# Patient Record
Sex: FEMALE | Race: WHITE | NOT HISPANIC OR LATINO | Employment: OTHER | URBAN - METROPOLITAN AREA
[De-identification: names, ages, dates, MRNs, and addresses within clinical notes are randomized per-mention and may not be internally consistent; named-entity substitution may affect disease eponyms.]

---

## 2017-01-10 ENCOUNTER — APPOINTMENT (OUTPATIENT)
Dept: LAB | Facility: HOSPITAL | Age: 57
End: 2017-01-10

## 2017-01-10 ENCOUNTER — OFFICE VISIT (OUTPATIENT)
Dept: INFECTIOUS DISEASES | Facility: CLINIC | Age: 57
End: 2017-01-10

## 2017-01-10 VITALS
HEART RATE: 66 BPM | HEIGHT: 67 IN | DIASTOLIC BLOOD PRESSURE: 83 MMHG | WEIGHT: 138.8 LBS | BODY MASS INDEX: 21.79 KG/M2 | TEMPERATURE: 98 F | SYSTOLIC BLOOD PRESSURE: 131 MMHG

## 2017-01-10 DIAGNOSIS — M81.0 OSTEOPOROSIS: ICD-10-CM

## 2017-01-10 DIAGNOSIS — G89.29 CHRONIC PAIN OF RIGHT KNEE: ICD-10-CM

## 2017-01-10 DIAGNOSIS — B20 HIV (HUMAN IMMUNODEFICIENCY VIRUS INFECTION) (HCC): Primary | ICD-10-CM

## 2017-01-10 DIAGNOSIS — M25.561 CHRONIC PAIN OF RIGHT KNEE: ICD-10-CM

## 2017-01-10 LAB
ALBUMIN SERPL-MCNC: 4.9 G/DL (ref 3.5–5.2)
ALBUMIN/GLOB SERPL: 2 G/DL
ALP SERPL-CCNC: 74 U/L (ref 39–117)
ALT SERPL W P-5'-P-CCNC: 15 U/L (ref 1–33)
ANION GAP SERPL CALCULATED.3IONS-SCNC: 13.9 MMOL/L
AST SERPL-CCNC: 20 U/L (ref 1–32)
BASOPHILS # BLD AUTO: 0.03 10*3/MM3 (ref 0–0.2)
BASOPHILS NFR BLD AUTO: 0.6 % (ref 0–1.5)
BILIRUB SERPL-MCNC: 0.3 MG/DL (ref 0.1–1.2)
BUN BLD-MCNC: 9 MG/DL (ref 6–20)
BUN/CREAT SERPL: 13.4 (ref 7–25)
CALCIUM SPEC-SCNC: 9.6 MG/DL (ref 8.6–10.5)
CHLORIDE SERPL-SCNC: 102 MMOL/L (ref 98–107)
CO2 SERPL-SCNC: 27.1 MMOL/L (ref 22–29)
CREAT BLD-MCNC: 0.67 MG/DL (ref 0.57–1)
DEPRECATED RDW RBC AUTO: 47.5 FL (ref 37–54)
EOSINOPHIL # BLD AUTO: 0.05 10*3/MM3 (ref 0–0.7)
EOSINOPHIL NFR BLD AUTO: 1 % (ref 0.3–6.2)
ERYTHROCYTE [DISTWIDTH] IN BLOOD BY AUTOMATED COUNT: 12.6 % (ref 11.7–13)
GFR SERPL CREATININE-BSD FRML MDRD: 91 ML/MIN/1.73
GLOBULIN UR ELPH-MCNC: 2.5 GM/DL
GLUCOSE BLD-MCNC: 90 MG/DL (ref 65–99)
HCT VFR BLD AUTO: 42.5 % (ref 35.6–45.5)
HGB BLD-MCNC: 13.7 G/DL (ref 11.9–15.5)
IMM GRANULOCYTES # BLD: 0 10*3/MM3 (ref 0–0.03)
IMM GRANULOCYTES NFR BLD: 0 % (ref 0–0.5)
LYMPHOCYTES # BLD AUTO: 2.33 10*3/MM3 (ref 0.9–4.8)
LYMPHOCYTES NFR BLD AUTO: 48.6 % (ref 19.6–45.3)
MCH RBC QN AUTO: 33.1 PG (ref 26.9–32)
MCHC RBC AUTO-ENTMCNC: 32.2 G/DL (ref 32.4–36.3)
MCV RBC AUTO: 102.7 FL (ref 80.5–98.2)
MONOCYTES # BLD AUTO: 0.41 10*3/MM3 (ref 0.2–1.2)
MONOCYTES NFR BLD AUTO: 8.6 % (ref 5–12)
NEUTROPHILS # BLD AUTO: 1.97 10*3/MM3 (ref 1.9–8.1)
NEUTROPHILS NFR BLD AUTO: 41.2 % (ref 42.7–76)
PLATELET # BLD AUTO: 258 10*3/MM3 (ref 140–500)
PMV BLD AUTO: 10.2 FL (ref 6–12)
POTASSIUM BLD-SCNC: 4.1 MMOL/L (ref 3.5–5.2)
PROT SERPL-MCNC: 7.4 G/DL (ref 6–8.5)
RBC # BLD AUTO: 4.14 10*6/MM3 (ref 3.9–5.2)
SODIUM BLD-SCNC: 143 MMOL/L (ref 136–145)
WBC NRBC COR # BLD: 4.79 10*3/MM3 (ref 4.5–10.7)

## 2017-01-10 PROCEDURE — 87536 HIV-1 QUANT&REVRSE TRNSCRPJ: CPT | Performed by: INTERNAL MEDICINE

## 2017-01-10 PROCEDURE — 36415 COLL VENOUS BLD VENIPUNCTURE: CPT | Performed by: INTERNAL MEDICINE

## 2017-01-10 PROCEDURE — 99214 OFFICE O/P EST MOD 30 MIN: CPT | Performed by: INTERNAL MEDICINE

## 2017-01-10 PROCEDURE — 85025 COMPLETE CBC W/AUTO DIFF WBC: CPT | Performed by: INTERNAL MEDICINE

## 2017-01-10 PROCEDURE — 86361 T CELL ABSOLUTE COUNT: CPT | Performed by: INTERNAL MEDICINE

## 2017-01-10 PROCEDURE — 80053 COMPREHEN METABOLIC PANEL: CPT | Performed by: INTERNAL MEDICINE

## 2017-01-10 NOTE — PROGRESS NOTES
CC: f/u HIV    HPI: Richa Dolan is a 56 y.o. female here for f/u of her HIV care. She continues to take Triumeq without any adverse effects. She has not missed any doses. She receives refills in a timely manner through [facility]. She has not had any obvious manifestations of her HIV disease. She has been dealing with some R knee pain worse w/ walking. No associated fevers or swelling or erythema. She is seeing ortho for this and contemplating TKA.     She has had some life stressors recently with father having dementia and her mother unable to care for him. Her son is applying for internships and requiring some heavy travel.    Review of Systems: no n/v/d; no CP or SOA    PMH: HIV, headaches, GERD, HLD    PSH: tonsillectomy, hysterectomy, cholecystectomy    FH: father had dementia    SH: , 1 son, no tobacco use, occasional EtOH, no illicits, works at [employer]    Allergies: Augmentin (hives)    Medications:   Current Outpatient Prescriptions:   •  baclofen (LIORESAL) 10 MG tablet, TAKE 1 TABLET BY MOUTH EVERY NIGHT AT BEDTIME, Disp: , Rfl: 3  •  calcium carbonate (TUMS) 500 MG chewable tablet, Chew 1,000 mg., Disp: , Rfl:   •  colesevelam (WELCHOL) 625 MG tablet, Take 2 tablets by mouth Daily., Disp: 180 tablet, Rfl: 3  •  diclofenac (VOLTAREN) 1 % gel gel, Apply 4 g topically 2 (two) times a day. Use per pkg insert, Disp: 100 g, Rfl: 2  •  escitalopram (LEXAPRO) 20 MG tablet, Take by mouth., Disp: , Rfl:   •  fluticasone (FLONASE) 50 MCG/ACT nasal spray, , Disp: , Rfl:   •  gabapentin (NEURONTIN) 300 MG capsule, , Disp: , Rfl:   •  HYDROcodone-acetaminophen (NORCO)  MG per tablet, Take by mouth., Disp: , Rfl:   •  ibandronate (BONIVA) 150 MG tablet, Take 1 tablet by mouth every 30 (thirty) days., Disp: 3 tablet, Rfl: 3  •  rizatriptan (MAXALT) 10 MG tablet, Take one tablet at onset of headache; may repeat one time in 2 hours if needed., Disp: 12 tablet, Rfl: 6  •  TRIUMEQ 600- MG tablet, TAKE 1  "TABLET BY MOUTH EVERY DAY, Disp: 90 tablet, Rfl: 3  •  zolpidem (AMBIEN) 10 MG tablet, Take 1 tablet by mouth At Night As Needed for sleep., Disp: 90 tablet, Rfl: 0        Blood pressure 131/83, pulse 66, temperature 98 °F (36.7 °C), height 67\" (170.2 cm), weight 138 lb 12.8 oz (63 kg).  GENERAL: Awake and alert, in no acute distress, very nice  HEENT: Oropharynx is clear. Good dentition. No thrush.  EYES: PERRL. EOMI. No scleral icterus.    LYMPHATICS: No lymphadenopathy of the neck region  HEART: Normal rate, regular rhythm.  No peripheral edema.   LUNGS: . Normal work of breathing on ambient air.    ABDOMEN: Soft, nontender, nondistended  SKIN: Warm and dry without cutaneous eruptions   PSYCHIATRIC: Appropriate mood, affect, insight, and judgment.   MSK: R knee crepitus      DIAGNOSTICS:  CD4 817/40% (7/2016)  VL < 20 (7/2016)  HIV Genotype: unknown bc undetectable  [id number] - negative  QF Gold - negative (7/2016)  RPR - nonreactive (10/2016)  Hep A - immune (7/2016)  Hep B - immune (9/2015)  Hep C - negative (9/2015)  Urine GCCT - negative (9/2015)    Lab Results   Component Value Date    WBC 4.1 07/06/2016    HGB 13.7 07/06/2016    HCT 39.8 07/06/2016     07/06/2016     Lab Results   Component Value Date    GLUCOSE 87 10/13/2016    BUN 13 10/13/2016    CREATININE 0.65 10/13/2016    EGFRIFNONA 94 10/13/2016    EGFRIFAFRI >60 05/28/2015    BCR 20.0 10/13/2016    CO2 27.1 10/13/2016    CALCIUM 9.3 10/13/2016    PROTENTOTREF 7.2 10/13/2016    ALBUMIN 4.80 10/13/2016    ALBUMIN 4.2 10/13/2016    LABIL2 2.1 10/13/2016    LABIL2 1.4 10/13/2016    AST 18 10/13/2016    ALT 16 10/13/2016     Radiology: no new imaging    ASSESSMENT/PLAN:    1. HIV (human immunodeficiency virus infection) - well controlled on ART  -continue Triumeq; refills addressed  -check CD4, VL, CBC, CMP today  -encouraged 100% compliance with therapy which she has already achieved for years now    2. Osteoporosis    3. HLD - resolved with diet " modification as of labs 1/2016    4. Chronic knee pain - receiving injections with orthopedics; contemplating replacement    5. Health Maintenance: mammogram and colonoscopoy per PCP. If she has never had a Prevnar-13 then she needs that too.    RTC 6 months

## 2017-01-10 NOTE — MR AVS SNAPSHOT
Richa Dolan   1/10/2017 1:00 PM   Office Visit    Dept Phone:  [phone]   Encounter #:  [id number]    Provider:  Denilson Dawn MD   Department:  [facility]                Your Full Care Plan              Your Updated Medication List          This list is accurate as of: 1/10/17  1:19 PM.  Always use your most recent med list.                baclofen 10 MG tablet   Commonly known as:  LIORESAL       calcium carbonate 500 MG chewable tablet   Commonly known as:  TUMS       colesevelam 625 MG tablet   Commonly known as:  WELCHOL   Take 2 tablets by mouth Daily.       diclofenac 1 % gel gel   Commonly known as:  VOLTAREN   Apply 4 g topically 2 (two) times a day. Use per pkg insert       escitalopram 20 MG tablet   Commonly known as:  LEXAPRO       fluticasone 50 MCG/ACT nasal spray   Commonly known as:  FLONASE       gabapentin 300 MG capsule   Commonly known as:  NEURONTIN       HYDROcodone-acetaminophen  MG per tablet   Commonly known as:  NORCO       ibandronate 150 MG tablet   Commonly known as:  BONIVA   Take 1 tablet by mouth every 30 (thirty) days.       rizatriptan 10 MG tablet   Commonly known as:  MAXALT   Take one tablet at onset of headache; may repeat one time in 2 hours if needed.       TRIUMEQ 600- MG tablet   Generic drug:  Abacavir-Dolutegravir-Lamivud   TAKE 1 TABLET BY MOUTH EVERY DAY       zolpidem 10 MG tablet   Commonly known as:  AMBIEN   Take 1 tablet by mouth At Night As Needed for sleep.               We Performed the Following     CBC & Differential     Comprehensive Metabolic Panel     HIV RNA Real Time PCR (Non-Graph)     T-helper Cells (CD4) Count       You Were Diagnosed With        Codes Comments    HIV (human immunodeficiency virus infection)    -  Primary ICD-10-CM: Z21  ICD-9-CM: V08     Chronic pain of right knee     ICD-10-CM: M25.561, G89.29  ICD-9-CM: 719.46, 338.29     Osteoporosis     ICD-10-CM:  "M81.0  ICD-9-CM: 733.00       Instructions     None    Patient Instructions History      Upcoming Appointments     Visit Type Date Time Department    FOLLOW UP 1/10/2017  1:00 PM [facility]    FOLLOW UP 7/10/2017 10:40 AM [facility]      MyChart Signup     [facility] [employer] allows you to send messages to your doctor, view your test results, renew your prescriptions, schedule appointments, and more. To sign up, go to [employer] and click on the Sign Up Now link in the New User? box. Enter your [employer] Activation Code exactly as it appears below along with the last four digits of your Social Security Number and your Date of Birth () to complete the sign-up process. If you do not sign up before the expiration date, you must request a new code.    [employer] Activation Code: [email or web address]  Expires: 2017  1:19 PM    If you have questions, you can email [email or web address] or call [phone] to talk to our [employer] staff. Remember, [employer] is NOT to be used for urgent needs. For medical emergencies, dial 911.               Other Info from Your Visit           Your Appointments     Jul 10, 2017 10:40 AM EDT   Follow Up with Denilson Dawn MD   [facility] (--)    [address] [place] [id number] [phone]           Arrive 15 minutes prior to appointment.              Allergies     Augmentin [Amoxicillin-pot Clavulanate]  Hives      Reason for Visit     Follow-up           Vital Signs     Blood Pressure Pulse Temperature Height Weight Body Mass Index    131/83 66 98 °F (36.7 °C) 67\" (170.2 cm) 138 lb 12.8 oz (63 kg) 21.74 kg/m2    Smoking Status                   Never Smoker           Problems and Diagnoses Noted     Osteoporosis    Chronic pain of right knee            "

## 2017-01-11 ENCOUNTER — TELEPHONE (OUTPATIENT)
Dept: INFECTIOUS DISEASES | Facility: CLINIC | Age: 57
End: 2017-01-11

## 2017-01-11 DIAGNOSIS — B20 HIV (HUMAN IMMUNODEFICIENCY VIRUS INFECTION) (HCC): Primary | ICD-10-CM

## 2017-01-11 LAB
BASOPHILS # BLD AUTO: 0 X10E3/UL (ref 0–0.2)
BASOPHILS NFR BLD AUTO: 0 %
CD3+CD4+ CELLS # BLD: 1028 /UL (ref 359–1519)
CD3+CD4+ CELLS NFR BLD: 44.7 % (ref 30.8–58.5)
EOSINOPHIL # BLD AUTO: 0 X10E3/UL (ref 0–0.4)
EOSINOPHIL # BLD AUTO: 1 %
ERYTHROCYTE [DISTWIDTH] IN BLOOD BY AUTOMATED COUNT: 13 % (ref 12.3–15.4)
HCT VFR BLD AUTO: 39.9 % (ref 34–46.6)
HGB BLD-MCNC: 13.6 G/DL (ref 11.1–15.9)
HIV1 RNA # SERPL NAA+PROBE: 140 COPIES/ML
IMM GRANULOCYTES # BLD: 0 X10E3/UL (ref 0–0.1)
IMM GRANULOCYTES NFR BLD: 0 %
LOG10 HIV-1 RNA: 2.15 LOG10COPY/ML
LYMPHOCYTES # BLD AUTO: 2.3 X10E3/UL (ref 0.7–3.1)
LYMPHOCYTES NFR BLD AUTO: 49 %
MCH RBC QN AUTO: 32.8 PG (ref 26.6–33)
MCHC RBC AUTO-ENTMCNC: 34.1 G/DL (ref 31.5–35.7)
MCV RBC AUTO: 96 FL (ref 79–97)
MONOCYTES # BLD AUTO: 0.4 X10E3/UL (ref 0.1–0.9)
MONOCYTES NFR BLD AUTO: 8 %
NEUTROPHILS # BLD AUTO: 2 X10E3/UL (ref 1.4–7)
NEUTROPHILS NFR BLD AUTO: 42 %
PLATELET # BLD AUTO: 269 X10E3/UL (ref 150–379)
RBC # BLD AUTO: 4.15 X10E6/UL (ref 3.77–5.28)
WBC # BLD AUTO: 4.7 X10E3/UL (ref 3.4–10.8)

## 2017-02-06 RX ORDER — ZOLPIDEM TARTRATE 10 MG/1
TABLET ORAL
Qty: 90 TABLET | Refills: 0 | OUTPATIENT
Start: 2017-02-06

## 2017-02-10 ENCOUNTER — OFFICE VISIT (OUTPATIENT)
Dept: FAMILY MEDICINE CLINIC | Facility: CLINIC | Age: 57
End: 2017-02-10

## 2017-02-10 VITALS
SYSTOLIC BLOOD PRESSURE: 118 MMHG | WEIGHT: 139.4 LBS | OXYGEN SATURATION: 99 % | HEIGHT: 67 IN | TEMPERATURE: 98.5 F | DIASTOLIC BLOOD PRESSURE: 72 MMHG | HEART RATE: 72 BPM | BODY MASS INDEX: 21.88 KG/M2

## 2017-02-10 DIAGNOSIS — M81.0 OSTEOPOROSIS: ICD-10-CM

## 2017-02-10 DIAGNOSIS — J30.2 SEASONAL ALLERGIC RHINITIS, UNSPECIFIED ALLERGIC RHINITIS TRIGGER: Primary | ICD-10-CM

## 2017-02-10 DIAGNOSIS — G47.00 INSOMNIA, UNSPECIFIED TYPE: ICD-10-CM

## 2017-02-10 PROCEDURE — 99214 OFFICE O/P EST MOD 30 MIN: CPT | Performed by: NURSE PRACTITIONER

## 2017-02-10 RX ORDER — ZOLPIDEM TARTRATE 10 MG/1
10 TABLET ORAL NIGHTLY PRN
Qty: 90 TABLET | Refills: 0 | Status: SHIPPED | OUTPATIENT
Start: 2017-02-10 | End: 2017-05-09 | Stop reason: SDUPTHER

## 2017-02-10 RX ORDER — IBANDRONATE SODIUM 150 MG/1
150 TABLET, FILM COATED ORAL
Qty: 3 TABLET | Refills: 3 | Status: SHIPPED | OUTPATIENT
Start: 2017-02-10 | End: 2018-05-09 | Stop reason: SDUPTHER

## 2017-02-10 RX ORDER — FLUTICASONE PROPIONATE 50 MCG
2 SPRAY, SUSPENSION (ML) NASAL DAILY
Qty: 3 EACH | Refills: 3 | Status: SHIPPED | OUTPATIENT
Start: 2017-02-10 | End: 2018-10-09 | Stop reason: SDUPTHER

## 2017-02-13 ENCOUNTER — RESULTS ENCOUNTER (OUTPATIENT)
Dept: INFECTIOUS DISEASES | Facility: CLINIC | Age: 57
End: 2017-02-13

## 2017-02-13 ENCOUNTER — APPOINTMENT (OUTPATIENT)
Dept: LAB | Facility: HOSPITAL | Age: 57
End: 2017-02-13

## 2017-02-13 DIAGNOSIS — B20 HIV (HUMAN IMMUNODEFICIENCY VIRUS INFECTION) (HCC): ICD-10-CM

## 2017-02-13 PROCEDURE — 36415 COLL VENOUS BLD VENIPUNCTURE: CPT | Performed by: INTERNAL MEDICINE

## 2017-02-13 PROCEDURE — 87536 HIV-1 QUANT&REVRSE TRNSCRPJ: CPT | Performed by: INTERNAL MEDICINE

## 2017-02-15 LAB
HIV1 RNA # SERPL NAA+PROBE: <20 COPIES/ML
LOG10 HIV-1 RNA: NORMAL LOG10COPY/ML

## 2017-03-01 ENCOUNTER — OFFICE VISIT (OUTPATIENT)
Dept: FAMILY MEDICINE CLINIC | Facility: CLINIC | Age: 57
End: 2017-03-01

## 2017-03-01 VITALS
HEIGHT: 67 IN | SYSTOLIC BLOOD PRESSURE: 120 MMHG | BODY MASS INDEX: 21.44 KG/M2 | DIASTOLIC BLOOD PRESSURE: 74 MMHG | OXYGEN SATURATION: 98 % | TEMPERATURE: 98.4 F | WEIGHT: 136.6 LBS | HEART RATE: 71 BPM

## 2017-03-01 DIAGNOSIS — G89.29 CHRONIC PAIN OF RIGHT KNEE: Primary | ICD-10-CM

## 2017-03-01 DIAGNOSIS — M25.561 CHRONIC PAIN OF RIGHT KNEE: Primary | ICD-10-CM

## 2017-03-01 PROCEDURE — 73560 X-RAY EXAM OF KNEE 1 OR 2: CPT | Performed by: NURSE PRACTITIONER

## 2017-03-01 PROCEDURE — 99214 OFFICE O/P EST MOD 30 MIN: CPT | Performed by: NURSE PRACTITIONER

## 2017-03-01 PROCEDURE — 20610 DRAIN/INJ JOINT/BURSA W/O US: CPT | Performed by: NURSE PRACTITIONER

## 2017-03-01 RX ORDER — TRIAMCINOLONE ACETONIDE 40 MG/ML
40 INJECTION, SUSPENSION INTRA-ARTICULAR; INTRAMUSCULAR ONCE
Status: COMPLETED | OUTPATIENT
Start: 2017-03-01 | End: 2017-03-01

## 2017-03-01 RX ADMIN — TRIAMCINOLONE ACETONIDE 40 MG: 40 INJECTION, SUSPENSION INTRA-ARTICULAR; INTRAMUSCULAR at 13:31

## 2017-03-01 NOTE — PROGRESS NOTES
"Subjective   Richa Dolan is a 56 y.o. female.     History of Present Illness   Knee Pain: Patient presents with a knee injury involving the  right knee. Onset of the symptoms was 2 weeks ago. Inciting event: injured while walking dog fell. Current symptoms include pain located frontal. Pain is aggravated by any weight bearing, lateral movements and walking.  Patient has had prior knee problems. Evaluation to date: none. Treatment to date: none.  Sees ortho for arthritis and has had steroid shots and Synvisc injections in that knee the last shot was last year.    The following portions of the patient's history were reviewed and updated as appropriate: allergies, current medications, past social history and problem list.    Review of Systems   All other systems reviewed and are negative.      Objective   Visit Vitals   • /74 (BP Location: Right arm, Patient Position: Sitting)   • Pulse 71   • Temp 98.4 °F (36.9 °C)   • Ht 67\" (170.2 cm)   • Wt 136 lb 9.6 oz (62 kg)   • SpO2 98%   • BMI 21.39 kg/m2     Physical Exam   Constitutional: She is oriented to person, place, and time. Vital signs are normal. She appears well-developed and well-nourished. No distress.   HENT:   Head: Normocephalic.   Cardiovascular: Normal rate, regular rhythm and normal heart sounds.    Pulmonary/Chest: Effort normal and breath sounds normal.   Musculoskeletal:   Full ROM exam within normal limits.  Pain with palpation to frontal knee just below knee cap   Neurological: She is alert and oriented to person, place, and time. Gait normal.   Psychiatric: She has a normal mood and affect. Her behavior is normal. Judgment and thought content normal.   Vitals reviewed.    Xray- knee for injury     Findings- arthritis  No comparsion    Arthrocentesis  Date/Time: 3/1/2017 1:21 PM  Performed by: REJI MOFFETT  Authorized by: REJI MOFFETT   Consent: Verbal consent obtained.  Consent given by: patient  Patient understanding: patient states " understanding of the procedure being performed  Patient consent: the patient's understanding of the procedure matches consent given  Patient identity confirmed: verbally with patient  Indications: pain   Body area: knee  Joint: right knee  Local anesthesia used: yes    Anesthesia:  Local anesthesia used: yes  Local Anesthetic: topical anesthetic   Sedation:  Patient sedated: no    Needle size: 20 G  Ultrasound guidance: no  Approach: lateral  Triamcinolone amount: 40 mg  Patient tolerance: Patient tolerated the procedure well with no immediate complications          Assessment/Plan   Problem List Items Addressed This Visit        Musculoskeletal and Integument    Chronic pain of right knee - Primary    Relevant Medications    triamcinolone acetonide (KENALOG-40) injection 40 mg (Start on 3/1/2017  2:00 PM)    Other Relevant Orders    XR Knee 1 or 2 View Right (Completed)    MRI Knee Right Without Contrast           ice to knee

## 2017-03-07 ENCOUNTER — HOSPITAL ENCOUNTER (OUTPATIENT)
Dept: MRI IMAGING | Facility: HOSPITAL | Age: 57
Discharge: HOME | End: 2017-03-07
Admitting: NURSE PRACTITIONER

## 2017-03-07 DIAGNOSIS — M25.561 CHRONIC PAIN OF RIGHT KNEE: ICD-10-CM

## 2017-03-07 DIAGNOSIS — G89.29 CHRONIC PAIN OF RIGHT KNEE: ICD-10-CM

## 2017-03-07 PROCEDURE — 73721 MRI JNT OF LWR EXTRE W/O DYE: CPT

## 2017-03-13 DIAGNOSIS — R93.6 ABNORMAL MRI, KNEE: Primary | ICD-10-CM

## 2017-03-14 PROBLEM — K21.9 GASTROESOPHAGEAL REFLUX DISEASE: Status: ACTIVE | Noted: 2017-03-14

## 2017-03-14 PROBLEM — G89.29 CHRONIC NECK PAIN: Status: ACTIVE | Noted: 2017-03-14

## 2017-03-14 PROBLEM — F41.9 ANXIETY: Status: ACTIVE | Noted: 2017-03-14

## 2017-03-14 PROBLEM — E78.00 HYPERCHOLESTEROLEMIA: Status: ACTIVE | Noted: 2017-03-14

## 2017-03-14 PROBLEM — M54.2 CHRONIC NECK PAIN: Status: ACTIVE | Noted: 2017-03-14

## 2017-03-17 ENCOUNTER — OFFICE VISIT (OUTPATIENT)
Dept: ORTHOPEDIC SURGERY | Facility: CLINIC | Age: 57
End: 2017-03-17

## 2017-03-17 ENCOUNTER — TELEPHONE (OUTPATIENT)
Dept: ORTHOPEDIC SURGERY | Facility: CLINIC | Age: 57
End: 2017-03-17

## 2017-03-17 VITALS — HEIGHT: 67 IN | BODY MASS INDEX: 21.35 KG/M2 | TEMPERATURE: 98.4 F | WEIGHT: 136 LBS

## 2017-03-17 DIAGNOSIS — M17.11 PRIMARY OSTEOARTHRITIS OF RIGHT KNEE: ICD-10-CM

## 2017-03-17 DIAGNOSIS — S83.241A TEAR OF MEDIAL MENISCUS OF RIGHT KNEE, CURRENT, UNSPECIFIED TEAR TYPE, INITIAL ENCOUNTER: ICD-10-CM

## 2017-03-17 DIAGNOSIS — M25.561 RIGHT KNEE PAIN, UNSPECIFIED CHRONICITY: Primary | ICD-10-CM

## 2017-03-17 PROCEDURE — 73562 X-RAY EXAM OF KNEE 3: CPT | Performed by: ORTHOPAEDIC SURGERY

## 2017-03-17 PROCEDURE — 99214 OFFICE O/P EST MOD 30 MIN: CPT | Performed by: ORTHOPAEDIC SURGERY

## 2017-03-17 RX ORDER — SODIUM CHLORIDE 0.9 % (FLUSH) 0.9 %
1-10 SYRINGE (ML) INJECTION AS NEEDED
Status: CANCELLED | OUTPATIENT
Start: 2017-03-17

## 2017-03-17 RX ORDER — VANCOMYCIN HYDROCHLORIDE 1 G/200ML
15 INJECTION, SOLUTION INTRAVENOUS ONCE
Status: CANCELLED | OUTPATIENT
Start: 2017-03-17 | End: 2017-03-17

## 2017-03-20 NOTE — PROGRESS NOTES
New Patient Complaint      Patient: Richa Dolan  YOB: 1960 56 y.o. female  Medical Record Number: [id number]    Chief Complaints: My knee hurts    History of Present Illness:  Three-week history of moderate intermittent worsening aching pain with grinding and painful popping and catching to the right knee that began after an injury twisting while walking her dog.  Been improved only minimally with ice.  She saw her primary care physician and was sent for MRI and is seen here today for further evaluation.  I last saw her in August of last year with some anterior ankle pain with impingement that has responded very well to injection she is no longer having any pain or burning to the left ankle.    HPI    Allergies:   Allergies   Allergen Reactions   • Augmentin [Amoxicillin-Pot Clavulanate] Hives       Medications:   Current Outpatient Prescriptions on File Prior to Visit   Medication Sig   • colesevelam (WELCHOL) 625 MG tablet Take 2 tablets by mouth Daily.   • diclofenac (VOLTAREN) 1 % gel gel Apply 4 g topically 2 (two) times a day. Use per pkg insert   • escitalopram (LEXAPRO) 20 MG tablet Take by mouth.   • fluticasone (FLONASE) 50 MCG/ACT nasal spray 2 sprays into each nostril Daily.   • gabapentin (NEURONTIN) 300 MG capsule Take 300 mg by mouth 3 (Three) Times a Day.   • HYDROcodone-acetaminophen (NORCO)  MG per tablet Take by mouth.   • ibandronate (BONIVA) 150 MG tablet Take 1 tablet by mouth Every 30 (Thirty) Days.   • rizatriptan (MAXALT) 10 MG tablet Take one tablet at onset of headache; may repeat one time in 2 hours if needed.   • TRIUMEQ 600- MG tablet TAKE 1 TABLET BY MOUTH EVERY DAY   • zolpidem (AMBIEN) 10 MG tablet Take 1 tablet by mouth At Night As Needed for sleep.     No current facility-administered medications on file prior to visit.        Past Medical History   Diagnosis Date   • Depression    • HIV (human immunodeficiency virus infection)    • Hyperlipidemia   "  • Insomnia    • Insomnia    • Neck pain    • Osteoarthritis    • Osteoporosis      Past Surgical History   Procedure Laterality Date   • Adenoidectomy     • Hysterectomy     • Tonsillectomy       Social History     Occupational History   • Not on file.     Social History Main Topics   • Smoking status: Never Smoker   • Smokeless tobacco: Never Used   • Alcohol use No   • Drug use: No   • Sexual activity: Not on file      Social History     Social History Narrative     Family History   Problem Relation Age of Onset   • Cancer Mother    • Dementia Father    • Heart disease Maternal Grandmother    • Diabetes Maternal Grandfather    • Heart disease Maternal Grandfather    • Stroke Maternal Grandfather    • Osteoporosis Paternal Grandmother    • Heart disease Paternal Grandfather        Review of Systems: 14 point review of systems performed, positive pertinent findings identified in HPI. All remaining systems negative for difficulty sleeping    Review of Systems      Physical Exam:   Vitals:    03/17/17 1250   Temp: 98.4 °F (36.9 °C)   TempSrc: Temporal Artery    Weight: 136 lb (61.7 kg)   Height: 67\" (170.2 cm)     Physical Exam   Constitutional: pleasant, well developed   Eyes: sclera non icteric  Hearing : adequate for exam  Cardiovascular: palpable pulses in foot, calf/ thigh NT without sign of DVT  Respiratoy: breathing unlabored   Neurological: grossly sensate to LT throughout LE  Psychiatric: oriented with normal mood and affect.   Lymphatic: No palpable popliteal lymphadenopathy  Skin: intact throughout leg/foot  Musculoskeletal: Nonantalgic gait.  Mild effusion right knee.  0-115° range of motion.  Grossly stable ligamentous exam with discomfort palpation over the medial more so than lateral joint line exacerbated by Deysi's medially..  Physical Exam  Ortho Exam    Radiology: 3 views of the right knee were ordered by weight knee pain reviewed and there are no prior x-rays available for comparison but there " was a report from 3/1/17 of the right knee.  I did not see any obvious fracture or abnormality overall joint space appeared to be a little bit thinned medially but overall preserved.  This is consistent with the previous report    MRI films and report of the right knee do show an oblique tear of the medial meniscal body some mild medial and patellofemoral arthritic change and a 1.5 cm bone contusion over the anterior aspect of the lateral tibial plateau.  As well as a Baker cyst.    Assessment/Plan: Right knee medial meniscal tear with mild exacerbation of chondromalacia.  We discussed operative and nonoperative treatment options however given her onset of pain with an associated event and persistent mechanical symptoms I would recommend arthroscopic debridement.I have discussed operative and non-operative treatment options and although no guarantees could be given,  pt would like to proceed with operative treatment. Plan is for knee scope with debridement as needed. We discussed the  procedure in detail including use of a model as well as  post op protocol. Risks, benefits, potential outcomes, and complications were reviewed and can include but are not limited to heart attack, stroke, death, pneumonia, infection, bleeding, damage to blood vessels, nerves, or tendons, blood clot, pulmonary embolus,persistent or worsening synptoms, stiffness, need for subsequent surgery, and failure to return to presurgery and precondition levels of activity.Pt voiced a clear understanding of these. This will be scheduled on an outpatient basis at a mutually convenient time. Pt was encouraged to call with any questions prior to surgery.

## 2017-03-22 ENCOUNTER — APPOINTMENT (OUTPATIENT)
Dept: PREADMISSION TESTING | Facility: HOSPITAL | Age: 57
End: 2017-03-22

## 2017-03-22 VITALS
DIASTOLIC BLOOD PRESSURE: 77 MMHG | RESPIRATION RATE: 18 BRPM | HEIGHT: 68 IN | HEART RATE: 77 BPM | BODY MASS INDEX: 20.82 KG/M2 | WEIGHT: 137.4 LBS | TEMPERATURE: 98 F | SYSTOLIC BLOOD PRESSURE: 117 MMHG | OXYGEN SATURATION: 100 %

## 2017-03-22 LAB
ANION GAP SERPL CALCULATED.3IONS-SCNC: 13.7 MMOL/L
BASOPHILS # BLD AUTO: 0.03 10*3/MM3 (ref 0–0.2)
BASOPHILS NFR BLD AUTO: 0.4 % (ref 0–1.5)
BUN BLD-MCNC: 12 MG/DL (ref 6–20)
BUN/CREAT SERPL: 19.4 (ref 7–25)
CALCIUM SPEC-SCNC: 9.3 MG/DL (ref 8.6–10.5)
CHLORIDE SERPL-SCNC: 101 MMOL/L (ref 98–107)
CO2 SERPL-SCNC: 26.3 MMOL/L (ref 22–29)
CREAT BLD-MCNC: 0.62 MG/DL (ref 0.57–1)
DEPRECATED RDW RBC AUTO: 45.6 FL (ref 37–54)
EOSINOPHIL # BLD AUTO: 0.08 10*3/MM3 (ref 0–0.7)
EOSINOPHIL NFR BLD AUTO: 1.1 % (ref 0.3–6.2)
ERYTHROCYTE [DISTWIDTH] IN BLOOD BY AUTOMATED COUNT: 12.6 % (ref 11.7–13)
GFR SERPL CREATININE-BSD FRML MDRD: 100 ML/MIN/1.73
GLUCOSE BLD-MCNC: 97 MG/DL (ref 65–99)
HCT VFR BLD AUTO: 40.1 % (ref 35.6–45.5)
HGB BLD-MCNC: 13.2 G/DL (ref 11.9–15.5)
IMM GRANULOCYTES # BLD: 0 10*3/MM3 (ref 0–0.03)
IMM GRANULOCYTES NFR BLD: 0 % (ref 0–0.5)
LYMPHOCYTES # BLD AUTO: 2.64 10*3/MM3 (ref 0.9–4.8)
LYMPHOCYTES NFR BLD AUTO: 36.5 % (ref 19.6–45.3)
MCH RBC QN AUTO: 32.7 PG (ref 26.9–32)
MCHC RBC AUTO-ENTMCNC: 32.9 G/DL (ref 32.4–36.3)
MCV RBC AUTO: 99.3 FL (ref 80.5–98.2)
MONOCYTES # BLD AUTO: 0.67 10*3/MM3 (ref 0.2–1.2)
MONOCYTES NFR BLD AUTO: 9.3 % (ref 5–12)
NEUTROPHILS # BLD AUTO: 3.81 10*3/MM3 (ref 1.9–8.1)
NEUTROPHILS NFR BLD AUTO: 52.7 % (ref 42.7–76)
PLATELET # BLD AUTO: 297 10*3/MM3 (ref 140–500)
PMV BLD AUTO: 9.8 FL (ref 6–12)
POTASSIUM BLD-SCNC: 4.2 MMOL/L (ref 3.5–5.2)
RBC # BLD AUTO: 4.04 10*6/MM3 (ref 3.9–5.2)
SODIUM BLD-SCNC: 141 MMOL/L (ref 136–145)
WBC NRBC COR # BLD: 7.23 10*3/MM3 (ref 4.5–10.7)

## 2017-03-22 NOTE — DISCHARGE INSTRUCTIONS
Take the following medications the morning of surgery with a small sip of water:  NONE  STOP PREOPERATIVELY ANY ANTIINFLAMMATORY, VOLTAREN GEL, MULTIVITAMIN, AND PROBIOTIC.  ARRIVE TO THE OUTPATIENT SURGERY DESK THE DAY OF YOUR SURGERY BY 7AM.        General Instructions:  • Do not eat or drink after midnight: includes water, mints, or gum. You may brush your teeth.  • Do not smoke, chew tobacco, or drink alcohol.  • The Pre-Admission Testing nurse will instruct you to bring medications if unable to obtain an accurate list in Pre-Admission Testing.    • If applicable bring your C-PAP/ BI-PAP machine.  • Bring any papers given to you in the doctor’s office.  • Wear clean comfortable clothes and socks.  • Do not wear contact lenses or make-up.  Bring a case for your glasses if applicable.   • Bring crutches or walker if applicable.  • Leave all other valuables and jewelry at home.    If you were given a blood bank ID arm band remember to bring it with you the day of surgery.    Preventing a Surgical Site Infection:  Shower on the morning of surgery using a fresh bar of anti-bacterial soap (such as Dial) and clean washcloth.  Dry with a clean towel and dress in clean clothing.  For 2 to 3 days before surgery, avoid shaving with a razor near where you will have surgery because the razor can irritate skin and make it easier to develop an infection  Ask your surgeon if you will be receiving antibiotics prior to surgery  Make sure you, your family, and all healthcare providers clean their hands with soap and water or an alcohol based hand  before caring for you or your wound  If at all possible, quit smoking as many days before surgery as you can.    Day of surgery:  Upon arrival, a Pre-op nurse and Anesthesiologist will review your health history, obtain vital signs, and answer questions you may have.  The only belongings needed at this time will be your home medications and if applicable your C-PAP/BI-PAP  machine.  If you are staying overnight your family can leave the rest of your belongings in the car and bring them to your room later.  A Pre-op nurse will start an IV and you may receive medication in preparation for surgery, including something to help you relax.  Your family will be able to see you in the Pre-op area.  While you are in surgery your family should notify the waiting room  if they leave the waiting room area and provide a contact phone number.    Please be aware that surgery does come with discomfort.  We want to make every effort to control your discomfort so please discuss any uncontrolled symptoms with your nurse.   Your doctor will most likely have prescribed pain medications.      If you are going home after surgery you will receive individualized written care instructions before being discharged.  A responsible adult must drive you to and from the hospital on the day of your surgery and stay with you for 24 hours.    If you are staying overnight following surgery, you will be transported to your hospital room following the recovery period.  [facility] has all private rooms.    If you have any questions please call Pre-Admission Testing at [phone].  Deductibles and co-payments are collected on the day of service. Please be prepared to pay the required co-pay, deductible or deposit on the day of service as defined by your plan.

## 2017-03-23 NOTE — H&P
Patient: Richa Dolan  YOB: 1960 56 y.o. female  Medical Record Number: [id number]     Chief Complaints: My knee hurts     History of Present Illness: Three-week history of moderate intermittent worsening aching pain with grinding and painful popping and catching to the right knee that began after an injury twisting while walking her dog. Been improved only minimally with ice. She saw her primary care physician and was sent for MRI and is seen here today for further evaluation. I last saw her in August of last year with some anterior ankle pain with impingement that has responded very well to injection she is no longer having any pain or burning to the left ankle.     HPI     Allergies:        Allergies   Allergen Reactions   • Augmentin [Amoxicillin-Pot Clavulanate] Hives         Medications:   Current Outpatient Prescriptions on File Prior to Visit   Medication Sig   • colesevelam (WELCHOL) 625 MG tablet Take 2 tablets by mouth Daily.   • diclofenac (VOLTAREN) 1 % gel gel Apply 4 g topically 2 (two) times a day. Use per pkg insert   • escitalopram (LEXAPRO) 20 MG tablet Take by mouth.   • fluticasone (FLONASE) 50 MCG/ACT nasal spray 2 sprays into each nostril Daily.   • gabapentin (NEURONTIN) 300 MG capsule Take 300 mg by mouth 3 (Three) Times a Day.   • HYDROcodone-acetaminophen (NORCO)  MG per tablet Take by mouth.   • ibandronate (BONIVA) 150 MG tablet Take 1 tablet by mouth Every 30 (Thirty) Days.   • rizatriptan (MAXALT) 10 MG tablet Take one tablet at onset of headache; may repeat one time in 2 hours if needed.   • TRIUMEQ 600- MG tablet TAKE 1 TABLET BY MOUTH EVERY DAY   • zolpidem (AMBIEN) 10 MG tablet Take 1 tablet by mouth At Night As Needed for sleep.      No current facility-administered medications on file prior to visit.           Medical History         Past Medical History   Diagnosis Date   • Depression     • HIV (human immunodeficiency virus infection)     •  "Hyperlipidemia     • Insomnia     • Insomnia     • Neck pain     • Osteoarthritis     • Osteoporosis            Surgical History          Past Surgical History   Procedure Laterality Date   • Adenoidectomy       • Hysterectomy       • Tonsillectomy             Social History          Occupational History   • Not on file.           Social History Main Topics   • Smoking status: Never Smoker   • Smokeless tobacco: Never Used   • Alcohol use No   • Drug use: No   • Sexual activity: Not on file      Social History      Social History Narrative            Family History   Problem Relation Age of Onset   • Cancer Mother     • Dementia Father     • Heart disease Maternal Grandmother     • Diabetes Maternal Grandfather     • Heart disease Maternal Grandfather     • Stroke Maternal Grandfather     • Osteoporosis Paternal Grandmother     • Heart disease Paternal Grandfather           Review of Systems: 14 point review of systems performed, positive pertinent findings identified in HPI. All remaining systems negative for difficulty sleeping     Review of Systems        Physical Exam:    Vitals        Vitals:     03/17/17 1250   Temp: 98.4 °F (36.9 °C)   TempSrc: Temporal Artery    Weight: 136 lb (61.7 kg)   Height: 67\" (170.2 cm)         Physical Exam   Constitutional: pleasant, well developed   Eyes: sclera non icteric  Hearing : adequate for exam  Cardiovascular: palpable pulses in foot, calf/ thigh NT without sign of DVT  Respiratoy: breathing unlabored   Neurological: grossly sensate to LT throughout LE  Psychiatric: oriented with normal mood and affect.   Lymphatic: No palpable popliteal lymphadenopathy  Skin: intact throughout leg/foot  Musculoskeletal: Nonantalgic gait. Mild effusion right knee. 0-115° range of motion. Grossly stable ligamentous exam with discomfort palpation over the medial more so than lateral joint line exacerbated by Deysi's medially..  Physical Exam  Ortho Exam     Radiology: 3 views of the " right knee were ordered by weight knee pain reviewed and there are no prior x-rays available for comparison but there was a report from 3/1/17 of the right knee. I did not see any obvious fracture or abnormality overall joint space appeared to be a little bit thinned medially but overall preserved. This is consistent with the previous report     MRI films and report of the right knee do show an oblique tear of the medial meniscal body some mild medial and patellofemoral arthritic change and a 1.5 cm bone contusion over the anterior aspect of the lateral tibial plateau. As well as a Baker cyst.     Assessment/Plan: Right knee medial meniscal tear with mild exacerbation of chondromalacia. We discussed operative and nonoperative treatment options however given her onset of pain with an associated event and persistent mechanical symptoms I would recommend arthroscopic debridement.I have discussed operative and non-operative treatment options and although no guarantees could be given, pt would like to proceed with operative treatment. Plan is for knee scope with debridement as needed. We discussed the procedure in detail including use of a model as well as post op protocol. Risks, benefits, potential outcomes, and complications were reviewed and can include but are not limited to heart attack, stroke, death, pneumonia, infection, bleeding, damage to blood vessels, nerves, or tendons, blood clot, pulmonary embolus,persistent or worsening synptoms, stiffness, need for subsequent surgery, and failure to return to presurgery and precondition levels of activity.Pt voiced a clear understanding of these. This will be scheduled on an outpatient basis at a mutually convenient time. Pt was encouraged to call with any questions prior to surgery.

## 2017-03-24 ENCOUNTER — HOSPITAL ENCOUNTER (OUTPATIENT)
Facility: HOSPITAL | Age: 57
Setting detail: HOSPITAL OUTPATIENT SURGERY
Discharge: HOME | End: 2017-03-24
Attending: ORTHOPAEDIC SURGERY | Admitting: ORTHOPAEDIC SURGERY

## 2017-03-24 ENCOUNTER — ANESTHESIA EVENT (OUTPATIENT)
Dept: PERIOP | Facility: HOSPITAL | Age: 57
End: 2017-03-24

## 2017-03-24 ENCOUNTER — ANESTHESIA (OUTPATIENT)
Dept: PERIOP | Facility: HOSPITAL | Age: 57
End: 2017-03-24

## 2017-03-24 VITALS
TEMPERATURE: 98.1 F | OXYGEN SATURATION: 97 % | SYSTOLIC BLOOD PRESSURE: 117 MMHG | DIASTOLIC BLOOD PRESSURE: 74 MMHG | RESPIRATION RATE: 20 BRPM | HEART RATE: 71 BPM

## 2017-03-24 DIAGNOSIS — S83.241A TEAR OF MEDIAL MENISCUS OF RIGHT KNEE, CURRENT, UNSPECIFIED TEAR TYPE, INITIAL ENCOUNTER: ICD-10-CM

## 2017-03-24 PROCEDURE — 25010000002 VANCOMYCIN PER 500 MG: Performed by: ORTHOPAEDIC SURGERY

## 2017-03-24 PROCEDURE — 25010000002 DEXAMETHASONE PER 1 MG: Performed by: NURSE ANESTHETIST, CERTIFIED REGISTERED

## 2017-03-24 PROCEDURE — 25010000002 KETOROLAC TROMETHAMINE PER 15 MG: Performed by: NURSE ANESTHETIST, CERTIFIED REGISTERED

## 2017-03-24 PROCEDURE — 25010000002 MIDAZOLAM PER 1 MG: Performed by: ANESTHESIOLOGY

## 2017-03-24 PROCEDURE — 25010000002 PROPOFOL 10 MG/ML EMULSION: Performed by: NURSE ANESTHETIST, CERTIFIED REGISTERED

## 2017-03-24 PROCEDURE — 25010000002 EPINEPHRINE PER 0.1 MG: Performed by: ORTHOPAEDIC SURGERY

## 2017-03-24 PROCEDURE — 25010000002 FENTANYL CITRATE (PF) 100 MCG/2ML SOLUTION: Performed by: NURSE ANESTHETIST, CERTIFIED REGISTERED

## 2017-03-24 PROCEDURE — 29881 ARTHRS KNE SRG MNISECTMY M/L: CPT | Performed by: ORTHOPAEDIC SURGERY

## 2017-03-24 PROCEDURE — 25010000002 ONDANSETRON PER 1 MG: Performed by: NURSE ANESTHETIST, CERTIFIED REGISTERED

## 2017-03-24 RX ORDER — DEXAMETHASONE SODIUM PHOSPHATE 10 MG/ML
INJECTION INTRAMUSCULAR; INTRAVENOUS AS NEEDED
Status: DISCONTINUED | OUTPATIENT
Start: 2017-03-24 | End: 2017-03-24 | Stop reason: SURG

## 2017-03-24 RX ORDER — OXYCODONE HYDROCHLORIDE AND ACETAMINOPHEN 5; 325 MG/1; MG/1
1-2 TABLET ORAL EVERY 4 HOURS PRN
Qty: 80 TABLET | Refills: 0 | Status: SHIPPED | OUTPATIENT
Start: 2017-03-24 | End: 2017-04-27

## 2017-03-24 RX ORDER — MIDAZOLAM HYDROCHLORIDE 1 MG/ML
1 INJECTION INTRAMUSCULAR; INTRAVENOUS
Status: DISCONTINUED | OUTPATIENT
Start: 2017-03-24 | End: 2017-03-24 | Stop reason: HOSPADM

## 2017-03-24 RX ORDER — NALBUPHINE HCL 10 MG/ML
10 AMPUL (ML) INJECTION EVERY 4 HOURS PRN
Status: DISCONTINUED | OUTPATIENT
Start: 2017-03-24 | End: 2017-03-24 | Stop reason: HOSPADM

## 2017-03-24 RX ORDER — PROMETHAZINE HYDROCHLORIDE 25 MG/ML
6.25 INJECTION, SOLUTION INTRAMUSCULAR; INTRAVENOUS ONCE AS NEEDED
Status: DISCONTINUED | OUTPATIENT
Start: 2017-03-24 | End: 2017-03-24 | Stop reason: HOSPADM

## 2017-03-24 RX ORDER — ERYTHROMYCIN 500 MG/1
500 TABLET, COATED ORAL EVERY 6 HOURS
Qty: 8 TABLET | Refills: 0 | Status: SHIPPED | OUTPATIENT
Start: 2017-03-24 | End: 2017-04-27

## 2017-03-24 RX ORDER — HYDROMORPHONE HYDROCHLORIDE 1 MG/ML
0.5 INJECTION, SOLUTION INTRAMUSCULAR; INTRAVENOUS; SUBCUTANEOUS
Status: DISCONTINUED | OUTPATIENT
Start: 2017-03-24 | End: 2017-03-24 | Stop reason: HOSPADM

## 2017-03-24 RX ORDER — SODIUM CHLORIDE 0.9 % (FLUSH) 0.9 %
1-10 SYRINGE (ML) INJECTION AS NEEDED
Status: DISCONTINUED | OUTPATIENT
Start: 2017-03-24 | End: 2017-03-24 | Stop reason: HOSPADM

## 2017-03-24 RX ORDER — NALBUPHINE HCL 10 MG/ML
2 AMPUL (ML) INJECTION EVERY 4 HOURS PRN
Status: DISCONTINUED | OUTPATIENT
Start: 2017-03-24 | End: 2017-03-24 | Stop reason: HOSPADM

## 2017-03-24 RX ORDER — PROMETHAZINE HYDROCHLORIDE 25 MG/1
25 TABLET ORAL ONCE AS NEEDED
Status: DISCONTINUED | OUTPATIENT
Start: 2017-03-24 | End: 2017-03-24 | Stop reason: HOSPADM

## 2017-03-24 RX ORDER — ACETAMINOPHEN 325 MG/1
650 TABLET ORAL ONCE AS NEEDED
Status: DISCONTINUED | OUTPATIENT
Start: 2017-03-24 | End: 2017-03-24 | Stop reason: HOSPADM

## 2017-03-24 RX ORDER — KETOROLAC TROMETHAMINE 30 MG/ML
INJECTION, SOLUTION INTRAMUSCULAR; INTRAVENOUS AS NEEDED
Status: DISCONTINUED | OUTPATIENT
Start: 2017-03-24 | End: 2017-03-24 | Stop reason: SURG

## 2017-03-24 RX ORDER — DIPHENHYDRAMINE HYDROCHLORIDE 50 MG/ML
12.5 INJECTION INTRAMUSCULAR; INTRAVENOUS
Status: DISCONTINUED | OUTPATIENT
Start: 2017-03-24 | End: 2017-03-24 | Stop reason: HOSPADM

## 2017-03-24 RX ORDER — LIDOCAINE HYDROCHLORIDE 10 MG/ML
5 INJECTION, SOLUTION EPIDURAL; INFILTRATION; INTRACAUDAL; PERINEURAL ONCE
Status: DISCONTINUED | OUTPATIENT
Start: 2017-03-24 | End: 2017-03-24 | Stop reason: HOSPADM

## 2017-03-24 RX ORDER — MIDAZOLAM HYDROCHLORIDE 1 MG/ML
2 INJECTION INTRAMUSCULAR; INTRAVENOUS
Status: DISCONTINUED | OUTPATIENT
Start: 2017-03-24 | End: 2017-03-24 | Stop reason: HOSPADM

## 2017-03-24 RX ORDER — ACETAMINOPHEN 325 MG/1
650 TABLET ORAL ONCE
Status: COMPLETED | OUTPATIENT
Start: 2017-03-24 | End: 2017-03-24

## 2017-03-24 RX ORDER — PROMETHAZINE HYDROCHLORIDE 25 MG/1
25 SUPPOSITORY RECTAL ONCE AS NEEDED
Status: DISCONTINUED | OUTPATIENT
Start: 2017-03-24 | End: 2017-03-24 | Stop reason: HOSPADM

## 2017-03-24 RX ORDER — PROPOFOL 10 MG/ML
VIAL (ML) INTRAVENOUS AS NEEDED
Status: DISCONTINUED | OUTPATIENT
Start: 2017-03-24 | End: 2017-03-24 | Stop reason: SURG

## 2017-03-24 RX ORDER — SODIUM CHLORIDE, SODIUM LACTATE, POTASSIUM CHLORIDE, CALCIUM CHLORIDE 600; 310; 30; 20 MG/100ML; MG/100ML; MG/100ML; MG/100ML
9 INJECTION, SOLUTION INTRAVENOUS CONTINUOUS
Status: DISCONTINUED | OUTPATIENT
Start: 2017-03-24 | End: 2017-03-24 | Stop reason: HOSPADM

## 2017-03-24 RX ORDER — NALOXONE HCL 0.4 MG/ML
0.4 VIAL (ML) INJECTION AS NEEDED
Status: DISCONTINUED | OUTPATIENT
Start: 2017-03-24 | End: 2017-03-24 | Stop reason: HOSPADM

## 2017-03-24 RX ORDER — LIDOCAINE HYDROCHLORIDE 20 MG/ML
INJECTION, SOLUTION INFILTRATION; PERINEURAL AS NEEDED
Status: DISCONTINUED | OUTPATIENT
Start: 2017-03-24 | End: 2017-03-24 | Stop reason: SURG

## 2017-03-24 RX ORDER — ONDANSETRON 2 MG/ML
INJECTION INTRAMUSCULAR; INTRAVENOUS AS NEEDED
Status: DISCONTINUED | OUTPATIENT
Start: 2017-03-24 | End: 2017-03-24 | Stop reason: SURG

## 2017-03-24 RX ORDER — FENTANYL CITRATE 50 UG/ML
INJECTION, SOLUTION INTRAMUSCULAR; INTRAVENOUS AS NEEDED
Status: DISCONTINUED | OUTPATIENT
Start: 2017-03-24 | End: 2017-03-24 | Stop reason: SURG

## 2017-03-24 RX ORDER — FAMOTIDINE 10 MG/ML
20 INJECTION, SOLUTION INTRAVENOUS ONCE
Status: COMPLETED | OUTPATIENT
Start: 2017-03-24 | End: 2017-03-24

## 2017-03-24 RX ORDER — ACETAMINOPHEN 650 MG/1
650 SUPPOSITORY RECTAL ONCE AS NEEDED
Status: DISCONTINUED | OUTPATIENT
Start: 2017-03-24 | End: 2017-03-24 | Stop reason: HOSPADM

## 2017-03-24 RX ORDER — HYDRALAZINE HYDROCHLORIDE 20 MG/ML
5 INJECTION INTRAMUSCULAR; INTRAVENOUS
Status: DISCONTINUED | OUTPATIENT
Start: 2017-03-24 | End: 2017-03-24 | Stop reason: HOSPADM

## 2017-03-24 RX ORDER — FENTANYL CITRATE 50 UG/ML
50 INJECTION, SOLUTION INTRAMUSCULAR; INTRAVENOUS
Status: DISCONTINUED | OUTPATIENT
Start: 2017-03-24 | End: 2017-03-24 | Stop reason: HOSPADM

## 2017-03-24 RX ORDER — OXYCODONE HYDROCHLORIDE AND ACETAMINOPHEN 5; 325 MG/1; MG/1
1 TABLET ORAL ONCE AS NEEDED
Status: DISCONTINUED | OUTPATIENT
Start: 2017-03-24 | End: 2017-03-24 | Stop reason: HOSPADM

## 2017-03-24 RX ORDER — VANCOMYCIN HYDROCHLORIDE 1 G/200ML
15 INJECTION, SOLUTION INTRAVENOUS ONCE
Status: COMPLETED | OUTPATIENT
Start: 2017-03-24 | End: 2017-03-24

## 2017-03-24 RX ORDER — SODIUM CHLORIDE, SODIUM LACTATE, POTASSIUM CHLORIDE, AND CALCIUM CHLORIDE .6; .31; .03; .02 G/100ML; G/100ML; G/100ML; G/100ML
IRRIGANT IRRIGATION AS NEEDED
Status: DISCONTINUED | OUTPATIENT
Start: 2017-03-24 | End: 2017-03-24 | Stop reason: HOSPADM

## 2017-03-24 RX ADMIN — LIDOCAINE HYDROCHLORIDE 60 MG: 20 INJECTION, SOLUTION INFILTRATION; PERINEURAL at 08:27

## 2017-03-24 RX ADMIN — FENTANYL CITRATE 50 MCG: 50 INJECTION INTRAMUSCULAR; INTRAVENOUS at 08:27

## 2017-03-24 RX ADMIN — VANCOMYCIN HYDROCHLORIDE 1000 MG: 1 INJECTION, SOLUTION INTRAVENOUS at 08:19

## 2017-03-24 RX ADMIN — MIDAZOLAM 2 MG: 1 INJECTION INTRAMUSCULAR; INTRAVENOUS at 08:15

## 2017-03-24 RX ADMIN — OXYCODONE HYDROCHLORIDE AND ACETAMINOPHEN 1 TABLET: 5; 325 TABLET ORAL at 10:54

## 2017-03-24 RX ADMIN — PROPOFOL 150 MG: 10 INJECTION, EMULSION INTRAVENOUS at 08:27

## 2017-03-24 RX ADMIN — DEXAMETHASONE SODIUM PHOSPHATE 8 MG: 10 INJECTION INTRAMUSCULAR; INTRAVENOUS at 08:55

## 2017-03-24 RX ADMIN — ONDANSETRON 4 MG: 2 INJECTION INTRAMUSCULAR; INTRAVENOUS at 09:05

## 2017-03-24 RX ADMIN — SODIUM CHLORIDE, POTASSIUM CHLORIDE, SODIUM LACTATE AND CALCIUM CHLORIDE: 600; 310; 30; 20 INJECTION, SOLUTION INTRAVENOUS at 09:14

## 2017-03-24 RX ADMIN — KETOROLAC TROMETHAMINE 30 MG: 30 INJECTION, SOLUTION INTRAMUSCULAR; INTRAVENOUS at 09:14

## 2017-03-24 RX ADMIN — ACETAMINOPHEN 650 MG: 325 TABLET ORAL at 08:14

## 2017-03-24 RX ADMIN — SODIUM CHLORIDE, POTASSIUM CHLORIDE, SODIUM LACTATE AND CALCIUM CHLORIDE 9 ML/HR: 600; 310; 30; 20 INJECTION, SOLUTION INTRAVENOUS at 08:15

## 2017-03-24 RX ADMIN — FAMOTIDINE 20 MG: 10 INJECTION, SOLUTION INTRAVENOUS at 08:15

## 2017-03-24 NOTE — ANESTHESIA POSTPROCEDURE EVALUATION
Patient: Richa Dolan    Procedure Summary     Date Anesthesia Start Anesthesia Stop Room / Location    03/24/17 0824 0929  [facility]  /  [facility]       Procedure Diagnosis Surgeon Provider     RIGHT  KNEE ARTHROSCOPY WITH DEBRIDEMENT CHONDROPLASTY PARTIAL MENISCECTOMY (Right Knee) Tear of medial meniscus of right knee, current, unspecified tear type, initial encounter  (Tear of medial meniscus of right knee, current, unspecified tear type, initial encounter [S83.960A]) MD Adán Bowles MD          Anesthesia Type: general  Last vitals  /74 (03/24/17 1053)    Temp 36.7 °C (98.1 °F) (03/24/17 1053)    Pulse 71 (03/24/17 1053)   Resp 20 (03/24/17 1053)    SpO2 97 % (03/24/17 1053)      Post Anesthesia Care and Evaluation    Patient location during evaluation: bedside  Patient participation: complete - patient participated  Level of consciousness: awake  Pain management: adequate  Airway patency: patent  Anesthetic complications: No anesthetic complications    Cardiovascular status: acceptable  Respiratory status: acceptable  Hydration status: acceptable

## 2017-03-24 NOTE — ANESTHESIA PROCEDURE NOTES
Airway  Urgency: elective    Date/Time: 3/24/2017 8:28 AM  Airway not difficult    General Information and Staff    Patient location during procedure: OR  Anesthesiologist: JULIETTE HEWITT  CRNA: POLI RADER    Indications and Patient Condition  Indications for airway management: airway protection    Preoxygenated: yes  MILS not maintained throughout  Mask difficulty assessment: 0 - not attempted    Final Airway Details  Final airway type: supraglottic airway      Successful airway: classic  Size 4    Number of attempts at approach: 1    Additional Comments  Atraumatic. Dentition intact and unchanged

## 2017-03-24 NOTE — ANESTHESIA PREPROCEDURE EVALUATION
Anesthesia Evaluation        Airway   Mallampati: II  TM distance: >3 FB  Neck ROM: full  no difficulty expected  Dental - normal exam     Pulmonary - negative pulmonary ROS and normal exam   (-) wheezes  Cardiovascular     Rhythm: regular    (+) hypertension,   (-) murmur      Neuro/Psych  (+) headaches, psychiatric history Anxiety and Depression,    GI/Hepatic/Renal/Endo    (+)  GERD, chronic renal disease,     Musculoskeletal     Abdominal  - normal exam   Substance History      OB/GYN          Other                                    Anesthesia Plan    ASA 2     general     intravenous induction   Anesthetic plan and risks discussed with patient.

## 2017-03-24 NOTE — OP NOTE
DATE OF PROCEDURE:  03/24/2017    PREOPERATIVE DIAGNOSES:   1.  Right knee medial meniscal tear.   2.  Right knee medial compartment chondromalacia.   3.  Right knee patellar chondromalacia.     POSTOPERATIVE DIAGNOSES:  1.  Right knee oblique medial meniscal body and posterior horn tear with horizontal component.   2.  Right knee medial femoral condyle grade 3 chondromalacia.   3.  Right medial tibial plateau grade 2 chondromalacia.   4.  Right knee impinging plica.   5.  Right knee medial patellar chondromalacia.     PROCEDURES PERFORMED:  1.  Partial medial meniscectomy.   2.  Right knee abrasion chondroplasty medial femoral condyle.    3.  Right knee plica excision.   4.  Right knee abrasion chondroplasty, patella.     SURGEON: Karl Galeas MD     ASSISTANT: None.     ANESTHESIA: General LMA.     TOURNIQUET TIME: 23 minutes.       ESTIMATED BLOOD LOSS:  Minimal.     DISPOSITION: Taken to recovery room in stable condition.     INDICATIONS FOR PROCEDURE: Several week history of persistent worsening right knee pain with mechanical symptoms subsequent to injury while walking her dog. She has had persistent pain with mechanical symptoms and presents now for operative treatment.     DESCRIPTION OF PROCEDURE: Preoperative informed consent and anesthesia evaluation were obtained. IV antibiotics were administered. Surgical site was marked. Surgical site was marked. The patient was brought to the operating room and placed in a supine position. Anesthesia was induced. LMA was positioned. Well-padded tourniquet was placed on right proximal thigh after exam under anesthesia showed full range of motion and stable ligamentous exam. The right leg was carefully positioned in an arthroscopic leg flores. The right leg was prepped and draped in a sterile fashion. A surgical timeout was performed. The right leg was exsanguinated. Pneumatic tourniquet inflated to 250 mmHg.  Inferolateral portal was established and  arthroscope was introduced. Inspection of the medial compartment showed chondromalacial changes with some loose cartilaginous flaps along the medial femoral condyle as well as some chondromalacial changes along the medial tibial plateau but no loose cartilaginous flaps. Inferomedial portal was created after spinal needle localization. Probe was introduced in the posterior horn extending to the body of the medial meniscus was probed and found to have an oblique tear with a horizontal component to this. This was debrided back to stable margins along the posterior horn and posterior one-half of the body using biter, shaver, and judicious use of electrocautery with exchanging portals where necessary. Abrasion chondroplasty was then performed over the medial femoral condyle back to stable margins. The ACL was inspected and found to be intact. The lateral compartment was inspected and found to be in good condition. There were some minor inner rim fraying of the meniscus, but no clear tear.  Some mild softening of the medial tibial plateau, but no loose cartilaginous fragments. The lateral gutter was inspected and found to be free of any loose body or impinging plica. Medial gutter was then inspected and found to have an impinging plica over the medial aspect of the patella and femoral trochlea with a loose cartilaginous edge along the most medial portion of the patella. The plica was then debrided such that it was no further impingement and removed with loose flap of cartilage from the undersurface of the medial aspect of the patella. Arthroscopic instruments were then removed. Portals were closed with 4-0 nylon suture. The tourniquet was released. Wounds were hemostatic. The knee joint and portals were injected with a total of 30 mL of 0.5% Marcaine with epinephrine. Thigh and calf compartments were soft. Sterile dressings were applied with gently applied Ace bandages from the toes to the upper thigh. She was awakened,  transferred to stretcher, and taken to recovery room in stable condition.     FLOR Sánchez:dulce  D:   03/24/2017 10:07:23  T:   03/24/2017 14:24:33  Job ID:   [id number]  Document ID:   [id number]  cc:

## 2017-03-24 NOTE — PLAN OF CARE
Problem: Perioperative Period (Adult)  Goal: Signs and Symptoms of Listed Potential Problems Will be Absent or Manageable (Perioperative Period)  Outcome: Ongoing (interventions implemented as appropriate)    03/24/17 1006   Perioperative Period   Problems Assessed (Perioperative Period) all   Problems Present (Perioperative Period) none

## 2017-03-24 NOTE — BRIEF OP NOTE
KNEE ARTHROSCOPY  Procedure Note    Richa Dolan  3/24/2017    Pre-op Diagnosis:   Tear of medial meniscus of right knee, current, unspecified tear type, initial encounter [S83.241A]    Post-op Diagnosis:     Post-Op Diagnosis Codes:     * Tear of medial meniscus of right knee, current, unspecified tear type, initial encounter [S83.241A]    Procedure/CPT® Codes:      Procedure(s):   RIGHT  KNEE ARTHROSCOPY WITH DEBRIDEMENT CHONDROPLASTY PARTIAL MENISCECTOMY,plica excision    Surgeon(s):  Karl Galeas MD    Anesthesia: General    Staff:   Circulator: Yanet Rosado RN; Orly Morelos RN  Scrub Person: Lindsay Boyce    Estimated Blood Loss: minimal  Urine Voided: * No values recorded between 3/24/2017  8:22 AM and 3/24/2017  9:23 AM *    Specimens:                none       TT 23min   Drains: none            Complications: none      Karl Galeas MD     Date: 3/24/2017  Time: 9:26 AM

## 2017-03-24 NOTE — PLAN OF CARE
Problem: Patient Care Overview (Adult)  Goal: Plan of Care Review  Outcome: Ongoing (interventions implemented as appropriate)    03/24/17 1006   Coping/Psychosocial Response Interventions   Plan Of Care Reviewed With patient   Patient Care Overview   Progress improving   Outcome Evaluation   Outcome Summary/Follow up Plan Vital signs stable,pain free, taking po fluids       Goal: Adult Individualization and Mutuality  Outcome: Ongoing (interventions implemented as appropriate)  Goal: Discharge Needs Assessment  Outcome: Ongoing (interventions implemented as appropriate)    03/24/17 1006   Discharge Needs Assessment   Concerns To Be Addressed no discharge needs identified

## 2017-03-28 ENCOUNTER — TELEPHONE (OUTPATIENT)
Dept: ORTHOPEDIC SURGERY | Facility: CLINIC | Age: 57
End: 2017-03-28

## 2017-03-29 NOTE — TELEPHONE ENCOUNTER
I spoke with patient  she states she does have a little bit of swelling and wanted to know if she should still be elevating and ice which of course shoulder she should.  She is otherwise doing fine

## 2017-04-03 ENCOUNTER — OFFICE VISIT (OUTPATIENT)
Dept: ORTHOPEDIC SURGERY | Facility: CLINIC | Age: 57
End: 2017-04-03

## 2017-04-03 VITALS — TEMPERATURE: 98.2 F | BODY MASS INDEX: 20.92 KG/M2 | HEIGHT: 68 IN | WEIGHT: 138 LBS

## 2017-04-03 DIAGNOSIS — M17.11 PRIMARY OSTEOARTHRITIS OF RIGHT KNEE: ICD-10-CM

## 2017-04-03 DIAGNOSIS — S83.241D TEAR OF MEDIAL MENISCUS OF RIGHT KNEE, CURRENT, UNSPECIFIED TEAR TYPE, SUBSEQUENT ENCOUNTER: Primary | ICD-10-CM

## 2017-04-03 PROCEDURE — 99024 POSTOP FOLLOW-UP VISIT: CPT | Performed by: ORTHOPAEDIC SURGERY

## 2017-04-03 NOTE — PROGRESS NOTES
"Knee Exam      Patient: Richa Dolan    YOB: 1960 56 y.o. female    Chief Complaints: knee doing okay    History of Present Illness: Follows up right knee scope with partial medial meniscectomy, plica excision and abrasion chondroplasty on 3/24/17.  Still complains of some mild intermittent aching throbbing pain over the medial more so than lateral aspect of the knee but no longer having catching and locking symptoms that she was having before surgery.  HPI    ROS: knee pain no chest pain or shortness of breath  Past Medical History:   Diagnosis Date   • DDD (degenerative disc disease), cervical    • Depression    • GERD (gastroesophageal reflux disease)    • HIV (human immunodeficiency virus infection) 1996   • Hyperlipidemia    • Insomnia    • Meniscus tear 2017    RIGHT   • Migraine    • Neck pain     WITH SHOOTING PAIN LEFT RIGHT ARM   • Osteoarthritis    • Osteoporosis    • Spinal headache    • Tick bite 06/2014       Physical Exam:   Vitals:    04/03/17 0945   Temp: 98.2 °F (36.8 °C)   TempSrc: Temporal Artery    Weight: 138 lb (62.6 kg)   Height: 68\" (172.7 cm)     Well developed with normal mood.  Minimal effusion right knee no warmth or erythema.  Portals are healing without sign of infection.  0-110° range of motion right knee.  Right calf and thigh nontender without sign of DVT      Radiology: None performed      Assessment/Plan:  Status post right knee scope.  Overall she seems be doing quite well.  She's scheduled to go back working as a  standing on 4/21/17 which is fine with me.  If she does not feel she is ready that point she'll let us know.  We'll begin physical therapy at [facility] on [address] at her request.  Sutures removed Steri-Strips are applied.  I'll see her back in 4 weeks.  "

## 2017-04-18 ENCOUNTER — TELEPHONE (OUTPATIENT)
Dept: ORTHOPEDIC SURGERY | Facility: CLINIC | Age: 57
End: 2017-04-18

## 2017-04-18 ENCOUNTER — TELEPHONE (OUTPATIENT)
Dept: FAMILY MEDICINE CLINIC | Facility: CLINIC | Age: 57
End: 2017-04-18

## 2017-04-18 NOTE — TELEPHONE ENCOUNTER
I'm not aware of any adverse drug interaction between WelCholand anti-inflammatory medication. Patient can take over-the-counter ibuprofen or Aleve.  Patient should follow the directions for dosage on the bottle.

## 2017-04-18 NOTE — TELEPHONE ENCOUNTER
I spoke with patient she was doing some problems with feelings of stiffness and soreness to the knee.  She's doing physical therapy at [facility] and they had recommended that she check about anti-inflammatories.  She said that she hasn't taken anti-inflammatories and many many years that she has some digestive issues.  I therefore recommended that she talk with her primary care physician regarding any anti-inflammatories.  If they're unable to provide any due to digestive problems she will let me know and may consider getting her back in for repeat evaluation and possible injection.  She appreciated the call.  I also spoke with her that is not unusual being only 3 weeks out from surgery does still have some pain swelling and inflammation.

## 2017-04-18 NOTE — TELEPHONE ENCOUNTER
Patient left voice message today at 1:12pm    Physical therapist told patient to request an anti-inflammatory from her surgeon (Adal - meniscal tear). He said no because patient is taking Welchol and the combination could cause GI problems. Told patient to follow up with her primary on this. So, patient is requesting advice on this. Please advise.

## 2017-04-26 ENCOUNTER — OFFICE VISIT (OUTPATIENT)
Dept: ORTHOPEDIC SURGERY | Facility: CLINIC | Age: 57
End: 2017-04-26

## 2017-04-26 VITALS — BODY MASS INDEX: 20.92 KG/M2 | HEIGHT: 68 IN | WEIGHT: 138 LBS

## 2017-04-26 DIAGNOSIS — S83.241D TEAR OF MEDIAL MENISCUS OF RIGHT KNEE, UNSPECIFIED TEAR TYPE, UNSPECIFIED WHETHER OLD OR CURRENT TEAR, SUBSEQUENT ENCOUNTER: ICD-10-CM

## 2017-04-26 DIAGNOSIS — M94.261 CHONDROMALACIA, KNEE, RIGHT: Primary | ICD-10-CM

## 2017-04-26 PROBLEM — M94.269 CHONDROMALACIA, KNEE: Status: ACTIVE | Noted: 2017-04-26

## 2017-04-26 PROBLEM — S83.241A TEAR OF MEDIAL MENISCUS OF RIGHT KNEE: Status: ACTIVE | Noted: 2017-04-26

## 2017-04-26 PROCEDURE — 99024 POSTOP FOLLOW-UP VISIT: CPT | Performed by: ORTHOPAEDIC SURGERY

## 2017-04-26 NOTE — PROGRESS NOTES
"Knee Exam      Patient: Richa Dolan    YOB: 1960 56 y.o. female    Chief Complaints: knee hurts    History of Present Illness: Follows up right knee scope with partial medial meniscectomy, plica excision and abrasion chondroplasty from 3/24/17 she was doing some physical therapy but had some complaints of uncomfortable popping and stiffness in the right knee.  I spoke with her on the phone and she's going to speak with her primary care physician about some anti-inflammatory she tried her 's Motrin and it upset her stomach.  She also had a right knee injection before her surgery sometime in March.  That had any efrain locking or giving way to the knee.  HPI    ROS: knee pain reverse chills chest pain or shortness of breath  Past Medical History:   Diagnosis Date   • DDD (degenerative disc disease), cervical    • Depression    • GERD (gastroesophageal reflux disease)    • HIV (human immunodeficiency virus infection) 1996   • Hyperlipidemia    • Insomnia    • Meniscus tear 2017    RIGHT   • Migraine    • Neck pain     WITH SHOOTING PAIN LEFT RIGHT ARM   • Osteoarthritis    • Osteoporosis    • Spinal headache    • Tick bite 06/2014       Physical Exam:   Vitals:    04/26/17 0903   Weight: 138 lb (62.6 kg)   Height: 68\" (172.7 cm)     Well developed with normal mood.Nonantalgic gait minimal if any effusion to the right knee no warmth erythema.  Full extension about 120° of flexion she did have some discomfort over the medial more so than lateral joint line had slowed bit of feelings of popping with flexion and extension about the knee.  Calf and thigh are nontender without sign of DVT.      Radiology: None performed      Assessment/Plan:  About 4-1/2 weeks out now from right knee scope.  Counseled her she may be having some scar tissue may be having some symptoms from the arthritic change to the knee but don't see any need for repeat arthroscopy or further diagnostic imaging at this time.  2 " early for another cortisone injection.  She'll speak with her primary care physician about any anti-inflammatories given her stomach problems.  We'll have her continue physical therapy at [facility] and I'll see her back in 4 weeks if she is unimproved may consider injection.

## 2017-04-27 ENCOUNTER — OFFICE VISIT (OUTPATIENT)
Dept: FAMILY MEDICINE CLINIC | Facility: CLINIC | Age: 57
End: 2017-04-27

## 2017-04-27 VITALS
BODY MASS INDEX: 20.55 KG/M2 | SYSTOLIC BLOOD PRESSURE: 118 MMHG | WEIGHT: 135.6 LBS | TEMPERATURE: 98.3 F | DIASTOLIC BLOOD PRESSURE: 82 MMHG | HEART RATE: 67 BPM | OXYGEN SATURATION: 99 % | HEIGHT: 68 IN

## 2017-04-27 DIAGNOSIS — G47.00 INSOMNIA, UNSPECIFIED TYPE: ICD-10-CM

## 2017-04-27 DIAGNOSIS — S83.241D TEAR OF MEDIAL MENISCUS OF RIGHT KNEE, CURRENT, UNSPECIFIED TEAR TYPE, SUBSEQUENT ENCOUNTER: Primary | ICD-10-CM

## 2017-04-27 PROBLEM — S83.241A TEAR OF MEDIAL MENISCUS OF RIGHT KNEE: Status: RESOLVED | Noted: 2017-04-26 | Resolved: 2017-04-27

## 2017-04-27 PROCEDURE — 99214 OFFICE O/P EST MOD 30 MIN: CPT | Performed by: NURSE PRACTITIONER

## 2017-04-27 NOTE — PROGRESS NOTES
"Subjective   Richa Dolan is a 56 y.o. female.     History of Present Illness   Patient had meniscal repair surgery 1 month ago on right knee patient states the knee is not any better.  She saw her orthopedic yesterday and he suggested to get an anti-inflammatory from his primary care provider.  She goes back to see her Ortho Evra in 3 weeks for follow-up and she is currently is doing physical therapy    Richa Dolan 56 y.o. female presents for follow up of insomnia with onset of symptoms years ago. Patient describes symptoms as difficulty falling asleep. Patient has found complete relief with prescription sleep aid, Ambien. Associated symptoms include: fatigue if unable to take Rx . Patient denies daytime somnolence Symptoms have stabilized.  The patient has failed multiple OTC medications for insomnia.  They are well controlled on current Rx and will continue to try to take Rx PRN.  She will use the lowest effective dose.  The patient has read and signed the [facility] Controlled Substance Contract.  I will continue to see patient for regular follow up appointments and be prescribed the lowest effective dose.  MAGY has been reviewed by me and is updated every 3 months. The patient is aware of the potential for addiction and dependence.  She denies that Ambien cause excessive daytime drowsiness and sleep walking.      The following portions of the patient's history were reviewed and updated as appropriate: allergies, current medications, past social history and problem list.    Review of Systems   Musculoskeletal: Positive for joint swelling.   All other systems reviewed and are negative.      Objective   /82 (BP Location: Right arm, Patient Position: Sitting)  Pulse 67  Temp 98.3 °F (36.8 °C)  Ht 68\" (172.7 cm)  Wt 135 lb 9.6 oz (61.5 kg)  SpO2 99%  BMI 20.62 kg/m2  Physical Exam   Constitutional: She is oriented to person, place, and time. Vital signs are normal. She appears " well-developed and well-nourished. No distress.   HENT:   Head: Normocephalic.   Cardiovascular: Normal rate, regular rhythm and normal heart sounds.    Pulmonary/Chest: Effort normal and breath sounds normal.   Neurological: She is alert and oriented to person, place, and time. Gait normal.   Psychiatric: She has a normal mood and affect. Her behavior is normal. Judgment and thought content normal.   Vitals reviewed.    The patient has read and signed the [facility] Controlled Substance Contract.  I will continue to see patient for regular follow up appointments.  They are well controlled on their medication.  MAGY has been reviewed by me and is updated every 3 months. The patient is aware of the potential for addiction and dependence.    Assessment/Plan   Problem List Items Addressed This Visit        Musculoskeletal and Integument    Tear of medial meniscus of right knee, current - Primary    Relevant Medications    Naproxen-Esomeprazole (VIMOVO) 500-20 MG tablet delayed-release       Other    Insomnia      refill ambien when needed   rto in 3 mons

## 2017-05-09 DIAGNOSIS — G47.00 INSOMNIA, UNSPECIFIED TYPE: ICD-10-CM

## 2017-05-10 RX ORDER — ZOLPIDEM TARTRATE 10 MG/1
TABLET ORAL
Qty: 30 TABLET | Refills: 0 | OUTPATIENT
Start: 2017-05-10 | End: 2017-06-10 | Stop reason: SDUPTHER

## 2017-05-24 ENCOUNTER — OFFICE VISIT (OUTPATIENT)
Dept: ORTHOPEDIC SURGERY | Facility: CLINIC | Age: 57
End: 2017-05-24

## 2017-05-24 VITALS — WEIGHT: 138 LBS | HEIGHT: 67 IN | TEMPERATURE: 98.2 F | BODY MASS INDEX: 21.66 KG/M2

## 2017-05-24 DIAGNOSIS — M17.11 PRIMARY OSTEOARTHRITIS OF RIGHT KNEE: Primary | ICD-10-CM

## 2017-05-24 DIAGNOSIS — S83.241S TEAR OF MEDIAL MENISCUS OF RIGHT KNEE, CURRENT, UNSPECIFIED TEAR TYPE, SEQUELA: ICD-10-CM

## 2017-05-24 PROCEDURE — 99024 POSTOP FOLLOW-UP VISIT: CPT | Performed by: ORTHOPAEDIC SURGERY

## 2017-05-24 RX ORDER — HYDROCODONE BITARTRATE AND ACETAMINOPHEN 10; 325 MG/1; MG/1
TABLET ORAL
Refills: 0 | COMMUNITY
Start: 2017-05-10 | End: 2020-01-17

## 2017-06-10 DIAGNOSIS — G47.00 INSOMNIA, UNSPECIFIED TYPE: ICD-10-CM

## 2017-06-12 RX ORDER — ZOLPIDEM TARTRATE 10 MG/1
TABLET ORAL
Qty: 30 TABLET | Refills: 0 | OUTPATIENT
Start: 2017-06-12 | End: 2017-07-09 | Stop reason: SDUPTHER

## 2017-06-13 DIAGNOSIS — G47.00 INSOMNIA, UNSPECIFIED TYPE: ICD-10-CM

## 2017-06-13 RX ORDER — ZOLPIDEM TARTRATE 10 MG/1
TABLET ORAL
Qty: 30 TABLET | Refills: 0 | OUTPATIENT
Start: 2017-06-13

## 2017-07-09 DIAGNOSIS — G47.00 INSOMNIA, UNSPECIFIED TYPE: ICD-10-CM

## 2017-07-10 RX ORDER — ZOLPIDEM TARTRATE 10 MG/1
TABLET ORAL
Qty: 30 TABLET | Refills: 0 | OUTPATIENT
Start: 2017-07-10 | End: 2017-08-09 | Stop reason: SDUPTHER

## 2017-07-13 ENCOUNTER — OFFICE VISIT (OUTPATIENT)
Dept: FAMILY MEDICINE CLINIC | Facility: CLINIC | Age: 57
End: 2017-07-13

## 2017-07-13 VITALS
SYSTOLIC BLOOD PRESSURE: 110 MMHG | OXYGEN SATURATION: 98 % | HEART RATE: 78 BPM | WEIGHT: 131.6 LBS | TEMPERATURE: 98.5 F | DIASTOLIC BLOOD PRESSURE: 80 MMHG | BODY MASS INDEX: 20.65 KG/M2 | HEIGHT: 67 IN

## 2017-07-13 DIAGNOSIS — I61.9 BRAIN BLEED (HCC): ICD-10-CM

## 2017-07-13 DIAGNOSIS — Z09 HOSPITAL DISCHARGE FOLLOW-UP: ICD-10-CM

## 2017-07-13 DIAGNOSIS — G47.00 INSOMNIA, UNSPECIFIED TYPE: Primary | ICD-10-CM

## 2017-07-13 PROCEDURE — 99214 OFFICE O/P EST MOD 30 MIN: CPT | Performed by: NURSE PRACTITIONER

## 2017-07-13 RX ORDER — TOPIRAMATE 25 MG/1
TABLET ORAL
COMMUNITY
Start: 2017-07-11 | End: 2017-12-11

## 2017-07-13 RX ORDER — ATORVASTATIN CALCIUM 20 MG/1
TABLET, FILM COATED ORAL
COMMUNITY
Start: 2017-07-11 | End: 2017-08-11 | Stop reason: SDUPTHER

## 2017-07-13 NOTE — PROGRESS NOTES
"Subjective   Richa Dolan is a 56 y.o. female.     History of Present Illness   Patient presenting to the office today for hospital follow-up.  On 710 she went to the emergency room at [facility] due to having a headache and dizziness she believes she was having vertigo because she's had the past CT scans MRI/MRA show that she had a very small bleed.  She not had any injury she's not following should not.  She has absolutely no risk factors for a bleed.  She states she's doing well today she will occasionally have dizziness if she lays down flat.  Otherwise she is doing well with no problems of vision changes or speech problems.  She follows up with the neurologist in 5 weeks she's off work for the next week and she needs [place] paperwork [place] she is unable to drive for the next week per neurology    She's also here for a follow-up on her insomnia and she's taking Ambien nightly without any problems or complaints as directed.    The following portions of the patient's history were reviewed and updated as appropriate: allergies, current medications, past social history and problem list.    Review of Systems   Neurological: Positive for headaches.   All other systems reviewed and are negative.      Objective   /80 (BP Location: Left arm, Patient Position: Sitting)  Pulse 78  Temp 98.5 °F (36.9 °C)  Ht 67\" (170.2 cm)  Wt 131 lb 9.6 oz (59.7 kg)  SpO2 98%  BMI 20.61 kg/m2  Physical Exam   Constitutional: She is oriented to person, place, and time. Vital signs are normal. She appears well-developed and well-nourished. No distress.   HENT:   Head: Normocephalic.   Cardiovascular: Normal rate, regular rhythm and normal heart sounds.    Pulmonary/Chest: Effort normal and breath sounds normal.   Neurological: She is alert and oriented to person, place, and time. Gait normal.   Psychiatric: She has a normal mood and affect. Her behavior is normal. Judgment and thought content normal.   Vitals " reviewed.      Assessment/Plan   Problem List Items Addressed This Visit        Nervous and Auditory    Brain bleed       Other    Insomnia - Primary    Hospital discharge follow-up           continue same with Ambien.  She certainly breasts not do any strenuous exercise take it easy no work no driving for at least a week follow-up with neurology also follow-up as directed per hospital with the headache clinic. Return to ER if needed

## 2017-08-08 ENCOUNTER — TELEPHONE (OUTPATIENT)
Dept: FAMILY MEDICINE CLINIC | Facility: CLINIC | Age: 57
End: 2017-08-08

## 2017-08-08 NOTE — TELEPHONE ENCOUNTER
Patient left message stating stephanie had filled out FMLA paperwork for her to be off form 7/10/17 to 7/21/17 due to mini stroke. She said she can not go back to work yet she has not been released from neuro. She wanted to know if stephanie would continue to fill out her FMLA or does neuro need to?

## 2017-08-09 DIAGNOSIS — G47.00 INSOMNIA, UNSPECIFIED TYPE: ICD-10-CM

## 2017-08-10 ENCOUNTER — TELEPHONE (OUTPATIENT)
Dept: FAMILY MEDICINE CLINIC | Facility: CLINIC | Age: 57
End: 2017-08-10

## 2017-08-10 RX ORDER — ZOLPIDEM TARTRATE 10 MG/1
TABLET ORAL
Qty: 30 TABLET | Refills: 0 | OUTPATIENT
Start: 2017-08-10 | End: 2017-09-09 | Stop reason: SDUPTHER

## 2017-08-11 RX ORDER — ATORVASTATIN CALCIUM 20 MG/1
TABLET, FILM COATED ORAL
Qty: 30 TABLET | Refills: 0 | Status: SHIPPED | OUTPATIENT
Start: 2017-08-11 | End: 2017-09-10 | Stop reason: SDUPTHER

## 2017-08-14 RX ORDER — ABACAVIR SULFATE, DOLUTEGRAVIR SODIUM, LAMIVUDINE 600; 50; 300 MG/1; MG/1; MG/1
TABLET, FILM COATED ORAL
Qty: 90 TABLET | Refills: 3 | Status: SHIPPED | OUTPATIENT
Start: 2017-08-14 | End: 2018-08-31 | Stop reason: SDUPTHER

## 2017-08-22 ENCOUNTER — OFFICE VISIT (OUTPATIENT)
Dept: FAMILY MEDICINE CLINIC | Facility: CLINIC | Age: 57
End: 2017-08-22

## 2017-08-22 VITALS
HEART RATE: 78 BPM | DIASTOLIC BLOOD PRESSURE: 72 MMHG | BODY MASS INDEX: 21.28 KG/M2 | OXYGEN SATURATION: 98 % | HEIGHT: 67 IN | WEIGHT: 135.6 LBS | TEMPERATURE: 98.3 F | SYSTOLIC BLOOD PRESSURE: 116 MMHG

## 2017-08-22 DIAGNOSIS — I63.9 CEREBROVASCULAR ACCIDENT (CVA), UNSPECIFIED MECHANISM (HCC): Primary | ICD-10-CM

## 2017-08-22 DIAGNOSIS — E53.8 VITAMIN B12 DEFICIENCY: ICD-10-CM

## 2017-08-22 DIAGNOSIS — H61.22 IMPACTED CERUMEN OF LEFT EAR: ICD-10-CM

## 2017-08-22 PROCEDURE — 99213 OFFICE O/P EST LOW 20 MIN: CPT | Performed by: NURSE PRACTITIONER

## 2017-08-22 PROCEDURE — 69209 REMOVE IMPACTED EAR WAX UNI: CPT | Performed by: NURSE PRACTITIONER

## 2017-08-22 PROCEDURE — 96372 THER/PROPH/DIAG INJ SC/IM: CPT | Performed by: NURSE PRACTITIONER

## 2017-08-22 RX ORDER — TOPIRAMATE 25 MG/1
CAPSULE, EXTENDED RELEASE ORAL
Refills: 0 | COMMUNITY
Start: 2017-08-01 | End: 2017-12-11

## 2017-08-22 RX ORDER — CYANOCOBALAMIN 1000 UG/ML
1000 INJECTION, SOLUTION INTRAMUSCULAR; SUBCUTANEOUS
Status: DISCONTINUED | OUTPATIENT
Start: 2017-08-22 | End: 2019-07-03

## 2017-08-22 RX ADMIN — CYANOCOBALAMIN 1000 MCG: 1000 INJECTION, SOLUTION INTRAMUSCULAR; SUBCUTANEOUS at 15:57

## 2017-08-22 NOTE — PROGRESS NOTES
"Subjective   Richa Dolan is a 56 y.o. female.     History of Present Illness   Having left ear pain for several weeks.  Decreased hearing a little bit.     Also still hasn't followed up with neurology after having stroke but did have a follow up MRi showing she was stable.  Having trouble with [place] office that was suppose to refer her to neuro. Can't get them to answer the phone.     The following portions of the patient's history were reviewed and updated as appropriate: allergies, current medications, past social history and problem list.    Review of Systems   HENT: Positive for ear pain.    All other systems reviewed and are negative.      Objective   /72 (BP Location: Left arm, Patient Position: Sitting)  Pulse 78  Temp 98.3 °F (36.8 °C)  Ht 67\" (170.2 cm)  Wt 135 lb 9.6 oz (61.5 kg)  SpO2 98%  BMI 21.24 kg/m2    Physical Exam   Constitutional: She is oriented to person, place, and time. Vital signs are normal. She appears well-developed and well-nourished. No distress.   HENT:   Head: Normocephalic.   Right Ear: Tympanic membrane and ear canal normal.   Left Ear: Tympanic membrane normal.   Was removed from left ear and WNL after removed    Cardiovascular: Normal rate, regular rhythm and normal heart sounds.    Pulmonary/Chest: Effort normal and breath sounds normal.   Neurological: She is alert and oriented to person, place, and time. Gait normal.   Psychiatric: She has a normal mood and affect. Her behavior is normal. Judgment and thought content normal.   Vitals reviewed.    Ear Cerumen Removal Instrumentation  Date/Time: 8/22/2017 1:54 PM  Performed by: REJI MOFFETT  Authorized by: REJI MOFFETT   Consent: Verbal consent obtained.  Consent given by: patient  Patient understanding: patient states understanding of the procedure being performed  Patient consent: the patient's understanding of the procedure matches consent given  Procedure consent: procedure consent matches procedure " scheduled  Relevant documents: relevant documents present and verified  Patient identity confirmed: verbally with patient    Anesthesia:  Local Anesthetic: none  Location details: left ear  Procedure type: irrigation    Sedation:  Patient sedated: no  Patient tolerance: Patient tolerated the procedure well with no immediate complications        Assessment/Plan   Problem List Items Addressed This Visit        Nervous and Auditory    Impacted cerumen of left ear    Relevant Orders    Ear Cerumen Removal       Other    Cerebrovascular accident (CVA) - Primary    Relevant Orders    Ambulatory Referral to Neurology        rto prn

## 2017-09-08 ENCOUNTER — OFFICE VISIT (OUTPATIENT)
Dept: INFECTIOUS DISEASES | Facility: CLINIC | Age: 57
End: 2017-09-08

## 2017-09-08 ENCOUNTER — LAB (OUTPATIENT)
Dept: LAB | Facility: HOSPITAL | Age: 57
End: 2017-09-08

## 2017-09-08 VITALS
DIASTOLIC BLOOD PRESSURE: 82 MMHG | TEMPERATURE: 98.1 F | HEART RATE: 65 BPM | BODY MASS INDEX: 21.37 KG/M2 | WEIGHT: 141 LBS | HEIGHT: 68 IN | SYSTOLIC BLOOD PRESSURE: 133 MMHG | RESPIRATION RATE: 12 BRPM

## 2017-09-08 DIAGNOSIS — G89.29 CHRONIC PAIN OF BOTH KNEES: ICD-10-CM

## 2017-09-08 DIAGNOSIS — E78.00 PURE HYPERCHOLESTEROLEMIA: ICD-10-CM

## 2017-09-08 DIAGNOSIS — I63.10 CEREBROVASCULAR ACCIDENT (CVA) DUE TO EMBOLISM OF PRECEREBRAL ARTERY (HCC): ICD-10-CM

## 2017-09-08 DIAGNOSIS — M25.562 CHRONIC PAIN OF BOTH KNEES: ICD-10-CM

## 2017-09-08 DIAGNOSIS — B20 HIV (HUMAN IMMUNODEFICIENCY VIRUS INFECTION) (HCC): Primary | ICD-10-CM

## 2017-09-08 DIAGNOSIS — M25.561 CHRONIC PAIN OF BOTH KNEES: ICD-10-CM

## 2017-09-08 DIAGNOSIS — M81.0 OSTEOPOROSIS: ICD-10-CM

## 2017-09-08 LAB
ALBUMIN SERPL-MCNC: 4.5 G/DL (ref 3.5–5.2)
ALBUMIN/GLOB SERPL: 1.6 G/DL
ALP SERPL-CCNC: 82 U/L (ref 39–117)
ALT SERPL W P-5'-P-CCNC: 21 U/L (ref 1–33)
ANION GAP SERPL CALCULATED.3IONS-SCNC: 12.6 MMOL/L
AST SERPL-CCNC: 22 U/L (ref 1–32)
BASOPHILS # BLD AUTO: 0.03 10*3/MM3 (ref 0–0.2)
BASOPHILS NFR BLD AUTO: 0.6 % (ref 0–1.5)
BILIRUB SERPL-MCNC: 0.4 MG/DL (ref 0.1–1.2)
BUN BLD-MCNC: 10 MG/DL (ref 6–20)
BUN/CREAT SERPL: 15.4 (ref 7–25)
CALCIUM SPEC-SCNC: 9.2 MG/DL (ref 8.6–10.5)
CHLORIDE SERPL-SCNC: 104 MMOL/L (ref 98–107)
CO2 SERPL-SCNC: 27.4 MMOL/L (ref 22–29)
CREAT BLD-MCNC: 0.65 MG/DL (ref 0.57–1)
DEPRECATED RDW RBC AUTO: 46.4 FL (ref 37–54)
EOSINOPHIL # BLD AUTO: 0.03 10*3/MM3 (ref 0–0.7)
EOSINOPHIL NFR BLD AUTO: 0.6 % (ref 0.3–6.2)
ERYTHROCYTE [DISTWIDTH] IN BLOOD BY AUTOMATED COUNT: 12.6 % (ref 11.7–13)
GFR SERPL CREATININE-BSD FRML MDRD: 94 ML/MIN/1.73
GLOBULIN UR ELPH-MCNC: 2.9 GM/DL
GLUCOSE BLD-MCNC: 89 MG/DL (ref 65–99)
HCT VFR BLD AUTO: 39.5 % (ref 35.6–45.5)
HCV AB SER DONR QL: NORMAL
HGB BLD-MCNC: 12.8 G/DL (ref 11.9–15.5)
IMM GRANULOCYTES # BLD: 0 10*3/MM3 (ref 0–0.03)
IMM GRANULOCYTES NFR BLD: 0 % (ref 0–0.5)
LYMPHOCYTES # BLD AUTO: 2.23 10*3/MM3 (ref 0.9–4.8)
LYMPHOCYTES NFR BLD AUTO: 47.5 % (ref 19.6–45.3)
MCH RBC QN AUTO: 33 PG (ref 26.9–32)
MCHC RBC AUTO-ENTMCNC: 32.4 G/DL (ref 32.4–36.3)
MCV RBC AUTO: 101.8 FL (ref 80.5–98.2)
MONOCYTES # BLD AUTO: 0.5 10*3/MM3 (ref 0.2–1.2)
MONOCYTES NFR BLD AUTO: 10.7 % (ref 5–12)
NEUTROPHILS # BLD AUTO: 1.9 10*3/MM3 (ref 1.9–8.1)
NEUTROPHILS NFR BLD AUTO: 40.6 % (ref 42.7–76)
PLATELET # BLD AUTO: 216 10*3/MM3 (ref 140–500)
PMV BLD AUTO: 10.3 FL (ref 6–12)
POTASSIUM BLD-SCNC: 4.2 MMOL/L (ref 3.5–5.2)
PROT SERPL-MCNC: 7.4 G/DL (ref 6–8.5)
RBC # BLD AUTO: 3.88 10*6/MM3 (ref 3.9–5.2)
SODIUM BLD-SCNC: 144 MMOL/L (ref 136–145)
WBC NRBC COR # BLD: 4.69 10*3/MM3 (ref 4.5–10.7)

## 2017-09-08 PROCEDURE — 87536 HIV-1 QUANT&REVRSE TRNSCRPJ: CPT | Performed by: INTERNAL MEDICINE

## 2017-09-08 PROCEDURE — 99214 OFFICE O/P EST MOD 30 MIN: CPT | Performed by: INTERNAL MEDICINE

## 2017-09-08 PROCEDURE — 85025 COMPLETE CBC W/AUTO DIFF WBC: CPT | Performed by: INTERNAL MEDICINE

## 2017-09-08 PROCEDURE — 36415 COLL VENOUS BLD VENIPUNCTURE: CPT | Performed by: INTERNAL MEDICINE

## 2017-09-08 PROCEDURE — 86592 SYPHILIS TEST NON-TREP QUAL: CPT | Performed by: INTERNAL MEDICINE

## 2017-09-08 PROCEDURE — 86803 HEPATITIS C AB TEST: CPT | Performed by: INTERNAL MEDICINE

## 2017-09-08 PROCEDURE — 80053 COMPREHEN METABOLIC PANEL: CPT | Performed by: INTERNAL MEDICINE

## 2017-09-08 PROCEDURE — 86361 T CELL ABSOLUTE COUNT: CPT | Performed by: INTERNAL MEDICINE

## 2017-09-08 PROCEDURE — 86480 TB TEST CELL IMMUN MEASURE: CPT

## 2017-09-08 RX ORDER — GABAPENTIN 300 MG/1
300 CAPSULE ORAL 3 TIMES DAILY
COMMUNITY
End: 2018-10-09

## 2017-09-08 NOTE — PROGRESS NOTES
CC: f/u HIV    HPI: Richa Dolan is a 57 y.o. female here for f/u of her HIV care. She continues to take Triumeq without any adverse effects. She has not missed any doses. She receives refills in a timely manner through [facility]. She has not had any obvious manifestations of her HIV disease.     She had a CVA recently and was admitted at [facility]. She denies deficits at this time. She has some headaches but unsure if this is related. She is trying to arrange f/u with neurology.     Her stress level is improved since last visit.     Knee pain is about the same.     Review of Systems: no n/v/d; no CP or SOA    PMH: HIV, headaches, GERD, HLD, CVA    PSH: tonsillectomy, hysterectomy, cholecystectomy    FH: father had dementia    SH: , 1 son, no tobacco use, occasional EtOH, no illicits, works at [employer]    Allergies: Augmentin (hives)    Medications:   Current Outpatient Prescriptions:   •  Abacavir-Dolutegravir-Lamivud (TRIUMEQ) 600- MG tablet, Take 1 tablet by mouth Daily., Disp: , Rfl:   •  atorvastatin (LIPITOR) 20 MG tablet, TAKE 1 TABLET BY MOUTH NIGHTLY, Disp: 30 tablet, Rfl: 0  •  colesevelam (WELCHOL) 625 MG tablet, Take 2 tablets by mouth Daily., Disp: 180 tablet, Rfl: 3  •  diclofenac (VOLTAREN) 1 % gel gel, Apply 4 g topically 2 (two) times a day. Use per pkg insert (Patient taking differently: Apply 4 g topically 2 (Two) Times a Day. Use per pkg insert;  STOPPED PREOP, LAST DOSE 3/21/17), Disp: 100 g, Rfl: 2  •  escitalopram (LEXAPRO) 20 MG tablet, Take 20 mg by mouth Daily., Disp: , Rfl:   •  fluticasone (FLONASE) 50 MCG/ACT nasal spray, 2 sprays into each nostril Daily., Disp: 3 each, Rfl: 3  •  HYDROcodone-acetaminophen (NORCO)  MG per tablet, take 1 tablet by mouth three times a day, Disp: , Rfl: 0  •  ibandronate (BONIVA) 150 MG tablet, Take 1 tablet by mouth Every 30 (Thirty) Days. (Patient taking differently: Take 150 mg by mouth Every 30 (Thirty) Days. APPROX FIRST OF MONTH),  "Disp: 3 tablet, Rfl: 3  •  Multiple Vitamins-Minerals (MULTIVITAMIN ADULT PO), Take 1 tablet by mouth Daily. STOPPED PREOP, LAST DOSE 3/15/17, Disp: , Rfl:   •  Probiotic Product (PROBIOTIC DAILY PO), Take 1 tablet by mouth Daily. STOPPED PREOP, LAST DOSE 3/15/17, Disp: , Rfl:   •  rizatriptan (MAXALT) 10 MG tablet, Take one tablet at onset of headache; may repeat one time in 2 hours if needed. (Patient taking differently: 10 mg 1 (One) Time As Needed. Take one tablet at onset of headache; may repeat one time in 2 hours if needed.), Disp: 12 tablet, Rfl: 6  •  topiramate (TOPAMAX) 25 MG tablet, , Disp: , Rfl:   •  TRIUMEQ 600- MG tablet, TAKE 1 TABLET BY MOUTH EVERY DAY, Disp: 90 tablet, Rfl: 3  •  TROKENDI XR 25 MG capsule sustained-release 24 hr, TAKE 1 CAPSULE BY MOUTH DAILY, Disp: , Rfl: 0  •  zolpidem (AMBIEN) 10 MG tablet, take 1 tablet by mouth at bedtime, Disp: 30 tablet, Rfl: 0    Current Facility-Administered Medications:   •  cyanocobalamin injection 1,000 mcg, 1,000 mcg, Intramuscular, Q28 Days, ОЛЬГА Mccoy, 1,000 mcg at 08/22/17 1557      OBJECTIVE:  /82  Pulse 65  Temp 98.1 °F (36.7 °C)  Resp 12  Ht 68\" (172.7 cm)  Wt 141 lb (64 kg)  BMI 21.44 kg/m2  GENERAL: Awake and alert, in no acute distress, very nice  HEENT: Oropharynx is clear. Good dentition. No thrush.  EYES: PERRL. EOMI. No scleral icterus.    LYMPHATICS: No lymphadenopathy of the neck region  HEART: Normal rate, regular rhythm.  No peripheral edema.   LUNGS: . Normal work of breathing on ambient air.    ABDOMEN: Soft, nontender, nondistended  SKIN: Warm and dry without cutaneous eruptions   PSYCHIATRIC: Appropriate mood, affect, insight, and judgment.   MSK: R knee crepitus      DIAGNOSTICS:  CBC, BMP, HIV labs reviewed today  Lab Results   Component Value Date    WBC 7.23 03/22/2017    HGB 13.2 03/22/2017    HCT 40.1 03/22/2017     03/22/2017     Lab Results   Component Value Date    GLUCOSE 97 03/22/2017    " BUN 12 03/22/2017    CREATININE 0.62 03/22/2017    EGFRIFNONA 100 03/22/2017    EGFRIFAFRI >60 05/28/2015    BCR 19.4 03/22/2017    CO2 26.3 03/22/2017    CALCIUM 9.3 03/22/2017    PROTENTOTREF 7.2 10/13/2016    ALBUMIN 4.90 01/10/2017    LABIL2 2.0 01/10/2017    AST 20 01/10/2017    ALT 15 01/10/2017     HIV Related Labs:  CD4 1028 (1/2017)  VL < 20 (2/2017)  HIV Genotype: unknown bc undetectable  [id number] - negative  QF Gold - negative (7/2016)  RPR - nonreactive (10/2016)  Hep A - immune (7/2016)  Hep B - immune (9/2015)  Hep C - negative (9/2015)  Urine GCCT - negative (9/2015)    Radiology (reviewed report):  Stable old hemorrhagic insult per radiologist    ASSESSMENT/PLAN:    1. HIV (human immunodeficiency virus infection)   -well controlled on ART  -continue Triumeq; refills addressed  -check CD4, VL, CBC, CMP, Hep C, RPR today  -encouraged 100% compliance with therapy which she has already achieved for years now    2. Osteoporosis    3. HLD - resolved with diet modification as of labs 1/2016    4. Chronic knee pain - receiving injections with orthopedics; contemplating replacement    5. Health Maintenance: mammogram and colonoscopoy per PCP. She needs a Prevnar-13 vaccine. I sent a message to her PCP to please give that as we do not carry it in our office.     6. CVA - follows with Jonah rivera.    RTC 6 months

## 2017-09-09 DIAGNOSIS — G47.00 INSOMNIA, UNSPECIFIED TYPE: ICD-10-CM

## 2017-09-09 LAB
BASOPHILS # BLD AUTO: 0 X10E3/UL (ref 0–0.2)
BASOPHILS NFR BLD AUTO: 0 %
CD3+CD4+ CELLS # BLD: 994 /UL (ref 359–1519)
CD3+CD4+ CELLS NFR BLD: 45.2 % (ref 30.8–58.5)
EOSINOPHIL # BLD AUTO: 0.1 X10E3/UL (ref 0–0.4)
EOSINOPHIL # BLD AUTO: 1 %
ERYTHROCYTE [DISTWIDTH] IN BLOOD BY AUTOMATED COUNT: 12.9 % (ref 12.3–15.4)
HCT VFR BLD AUTO: 38.1 % (ref 34–46.6)
HGB BLD-MCNC: 12.5 G/DL (ref 11.1–15.9)
IMM GRANULOCYTES # BLD: 0 X10E3/UL (ref 0–0.1)
IMM GRANULOCYTES NFR BLD: 0 %
LYMPHOCYTES # BLD AUTO: 2.2 X10E3/UL (ref 0.7–3.1)
LYMPHOCYTES NFR BLD AUTO: 47 %
MCH RBC QN AUTO: 32 PG (ref 26.6–33)
MCHC RBC AUTO-ENTMCNC: 32.8 G/DL (ref 31.5–35.7)
MCV RBC AUTO: 97 FL (ref 79–97)
MONOCYTES # BLD AUTO: 0.5 X10E3/UL (ref 0.1–0.9)
MONOCYTES NFR BLD AUTO: 10 %
NEUTROPHILS # BLD AUTO: 2 X10E3/UL (ref 1.4–7)
NEUTROPHILS NFR BLD AUTO: 42 %
PLATELET # BLD AUTO: 227 X10E3/UL (ref 150–379)
RBC # BLD AUTO: 3.91 X10E6/UL (ref 3.77–5.28)
WBC # BLD AUTO: 4.8 X10E3/UL (ref 3.4–10.8)

## 2017-09-11 LAB — RPR SER QL: NORMAL

## 2017-09-11 RX ORDER — ZOLPIDEM TARTRATE 10 MG/1
TABLET ORAL
Qty: 30 TABLET | Refills: 0 | OUTPATIENT
Start: 2017-09-11 | End: 2017-10-10 | Stop reason: SDUPTHER

## 2017-09-11 RX ORDER — ATORVASTATIN CALCIUM 20 MG/1
TABLET, FILM COATED ORAL
Qty: 30 TABLET | Refills: 3 | Status: SHIPPED | OUTPATIENT
Start: 2017-09-11 | End: 2018-01-25 | Stop reason: SDUPTHER

## 2017-09-14 LAB
INTERPRETATION: NORMAL
M TB TUBERC IFN-G BLD QL: NEGATIVE
QFT TB AG MINUS NIL VALUE: 0.07 IU/ML
QUANTIFERON CRITERIA: NORMAL
QUANTIFERON MITOGEN VALUE: >10 IU/ML
QUANTIFERON NIL VALUE: 0.06 IU/ML
QUANTIFERON TB AG VALUE: 0.13 IU/ML

## 2017-09-15 LAB
HIV1 RNA # SERPL NAA+PROBE: <20 COPIES/ML
LOG10 HIV-1 RNA: NORMAL LOG10COPY/ML

## 2017-10-10 DIAGNOSIS — G47.00 INSOMNIA, UNSPECIFIED TYPE: ICD-10-CM

## 2017-10-11 RX ORDER — ZOLPIDEM TARTRATE 10 MG/1
TABLET ORAL
Qty: 30 TABLET | Refills: 0 | OUTPATIENT
Start: 2017-10-11 | End: 2017-11-09 | Stop reason: SDUPTHER

## 2017-11-09 ENCOUNTER — FLU SHOT (OUTPATIENT)
Dept: FAMILY MEDICINE CLINIC | Facility: CLINIC | Age: 57
End: 2017-11-09

## 2017-11-09 ENCOUNTER — TELEPHONE (OUTPATIENT)
Dept: FAMILY MEDICINE CLINIC | Facility: CLINIC | Age: 57
End: 2017-11-09

## 2017-11-09 DIAGNOSIS — G47.00 INSOMNIA, UNSPECIFIED TYPE: ICD-10-CM

## 2017-11-09 PROCEDURE — 90471 IMMUNIZATION ADMIN: CPT | Performed by: NURSE PRACTITIONER

## 2017-11-09 PROCEDURE — 90686 IIV4 VACC NO PRSV 0.5 ML IM: CPT | Performed by: NURSE PRACTITIONER

## 2017-11-09 RX ORDER — ZOLPIDEM TARTRATE 10 MG/1
10 TABLET ORAL NIGHTLY PRN
Qty: 30 TABLET | Refills: 0 | OUTPATIENT
Start: 2017-11-09 | End: 2017-12-11 | Stop reason: SDUPTHER

## 2017-11-10 RX ORDER — ZOLPIDEM TARTRATE 10 MG/1
TABLET ORAL
Qty: 30 TABLET | Refills: 0 | OUTPATIENT
Start: 2017-11-10

## 2017-11-23 DIAGNOSIS — R10.9 STOMACH DISCOMFORT: ICD-10-CM

## 2017-11-27 RX ORDER — COLESEVELAM HYDROCHLORIDE 625 MG/1
TABLET, FILM COATED ORAL
Qty: 180 TABLET | Refills: 3 | Status: SHIPPED | OUTPATIENT
Start: 2017-11-27 | End: 2017-12-11 | Stop reason: SDUPTHER

## 2017-12-11 ENCOUNTER — OFFICE VISIT (OUTPATIENT)
Dept: FAMILY MEDICINE CLINIC | Facility: CLINIC | Age: 57
End: 2017-12-11

## 2017-12-11 VITALS
OXYGEN SATURATION: 98 % | HEART RATE: 72 BPM | BODY MASS INDEX: 21.9 KG/M2 | WEIGHT: 144.5 LBS | SYSTOLIC BLOOD PRESSURE: 130 MMHG | DIASTOLIC BLOOD PRESSURE: 80 MMHG | HEIGHT: 68 IN | TEMPERATURE: 98.5 F

## 2017-12-11 DIAGNOSIS — R10.9 STOMACH DISCOMFORT: ICD-10-CM

## 2017-12-11 DIAGNOSIS — G47.00 INSOMNIA, UNSPECIFIED TYPE: ICD-10-CM

## 2017-12-11 PROCEDURE — 99213 OFFICE O/P EST LOW 20 MIN: CPT | Performed by: NURSE PRACTITIONER

## 2017-12-11 RX ORDER — COLESEVELAM 180 1/1
TABLET ORAL
Qty: 180 TABLET | Refills: 3 | Status: SHIPPED | OUTPATIENT
Start: 2017-12-11 | End: 2018-12-13 | Stop reason: SDUPTHER

## 2017-12-11 RX ORDER — ZOLPIDEM TARTRATE 10 MG/1
10 TABLET ORAL NIGHTLY PRN
Qty: 90 TABLET | Refills: 0 | Status: SHIPPED | OUTPATIENT
Start: 2017-12-11 | End: 2018-03-08 | Stop reason: SDUPTHER

## 2017-12-11 NOTE — PROGRESS NOTES
"Subjective   Richa Dolan is a 57 y.o. female.     History of Present Illness   Richa Dolan 57 y.o. female presents for follow up of insomnia with onset of symptoms years ago. Patient describes symptoms as difficulty falling asleep. Patient has found complete relief with prescription sleep aid, Ambien. Associated symptoms include: fatigue if unable to take Rx . Patient denies daytime somnolence Symptoms have stabilized.  The patient has failed multiple OTC medications for insomnia.  They are well controlled on current Rx and will continue to try to take Rx PRN.  She will use the lowest effective dose.  The patient has read and signed the [facility] Controlled Substance Contract.  I will continue to see patient for regular follow up appointments and be prescribed the lowest effective dose.  MAGY has been reviewed by me and is updated every 3 months. The patient is aware of the potential for addiction and dependence.  She denies that Ambien cause excessive daytime drowsiness and sleep walking.    Also needing Welchol refilled,  USing as directed without side effects     The following portions of the patient's history were reviewed and updated as appropriate: allergies, current medications, past social history and problem list.    Review of Systems   Constitutional: Negative for fever.   All other systems reviewed and are negative.      Objective   /80 (BP Location: Right arm, Patient Position: Sitting)  Pulse 72  Temp 98.5 °F (36.9 °C)  Ht 172.7 cm (67.99\")  Wt 65.5 kg (144 lb 8 oz)  SpO2 98%  BMI 21.98 kg/m2  Physical Exam   Constitutional: She is oriented to person, place, and time. Vital signs are normal. She appears well-developed and well-nourished. No distress.   HENT:   Head: Normocephalic.   Cardiovascular: Normal rate, regular rhythm and normal heart sounds.    Pulmonary/Chest: Effort normal and breath sounds normal.   Neurological: She is alert and oriented to person, place, and " time. Gait normal.   Psychiatric: She has a normal mood and affect. Her behavior is normal. Judgment and thought content normal.   Vitals reviewed.    The patient has read and signed the [facility] Controlled Substance Contract.  I will continue to see patient for regular follow up appointments.  They are well controlled on their medication.  MAGY has been reviewed by me and is updated every 3 months. The patient is aware of the potential for addiction and dependence.    Assessment/Plan   Problem List Items Addressed This Visit        Nervous and Auditory    Stomach discomfort    Relevant Medications    colesevelam (WELCHOL) 625 MG tablet       Other    Insomnia    Relevant Medications    zolpidem (AMBIEN) 10 MG tablet        rto in 3 mons

## 2018-01-18 ENCOUNTER — OFFICE VISIT (OUTPATIENT)
Dept: ORTHOPEDIC SURGERY | Facility: CLINIC | Age: 58
End: 2018-01-18

## 2018-01-18 VITALS — BODY MASS INDEX: 21.67 KG/M2 | WEIGHT: 143 LBS | TEMPERATURE: 98.4 F | HEIGHT: 68 IN

## 2018-01-18 DIAGNOSIS — S83.241S TEAR OF MEDIAL MENISCUS OF RIGHT KNEE, CURRENT, UNSPECIFIED TEAR TYPE, SEQUELA: Primary | ICD-10-CM

## 2018-01-18 DIAGNOSIS — M94.261 CHONDROMALACIA OF RIGHT KNEE: ICD-10-CM

## 2018-01-18 DIAGNOSIS — M25.561 RIGHT KNEE PAIN, UNSPECIFIED CHRONICITY: ICD-10-CM

## 2018-01-18 PROCEDURE — 99213 OFFICE O/P EST LOW 20 MIN: CPT | Performed by: ORTHOPAEDIC SURGERY

## 2018-01-18 PROCEDURE — 73562 X-RAY EXAM OF KNEE 3: CPT | Performed by: ORTHOPAEDIC SURGERY

## 2018-01-18 NOTE — PROGRESS NOTES
"Knee Exam      Patient: Richa Dolan    YOB: 1960 57 y.o. female    Chief Complaints: knee hurts    History of Present Illness: Patient had right knee scope with medial meniscal debridement and abrasion chondroplasty of the medial femoral condyle and patella with plica excision on 3/24/17.  Last seen 5/24/17 and she said her knee did really really well up until around Thanksgiving.  She was doing a lot of standing squatting and twisting.  Since then she's had progressively worsening moderate constant aching pain over the medial and anterior aspects of the right knee is worse with standing and twisting which causes her knee to feel like it wants to give out and locked up.  Also gets discomfort with stairs.    She has started using a neoprene brace with medial and lateral supports since then with out improvement in symptoms  HPI    ROS: knee pain  Past Medical History:   Diagnosis Date   • DDD (degenerative disc disease), cervical    • Depression    • GERD (gastroesophageal reflux disease)    • HIV (human immunodeficiency virus infection) 1996   • Hyperlipidemia    • Insomnia    • Meniscus tear 2017    RIGHT   • Migraine    • Neck pain     WITH SHOOTING PAIN LEFT RIGHT ARM   • Osteoarthritis    • Osteoporosis    • Spinal headache    • Tick bite 06/2014       Physical Exam:   Vitals:    01/18/18 1320   Temp: 98.4 °F (36.9 °C)   TempSrc: Temporal Artery    Weight: 64.9 kg (143 lb)   Height: 172.7 cm (68\")     Well developed with normal mood.Nonantalgic gait.  Right knee shows mild effusion no warmth erythema portals are well-healed.  0-115° range of motion.  Mild discomfort with patellofemoral compression but no patellar instability.  Moderate discomfort over the medial joint line and over the medial tibial plateau symptoms are worse with Deysi's.  No focal tenderness over the medial or lateral femoral condyle or lateral tibial plateau.      Radiology: Views of the right knee ordered to evaluate " pain reviewed and compared with x-rays from March of this year.  There appears to be joint space narrowing tricompartmentally but I do not appreciate significant interval change.      Assessment/Plan:  Recurrent right knee pain with concern for recurrent medial meniscal tear or medial tibial plateau stress fracture.    She'll continue with her knee brace.  She will avoid prolonged standing walking squatting kneeling or twisting.    We need to get an MRI of her right knee for evaluation for recurrent meniscal tear or medial tibial plateau stress fracture.    She understands to call to schedule follow-up appointment once MRI has been scheduled

## 2018-01-25 ENCOUNTER — HOSPITAL ENCOUNTER (OUTPATIENT)
Dept: MRI IMAGING | Facility: HOSPITAL | Age: 58
Discharge: HOME | End: 2018-01-25
Attending: ORTHOPAEDIC SURGERY | Admitting: ORTHOPAEDIC SURGERY

## 2018-01-25 DIAGNOSIS — S83.241S TEAR OF MEDIAL MENISCUS OF RIGHT KNEE, CURRENT, UNSPECIFIED TEAR TYPE, SEQUELA: ICD-10-CM

## 2018-01-25 DIAGNOSIS — M94.261 CHONDROMALACIA OF RIGHT KNEE: ICD-10-CM

## 2018-01-25 PROCEDURE — 73721 MRI JNT OF LWR EXTRE W/O DYE: CPT

## 2018-01-25 RX ORDER — ATORVASTATIN CALCIUM 20 MG/1
TABLET, FILM COATED ORAL
Qty: 30 TABLET | Refills: 3 | Status: SHIPPED | OUTPATIENT
Start: 2018-01-25 | End: 2018-06-05 | Stop reason: SDUPTHER

## 2018-01-29 ENCOUNTER — OFFICE VISIT (OUTPATIENT)
Dept: ORTHOPEDIC SURGERY | Facility: CLINIC | Age: 58
End: 2018-01-29

## 2018-01-29 VITALS — WEIGHT: 143 LBS | BODY MASS INDEX: 21.67 KG/M2 | HEIGHT: 68 IN

## 2018-01-29 DIAGNOSIS — M17.11 PRIMARY LOCALIZED OSTEOARTHROSIS OF RIGHT LOWER LEG: Primary | ICD-10-CM

## 2018-01-29 PROCEDURE — 20610 DRAIN/INJ JOINT/BURSA W/O US: CPT | Performed by: ORTHOPAEDIC SURGERY

## 2018-01-29 PROCEDURE — 99213 OFFICE O/P EST LOW 20 MIN: CPT | Performed by: ORTHOPAEDIC SURGERY

## 2018-01-29 RX ADMIN — BUPIVACAINE HYDROCHLORIDE 4 ML: 5 INJECTION, SOLUTION EPIDURAL; INTRACAUDAL at 12:18

## 2018-01-29 RX ADMIN — METHYLPREDNISOLONE ACETATE 80 MG: 80 INJECTION, SUSPENSION INTRA-ARTICULAR; INTRALESIONAL; INTRAMUSCULAR; SOFT TISSUE at 12:18

## 2018-01-30 RX ORDER — BUPIVACAINE HYDROCHLORIDE 5 MG/ML
4 INJECTION, SOLUTION EPIDURAL; INTRACAUDAL
Status: COMPLETED | OUTPATIENT
Start: 2018-01-29 | End: 2018-01-29

## 2018-01-30 RX ORDER — METHYLPREDNISOLONE ACETATE 80 MG/ML
80 INJECTION, SUSPENSION INTRA-ARTICULAR; INTRALESIONAL; INTRAMUSCULAR; SOFT TISSUE
Status: COMPLETED | OUTPATIENT
Start: 2018-01-29 | End: 2018-01-29

## 2018-01-30 NOTE — PROGRESS NOTES
"Knee Exam      Patient: Richa Dolan    YOB: 1960 57 y.o. female    Chief Complaints: knee feels some better    History of Present Illness: Patient had right knee scope was doing very well up until around Thanksgiving when she was doing a lot of standing squatting and twisting with progressive worsening over the anterior medial aspects of the knee with mechanical symptoms.    She has avoided these activities since I last saw her and has had some improvement.  She states that the knee sleeve does help.  HPI    ROS: knee pain  Past Medical History:   Diagnosis Date   • DDD (degenerative disc disease), cervical    • Depression    • GERD (gastroesophageal reflux disease)    • HIV (human immunodeficiency virus infection) 1996   • Hyperlipidemia    • Insomnia    • Meniscus tear 2017    RIGHT   • Migraine    • Neck pain     WITH SHOOTING PAIN LEFT RIGHT ARM   • Osteoarthritis    • Osteoporosis    • Spinal headache    • Tick bite 06/2014       Physical Exam:   Vitals:    01/29/18 1359   Weight: 64.9 kg (143 lb)   Height: 172.7 cm (68\")     Well developed with normal mood.Right knee shows trace effusion no warmth or erythema there is discomfort with patellofemoral compression and some discomfort over the medial joint line but no exacerbation with Deysi's.  Stable ligamentous exam.  Right calf is nontender without sign of DVT      Radiology: MRI films and report of the right knee dated 20/5/18 were reviewed which showed partial medial meniscectomy but no recurrent meniscal tear was identified.  Chondral malacia was noted along the weightbearing surface of the medial femoral condyle there was no evidence of stress fracture.  Also some chondromalacia the medial patellar facet.  Lateral cartilage appears preserved.      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    I reviewed with her that I don't see anything to do from an operative " standpoint at this time.    She cannot take anti-inflammatories because of significant GI problems when she takes them.    She will continue with her knee sleeve and will begin physical therapy at [facility] in [place] her request.    We discussed other treatment options and after verbal consent and sterile preparation with discussion of risks which can include infection right knee was injected with steroid.    I'll see her back in 6 weeks with x-rays of her right knee if she still having pain.    Large Joint Arthrocentesis  Date/Time: 1/29/2018 12:18 PM  Consent given by: patient  Site marked: site marked  Timeout: Immediately prior to procedure a time out was called to verify the correct patient, procedure, equipment, support staff and site/side marked as required   Supporting Documentation  Indications: pain   Procedure Details  Location: knee - R knee  Preparation: Patient was prepped and draped in the usual sterile fashion  Needle size: 22 G  Approach: anteromedial  Medications administered: 80 mg methylPREDNISolone acetate 80 MG/ML; 4 mL bupivacaine (PF) 0.5 %  Patient tolerance: patient tolerated the procedure well with no immediate complications

## 2018-03-08 DIAGNOSIS — G47.00 INSOMNIA, UNSPECIFIED TYPE: ICD-10-CM

## 2018-03-09 ENCOUNTER — OFFICE VISIT (OUTPATIENT)
Dept: INFECTIOUS DISEASES | Facility: CLINIC | Age: 58
End: 2018-03-09

## 2018-03-09 VITALS
SYSTOLIC BLOOD PRESSURE: 133 MMHG | RESPIRATION RATE: 12 BRPM | BODY MASS INDEX: 22.16 KG/M2 | HEART RATE: 73 BPM | DIASTOLIC BLOOD PRESSURE: 85 MMHG | WEIGHT: 146.2 LBS | HEIGHT: 68 IN

## 2018-03-09 DIAGNOSIS — B20 HIV (HUMAN IMMUNODEFICIENCY VIRUS INFECTION) (HCC): Primary | ICD-10-CM

## 2018-03-09 PROCEDURE — 99213 OFFICE O/P EST LOW 20 MIN: CPT | Performed by: INTERNAL MEDICINE

## 2018-03-09 RX ORDER — ZOLPIDEM TARTRATE 10 MG/1
TABLET ORAL
Qty: 90 TABLET | Refills: 0 | OUTPATIENT
Start: 2018-03-09 | End: 2018-04-05 | Stop reason: SDUPTHER

## 2018-03-09 NOTE — PROGRESS NOTES
CC: f/u HIV    HPI: Richa Dolan is a 57 y.o. female here for f/u of her HIV care. She continues to take Triumeq without any adverse effects. She has not missed any doses. She receives refills in a timely manner through [facility]. She received a coupon this last visit so now her medication cost is $0 about which she is ecstatic.     She has continued to follow with Dr Galeas for her R meniscus injury. She recently received a cortisone shot with good relief.    She has received 30 Botox shots for her migraines with marginal benefit. She says the oral medications were no helping    Review of Systems: no n/v/d; no CP or SOA    PMH: HIV, headaches, GERD, HLD, CVA    PSH: tonsillectomy, hysterectomy, cholecystectomy, Partial medial meniscectomy (right)    FH: father had dementia    SH: , 1 son, no tobacco use, occasional EtOH, no illicits, works at [employer]    Allergies: Augmentin (hives)    Medications:   Current Outpatient Prescriptions:   •  atorvastatin (LIPITOR) 20 MG tablet, TAKE 1 TABLET BY MOUTH NIGHTLY, Disp: 30 tablet, Rfl: 3  •  colesevelam (WELCHOL) 625 MG tablet, 2 tablets Once a day, Disp: 180 tablet, Rfl: 3  •  escitalopram (LEXAPRO) 20 MG tablet, Take 20 mg by mouth Daily., Disp: , Rfl:   •  fluticasone (FLONASE) 50 MCG/ACT nasal spray, 2 sprays into each nostril Daily., Disp: 3 each, Rfl: 3  •  gabapentin (NEURONTIN) 300 MG capsule, Take 300 mg by mouth 3 (Three) Times a Day., Disp: , Rfl:   •  HYDROcodone-acetaminophen (NORCO)  MG per tablet, take 1 tablet by mouth three times a day, Disp: , Rfl: 0  •  ibandronate (BONIVA) 150 MG tablet, Take 1 tablet by mouth Every 30 (Thirty) Days. (Patient taking differently: Take 150 mg by mouth Every 30 (Thirty) Days. APPROX FIRST OF MONTH), Disp: 3 tablet, Rfl: 3  •  Multiple Vitamins-Minerals (MULTIVITAMIN ADULT PO), Take 1 tablet by mouth Daily. STOPPED PREOP, LAST DOSE 3/15/17, Disp: , Rfl:   •  TRIUMEQ 600- MG tablet, TAKE 1 TABLET BY  "MOUTH EVERY DAY, Disp: 90 tablet, Rfl: 3  •  zolpidem (AMBIEN) 10 MG tablet, take 1 tablet by mouth at bedtime if needed for sleep, Disp: 90 tablet, Rfl: 0    Current Facility-Administered Medications:   •  cyanocobalamin injection 1,000 mcg, 1,000 mcg, Intramuscular, Q28 Days, ОЛЬГА Mccoy, 1,000 mcg at 08/22/17 1557      OBJECTIVE:  /85  Pulse 73  Resp 12  Ht 172.7 cm (68\")  Wt 66.3 kg (146 lb 3.2 oz)  BMI 22.23 kg/m2    GENERAL: Awake and alert, in no acute distress, very nice  HEENT: Oropharynx is clear. Good dentition. No thrush.  EYES: PERRL. EOMI. No scleral icterus.    LYMPHATICS: No lymphadenopathy of the neck region  HEART: Normal rate, regular rhythm.  No peripheral edema.   LUNGS: . Normal work of breathing on ambient air.    ABDOMEN: Soft, nontender, nondistended  SKIN: Warm and dry without cutaneous eruptions   PSYCHIATRIC: Appropriate mood, affect, insight, and judgment.         DIAGNOSTICS:  CBC, CMP, HIV labs reviewed today  Lab Results   Component Value Date    WBC 4.8 09/08/2017    WBC 4.69 09/08/2017    HGB 12.5 09/08/2017    HGB 12.8 09/08/2017    HCT 38.1 09/08/2017    HCT 39.5 09/08/2017     09/08/2017     09/08/2017     Lab Results   Component Value Date    GLUCOSE 89 09/08/2017    BUN 10 09/08/2017    CREATININE 0.65 09/08/2017    EGFRIFNONA 94 09/08/2017    EGFRIFAFRI >60 05/28/2015    BCR 15.4 09/08/2017    CO2 27.4 09/08/2017    CALCIUM 9.2 09/08/2017    PROTENTOTREF 7.2 10/13/2016    ALBUMIN 4.50 09/08/2017    LABIL2 1.6 09/08/2017    AST 22 09/08/2017    ALT 21 09/08/2017     HIV Related Labs:  CD4: 994/45% (7/2017)  VL < 20 (7/2017)  HIV Genotype: unknown bc undetectable  [id number] - negative  Tspot - negative (7/2017)  RPR - nonreactive (7/2017)  Hep A - immune (7/2016)  Hep B - immune (9/2015)  Hep C - negative (7/2017)  Urine GCCT - negative (9/2015)    Radiology (reviewed report):  No new imaging today    ASSESSMENT/PLAN:  1. HIV (human " immunodeficiency virus infection)   -well controlled on ART  -continue Triumeq; refills addressed  -check CD4, HIV RNA quantification, CBC w/ diff, CMP today  -encouraged 100% compliance with therapy which she has already achieved for years now    2. Health Maintenance: mammogram and colonoscopoy per PCP. She needs a Prevnar-13 vaccine. I previously sent a message to her PCP to please give that as we do not carry it in our office. Today I handwrote an RX for her to either take to PCP or to local pharmacy such as [facility] or [facility]. Prevnar-13 indicated in all patients with HIV.     I spent greater than 15 minutes of face-to-face time with patient and >50% counseling re: HIV treatments, monitoring, goals of care, risks of transmission.      RTC 6 months

## 2018-03-12 ENCOUNTER — APPOINTMENT (OUTPATIENT)
Dept: LAB | Facility: HOSPITAL | Age: 58
End: 2018-03-12

## 2018-03-12 LAB
ALBUMIN SERPL-MCNC: 4.8 G/DL (ref 3.5–5.2)
ALBUMIN/GLOB SERPL: 1.7 G/DL
ALP SERPL-CCNC: 83 U/L (ref 39–117)
ALT SERPL W P-5'-P-CCNC: 13 U/L (ref 1–33)
ANION GAP SERPL CALCULATED.3IONS-SCNC: 12.9 MMOL/L
AST SERPL-CCNC: 19 U/L (ref 1–32)
BASOPHILS # BLD AUTO: 0.02 10*3/MM3 (ref 0–0.2)
BASOPHILS NFR BLD AUTO: 0.3 % (ref 0–1.5)
BILIRUB SERPL-MCNC: 0.3 MG/DL (ref 0.1–1.2)
BUN BLD-MCNC: 11 MG/DL (ref 6–20)
BUN/CREAT SERPL: 15.5 (ref 7–25)
CALCIUM SPEC-SCNC: 9.6 MG/DL (ref 8.6–10.5)
CHLORIDE SERPL-SCNC: 102 MMOL/L (ref 98–107)
CO2 SERPL-SCNC: 28.1 MMOL/L (ref 22–29)
CREAT BLD-MCNC: 0.71 MG/DL (ref 0.57–1)
DEPRECATED RDW RBC AUTO: 46.2 FL (ref 37–54)
EOSINOPHIL # BLD AUTO: 0.08 10*3/MM3 (ref 0–0.7)
EOSINOPHIL NFR BLD AUTO: 1.3 % (ref 0.3–6.2)
ERYTHROCYTE [DISTWIDTH] IN BLOOD BY AUTOMATED COUNT: 12.7 % (ref 11.7–13)
GFR SERPL CREATININE-BSD FRML MDRD: 85 ML/MIN/1.73
GLOBULIN UR ELPH-MCNC: 2.8 GM/DL
GLUCOSE BLD-MCNC: 103 MG/DL (ref 65–99)
HCT VFR BLD AUTO: 41.8 % (ref 35.6–45.5)
HGB BLD-MCNC: 13.6 G/DL (ref 11.9–15.5)
IMM GRANULOCYTES # BLD: 0 10*3/MM3 (ref 0–0.03)
IMM GRANULOCYTES NFR BLD: 0 % (ref 0–0.5)
LYMPHOCYTES # BLD AUTO: 2.56 10*3/MM3 (ref 0.9–4.8)
LYMPHOCYTES NFR BLD AUTO: 40.4 % (ref 19.6–45.3)
MCH RBC QN AUTO: 32.3 PG (ref 26.9–32)
MCHC RBC AUTO-ENTMCNC: 32.5 G/DL (ref 32.4–36.3)
MCV RBC AUTO: 99.3 FL (ref 80.5–98.2)
MONOCYTES # BLD AUTO: 0.76 10*3/MM3 (ref 0.2–1.2)
MONOCYTES NFR BLD AUTO: 12 % (ref 5–12)
NEUTROPHILS # BLD AUTO: 2.92 10*3/MM3 (ref 1.9–8.1)
NEUTROPHILS NFR BLD AUTO: 46 % (ref 42.7–76)
PLATELET # BLD AUTO: 303 10*3/MM3 (ref 140–500)
PMV BLD AUTO: 10.3 FL (ref 6–12)
POTASSIUM BLD-SCNC: 3.9 MMOL/L (ref 3.5–5.2)
PROT SERPL-MCNC: 7.6 G/DL (ref 6–8.5)
RBC # BLD AUTO: 4.21 10*6/MM3 (ref 3.9–5.2)
SODIUM BLD-SCNC: 143 MMOL/L (ref 136–145)
WBC NRBC COR # BLD: 6.34 10*3/MM3 (ref 4.5–10.7)

## 2018-03-12 PROCEDURE — 36415 COLL VENOUS BLD VENIPUNCTURE: CPT | Performed by: INTERNAL MEDICINE

## 2018-03-12 PROCEDURE — 85025 COMPLETE CBC W/AUTO DIFF WBC: CPT | Performed by: INTERNAL MEDICINE

## 2018-03-12 PROCEDURE — 86361 T CELL ABSOLUTE COUNT: CPT | Performed by: INTERNAL MEDICINE

## 2018-03-12 PROCEDURE — 87536 HIV-1 QUANT&REVRSE TRNSCRPJ: CPT | Performed by: INTERNAL MEDICINE

## 2018-03-12 PROCEDURE — 80053 COMPREHEN METABOLIC PANEL: CPT | Performed by: INTERNAL MEDICINE

## 2018-03-14 LAB
BASOPHILS # BLD AUTO: 0 X10E3/UL (ref 0–0.2)
BASOPHILS NFR BLD AUTO: 0 %
CD3+CD4+ CELLS # BLD: 1180 /UL (ref 359–1519)
CD3+CD4+ CELLS NFR BLD: 45.4 % (ref 30.8–58.5)
EOSINOPHIL # BLD AUTO: 0 X10E3/UL (ref 0–0.4)
EOSINOPHIL # BLD AUTO: 1 %
ERYTHROCYTE [DISTWIDTH] IN BLOOD BY AUTOMATED COUNT: 14 % (ref 12.3–15.4)
HCT VFR BLD AUTO: 41.1 % (ref 34–46.6)
HGB BLD-MCNC: 13.9 G/DL (ref 11.1–15.9)
IMM GRANULOCYTES # BLD: 0 X10E3/UL (ref 0–0.1)
IMM GRANULOCYTES NFR BLD: 0 %
LYMPHOCYTES # BLD AUTO: 2.6 X10E3/UL (ref 0.7–3.1)
LYMPHOCYTES NFR BLD AUTO: 41 %
MCH RBC QN AUTO: 33.5 PG (ref 26.6–33)
MCHC RBC AUTO-ENTMCNC: 33.8 G/DL (ref 31.5–35.7)
MCV RBC AUTO: 99 FL (ref 79–97)
MONOCYTES # BLD AUTO: 0.5 X10E3/UL (ref 0.1–0.9)
MONOCYTES NFR BLD AUTO: 8 %
NEUTROPHILS # BLD AUTO: 3.1 X10E3/UL (ref 1.4–7)
NEUTROPHILS NFR BLD AUTO: 50 %
PLATELET # BLD AUTO: 335 X10E3/UL (ref 150–379)
RBC # BLD AUTO: 4.15 X10E6/UL (ref 3.77–5.28)
WBC # BLD AUTO: 6.3 X10E3/UL (ref 3.4–10.8)

## 2018-03-15 LAB
HIV1 RNA # SERPL NAA+PROBE: <20 COPIES/ML
LOG10 HIV-1 RNA: NORMAL LOG10COPY/ML

## 2018-04-05 ENCOUNTER — OFFICE VISIT (OUTPATIENT)
Dept: FAMILY MEDICINE CLINIC | Facility: CLINIC | Age: 58
End: 2018-04-05

## 2018-04-05 VITALS
OXYGEN SATURATION: 98 % | BODY MASS INDEX: 22.43 KG/M2 | HEIGHT: 68 IN | TEMPERATURE: 98.2 F | WEIGHT: 148 LBS | SYSTOLIC BLOOD PRESSURE: 122 MMHG | HEART RATE: 64 BPM | DIASTOLIC BLOOD PRESSURE: 64 MMHG | RESPIRATION RATE: 14 BRPM

## 2018-04-05 DIAGNOSIS — G47.00 INSOMNIA, UNSPECIFIED TYPE: ICD-10-CM

## 2018-04-05 DIAGNOSIS — Z23 NEED FOR VACCINATION: Primary | ICD-10-CM

## 2018-04-05 DIAGNOSIS — F33.0 MILD EPISODE OF RECURRENT MAJOR DEPRESSIVE DISORDER (HCC): ICD-10-CM

## 2018-04-05 PROBLEM — I63.9 CEREBROVASCULAR ACCIDENT (CVA) (HCC): Status: RESOLVED | Noted: 2017-08-22 | Resolved: 2018-04-05

## 2018-04-05 PROBLEM — G43.719 INTRACTABLE CHRONIC MIGRAINE WITHOUT AURA AND WITHOUT STATUS MIGRAINOSUS: Status: ACTIVE | Noted: 2018-04-05

## 2018-04-05 PROBLEM — R51.9 HEADACHE: Status: ACTIVE | Noted: 2017-08-18

## 2018-04-05 PROBLEM — Z09 HOSPITAL DISCHARGE FOLLOW-UP: Status: RESOLVED | Noted: 2017-07-13 | Resolved: 2018-04-05

## 2018-04-05 PROBLEM — H61.22 IMPACTED CERUMEN OF LEFT EAR: Status: RESOLVED | Noted: 2017-08-22 | Resolved: 2018-04-05

## 2018-04-05 PROBLEM — M17.11 OSTEOARTHRITIS OF RIGHT KNEE: Status: ACTIVE | Noted: 2017-02-02

## 2018-04-05 PROCEDURE — 99214 OFFICE O/P EST MOD 30 MIN: CPT | Performed by: NURSE PRACTITIONER

## 2018-04-05 PROCEDURE — 90670 PCV13 VACCINE IM: CPT | Performed by: NURSE PRACTITIONER

## 2018-04-05 PROCEDURE — 90471 IMMUNIZATION ADMIN: CPT | Performed by: NURSE PRACTITIONER

## 2018-04-05 RX ORDER — BUPROPION HYDROCHLORIDE 150 MG/1
150 TABLET ORAL DAILY
Qty: 90 TABLET | Refills: 3 | Status: SHIPPED | OUTPATIENT
Start: 2018-04-05 | End: 2018-10-09

## 2018-04-05 RX ORDER — ZOLPIDEM TARTRATE 10 MG/1
10 TABLET ORAL NIGHTLY
Qty: 90 TABLET | Refills: 0 | Status: SHIPPED | OUTPATIENT
Start: 2018-04-05 | End: 2018-07-02 | Stop reason: SDUPTHER

## 2018-04-05 NOTE — PROGRESS NOTES
"Subjective   Richa Dolan is a 57 y.o. female.     History of Present Illness   Richa Dolan 57 y.o. female presents for follow up of insomnia with onset of symptoms years ago. Patient describes symptoms as frequent night time awakening and difficulty falling asleep. Patient has found complete relief with Ambien (Zolpidem). Associated symptoms include: fatigue if unable to take Rx . Patient denies daytime somnolence Symptoms have stabilized.  The patient has failed multiple OTC medications for insomnia.  They are well controlled on current Rx and will continue to try to take Rx PRN.  She will use the lowest effective dose.  The patient has read and signed the [facility] Controlled Substance Contract.  I will continue to see patient for regular follow up appointments and be prescribed the lowest effective dose.  MAGY has been reviewed by me and is updated every 3 months. The patient is aware of the potential for addiction and dependence.  She denies that Ambien (Zolpidem) causes excessive daytime drowsiness and sleep walking.  Patient voices understanding to take Ambien (Zolpidem) and go straight to bed. Patient must be able to sleep 7 hours or more when taking this.  Patient will hold Rx and contact me if they experience any impaired mental alertness the next day.      Patient states that she feels the Lexapro is not working as well as it used to.  She is taking it daily as directed without any side effects she's noticed that her depression seems to be getting worse.    The following portions of the patient's history were reviewed and updated as appropriate: allergies, current medications, past social history and problem list.    Review of Systems   Constitutional: Negative for activity change.   All other systems reviewed and are negative.      Objective   /64 (BP Location: Right arm)   Pulse 64   Temp 98.2 °F (36.8 °C) (Oral)   Resp 14   Ht 172.7 cm (68\")   Wt 67.1 kg (148 lb)   SpO2 98%  "  BMI 22.50 kg/m²   Physical Exam   Constitutional: She is oriented to person, place, and time. Vital signs are normal. She appears well-developed and well-nourished. No distress.   HENT:   Head: Normocephalic.   Cardiovascular: Normal rate, regular rhythm and normal heart sounds.    Pulmonary/Chest: Effort normal and breath sounds normal.   Neurological: She is alert and oriented to person, place, and time. Gait normal.   Psychiatric: She has a normal mood and affect. Her behavior is normal. Judgment and thought content normal.   Vitals reviewed.      Assessment/Plan   Problem List Items Addressed This Visit        Other    Insomnia    Relevant Medications    zolpidem (AMBIEN) 10 MG tablet    Need for vaccination - Primary    Relevant Orders    Pneumococcal Conjugate Vaccine 13-[facility] All    Mild episode of recurrent major depressive disorder    Relevant Medications    zolpidem (AMBIEN) 10 MG tablet    buPROPion XL (WELLBUTRIN XL) 150 MG 24 hr tablet      Other Visit Diagnoses    None.       rto in 3 mons   Give wellbutrin 4 weeks to start working

## 2018-05-09 DIAGNOSIS — M81.0 OSTEOPOROSIS: ICD-10-CM

## 2018-05-09 RX ORDER — IBANDRONATE SODIUM 150 MG/1
150 TABLET, FILM COATED ORAL
Qty: 3 TABLET | Refills: 3 | Status: SHIPPED | OUTPATIENT
Start: 2018-05-09 | End: 2019-05-05 | Stop reason: SDUPTHER

## 2018-06-05 RX ORDER — ATORVASTATIN CALCIUM 20 MG/1
TABLET, FILM COATED ORAL
Qty: 30 TABLET | Refills: 3 | Status: SHIPPED | OUTPATIENT
Start: 2018-06-05 | End: 2018-10-01 | Stop reason: SDUPTHER

## 2018-07-02 DIAGNOSIS — G47.00 INSOMNIA, UNSPECIFIED TYPE: ICD-10-CM

## 2018-07-05 RX ORDER — ZOLPIDEM TARTRATE 10 MG/1
TABLET ORAL
Qty: 90 TABLET | Refills: 0 | Status: SHIPPED | OUTPATIENT
Start: 2018-07-05 | End: 2018-10-09 | Stop reason: SDUPTHER

## 2018-07-06 ENCOUNTER — TELEPHONE (OUTPATIENT)
Dept: FAMILY MEDICINE CLINIC | Facility: CLINIC | Age: 58
End: 2018-07-06

## 2018-07-06 NOTE — TELEPHONE ENCOUNTER
PA approved for zolpidem until 7/6/19. Rep says they do not give confirmation number but will fax over approval.    Pharmacy can also run rx again & they will have it as well.    Form placed in MA's PA folder.

## 2018-08-31 RX ORDER — ABACAVIR SULFATE, DOLUTEGRAVIR SODIUM, LAMIVUDINE 600; 50; 300 MG/1; MG/1; MG/1
TABLET, FILM COATED ORAL
Qty: 90 TABLET | Refills: 3 | Status: SHIPPED | OUTPATIENT
Start: 2018-08-31 | End: 2019-08-22 | Stop reason: SDUPTHER

## 2018-09-10 ENCOUNTER — OFFICE VISIT (OUTPATIENT)
Dept: INFECTIOUS DISEASES | Facility: CLINIC | Age: 58
End: 2018-09-10

## 2018-09-10 VITALS
BODY MASS INDEX: 22.49 KG/M2 | TEMPERATURE: 98.3 F | DIASTOLIC BLOOD PRESSURE: 75 MMHG | HEIGHT: 68 IN | HEART RATE: 73 BPM | WEIGHT: 148.4 LBS | SYSTOLIC BLOOD PRESSURE: 114 MMHG

## 2018-09-10 DIAGNOSIS — B20 HIV (HUMAN IMMUNODEFICIENCY VIRUS INFECTION) (HCC): Primary | ICD-10-CM

## 2018-09-10 PROCEDURE — 99213 OFFICE O/P EST LOW 20 MIN: CPT | Performed by: INTERNAL MEDICINE

## 2018-09-10 RX ORDER — BACLOFEN 10 MG/1
10 TABLET ORAL 2 TIMES DAILY
Refills: 2 | COMMUNITY
Start: 2018-08-14

## 2018-09-10 NOTE — PROGRESS NOTES
CC: f/u HIV    HPI: Richa Dolan is a 58 y.o. female here for f/u of her HIV care. She continues to take Triumeq without any adverse effects. She has not missed any doses. She receives refills in a timely manner through [facility]. Her copay is $0.    Her only symptom to report is depression related to a change in her Neurontin dose. She wants to stop it and will d/w her neurologist.     Her migraines are controlled with a new injectable drug.    Review of Systems: no n/v/d; no CP or SOA    PMH: HIV, migraines, GERD, HLD, CVA    PSH: tonsillectomy, hysterectomy, cholecystectomy, Partial medial meniscectomy (right)    FH: father had dementia    SH: , 1 son, no tobacco use, occasional EtOH, no illicits, works at [employer]    Allergies: Augmentin (hives)    Medications:   Current Outpatient Prescriptions:   •  atorvastatin (LIPITOR) 20 MG tablet, TAKE 1 TABLET BY MOUTH NIGHTLY, Disp: 30 tablet, Rfl: 3  •  buPROPion XL (WELLBUTRIN XL) 150 MG 24 hr tablet, Take 1 tablet by mouth Daily., Disp: 90 tablet, Rfl: 3  •  colesevelam (WELCHOL) 625 MG tablet, 2 tablets Once a day, Disp: 180 tablet, Rfl: 3  •  escitalopram (LEXAPRO) 20 MG tablet, Take 20 mg by mouth Daily., Disp: , Rfl:   •  fluticasone (FLONASE) 50 MCG/ACT nasal spray, 2 sprays into each nostril Daily., Disp: 3 each, Rfl: 3  •  gabapentin (NEURONTIN) 300 MG capsule, Take 300 mg by mouth 3 (Three) Times a Day., Disp: , Rfl:   •  HYDROcodone-acetaminophen (NORCO)  MG per tablet, take 1 tablet by mouth three times a day, Disp: , Rfl: 0  •  ibandronate (BONIVA) 150 MG tablet, TAKE 1 TABLET BY MOUTH EVERY 30 (THIRTY) DAYS., Disp: 3 tablet, Rfl: 3  •  TRIUMEQ 600- MG tablet, TAKE 1 TABLET BY MOUTH EVERY DAY, Disp: 90 tablet, Rfl: 3  •  zolpidem (AMBIEN) 10 MG tablet, take 1 tablet by mouth every evening for sleep, Disp: 90 tablet, Rfl: 0  •  Multiple Vitamins-Minerals (MULTIVITAMIN ADULT PO), Take 1 tablet by mouth Daily. STOPPED PREOP, LAST DOSE  "3/15/17, Disp: , Rfl:     Current Facility-Administered Medications:   •  cyanocobalamin injection 1,000 mcg, 1,000 mcg, Intramuscular, Q28 Days, Zach Lora APRN, 1,000 mcg at 08/22/17 1557      OBJECTIVE:  /75   Pulse 73   Temp 98.3 °F (36.8 °C)   Ht 172.7 cm (67.99\")   Wt 67.3 kg (148 lb 6.4 oz)   BMI 22.57 kg/m²     GENERAL: Awake and alert, in no acute distress, very nice  HEENT: Oropharynx is clear. Good dentition. No thrush.  EYES: No scleral icterus.    LYMPHATICS: No lymphadenopathy of the neck region  HEART: Normal rate, regular rhythm.  No peripheral edema.   LUNGS: . Normal work of breathing on ambient air.    ABDOMEN: Soft, nontender, nondistended  SKIN: Warm and dry without cutaneous eruptions       DIAGNOSTICS:  CBC, CMP, HIV labs reviewed today  Lab Results   Component Value Date    WBC 6.3 03/12/2018    WBC 6.34 03/12/2018    HGB 13.9 03/12/2018    HGB 13.6 03/12/2018    HCT 41.1 03/12/2018    HCT 41.8 03/12/2018     03/12/2018     03/12/2018     Lab Results   Component Value Date    GLUCOSE 103 (H) 03/12/2018    BUN 11 03/12/2018    CREATININE 0.71 03/12/2018    EGFRIFNONA 85 03/12/2018    EGFRIFAFRI >60 05/28/2015    BCR 15.5 03/12/2018    CO2 28.1 03/12/2018    CALCIUM 9.6 03/12/2018    PROTENTOTREF 7.2 10/13/2016    ALBUMIN 4.80 03/12/2018    LABIL2 1.4 10/13/2016    AST 19 03/12/2018    ALT 13 03/12/2018     HIV Related Labs:  CD4: 1,180/45% (3/2018)  VL < 20 (3/2018)  HIV Genotype: unknown bc undetectable  [id number] - negative  QF Gold - negative (9/2017)  RPR - nonreactive (9/2017)  Hep A - immune (7/2016)  Hep B - immune (9/2015)  Hep C - negative (9/2017)  Urine GCCT - negative (9/2015)    Radiology (reviewed report):  No new imaging today    ASSESSMENT/PLAN:  1. HIV (human immunodeficiency virus infection)   -well controlled on ART  -continue Triumeq; refills addressed  -check CD4, HIV RNA quantification, CBC w/ diff, CMP, Hep C Ab, RPR, and Tspot " today  -congratulated her on excellent compliance and viral suppression; encouraged her to continue the same  -received Prevnar-13 vaccine as advised; encouraged her to get yearly flu shot once it becomes available        I spent greater than 15 minutes of face-to-face time with patient and >50% counseling re: HIV treatment, lab monitoring, medication adherence, goals of therapy, and her low risk of transmission.      RTC 6 months

## 2018-09-13 ENCOUNTER — APPOINTMENT (OUTPATIENT)
Dept: LAB | Facility: HOSPITAL | Age: 58
End: 2018-09-13

## 2018-09-13 LAB
ALBUMIN SERPL-MCNC: 4.8 G/DL (ref 3.5–5.2)
ALBUMIN/GLOB SERPL: 1.6 G/DL
ALP SERPL-CCNC: 84 U/L (ref 39–117)
ALT SERPL W P-5'-P-CCNC: 20 U/L (ref 1–33)
ANION GAP SERPL CALCULATED.3IONS-SCNC: 11.3 MMOL/L
AST SERPL-CCNC: 23 U/L (ref 1–32)
BASOPHILS # BLD AUTO: 0.02 10*3/MM3 (ref 0–0.2)
BASOPHILS NFR BLD AUTO: 0.3 % (ref 0–1.5)
BILIRUB SERPL-MCNC: 0.2 MG/DL (ref 0.1–1.2)
BUN BLD-MCNC: 10 MG/DL (ref 6–20)
BUN/CREAT SERPL: 15.6 (ref 7–25)
CALCIUM SPEC-SCNC: 9.7 MG/DL (ref 8.6–10.5)
CHLORIDE SERPL-SCNC: 102 MMOL/L (ref 98–107)
CO2 SERPL-SCNC: 28.7 MMOL/L (ref 22–29)
CREAT BLD-MCNC: 0.64 MG/DL (ref 0.57–1)
DEPRECATED RDW RBC AUTO: 46.9 FL (ref 37–54)
EOSINOPHIL # BLD AUTO: 0.12 10*3/MM3 (ref 0–0.7)
EOSINOPHIL NFR BLD AUTO: 1.9 % (ref 0.3–6.2)
ERYTHROCYTE [DISTWIDTH] IN BLOOD BY AUTOMATED COUNT: 12.7 % (ref 11.7–13)
GFR SERPL CREATININE-BSD FRML MDRD: 95 ML/MIN/1.73
GLOBULIN UR ELPH-MCNC: 3 GM/DL
GLUCOSE BLD-MCNC: 82 MG/DL (ref 65–99)
HCT VFR BLD AUTO: 43.3 % (ref 35.6–45.5)
HCV AB SER DONR QL: NORMAL
HGB BLD-MCNC: 13.9 G/DL (ref 11.9–15.5)
IMM GRANULOCYTES # BLD: 0 10*3/MM3 (ref 0–0.03)
IMM GRANULOCYTES NFR BLD: 0 % (ref 0–0.5)
LYMPHOCYTES # BLD AUTO: 2.66 10*3/MM3 (ref 0.9–4.8)
LYMPHOCYTES NFR BLD AUTO: 42.8 % (ref 19.6–45.3)
MCH RBC QN AUTO: 32.6 PG (ref 26.9–32)
MCHC RBC AUTO-ENTMCNC: 32.1 G/DL (ref 32.4–36.3)
MCV RBC AUTO: 101.4 FL (ref 80.5–98.2)
MONOCYTES # BLD AUTO: 0.63 10*3/MM3 (ref 0.2–1.2)
MONOCYTES NFR BLD AUTO: 10.1 % (ref 5–12)
NEUTROPHILS # BLD AUTO: 2.78 10*3/MM3 (ref 1.9–8.1)
NEUTROPHILS NFR BLD AUTO: 44.9 % (ref 42.7–76)
PLATELET # BLD AUTO: 264 10*3/MM3 (ref 140–500)
PMV BLD AUTO: 10.5 FL (ref 6–12)
POTASSIUM BLD-SCNC: 4.1 MMOL/L (ref 3.5–5.2)
PROT SERPL-MCNC: 7.8 G/DL (ref 6–8.5)
RBC # BLD AUTO: 4.27 10*6/MM3 (ref 3.9–5.2)
SODIUM BLD-SCNC: 142 MMOL/L (ref 136–145)
WBC NRBC COR # BLD: 6.21 10*3/MM3 (ref 4.5–10.7)

## 2018-09-13 PROCEDURE — 86361 T CELL ABSOLUTE COUNT: CPT | Performed by: INTERNAL MEDICINE

## 2018-09-13 PROCEDURE — 85025 COMPLETE CBC W/AUTO DIFF WBC: CPT | Performed by: INTERNAL MEDICINE

## 2018-09-13 PROCEDURE — 86803 HEPATITIS C AB TEST: CPT | Performed by: INTERNAL MEDICINE

## 2018-09-13 PROCEDURE — 86481 TB AG RESPONSE T-CELL SUSP: CPT | Performed by: INTERNAL MEDICINE

## 2018-09-13 PROCEDURE — 87536 HIV-1 QUANT&REVRSE TRNSCRPJ: CPT | Performed by: INTERNAL MEDICINE

## 2018-09-13 PROCEDURE — 80053 COMPREHEN METABOLIC PANEL: CPT | Performed by: INTERNAL MEDICINE

## 2018-09-13 PROCEDURE — 86592 SYPHILIS TEST NON-TREP QUAL: CPT | Performed by: INTERNAL MEDICINE

## 2018-09-13 PROCEDURE — 36415 COLL VENOUS BLD VENIPUNCTURE: CPT | Performed by: INTERNAL MEDICINE

## 2018-09-14 LAB — RPR SER QL: NORMAL

## 2018-09-15 LAB
BASOPHILS # BLD AUTO: 0 X10E3/UL (ref 0–0.2)
BASOPHILS NFR BLD AUTO: 0 %
CD3+CD4+ CELLS # BLD: 1261 /UL (ref 359–1519)
CD3+CD4+ CELLS NFR BLD: 46.7 % (ref 30.8–58.5)
EOSINOPHIL # BLD AUTO: 0.1 X10E3/UL (ref 0–0.4)
EOSINOPHIL # BLD AUTO: 2 %
ERYTHROCYTE [DISTWIDTH] IN BLOOD BY AUTOMATED COUNT: 13.4 % (ref 12.3–15.4)
HCT VFR BLD AUTO: 41.6 % (ref 34–46.6)
HGB BLD-MCNC: 13.8 G/DL (ref 11.1–15.9)
IMM GRANULOCYTES # BLD: 0 X10E3/UL (ref 0–0.1)
IMM GRANULOCYTES NFR BLD: 0 %
LYMPHOCYTES # BLD AUTO: 2.7 X10E3/UL (ref 0.7–3.1)
LYMPHOCYTES NFR BLD AUTO: 43 %
MCH RBC QN AUTO: 33.4 PG (ref 26.6–33)
MCHC RBC AUTO-ENTMCNC: 33.2 G/DL (ref 31.5–35.7)
MCV RBC AUTO: 101 FL (ref 79–97)
MONOCYTES # BLD AUTO: 0.5 X10E3/UL (ref 0.1–0.9)
MONOCYTES NFR BLD AUTO: 8 %
NEUTROPHILS # BLD AUTO: 2.9 X10E3/UL (ref 1.4–7)
NEUTROPHILS NFR BLD AUTO: 47 %
PLATELET # BLD AUTO: 274 X10E3/UL (ref 150–379)
RBC # BLD AUTO: 4.13 X10E6/UL (ref 3.77–5.28)
TSPOT INTERPRETATION: NEGATIVE
TSPOT NIL CONTROL INTERPRETATION: NORMAL
TSPOT PANEL A: 0
TSPOT PANEL B: 0
TSPOT POS CONTROL INTERPRETATION: NORMAL
WBC # BLD AUTO: 6.3 X10E3/UL (ref 3.4–10.8)

## 2018-09-16 LAB
HIV1 RNA # SERPL NAA+PROBE: 50 COPIES/ML
LOG10 HIV-1 RNA: 1.7 LOG10COPY/ML

## 2018-09-17 DIAGNOSIS — B20 HIV (HUMAN IMMUNODEFICIENCY VIRUS INFECTION) (HCC): Primary | ICD-10-CM

## 2018-09-18 ENCOUNTER — TELEPHONE (OUTPATIENT)
Dept: INFECTIOUS DISEASES | Facility: CLINIC | Age: 58
End: 2018-09-18

## 2018-09-18 NOTE — TELEPHONE ENCOUNTER
----- Message from Denilson Dawn MD sent at 9/17/2018  8:44 AM EDT -----  Please let patient know her CD4 count remains very high at 1261. The viral load resulted as 50 and it is usually less than 20. It's probably nothing to worry about but I do want her to come back any time in October for a repeat lab to check the viral load again. I will place a standing order.

## 2018-10-01 ENCOUNTER — RESULTS ENCOUNTER (OUTPATIENT)
Dept: INFECTIOUS DISEASES | Facility: CLINIC | Age: 58
End: 2018-10-01

## 2018-10-01 DIAGNOSIS — B20 HIV (HUMAN IMMUNODEFICIENCY VIRUS INFECTION) (HCC): ICD-10-CM

## 2018-10-01 RX ORDER — ATORVASTATIN CALCIUM 20 MG/1
TABLET, FILM COATED ORAL
Qty: 30 TABLET | Refills: 3 | Status: SHIPPED | OUTPATIENT
Start: 2018-10-01 | End: 2018-11-26 | Stop reason: SDUPTHER

## 2018-10-02 DIAGNOSIS — G47.00 INSOMNIA, UNSPECIFIED TYPE: ICD-10-CM

## 2018-10-03 RX ORDER — ZOLPIDEM TARTRATE 10 MG/1
TABLET ORAL
Qty: 30 TABLET | OUTPATIENT
Start: 2018-10-03

## 2018-10-05 DIAGNOSIS — G47.00 INSOMNIA, UNSPECIFIED TYPE: ICD-10-CM

## 2018-10-05 RX ORDER — ZOLPIDEM TARTRATE 10 MG/1
TABLET ORAL
Qty: 30 TABLET | OUTPATIENT
Start: 2018-10-05

## 2018-10-09 ENCOUNTER — OFFICE VISIT (OUTPATIENT)
Dept: FAMILY MEDICINE CLINIC | Facility: CLINIC | Age: 58
End: 2018-10-09

## 2018-10-09 VITALS
BODY MASS INDEX: 22.34 KG/M2 | DIASTOLIC BLOOD PRESSURE: 80 MMHG | TEMPERATURE: 98.3 F | WEIGHT: 147.4 LBS | SYSTOLIC BLOOD PRESSURE: 120 MMHG | HEART RATE: 68 BPM | OXYGEN SATURATION: 98 % | HEIGHT: 68 IN

## 2018-10-09 DIAGNOSIS — G47.00 INSOMNIA, UNSPECIFIED TYPE: ICD-10-CM

## 2018-10-09 DIAGNOSIS — G89.29 CHRONIC MIDLINE LOW BACK PAIN WITHOUT SCIATICA: Primary | ICD-10-CM

## 2018-10-09 DIAGNOSIS — F33.0 MILD EPISODE OF RECURRENT MAJOR DEPRESSIVE DISORDER (HCC): ICD-10-CM

## 2018-10-09 DIAGNOSIS — M54.50 CHRONIC MIDLINE LOW BACK PAIN WITHOUT SCIATICA: Primary | ICD-10-CM

## 2018-10-09 DIAGNOSIS — J30.2 SEASONAL ALLERGIC RHINITIS, UNSPECIFIED TRIGGER: ICD-10-CM

## 2018-10-09 PROCEDURE — 99214 OFFICE O/P EST MOD 30 MIN: CPT | Performed by: NURSE PRACTITIONER

## 2018-10-09 RX ORDER — ZOLPIDEM TARTRATE 10 MG/1
10 TABLET ORAL NIGHTLY PRN
Qty: 90 TABLET | Refills: 0 | Status: SHIPPED | OUTPATIENT
Start: 2018-10-09 | End: 2019-01-03 | Stop reason: SDUPTHER

## 2018-10-09 RX ORDER — DULOXETIN HYDROCHLORIDE 60 MG/1
60 CAPSULE, DELAYED RELEASE ORAL DAILY
Qty: 90 CAPSULE | Refills: 3 | Status: SHIPPED | OUTPATIENT
Start: 2018-10-09 | End: 2019-01-02 | Stop reason: SDUPTHER

## 2018-10-09 RX ORDER — ERENUMAB-AOOE 70 MG/ML
INJECTION SUBCUTANEOUS
Refills: 5 | COMMUNITY
Start: 2018-09-21 | End: 2019-11-04

## 2018-10-09 RX ORDER — GABAPENTIN 400 MG/1
400 CAPSULE ORAL DAILY
Refills: 3 | COMMUNITY
Start: 2018-10-03 | End: 2019-01-14

## 2018-10-09 RX ORDER — DULOXETIN HYDROCHLORIDE 30 MG/1
30 CAPSULE, DELAYED RELEASE ORAL DAILY
Qty: 14 CAPSULE | Refills: 0 | Status: SHIPPED | OUTPATIENT
Start: 2018-10-09 | End: 2019-01-14

## 2018-10-09 RX ORDER — FLUTICASONE PROPIONATE 50 MCG
2 SPRAY, SUSPENSION (ML) NASAL DAILY
Qty: 3 BOTTLE | Refills: 3 | Status: SHIPPED | OUTPATIENT
Start: 2018-10-09 | End: 2019-10-14 | Stop reason: SDUPTHER

## 2018-10-09 NOTE — PROGRESS NOTES
"Subjective   Richa Dolan is a 58 y.o. female.     History of Present Illness   Pt presenting to the office today for ambien refill.  She is using as directed without side effects and working well to control insomnia.    Also needing refill for flonase that she is using for allergies and working well to control.    Would like to switch meds to Cymbalta to see if she can get pain relieve from it.       The following portions of the patient's history were reviewed and updated as appropriate: allergies, current medications, past social history and problem list.    Review of Systems   All other systems reviewed and are negative.      Objective   /80 (BP Location: Left arm, Patient Position: Sitting)   Pulse 68   Temp 98.3 °F (36.8 °C)   Ht 172.7 cm (67.99\")   Wt 66.9 kg (147 lb 6.4 oz)   SpO2 98%   BMI 22.42 kg/m²   Physical Exam   Constitutional: She is oriented to person, place, and time. Vital signs are normal. She appears well-developed and well-nourished. No distress.   HENT:   Head: Normocephalic.   Cardiovascular: Normal rate, regular rhythm and normal heart sounds.    Pulmonary/Chest: Effort normal and breath sounds normal.   Neurological: She is alert and oriented to person, place, and time. Gait normal.   Psychiatric: She has a normal mood and affect. Her behavior is normal. Judgment and thought content normal.   Vitals reviewed.    The patient has read and signed the [facility] Controlled Substance Contract.  I will continue to see patient for regular follow up appointments.  They are well controlled on their medication.  MAGY has been reviewed by me and is updated every 3 months. The patient is aware of the potential for addiction and dependence.    Assessment/Plan      Diagnosis Plan   1. Chronic midline low back pain without sciatica  DULoxetine (CYMBALTA) 30 MG capsule    DULoxetine (CYMBALTA) 60 MG capsule   2. Insomnia, unspecified type  zolpidem (AMBIEN) 10 MG tablet   3. Mild " episode of recurrent major depressive disorder (CMS/HCC)  DULoxetine (CYMBALTA) 30 MG capsule    DULoxetine (CYMBALTA) 60 MG capsule   4. Seasonal allergic rhinitis, unspecified trigger  fluticasone (FLONASE) 50 MCG/ACT nasal spray     rto in 3 mobs   Zach Lora, APRN  10/9/2018

## 2018-10-22 ENCOUNTER — APPOINTMENT (OUTPATIENT)
Dept: LAB | Facility: HOSPITAL | Age: 58
End: 2018-10-22

## 2018-10-23 PROCEDURE — 36415 COLL VENOUS BLD VENIPUNCTURE: CPT | Performed by: NURSE PRACTITIONER

## 2018-10-25 LAB
HIV1 RNA # SERPL NAA+PROBE: <20 COPIES/ML
LOG10 HIV-1 RNA: NORMAL LOG10COPY/ML

## 2018-10-30 ENCOUNTER — TELEPHONE (OUTPATIENT)
Dept: INFECTIOUS DISEASES | Facility: CLINIC | Age: 58
End: 2018-10-30

## 2018-10-30 NOTE — TELEPHONE ENCOUNTER
Please let patient know her HIV viral load was < 20 (which is undetectable) when we repeated it last week which is great news. She should continue the Triumeq.

## 2018-11-26 RX ORDER — ATORVASTATIN CALCIUM 20 MG/1
20 TABLET, FILM COATED ORAL NIGHTLY
Qty: 90 TABLET | Refills: 3 | Status: SHIPPED | OUTPATIENT
Start: 2018-11-26 | End: 2019-11-21 | Stop reason: SDUPTHER

## 2018-12-05 ENCOUNTER — FLU SHOT (OUTPATIENT)
Dept: FAMILY MEDICINE CLINIC | Facility: CLINIC | Age: 58
End: 2018-12-05

## 2018-12-05 DIAGNOSIS — Z23 NEED FOR INFLUENZA VACCINATION: Primary | ICD-10-CM

## 2018-12-05 PROCEDURE — 90471 IMMUNIZATION ADMIN: CPT | Performed by: NURSE PRACTITIONER

## 2018-12-05 PROCEDURE — 90674 CCIIV4 VAC NO PRSV 0.5 ML IM: CPT | Performed by: NURSE PRACTITIONER

## 2018-12-13 DIAGNOSIS — R10.9 STOMACH DISCOMFORT: ICD-10-CM

## 2018-12-13 RX ORDER — COLESEVELAM 180 1/1
TABLET ORAL
Qty: 180 TABLET | Refills: 3 | Status: SHIPPED | OUTPATIENT
Start: 2018-12-13 | End: 2019-11-30 | Stop reason: SDUPTHER

## 2019-01-02 DIAGNOSIS — G89.29 CHRONIC MIDLINE LOW BACK PAIN WITHOUT SCIATICA: ICD-10-CM

## 2019-01-02 DIAGNOSIS — F33.0 MILD EPISODE OF RECURRENT MAJOR DEPRESSIVE DISORDER (HCC): ICD-10-CM

## 2019-01-02 DIAGNOSIS — M54.50 CHRONIC MIDLINE LOW BACK PAIN WITHOUT SCIATICA: ICD-10-CM

## 2019-01-02 RX ORDER — DULOXETIN HYDROCHLORIDE 60 MG/1
60 CAPSULE, DELAYED RELEASE ORAL DAILY
Qty: 90 CAPSULE | Refills: 3 | Status: SHIPPED | OUTPATIENT
Start: 2019-01-02 | End: 2019-03-21

## 2019-01-03 DIAGNOSIS — G47.00 INSOMNIA, UNSPECIFIED TYPE: ICD-10-CM

## 2019-01-03 RX ORDER — ZOLPIDEM TARTRATE 10 MG/1
10 TABLET ORAL NIGHTLY PRN
Qty: 90 TABLET | Refills: 0 | OUTPATIENT
Start: 2019-01-03

## 2019-01-03 NOTE — TELEPHONE ENCOUNTER
Pt made a Zolpidem refill appt for 1/14. It was the first available. Pt is curious if a short supply of Zolpidem can be prescribed until upcoming appt?

## 2019-01-04 RX ORDER — ZOLPIDEM TARTRATE 10 MG/1
10 TABLET ORAL NIGHTLY PRN
Qty: 90 TABLET | Refills: 0 | OUTPATIENT
Start: 2019-01-04 | End: 2019-01-09 | Stop reason: SDUPTHER

## 2019-01-09 DIAGNOSIS — G47.00 INSOMNIA, UNSPECIFIED TYPE: ICD-10-CM

## 2019-01-09 RX ORDER — ZOLPIDEM TARTRATE 10 MG/1
10 TABLET ORAL NIGHTLY PRN
Qty: 90 TABLET | Refills: 0 | Status: SHIPPED | OUTPATIENT
Start: 2019-01-09 | End: 2019-03-21 | Stop reason: SDUPTHER

## 2019-01-14 ENCOUNTER — OFFICE VISIT (OUTPATIENT)
Dept: FAMILY MEDICINE CLINIC | Facility: CLINIC | Age: 59
End: 2019-01-14

## 2019-01-14 VITALS
BODY MASS INDEX: 21.98 KG/M2 | SYSTOLIC BLOOD PRESSURE: 130 MMHG | TEMPERATURE: 98.1 F | HEART RATE: 76 BPM | DIASTOLIC BLOOD PRESSURE: 80 MMHG | OXYGEN SATURATION: 99 % | WEIGHT: 145 LBS | HEIGHT: 68 IN

## 2019-01-14 DIAGNOSIS — G47.00 INSOMNIA, UNSPECIFIED TYPE: Primary | ICD-10-CM

## 2019-01-14 DIAGNOSIS — L60.0 INGROWN NAIL OF GREAT TOE OF RIGHT FOOT: ICD-10-CM

## 2019-01-14 PROCEDURE — 99213 OFFICE O/P EST LOW 20 MIN: CPT | Performed by: NURSE PRACTITIONER

## 2019-01-14 RX ORDER — BUPROPION HYDROCHLORIDE 150 MG/1
150 TABLET ORAL DAILY
Refills: 3 | COMMUNITY
Start: 2018-12-27 | End: 2019-11-04

## 2019-01-14 RX ORDER — DICLOFENAC POTASSIUM 50 MG/1
POWDER, FOR SOLUTION ORAL
Refills: 1 | COMMUNITY
Start: 2018-12-05 | End: 2020-09-29

## 2019-01-14 NOTE — PROGRESS NOTES
"Subjective   Richa Dolan is a 58 y.o. female.     History of Present Illness   Richa Dolan 58 y.o. female presents for follow up of insomnia with onset of symptoms years ago. Patient describes symptoms as frequent night time awakening and difficulty falling asleep. Patient has found complete relief with Ambien (Zolpidem). Associated symptoms include: fatigue if unable to take Rx . Patient denies daytime somnolence Symptoms have stabilized.  The patient has failed multiple OTC medications for insomnia.  They are well controlled on current Rx and will continue to try to take Rx PRN.  She will use the lowest effective dose.  The patient has read and signed the [facility] Controlled Substance Contract.  I will continue to see patient for regular follow up appointments and be prescribed the lowest effective dose.  MAGY has been reviewed by me and is updated every 3 months. The patient is aware of the potential for addiction and dependence.  She denies that Ambien (Zolpidem) causes excessive daytime drowsiness and sleep walking.  Patient voices understanding to take Ambien (Zolpidem) and go straight to bed. Patient must be able to sleep 7 hours or more when taking this.  Patient will hold Rx and contact me if they experience any impaired mental alertness the next day.      As painful right great toe nail on the medial side of toe.  It is also swollen and hurts around 2:00 every day where she has elevated her foot.    The following portions of the patient's history were reviewed and updated as appropriate: allergies, current medications, past social history and problem list.    Review of Systems   Skin:        Nail problem   All other systems reviewed and are negative.      Objective   /80 (BP Location: Right arm, Patient Position: Sitting)   Pulse 76   Temp 98.1 °F (36.7 °C)   Ht 172.7 cm (67.99\")   Wt 65.8 kg (145 lb)   SpO2 99%   BMI 22.05 kg/m²   Physical Exam   Constitutional: She is " oriented to person, place, and time. Vital signs are normal. She appears well-developed and well-nourished. No distress.   HENT:   Head: Normocephalic.   Cardiovascular: Normal rate, regular rhythm and normal heart sounds.   Pulmonary/Chest: Effort normal and breath sounds normal.   Neurological: She is alert and oriented to person, place, and time. Gait normal.   Psychiatric: She has a normal mood and affect. Her behavior is normal. Judgment and thought content normal.   Vitals reviewed.      Assessment/Plan      Diagnosis Plan   1. Insomnia, unspecified type     2. Ingrown nail of great toe of right foot  Ambulatory Referral to Podiatry     Return to office in 3 months    Zach Lora, APRN  1/14/2019

## 2019-03-21 ENCOUNTER — OFFICE VISIT (OUTPATIENT)
Dept: FAMILY MEDICINE CLINIC | Facility: CLINIC | Age: 59
End: 2019-03-21

## 2019-03-21 VITALS
OXYGEN SATURATION: 99 % | TEMPERATURE: 98.3 F | DIASTOLIC BLOOD PRESSURE: 70 MMHG | WEIGHT: 147.8 LBS | HEIGHT: 68 IN | SYSTOLIC BLOOD PRESSURE: 134 MMHG | HEART RATE: 77 BPM | BODY MASS INDEX: 22.4 KG/M2

## 2019-03-21 DIAGNOSIS — G47.00 INSOMNIA, UNSPECIFIED TYPE: ICD-10-CM

## 2019-03-21 DIAGNOSIS — F41.9 ANXIETY: Primary | ICD-10-CM

## 2019-03-21 DIAGNOSIS — Z79.899 HIGH RISK MEDICATION USE: ICD-10-CM

## 2019-03-21 LAB
POC AMPHETAMINES: NEGATIVE
POC BARBITURATES: NEGATIVE
POC BENZODIAZEPHINES: NEGATIVE
POC COCAINE: NEGATIVE
POC METHADONE: NEGATIVE
POC METHAMPHETAMINE SCREEN URINE: NEGATIVE
POC OPIATES: POSITIVE
POC OXYCODONE: NEGATIVE
POC PHENCYCLIDINE: NEGATIVE
POC PROPOXYPHENE: NEGATIVE
POC THC: NEGATIVE
POC TRICYCLIC ANTIDEPRESSANTS: NEGATIVE

## 2019-03-21 PROCEDURE — 99213 OFFICE O/P EST LOW 20 MIN: CPT | Performed by: NURSE PRACTITIONER

## 2019-03-21 RX ORDER — DULOXETIN HYDROCHLORIDE 30 MG/1
30 CAPSULE, DELAYED RELEASE ORAL DAILY
Qty: 30 CAPSULE | Refills: 0 | Status: SHIPPED | OUTPATIENT
Start: 2019-03-21 | End: 2019-04-16 | Stop reason: SDUPTHER

## 2019-03-21 RX ORDER — ZOLPIDEM TARTRATE 10 MG/1
10 TABLET ORAL NIGHTLY PRN
Qty: 90 TABLET | Refills: 0 | Status: SHIPPED | OUTPATIENT
Start: 2019-03-21 | End: 2019-05-01

## 2019-03-21 RX ORDER — ESCITALOPRAM OXALATE 10 MG/1
10 TABLET ORAL DAILY
Qty: 90 TABLET | Refills: 3 | Status: SHIPPED | OUTPATIENT
Start: 2019-03-21 | End: 2020-04-01

## 2019-03-21 NOTE — PROGRESS NOTES
Subjective   Richa Dolan is a 58 y.o. female.     History of Present Illness   Richa Dolan 58 y.o. female presents for follow up of insomnia with onset of symptoms years ago. Patient describes symptoms as frequent night time awakening and difficulty falling asleep. Patient has found complete relief with Ambien (Zolpidem). Associated symptoms include: fatigue if unable to take Rx . Patient denies daytime somnolence Symptoms have stabilized.  The patient has failed multiple OTC medications for insomnia.  They are well controlled on current Rx and will continue to try to take Rx PRN.  She will use the lowest effective dose.  The patient has read and signed the [facility] Controlled Substance Contract.  I will continue to see patient for regular follow up appointments and be prescribed the lowest effective dose.  MAGY has been reviewed by me and is updated every 3 months. The patient is aware of the potential for addiction and dependence.  She denies that Ambien (Zolpidem) causes excessive daytime drowsiness and sleep walking.  Patient voices understanding to take Ambien (Zolpidem) and go straight to bed. Patient must be able to sleep 7 hours or more when taking this.  Patient will hold Rx and contact me if they experience any impaired mental alertness the next day.      Her anxiety is increased lately due to her mother's health and she does not believe the Cymbalta is controlling her anxiety as well as the Lexapro used to.  Another provider that she went to switch her from Lexapro to Cymbalta due to her joint pain but she does not believe that the Cymbalta is helping with her joint pain anyway.  She is requesting to go back on the Lexapro to help her with her anxiety.  The following portions of the patient's history were reviewed and updated as appropriate: allergies, current medications, past social history and problem list.    Review of Systems   All other systems reviewed and are  "negative.      Objective   /70 (BP Location: Left arm, Patient Position: Sitting)   Pulse 77   Temp 98.3 °F (36.8 °C)   Ht 172.7 cm (67.99\")   Wt 67 kg (147 lb 12.8 oz)   SpO2 99%   BMI 22.48 kg/m²   Physical Exam   Constitutional: She is oriented to person, place, and time. Vital signs are normal. She appears well-developed and well-nourished. No distress.   HENT:   Head: Normocephalic.   Cardiovascular: Normal rate, regular rhythm and normal heart sounds.   Pulmonary/Chest: Effort normal and breath sounds normal.   Neurological: She is alert and oriented to person, place, and time. Gait normal.   Psychiatric: She has a normal mood and affect. Her behavior is normal. Judgment and thought content normal.   Vitals reviewed.    The patient has read and signed the [facility] Controlled Substance Contract.  I will continue to see patient for regular follow up appointments.  They are well controlled on their medication.  MAGY has been reviewed by me and is updated every 3 months. The patient is aware of the potential for addiction and dependence.    Assessment/Plan      Diagnosis Plan   1. Anxiety  escitalopram (LEXAPRO) 10 MG tablet    DULoxetine (CYMBALTA) 30 MG capsule   2. Insomnia, unspecified type  zolpidem (AMBIEN) 10 MG tablet     rto in 3 mons   Every other day go ahead and start Lexapro today call with any concerns or questions regarding coming off Cymbalta  Zach Lora, ОЛЬГА  3/21/2019    "

## 2019-03-26 DIAGNOSIS — F33.0 MILD EPISODE OF RECURRENT MAJOR DEPRESSIVE DISORDER (HCC): ICD-10-CM

## 2019-03-26 RX ORDER — BUPROPION HYDROCHLORIDE 150 MG/1
150 TABLET ORAL DAILY
Qty: 90 TABLET | Refills: 3 | OUTPATIENT
Start: 2019-03-26

## 2019-04-08 ENCOUNTER — TELEPHONE (OUTPATIENT)
Dept: FAMILY MEDICINE CLINIC | Facility: CLINIC | Age: 59
End: 2019-04-08

## 2019-04-08 NOTE — TELEPHONE ENCOUNTER
Pt asking for advice from stephanie:    Pt woke up this morning, as soon as she did pt became very dizzy and nauseous. No vomiting. The dizziness and nausea has not gone away. Suggestions for pt?

## 2019-04-08 NOTE — TELEPHONE ENCOUNTER
Possible vertigo.  Move slower with position changes especially when looking over shoulder or turning in bed

## 2019-04-16 DIAGNOSIS — F41.9 ANXIETY: ICD-10-CM

## 2019-04-16 RX ORDER — DULOXETIN HYDROCHLORIDE 30 MG/1
30 CAPSULE, DELAYED RELEASE ORAL DAILY
Qty: 90 CAPSULE | Refills: 0 | Status: SHIPPED | OUTPATIENT
Start: 2019-04-16 | End: 2019-05-01

## 2019-05-01 ENCOUNTER — OFFICE VISIT (OUTPATIENT)
Dept: FAMILY MEDICINE CLINIC | Facility: CLINIC | Age: 59
End: 2019-05-01

## 2019-05-01 VITALS
DIASTOLIC BLOOD PRESSURE: 72 MMHG | BODY MASS INDEX: 22.79 KG/M2 | HEIGHT: 68 IN | WEIGHT: 150.4 LBS | SYSTOLIC BLOOD PRESSURE: 130 MMHG | OXYGEN SATURATION: 100 % | TEMPERATURE: 98.6 F | HEART RATE: 70 BPM

## 2019-05-01 DIAGNOSIS — M25.532 LEFT WRIST PAIN: Primary | ICD-10-CM

## 2019-05-01 PROCEDURE — 73100 X-RAY EXAM OF WRIST: CPT | Performed by: NURSE PRACTITIONER

## 2019-05-01 PROCEDURE — 73130 X-RAY EXAM OF HAND: CPT | Performed by: NURSE PRACTITIONER

## 2019-05-01 PROCEDURE — 99214 OFFICE O/P EST MOD 30 MIN: CPT | Performed by: NURSE PRACTITIONER

## 2019-05-01 NOTE — PROGRESS NOTES
"Subjective   Richa Dolan is a 58 y.o. female.     History of Present Illness   Patient presented to the office today with left wrist pain after falling about 2 weeks ago.  She did fall on her left wrist when she went to catch herself after she just thought she bruised it but is continued to hurt her it hurts worse when she is working it is located along the lateral part of her thumb.  No swelling or bruising.  The following portions of the patient's history were reviewed and updated as appropriate: allergies, current medications, past social history and problem list.    Review of Systems   Musculoskeletal:        Wrist pain   All other systems reviewed and are negative.      Objective   /72 (BP Location: Left arm, Patient Position: Sitting)   Pulse 70   Temp 98.6 °F (37 °C)   Ht 172.7 cm (67.99\")   Wt 68.2 kg (150 lb 6.4 oz)   SpO2 100%   BMI 22.87 kg/m²   Physical Exam   Constitutional: She is oriented to person, place, and time. Vital signs are normal. She appears well-developed and well-nourished. No distress.   HENT:   Head: Normocephalic.   Cardiovascular: Normal rate, regular rhythm and normal heart sounds.   Pulmonary/Chest: Effort normal and breath sounds normal.   Neurological: She is alert and oriented to person, place, and time. Gait normal.   Psychiatric: She has a normal mood and affect. Her behavior is normal. Judgment and thought content normal.   Vitals reviewed.    Xray- left wrist     Findings-normal  No comparison    Assessment/Plan      Diagnosis Plan   1. Left wrist pain  XR Wrist 2 View Left     Brace for 2 weeks ice x 2 daily  rto if no improvement   Zach Lora, APRN  5/1/2019  "

## 2019-05-05 DIAGNOSIS — M81.0 OSTEOPOROSIS: ICD-10-CM

## 2019-05-06 RX ORDER — IBANDRONATE SODIUM 150 MG/1
150 TABLET, FILM COATED ORAL
Qty: 3 TABLET | Refills: 3 | Status: SHIPPED | OUTPATIENT
Start: 2019-05-06 | End: 2020-04-27

## 2019-06-04 ENCOUNTER — TELEPHONE (OUTPATIENT)
Dept: FAMILY MEDICINE CLINIC | Facility: CLINIC | Age: 59
End: 2019-06-04

## 2019-06-06 DIAGNOSIS — G47.00 INSOMNIA, UNSPECIFIED TYPE: Primary | ICD-10-CM

## 2019-06-06 RX ORDER — ZOLPIDEM TARTRATE 10 MG/1
10 TABLET ORAL NIGHTLY PRN
Qty: 90 TABLET | Refills: 3 | Status: SHIPPED | OUTPATIENT
Start: 2019-06-06 | End: 2019-07-03 | Stop reason: SDUPTHER

## 2019-07-03 ENCOUNTER — OFFICE VISIT (OUTPATIENT)
Dept: FAMILY MEDICINE CLINIC | Facility: CLINIC | Age: 59
End: 2019-07-03

## 2019-07-03 VITALS
OXYGEN SATURATION: 98 % | DIASTOLIC BLOOD PRESSURE: 78 MMHG | TEMPERATURE: 98.1 F | BODY MASS INDEX: 22.22 KG/M2 | WEIGHT: 146.6 LBS | SYSTOLIC BLOOD PRESSURE: 134 MMHG | HEIGHT: 68 IN | HEART RATE: 70 BPM

## 2019-07-03 DIAGNOSIS — M54.50 ACUTE RIGHT-SIDED LOW BACK PAIN WITHOUT SCIATICA: Primary | ICD-10-CM

## 2019-07-03 DIAGNOSIS — G47.00 INSOMNIA, UNSPECIFIED TYPE: ICD-10-CM

## 2019-07-03 DIAGNOSIS — Z12.39 BREAST CANCER SCREENING: ICD-10-CM

## 2019-07-03 PROCEDURE — 99213 OFFICE O/P EST LOW 20 MIN: CPT | Performed by: NURSE PRACTITIONER

## 2019-07-03 RX ORDER — ZOLPIDEM TARTRATE 10 MG/1
10 TABLET ORAL NIGHTLY PRN
Qty: 90 TABLET | Refills: 0 | Status: SHIPPED | OUTPATIENT
Start: 2019-07-03 | End: 2019-10-31 | Stop reason: SDUPTHER

## 2019-07-03 NOTE — PROGRESS NOTES
Subjective   Richa Dolan is a 58 y.o. female.     History of Present Illness   Richa Dolan 58 y.o. female presents for follow up of insomnia with onset of symptoms years ago. Patient describes symptoms as frequent night time awakening and difficulty falling asleep. Patient has found complete relief with Ambien (Zolpidem). Associated symptoms include: fatigue if unable to take Rx . Patient denies daytime somnolence Symptoms have stabilized.  The patient has failed multiple OTC medications for insomnia.  They are well controlled on current Rx and will continue to try to take Rx PRN.  She will use the lowest effective dose.  The patient has read and signed the [facility] Controlled Substance Contract.  I will continue to see patient for regular follow up appointments and be prescribed the lowest effective dose.  MAGY has been reviewed by me and is updated every 3 months. The patient is aware of the potential for addiction and dependence.  She denies that Ambien (Zolpidem) causes excessive daytime drowsiness and sleep walking.  Patient voices understanding to take Ambien (Zolpidem) and go straight to bed. Patient must be able to sleep 7 hours or more when taking this.  Patient will hold Rx and contact me if they experience any impaired mental alertness the next day.      Patient also presenting to the office today with low back hip pain on the right side there is no radiation down her leg no numbness or tingling she did change jobs at work and that is when it started.  She uses heat at night which does help while it is applied.  She also is taking her anti-inflammatory as directed.  Pain scale of 5.  Is not causing a limp she does have full range of motion.    The following portions of the patient's history were reviewed and updated as appropriate: allergies, current medications, past social history and problem list.    Review of Systems   Musculoskeletal:        Hip pain   All other systems reviewed and  "are negative.      Objective   /78 (BP Location: Right arm, Patient Position: Sitting)   Pulse 70   Temp 98.1 °F (36.7 °C)   Ht 172.7 cm (67.99\")   Wt 66.5 kg (146 lb 9.6 oz)   SpO2 98%   BMI 22.30 kg/m²   Physical Exam   Constitutional: She is oriented to person, place, and time. Vital signs are normal. She appears well-developed and well-nourished. No distress.   HENT:   Head: Normocephalic.   Cardiovascular: Normal rate, regular rhythm and normal heart sounds.   Pulmonary/Chest: Effort normal and breath sounds normal.   Neurological: She is alert and oriented to person, place, and time. Gait normal.   Psychiatric: She has a normal mood and affect. Her behavior is normal. Judgment and thought content normal.   Vitals reviewed.      Assessment/Plan      Diagnosis Plan   1. Acute right-sided low back pain without sciatica     2. Insomnia, unspecified type  zolpidem (AMBIEN) 10 MG tablet   3. Breast cancer screening  Mammo Screening Bilateral With CAD     rto in 3 mons  I think she is experiencing pain in her SI joint.  She states that she got injections into it when she went to get her back injections and that did help and she would like to continue with that.  I told her if she needs an order for me then I can do that she will let me know.    Zach Lora, APRN  7/3/2019  "

## 2019-07-25 ENCOUNTER — APPOINTMENT (OUTPATIENT)
Dept: WOMENS IMAGING | Facility: HOSPITAL | Age: 59
End: 2019-07-25

## 2019-08-13 ENCOUNTER — TELEPHONE (OUTPATIENT)
Dept: FAMILY MEDICINE CLINIC | Facility: CLINIC | Age: 59
End: 2019-08-13

## 2019-08-13 NOTE — TELEPHONE ENCOUNTER
It's a screening mammogram only, there is no diagnostic mammo or ultrasound ordered. But I have sent to order to [facility]. They will be contacting pt to set up or she can call them to set up [phone]

## 2019-08-13 NOTE — TELEPHONE ENCOUNTER
Pt was scheduled to have an ultrasound and mammo performed at [facility]. Pt states that the appointment was cancelled and she prefers not to return to that facility and would rather have the ultrasound and mammo done through [facility]. Are we able to change this order in the system?

## 2019-08-22 RX ORDER — ABACAVIR SULFATE, DOLUTEGRAVIR SODIUM, LAMIVUDINE 600; 50; 300 MG/1; MG/1; MG/1
TABLET, FILM COATED ORAL
Qty: 90 TABLET | Refills: 3 | Status: SHIPPED | OUTPATIENT
Start: 2019-08-22 | End: 2020-02-07

## 2019-09-23 ENCOUNTER — APPOINTMENT (OUTPATIENT)
Dept: LAB | Facility: HOSPITAL | Age: 59
End: 2019-09-23

## 2019-09-23 ENCOUNTER — OFFICE VISIT (OUTPATIENT)
Dept: INFECTIOUS DISEASES | Facility: CLINIC | Age: 59
End: 2019-09-23

## 2019-09-23 VITALS
WEIGHT: 145.8 LBS | SYSTOLIC BLOOD PRESSURE: 122 MMHG | HEIGHT: 68 IN | DIASTOLIC BLOOD PRESSURE: 78 MMHG | HEART RATE: 69 BPM | BODY MASS INDEX: 22.1 KG/M2

## 2019-09-23 DIAGNOSIS — B20 HIV (HUMAN IMMUNODEFICIENCY VIRUS INFECTION) (HCC): Primary | ICD-10-CM

## 2019-09-23 LAB
ALBUMIN SERPL-MCNC: 4.7 G/DL (ref 3.5–5.2)
ALBUMIN/GLOB SERPL: 1.7 G/DL
ALP SERPL-CCNC: 79 U/L (ref 39–117)
ALT SERPL W P-5'-P-CCNC: 13 U/L (ref 1–33)
ANION GAP SERPL CALCULATED.3IONS-SCNC: 14.5 MMOL/L (ref 5–15)
AST SERPL-CCNC: 20 U/L (ref 1–32)
BASOPHILS # BLD AUTO: 0.04 10*3/MM3 (ref 0–0.2)
BASOPHILS NFR BLD AUTO: 0.7 % (ref 0–1.5)
BILIRUB SERPL-MCNC: 0.2 MG/DL (ref 0.2–1.2)
BUN BLD-MCNC: 14 MG/DL (ref 6–20)
BUN/CREAT SERPL: 23 (ref 7–25)
CALCIUM SPEC-SCNC: 9 MG/DL (ref 8.6–10.5)
CHLORIDE SERPL-SCNC: 102 MMOL/L (ref 98–107)
CO2 SERPL-SCNC: 24.5 MMOL/L (ref 22–29)
CREAT BLD-MCNC: 0.61 MG/DL (ref 0.57–1)
DEPRECATED RDW RBC AUTO: 45.1 FL (ref 37–54)
EOSINOPHIL # BLD AUTO: 0.04 10*3/MM3 (ref 0–0.4)
EOSINOPHIL NFR BLD AUTO: 0.7 % (ref 0.3–6.2)
ERYTHROCYTE [DISTWIDTH] IN BLOOD BY AUTOMATED COUNT: 12.4 % (ref 12.3–15.4)
GFR SERPL CREATININE-BSD FRML MDRD: 100 ML/MIN/1.73
GLOBULIN UR ELPH-MCNC: 2.7 GM/DL
GLUCOSE BLD-MCNC: 87 MG/DL (ref 65–99)
HCT VFR BLD AUTO: 39.5 % (ref 34–46.6)
HCV AB SER DONR QL: NORMAL
HGB BLD-MCNC: 13.2 G/DL (ref 12–15.9)
IMM GRANULOCYTES # BLD AUTO: 0.01 10*3/MM3 (ref 0–0.05)
IMM GRANULOCYTES NFR BLD AUTO: 0.2 % (ref 0–0.5)
LYMPHOCYTES # BLD AUTO: 2.62 10*3/MM3 (ref 0.7–3.1)
LYMPHOCYTES NFR BLD AUTO: 45 % (ref 19.6–45.3)
MCH RBC QN AUTO: 33 PG (ref 26.6–33)
MCHC RBC AUTO-ENTMCNC: 33.4 G/DL (ref 31.5–35.7)
MCV RBC AUTO: 98.8 FL (ref 79–97)
MONOCYTES # BLD AUTO: 0.57 10*3/MM3 (ref 0.1–0.9)
MONOCYTES NFR BLD AUTO: 9.8 % (ref 5–12)
NEUTROPHILS # BLD AUTO: 2.54 10*3/MM3 (ref 1.7–7)
NEUTROPHILS NFR BLD AUTO: 43.6 % (ref 42.7–76)
NRBC BLD AUTO-RTO: 0 /100 WBC (ref 0–0.2)
PLATELET # BLD AUTO: 239 10*3/MM3 (ref 140–450)
PMV BLD AUTO: 10 FL (ref 6–12)
POTASSIUM BLD-SCNC: 3.7 MMOL/L (ref 3.5–5.2)
PROT SERPL-MCNC: 7.4 G/DL (ref 6–8.5)
RBC # BLD AUTO: 4 10*6/MM3 (ref 3.77–5.28)
RPR SER QL: NORMAL
SODIUM BLD-SCNC: 141 MMOL/L (ref 136–145)
WBC NRBC COR # BLD: 5.82 10*3/MM3 (ref 3.4–10.8)

## 2019-09-23 PROCEDURE — 85025 COMPLETE CBC W/AUTO DIFF WBC: CPT | Performed by: INTERNAL MEDICINE

## 2019-09-23 PROCEDURE — 86803 HEPATITIS C AB TEST: CPT | Performed by: INTERNAL MEDICINE

## 2019-09-23 PROCEDURE — 99213 OFFICE O/P EST LOW 20 MIN: CPT | Performed by: INTERNAL MEDICINE

## 2019-09-23 PROCEDURE — 87536 HIV-1 QUANT&REVRSE TRNSCRPJ: CPT | Performed by: INTERNAL MEDICINE

## 2019-09-23 PROCEDURE — 80053 COMPREHEN METABOLIC PANEL: CPT | Performed by: INTERNAL MEDICINE

## 2019-09-23 PROCEDURE — 36415 COLL VENOUS BLD VENIPUNCTURE: CPT | Performed by: INTERNAL MEDICINE

## 2019-09-23 PROCEDURE — 86361 T CELL ABSOLUTE COUNT: CPT | Performed by: INTERNAL MEDICINE

## 2019-09-23 PROCEDURE — 86481 TB AG RESPONSE T-CELL SUSP: CPT | Performed by: INTERNAL MEDICINE

## 2019-09-23 PROCEDURE — 86592 SYPHILIS TEST NON-TREP QUAL: CPT | Performed by: INTERNAL MEDICINE

## 2019-09-23 NOTE — PROGRESS NOTES
CC: f/u HIV    HPI: Richa Dolan is a 59 y.o. female here for f/u of her HIV care. She continues to take Triumeq without any adverse effects. She has not missed any doses. She receives refills in a timely manner through [facility]. Her copay is $0.    She says that vertigo still plagues her from time to time. She is now following with ENT. Her symptoms are improved w/ positioning and recommended exercises.    Review of Systems: no n/v/d; no CP or SOA    PMH: HIV, migraines, GERD, HLD, CVA    PSH: tonsillectomy, hysterectomy, cholecystectomy, Partial medial meniscectomy (right)    FH: father had dementia    SH: , 1 son, no tobacco use, occasional EtOH, no illicits, works at [employer]    Allergies: Augmentin (hives)    Medications:   Current Outpatient Medications:   •  AIMOVIG 70 MG/ML solution auto-injector, INJECT 2 MLS INTO THE SKIN EVERY 30 (THIRTY) DAYS AS INSTRUCTED/EDUCATED, Disp: , Rfl: 5  •  aspirin 81 MG tablet, Take 81 mg by mouth Daily., Disp: , Rfl:   •  atorvastatin (LIPITOR) 20 MG tablet, Take 1 tablet by mouth Every Night., Disp: 90 tablet, Rfl: 3  •  baclofen (LIORESAL) 10 MG tablet, Take 10 mg by mouth 2 (Two) Times a Day., Disp: , Rfl: 2  •  buPROPion XL (WELLBUTRIN XL) 150 MG 24 hr tablet, Take 150 mg by mouth Daily., Disp: , Rfl: 3  •  CAMB[place] 50 MG pack, TAKE 1 PACKET AS NEEDED FOR HEADACHES, Disp: , Rfl: 1  •  colesevelam (WELCHOL) 625 MG tablet, TAKE 2 TABLETS BY MOUTH EVERY DAY, Disp: 180 tablet, Rfl: 3  •  escitalopram (LEXAPRO) 10 MG tablet, Take 1 tablet by mouth Daily., Disp: 90 tablet, Rfl: 3  •  fluticasone (FLONASE) 50 MCG/ACT nasal spray, 2 sprays into the nostril(s) as directed by provider Daily., Disp: 3 bottle, Rfl: 3  •  HYDROcodone-acetaminophen (NORCO)  MG per tablet, take 1 tablet by mouth three times a day, Disp: , Rfl: 0  •  ibandronate (BONIVA) 150 MG tablet, TAKE 1 TABLET BY MOUTH EVERY 30 (THIRTY) DAYS., Disp: 3 tablet, Rfl: 3  •  Multiple Vitamins-Minerals  "(MULTIVITAMIN ADULT PO), Take 1 tablet by mouth Daily. STOPPED PREOP, LAST DOSE 3/15/17, Disp: , Rfl:   •  TRIUMEQ 600- MG per tablet, TAKE 1 TABLET BY MOUTH EVERY DAY, Disp: 90 tablet, Rfl: 3  •  zolpidem (AMBIEN) 10 MG tablet, Take 1 tablet by mouth At Night As Needed for Sleep., Disp: 90 tablet, Rfl: 0    OBJECTIVE:  /78   Pulse 69   Ht 172.7 cm (67.99\")   Wt 66.1 kg (145 lb 12.8 oz)   BMI 22.18 kg/m²     GENERAL: Awake and alert, NAD  HEENT: Oropharynx is clear. Good dentition. No thrush.  EYES: No scleral icterus.    HEART: NR, RR, no peripheral edema.   LUNGS: . Normal work of breathing on ambient air.    ABDOMEN: Soft, nondistended  SKIN: Warm and dry without cutaneous eruptions     DIAGNOSTICS:  CBC, CMP, HIV labs reviewed today  Lab Results   Component Value Date    WBC 6.06 04/08/2019    HGB 15.0 04/08/2019    HCT 43.8 04/08/2019     04/08/2019     Lab Results   Component Value Date    GLUCOSE 82 09/13/2018    BUN 9 04/08/2019    CREATININE 0.6 (L) 04/08/2019    EGFRIFNONA 95 09/13/2018    EGFRIFAFRI >60 05/28/2015    BCR 15.0 04/08/2019    CO2 30 04/08/2019    CALCIUM 9.5 04/08/2019    PROTENTOTREF 7.2 10/13/2016    ALBUMIN 4.7 04/08/2019    LABIL2 1.5 04/08/2019    AST 31 04/08/2019    ALT 26 04/08/2019     HIV Related Labs:  CD4: 1261/46% (9/2018)  VL < 20 (10/2018)  HIV Genotype: unknown bc undetectable  [id number] - negative  Tspot - negative (9/2018)  RPR - non-reactive (9/2018)  Hep A - immune (7/2016)  Hep B - immune (9/2015)  Hep C - negative (9/2018)  Urine GCCT - negative (9/2015)    Radiology (reviewed report):  No new imaging today    ASSESSMENT/PLAN:  1. HIV (human immunodeficiency virus infection)   -well controlled on ART  -continue Triumeq daily; refills addressed  -check CD4, HIV RNA quantification, CBC w/ diff, CMP, Hep C Ab, RPR, and Tspot today  -congratulated her on excellent compliance and viral suppression; encouraged her to continue the same  -recommended " annual flu shot which she will get at local pharmacy      RTC 6 months

## 2019-09-24 LAB
BASOPHILS # BLD AUTO: 0 X10E3/UL (ref 0–0.2)
BASOPHILS NFR BLD AUTO: 1 %
CD3+CD4+ CELLS # BLD: 1097 /UL (ref 359–1519)
CD3+CD4+ CELLS NFR BLD: 42.2 % (ref 30.8–58.5)
EOSINOPHIL # BLD AUTO: 0.1 X10E3/UL (ref 0–0.4)
EOSINOPHIL # BLD AUTO: 1 %
ERYTHROCYTE [DISTWIDTH] IN BLOOD BY AUTOMATED COUNT: 12.4 % (ref 12.3–15.4)
HCT VFR BLD AUTO: 39.7 % (ref 34–46.6)
HGB BLD-MCNC: 13.9 G/DL (ref 11.1–15.9)
IMM GRANULOCYTES # BLD: 0 X10E3/UL (ref 0–0.1)
IMM GRANULOCYTES NFR BLD: 0 %
LYMPHOCYTES # BLD AUTO: 2.6 X10E3/UL (ref 0.7–3.1)
LYMPHOCYTES NFR BLD AUTO: 44 %
MCH RBC QN AUTO: 33.7 PG (ref 26.6–33)
MCHC RBC AUTO-ENTMCNC: 35 G/DL (ref 31.5–35.7)
MCV RBC AUTO: 96 FL (ref 79–97)
MONOCYTES # BLD AUTO: 0.7 X10E3/UL (ref 0.1–0.9)
MONOCYTES NFR BLD AUTO: 11 %
NEUTROPHILS # BLD AUTO: 2.5 X10E3/UL (ref 1.4–7)
NEUTROPHILS NFR BLD AUTO: 43 %
PLATELET # BLD AUTO: 261 X10E3/UL (ref 150–450)
RBC # BLD AUTO: 4.13 X10E6/UL (ref 3.77–5.28)
TSPOT INTERPRETATION: NEGATIVE
TSPOT NIL CONTROL INTERPRETATION: NORMAL
TSPOT PANEL A: 0
TSPOT PANEL B: 0
TSPOT POS CONTROL INTERPRETATION: NORMAL
WBC # BLD AUTO: 5.9 X10E3/UL (ref 3.4–10.8)

## 2019-09-25 DIAGNOSIS — B20 HIV (HUMAN IMMUNODEFICIENCY VIRUS INFECTION) (HCC): Primary | ICD-10-CM

## 2019-09-25 LAB
HIV1 RNA # SERPL NAA+PROBE: <20 COPIES/ML
LOG10 HIV-1 RNA: NORMAL

## 2019-10-10 ENCOUNTER — TRANSCRIBE ORDERS (OUTPATIENT)
Dept: ADMINISTRATIVE | Facility: HOSPITAL | Age: 59
End: 2019-10-10

## 2019-10-10 DIAGNOSIS — N64.4 BREAST TENDERNESS: Primary | ICD-10-CM

## 2019-10-14 DIAGNOSIS — J30.2 SEASONAL ALLERGIC RHINITIS, UNSPECIFIED TRIGGER: ICD-10-CM

## 2019-10-14 RX ORDER — FLUTICASONE PROPIONATE 50 MCG
2 SPRAY, SUSPENSION (ML) NASAL DAILY
Qty: 3 BOTTLE | Refills: 3 | Status: SHIPPED | OUTPATIENT
Start: 2019-10-14 | End: 2020-09-28 | Stop reason: SDUPTHER

## 2019-10-15 ENCOUNTER — TELEPHONE (OUTPATIENT)
Dept: FAMILY MEDICINE CLINIC | Facility: CLINIC | Age: 59
End: 2019-10-15

## 2019-10-15 DIAGNOSIS — N64.4 BREAST TENDERNESS: Primary | ICD-10-CM

## 2019-10-15 NOTE — TELEPHONE ENCOUNTER
Pt has not had a mammogram performed since 2015. So radiology is requesting pt's current mammogram order to be change to Bilateral Diagnostic + US PRN.

## 2019-10-21 ENCOUNTER — HOSPITAL ENCOUNTER (OUTPATIENT)
Dept: ULTRASOUND IMAGING | Facility: HOSPITAL | Age: 59
Discharge: HOME | End: 2019-10-21

## 2019-10-21 ENCOUNTER — HOSPITAL ENCOUNTER (OUTPATIENT)
Dept: MAMMOGRAPHY | Facility: HOSPITAL | Age: 59
Discharge: HOME | End: 2019-10-21
Admitting: NURSE PRACTITIONER

## 2019-10-21 DIAGNOSIS — N64.4 BREAST TENDERNESS: ICD-10-CM

## 2019-10-21 PROCEDURE — 77066 DX MAMMO INCL CAD BI: CPT

## 2019-10-21 PROCEDURE — 76642 ULTRASOUND BREAST LIMITED: CPT

## 2019-10-22 DIAGNOSIS — N63.0 BREAST MASS: Primary | ICD-10-CM

## 2019-10-24 ENCOUNTER — HOSPITAL ENCOUNTER (OUTPATIENT)
Dept: ULTRASOUND IMAGING | Facility: HOSPITAL | Age: 59
Discharge: HOME | End: 2019-10-24

## 2019-10-28 ENCOUNTER — TELEPHONE (OUTPATIENT)
Dept: FAMILY MEDICINE CLINIC | Facility: CLINIC | Age: 59
End: 2019-10-28

## 2019-10-28 NOTE — TELEPHONE ENCOUNTER
Owen needs fmla forms filled out to assist Richa to future doctor visits. Richa was last evaluated on 7/3 for insomnia and a breast cancer screening was ordered. Seen then several ultrasounds haven been ordered. Will stephanie authorize fmla for Owen with Richa being evaluated? Please advise.

## 2019-10-29 ENCOUNTER — HOSPITAL ENCOUNTER (OUTPATIENT)
Dept: ULTRASOUND IMAGING | Facility: HOSPITAL | Age: 59
Discharge: HOME | End: 2019-10-29

## 2019-10-29 ENCOUNTER — HOSPITAL ENCOUNTER (OUTPATIENT)
Dept: ULTRASOUND IMAGING | Facility: HOSPITAL | Age: 59
Discharge: HOME | End: 2019-10-29
Admitting: NURSE PRACTITIONER

## 2019-10-29 VITALS
WEIGHT: 150 LBS | TEMPERATURE: 97.9 F | SYSTOLIC BLOOD PRESSURE: 167 MMHG | HEIGHT: 67 IN | DIASTOLIC BLOOD PRESSURE: 88 MMHG | RESPIRATION RATE: 16 BRPM | BODY MASS INDEX: 23.54 KG/M2 | OXYGEN SATURATION: 99 % | HEART RATE: 72 BPM

## 2019-10-29 DIAGNOSIS — N63.0 BREAST MASS: ICD-10-CM

## 2019-10-29 DIAGNOSIS — IMO0002 MASS: ICD-10-CM

## 2019-10-29 PROCEDURE — 76942 ECHO GUIDE FOR BIOPSY: CPT

## 2019-10-29 PROCEDURE — 88305 TISSUE EXAM BY PATHOLOGIST: CPT | Performed by: NURSE PRACTITIONER

## 2019-10-29 PROCEDURE — 88377 M/PHMTRC ALYS ISHQUANT/SEMIQ: CPT

## 2019-10-29 PROCEDURE — 25010000003 LIDOCAINE 1 % SOLUTION: Performed by: RADIOLOGY

## 2019-10-29 PROCEDURE — A4648 IMPLANTABLE TISSUE MARKER: HCPCS

## 2019-10-29 PROCEDURE — 88360 TUMOR IMMUNOHISTOCHEM/MANUAL: CPT | Performed by: NURSE PRACTITIONER

## 2019-10-29 RX ORDER — LIDOCAINE HYDROCHLORIDE 10 MG/ML
20 INJECTION, SOLUTION INFILTRATION; PERINEURAL ONCE
Status: COMPLETED | OUTPATIENT
Start: 2019-10-29 | End: 2019-10-29

## 2019-10-29 RX ORDER — LIDOCAINE HYDROCHLORIDE AND EPINEPHRINE 10; 10 MG/ML; UG/ML
10 INJECTION, SOLUTION INFILTRATION; PERINEURAL ONCE
Status: COMPLETED | OUTPATIENT
Start: 2019-10-29 | End: 2019-10-29

## 2019-10-29 RX ORDER — DIAZEPAM 5 MG/1
5 TABLET ORAL ONCE AS NEEDED
Status: DISCONTINUED | OUTPATIENT
Start: 2019-10-29 | End: 2019-10-30 | Stop reason: HOSPADM

## 2019-10-29 RX ADMIN — LIDOCAINE HYDROCHLORIDE AND EPINEPHRINE 10 ML: 10; 10 INJECTION, SOLUTION INFILTRATION; PERINEURAL at 14:40

## 2019-10-29 RX ADMIN — LIDOCAINE HYDROCHLORIDE 12 ML: 10 INJECTION, SOLUTION INFILTRATION; PERINEURAL at 14:40

## 2019-10-29 NOTE — TELEPHONE ENCOUNTER
Zach,    No mention of FMLA on last office visit in July. Do you want to add an addendum to the note or have patient return for office visit?  needs FMLA filled out and currently last note is missing this documentation. Please advise.

## 2019-10-29 NOTE — TELEPHONE ENCOUNTER
We can fill it out without a office visit she has been dx with breat cancer and he brenden need off to assist her with appointments

## 2019-10-31 DIAGNOSIS — C50.919 MALIGNANT NEOPLASM OF FEMALE BREAST, UNSPECIFIED ESTROGEN RECEPTOR STATUS, UNSPECIFIED LATERALITY, UNSPECIFIED SITE OF BREAST (HCC): Primary | ICD-10-CM

## 2019-10-31 DIAGNOSIS — G47.00 INSOMNIA, UNSPECIFIED TYPE: ICD-10-CM

## 2019-10-31 RX ORDER — ZOLPIDEM TARTRATE 10 MG/1
10 TABLET ORAL NIGHTLY PRN
Qty: 90 TABLET | Refills: 0 | Status: SHIPPED | OUTPATIENT
Start: 2019-10-31 | End: 2020-03-04 | Stop reason: SDUPTHER

## 2019-11-04 ENCOUNTER — OFFICE VISIT (OUTPATIENT)
Dept: FAMILY MEDICINE CLINIC | Facility: CLINIC | Age: 59
End: 2019-11-04

## 2019-11-04 VITALS
OXYGEN SATURATION: 99 % | DIASTOLIC BLOOD PRESSURE: 80 MMHG | WEIGHT: 141.2 LBS | HEART RATE: 71 BPM | TEMPERATURE: 98.1 F | BODY MASS INDEX: 22.16 KG/M2 | SYSTOLIC BLOOD PRESSURE: 122 MMHG | HEIGHT: 67 IN

## 2019-11-04 DIAGNOSIS — F41.9 ANXIETY: Primary | ICD-10-CM

## 2019-11-04 LAB
CYTO UR: NORMAL
LAB AP CASE REPORT: NORMAL
LAB AP CLINICAL INFORMATION: NORMAL
LAB AP DIAGNOSIS COMMENT: NORMAL
LAB AP SPECIAL STAINS: NORMAL
LAB AP SYNOPTIC CHECKLIST: NORMAL
PATH REPORT.ADDENDUM SPEC: NORMAL
PATH REPORT.FINAL DX SPEC: NORMAL
PATH REPORT.GROSS SPEC: NORMAL

## 2019-11-04 PROCEDURE — 99213 OFFICE O/P EST LOW 20 MIN: CPT | Performed by: NURSE PRACTITIONER

## 2019-11-04 RX ORDER — ONDANSETRON 4 MG/1
4 TABLET, ORALLY DISINTEGRATING ORAL EVERY 8 HOURS PRN
Qty: 30 TABLET | Refills: 0 | Status: SHIPPED | OUTPATIENT
Start: 2019-11-04 | End: 2020-04-03 | Stop reason: SDUPTHER

## 2019-11-04 RX ORDER — ERENUMAB-AOOE 140 MG/ML
140 INJECTION, SOLUTION SUBCUTANEOUS
Refills: 11 | COMMUNITY
Start: 2019-10-21

## 2019-11-04 RX ORDER — CLONAZEPAM 0.5 MG/1
0.5 TABLET ORAL 2 TIMES DAILY PRN
Qty: 60 TABLET | Refills: 0 | Status: SHIPPED | OUTPATIENT
Start: 2019-11-04 | End: 2019-12-07 | Stop reason: SDUPTHER

## 2019-11-04 NOTE — PROGRESS NOTES
"Subjective   Richa Dolan is a 59 y.o. female.     History of Present Illness   Patient presented to the office today to discuss her anxiety ever since getting the news that she has breast cancer.  She is lost 10 pounds she is upset stomach all the time where she feels like she is going to vomit but she does not.  She is on edge all the time she is unable to sleep ever since getting the diagnosis.  She has not her first appointment with an oncologist on 12 November.  She thinks that she is handling it okay but her anxiety is out of control and requesting something to help her with it.    The following portions of the patient's history were reviewed and updated as appropriate: allergies, current medications, past social history and problem list.    Review of Systems   Psychiatric/Behavioral: The patient is nervous/anxious.    All other systems reviewed and are negative.      Objective   /80 (BP Location: Right arm, Patient Position: Sitting)   Pulse 71   Temp 98.1 °F (36.7 °C)   Ht 170.2 cm (67.01\")   Wt 64 kg (141 lb 3.2 oz)   SpO2 99%   BMI 22.11 kg/m²   Physical Exam   Constitutional: She is oriented to person, place, and time. Vital signs are normal. She appears well-developed and well-nourished. No distress.   HENT:   Head: Normocephalic.   Cardiovascular: Normal rate, regular rhythm and normal heart sounds.   Pulmonary/Chest: Effort normal and breath sounds normal.   Neurological: She is alert and oriented to person, place, and time. Gait normal.   Psychiatric: She has a normal mood and affect. Her behavior is normal. Judgment and thought content normal.   Vitals reviewed.      Assessment/Plan      Diagnosis Plan   1. Anxiety  clonazePAM (KlonoPIN) 0.5 MG tablet    ondansetron ODT (ZOFRAN ODT) 4 MG disintegrating tablet     Start Klonopin 0.5 mg twice a day on an as-needed basis return to the office if medication does not work  Zach Lora, ОЛЬГА  11/4/2019  "

## 2019-11-12 ENCOUNTER — CONSULT (OUTPATIENT)
Dept: ONCOLOGY | Facility: CLINIC | Age: 59
End: 2019-11-12

## 2019-11-12 ENCOUNTER — LAB (OUTPATIENT)
Dept: LAB | Facility: HOSPITAL | Age: 59
End: 2019-11-12

## 2019-11-12 VITALS
BODY MASS INDEX: 23.37 KG/M2 | OXYGEN SATURATION: 95 % | HEART RATE: 73 BPM | DIASTOLIC BLOOD PRESSURE: 82 MMHG | WEIGHT: 145.4 LBS | SYSTOLIC BLOOD PRESSURE: 136 MMHG | RESPIRATION RATE: 16 BRPM | HEIGHT: 66 IN | TEMPERATURE: 98.2 F

## 2019-11-12 DIAGNOSIS — M81.0 OSTEOPOROSIS, UNSPECIFIED OSTEOPOROSIS TYPE, UNSPECIFIED PATHOLOGICAL FRACTURE PRESENCE: ICD-10-CM

## 2019-11-12 DIAGNOSIS — Z21 HIV POSITIVE LONG-TERM NON-PROGRESSOR (HCC): Primary | ICD-10-CM

## 2019-11-12 DIAGNOSIS — Z17.0 MALIGNANT NEOPLASM OF OVERLAPPING SITES OF LEFT BREAST IN FEMALE, ESTROGEN RECEPTOR POSITIVE (HCC): ICD-10-CM

## 2019-11-12 DIAGNOSIS — Z51.11 ENCOUNTER FOR ANTINEOPLASTIC CHEMOTHERAPY AND IMMUNOTHERAPY: ICD-10-CM

## 2019-11-12 DIAGNOSIS — Z86.73 HISTORY OF STROKE: ICD-10-CM

## 2019-11-12 DIAGNOSIS — M19.90 ARTHRITIS: ICD-10-CM

## 2019-11-12 DIAGNOSIS — C50.919 MALIGNANT NEOPLASM OF FEMALE BREAST, UNSPECIFIED ESTROGEN RECEPTOR STATUS, UNSPECIFIED LATERALITY, UNSPECIFIED SITE OF BREAST (HCC): Primary | ICD-10-CM

## 2019-11-12 DIAGNOSIS — C50.912 INVASIVE DUCTAL CARCINOMA OF BREAST, FEMALE, LEFT (HCC): ICD-10-CM

## 2019-11-12 DIAGNOSIS — Z51.12 ENCOUNTER FOR ANTINEOPLASTIC CHEMOTHERAPY AND IMMUNOTHERAPY: ICD-10-CM

## 2019-11-12 DIAGNOSIS — C50.812 MALIGNANT NEOPLASM OF OVERLAPPING SITES OF LEFT BREAST IN FEMALE, ESTROGEN RECEPTOR POSITIVE (HCC): ICD-10-CM

## 2019-11-12 DIAGNOSIS — C50.912 INVASIVE DUCTAL CARCINOMA OF BREAST, FEMALE, LEFT (HCC): Primary | ICD-10-CM

## 2019-11-12 LAB
BASOPHILS # BLD AUTO: 0.03 10*3/MM3 (ref 0–0.2)
BASOPHILS NFR BLD AUTO: 0.5 % (ref 0–1.5)
DEPRECATED RDW RBC AUTO: 44.6 FL (ref 37–54)
EOSINOPHIL # BLD AUTO: 0.06 10*3/MM3 (ref 0–0.4)
EOSINOPHIL NFR BLD AUTO: 1 % (ref 0.3–6.2)
ERYTHROCYTE [DISTWIDTH] IN BLOOD BY AUTOMATED COUNT: 12.2 % (ref 12.3–15.4)
HCT VFR BLD AUTO: 41.4 % (ref 34–46.6)
HGB BLD-MCNC: 14.2 G/DL (ref 12–15.9)
IMM GRANULOCYTES # BLD AUTO: 0.01 10*3/MM3 (ref 0–0.05)
IMM GRANULOCYTES NFR BLD AUTO: 0.2 % (ref 0–0.5)
LYMPHOCYTES # BLD AUTO: 2.59 10*3/MM3 (ref 0.7–3.1)
LYMPHOCYTES NFR BLD AUTO: 45 % (ref 19.6–45.3)
MCH RBC QN AUTO: 33.9 PG (ref 26.6–33)
MCHC RBC AUTO-ENTMCNC: 34.3 G/DL (ref 31.5–35.7)
MCV RBC AUTO: 98.8 FL (ref 79–97)
MONOCYTES # BLD AUTO: 0.56 10*3/MM3 (ref 0.1–0.9)
MONOCYTES NFR BLD AUTO: 9.7 % (ref 5–12)
NEUTROPHILS # BLD AUTO: 2.51 10*3/MM3 (ref 1.7–7)
NEUTROPHILS NFR BLD AUTO: 43.6 % (ref 42.7–76)
NRBC BLD AUTO-RTO: 0 /100 WBC (ref 0–0.2)
PLATELET # BLD AUTO: 220 10*3/MM3 (ref 140–450)
PMV BLD AUTO: 10.2 FL (ref 6–12)
RBC # BLD AUTO: 4.19 10*6/MM3 (ref 3.77–5.28)
WBC NRBC COR # BLD: 5.76 10*3/MM3 (ref 3.4–10.8)

## 2019-11-12 PROCEDURE — 36415 COLL VENOUS BLD VENIPUNCTURE: CPT

## 2019-11-12 PROCEDURE — 99245 OFF/OP CONSLTJ NEW/EST HI 55: CPT | Performed by: INTERNAL MEDICINE

## 2019-11-12 PROCEDURE — G0463 HOSPITAL OUTPT CLINIC VISIT: HCPCS | Performed by: INTERNAL MEDICINE

## 2019-11-12 PROCEDURE — 85025 COMPLETE CBC W/AUTO DIFF WBC: CPT

## 2019-11-12 NOTE — PROGRESS NOTES
Subjective     REASON FOR CONSULTATION:    1.  recent diagnosis of invasive ductal carcinoma of the left breast with lymph node involvement.   2.  HIV followed by infectious disease  3.  Vertigo  4.  History of stroke                            REQUESTING PHYSICIAN:  Zach Lora    RECORDS OBTAINED:  Records of the patients history including those obtained from the referring provider were reviewed and summarized in detail.    HISTORY OF PRESENT ILLNESS:  The patient is a 59 y.o. year old female who is here for an opinion about the above issue.    History of Present Illness .  Patient is a 59-year-old female who has had a history of HIV since age 34 followed by Dr. Natali Ng at [facility] and was on multiple HIV drugs initially but more recently has been on a single medication TRIUMEQ, with well-controlled HIV.  She follows up with Dr. Dawn , infectious disease who saw her recently and he saw her on 4/8/2019 and has excellent control and suppression of her viral load.  She is very compliant with her medications.    She also has had history of stroke in 2017 for which she was placed on aspirin.  She presented with a headache.  She is very active and works at [employer] full-time.  She also has had history of vertigo in the past  Patient's PCP is Zach Lora.    Patient first noticed the mass in her left breast 5 months ago.  Her last mammogram was 5 years ago.  She had soreness in the left breast and she went to her primary care physician who then ordered a mammogram diagnostic and an ultrasound.  The diagnostic mammogram showed a 3.7 cm mass at 4 o'clock position in the lower outer quadrant of the left breast.  The ultrasound showed 3.8 cm x 3.1 x 2.5 cm mass at 4 o'clock position in the left breast with 2 abnormal appearing left axillary lymph nodes the largest being 1.4 cm.    She then underwent biopsy of both the breast mass as well as the left axillary lymph node and both of  them are positive for invasive mammary carcinoma it is invasive ductal carcinoma with apocrine features, moderately differentiated, grade 2 of 3 with a [place] score of 5.  The left axillary lymph node is also consistent with metastatic mammary carcinoma.  It is ER positive HI positive HER-2/merna negative.  Details as follows        10/21/19 - Bilateral Diagnostic Mammogram and US Breast Left  FINDINGS: Bilateral digital CC and MLO mammographic images were  obtained. No prior examination is available for comparison. Scattered  fibroglandular densities are seen throughout both breasts. A triangular  skin marker represents the area of palpable concern in the lower outer  quadrant of the left breast in the posterior 1/3. At this location there  is an irregular mass that measures on the order of 3.7 cm in greatest  dimension. Internal calcifications are noted. No suspicious findings in  the right breast are appreciated.     ULTRASOUND: Targeted sonographic evaluation of the left breast was  performed through the area of concern in the left axilla. At the 4  o'clock position on the order of 6 cm from the nipple there is an  irregular hypoechoic mass that measures 3.8 x 3.1 x 2.5 cm. Internal  vascularity is noted.     There are 2 abnormal-appearing left axillary lymph nodes, the largest  which measures on the order of 1.4 cm in greatest dimension.     IMPRESSION:  1. There is a 3.8 cm irregular mass in the left breast at the 4 o'clock  position. This is seen in conjunction with an irregular 1.4 cm lymph  node in the left axilla. This is suspicious for malignancy with left  axillary involvement. Correlation with ultrasound-guided biopsy of both  the left breast mass and the lymph node is recommended.  2. There are no findings suspicious for malignancy in the right breast.     BI-RADS CATEGORY 5:     Patient's labs from 11/12/19 are normal.    10/29/19 - Tissue Pathology  1. Left Breast, 4:00, 6 cm FN, U/S-Guided Core  Needle Biopsy for a Mass:  A. INVASIVE DUCTAL CARCINOMA WITH APOCRINE FEATURES, Moderately differentiated;  Jen Histologic Grade II/III (tubule score = 3, nuclear score = 2, mitoses score = 1), measuring at least  9 mm.  B. No ductal or lobular carcinoma in situ is identified in this sample.  C. Focus suspicious for lymphovascular space invasion.  D. See Biomarker Template for hormone receptor studies.  2. Lymph Node, Left Axilla, U/S-Guided Core Needle Biopsy:  A. METASTATIC MAMMARY CARCINOMA (see Comment).  B. Metastatic focus measures 5 mm in greatest extent.  ER+, 85%  MO+, 10%  HER2- Score 2+    PAST MEDICAL HISTORY:  She had a stroke in 2017 with headache and dizziness.  She went to the ER and was placed on aspirin.  However, she stopped taking it two weeks ago.  She has been taking Aimovig (injection) monthly with Fariba López at [facility].    In , patient was diagnosed with HIV with an unknown cause which is managed by Dr. Natali Subramanian.  As of now, her viral load is good.    Patient has arthritis.  She gets trigger-point injections in her back with her last one being 2 weeks ago.  She has had multiple ablations.  She also has pain in her SI joint and Dr. Bermudez performed a surgery on this.  She takes Narco for the pain.      She is osteoporotic.  She has had multiple hairline fractures in both her legs.  She had her knees cleaned out by Dr. Sinha.     Patient has had a hysterectomy and still has both of her ovaries.    Patient has vertigo and the muscles in her left ear are weak.  She just has an imbalance.    FAMILY HISTORY:  Her mother had breast cancer at age 60, still alive at 85 and in good health..  Her father had dementia.  Her paternal uncle had prostate cancer.  Her brother had esophageal cancer.    OB-GYN:  Menarche 15 years  Menopause-46  Pregnancies- 1 para 1 no miscarriages her first pregnancy was in  at 29 years of age.  She did not breastfeed.  Post menopausal HRT-  None.  Hx of birth control pills- Yes.    SOCIAL HISTORY:  Patient does not smoke or do drugs.  She does drink occasionally.    Past Medical History:   Diagnosis Date   • Anxiety    • Cerebrovascular accident (CVA) (CMS/[facility]) 8/22/2017   • DDD (degenerative disc disease), cervical    • Depression    • GERD (gastroesophageal reflux disease)    • H/O ICH (intracerebral hemorrhage) (CMS/[facility])    • History of stroke    • History of thrombocytopenia    • HIV (human immunodeficiency virus infection) (CMS/[facility]) 1996   • Hyperlipidemia    • Insomnia    • Lupus anticoagulant positive    • Meniscus tear 2017    RIGHT   • Migraine    • Neck pain     WITH SHOOTING PAIN LEFT RIGHT ARM   • Osteoarthritis    • Osteoporosis    • Seasonal allergies    • Spinal headache    • Tick bite 06/2014        Past Surgical History:   Procedure Laterality Date   • ADENOIDECTOMY     • CHOLECYSTECTOMY     • HYSTERECTOMY     • KNEE ARTHROSCOPY Right 3/24/2017    Procedure:  RIGHT  KNEE ARTHROSCOPY WITH DEBRIDEMENT CHONDROPLASTY PARTIAL MENISCECTOMY;  Surgeon: Karl Galeas MD;  Location: [facility] OR [facility];  Service:    • KNEE CARTILAGE SURGERY Right 2007   • TONSILLECTOMY     • US GUIDED LYMPH NODE BIOPSY  10/29/2019        Current Outpatient Medications on File Prior to Visit   Medication Sig Dispense Refill   • AIMOVIG 140 MG/ML solution auto-injector INJECT 140 MG INTO THE SKIN EVERY 30 (THIRTY) DAYS.  11   • atorvastatin (LIPITOR) 20 MG tablet Take 1 tablet by mouth Every Night. 90 tablet 3   • baclofen (LIORESAL) 10 MG tablet Take 10 mg by mouth 2 (Two) Times a Day.  2   • CAMBIA 50 MG pack TAKE 1 PACKET AS NEEDED FOR HEADACHES  1   • clonazePAM (KlonoPIN) 0.5 MG tablet Take 1 tablet by mouth 2 (Two) Times a Day As Needed for Anxiety. 60 tablet 0   • COCONUT OIL PO Take  by mouth.     • colesevelam (WELCHOL) 625 MG tablet TAKE 2 TABLETS BY MOUTH EVERY  tablet 3   • escitalopram (LEXAPRO) 10 MG tablet Take 1 tablet by mouth Daily.  90 tablet 3   • fluticasone (FLONASE) 50 MCG/ACT nasal spray 2 sprays into the nostril(s) as directed by provider Daily. 3 bottle 3   • GLUCOSAMINE CHONDROITIN COMPLX PO Take  by mouth.     • HYDROcodone-acetaminophen (NORCO)  MG per tablet take 1 tablet by mouth three times a day  0   • ibandronate (BONIVA) 150 MG tablet TAKE 1 TABLET BY MOUTH EVERY 30 (THIRTY) DAYS. 3 tablet 3   • NON FORMULARY Collagen peptides powder     • ondansetron ODT (ZOFRAN ODT) 4 MG disintegrating tablet Take 1 tablet by mouth Every 8 (Eight) Hours As Needed for Nausea or Vomiting. 30 tablet 0   • TRIUMEQ 600- MG per tablet TAKE 1 TABLET BY MOUTH EVERY DAY 90 tablet 3   • TURMERIC PO Take  by mouth.     • zolpidem (AMBIEN) 10 MG tablet Take 1 tablet by mouth At Night As Needed for Sleep. 90 tablet 0   • aspirin 81 MG tablet Take 81 mg by mouth Daily.       No current facility-administered medications on file prior to visit.         ALLERGIES:    Allergies   Allergen Reactions   • Augmentin [Amoxicillin-Pot Clavulanate] Hives        Social History     Socioeconomic History   • Marital status:      Spouse name: Owen   • Number of children: Not on file   • Years of education: Not on file   • Highest education level: Not on file   Occupational History     Employer:  [employer]   Tobacco Use   • Smoking status: Never Smoker   • Smokeless tobacco: Never Used   Substance and Sexual Activity   • Alcohol use: No     Comment: occasioonal   • Drug use: No   • Sexual activity: Defer     Birth control/protection: None        Family History   Problem Relation Age of Onset   • Breast cancer Mother    • Leukemia Mother         CLL?   • Dementia Father    • Heart disease Maternal Grandmother    • Diabetes Maternal Grandfather    • Heart disease Maternal Grandfather    • Stroke Maternal Grandfather    • Osteoporosis Paternal Grandmother    • Heart disease Paternal Grandfather    • Leukemia Maternal Aunt         CLL?   •  "Throat cancer Paternal Uncle         Review of Systems   Constitutional: Negative for appetite change, chills, diaphoresis, fatigue, fever and unexpected weight change.   HENT: Negative for hearing loss, sore throat and trouble swallowing.    Respiratory: Negative for cough, chest tightness, shortness of breath and wheezing.    Cardiovascular: Negative for chest pain, palpitations and leg swelling.   Gastrointestinal: Negative for abdominal distention, abdominal pain, constipation, diarrhea, nausea and vomiting.   Genitourinary: Negative for dysuria, frequency, hematuria and urgency.   Musculoskeletal: Negative for joint swelling.        No muscle weakness.   Skin: Negative for rash and wound.   Neurological: Negative for seizures, syncope, speech difficulty, weakness, numbness and headaches.   Hematological: Negative for adenopathy. Does not bruise/bleed easily.   Psychiatric/Behavioral: Negative for behavioral problems, confusion and suicidal ideas.       Objective     Vitals:    11/12/19 1345   BP: 136/82   Pulse: 73   Resp: 16   Temp: 98.2 °F (36.8 °C)   TempSrc: Oral   SpO2: 95%   Weight: 66 kg (145 lb 6.4 oz)   Height: 168 cm (66.14\")  Comment: New Ht No Shoes   PainSc: 0-No pain     Current Status 11/12/2019   ECOG score 0       Physical Exam    GENERAL:  Well-developed, well-nourished in no acute distress.   SKIN:  Warm, dry without rashes, purpura or petechiae.  NECK:  Supple with good range of motion; no thyromegaly or masses, no JVD.  LYMPHATICS:  No cervical, supraclavicular, or inguinal adenopathy.   CHEST:  Lungs clear to auscultation. Good airflow.  BREAST:   Right breast: No skin changes, no evidence of breast mass, no nipple discharge, no evidence of any right axillary adenopathy or right supraclavicular adenopathy  Left breast: No evidence of any skin changes and no evidence of left nipple discharge as well as no left supraclavicular adenopathy.  There is a 4.5 cm by 3.5 cm mass in the lower outer " quadrant of the left breast.   She has left axillary lymphadenopathy.  CARDIAC:  Regular rate and rhythm without murmurs, rubs or gallops. Normal S1,S2.  ABDOMEN:  Soft, nontender with no hepatosplenomegaly or masses.  EXTREMITIES:  No clubbing, cyanosis or edema.  NEUROLOGICAL:  Cranial Nerves II-XII grossly intact.  No focal neurological deficits.  PSYCHIATRIC:  Normal affect and mood.        RECENT LABS:  Hematology WBC   Date Value Ref Range Status   11/12/2019 5.76 3.40 - 10.80 10*3/mm3 Final   09/23/2019 5.9 3.4 - 10.8 x10E3/uL Final   04/08/2019 6.06 4.5 - 11.0 10*3/uL Final     RBC   Date Value Ref Range Status   11/12/2019 4.19 3.77 - 5.28 10*6/mm3 Final   09/23/2019 4.13 3.77 - 5.28 x10E6/uL Final   04/08/2019 4.47 4.0 - 5.2 10*6/uL Final     Hemoglobin   Date Value Ref Range Status   11/12/2019 14.2 12.0 - 15.9 g/dL Final   04/08/2019 15.0 12.0 - 16.0 g/dL Final     Hematocrit   Date Value Ref Range Status   11/12/2019 41.4 34.0 - 46.6 % Final   04/08/2019 43.8 36.0 - 46.0 % Final     Platelets   Date Value Ref Range Status   11/12/2019 220 140 - 450 10*3/mm3 Final   04/08/2019 272 140 - 440 10*3/uL Final          Assessment/Plan     1. T2N1 invasive ductal carcinoma of the left breast, recently diagnosed.    -Noticed mass in the left breast x5 months  -Diagnostic mammogram showed 3.7 mm mass in the left breast at 4 o'clock position  -Ultrasound-showed 3.8 x 3.1 x 2.5 cm mass at 4 o'clock position with 2 abnormal axillary lymph nodes, larger lymph node being 1.4 cm  -Left breast biopsy and axillary lymph node biopsy October 29, 2019, invasive ductal carcinoma, moderately differentiated, grade 2, ER 85%, MS 10%, HER-2/merna 2+, HER-2 FISH negative  -Patient now here to be evaluated for further options of treatment    · Discussed in length with patient that the best option is likely surgery upfront and will need referral to surgeon.  · Patient is interested in lumpectomy but given the large size of the tumor  with axillary lymph node involvement and small sized breast I am unsure if she will be a candidate for lumpectomy.  · Given lymph node involvement I will go ahead and obtain MRI of bilateral breast, CT scan of the chest abdomen pelvis and a bone scan to evaluate for any distant disease.    2. HIV, followed by Dr. Dawn in infectious disease.  Well-controlled and is on a single medication with very low viral load. Has HIV since age 34.     3.  Family history of breast cancer with mother having had breast cancer at age 60.  There is family history of uncle with prostate cancer.  Patient will require genetic referral    4.  History of stroke 2017    5.  Arthritis with severe back pain followed by Dr. Bam Crandall        PLAN:  1. Order CT and bone scans, MRI breast, and echocardiogram  2. Patient scheduled with Dr. Forman  3. Return in 2 weeks with Tiffanie with labs  4. Obtain 2D echocardiogram  5. We will discuss her in the breast cancer conference , discussed with her that if she is a candidate for lumpectomy we could consider chemotherapy adjuvantly but if she is not a candidate for, lumpectomy after reviewing the MRI then we may need to consider neoadjuvant chemotherapy.  6. However best is to obtain genetics testing, will obtain [facility] genetics test  7. We will discuss with infectious disease about her underlying HIV status    Thank you for allowing me to participate in the care of this patient     Ruby Mroeno MD       CC:  ОЛЬГА Mccoy MD Nathan Bullington, MD

## 2019-11-13 ENCOUNTER — TELEPHONE (OUTPATIENT)
Dept: ONCOLOGY | Facility: CLINIC | Age: 59
End: 2019-11-13

## 2019-11-13 ENCOUNTER — HOSPITAL ENCOUNTER (OUTPATIENT)
Dept: PET IMAGING | Facility: HOSPITAL | Age: 59
Discharge: HOME | End: 2019-11-13
Admitting: INTERNAL MEDICINE

## 2019-11-13 ENCOUNTER — LAB (OUTPATIENT)
Dept: LAB | Facility: HOSPITAL | Age: 59
End: 2019-11-13

## 2019-11-13 DIAGNOSIS — Z21 HIV POSITIVE LONG-TERM NON-PROGRESSOR (HCC): ICD-10-CM

## 2019-11-13 DIAGNOSIS — M81.0 OSTEOPOROSIS, UNSPECIFIED OSTEOPOROSIS TYPE, UNSPECIFIED PATHOLOGICAL FRACTURE PRESENCE: ICD-10-CM

## 2019-11-13 DIAGNOSIS — Z17.0 MALIGNANT NEOPLASM OF OVERLAPPING SITES OF LEFT BREAST IN FEMALE, ESTROGEN RECEPTOR POSITIVE (HCC): ICD-10-CM

## 2019-11-13 DIAGNOSIS — C50.912 INVASIVE DUCTAL CARCINOMA OF BREAST, FEMALE, LEFT (HCC): ICD-10-CM

## 2019-11-13 DIAGNOSIS — C50.812 MALIGNANT NEOPLASM OF OVERLAPPING SITES OF LEFT BREAST IN FEMALE, ESTROGEN RECEPTOR POSITIVE (HCC): ICD-10-CM

## 2019-11-13 LAB — CREAT BLDA-MCNC: 0.7 MG/DL (ref 0.6–1.3)

## 2019-11-13 PROCEDURE — 70491 CT SOFT TISSUE NECK W/DYE: CPT

## 2019-11-13 PROCEDURE — 71260 CT THORAX DX C+: CPT

## 2019-11-13 PROCEDURE — 25010000002 IOPAMIDOL 61 % SOLUTION: Performed by: INTERNAL MEDICINE

## 2019-11-13 PROCEDURE — 0 DIATRIZOATE MEGLUMINE & SODIUM PER 1 ML: Performed by: INTERNAL MEDICINE

## 2019-11-13 PROCEDURE — 82565 ASSAY OF CREATININE: CPT

## 2019-11-13 PROCEDURE — 74177 CT ABD & PELVIS W/CONTRAST: CPT

## 2019-11-13 RX ADMIN — DIATRIZOATE MEGLUMINE AND DIATRIZOATE SODIUM 30 ML: 660; 100 LIQUID ORAL; RECTAL at 08:00

## 2019-11-13 RX ADMIN — IOPAMIDOL 85 ML: 612 INJECTION, SOLUTION INTRAVENOUS at 08:43

## 2019-11-13 NOTE — TELEPHONE ENCOUNTER
----- Message from Ruby Moreno MD sent at 11/12/2019  6:02 PM EST -----  Please call the patient as she needs an appointment to get the genetic INVITAE testing done tomorrow.  She is coming for CT scan, can you please do it at the same time and call the patient with appointment.  Ruby Moreno MD

## 2019-11-14 ENCOUNTER — HOSPITAL ENCOUNTER (OUTPATIENT)
Dept: CARDIOLOGY | Facility: HOSPITAL | Age: 59
Discharge: HOME | End: 2019-11-14
Admitting: INTERNAL MEDICINE

## 2019-11-14 VITALS
DIASTOLIC BLOOD PRESSURE: 62 MMHG | WEIGHT: 144 LBS | HEIGHT: 66 IN | HEART RATE: 67 BPM | SYSTOLIC BLOOD PRESSURE: 122 MMHG | BODY MASS INDEX: 23.14 KG/M2

## 2019-11-14 DIAGNOSIS — C50.912 INVASIVE DUCTAL CARCINOMA OF BREAST, FEMALE, LEFT (HCC): ICD-10-CM

## 2019-11-14 DIAGNOSIS — Z51.12 ENCOUNTER FOR ANTINEOPLASTIC CHEMOTHERAPY AND IMMUNOTHERAPY: ICD-10-CM

## 2019-11-14 DIAGNOSIS — Z51.11 ENCOUNTER FOR ANTINEOPLASTIC CHEMOTHERAPY AND IMMUNOTHERAPY: ICD-10-CM

## 2019-11-14 PROCEDURE — 93306 TTE W/DOPPLER COMPLETE: CPT

## 2019-11-14 PROCEDURE — 0399T HC MYOCARDL STRAIN IMAG QUAN ASSMT PER SESS: CPT

## 2019-11-14 PROCEDURE — 93306 TTE W/DOPPLER COMPLETE: CPT | Performed by: INTERNAL MEDICINE

## 2019-11-14 PROCEDURE — 25010000002 PERFLUTREN (DEFINITY) 8.476 MG IN SODIUM CHLORIDE 0.9 % 10 ML INJECTION: Performed by: INTERNAL MEDICINE

## 2019-11-14 PROCEDURE — 0399T ADULT TRANSTHORACIC ECHO COMPLETE W/ CONT IF NECESSARY PER PROTOCOL: CPT | Performed by: INTERNAL MEDICINE

## 2019-11-14 RX ADMIN — PERFLUTREN 2 ML: 6.52 INJECTION, SUSPENSION INTRAVENOUS at 12:17

## 2019-11-15 ENCOUNTER — HOSPITAL ENCOUNTER (OUTPATIENT)
Dept: NUCLEAR MEDICINE | Facility: HOSPITAL | Age: 59
Discharge: HOME | End: 2019-11-15

## 2019-11-15 ENCOUNTER — TELEPHONE (OUTPATIENT)
Dept: OTHER | Facility: HOSPITAL | Age: 59
End: 2019-11-15

## 2019-11-15 DIAGNOSIS — C50.912 INVASIVE DUCTAL CARCINOMA OF BREAST, FEMALE, LEFT (HCC): ICD-10-CM

## 2019-11-15 LAB
AORTIC ARCH: 2.3 CM
AORTIC ROOT ANNULUS: 1.9 CM
ASCENDING AORTA: 2.8 CM
BH CV ECHO MEAS - ACS: 1.9 CM
BH CV ECHO MEAS - AO MAX PG (FULL): 3.3 MMHG
BH CV ECHO MEAS - AO MAX PG: 7.1 MMHG
BH CV ECHO MEAS - AO MEAN PG (FULL): 2 MMHG
BH CV ECHO MEAS - AO MEAN PG: 4.1 MMHG
BH CV ECHO MEAS - AO ROOT AREA (BSA CORRECTED): 1.8
BH CV ECHO MEAS - AO ROOT AREA: 7.3 CM^2
BH CV ECHO MEAS - AO ROOT DIAM: 3.1 CM
BH CV ECHO MEAS - AO V2 MAX: 132.8 CM/SEC
BH CV ECHO MEAS - AO V2 MEAN: 96.8 CM/SEC
BH CV ECHO MEAS - AO V2 VTI: 32.5 CM
BH CV ECHO MEAS - ASC AORTA: 2.8 CM
BH CV ECHO MEAS - AVA(I,A): 2.1 CM^2
BH CV ECHO MEAS - AVA(I,D): 2.1 CM^2
BH CV ECHO MEAS - AVA(V,A): 2.2 CM^2
BH CV ECHO MEAS - AVA(V,D): 2.2 CM^2
BH CV ECHO MEAS - BSA(HAYCOCK): 1.7 M^2
BH CV ECHO MEAS - BSA: 1.7 M^2
BH CV ECHO MEAS - BZI_BMI: 23.2 KILOGRAMS/M^2
BH CV ECHO MEAS - BZI_METRIC_HEIGHT: 167.6 CM
BH CV ECHO MEAS - BZI_METRIC_WEIGHT: 65.3 KG
BH CV ECHO MEAS - EDV(MOD-SP2): 80 ML
BH CV ECHO MEAS - EDV(MOD-SP4): 92 ML
BH CV ECHO MEAS - EDV(TEICH): 109.7 ML
BH CV ECHO MEAS - EF(CUBED): 75 %
BH CV ECHO MEAS - EF(MOD-BP): 67 %
BH CV ECHO MEAS - EF(MOD-SP2): 68.8 %
BH CV ECHO MEAS - EF(MOD-SP4): 65.2 %
BH CV ECHO MEAS - EF(TEICH): 66.7 %
BH CV ECHO MEAS - ESV(MOD-SP2): 25 ML
BH CV ECHO MEAS - ESV(MOD-SP4): 32 ML
BH CV ECHO MEAS - ESV(TEICH): 36.5 ML
BH CV ECHO MEAS - FS: 37 %
BH CV ECHO MEAS - IVS/LVPW: 1
BH CV ECHO MEAS - IVSD: 0.94 CM
BH CV ECHO MEAS - LAT PEAK E' VEL: 15 CM/SEC
BH CV ECHO MEAS - LV DIASTOLIC VOL/BSA (35-75): 52.9 ML/M^2
BH CV ECHO MEAS - LV MASS(C)D: 153.3 GRAMS
BH CV ECHO MEAS - LV MASS(C)DI: 88.1 GRAMS/M^2
BH CV ECHO MEAS - LV MAX PG: 3.8 MMHG
BH CV ECHO MEAS - LV MEAN PG: 2.1 MMHG
BH CV ECHO MEAS - LV SYSTOLIC VOL/BSA (12-30): 18.4 ML/M^2
BH CV ECHO MEAS - LV V1 MAX: 96.8 CM/SEC
BH CV ECHO MEAS - LV V1 MEAN: 69.3 CM/SEC
BH CV ECHO MEAS - LV V1 VTI: 22.7 CM
BH CV ECHO MEAS - LVIDD: 4.8 CM
BH CV ECHO MEAS - LVIDS: 3.1 CM
BH CV ECHO MEAS - LVLD AP2: 6.9 CM
BH CV ECHO MEAS - LVLD AP4: 7.5 CM
BH CV ECHO MEAS - LVLS AP2: 5.8 CM
BH CV ECHO MEAS - LVLS AP4: 6.4 CM
BH CV ECHO MEAS - LVOT AREA (M): 2.8 CM^2
BH CV ECHO MEAS - LVOT AREA: 3 CM^2
BH CV ECHO MEAS - LVOT DIAM: 1.9 CM
BH CV ECHO MEAS - LVPWD: 0.9 CM
BH CV ECHO MEAS - MED PEAK E' VEL: 10 CM/SEC
BH CV ECHO MEAS - MR MAX PG: 91.3 MMHG
BH CV ECHO MEAS - MR MAX VEL: 477.8 CM/SEC
BH CV ECHO MEAS - MV A DUR: 0.11 SEC
BH CV ECHO MEAS - MV A MAX VEL: 69.8 CM/SEC
BH CV ECHO MEAS - MV DEC SLOPE: 423.2 CM/SEC^2
BH CV ECHO MEAS - MV DEC TIME: 0.19 SEC
BH CV ECHO MEAS - MV E MAX VEL: 77.1 CM/SEC
BH CV ECHO MEAS - MV E/A: 1.1
BH CV ECHO MEAS - MV MAX PG: 2.9 MMHG
BH CV ECHO MEAS - MV MEAN PG: 1.5 MMHG
BH CV ECHO MEAS - MV P1/2T MAX VEL: 81.4 CM/SEC
BH CV ECHO MEAS - MV P1/2T: 56.3 MSEC
BH CV ECHO MEAS - MV V2 MAX: 85.3 CM/SEC
BH CV ECHO MEAS - MV V2 MEAN: 58.2 CM/SEC
BH CV ECHO MEAS - MV V2 VTI: 31.2 CM
BH CV ECHO MEAS - MVA P1/2T LCG: 2.7 CM^2
BH CV ECHO MEAS - MVA(P1/2T): 3.9 CM^2
BH CV ECHO MEAS - MVA(VTI): 2.2 CM^2
BH CV ECHO MEAS - PA MAX PG (FULL): 0.98 MMHG
BH CV ECHO MEAS - PA MAX PG: 3.1 MMHG
BH CV ECHO MEAS - PA V2 MAX: 88.4 CM/SEC
BH CV ECHO MEAS - PULM A REVS DUR: 0.11 SEC
BH CV ECHO MEAS - PULM A REVS VEL: 25.7 CM/SEC
BH CV ECHO MEAS - PULM DIAS VEL: 47.6 CM/SEC
BH CV ECHO MEAS - PULM S/D: 1.1
BH CV ECHO MEAS - PULM SYS VEL: 54 CM/SEC
BH CV ECHO MEAS - PVA(V,A): 1.7 CM^2
BH CV ECHO MEAS - PVA(V,D): 1.7 CM^2
BH CV ECHO MEAS - QP/QS: 0.47
BH CV ECHO MEAS - RAP SYSTOLE: 3 MMHG
BH CV ECHO MEAS - RV MAX PG: 2.1 MMHG
BH CV ECHO MEAS - RV MEAN PG: 1.3 MMHG
BH CV ECHO MEAS - RV V1 MAX: 73.3 CM/SEC
BH CV ECHO MEAS - RV V1 MEAN: 53.4 CM/SEC
BH CV ECHO MEAS - RV V1 VTI: 15.7 CM
BH CV ECHO MEAS - RVOT AREA: 2 CM^2
BH CV ECHO MEAS - RVOT DIAM: 1.6 CM
BH CV ECHO MEAS - RVSP: 28 MMHG
BH CV ECHO MEAS - SI(AO): 136.4 ML/M^2
BH CV ECHO MEAS - SI(CUBED): 48.9 ML/M^2
BH CV ECHO MEAS - SI(LVOT): 38.6 ML/M^2
BH CV ECHO MEAS - SI(MOD-SP2): 31.6 ML/M^2
BH CV ECHO MEAS - SI(MOD-SP4): 34.5 ML/M^2
BH CV ECHO MEAS - SI(TEICH): 42.1 ML/M^2
BH CV ECHO MEAS - SUP REN AO DIAM: 1.6 CM
BH CV ECHO MEAS - SV(AO): 237.3 ML
BH CV ECHO MEAS - SV(CUBED): 85.1 ML
BH CV ECHO MEAS - SV(LVOT): 67.2 ML
BH CV ECHO MEAS - SV(MOD-SP2): 55 ML
BH CV ECHO MEAS - SV(MOD-SP4): 60 ML
BH CV ECHO MEAS - SV(RVOT): 31.7 ML
BH CV ECHO MEAS - SV(TEICH): 73.2 ML
BH CV ECHO MEAS - TAPSE (>1.6): 2.2 CM2
BH CV ECHO MEAS - TR MAX VEL: 248.9 CM/SEC
BH CV ECHO MEASUREMENTS AVERAGE E/E' RATIO: 6.17
BH CV XLRA - RV BASE: 2.7 CM
BH CV XLRA - RV LENGTH: 6.5 CM
BH CV XLRA - RV MID: 1.6 CM
BH CV XLRA - TDI S': 13 CM/SEC
LEFT ATRIUM VOLUME INDEX: 20 ML/M2
LV EF 2D ECHO EST: 67 %
MAXIMAL PREDICTED HEART RATE: 161 BPM
SINUS: 2.7 CM
STJ: 2.7 CM
STRESS TARGET HR: 137 BPM

## 2019-11-15 PROCEDURE — 0 TECHNETIUM MEDRONATE KIT: Performed by: INTERNAL MEDICINE

## 2019-11-15 PROCEDURE — 78306 BONE IMAGING WHOLE BODY: CPT

## 2019-11-15 PROCEDURE — A9503 TC99M MEDRONATE: HCPCS | Performed by: INTERNAL MEDICINE

## 2019-11-15 RX ORDER — TC 99M MEDRONATE 20 MG/10ML
24 INJECTION, POWDER, LYOPHILIZED, FOR SOLUTION INTRAVENOUS
Status: COMPLETED | OUTPATIENT
Start: 2019-11-15 | End: 2019-11-15

## 2019-11-15 RX ADMIN — Medication 24 MILLICURIE: at 08:06

## 2019-11-15 NOTE — TELEPHONE ENCOUNTER
Richa returned my call. I introduced myself and navigational services. She had a consultation with Dr. Moreno on Nov 12th and is scheduled to see Dr. Forman on the 19th. She was agreeable to meet after her appointment with Dr. Forman in my office. She has my contact information if the need arises.

## 2019-11-15 NOTE — TELEPHONE ENCOUNTER
Referral received from Eve Cruz RN. Called Ms. Dolan and left a message introducing myself and navigational services. Asked her to call me back at her convenience and left my contact information.

## 2019-11-18 ENCOUNTER — HOSPITAL ENCOUNTER (OUTPATIENT)
Dept: MRI IMAGING | Facility: HOSPITAL | Age: 59
Discharge: HOME | End: 2019-11-18
Admitting: INTERNAL MEDICINE

## 2019-11-18 DIAGNOSIS — Z51.11 ENCOUNTER FOR ANTINEOPLASTIC CHEMOTHERAPY AND IMMUNOTHERAPY: ICD-10-CM

## 2019-11-18 DIAGNOSIS — C50.912 INVASIVE DUCTAL CARCINOMA OF BREAST, FEMALE, LEFT (HCC): ICD-10-CM

## 2019-11-18 DIAGNOSIS — Z51.12 ENCOUNTER FOR ANTINEOPLASTIC CHEMOTHERAPY AND IMMUNOTHERAPY: ICD-10-CM

## 2019-11-18 LAB — CREAT BLDA-MCNC: 0.7 MG/DL (ref 0.6–1.3)

## 2019-11-18 PROCEDURE — 77049 MRI BREAST C-+ W/CAD BI: CPT

## 2019-11-18 PROCEDURE — A9577 INJ MULTIHANCE: HCPCS | Performed by: INTERNAL MEDICINE

## 2019-11-18 PROCEDURE — 0 GADOBENATE DIMEGLUMINE 529 MG/ML SOLUTION: Performed by: INTERNAL MEDICINE

## 2019-11-18 PROCEDURE — 82565 ASSAY OF CREATININE: CPT

## 2019-11-18 RX ADMIN — GADOBENATE DIMEGLUMINE 13 ML: 529 INJECTION, SOLUTION INTRAVENOUS at 10:06

## 2019-11-19 ENCOUNTER — NURSE NAVIGATOR (OUTPATIENT)
Dept: OTHER | Facility: HOSPITAL | Age: 59
End: 2019-11-19

## 2019-11-19 ENCOUNTER — OFFICE VISIT (OUTPATIENT)
Dept: MAMMOGRAPHY | Facility: CLINIC | Age: 59
End: 2019-11-19

## 2019-11-19 VITALS
WEIGHT: 146 LBS | BODY MASS INDEX: 23.46 KG/M2 | HEIGHT: 66 IN | SYSTOLIC BLOOD PRESSURE: 122 MMHG | DIASTOLIC BLOOD PRESSURE: 78 MMHG

## 2019-11-19 DIAGNOSIS — C50.512 MALIGNANT NEOPLASM OF LOWER-OUTER QUADRANT OF LEFT BREAST OF FEMALE, ESTROGEN RECEPTOR POSITIVE (HCC): Primary | ICD-10-CM

## 2019-11-19 DIAGNOSIS — Z17.0 MALIGNANT NEOPLASM OF LOWER-OUTER QUADRANT OF LEFT BREAST OF FEMALE, ESTROGEN RECEPTOR POSITIVE (HCC): Primary | ICD-10-CM

## 2019-11-19 PROCEDURE — 99205 OFFICE O/P NEW HI 60 MIN: CPT | Performed by: SURGERY

## 2019-11-20 NOTE — PROGRESS NOTES
Met Ms. Dolan after her surgery consult today. She and her  stopped by the [facility] and I introduced myself and navigational services. She had her MRI done Monday and is waiting to hear results before proceeding with a surgery decision. She stated they are thinking about doing chemo first, but wanted all the test results before deciding. She has a good understanding of her pathology and treatment options but stated she is a little nervous about it. She had some questions about radiation therapy and we discussed those.    We discussed her support system and she stated she has great family and friends to talk to. We discussed that we have counseling services available through our [facility]. She declined the need for that at present. We also discussed [employer] and [employer] as other support avenues. She was thankful for the information     We discussed integrative therapies and other services at the [facility]. I gave her a navigation folder with the following information:     [facility] Cancer Support Network, Sharing Our Stories Breast Cancer Support Group, Cancer and Restorative Exercise (CARE), [facility] Exercise program, Together for Breast Cancer Survival, For Women Facing Breast Cancer, Road to Recovery, [employer], [facility], Massage Therapy, Reiki Therapy, [employer] [place], Cancer Nutrition, Survivorship Clinic, Genetic Counseling, Cancer and Career.     She verbalized appreciation for navigational services and she has my contact information and will call with any questions that arise.

## 2019-11-21 RX ORDER — ATORVASTATIN CALCIUM 20 MG/1
TABLET, FILM COATED ORAL
Qty: 90 TABLET | Refills: 3 | Status: SHIPPED | OUTPATIENT
Start: 2019-11-21 | End: 2021-05-26 | Stop reason: SDUPTHER

## 2019-11-26 ENCOUNTER — LAB (OUTPATIENT)
Dept: LAB | Facility: HOSPITAL | Age: 59
End: 2019-11-26

## 2019-11-26 ENCOUNTER — PREP FOR SURGERY (OUTPATIENT)
Dept: OTHER | Facility: HOSPITAL | Age: 59
End: 2019-11-26

## 2019-11-26 ENCOUNTER — OFFICE VISIT (OUTPATIENT)
Dept: ONCOLOGY | Facility: CLINIC | Age: 59
End: 2019-11-26

## 2019-11-26 VITALS
HEART RATE: 68 BPM | OXYGEN SATURATION: 98 % | TEMPERATURE: 98.1 F | HEIGHT: 66 IN | RESPIRATION RATE: 16 BRPM | SYSTOLIC BLOOD PRESSURE: 124 MMHG | BODY MASS INDEX: 23.14 KG/M2 | DIASTOLIC BLOOD PRESSURE: 79 MMHG | WEIGHT: 144 LBS

## 2019-11-26 DIAGNOSIS — C50.812 MALIGNANT NEOPLASM OF OVERLAPPING SITES OF LEFT BREAST IN FEMALE, ESTROGEN RECEPTOR POSITIVE (HCC): Primary | ICD-10-CM

## 2019-11-26 DIAGNOSIS — Z17.0 MALIGNANT NEOPLASM OF OVERLAPPING SITES OF LEFT BREAST IN FEMALE, ESTROGEN RECEPTOR POSITIVE (HCC): Primary | ICD-10-CM

## 2019-11-26 DIAGNOSIS — Z21 HIV POSITIVE LONG-TERM NON-PROGRESSOR (HCC): ICD-10-CM

## 2019-11-26 DIAGNOSIS — Z86.73 HISTORY OF STROKE: ICD-10-CM

## 2019-11-26 DIAGNOSIS — M25.50 ARTHRALGIA OF MULTIPLE JOINTS: ICD-10-CM

## 2019-11-26 DIAGNOSIS — Z80.3 FAMILY HISTORY OF BREAST CANCER: ICD-10-CM

## 2019-11-26 LAB
BASOPHILS # BLD AUTO: 0.03 10*3/MM3 (ref 0–0.2)
BASOPHILS NFR BLD AUTO: 0.6 % (ref 0–1.5)
DEPRECATED RDW RBC AUTO: 43.2 FL (ref 37–54)
EOSINOPHIL # BLD AUTO: 0.06 10*3/MM3 (ref 0–0.4)
EOSINOPHIL NFR BLD AUTO: 1.2 % (ref 0.3–6.2)
ERYTHROCYTE [DISTWIDTH] IN BLOOD BY AUTOMATED COUNT: 12.1 % (ref 12.3–15.4)
HCT VFR BLD AUTO: 41.5 % (ref 34–46.6)
HGB BLD-MCNC: 14.3 G/DL (ref 12–15.9)
IMM GRANULOCYTES # BLD AUTO: 0.02 10*3/MM3 (ref 0–0.05)
IMM GRANULOCYTES NFR BLD AUTO: 0.4 % (ref 0–0.5)
LYMPHOCYTES # BLD AUTO: 2.55 10*3/MM3 (ref 0.7–3.1)
LYMPHOCYTES NFR BLD AUTO: 49.5 % (ref 19.6–45.3)
MCH RBC QN AUTO: 33.3 PG (ref 26.6–33)
MCHC RBC AUTO-ENTMCNC: 34.5 G/DL (ref 31.5–35.7)
MCV RBC AUTO: 96.7 FL (ref 79–97)
MONOCYTES # BLD AUTO: 0.78 10*3/MM3 (ref 0.1–0.9)
MONOCYTES NFR BLD AUTO: 15.1 % (ref 5–12)
NEUTROPHILS # BLD AUTO: 1.71 10*3/MM3 (ref 1.7–7)
NEUTROPHILS NFR BLD AUTO: 33.2 % (ref 42.7–76)
NRBC BLD AUTO-RTO: 0 /100 WBC (ref 0–0.2)
PLATELET # BLD AUTO: 247 10*3/MM3 (ref 140–450)
PMV BLD AUTO: 9.9 FL (ref 6–12)
RBC # BLD AUTO: 4.29 10*6/MM3 (ref 3.77–5.28)
WBC NRBC COR # BLD: 5.15 10*3/MM3 (ref 3.4–10.8)

## 2019-11-26 PROCEDURE — 85025 COMPLETE CBC W/AUTO DIFF WBC: CPT

## 2019-11-26 PROCEDURE — 99215 OFFICE O/P EST HI 40 MIN: CPT | Performed by: INTERNAL MEDICINE

## 2019-11-26 PROCEDURE — 36415 COLL VENOUS BLD VENIPUNCTURE: CPT

## 2019-11-26 RX ORDER — CLINDAMYCIN PHOSPHATE 900 MG/50ML
900 INJECTION INTRAVENOUS ONCE
Status: CANCELLED | OUTPATIENT
Start: 2019-12-02 | End: 2019-11-26

## 2019-11-27 PROBLEM — Z80.3 FAMILY HISTORY OF BREAST CANCER: Status: ACTIVE | Noted: 2019-11-27

## 2019-11-30 DIAGNOSIS — R10.9 STOMACH DISCOMFORT: ICD-10-CM

## 2019-12-02 ENCOUNTER — ANESTHESIA (OUTPATIENT)
Dept: PERIOP | Facility: HOSPITAL | Age: 59
End: 2019-12-02

## 2019-12-02 ENCOUNTER — APPOINTMENT (OUTPATIENT)
Dept: GENERAL RADIOLOGY | Facility: HOSPITAL | Age: 59
End: 2019-12-02

## 2019-12-02 ENCOUNTER — ANESTHESIA EVENT (OUTPATIENT)
Dept: PERIOP | Facility: HOSPITAL | Age: 59
End: 2019-12-02

## 2019-12-02 ENCOUNTER — HOSPITAL ENCOUNTER (OUTPATIENT)
Facility: HOSPITAL | Age: 59
Setting detail: HOSPITAL OUTPATIENT SURGERY
Discharge: HOME | End: 2019-12-02
Attending: SURGERY | Admitting: SURGERY

## 2019-12-02 VITALS
HEART RATE: 83 BPM | TEMPERATURE: 97.5 F | RESPIRATION RATE: 16 BRPM | HEIGHT: 66 IN | OXYGEN SATURATION: 98 % | WEIGHT: 142.5 LBS | DIASTOLIC BLOOD PRESSURE: 82 MMHG | BODY MASS INDEX: 22.9 KG/M2 | SYSTOLIC BLOOD PRESSURE: 127 MMHG

## 2019-12-02 DIAGNOSIS — C50.812 MALIGNANT NEOPLASM OF OVERLAPPING SITES OF LEFT BREAST IN FEMALE, ESTROGEN RECEPTOR POSITIVE (HCC): ICD-10-CM

## 2019-12-02 DIAGNOSIS — Z17.0 MALIGNANT NEOPLASM OF OVERLAPPING SITES OF LEFT BREAST IN FEMALE, ESTROGEN RECEPTOR POSITIVE (HCC): ICD-10-CM

## 2019-12-02 LAB
ANION GAP SERPL CALCULATED.3IONS-SCNC: 11.7 MMOL/L (ref 5–15)
BUN BLD-MCNC: 11 MG/DL (ref 6–20)
BUN/CREAT SERPL: 15.3 (ref 7–25)
CALCIUM SPEC-SCNC: 9.4 MG/DL (ref 8.6–10.5)
CHLORIDE SERPL-SCNC: 101 MMOL/L (ref 98–107)
CO2 SERPL-SCNC: 30.3 MMOL/L (ref 22–29)
CREAT BLD-MCNC: 0.72 MG/DL (ref 0.57–1)
GFR SERPL CREATININE-BSD FRML MDRD: 83 ML/MIN/1.73
GLUCOSE BLD-MCNC: 92 MG/DL (ref 65–99)
POTASSIUM BLD-SCNC: 4.2 MMOL/L (ref 3.5–5.2)
SODIUM BLD-SCNC: 143 MMOL/L (ref 136–145)

## 2019-12-02 PROCEDURE — 80048 BASIC METABOLIC PNL TOTAL CA: CPT | Performed by: SURGERY

## 2019-12-02 PROCEDURE — 25010000002 FENTANYL CITRATE (PF) 100 MCG/2ML SOLUTION: Performed by: ANESTHESIOLOGY

## 2019-12-02 PROCEDURE — 25010000002 PROPOFOL 10 MG/ML EMULSION: Performed by: NURSE ANESTHETIST, CERTIFIED REGISTERED

## 2019-12-02 PROCEDURE — 36561 INSERT TUNNELED CV CATH: CPT | Performed by: SURGERY

## 2019-12-02 PROCEDURE — 77001 FLUOROGUIDE FOR VEIN DEVICE: CPT | Performed by: SURGERY

## 2019-12-02 PROCEDURE — 76000 FLUOROSCOPY <1 HR PHYS/QHP: CPT

## 2019-12-02 PROCEDURE — 25010000002 MIDAZOLAM PER 1 MG: Performed by: ANESTHESIOLOGY

## 2019-12-02 PROCEDURE — 25010000002 HEPARIN (PORCINE) PER 1000 UNITS: Performed by: SURGERY

## 2019-12-02 PROCEDURE — C1788 PORT, INDWELLING, IMP: HCPCS | Performed by: SURGERY

## 2019-12-02 PROCEDURE — 25010000002 DEXAMETHASONE PER 1 MG: Performed by: NURSE ANESTHETIST, CERTIFIED REGISTERED

## 2019-12-02 PROCEDURE — 25010000002 ONDANSETRON PER 1 MG: Performed by: NURSE ANESTHETIST, CERTIFIED REGISTERED

## 2019-12-02 DEVICE — POWERPORT CLEARVUE IMPLANTABLE PORT WITH ATTACHABLE 8F POLYURETHANE OPEN-ENDED SINGLE-LUMEN VENOUS CATHETER INTERMEDIATE KIT
Type: IMPLANTABLE DEVICE | Site: CHEST | Status: FUNCTIONAL
Brand: POWERPORT CLEARVUE

## 2019-12-02 RX ORDER — OXYCODONE AND ACETAMINOPHEN 7.5; 325 MG/1; MG/1
1 TABLET ORAL ONCE AS NEEDED
Status: DISCONTINUED | OUTPATIENT
Start: 2019-12-02 | End: 2019-12-02 | Stop reason: HOSPADM

## 2019-12-02 RX ORDER — NALOXONE HCL 0.4 MG/ML
0.2 VIAL (ML) INJECTION AS NEEDED
Status: DISCONTINUED | OUTPATIENT
Start: 2019-12-02 | End: 2019-12-02 | Stop reason: HOSPADM

## 2019-12-02 RX ORDER — LABETALOL HYDROCHLORIDE 5 MG/ML
5 INJECTION, SOLUTION INTRAVENOUS
Status: DISCONTINUED | OUTPATIENT
Start: 2019-12-02 | End: 2019-12-02 | Stop reason: HOSPADM

## 2019-12-02 RX ORDER — HYDRALAZINE HYDROCHLORIDE 20 MG/ML
5 INJECTION INTRAMUSCULAR; INTRAVENOUS
Status: DISCONTINUED | OUTPATIENT
Start: 2019-12-02 | End: 2019-12-02 | Stop reason: HOSPADM

## 2019-12-02 RX ORDER — EPHEDRINE SULFATE 50 MG/ML
INJECTION, SOLUTION INTRAVENOUS AS NEEDED
Status: DISCONTINUED | OUTPATIENT
Start: 2019-12-02 | End: 2019-12-02 | Stop reason: SURG

## 2019-12-02 RX ORDER — LIDOCAINE HYDROCHLORIDE 10 MG/ML
0.5 INJECTION, SOLUTION EPIDURAL; INFILTRATION; INTRACAUDAL; PERINEURAL ONCE AS NEEDED
Status: DISCONTINUED | OUTPATIENT
Start: 2019-12-02 | End: 2019-12-02 | Stop reason: HOSPADM

## 2019-12-02 RX ORDER — FAMOTIDINE 10 MG/ML
20 INJECTION, SOLUTION INTRAVENOUS ONCE
Status: COMPLETED | OUTPATIENT
Start: 2019-12-02 | End: 2019-12-02

## 2019-12-02 RX ORDER — ACETAMINOPHEN 325 MG/1
650 TABLET ORAL ONCE AS NEEDED
Status: DISCONTINUED | OUTPATIENT
Start: 2019-12-02 | End: 2019-12-02 | Stop reason: HOSPADM

## 2019-12-02 RX ORDER — SODIUM CHLORIDE 0.9 % (FLUSH) 0.9 %
3 SYRINGE (ML) INJECTION EVERY 12 HOURS SCHEDULED
Status: DISCONTINUED | OUTPATIENT
Start: 2019-12-02 | End: 2019-12-02 | Stop reason: HOSPADM

## 2019-12-02 RX ORDER — BUPIVACAINE HYDROCHLORIDE 2.5 MG/ML
INJECTION, SOLUTION EPIDURAL; INFILTRATION; INTRACAUDAL AS NEEDED
Status: DISCONTINUED | OUTPATIENT
Start: 2019-12-02 | End: 2019-12-02 | Stop reason: HOSPADM

## 2019-12-02 RX ORDER — COLESEVELAM 180 1/1
TABLET ORAL
Qty: 180 TABLET | Refills: 3 | Status: SHIPPED | OUTPATIENT
Start: 2019-12-02 | End: 2020-12-21 | Stop reason: SDUPTHER

## 2019-12-02 RX ORDER — PROMETHAZINE HYDROCHLORIDE 25 MG/1
25 TABLET ORAL ONCE AS NEEDED
Status: DISCONTINUED | OUTPATIENT
Start: 2019-12-02 | End: 2019-12-02 | Stop reason: HOSPADM

## 2019-12-02 RX ORDER — EPHEDRINE SULFATE 50 MG/ML
5 INJECTION, SOLUTION INTRAVENOUS ONCE AS NEEDED
Status: DISCONTINUED | OUTPATIENT
Start: 2019-12-02 | End: 2019-12-02 | Stop reason: HOSPADM

## 2019-12-02 RX ORDER — ONDANSETRON 2 MG/ML
4 INJECTION INTRAMUSCULAR; INTRAVENOUS ONCE AS NEEDED
Status: DISCONTINUED | OUTPATIENT
Start: 2019-12-02 | End: 2019-12-02 | Stop reason: HOSPADM

## 2019-12-02 RX ORDER — CLINDAMYCIN PHOSPHATE 900 MG/50ML
900 INJECTION INTRAVENOUS ONCE
Status: COMPLETED | OUTPATIENT
Start: 2019-12-02 | End: 2019-12-02

## 2019-12-02 RX ORDER — MIDAZOLAM HYDROCHLORIDE 1 MG/ML
1 INJECTION INTRAMUSCULAR; INTRAVENOUS
Status: DISCONTINUED | OUTPATIENT
Start: 2019-12-02 | End: 2019-12-02 | Stop reason: HOSPADM

## 2019-12-02 RX ORDER — FENTANYL CITRATE 50 UG/ML
50 INJECTION, SOLUTION INTRAMUSCULAR; INTRAVENOUS
Status: DISCONTINUED | OUTPATIENT
Start: 2019-12-02 | End: 2019-12-02 | Stop reason: HOSPADM

## 2019-12-02 RX ORDER — SODIUM CHLORIDE 0.9 % (FLUSH) 0.9 %
3-10 SYRINGE (ML) INJECTION AS NEEDED
Status: DISCONTINUED | OUTPATIENT
Start: 2019-12-02 | End: 2019-12-02 | Stop reason: HOSPADM

## 2019-12-02 RX ORDER — ONDANSETRON 2 MG/ML
INJECTION INTRAMUSCULAR; INTRAVENOUS AS NEEDED
Status: DISCONTINUED | OUTPATIENT
Start: 2019-12-02 | End: 2019-12-02 | Stop reason: SURG

## 2019-12-02 RX ORDER — PROMETHAZINE HYDROCHLORIDE 25 MG/ML
12.5 INJECTION, SOLUTION INTRAMUSCULAR; INTRAVENOUS ONCE AS NEEDED
Status: DISCONTINUED | OUTPATIENT
Start: 2019-12-02 | End: 2019-12-02 | Stop reason: HOSPADM

## 2019-12-02 RX ORDER — PROMETHAZINE HYDROCHLORIDE 25 MG/ML
6.25 INJECTION, SOLUTION INTRAMUSCULAR; INTRAVENOUS
Status: DISCONTINUED | OUTPATIENT
Start: 2019-12-02 | End: 2019-12-02 | Stop reason: HOSPADM

## 2019-12-02 RX ORDER — MIDAZOLAM HYDROCHLORIDE 1 MG/ML
2 INJECTION INTRAMUSCULAR; INTRAVENOUS
Status: DISCONTINUED | OUTPATIENT
Start: 2019-12-02 | End: 2019-12-02 | Stop reason: HOSPADM

## 2019-12-02 RX ORDER — HYDROMORPHONE HYDROCHLORIDE 1 MG/ML
0.5 INJECTION, SOLUTION INTRAMUSCULAR; INTRAVENOUS; SUBCUTANEOUS
Status: DISCONTINUED | OUTPATIENT
Start: 2019-12-02 | End: 2019-12-02 | Stop reason: HOSPADM

## 2019-12-02 RX ORDER — HYDROCODONE BITARTRATE AND ACETAMINOPHEN 7.5; 325 MG/1; MG/1
1 TABLET ORAL ONCE AS NEEDED
Status: DISCONTINUED | OUTPATIENT
Start: 2019-12-02 | End: 2019-12-02 | Stop reason: HOSPADM

## 2019-12-02 RX ORDER — LIDOCAINE HYDROCHLORIDE 20 MG/ML
INJECTION, SOLUTION INFILTRATION; PERINEURAL AS NEEDED
Status: DISCONTINUED | OUTPATIENT
Start: 2019-12-02 | End: 2019-12-02 | Stop reason: SURG

## 2019-12-02 RX ORDER — DEXAMETHASONE SODIUM PHOSPHATE 10 MG/ML
INJECTION INTRAMUSCULAR; INTRAVENOUS AS NEEDED
Status: DISCONTINUED | OUTPATIENT
Start: 2019-12-02 | End: 2019-12-02 | Stop reason: SURG

## 2019-12-02 RX ORDER — DIPHENHYDRAMINE HYDROCHLORIDE 50 MG/ML
12.5 INJECTION INTRAMUSCULAR; INTRAVENOUS
Status: DISCONTINUED | OUTPATIENT
Start: 2019-12-02 | End: 2019-12-02 | Stop reason: HOSPADM

## 2019-12-02 RX ORDER — MAGNESIUM HYDROXIDE 1200 MG/15ML
LIQUID ORAL AS NEEDED
Status: DISCONTINUED | OUTPATIENT
Start: 2019-12-02 | End: 2019-12-02 | Stop reason: HOSPADM

## 2019-12-02 RX ORDER — PROMETHAZINE HYDROCHLORIDE 25 MG/1
25 SUPPOSITORY RECTAL ONCE AS NEEDED
Status: DISCONTINUED | OUTPATIENT
Start: 2019-12-02 | End: 2019-12-02 | Stop reason: HOSPADM

## 2019-12-02 RX ORDER — PROPOFOL 10 MG/ML
VIAL (ML) INTRAVENOUS AS NEEDED
Status: DISCONTINUED | OUTPATIENT
Start: 2019-12-02 | End: 2019-12-02 | Stop reason: SURG

## 2019-12-02 RX ORDER — DIPHENHYDRAMINE HCL 25 MG
25 CAPSULE ORAL
Status: DISCONTINUED | OUTPATIENT
Start: 2019-12-02 | End: 2019-12-02 | Stop reason: HOSPADM

## 2019-12-02 RX ORDER — SODIUM CHLORIDE, SODIUM LACTATE, POTASSIUM CHLORIDE, CALCIUM CHLORIDE 600; 310; 30; 20 MG/100ML; MG/100ML; MG/100ML; MG/100ML
9 INJECTION, SOLUTION INTRAVENOUS CONTINUOUS
Status: DISCONTINUED | OUTPATIENT
Start: 2019-12-02 | End: 2019-12-02 | Stop reason: HOSPADM

## 2019-12-02 RX ORDER — FLUMAZENIL 0.1 MG/ML
0.2 INJECTION INTRAVENOUS AS NEEDED
Status: DISCONTINUED | OUTPATIENT
Start: 2019-12-02 | End: 2019-12-02 | Stop reason: HOSPADM

## 2019-12-02 RX ADMIN — EPHEDRINE SULFATE 10 MG: 50 INJECTION INTRAMUSCULAR; INTRAVENOUS; SUBCUTANEOUS at 15:10

## 2019-12-02 RX ADMIN — FENTANYL CITRATE 50 MCG: 50 INJECTION INTRAMUSCULAR; INTRAVENOUS at 15:00

## 2019-12-02 RX ADMIN — FENTANYL CITRATE 50 MCG: 50 INJECTION INTRAMUSCULAR; INTRAVENOUS at 15:36

## 2019-12-02 RX ADMIN — SODIUM CHLORIDE, POTASSIUM CHLORIDE, SODIUM LACTATE AND CALCIUM CHLORIDE 9 ML/HR: 600; 310; 30; 20 INJECTION, SOLUTION INTRAVENOUS at 14:33

## 2019-12-02 RX ADMIN — PROPOFOL 200 MG: 10 INJECTION, EMULSION INTRAVENOUS at 14:56

## 2019-12-02 RX ADMIN — DEXAMETHASONE SODIUM PHOSPHATE 8 MG: 10 INJECTION INTRAMUSCULAR; INTRAVENOUS at 15:00

## 2019-12-02 RX ADMIN — LIDOCAINE HYDROCHLORIDE 100 MG: 20 INJECTION, SOLUTION INFILTRATION; PERINEURAL at 14:56

## 2019-12-02 RX ADMIN — MIDAZOLAM 1 MG: 1 INJECTION INTRAMUSCULAR; INTRAVENOUS at 14:33

## 2019-12-02 RX ADMIN — ONDANSETRON HYDROCHLORIDE 4 MG: 2 SOLUTION INTRAMUSCULAR; INTRAVENOUS at 15:00

## 2019-12-02 RX ADMIN — CLINDAMYCIN PHOSPHATE 900 MG: 900 INJECTION INTRAVENOUS at 15:00

## 2019-12-02 RX ADMIN — FAMOTIDINE 20 MG: 10 INJECTION INTRAVENOUS at 14:33

## 2019-12-02 NOTE — DISCHARGE INSTRUCTIONS
**SEE PORT BOOKLET AND CARD**    Discharge Instructions for Port Placement    1. Go home, rest and take it easy today; however, you should get up and move about several times today to reduce the risk of developing a clot in your legs.      2. You may experience some dizziness or memory loss from the anesthesia.  This may last for the next 24 hours.  Someone should plan on staying with you for the first 24 hours for your safety.    3. Do not make any important legal decisions or sign any legal papers for the next 24 hours.      4. Eat and drink lightly today.  Start off with liquids, jello, soup, crackers or other bland foods at first. You may advance your diet tomorrow as tolerated as long as you do not experience any nausea or vomiting.     5. You may remove your outer dressings in 2 days or until your first treatment whichever comes first. If you remove the dressing before your first treatment, please leave the little white tapes known as steri-strips alone.  They usually fall off in 1-2 weeks.  Do not worry if they come off sooner. If skin glue(Dermabond) was used, your incision is covered and protected. The invisible glue will dissolve on its own as your incision heals.    6. You may notice some bleeding/drainage on your outer dressings. A little bloody drainage is normal. If the bleeding/drainage is such that the bandage cannot absorb it, remove the dressing, apply clean gauze and apply firm pressure for a full 15 minutes.  If the bleeding continues, please call me.    7. You may shower tomorrow allowing water to run over the incisions; however, do not scrub the incision.  No tub baths until your incisions are completely healed.    8. You have received a prescription for a narcotic pain medicine, as you may have some pain/discomfort following surgery.   You will not be totally pain free, but your pain medicine should make the pain tolerable.  Please take your pain medicine as prescribed and always take your  pills with food to prevent nausea. If you are having severe pain that cannot be controlled by the pain medicine, please contact me.  If the pain is such that narcotic pain medicine is not required, you may take Tylenol or Ibuprofen as directed unless indicated otherwise.      9. You have also received a prescription for an anti-nausea medicine.  Please take this as prescribed for any nausea or vomiting.  Nausea could be a result of the anesthesia or a result of the narcotic pain medicine.  If you experience severe nausea and vomiting that cannot be controlled by the nausea medicine, please call me.      10. No driving for 24 hours and for as long as you are taking your prescription pain medicine.      11. If the port is to be used within 10-14 days of placement, no surgical follow-up is needed.  Otherwise, you should call the office  to schedule a follow-up in about 1 week.      12. Remember to contact me for any of the following:    • Fever> 101 degrees  • Severe pain that cannot be controlled by taking your pain pills  • Severe nausea or vomiting that cannot be controlled by taking your nausea medicine  • Significant bleeding from your incision  • Drainage that has a bad smell or is yellow or green in appearance  • Any other questions or concerns

## 2019-12-02 NOTE — ANESTHESIA PROCEDURE NOTES
Airway  Urgency: elective    Date/Time: 12/2/2019 2:58 PM    General Information and Staff    Patient location during procedure: OR  CRNA: Geno Mosley CRNA    Indications and Patient Condition  Indications for airway management: airway protection    Preoxygenated: yes  Mask difficulty assessment: 0 - not attempted    Final Airway Details  Final airway type: supraglottic airway      Successful airway: unique  Size 4    Number of attempts at approach: 1    Additional Comments  Atraumatic placement of Unique LMA. Cuff  WNL. Dentition unchanged from preop.

## 2019-12-02 NOTE — ANESTHESIA POSTPROCEDURE EVALUATION
Patient: Richa Dolan    Procedure Summary     Date:  12/02/19 Room / Location:  [facility] OR [address] [place] [address] MAIN OR    Anesthesia Start:  1446 Anesthesia Stop:  1554    Procedure:  INSERTION VENOUS ACCESS DEVICE RIGHT (Right Chest) Diagnosis:       Malignant neoplasm of overlapping sites of left breast in female, estrogen receptor positive (CMS/HCC)      (Malignant neoplasm of overlapping sites of left breast in female, estrogen receptor positive (CMS/HCC) [C50.812, Z17.0])    Surgeon:  Landen Forman MD Provider:  Sonny Meyer MD    Anesthesia Type:  general ASA Status:  3          Anesthesia Type: general  Last vitals  BP   138/85 (12/02/19 1344)   Temp   36.8 °C (98.2 °F) (12/02/19 1344)   Pulse   75 (12/02/19 1437)   Resp   18 (12/02/19 1344)     SpO2   99 %(post sedation) (12/02/19 1437)     Post Anesthesia Care and Evaluation    Patient location during evaluation: PACU  Patient participation: complete - patient participated  Level of consciousness: awake and alert  Pain management: adequate  Airway patency: patent  Anesthetic complications: No anesthetic complications    Cardiovascular status: acceptable  Respiratory status: acceptable  Hydration status: acceptable    Comments: --------------------            12/02/19 1437     --------------------   BP:                  Pulse:      75       Resp:                Temp:                SpO2:      99%      --------------------

## 2019-12-02 NOTE — OP NOTE
Name: Richa Dolan  Age: 59 y.o.  Sex: female  :  1960  MRN: [id number]    Pre- op Dx: poor IV access, personal history left breast cancer    Postop DX:  Same    Procedure: Insertion of Central Venous Port    Surgeon : Landen Forman MD    Indications:  vascular access for administration of chemotherapy    Procedure Details   Informed consent was obtained for the procedure, including sedation.  Risks of lung perforation, hemorrhage, arrhythmia, and adverse drug reaction were discussed.     Sterile technique was used.     Under sterile conditions the neck and chest were prepped and covered with a sterile drape. Local anesthesia was applied to the skin and subcutaneous tissues.  A needle was then inserted into the right  subclavian vein.   A guide wire was then passed easily through the catheter. There were no arrhythmias. A dilator and sheath were advanced over the wire and the wire was removed. The catheter was advanced through the sheath and the sheath was removed.  Fluoroscopy was used to position the tip in the superior vena cava. There was no evidence of a pneumothorax.   A pocket was developed on the anterior chest wall.  The catheter was trimmed to the appropriate length.  The catheter was attached to the port in the usual fashion.  The port was anchored to the fascia with prolene and the skin was closed with vicryl.  Steri-Strips were applied.  Tegaderm was applied    Findings:  There were no changes to vital signs. The port was flushed with heparin. Patient did tolerate procedure well.    EBL: 2 cc    Condition:   good

## 2019-12-02 NOTE — ANESTHESIA POSTPROCEDURE EVALUATION
"Patient: Richa Dolan    Procedure Summary     Date:  12/02/19 Room / Location:  [facility] OR [address] [place] [address] MAIN OR    Anesthesia Start:  1446 Anesthesia Stop:  1554    Procedure:  INSERTION VENOUS ACCESS DEVICE RIGHT (Right Chest) Diagnosis:       Malignant neoplasm of overlapping sites of left breast in female, estrogen receptor positive (CMS/HCC)      (Malignant neoplasm of overlapping sites of left breast in female, estrogen receptor positive (CMS/HCC) [C50.812, Z17.0])    Surgeon:  Landen Forman MD Provider:  Sonny Meyer MD    Anesthesia Type:  general ASA Status:  3          Anesthesia Type: general  Last vitals  BP   138/85 (12/02/19 1344)   Temp   36.8 °C (98.2 °F) (12/02/19 1344)   Pulse   75 (12/02/19 1437)   Resp   18 (12/02/19 1344)     SpO2   99 %(post sedation) (12/02/19 1437)     Post Anesthesia Care and Evaluation    Patient location during evaluation: bedside  Level of consciousness: awake  Pain management: adequate  Airway patency: patent  Anesthetic complications: No anesthetic complications    Cardiovascular status: acceptable  Respiratory status: acceptable  Hydration status: acceptable    Comments: /85 (BP Location: Right arm, Patient Position: Lying)   Pulse 75   Temp 36.8 °C (98.2 °F) (Oral)   Resp 18   Ht 167.6 cm (66\")   Wt 64.6 kg (142 lb 8 oz)   SpO2 99% Comment: post sedation  BMI 23.00 kg/m²         "

## 2019-12-03 ENCOUNTER — OFFICE VISIT (OUTPATIENT)
Dept: ONCOLOGY | Facility: CLINIC | Age: 59
End: 2019-12-03

## 2019-12-03 ENCOUNTER — APPOINTMENT (OUTPATIENT)
Dept: LAB | Facility: HOSPITAL | Age: 59
End: 2019-12-03

## 2019-12-03 VITALS — BODY MASS INDEX: 23.42 KG/M2 | WEIGHT: 145.1 LBS

## 2019-12-03 DIAGNOSIS — Z17.0 MALIGNANT NEOPLASM OF OVERLAPPING SITES OF LEFT BREAST IN FEMALE, ESTROGEN RECEPTOR POSITIVE (HCC): Primary | ICD-10-CM

## 2019-12-03 DIAGNOSIS — C50.812 MALIGNANT NEOPLASM OF OVERLAPPING SITES OF LEFT BREAST IN FEMALE, ESTROGEN RECEPTOR POSITIVE (HCC): Primary | ICD-10-CM

## 2019-12-03 PROCEDURE — 99215 OFFICE O/P EST HI 40 MIN: CPT | Performed by: NURSE PRACTITIONER

## 2019-12-04 ENCOUNTER — TELEPHONE (OUTPATIENT)
Dept: MAMMOGRAPHY | Facility: CLINIC | Age: 59
End: 2019-12-04

## 2019-12-05 DIAGNOSIS — Z17.0 MALIGNANT NEOPLASM OF OVERLAPPING SITES OF LEFT BREAST IN FEMALE, ESTROGEN RECEPTOR POSITIVE (HCC): Primary | ICD-10-CM

## 2019-12-05 DIAGNOSIS — C50.812 MALIGNANT NEOPLASM OF OVERLAPPING SITES OF LEFT BREAST IN FEMALE, ESTROGEN RECEPTOR POSITIVE (HCC): Primary | ICD-10-CM

## 2019-12-05 PROBLEM — Z45.2 ENCOUNTER FOR FITTING AND ADJUSTMENT OF VASCULAR CATHETER: Status: ACTIVE | Noted: 2019-12-05

## 2019-12-05 RX ORDER — ONDANSETRON HYDROCHLORIDE 8 MG/1
8 TABLET, FILM COATED ORAL 3 TIMES DAILY PRN
Qty: 30 TABLET | Refills: 5 | Status: SHIPPED | OUTPATIENT
Start: 2019-12-05 | End: 2020-08-25

## 2019-12-05 RX ORDER — SODIUM CHLORIDE 0.9 % (FLUSH) 0.9 %
10 SYRINGE (ML) INJECTION AS NEEDED
Status: CANCELLED | OUTPATIENT
Start: 2019-12-05

## 2019-12-05 RX ORDER — DEXAMETHASONE 4 MG/1
TABLET ORAL
Qty: 6 TABLET | Refills: 3 | Status: SHIPPED | OUTPATIENT
Start: 2019-12-05 | End: 2020-05-26

## 2019-12-06 ENCOUNTER — INFUSION (OUTPATIENT)
Dept: ONCOLOGY | Facility: HOSPITAL | Age: 59
End: 2019-12-06

## 2019-12-06 ENCOUNTER — OFFICE VISIT (OUTPATIENT)
Dept: ONCOLOGY | Facility: CLINIC | Age: 59
End: 2019-12-06

## 2019-12-06 ENCOUNTER — RESEARCH ENCOUNTER (OUTPATIENT)
Dept: ONCOLOGY | Facility: CLINIC | Age: 59
End: 2019-12-06

## 2019-12-06 VITALS
TEMPERATURE: 97.3 F | HEIGHT: 66 IN | OXYGEN SATURATION: 98 % | DIASTOLIC BLOOD PRESSURE: 80 MMHG | RESPIRATION RATE: 16 BRPM | BODY MASS INDEX: 22.69 KG/M2 | HEART RATE: 78 BPM | WEIGHT: 141.2 LBS | SYSTOLIC BLOOD PRESSURE: 123 MMHG

## 2019-12-06 DIAGNOSIS — M25.50 ARTHRALGIA OF MULTIPLE JOINTS: ICD-10-CM

## 2019-12-06 DIAGNOSIS — Z17.0 MALIGNANT NEOPLASM OF OVERLAPPING SITES OF LEFT BREAST IN FEMALE, ESTROGEN RECEPTOR POSITIVE (HCC): ICD-10-CM

## 2019-12-06 DIAGNOSIS — Z17.0 MALIGNANT NEOPLASM OF OVERLAPPING SITES OF LEFT BREAST IN FEMALE, ESTROGEN RECEPTOR POSITIVE (HCC): Primary | ICD-10-CM

## 2019-12-06 DIAGNOSIS — C50.812 MALIGNANT NEOPLASM OF OVERLAPPING SITES OF LEFT BREAST IN FEMALE, ESTROGEN RECEPTOR POSITIVE (HCC): ICD-10-CM

## 2019-12-06 DIAGNOSIS — C50.812 MALIGNANT NEOPLASM OF OVERLAPPING SITES OF LEFT BREAST IN FEMALE, ESTROGEN RECEPTOR POSITIVE (HCC): Primary | ICD-10-CM

## 2019-12-06 DIAGNOSIS — Z80.3 FAMILY HISTORY OF BREAST CANCER: ICD-10-CM

## 2019-12-06 DIAGNOSIS — Z86.73 HISTORY OF STROKE: ICD-10-CM

## 2019-12-06 DIAGNOSIS — Z21 HIV POSITIVE LONG-TERM NON-PROGRESSOR (HCC): ICD-10-CM

## 2019-12-06 DIAGNOSIS — R04.0 EPISTAXIS: ICD-10-CM

## 2019-12-06 LAB
ALBUMIN SERPL-MCNC: 4.6 G/DL (ref 3.5–5.2)
ALBUMIN/GLOB SERPL: 1.8 G/DL (ref 1.1–2.4)
ALP SERPL-CCNC: 82 U/L (ref 38–116)
ALT SERPL W P-5'-P-CCNC: 15 U/L (ref 0–33)
ANION GAP SERPL CALCULATED.3IONS-SCNC: 10.6 MMOL/L (ref 5–15)
AST SERPL-CCNC: 22 U/L (ref 0–32)
BASOPHILS # BLD AUTO: 0.02 10*3/MM3 (ref 0–0.2)
BASOPHILS NFR BLD AUTO: 0.4 % (ref 0–1.5)
BILIRUB SERPL-MCNC: 0.7 MG/DL (ref 0.2–1.2)
BUN BLD-MCNC: 11 MG/DL (ref 6–20)
BUN/CREAT SERPL: 16.4 (ref 7.3–30)
CALCIUM SPEC-SCNC: 9.4 MG/DL (ref 8.5–10.2)
CHLORIDE SERPL-SCNC: 103 MMOL/L (ref 98–107)
CO2 SERPL-SCNC: 29.4 MMOL/L (ref 22–29)
CREAT BLD-MCNC: 0.67 MG/DL (ref 0.6–1.1)
DEPRECATED RDW RBC AUTO: 44.6 FL (ref 37–54)
EOSINOPHIL # BLD AUTO: 0.05 10*3/MM3 (ref 0–0.4)
EOSINOPHIL NFR BLD AUTO: 1 % (ref 0.3–6.2)
ERYTHROCYTE [DISTWIDTH] IN BLOOD BY AUTOMATED COUNT: 11.9 % (ref 12.3–15.4)
GFR SERPL CREATININE-BSD FRML MDRD: 90 ML/MIN/1.73
GLOBULIN UR ELPH-MCNC: 2.6 GM/DL (ref 1.8–3.5)
GLUCOSE BLD-MCNC: 95 MG/DL (ref 74–124)
HCT VFR BLD AUTO: 40.9 % (ref 34–46.6)
HGB BLD-MCNC: 13.6 G/DL (ref 12–15.9)
IMM GRANULOCYTES # BLD AUTO: 0.01 10*3/MM3 (ref 0–0.05)
IMM GRANULOCYTES NFR BLD AUTO: 0.2 % (ref 0–0.5)
LYMPHOCYTES # BLD AUTO: 1.55 10*3/MM3 (ref 0.7–3.1)
LYMPHOCYTES NFR BLD AUTO: 30.6 % (ref 19.6–45.3)
MCH RBC QN AUTO: 33.8 PG (ref 26.6–33)
MCHC RBC AUTO-ENTMCNC: 33.3 G/DL (ref 31.5–35.7)
MCV RBC AUTO: 101.7 FL (ref 79–97)
MONOCYTES # BLD AUTO: 0.65 10*3/MM3 (ref 0.1–0.9)
MONOCYTES NFR BLD AUTO: 12.8 % (ref 5–12)
NEUTROPHILS # BLD AUTO: 2.78 10*3/MM3 (ref 1.7–7)
NEUTROPHILS NFR BLD AUTO: 55 % (ref 42.7–76)
NRBC BLD AUTO-RTO: 0 /100 WBC (ref 0–0.2)
PLATELET # BLD AUTO: 235 10*3/MM3 (ref 140–450)
PMV BLD AUTO: 9.6 FL (ref 6–12)
POTASSIUM BLD-SCNC: 4.4 MMOL/L (ref 3.5–4.7)
PROT SERPL-MCNC: 7.2 G/DL (ref 6.3–8)
RBC # BLD AUTO: 4.02 10*6/MM3 (ref 3.77–5.28)
SODIUM BLD-SCNC: 143 MMOL/L (ref 134–145)
WBC NRBC COR # BLD: 5.06 10*3/MM3 (ref 3.4–10.8)

## 2019-12-06 PROCEDURE — 96413 CHEMO IV INFUSION 1 HR: CPT | Performed by: INTERNAL MEDICINE

## 2019-12-06 PROCEDURE — 25010000002 PEGFILGRASTIM 6 MG/0.6ML PREFILLED SYRINGE KIT: Performed by: INTERNAL MEDICINE

## 2019-12-06 PROCEDURE — 25010000002 DEXAMETHASONE SODIUM PHOSPHATE 100 MG/10ML SOLUTION: Performed by: INTERNAL MEDICINE

## 2019-12-06 PROCEDURE — 96411 CHEMO IV PUSH ADDL DRUG: CPT | Performed by: INTERNAL MEDICINE

## 2019-12-06 PROCEDURE — 25010000002 CYCLOPHOSPHAMIDE PER 100 MG: Performed by: INTERNAL MEDICINE

## 2019-12-06 PROCEDURE — 25010000002 DOXORUBICIN PER 10 MG: Performed by: INTERNAL MEDICINE

## 2019-12-06 PROCEDURE — 96377 APPLICATON ON-BODY INJECTOR: CPT | Performed by: INTERNAL MEDICINE

## 2019-12-06 PROCEDURE — 80053 COMPREHEN METABOLIC PANEL: CPT

## 2019-12-06 PROCEDURE — 99215 OFFICE O/P EST HI 40 MIN: CPT | Performed by: INTERNAL MEDICINE

## 2019-12-06 PROCEDURE — 85025 COMPLETE CBC W/AUTO DIFF WBC: CPT

## 2019-12-06 PROCEDURE — 96367 TX/PROPH/DG ADDL SEQ IV INF: CPT | Performed by: INTERNAL MEDICINE

## 2019-12-06 PROCEDURE — 25010000002 PALONOSETRON PER 25 MCG: Performed by: INTERNAL MEDICINE

## 2019-12-06 PROCEDURE — 96375 TX/PRO/DX INJ NEW DRUG ADDON: CPT | Performed by: INTERNAL MEDICINE

## 2019-12-06 PROCEDURE — 25010000002 FOSAPREPITANT PER 1 MG: Performed by: INTERNAL MEDICINE

## 2019-12-06 RX ORDER — DOXORUBICIN HYDROCHLORIDE 2 MG/ML
60 INJECTION, SOLUTION INTRAVENOUS ONCE
Status: COMPLETED | OUTPATIENT
Start: 2019-12-06 | End: 2019-12-06

## 2019-12-06 RX ORDER — PALONOSETRON 0.05 MG/ML
0.25 INJECTION, SOLUTION INTRAVENOUS ONCE
Status: COMPLETED | OUTPATIENT
Start: 2019-12-06 | End: 2019-12-06

## 2019-12-06 RX ORDER — SODIUM CHLORIDE 9 MG/ML
250 INJECTION, SOLUTION INTRAVENOUS ONCE
Status: COMPLETED | OUTPATIENT
Start: 2019-12-06 | End: 2019-12-06

## 2019-12-06 RX ORDER — PROCHLORPERAZINE MALEATE 10 MG
10 TABLET ORAL EVERY 6 HOURS PRN
Qty: 20 TABLET | Refills: 2 | Status: SHIPPED | OUTPATIENT
Start: 2019-12-06 | End: 2020-08-25

## 2019-12-06 RX ORDER — DOXORUBICIN HYDROCHLORIDE 2 MG/ML
60 INJECTION, SOLUTION INTRAVENOUS ONCE
Status: CANCELLED | OUTPATIENT
Start: 2019-12-06

## 2019-12-06 RX ADMIN — DEXAMETHASONE SODIUM PHOSPHATE 12 MG: 10 INJECTION, SOLUTION INTRAMUSCULAR; INTRAVENOUS at 11:17

## 2019-12-06 RX ADMIN — SODIUM CHLORIDE 100 ML: 9 INJECTION, SOLUTION INTRAVENOUS at 10:44

## 2019-12-06 RX ADMIN — PALONOSETRON 0.25 MG: 0.05 INJECTION, SOLUTION INTRAVENOUS at 10:41

## 2019-12-06 RX ADMIN — DOXORUBICIN HYDROCHLORIDE 104 MG: 2 INJECTION, SOLUTION INTRAVENOUS at 12:30

## 2019-12-06 RX ADMIN — SODIUM CHLORIDE 250 ML: 9 INJECTION, SOLUTION INTRAVENOUS at 10:41

## 2019-12-06 RX ADMIN — PEGFILGRASTIM 6 MG: KIT SUBCUTANEOUS at 15:14

## 2019-12-06 RX ADMIN — SODIUM CHLORIDE 1040 MG: 900 INJECTION, SOLUTION INTRAVENOUS at 12:56

## 2019-12-06 NOTE — PROGRESS NOTES
Subjective     REASON FOR FOLLOW UP:    1.  T2N1 invasive ductal carcinoma of the left breast.  Ultrasound-showed 3.8 x 3.1 x 2.5 cm mass at 4 o'clock position with 2 abnormal axillary lymph nodes, larger lymph node being 1.4 cm.  Left breast biopsy and axillary lymph node biopsy October 29, 2019, invasive ductal carcinoma, moderately differentiated, grade 2, ER 85%, GA 10%, HER-2/merna 2+, HER-2 FISH negative.  · 12/06/19: Cycle #1 of Adriamycin/Cytoxan  2.  HIV followed by infectious disease  3.  Vertigo  4.  History of stroke             HISTORY OF PRESENT ILLNESS:  The patient is a 59 y.o. year old female who was diagnosed with T2N1 invasive ductal carcinoma of the left breast.  Ultrasound-showed 3.8 x 3.1 x 2.5 cm mass at 4 o'clock position with 2 abnormal axillary lymph nodes, larger lymph node being 1.4 cm.  Left breast biopsy and axillary lymph node biopsy October 29, 2019, invasive ductal carcinoma, moderately differentiated, grade 2, ER 85%, GA 10%, HER-2/merna 2+, HER-2 FISH negative    She is here for evaluation prior to cycle #1 of chemotherapy with Adriamycin/Cytoxan.  She denies any fever, chills, and shortness of breath.  She had a nosebleed a few days ago which lasted for a few minutes and the bleeding was not heavy.  She has these episodes once a week which began 3-4 years ago. She had been taking baby aspirins at this time which likely made the nosebleeds worse.  We will continue to monitor this.  She has not been seen by an ENT yet.  I will refer her to an ENT for evaluation.    She had a mini-stroke two years ago with migraines, and she was seen by Fariba ROLON.  She had a very small brain bleed then.  Her father also had a mini stroke and lost sight in one eye.      Patient's labs from 12/06/19 are normal.    ONCOLOGIC HISTORY:  Patient is a 59-year-old female who has had a history of HIV since age 34 followed by Dr. Natali Ng at [facility] and was on multiple HIV drugs  initially but more recently has been on a single medication TRIUMEQ, with well-controlled HIV.  She follows up with Dr. Dawn , infectious disease who saw her recently and he saw her on 4/8/2019 and has excellent control and suppression of her viral load.  She is very compliant with her medications.    She also has had history of stroke in 2017 for which she was placed on aspirin.  She presented with a headache.  She is very active and works at [employer] full-time.  She also has had history of vertigo in the past  Patient's PCP is Zach Lora.    Patient first noticed the mass in her left breast 5 months ago.  Her last mammogram was 5 years ago.  She had soreness in the left breast and she went to her primary care physician who then ordered a mammogram diagnostic and an ultrasound.  The diagnostic mammogram showed a 3.7 cm mass at 4 o'clock position in the lower outer quadrant of the left breast.  The ultrasound showed 3.8 cm x 3.1 x 2.5 cm mass at 4 o'clock position in the left breast with 2 abnormal appearing left axillary lymph nodes the largest being 1.4 cm.    She then underwent biopsy of both the breast mass as well as the left axillary lymph node and both of them are positive for invasive mammary carcinoma it is invasive ductal carcinoma with apocrine features, moderately differentiated, grade 2 of 3 with a Jen score of 5.  The left axillary lymph node is also consistent with metastatic mammary carcinoma.  It is ER positive MO positive HER-2/merna negative.  Details as follows    10/21/19 - Bilateral Diagnostic Mammogram and US Breast Left  FINDINGS: Bilateral digital CC and MLO mammographic images were  obtained. No prior examination is available for comparison. Scattered  fibroglandular densities are seen throughout both breasts. A triangular  skin marker represents the area of palpable concern in the lower outer  quadrant of the left breast in the posterior 1/3. At this location  there  is an irregular mass that measures on the order of 3.7 cm in greatest  dimension. Internal calcifications are noted. No suspicious findings in  the right breast are appreciated.     ULTRASOUND: Targeted sonographic evaluation of the left breast was  performed through the area of concern in the left axilla. At the 4  o'clock position on the order of 6 cm from the nipple there is an  irregular hypoechoic mass that measures 3.8 x 3.1 x 2.5 cm. Internal  vascularity is noted.     There are 2 abnormal-appearing left axillary lymph nodes, the largest  which measures on the order of 1.4 cm in greatest dimension.     IMPRESSION:  1. There is a 3.8 cm irregular mass in the left breast at the 4 o'clock  position. This is seen in conjunction with an irregular 1.4 cm lymph  node in the left axilla. This is suspicious for malignancy with left  axillary involvement. Correlation with ultrasound-guided biopsy of both  the left breast mass and the lymph node is recommended.  2. There are no findings suspicious for malignancy in the right breast.     BI-RADS CATEGORY 5:     Patient's labs from 11/12/19 are normal.    10/29/19 - Tissue Pathology  1. Left Breast, 4:00, 6 cm FN, U/S-Guided Core Needle Biopsy for a Mass:  A. INVASIVE DUCTAL CARCINOMA WITH APOCRINE FEATURES, Moderately differentiated;  [place] Histologic Grade II/III (tubule score = 3, nuclear score = 2, mitoses score = 1), measuring at least  9 mm.  B. No ductal or lobular carcinoma in situ is identified in this sample.  C. Focus suspicious for lymphovascular space invasion.  D. See Biomarker Template for hormone receptor studies.  2. Lymph Node, Left Axilla, U/S-Guided Core Needle Biopsy:  A. METASTATIC MAMMARY CARCINOMA (see Comment).  B. Metastatic focus measures 5 mm in greatest extent.  ER+, 85%  OK+, 10%  HER2- Score 2+    12/06/19: Cycle #1 of Adriamycin/Cytoxan    PAST MEDICAL HISTORY:  She had a stroke in July 2017 with headache and dizziness.  She  went to the ER and was placed on aspirin.  However, she stopped taking it two weeks ago.  She has been taking Aimovig (injection) monthly with Fariba López at [facility].    In 1994, patient was diagnosed with HIV with an unknown cause which is managed by Dr. Natali Subramanian.  As of now, her viral load is good.    Patient has arthritis.  She gets trigger-point injections in her back with her last one being 2 weeks ago.  She has had multiple ablations.  She also has pain in her SI joint and Dr. Bermudez performed a surgery on this.  She takes Narco for the pain.      She is osteoporotic.  She has had multiple hairline fractures in both her legs.  She had her knees cleaned out by Dr. Sinha.     Patient has had a hysterectomy and still has both of her ovaries.    Patient has vertigo and the muscles in her left ear are weak.  She just has an imbalance.    11/13/19 - CT Neck Chest Abdomen Pelvis  IMPRESSION:  1. In addition to the irregular approximately 3.8 cm mass within the  lateral aspect of the left breast, there are a few subcentimeter  asymmetric nodules along the lateral margin of the glandular tissue. The  several asymmetric left subpectoral nodes and 1.2 x 1.0 cm left axillary  node are suspicious for tamar metastases. The asymmetric subcentimeter  left supraclavicular and left posterior cervical triangle nodes are  worrisome as well.  2. There is no convincing evidence for metastatic disease within the  abdomen or pelvis.    11/14/19 - Echocardiogram:  Normal.  Calculated EF = 67%.  There is trace mitral valve and tricuspid valve regurgitation.    11/15/19 - Bone Scan  Negative.    11/18/19 - MRI Breast Bilateral  IMPRESSION:  1. Biopsy-proven malignancy in the left breast centered at the 4 o'clock  position with associated surrounding satellite nodules as described. The  entire region of involvement including the satellite nodules and the  dominant mass measure up to 7.5 cm in greatest dimension. Also, left  axillary  adenopathy with multiple irregular lymph nodes and loss of  differentiation of the borders of multiple lymph nodes is noted and this  is suspicious for extranodal involvement.  2. There are no findings suspicious for malignancy in the right breast.  BI-RADS CATEGORY 6    19 - Invitae was Negative.      FAMILY HISTORY:  Her mother had breast cancer at age 60, still alive at 85 and in good health..  Her father had dementia.  Her paternal uncle had prostate cancer.  Her brother had esophageal cancer.    OB-GYN:  Menarche 15 years  Menopause-46  Pregnancies- 1 para 1 no miscarriages her first pregnancy was in  at 29 years of age.  She did not breastfeed.  Post menopausal HRT- None.  Hx of birth control pills- Yes.    SOCIAL HISTORY:  Patient does not smoke or do drugs.  She does drink occasionally.    Past Medical History:   Diagnosis Date   • Anxiety    • Cerebrovascular accident (CVA) (CMS/[facility]) 2017   • DDD (degenerative disc disease), cervical    • Depression    • GERD (gastroesophageal reflux disease)    • H/O ICH (intracerebral hemorrhage) (CMS/[facility])    • History of stroke    • History of thrombocytopenia    • HIV (human immunodeficiency virus infection) (CMS/[facility])    • Hyperlipidemia    • Insomnia    • Lupus anticoagulant positive    • Malignant neoplasm of overlapping sites of left breast in female, estrogen receptor positive (CMS/[facility]) 2019   • Meniscus tear 2017    RIGHT   • Migraine    • Neck pain     WITH SHOOTING PAIN LEFT RIGHT ARM   • Osteoarthritis    • Osteoporosis    • Seasonal allergies    • Spinal headache    • Tick bite 2014        Past Surgical History:   Procedure Laterality Date   • ADENOIDECTOMY     • CHOLECYSTECTOMY     • HYSTERECTOMY     • KNEE ARTHROSCOPY Right 3/24/2017    Procedure:  RIGHT  KNEE ARTHROSCOPY WITH DEBRIDEMENT CHONDROPLASTY PARTIAL MENISCECTOMY;  Surgeon: Karl Galeas MD;  [facility] [place] [facility]  Service:    • KNEE CARTILAGE SURGERY  Right 2007   • TONSILLECTOMY     • US GUIDED LYMPH NODE BIOPSY  10/29/2019   • VENOUS ACCESS DEVICE (PORT) INSERTION Right 12/2/2019    Procedure: INSERTION VENOUS ACCESS DEVICE RIGHT;  Surgeon: Landen Forman MD;  Location: [facility] OR;  Service: General        Current Outpatient Medications on File Prior to Visit   Medication Sig Dispense Refill   • AIMOVIG 140 MG/ML solution auto-injector INJECT 140 MG INTO THE SKIN EVERY 30 (THIRTY) DAYS.  11   • atorvastatin (LIPITOR) 20 MG tablet TAKE 1 TABLET BY MOUTH EVERY DAY AT NIGHT 90 tablet 3   • baclofen (LIORESAL) 10 MG tablet Take 10 mg by mouth 2 (Two) Times a Day.  2   • CAMBIA 50 MG pack TAKE 1 PACKET AS NEEDED FOR HEADACHES  1   • clonazePAM (KlonoPIN) 0.5 MG tablet Take 1 tablet by mouth 2 (Two) Times a Day As Needed for Anxiety. 60 tablet 0   • COCONUT OIL PO Take  by mouth.     • colesevelam (WELCHOL) 625 MG tablet TAKE 2 TABLETS BY MOUTH EVERY  tablet 3   • dexamethasone (DECADRON) 4 MG tablet Take 2 tablets in the morning daily on Days 2, 3 & 4.  Take with food. 6 tablet 3   • escitalopram (LEXAPRO) 10 MG tablet Take 1 tablet by mouth Daily. 90 tablet 3   • fluticasone (FLONASE) 50 MCG/ACT nasal spray 2 sprays into the nostril(s) as directed by provider Daily. 3 bottle 3   • HYDROcodone-acetaminophen (NORCO)  MG per tablet take 1 tablet by mouth three times a day  0   • ibandronate (BONIVA) 150 MG tablet TAKE 1 TABLET BY MOUTH EVERY 30 (THIRTY) DAYS. 3 tablet 3   • Menaquinone-7 (VITAMIN K2 PO) Take  by mouth.     • NON FORMULARY Collagen peptides powder     • ondansetron (ZOFRAN) 8 MG tablet Take 1 tablet by mouth 3 (Three) Times a Day As Needed for Nausea or Vomiting. 30 tablet 5   • ondansetron ODT (ZOFRAN ODT) 4 MG disintegrating tablet Take 1 tablet by mouth Every 8 (Eight) Hours As Needed for Nausea or Vomiting. 30 tablet 0   • TRIUMEQ 600- MG per tablet TAKE 1 TABLET BY MOUTH EVERY DAY 90 tablet 3   • TURMERIC PO Take  by  mouth.     • zolpidem (AMBIEN) 10 MG tablet Take 1 tablet by mouth At Night As Needed for Sleep. 90 tablet 0     No current facility-administered medications on file prior to visit.         ALLERGIES:    Allergies   Allergen Reactions   • Augmentin [Amoxicillin-Pot Clavulanate] Hives        Social History     Socioeconomic History   • Marital status:      Spouse name: Owen   • Number of children: 1   • Years of education: College   • Highest education level: Not on file   Occupational History   • Occupation:      Employer:  [employer]   Tobacco Use   • Smoking status: Never Smoker   • Smokeless tobacco: Never Used   Substance and Sexual Activity   • Alcohol use: Yes     Comment: occasioonal   • Drug use: No   • Sexual activity: Defer     Birth control/protection: None        Family History   Problem Relation Age of Onset   • Breast cancer Mother    • Leukemia Mother         CLL?   • Diabetes Mother    • Hyperthyroidism Mother    • Hearing loss Mother    • Dementia Father    • Heart disease Maternal Grandmother    • Diabetes Maternal Grandfather    • Heart disease Maternal Grandfather    • Stroke Maternal Grandfather    • Heart attack Maternal Grandfather    • Osteoporosis Paternal Grandmother    • Heart disease Paternal Grandfather    • Leukemia Maternal Aunt         CLL?   • Throat cancer Paternal Uncle         Review of Systems   Constitutional: Negative for appetite change, chills, diaphoresis, fatigue, fever and unexpected weight change.   HENT: Negative for hearing loss, sore throat and trouble swallowing.    Respiratory: Negative for cough, chest tightness, shortness of breath and wheezing.    Cardiovascular: Negative for chest pain, palpitations and leg swelling.   Gastrointestinal: Positive for constipation (12/06/19). Negative for abdominal distention, abdominal pain, diarrhea, nausea and vomiting.   Genitourinary: Negative for dysuria, frequency, hematuria and urgency.  "  Musculoskeletal: Negative for joint swelling.        No muscle weakness.   Skin: Negative for rash and wound.   Neurological: Negative for seizures, syncope, speech difficulty, weakness, numbness and headaches.   Hematological: Negative for adenopathy. Does not bruise/bleed easily.   Psychiatric/Behavioral: Negative for behavioral problems, confusion and suicidal ideas.       Objective     Vitals:    12/06/19 0848   BP: 123/80   Pulse: 78   Resp: 16   Temp: 97.3 °F (36.3 °C)   TempSrc: Oral   SpO2: 98%   Weight: 64 kg (141 lb 3.2 oz)   Height: 168.1 cm (66.18\")  Comment: New Ht taken.No Shoes   PainSc: 0-No pain     Current Status 12/6/2019   ECOG score 0     Physical Exam    GENERAL:  Well-developed, well-nourished in no acute distress.   SKIN:  Warm, dry without rashes, purpura or petechiae.  NECK:  Supple with good range of motion; no thyromegaly or masses, no JVD.  LYMPHATICS:  No cervical, supraclavicular, axillary or inguinal adenopathy.  CHEST:  Lungs clear to auscultation. Good airflow.  BREAST:   · Left breast: Mass today measures 6.5 cm x 6.0 cm superior to inferior dimension in the upper outer quadrant  CARDIAC:  Regular rate and rhythm without murmurs, rubs or gallops. Normal S1,S2.  ABDOMEN:  Soft, nontender with no hepatosplenomegaly or masses.  EXTREMITIES:  No clubbing, cyanosis or edema.  NEUROLOGICAL:  Cranial Nerves II-XII grossly intact.  No focal neurological deficits.  PSYCHIATRIC:  Normal affect and mood.        RECENT LABS:  Hematology WBC   Date Value Ref Range Status   12/06/2019 5.06 3.40 - 10.80 10*3/mm3 Final   09/23/2019 5.9 3.4 - 10.8 x10E3/uL Final   04/08/2019 6.06 4.5 - 11.0 10*3/uL Final     RBC   Date Value Ref Range Status   12/06/2019 4.02 3.77 - 5.28 10*6/mm3 Final   09/23/2019 4.13 3.77 - 5.28 x10E6/uL Final   04/08/2019 4.47 4.0 - 5.2 10*6/uL Final     Hemoglobin   Date Value Ref Range Status   12/06/2019 13.6 12.0 - 15.9 g/dL Final   04/08/2019 15.0 12.0 - 16.0 g/dL Final "     Hematocrit   Date Value Ref Range Status   12/06/2019 40.9 34.0 - 46.6 % Final   04/08/2019 43.8 36.0 - 46.0 % Final     Platelets   Date Value Ref Range Status   12/06/2019 235 140 - 450 10*3/mm3 Final   04/08/2019 272 140 - 440 10*3/uL Final      Assessment/Plan    1. T2N1 invasive ductal carcinoma of the left breast, recently diagnosed.    -Noticed mass in the left breast x5 months  -Diagnostic mammogram showed 3.7 mm mass in the left breast at 4 o'clock position  -Ultrasound-showed 3.8 x 3.1 x 2.5 cm mass at 4 o'clock position with 2 abnormal axillary lymph nodes, larger lymph node being 1.4 cm  -Left breast biopsy and axillary lymph node biopsy October 29, 2019, invasive ductal carcinoma, moderately differentiated, grade 2, ER 85%, NE 10%, HER-2/merna 2+, HER-2 FISH negative  · CT scans from 11/13/19 show some asymmetric nodules along the lateral margin of the glandular tissue.  The 1.2x1.0 cm left axillary nodes, left supraclavicular, and left posterior cervical triangle nodes are suspicious for tamar metastases.  We reviewed with patient the bone scan from 11/15/19 which was negative. We reviewed with patient the MRI breast from 11/18/19 which shows the biopsy-proven malignancy in the left breast centered at the 4:00 position.  The region of involvement measures up to 7.5 cm.  There is left axillary adenopathy with multiple irregular lymph nodes and loss of differentiation of the border of multiple lymph nodes which are suspicious for extranodal involvement.  We reviewed with patient the echocardiogram from 11/14/19 which was normal with an ejection fraction of 67%.  We reviewed with patient the INVITAE from 11/14/19 which was negative.  · Given the size of her tumor and her medical history, patient will be given 4 cycles of Adriamycin/Cytoxan with Neulasta.  After completion of this treatment, patient will be started on 12 cycles of Taxol.  After the tumor shrinks in size, Dr. Forman will perform a  lumpectomy.  Following lumpectomy, patient will begin endocrine therapy.  I have spoken with Dr. Forman who agrees with chemotherapy first due to the large size of the tumor.  Dr. aDwn agrees chemotherapy will not aggravate her HIV condition.  I also spoke with Dr. Mohan at [facility] who also agrees chemotherapy is the first step.   · 12/06/19: Cycle #1 of Adriamycin/Cytoxan    2. HIV, followed by Dr. Dawn in infectious disease.  Well-controlled and is on a single medication with very low viral load. Has HIV since age 34.     3.  Family history of breast cancer with mother having had breast cancer at age 60.  There is family history of uncle with prostate cancer.  Patient will require genetic referral    4.  History of stroke 2017    5.  Arthritis with severe back pain followed by Dr. Bam Crandall    6. Episodes of nosebleeds.  She has had these once a week for 3-4 years.    PLAN:  1. Proceed with Cycle #1 of Adriamycin/Cytoxan  2. Prescribed Compazine  3. Refer to ENT.  We will reconsider putting patient on baby aspirins after consultation.  4. Return in 1 week with NP for labs   5. Return in 2 weeks with Tiffanie with labs and Adriamycin/Cytoxan  6. Will discuss with neurolgy at [facility] as she had a small stroke in past    I, Yuli Lorenzo, acted as scribe for Ruby Moreno MD  in documenting the service or procedure. To the best of my knowledge, I recorded what was dictated by MD Ruby Garcia MD  12/06/19       CC:  ОЛЬГА Mccoy MD Nathan Bullington, MD

## 2019-12-06 NOTE — RESEARCH
[facility]  CONSULTING IN BLOOD DISORDERS & CANCER  Joseluis Velazquez, Carlos Dee, Keanu, Baljinder,  Tiffanie, Stiven, Lele, Devaughn & Cuco  [address], [address]  [place] [address]  Phone: [phone]  Fax: [phone]      Patient Name:  Richa Dolan  YOB: 1960  Patient Age:  59 y.o.  Patient's Sex:  female    Date of Service:  12/06/2019    Protocol:  [employer]ENCE 2018-01                                       RESEARCH INFORMED CONSENT                                     The subject was contacted prior to the visit to the clinic today in order to ascertain interest in the referenced study. The consent form was explained in detail and summarized by the investigator and/or coordinator.        The subject was given the opportunity to ask questions of the coordinator and the investigator.      The subject has read and verbalized an understanding of the informed consent.  All questions and concerns were addressed.  She signed and was given a copy of the written informed consent prior to performing any study-related procedures.      Kenya Scott    12/06/19, 4:24 PM

## 2019-12-07 DIAGNOSIS — F41.9 ANXIETY: ICD-10-CM

## 2019-12-09 ENCOUNTER — TELEPHONE (OUTPATIENT)
Dept: ONCOLOGY | Facility: HOSPITAL | Age: 59
End: 2019-12-09

## 2019-12-09 ENCOUNTER — TELEPHONE (OUTPATIENT)
Dept: ONCOLOGY | Facility: CLINIC | Age: 59
End: 2019-12-09

## 2019-12-09 NOTE — TELEPHONE ENCOUNTER
Message reviewed with Dr. COOLEY regarding lack of sleep.  Per Dr. COOLEY, pt can hold steroid today and see if that helps.  Patient v/u.

## 2019-12-09 NOTE — TELEPHONE ENCOUNTER
Pt received first treatment on Friday.  C/o trouble sleeping since receiving treatment. Message sent to Dr. COOLEY to address lack of sleep.  Pt is already on Ambien 10mg.  Likely related to oral dex.  Will wait for Dr. COOLEY's response.

## 2019-12-10 DIAGNOSIS — F41.9 ANXIETY: ICD-10-CM

## 2019-12-10 RX ORDER — CLONAZEPAM 0.5 MG/1
0.5 TABLET ORAL 2 TIMES DAILY PRN
Qty: 60 TABLET | Refills: 0 | Status: SHIPPED | OUTPATIENT
Start: 2019-12-10 | End: 2020-01-17 | Stop reason: SDUPTHER

## 2019-12-10 RX ORDER — CLONAZEPAM 0.5 MG/1
0.5 TABLET ORAL 2 TIMES DAILY PRN
Qty: 60 TABLET | Refills: 1 | Status: SHIPPED | OUTPATIENT
Start: 2019-12-10 | End: 2021-09-09 | Stop reason: SDUPTHER

## 2019-12-13 ENCOUNTER — INFUSION (OUTPATIENT)
Dept: ONCOLOGY | Facility: HOSPITAL | Age: 59
End: 2019-12-13

## 2019-12-13 ENCOUNTER — OFFICE VISIT (OUTPATIENT)
Dept: ONCOLOGY | Facility: CLINIC | Age: 59
End: 2019-12-13

## 2019-12-13 VITALS
SYSTOLIC BLOOD PRESSURE: 120 MMHG | OXYGEN SATURATION: 96 % | WEIGHT: 139.8 LBS | BODY MASS INDEX: 22.47 KG/M2 | TEMPERATURE: 98.3 F | DIASTOLIC BLOOD PRESSURE: 77 MMHG | RESPIRATION RATE: 16 BRPM | HEIGHT: 66 IN | HEART RATE: 80 BPM

## 2019-12-13 DIAGNOSIS — Z17.0 MALIGNANT NEOPLASM OF OVERLAPPING SITES OF LEFT BREAST IN FEMALE, ESTROGEN RECEPTOR POSITIVE (HCC): Primary | ICD-10-CM

## 2019-12-13 DIAGNOSIS — Z45.2 ENCOUNTER FOR FITTING AND ADJUSTMENT OF VASCULAR CATHETER: ICD-10-CM

## 2019-12-13 DIAGNOSIS — D70.1 NEUTROPENIA DUE TO AND NOT CONCURRENT WITH CHEMOTHERAPY (HCC): ICD-10-CM

## 2019-12-13 DIAGNOSIS — C50.812 MALIGNANT NEOPLASM OF OVERLAPPING SITES OF LEFT BREAST IN FEMALE, ESTROGEN RECEPTOR POSITIVE (HCC): Primary | ICD-10-CM

## 2019-12-13 DIAGNOSIS — T45.1X5S NEUTROPENIA DUE TO AND NOT CONCURRENT WITH CHEMOTHERAPY (HCC): ICD-10-CM

## 2019-12-13 LAB
ALBUMIN SERPL-MCNC: 4.2 G/DL (ref 3.5–5.2)
ALBUMIN/GLOB SERPL: 1.6 G/DL (ref 1.1–2.4)
ALP SERPL-CCNC: 100 U/L (ref 38–116)
ALT SERPL W P-5'-P-CCNC: 10 U/L (ref 0–33)
ANION GAP SERPL CALCULATED.3IONS-SCNC: 13.1 MMOL/L (ref 5–15)
AST SERPL-CCNC: 11 U/L (ref 0–32)
BASOPHILS # BLD AUTO: 0.01 10*3/MM3 (ref 0–0.2)
BASOPHILS NFR BLD AUTO: 1.2 % (ref 0–1.5)
BILIRUB SERPL-MCNC: 0.7 MG/DL (ref 0.2–1.2)
BUN BLD-MCNC: 11 MG/DL (ref 6–20)
BUN/CREAT SERPL: 19.3 (ref 7.3–30)
CALCIUM SPEC-SCNC: 9.1 MG/DL (ref 8.5–10.2)
CHLORIDE SERPL-SCNC: 99 MMOL/L (ref 98–107)
CO2 SERPL-SCNC: 26.9 MMOL/L (ref 22–29)
CREAT BLD-MCNC: 0.57 MG/DL (ref 0.6–1.1)
DEPRECATED RDW RBC AUTO: 42.7 FL (ref 37–54)
EOSINOPHIL # BLD AUTO: 0.06 10*3/MM3 (ref 0–0.4)
EOSINOPHIL NFR BLD AUTO: 7.1 % (ref 0.3–6.2)
ERYTHROCYTE [DISTWIDTH] IN BLOOD BY AUTOMATED COUNT: 11.8 % (ref 12.3–15.4)
GFR SERPL CREATININE-BSD FRML MDRD: 109 ML/MIN/1.73
GLOBULIN UR ELPH-MCNC: 2.6 GM/DL (ref 1.8–3.5)
GLUCOSE BLD-MCNC: 110 MG/DL (ref 74–124)
HCT VFR BLD AUTO: 36.6 % (ref 34–46.6)
HGB BLD-MCNC: 12.3 G/DL (ref 12–15.9)
IMM GRANULOCYTES # BLD AUTO: 0.02 10*3/MM3 (ref 0–0.05)
IMM GRANULOCYTES NFR BLD AUTO: 2.4 % (ref 0–0.5)
LYMPHOCYTES # BLD AUTO: 0.56 10*3/MM3 (ref 0.7–3.1)
LYMPHOCYTES NFR BLD AUTO: 65.9 % (ref 19.6–45.3)
MCH RBC QN AUTO: 33.5 PG (ref 26.6–33)
MCHC RBC AUTO-ENTMCNC: 33.6 G/DL (ref 31.5–35.7)
MCV RBC AUTO: 99.7 FL (ref 79–97)
MONOCYTES # BLD AUTO: 0.15 10*3/MM3 (ref 0.1–0.9)
MONOCYTES NFR BLD AUTO: 17.6 % (ref 5–12)
NEUTROPHILS # BLD AUTO: 0.05 10*3/MM3 (ref 1.7–7)
NEUTROPHILS NFR BLD AUTO: 5.8 % (ref 42.7–76)
NRBC BLD AUTO-RTO: 0 /100 WBC (ref 0–0.2)
PLATELET # BLD AUTO: 92 10*3/MM3 (ref 140–450)
PMV BLD AUTO: 11 FL (ref 6–12)
POTASSIUM BLD-SCNC: 4.4 MMOL/L (ref 3.5–4.7)
PROT SERPL-MCNC: 6.8 G/DL (ref 6.3–8)
RBC # BLD AUTO: 3.67 10*6/MM3 (ref 3.77–5.28)
SODIUM BLD-SCNC: 139 MMOL/L (ref 134–145)
WBC NRBC COR # BLD: 0.85 10*3/MM3 (ref 3.4–10.8)

## 2019-12-13 PROCEDURE — 80053 COMPREHEN METABOLIC PANEL: CPT | Performed by: NURSE PRACTITIONER

## 2019-12-13 PROCEDURE — 85025 COMPLETE CBC W/AUTO DIFF WBC: CPT | Performed by: NURSE PRACTITIONER

## 2019-12-13 PROCEDURE — 96523 IRRIG DRUG DELIVERY DEVICE: CPT | Performed by: NURSE PRACTITIONER

## 2019-12-13 PROCEDURE — 99214 OFFICE O/P EST MOD 30 MIN: CPT | Performed by: NURSE PRACTITIONER

## 2019-12-13 RX ORDER — SODIUM CHLORIDE 0.9 % (FLUSH) 0.9 %
10 SYRINGE (ML) INJECTION AS NEEDED
Status: DISCONTINUED | OUTPATIENT
Start: 2019-12-13 | End: 2019-12-13 | Stop reason: HOSPADM

## 2019-12-13 RX ORDER — SODIUM CHLORIDE 0.9 % (FLUSH) 0.9 %
10 SYRINGE (ML) INJECTION AS NEEDED
Status: CANCELLED | OUTPATIENT
Start: 2019-12-13

## 2019-12-13 RX ORDER — LEVOFLOXACIN 500 MG/1
500 TABLET, FILM COATED ORAL DAILY
Qty: 7 TABLET | Refills: 0 | Status: SHIPPED | OUTPATIENT
Start: 2019-12-13 | End: 2019-12-20

## 2019-12-13 RX ADMIN — Medication 500 UNITS: at 09:35

## 2019-12-13 RX ADMIN — SODIUM CHLORIDE, PRESERVATIVE FREE 10 ML: 5 INJECTION INTRAVENOUS at 09:35

## 2019-12-13 NOTE — PROGRESS NOTES
Subjective     REASON FOR FOLLOW UP:    1.  T2N1 invasive ductal carcinoma of the left breast.  Ultrasound-showed 3.8 x 3.1 x 2.5 cm mass at 4 o'clock position with 2 abnormal axillary lymph nodes, larger lymph node being 1.4 cm.  Left breast biopsy and axillary lymph node biopsy October 29, 2019, invasive ductal carcinoma, moderately differentiated, grade 2, ER 85%, KY 10%, HER-2/merna 2+, HER-2 FISH negative.  · 12/06/19: Cycle #1 of Adriamycin/Cytoxan  2.  HIV followed by infectious disease  3.  Vertigo  4.  History of stroke             HISTORY OF PRESENT ILLNESS:  The patient is a 59 y.o. year old female with the above mentioned history here today for toxicity check.  She received her first cycle of Adriamycin Cytoxan on 12/6/2019.  She struggled with insomnia on the days that she took the dexamethasone.  In fact, she discontinued the dexamethasone 1 day early because of this.  She has not had any significant issues with nausea or vomiting.  She denies any issues with constipation or diarrhea.  She denies fevers or chills.  She has had some fatigue yesterday, but it seems to have improved today.  She also had some decrease in appetite.      ONCOLOGIC HISTORY:  Patient is a 59-year-old female who has had a history of HIV since age 34 followed by Dr. Natali Ng at [facility] and was on multiple HIV drugs initially but more recently has been on a single medication TRIUMEQ, with well-controlled HIV.  She follows up with Dr. Dawn , infectious disease who saw her recently and he saw her on 4/8/2019 and has excellent control and suppression of her viral load.  She is very compliant with her medications.    She also has had history of stroke in 2017 for which she was placed on aspirin.  She presented with a headache.  She is very active and works at [employer] full-time.  She also has had history of vertigo in the past  Patient's PCP is Zach Lora.    Patient first noticed the mass in  her left breast 5 months ago.  Her last mammogram was 5 years ago.  She had soreness in the left breast and she went to her primary care physician who then ordered a mammogram diagnostic and an ultrasound.  The diagnostic mammogram showed a 3.7 cm mass at 4 o'clock position in the lower outer quadrant of the left breast.  The ultrasound showed 3.8 cm x 3.1 x 2.5 cm mass at 4 o'clock position in the left breast with 2 abnormal appearing left axillary lymph nodes the largest being 1.4 cm.    She then underwent biopsy of both the breast mass as well as the left axillary lymph node and both of them are positive for invasive mammary carcinoma it is invasive ductal carcinoma with apocrine features, moderately differentiated, grade 2 of 3 with a Everson score of 5.  The left axillary lymph node is also consistent with metastatic mammary carcinoma.  It is ER positive MN positive HER-2/merna negative.  Details as follows    10/21/19 - Bilateral Diagnostic Mammogram and US Breast Left  FINDINGS: Bilateral digital CC and MLO mammographic images were  obtained. No prior examination is available for comparison. Scattered  fibroglandular densities are seen throughout both breasts. A triangular  skin marker represents the area of palpable concern in the lower outer  quadrant of the left breast in the posterior 1/3. At this location there  is an irregular mass that measures on the order of 3.7 cm in greatest  dimension. Internal calcifications are noted. No suspicious findings in  the right breast are appreciated.     ULTRASOUND: Targeted sonographic evaluation of the left breast was  performed through the area of concern in the left axilla. At the 4  o'clock position on the order of 6 cm from the nipple there is an  irregular hypoechoic mass that measures 3.8 x 3.1 x 2.5 cm. Internal  vascularity is noted.     There are 2 abnormal-appearing left axillary lymph nodes, the largest  which measures on the order of 1.4 cm in greatest  dimension.     IMPRESSION:  1. There is a 3.8 cm irregular mass in the left breast at the 4 o'clock  position. This is seen in conjunction with an irregular 1.4 cm lymph  node in the left axilla. This is suspicious for malignancy with left  axillary involvement. Correlation with ultrasound-guided biopsy of both  the left breast mass and the lymph node is recommended.  2. There are no findings suspicious for malignancy in the right breast.     BI-RADS CATEGORY 5:     Patient's labs from 11/12/19 are normal.    10/29/19 - Tissue Pathology  1. Left Breast, 4:00, 6 cm FN, U/S-Guided Core Needle Biopsy for a Mass:  A. INVASIVE DUCTAL CARCINOMA WITH APOCRINE FEATURES, Moderately differentiated;  Jen Histologic Grade II/III (tubule score = 3, nuclear score = 2, mitoses score = 1), measuring at least  9 mm.  B. No ductal or lobular carcinoma in situ is identified in this sample.  C. Focus suspicious for lymphovascular space invasion.  D. See Biomarker Template for hormone receptor studies.  2. Lymph Node, Left Axilla, U/S-Guided Core Needle Biopsy:  A. METASTATIC MAMMARY CARCINOMA (see Comment).  B. Metastatic focus measures 5 mm in greatest extent.  ER+, 85%  IN+, 10%  HER2- Score 2+    12/06/19: Cycle #1 of Adriamycin/Cytoxan    PAST MEDICAL HISTORY:  She had a stroke in July 2017 with headache and dizziness.  She went to the ER and was placed on aspirin.  However, she stopped taking it two weeks ago.  She has been taking Aimovig (injection) monthly with Fariba López at [facility].    In 1994, patient was diagnosed with HIV with an unknown cause which is managed by Dr. Natali Subramanian.  As of now, her viral load is good.    Patient has arthritis.  She gets trigger-point injections in her back with her last one being 2 weeks ago.  She has had multiple ablations.  She also has pain in her SI joint and Dr. Bermudez performed a surgery on this.  She takes Narco for the pain.      She is osteoporotic.  She has had multiple  hairline fractures in both her legs.  She had her knees cleaned out by Dr. Sinha.     Patient has had a hysterectomy and still has both of her ovaries.    Patient has vertigo and the muscles in her left ear are weak.  She just has an imbalance.    19 - CT Neck Chest Abdomen Pelvis  IMPRESSION:  1. In addition to the irregular approximately 3.8 cm mass within the  lateral aspect of the left breast, there are a few subcentimeter  asymmetric nodules along the lateral margin of the glandular tissue. The  several asymmetric left subpectoral nodes and 1.2 x 1.0 cm left axillary  node are suspicious for tamar metastases. The asymmetric subcentimeter  left supraclavicular and left posterior cervical triangle nodes are  worrisome as well.  2. There is no convincing evidence for metastatic disease within the  abdomen or pelvis.    19 - Echocardiogram:  Normal.  Calculated EF = 67%.  There is trace mitral valve and tricuspid valve regurgitation.    11/15/19 - Bone Scan  Negative.    19 - MRI Breast Bilateral  IMPRESSION:  1. Biopsy-proven malignancy in the left breast centered at the 4 o'clock  position with associated surrounding satellite nodules as described. The  entire region of involvement including the satellite nodules and the  dominant mass measure up to 7.5 cm in greatest dimension. Also, left  axillary adenopathy with multiple irregular lymph nodes and loss of  differentiation of the borders of multiple lymph nodes is noted and this  is suspicious for extranodal involvement.  2. There are no findings suspicious for malignancy in the right breast.  BI-RADS CATEGORY 6    19 - Invitae was Negative.      FAMILY HISTORY:  Her mother had breast cancer at age 60, still alive at 85 and in good health..  Her father had dementia.  Her paternal uncle had prostate cancer.  Her brother had esophageal cancer.    OB-GYN:  Menarche 15 years  Menopause-46  Pregnancies- 1 para 1 no miscarriages her  first pregnancy was in 1990 at 29 years of age.  She did not breastfeed.  Post menopausal HRT- None.  Hx of birth control pills- Yes.    SOCIAL HISTORY:  Patient does not smoke or do drugs.  She does drink occasionally.    Past Medical History:   Diagnosis Date   • Anxiety    • Cerebrovascular accident (CVA) (CMS/[facility]) 8/22/2017   • DDD (degenerative disc disease), cervical    • Depression    • GERD (gastroesophageal reflux disease)    • H/O ICH (intracerebral hemorrhage) (CMS/[facility])    • History of stroke    • History of thrombocytopenia    • HIV (human immunodeficiency virus infection) (CMS/[facility]) 1996   • Hyperlipidemia    • Insomnia    • Lupus anticoagulant positive    • Malignant neoplasm of overlapping sites of left breast in female, estrogen receptor positive (CMS/[facility]) 11/12/2019   • Meniscus tear 2017    RIGHT   • Migraine    • Neck pain     WITH SHOOTING PAIN LEFT RIGHT ARM   • Osteoarthritis    • Osteoporosis    • Seasonal allergies    • Spinal headache    • Tick bite 06/2014        Past Surgical History:   Procedure Laterality Date   • ADENOIDECTOMY     • CHOLECYSTECTOMY     • HYSTERECTOMY     • KNEE ARTHROSCOPY Right 3/24/2017    Procedure:  RIGHT  KNEE ARTHROSCOPY WITH DEBRIDEMENT CHONDROPLASTY PARTIAL MENISCECTOMY;  Surgeon: Karl Galeas MD;  Location: [facility];  Service:    • KNEE CARTILAGE SURGERY Right 2007   • TONSILLECTOMY     • US GUIDED LYMPH NODE BIOPSY  10/29/2019   • VENOUS ACCESS DEVICE (PORT) INSERTION Right 12/2/2019    Procedure: INSERTION VENOUS ACCESS DEVICE RIGHT;  Surgeon: Landen Forman MD;  Location: [facility];  Service: General        Current Outpatient Medications on File Prior to Visit   Medication Sig Dispense Refill   • AIMOVIG 140 MG/ML solution auto-injector INJECT 140 MG INTO THE SKIN EVERY 30 (THIRTY) DAYS.  11   • atorvastatin (LIPITOR) 20 MG tablet TAKE 1 TABLET BY MOUTH EVERY DAY AT NIGHT 90 tablet 3   • baclofen (LIORESAL) 10 MG tablet Take 10 mg  by mouth 2 (Two) Times a Day.  2   • CAMBIA 50 MG pack TAKE 1 PACKET AS NEEDED FOR HEADACHES  1   • clonazePAM (KlonoPIN) 0.5 MG tablet TAKE 1 TABLET BY MOUTH 2 (TWO) TIMES A DAY AS NEEDED FOR ANXIETY. 60 tablet 0   • COCONUT OIL PO Take  by mouth.     • colesevelam (WELCHOL) 625 MG tablet TAKE 2 TABLETS BY MOUTH EVERY  tablet 3   • escitalopram (LEXAPRO) 10 MG tablet Take 1 tablet by mouth Daily. 90 tablet 3   • fluticasone (FLONASE) 50 MCG/ACT nasal spray 2 sprays into the nostril(s) as directed by provider Daily. 3 bottle 3   • HYDROcodone-acetaminophen (NORCO)  MG per tablet take 1 tablet by mouth three times a day  0   • ibandronate (BONIVA) 150 MG tablet TAKE 1 TABLET BY MOUTH EVERY 30 (THIRTY) DAYS. 3 tablet 3   • ondansetron (ZOFRAN) 8 MG tablet Take 1 tablet by mouth 3 (Three) Times a Day As Needed for Nausea or Vomiting. 30 tablet 5   • ondansetron ODT (ZOFRAN ODT) 4 MG disintegrating tablet Take 1 tablet by mouth Every 8 (Eight) Hours As Needed for Nausea or Vomiting. 30 tablet 0   • prochlorperazine (COMPAZINE) 10 MG tablet Take 1 tablet by mouth Every 6 (Six) Hours As Needed for Nausea or Vomiting. 20 tablet 2   • TRIUMEQ 600- MG per tablet TAKE 1 TABLET BY MOUTH EVERY DAY 90 tablet 3   • zolpidem (AMBIEN) 10 MG tablet Take 1 tablet by mouth At Night As Needed for Sleep. 90 tablet 0   • clonazePAM (KlonoPIN) 0.5 MG tablet TAKE 1 TABLET BY MOUTH 2 (TWO) TIMES A DAY AS NEEDED FOR ANXIETY. 60 tablet 1   • dexamethasone (DECADRON) 4 MG tablet Take 2 tablets in the morning daily on Days 2, 3 & 4.  Take with food. 6 tablet 3   • Menaquinone-7 (VITAMIN K2 PO) Take  by mouth.     • NON FORMULARY Collagen peptides powder     • TURMERIC PO Take  by mouth.       Current Facility-Administered Medications on File Prior to Visit   Medication Dose Route Frequency Provider Last Rate Last Dose   • [DISCONTINUED] heparin flush (porcine) 100 UNIT/ML injection 500 Units  500 Units Intravenous PRN  Ruby Moreno MD   500 Units at 12/13/19 0935   • [DISCONTINUED] sodium chloride 0.9 % flush 10 mL  10 mL Intravenous PRN Ruby Moreno MD   10 mL at 12/13/19 0935        ALLERGIES:    Allergies   Allergen Reactions   • Augmentin [Amoxicillin-Pot Clavulanate] Hives        Social History     Socioeconomic History   • Marital status:      Spouse name: Owen   • Number of children: 1   • Years of education: College   • Highest education level: Not on file   Occupational History   • Occupation:      Employer: [employer] [place]   Tobacco Use   • Smoking status: Never Smoker   • Smokeless tobacco: Never Used   Substance and Sexual Activity   • Alcohol use: Yes     Comment: occasioonal   • Drug use: No   • Sexual activity: Defer     Birth control/protection: None        Family History   Problem Relation Age of Onset   • Breast cancer Mother    • Leukemia Mother         CLL?   • Diabetes Mother    • Hyperthyroidism Mother    • Hearing loss Mother    • Dementia Father    • Heart disease Maternal Grandmother    • Diabetes Maternal Grandfather    • Heart disease Maternal Grandfather    • Stroke Maternal Grandfather    • Heart attack Maternal Grandfather    • Osteoporosis Paternal Grandmother    • Heart disease Paternal Grandfather    • Leukemia Maternal Aunt         CLL?   • Throat cancer Paternal Uncle         Review of Systems   Constitutional: Positive for fatigue. Negative for activity change, appetite change, chills and fever.   HENT: Negative for mouth sores, nosebleeds and trouble swallowing.    Respiratory: Negative for cough and shortness of breath.    Cardiovascular: Negative for chest pain and leg swelling.   Gastrointestinal: Negative for abdominal pain, constipation, diarrhea, nausea and vomiting.   Genitourinary: Negative for difficulty urinating.   Skin: Negative for rash.   Neurological: Negative for dizziness, weakness and numbness.   Hematological: Negative for adenopathy. Does not  "bruise/bleed easily.   Psychiatric/Behavioral: Positive for sleep disturbance (from dexamethasone).       Objective     Vitals:    12/13/19 1009   BP: 120/77   Pulse: 80   Resp: 16   Temp: 98.3 °F (36.8 °C)   TempSrc: Oral   SpO2: 96%   Weight: 63.4 kg (139 lb 12.8 oz)   Height: 168 cm (66.14\")   PainSc: 0-No pain     Current Status 12/13/2019   ECOG score 0     Physical Exam   Constitutional: She is oriented to person, place, and time. She appears well-developed and well-nourished. No distress.   HENT:   Head: Normocephalic and atraumatic.   Mouth/Throat: Oropharynx is clear and moist and mucous membranes are normal. No oropharyngeal exudate.   Eyes: Pupils are equal, round, and reactive to light.   Neck: Normal range of motion.   Cardiovascular: Normal rate, regular rhythm and normal heart sounds.   No murmur heard.  Pulmonary/Chest: Effort normal and breath sounds normal. No respiratory distress. She has no wheezes. She has no rhonchi. She has no rales.   Abdominal: Soft. Normal appearance and bowel sounds are normal. She exhibits no distension. There is no hepatosplenomegaly.   Musculoskeletal: Normal range of motion. She exhibits no edema.   Neurological: She is alert and oriented to person, place, and time.   Skin: Skin is warm and dry. No rash noted.   Psychiatric: She has a normal mood and affect.   Vitals reviewed.    · Left breast: Mass measures 6.5 cm x 6.0 cm superior to inferior dimension in the upper outer quadrant      RECENT LABS:  Hematology WBC   Date Value Ref Range Status   12/13/2019 0.85 (C) 3.40 - 10.80 10*3/mm3 Final   09/23/2019 5.9 3.4 - 10.8 x10E3/uL Final   04/08/2019 6.06 4.5 - 11.0 10*3/uL Final     RBC   Date Value Ref Range Status   12/13/2019 3.67 (L) 3.77 - 5.28 10*6/mm3 Final   09/23/2019 4.13 3.77 - 5.28 x10E6/uL Final   04/08/2019 4.47 4.0 - 5.2 10*6/uL Final     Hemoglobin   Date Value Ref Range Status   12/13/2019 12.3 12.0 - 15.9 g/dL Final   04/08/2019 15.0 12.0 - 16.0 g/dL " Final     Hematocrit   Date Value Ref Range Status   12/13/2019 36.6 34.0 - 46.6 % Final   04/08/2019 43.8 36.0 - 46.0 % Final     Platelets   Date Value Ref Range Status   12/13/2019 92 (L) 140 - 450 10*3/mm3 Final   04/08/2019 272 140 - 440 10*3/uL Final      Assessment/Plan    1. T2N1 invasive ductal carcinoma of the left breast, recently diagnosed.    -Noticed mass in the left breast x5 months  -Diagnostic mammogram showed 3.7 mm mass in the left breast at 4 o'clock position  -Ultrasound-showed 3.8 x 3.1 x 2.5 cm mass at 4 o'clock position with 2 abnormal axillary lymph nodes, larger lymph node being 1.4 cm  -Left breast biopsy and axillary lymph node biopsy October 29, 2019, invasive ductal carcinoma, moderately differentiated, grade 2, ER 85%, SD 10%, HER-2/merna 2+, HER-2 FISH negative  · CT scans from 11/13/19 show some asymmetric nodules along the lateral margin of the glandular tissue.  The 1.2x1.0 cm left axillary nodes, left supraclavicular, and left posterior cervical triangle nodes are suspicious for tamar metastases.  We reviewed with patient the bone scan from 11/15/19 which was negative. We reviewed with patient the MRI breast from 11/18/19 which shows the biopsy-proven malignancy in the left breast centered at the 4:00 position.  The region of involvement measures up to 7.5 cm.  There is left axillary adenopathy with multiple irregular lymph nodes and loss of differentiation of the border of multiple lymph nodes which are suspicious for extranodal involvement.  We reviewed with patient the echocardiogram from 11/14/19 which was normal with an ejection fraction of 67%.  We reviewed with patient the INVITAE from 11/14/19 which was negative.  · Given the size of her tumor and her medical history, patient will be given 4 cycles of Adriamycin/Cytoxan with Neulasta.  After completion of this treatment, patient will be started on 12 cycles of Taxol.  After the tumor shrinks in size, Dr. Forman will  perform a lumpectomy.  Following lumpectomy, patient will begin endocrine therapy.  I have spoken with Dr. Forman who agrees with chemotherapy first due to the large size of the tumor.  Dr. Dawn agrees chemotherapy will not aggravate her HIV condition.  I also spoke with Dr. Mohan at [facility] who also agrees chemotherapy is the first step.   · 12/06/19: Cycle #1 of Adriamycin/Cytoxan  · 12/13/2018 patient here for toxicity check and struggled with significant insomnia from steroids following her first cycle.  We discussed taking the dexamethasone, only 1 4 mg tablet daily for 3 days with her next cycle of chemotherapy to see if this improves her symptoms.    2. HIV, followed by Dr. Dawn in infectious disease.  Well-controlled and is on a single medication with very low viral load. Has HIV since age 34.     3.  Family history of breast cancer with mother having had breast cancer at age 60.  There is family history of uncle with prostate cancer.  Patient will require genetic referral    4.  History of stroke 2017    5.  Arthritis with severe back pain followed by Dr. Bam Crandall    6. Episodes of nosebleeds.  She has had these once a week for 3-4 years.    7.  Neutropenia due to chemotherapy: Today, 12/13/2019 white blood cell count 0.85, ANC 0.05.  She does have a monocytosis with monocyte of 17.6%.  We will start the patient on Levaquin 500 mg daily.  I have reviewed neutropenic precautions with the patient.      PLAN:  1. Levaquin 500 mg daily x7 days.  2. Return in 1 week for follow-up visit with Dr. Cantor for reevaluation prior to cycle 2 Adriamycin and Cytoxan.  3. We discussed decreasing the dexamethasone after treatment to 4 mg in the morning once daily on days 2 3 and 4 to see if this improves her issues with insomnia.

## 2019-12-16 DIAGNOSIS — Z17.0 MALIGNANT NEOPLASM OF OVERLAPPING SITES OF LEFT BREAST IN FEMALE, ESTROGEN RECEPTOR POSITIVE (HCC): ICD-10-CM

## 2019-12-16 DIAGNOSIS — C50.812 MALIGNANT NEOPLASM OF OVERLAPPING SITES OF LEFT BREAST IN FEMALE, ESTROGEN RECEPTOR POSITIVE (HCC): ICD-10-CM

## 2019-12-19 ENCOUNTER — RESEARCH ENCOUNTER (OUTPATIENT)
Dept: ONCOLOGY | Facility: CLINIC | Age: 59
End: 2019-12-19

## 2019-12-19 NOTE — RESEARCH
[facility]  CONSULTING IN BLOOD DISORDERS & CANCER  Joseluis Velazquez, Keanu Dee Huber, Carlos Moreno, [place], [employer]  [address], [address]  [place], [place] [address]  Phone: [phone]  Fax: [phone]      Patient Name:  Richa Dolan  YOB: 1960  Patient Age:  59 y.o.  Patient's Sex:  female    Date of Service:  12/19/2019                        RESEARCH NOTE                  [employer]    Coordinator placed a follow up phone call to this subject to ascertain what year she first consumed alcohol. The subject stated she was 20 years of age - so 1980 would have been the first year. Subject agrees.

## 2019-12-20 ENCOUNTER — DOCUMENTATION (OUTPATIENT)
Dept: OTHER | Facility: HOSPITAL | Age: 59
End: 2019-12-20

## 2019-12-20 ENCOUNTER — OFFICE VISIT (OUTPATIENT)
Dept: ONCOLOGY | Facility: CLINIC | Age: 59
End: 2019-12-20

## 2019-12-20 ENCOUNTER — INFUSION (OUTPATIENT)
Dept: ONCOLOGY | Facility: HOSPITAL | Age: 59
End: 2019-12-20

## 2019-12-20 VITALS — BODY MASS INDEX: 22.34 KG/M2 | HEIGHT: 66 IN | WEIGHT: 139 LBS

## 2019-12-20 VITALS
DIASTOLIC BLOOD PRESSURE: 87 MMHG | SYSTOLIC BLOOD PRESSURE: 139 MMHG | RESPIRATION RATE: 16 BRPM | OXYGEN SATURATION: 99 % | TEMPERATURE: 98.4 F | HEART RATE: 63 BPM

## 2019-12-20 DIAGNOSIS — T45.1X5A CHEMOTHERAPY-INDUCED THROMBOCYTOPENIA: ICD-10-CM

## 2019-12-20 DIAGNOSIS — Z17.0 MALIGNANT NEOPLASM OF OVERLAPPING SITES OF LEFT BREAST IN FEMALE, ESTROGEN RECEPTOR POSITIVE (HCC): ICD-10-CM

## 2019-12-20 DIAGNOSIS — C50.812 MALIGNANT NEOPLASM OF OVERLAPPING SITES OF LEFT BREAST IN FEMALE, ESTROGEN RECEPTOR POSITIVE (HCC): Primary | ICD-10-CM

## 2019-12-20 DIAGNOSIS — D70.1 CHEMOTHERAPY-INDUCED NEUTROPENIA (HCC): ICD-10-CM

## 2019-12-20 DIAGNOSIS — T45.1X5A ANEMIA ASSOCIATED WITH CHEMOTHERAPY: ICD-10-CM

## 2019-12-20 DIAGNOSIS — Z17.0 MALIGNANT NEOPLASM OF OVERLAPPING SITES OF LEFT BREAST IN FEMALE, ESTROGEN RECEPTOR POSITIVE (HCC): Primary | ICD-10-CM

## 2019-12-20 DIAGNOSIS — D64.81 ANEMIA ASSOCIATED WITH CHEMOTHERAPY: ICD-10-CM

## 2019-12-20 DIAGNOSIS — D69.59 CHEMOTHERAPY-INDUCED THROMBOCYTOPENIA: ICD-10-CM

## 2019-12-20 DIAGNOSIS — C50.812 MALIGNANT NEOPLASM OF OVERLAPPING SITES OF LEFT BREAST IN FEMALE, ESTROGEN RECEPTOR POSITIVE (HCC): ICD-10-CM

## 2019-12-20 DIAGNOSIS — F19.982 SUBSTANCE OR MEDICATION-INDUCED SLEEP DISORDER, INSOMNIA TYPE (HCC): ICD-10-CM

## 2019-12-20 DIAGNOSIS — T45.1X5A CHEMOTHERAPY-INDUCED NEUTROPENIA (HCC): ICD-10-CM

## 2019-12-20 LAB
ALBUMIN SERPL-MCNC: 4.1 G/DL (ref 3.5–5.2)
ALBUMIN/GLOB SERPL: 1.7 G/DL (ref 1.1–2.4)
ALP SERPL-CCNC: 134 U/L (ref 38–116)
ALT SERPL W P-5'-P-CCNC: 11 U/L (ref 0–33)
ANION GAP SERPL CALCULATED.3IONS-SCNC: 10.8 MMOL/L (ref 5–15)
AST SERPL-CCNC: 16 U/L (ref 0–32)
BASOPHILS # BLD AUTO: 0.04 10*3/MM3 (ref 0–0.2)
BASOPHILS NFR BLD AUTO: 0.2 % (ref 0–1.5)
BILIRUB SERPL-MCNC: 0.2 MG/DL (ref 0.2–1.2)
BUN BLD-MCNC: 9 MG/DL (ref 6–20)
BUN/CREAT SERPL: 13.4 (ref 7.3–30)
CALCIUM SPEC-SCNC: 8.7 MG/DL (ref 8.5–10.2)
CHLORIDE SERPL-SCNC: 106 MMOL/L (ref 98–107)
CO2 SERPL-SCNC: 27.2 MMOL/L (ref 22–29)
CREAT BLD-MCNC: 0.67 MG/DL (ref 0.6–1.1)
DEPRECATED RDW RBC AUTO: 44.7 FL (ref 37–54)
EOSINOPHIL # BLD AUTO: 0.01 10*3/MM3 (ref 0–0.4)
EOSINOPHIL NFR BLD AUTO: 0.1 % (ref 0.3–6.2)
ERYTHROCYTE [DISTWIDTH] IN BLOOD BY AUTOMATED COUNT: 11.9 % (ref 12.3–15.4)
GFR SERPL CREATININE-BSD FRML MDRD: 90 ML/MIN/1.73
GLOBULIN UR ELPH-MCNC: 2.4 GM/DL (ref 1.8–3.5)
GLUCOSE BLD-MCNC: 92 MG/DL (ref 74–124)
HCT VFR BLD AUTO: 34.7 % (ref 34–46.6)
HGB BLD-MCNC: 11.5 G/DL (ref 12–15.9)
IMM GRANULOCYTES # BLD AUTO: 3.15 10*3/MM3 (ref 0–0.05)
IMM GRANULOCYTES NFR BLD AUTO: 19 % (ref 0–0.5)
LYMPHOCYTES # BLD AUTO: 1.64 10*3/MM3 (ref 0.7–3.1)
LYMPHOCYTES NFR BLD AUTO: 9.9 % (ref 19.6–45.3)
MCH RBC QN AUTO: 34.1 PG (ref 26.6–33)
MCHC RBC AUTO-ENTMCNC: 33.1 G/DL (ref 31.5–35.7)
MCV RBC AUTO: 103 FL (ref 79–97)
MONOCYTES # BLD AUTO: 0.93 10*3/MM3 (ref 0.1–0.9)
MONOCYTES NFR BLD AUTO: 5.6 % (ref 5–12)
NEUTROPHILS # BLD AUTO: 10.82 10*3/MM3 (ref 1.7–7)
NEUTROPHILS NFR BLD AUTO: 65.2 % (ref 42.7–76)
NRBC BLD AUTO-RTO: 0.1 /100 WBC (ref 0–0.2)
PLATELET # BLD AUTO: 168 10*3/MM3 (ref 140–450)
PMV BLD AUTO: 9.8 FL (ref 6–12)
POTASSIUM BLD-SCNC: 3.8 MMOL/L (ref 3.5–4.7)
PROT SERPL-MCNC: 6.5 G/DL (ref 6.3–8)
RBC # BLD AUTO: 3.37 10*6/MM3 (ref 3.77–5.28)
SODIUM BLD-SCNC: 144 MMOL/L (ref 134–145)
WBC NRBC COR # BLD: 16.59 10*3/MM3 (ref 3.4–10.8)

## 2019-12-20 PROCEDURE — 80053 COMPREHEN METABOLIC PANEL: CPT

## 2019-12-20 PROCEDURE — 25010000002 PALONOSETRON PER 25 MCG: Performed by: INTERNAL MEDICINE

## 2019-12-20 PROCEDURE — 99215 OFFICE O/P EST HI 40 MIN: CPT | Performed by: INTERNAL MEDICINE

## 2019-12-20 PROCEDURE — 85025 COMPLETE CBC W/AUTO DIFF WBC: CPT

## 2019-12-20 PROCEDURE — 96413 CHEMO IV INFUSION 1 HR: CPT | Performed by: INTERNAL MEDICINE

## 2019-12-20 PROCEDURE — 25010000002 DOXORUBICIN PER 10 MG: Performed by: INTERNAL MEDICINE

## 2019-12-20 PROCEDURE — 25010000002 CYCLOPHOSPHAMIDE PER 100 MG: Performed by: INTERNAL MEDICINE

## 2019-12-20 PROCEDURE — 96367 TX/PROPH/DG ADDL SEQ IV INF: CPT | Performed by: INTERNAL MEDICINE

## 2019-12-20 PROCEDURE — 25010000002 DEXAMETHASONE SODIUM PHOSPHATE 100 MG/10ML SOLUTION: Performed by: INTERNAL MEDICINE

## 2019-12-20 PROCEDURE — 25010000002 PEGFILGRASTIM 6 MG/0.6ML PREFILLED SYRINGE KIT: Performed by: INTERNAL MEDICINE

## 2019-12-20 PROCEDURE — 96375 TX/PRO/DX INJ NEW DRUG ADDON: CPT | Performed by: INTERNAL MEDICINE

## 2019-12-20 PROCEDURE — 96411 CHEMO IV PUSH ADDL DRUG: CPT | Performed by: INTERNAL MEDICINE

## 2019-12-20 PROCEDURE — 25010000002 FOSAPREPITANT PER 1 MG: Performed by: INTERNAL MEDICINE

## 2019-12-20 PROCEDURE — 96377 APPLICATON ON-BODY INJECTOR: CPT | Performed by: INTERNAL MEDICINE

## 2019-12-20 RX ORDER — DOXORUBICIN HYDROCHLORIDE 2 MG/ML
60 INJECTION, SOLUTION INTRAVENOUS ONCE
Status: COMPLETED | OUTPATIENT
Start: 2019-12-20 | End: 2019-12-20

## 2019-12-20 RX ORDER — DOXORUBICIN HYDROCHLORIDE 2 MG/ML
60 INJECTION, SOLUTION INTRAVENOUS ONCE
Status: CANCELLED | OUTPATIENT
Start: 2019-12-20

## 2019-12-20 RX ORDER — PALONOSETRON 0.05 MG/ML
0.25 INJECTION, SOLUTION INTRAVENOUS ONCE
Status: CANCELLED | OUTPATIENT
Start: 2019-12-20

## 2019-12-20 RX ORDER — LEVOFLOXACIN 250 MG/1
500 TABLET ORAL DAILY
Qty: 7 TABLET | Refills: 2 | Status: SHIPPED | OUTPATIENT
Start: 2019-12-20 | End: 2020-01-31

## 2019-12-20 RX ORDER — SODIUM CHLORIDE 9 MG/ML
250 INJECTION, SOLUTION INTRAVENOUS ONCE
Status: CANCELLED | OUTPATIENT
Start: 2019-12-20

## 2019-12-20 RX ORDER — PALONOSETRON 0.05 MG/ML
0.25 INJECTION, SOLUTION INTRAVENOUS ONCE
Status: COMPLETED | OUTPATIENT
Start: 2019-12-20 | End: 2019-12-20

## 2019-12-20 RX ORDER — SODIUM CHLORIDE 9 MG/ML
250 INJECTION, SOLUTION INTRAVENOUS ONCE
Status: COMPLETED | OUTPATIENT
Start: 2019-12-20 | End: 2019-12-20

## 2019-12-20 RX ADMIN — SODIUM CHLORIDE 1040 MG: 900 INJECTION, SOLUTION INTRAVENOUS at 12:43

## 2019-12-20 RX ADMIN — DEXAMETHASONE SODIUM PHOSPHATE 12 MG: 10 INJECTION, SOLUTION INTRAMUSCULAR; INTRAVENOUS at 11:40

## 2019-12-20 RX ADMIN — SODIUM CHLORIDE 100 ML: 9 INJECTION, SOLUTION INTRAVENOUS at 11:02

## 2019-12-20 RX ADMIN — DOXORUBICIN HYDROCHLORIDE 104 MG: 2 INJECTION, SOLUTION INTRAVENOUS at 12:11

## 2019-12-20 RX ADMIN — PEGFILGRASTIM 6 MG: KIT SUBCUTANEOUS at 15:00

## 2019-12-20 RX ADMIN — SODIUM CHLORIDE 250 ML: 9 INJECTION, SOLUTION INTRAVENOUS at 11:01

## 2019-12-20 RX ADMIN — PALONOSETRON 0.25 MG: 0.05 INJECTION, SOLUTION INTRAVENOUS at 11:02

## 2019-12-20 NOTE — NURSING NOTE
Pt tolerated treatment well.  Pt complained of slight headache related to cold cap.  Pt states this resolved once off.

## 2019-12-20 NOTE — PROGRESS NOTES
Subjective     REASON FOR FOLLOW UP:    1.  T2N1 invasive ductal carcinoma of the left breast.  Ultrasound-showed 3.8 x 3.1 x 2.5 cm mass at 4 o'clock position with 2 abnormal axillary lymph nodes, larger lymph node being 1.4 cm.  Left breast biopsy and axillary lymph node biopsy October 29, 2019, invasive ductal carcinoma, moderately differentiated, grade 2, ER 85%, TX 10%, HER-2/merna 2+, HER-2 FISH negative.  · 12/06/19: Cycle #1 of dose-dense Adriamycin/Cytoxan with Neulasta for marrow support  2.  Severe neutropenia secondary to chemotherapy, with ANC 50 at 1-week after cycle #1 chemotherapy.  Also had moderate thrombocytopenia platelets 92,000.  Patient was given Levaquin for prophylaxis of neutropenia fever.               HISTORY OF PRESENT ILLNESS:  The patient is a 59 y.o. year old female with the above mentioned history presenting today for MD evaluation, prior to her cycle #2 dose dense AC regimen.  Patient reports fatigue, however has improved.  She denies fever sweating chills.  She had a severe neutropenia last week, and was given antibiotic for prophylaxis.  She fortunately did not develop neutropenia fever.  She also had moderate thrombocytopenia with platelets 92,000, she denies bleeding or bruising.  She reports no oral mucositis.  She did complain significant bony achiness secondary to her Neulasta injection.    She had a 1-8 loose bowel month after first cycle chemotherapy but it resolved.  She has good appetite.  No nausea vomiting.  She had a significant insomnia likely secondary to oral dexamethasone after chemotherapy, she discontinued dexamethasone 1 day earlier.    Laboratory study showed resolution of neutropenia, actually with leukocytosis WBC 16,600 including ANC 10,800 and monocytes 930.  Also resolution of thrombocytopenia with platelets 168,000.  However she has developed anemia secondary to chemotherapy with hemoglobin 11.5.    PAST MEDICAL HISTORY:  She had a stroke in July 2017  with headache and dizziness.  She went to the ER and was placed on aspirin.  However, she stopped taking it two weeks ago.  She has been taking Aimovig (injection) monthly with Fariba López at [facility].    In 1994, patient was diagnosed with HIV with an unknown cause which is managed by Dr. Natali Subramanian.  As of now, her viral load is good.    Patient has arthritis.  She gets trigger-point injections in her back with her last one being 2 weeks ago.  She has had multiple ablations.  She also has pain in her SI joint and Dr. Bermudez performed a surgery on this.  She takes Narco for the pain.      She is osteoporotic.  She has had multiple hairline fractures in both her legs.  She had her knees cleaned out by Dr. Sinha.     Patient has had a hysterectomy and still has both of her ovaries.    Patient has vertigo and the muscles in her left ear are weak.  She just has an imbalance.        ONCOLOGIC HISTORY:  Patient is a 59-year-old female who has had a history of HIV since age 34 followed by Dr. Natali Ng at [facility] and was on multiple HIV drugs initially but more recently has been on a single medication TRIUMEQ, with well-controlled HIV.  She follows up with Dr. Dawn , infectious disease who saw her recently and he saw her on 4/8/2019 and has excellent control and suppression of her viral load.  She is very compliant with her medications.    She also has had history of stroke in 2017 for which she was placed on aspirin.  She presented with a headache.  She is very active and works at [employer] full-time.  She also has had history of vertigo in the past  Patient's PCP is Zach Lora.    Patient first noticed the mass in her left breast 5 months ago.  Her last mammogram was 5 years ago.  She had soreness in the left breast and she went to her primary care physician who then ordered a mammogram diagnostic and an ultrasound.  The diagnostic mammogram showed a 3.7 cm mass at 4 o'clock  position in the lower outer quadrant of the left breast.  The ultrasound showed 3.8 cm x 3.1 x 2.5 cm mass at 4 o'clock position in the left breast with 2 abnormal appearing left axillary lymph nodes the largest being 1.4 cm.    She then underwent biopsy of both the breast mass as well as the left axillary lymph node and both of them are positive for invasive mammary carcinoma it is invasive ductal carcinoma with apocrine features, moderately differentiated, grade 2 of 3 with a [place] score of 5.  The left axillary lymph node is also consistent with metastatic mammary carcinoma.  It is ER positive TX positive HER-2/merna negative.  Details as follows    10/21/19 - Bilateral Diagnostic Mammogram and US Breast Left  FINDINGS: Bilateral digital CC and MLO mammographic images were  obtained. No prior examination is available for comparison. Scattered  fibroglandular densities are seen throughout both breasts. A triangular  skin marker represents the area of palpable concern in the lower outer  quadrant of the left breast in the posterior 1/3. At this location there  is an irregular mass that measures on the order of 3.7 cm in greatest  dimension. Internal calcifications are noted. No suspicious findings in  the right breast are appreciated.     ULTRASOUND: Targeted sonographic evaluation of the left breast was  performed through the area of concern in the left axilla. At the 4  o'clock position on the order of 6 cm from the nipple there is an  irregular hypoechoic mass that measures 3.8 x 3.1 x 2.5 cm. Internal  vascularity is noted.     There are 2 abnormal-appearing left axillary lymph nodes, the largest  which measures on the order of 1.4 cm in greatest dimension.     IMPRESSION:  1. There is a 3.8 cm irregular mass in the left breast at the 4 o'clock  position. This is seen in conjunction with an irregular 1.4 cm lymph  node in the left axilla. This is suspicious for malignancy with left  axillary involvement.  Correlation with ultrasound-guided biopsy of both  the left breast mass and the lymph node is recommended.  2. There are no findings suspicious for malignancy in the right breast.     BI-RADS CATEGORY 5:     Patient's labs from 11/12/19 are normal.    10/29/19 - Tissue Pathology  1. Left Breast, 4:00, 6 cm FN, U/S-Guided Core Needle Biopsy for a Mass:  A. INVASIVE DUCTAL CARCINOMA WITH APOCRINE FEATURES, Moderately differentiated;  Jen Histologic Grade II/III (tubule score = 3, nuclear score = 2, mitoses score = 1), measuring at least  9 mm.  B. No ductal or lobular carcinoma in situ is identified in this sample.  C. Focus suspicious for lymphovascular space invasion.  D. See Biomarker Template for hormone receptor studies.  2. Lymph Node, Left Axilla, U/S-Guided Core Needle Biopsy:  A. METASTATIC MAMMARY CARCINOMA (see Comment).  B. Metastatic focus measures 5 mm in greatest extent.  ER+, 85%  NV+, 10%  HER2- Score 2+    11/13/19 - CT Neck Chest Abdomen Pelvis  IMPRESSION:  1. In addition to the irregular approximately 3.8 cm mass within the  lateral aspect of the left breast, there are a few subcentimeter  asymmetric nodules along the lateral margin of the glandular tissue. The  several asymmetric left subpectoral nodes and 1.2 x 1.0 cm left axillary  node are suspicious for tamar metastases. The asymmetric subcentimeter  left supraclavicular and left posterior cervical triangle nodes are  worrisome as well.  2. There is no convincing evidence for metastatic disease within the  abdomen or pelvis.    11/14/19 - Echocardiogram:  Normal.  Calculated EF = 67%.  There is trace mitral valve and tricuspid valve regurgitation.    11/15/19 - Bone Scan  Negative.    11/18/19 - MRI Breast Bilateral  IMPRESSION:  1. Biopsy-proven malignancy in the left breast centered at the 4 o'clock  position with associated surrounding satellite nodules as described. The  entire region of involvement including the satellite nodules  and the  dominant mass measure up to 7.5 cm in greatest dimension. Also, left  axillary adenopathy with multiple irregular lymph nodes and loss of  differentiation of the borders of multiple lymph nodes is noted and this  is suspicious for extranodal involvement.  2. There are no findings suspicious for malignancy in the right breast.  BI-RADS CATEGORY 6      19: Cycle #1 of Adriamycin/Cytoxan            Past Medical History:   Diagnosis Date   • Anxiety    • Cerebrovascular accident (CVA) (CMS/[facility]) 2017   • DDD (degenerative disc disease), cervical    • Depression    • GERD (gastroesophageal reflux disease)    • H/O ICH (intracerebral hemorrhage) (CMS/[facility])    • History of stroke    • History of thrombocytopenia    • HIV (human immunodeficiency virus infection) (CMS/[facility])    • Hyperlipidemia    • Insomnia    • Lupus anticoagulant positive    • Malignant neoplasm of overlapping sites of left breast in female, estrogen receptor positive (CMS/[facility]) 2019   • Meniscus tear 2017    RIGHT   • Migraine    • Neck pain     WITH SHOOTING PAIN LEFT RIGHT ARM   • Osteoarthritis    • Osteoporosis    • Seasonal allergies    • Spinal headache    • Tick bite 2014        Past Surgical History:   Procedure Laterality Date   • ADENOIDECTOMY     • CHOLECYSTECTOMY     • HYSTERECTOMY     • KNEE ARTHROSCOPY Right 3/24/2017    Procedure:  RIGHT  KNEE ARTHROSCOPY WITH DEBRIDEMENT CHONDROPLASTY PARTIAL MENISCECTOMY;  Surgeon: Karl Galeas MD;  Location: [facility];  Service:    • KNEE CARTILAGE SURGERY Right    • TONSILLECTOMY     • US GUIDED LYMPH NODE BIOPSY  10/29/2019   • VENOUS ACCESS DEVICE (PORT) INSERTION Right 2019    Procedure: INSERTION VENOUS ACCESS DEVICE RIGHT;  Surgeon: Landen Forman MD;  Location: [facility];  Service: General      OB-GYN:  Menarche 15 years  Menopause-46  Pregnancies- 1 para 1 no miscarriages her first pregnancy was in  at 29 years of age.   She did not breastfeed.  Post menopausal HRT- None.  Hx of birth control pills- Yes.    Current Outpatient Medications on File Prior to Visit   Medication Sig Dispense Refill   • AIMOVIG 140 MG/ML solution auto-injector INJECT 140 MG INTO THE SKIN EVERY 30 (THIRTY) DAYS.  11   • atorvastatin (LIPITOR) 20 MG tablet TAKE 1 TABLET BY MOUTH EVERY DAY AT NIGHT 90 tablet 3   • baclofen (LIORESAL) 10 MG tablet Take 10 mg by mouth 2 (Two) Times a Day.  2   • CAMBIA 50 MG pack TAKE 1 PACKET AS NEEDED FOR HEADACHES  1   • clonazePAM (KlonoPIN) 0.5 MG tablet TAKE 1 TABLET BY MOUTH 2 (TWO) TIMES A DAY AS NEEDED FOR ANXIETY. 60 tablet 0   • clonazePAM (KlonoPIN) 0.5 MG tablet TAKE 1 TABLET BY MOUTH 2 (TWO) TIMES A DAY AS NEEDED FOR ANXIETY. 60 tablet 1   • COCONUT OIL PO Take  by mouth.     • colesevelam (WELCHOL) 625 MG tablet TAKE 2 TABLETS BY MOUTH EVERY  tablet 3   • dexamethasone (DECADRON) 4 MG tablet Take 2 tablets in the morning daily on Days 2, 3 & 4.  Take with food. 6 tablet 3   • escitalopram (LEXAPRO) 10 MG tablet Take 1 tablet by mouth Daily. 90 tablet 3   • fluticasone (FLONASE) 50 MCG/ACT nasal spray 2 sprays into the nostril(s) as directed by provider Daily. 3 bottle 3   • HYDROcodone-acetaminophen (NORCO)  MG per tablet take 1 tablet by mouth three times a day  0   • ibandronate (BONIVA) 150 MG tablet TAKE 1 TABLET BY MOUTH EVERY 30 (THIRTY) DAYS. 3 tablet 3   • Menaquinone-7 (VITAMIN K2 PO) Take  by mouth.     • NON FORMULARY Collagen peptides powder     • ondansetron (ZOFRAN) 8 MG tablet Take 1 tablet by mouth 3 (Three) Times a Day As Needed for Nausea or Vomiting. 30 tablet 5   • ondansetron ODT (ZOFRAN ODT) 4 MG disintegrating tablet Take 1 tablet by mouth Every 8 (Eight) Hours As Needed for Nausea or Vomiting. 30 tablet 0   • prochlorperazine (COMPAZINE) 10 MG tablet Take 1 tablet by mouth Every 6 (Six) Hours As Needed for Nausea or Vomiting. 20 tablet 2   • TRIUMEQ 600- MG per tablet  TAKE 1 TABLET BY MOUTH EVERY DAY 90 tablet 3   • zolpidem (AMBIEN) 10 MG tablet Take 1 tablet by mouth At Night As Needed for Sleep. 90 tablet 0   • [DISCONTINUED] TURMERIC PO Take  by mouth.       No current facility-administered medications on file prior to visit.         ALLERGIES:    Allergies   Allergen Reactions   • Augmentin [Amoxicillin-Pot Clavulanate] Hives        Social History     Socioeconomic History   • Marital status:      Spouse name: Owen   • Number of children: 1   • Years of education: College   • Highest education level: Not on file   Occupational History   • Occupation:      Employer: [employer] [place]   Tobacco Use   • Smoking status: Never Smoker   • Smokeless tobacco: Never Used   Substance and Sexual Activity   • Alcohol use: Yes     Comment: occasioonal   • Drug use: No   • Sexual activity: Defer     Birth control/protection: None     Patient does not smoke or do drugs.  She does drink occasionally.    Family History   Problem Relation Age of Onset   • Breast cancer Mother    • Leukemia Mother         CLL?   • Diabetes Mother    • Hyperthyroidism Mother    • Hearing loss Mother    • Dementia Father    • Heart disease Maternal Grandmother    • Diabetes Maternal Grandfather    • Heart disease Maternal Grandfather    • Stroke Maternal Grandfather    • Heart attack Maternal Grandfather    • Osteoporosis Paternal Grandmother    • Heart disease Paternal Grandfather    • Leukemia Maternal Aunt         CLL?   • Throat cancer Paternal Uncle     FAMILY HISTORY:  Her mother had breast cancer at age 60, still alive at 85 and in good health..  Her father had dementia.  Her paternal uncle had prostate cancer.  Her brother had esophageal cancer.        Review of Systems   Constitutional: Positive for fatigue. Negative for activity change, appetite change, chills and fever.   HENT: Negative for mouth sores, nosebleeds and trouble swallowing.    Respiratory: Negative for cough and  shortness of breath.    Cardiovascular: Negative for chest pain and leg swelling.   Gastrointestinal: Negative for abdominal pain, constipation, diarrhea, nausea and vomiting.   Genitourinary: Negative for difficulty urinating.   Skin: Negative for rash.   Neurological: Negative for dizziness, weakness and numbness.   Hematological: Negative for adenopathy. Does not bruise/bleed easily.   Psychiatric/Behavioral: Positive for sleep disturbance (from dexamethasone).       Objective     /787, O2 saturation 99%, pulse 63, and temperature 98.4 °F  , respiratory rate 16.  Weight 139 pounds.   ECOG PS 0       Physical Exam   Constitutional: She is oriented to person, place, and time. She appears well-developed and well-nourished. No distress.   HENT:   Head: Normocephalic and atraumatic.   Mouth/Throat: Oropharynx is clear and moist and mucous membranes are normal. No oropharyngeal exudate.   Eyes: Pupils are equal, round, and reactive to light. Conjunctivae are normal.   Neck: Normal range of motion.   Cardiovascular: Normal rate, regular rhythm and normal heart sounds.   No murmur heard.  Pulmonary/Chest: Effort normal and breath sounds normal. No respiratory distress. She has no rhonchi.   Abdominal: Soft. Normal appearance and bowel sounds are normal. There is no hepatosplenomegaly. There is no tenderness.   Musculoskeletal: Normal range of motion. She exhibits no edema.   Lymphadenopathy:     She has no cervical adenopathy.   Neurological: She is alert and oriented to person, place, and time.   Skin: Skin is warm and dry. No rash noted.   Psychiatric: She has a normal mood and affect.   Vitals reviewed.      RECENT LABS:     Results from last 7 days   Lab Units 12/20/19  0941   WBC 10*3/mm3 16.59*   HEMOGLOBIN g/dL 11.5*   HEMATOCRIT % 34.7   PLATELETS 10*3/mm3 168     Lab Results   Component Value Date    NEUTROABS 10.82 (H) 12/20/2019     Results from last 7 days   Lab Units 12/20/19  0950   SODIUM mmol/L 144    POTASSIUM mmol/L 3.8   CHLORIDE mmol/L 106   CO2 mmol/L 27.2   BUN mg/dL 9   CREATININE mg/dL 0.67   CALCIUM mg/dL 8.7   BILIRUBIN mg/dL 0.2   ALK PHOS U/L 134*   ALT (SGPT) U/L 11   AST (SGOT) U/L 16   GLUCOSE mg/dL 92       Assessment/Plan    1. T2N1 invasive ductal carcinoma of the left breast, recently diagnosed.    -Noticed mass in the left breast x5 months  -Diagnostic mammogram showed 3.7 mm mass in the left breast at 4 o'clock position  -Ultrasound-showed 3.8 x 3.1 x 2.5 cm mass at 4 o'clock position with 2 abnormal axillary lymph nodes, larger lymph node being 1.4 cm  -Left breast biopsy and axillary lymph node biopsy October 29, 2019, invasive ductal carcinoma, moderately differentiated, grade 2, ER 85%, RI 10%, HER-2/merna 2+, HER-2 FISH negative  · CT scans from 11/13/19 show some asymmetric nodules along the lateral margin of the glandular tissue.  The 1.2x1.0 cm left axillary nodes, left supraclavicular, and left posterior cervical triangle nodes are suspicious for tamar metastases.  We reviewed with patient the bone scan from 11/15/19 which was negative. We reviewed with patient the MRI breast from 11/18/19 which shows the biopsy-proven malignancy in the left breast centered at the 4:00 position.  The region of involvement measures up to 7.5 cm.  There is left axillary adenopathy with multiple irregular lymph nodes and loss of differentiation of the border of multiple lymph nodes which are suspicious for extranodal involvement.  We reviewed with patient the echocardiogram from 11/14/19 which was normal with an ejection fraction of 67%.  We reviewed with patient the INVITAE from 11/14/19 which was negative.  · Given the size of her tumor and her medical history, patient will be given 4 cycles of Adriamycin/Cytoxan with Neulasta.  After completion of this treatment, patient will be started on 12 cycles of Taxol.  After the tumor shrinks in size, Dr. Forman will perform a lumpectomy.  Following  lumpectomy, patient will begin endocrine therapy.  I have spoken with Dr. Forman who agrees with chemotherapy first due to the large size of the tumor.  Dr. Dawn agrees chemotherapy will not aggravate her HIV condition.  I also spoke with Dr. Mohan at [place] who also agrees chemotherapy is the first step.   · 12/06/19: Cycle #1 of Adriamycin/Cytoxan  ·     2.  Profound neutropenia due to chemotherapy despite Neulasta:   · On 12/13/2019 at 1 week after cycle #1 chemotherapy, white blood cell count 850, ANC 50 together with a low monocytes 150 .  We started the patient on Levaquin 500 mg daily for 7 days.  No neutropenia fever.    · On 12/20/2019, much improved and elevated ANC 10,800.  We will proceed ahead with cycle #2 of chemotherapy.  Neulasta will be continued for each chemotherapy.  Due to expected neutropenia, will start the patient on Levaquin 500 mg daily for 7 days, starting on day #4 of each cycle AC chemotherapy.    3.  moderate thrombocytopenia secondary to chemotherapy.    · This happened after cycle 1 Chemotherapy with platelets 92,000 on 12/13/2019.  Patient was asymptomatic.  · Normalized platelets 168,000 on 12/20/2019.  Expecting thrombocytopenia will recur.  Continue to monitor.    4.  Chemotherapy induced anemia.   · Hemoglobin 11.5 on 12/20/2019.  Likely anemia were getting worse with ongoing chemotherapy.  Continue to monitor.     5.  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  · On 12/13/2019 patient here for toxicity check and struggled with significant insomnia from steroids following her first cycle.  We discussed taking the dexamethasone, only one 4 mg tablet daily for 3 days with her next cycle of chemotherapy to see if this improves her symptoms.    · On 12/20/2019, I questioned the patient that her nausea and vomiting may be significant due to decreased dose of dexamethasone.  She could use Compazine and Zofran as needed more frequently.    6. HIV, followed by Dr. Dawn in  infectious disease.  Well-controlled and is on a single medication with very low viral load. Has HIV since age 34.     7.  Family history of breast cancer with mother having had breast cancer at age 60.  There is family history of uncle with prostate cancer.  Patient will require genetic referral    8.  Arthritis with severe back pain followed by Dr. Bam Crandall    9. Episodes of nosebleeds.  She has had these once a week for 3-4 years.        PLAN:  1. Proceed ahead with cycle #2 of dose dense Adriamycin and Cytoxan.  On body Neulasta injection will be applied.    2. Start from cycle #2 day #4 with prophylactic Levaquin 500 mg daily x7 days.  I E scribed to her pharmacy today.   3. We discussed decreasing the dexamethasone after treatment to 4 mg in the morning once daily on days 2, 3 and 4 to see if this improves her issues with insomnia.  At the meantime she should the appropriate use Zofran and Compazine more often than she did after cycle 1.   4. Patient will return in 2 weeks for MD visit, prior to cycle #3 dose dense AC.       MIAH FAN M.D., Ph.D.    12/20/2019        Dictated using Dragon Dictation.

## 2019-12-20 NOTE — PROGRESS NOTES
"Outpatient Oncology Assessment      Patient Name:  Richa Dolan  YOB: 1960  MRN: [id number]      Assessment Date:  12/20/2019    Comments:  Met patient and spouse in [facility] during infusion. Received referral due to weight loss and decreased appetite. Patient reports that she had been afraid to eat after her last treatment but has done better over the last few days and is back to eating more of a normal diet. She does tend to skip meals when she is at home by herself so we discussed setting a timer or reminder to eat and thinking of food as medicine during this time. We talked about food safety, listed high protein foods and snacks to have on hand, foods that are easy to prepare, etc. I provided her with a booklet as a resource. Encouraged intake and hydration.   Will be available for any questions.     Reason for Assessment     Row Name 12/20/19 1100          Reason for Assessment    Reason For Assessment  nurse/nurse practitioner consult     Diagnosis  cancer diagnosis/related complications     Identified At Risk by Screening Criteria  -- ER+MO+ breast cancer         Nutrition/Diet History     Row Name 12/20/19 1100          Nutrition/Diet History    Typical Food/Fluid Intake  eating better this week         Anthropometrics     Row Name 12/20/19 1100          Anthropometrics    Height  168 cm (66.14\")     Weight  63 kg (139 lb)        Ideal Body Weight (IBW)    Ideal Body Weight (IBW) (kg)  59.9     % Ideal Body Weight  105.26        Usual Body Weight (UBW)    Usual Body Weight  63 kg (139 lb)     % Usual Body Weight  100     Weight Loss  unintentional     Weight Loss Time Frame  -- has lost about 10lb total since diagnosis        Body Mass Index (BMI)    BMI (kg/m2)  22.39     BMI Assessment  BMI 18.5-24.9: normal         Labs/Tests/Procedures/Meds     Row Name 12/20/19 1100          Labs/Procedures/Meds    Lab Results Reviewed  reviewed, pertinent        Medications    Pertinent Medications " "Reviewed  reviewed, pertinent     Pertinent Medications Comments  decradron, AC x 4 cycles, zofran, compazine            Estimated/Assessed Needs     Row Name 12/20/19 1100          Calculation Measurements    Weight Used For Calculations  63.4 kg (139 lb 12.4 oz)     Height  168 cm (66.14\")        Estimated/Assessed Needs    Additional Documentation  Calorie Requirements (Group);Fluid Requirements (Group);KCAL/KG (Group);Protein Requirements (Group)        Calorie Requirements    Weight Used For Calorie Calculations  63.4 kg (139 lb 12.4 oz)        KCAL/KG    KCAL/KG  25 Kcal/Kg (kcal)     25 Kcal/Kg (kcal)  1585        Protein Requirements    Weight Used For Protein Calculations  63.4 kg (139 lb 12.4 oz)     Est Protein Requirement Amount (gms/kg)  1.2 gm protein     Estimated Protein Requirements (gms/day)  76.08        Fluid Requirements    Estimated Fluid Requirements (mL/day)  1600     Estimated Fluid Requirement Method  RDA Method     RDA Method (mL)  1600         Nutrition Prescription Ordered     Row Name 12/20/19 1100          Nutrition Prescription PO    Current PO Diet  Regular                 Problem/Interventions:    Knowledge deficit related to breast cancer diagnosis and treatment as evidenced by weight loss over the last two weeks.           Intervention Goal     Row Name 12/20/19 1100          Intervention Goal    General  Maintain nutrition;Meet nutritional needs for age/condition;Disease management/therapy     Weight  Maintain weight             Education/Evaluation     Row Name 12/20/19 1100          Education    Education  Provided education regarding;Education topics;Advised regarding habits/behavior     Provided education regarding  Avoidance of associated complications     Advised Regarding Habits/Behavior  Eating pattern;Food choices;Food safety practice;Increased nutrient density;Snacks;Weight gain strategy        Monitor/Evaluation    Monitor  PO intake;Weight     Education Follow-up  " Reinforce PRN           Electronically signed by:  Prachi Lizarraga RD, LD  12/20/19 11:25 AM

## 2019-12-21 PROBLEM — T45.1X5A CHEMOTHERAPY-INDUCED THROMBOCYTOPENIA: Status: ACTIVE | Noted: 2019-12-21

## 2019-12-21 PROBLEM — D64.81 ANEMIA ASSOCIATED WITH CHEMOTHERAPY: Status: ACTIVE | Noted: 2019-12-21

## 2019-12-21 PROBLEM — C50.912 INVASIVE DUCTAL CARCINOMA OF BREAST, FEMALE, LEFT: Status: RESOLVED | Noted: 2019-11-12 | Resolved: 2019-12-21

## 2019-12-21 PROBLEM — T45.1X5A ANEMIA ASSOCIATED WITH CHEMOTHERAPY: Status: ACTIVE | Noted: 2019-12-21

## 2019-12-21 PROBLEM — F19.982: Status: ACTIVE | Noted: 2019-12-21

## 2019-12-21 PROBLEM — D69.59 CHEMOTHERAPY-INDUCED THROMBOCYTOPENIA: Status: ACTIVE | Noted: 2019-12-21

## 2019-12-31 DIAGNOSIS — C50.812 MALIGNANT NEOPLASM OF OVERLAPPING SITES OF LEFT BREAST IN FEMALE, ESTROGEN RECEPTOR POSITIVE (HCC): Primary | ICD-10-CM

## 2019-12-31 DIAGNOSIS — Z17.0 MALIGNANT NEOPLASM OF OVERLAPPING SITES OF LEFT BREAST IN FEMALE, ESTROGEN RECEPTOR POSITIVE (HCC): Primary | ICD-10-CM

## 2020-01-02 DIAGNOSIS — C50.812 MALIGNANT NEOPLASM OF OVERLAPPING SITES OF LEFT BREAST IN FEMALE, ESTROGEN RECEPTOR POSITIVE (HCC): ICD-10-CM

## 2020-01-02 DIAGNOSIS — Z17.0 MALIGNANT NEOPLASM OF OVERLAPPING SITES OF LEFT BREAST IN FEMALE, ESTROGEN RECEPTOR POSITIVE (HCC): ICD-10-CM

## 2020-01-03 ENCOUNTER — INFUSION (OUTPATIENT)
Dept: ONCOLOGY | Facility: HOSPITAL | Age: 60
End: 2020-01-03

## 2020-01-03 ENCOUNTER — OFFICE VISIT (OUTPATIENT)
Dept: ONCOLOGY | Facility: CLINIC | Age: 60
End: 2020-01-03

## 2020-01-03 VITALS
DIASTOLIC BLOOD PRESSURE: 68 MMHG | SYSTOLIC BLOOD PRESSURE: 103 MMHG | OXYGEN SATURATION: 98 % | TEMPERATURE: 98.1 F | HEART RATE: 82 BPM | HEIGHT: 66 IN | BODY MASS INDEX: 22.68 KG/M2 | WEIGHT: 141.1 LBS | RESPIRATION RATE: 16 BRPM

## 2020-01-03 DIAGNOSIS — C50.812 MALIGNANT NEOPLASM OF OVERLAPPING SITES OF LEFT BREAST IN FEMALE, ESTROGEN RECEPTOR POSITIVE (HCC): ICD-10-CM

## 2020-01-03 DIAGNOSIS — Z17.0 MALIGNANT NEOPLASM OF OVERLAPPING SITES OF LEFT BREAST IN FEMALE, ESTROGEN RECEPTOR POSITIVE (HCC): ICD-10-CM

## 2020-01-03 DIAGNOSIS — Z17.0 MALIGNANT NEOPLASM OF OVERLAPPING SITES OF LEFT BREAST IN FEMALE, ESTROGEN RECEPTOR POSITIVE (HCC): Primary | ICD-10-CM

## 2020-01-03 DIAGNOSIS — C50.812 MALIGNANT NEOPLASM OF OVERLAPPING SITES OF LEFT BREAST IN FEMALE, ESTROGEN RECEPTOR POSITIVE (HCC): Primary | ICD-10-CM

## 2020-01-03 LAB
ALBUMIN SERPL-MCNC: 4.4 G/DL (ref 3.5–5.2)
ALBUMIN/GLOB SERPL: 1.7 G/DL (ref 1.1–2.4)
ALP SERPL-CCNC: 153 U/L (ref 38–116)
ALT SERPL W P-5'-P-CCNC: 9 U/L (ref 0–33)
ANION GAP SERPL CALCULATED.3IONS-SCNC: 11.5 MMOL/L (ref 5–15)
AST SERPL-CCNC: 14 U/L (ref 0–32)
BASOPHILS # BLD AUTO: 0.06 10*3/MM3 (ref 0–0.2)
BASOPHILS NFR BLD AUTO: 0.2 % (ref 0–1.5)
BILIRUB SERPL-MCNC: <0.2 MG/DL (ref 0.2–1.2)
BUN BLD-MCNC: 16 MG/DL (ref 6–20)
BUN/CREAT SERPL: 25.4 (ref 7.3–30)
CALCIUM SPEC-SCNC: 9.1 MG/DL (ref 8.5–10.2)
CHLORIDE SERPL-SCNC: 103 MMOL/L (ref 98–107)
CO2 SERPL-SCNC: 26.5 MMOL/L (ref 22–29)
CREAT BLD-MCNC: 0.63 MG/DL (ref 0.6–1.1)
DEPRECATED RDW RBC AUTO: 46.8 FL (ref 37–54)
EOSINOPHIL # BLD AUTO: 0 10*3/MM3 (ref 0–0.4)
EOSINOPHIL NFR BLD AUTO: 0 % (ref 0.3–6.2)
ERYTHROCYTE [DISTWIDTH] IN BLOOD BY AUTOMATED COUNT: 13 % (ref 12.3–15.4)
GFR SERPL CREATININE-BSD FRML MDRD: 97 ML/MIN/1.73
GLOBULIN UR ELPH-MCNC: 2.6 GM/DL (ref 1.8–3.5)
GLUCOSE BLD-MCNC: 131 MG/DL (ref 74–124)
HCT VFR BLD AUTO: 35.3 % (ref 34–46.6)
HGB BLD-MCNC: 11.5 G/DL (ref 12–15.9)
IMM GRANULOCYTES # BLD AUTO: 4.62 10*3/MM3 (ref 0–0.05)
IMM GRANULOCYTES NFR BLD AUTO: 19 % (ref 0–0.5)
LYMPHOCYTES # BLD AUTO: 1.25 10*3/MM3 (ref 0.7–3.1)
LYMPHOCYTES NFR BLD AUTO: 5.1 % (ref 19.6–45.3)
MCH RBC QN AUTO: 33.7 PG (ref 26.6–33)
MCHC RBC AUTO-ENTMCNC: 32.6 G/DL (ref 31.5–35.7)
MCV RBC AUTO: 103.5 FL (ref 79–97)
MONOCYTES # BLD AUTO: 1.58 10*3/MM3 (ref 0.1–0.9)
MONOCYTES NFR BLD AUTO: 6.5 % (ref 5–12)
NEUTROPHILS # BLD AUTO: 16.82 10*3/MM3 (ref 1.7–7)
NEUTROPHILS NFR BLD AUTO: 69.2 % (ref 42.7–76)
NRBC BLD AUTO-RTO: 0.2 /100 WBC (ref 0–0.2)
PLATELET # BLD AUTO: 239 10*3/MM3 (ref 140–450)
PMV BLD AUTO: 9.5 FL (ref 6–12)
POTASSIUM BLD-SCNC: 4.3 MMOL/L (ref 3.5–4.7)
PROT SERPL-MCNC: 7 G/DL (ref 6.3–8)
RBC # BLD AUTO: 3.41 10*6/MM3 (ref 3.77–5.28)
SODIUM BLD-SCNC: 141 MMOL/L (ref 134–145)
WBC NRBC COR # BLD: 24.33 10*3/MM3 (ref 3.4–10.8)

## 2020-01-03 PROCEDURE — 85025 COMPLETE CBC W/AUTO DIFF WBC: CPT

## 2020-01-03 PROCEDURE — 96411 CHEMO IV PUSH ADDL DRUG: CPT | Performed by: NURSE PRACTITIONER

## 2020-01-03 PROCEDURE — 25010000002 DEXAMETHASONE SODIUM PHOSPHATE 100 MG/10ML SOLUTION: Performed by: NURSE PRACTITIONER

## 2020-01-03 PROCEDURE — 25010000002 PEGFILGRASTIM 6 MG/0.6ML PREFILLED SYRINGE KIT: Performed by: NURSE PRACTITIONER

## 2020-01-03 PROCEDURE — 99213 OFFICE O/P EST LOW 20 MIN: CPT | Performed by: NURSE PRACTITIONER

## 2020-01-03 PROCEDURE — 25010000002 FOSAPREPITANT PER 1 MG: Performed by: NURSE PRACTITIONER

## 2020-01-03 PROCEDURE — 80053 COMPREHEN METABOLIC PANEL: CPT

## 2020-01-03 PROCEDURE — 96377 APPLICATON ON-BODY INJECTOR: CPT | Performed by: NURSE PRACTITIONER

## 2020-01-03 PROCEDURE — 25010000002 CYCLOPHOSPHAMIDE PER 100 MG: Performed by: NURSE PRACTITIONER

## 2020-01-03 PROCEDURE — 25010000002 DOXORUBICIN PER 10 MG: Performed by: NURSE PRACTITIONER

## 2020-01-03 PROCEDURE — 96367 TX/PROPH/DG ADDL SEQ IV INF: CPT | Performed by: NURSE PRACTITIONER

## 2020-01-03 PROCEDURE — 25010000002 PALONOSETRON PER 25 MCG: Performed by: NURSE PRACTITIONER

## 2020-01-03 PROCEDURE — 96375 TX/PRO/DX INJ NEW DRUG ADDON: CPT | Performed by: NURSE PRACTITIONER

## 2020-01-03 PROCEDURE — 96413 CHEMO IV INFUSION 1 HR: CPT | Performed by: NURSE PRACTITIONER

## 2020-01-03 RX ORDER — DOXORUBICIN HYDROCHLORIDE 2 MG/ML
60 INJECTION, SOLUTION INTRAVENOUS ONCE
Status: CANCELLED | OUTPATIENT
Start: 2020-01-03

## 2020-01-03 RX ORDER — DOXORUBICIN HYDROCHLORIDE 2 MG/ML
60 INJECTION, SOLUTION INTRAVENOUS ONCE
Status: COMPLETED | OUTPATIENT
Start: 2020-01-03 | End: 2020-01-03

## 2020-01-03 RX ORDER — PALONOSETRON 0.05 MG/ML
0.25 INJECTION, SOLUTION INTRAVENOUS ONCE
Status: COMPLETED | OUTPATIENT
Start: 2020-01-03 | End: 2020-01-03

## 2020-01-03 RX ORDER — SODIUM CHLORIDE 9 MG/ML
250 INJECTION, SOLUTION INTRAVENOUS ONCE
Status: COMPLETED | OUTPATIENT
Start: 2020-01-03 | End: 2020-01-03

## 2020-01-03 RX ADMIN — DEXAMETHASONE SODIUM PHOSPHATE 12 MG: 10 INJECTION, SOLUTION INTRAMUSCULAR; INTRAVENOUS at 14:14

## 2020-01-03 RX ADMIN — PEGFILGRASTIM 6 MG: KIT SUBCUTANEOUS at 15:30

## 2020-01-03 RX ADMIN — SODIUM CHLORIDE 250 ML: 9 INJECTION, SOLUTION INTRAVENOUS at 13:40

## 2020-01-03 RX ADMIN — DOXORUBICIN HYDROCHLORIDE 104 MG: 2 INJECTION, SOLUTION INTRAVENOUS at 14:33

## 2020-01-03 RX ADMIN — SODIUM CHLORIDE 1040 MG: 900 INJECTION, SOLUTION INTRAVENOUS at 14:48

## 2020-01-03 RX ADMIN — SODIUM CHLORIDE 100 ML: 9 INJECTION, SOLUTION INTRAVENOUS at 13:40

## 2020-01-03 RX ADMIN — PALONOSETRON 0.25 MG: 0.05 INJECTION, SOLUTION INTRAVENOUS at 13:40

## 2020-01-03 NOTE — PROGRESS NOTES
Subjective     REASON FOR FOLLOW UP:    1.  T2N1 invasive ductal carcinoma of the left breast.  Ultrasound-showed 3.8 x 3.1 x 2.5 cm mass at 4 o'clock position with 2 abnormal axillary lymph nodes, larger lymph node being 1.4 cm.  Left breast biopsy and axillary lymph node biopsy October 29, 2019, invasive ductal carcinoma, moderately differentiated, grade 2, ER 85%, UT 10%, HER-2/merna 2+, HER-2 FISH negative.  · 12/06/19: Cycle #1 of dose-dense Adriamycin/Cytoxan with Neulasta for marrow support  2.  Severe neutropenia secondary to chemotherapy, with ANC 50 at 1-week after cycle #1 chemotherapy.  Also had moderate thrombocytopenia platelets 92,000.  Patient was given Levaquin for prophylaxis of neutropenia fever.               HISTORY OF PRESENT ILLNESS:  The patient is a 59 y.o. year old female the above-mentioned history here today for follow-up visit prior to her third cycle of dose dense Adriamycin Cytoxan with Neulasta support.  She is also continue to utilize the [employer].  She has noted some hair loss and is trimmed her hair shorter as a result of this.  She denies fevers or chills.  She denies bleeding issues.  She does report intermittent headaches, but has a history of migraines.  She has noted some mild arthralgias.  She is taking muscle relaxers and pain medication as needed with relief.  No significant issues with nausea, vomiting, diarrhea, or constipation.      PAST MEDICAL HISTORY:  She had a stroke in July 2017 with headache and dizziness.  She went to the ER and was placed on aspirin.  However, she stopped taking it two weeks ago.  She has been taking Aimovig (injection) monthly with Fariba López at [place].    In 1994, patient was diagnosed with HIV with an unknown cause which is managed by Dr. Natali Subramanian.  As of now, her viral load is good.    Patient has arthritis.  She gets trigger-point injections in her back with her last one being 2 weeks ago.  She has had multiple ablations.  She also has  pain in her SI joint and Dr. Bermudez performed a surgery on this.  She takes Narco for the pain.      She is osteoporotic.  She has had multiple hairline fractures in both her legs.  She had her knees cleaned out by Dr. Sinha.     Patient has had a hysterectomy and still has both of her ovaries.    Patient has vertigo and the muscles in her left ear are weak.  She just has an imbalance.        ONCOLOGIC HISTORY:  Patient is a 59-year-old female who has had a history of HIV since age 34 followed by Dr. Natali Ng at [facility] and was on multiple HIV drugs initially but more recently has been on a single medication TRIUMEQ, with well-controlled HIV.  She follows up with Dr. Dawn , infectious disease who saw her recently and he saw her on 4/8/2019 and has excellent control and suppression of her viral load.  She is very compliant with her medications.    She also has had history of stroke in 2017 for which she was placed on aspirin.  She presented with a headache.  She is very active and works at [employer] full-time.  She also has had history of vertigo in the past  Patient's PCP is Zach Lora.    Patient first noticed the mass in her left breast 5 months ago.  Her last mammogram was 5 years ago.  She had soreness in the left breast and she went to her primary care physician who then ordered a mammogram diagnostic and an ultrasound.  The diagnostic mammogram showed a 3.7 cm mass at 4 o'clock position in the lower outer quadrant of the left breast.  The ultrasound showed 3.8 cm x 3.1 x 2.5 cm mass at 4 o'clock position in the left breast with 2 abnormal appearing left axillary lymph nodes the largest being 1.4 cm.    She then underwent biopsy of both the breast mass as well as the left axillary lymph node and both of them are positive for invasive mammary carcinoma it is invasive ductal carcinoma with apocrine features, moderately differentiated, grade 2 of 3 with a Jen score of  5.  The left axillary lymph node is also consistent with metastatic mammary carcinoma.  It is ER positive NC positive HER-2/merna negative.  Details as follows    10/21/19 - Bilateral Diagnostic Mammogram and US Breast Left  FINDINGS: Bilateral digital CC and MLO mammographic images were  obtained. No prior examination is available for comparison. Scattered  fibroglandular densities are seen throughout both breasts. A triangular  skin marker represents the area of palpable concern in the lower outer  quadrant of the left breast in the posterior 1/3. At this location there  is an irregular mass that measures on the order of 3.7 cm in greatest  dimension. Internal calcifications are noted. No suspicious findings in  the right breast are appreciated.     ULTRASOUND: Targeted sonographic evaluation of the left breast was  performed through the area of concern in the left axilla. At the 4  o'clock position on the order of 6 cm from the nipple there is an  irregular hypoechoic mass that measures 3.8 x 3.1 x 2.5 cm. Internal  vascularity is noted.     There are 2 abnormal-appearing left axillary lymph nodes, the largest  which measures on the order of 1.4 cm in greatest dimension.     IMPRESSION:  1. There is a 3.8 cm irregular mass in the left breast at the 4 o'clock  position. This is seen in conjunction with an irregular 1.4 cm lymph  node in the left axilla. This is suspicious for malignancy with left  axillary involvement. Correlation with ultrasound-guided biopsy of both  the left breast mass and the lymph node is recommended.  2. There are no findings suspicious for malignancy in the right breast.     BI-RADS CATEGORY 5:     Patient's labs from 11/12/19 are normal.    10/29/19 - Tissue Pathology  1. Left Breast, 4:00, 6 cm FN, U/S-Guided Core Needle Biopsy for a Mass:  A. INVASIVE DUCTAL CARCINOMA WITH APOCRINE FEATURES, Moderately differentiated;  Jen Histologic Grade II/III (tubule score = 3, nuclear score =  2, mitoses score = 1), measuring at least  9 mm.  B. No ductal or lobular carcinoma in situ is identified in this sample.  C. Focus suspicious for lymphovascular space invasion.  D. See Biomarker Template for hormone receptor studies.  2. Lymph Node, Left Axilla, U/S-Guided Core Needle Biopsy:  A. METASTATIC MAMMARY CARCINOMA (see Comment).  B. Metastatic focus measures 5 mm in greatest extent.  ER+, 85%  VT+, 10%  HER2- Score 2+    11/13/19 - CT Neck Chest Abdomen Pelvis  IMPRESSION:  1. In addition to the irregular approximately 3.8 cm mass within the  lateral aspect of the left breast, there are a few subcentimeter  asymmetric nodules along the lateral margin of the glandular tissue. The  several asymmetric left subpectoral nodes and 1.2 x 1.0 cm left axillary  node are suspicious for tamar metastases. The asymmetric subcentimeter  left supraclavicular and left posterior cervical triangle nodes are  worrisome as well.  2. There is no convincing evidence for metastatic disease within the  abdomen or pelvis.    11/14/19 - Echocardiogram:  Normal.  Calculated EF = 67%.  There is trace mitral valve and tricuspid valve regurgitation.    11/15/19 - Bone Scan  Negative.    11/18/19 - MRI Breast Bilateral  IMPRESSION:  1. Biopsy-proven malignancy in the left breast centered at the 4 o'clock  position with associated surrounding satellite nodules as described. The  entire region of involvement including the satellite nodules and the  dominant mass measure up to 7.5 cm in greatest dimension. Also, left  axillary adenopathy with multiple irregular lymph nodes and loss of  differentiation of the borders of multiple lymph nodes is noted and this  is suspicious for extranodal involvement.  2. There are no findings suspicious for malignancy in the right breast.  BI-RADS CATEGORY 6      12/06/19: Cycle #1 of Adriamycin/Cytoxan            Past Medical History:   Diagnosis Date   • Anxiety    • Cerebrovascular accident (CVA)  (CMS/[facility]) 2017   • DDD (degenerative disc disease), cervical    • Depression    • GERD (gastroesophageal reflux disease)    • H/O ICH (intracerebral hemorrhage) (CMS/[facility])    • History of stroke    • History of thrombocytopenia    • HIV (human immunodeficiency virus infection) (CMS/[facility])    • Hyperlipidemia    • Insomnia    • Lupus anticoagulant positive    • Malignant neoplasm of overlapping sites of left breast in female, estrogen receptor positive (CMS/[facility]) 2019   • Meniscus tear 2017    RIGHT   • Migraine    • Neck pain     WITH SHOOTING PAIN LEFT RIGHT ARM   • Osteoarthritis    • Osteoporosis    • Seasonal allergies    • Spinal headache    • Tick bite 2014        Past Surgical History:   Procedure Laterality Date   • ADENOIDECTOMY     • CHOLECYSTECTOMY     • HYSTERECTOMY     • KNEE ARTHROSCOPY Right 3/24/2017    Procedure:  RIGHT  KNEE ARTHROSCOPY WITH DEBRIDEMENT CHONDROPLASTY PARTIAL MENISCECTOMY;  Surgeon: Karl Galeas MD;  Location: [facility];  Service:    • KNEE CARTILAGE SURGERY Right    • TONSILLECTOMY     • US GUIDED LYMPH NODE BIOPSY  10/29/2019   • VENOUS ACCESS DEVICE (PORT) INSERTION Right 2019    Procedure: INSERTION VENOUS ACCESS DEVICE RIGHT;  Surgeon: Landen Forman MD;  Location: [facility];  Service: General      OB-GYN:  Menarche 15 years  Menopause-46  Pregnancies- 1 para 1 no miscarriages her first pregnancy was in  at 29 years of age.  She did not breastfeed.  Post menopausal HRT- None.  Hx of birth control pills- Yes.    Current Outpatient Medications on File Prior to Visit   Medication Sig Dispense Refill   • AIMOVIG 140 MG/ML solution auto-injector INJECT 140 MG INTO THE SKIN EVERY 30 (THIRTY) DAYS.  11   • atorvastatin (LIPITOR) 20 MG tablet TAKE 1 TABLET BY MOUTH EVERY DAY AT NIGHT 90 tablet 3   • baclofen (LIORESAL) 10 MG tablet Take 10 mg by mouth 2 (Two) Times a Day.  2   • CAMBIA 50 MG pack TAKE 1 PACKET AS NEEDED FOR  HEADACHES  1   • clonazePAM (KlonoPIN) 0.5 MG tablet TAKE 1 TABLET BY MOUTH 2 (TWO) TIMES A DAY AS NEEDED FOR ANXIETY. 60 tablet 0   • clonazePAM (KlonoPIN) 0.5 MG tablet TAKE 1 TABLET BY MOUTH 2 (TWO) TIMES A DAY AS NEEDED FOR ANXIETY. 60 tablet 1   • COCONUT OIL PO Take  by mouth.     • colesevelam (WELCHOL) 625 MG tablet TAKE 2 TABLETS BY MOUTH EVERY  tablet 3   • dexamethasone (DECADRON) 4 MG tablet Take 2 tablets in the morning daily on Days 2, 3 & 4.  Take with food. 6 tablet 3   • escitalopram (LEXAPRO) 10 MG tablet Take 1 tablet by mouth Daily. 90 tablet 3   • fluticasone (FLONASE) 50 MCG/ACT nasal spray 2 sprays into the nostril(s) as directed by provider Daily. 3 bottle 3   • HYDROcodone-acetaminophen (NORCO)  MG per tablet take 1 tablet by mouth three times a day  0   • ibandronate (BONIVA) 150 MG tablet TAKE 1 TABLET BY MOUTH EVERY 30 (THIRTY) DAYS. 3 tablet 3   • levoFLOXacin (LEVAQUIN) 250 MG tablet Take 2 tablets by mouth Daily. Starting from day#4 of chemo cycle. 7 tablet 2   • Menaquinone-7 (VITAMIN K2 PO) Take  by mouth.     • NON FORMULARY Collagen peptides powder     • ondansetron (ZOFRAN) 8 MG tablet Take 1 tablet by mouth 3 (Three) Times a Day As Needed for Nausea or Vomiting. 30 tablet 5   • ondansetron ODT (ZOFRAN ODT) 4 MG disintegrating tablet Take 1 tablet by mouth Every 8 (Eight) Hours As Needed for Nausea or Vomiting. 30 tablet 0   • prochlorperazine (COMPAZINE) 10 MG tablet Take 1 tablet by mouth Every 6 (Six) Hours As Needed for Nausea or Vomiting. 20 tablet 2   • TRIUMEQ 600- MG per tablet TAKE 1 TABLET BY MOUTH EVERY DAY 90 tablet 3   • zolpidem (AMBIEN) 10 MG tablet Take 1 tablet by mouth At Night As Needed for Sleep. 90 tablet 0     No current facility-administered medications on file prior to visit.         ALLERGIES:    Allergies   Allergen Reactions   • Augmentin [Amoxicillin-Pot Clavulanate] Hives        Social History     Socioeconomic History   • Marital  status:      Spouse name: Owen   • Number of children: 1   • Years of education: College   • Highest education level: Not on file   Occupational History   • Occupation:      Employer:  [employer]   Tobacco Use   • Smoking status: Never Smoker   • Smokeless tobacco: Never Used   Substance and Sexual Activity   • Alcohol use: Yes     Comment: occasioonal   • Drug use: No   • Sexual activity: Defer     Birth control/protection: None     Patient does not smoke or do drugs.  She does drink occasionally.    Family History   Problem Relation Age of Onset   • Breast cancer Mother    • Leukemia Mother         CLL?   • Diabetes Mother    • Hyperthyroidism Mother    • Hearing loss Mother    • Dementia Father    • Heart disease Maternal Grandmother    • Diabetes Maternal Grandfather    • Heart disease Maternal Grandfather    • Stroke Maternal Grandfather    • Heart attack Maternal Grandfather    • Osteoporosis Paternal Grandmother    • Heart disease Paternal Grandfather    • Leukemia Maternal Aunt         CLL?   • Throat cancer Paternal Uncle     FAMILY HISTORY:  Her mother had breast cancer at age 60, still alive at 85 and in good health..  Her father had dementia.  Her paternal uncle had prostate cancer.  Her brother had esophageal cancer.        Review of Systems   Constitutional: Positive for fatigue. Negative for activity change, appetite change, chills and fever.   HENT: Negative for mouth sores, nosebleeds and trouble swallowing.    Respiratory: Negative for cough and shortness of breath.    Cardiovascular: Negative for chest pain and leg swelling.   Gastrointestinal: Negative for abdominal pain, constipation, diarrhea, nausea and vomiting.   Genitourinary: Negative for difficulty urinating.   Musculoskeletal: Positive for arthralgias.   Skin: Negative for rash.   Neurological: Positive for headaches (intermittent). Negative for dizziness, weakness and numbness.   Hematological: Negative for  adenopathy. Does not bruise/bleed easily.   Psychiatric/Behavioral: Positive for sleep disturbance (from dexamethasone).   1/3/2020 review of systems unchanged from above except as updated.    Objective   Vitals:    01/03/20 1329   BP: 103/68   Pulse: 82   Resp: 16   Temp: 98.1 °F (36.7 °C)   SpO2: 98%       Physical Exam   Constitutional: She is oriented to person, place, and time. She appears well-developed and well-nourished. No distress.   HENT:   Head: Normocephalic and atraumatic.   Mouth/Throat: Oropharynx is clear and moist and mucous membranes are normal. No oropharyngeal exudate.   Eyes: Pupils are equal, round, and reactive to light.   Neck: Normal range of motion.   Cardiovascular: Normal rate, regular rhythm and normal heart sounds.   No murmur heard.  Pulmonary/Chest: Effort normal and breath sounds normal. No respiratory distress. She has no wheezes. She has no rhonchi. She has no rales.   Abdominal: Soft. Normal appearance and bowel sounds are normal. She exhibits no distension. There is no hepatosplenomegaly.   Musculoskeletal: Normal range of motion. She exhibits no edema.   Neurological: She is alert and oriented to person, place, and time.   Skin: Skin is warm and dry. No rash noted.   Psychiatric: She has a normal mood and affect.   Vitals reviewed.      RECENT LABS:     Results from last 7 days   Lab Units 01/03/20  1236   WBC 10*3/mm3 24.33*   HEMOGLOBIN g/dL 11.5*   HEMATOCRIT % 35.3   PLATELETS 10*3/mm3 239     Lab Results   Component Value Date    NEUTROABS 16.82 (H) 01/03/2020     Results from last 7 days   Lab Units 01/03/20  1236   SODIUM mmol/L 141   POTASSIUM mmol/L 4.3   CHLORIDE mmol/L 103   CO2 mmol/L 26.5   BUN mg/dL 16   CREATININE mg/dL 0.63   CALCIUM mg/dL 9.1   BILIRUBIN mg/dL <0.2*   ALK PHOS U/L 153*   ALT (SGPT) U/L 9   AST (SGOT) U/L 14   GLUCOSE mg/dL 131*       Assessment/Plan    1. T2N1 invasive ductal carcinoma of the left breast, recently diagnosed.    -Noticed mass in  the left breast x5 months  -Diagnostic mammogram showed 3.7 mm mass in the left breast at 4 o'clock position  -Ultrasound-showed 3.8 x 3.1 x 2.5 cm mass at 4 o'clock position with 2 abnormal axillary lymph nodes, larger lymph node being 1.4 cm  -Left breast biopsy and axillary lymph node biopsy October 29, 2019, invasive ductal carcinoma, moderately differentiated, grade 2, ER 85%, DC 10%, HER-2/merna 2+, HER-2 FISH negative  · CT scans from 11/13/19 show some asymmetric nodules along the lateral margin of the glandular tissue.  The 1.2x1.0 cm left axillary nodes, left supraclavicular, and left posterior cervical triangle nodes are suspicious for tamar metastases.  We reviewed with patient the bone scan from 11/15/19 which was negative. We reviewed with patient the MRI breast from 11/18/19 which shows the biopsy-proven malignancy in the left breast centered at the 4:00 position.  The region of involvement measures up to 7.5 cm.  There is left axillary adenopathy with multiple irregular lymph nodes and loss of differentiation of the border of multiple lymph nodes which are suspicious for extranodal involvement.  We reviewed with patient the echocardiogram from 11/14/19 which was normal with an ejection fraction of 67%.  We reviewed with patient the INVITAE from 11/14/19 which was negative.  · Given the size of her tumor and her medical history, patient will be given 4 cycles of Adriamycin/Cytoxan with Neulasta.  After completion of this treatment, patient will be started on 12 cycles of Taxol.  After the tumor shrinks in size, Dr. Forman will perform a lumpectomy.  Following lumpectomy, patient will begin endocrine therapy.  I have spoken with Dr. Forman who agrees with chemotherapy first due to the large size of the tumor.  Dr. Dawn agrees chemotherapy will not aggravate her HIV condition.  I also spoke with Dr. Mohan at [place] who also agrees chemotherapy is the first step.   · 12/06/19: Cycle #1 of  Adriamycin/Cytoxan      2.  Profound neutropenia due to chemotherapy despite Neulasta:   · On 12/13/2019 at 1 week after cycle #1 chemotherapy, white blood cell count 850, ANC 50 together with a low monocytes 150 .  We started the patient on Levaquin 500 mg daily for 7 days.  No neutropenia fever.    · On 12/20/2019, much improved and elevated ANC 10,800.  We will proceed ahead with cycle #2 of chemotherapy.  Neulasta will be continued for each chemotherapy.  Due to expected neutropenia, will start the patient on Levaquin 500 mg daily for 7 days, starting on day #4 of each cycle AC chemotherapy.    3.  moderate thrombocytopenia secondary to chemotherapy.    · This happened after cycle 1 Chemotherapy with platelets 92,000 on 12/13/2019.  Patient was asymptomatic.  · Normalized platelets 168,000 on 12/20/2019.  Expecting thrombocytopenia will recur.  Continue to monitor.    4.  Chemotherapy induced anemia.   · Hemoglobin 11.5 on 12/20/2019.  Likely anemia were getting worse with ongoing chemotherapy.  Continue to monitor.   ·  1/3/2020 hemoglobin remains 11.5.  Continue to monitor.    5.  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  · On 12/13/2019 patient here for toxicity check and struggled with significant insomnia from steroids following her first cycle.  We discussed taking the dexamethasone, only one 4 mg tablet daily for 3 days with her next cycle of chemotherapy to see if this improves her symptoms.    · On 12/20/2019, I questioned the patient that her nausea and vomiting may be significant due to decreased dose of dexamethasone.  She could use Compazine and Zofran as needed more frequently.    6. HIV, followed by Dr. Dawn in infectious disease.  Well-controlled and is on a single medication with very low viral load. Has HIV since age 34.     7.  Family history of breast cancer with mother having had breast cancer at age 60.  There is family history of uncle with prostate cancer.  Patient will require genetic  referral    8.  Arthritis with severe back pain followed by Dr. Bam Crandall    9. Episodes of nosebleeds.  She has had these once a week for 3-4 years.        PLAN:  1. Proceed with cycle 3 dose dense Adriamycin Cytoxan with Neulasta support.  2. Continue with prophylactic Levaquin 500 mg daily starting on day 4 and continuing for 1 week.  3. Continue dexamethasone 4 mg daily in the morning on days 2, 3, and 4.  4. Return in 2 weeks for follow-up visit with Dr. Cantor for evaluation prior to her fourth cycle of dose dense Adriamycin/Cytoxan.  5. Continue antiemetics as needed.

## 2020-01-06 ENCOUNTER — TELEPHONE (OUTPATIENT)
Dept: ONCOLOGY | Facility: CLINIC | Age: 60
End: 2020-01-06

## 2020-01-06 NOTE — TELEPHONE ENCOUNTER
Pt called stating she is having lots of back, hip, joint pain. Taking tylenol, hydrocodone and baclofen with not much relief. I informed pt it is most likely from neulasta. Pt confirms taking claritin. Asked her to continue treating with OTC meds and she can add heating pads or icy hot patches to specific areas to help. Pt v/u

## 2020-01-08 LAB — REF LAB TEST RESULTS: NORMAL

## 2020-01-14 DIAGNOSIS — C50.812 MALIGNANT NEOPLASM OF OVERLAPPING SITES OF LEFT BREAST IN FEMALE, ESTROGEN RECEPTOR POSITIVE (HCC): ICD-10-CM

## 2020-01-14 DIAGNOSIS — Z17.0 MALIGNANT NEOPLASM OF OVERLAPPING SITES OF LEFT BREAST IN FEMALE, ESTROGEN RECEPTOR POSITIVE (HCC): ICD-10-CM

## 2020-01-16 NOTE — PROGRESS NOTES
Subjective     REASON FOR FOLLOW UP:    1.  T2N1 invasive ductal carcinoma of the left breast.  Ultrasound-showed 3.8 x 3.1 x 2.5 cm mass at 4 o'clock position with 2 abnormal axillary lymph nodes, larger lymph node being 1.4 cm.  Left breast biopsy and axillary lymph node biopsy October 29, 2019, invasive ductal carcinoma, moderately differentiated, grade 2, ER 85%, WA 10%, HER-2/merna 2+, HER-2 FISH negative.  · 12/06/19: Cycle #1 of dose-dense Adriamycin/Cytoxan with Neulasta for marrow support    · 01/17/20: Last cycle of Adriamycin/Cytoxan given  2.  Severe neutropenia secondary to chemotherapy, with ANC 50 at 1-week after cycle #1 chemotherapy.  Also had moderate thrombocytopenia platelets 92,000.  Patient was given Levaquin for prophylaxis of neutropenia fever.               HISTORY OF PRESENT ILLNESS:  The patient is a 59 y.o. year old female with the above mentioned history presenting today for MD evaluation, prior to her last cycle #4 dose dense AC regimen.      Patient reports she had a lot of side effects after chemotherapy cycle 3 with Neulasta.  She had pain on Sunday, Monday, and Tuesday, and she says the pain was so severe that she thought she couldn't continue with the treatment.  Her  states she was pacing the floor because the pain was so severe, which was likely due to Neulasta.  She called the Oncologist on-call and took pain medications as prescribed.  She had also gone to the pain management clinic.  I discussed switching from Neulasta to Neupogen due to the severity of her pain and explained the procedure to her.  She had taken Percocet  for her pain which had helped her.  If her pain worsens, I will give her morphine.    She continues to follow up with Dr. Dawn for HIV management.  She is taking her HIV medications and did not stop taking them during her side effects.      After visiting the pain management clinic, she started a postnasal drip.  She denies any fever or chills.   She says her feet remain very cold.  She denies wanting to chew on ice.  She denies any current neuropathy.  I have ordered iron profile studies on her labs today.    Patient's labs from 01/17/2020 show WBC 32.47, hemoglobin 10.4, .2, and absolute neutrophil 21.65.  We will discontinue Neupogen.      PAST MEDICAL HISTORY:  She had a stroke in July 2017 with headache and dizziness.  She went to the ER and was placed on aspirin.  However, she stopped taking it two weeks ago.  She has been taking Aimovig (injection) monthly with Fariba López at [place].    In 1994, patient was diagnosed with HIV with an unknown cause which is managed by Dr. Natali Subramanian.  As of now, her viral load is good.    Patient has arthritis.  She gets trigger-point injections in her back with her last one being 2 weeks ago.  She has had multiple ablations.  She also has pain in her SI joint and Dr. Bermudez performed a surgery on this.  She takes Narco for the pain.      She is osteoporotic.  She has had multiple hairline fractures in both her legs.  She had her knees cleaned out by Dr. Sinha.     Patient has had a hysterectomy and still has both of her ovaries.    Patient has vertigo and the muscles in her left ear are weak.  She just has an imbalance.        ONCOLOGIC HISTORY:  Patient is a 59-year-old female who has had a history of HIV since age 34 followed by Dr. Natali Ng at [facility] and was on multiple HIV drugs initially but more recently has been on a single medication TRIUMEQ, with well-controlled HIV.  She follows up with Dr. Dawn , infectious disease who saw her recently and he saw her on 4/8/2019 and has excellent control and suppression of her viral load.  She is very compliant with her medications.    She also has had history of stroke in 2017 for which she was placed on aspirin.  She presented with a headache.  She is very active and works at [employer] full-time.  She also has had history of  vertigo in the past  Patient's PCP is Zach Lora.    Patient first noticed the mass in her left breast 5 months ago.  Her last mammogram was 5 years ago.  She had soreness in the left breast and she went to her primary care physician who then ordered a mammogram diagnostic and an ultrasound.  The diagnostic mammogram showed a 3.7 cm mass at 4 o'clock position in the lower outer quadrant of the left breast.  The ultrasound showed 3.8 cm x 3.1 x 2.5 cm mass at 4 o'clock position in the left breast with 2 abnormal appearing left axillary lymph nodes the largest being 1.4 cm.    She then underwent biopsy of both the breast mass as well as the left axillary lymph node and both of them are positive for invasive mammary carcinoma it is invasive ductal carcinoma with apocrine features, moderately differentiated, grade 2 of 3 with a Jen score of 5.  The left axillary lymph node is also consistent with metastatic mammary carcinoma.  It is ER positive NH positive HER-2/merna negative.  Details as follows    10/21/19 - Bilateral Diagnostic Mammogram and US Breast Left  FINDINGS: Bilateral digital CC and MLO mammographic images were  obtained. No prior examination is available for comparison. Scattered  fibroglandular densities are seen throughout both breasts. A triangular  skin marker represents the area of palpable concern in the lower outer  quadrant of the left breast in the posterior 1/3. At this location there  is an irregular mass that measures on the order of 3.7 cm in greatest  dimension. Internal calcifications are noted. No suspicious findings in  the right breast are appreciated.     ULTRASOUND: Targeted sonographic evaluation of the left breast was  performed through the area of concern in the left axilla. At the 4  o'clock position on the order of 6 cm from the nipple there is an  irregular hypoechoic mass that measures 3.8 x 3.1 x 2.5 cm. Internal  vascularity is noted.     There are 2 abnormal-appearing  left axillary lymph nodes, the largest  which measures on the order of 1.4 cm in greatest dimension.     IMPRESSION:  1. There is a 3.8 cm irregular mass in the left breast at the 4 o'clock  position. This is seen in conjunction with an irregular 1.4 cm lymph  node in the left axilla. This is suspicious for malignancy with left  axillary involvement. Correlation with ultrasound-guided biopsy of both  the left breast mass and the lymph node is recommended.  2. There are no findings suspicious for malignancy in the right breast.     BI-RADS CATEGORY 5:     Patient's labs from 11/12/19 are normal.    10/29/19 - Tissue Pathology  1. Left Breast, 4:00, 6 cm FN, U/S-Guided Core Needle Biopsy for a Mass:  A. INVASIVE DUCTAL CARCINOMA WITH APOCRINE FEATURES, Moderately differentiated;  [place] Histologic Grade II/III (tubule score = 3, nuclear score = 2, mitoses score = 1), measuring at least  9 mm.  B. No ductal or lobular carcinoma in situ is identified in this sample.  C. Focus suspicious for lymphovascular space invasion.  D. See Biomarker Template for hormone receptor studies.  2. Lymph Node, Left Axilla, U/S-Guided Core Needle Biopsy:  A. METASTATIC MAMMARY CARCINOMA (see Comment).  B. Metastatic focus measures 5 mm in greatest extent.  ER+, 85%  TX+, 10%  HER2- Score 2+    11/13/19 - CT Neck Chest Abdomen Pelvis  IMPRESSION:  1. In addition to the irregular approximately 3.8 cm mass within the  lateral aspect of the left breast, there are a few subcentimeter  asymmetric nodules along the lateral margin of the glandular tissue. The  several asymmetric left subpectoral nodes and 1.2 x 1.0 cm left axillary  node are suspicious for tamar metastases. The asymmetric subcentimeter  left supraclavicular and left posterior cervical triangle nodes are  worrisome as well.  2. There is no convincing evidence for metastatic disease within the  abdomen or pelvis.    11/14/19 - Echocardiogram:  Normal.  Calculated EF = 67%.   There is trace mitral valve and tricuspid valve regurgitation.    11/15/19 - Bone Scan  Negative.    11/18/19 - MRI Breast Bilateral  IMPRESSION:  1. Biopsy-proven malignancy in the left breast centered at the 4 o'clock  position with associated surrounding satellite nodules as described. The  entire region of involvement including the satellite nodules and the  dominant mass measure up to 7.5 cm in greatest dimension. Also, left  axillary adenopathy with multiple irregular lymph nodes and loss of  differentiation of the borders of multiple lymph nodes is noted and this  is suspicious for extranodal involvement.  2. There are no findings suspicious for malignancy in the right breast.  BI-RADS CATEGORY 6      12/06/19: Cycle #1 of Adriamycin/Cytoxan    01/17/20: Last cycle of Adriamycin/Cytoxan given without Neulasta.  We will switch from Neulasta to 7 days of Neupogen injections after cycle 4.      Past Medical History:   Diagnosis Date   • Anxiety    • Cerebrovascular accident (CVA) (CMS/HCC) 8/22/2017   • DDD (degenerative disc disease), cervical    • Depression    • GERD (gastroesophageal reflux disease)    • H/O ICH (intracerebral hemorrhage) (CMS/[facility])    • History of stroke    • History of thrombocytopenia    • HIV (human immunodeficiency virus infection) (CMS/[facility]) 1996   • Hyperlipidemia    • Insomnia    • Lupus anticoagulant positive    • Malignant neoplasm of overlapping sites of left breast in female, estrogen receptor positive (CMS/HCC) 11/12/2019   • Meniscus tear 2017    RIGHT   • Migraine    • Neck pain     WITH SHOOTING PAIN LEFT RIGHT ARM   • Osteoarthritis    • Osteoporosis    • Seasonal allergies    • Spinal headache    • Tick bite 06/2014        Past Surgical History:   Procedure Laterality Date   • ADENOIDECTOMY     • CHOLECYSTECTOMY     • HYSTERECTOMY     • KNEE ARTHROSCOPY Right 3/24/2017    Procedure:  RIGHT  KNEE ARTHROSCOPY WITH DEBRIDEMENT CHONDROPLASTY PARTIAL MENISCECTOMY;  Surgeon:  Karl Galeas MD;  Location: [facility] OR [facility];  Service:    • KNEE CARTILAGE SURGERY Right    • TONSILLECTOMY     • US GUIDED LYMPH NODE BIOPSY  10/29/2019   • VENOUS ACCESS DEVICE (PORT) INSERTION Right 2019    Procedure: INSERTION VENOUS ACCESS DEVICE RIGHT;  Surgeon: Landen Forman MD;  Location: [facility] MAIN OR;  Service: General      OB-GYN:  Menarche 15 years  Menopause-46  Pregnancies- 1 para 1 no miscarriages her first pregnancy was in  at 29 years of age.  She did not breastfeed.  Post menopausal HRT- None.  Hx of birth control pills- Yes.    Current Outpatient Medications on File Prior to Visit   Medication Sig Dispense Refill   • AIMOVIG 140 MG/ML solution auto-injector INJECT 140 MG INTO THE SKIN EVERY 30 (THIRTY) DAYS.  11   • atorvastatin (LIPITOR) 20 MG tablet TAKE 1 TABLET BY MOUTH EVERY DAY AT NIGHT 90 tablet 3   • baclofen (LIORESAL) 10 MG tablet Take 10 mg by mouth 2 (Two) Times a Day.  2   • CAMBIA 50 MG pack TAKE 1 PACKET AS NEEDED FOR HEADACHES  1   • clonazePAM (KlonoPIN) 0.5 MG tablet TAKE 1 TABLET BY MOUTH 2 (TWO) TIMES A DAY AS NEEDED FOR ANXIETY. 60 tablet 1   • COCONUT OIL PO Take  by mouth.     • colesevelam (WELCHOL) 625 MG tablet TAKE 2 TABLETS BY MOUTH EVERY  tablet 3   • dexamethasone (DECADRON) 4 MG tablet Take 2 tablets in the morning daily on Days 2, 3 & 4.  Take with food. 6 tablet 3   • escitalopram (LEXAPRO) 10 MG tablet Take 1 tablet by mouth Daily. 90 tablet 3   • fluticasone (FLONASE) 50 MCG/ACT nasal spray 2 sprays into the nostril(s) as directed by provider Daily. 3 bottle 3   • ibandronate (BONIVA) 150 MG tablet TAKE 1 TABLET BY MOUTH EVERY 30 (THIRTY) DAYS. 3 tablet 3   • levoFLOXacin (LEVAQUIN) 250 MG tablet Take 2 tablets by mouth Daily. Starting from day#4 of chemo cycle. 7 tablet 2   • Menaquinone-7 (VITAMIN K2 PO) Take  by mouth.     • NON FORMULARY Collagen peptides powder     • ondansetron (ZOFRAN) 8 MG tablet Take 1 tablet  by mouth 3 (Three) Times a Day As Needed for Nausea or Vomiting. 30 tablet 5   • ondansetron ODT (ZOFRAN ODT) 4 MG disintegrating tablet Take 1 tablet by mouth Every 8 (Eight) Hours As Needed for Nausea or Vomiting. 30 tablet 0   • oxyCODONE-acetaminophen (PERCOCET)  MG per tablet TK 1 T PO QID     • prochlorperazine (COMPAZINE) 10 MG tablet Take 1 tablet by mouth Every 6 (Six) Hours As Needed for Nausea or Vomiting. 20 tablet 2   • TRIUMEQ 600- MG per tablet TAKE 1 TABLET BY MOUTH EVERY DAY 90 tablet 3   • zolpidem (AMBIEN) 10 MG tablet Take 1 tablet by mouth At Night As Needed for Sleep. 90 tablet 0   • clonazePAM (KlonoPIN) 0.5 MG tablet TAKE 1 TABLET BY MOUTH 2 (TWO) TIMES A DAY AS NEEDED FOR ANXIETY. 60 tablet 0   • HYDROcodone-acetaminophen (NORCO)  MG per tablet take 1 tablet by mouth three times a day  0     No current facility-administered medications on file prior to visit.         ALLERGIES:    Allergies   Allergen Reactions   • Augmentin [Amoxicillin-Pot Clavulanate] Hives        Social History     Socioeconomic History   • Marital status:      Spouse name: Owen   • Number of children: 1   • Years of education: College   • Highest education level: Not on file   Occupational History   • Occupation:      Employer:  [employer]   Tobacco Use   • Smoking status: Never Smoker   • Smokeless tobacco: Never Used   Substance and Sexual Activity   • Alcohol use: Yes     Comment: occasioonal   • Drug use: No   • Sexual activity: Defer     Birth control/protection: None     Patient does not smoke or do drugs.  She does drink occasionally.    Family History   Problem Relation Age of Onset   • Breast cancer Mother    • Leukemia Mother         CLL?   • Diabetes Mother    • Hyperthyroidism Mother    • Hearing loss Mother    • Dementia Father    • Heart disease Maternal Grandmother    • Diabetes Maternal Grandfather    • Heart disease Maternal Grandfather    • Stroke Maternal  Grandfather    • Heart attack Maternal Grandfather    • Osteoporosis Paternal Grandmother    • Heart disease Paternal Grandfather    • Leukemia Maternal Aunt         CLL?   • Throat cancer Paternal Uncle     FAMILY HISTORY:  Her mother had breast cancer at age 60, still alive at 85 and in good health..  Her father had dementia.  Her paternal uncle had prostate cancer.  Her brother had esophageal cancer.        Review of Systems   Constitutional: Positive for fatigue (01/17/20-Unchanged). Negative for activity change, appetite change, chills, diaphoresis, fever and unexpected weight change.   HENT: Negative for hearing loss, mouth sores, nosebleeds, sore throat and trouble swallowing.    Respiratory: Negative for cough, chest tightness, shortness of breath and wheezing.    Cardiovascular: Negative for chest pain, palpitations and leg swelling.   Gastrointestinal: Negative for abdominal distention, abdominal pain, constipation, diarrhea, nausea and vomiting.   Genitourinary: Negative for difficulty urinating, dysuria, frequency, hematuria and urgency.   Musculoskeletal: Negative for joint swelling.        No muscle weakness.   Skin: Negative for rash and wound.   Neurological: Positive for headaches (01/17/20-Unchanged). Negative for dizziness, seizures, syncope, speech difficulty, weakness and numbness.   Hematological: Negative for adenopathy. Does not bruise/bleed easily.   Psychiatric/Behavioral: Positive for sleep disturbance (from dexamethasone). Negative for behavioral problems, confusion and suicidal ideas.       Objective     /787, O2 saturation 99%, pulse 63, and temperature 98.4 °F  , respiratory rate 16.  Weight 139 pounds.   ECOG PS 0     PHYSICAL EXAM  GENERAL:  Well-developed, well-nourished in no acute distress.   SKIN:  Warm, dry without rashes, purpura or petechiae.  NECK:  Supple with good range of motion; no thyromegaly or masses, no JVD.  LYMPHATICS:  No cervical, supraclavicular, axillary  or inguinal adenopathy.  CHEST:  Lungs clear to auscultation. Good airflow.  BREAST: Right breast: No skin changes, no evidence of breast mass, no nipple discharge, no evidence of any right axillary adenopathy or right supraclavicular adenopathy  Left breast: No evidence of any skin changes and no evidence of left nipple discharge as well as no left axillary adenopathy or left supraclavicular adenopathy.  Her tumor measures 3.5 cm by 3.5 cm.    CARDIAC:  Regular rate and rhythm without murmurs, rubs or gallops. Normal S1,S2.  ABDOMEN:  Soft, nontender with no hepatosplenomegaly or masses.  EXTREMITIES:  No clubbing, cyanosis or edema.  NEUROLOGICAL:  Cranial Nerves II-XII grossly intact.  No focal neurological deficits.  PSYCHIATRIC:  Normal affect and mood.        RECENT LABS:     Results from last 7 days   Lab Units 01/17/20  0747   WBC 10*3/mm3 32.47*   HEMOGLOBIN g/dL 10.4*   HEMATOCRIT % 31.9*   PLATELETS 10*3/mm3 213     Lab Results   Component Value Date    NEUTROABS 21.65 (H) 01/17/2020     Results from last 7 days   Lab Units 01/17/20  0756   SODIUM mmol/L 142   POTASSIUM mmol/L 4.4   CHLORIDE mmol/L 104   CO2 mmol/L 27.0   BUN mg/dL 8   CREATININE mg/dL 0.58*   CALCIUM mg/dL 8.9   BILIRUBIN mg/dL <0.2*   ALK PHOS U/L 175*   ALT (SGPT) U/L 9   AST (SGOT) U/L 17   GLUCOSE mg/dL 94       Assessment/Plan    1. T2N1 invasive ductal carcinoma of the left breast, recently diagnosed.    -Noticed mass in the left breast x5 months  -Diagnostic mammogram showed 3.7 mm mass in the left breast at 4 o'clock position  -Ultrasound-showed 3.8 x 3.1 x 2.5 cm mass at 4 o'clock position with 2 abnormal axillary lymph nodes, larger lymph node being 1.4 cm  -Left breast biopsy and axillary lymph node biopsy October 29, 2019, invasive ductal carcinoma, moderately differentiated, grade 2, ER 85%, VA 10%, HER-2/merna 2+, HER-2 FISH negative  · CT scans from 11/13/19 show some asymmetric nodules along the lateral margin of the  glandular tissue.  The 1.2x1.0 cm left axillary nodes, left supraclavicular, and left posterior cervical triangle nodes are suspicious for tamar metastases.  ·   bone scan from 11/15/19 which was negative.   ·  MRI breast from 11/18/19 which shows the biopsy-proven malignancy in the left breast centered at the 4:00 position.  The region of involvement measures up to 7.5 cm.  There is left axillary adenopathy with multiple irregular lymph nodes and loss of differentiation of the border of multiple lymph nodes which are suspicious for extranodal involvement.  ·    echocardiogram from 11/14/19 which was normal with an ejection fraction of 67%.    · INVITAE from 11/14/19 which was negative.  · Given the size of her tumor and her medical history, patient will be given 4 cycles of Adriamycin/Cytoxan with Neulasta.  After completion of this treatment, patient will be started on 12 cycles of Taxol.  After the tumor shrinks in size, Dr. Forman will perform a lumpectomy.    · Following lumpectomy, patient will begin endocrine therapy.  I have spoken with Dr. Forman who agrees with chemotherapy first due to the large size of the tumor.   ·  Dr. Dawn agrees chemotherapy will not aggravate her HIV condition.  I also spoke with Dr. Mohan at [facility] who also agrees chemotherapy is the first step.   · 12/06/19: Cycle #1 of Adriamycin/Cytoxan  · 01/17/20: Proceed with final A/C cycle 4 today without Neulasta.  Patient to come 7 days for Neupogen injections.  If she has more pain after the injections, she can be given morphine.    2.  Profound neutropenia due to chemotherapy despite Neulasta:   · On 12/13/2019 at 1 week after cycle #1 chemotherapy, white blood cell count 850, ANC 50 together with a low monocytes 150 .  We started the patient on Levaquin 500 mg daily for 7 days.  No neutropenia fever.    · On 12/20/2019, much improved and elevated ANC 10,800.  We will proceed ahead with cycle #2 of chemotherapy.   Neulasta will be continued for each chemotherapy.  Due to expected neutropenia, will start the patient on Levaquin 500 mg daily for 7 days, starting on day #4 of each cycle AC chemotherapy.    3.  moderate thrombocytopenia secondary to chemotherapy.    · This happened after cycle 1 Chemotherapy with platelets 92,000 on 12/13/2019.  Patient was asymptomatic.  · Normalized platelets 168,000 on 12/20/2019.  Expecting thrombocytopenia will recur.  Continue to monitor.    4.  Chemotherapy induced anemia.   · Hemoglobin 11.5 on 12/20/2019.  Likely anemia were getting worse with ongoing chemotherapy.  Continue to monitor.     5.  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  · On 12/13/2019 patient here for toxicity check and struggled with significant insomnia from steroids following her first cycle.  We discussed taking the dexamethasone, only one 4 mg tablet daily for 3 days with her next cycle of chemotherapy to see if this improves her symptoms.    · On 12/20/2019, I questioned the patient that her nausea and vomiting may be significant due to decreased dose of dexamethasone.  She could use Compazine and Zofran as needed more frequently.  · Patient is taking Ambien for insomnia and that is still not helping that much.  · Will decrease dose of dexamethasone to 2 mg days 2 3 and 4 after chemotherapy in the morning    6. HIV, followed by Dr. Dawn in infectious disease.  Well-controlled and is on a single medication with very low viral load. Has HIV since age 34.     7.  Family history of breast cancer with mother having had breast cancer at age 60.  There is family history of uncle with prostate cancer.  Patient will require genetic referral    8.  Arthritis with severe back pain followed by Dr. Bam Crandall  · And received Percocet    9. Episodes of nosebleeds.  She has had these once a week for 3-4 years.    10. Severe body ache post A/C cycle 3.  She took Percocet and Hydrocodone for pain management which did help.  I have  discontinued Neulasta.  Patient to come 7 days after final cycle of A/C for Neupogen injections.    PLAN:  1. Proceed with final A/C cycle 4 today without Neulasta.  2. Patient to come 7 days for Neupogen injections.  If she has more pain after the injections, she can be given morphine.  3. Order a left breast US in 10 days to assess response.  On exam it appears to be stable or slightly decreased.  4. Return in 1 week with NP for labs  5. Return in 2 weeks with Tiffanie for labs and first treatment of Taxol;  6.  Breast US to be done on 01/29/20  7. Decrease dexamethasone to 2 mg, day 2 3 and 4 after cycle 4 AC chemo therapy    I, Yuli Lorenzo, acted as scribe for Ruby Moreno MD  in documenting the service or procedure. To the best of my knowledge, I recorded what was dictated by MD Ruby Garcia MD  01/16/20      Cc:  ОЛЬГА Mccoy MD Nathan Bullington, MD Gary Reasor, MD

## 2020-01-17 ENCOUNTER — OFFICE VISIT (OUTPATIENT)
Dept: ONCOLOGY | Facility: CLINIC | Age: 60
End: 2020-01-17

## 2020-01-17 ENCOUNTER — INFUSION (OUTPATIENT)
Dept: ONCOLOGY | Facility: HOSPITAL | Age: 60
End: 2020-01-17

## 2020-01-17 VITALS
HEIGHT: 66 IN | TEMPERATURE: 98.1 F | RESPIRATION RATE: 16 BRPM | WEIGHT: 142.7 LBS | SYSTOLIC BLOOD PRESSURE: 117 MMHG | DIASTOLIC BLOOD PRESSURE: 70 MMHG | BODY MASS INDEX: 22.93 KG/M2 | HEART RATE: 80 BPM | OXYGEN SATURATION: 98 %

## 2020-01-17 DIAGNOSIS — Z80.3 FAMILY HISTORY OF BREAST CANCER: ICD-10-CM

## 2020-01-17 DIAGNOSIS — R52 BODY ACHES: ICD-10-CM

## 2020-01-17 DIAGNOSIS — Z17.0 MALIGNANT NEOPLASM OF OVERLAPPING SITES OF LEFT BREAST IN FEMALE, ESTROGEN RECEPTOR POSITIVE (HCC): Primary | ICD-10-CM

## 2020-01-17 DIAGNOSIS — R04.0 EPISTAXIS: ICD-10-CM

## 2020-01-17 DIAGNOSIS — Z45.2 ENCOUNTER FOR FITTING AND ADJUSTMENT OF VASCULAR CATHETER: ICD-10-CM

## 2020-01-17 DIAGNOSIS — C50.812 MALIGNANT NEOPLASM OF OVERLAPPING SITES OF LEFT BREAST IN FEMALE, ESTROGEN RECEPTOR POSITIVE (HCC): ICD-10-CM

## 2020-01-17 DIAGNOSIS — Z21 HIV POSITIVE LONG-TERM NON-PROGRESSOR (HCC): ICD-10-CM

## 2020-01-17 DIAGNOSIS — Z17.0 MALIGNANT NEOPLASM OF OVERLAPPING SITES OF LEFT BREAST IN FEMALE, ESTROGEN RECEPTOR POSITIVE (HCC): ICD-10-CM

## 2020-01-17 DIAGNOSIS — C50.812 MALIGNANT NEOPLASM OF OVERLAPPING SITES OF LEFT BREAST IN FEMALE, ESTROGEN RECEPTOR POSITIVE (HCC): Primary | ICD-10-CM

## 2020-01-17 DIAGNOSIS — M25.50 ARTHRALGIA OF MULTIPLE JOINTS: ICD-10-CM

## 2020-01-17 LAB
ALBUMIN SERPL-MCNC: 4.2 G/DL (ref 3.5–5.2)
ALBUMIN/GLOB SERPL: 1.8 G/DL (ref 1.1–2.4)
ALP SERPL-CCNC: 175 U/L (ref 38–116)
ALT SERPL W P-5'-P-CCNC: 9 U/L (ref 0–33)
ANION GAP SERPL CALCULATED.3IONS-SCNC: 11 MMOL/L (ref 5–15)
AST SERPL-CCNC: 17 U/L (ref 0–32)
BASOPHILS # BLD AUTO: 0.03 10*3/MM3 (ref 0–0.2)
BASOPHILS NFR BLD AUTO: 0.1 % (ref 0–1.5)
BILIRUB SERPL-MCNC: <0.2 MG/DL (ref 0.2–1.2)
BUN BLD-MCNC: 8 MG/DL (ref 6–20)
BUN/CREAT SERPL: 13.8 (ref 7.3–30)
CALCIUM SPEC-SCNC: 8.9 MG/DL (ref 8.5–10.2)
CHLORIDE SERPL-SCNC: 104 MMOL/L (ref 98–107)
CO2 SERPL-SCNC: 27 MMOL/L (ref 22–29)
CREAT BLD-MCNC: 0.58 MG/DL (ref 0.6–1.1)
DEPRECATED RDW RBC AUTO: 51.2 FL (ref 37–54)
EOSINOPHIL # BLD AUTO: 0 10*3/MM3 (ref 0–0.4)
EOSINOPHIL NFR BLD AUTO: 0 % (ref 0.3–6.2)
ERYTHROCYTE [DISTWIDTH] IN BLOOD BY AUTOMATED COUNT: 14.1 % (ref 12.3–15.4)
FERRITIN SERPL-MCNC: 840.1 NG/ML (ref 11–207)
GFR SERPL CREATININE-BSD FRML MDRD: 106 ML/MIN/1.73
GLOBULIN UR ELPH-MCNC: 2.4 GM/DL (ref 1.8–3.5)
GLUCOSE BLD-MCNC: 94 MG/DL (ref 74–124)
HCT VFR BLD AUTO: 31.9 % (ref 34–46.6)
HGB BLD-MCNC: 10.4 G/DL (ref 12–15.9)
IMM GRANULOCYTES # BLD AUTO: 7.44 10*3/MM3 (ref 0–0.05)
IMM GRANULOCYTES NFR BLD AUTO: 22.9 % (ref 0–0.5)
IRON 24H UR-MRATE: 100 MCG/DL (ref 37–145)
IRON SATN MFR SERPL: 30 % (ref 14–48)
LYMPHOCYTES # BLD AUTO: 1.11 10*3/MM3 (ref 0.7–3.1)
LYMPHOCYTES NFR BLD AUTO: 3.4 % (ref 19.6–45.3)
MCH RBC QN AUTO: 33.7 PG (ref 26.6–33)
MCHC RBC AUTO-ENTMCNC: 32.6 G/DL (ref 31.5–35.7)
MCV RBC AUTO: 103.2 FL (ref 79–97)
MONOCYTES # BLD AUTO: 2.24 10*3/MM3 (ref 0.1–0.9)
MONOCYTES NFR BLD AUTO: 6.9 % (ref 5–12)
NEUTROPHILS # BLD AUTO: 21.65 10*3/MM3 (ref 1.7–7)
NEUTROPHILS NFR BLD AUTO: 66.7 % (ref 42.7–76)
NRBC BLD AUTO-RTO: 0.4 /100 WBC (ref 0–0.2)
PLATELET # BLD AUTO: 213 10*3/MM3 (ref 140–450)
PMV BLD AUTO: 9.3 FL (ref 6–12)
POTASSIUM BLD-SCNC: 4.4 MMOL/L (ref 3.5–4.7)
PROT SERPL-MCNC: 6.6 G/DL (ref 6.3–8)
RBC # BLD AUTO: 3.09 10*6/MM3 (ref 3.77–5.28)
SODIUM BLD-SCNC: 142 MMOL/L (ref 134–145)
TIBC SERPL-MCNC: 329 MCG/DL (ref 249–505)
TRANSFERRIN SERPL-MCNC: 235 MG/DL (ref 200–360)
WBC NRBC COR # BLD: 32.47 10*3/MM3 (ref 3.4–10.8)

## 2020-01-17 PROCEDURE — 25010000002 DOXORUBICIN PER 10 MG: Performed by: INTERNAL MEDICINE

## 2020-01-17 PROCEDURE — 83540 ASSAY OF IRON: CPT | Performed by: INTERNAL MEDICINE

## 2020-01-17 PROCEDURE — 25010000002 PALONOSETRON PER 25 MCG: Performed by: INTERNAL MEDICINE

## 2020-01-17 PROCEDURE — 25010000002 FOSAPREPITANT PER 1 MG: Performed by: INTERNAL MEDICINE

## 2020-01-17 PROCEDURE — 80053 COMPREHEN METABOLIC PANEL: CPT

## 2020-01-17 PROCEDURE — 96411 CHEMO IV PUSH ADDL DRUG: CPT

## 2020-01-17 PROCEDURE — 36415 COLL VENOUS BLD VENIPUNCTURE: CPT | Performed by: INTERNAL MEDICINE

## 2020-01-17 PROCEDURE — 84466 ASSAY OF TRANSFERRIN: CPT | Performed by: INTERNAL MEDICINE

## 2020-01-17 PROCEDURE — 25010000002 CYCLOPHOSPHAMIDE PER 100 MG: Performed by: INTERNAL MEDICINE

## 2020-01-17 PROCEDURE — 99215 OFFICE O/P EST HI 40 MIN: CPT | Performed by: INTERNAL MEDICINE

## 2020-01-17 PROCEDURE — 85025 COMPLETE CBC W/AUTO DIFF WBC: CPT

## 2020-01-17 PROCEDURE — 25010000002 DEXAMETHASONE SODIUM PHOSPHATE 100 MG/10ML SOLUTION: Performed by: INTERNAL MEDICINE

## 2020-01-17 PROCEDURE — 96367 TX/PROPH/DG ADDL SEQ IV INF: CPT

## 2020-01-17 PROCEDURE — 96413 CHEMO IV INFUSION 1 HR: CPT

## 2020-01-17 PROCEDURE — 96375 TX/PRO/DX INJ NEW DRUG ADDON: CPT

## 2020-01-17 PROCEDURE — 82728 ASSAY OF FERRITIN: CPT | Performed by: INTERNAL MEDICINE

## 2020-01-17 RX ORDER — DOXORUBICIN HYDROCHLORIDE 2 MG/ML
60 INJECTION, SOLUTION INTRAVENOUS ONCE
Status: COMPLETED | OUTPATIENT
Start: 2020-01-17 | End: 2020-01-17

## 2020-01-17 RX ORDER — SODIUM CHLORIDE 0.9 % (FLUSH) 0.9 %
10 SYRINGE (ML) INJECTION AS NEEDED
Status: CANCELLED | OUTPATIENT
Start: 2020-01-17

## 2020-01-17 RX ORDER — OXYCODONE AND ACETAMINOPHEN 10; 325 MG/1; MG/1
1 TABLET ORAL EVERY 6 HOURS PRN
COMMUNITY
Start: 2020-01-14 | End: 2020-05-29 | Stop reason: HOSPADM

## 2020-01-17 RX ORDER — DOXORUBICIN HYDROCHLORIDE 2 MG/ML
60 INJECTION, SOLUTION INTRAVENOUS ONCE
Status: CANCELLED | OUTPATIENT
Start: 2020-01-17

## 2020-01-17 RX ORDER — SODIUM CHLORIDE 9 MG/ML
250 INJECTION, SOLUTION INTRAVENOUS ONCE
Status: CANCELLED | OUTPATIENT
Start: 2020-01-17

## 2020-01-17 RX ORDER — PALONOSETRON 0.05 MG/ML
0.25 INJECTION, SOLUTION INTRAVENOUS ONCE
Status: COMPLETED | OUTPATIENT
Start: 2020-01-17 | End: 2020-01-17

## 2020-01-17 RX ORDER — HEPARIN SODIUM (PORCINE) LOCK FLUSH IV SOLN 100 UNIT/ML 100 UNIT/ML
500 SOLUTION INTRAVENOUS AS NEEDED
Status: CANCELLED | OUTPATIENT
Start: 2020-01-17

## 2020-01-17 RX ORDER — SODIUM CHLORIDE 0.9 % (FLUSH) 0.9 %
10 SYRINGE (ML) INJECTION AS NEEDED
Status: DISCONTINUED | OUTPATIENT
Start: 2020-01-17 | End: 2020-01-17 | Stop reason: HOSPADM

## 2020-01-17 RX ORDER — SODIUM CHLORIDE 9 MG/ML
250 INJECTION, SOLUTION INTRAVENOUS ONCE
Status: COMPLETED | OUTPATIENT
Start: 2020-01-17 | End: 2020-01-17

## 2020-01-17 RX ORDER — PALONOSETRON 0.05 MG/ML
0.25 INJECTION, SOLUTION INTRAVENOUS ONCE
Status: CANCELLED | OUTPATIENT
Start: 2020-01-17

## 2020-01-17 RX ADMIN — DOXORUBICIN HYDROCHLORIDE 104 MG: 2 INJECTION, SOLUTION INTRAVENOUS at 10:18

## 2020-01-17 RX ADMIN — Medication 500 UNITS: at 12:50

## 2020-01-17 RX ADMIN — SODIUM CHLORIDE 100 ML: 9 INJECTION, SOLUTION INTRAVENOUS at 09:11

## 2020-01-17 RX ADMIN — SODIUM CHLORIDE, PRESERVATIVE FREE 10 ML: 5 INJECTION INTRAVENOUS at 12:50

## 2020-01-17 RX ADMIN — SODIUM CHLORIDE 250 ML: 9 INJECTION, SOLUTION INTRAVENOUS at 09:10

## 2020-01-17 RX ADMIN — SODIUM CHLORIDE 1040 MG: 900 INJECTION, SOLUTION INTRAVENOUS at 10:32

## 2020-01-17 RX ADMIN — PALONOSETRON 0.25 MG: 0.05 INJECTION, SOLUTION INTRAVENOUS at 09:10

## 2020-01-17 RX ADMIN — DEXAMETHASONE SODIUM PHOSPHATE 12 MG: 10 INJECTION, SOLUTION INTRAMUSCULAR; INTRAVENOUS at 09:45

## 2020-01-17 NOTE — PROGRESS NOTES
Per Dr. Moreno, pt to have neupogen daily x 7 days starting tomorrow.  She had severe body aches with on body neulasta.  Pt is aware to go to [facility] Sat/Sun and then back to our office Mon-Fri.

## 2020-01-18 ENCOUNTER — INFUSION (OUTPATIENT)
Dept: ONCOLOGY | Facility: HOSPITAL | Age: 60
End: 2020-01-18

## 2020-01-18 VITALS
HEART RATE: 71 BPM | TEMPERATURE: 97.2 F | OXYGEN SATURATION: 98 % | DIASTOLIC BLOOD PRESSURE: 71 MMHG | SYSTOLIC BLOOD PRESSURE: 113 MMHG | RESPIRATION RATE: 18 BRPM

## 2020-01-18 DIAGNOSIS — C50.812 MALIGNANT NEOPLASM OF OVERLAPPING SITES OF LEFT BREAST IN FEMALE, ESTROGEN RECEPTOR POSITIVE (HCC): Primary | ICD-10-CM

## 2020-01-18 DIAGNOSIS — Z17.0 MALIGNANT NEOPLASM OF OVERLAPPING SITES OF LEFT BREAST IN FEMALE, ESTROGEN RECEPTOR POSITIVE (HCC): Primary | ICD-10-CM

## 2020-01-18 PROCEDURE — 25010000002 FILGRASTIM-SNDZ 300 MCG/0.5ML SOLUTION PREFILLED SYRINGE: Performed by: INTERNAL MEDICINE

## 2020-01-18 PROCEDURE — 96372 THER/PROPH/DIAG INJ SC/IM: CPT

## 2020-01-18 RX ADMIN — FILGRASTIM-SNDZ 300 MCG: 300 INJECTION, SOLUTION INTRAVENOUS; SUBCUTANEOUS at 14:49

## 2020-01-19 ENCOUNTER — INFUSION (OUTPATIENT)
Dept: ONCOLOGY | Facility: HOSPITAL | Age: 60
End: 2020-01-19

## 2020-01-19 DIAGNOSIS — C50.812 MALIGNANT NEOPLASM OF OVERLAPPING SITES OF LEFT BREAST IN FEMALE, ESTROGEN RECEPTOR POSITIVE (HCC): Primary | ICD-10-CM

## 2020-01-19 DIAGNOSIS — Z17.0 MALIGNANT NEOPLASM OF OVERLAPPING SITES OF LEFT BREAST IN FEMALE, ESTROGEN RECEPTOR POSITIVE (HCC): Primary | ICD-10-CM

## 2020-01-19 PROCEDURE — 96372 THER/PROPH/DIAG INJ SC/IM: CPT

## 2020-01-19 PROCEDURE — 25010000002 FILGRASTIM-SNDZ 300 MCG/0.5ML SOLUTION PREFILLED SYRINGE: Performed by: INTERNAL MEDICINE

## 2020-01-19 RX ADMIN — FILGRASTIM-SNDZ 300 MCG: 300 INJECTION, SOLUTION INTRAVENOUS; SUBCUTANEOUS at 14:15

## 2020-01-20 ENCOUNTER — INFUSION (OUTPATIENT)
Dept: ONCOLOGY | Facility: HOSPITAL | Age: 60
End: 2020-01-20

## 2020-01-20 VITALS
RESPIRATION RATE: 18 BRPM | HEART RATE: 76 BPM | TEMPERATURE: 97.7 F | SYSTOLIC BLOOD PRESSURE: 120 MMHG | DIASTOLIC BLOOD PRESSURE: 76 MMHG

## 2020-01-20 DIAGNOSIS — Z17.0 MALIGNANT NEOPLASM OF OVERLAPPING SITES OF LEFT BREAST IN FEMALE, ESTROGEN RECEPTOR POSITIVE (HCC): Primary | ICD-10-CM

## 2020-01-20 DIAGNOSIS — C50.812 MALIGNANT NEOPLASM OF OVERLAPPING SITES OF LEFT BREAST IN FEMALE, ESTROGEN RECEPTOR POSITIVE (HCC): Primary | ICD-10-CM

## 2020-01-20 PROCEDURE — 25010000002 FILGRASTIM-SNDZ 300 MCG/0.5ML SOLUTION PREFILLED SYRINGE: Performed by: INTERNAL MEDICINE

## 2020-01-20 PROCEDURE — 96372 THER/PROPH/DIAG INJ SC/IM: CPT | Performed by: INTERNAL MEDICINE

## 2020-01-20 RX ADMIN — FILGRASTIM-SNDZ 300 MCG: 300 INJECTION, SOLUTION INTRAVENOUS; SUBCUTANEOUS at 11:35

## 2020-01-20 NOTE — PROGRESS NOTES
"Patient here for Zarxio and has c/o feeling \"jittery.\"   sates that she does this a couple of times a day, \"she has some peanut butter and that helps.\"  VSS. Patient was given some peanut butter crackers and wants to wait in waiting room to see if this helps before leaving.   Notified  to call me if needed.     "

## 2020-01-21 ENCOUNTER — INFUSION (OUTPATIENT)
Dept: ONCOLOGY | Facility: HOSPITAL | Age: 60
End: 2020-01-21

## 2020-01-21 DIAGNOSIS — C50.812 MALIGNANT NEOPLASM OF OVERLAPPING SITES OF LEFT BREAST IN FEMALE, ESTROGEN RECEPTOR POSITIVE (HCC): Primary | ICD-10-CM

## 2020-01-21 DIAGNOSIS — Z17.0 MALIGNANT NEOPLASM OF OVERLAPPING SITES OF LEFT BREAST IN FEMALE, ESTROGEN RECEPTOR POSITIVE (HCC): Primary | ICD-10-CM

## 2020-01-21 PROCEDURE — 96372 THER/PROPH/DIAG INJ SC/IM: CPT | Performed by: INTERNAL MEDICINE

## 2020-01-21 PROCEDURE — 25010000002 FILGRASTIM-SNDZ 300 MCG/0.5ML SOLUTION PREFILLED SYRINGE: Performed by: INTERNAL MEDICINE

## 2020-01-21 RX ADMIN — FILGRASTIM-SNDZ 300 MCG: 300 INJECTION, SOLUTION INTRAVENOUS; SUBCUTANEOUS at 09:17

## 2020-01-21 NOTE — PROGRESS NOTES
"Patient feels better today.  Went home yesterday and ate and rested and felt better.  No further c/o \"jitteriness.\"  "

## 2020-01-22 ENCOUNTER — INFUSION (OUTPATIENT)
Dept: ONCOLOGY | Facility: HOSPITAL | Age: 60
End: 2020-01-22

## 2020-01-22 DIAGNOSIS — C50.812 MALIGNANT NEOPLASM OF OVERLAPPING SITES OF LEFT BREAST IN FEMALE, ESTROGEN RECEPTOR POSITIVE (HCC): Primary | ICD-10-CM

## 2020-01-22 DIAGNOSIS — Z17.0 MALIGNANT NEOPLASM OF OVERLAPPING SITES OF LEFT BREAST IN FEMALE, ESTROGEN RECEPTOR POSITIVE (HCC): Primary | ICD-10-CM

## 2020-01-22 PROCEDURE — 96372 THER/PROPH/DIAG INJ SC/IM: CPT | Performed by: NURSE PRACTITIONER

## 2020-01-22 PROCEDURE — 25010000002 FILGRASTIM-SNDZ 300 MCG/0.5ML SOLUTION PREFILLED SYRINGE: Performed by: NURSE PRACTITIONER

## 2020-01-22 RX ADMIN — FILGRASTIM-SNDZ 300 MCG: 300 INJECTION, SOLUTION INTRAVENOUS; SUBCUTANEOUS at 11:05

## 2020-01-23 ENCOUNTER — INFUSION (OUTPATIENT)
Dept: ONCOLOGY | Facility: HOSPITAL | Age: 60
End: 2020-01-23

## 2020-01-23 DIAGNOSIS — Z17.0 MALIGNANT NEOPLASM OF OVERLAPPING SITES OF LEFT BREAST IN FEMALE, ESTROGEN RECEPTOR POSITIVE (HCC): Primary | ICD-10-CM

## 2020-01-23 DIAGNOSIS — C50.812 MALIGNANT NEOPLASM OF OVERLAPPING SITES OF LEFT BREAST IN FEMALE, ESTROGEN RECEPTOR POSITIVE (HCC): Primary | ICD-10-CM

## 2020-01-23 PROCEDURE — 96372 THER/PROPH/DIAG INJ SC/IM: CPT | Performed by: INTERNAL MEDICINE

## 2020-01-23 PROCEDURE — 25010000002 FILGRASTIM-SNDZ 300 MCG/0.5ML SOLUTION PREFILLED SYRINGE: Performed by: INTERNAL MEDICINE

## 2020-01-23 RX ADMIN — FILGRASTIM-SNDZ 300 MCG: 300 INJECTION, SOLUTION INTRAVENOUS; SUBCUTANEOUS at 10:30

## 2020-01-24 ENCOUNTER — INFUSION (OUTPATIENT)
Dept: ONCOLOGY | Facility: HOSPITAL | Age: 60
End: 2020-01-24

## 2020-01-24 ENCOUNTER — OFFICE VISIT (OUTPATIENT)
Dept: ONCOLOGY | Facility: CLINIC | Age: 60
End: 2020-01-24

## 2020-01-24 ENCOUNTER — TELEPHONE (OUTPATIENT)
Dept: ONCOLOGY | Facility: CLINIC | Age: 60
End: 2020-01-24

## 2020-01-24 VITALS
TEMPERATURE: 97.7 F | DIASTOLIC BLOOD PRESSURE: 72 MMHG | RESPIRATION RATE: 16 BRPM | HEART RATE: 88 BPM | SYSTOLIC BLOOD PRESSURE: 108 MMHG | OXYGEN SATURATION: 98 % | HEIGHT: 66 IN | BODY MASS INDEX: 22.66 KG/M2 | WEIGHT: 141 LBS

## 2020-01-24 DIAGNOSIS — Z45.2 ENCOUNTER FOR FITTING AND ADJUSTMENT OF VASCULAR CATHETER: ICD-10-CM

## 2020-01-24 DIAGNOSIS — C50.912 INVASIVE DUCTAL CARCINOMA OF BREAST, FEMALE, LEFT (HCC): Primary | ICD-10-CM

## 2020-01-24 DIAGNOSIS — C50.812 MALIGNANT NEOPLASM OF OVERLAPPING SITES OF LEFT BREAST IN FEMALE, ESTROGEN RECEPTOR POSITIVE (HCC): Primary | ICD-10-CM

## 2020-01-24 DIAGNOSIS — Z17.0 MALIGNANT NEOPLASM OF OVERLAPPING SITES OF LEFT BREAST IN FEMALE, ESTROGEN RECEPTOR POSITIVE (HCC): Primary | ICD-10-CM

## 2020-01-24 LAB
ALBUMIN SERPL-MCNC: 4 G/DL (ref 3.5–5.2)
ALBUMIN/GLOB SERPL: 1.8 G/DL (ref 1.1–2.4)
ALP SERPL-CCNC: 158 U/L (ref 38–116)
ALT SERPL W P-5'-P-CCNC: 7 U/L (ref 0–33)
ANION GAP SERPL CALCULATED.3IONS-SCNC: 9.9 MMOL/L (ref 5–15)
AST SERPL-CCNC: 11 U/L (ref 0–32)
BASOPHILS # BLD AUTO: 0.03 10*3/MM3 (ref 0–0.2)
BASOPHILS NFR BLD AUTO: 0.4 % (ref 0–1.5)
BILIRUB SERPL-MCNC: 0.2 MG/DL (ref 0.2–1.2)
BUN BLD-MCNC: 9 MG/DL (ref 6–20)
BUN/CREAT SERPL: 20.9 (ref 7.3–30)
CALCIUM SPEC-SCNC: 8.8 MG/DL (ref 8.5–10.2)
CHLORIDE SERPL-SCNC: 103 MMOL/L (ref 98–107)
CO2 SERPL-SCNC: 27.1 MMOL/L (ref 22–29)
CREAT BLD-MCNC: 0.43 MG/DL (ref 0.6–1.1)
DEPRECATED RDW RBC AUTO: 51.5 FL (ref 37–54)
EOSINOPHIL # BLD AUTO: 0 10*3/MM3 (ref 0–0.4)
EOSINOPHIL NFR BLD AUTO: 0 % (ref 0.3–6.2)
ERYTHROCYTE [DISTWIDTH] IN BLOOD BY AUTOMATED COUNT: 13.5 % (ref 12.3–15.4)
GFR SERPL CREATININE-BSD FRML MDRD: 150 ML/MIN/1.73
GLOBULIN UR ELPH-MCNC: 2.2 GM/DL (ref 1.8–3.5)
GLUCOSE BLD-MCNC: 90 MG/DL (ref 74–124)
HCT VFR BLD AUTO: 29.1 % (ref 34–46.6)
HGB BLD-MCNC: 9.3 G/DL (ref 12–15.9)
IMM GRANULOCYTES # BLD AUTO: 0.13 10*3/MM3 (ref 0–0.05)
IMM GRANULOCYTES NFR BLD AUTO: 1.8 % (ref 0–0.5)
LYMPHOCYTES # BLD AUTO: 0.27 10*3/MM3 (ref 0.7–3.1)
LYMPHOCYTES NFR BLD AUTO: 3.8 % (ref 19.6–45.3)
MCH RBC QN AUTO: 33.2 PG (ref 26.6–33)
MCHC RBC AUTO-ENTMCNC: 32 G/DL (ref 31.5–35.7)
MCV RBC AUTO: 103.9 FL (ref 79–97)
MONOCYTES # BLD AUTO: 0.14 10*3/MM3 (ref 0.1–0.9)
MONOCYTES NFR BLD AUTO: 2 % (ref 5–12)
NEUTROPHILS # BLD AUTO: 6.51 10*3/MM3 (ref 1.7–7)
NEUTROPHILS NFR BLD AUTO: 92 % (ref 42.7–76)
NRBC BLD AUTO-RTO: 0 /100 WBC (ref 0–0.2)
PLATELET # BLD AUTO: 130 10*3/MM3 (ref 140–450)
PMV BLD AUTO: 9.8 FL (ref 6–12)
POTASSIUM BLD-SCNC: 4.2 MMOL/L (ref 3.5–4.7)
PROT SERPL-MCNC: 6.2 G/DL (ref 6.3–8)
RBC # BLD AUTO: 2.8 10*6/MM3 (ref 3.77–5.28)
SODIUM BLD-SCNC: 140 MMOL/L (ref 134–145)
WBC NRBC COR # BLD: 7.08 10*3/MM3 (ref 3.4–10.8)

## 2020-01-24 PROCEDURE — 99214 OFFICE O/P EST MOD 30 MIN: CPT | Performed by: NURSE PRACTITIONER

## 2020-01-24 PROCEDURE — 96372 THER/PROPH/DIAG INJ SC/IM: CPT | Performed by: NURSE PRACTITIONER

## 2020-01-24 PROCEDURE — 85025 COMPLETE CBC W/AUTO DIFF WBC: CPT | Performed by: NURSE PRACTITIONER

## 2020-01-24 PROCEDURE — 80053 COMPREHEN METABOLIC PANEL: CPT | Performed by: NURSE PRACTITIONER

## 2020-01-24 PROCEDURE — 25010000003 HEPARIN LOCK FLUCH PER 10 UNITS: Performed by: NURSE PRACTITIONER

## 2020-01-24 PROCEDURE — 96523 IRRIG DRUG DELIVERY DEVICE: CPT | Performed by: NURSE PRACTITIONER

## 2020-01-24 PROCEDURE — 25010000002 FILGRASTIM-SNDZ 300 MCG/0.5ML SOLUTION PREFILLED SYRINGE: Performed by: INTERNAL MEDICINE

## 2020-01-24 RX ORDER — SODIUM CHLORIDE 0.9 % (FLUSH) 0.9 %
10 SYRINGE (ML) INJECTION AS NEEDED
Status: CANCELLED | OUTPATIENT
Start: 2020-01-24

## 2020-01-24 RX ORDER — HEPARIN SODIUM (PORCINE) LOCK FLUSH IV SOLN 100 UNIT/ML 100 UNIT/ML
500 SOLUTION INTRAVENOUS AS NEEDED
Status: CANCELLED | OUTPATIENT
Start: 2020-01-24

## 2020-01-24 RX ORDER — HEPARIN SODIUM (PORCINE) LOCK FLUSH IV SOLN 100 UNIT/ML 100 UNIT/ML
500 SOLUTION INTRAVENOUS AS NEEDED
Status: DISCONTINUED | OUTPATIENT
Start: 2020-01-24 | End: 2020-01-24 | Stop reason: HOSPADM

## 2020-01-24 RX ORDER — SODIUM CHLORIDE 0.9 % (FLUSH) 0.9 %
10 SYRINGE (ML) INJECTION AS NEEDED
Status: DISCONTINUED | OUTPATIENT
Start: 2020-01-24 | End: 2020-01-24 | Stop reason: HOSPADM

## 2020-01-24 RX ADMIN — Medication 500 UNITS: at 10:18

## 2020-01-24 RX ADMIN — SODIUM CHLORIDE, PRESERVATIVE FREE 10 ML: 5 INJECTION INTRAVENOUS at 10:19

## 2020-01-24 RX ADMIN — FILGRASTIM-SNDZ 300 MCG: 300 INJECTION, SOLUTION INTRAVENOUS; SUBCUTANEOUS at 10:45

## 2020-01-24 NOTE — TELEPHONE ENCOUNTER
----- Message from ОЛЬГА Huitron sent at 1/24/2020 11:40 AM EST -----  Regarding: Please schedule this patient for Zarxio  Over the weekend, 1/25 and 1/26/2020.  She will need to return to our office on Monday for CBC with RN review and possible Zarxio.  Please let me know if you have questions or need more information.  Thank you.

## 2020-01-24 NOTE — PROGRESS NOTES
Subjective     REASON FOR FOLLOW UP:    1.  T2N1 invasive ductal carcinoma of the left breast.  Ultrasound-showed 3.8 x 3.1 x 2.5 cm mass at 4 o'clock position with 2 abnormal axillary lymph nodes, larger lymph node being 1.4 cm.  Left breast biopsy and axillary lymph node biopsy October 29, 2019, invasive ductal carcinoma, moderately differentiated, grade 2, ER 85%, OR 10%, HER-2/merna 2+, HER-2 FISH negative.  · 12/06/19: Cycle #1 of dose-dense Adriamycin/Cytoxan with Neulasta for marrow support    · 01/17/20: Last cycle of Adriamycin/Cytoxan given  2.  Severe neutropenia secondary to chemotherapy, with ANC 50 at 1-week after cycle #1 chemotherapy.  Also had moderate thrombocytopenia platelets 92,000.  Patient was given Levaquin for prophylaxis of neutropenia fever.  3.  2/24/2020.  Seventh dose of Zarxio marginal counts, additional Zarxio plan through the weekend with recheck on Monday, January 27               HISTORY OF PRESENT ILLNESS:  The patient is a 59 y.o. year old female with the above mentioned history presenting here today for lab review cardio injection.  This is her seventh injection following her fourth cycle of Adriamycin Cytoxan.  She actually reports feeling quite well today.  She does not experience the extreme bone pain that she did with Neulasta.  She denies new symptoms such as fever, chills, shortness of breath, bleeding, bruising or skin changes.  She denies numbness or tingling.    Her white count is within normal limits but but slightly marginal considering her multiple Zarxio injections.  Her total white count today is 7.08 and ANC is 6.51.      PAST MEDICAL HISTORY:  She had a stroke in July 2017 with headache and dizziness.  She went to the ER and was placed on aspirin.  However, she stopped taking it two weeks ago.  She has been taking Aimovig (injection) monthly with Fariba López at [facility].    In 1994, patient was diagnosed with HIV with an unknown cause which is managed by   Natali Subramanian.  As of now, her viral load is good.    Patient has arthritis.  She gets trigger-point injections in her back with her last one being 2 weeks ago.  She has had multiple ablations.  She also has pain in her SI joint and Dr. Bermudez performed a surgery on this.  She takes Narco for the pain.      She is osteoporotic.  She has had multiple hairline fractures in both her legs.  She had her knees cleaned out by Dr. Sinha.     Patient has had a hysterectomy and still has both of her ovaries.    Patient has vertigo and the muscles in her left ear are weak.  She just has an imbalance.        ONCOLOGIC HISTORY:  Patient is a 59-year-old female who has had a history of HIV since age 34 followed by Dr. Natali Ng at [facility] and was on multiple HIV drugs initially but more recently has been on a single medication TRIUMEQ, with well-controlled HIV.  She follows up with Dr. Dawn , infectious disease who saw her recently and he saw her on 4/8/2019 and has excellent control and suppression of her viral load.  She is very compliant with her medications.    She also has had history of stroke in 2017 for which she was placed on aspirin.  She presented with a headache.  She is very active and works at [employer] full-time.  She also has had history of vertigo in the past  Patient's PCP is Zach Lora.    Patient first noticed the mass in her left breast 5 months ago.  Her last mammogram was 5 years ago.  She had soreness in the left breast and she went to her primary care physician who then ordered a mammogram diagnostic and an ultrasound.  The diagnostic mammogram showed a 3.7 cm mass at 4 o'clock position in the lower outer quadrant of the left breast.  The ultrasound showed 3.8 cm x 3.1 x 2.5 cm mass at 4 o'clock position in the left breast with 2 abnormal appearing left axillary lymph nodes the largest being 1.4 cm.    She then underwent biopsy of both the breast mass as well as the left  axillary lymph node and both of them are positive for invasive mammary carcinoma it is invasive ductal carcinoma with apocrine features, moderately differentiated, grade 2 of 3 with a [place] score of 5.  The left axillary lymph node is also consistent with metastatic mammary carcinoma.  It is ER positive MI positive HER-2/merna negative.  Details as follows    10/21/19 - Bilateral Diagnostic Mammogram and US Breast Left  FINDINGS: Bilateral digital CC and MLO mammographic images were  obtained. No prior examination is available for comparison. Scattered  fibroglandular densities are seen throughout both breasts. A triangular  skin marker represents the area of palpable concern in the lower outer  quadrant of the left breast in the posterior 1/3. At this location there  is an irregular mass that measures on the order of 3.7 cm in greatest  dimension. Internal calcifications are noted. No suspicious findings in  the right breast are appreciated.     ULTRASOUND: Targeted sonographic evaluation of the left breast was  performed through the area of concern in the left axilla. At the 4  o'clock position on the order of 6 cm from the nipple there is an  irregular hypoechoic mass that measures 3.8 x 3.1 x 2.5 cm. Internal  vascularity is noted.     There are 2 abnormal-appearing left axillary lymph nodes, the largest  which measures on the order of 1.4 cm in greatest dimension.     IMPRESSION:  1. There is a 3.8 cm irregular mass in the left breast at the 4 o'clock  position. This is seen in conjunction with an irregular 1.4 cm lymph  node in the left axilla. This is suspicious for malignancy with left  axillary involvement. Correlation with ultrasound-guided biopsy of both  the left breast mass and the lymph node is recommended.  2. There are no findings suspicious for malignancy in the right breast.     BI-RADS CATEGORY 5:     Patient's labs from 11/12/19 are normal.    10/29/19 - Tissue Pathology  1. Left Breast, 4:00,  6 cm FN, U/S-Guided Core Needle Biopsy for a Mass:  A. INVASIVE DUCTAL CARCINOMA WITH APOCRINE FEATURES, Moderately differentiated;  Jen Histologic Grade II/III (tubule score = 3, nuclear score = 2, mitoses score = 1), measuring at least  9 mm.  B. No ductal or lobular carcinoma in situ is identified in this sample.  C. Focus suspicious for lymphovascular space invasion.  D. See Biomarker Template for hormone receptor studies.  2. Lymph Node, Left Axilla, U/S-Guided Core Needle Biopsy:  A. METASTATIC MAMMARY CARCINOMA (see Comment).  B. Metastatic focus measures 5 mm in greatest extent.  ER+, 85%  IL+, 10%  HER2- Score 2+    11/13/19 - CT Neck Chest Abdomen Pelvis  IMPRESSION:  1. In addition to the irregular approximately 3.8 cm mass within the  lateral aspect of the left breast, there are a few subcentimeter  asymmetric nodules along the lateral margin of the glandular tissue. The  several asymmetric left subpectoral nodes and 1.2 x 1.0 cm left axillary  node are suspicious for tamar metastases. The asymmetric subcentimeter  left supraclavicular and left posterior cervical triangle nodes are  worrisome as well.  2. There is no convincing evidence for metastatic disease within the  abdomen or pelvis.    11/14/19 - Echocardiogram:  Normal.  Calculated EF = 67%.  There is trace mitral valve and tricuspid valve regurgitation.    11/15/19 - Bone Scan  Negative.    11/18/19 - MRI Breast Bilateral  IMPRESSION:  1. Biopsy-proven malignancy in the left breast centered at the 4 o'clock  position with associated surrounding satellite nodules as described. The  entire region of involvement including the satellite nodules and the  dominant mass measure up to 7.5 cm in greatest dimension. Also, left  axillary adenopathy with multiple irregular lymph nodes and loss of  differentiation of the borders of multiple lymph nodes is noted and this  is suspicious for extranodal involvement.  2. There are no findings suspicious  for malignancy in the right breast.  BI-RADS CATEGORY 6      19: Cycle #1 of Adriamycin/Cytoxan    20: Last cycle of Adriamycin/Cytoxan given without Neulasta.  We will switch from Neulasta to 7 days of Neupogen injections after cycle 4.      Past Medical History:   Diagnosis Date   • Anxiety    • Cerebrovascular accident (CVA) (CMS/[facility]) 2017   • DDD (degenerative disc disease), cervical    • Depression    • GERD (gastroesophageal reflux disease)    • H/O ICH (intracerebral hemorrhage) (CMS/[facility])    • History of stroke    • History of thrombocytopenia    • HIV (human immunodeficiency virus infection) (CMS/[facility])    • Hyperlipidemia    • Insomnia    • Lupus anticoagulant positive    • Malignant neoplasm of overlapping sites of left breast in female, estrogen receptor positive (CMS/[facility]) 2019   • Meniscus tear 2017    RIGHT   • Migraine    • Neck pain     WITH SHOOTING PAIN LEFT RIGHT ARM   • Osteoarthritis    • Osteoporosis    • Seasonal allergies    • Spinal headache    • Tick bite 2014        Past Surgical History:   Procedure Laterality Date   • ADENOIDECTOMY     • CHOLECYSTECTOMY     • HYSTERECTOMY     • KNEE ARTHROSCOPY Right 3/24/2017    Procedure:  RIGHT  KNEE ARTHROSCOPY WITH DEBRIDEMENT CHONDROPLASTY PARTIAL MENISCECTOMY;  Surgeon: Karl Galeas MD;  Location: [facility];  Service:    • KNEE CARTILAGE SURGERY Right    • TONSILLECTOMY     • US GUIDED LYMPH NODE BIOPSY  10/29/2019   • VENOUS ACCESS DEVICE (PORT) INSERTION Right 2019    Procedure: INSERTION VENOUS ACCESS DEVICE RIGHT;  Surgeon: Landen Forman MD;  Location: [facility];  Service: General      OB-GYN:  Menarche 15 years  Menopause-46  Pregnancies- 1 para 1 no miscarriages her first pregnancy was in  at 29 years of age.  She did not breastfeed.  Post menopausal HRT- None.  Hx of birth control pills- Yes.    Current Outpatient Medications on File Prior to Visit   Medication Sig  Dispense Refill   • AIMOVIG 140 MG/ML solution auto-injector INJECT 140 MG INTO THE SKIN EVERY 30 (THIRTY) DAYS.  11   • atorvastatin (LIPITOR) 20 MG tablet TAKE 1 TABLET BY MOUTH EVERY DAY AT NIGHT 90 tablet 3   • baclofen (LIORESAL) 10 MG tablet Take 10 mg by mouth 2 (Two) Times a Day.  2   • CAMBIA 50 MG pack TAKE 1 PACKET AS NEEDED FOR HEADACHES  1   • clonazePAM (KlonoPIN) 0.5 MG tablet TAKE 1 TABLET BY MOUTH 2 (TWO) TIMES A DAY AS NEEDED FOR ANXIETY. 60 tablet 1   • COCONUT OIL PO Take  by mouth.     • colesevelam (WELCHOL) 625 MG tablet TAKE 2 TABLETS BY MOUTH EVERY  tablet 3   • dexamethasone (DECADRON) 4 MG tablet Take 2 tablets in the morning daily on Days 2, 3 & 4.  Take with food. 6 tablet 3   • escitalopram (LEXAPRO) 10 MG tablet Take 1 tablet by mouth Daily. 90 tablet 3   • fluticasone (FLONASE) 50 MCG/ACT nasal spray 2 sprays into the nostril(s) as directed by provider Daily. 3 bottle 3   • ibandronate (BONIVA) 150 MG tablet TAKE 1 TABLET BY MOUTH EVERY 30 (THIRTY) DAYS. 3 tablet 3   • levoFLOXacin (LEVAQUIN) 250 MG tablet Take 2 tablets by mouth Daily. Starting from day#4 of chemo cycle. 7 tablet 2   • Menaquinone-7 (VITAMIN K2 PO) Take  by mouth.     • NON FORMULARY Collagen peptides powder     • ondansetron (ZOFRAN) 8 MG tablet Take 1 tablet by mouth 3 (Three) Times a Day As Needed for Nausea or Vomiting. 30 tablet 5   • ondansetron ODT (ZOFRAN ODT) 4 MG disintegrating tablet Take 1 tablet by mouth Every 8 (Eight) Hours As Needed for Nausea or Vomiting. 30 tablet 0   • oxyCODONE-acetaminophen (PERCOCET)  MG per tablet TK 1 T PO QID     • prochlorperazine (COMPAZINE) 10 MG tablet Take 1 tablet by mouth Every 6 (Six) Hours As Needed for Nausea or Vomiting. 20 tablet 2   • TRIUMEQ 600- MG per tablet TAKE 1 TABLET BY MOUTH EVERY DAY 90 tablet 3   • zolpidem (AMBIEN) 10 MG tablet Take 1 tablet by mouth At Night As Needed for Sleep. 90 tablet 0     Current [facility]-Administered  Medications on File Prior to Visit   Medication Dose Route Frequency Provider Last Rate Last Dose   • [COMPLETED] Filgrastim-sndz (ZARXIO) injection 300 mcg  300 mcg Subcutaneous Once Ruby Moreno MD   300 mcg at 01/23/20 1030        ALLERGIES:    Allergies   Allergen Reactions   • Augmentin [Amoxicillin-Pot Clavulanate] Hives        Social History     Socioeconomic History   • Marital status:      Spouse name: Owen   • Number of children: 1   • Years of education: College   • Highest education level: Not on file   Occupational History   • Occupation:      Employer: [employer]   Tobacco Use   • Smoking status: Never Smoker   • Smokeless tobacco: Never Used   Substance and Sexual Activity   • Alcohol use: Yes     Comment: occasioonal   • Drug use: No   • Sexual activity: Defer     Birth control/protection: None     Patient does not smoke or do drugs.  She does drink occasionally.    Family History   Problem Relation Age of Onset   • Breast cancer Mother    • Leukemia Mother         CLL?   • Diabetes Mother    • Hyperthyroidism Mother    • Hearing loss Mother    • Dementia Father    • Heart disease Maternal Grandmother    • Diabetes Maternal Grandfather    • Heart disease Maternal Grandfather    • Stroke Maternal Grandfather    • Heart attack Maternal Grandfather    • Osteoporosis Paternal Grandmother    • Heart disease Paternal Grandfather    • Leukemia Maternal Aunt         CLL?   • Throat cancer Paternal Uncle     FAMILY HISTORY:  Her mother had breast cancer at age 60, still alive at 85 and in good health..  Her father had dementia.  Her paternal uncle had prostate cancer.  Her brother had esophageal cancer.        Review of Systems   Constitutional: Positive for fatigue. Negative for activity change, appetite change, chills, diaphoresis, fever and unexpected weight change.   HENT: Negative for hearing loss, mouth sores, nosebleeds, sore throat and trouble swallowing.     Respiratory: Negative for cough, chest tightness, shortness of breath and wheezing.    Cardiovascular: Negative for chest pain, palpitations and leg swelling.   Gastrointestinal: Negative for abdominal distention, abdominal pain, constipation, diarrhea, nausea and vomiting.   Genitourinary: Negative for difficulty urinating, dysuria, frequency, hematuria and urgency.   Musculoskeletal: Negative for joint swelling.        See HPI   Skin: Negative for rash and wound.   Neurological: Negative for dizziness, seizures, syncope, speech difficulty, weakness and numbness.   Hematological: Negative for adenopathy. Does not bruise/bleed easily.   Psychiatric/Behavioral: Negative for behavioral problems, confusion and suicidal ideas.       Objective     /787, O2 saturation 99%, pulse 63, and temperature 98.4 °F  , respiratory rate 16.  Weight 139 pounds.   ECOG PS 0     PHYSICAL EXAM  GENERAL:  Well-developed, well-nourished in no acute distress.  She is accompanied by her   SKIN:  Warm, dry without rashes, purpura or petechiae.  Alopecia of the head noted  NECK:  Supple with good range of motion; no thyromegaly or masses, no JVD.  LYMPHATICS:  No cervical, supraclavicular, axillary or inguinal adenopathy.  CHEST:  Lungs clear to auscultation. Good airflow.  CARDIAC:  Regular rate and rhythm without murmurs, rubs or gallops. Normal S1,S2.  ABDOMEN:  Soft, nontender with no hepatosplenomegaly or masses.  EXTREMITIES:  No clubbing, cyanosis or edema.  NEUROLOGICAL:  Cranial Nerves II-XII grossly intact.  No focal neurological deficits.  PSYCHIATRIC:  Normal affect and mood.        RECENT LABS:           Invalid input(s): NEUTOPHILPCT,  EOSPCT  Lab Results   Component Value Date    NEUTROABS 21.65 (H) 01/17/2020           Invalid input(s): LABALBU, PROT    Assessment/Plan    1. T2N1 invasive ductal carcinoma of the left breast, recently diagnosed.    -Noticed mass in the left breast x5 months  -Diagnostic mammogram  showed 3.7 mm mass in the left breast at 4 o'clock position  -Ultrasound-showed 3.8 x 3.1 x 2.5 cm mass at 4 o'clock position with 2 abnormal axillary lymph nodes, larger lymph node being 1.4 cm  -Left breast biopsy and axillary lymph node biopsy October 29, 2019, invasive ductal carcinoma, moderately differentiated, grade 2, ER 85%, [place] 10%, HER-2/merna 2+, HER-2 FISH negative  · CT scans from 11/13/19 show some asymmetric nodules along the lateral margin of the glandular tissue.  The 1.2x1.0 cm left axillary nodes, left supraclavicular, and left posterior cervical triangle nodes are suspicious for tamar metastases.  ·   bone scan from 11/15/19 which was negative.   ·  MRI breast from 11/18/19 which shows the biopsy-proven malignancy in the left breast centered at the 4:00 position.  The region of involvement measures up to 7.5 cm.  There is left axillary adenopathy with multiple irregular lymph nodes and loss of differentiation of the border of multiple lymph nodes which are suspicious for extranodal involvement.  ·    echocardiogram from 11/14/19 which was normal with an ejection fraction of 67%.    · INVITAE from 11/14/19 which was negative.  · Given the size of her tumor and her medical history, patient will be given 4 cycles of Adriamycin/Cytoxan with Neulasta.  After completion of this treatment, patient will be started on 12 cycles of Taxol.  After the tumor shrinks in size, Dr. Forman will perform a lumpectomy.    · Following lumpectomy, patient will begin endocrine therapy.  I have spoken with Dr. Forman who agrees with chemotherapy first due to the large size of the tumor.   ·  Dr. Dawn agrees chemotherapy will not aggravate her HIV condition.  I also spoke with Dr. Mohan at [place] [facility] who also agrees chemotherapy is the first step.   · 12/06/19: Cycle #1 of Adriamycin/Cytoxan  · 01/17/20: Proceed with final A/C cycle 4 today without Neulasta.  Patient to come 7 days for Neupogen injections.  If  she has more pain after the injections, she can be given morphine.  · She is here today, 2/24/2020 for her seventh dose of Zarxio.  She did tolerate Zarxio much better than Neulasta.  Her total white count is within normal limits but slightly lower than we like.    2.  Profound neutropenia due to chemotherapy despite Neulasta:   · On 12/13/2019 at 1 week after cycle #1 chemotherapy, white blood cell count 850, ANC 50 together with a low monocytes 150 .  We started the patient on Levaquin 500 mg daily for 7 days.  No neutropenia fever.    · On 12/20/2019, much improved and elevated ANC 10,800.  We will proceed ahead with cycle #2 of chemotherapy.  Neulasta will be continued for each chemotherapy.  Due to expected neutropenia, will start the patient on Levaquin 500 mg daily for 7 days, starting on day #4 of each cycle AC chemothera  · As outlined above the patient did receive 6 days of Zarxio and is here today for scheduled Zarxio.  Her total white count today is 7.08 and ANC is 6.51.    3.  moderate thrombocytopenia secondary to chemotherapy.    · This happened after cycle 1 Chemotherapy with platelets 92,000 on 12/13/2019.  Patient was asymptomatic.  · Normalized platelets 168,000 on 12/20/2019.  Expecting thrombocytopenia will recur.  Continue to monitor.  · Platelet count today is 130,000    4.  Chemotherapy induced anemia.   · Hemoglobin 11.5 on 12/20/2019.  Likely anemia were getting worse with ongoing chemotherapy.  Continue to monitor.     5.  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  · On 12/13/2019 patient here for toxicity check and struggled with significant insomnia from steroids following her first cycle.  We discussed taking the dexamethasone, only one 4 mg tablet daily for 3 days with her next cycle of chemotherapy to see if this improves her symptoms.    · On 12/20/2019, I questioned the patient that her nausea and vomiting may be significant due to decreased dose of dexamethasone.  She could use Compazine and  Zofran as needed more frequently.  · Patient is taking Ambien for insomnia and that is still not helping that much.  · Will decrease dose of dexamethasone to 2 mg days 2 3 and 4 after chemotherapy in the morning  · Insomnia much improved with decreased dose of dexamethasone    6. HIV, followed by Dr. Dawn in infectious disease.  Well-controlled and is on a single medication with very low viral load. Has HIV since age 34.     7.  Family history of breast cancer with mother having had breast cancer at age 60.  There is family history of uncle with prostate cancer.  Patient will require genetic referral    8.  Arthritis with severe back pain followed by Dr. Bam Crandall  · And received Percocet    9. Episodes of nosebleeds.  She has had these once a week for 3-4 years.  She did not report this today.    10. Severe body ache post A/C cycle 3.  She took Percocet and Hydrocodone for pain management which did help.  I have discontinued Neulasta as outlined above.    PLAN:  1. I have reviewed the patient's lab work with Dr. Moreno.  We would like to continue Zarxio injections over the weekend, Saturday and Sunday, the patient will return on Monday for CBC with RN review and possible Zarxio.  2. She is currently scheduled for breast US to be done on 01/29/20.  We will then see Dr. Moreno on 1/31/2020 with plans to start cycle 1 day 1 of her Taxol.      I have asked the patient to call the office with any new or worsening symptoms before her scheduled visits.

## 2020-01-25 ENCOUNTER — INFUSION (OUTPATIENT)
Dept: ONCOLOGY | Facility: HOSPITAL | Age: 60
End: 2020-01-25

## 2020-01-25 DIAGNOSIS — D84.9 ACQUIRED IMMUNOCOMPROMISED STATE (HCC): ICD-10-CM

## 2020-01-25 PROCEDURE — 96372 THER/PROPH/DIAG INJ SC/IM: CPT

## 2020-01-25 PROCEDURE — 25010000002 FILGRASTIM-SNDZ 300 MCG/0.5ML SOLUTION PREFILLED SYRINGE: Performed by: INTERNAL MEDICINE

## 2020-01-25 RX ADMIN — FILGRASTIM-SNDZ 300 MCG: 300 INJECTION, SOLUTION INTRAVENOUS; SUBCUTANEOUS at 13:29

## 2020-01-26 ENCOUNTER — INFUSION (OUTPATIENT)
Dept: ONCOLOGY | Facility: HOSPITAL | Age: 60
End: 2020-01-26

## 2020-01-26 DIAGNOSIS — D70.1 CHEMOTHERAPY-INDUCED NEUTROPENIA (HCC): ICD-10-CM

## 2020-01-26 DIAGNOSIS — C50.812 MALIGNANT NEOPLASM OF OVERLAPPING SITES OF LEFT BREAST IN FEMALE, ESTROGEN RECEPTOR POSITIVE (HCC): ICD-10-CM

## 2020-01-26 DIAGNOSIS — Z17.0 MALIGNANT NEOPLASM OF OVERLAPPING SITES OF LEFT BREAST IN FEMALE, ESTROGEN RECEPTOR POSITIVE (HCC): ICD-10-CM

## 2020-01-26 DIAGNOSIS — T45.1X5A CHEMOTHERAPY-INDUCED NEUTROPENIA (HCC): ICD-10-CM

## 2020-01-26 PROCEDURE — 96372 THER/PROPH/DIAG INJ SC/IM: CPT

## 2020-01-26 PROCEDURE — 25010000002 FILGRASTIM-SNDZ 300 MCG/0.5ML SOLUTION PREFILLED SYRINGE: Performed by: INTERNAL MEDICINE

## 2020-01-26 RX ADMIN — FILGRASTIM-SNDZ 300 MCG: 300 INJECTION, SOLUTION INTRAVENOUS; SUBCUTANEOUS at 13:15

## 2020-01-27 ENCOUNTER — INFUSION (OUTPATIENT)
Dept: ONCOLOGY | Facility: HOSPITAL | Age: 60
End: 2020-01-27

## 2020-01-27 ENCOUNTER — LAB (OUTPATIENT)
Dept: LAB | Facility: HOSPITAL | Age: 60
End: 2020-01-27

## 2020-01-27 DIAGNOSIS — C50.812 MALIGNANT NEOPLASM OF OVERLAPPING SITES OF LEFT BREAST IN FEMALE, ESTROGEN RECEPTOR POSITIVE (HCC): Primary | ICD-10-CM

## 2020-01-27 DIAGNOSIS — Z17.0 MALIGNANT NEOPLASM OF OVERLAPPING SITES OF LEFT BREAST IN FEMALE, ESTROGEN RECEPTOR POSITIVE (HCC): ICD-10-CM

## 2020-01-27 DIAGNOSIS — C50.812 MALIGNANT NEOPLASM OF OVERLAPPING SITES OF LEFT BREAST IN FEMALE, ESTROGEN RECEPTOR POSITIVE (HCC): ICD-10-CM

## 2020-01-27 DIAGNOSIS — Z17.0 MALIGNANT NEOPLASM OF OVERLAPPING SITES OF LEFT BREAST IN FEMALE, ESTROGEN RECEPTOR POSITIVE (HCC): Primary | ICD-10-CM

## 2020-01-27 LAB
BASOPHILS # BLD AUTO: 0.09 10*3/MM3 (ref 0–0.2)
BASOPHILS NFR BLD AUTO: 1.2 % (ref 0–1.5)
DEPRECATED RDW RBC AUTO: 50 FL (ref 37–54)
EOSINOPHIL # BLD AUTO: 0.01 10*3/MM3 (ref 0–0.4)
EOSINOPHIL NFR BLD AUTO: 0.1 % (ref 0.3–6.2)
ERYTHROCYTE [DISTWIDTH] IN BLOOD BY AUTOMATED COUNT: 13.8 % (ref 12.3–15.4)
HCT VFR BLD AUTO: 29.9 % (ref 34–46.6)
HGB BLD-MCNC: 9.9 G/DL (ref 12–15.9)
IMM GRANULOCYTES # BLD AUTO: 1.03 10*3/MM3 (ref 0–0.05)
IMM GRANULOCYTES NFR BLD AUTO: 13.4 % (ref 0–0.5)
LYMPHOCYTES # BLD AUTO: 0.85 10*3/MM3 (ref 0.7–3.1)
LYMPHOCYTES NFR BLD AUTO: 11 % (ref 19.6–45.3)
MCH RBC QN AUTO: 33.9 PG (ref 26.6–33)
MCHC RBC AUTO-ENTMCNC: 33.1 G/DL (ref 31.5–35.7)
MCV RBC AUTO: 102.4 FL (ref 79–97)
MONOCYTES # BLD AUTO: 1.65 10*3/MM3 (ref 0.1–0.9)
MONOCYTES NFR BLD AUTO: 21.4 % (ref 5–12)
NEUTROPHILS # BLD AUTO: 4.08 10*3/MM3 (ref 1.7–7)
NEUTROPHILS NFR BLD AUTO: 52.9 % (ref 42.7–76)
NRBC BLD AUTO-RTO: 1.3 /100 WBC (ref 0–0.2)
PLATELET # BLD AUTO: 163 10*3/MM3 (ref 140–450)
PMV BLD AUTO: 10.8 FL (ref 6–12)
RBC # BLD AUTO: 2.92 10*6/MM3 (ref 3.77–5.28)
WBC NRBC COR # BLD: 7.71 10*3/MM3 (ref 3.4–10.8)

## 2020-01-27 PROCEDURE — 25010000002 FILGRASTIM-SNDZ 300 MCG/0.5ML SOLUTION PREFILLED SYRINGE: Performed by: NURSE PRACTITIONER

## 2020-01-27 PROCEDURE — 85025 COMPLETE CBC W/AUTO DIFF WBC: CPT

## 2020-01-27 PROCEDURE — 96372 THER/PROPH/DIAG INJ SC/IM: CPT | Performed by: NURSE PRACTITIONER

## 2020-01-27 PROCEDURE — 36416 COLLJ CAPILLARY BLOOD SPEC: CPT

## 2020-01-27 RX ADMIN — FILGRASTIM-SNDZ 300 MCG: 300 INJECTION, SOLUTION INTRAVENOUS; SUBCUTANEOUS at 11:19

## 2020-01-27 NOTE — PROGRESS NOTES
Pt is here for lab with RN review and possible zarxio.  CBC reviewed with pt, counts are stable for this pt at this time.   Reviewed with Ann GARDNER and ok to give Zarxio today and return tomorrow for CBC with nurse review.   Message sent to scheduling for appt tomorrow.

## 2020-01-28 ENCOUNTER — LAB (OUTPATIENT)
Dept: LAB | Facility: HOSPITAL | Age: 60
End: 2020-01-28

## 2020-01-28 ENCOUNTER — INFUSION (OUTPATIENT)
Dept: ONCOLOGY | Facility: HOSPITAL | Age: 60
End: 2020-01-28

## 2020-01-28 ENCOUNTER — APPOINTMENT (OUTPATIENT)
Dept: ONCOLOGY | Facility: HOSPITAL | Age: 60
End: 2020-01-28

## 2020-01-28 DIAGNOSIS — Z17.0 MALIGNANT NEOPLASM OF OVERLAPPING SITES OF LEFT BREAST IN FEMALE, ESTROGEN RECEPTOR POSITIVE (HCC): ICD-10-CM

## 2020-01-28 DIAGNOSIS — C50.812 MALIGNANT NEOPLASM OF OVERLAPPING SITES OF LEFT BREAST IN FEMALE, ESTROGEN RECEPTOR POSITIVE (HCC): ICD-10-CM

## 2020-01-28 LAB
BASOPHILS # BLD AUTO: 0.05 10*3/MM3 (ref 0–0.2)
BASOPHILS NFR BLD AUTO: 0.2 % (ref 0–1.5)
DEPRECATED RDW RBC AUTO: 52 FL (ref 37–54)
EOSINOPHIL # BLD AUTO: 0 10*3/MM3 (ref 0–0.4)
EOSINOPHIL NFR BLD AUTO: 0 % (ref 0.3–6.2)
ERYTHROCYTE [DISTWIDTH] IN BLOOD BY AUTOMATED COUNT: 14.6 % (ref 12.3–15.4)
HCT VFR BLD AUTO: 29.2 % (ref 34–46.6)
HGB BLD-MCNC: 9.8 G/DL (ref 12–15.9)
IMM GRANULOCYTES # BLD AUTO: 7.58 10*3/MM3 (ref 0–0.05)
IMM GRANULOCYTES NFR BLD AUTO: 23.9 % (ref 0–0.5)
LYMPHOCYTES # BLD AUTO: 1.3 10*3/MM3 (ref 0.7–3.1)
LYMPHOCYTES NFR BLD AUTO: 4.1 % (ref 19.6–45.3)
MCH RBC QN AUTO: 34.5 PG (ref 26.6–33)
MCHC RBC AUTO-ENTMCNC: 33.6 G/DL (ref 31.5–35.7)
MCV RBC AUTO: 102.8 FL (ref 79–97)
MONOCYTES # BLD AUTO: 3.13 10*3/MM3 (ref 0.1–0.9)
MONOCYTES NFR BLD AUTO: 9.9 % (ref 5–12)
NEUTROPHILS # BLD AUTO: 19.67 10*3/MM3 (ref 1.7–7)
NEUTROPHILS NFR BLD AUTO: 61.9 % (ref 42.7–76)
NRBC BLD AUTO-RTO: 0.8 /100 WBC (ref 0–0.2)
PLATELET # BLD AUTO: 201 10*3/MM3 (ref 140–450)
PMV BLD AUTO: 9.9 FL (ref 6–12)
RBC # BLD AUTO: 2.84 10*6/MM3 (ref 3.77–5.28)
WBC NRBC COR # BLD: 31.73 10*3/MM3 (ref 3.4–10.8)

## 2020-01-28 PROCEDURE — 85025 COMPLETE CBC W/AUTO DIFF WBC: CPT

## 2020-01-28 PROCEDURE — 36415 COLL VENOUS BLD VENIPUNCTURE: CPT

## 2020-01-28 NOTE — PROGRESS NOTES
Reviewed CBC with Ariane GARDNER, and patient does not need additional zarxio at this time.   Patient given copy of labs.     Lab Results   Component Value Date    WBC 31.73 (H) 01/28/2020    HGB 9.8 (L) 01/28/2020    HCT 29.2 (L) 01/28/2020    .8 (H) 01/28/2020     01/28/2020

## 2020-01-29 ENCOUNTER — HOSPITAL ENCOUNTER (OUTPATIENT)
Dept: ULTRASOUND IMAGING | Facility: HOSPITAL | Age: 60
Discharge: HOME | End: 2020-01-29
Admitting: INTERNAL MEDICINE

## 2020-01-29 DIAGNOSIS — Z17.0 MALIGNANT NEOPLASM OF OVERLAPPING SITES OF LEFT BREAST IN FEMALE, ESTROGEN RECEPTOR POSITIVE (HCC): ICD-10-CM

## 2020-01-29 DIAGNOSIS — C50.812 MALIGNANT NEOPLASM OF OVERLAPPING SITES OF LEFT BREAST IN FEMALE, ESTROGEN RECEPTOR POSITIVE (HCC): ICD-10-CM

## 2020-01-29 PROCEDURE — 76642 ULTRASOUND BREAST LIMITED: CPT

## 2020-01-30 NOTE — PROGRESS NOTES
Subjective     REASON FOR FOLLOW UP:    1.  T2N1 invasive ductal carcinoma of the left breast.  Ultrasound-showed 3.8 x 3.1 x 2.5 cm mass at 4 o'clock position with 2 abnormal axillary lymph nodes, larger lymph node being 1.4 cm.  Left breast biopsy and axillary lymph node biopsy October 29, 2019, invasive ductal carcinoma, moderately differentiated, grade 2, ER 85%, OK 10%, HER-2/merna 2+, HER-2 FISH negative.  · 12/06/19: Cycle #1 of dose-dense Adriamycin/Cytoxan with Neulasta for marrow support    · 01/17/20: Last cycle of Adriamycin/Cytoxan given  2.  Severe neutropenia secondary to chemotherapy, with ANC 50 at 1-week after cycle #1 chemotherapy.  Also had moderate thrombocytopenia platelets 92,000.  Patient was given Levaquin for prophylaxis of neutropenia fever.  3.  1/24/2020.  Seventh dose of Zarxio marginal counts, additional Zarxio plan through the weekend with recheck on Monday, January 27  4. 01/31/20: Initiated cycle 1 Taxol               HISTORY OF PRESENT ILLNESS:  The patient is a 59 y.o. year old female with the above mentioned history presenting here today for lab review cardio injection.  She is here for cycle 1, day 1 of Taxol.  She has started taking Neupogen injections which helped her feel much better.  She reports having some reflux, and I have advised her to take Prilosec for management.  She denies any tingling/numbess today.      We reviewed with patient the US Breast from 01/29/20 which shows mild interval partial response to neoadjuvant chemotherapy.      Patient's labs from 01/31/20 show WBC 17.01, hemoglobin 9.7, .4, and absolute neutrophil 9.55.    PAST MEDICAL HISTORY:  She had a stroke in July 2017 with headache and dizziness.  She went to the ER and was placed on aspirin.  However, she stopped taking it two weeks ago.  She has been taking Aimovig (injection) monthly with Fariba López at [facility].    In 1994, patient was diagnosed with HIV with an unknown cause which is  managed by Dr. Natali Subramanian.  As of now, her viral load is good.    Patient has arthritis.  She gets trigger-point injections in her back with her last one being 2 weeks ago.  She has had multiple ablations.  She also has pain in her SI joint and Dr. Bermudez performed a surgery on this.  She takes Narco for the pain.      She is osteoporotic.  She has had multiple hairline fractures in both her legs.  She had her knees cleaned out by Dr. Sinha.     Patient has had a hysterectomy and still has both of her ovaries.    Patient has vertigo and the muscles in her left ear are weak.  She just has an imbalance.        ONCOLOGIC HISTORY:  Patient is a 59-year-old female who has had a history of HIV since age 34 followed by Dr. Natali Ng at [facility] and was on multiple HIV drugs initially but more recently has been on a single medication TRIUMEQ, with well-controlled HIV.  She follows up with Dr. Dawn , infectious disease who saw her recently and he saw her on 4/8/2019 and has excellent control and suppression of her viral load.  She is very compliant with her medications.    She also has had history of stroke in 2017 for which she was placed on aspirin.  She presented with a headache.  She is very active and works at [employer] full-time.  She also has had history of vertigo in the past  Patient's PCP is Zach Lora.    Patient first noticed the mass in her left breast 5 months ago.  Her last mammogram was 5 years ago.  She had soreness in the left breast and she went to her primary care physician who then ordered a mammogram diagnostic and an ultrasound.  The diagnostic mammogram showed a 3.7 cm mass at 4 o'clock position in the lower outer quadrant of the left breast.  The ultrasound showed 3.8 cm x 3.1 x 2.5 cm mass at 4 o'clock position in the left breast with 2 abnormal appearing left axillary lymph nodes the largest being 1.4 cm.    She then underwent biopsy of both the breast mass as  well as the left axillary lymph node and both of them are positive for invasive mammary carcinoma it is invasive ductal carcinoma with apocrine features, moderately differentiated, grade 2 of 3 with a [place] score of 5.  The left axillary lymph node is also consistent with metastatic mammary carcinoma.  It is ER positive TN positive HER-2/merna negative.  Details as follows    10/21/19 - Bilateral Diagnostic Mammogram and US Breast Left  FINDINGS: Bilateral digital CC and MLO mammographic images were  obtained. No prior examination is available for comparison. Scattered  fibroglandular densities are seen throughout both breasts. A triangular  skin marker represents the area of palpable concern in the lower outer  quadrant of the left breast in the posterior 1/3. At this location there  is an irregular mass that measures on the order of 3.7 cm in greatest  dimension. Internal calcifications are noted. No suspicious findings in  the right breast are appreciated.     ULTRASOUND: Targeted sonographic evaluation of the left breast was  performed through the area of concern in the left axilla. At the 4  o'clock position on the order of 6 cm from the nipple there is an  irregular hypoechoic mass that measures 3.8 x 3.1 x 2.5 cm. Internal  vascularity is noted.     There are 2 abnormal-appearing left axillary lymph nodes, the largest  which measures on the order of 1.4 cm in greatest dimension.     IMPRESSION:  1. There is a 3.8 cm irregular mass in the left breast at the 4 o'clock  position. This is seen in conjunction with an irregular 1.4 cm lymph  node in the left axilla. This is suspicious for malignancy with left  axillary involvement. Correlation with ultrasound-guided biopsy of both  the left breast mass and the lymph node is recommended.  2. There are no findings suspicious for malignancy in the right breast.     BI-RADS CATEGORY 5:     Patient's labs from 11/12/19 are normal.    10/29/19 - Tissue Pathology  1.  Left Breast, 4:00, 6 cm FN, U/S-Guided Core Needle Biopsy for a Mass:  A. INVASIVE DUCTAL CARCINOMA WITH APOCRINE FEATURES, Moderately differentiated;  [place] Histologic Grade II/III (tubule score = 3, nuclear score = 2, mitoses score = 1), measuring at least  9 mm.  B. No ductal or lobular carcinoma in situ is identified in this sample.  C. Focus suspicious for lymphovascular space invasion.  D. See Biomarker Template for hormone receptor studies.  2. Lymph Node, Left Axilla, U/S-Guided Core Needle Biopsy:  A. METASTATIC MAMMARY CARCINOMA (see Comment).  B. Metastatic focus measures 5 mm in greatest extent.  ER+, 85%  UT+, 10%  HER2- Score 2+    11/13/19 - CT Neck Chest Abdomen Pelvis  IMPRESSION:  1. In addition to the irregular approximately 3.8 cm mass within the  lateral aspect of the left breast, there are a few subcentimeter  asymmetric nodules along the lateral margin of the glandular tissue. The  several asymmetric left subpectoral nodes and 1.2 x 1.0 cm left axillary  node are suspicious for tamar metastases. The asymmetric subcentimeter  left supraclavicular and left posterior cervical triangle nodes are  worrisome as well.  2. There is no convincing evidence for metastatic disease within the  abdomen or pelvis.    11/14/19 - Echocardiogram:  Normal.  Calculated EF = 67%.  There is trace mitral valve and tricuspid valve regurgitation.    11/15/19 - Bone Scan  Negative.    11/18/19 - MRI Breast Bilateral  IMPRESSION:  1. Biopsy-proven malignancy in the left breast centered at the 4 o'clock  position with associated surrounding satellite nodules as described. The  entire region of involvement including the satellite nodules and the  dominant mass measure up to 7.5 cm in greatest dimension. Also, left  axillary adenopathy with multiple irregular lymph nodes and loss of  differentiation of the borders of multiple lymph nodes is noted and this  is suspicious for extranodal involvement.  2. There are no  findings suspicious for malignancy in the right breast.  BI-RADS CATEGORY 6      19: Cycle #1 of Adriamycin/Cytoxan    20: Last cycle of Adriamycin/Cytoxan given without Neulasta.  We will switch from Neulasta to 7 days of Neupogen injections after cycle 4.    We reviewed with patient the US Breast from 20 which shows mild interval partial response to neoadjuvant chemotherapy.      20: Initiated cycle 1 Taxol    Past Medical History:   Diagnosis Date   • Anxiety    • Cerebrovascular accident (CVA) (CMS/Formerly [facility]) 2017   • DDD (degenerative disc disease), cervical    • Depression    • GERD (gastroesophageal reflux disease)    • H/O ICH (intracerebral hemorrhage) (CMS/Formerly [facility])    • History of stroke    • History of thrombocytopenia    • HIV (human immunodeficiency virus infection) (CMS/Formerly [facility])    • Hyperlipidemia    • Insomnia    • Lupus anticoagulant positive    • Malignant neoplasm of overlapping sites of left breast in female, estrogen receptor positive (CMS/Formerly [facility]) 2019   • Meniscus tear 2017    RIGHT   • Migraine    • Neck pain     WITH SHOOTING PAIN LEFT RIGHT ARM   • Osteoarthritis    • Osteoporosis    • Seasonal allergies    • Spinal headache    • Tick bite 2014        Past Surgical History:   Procedure Laterality Date   • ADENOIDECTOMY     • CHOLECYSTECTOMY     • HYSTERECTOMY     • KNEE ARTHROSCOPY Right 3/24/2017    Procedure:  RIGHT  KNEE ARTHROSCOPY WITH DEBRIDEMENT CHONDROPLASTY PARTIAL MENISCECTOMY;  Surgeon: Karl Galeas MD;  Location: [facility];  Service:    • KNEE CARTILAGE SURGERY Right    • TONSILLECTOMY     • US GUIDED LYMPH NODE BIOPSY  10/29/2019   • VENOUS ACCESS DEVICE (PORT) INSERTION Right 2019    Procedure: INSERTION VENOUS ACCESS DEVICE RIGHT;  Surgeon: Landen Forman MD;  Location: [facility]  Service: General      OB-GYN:  Menarche 15 years  Menopause-46  Pregnancies- 1 para 1 no miscarriages her first pregnancy was in  1990 at 29 years of age.  She did not breastfeed.  Post menopausal HRT- None.  Hx of birth control pills- Yes.    Current Outpatient Medications on File Prior to Visit   Medication Sig Dispense Refill   • AIMOVIG 140 MG/ML solution auto-injector INJECT 140 MG INTO THE SKIN EVERY 30 (THIRTY) DAYS.  11   • atorvastatin (LIPITOR) 20 MG tablet TAKE 1 TABLET BY MOUTH EVERY DAY AT NIGHT 90 tablet 3   • baclofen (LIORESAL) 10 MG tablet Take 10 mg by mouth 2 (Two) Times a Day.  2   • CAMBIA 50 MG pack TAKE 1 PACKET AS NEEDED FOR HEADACHES  1   • clonazePAM (KlonoPIN) 0.5 MG tablet TAKE 1 TABLET BY MOUTH 2 (TWO) TIMES A DAY AS NEEDED FOR ANXIETY. 60 tablet 1   • COCONUT OIL PO Take  by mouth.     • colesevelam (WELCHOL) 625 MG tablet TAKE 2 TABLETS BY MOUTH EVERY  tablet 3   • dexamethasone (DECADRON) 4 MG tablet Take 2 tablets in the morning daily on Days 2, 3 & 4.  Take with food. 6 tablet 3   • escitalopram (LEXAPRO) 10 MG tablet Take 1 tablet by mouth Daily. 90 tablet 3   • fluticasone (FLONASE) 50 MCG/ACT nasal spray 2 sprays into the nostril(s) as directed by provider Daily. 3 bottle 3   • ibandronate (BONIVA) 150 MG tablet TAKE 1 TABLET BY MOUTH EVERY 30 (THIRTY) DAYS. 3 tablet 3   • Menaquinone-7 (VITAMIN K2 PO) Take  by mouth.     • NON FORMULARY Collagen peptides powder     • ondansetron (ZOFRAN) 8 MG tablet Take 1 tablet by mouth 3 (Three) Times a Day As Needed for Nausea or Vomiting. 30 tablet 5   • ondansetron ODT (ZOFRAN ODT) 4 MG disintegrating tablet Take 1 tablet by mouth Every 8 (Eight) Hours As Needed for Nausea or Vomiting. 30 tablet 0   • oxyCODONE-acetaminophen (PERCOCET)  MG per tablet TK 1 T PO QID     • prochlorperazine (COMPAZINE) 10 MG tablet Take 1 tablet by mouth Every 6 (Six) Hours As Needed for Nausea or Vomiting. 20 tablet 2   • TRIUMEQ 600- MG per tablet TAKE 1 TABLET BY MOUTH EVERY DAY 90 tablet 3   • zolpidem (AMBIEN) 10 MG tablet Take 1 tablet by mouth At Night As Needed  for Sleep. 90 tablet 0   • levoFLOXacin (LEVAQUIN) 250 MG tablet Take 2 tablets by mouth Daily. Starting from day#4 of chemo cycle. 7 tablet 2     No current facility-administered medications on file prior to visit.         ALLERGIES:    Allergies   Allergen Reactions   • Augmentin [Amoxicillin-Pot Clavulanate] Hives        Social History     Socioeconomic History   • Marital status:      Spouse name: Owen   • Number of children: 1   • Years of education: College   • Highest education level: Not on file   Occupational History   • Occupation:      Employer:  [employer]   Tobacco Use   • Smoking status: Never Smoker   • Smokeless tobacco: Never Used   Substance and Sexual Activity   • Alcohol use: Yes     Comment: occasioonal   • Drug use: No   • Sexual activity: Defer     Birth control/protection: None     Patient does not smoke or do drugs.  She does drink occasionally.    Family History   Problem Relation Age of Onset   • Breast cancer Mother    • Leukemia Mother         CLL?   • Diabetes Mother    • Hyperthyroidism Mother    • Hearing loss Mother    • Dementia Father    • Heart disease Maternal Grandmother    • Diabetes Maternal Grandfather    • Heart disease Maternal Grandfather    • Stroke Maternal Grandfather    • Heart attack Maternal Grandfather    • Osteoporosis Paternal Grandmother    • Heart disease Paternal Grandfather    • Leukemia Maternal Aunt         CLL?   • Throat cancer Paternal Uncle       FAMILY HISTORY:  Her mother had breast cancer at age 60, still alive at 85 and in good health..  Her father had dementia.  Her paternal uncle had prostate cancer.  Her brother had esophageal cancer.        Review of Systems   Constitutional: Positive for fatigue (01/31/20-Unchanged). Negative for activity change, appetite change, chills, diaphoresis, fever and unexpected weight change.   HENT: Negative for hearing loss, mouth sores, nosebleeds, sore throat and trouble swallowing.     Respiratory: Negative for cough, chest tightness, shortness of breath and wheezing.    Cardiovascular: Negative for chest pain, palpitations and leg swelling.   Gastrointestinal: Negative for abdominal distention, abdominal pain, constipation, diarrhea, nausea and vomiting.   Genitourinary: Negative for difficulty urinating, dysuria, frequency, hematuria and urgency.   Musculoskeletal: Negative for joint swelling.        See HPI   Skin: Negative for rash and wound.   Neurological: Negative for dizziness, seizures, syncope, speech difficulty, weakness, numbness and headaches.   Hematological: Negative for adenopathy. Does not bruise/bleed easily.   Psychiatric/Behavioral: Negative for behavioral problems, confusion and suicidal ideas.       Objective      /787, O2 saturation 99%, pulse 63, and temperature 98.4 °F  , respiratory rate 16.  Weight 139 pounds.   ECOG PS 0     PHYSICAL EXAM  GENERAL:  Well-developed, well-nourished in no acute distress.   SKIN:  Warm, dry without rashes, purpura or petechiae.  NECK:  Supple with good range of motion; no thyromegaly or masses, no JVD.  LYMPHATICS:  No cervical, supraclavicular, axillary or inguinal adenopathy.  CHEST:  Lungs clear to auscultation. Good airflow.  BREAST: Right breast: No skin changes, no evidence of breast mass, no nipple discharge, no evidence of any right axillary adenopathy or right supraclavicular adenopathy  Left breast: No evidence of any skin changes, no evidence of any left breast mass and no evidence of left nipple discharge as well as no left axillary adenopathy or left supraclavicular adenopathy. Her tumor measures approximately 4.4 x 4.5 cm.  CARDIAC:  Regular rate and rhythm without murmurs, rubs or gallops. Normal S1,S2.  ABDOMEN:  Soft, nontender with no hepatosplenomegaly or masses.  EXTREMITIES:  No clubbing, cyanosis or edema.  NEUROLOGICAL:  Cranial Nerves II-XII grossly intact.  No focal neurological deficits.  PSYCHIATRIC:   Normal affect and mood.      RECENT LABS:     Results from last 7 days   Lab Units 01/31/20  0810 01/28/20  1247 01/27/20  1047   WBC 10*3/mm3 17.01* 31.73* 7.71   HEMOGLOBIN g/dL 9.7* 9.8* 9.9*   HEMATOCRIT % 30.1* 29.2* 29.9*   PLATELETS 10*3/mm3 171 201 163     Lab Results   Component Value Date    NEUTROABS 9.55 (H) 01/31/2020     Results from last 7 days   Lab Units 01/31/20  0810 01/24/20  1013   SODIUM mmol/L 142 140   POTASSIUM mmol/L 4.4 4.2   CHLORIDE mmol/L 105 103   CO2 mmol/L 28.2 27.1   BUN mg/dL 7 9   CREATININE mg/dL 0.60 0.43*   CALCIUM mg/dL 8.8 8.8   BILIRUBIN mg/dL <0.2* 0.2   ALK PHOS U/L 140* 158*   ALT (SGPT) U/L 9 7   AST (SGOT) U/L 17 11   GLUCOSE mg/dL 88 90     01/29/20 - US Breast  IMPRESSION:  There are findings suggestive of mild interval partial  response to neoadjuvant chemotherapy.    Assessment/Plan    1. T2N1 invasive ductal carcinoma of the left breast, recently diagnosed.    -Noticed mass in the left breast x5 months  -Diagnostic mammogram showed 3.7 mm mass in the left breast at 4 o'clock position  -Ultrasound-showed 3.8 x 3.1 x 2.5 cm mass at 4 o'clock position with 2 abnormal axillary lymph nodes, larger lymph node being 1.4 cm  -Left breast biopsy and axillary lymph node biopsy October 29, 2019, invasive ductal carcinoma, moderately differentiated, grade 2, ER 85%, [place] 10%, HER-2/merna 2+, HER-2 FISH negative  · CT scans from 11/13/19 show some asymmetric nodules along the lateral margin of the glandular tissue.  The 1.2x1.0 cm left axillary nodes, left supraclavicular, and left posterior cervical triangle nodes are suspicious for tamar metastases.  ·   bone scan from 11/15/19 which was negative.   ·  MRI breast from 11/18/19 which shows the biopsy-proven malignancy in the left breast centered at the 4:00 position.  The region of involvement measures up to 7.5 cm.  There is left axillary adenopathy with multiple irregular lymph nodes and loss of differentiation of the border of  multiple lymph nodes which are suspicious for extranodal involvement.  ·    echocardiogram from 11/14/19 which was normal with an ejection fraction of 67%.    · INVITAE from 11/14/19 which was negative.  · Given the size of her tumor and her medical history, patient will be given 4 cycles of Adriamycin/Cytoxan with Neulasta.  After completion of this treatment, patient will be started on 12 cycles of Taxol.  After the tumor shrinks in size, Dr. Forman will perform a lumpectomy.    · Following lumpectomy, patient will begin endocrine therapy.  I have spoken with Dr. Forman who agrees with chemotherapy first due to the large size of the tumor.   ·  Dr. Dawn agrees chemotherapy will not aggravate her HIV condition.  I also spoke with Dr. Mohan at [facility] who also agrees chemotherapy is the first step.   · 12/06/19: Cycle #1 of Adriamycin/Cytoxan  · 01/17/20: Proceed with final A/C cycle 4 today without Neulasta.  Patient to come 7 days for Neupogen injections.  If she has more pain after the injections, she can be given morphine.  · She is here today, 1/24/2020 for her seventh dose of Zarxio.  She did tolerate Zarxio much better than Neulasta.  Her total white count is within normal limits but slightly lower than we like.  · We reviewed with patient the US Breast from 01/29/20 after 4 cycles of Adriamycin Cytoxan chemotherapy which shows mild interval partial response to neoadjuvant chemotherapy.  The mass has decreased from 3.8 x 2.5 to 3.1 cm to 3.5 x 2.3 x 3.2 cm previously the left axillary lymph node has decreased from 1.4 x 0.7 x 1 cm to 1.1 x 0.6 x 0.9 cm.  · 01/31/20: Initiated cycle 1 Taxol    2.  Profound neutropenia due to chemotherapy despite Neulasta:   · On 12/13/2019 at 1 week after cycle #1 chemotherapy, white blood cell count 850, ANC 50 together with a low monocytes 150 .  We started the patient on Levaquin 500 mg daily for 7 days.  No neutropenia fever.    · On 12/20/2019, much improved  and elevated ANC 10,800.  We will proceed ahead with cycle #2 of chemotherapy.  Neulasta will be continued for each chemotherapy.  Due to expected neutropenia, will start the patient on Levaquin 500 mg daily for 7 days, starting on day #4 of each cycle AC chemothera  · As outlined above the patient did receive 6 days of Zarxio and is here today for scheduled Zarxio.  Her total white count today is 7.08 and ANC is 6.51.    3.  moderate thrombocytopenia secondary to chemotherapy.    · This happened after cycle 1 Chemotherapy with platelets 92,000 on 12/13/2019.  Patient was asymptomatic.  · Normalized platelets 168,000 on 12/20/2019.  Expecting thrombocytopenia will recur.  Continue to monitor.  · Platelet count today is 130,000    4.  Chemotherapy induced anemia.   · Hemoglobin 11.5 on 12/20/2019.  Likely anemia were getting worse with ongoing chemotherapy.  Continue to monitor.     5.  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  · On 12/13/2019 patient here for toxicity check and struggled with significant insomnia from steroids following her first cycle.  We discussed taking the dexamethasone, only one 4 mg tablet daily for 3 days with her next cycle of chemotherapy to see if this improves her symptoms.    · On 12/20/2019, I questioned the patient that her nausea and vomiting may be significant due to decreased dose of dexamethasone.  She could use Compazine and Zofran as needed more frequently.  · Patient is taking Ambien for insomnia and that is still not helping that much.  · Will decrease dose of dexamethasone to 2 mg days 2 3 and 4 after chemotherapy in the morning  · Insomnia much improved with decreased dose of dexamethasone    6. HIV, followed by Dr. Dawn in infectious disease.  Well-controlled and is on a single medication with very low viral load. Has HIV since age 34.     7.  Family history of breast cancer with mother having had breast cancer at age 60.  There is family history of uncle with prostate cancer.   Patient will require genetic referral    8.  Arthritis with severe back pain followed by Dr. Bam Crandall  · And received Percocet    9. Episodes of nosebleeds.  She has had these once a week for 3-4 years.  She did not report this today.    10. Severe body ache post A/C cycle 3.  She took Percocet and Hydrocodone for pain management which did help.  I have discontinued Neulasta as outlined above.      PLAN:  1. Proceed with cycle 1 Taxol today  2. We reviewed with patient the US Breast from 01/29/20 which shows mild interval partial response to neoadjuvant chemotherapy.    3. Return weekly for Taxol with CBC  4. Follow up with NP in 1 weeks with labs.  5. Follow up with [facility] in 3 weeks with labs.    I, Yuli Lorenzo, acted as scribe for Ruby Moreno MD  in documenting the service or procedure. To the best of my knowledge, I recorded what was dictated by MD Ruby Garcia MD  01/31/20     Cc:  ОЛЬГА Mccoy MD Nathan Bullington, MD Gary Reasor, MD

## 2020-01-31 ENCOUNTER — OFFICE VISIT (OUTPATIENT)
Dept: ONCOLOGY | Facility: CLINIC | Age: 60
End: 2020-01-31

## 2020-01-31 ENCOUNTER — INFUSION (OUTPATIENT)
Dept: ONCOLOGY | Facility: HOSPITAL | Age: 60
End: 2020-01-31

## 2020-01-31 VITALS
BODY MASS INDEX: 23.21 KG/M2 | TEMPERATURE: 98.6 F | HEART RATE: 81 BPM | SYSTOLIC BLOOD PRESSURE: 126 MMHG | DIASTOLIC BLOOD PRESSURE: 76 MMHG | OXYGEN SATURATION: 97 % | HEIGHT: 66 IN | WEIGHT: 144.4 LBS | RESPIRATION RATE: 16 BRPM

## 2020-01-31 VITALS — SYSTOLIC BLOOD PRESSURE: 121 MMHG | HEART RATE: 67 BPM | DIASTOLIC BLOOD PRESSURE: 78 MMHG

## 2020-01-31 DIAGNOSIS — Z21 HIV POSITIVE LONG-TERM NON-PROGRESSOR (HCC): ICD-10-CM

## 2020-01-31 DIAGNOSIS — R04.0 EPISTAXIS: ICD-10-CM

## 2020-01-31 DIAGNOSIS — Z80.3 FAMILY HISTORY OF BREAST CANCER: ICD-10-CM

## 2020-01-31 DIAGNOSIS — M25.50 ARTHRALGIA OF MULTIPLE JOINTS: ICD-10-CM

## 2020-01-31 DIAGNOSIS — C50.812 MALIGNANT NEOPLASM OF OVERLAPPING SITES OF LEFT BREAST IN FEMALE, ESTROGEN RECEPTOR POSITIVE (HCC): ICD-10-CM

## 2020-01-31 DIAGNOSIS — F19.982 DRUG-INDUCED INSOMNIA (HCC): ICD-10-CM

## 2020-01-31 DIAGNOSIS — R52 BODY ACHES: ICD-10-CM

## 2020-01-31 DIAGNOSIS — Z17.0 MALIGNANT NEOPLASM OF OVERLAPPING SITES OF LEFT BREAST IN FEMALE, ESTROGEN RECEPTOR POSITIVE (HCC): Primary | ICD-10-CM

## 2020-01-31 DIAGNOSIS — Z17.0 MALIGNANT NEOPLASM OF OVERLAPPING SITES OF LEFT BREAST IN FEMALE, ESTROGEN RECEPTOR POSITIVE (HCC): ICD-10-CM

## 2020-01-31 DIAGNOSIS — C91.10 CLL (CHRONIC LYMPHOCYTIC LEUKEMIA) (HCC): Primary | ICD-10-CM

## 2020-01-31 DIAGNOSIS — C50.812 MALIGNANT NEOPLASM OF OVERLAPPING SITES OF LEFT BREAST IN FEMALE, ESTROGEN RECEPTOR POSITIVE (HCC): Primary | ICD-10-CM

## 2020-01-31 DIAGNOSIS — Z45.2 ENCOUNTER FOR FITTING AND ADJUSTMENT OF VASCULAR CATHETER: ICD-10-CM

## 2020-01-31 DIAGNOSIS — C50.912 INVASIVE DUCTAL CARCINOMA OF BREAST, FEMALE, LEFT (HCC): Primary | ICD-10-CM

## 2020-01-31 LAB
ALBUMIN SERPL-MCNC: 4.1 G/DL (ref 3.5–5.2)
ALBUMIN/GLOB SERPL: 1.8 G/DL (ref 1.1–2.4)
ALP SERPL-CCNC: 140 U/L (ref 38–116)
ALT SERPL W P-5'-P-CCNC: 9 U/L (ref 0–33)
ANION GAP SERPL CALCULATED.3IONS-SCNC: 8.8 MMOL/L (ref 5–15)
AST SERPL-CCNC: 17 U/L (ref 0–32)
BASOPHILS # BLD AUTO: 0.06 10*3/MM3 (ref 0–0.2)
BASOPHILS NFR BLD AUTO: 0.4 % (ref 0–1.5)
BILIRUB SERPL-MCNC: <0.2 MG/DL (ref 0.2–1.2)
BUN BLD-MCNC: 7 MG/DL (ref 6–20)
BUN/CREAT SERPL: 11.7 (ref 7.3–30)
CALCIUM SPEC-SCNC: 8.8 MG/DL (ref 8.5–10.2)
CHLORIDE SERPL-SCNC: 105 MMOL/L (ref 98–107)
CO2 SERPL-SCNC: 28.2 MMOL/L (ref 22–29)
CREAT BLD-MCNC: 0.6 MG/DL (ref 0.6–1.1)
DEPRECATED RDW RBC AUTO: 58.3 FL (ref 37–54)
EOSINOPHIL # BLD AUTO: 0 10*3/MM3 (ref 0–0.4)
EOSINOPHIL NFR BLD AUTO: 0 % (ref 0.3–6.2)
ERYTHROCYTE [DISTWIDTH] IN BLOOD BY AUTOMATED COUNT: 15.2 % (ref 12.3–15.4)
GFR SERPL CREATININE-BSD FRML MDRD: 102 ML/MIN/1.73
GLOBULIN UR ELPH-MCNC: 2.3 GM/DL (ref 1.8–3.5)
GLUCOSE BLD-MCNC: 88 MG/DL (ref 74–124)
HCT VFR BLD AUTO: 30.1 % (ref 34–46.6)
HGB BLD-MCNC: 9.7 G/DL (ref 12–15.9)
IMM GRANULOCYTES # BLD AUTO: 5.18 10*3/MM3 (ref 0–0.05)
IMM GRANULOCYTES NFR BLD AUTO: 30.5 % (ref 0–0.5)
LYMPHOCYTES # BLD AUTO: 0.88 10*3/MM3 (ref 0.7–3.1)
LYMPHOCYTES NFR BLD AUTO: 5.2 % (ref 19.6–45.3)
MCH RBC QN AUTO: 34.3 PG (ref 26.6–33)
MCHC RBC AUTO-ENTMCNC: 32.2 G/DL (ref 31.5–35.7)
MCV RBC AUTO: 106.4 FL (ref 79–97)
MONOCYTES # BLD AUTO: 1.34 10*3/MM3 (ref 0.1–0.9)
MONOCYTES NFR BLD AUTO: 7.9 % (ref 5–12)
NEUTROPHILS # BLD AUTO: 9.55 10*3/MM3 (ref 1.7–7)
NEUTROPHILS NFR BLD AUTO: 56 % (ref 42.7–76)
NRBC BLD AUTO-RTO: 0.6 /100 WBC (ref 0–0.2)
PLATELET # BLD AUTO: 171 10*3/MM3 (ref 140–450)
PMV BLD AUTO: 8.9 FL (ref 6–12)
POTASSIUM BLD-SCNC: 4.4 MMOL/L (ref 3.5–4.7)
PROT SERPL-MCNC: 6.4 G/DL (ref 6.3–8)
RBC # BLD AUTO: 2.83 10*6/MM3 (ref 3.77–5.28)
SODIUM BLD-SCNC: 142 MMOL/L (ref 134–145)
WBC NRBC COR # BLD: 17.01 10*3/MM3 (ref 3.4–10.8)

## 2020-01-31 PROCEDURE — 96413 CHEMO IV INFUSION 1 HR: CPT | Performed by: INTERNAL MEDICINE

## 2020-01-31 PROCEDURE — 80053 COMPREHEN METABOLIC PANEL: CPT

## 2020-01-31 PROCEDURE — 85025 COMPLETE CBC W/AUTO DIFF WBC: CPT

## 2020-01-31 PROCEDURE — 25010000002 DEXAMETHASONE SODIUM PHOSPHATE 100 MG/10ML SOLUTION: Performed by: INTERNAL MEDICINE

## 2020-01-31 PROCEDURE — 99215 OFFICE O/P EST HI 40 MIN: CPT | Performed by: INTERNAL MEDICINE

## 2020-01-31 PROCEDURE — 25010000002 PACLITAXEL PER 30 MG: Performed by: INTERNAL MEDICINE

## 2020-01-31 PROCEDURE — 25010000002 DIPHENHYDRAMINE PER 50 MG: Performed by: INTERNAL MEDICINE

## 2020-01-31 PROCEDURE — 96375 TX/PRO/DX INJ NEW DRUG ADDON: CPT | Performed by: INTERNAL MEDICINE

## 2020-01-31 PROCEDURE — 96367 TX/PROPH/DG ADDL SEQ IV INF: CPT | Performed by: INTERNAL MEDICINE

## 2020-01-31 PROCEDURE — 25010000003 HEPARIN LOCK FLUCH PER 10 UNITS: Performed by: INTERNAL MEDICINE

## 2020-01-31 RX ORDER — SODIUM CHLORIDE 9 MG/ML
250 INJECTION, SOLUTION INTRAVENOUS ONCE
Status: CANCELLED | OUTPATIENT
Start: 2020-01-31

## 2020-01-31 RX ORDER — SODIUM CHLORIDE 9 MG/ML
250 INJECTION, SOLUTION INTRAVENOUS ONCE
Status: COMPLETED | OUTPATIENT
Start: 2020-01-31 | End: 2020-01-31

## 2020-01-31 RX ORDER — FAMOTIDINE 10 MG/ML
20 INJECTION, SOLUTION INTRAVENOUS AS NEEDED
Status: CANCELLED | OUTPATIENT
Start: 2020-01-31

## 2020-01-31 RX ORDER — HEPARIN SODIUM (PORCINE) LOCK FLUSH IV SOLN 100 UNIT/ML 100 UNIT/ML
500 SOLUTION INTRAVENOUS AS NEEDED
Status: DISCONTINUED | OUTPATIENT
Start: 2020-01-31 | End: 2020-01-31 | Stop reason: HOSPADM

## 2020-01-31 RX ORDER — FAMOTIDINE 10 MG/ML
20 INJECTION, SOLUTION INTRAVENOUS ONCE
Status: COMPLETED | OUTPATIENT
Start: 2020-01-31 | End: 2020-01-31

## 2020-01-31 RX ORDER — SODIUM CHLORIDE 0.9 % (FLUSH) 0.9 %
10 SYRINGE (ML) INJECTION AS NEEDED
Status: DISCONTINUED | OUTPATIENT
Start: 2020-01-31 | End: 2020-01-31 | Stop reason: HOSPADM

## 2020-01-31 RX ORDER — SODIUM CHLORIDE 0.9 % (FLUSH) 0.9 %
10 SYRINGE (ML) INJECTION AS NEEDED
Status: CANCELLED | OUTPATIENT
Start: 2020-01-31

## 2020-01-31 RX ORDER — HEPARIN SODIUM (PORCINE) LOCK FLUSH IV SOLN 100 UNIT/ML 100 UNIT/ML
500 SOLUTION INTRAVENOUS AS NEEDED
Status: CANCELLED | OUTPATIENT
Start: 2020-01-31

## 2020-01-31 RX ORDER — FAMOTIDINE 10 MG/ML
20 INJECTION, SOLUTION INTRAVENOUS ONCE
Status: CANCELLED | OUTPATIENT
Start: 2020-01-31

## 2020-01-31 RX ORDER — DIPHENHYDRAMINE HYDROCHLORIDE 50 MG/ML
50 INJECTION INTRAMUSCULAR; INTRAVENOUS AS NEEDED
Status: CANCELLED | OUTPATIENT
Start: 2020-01-31

## 2020-01-31 RX ADMIN — FAMOTIDINE 20 MG: 10 INJECTION INTRAVENOUS at 09:45

## 2020-01-31 RX ADMIN — DIPHENHYDRAMINE HYDROCHLORIDE 50 MG: 50 INJECTION INTRAMUSCULAR; INTRAVENOUS at 10:21

## 2020-01-31 RX ADMIN — SODIUM CHLORIDE 140 MG: 9 INJECTION, SOLUTION INTRAVENOUS at 11:15

## 2020-01-31 RX ADMIN — SODIUM CHLORIDE 250 ML: 9 INJECTION, SOLUTION INTRAVENOUS at 09:45

## 2020-01-31 RX ADMIN — SODIUM CHLORIDE, PRESERVATIVE FREE 10 ML: 5 INJECTION INTRAVENOUS at 13:28

## 2020-01-31 RX ADMIN — SODIUM CHLORIDE, PRESERVATIVE FREE 500 UNITS: 5 INJECTION INTRAVENOUS at 13:32

## 2020-01-31 RX ADMIN — DEXAMETHASONE SODIUM PHOSPHATE 12 MG: 10 INJECTION, SOLUTION INTRAMUSCULAR; INTRAVENOUS at 09:45

## 2020-02-03 ENCOUNTER — TELEPHONE (OUTPATIENT)
Dept: ONCOLOGY | Facility: HOSPITAL | Age: 60
End: 2020-02-03

## 2020-02-03 NOTE — TELEPHONE ENCOUNTER
"Called pt to see how she is doing after first taxol on 1/31. Pt states she is feeling \"tired and run down\" and having some body aches. Eating and drinking well. No c/o n/v/d. Informed pt to try hot baths or heating pad for achy joints, tylenol if needed. Reminded pt to call with any concerns and reminded pt of appt on 2/7  "

## 2020-02-07 ENCOUNTER — OFFICE VISIT (OUTPATIENT)
Dept: ONCOLOGY | Facility: CLINIC | Age: 60
End: 2020-02-07

## 2020-02-07 ENCOUNTER — APPOINTMENT (OUTPATIENT)
Dept: ONCOLOGY | Facility: HOSPITAL | Age: 60
End: 2020-02-07

## 2020-02-07 ENCOUNTER — INFUSION (OUTPATIENT)
Dept: ONCOLOGY | Facility: HOSPITAL | Age: 60
End: 2020-02-07

## 2020-02-07 VITALS
BODY MASS INDEX: 23.22 KG/M2 | OXYGEN SATURATION: 97 % | HEIGHT: 66 IN | RESPIRATION RATE: 16 BRPM | SYSTOLIC BLOOD PRESSURE: 115 MMHG | WEIGHT: 144.5 LBS | HEART RATE: 78 BPM | DIASTOLIC BLOOD PRESSURE: 73 MMHG | TEMPERATURE: 98.3 F

## 2020-02-07 DIAGNOSIS — C50.812 MALIGNANT NEOPLASM OF OVERLAPPING SITES OF LEFT BREAST IN FEMALE, ESTROGEN RECEPTOR POSITIVE (HCC): ICD-10-CM

## 2020-02-07 DIAGNOSIS — D70.1 CHEMOTHERAPY-INDUCED NEUTROPENIA (HCC): ICD-10-CM

## 2020-02-07 DIAGNOSIS — Z17.0 MALIGNANT NEOPLASM OF OVERLAPPING SITES OF LEFT BREAST IN FEMALE, ESTROGEN RECEPTOR POSITIVE (HCC): Primary | ICD-10-CM

## 2020-02-07 DIAGNOSIS — Z17.0 MALIGNANT NEOPLASM OF OVERLAPPING SITES OF LEFT BREAST IN FEMALE, ESTROGEN RECEPTOR POSITIVE (HCC): ICD-10-CM

## 2020-02-07 DIAGNOSIS — Z45.2 ENCOUNTER FOR FITTING AND ADJUSTMENT OF VASCULAR CATHETER: ICD-10-CM

## 2020-02-07 DIAGNOSIS — C50.812 MALIGNANT NEOPLASM OF OVERLAPPING SITES OF LEFT BREAST IN FEMALE, ESTROGEN RECEPTOR POSITIVE (HCC): Primary | ICD-10-CM

## 2020-02-07 DIAGNOSIS — T45.1X5A CHEMOTHERAPY-INDUCED NEUTROPENIA (HCC): ICD-10-CM

## 2020-02-07 DIAGNOSIS — H92.01 RIGHT EAR PAIN: Primary | ICD-10-CM

## 2020-02-07 DIAGNOSIS — D64.9 ANEMIA, UNSPECIFIED TYPE: ICD-10-CM

## 2020-02-07 LAB
ALBUMIN SERPL-MCNC: 4.2 G/DL (ref 3.5–5.2)
ALBUMIN/GLOB SERPL: 1.8 G/DL (ref 1.1–2.4)
ALP SERPL-CCNC: 93 U/L (ref 38–116)
ALT SERPL W P-5'-P-CCNC: 16 U/L (ref 0–33)
ANION GAP SERPL CALCULATED.3IONS-SCNC: 10 MMOL/L (ref 5–15)
AST SERPL-CCNC: 20 U/L (ref 0–32)
BASOPHILS # BLD AUTO: 0.02 10*3/MM3 (ref 0–0.2)
BASOPHILS NFR BLD AUTO: 1.1 % (ref 0–1.5)
BILIRUB SERPL-MCNC: 0.2 MG/DL (ref 0.2–1.2)
BUN BLD-MCNC: 14 MG/DL (ref 6–20)
BUN/CREAT SERPL: 25.9 (ref 7.3–30)
CALCIUM SPEC-SCNC: 9.1 MG/DL (ref 8.5–10.2)
CHLORIDE SERPL-SCNC: 104 MMOL/L (ref 98–107)
CO2 SERPL-SCNC: 27 MMOL/L (ref 22–29)
CREAT BLD-MCNC: 0.54 MG/DL (ref 0.6–1.1)
DEPRECATED RDW RBC AUTO: 57.2 FL (ref 37–54)
EOSINOPHIL # BLD AUTO: 0 10*3/MM3 (ref 0–0.4)
EOSINOPHIL NFR BLD AUTO: 0 % (ref 0.3–6.2)
ERYTHROCYTE [DISTWIDTH] IN BLOOD BY AUTOMATED COUNT: 15.1 % (ref 12.3–15.4)
GFR SERPL CREATININE-BSD FRML MDRD: 116 ML/MIN/1.73
GLOBULIN UR ELPH-MCNC: 2.3 GM/DL (ref 1.8–3.5)
GLUCOSE BLD-MCNC: 86 MG/DL (ref 74–124)
HCT VFR BLD AUTO: 29 % (ref 34–46.6)
HGB BLD-MCNC: 9.4 G/DL (ref 12–15.9)
IMM GRANULOCYTES # BLD AUTO: 0.01 10*3/MM3 (ref 0–0.05)
IMM GRANULOCYTES NFR BLD AUTO: 0.6 % (ref 0–0.5)
LYMPHOCYTES # BLD AUTO: 0.46 10*3/MM3 (ref 0.7–3.1)
LYMPHOCYTES NFR BLD AUTO: 26 % (ref 19.6–45.3)
MCH RBC QN AUTO: 34.1 PG (ref 26.6–33)
MCHC RBC AUTO-ENTMCNC: 32.4 G/DL (ref 31.5–35.7)
MCV RBC AUTO: 105.1 FL (ref 79–97)
MONOCYTES # BLD AUTO: 0.41 10*3/MM3 (ref 0.1–0.9)
MONOCYTES NFR BLD AUTO: 23.2 % (ref 5–12)
NEUTROPHILS # BLD AUTO: 0.87 10*3/MM3 (ref 1.7–7)
NEUTROPHILS NFR BLD AUTO: 49.1 % (ref 42.7–76)
NRBC BLD AUTO-RTO: 0 /100 WBC (ref 0–0.2)
PLATELET # BLD AUTO: 295 10*3/MM3 (ref 140–450)
PMV BLD AUTO: 9 FL (ref 6–12)
POTASSIUM BLD-SCNC: 4.3 MMOL/L (ref 3.5–4.7)
PROT SERPL-MCNC: 6.5 G/DL (ref 6.3–8)
RBC # BLD AUTO: 2.76 10*6/MM3 (ref 3.77–5.28)
SODIUM BLD-SCNC: 141 MMOL/L (ref 134–145)
VIT B12 BLD-MCNC: >2000 PG/ML (ref 211–946)
WBC NRBC COR # BLD: 1.77 10*3/MM3 (ref 3.4–10.8)

## 2020-02-07 PROCEDURE — 82607 VITAMIN B-12: CPT | Performed by: NURSE PRACTITIONER

## 2020-02-07 PROCEDURE — 25010000003 HEPARIN LOCK FLUCH PER 10 UNITS: Performed by: INTERNAL MEDICINE

## 2020-02-07 PROCEDURE — 96372 THER/PROPH/DIAG INJ SC/IM: CPT

## 2020-02-07 PROCEDURE — 25010000002 FILGRASTIM-SNDZ 300 MCG/0.5ML SOLUTION PREFILLED SYRINGE: Performed by: NURSE PRACTITIONER

## 2020-02-07 PROCEDURE — 80053 COMPREHEN METABOLIC PANEL: CPT

## 2020-02-07 PROCEDURE — 36415 COLL VENOUS BLD VENIPUNCTURE: CPT | Performed by: NURSE PRACTITIONER

## 2020-02-07 PROCEDURE — 99215 OFFICE O/P EST HI 40 MIN: CPT | Performed by: NURSE PRACTITIONER

## 2020-02-07 PROCEDURE — 85025 COMPLETE CBC W/AUTO DIFF WBC: CPT

## 2020-02-07 RX ORDER — SODIUM CHLORIDE 0.9 % (FLUSH) 0.9 %
10 SYRINGE (ML) INJECTION AS NEEDED
Status: CANCELLED | OUTPATIENT
Start: 2020-02-07

## 2020-02-07 RX ORDER — SODIUM CHLORIDE 0.9 % (FLUSH) 0.9 %
10 SYRINGE (ML) INJECTION AS NEEDED
Status: DISCONTINUED | OUTPATIENT
Start: 2020-02-07 | End: 2020-02-07 | Stop reason: HOSPADM

## 2020-02-07 RX ORDER — HEPARIN SODIUM (PORCINE) LOCK FLUSH IV SOLN 100 UNIT/ML 100 UNIT/ML
500 SOLUTION INTRAVENOUS AS NEEDED
Status: CANCELLED | OUTPATIENT
Start: 2020-02-07

## 2020-02-07 RX ORDER — LEVOFLOXACIN 500 MG/1
500 TABLET, FILM COATED ORAL DAILY
Qty: 7 TABLET | Refills: 0 | Status: SHIPPED | OUTPATIENT
Start: 2020-02-07 | End: 2020-02-14

## 2020-02-07 RX ORDER — HEPARIN SODIUM (PORCINE) LOCK FLUSH IV SOLN 100 UNIT/ML 100 UNIT/ML
500 SOLUTION INTRAVENOUS AS NEEDED
Status: DISCONTINUED | OUTPATIENT
Start: 2020-02-07 | End: 2020-02-07 | Stop reason: HOSPADM

## 2020-02-07 RX ADMIN — SODIUM CHLORIDE, PRESERVATIVE FREE 10 ML: 5 INJECTION INTRAVENOUS at 09:03

## 2020-02-07 RX ADMIN — FILGRASTIM-SNDZ 300 MCG: 300 INJECTION, SOLUTION INTRAVENOUS; SUBCUTANEOUS at 09:18

## 2020-02-07 RX ADMIN — Medication 500 UNITS: at 09:03

## 2020-02-07 NOTE — PROGRESS NOTES
Subjective     REASON FOR FOLLOW UP:    1.  T2N1 invasive ductal carcinoma of the left breast.  Ultrasound-showed 3.8 x 3.1 x 2.5 cm mass at 4 o'clock position with 2 abnormal axillary lymph nodes, larger lymph node being 1.4 cm.  Left breast biopsy and axillary lymph node biopsy October 29, 2019, invasive ductal carcinoma, moderately differentiated, grade 2, ER 85%, WI 10%, HER-2/merna 2+, HER-2 FISH negative.  · 12/06/19: Cycle #1 of dose-dense Adriamycin/Cytoxan with Neulasta for marrow support    · 01/17/20: Last cycle of Adriamycin/Cytoxan given  2.  Severe neutropenia secondary to chemotherapy, with ANC 50 at 1-week after cycle #1 chemotherapy.  Also had moderate thrombocytopenia platelets 92,000.  Patient was given Levaquin for prophylaxis of neutropenia fever.  3.  1/24/2020.  Seventh dose of Zarxio marginal counts, additional Zarxio plan through the weekend with recheck on Monday, January 27  4. 01/31/20: Initiated cycle 1 Taxol               HISTORY OF PRESENT ILLNESS:  The patient is a 59 y.o. year old female with the above-mentioned history who is here today for evaluation prior to cycle 2 weekly Taxol.  She is complaining of issues with fatigue, reporting that she tires much easier than previously.  She continues to report some discomfort in her right ear, which is been persistent.  She states she has a mild scratchy throat.  She denies fevers or chills.  She denies cough, congestion, or increased shortness of breath.  No issues with nausea, vomiting, diarrhea, or constipation.  Denies neuropathy symptoms.    PAST MEDICAL HISTORY:  She had a stroke in July 2017 with headache and dizziness.  She went to the ER and was placed on aspirin.  However, she stopped taking it two weeks ago.  She has been taking Aimovig (injection) monthly with Fariba López at [place].    In 1994, patient was diagnosed with HIV with an unknown cause which is managed by Dr. Natali Subramanian.  As of now, her viral load is  good.    Patient has arthritis.  She gets trigger-point injections in her back with her last one being 2 weeks ago.  She has had multiple ablations.  She also has pain in her SI joint and Dr. Bermudez performed a surgery on this.  She takes Narco for the pain.      She is osteoporotic.  She has had multiple hairline fractures in both her legs.  She had her knees cleaned out by Dr. Sinha.     Patient has had a hysterectomy and still has both of her ovaries.    Patient has vertigo and the muscles in her left ear are weak.  She just has an imbalance.    ONCOLOGIC HISTORY:  Patient is a 59-year-old female who has had a history of HIV since age 34 followed by Dr. Natali Ng at [facility] and was on multiple HIV drugs initially but more recently has been on a single medication TRIUMEQ, with well-controlled HIV.  She follows up with Dr. Dawn , infectious disease who saw her recently and he saw her on 4/8/2019 and has excellent control and suppression of her viral load.  She is very compliant with her medications.    She also has had history of stroke in 2017 for which she was placed on aspirin.  She presented with a headache.  She is very active and works at [employer] full-time.  She also has had history of vertigo in the past  Patient's PCP is Zach Lora.    Patient first noticed the mass in her left breast 5 months ago.  Her last mammogram was 5 years ago.  She had soreness in the left breast and she went to her primary care physician who then ordered a mammogram diagnostic and an ultrasound.  The diagnostic mammogram showed a 3.7 cm mass at 4 o'clock position in the lower outer quadrant of the left breast.  The ultrasound showed 3.8 cm x 3.1 x 2.5 cm mass at 4 o'clock position in the left breast with 2 abnormal appearing left axillary lymph nodes the largest being 1.4 cm.    She then underwent biopsy of both the breast mass as well as the left axillary lymph node and both of them are  positive for invasive mammary carcinoma it is invasive ductal carcinoma with apocrine features, moderately differentiated, grade 2 of 3 with a Jen score of 5.  The left axillary lymph node is also consistent with metastatic mammary carcinoma.  It is ER positive NV positive HER-2/merna negative.  Details as follows    10/21/19 - Bilateral Diagnostic Mammogram and US Breast Left  FINDINGS: Bilateral digital CC and MLO mammographic images were  obtained. No prior examination is available for comparison. Scattered  fibroglandular densities are seen throughout both breasts. A triangular  skin marker represents the area of palpable concern in the lower outer  quadrant of the left breast in the posterior 1/3. At this location there  is an irregular mass that measures on the order of 3.7 cm in greatest  dimension. Internal calcifications are noted. No suspicious findings in  the right breast are appreciated.     ULTRASOUND: Targeted sonographic evaluation of the left breast was  performed through the area of concern in the left axilla. At the 4  o'clock position on the order of 6 cm from the nipple there is an  irregular hypoechoic mass that measures 3.8 x 3.1 x 2.5 cm. Internal  vascularity is noted.     There are 2 abnormal-appearing left axillary lymph nodes, the largest  which measures on the order of 1.4 cm in greatest dimension.     IMPRESSION:  1. There is a 3.8 cm irregular mass in the left breast at the 4 o'clock  position. This is seen in conjunction with an irregular 1.4 cm lymph  node in the left axilla. This is suspicious for malignancy with left  axillary involvement. Correlation with ultrasound-guided biopsy of both  the left breast mass and the lymph node is recommended.  2. There are no findings suspicious for malignancy in the right breast.     BI-RADS CATEGORY 5:     Patient's labs from 11/12/19 are normal.    10/29/19 - Tissue Pathology  1. Left Breast, 4:00, 6 cm FN, U/S-Guided Core Needle Biopsy  for a Mass:  A. INVASIVE DUCTAL CARCINOMA WITH APOCRINE FEATURES, Moderately differentiated;  Jen Histologic Grade II/III (tubule score = 3, nuclear score = 2, mitoses score = 1), measuring at least  9 mm.  B. No ductal or lobular carcinoma in situ is identified in this sample.  C. Focus suspicious for lymphovascular space invasion.  D. See Biomarker Template for hormone receptor studies.  2. Lymph Node, Left Axilla, U/S-Guided Core Needle Biopsy:  A. METASTATIC MAMMARY CARCINOMA (see Comment).  B. Metastatic focus measures 5 mm in greatest extent.  ER+, 85%  TN+, 10%  HER2- Score 2+    11/13/19 - CT Neck Chest Abdomen Pelvis  IMPRESSION:  1. In addition to the irregular approximately 3.8 cm mass within the  lateral aspect of the left breast, there are a few subcentimeter  asymmetric nodules along the lateral margin of the glandular tissue. The  several asymmetric left subpectoral nodes and 1.2 x 1.0 cm left axillary  node are suspicious for tamar metastases. The asymmetric subcentimeter  left supraclavicular and left posterior cervical triangle nodes are  worrisome as well.  2. There is no convincing evidence for metastatic disease within the  abdomen or pelvis.    11/14/19 - Echocardiogram:  Normal.  Calculated EF = 67%.  There is trace mitral valve and tricuspid valve regurgitation.    11/15/19 - Bone Scan  Negative.    11/18/19 - MRI Breast Bilateral  IMPRESSION:  1. Biopsy-proven malignancy in the left breast centered at the 4 o'clock  position with associated surrounding satellite nodules as described. The  entire region of involvement including the satellite nodules and the  dominant mass measure up to 7.5 cm in greatest dimension. Also, left  axillary adenopathy with multiple irregular lymph nodes and loss of  differentiation of the borders of multiple lymph nodes is noted and this  is suspicious for extranodal involvement.  2. There are no findings suspicious for malignancy in the right  breast.  BI-RADS CATEGORY 6      19: Cycle #1 of Adriamycin/Cytoxan    20: Last cycle of Adriamycin/Cytoxan given without Neulasta.  We will switch from Neulasta to 7 days of Neupogen injections after cycle 4.    We reviewed with patient the US Breast from 20 which shows mild interval partial response to neoadjuvant chemotherapy.      20: Initiated cycle 1 Taxol    Past Medical History:   Diagnosis Date   • Anxiety    • Cerebrovascular accident (CVA) (CMS/[facility]) 2017   • DDD (degenerative disc disease), cervical    • Depression    • GERD (gastroesophageal reflux disease)    • H/O ICH (intracerebral hemorrhage) (CMS/[facility])    • History of stroke    • History of thrombocytopenia    • HIV (human immunodeficiency virus infection) (CMS/[facility])    • Hyperlipidemia    • Insomnia    • Lupus anticoagulant positive    • Malignant neoplasm of overlapping sites of left breast in female, estrogen receptor positive (CMS/[facility]) 2019   • Meniscus tear 2017    RIGHT   • Migraine    • Neck pain     WITH SHOOTING PAIN LEFT RIGHT ARM   • Osteoarthritis    • Osteoporosis    • Seasonal allergies    • Spinal headache    • Tick bite 2014        Past Surgical History:   Procedure Laterality Date   • ADENOIDECTOMY     • CHOLECYSTECTOMY     • HYSTERECTOMY     • KNEE ARTHROSCOPY Right 3/24/2017    Procedure:  RIGHT  KNEE ARTHROSCOPY WITH DEBRIDEMENT CHONDROPLASTY PARTIAL MENISCECTOMY;  Surgeon: Karl Galeas MD;  Location: [facility];  Service:    • KNEE CARTILAGE SURGERY Right    • TONSILLECTOMY     • US GUIDED LYMPH NODE BIOPSY  10/29/2019   • VENOUS ACCESS DEVICE (PORT) INSERTION Right 2019    Procedure: INSERTION VENOUS ACCESS DEVICE RIGHT;  Surgeon: Landen Forman MD;  Location: [facility];  Service: General      OB-GYN:  Menarche 15 years  Menopause-46  Pregnancies- 1 para 1 no miscarriages her first pregnancy was in  at 29 years of age.  She did not  breastfeed.  Post menopausal HRT- None.  Hx of birth control pills- Yes.    Current Outpatient Medications on File Prior to Visit   Medication Sig Dispense Refill   • AIMOVIG 140 MG/ML solution auto-injector INJECT 140 MG INTO THE SKIN EVERY 30 (THIRTY) DAYS.  11   • atorvastatin (LIPITOR) 20 MG tablet TAKE 1 TABLET BY MOUTH EVERY DAY AT NIGHT 90 tablet 3   • baclofen (LIORESAL) 10 MG tablet Take 10 mg by mouth 2 (Two) Times a Day.  2   • CAMBIA 50 MG pack TAKE 1 PACKET AS NEEDED FOR HEADACHES  1   • clonazePAM (KlonoPIN) 0.5 MG tablet TAKE 1 TABLET BY MOUTH 2 (TWO) TIMES A DAY AS NEEDED FOR ANXIETY. 60 tablet 1   • COCONUT OIL PO Take  by mouth.     • colesevelam (WELCHOL) 625 MG tablet TAKE 2 TABLETS BY MOUTH EVERY  tablet 3   • dexamethasone (DECADRON) 4 MG tablet Take 2 tablets in the morning daily on Days 2, 3 & 4.  Take with food. 6 tablet 3   • escitalopram (LEXAPRO) 10 MG tablet Take 1 tablet by mouth Daily. 90 tablet 3   • fluticasone (FLONASE) 50 MCG/ACT nasal spray 2 sprays into the nostril(s) as directed by provider Daily. 3 bottle 3   • ibandronate (BONIVA) 150 MG tablet TAKE 1 TABLET BY MOUTH EVERY 30 (THIRTY) DAYS. 3 tablet 3   • Menaquinone-7 (VITAMIN K2 PO) Take  by mouth.     • NON FORMULARY Collagen peptides powder     • ondansetron (ZOFRAN) 8 MG tablet Take 1 tablet by mouth 3 (Three) Times a Day As Needed for Nausea or Vomiting. 30 tablet 5   • ondansetron ODT (ZOFRAN ODT) 4 MG disintegrating tablet Take 1 tablet by mouth Every 8 (Eight) Hours As Needed for Nausea or Vomiting. 30 tablet 0   • oxyCODONE-acetaminophen (PERCOCET)  MG per tablet TK 1 T PO QID     • prochlorperazine (COMPAZINE) 10 MG tablet Take 1 tablet by mouth Every 6 (Six) Hours As Needed for Nausea or Vomiting. 20 tablet 2   • zolpidem (AMBIEN) 10 MG tablet Take 1 tablet by mouth At Night As Needed for Sleep. 90 tablet 0   • [DISCONTINUED] TRIUMEQ 600- MG per tablet TAKE 1 TABLET BY MOUTH EVERY DAY 90 tablet 3      No current facility-administered medications on file prior to visit.         ALLERGIES:    Allergies   Allergen Reactions   • Augmentin [Amoxicillin-Pot Clavulanate] Hives        Social History     Socioeconomic History   • Marital status:      Spouse name: Owen   • Number of children: 1   • Years of education: College   • Highest education level: Not on file   Occupational History   • Occupation:      Employer: [employer]   Tobacco Use   • Smoking status: Never Smoker   • Smokeless tobacco: Never Used   Substance and Sexual Activity   • Alcohol use: Yes     Comment: occasioonal   • Drug use: No   • Sexual activity: Defer     Birth control/protection: None     Patient does not smoke or do drugs.  She does drink occasionally.    Family History   Problem Relation Age of Onset   • Breast cancer Mother    • Leukemia Mother         CLL?   • Diabetes Mother    • Hyperthyroidism Mother    • Hearing loss Mother    • Dementia Father    • Heart disease Maternal Grandmother    • Diabetes Maternal Grandfather    • Heart disease Maternal Grandfather    • Stroke Maternal Grandfather    • Heart attack Maternal Grandfather    • Osteoporosis Paternal Grandmother    • Heart disease Paternal Grandfather    • Leukemia Maternal Aunt         CLL?   • Throat cancer Paternal Uncle       FAMILY HISTORY:  Her mother had breast cancer at age 60, still alive at 85 and in good health..  Her father had dementia.  Her paternal uncle had prostate cancer.  Her brother had esophageal cancer.        Review of Systems   Constitutional: Negative for activity change, appetite change, chills, fatigue and fever.   HENT: Positive for ear pain (right- see HPI). Negative for mouth sores, nosebleeds and trouble swallowing.    Respiratory: Negative for cough and shortness of breath.    Cardiovascular: Negative for chest pain and leg swelling.   Gastrointestinal: Negative for abdominal pain, constipation, diarrhea, nausea and  vomiting.   Genitourinary: Negative for difficulty urinating.   Skin: Negative for rash.   Neurological: Negative for dizziness, weakness and numbness.   Hematological: Negative for adenopathy. Does not bruise/bleed easily.   Psychiatric/Behavioral: Negative for sleep disturbance.       Objective    Vitals:    02/07/20 0811   BP: 115/73   Pulse: 78   Resp: 16   Temp: 98.3 °F (36.8 °C)   SpO2: 97%   Pain 5 out of 10  ECOG 0  Weight 144 pounds    Physical Exam   Constitutional: She is oriented to person, place, and time. She appears well-developed and well-nourished. No distress.   HENT:   Head: Normocephalic and atraumatic.   Mouth/Throat: Oropharynx is clear and moist and mucous membranes are normal. No oropharyngeal exudate.   Eyes: Pupils are equal, round, and reactive to light.   Neck: Normal range of motion.   Cardiovascular: Normal rate, regular rhythm and normal heart sounds.   No murmur heard.  Pulmonary/Chest: Effort normal and breath sounds normal. No respiratory distress. She has no wheezes. She has no rhonchi. She has no rales.   Abdominal: Soft. Normal appearance and bowel sounds are normal. She exhibits no distension. There is no hepatosplenomegaly.   Musculoskeletal: Normal range of motion. She exhibits no edema.   Neurological: She is alert and oriented to person, place, and time.   Skin: Skin is warm and dry. No rash noted.   Psychiatric: She has a normal mood and affect.   Vitals reviewed.      Prior  Left breast exam: No evidence of any skin changes, no evidence of any left breast mass and no evidence of left nipple discharge as well as no left axillary adenopathy or left supraclavicular adenopathy. Her tumor measures approximately 4.4 x 4.5 cm      RECENT LABS:     Results from last 7 days   Lab Units 02/07/20  0801   WBC 10*3/mm3 1.77*   HEMOGLOBIN g/dL 9.4*   HEMATOCRIT % 29.0*   PLATELETS 10*3/mm3 295     Lab Results   Component Value Date    NEUTROABS 0.87 (L) 02/07/2020     Results from last  7 days   Lab Units 02/07/20  0801   SODIUM mmol/L 141   POTASSIUM mmol/L 4.3   CHLORIDE mmol/L 104   CO2 mmol/L 27.0   BUN mg/dL 14   CREATININE mg/dL 0.54*   CALCIUM mg/dL 9.1   BILIRUBIN mg/dL 0.2   ALK PHOS U/L 93   ALT (SGPT) U/L 16   AST (SGOT) U/L 20   GLUCOSE mg/dL 86     01/29/20 - US Breast  IMPRESSION:  There are findings suggestive of mild interval partial  response to neoadjuvant chemotherapy.    Assessment/Plan    1. T2N1 invasive ductal carcinoma of the left breast, recently diagnosed.    -Noticed mass in the left breast x5 months  -Diagnostic mammogram showed 3.7 mm mass in the left breast at 4 o'clock position  -Ultrasound-showed 3.8 x 3.1 x 2.5 cm mass at 4 o'clock position with 2 abnormal axillary lymph nodes, larger lymph node being 1.4 cm  -Left breast biopsy and axillary lymph node biopsy October 29, 2019, invasive ductal carcinoma, moderately differentiated, grade 2, ER 85%, MA 10%, HER-2/merna 2+, HER-2 FISH negative  · CT scans from 11/13/19 show some asymmetric nodules along the lateral margin of the glandular tissue.  The 1.2x1.0 cm left axillary nodes, left supraclavicular, and left posterior cervical triangle nodes are suspicious for tamar metastases.  ·   bone scan from 11/15/19 which was negative.   ·  MRI breast from 11/18/19 which shows the biopsy-proven malignancy in the left breast centered at the 4:00 position.  The region of involvement measures up to 7.5 cm.  There is left axillary adenopathy with multiple irregular lymph nodes and loss of differentiation of the border of multiple lymph nodes which are suspicious for extranodal involvement.  ·    echocardiogram from 11/14/19 which was normal with an ejection fraction of 67%.    · INVITAE from 11/14/19 which was negative.  · Given the size of her tumor and her medical history, patient will be given 4 cycles of Adriamycin/Cytoxan with Neulasta.  After completion of this treatment, patient will be started on 12 cycles of Taxol.  After  the tumor shrinks in size, Dr. Forman will perform a lumpectomy.    · Following lumpectomy, patient will begin endocrine therapy.  I have spoken with Dr. Forman who agrees with chemotherapy first due to the large size of the tumor.   ·  Dr. Dawn agrees chemotherapy will not aggravate her HIV condition.  I also spoke with Dr. Mohan at [facility] who also agrees chemotherapy is the first step.   · 12/06/19: Cycle #1 of Adriamycin/Cytoxan  · 01/17/20: Proceed with final A/C cycle 4 today without Neulasta.  Patient to come 7 days for Neupogen injections.  If she has more pain after the injections, she can be given morphine.  · She is here today, 1/24/2020 for her seventh dose of Zarxio.  She did tolerate Zarxio much better than Neulasta.  Her total white count is within normal limits but slightly lower than we like.  · We reviewed with patient the US Breast from 01/29/20 after 4 cycles of Adriamycin Cytoxan chemotherapy which shows mild interval partial response to neoadjuvant chemotherapy.  The mass has decreased from 3.8 x 2.5 to 3.1 cm to 3.5 x 2.3 x 3.2 cm previously the left axillary lymph node has decreased from 1.4 x 0.7 x 1 cm to 1.1 x 0.6 x 0.9 cm.  · 01/31/20: Initiated cycle 1 Taxol  · 2/7/2020 due for cycle 2 Taxol, white blood cell count 1.77, ANC 0.87.  We will hold treatment today.  The patient is to receive 3 days of Neupogen injections, and she will return in 1 week for evaluation to see if we can resume weekly Taxol.    2.  Profound neutropenia due to chemotherapy despite Neulasta:   · On 12/13/2019 at 1 week after cycle #1 chemotherapy, white blood cell count 850, ANC 50 together with a low monocytes 150 .  We started the patient on Levaquin 500 mg daily for 7 days.  No neutropenia fever.    · On 12/20/2019, much improved and elevated ANC 10,800.  We will proceed ahead with cycle #2 of chemotherapy.  Neulasta will be continued for each chemotherapy.  Due to expected neutropenia, will start  the patient on Levaquin 500 mg daily for 7 days, starting on day #4 of each cycle AC chemotherapy.  · Poor tolerance to Neulasta.  Treatment changed to Zarxio 300 mcg daily following cycle 4 AC.  Patient ended up receiving a total of 10 days of Zarxio.  · 2/7/2020 patient due for cycle 2 Taxol, and she is again neutropenic with a white blood cell count of 1.77, ANC 0.87.  Treatment will be held today.  We will give her Zarxio 300 mcg daily for 3 days starting today.  We will also start her on prophylactic Levaquin.    3.  Moderate thrombocytopenia secondary to chemotherapy.    · This happened after cycle 1 Chemotherapy with platelets 92,000 on 12/13/2019.  Patient was asymptomatic.  · Normalized platelets 168,000 on 12/20/2019.  Expecting thrombocytopenia will recur.  Continue to monitor.  · Platelet count today 295,000.  Continue to monitor.    4.  Chemotherapy induced anemia.   · Hemoglobin 9.4 today.  We will continue to closely monitor.   · Ferritin and iron panel previously checked were within normal limits.  We will recheck a B12 level today.      5.  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  · On 12/13/2019 patient here for toxicity check and struggled with significant insomnia from steroids following her first cycle.  We discussed taking the dexamethasone, only one 4 mg tablet daily for 3 days with her next cycle of chemotherapy to see if this improves her symptoms.    · On 12/20/2019, I questioned the patient that her nausea and vomiting may be significant due to decreased dose of dexamethasone.  She could use Compazine and Zofran as needed more frequently.  · Patient is taking Ambien for insomnia and that is still not helping that much.  · Will decrease dose of dexamethasone to 2 mg days 2 3 and 4 after chemotherapy in the morning  · Insomnia much improved with decreased dose of dexamethasone    6. HIV, followed by Dr. Dawn in infectious disease.  Well-controlled and is on a single medication with very low  viral load. Has HIV since age 34.     7.  Family history of breast cancer with mother having had breast cancer at age 60.  There is family history of uncle with prostate cancer.  Patient will require genetic referral    8.  Arthritis with severe back pain followed by Dr. Bam Crandall  · And received Percocet    9. Episodes of nosebleeds.  She has had these once a week for 3-4 years.  She did not report this today.    10. Severe body ache post A/C cycle 3.  She took Percocet and Hydrocodone for pain management which did help.  I have discontinued Neulasta as outlined above.    11.  Otalgia: Of the right ear, present for a while.  Dr. Moreno would like the patient to follow-up with ENT, Dr. Stapleton, as she has previously seen him.      PLAN:  1. Hold cycle 2 Taxol today.  2. Zarxio 300 mcg daily x3 days starting today.  3. Start Levaquin 500 mg daily.  4. Follow-up with ENT on Monday of next week.  5. Return in 1 week for follow-up visit with nurse practitioner for reevaluation, repeat exam, in anticipation of cycle 2 weekly Taxol.Patient scheduled to follow-up with Dr. Moreno in 2 weeks for her third weekly Taxol.

## 2020-02-08 ENCOUNTER — INFUSION (OUTPATIENT)
Dept: ONCOLOGY | Facility: HOSPITAL | Age: 60
End: 2020-02-08

## 2020-02-08 VITALS
DIASTOLIC BLOOD PRESSURE: 72 MMHG | OXYGEN SATURATION: 99 % | RESPIRATION RATE: 18 BRPM | HEART RATE: 81 BPM | TEMPERATURE: 97.8 F | SYSTOLIC BLOOD PRESSURE: 110 MMHG

## 2020-02-08 DIAGNOSIS — Z17.0 MALIGNANT NEOPLASM OF OVERLAPPING SITES OF LEFT BREAST IN FEMALE, ESTROGEN RECEPTOR POSITIVE (HCC): Primary | ICD-10-CM

## 2020-02-08 DIAGNOSIS — D70.1 CHEMOTHERAPY-INDUCED NEUTROPENIA (HCC): ICD-10-CM

## 2020-02-08 DIAGNOSIS — C50.812 MALIGNANT NEOPLASM OF OVERLAPPING SITES OF LEFT BREAST IN FEMALE, ESTROGEN RECEPTOR POSITIVE (HCC): Primary | ICD-10-CM

## 2020-02-08 DIAGNOSIS — T45.1X5A CHEMOTHERAPY-INDUCED NEUTROPENIA (HCC): ICD-10-CM

## 2020-02-08 PROCEDURE — 25010000002 FILGRASTIM-SNDZ 300 MCG/0.5ML SOLUTION PREFILLED SYRINGE: Performed by: NURSE PRACTITIONER

## 2020-02-08 PROCEDURE — 96372 THER/PROPH/DIAG INJ SC/IM: CPT

## 2020-02-08 RX ADMIN — FILGRASTIM-SNDZ 300 MCG: 300 INJECTION, SOLUTION INTRAVENOUS; SUBCUTANEOUS at 12:08

## 2020-02-09 ENCOUNTER — INFUSION (OUTPATIENT)
Dept: ONCOLOGY | Facility: HOSPITAL | Age: 60
End: 2020-02-09

## 2020-02-09 DIAGNOSIS — T45.1X5A CHEMOTHERAPY-INDUCED NEUTROPENIA (HCC): ICD-10-CM

## 2020-02-09 DIAGNOSIS — D70.1 CHEMOTHERAPY-INDUCED NEUTROPENIA (HCC): ICD-10-CM

## 2020-02-09 DIAGNOSIS — Z17.0 MALIGNANT NEOPLASM OF OVERLAPPING SITES OF LEFT BREAST IN FEMALE, ESTROGEN RECEPTOR POSITIVE (HCC): Primary | ICD-10-CM

## 2020-02-09 DIAGNOSIS — C50.812 MALIGNANT NEOPLASM OF OVERLAPPING SITES OF LEFT BREAST IN FEMALE, ESTROGEN RECEPTOR POSITIVE (HCC): Primary | ICD-10-CM

## 2020-02-09 PROCEDURE — 96372 THER/PROPH/DIAG INJ SC/IM: CPT

## 2020-02-09 PROCEDURE — 25010000002 FILGRASTIM-SNDZ 300 MCG/0.5ML SOLUTION PREFILLED SYRINGE: Performed by: NURSE PRACTITIONER

## 2020-02-09 RX ADMIN — FILGRASTIM-SNDZ 300 MCG: 300 INJECTION, SOLUTION INTRAVENOUS; SUBCUTANEOUS at 15:01

## 2020-02-14 ENCOUNTER — INFUSION (OUTPATIENT)
Dept: ONCOLOGY | Facility: HOSPITAL | Age: 60
End: 2020-02-14

## 2020-02-14 ENCOUNTER — OFFICE VISIT (OUTPATIENT)
Dept: ONCOLOGY | Facility: CLINIC | Age: 60
End: 2020-02-14

## 2020-02-14 VITALS
SYSTOLIC BLOOD PRESSURE: 124 MMHG | TEMPERATURE: 98.3 F | WEIGHT: 144.7 LBS | RESPIRATION RATE: 16 BRPM | BODY MASS INDEX: 23.25 KG/M2 | DIASTOLIC BLOOD PRESSURE: 81 MMHG | HEART RATE: 71 BPM | HEIGHT: 66 IN | OXYGEN SATURATION: 95 %

## 2020-02-14 VITALS — SYSTOLIC BLOOD PRESSURE: 117 MMHG | HEART RATE: 76 BPM | DIASTOLIC BLOOD PRESSURE: 82 MMHG

## 2020-02-14 DIAGNOSIS — C50.812 MALIGNANT NEOPLASM OF OVERLAPPING SITES OF LEFT BREAST IN FEMALE, ESTROGEN RECEPTOR POSITIVE (HCC): Primary | ICD-10-CM

## 2020-02-14 DIAGNOSIS — Z17.0 MALIGNANT NEOPLASM OF OVERLAPPING SITES OF LEFT BREAST IN FEMALE, ESTROGEN RECEPTOR POSITIVE (HCC): ICD-10-CM

## 2020-02-14 DIAGNOSIS — T45.1X5A CHEMOTHERAPY-INDUCED NEUTROPENIA (HCC): ICD-10-CM

## 2020-02-14 DIAGNOSIS — C50.812 MALIGNANT NEOPLASM OF OVERLAPPING SITES OF LEFT BREAST IN FEMALE, ESTROGEN RECEPTOR POSITIVE (HCC): ICD-10-CM

## 2020-02-14 DIAGNOSIS — Z17.0 MALIGNANT NEOPLASM OF OVERLAPPING SITES OF LEFT BREAST IN FEMALE, ESTROGEN RECEPTOR POSITIVE (HCC): Primary | ICD-10-CM

## 2020-02-14 DIAGNOSIS — Z45.2 ENCOUNTER FOR FITTING AND ADJUSTMENT OF VASCULAR CATHETER: ICD-10-CM

## 2020-02-14 DIAGNOSIS — D70.1 CHEMOTHERAPY-INDUCED NEUTROPENIA (HCC): ICD-10-CM

## 2020-02-14 LAB
ALBUMIN SERPL-MCNC: 4.4 G/DL (ref 3.5–5.2)
ALBUMIN/GLOB SERPL: 1.8 G/DL (ref 1.1–2.4)
ALP SERPL-CCNC: 126 U/L (ref 38–116)
ALT SERPL W P-5'-P-CCNC: 19 U/L (ref 0–33)
ANION GAP SERPL CALCULATED.3IONS-SCNC: 10.6 MMOL/L (ref 5–15)
AST SERPL-CCNC: 23 U/L (ref 0–32)
BASOPHILS # BLD AUTO: 0.08 10*3/MM3 (ref 0–0.2)
BASOPHILS NFR BLD AUTO: 0.8 % (ref 0–1.5)
BILIRUB SERPL-MCNC: 0.2 MG/DL (ref 0.2–1.2)
BUN BLD-MCNC: 8 MG/DL (ref 6–20)
BUN/CREAT SERPL: 12.9 (ref 7.3–30)
CALCIUM SPEC-SCNC: 9.2 MG/DL (ref 8.5–10.2)
CHLORIDE SERPL-SCNC: 102 MMOL/L (ref 98–107)
CO2 SERPL-SCNC: 27.4 MMOL/L (ref 22–29)
CREAT BLD-MCNC: 0.62 MG/DL (ref 0.6–1.1)
DEPRECATED RDW RBC AUTO: 56.3 FL (ref 37–54)
EOSINOPHIL # BLD AUTO: 0.03 10*3/MM3 (ref 0–0.4)
EOSINOPHIL NFR BLD AUTO: 0.3 % (ref 0.3–6.2)
ERYTHROCYTE [DISTWIDTH] IN BLOOD BY AUTOMATED COUNT: 14.8 % (ref 12.3–15.4)
GFR SERPL CREATININE-BSD FRML MDRD: 99 ML/MIN/1.73
GLOBULIN UR ELPH-MCNC: 2.4 GM/DL (ref 1.8–3.5)
GLUCOSE BLD-MCNC: 90 MG/DL (ref 74–124)
HCT VFR BLD AUTO: 35.4 % (ref 34–46.6)
HGB BLD-MCNC: 11.5 G/DL (ref 12–15.9)
IMM GRANULOCYTES # BLD AUTO: 1.5 10*3/MM3 (ref 0–0.05)
IMM GRANULOCYTES NFR BLD AUTO: 14.3 % (ref 0–0.5)
LYMPHOCYTES # BLD AUTO: 1.3 10*3/MM3 (ref 0.7–3.1)
LYMPHOCYTES NFR BLD AUTO: 12.4 % (ref 19.6–45.3)
MCH RBC QN AUTO: 33.5 PG (ref 26.6–33)
MCHC RBC AUTO-ENTMCNC: 32.5 G/DL (ref 31.5–35.7)
MCV RBC AUTO: 103.2 FL (ref 79–97)
MONOCYTES # BLD AUTO: 2.16 10*3/MM3 (ref 0.1–0.9)
MONOCYTES NFR BLD AUTO: 20.5 % (ref 5–12)
NEUTROPHILS # BLD AUTO: 5.45 10*3/MM3 (ref 1.7–7)
NEUTROPHILS NFR BLD AUTO: 51.7 % (ref 42.7–76)
NRBC BLD AUTO-RTO: 0 /100 WBC (ref 0–0.2)
PLATELET # BLD AUTO: 324 10*3/MM3 (ref 140–450)
PMV BLD AUTO: 8.6 FL (ref 6–12)
POTASSIUM BLD-SCNC: 4.4 MMOL/L (ref 3.5–4.7)
PROT SERPL-MCNC: 6.8 G/DL (ref 6.3–8)
RBC # BLD AUTO: 3.43 10*6/MM3 (ref 3.77–5.28)
SODIUM BLD-SCNC: 140 MMOL/L (ref 134–145)
WBC NRBC COR # BLD: 10.52 10*3/MM3 (ref 3.4–10.8)

## 2020-02-14 PROCEDURE — 25010000002 DIPHENHYDRAMINE PER 50 MG: Performed by: NURSE PRACTITIONER

## 2020-02-14 PROCEDURE — 25010000002 PACLITAXEL PER 30 MG: Performed by: NURSE PRACTITIONER

## 2020-02-14 PROCEDURE — 96413 CHEMO IV INFUSION 1 HR: CPT

## 2020-02-14 PROCEDURE — 25010000002 DEXAMETHASONE SODIUM PHOSPHATE 100 MG/10ML SOLUTION: Performed by: NURSE PRACTITIONER

## 2020-02-14 PROCEDURE — 85025 COMPLETE CBC W/AUTO DIFF WBC: CPT

## 2020-02-14 PROCEDURE — 80053 COMPREHEN METABOLIC PANEL: CPT

## 2020-02-14 PROCEDURE — 96367 TX/PROPH/DG ADDL SEQ IV INF: CPT

## 2020-02-14 PROCEDURE — 25010000003 HEPARIN LOCK FLUCH PER 10 UNITS: Performed by: INTERNAL MEDICINE

## 2020-02-14 PROCEDURE — 99213 OFFICE O/P EST LOW 20 MIN: CPT | Performed by: NURSE PRACTITIONER

## 2020-02-14 PROCEDURE — 96375 TX/PRO/DX INJ NEW DRUG ADDON: CPT

## 2020-02-14 RX ORDER — FAMOTIDINE 10 MG/ML
20 INJECTION, SOLUTION INTRAVENOUS AS NEEDED
Status: CANCELLED | OUTPATIENT
Start: 2020-02-14

## 2020-02-14 RX ORDER — SODIUM CHLORIDE 0.9 % (FLUSH) 0.9 %
10 SYRINGE (ML) INJECTION AS NEEDED
Status: CANCELLED | OUTPATIENT
Start: 2020-02-14

## 2020-02-14 RX ORDER — SODIUM CHLORIDE 0.9 % (FLUSH) 0.9 %
10 SYRINGE (ML) INJECTION AS NEEDED
Status: DISCONTINUED | OUTPATIENT
Start: 2020-02-14 | End: 2020-02-14 | Stop reason: HOSPADM

## 2020-02-14 RX ORDER — DIPHENHYDRAMINE HYDROCHLORIDE 50 MG/ML
50 INJECTION INTRAMUSCULAR; INTRAVENOUS AS NEEDED
Status: CANCELLED | OUTPATIENT
Start: 2020-02-14

## 2020-02-14 RX ORDER — HEPARIN SODIUM (PORCINE) LOCK FLUSH IV SOLN 100 UNIT/ML 100 UNIT/ML
500 SOLUTION INTRAVENOUS AS NEEDED
Status: CANCELLED | OUTPATIENT
Start: 2020-02-14

## 2020-02-14 RX ORDER — FAMOTIDINE 10 MG/ML
20 INJECTION, SOLUTION INTRAVENOUS ONCE
Status: CANCELLED | OUTPATIENT
Start: 2020-02-14

## 2020-02-14 RX ORDER — FAMOTIDINE 10 MG/ML
20 INJECTION, SOLUTION INTRAVENOUS ONCE
Status: COMPLETED | OUTPATIENT
Start: 2020-02-14 | End: 2020-02-14

## 2020-02-14 RX ORDER — SODIUM CHLORIDE 9 MG/ML
250 INJECTION, SOLUTION INTRAVENOUS ONCE
Status: COMPLETED | OUTPATIENT
Start: 2020-02-14 | End: 2020-02-14

## 2020-02-14 RX ORDER — SODIUM CHLORIDE 9 MG/ML
250 INJECTION, SOLUTION INTRAVENOUS ONCE
Status: CANCELLED | OUTPATIENT
Start: 2020-02-14

## 2020-02-14 RX ORDER — HEPARIN SODIUM (PORCINE) LOCK FLUSH IV SOLN 100 UNIT/ML 100 UNIT/ML
500 SOLUTION INTRAVENOUS AS NEEDED
Status: DISCONTINUED | OUTPATIENT
Start: 2020-02-14 | End: 2020-02-14 | Stop reason: HOSPADM

## 2020-02-14 RX ADMIN — DEXAMETHASONE SODIUM PHOSPHATE 12 MG: 10 INJECTION, SOLUTION INTRAMUSCULAR; INTRAVENOUS at 10:41

## 2020-02-14 RX ADMIN — FAMOTIDINE 20 MG: 10 INJECTION INTRAVENOUS at 10:40

## 2020-02-14 RX ADMIN — SODIUM CHLORIDE 250 ML: 9 INJECTION, SOLUTION INTRAVENOUS at 10:40

## 2020-02-14 RX ADMIN — DIPHENHYDRAMINE HYDROCHLORIDE 25 MG: 50 INJECTION INTRAMUSCULAR; INTRAVENOUS at 11:00

## 2020-02-14 RX ADMIN — SODIUM CHLORIDE, PRESERVATIVE FREE 500 UNITS: 5 INJECTION INTRAVENOUS at 14:17

## 2020-02-14 RX ADMIN — PACLITAXEL 140 MG: 6 INJECTION, SOLUTION INTRAVENOUS at 12:14

## 2020-02-14 RX ADMIN — Medication 10 ML: at 14:17

## 2020-02-15 ENCOUNTER — INFUSION (OUTPATIENT)
Dept: ONCOLOGY | Facility: HOSPITAL | Age: 60
End: 2020-02-15

## 2020-02-15 VITALS
OXYGEN SATURATION: 100 % | SYSTOLIC BLOOD PRESSURE: 131 MMHG | RESPIRATION RATE: 20 BRPM | HEART RATE: 70 BPM | DIASTOLIC BLOOD PRESSURE: 78 MMHG | TEMPERATURE: 97.3 F

## 2020-02-15 DIAGNOSIS — Z17.0 MALIGNANT NEOPLASM OF OVERLAPPING SITES OF LEFT BREAST IN FEMALE, ESTROGEN RECEPTOR POSITIVE (HCC): Primary | ICD-10-CM

## 2020-02-15 DIAGNOSIS — C50.812 MALIGNANT NEOPLASM OF OVERLAPPING SITES OF LEFT BREAST IN FEMALE, ESTROGEN RECEPTOR POSITIVE (HCC): Primary | ICD-10-CM

## 2020-02-15 DIAGNOSIS — D70.1 CHEMOTHERAPY-INDUCED NEUTROPENIA (HCC): ICD-10-CM

## 2020-02-15 DIAGNOSIS — T45.1X5A CHEMOTHERAPY-INDUCED NEUTROPENIA (HCC): ICD-10-CM

## 2020-02-15 PROCEDURE — 96372 THER/PROPH/DIAG INJ SC/IM: CPT

## 2020-02-15 PROCEDURE — 25010000002 FILGRASTIM-SNDZ 300 MCG/0.5ML SOLUTION PREFILLED SYRINGE: Performed by: INTERNAL MEDICINE

## 2020-02-15 RX ADMIN — FILGRASTIM-SNDZ 300 MCG: 300 INJECTION, SOLUTION INTRAVENOUS; SUBCUTANEOUS at 15:44

## 2020-02-16 ENCOUNTER — INFUSION (OUTPATIENT)
Dept: ONCOLOGY | Facility: HOSPITAL | Age: 60
End: 2020-02-16

## 2020-02-16 VITALS
RESPIRATION RATE: 18 BRPM | OXYGEN SATURATION: 99 % | TEMPERATURE: 97.3 F | DIASTOLIC BLOOD PRESSURE: 74 MMHG | HEART RATE: 96 BPM | SYSTOLIC BLOOD PRESSURE: 117 MMHG

## 2020-02-16 DIAGNOSIS — Z17.0 MALIGNANT NEOPLASM OF OVERLAPPING SITES OF LEFT BREAST IN FEMALE, ESTROGEN RECEPTOR POSITIVE (HCC): Primary | ICD-10-CM

## 2020-02-16 DIAGNOSIS — D70.1 CHEMOTHERAPY-INDUCED NEUTROPENIA (HCC): ICD-10-CM

## 2020-02-16 DIAGNOSIS — T45.1X5A CHEMOTHERAPY-INDUCED NEUTROPENIA (HCC): ICD-10-CM

## 2020-02-16 DIAGNOSIS — C50.812 MALIGNANT NEOPLASM OF OVERLAPPING SITES OF LEFT BREAST IN FEMALE, ESTROGEN RECEPTOR POSITIVE (HCC): Primary | ICD-10-CM

## 2020-02-16 PROCEDURE — 25010000002 FILGRASTIM-SNDZ 300 MCG/0.5ML SOLUTION PREFILLED SYRINGE: Performed by: INTERNAL MEDICINE

## 2020-02-16 PROCEDURE — 96372 THER/PROPH/DIAG INJ SC/IM: CPT

## 2020-02-16 RX ADMIN — FILGRASTIM-SNDZ 300 MCG: 300 INJECTION, SOLUTION INTRAVENOUS; SUBCUTANEOUS at 14:52

## 2020-02-17 ENCOUNTER — INFUSION (OUTPATIENT)
Dept: ONCOLOGY | Facility: HOSPITAL | Age: 60
End: 2020-02-17

## 2020-02-17 DIAGNOSIS — T45.1X5A CHEMOTHERAPY-INDUCED NEUTROPENIA (HCC): ICD-10-CM

## 2020-02-17 DIAGNOSIS — Z17.0 MALIGNANT NEOPLASM OF OVERLAPPING SITES OF LEFT BREAST IN FEMALE, ESTROGEN RECEPTOR POSITIVE (HCC): Primary | ICD-10-CM

## 2020-02-17 DIAGNOSIS — D70.1 CHEMOTHERAPY-INDUCED NEUTROPENIA (HCC): ICD-10-CM

## 2020-02-17 DIAGNOSIS — C50.812 MALIGNANT NEOPLASM OF OVERLAPPING SITES OF LEFT BREAST IN FEMALE, ESTROGEN RECEPTOR POSITIVE (HCC): Primary | ICD-10-CM

## 2020-02-17 PROCEDURE — 96372 THER/PROPH/DIAG INJ SC/IM: CPT

## 2020-02-17 PROCEDURE — 25010000002 FILGRASTIM-SNDZ 300 MCG/0.5ML SOLUTION PREFILLED SYRINGE: Performed by: INTERNAL MEDICINE

## 2020-02-17 RX ADMIN — FILGRASTIM-SNDZ 300 MCG: 300 INJECTION, SOLUTION INTRAVENOUS; SUBCUTANEOUS at 15:36

## 2020-02-20 NOTE — PROGRESS NOTES
Subjective     REASON FOR FOLLOW UP:    1.  T2N1 invasive ductal carcinoma of the left breast.  Ultrasound-showed 3.8 x 3.1 x 2.5 cm mass at 4 o'clock position with 2 abnormal axillary lymph nodes, larger lymph node being 1.4 cm.  Left breast biopsy and axillary lymph node biopsy October 29, 2019, invasive ductal carcinoma, moderately differentiated, grade 2, ER 85%, VT 10%, HER-2/merna 2+, HER-2 FISH negative.  · 12/06/19: Cycle #1 of dose-dense Adriamycin/Cytoxan with Neulasta for marrow support    · 01/17/20: Last cycle of Adriamycin/Cytoxan given  2.  Severe neutropenia secondary to chemotherapy, with ANC 50 at 1-week after cycle #1 chemotherapy.  Also had moderate thrombocytopenia platelets 92,000.  Patient was given Levaquin for prophylaxis of neutropenia fever.  3.  1/24/2020.  Seventh dose of Zarxio marginal counts, additional Zarxio plan through the weekend with recheck on Monday, January 27  4. 01/31/20: Initiated cycle 1 Taxol  5.  2/7/2020 cycle 2 Taxol delayed due to neutropenia.  Plans to add 3 days of Neupogen injections after each Taxol treatment once we resume.               HISTORY OF PRESENT ILLNESS:  The patient is a 59 y.o. year old female with the above-mentioned history who is here today for evaluation prior to cycle 3 weekly Taxol.  Overall, patient is tolerating treatment well.  She occasionally has some back muscle spasms and has some aches a few days after treatments which resolve on their own, but denies any other concerns and side effects.      Patient's labs from 02/21/20 are normal, except hemoglobin 10.6.    PAST MEDICAL HISTORY:  She had a stroke in July 2017 with headache and dizziness.  She went to the ER and was placed on aspirin.  However, she stopped taking it two weeks ago.  She has been taking Aimovig (injection) monthly with Fariba López at [place].    In 1994, patient was diagnosed with HIV with an unknown cause which is managed by Dr. Natali Subramanian.  As of now, her viral  load is good.    Patient has arthritis.  She gets trigger-point injections in her back with her last one being 2 weeks ago.  She has had multiple ablations.  She also has pain in her SI joint and Dr. Bermudez performed a surgery on this.  She takes Narco for the pain.      She is osteoporotic.  She has had multiple hairline fractures in both her legs.  She had her knees cleaned out by Dr. Sinha.     Patient has had a hysterectomy and still has both of her ovaries.    Patient has vertigo and the muscles in her left ear are weak.  She just has an imbalance.    ONCOLOGIC HISTORY:  Patient is a 59-year-old female who has had a history of HIV since age 34 followed by Dr. Natali Ng at [facility] and was on multiple HIV drugs initially but more recently has been on a single medication TRIUMEQ, with well-controlled HIV.  She follows up with Dr. Dawn , infectious disease who saw her recently and he saw her on 4/8/2019 and has excellent control and suppression of her viral load.  She is very compliant with her medications.    She also has had history of stroke in 2017 for which she was placed on aspirin.  She presented with a headache.  She is very active and works at [employer] full-time.  She also has had history of vertigo in the past  Patient's PCP is Zach Lora.    Patient first noticed the mass in her left breast 5 months ago.  Her last mammogram was 5 years ago.  She had soreness in the left breast and she went to her primary care physician who then ordered a mammogram diagnostic and an ultrasound.  The diagnostic mammogram showed a 3.7 cm mass at 4 o'clock position in the lower outer quadrant of the left breast.  The ultrasound showed 3.8 cm x 3.1 x 2.5 cm mass at 4 o'clock position in the left breast with 2 abnormal appearing left axillary lymph nodes the largest being 1.4 cm.    She then underwent biopsy of both the breast mass as well as the left axillary lymph node and both of them  are positive for invasive mammary carcinoma it is invasive ductal carcinoma with apocrine features, moderately differentiated, grade 2 of 3 with a Jen score of 5.  The left axillary lymph node is also consistent with metastatic mammary carcinoma.  It is ER positive DC positive HER-2/merna negative.  Details as follows    10/21/19 - Bilateral Diagnostic Mammogram and US Breast Left  FINDINGS: Bilateral digital CC and MLO mammographic images were  obtained. No prior examination is available for comparison. Scattered  fibroglandular densities are seen throughout both breasts. A triangular  skin marker represents the area of palpable concern in the lower outer  quadrant of the left breast in the posterior 1/3. At this location there  is an irregular mass that measures on the order of 3.7 cm in greatest  dimension. Internal calcifications are noted. No suspicious findings in  the right breast are appreciated.     ULTRASOUND: Targeted sonographic evaluation of the left breast was  performed through the area of concern in the left axilla. At the 4  o'clock position on the order of 6 cm from the nipple there is an  irregular hypoechoic mass that measures 3.8 x 3.1 x 2.5 cm. Internal  vascularity is noted.     There are 2 abnormal-appearing left axillary lymph nodes, the largest  which measures on the order of 1.4 cm in greatest dimension.     IMPRESSION:  1. There is a 3.8 cm irregular mass in the left breast at the 4 o'clock  position. This is seen in conjunction with an irregular 1.4 cm lymph  node in the left axilla. This is suspicious for malignancy with left  axillary involvement. Correlation with ultrasound-guided biopsy of both  the left breast mass and the lymph node is recommended.  2. There are no findings suspicious for malignancy in the right breast.     BI-RADS CATEGORY 5:     Patient's labs from 11/12/19 are normal.    10/29/19 - Tissue Pathology  1. Left Breast, 4:00, 6 cm FN, U/S-Guided Core Needle  Biopsy for a Mass:  A. INVASIVE DUCTAL CARCINOMA WITH APOCRINE FEATURES, Moderately differentiated;  Jen Histologic Grade II/III (tubule score = 3, nuclear score = 2, mitoses score = 1), measuring at least  9 mm.  B. No ductal or lobular carcinoma in situ is identified in this sample.  C. Focus suspicious for lymphovascular space invasion.  D. See Biomarker Template for hormone receptor studies.  2. Lymph Node, Left Axilla, U/S-Guided Core Needle Biopsy:  A. METASTATIC MAMMARY CARCINOMA (see Comment).  B. Metastatic focus measures 5 mm in greatest extent.  ER+, 85%  NJ+, 10%  HER2- Score 2+    11/13/19 - CT Neck Chest Abdomen Pelvis  IMPRESSION:  1. In addition to the irregular approximately 3.8 cm mass within the  lateral aspect of the left breast, there are a few subcentimeter  asymmetric nodules along the lateral margin of the glandular tissue. The  several asymmetric left subpectoral nodes and 1.2 x 1.0 cm left axillary  node are suspicious for tamar metastases. The asymmetric subcentimeter  left supraclavicular and left posterior cervical triangle nodes are  worrisome as well.  2. There is no convincing evidence for metastatic disease within the  abdomen or pelvis.    11/14/19 - Echocardiogram:  Normal.  Calculated EF = 67%.  There is trace mitral valve and tricuspid valve regurgitation.    11/15/19 - Bone Scan  Negative.    11/18/19 - MRI Breast Bilateral  IMPRESSION:  1. Biopsy-proven malignancy in the left breast centered at the 4 o'clock  position with associated surrounding satellite nodules as described. The  entire region of involvement including the satellite nodules and the  dominant mass measure up to 7.5 cm in greatest dimension. Also, left  axillary adenopathy with multiple irregular lymph nodes and loss of  differentiation of the borders of multiple lymph nodes is noted and this  is suspicious for extranodal involvement.  2. There are no findings suspicious for malignancy in the right  breast.  BI-RADS CATEGORY 6      19: Cycle #1 of Adriamycin/Cytoxan    20: Last cycle of Adriamycin/Cytoxan given without Neulasta.  We will switch from Neulasta to 7 days of Neupogen injections after cycle 4.    We reviewed with patient the US Breast from 20 which shows mild interval partial response to neoadjuvant chemotherapy.      20: Initiated cycle 1 Taxol    Past Medical History:   Diagnosis Date   • Anxiety    • Cerebrovascular accident (CVA) (CMS/[facility]) 2017   • DDD (degenerative disc disease), cervical    • Depression    • GERD (gastroesophageal reflux disease)    • H/O ICH (intracerebral hemorrhage) (CMS/[facility])    • History of stroke    • History of thrombocytopenia    • HIV (human immunodeficiency virus infection) (CMS/[facility])    • Hyperlipidemia    • Insomnia    • Lupus anticoagulant positive    • Malignant neoplasm of overlapping sites of left breast in female, estrogen receptor positive (CMS/[facility]) 2019   • Meniscus tear 2017    RIGHT   • Migraine    • Neck pain     WITH SHOOTING PAIN LEFT RIGHT ARM   • Osteoarthritis    • Osteoporosis    • Seasonal allergies    • Spinal headache    • Tick bite 2014        Past Surgical History:   Procedure Laterality Date   • ADENOIDECTOMY     • CHOLECYSTECTOMY     • HYSTERECTOMY     • KNEE ARTHROSCOPY Right 3/24/2017    Procedure:  RIGHT  KNEE ARTHROSCOPY WITH DEBRIDEMENT CHONDROPLASTY PARTIAL MENISCECTOMY;  Surgeon: Karl Galeas MD;  Location: [facility];  Service:    • KNEE CARTILAGE SURGERY Right    • TONSILLECTOMY     • US GUIDED LYMPH NODE BIOPSY  10/29/2019   • VENOUS ACCESS DEVICE (PORT) INSERTION Right 2019    Procedure: INSERTION VENOUS ACCESS DEVICE RIGHT;  Surgeon: Landen Forman MD;  Location: [facility];  Service: General      OB-GYN:  Menarche 15 years  Menopause-46  Pregnancies- 1 para 1 no miscarriages her first pregnancy was in  at 29 years of age.  She did not  breastfeed.  Post menopausal HRT- None.  Hx of birth control pills- Yes.    Current Outpatient Medications on File Prior to Visit   Medication Sig Dispense Refill   • AIMOVIG 140 MG/ML solution auto-injector INJECT 140 MG INTO THE SKIN EVERY 30 (THIRTY) DAYS.  11   • atorvastatin (LIPITOR) 20 MG tablet TAKE 1 TABLET BY MOUTH EVERY DAY AT NIGHT 90 tablet 3   • baclofen (LIORESAL) 10 MG tablet Take 10 mg by mouth 2 (Two) Times a Day.  2   • CAMBIA 50 MG pack TAKE 1 PACKET AS NEEDED FOR HEADACHES  1   • clonazePAM (KlonoPIN) 0.5 MG tablet TAKE 1 TABLET BY MOUTH 2 (TWO) TIMES A DAY AS NEEDED FOR ANXIETY. 60 tablet 1   • COCONUT OIL PO Take  by mouth.     • colesevelam (WELCHOL) 625 MG tablet TAKE 2 TABLETS BY MOUTH EVERY  tablet 3   • dexamethasone (DECADRON) 4 MG tablet Take 2 tablets in the morning daily on Days 2, 3 & 4.  Take with food. 6 tablet 3   • escitalopram (LEXAPRO) 10 MG tablet Take 1 tablet by mouth Daily. 90 tablet 3   • fluticasone (FLONASE) 50 MCG/ACT nasal spray 2 sprays into the nostril(s) as directed by provider Daily. 3 bottle 3   • ibandronate (BONIVA) 150 MG tablet TAKE 1 TABLET BY MOUTH EVERY 30 (THIRTY) DAYS. 3 tablet 3   • Menaquinone-7 (VITAMIN K2 PO) Take  by mouth.     • NON FORMULARY Collagen peptides powder     • ondansetron (ZOFRAN) 8 MG tablet Take 1 tablet by mouth 3 (Three) Times a Day As Needed for Nausea or Vomiting. 30 tablet 5   • ondansetron ODT (ZOFRAN ODT) 4 MG disintegrating tablet Take 1 tablet by mouth Every 8 (Eight) Hours As Needed for Nausea or Vomiting. 30 tablet 0   • oxyCODONE-acetaminophen (PERCOCET)  MG per tablet TK 1 T PO QID     • prochlorperazine (COMPAZINE) 10 MG tablet Take 1 tablet by mouth Every 6 (Six) Hours As Needed for Nausea or Vomiting. 20 tablet 2   • zolpidem (AMBIEN) 10 MG tablet Take 1 tablet by mouth At Night As Needed for Sleep. 90 tablet 0     No current facility-administered medications on file prior to visit.         ALLERGIES:     Allergies   Allergen Reactions   • Augmentin [Amoxicillin-Pot Clavulanate] Hives        Social History     Socioeconomic History   • Marital status:      Spouse name: Owen   • Number of children: 1   • Years of education: College   • Highest education level: Not on file   Occupational History   • Occupation:      Employer:  [employer]   Tobacco Use   • Smoking status: Never Smoker   • Smokeless tobacco: Never Used   Substance and Sexual Activity   • Alcohol use: Yes     Comment: occasioonal   • Drug use: No   • Sexual activity: Defer     Birth control/protection: None     Patient does not smoke or do drugs.  She does drink occasionally.    Family History   Problem Relation Age of Onset   • Breast cancer Mother    • Leukemia Mother         CLL?   • Diabetes Mother    • Hyperthyroidism Mother    • Hearing loss Mother    • Dementia Father    • Heart disease Maternal Grandmother    • Diabetes Maternal Grandfather    • Heart disease Maternal Grandfather    • Stroke Maternal Grandfather    • Heart attack Maternal Grandfather    • Osteoporosis Paternal Grandmother    • Heart disease Paternal Grandfather    • Leukemia Maternal Aunt         CLL?   • Throat cancer Paternal Uncle       FAMILY HISTORY:  Her mother had breast cancer at age 60, still alive at 85 and in good health..  Her father had dementia.  Her paternal uncle had prostate cancer.  Her brother had esophageal cancer.        Review of Systems   Constitutional: Negative for activity change, appetite change, chills, fatigue and fever.   HENT: Positive for ear pain (right- see HPI). Negative for mouth sores, nosebleeds and trouble swallowing.    Respiratory: Negative for cough and shortness of breath.    Cardiovascular: Negative for chest pain and leg swelling.   Gastrointestinal: Negative for abdominal pain, constipation, diarrhea, nausea and vomiting.   Genitourinary: Negative for difficulty urinating.   Skin: Negative for rash.    Neurological: Negative for dizziness, weakness and numbness.   Hematological: Negative for adenopathy. Does not bruise/bleed easily.   Psychiatric/Behavioral: Negative for sleep disturbance.       Objective    Vitals:    02/21/20 0810   BP: 125/85   Pulse: 72   Resp: 12   Temp: 98.3 °F (36.8 °C)   SpO2: 99%       Physical Exam   Pulmonary/Chest:           February 21, 2020: Stable to decreased left breast mass and measures 3.5 x 2.5 cm.  Left axillary lymph nodes stable    GENERAL:  Well-developed, well-nourished in no acute distress.   SKIN:  Warm, dry without rashes, purpura or petechiae.  NECK:  Supple with good range of motion; no thyromegaly or masses, no JVD.  LYMPHATICS:  No cervical, supraclavicular, axillary or inguinal adenopathy.  CHEST:  Lungs clear to auscultation. Good airflow.  BREAST:   Left breast: No evidence of any skin changes and no evidence of left nipple discharge as well as no left axillary adenopathy or left supraclavicular adenopathy.  The mass measures 4.5 x 3.5 cm.  CARDIAC:  Regular rate and rhythm without murmurs, rubs or gallops. Normal S1,S2.  ABDOMEN:  Soft, nontender with no hepatosplenomegaly or masses.  EXTREMITIES:  No clubbing, cyanosis or edema.  NEUROLOGICAL:  Cranial Nerves II-XII grossly intact.  No focal neurological deficits.  PSYCHIATRIC:  Normal affect and mood.      RECENT LABS:     Results from last 7 days   Lab Units 02/21/20  0733 02/14/20  0949   WBC 10*3/mm3 3.99 10.52   HEMOGLOBIN g/dL 10.6* 11.5*   HEMATOCRIT % 32.6* 35.4   PLATELETS 10*3/mm3 200 324     Lab Results   Component Value Date    NEUTROABS 2.56 02/21/2020       Assessment/Plan    1. T2N1 invasive ductal carcinoma of the left breast, recently diagnosed.    -Noticed mass in the left breast x5 months  -Diagnostic mammogram showed 3.7 mm mass in the left breast at 4 o'clock position  -Ultrasound-showed 3.8 x 3.1 x 2.5 cm mass at 4 o'clock position with 2 abnormal axillary lymph nodes, larger lymph node  being 1.4 cm  -Left breast biopsy and axillary lymph node biopsy October 29, 2019, invasive ductal carcinoma, moderately differentiated, grade 2, ER 85%, WV 10%, HER-2/merna 2+, HER-2 FISH negative  · CT scans from 11/13/19 show some asymmetric nodules along the lateral margin of the glandular tissue.  The 1.2x1.0 cm left axillary nodes, left supraclavicular, and left posterior cervical triangle nodes are suspicious for tamar metastases.  ·   bone scan from 11/15/19 which was negative.   ·  MRI breast from 11/18/19 which shows the biopsy-proven malignancy in the left breast centered at the 4:00 position.  The region of involvement measures up to 7.5 cm.  There is left axillary adenopathy with multiple irregular lymph nodes and loss of differentiation of the border of multiple lymph nodes which are suspicious for extranodal involvement.  ·    echocardiogram from 11/14/19 which was normal with an ejection fraction of 67%.    · INVITAE from 11/14/19 which was negative.  · Given the size of her tumor and her medical history, patient will be given 4 cycles of Adriamycin/Cytoxan with Neulasta.  After completion of this treatment, patient will be started on 12 cycles of Taxol.  After the tumor shrinks in size, Dr. Forman will perform a lumpectomy.    · Following lumpectomy, patient will begin endocrine therapy.  I have spoken with Dr. Forman who agrees with chemotherapy first due to the large size of the tumor.   ·  Dr. Dawn agrees chemotherapy will not aggravate her HIV condition.  I also spoke with Dr. Mohan at [facility] who also agrees chemotherapy is the first step.   · 12/06/19: Cycle #1 of Adriamycin/Cytoxan  · 01/17/20: Proceed with final A/C cycle 4 today without Neulasta.  Patient to come 7 days for Neupogen injections.  If she has more pain after the injections, she can be given morphine.  · She is here today, 1/24/2020 for her seventh dose of Zarxio.  She did tolerate Zarxio much better than Neulasta.   Her total white count is within normal limits but slightly lower than we like.  · We reviewed with patient the US Breast from 01/29/20 after 4 cycles of Adriamycin Cytoxan chemotherapy which shows mild interval partial response to neoadjuvant chemotherapy.  The mass has decreased from 3.8 x 2.5 to 3.1 cm to 3.5 x 2.3 x 3.2 cm previously the left axillary lymph node has decreased from 1.4 x 0.7 x 1 cm to 1.1 x 0.6 x 0.9 cm.  · 01/31/20: Initiated cycle 1 Taxol  · 2/7/2020 due for cycle 2 Taxol, white blood cell count 1.77, ANC 0.87.  Treatment held, patient received 3 days of Neupogen injections.  · 2/14/2020 patient returns for evaluation prior to cycle 2.  Her white blood cell count has improved.  We will proceed with treatment today, and plan to give Neupogen injections daily x3 days starting on day #2 of each cycle.  · Mass in the left breast is stable to decreased in size and axillary lymph node also appears to be stable to decreased in size.    2.  Profound neutropenia due to chemotherapy despite Neulasta:   · On 12/13/2019 at 1 week after cycle #1 chemotherapy, white blood cell count 850, ANC 50 together with a low monocytes 150 .  We started the patient on Levaquin 500 mg daily for 7 days.  No neutropenia fever.    · On 12/20/2019, much improved and elevated ANC 10,800.  We will proceed ahead with cycle #2 of chemotherapy.  Neulasta will be continued for each chemotherapy.  Due to expected neutropenia, will start the patient on Levaquin 500 mg daily for 7 days, starting on day #4 of each cycle AC chemotherapy.  · Poor tolerance to Neulasta.  Treatment changed to Zarxio 300 mcg daily following cycle 4 AC.  Patient ended up receiving a total of 10 days of Zarxio.  · 2/7/2020 patient due for cycle 2 Taxol, and she is again neutropenic with a white blood cell count of 1.77, ANC 0.87.  Treatment held.  We will give her Zarxio 300 mcg daily for 3 days starting today.  We will also start her on prophylactic  Levaquin.    3.  Moderate thrombocytopenia secondary to chemotherapy.    · This happened after cycle 1 Chemotherapy with platelets 92,000 on 12/13/2019.  Patient was asymptomatic.  · Normalized platelets 168,000 on 12/20/2019.  Expecting thrombocytopenia will recur.  Continue to monitor.  · Platelet count 324,000.  Continue to monitor.    4.  Chemotherapy induced anemia.   · Hemoglobin 9.4 2/7/2020y.  We will continue to closely monitor.   · Ferritin and iron panel previously checked were within normal limits.  We will recheck a B12 level today.  · 2/14/2020 hemoglobin 11.5.      5.  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  · On 12/13/2019 patient here for toxicity check and struggled with significant insomnia from steroids following her first cycle.  We discussed taking the dexamethasone, only one 4 mg tablet daily for 3 days with her next cycle of chemotherapy to see if this improves her symptoms.    · On 12/20/2019, I questioned the patient that her nausea and vomiting may be significant due to decreased dose of dexamethasone.  She could use Compazine and Zofran as needed more frequently.  · Patient is taking Ambien for insomnia and that is still not helping that much.  · Will decrease dose of dexamethasone to 2 mg days 2 3 and 4 after chemotherapy in the morning  · Insomnia much improved with decreased dose of dexamethasone    6. HIV, followed by Dr. Dawn in infectious disease.  Well-controlled and is on a single medication with very low viral load. Has HIV since age 34.     7.  Family history of breast cancer with mother having had breast cancer at age 60.  There is family history of uncle with prostate cancer.  Patient will require genetic referral    8.  Arthritis with severe back pain followed by Dr. Bam Crandall  · And received Percocet    9. Episodes of nosebleeds.  She has had these once a week for 3-4 years.  She did not report this today.    10. Severe body ache post A/C cycle 3.  She took Percocet and  Hydrocodone for pain management which did help.  I have discontinued Neulasta as outlined above.    11.  Otalgia: Of the right ear, present for a while.  Dr. Moreno would like the patient to follow-up with ENT, Dr. Stapleton, as she has previously seen him.    12.  Body aches a few days after treatment which resolve on their own.      PLAN:  1. Proceed with cycle 3 weekly Taxol today.  2. Patient to receive Zarxio 300 mcg daily x3 days starting on day 2 with each cycle.  3. Return weekly for Taxol with CBC.  Neupogen for 3 days after every Taxol  4. Follow up with NP in 2 weeks with labs.  5. Follow up with [facility] in 4 weeks at 8:20am with labs.    I, Yuli Lorenzo, acted as scribe for Ruby Moreno MD  in documenting the service or procedure. To the best of my knowledge, I recorded what was dictated by MD Ruby Garcia MD  02/21/20     Cc:  ОЛЬГА Mccoy MD Nathan Bullington, MD Gary Reasor, MD

## 2020-02-21 ENCOUNTER — INFUSION (OUTPATIENT)
Dept: ONCOLOGY | Facility: HOSPITAL | Age: 60
End: 2020-02-21

## 2020-02-21 ENCOUNTER — OFFICE VISIT (OUTPATIENT)
Dept: ONCOLOGY | Facility: CLINIC | Age: 60
End: 2020-02-21

## 2020-02-21 VITALS
TEMPERATURE: 98.3 F | DIASTOLIC BLOOD PRESSURE: 85 MMHG | SYSTOLIC BLOOD PRESSURE: 125 MMHG | HEART RATE: 72 BPM | HEIGHT: 66 IN | WEIGHT: 145.2 LBS | RESPIRATION RATE: 12 BRPM | OXYGEN SATURATION: 99 % | BODY MASS INDEX: 23.33 KG/M2

## 2020-02-21 VITALS — DIASTOLIC BLOOD PRESSURE: 76 MMHG | HEART RATE: 65 BPM | SYSTOLIC BLOOD PRESSURE: 129 MMHG

## 2020-02-21 DIAGNOSIS — C50.812 MALIGNANT NEOPLASM OF OVERLAPPING SITES OF LEFT BREAST IN FEMALE, ESTROGEN RECEPTOR POSITIVE (HCC): Primary | ICD-10-CM

## 2020-02-21 DIAGNOSIS — Z17.0 MALIGNANT NEOPLASM OF OVERLAPPING SITES OF LEFT BREAST IN FEMALE, ESTROGEN RECEPTOR POSITIVE (HCC): Primary | ICD-10-CM

## 2020-02-21 DIAGNOSIS — M25.50 ARTHRALGIA OF MULTIPLE JOINTS: ICD-10-CM

## 2020-02-21 DIAGNOSIS — Z80.3 FAMILY HISTORY OF BREAST CANCER: ICD-10-CM

## 2020-02-21 DIAGNOSIS — Z17.0 MALIGNANT NEOPLASM OF OVERLAPPING SITES OF LEFT BREAST IN FEMALE, ESTROGEN RECEPTOR POSITIVE (HCC): ICD-10-CM

## 2020-02-21 DIAGNOSIS — C50.812 MALIGNANT NEOPLASM OF OVERLAPPING SITES OF LEFT BREAST IN FEMALE, ESTROGEN RECEPTOR POSITIVE (HCC): ICD-10-CM

## 2020-02-21 DIAGNOSIS — Z21 HIV POSITIVE LONG-TERM NON-PROGRESSOR (HCC): ICD-10-CM

## 2020-02-21 DIAGNOSIS — Z45.2 ENCOUNTER FOR FITTING AND ADJUSTMENT OF VASCULAR CATHETER: ICD-10-CM

## 2020-02-21 DIAGNOSIS — R52 BODY ACHES: ICD-10-CM

## 2020-02-21 LAB
ALBUMIN SERPL-MCNC: 4.1 G/DL (ref 3.5–5.2)
ALBUMIN/GLOB SERPL: 1.6 G/DL (ref 1.1–2.4)
ALP SERPL-CCNC: 130 U/L (ref 38–116)
ALT SERPL W P-5'-P-CCNC: 21 U/L (ref 0–33)
ANION GAP SERPL CALCULATED.3IONS-SCNC: 11.2 MMOL/L (ref 5–15)
AST SERPL-CCNC: 23 U/L (ref 0–32)
BASOPHILS # BLD AUTO: 0.05 10*3/MM3 (ref 0–0.2)
BASOPHILS NFR BLD AUTO: 1.3 % (ref 0–1.5)
BILIRUB SERPL-MCNC: 0.2 MG/DL (ref 0.2–1.2)
BUN BLD-MCNC: 14 MG/DL (ref 6–20)
BUN/CREAT SERPL: 25 (ref 7.3–30)
CALCIUM SPEC-SCNC: 9.2 MG/DL (ref 8.5–10.2)
CHLORIDE SERPL-SCNC: 101 MMOL/L (ref 98–107)
CO2 SERPL-SCNC: 27.8 MMOL/L (ref 22–29)
CREAT BLD-MCNC: 0.56 MG/DL (ref 0.6–1.1)
DEPRECATED RDW RBC AUTO: 54.4 FL (ref 37–54)
EOSINOPHIL # BLD AUTO: 0.07 10*3/MM3 (ref 0–0.4)
EOSINOPHIL NFR BLD AUTO: 1.8 % (ref 0.3–6.2)
ERYTHROCYTE [DISTWIDTH] IN BLOOD BY AUTOMATED COUNT: 14.4 % (ref 12.3–15.4)
GFR SERPL CREATININE-BSD FRML MDRD: 111 ML/MIN/1.73
GLOBULIN UR ELPH-MCNC: 2.5 GM/DL (ref 1.8–3.5)
GLUCOSE BLD-MCNC: 83 MG/DL (ref 74–124)
HCT VFR BLD AUTO: 32.6 % (ref 34–46.6)
HGB BLD-MCNC: 10.6 G/DL (ref 12–15.9)
IMM GRANULOCYTES # BLD AUTO: 0.03 10*3/MM3 (ref 0–0.05)
IMM GRANULOCYTES NFR BLD AUTO: 0.8 % (ref 0–0.5)
LYMPHOCYTES # BLD AUTO: 0.65 10*3/MM3 (ref 0.7–3.1)
LYMPHOCYTES NFR BLD AUTO: 16.3 % (ref 19.6–45.3)
MCH RBC QN AUTO: 33.3 PG (ref 26.6–33)
MCHC RBC AUTO-ENTMCNC: 32.5 G/DL (ref 31.5–35.7)
MCV RBC AUTO: 102.5 FL (ref 79–97)
MONOCYTES # BLD AUTO: 0.63 10*3/MM3 (ref 0.1–0.9)
MONOCYTES NFR BLD AUTO: 15.8 % (ref 5–12)
NEUTROPHILS # BLD AUTO: 2.56 10*3/MM3 (ref 1.7–7)
NEUTROPHILS NFR BLD AUTO: 64 % (ref 42.7–76)
NRBC BLD AUTO-RTO: 0 /100 WBC (ref 0–0.2)
PLATELET # BLD AUTO: 200 10*3/MM3 (ref 140–450)
PMV BLD AUTO: 9.8 FL (ref 6–12)
POTASSIUM BLD-SCNC: 4.2 MMOL/L (ref 3.5–4.7)
PROT SERPL-MCNC: 6.6 G/DL (ref 6.3–8)
RBC # BLD AUTO: 3.18 10*6/MM3 (ref 3.77–5.28)
SODIUM BLD-SCNC: 140 MMOL/L (ref 134–145)
WBC NRBC COR # BLD: 3.99 10*3/MM3 (ref 3.4–10.8)

## 2020-02-21 PROCEDURE — 85025 COMPLETE CBC W/AUTO DIFF WBC: CPT

## 2020-02-21 PROCEDURE — 96367 TX/PROPH/DG ADDL SEQ IV INF: CPT

## 2020-02-21 PROCEDURE — 25010000002 DEXAMETHASONE SODIUM PHOSPHATE 100 MG/10ML SOLUTION: Performed by: INTERNAL MEDICINE

## 2020-02-21 PROCEDURE — 96413 CHEMO IV INFUSION 1 HR: CPT

## 2020-02-21 PROCEDURE — 96375 TX/PRO/DX INJ NEW DRUG ADDON: CPT

## 2020-02-21 PROCEDURE — 25010000002 DIPHENHYDRAMINE PER 50 MG: Performed by: INTERNAL MEDICINE

## 2020-02-21 PROCEDURE — 99214 OFFICE O/P EST MOD 30 MIN: CPT | Performed by: INTERNAL MEDICINE

## 2020-02-21 PROCEDURE — 25010000003 HEPARIN LOCK FLUCH PER 10 UNITS: Performed by: INTERNAL MEDICINE

## 2020-02-21 PROCEDURE — 80053 COMPREHEN METABOLIC PANEL: CPT

## 2020-02-21 PROCEDURE — 25010000002 PACLITAXEL PER 30 MG: Performed by: INTERNAL MEDICINE

## 2020-02-21 RX ORDER — HEPARIN SODIUM (PORCINE) LOCK FLUSH IV SOLN 100 UNIT/ML 100 UNIT/ML
500 SOLUTION INTRAVENOUS AS NEEDED
Status: DISCONTINUED | OUTPATIENT
Start: 2020-02-21 | End: 2020-02-21 | Stop reason: HOSPADM

## 2020-02-21 RX ORDER — FAMOTIDINE 10 MG/ML
20 INJECTION, SOLUTION INTRAVENOUS ONCE
Status: CANCELLED | OUTPATIENT
Start: 2020-02-21

## 2020-02-21 RX ORDER — FAMOTIDINE 10 MG/ML
20 INJECTION, SOLUTION INTRAVENOUS ONCE
Status: COMPLETED | OUTPATIENT
Start: 2020-02-21 | End: 2020-02-21

## 2020-02-21 RX ORDER — SODIUM CHLORIDE 9 MG/ML
250 INJECTION, SOLUTION INTRAVENOUS ONCE
Status: CANCELLED | OUTPATIENT
Start: 2020-02-21

## 2020-02-21 RX ORDER — SODIUM CHLORIDE 9 MG/ML
250 INJECTION, SOLUTION INTRAVENOUS ONCE
Status: COMPLETED | OUTPATIENT
Start: 2020-02-21 | End: 2020-02-21

## 2020-02-21 RX ORDER — SODIUM CHLORIDE 0.9 % (FLUSH) 0.9 %
10 SYRINGE (ML) INJECTION AS NEEDED
Status: CANCELLED | OUTPATIENT
Start: 2020-02-21

## 2020-02-21 RX ORDER — HEPARIN SODIUM (PORCINE) LOCK FLUSH IV SOLN 100 UNIT/ML 100 UNIT/ML
500 SOLUTION INTRAVENOUS AS NEEDED
Status: CANCELLED | OUTPATIENT
Start: 2020-02-21

## 2020-02-21 RX ORDER — FAMOTIDINE 10 MG/ML
20 INJECTION, SOLUTION INTRAVENOUS AS NEEDED
Status: CANCELLED | OUTPATIENT
Start: 2020-02-21

## 2020-02-21 RX ORDER — SODIUM CHLORIDE 0.9 % (FLUSH) 0.9 %
10 SYRINGE (ML) INJECTION AS NEEDED
Status: DISCONTINUED | OUTPATIENT
Start: 2020-02-21 | End: 2020-02-21 | Stop reason: HOSPADM

## 2020-02-21 RX ORDER — DIPHENHYDRAMINE HYDROCHLORIDE 50 MG/ML
50 INJECTION INTRAMUSCULAR; INTRAVENOUS AS NEEDED
Status: CANCELLED | OUTPATIENT
Start: 2020-02-21

## 2020-02-21 RX ADMIN — FAMOTIDINE 20 MG: 10 INJECTION INTRAVENOUS at 08:44

## 2020-02-21 RX ADMIN — DIPHENHYDRAMINE HYDROCHLORIDE 25 MG: 50 INJECTION, SOLUTION INTRAMUSCULAR; INTRAVENOUS at 08:47

## 2020-02-21 RX ADMIN — PACLITAXEL 140 MG: 6 INJECTION, SOLUTION INTRAVENOUS at 09:57

## 2020-02-21 RX ADMIN — Medication 500 UNITS: at 11:13

## 2020-02-21 RX ADMIN — SODIUM CHLORIDE, PRESERVATIVE FREE 10 ML: 5 INJECTION INTRAVENOUS at 11:13

## 2020-02-21 RX ADMIN — SODIUM CHLORIDE 250 ML: 9 INJECTION, SOLUTION INTRAVENOUS at 08:44

## 2020-02-21 RX ADMIN — DEXAMETHASONE SODIUM PHOSPHATE 12 MG: 10 INJECTION, SOLUTION INTRAMUSCULAR; INTRAVENOUS at 09:07

## 2020-02-22 ENCOUNTER — INFUSION (OUTPATIENT)
Dept: ONCOLOGY | Facility: HOSPITAL | Age: 60
End: 2020-02-22

## 2020-02-22 DIAGNOSIS — Z17.0 MALIGNANT NEOPLASM OF OVERLAPPING SITES OF LEFT BREAST IN FEMALE, ESTROGEN RECEPTOR POSITIVE (HCC): Primary | ICD-10-CM

## 2020-02-22 DIAGNOSIS — T45.1X5A CHEMOTHERAPY-INDUCED NEUTROPENIA (HCC): ICD-10-CM

## 2020-02-22 DIAGNOSIS — C50.812 MALIGNANT NEOPLASM OF OVERLAPPING SITES OF LEFT BREAST IN FEMALE, ESTROGEN RECEPTOR POSITIVE (HCC): Primary | ICD-10-CM

## 2020-02-22 DIAGNOSIS — D70.1 CHEMOTHERAPY-INDUCED NEUTROPENIA (HCC): ICD-10-CM

## 2020-02-22 PROCEDURE — 25010000002 FILGRASTIM-SNDZ 300 MCG/0.5ML SOLUTION PREFILLED SYRINGE: Performed by: INTERNAL MEDICINE

## 2020-02-22 PROCEDURE — 96372 THER/PROPH/DIAG INJ SC/IM: CPT

## 2020-02-22 RX ADMIN — FILGRASTIM-SNDZ 300 MCG: 300 INJECTION, SOLUTION INTRAVENOUS; SUBCUTANEOUS at 14:45

## 2020-02-23 ENCOUNTER — INFUSION (OUTPATIENT)
Dept: ONCOLOGY | Facility: HOSPITAL | Age: 60
End: 2020-02-23

## 2020-02-23 DIAGNOSIS — D70.1 CHEMOTHERAPY-INDUCED NEUTROPENIA (HCC): ICD-10-CM

## 2020-02-23 DIAGNOSIS — Z17.0 MALIGNANT NEOPLASM OF OVERLAPPING SITES OF LEFT BREAST IN FEMALE, ESTROGEN RECEPTOR POSITIVE (HCC): Primary | ICD-10-CM

## 2020-02-23 DIAGNOSIS — T45.1X5A CHEMOTHERAPY-INDUCED NEUTROPENIA (HCC): ICD-10-CM

## 2020-02-23 DIAGNOSIS — C50.812 MALIGNANT NEOPLASM OF OVERLAPPING SITES OF LEFT BREAST IN FEMALE, ESTROGEN RECEPTOR POSITIVE (HCC): Primary | ICD-10-CM

## 2020-02-23 PROCEDURE — 96372 THER/PROPH/DIAG INJ SC/IM: CPT

## 2020-02-23 PROCEDURE — 25010000002 FILGRASTIM-SNDZ 300 MCG/0.5ML SOLUTION PREFILLED SYRINGE: Performed by: INTERNAL MEDICINE

## 2020-02-23 RX ADMIN — FILGRASTIM-SNDZ 300 MCG: 300 INJECTION, SOLUTION INTRAVENOUS; SUBCUTANEOUS at 14:34

## 2020-02-24 ENCOUNTER — INFUSION (OUTPATIENT)
Dept: ONCOLOGY | Facility: HOSPITAL | Age: 60
End: 2020-02-24

## 2020-02-24 DIAGNOSIS — T45.1X5A CHEMOTHERAPY-INDUCED NEUTROPENIA (HCC): ICD-10-CM

## 2020-02-24 DIAGNOSIS — Z17.0 MALIGNANT NEOPLASM OF OVERLAPPING SITES OF LEFT BREAST IN FEMALE, ESTROGEN RECEPTOR POSITIVE (HCC): Primary | ICD-10-CM

## 2020-02-24 DIAGNOSIS — D70.1 CHEMOTHERAPY-INDUCED NEUTROPENIA (HCC): ICD-10-CM

## 2020-02-24 DIAGNOSIS — C50.812 MALIGNANT NEOPLASM OF OVERLAPPING SITES OF LEFT BREAST IN FEMALE, ESTROGEN RECEPTOR POSITIVE (HCC): Primary | ICD-10-CM

## 2020-02-24 PROCEDURE — 96372 THER/PROPH/DIAG INJ SC/IM: CPT

## 2020-02-24 PROCEDURE — 25010000002 FILGRASTIM-SNDZ 300 MCG/0.5ML SOLUTION PREFILLED SYRINGE: Performed by: INTERNAL MEDICINE

## 2020-02-24 RX ORDER — FAMOTIDINE 10 MG/ML
20 INJECTION, SOLUTION INTRAVENOUS AS NEEDED
Status: CANCELLED | OUTPATIENT
Start: 2020-02-28

## 2020-02-24 RX ORDER — DIPHENHYDRAMINE HYDROCHLORIDE 50 MG/ML
50 INJECTION INTRAMUSCULAR; INTRAVENOUS AS NEEDED
Status: CANCELLED | OUTPATIENT
Start: 2020-02-28

## 2020-02-24 RX ORDER — SODIUM CHLORIDE 9 MG/ML
250 INJECTION, SOLUTION INTRAVENOUS ONCE
Status: CANCELLED | OUTPATIENT
Start: 2020-02-28

## 2020-02-24 RX ORDER — FAMOTIDINE 10 MG/ML
20 INJECTION, SOLUTION INTRAVENOUS ONCE
Status: CANCELLED | OUTPATIENT
Start: 2020-02-28

## 2020-02-24 RX ADMIN — FILGRASTIM-SNDZ 300 MCG: 300 INJECTION, SOLUTION INTRAVENOUS; SUBCUTANEOUS at 13:53

## 2020-02-28 ENCOUNTER — INFUSION (OUTPATIENT)
Dept: ONCOLOGY | Facility: HOSPITAL | Age: 60
End: 2020-02-28

## 2020-02-28 VITALS
BODY MASS INDEX: 23.12 KG/M2 | TEMPERATURE: 98.2 F | SYSTOLIC BLOOD PRESSURE: 123 MMHG | HEART RATE: 66 BPM | OXYGEN SATURATION: 98 % | WEIGHT: 144 LBS | DIASTOLIC BLOOD PRESSURE: 78 MMHG

## 2020-02-28 DIAGNOSIS — C50.812 MALIGNANT NEOPLASM OF OVERLAPPING SITES OF LEFT BREAST IN FEMALE, ESTROGEN RECEPTOR POSITIVE (HCC): Primary | ICD-10-CM

## 2020-02-28 DIAGNOSIS — Z17.0 MALIGNANT NEOPLASM OF OVERLAPPING SITES OF LEFT BREAST IN FEMALE, ESTROGEN RECEPTOR POSITIVE (HCC): Primary | ICD-10-CM

## 2020-02-28 LAB
ALBUMIN SERPL-MCNC: 4.4 G/DL (ref 3.5–5.2)
ALBUMIN/GLOB SERPL: 1.8 G/DL (ref 1.1–2.4)
ALP SERPL-CCNC: 135 U/L (ref 38–116)
ALT SERPL W P-5'-P-CCNC: 24 U/L (ref 0–33)
ANION GAP SERPL CALCULATED.3IONS-SCNC: 12.4 MMOL/L (ref 5–15)
AST SERPL-CCNC: 27 U/L (ref 0–32)
BASOPHILS # BLD AUTO: 0.02 10*3/MM3 (ref 0–0.2)
BASOPHILS NFR BLD AUTO: 0.6 % (ref 0–1.5)
BILIRUB SERPL-MCNC: 0.2 MG/DL (ref 0.2–1.2)
BUN BLD-MCNC: 10 MG/DL (ref 6–20)
BUN/CREAT SERPL: 19.6 (ref 7.3–30)
CALCIUM SPEC-SCNC: 9.3 MG/DL (ref 8.5–10.2)
CHLORIDE SERPL-SCNC: 102 MMOL/L (ref 98–107)
CO2 SERPL-SCNC: 27.6 MMOL/L (ref 22–29)
CREAT BLD-MCNC: 0.51 MG/DL (ref 0.6–1.1)
DEPRECATED RDW RBC AUTO: 56.8 FL (ref 37–54)
EOSINOPHIL # BLD AUTO: 0.17 10*3/MM3 (ref 0–0.4)
EOSINOPHIL NFR BLD AUTO: 4.7 % (ref 0.3–6.2)
ERYTHROCYTE [DISTWIDTH] IN BLOOD BY AUTOMATED COUNT: 14.8 % (ref 12.3–15.4)
GFR SERPL CREATININE-BSD FRML MDRD: 123 ML/MIN/1.73
GLOBULIN UR ELPH-MCNC: 2.4 GM/DL (ref 1.8–3.5)
GLUCOSE BLD-MCNC: 98 MG/DL (ref 74–124)
HCT VFR BLD AUTO: 33 % (ref 34–46.6)
HGB BLD-MCNC: 10.8 G/DL (ref 12–15.9)
IMM GRANULOCYTES # BLD AUTO: 0.05 10*3/MM3 (ref 0–0.05)
IMM GRANULOCYTES NFR BLD AUTO: 1.4 % (ref 0–0.5)
LYMPHOCYTES # BLD AUTO: 0.8 10*3/MM3 (ref 0.7–3.1)
LYMPHOCYTES NFR BLD AUTO: 22.2 % (ref 19.6–45.3)
MCH RBC QN AUTO: 34 PG (ref 26.6–33)
MCHC RBC AUTO-ENTMCNC: 32.7 G/DL (ref 31.5–35.7)
MCV RBC AUTO: 103.8 FL (ref 79–97)
MONOCYTES # BLD AUTO: 0.74 10*3/MM3 (ref 0.1–0.9)
MONOCYTES NFR BLD AUTO: 20.5 % (ref 5–12)
NEUTROPHILS # BLD AUTO: 1.83 10*3/MM3 (ref 1.7–7)
NEUTROPHILS NFR BLD AUTO: 50.6 % (ref 42.7–76)
NRBC BLD AUTO-RTO: 0 /100 WBC (ref 0–0.2)
PLATELET # BLD AUTO: 208 10*3/MM3 (ref 140–450)
PMV BLD AUTO: 9.1 FL (ref 6–12)
POTASSIUM BLD-SCNC: 4.2 MMOL/L (ref 3.5–4.7)
PROT SERPL-MCNC: 6.8 G/DL (ref 6.3–8)
RBC # BLD AUTO: 3.18 10*6/MM3 (ref 3.77–5.28)
SODIUM BLD-SCNC: 142 MMOL/L (ref 134–145)
WBC NRBC COR # BLD: 3.61 10*3/MM3 (ref 3.4–10.8)

## 2020-02-28 PROCEDURE — 25010000002 DIPHENHYDRAMINE PER 50 MG: Performed by: INTERNAL MEDICINE

## 2020-02-28 PROCEDURE — 80053 COMPREHEN METABOLIC PANEL: CPT

## 2020-02-28 PROCEDURE — 25010000002 DEXAMETHASONE SODIUM PHOSPHATE 100 MG/10ML SOLUTION: Performed by: INTERNAL MEDICINE

## 2020-02-28 PROCEDURE — 96413 CHEMO IV INFUSION 1 HR: CPT

## 2020-02-28 PROCEDURE — 25010000002 PACLITAXEL PER 30 MG: Performed by: INTERNAL MEDICINE

## 2020-02-28 PROCEDURE — 85025 COMPLETE CBC W/AUTO DIFF WBC: CPT

## 2020-02-28 PROCEDURE — 96375 TX/PRO/DX INJ NEW DRUG ADDON: CPT

## 2020-02-28 RX ORDER — FAMOTIDINE 10 MG/ML
20 INJECTION, SOLUTION INTRAVENOUS ONCE
Status: COMPLETED | OUTPATIENT
Start: 2020-02-28 | End: 2020-02-28

## 2020-02-28 RX ORDER — SODIUM CHLORIDE 9 MG/ML
250 INJECTION, SOLUTION INTRAVENOUS ONCE
Status: COMPLETED | OUTPATIENT
Start: 2020-02-28 | End: 2020-02-28

## 2020-02-28 RX ADMIN — DIPHENHYDRAMINE HYDROCHLORIDE 25 MG: 50 INJECTION, SOLUTION INTRAMUSCULAR; INTRAVENOUS at 12:21

## 2020-02-28 RX ADMIN — DEXAMETHASONE SODIUM PHOSPHATE 12 MG: 10 INJECTION, SOLUTION INTRAMUSCULAR; INTRAVENOUS at 12:05

## 2020-02-28 RX ADMIN — SODIUM CHLORIDE 250 ML: 9 INJECTION, SOLUTION INTRAVENOUS at 12:05

## 2020-02-28 RX ADMIN — FAMOTIDINE 20 MG: 10 INJECTION INTRAVENOUS at 12:05

## 2020-02-28 RX ADMIN — PACLITAXEL 140 MG: 6 INJECTION, SOLUTION INTRAVENOUS at 13:16

## 2020-02-29 ENCOUNTER — INFUSION (OUTPATIENT)
Dept: ONCOLOGY | Facility: HOSPITAL | Age: 60
End: 2020-02-29

## 2020-02-29 VITALS
SYSTOLIC BLOOD PRESSURE: 122 MMHG | DIASTOLIC BLOOD PRESSURE: 79 MMHG | HEART RATE: 93 BPM | TEMPERATURE: 97.7 F | RESPIRATION RATE: 20 BRPM | OXYGEN SATURATION: 97 %

## 2020-02-29 DIAGNOSIS — T45.1X5A CHEMOTHERAPY-INDUCED NEUTROPENIA (HCC): ICD-10-CM

## 2020-02-29 DIAGNOSIS — C50.812 MALIGNANT NEOPLASM OF OVERLAPPING SITES OF LEFT BREAST IN FEMALE, ESTROGEN RECEPTOR POSITIVE (HCC): Primary | ICD-10-CM

## 2020-02-29 DIAGNOSIS — Z17.0 MALIGNANT NEOPLASM OF OVERLAPPING SITES OF LEFT BREAST IN FEMALE, ESTROGEN RECEPTOR POSITIVE (HCC): Primary | ICD-10-CM

## 2020-02-29 DIAGNOSIS — D70.1 CHEMOTHERAPY-INDUCED NEUTROPENIA (HCC): ICD-10-CM

## 2020-02-29 PROCEDURE — 25010000002 FILGRASTIM-SNDZ 300 MCG/0.5ML SOLUTION PREFILLED SYRINGE: Performed by: INTERNAL MEDICINE

## 2020-02-29 PROCEDURE — 96372 THER/PROPH/DIAG INJ SC/IM: CPT

## 2020-02-29 RX ADMIN — FILGRASTIM-SNDZ 300 MCG: 300 INJECTION, SOLUTION INTRAVENOUS; SUBCUTANEOUS at 13:44

## 2020-03-01 ENCOUNTER — INFUSION (OUTPATIENT)
Dept: ONCOLOGY | Facility: HOSPITAL | Age: 60
End: 2020-03-01

## 2020-03-01 ENCOUNTER — RESULTS ENCOUNTER (OUTPATIENT)
Dept: INFECTIOUS DISEASES | Facility: CLINIC | Age: 60
End: 2020-03-01

## 2020-03-01 VITALS
SYSTOLIC BLOOD PRESSURE: 118 MMHG | TEMPERATURE: 97.3 F | DIASTOLIC BLOOD PRESSURE: 74 MMHG | OXYGEN SATURATION: 99 % | RESPIRATION RATE: 18 BRPM | HEART RATE: 68 BPM

## 2020-03-01 DIAGNOSIS — B20 HIV (HUMAN IMMUNODEFICIENCY VIRUS INFECTION) (HCC): ICD-10-CM

## 2020-03-01 DIAGNOSIS — C50.812 MALIGNANT NEOPLASM OF OVERLAPPING SITES OF LEFT BREAST IN FEMALE, ESTROGEN RECEPTOR POSITIVE (HCC): Primary | ICD-10-CM

## 2020-03-01 DIAGNOSIS — T45.1X5A CHEMOTHERAPY-INDUCED NEUTROPENIA (HCC): ICD-10-CM

## 2020-03-01 DIAGNOSIS — D70.1 CHEMOTHERAPY-INDUCED NEUTROPENIA (HCC): ICD-10-CM

## 2020-03-01 DIAGNOSIS — Z17.0 MALIGNANT NEOPLASM OF OVERLAPPING SITES OF LEFT BREAST IN FEMALE, ESTROGEN RECEPTOR POSITIVE (HCC): Primary | ICD-10-CM

## 2020-03-01 PROCEDURE — 25010000002 FILGRASTIM-SNDZ 300 MCG/0.5ML SOLUTION PREFILLED SYRINGE: Performed by: INTERNAL MEDICINE

## 2020-03-01 PROCEDURE — 96372 THER/PROPH/DIAG INJ SC/IM: CPT

## 2020-03-01 RX ADMIN — FILGRASTIM-SNDZ 300 MCG: 300 INJECTION, SOLUTION INTRAVENOUS; SUBCUTANEOUS at 13:17

## 2020-03-02 ENCOUNTER — INFUSION (OUTPATIENT)
Dept: ONCOLOGY | Facility: HOSPITAL | Age: 60
End: 2020-03-02

## 2020-03-02 DIAGNOSIS — D70.1 CHEMOTHERAPY-INDUCED NEUTROPENIA (HCC): ICD-10-CM

## 2020-03-02 DIAGNOSIS — C50.812 MALIGNANT NEOPLASM OF OVERLAPPING SITES OF LEFT BREAST IN FEMALE, ESTROGEN RECEPTOR POSITIVE (HCC): Primary | ICD-10-CM

## 2020-03-02 DIAGNOSIS — Z17.0 MALIGNANT NEOPLASM OF OVERLAPPING SITES OF LEFT BREAST IN FEMALE, ESTROGEN RECEPTOR POSITIVE (HCC): Primary | ICD-10-CM

## 2020-03-02 DIAGNOSIS — T45.1X5A CHEMOTHERAPY-INDUCED NEUTROPENIA (HCC): ICD-10-CM

## 2020-03-02 PROCEDURE — 96372 THER/PROPH/DIAG INJ SC/IM: CPT

## 2020-03-02 PROCEDURE — 25010000002 FILGRASTIM-SNDZ 300 MCG/0.5ML SOLUTION PREFILLED SYRINGE: Performed by: INTERNAL MEDICINE

## 2020-03-02 RX ADMIN — FILGRASTIM-SNDZ 300 MCG: 300 INJECTION, SOLUTION INTRAVENOUS; SUBCUTANEOUS at 13:51

## 2020-03-04 ENCOUNTER — OFFICE VISIT (OUTPATIENT)
Dept: FAMILY MEDICINE CLINIC | Facility: CLINIC | Age: 60
End: 2020-03-04

## 2020-03-04 VITALS
TEMPERATURE: 98 F | DIASTOLIC BLOOD PRESSURE: 70 MMHG | HEART RATE: 71 BPM | SYSTOLIC BLOOD PRESSURE: 130 MMHG | BODY MASS INDEX: 23.4 KG/M2 | WEIGHT: 145.6 LBS | HEIGHT: 66 IN | OXYGEN SATURATION: 99 %

## 2020-03-04 DIAGNOSIS — G47.00 INSOMNIA, UNSPECIFIED TYPE: ICD-10-CM

## 2020-03-04 DIAGNOSIS — Z79.899 HIGH RISK MEDICATION USE: Primary | ICD-10-CM

## 2020-03-04 PROCEDURE — 99213 OFFICE O/P EST LOW 20 MIN: CPT | Performed by: NURSE PRACTITIONER

## 2020-03-04 RX ORDER — ZOLPIDEM TARTRATE 10 MG/1
10 TABLET ORAL NIGHTLY PRN
Qty: 90 TABLET | Refills: 0 | Status: SHIPPED | OUTPATIENT
Start: 2020-03-04 | End: 2020-08-26 | Stop reason: SDUPTHER

## 2020-03-04 NOTE — PROGRESS NOTES
"Subjective   Richa Dolan is a 59 y.o. female.     History of Present Illness   Richa Dolan 59 y.o. female presents for follow up of insomnia with onset of symptoms years ago. Patient describes symptoms as early morning awakening and frequent night time awakening. Patient has found complete relief with Ambien (Zolpidem). Associated symptoms include: fatigue if unable to take Rx . Patient denies daytime somnolence Symptoms have stabilized.  The patient has failed multiple OTC medications for insomnia.  They are well controlled on current Rx and will continue to try to take Rx PRN.  She will use the lowest effective dose.  The patient has read and signed the [facility] Controlled Substance Contract.  I will continue to see patient for regular follow up appointments and be prescribed the lowest effective dose.  MAGY has been reviewed by me and is updated every 3 months. The patient is aware of the potential for addiction and dependence.  She denies that Ambien (Zolpidem) causes excessive daytime drowsiness and sleep walking.  Patient voices understanding to take Ambien (Zolpidem) and go straight to bed. Patient must be able to sleep 7 hours or more when taking this.  Patient will hold Rx and contact me if they experience any impaired mental alertness the next day.        The following portions of the patient's history were reviewed and updated as appropriate: allergies, current medications, past social history and problem list.    Review of Systems   Constitutional: Negative for fever.   Respiratory: Negative for cough and shortness of breath.    Cardiovascular: Negative for chest pain.   Gastrointestinal: Negative for abdominal pain.   Neurological: Negative for dizziness.       Objective   /70 (BP Location: Left arm, Patient Position: Sitting)   Pulse 71   Temp 98 °F (36.7 °C)   Ht 167.6 cm (66\")   Wt 66 kg (145 lb 9.6 oz)   SpO2 99%   BMI 23.50 kg/m²   Physical Exam   Constitutional: She " is oriented to person, place, and time. Vital signs are normal. She appears well-developed and well-nourished. No distress.   HENT:   Head: Normocephalic.   Cardiovascular: Normal rate, regular rhythm and normal heart sounds.   Pulmonary/Chest: Effort normal and breath sounds normal.   Neurological: She is alert and oriented to person, place, and time. Gait normal.   Psychiatric: She has a normal mood and affect. Her behavior is normal. Judgment and thought content normal.   Vitals reviewed.    The patient has read and signed the [facility] Controlled Substance Contract.  I will continue to see patient for regular follow up appointments.  They are well controlled on their medication.  MAGY has been reviewed by me and is updated every 3 months. The patient is aware of the potential for addiction and dependence.    Assessment/Plan      Diagnosis Plan   1. High risk medication use  ToxASSURE Select 13 (MW) - Urine, Clean Catch   2. Insomnia, unspecified type  zolpidem (AMBIEN) 10 MG tablet     rto in 3 ОЛЬГА Gonzales  3/4/2020

## 2020-03-06 ENCOUNTER — INFUSION (OUTPATIENT)
Dept: ONCOLOGY | Facility: HOSPITAL | Age: 60
End: 2020-03-06

## 2020-03-06 ENCOUNTER — OFFICE VISIT (OUTPATIENT)
Dept: ONCOLOGY | Facility: CLINIC | Age: 60
End: 2020-03-06

## 2020-03-06 ENCOUNTER — APPOINTMENT (OUTPATIENT)
Dept: LAB | Facility: HOSPITAL | Age: 60
End: 2020-03-06

## 2020-03-06 VITALS
HEIGHT: 66 IN | BODY MASS INDEX: 23.22 KG/M2 | RESPIRATION RATE: 14 BRPM | WEIGHT: 144.5 LBS | DIASTOLIC BLOOD PRESSURE: 95 MMHG | SYSTOLIC BLOOD PRESSURE: 136 MMHG | TEMPERATURE: 98.3 F | HEART RATE: 73 BPM | OXYGEN SATURATION: 97 %

## 2020-03-06 VITALS — DIASTOLIC BLOOD PRESSURE: 89 MMHG | SYSTOLIC BLOOD PRESSURE: 125 MMHG | HEART RATE: 90 BPM

## 2020-03-06 DIAGNOSIS — G62.0 CHEMOTHERAPY-INDUCED NEUROPATHY (HCC): ICD-10-CM

## 2020-03-06 DIAGNOSIS — Z17.0 MALIGNANT NEOPLASM OF OVERLAPPING SITES OF LEFT BREAST IN FEMALE, ESTROGEN RECEPTOR POSITIVE (HCC): Primary | ICD-10-CM

## 2020-03-06 DIAGNOSIS — C50.812 MALIGNANT NEOPLASM OF OVERLAPPING SITES OF LEFT BREAST IN FEMALE, ESTROGEN RECEPTOR POSITIVE (HCC): Primary | ICD-10-CM

## 2020-03-06 DIAGNOSIS — Z45.2 ENCOUNTER FOR FITTING AND ADJUSTMENT OF VASCULAR CATHETER: ICD-10-CM

## 2020-03-06 DIAGNOSIS — C50.812 MALIGNANT NEOPLASM OF OVERLAPPING SITES OF LEFT BREAST IN FEMALE, ESTROGEN RECEPTOR POSITIVE (HCC): ICD-10-CM

## 2020-03-06 DIAGNOSIS — D64.9 ANEMIA, UNSPECIFIED TYPE: ICD-10-CM

## 2020-03-06 DIAGNOSIS — Z17.0 MALIGNANT NEOPLASM OF OVERLAPPING SITES OF LEFT BREAST IN FEMALE, ESTROGEN RECEPTOR POSITIVE (HCC): ICD-10-CM

## 2020-03-06 DIAGNOSIS — T45.1X5A CHEMOTHERAPY-INDUCED NEUROPATHY (HCC): ICD-10-CM

## 2020-03-06 LAB
ALBUMIN SERPL-MCNC: 4.3 G/DL (ref 3.5–5.2)
ALBUMIN/GLOB SERPL: 1.9 G/DL (ref 1.1–2.4)
ALP SERPL-CCNC: 143 U/L (ref 38–116)
ALT SERPL W P-5'-P-CCNC: 24 U/L (ref 0–33)
ANION GAP SERPL CALCULATED.3IONS-SCNC: 11.8 MMOL/L (ref 5–15)
AST SERPL-CCNC: 24 U/L (ref 0–32)
BASOPHILS # BLD AUTO: 0.03 10*3/MM3 (ref 0–0.2)
BASOPHILS NFR BLD AUTO: 0.6 % (ref 0–1.5)
BILIRUB SERPL-MCNC: 0.3 MG/DL (ref 0.2–1.2)
BUN BLD-MCNC: 9 MG/DL (ref 6–20)
BUN/CREAT SERPL: 15.5 (ref 7.3–30)
CALCIUM SPEC-SCNC: 9.3 MG/DL (ref 8.5–10.2)
CHLORIDE SERPL-SCNC: 103 MMOL/L (ref 98–107)
CO2 SERPL-SCNC: 27.2 MMOL/L (ref 22–29)
CREAT BLD-MCNC: 0.58 MG/DL (ref 0.6–1.1)
DEPRECATED RDW RBC AUTO: 55.8 FL (ref 37–54)
EOSINOPHIL # BLD AUTO: 0.27 10*3/MM3 (ref 0–0.4)
EOSINOPHIL NFR BLD AUTO: 5.7 % (ref 0.3–6.2)
ERYTHROCYTE [DISTWIDTH] IN BLOOD BY AUTOMATED COUNT: 14.7 % (ref 12.3–15.4)
GFR SERPL CREATININE-BSD FRML MDRD: 106 ML/MIN/1.73
GLOBULIN UR ELPH-MCNC: 2.3 GM/DL (ref 1.8–3.5)
GLUCOSE BLD-MCNC: 81 MG/DL (ref 74–124)
HCT VFR BLD AUTO: 33.9 % (ref 34–46.6)
HGB BLD-MCNC: 11.1 G/DL (ref 12–15.9)
IMM GRANULOCYTES # BLD AUTO: 0.06 10*3/MM3 (ref 0–0.05)
IMM GRANULOCYTES NFR BLD AUTO: 1.3 % (ref 0–0.5)
LYMPHOCYTES # BLD AUTO: 0.75 10*3/MM3 (ref 0.7–3.1)
LYMPHOCYTES NFR BLD AUTO: 15.8 % (ref 19.6–45.3)
MCH RBC QN AUTO: 33.8 PG (ref 26.6–33)
MCHC RBC AUTO-ENTMCNC: 32.7 G/DL (ref 31.5–35.7)
MCV RBC AUTO: 103.4 FL (ref 79–97)
MONOCYTES # BLD AUTO: 0.74 10*3/MM3 (ref 0.1–0.9)
MONOCYTES NFR BLD AUTO: 15.6 % (ref 5–12)
NEUTROPHILS # BLD AUTO: 2.9 10*3/MM3 (ref 1.7–7)
NEUTROPHILS NFR BLD AUTO: 61 % (ref 42.7–76)
NRBC BLD AUTO-RTO: 0 /100 WBC (ref 0–0.2)
PLATELET # BLD AUTO: 235 10*3/MM3 (ref 140–450)
PMV BLD AUTO: 9.3 FL (ref 6–12)
POTASSIUM BLD-SCNC: 4.2 MMOL/L (ref 3.5–4.7)
PROT SERPL-MCNC: 6.6 G/DL (ref 6.3–8)
RBC # BLD AUTO: 3.28 10*6/MM3 (ref 3.77–5.28)
SODIUM BLD-SCNC: 142 MMOL/L (ref 134–145)
WBC NRBC COR # BLD: 4.75 10*3/MM3 (ref 3.4–10.8)

## 2020-03-06 PROCEDURE — 96375 TX/PRO/DX INJ NEW DRUG ADDON: CPT

## 2020-03-06 PROCEDURE — 25010000003 HEPARIN LOCK FLUCH PER 10 UNITS: Performed by: INTERNAL MEDICINE

## 2020-03-06 PROCEDURE — 80053 COMPREHEN METABOLIC PANEL: CPT

## 2020-03-06 PROCEDURE — 25010000002 DIPHENHYDRAMINE PER 50 MG: Performed by: NURSE PRACTITIONER

## 2020-03-06 PROCEDURE — 25010000002 DEXAMETHASONE SODIUM PHOSPHATE 100 MG/10ML SOLUTION: Performed by: NURSE PRACTITIONER

## 2020-03-06 PROCEDURE — 25010000002 PACLITAXEL PER 30 MG: Performed by: NURSE PRACTITIONER

## 2020-03-06 PROCEDURE — 85025 COMPLETE CBC W/AUTO DIFF WBC: CPT

## 2020-03-06 PROCEDURE — 99213 OFFICE O/P EST LOW 20 MIN: CPT | Performed by: NURSE PRACTITIONER

## 2020-03-06 PROCEDURE — 96413 CHEMO IV INFUSION 1 HR: CPT

## 2020-03-06 RX ORDER — FAMOTIDINE 10 MG/ML
20 INJECTION, SOLUTION INTRAVENOUS ONCE
Status: CANCELLED | OUTPATIENT
Start: 2020-03-06

## 2020-03-06 RX ORDER — FAMOTIDINE 10 MG/ML
20 INJECTION, SOLUTION INTRAVENOUS AS NEEDED
Status: CANCELLED | OUTPATIENT
Start: 2020-03-06

## 2020-03-06 RX ORDER — SODIUM CHLORIDE 9 MG/ML
250 INJECTION, SOLUTION INTRAVENOUS ONCE
Status: COMPLETED | OUTPATIENT
Start: 2020-03-06 | End: 2020-03-06

## 2020-03-06 RX ORDER — HEPARIN SODIUM (PORCINE) LOCK FLUSH IV SOLN 100 UNIT/ML 100 UNIT/ML
500 SOLUTION INTRAVENOUS AS NEEDED
Status: DISCONTINUED | OUTPATIENT
Start: 2020-03-06 | End: 2020-03-06 | Stop reason: HOSPADM

## 2020-03-06 RX ORDER — FAMOTIDINE 10 MG/ML
20 INJECTION, SOLUTION INTRAVENOUS ONCE
Status: COMPLETED | OUTPATIENT
Start: 2020-03-06 | End: 2020-03-06

## 2020-03-06 RX ORDER — SODIUM CHLORIDE 0.9 % (FLUSH) 0.9 %
10 SYRINGE (ML) INJECTION AS NEEDED
Status: CANCELLED | OUTPATIENT
Start: 2020-03-06

## 2020-03-06 RX ORDER — SODIUM CHLORIDE 9 MG/ML
250 INJECTION, SOLUTION INTRAVENOUS ONCE
Status: CANCELLED | OUTPATIENT
Start: 2020-03-06

## 2020-03-06 RX ORDER — HEPARIN SODIUM (PORCINE) LOCK FLUSH IV SOLN 100 UNIT/ML 100 UNIT/ML
500 SOLUTION INTRAVENOUS AS NEEDED
Status: CANCELLED | OUTPATIENT
Start: 2020-03-06

## 2020-03-06 RX ORDER — DIPHENHYDRAMINE HYDROCHLORIDE 50 MG/ML
50 INJECTION INTRAMUSCULAR; INTRAVENOUS AS NEEDED
Status: CANCELLED | OUTPATIENT
Start: 2020-03-06

## 2020-03-06 RX ADMIN — DIPHENHYDRAMINE HYDROCHLORIDE 25 MG: 50 INJECTION, SOLUTION INTRAMUSCULAR; INTRAVENOUS at 08:39

## 2020-03-06 RX ADMIN — SODIUM CHLORIDE 250 ML: 9 INJECTION, SOLUTION INTRAVENOUS at 08:37

## 2020-03-06 RX ADMIN — Medication 500 UNITS: at 11:41

## 2020-03-06 RX ADMIN — DEXAMETHASONE SODIUM PHOSPHATE 12 MG: 10 INJECTION, SOLUTION INTRAMUSCULAR; INTRAVENOUS at 08:57

## 2020-03-06 RX ADMIN — FAMOTIDINE 20 MG: 10 INJECTION INTRAVENOUS at 08:37

## 2020-03-06 RX ADMIN — PACLITAXEL 140 MG: 6 INJECTION, SOLUTION INTRAVENOUS at 09:43

## 2020-03-06 NOTE — PROGRESS NOTES
Subjective     REASON FOR FOLLOW UP:    1.  T2N1 invasive ductal carcinoma of the left breast.  Ultrasound-showed 3.8 x 3.1 x 2.5 cm mass at 4 o'clock position with 2 abnormal axillary lymph nodes, larger lymph node being 1.4 cm.  Left breast biopsy and axillary lymph node biopsy October 29, 2019, invasive ductal carcinoma, moderately differentiated, grade 2, ER 85%, ID 10%, HER-2/merna 2+, HER-2 FISH negative.  · 12/06/19: Cycle #1 of dose-dense Adriamycin/Cytoxan with Neulasta for marrow support    · 01/17/20: Last cycle of Adriamycin/Cytoxan given  2.  Severe neutropenia secondary to chemotherapy, with ANC 50 at 1-week after cycle #1 chemotherapy.  Also had moderate thrombocytopenia platelets 92,000.  Patient was given Levaquin for prophylaxis of neutropenia fever.  3.  1/24/2020.  Seventh dose of Zarxio marginal counts, additional Zarxio plan through the weekend with recheck on Monday, January 27  4. 01/31/20: Initiated cycle 1 Taxol  5.  2/7/2020 cycle 2 Taxol delayed due to neutropenia.  Plans to add 3 days of Neupogen injections after each Taxol treatment once we resume.               HISTORY OF PRESENT ILLNESS:  The patient is a 59 y.o. year old female with the above mentioned history here today for evaluation prior to cycle 5 weekly Taxol.  She is receiving three days of Xarxio after each treatment.  She reports that she has new today noticed some intermittent numbness/burning in her toes.  She particularly notices that her yesterday at night, but it does not occur every night.  Her hands are unaffected.  She does report some mild arthralgias after the Zarxio injections.  No fevers or chills.  No issues with nausea, vomiting, diarrhea, or constipation.    PAST MEDICAL HISTORY:  She had a stroke in July 2017 with headache and dizziness.  She went to the ER and was placed on aspirin.  However, she stopped taking it two weeks ago.  She has been taking Aimovig (injection) monthly with Fariba López at  [facility].    In 1994, patient was diagnosed with HIV with an unknown cause which is managed by Dr. Natali Subramanian.  As of now, her viral load is good.    Patient has arthritis.  She gets trigger-point injections in her back with her last one being 2 weeks ago.  She has had multiple ablations.  She also has pain in her SI joint and Dr. Bermudez performed a surgery on this.  She takes Narco for the pain.      She is osteoporotic.  She has had multiple hairline fractures in both her legs.  She had her knees cleaned out by Dr. Sinha.     Patient has had a hysterectomy and still has both of her ovaries.    Patient has vertigo and the muscles in her left ear are weak.  She just has an imbalance.    ONCOLOGIC HISTORY:  Patient is a 59-year-old female who has had a history of HIV since age 34 followed by Dr. Natali Ng at [facility] and was on multiple HIV drugs initially but more recently has been on a single medication TRIUMEQ, with well-controlled HIV.  She follows up with Dr. Dawn , infectious disease who saw her recently and he saw her on 4/8/2019 and has excellent control and suppression of her viral load.  She is very compliant with her medications.    She also has had history of stroke in 2017 for which she was placed on aspirin.  She presented with a headache.  She is very active and works at [employer] full-time.  She also has had history of vertigo in the past  Patient's PCP is Zach Lora.    Patient first noticed the mass in her left breast 5 months ago.  Her last mammogram was 5 years ago.  She had soreness in the left breast and she went to her primary care physician who then ordered a mammogram diagnostic and an ultrasound.  The diagnostic mammogram showed a 3.7 cm mass at 4 o'clock position in the lower outer quadrant of the left breast.  The ultrasound showed 3.8 cm x 3.1 x 2.5 cm mass at 4 o'clock position in the left breast with 2 abnormal appearing left axillary lymph nodes the  largest being 1.4 cm.    She then underwent biopsy of both the breast mass as well as the left axillary lymph node and both of them are positive for invasive mammary carcinoma it is invasive ductal carcinoma with apocrine features, moderately differentiated, grade 2 of 3 with a [place] score of 5.  The left axillary lymph node is also consistent with metastatic mammary carcinoma.  It is ER positive IL positive HER-2/merna negative.  Details as follows    10/21/19 - Bilateral Diagnostic Mammogram and US Breast Left  FINDINGS: Bilateral digital CC and MLO mammographic images were  obtained. No prior examination is available for comparison. Scattered  fibroglandular densities are seen throughout both breasts. A triangular  skin marker represents the area of palpable concern in the lower outer  quadrant of the left breast in the posterior 1/3. At this location there  is an irregular mass that measures on the order of 3.7 cm in greatest  dimension. Internal calcifications are noted. No suspicious findings in  the right breast are appreciated.     ULTRASOUND: Targeted sonographic evaluation of the left breast was  performed through the area of concern in the left axilla. At the 4  o'clock position on the order of 6 cm from the nipple there is an  irregular hypoechoic mass that measures 3.8 x 3.1 x 2.5 cm. Internal  vascularity is noted.     There are 2 abnormal-appearing left axillary lymph nodes, the largest  which measures on the order of 1.4 cm in greatest dimension.     IMPRESSION:  1. There is a 3.8 cm irregular mass in the left breast at the 4 o'clock  position. This is seen in conjunction with an irregular 1.4 cm lymph  node in the left axilla. This is suspicious for malignancy with left  axillary involvement. Correlation with ultrasound-guided biopsy of both  the left breast mass and the lymph node is recommended.  2. There are no findings suspicious for malignancy in the right breast.     BI-RADS CATEGORY 5:      Patient's labs from 11/12/19 are normal.    10/29/19 - Tissue Pathology  1. Left Breast, 4:00, 6 cm FN, U/S-Guided Core Needle Biopsy for a Mass:  A. INVASIVE DUCTAL CARCINOMA WITH APOCRINE FEATURES, Moderately differentiated;  [place] Histologic Grade II/III (tubule score = 3, nuclear score = 2, mitoses score = 1), measuring at least  9 mm.  B. No ductal or lobular carcinoma in situ is identified in this sample.  C. Focus suspicious for lymphovascular space invasion.  D. See Biomarker Template for hormone receptor studies.  2. Lymph Node, Left Axilla, U/S-Guided Core Needle Biopsy:  A. METASTATIC MAMMARY CARCINOMA (see Comment).  B. Metastatic focus measures 5 mm in greatest extent.  ER+, 85%  DC+, 10%  HER2- Score 2+    11/13/19 - CT Neck Chest Abdomen Pelvis  IMPRESSION:  1. In addition to the irregular approximately 3.8 cm mass within the  lateral aspect of the left breast, there are a few subcentimeter  asymmetric nodules along the lateral margin of the glandular tissue. The  several asymmetric left subpectoral nodes and 1.2 x 1.0 cm left axillary  node are suspicious for tamar metastases. The asymmetric subcentimeter  left supraclavicular and left posterior cervical triangle nodes are  worrisome as well.  2. There is no convincing evidence for metastatic disease within the  abdomen or pelvis.    11/14/19 - Echocardiogram:  Normal.  Calculated EF = 67%.  There is trace mitral valve and tricuspid valve regurgitation.    11/15/19 - Bone Scan  Negative.    11/18/19 - MRI Breast Bilateral  IMPRESSION:  1. Biopsy-proven malignancy in the left breast centered at the 4 o'clock  position with associated surrounding satellite nodules as described. The  entire region of involvement including the satellite nodules and the  dominant mass measure up to 7.5 cm in greatest dimension. Also, left  axillary adenopathy with multiple irregular lymph nodes and loss of  differentiation of the borders of multiple lymph nodes  is noted and this  is suspicious for extranodal involvement.  2. There are no findings suspicious for malignancy in the right breast.  BI-RADS CATEGORY 6      12/06/19: Cycle #1 of Adriamycin/Cytoxan    01/17/20: Last cycle of Adriamycin/Cytoxan given without Neulasta.  We will switch from Neulasta to 7 days of Neupogen injections after cycle 4.    We reviewed with patient the US Breast from 01/29/20 which shows mild interval partial response to neoadjuvant chemotherapy.      01/31/20: Initiated cycle 1 Taxol    Past Medical History:   Diagnosis Date   • Anxiety    • Cerebrovascular accident (CVA) (CMS/Formerly [facility]) 8/22/2017   • DDD (degenerative disc disease), cervical    • Depression    • GERD (gastroesophageal reflux disease)    • H/O ICH (intracerebral hemorrhage) (CMS/Formerly [facility])    • History of stroke    • History of thrombocytopenia    • HIV (human immunodeficiency virus infection) (CMS/Formerly [facility]) 1996   • Hyperlipidemia    • Insomnia    • Lupus anticoagulant positive    • Malignant neoplasm of overlapping sites of left breast in female, estrogen receptor positive (CMS/Formerly [facility]) 11/12/2019   • Meniscus tear 2017    RIGHT   • Migraine    • Neck pain     WITH SHOOTING PAIN LEFT RIGHT ARM   • Osteoarthritis    • Osteoporosis    • Seasonal allergies    • Spinal headache    • Tick bite 06/2014        Past Surgical History:   Procedure Laterality Date   • ADENOIDECTOMY     • CHOLECYSTECTOMY     • HYSTERECTOMY     • KNEE ARTHROSCOPY Right 3/24/2017    Procedure:  RIGHT  KNEE ARTHROSCOPY WITH DEBRIDEMENT CHONDROPLASTY PARTIAL MENISCECTOMY;  Surgeon: Karl Galeas MD;  Location: [facility];  Service:    • KNEE CARTILAGE SURGERY Right 2007   • TONSILLECTOMY     • US GUIDED LYMPH NODE BIOPSY  10/29/2019   • VENOUS ACCESS DEVICE (PORT) INSERTION Right 12/2/2019    Procedure: INSERTION VENOUS ACCESS DEVICE RIGHT;  Surgeon: Landen Forman MD;  Location: [facility] OR;  Service: General      OB-GYN:  Menarche 15  years  Menopause-46  Pregnancies- 1 para 1 no miscarriages her first pregnancy was in  at 29 years of age.  She did not breastfeed.  Post menopausal HRT- None.  Hx of birth control pills- Yes.    Current Outpatient Medications on File Prior to Visit   Medication Sig Dispense Refill   • AIMOVIG 140 MG/ML solution auto-injector INJECT 140 MG INTO THE SKIN EVERY 30 (THIRTY) DAYS.  11   • atorvastatin (LIPITOR) 20 MG tablet TAKE 1 TABLET BY MOUTH EVERY DAY AT NIGHT 90 tablet 3   • baclofen (LIORESAL) 10 MG tablet Take 10 mg by mouth 2 (Two) Times a Day.  2   • CAMBIA 50 MG pack TAKE 1 PACKET AS NEEDED FOR HEADACHES  1   • clonazePAM (KlonoPIN) 0.5 MG tablet TAKE 1 TABLET BY MOUTH 2 (TWO) TIMES A DAY AS NEEDED FOR ANXIETY. 60 tablet 1   • COCONUT OIL PO Take  by mouth.     • colesevelam (WELCHOL) 625 MG tablet TAKE 2 TABLETS BY MOUTH EVERY  tablet 3   • dexamethasone (DECADRON) 4 MG tablet Take 2 tablets in the morning daily on Days 2, 3 & 4.  Take with food. 6 tablet 3   • escitalopram (LEXAPRO) 10 MG tablet Take 1 tablet by mouth Daily. 90 tablet 3   • fluticasone (FLONASE) 50 MCG/ACT nasal spray 2 sprays into the nostril(s) as directed by provider Daily. 3 bottle 3   • ibandronate (BONIVA) 150 MG tablet TAKE 1 TABLET BY MOUTH EVERY 30 (THIRTY) DAYS. 3 tablet 3   • Menaquinone-7 (VITAMIN K2 PO) Take  by mouth.     • NON FORMULARY Collagen peptides powder     • ondansetron (ZOFRAN) 8 MG tablet Take 1 tablet by mouth 3 (Three) Times a Day As Needed for Nausea or Vomiting. 30 tablet 5   • ondansetron ODT (ZOFRAN ODT) 4 MG disintegrating tablet Take 1 tablet by mouth Every 8 (Eight) Hours As Needed for Nausea or Vomiting. 30 tablet 0   • oxyCODONE-acetaminophen (PERCOCET)  MG per tablet TK 1 T PO QID     • prochlorperazine (COMPAZINE) 10 MG tablet Take 1 tablet by mouth Every 6 (Six) Hours As Needed for Nausea or Vomiting. 20 tablet 2   • zolpidem (AMBIEN) 10 MG tablet Take 1 tablet by mouth At  Night As Needed for Sleep. 90 tablet 0     No current facility-administered medications on file prior to visit.         ALLERGIES:    Allergies   Allergen Reactions   • Augmentin [Amoxicillin-Pot Clavulanate] Hives        Social History     Socioeconomic History   • Marital status:      Spouse name: Owen   • Number of children: 1   • Years of education: College   • Highest education level: Not on file   Occupational History   • Occupation:      Employer:  [employer]   Tobacco Use   • Smoking status: Never Smoker   • Smokeless tobacco: Never Used   Substance and Sexual Activity   • Alcohol use: Yes     Comment: occasioonal   • Drug use: No   • Sexual activity: Defer     Birth control/protection: None     Patient does not smoke or do drugs.  She does drink occasionally.    Family History   Problem Relation Age of Onset   • Breast cancer Mother    • Leukemia Mother         CLL?   • Diabetes Mother    • Hyperthyroidism Mother    • Hearing loss Mother    • Dementia Father    • Heart disease Maternal Grandmother    • Diabetes Maternal Grandfather    • Heart disease Maternal Grandfather    • Stroke Maternal Grandfather    • Heart attack Maternal Grandfather    • Osteoporosis Paternal Grandmother    • Heart disease Paternal Grandfather    • Leukemia Maternal Aunt         CLL?   • Throat cancer Paternal Uncle       FAMILY HISTORY:  Her mother had breast cancer at age 60, still alive at 85 and in good health..  Her father had dementia.  Her paternal uncle had prostate cancer.  Her brother had esophageal cancer.        Review of Systems   Constitutional: Negative for activity change, appetite change, chills, fatigue and fever.   HENT: Negative for mouth sores, nosebleeds and trouble swallowing.    Respiratory: Negative for cough and shortness of breath.    Cardiovascular: Negative for chest pain and leg swelling.   Gastrointestinal: Negative for abdominal pain, constipation, diarrhea, nausea and  vomiting.   Genitourinary: Negative for difficulty urinating.   Skin: Negative for rash.   Neurological: Positive for numbness (intermittently in feet). Negative for dizziness and weakness.   Hematological: Negative for adenopathy. Does not bruise/bleed easily.   Psychiatric/Behavioral: Negative for sleep disturbance.       Objective    Vitals:    03/06/20 0758   BP: 136/95   Pulse: 73   Resp: 14   Temp: 98.3 °F (36.8 °C)   SpO2: 97%   Pain 0 out of 10  ECOG 0  Weight 144 pounds    Physical Exam   Constitutional: She is oriented to person, place, and time. She appears well-developed and well-nourished. No distress.   HENT:   Head: Normocephalic and atraumatic.   Mouth/Throat: Oropharynx is clear and moist and mucous membranes are normal. No oropharyngeal exudate.   Eyes: Pupils are equal, round, and reactive to light.   Neck: Normal range of motion.   Cardiovascular: Normal rate, regular rhythm and normal heart sounds.   No murmur heard.  Pulmonary/Chest: Effort normal and breath sounds normal. No respiratory distress. She has no wheezes. She has no rhonchi. She has no rales.   Abdominal: Soft. Normal appearance and bowel sounds are normal. She exhibits no distension. There is no hepatosplenomegaly.   Musculoskeletal: Normal range of motion. She exhibits no edema.   Neurological: She is alert and oriented to person, place, and time.   Skin: Skin is warm and dry. No rash noted.   Psychiatric: She has a normal mood and affect.   Vitals reviewed.  3/6/2020 physical exam unchanged from above.    BREAST: 2/14/2020 :Left breast exam, without skin change, mass in the lower outer quadrant around 4-5 o'clock measures about 4-1/2 x 4 cm.  2/21/2020: Left breast: No evidence of any skin changes and no evidence of left nipple discharge as well as no left axillary adenopathy or left supraclavicular adenopathy.  The mass measures 4.5 x 3.5 cm.      RECENT LABS:     Results from last 7 days   Lab Units 03/06/20  0748  02/28/20  1108   WBC 10*3/mm3 4.75 3.61   HEMOGLOBIN g/dL 11.1* 10.8*   HEMATOCRIT % 33.9* 33.0*   PLATELETS 10*3/mm3 235 208     Lab Results   Component Value Date    NEUTROABS 2.90 03/06/2020 01/29/20 - US Breast  IMPRESSION:  There are findings suggestive of mild interval partial  response to neoadjuvant chemotherapy.    Assessment/Plan    1. T2N1 invasive ductal carcinoma of the left breast, recently diagnosed.    -Noticed mass in the left breast x5 months  -Diagnostic mammogram showed 3.7 mm mass in the left breast at 4 o'clock position  -Ultrasound-showed 3.8 x 3.1 x 2.5 cm mass at 4 o'clock position with 2 abnormal axillary lymph nodes, larger lymph node being 1.4 cm  -Left breast biopsy and axillary lymph node biopsy October 29, 2019, invasive ductal carcinoma, moderately differentiated, grade 2, ER 85%, [place] 10%, HER-2/merna 2+, HER-2 FISH negative  · CT scans from 11/13/19 show some asymmetric nodules along the lateral margin of the glandular tissue.  The 1.2x1.0 cm left axillary nodes, left supraclavicular, and left posterior cervical triangle nodes are suspicious for tamar metastases.  ·   bone scan from 11/15/19 which was negative.   ·  MRI breast from 11/18/19 which shows the biopsy-proven malignancy in the left breast centered at the 4:00 position.  The region of involvement measures up to 7.5 cm.  There is left axillary adenopathy with multiple irregular lymph nodes and loss of differentiation of the border of multiple lymph nodes which are suspicious for extranodal involvement.  ·    echocardiogram from 11/14/19 which was normal with an ejection fraction of 67%.    · INVITAE from 11/14/19 which was negative.  · Given the size of her tumor and her medical history, patient will be given 4 cycles of Adriamycin/Cytoxan with Neulasta.  After completion of this treatment, patient will be started on 12 cycles of Taxol.  After the tumor shrinks in size, Dr. Forman will perform a lumpectomy.    · Following  lumpectomy, patient will begin endocrine therapy.  I have spoken with Dr. Forman who agrees with chemotherapy first due to the large size of the tumor.   ·  Dr. Dawn agrees chemotherapy will not aggravate her HIV condition.  I also spoke with Dr. Mohan at [place] who also agrees chemotherapy is the first step.   · 12/06/19: Cycle #1 of Adriamycin/Cytoxan  · 01/17/20: Proceed with final A/C cycle 4 today without Neulasta.  Patient to come 7 days for Neupogen injections.  If she has more pain after the injections, she can be given morphine.  · She is here today, 1/24/2020 for her seventh dose of Zarxio.  She did tolerate Zarxio much better than Neulasta.  Her total white count is within normal limits but slightly lower than we like.  · We reviewed with patient the US Breast from 01/29/20 after 4 cycles of Adriamycin Cytoxan chemotherapy which shows mild interval partial response to neoadjuvant chemotherapy.  The mass has decreased from 3.8 x 2.5 to 3.1 cm to 3.5 x 2.3 x 3.2 cm previously the left axillary lymph node has decreased from 1.4 x 0.7 x 1 cm to 1.1 x 0.6 x 0.9 cm.  · 01/31/20: Initiated cycle 1 Taxol  · 2/7/2020 due for cycle 2 Taxol, white blood cell count 1.77, ANC 0.87.  Treatment held, patient received 3 days of Neupogen injections.  · 2/14/2020 patient returns for evaluation prior to cycle 2.  Her white blood cell count has improved.  We will proceed with treatment today, and plan to give Neupogen injections daily x3 days starting on day #2 of each cycle.  · 2/21/2020 noted left breast mass to be stable to decrease in size and axillary lymph node also stable to decrease in size.    2.  Profound neutropenia due to chemotherapy despite Neulasta:   · On 12/13/2019 at 1 week after cycle #1 chemotherapy, white blood cell count 850, ANC 50 together with a low monocytes 150 .  We started the patient on Levaquin 500 mg daily for 7 days.  No neutropenia fever.    · On 12/20/2019, much improved and  elevated ANC 10,800.  We will proceed ahead with cycle #2 of chemotherapy.  Neulasta will be continued for each chemotherapy.  Due to expected neutropenia, will start the patient on Levaquin 500 mg daily for 7 days, starting on day #4 of each cycle AC chemotherapy.  · Poor tolerance to Neulasta.  Treatment changed to Zarxio 300 mcg daily following cycle 4 AC.  Patient ended up receiving a total of 10 days of Zarxio.  · 2/7/2020 patient due for cycle 2 Taxol, and she is again neutropenic with a white blood cell count of 1.77, ANC 0.87.  Treatment held.  We will give her Zarxio 300 mcg daily for 3 days starting today.  We will also start her on prophylactic Levaquin.    3.  Moderate thrombocytopenia secondary to chemotherapy.    · This happened after cycle 1 Chemotherapy with platelets 92,000 on 12/13/2019.  Patient was asymptomatic.  · Normalized platelets 168,000 on 12/20/2019.  Expecting thrombocytopenia will recur.  Continue to monitor.  · Platelet count 235,000, continue to monitor.    4.  Chemotherapy induced anemia.   · Hemoglobin 9.4 2/7/2020y.  We will continue to closely monitor.   · Ferritin and iron panel previously checked were within normal limits.  We will recheck a B12 level today.  · 3/6/2020 hemoglobin 11.1.  Continue to monitor      5.  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  · On 12/13/2019 patient here for toxicity check and struggled with significant insomnia from steroids following her first cycle.  We discussed taking the dexamethasone, only one 4 mg tablet daily for 3 days with her next cycle of chemotherapy to see if this improves her symptoms.    · On 12/20/2019, I questioned the patient that her nausea and vomiting may be significant due to decreased dose of dexamethasone.  She could use Compazine and Zofran as needed more frequently.  · Patient is taking Ambien for insomnia and that is still not helping that much.  · Will decrease dose of dexamethasone to 2 mg days 2 3 and 4 after chemotherapy in  the morning  · Insomnia much improved with decreased dose of dexamethasone    6. HIV, followed by Dr. Dawn in infectious disease.  Well-controlled and is on a single medication with very low viral load. Has HIV since age 34.     7.  Family history of breast cancer with mother having had breast cancer at age 60.  There is family history of uncle with prostate cancer.  Patient will require genetic referral    8.  Arthritis with severe back pain followed by Dr. Bam Crandall  · And received Percocet    9. Episodes of nosebleeds.  She has had these once a week for 3-4 years.  She did not report this today.    10. Severe body ache post A/C cycle 3.  She took Percocet and Hydrocodone for pain management which did help.  I have discontinued Neulasta as outlined above.    11.  Otalgia: Of the right ear, present for a while.  Dr. Moreno would like the patient to follow-up with ENT, Dr. Stapleton, as she has previously seen him.    12.  Arthralgias for a few days after each treatment, which resolve on their own.    13.  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  Advised starting on B complex.  We will continue to monitor.     PLAN:  1. Proceed with cycle 5 weekly Taxol today.   2. Continue Xarxio x 3 days after each treatment.    3. Start B complex.   4. Return in one week for cycle 6.  5. Return in two weeks for evaluation by Dr. Moreno prior to cycle 7

## 2020-03-08 ENCOUNTER — INFUSION (OUTPATIENT)
Dept: ONCOLOGY | Facility: HOSPITAL | Age: 60
End: 2020-03-08

## 2020-03-08 DIAGNOSIS — T45.1X5A CHEMOTHERAPY-INDUCED NEUTROPENIA (HCC): ICD-10-CM

## 2020-03-08 DIAGNOSIS — Z17.0 MALIGNANT NEOPLASM OF OVERLAPPING SITES OF LEFT BREAST IN FEMALE, ESTROGEN RECEPTOR POSITIVE (HCC): Primary | ICD-10-CM

## 2020-03-08 DIAGNOSIS — C50.812 MALIGNANT NEOPLASM OF OVERLAPPING SITES OF LEFT BREAST IN FEMALE, ESTROGEN RECEPTOR POSITIVE (HCC): Primary | ICD-10-CM

## 2020-03-08 DIAGNOSIS — D70.1 CHEMOTHERAPY-INDUCED NEUTROPENIA (HCC): ICD-10-CM

## 2020-03-08 LAB — DRUGS UR: NORMAL

## 2020-03-08 PROCEDURE — 96372 THER/PROPH/DIAG INJ SC/IM: CPT

## 2020-03-08 PROCEDURE — 25010000002 FILGRASTIM-SNDZ 300 MCG/0.5ML SOLUTION PREFILLED SYRINGE: Performed by: INTERNAL MEDICINE

## 2020-03-08 RX ADMIN — FILGRASTIM-SNDZ 300 MCG: 300 INJECTION, SOLUTION INTRAVENOUS; SUBCUTANEOUS at 13:04

## 2020-03-09 ENCOUNTER — INFUSION (OUTPATIENT)
Dept: ONCOLOGY | Facility: HOSPITAL | Age: 60
End: 2020-03-09

## 2020-03-09 DIAGNOSIS — D70.1 CHEMOTHERAPY-INDUCED NEUTROPENIA (HCC): ICD-10-CM

## 2020-03-09 DIAGNOSIS — C50.812 MALIGNANT NEOPLASM OF OVERLAPPING SITES OF LEFT BREAST IN FEMALE, ESTROGEN RECEPTOR POSITIVE (HCC): Primary | ICD-10-CM

## 2020-03-09 DIAGNOSIS — T45.1X5A CHEMOTHERAPY-INDUCED NEUTROPENIA (HCC): ICD-10-CM

## 2020-03-09 DIAGNOSIS — Z17.0 MALIGNANT NEOPLASM OF OVERLAPPING SITES OF LEFT BREAST IN FEMALE, ESTROGEN RECEPTOR POSITIVE (HCC): Primary | ICD-10-CM

## 2020-03-09 PROCEDURE — 25010000002 FILGRASTIM-SNDZ 300 MCG/0.5ML SOLUTION PREFILLED SYRINGE: Performed by: INTERNAL MEDICINE

## 2020-03-09 PROCEDURE — 96372 THER/PROPH/DIAG INJ SC/IM: CPT

## 2020-03-09 RX ADMIN — FILGRASTIM-SNDZ 300 MCG: 300 INJECTION, SOLUTION INTRAVENOUS; SUBCUTANEOUS at 14:11

## 2020-03-13 ENCOUNTER — INFUSION (OUTPATIENT)
Dept: ONCOLOGY | Facility: HOSPITAL | Age: 60
End: 2020-03-13

## 2020-03-13 VITALS
HEART RATE: 76 BPM | SYSTOLIC BLOOD PRESSURE: 110 MMHG | WEIGHT: 145 LBS | BODY MASS INDEX: 23.28 KG/M2 | DIASTOLIC BLOOD PRESSURE: 74 MMHG | OXYGEN SATURATION: 98 % | TEMPERATURE: 98.8 F

## 2020-03-13 DIAGNOSIS — C50.812 MALIGNANT NEOPLASM OF OVERLAPPING SITES OF LEFT BREAST IN FEMALE, ESTROGEN RECEPTOR POSITIVE (HCC): Primary | ICD-10-CM

## 2020-03-13 DIAGNOSIS — Z17.0 MALIGNANT NEOPLASM OF OVERLAPPING SITES OF LEFT BREAST IN FEMALE, ESTROGEN RECEPTOR POSITIVE (HCC): Primary | ICD-10-CM

## 2020-03-13 LAB
ALBUMIN SERPL-MCNC: 4.2 G/DL (ref 3.5–5.2)
ALBUMIN/GLOB SERPL: 1.8 G/DL (ref 1.1–2.4)
ALP SERPL-CCNC: 147 U/L (ref 38–116)
ALT SERPL W P-5'-P-CCNC: 24 U/L (ref 0–33)
ANION GAP SERPL CALCULATED.3IONS-SCNC: 11.5 MMOL/L (ref 5–15)
AST SERPL-CCNC: 26 U/L (ref 0–32)
BASOPHILS # BLD AUTO: 0.04 10*3/MM3 (ref 0–0.2)
BASOPHILS NFR BLD AUTO: 0.8 % (ref 0–1.5)
BILIRUB SERPL-MCNC: 0.3 MG/DL (ref 0.2–1.2)
BUN BLD-MCNC: 13 MG/DL (ref 6–20)
BUN/CREAT SERPL: 22 (ref 7.3–30)
CALCIUM SPEC-SCNC: 9.1 MG/DL (ref 8.5–10.2)
CHLORIDE SERPL-SCNC: 102 MMOL/L (ref 98–107)
CO2 SERPL-SCNC: 27.5 MMOL/L (ref 22–29)
CREAT BLD-MCNC: 0.59 MG/DL (ref 0.6–1.1)
DEPRECATED RDW RBC AUTO: 57.3 FL (ref 37–54)
EOSINOPHIL # BLD AUTO: 0.17 10*3/MM3 (ref 0–0.4)
EOSINOPHIL NFR BLD AUTO: 3.3 % (ref 0.3–6.2)
ERYTHROCYTE [DISTWIDTH] IN BLOOD BY AUTOMATED COUNT: 14.7 % (ref 12.3–15.4)
GFR SERPL CREATININE-BSD FRML MDRD: 104 ML/MIN/1.73
GLOBULIN UR ELPH-MCNC: 2.4 GM/DL (ref 1.8–3.5)
GLUCOSE BLD-MCNC: 89 MG/DL (ref 74–124)
HCT VFR BLD AUTO: 33.1 % (ref 34–46.6)
HGB BLD-MCNC: 10.7 G/DL (ref 12–15.9)
IMM GRANULOCYTES # BLD AUTO: 0.15 10*3/MM3 (ref 0–0.05)
IMM GRANULOCYTES NFR BLD AUTO: 2.9 % (ref 0–0.5)
LYMPHOCYTES # BLD AUTO: 0.88 10*3/MM3 (ref 0.7–3.1)
LYMPHOCYTES NFR BLD AUTO: 17.3 % (ref 19.6–45.3)
MCH RBC QN AUTO: 33.9 PG (ref 26.6–33)
MCHC RBC AUTO-ENTMCNC: 32.3 G/DL (ref 31.5–35.7)
MCV RBC AUTO: 104.7 FL (ref 79–97)
MONOCYTES # BLD AUTO: 0.79 10*3/MM3 (ref 0.1–0.9)
MONOCYTES NFR BLD AUTO: 15.5 % (ref 5–12)
NEUTROPHILS # BLD AUTO: 3.07 10*3/MM3 (ref 1.7–7)
NEUTROPHILS NFR BLD AUTO: 60.2 % (ref 42.7–76)
NRBC BLD AUTO-RTO: 0 /100 WBC (ref 0–0.2)
PLATELET # BLD AUTO: 241 10*3/MM3 (ref 140–450)
PMV BLD AUTO: 8.8 FL (ref 6–12)
POTASSIUM BLD-SCNC: 4.1 MMOL/L (ref 3.5–4.7)
PROT SERPL-MCNC: 6.6 G/DL (ref 6.3–8)
RBC # BLD AUTO: 3.16 10*6/MM3 (ref 3.77–5.28)
SODIUM BLD-SCNC: 141 MMOL/L (ref 134–145)
WBC NRBC COR # BLD: 5.1 10*3/MM3 (ref 3.4–10.8)

## 2020-03-13 PROCEDURE — 96375 TX/PRO/DX INJ NEW DRUG ADDON: CPT

## 2020-03-13 PROCEDURE — 96413 CHEMO IV INFUSION 1 HR: CPT

## 2020-03-13 PROCEDURE — 25010000002 DIPHENHYDRAMINE PER 50 MG: Performed by: NURSE PRACTITIONER

## 2020-03-13 PROCEDURE — 25010000002 DEXAMETHASONE SODIUM PHOSPHATE 100 MG/10ML SOLUTION: Performed by: NURSE PRACTITIONER

## 2020-03-13 PROCEDURE — 25010000002 PACLITAXEL PER 30 MG: Performed by: NURSE PRACTITIONER

## 2020-03-13 PROCEDURE — 85025 COMPLETE CBC W/AUTO DIFF WBC: CPT

## 2020-03-13 PROCEDURE — 80053 COMPREHEN METABOLIC PANEL: CPT

## 2020-03-13 RX ORDER — FAMOTIDINE 10 MG/ML
20 INJECTION, SOLUTION INTRAVENOUS ONCE
Status: COMPLETED | OUTPATIENT
Start: 2020-03-13 | End: 2020-03-13

## 2020-03-13 RX ORDER — SODIUM CHLORIDE 9 MG/ML
250 INJECTION, SOLUTION INTRAVENOUS ONCE
Status: COMPLETED | OUTPATIENT
Start: 2020-03-13 | End: 2020-03-13

## 2020-03-13 RX ORDER — DIPHENHYDRAMINE HYDROCHLORIDE 50 MG/ML
50 INJECTION INTRAMUSCULAR; INTRAVENOUS AS NEEDED
Status: CANCELLED | OUTPATIENT
Start: 2020-03-13

## 2020-03-13 RX ORDER — FAMOTIDINE 10 MG/ML
20 INJECTION, SOLUTION INTRAVENOUS AS NEEDED
Status: CANCELLED | OUTPATIENT
Start: 2020-03-13

## 2020-03-13 RX ADMIN — DEXAMETHASONE SODIUM PHOSPHATE 12 MG: 10 INJECTION, SOLUTION INTRAMUSCULAR; INTRAVENOUS at 09:02

## 2020-03-13 RX ADMIN — FAMOTIDINE 20 MG: 10 INJECTION INTRAVENOUS at 09:00

## 2020-03-13 RX ADMIN — SODIUM CHLORIDE 250 ML: 9 INJECTION, SOLUTION INTRAVENOUS at 08:59

## 2020-03-13 RX ADMIN — PACLITAXEL 140 MG: 6 INJECTION, SOLUTION INTRAVENOUS at 10:07

## 2020-03-13 RX ADMIN — DIPHENHYDRAMINE HYDROCHLORIDE 25 MG: 50 INJECTION, SOLUTION INTRAMUSCULAR; INTRAVENOUS at 09:19

## 2020-03-14 ENCOUNTER — INFUSION (OUTPATIENT)
Dept: ONCOLOGY | Facility: HOSPITAL | Age: 60
End: 2020-03-14

## 2020-03-14 VITALS
SYSTOLIC BLOOD PRESSURE: 128 MMHG | DIASTOLIC BLOOD PRESSURE: 80 MMHG | TEMPERATURE: 97.5 F | HEART RATE: 75 BPM | RESPIRATION RATE: 20 BRPM | OXYGEN SATURATION: 100 %

## 2020-03-14 DIAGNOSIS — T45.1X5A CHEMOTHERAPY-INDUCED NEUTROPENIA (HCC): ICD-10-CM

## 2020-03-14 DIAGNOSIS — D70.1 CHEMOTHERAPY-INDUCED NEUTROPENIA (HCC): ICD-10-CM

## 2020-03-14 DIAGNOSIS — Z17.0 MALIGNANT NEOPLASM OF OVERLAPPING SITES OF LEFT BREAST IN FEMALE, ESTROGEN RECEPTOR POSITIVE (HCC): Primary | ICD-10-CM

## 2020-03-14 DIAGNOSIS — C50.812 MALIGNANT NEOPLASM OF OVERLAPPING SITES OF LEFT BREAST IN FEMALE, ESTROGEN RECEPTOR POSITIVE (HCC): Primary | ICD-10-CM

## 2020-03-14 PROCEDURE — 96372 THER/PROPH/DIAG INJ SC/IM: CPT

## 2020-03-14 PROCEDURE — 25010000002 FILGRASTIM-SNDZ 300 MCG/0.5ML SOLUTION PREFILLED SYRINGE: Performed by: NURSE PRACTITIONER

## 2020-03-14 RX ADMIN — FILGRASTIM-SNDZ 300 MCG: 300 INJECTION, SOLUTION INTRAVENOUS; SUBCUTANEOUS at 12:50

## 2020-03-15 ENCOUNTER — INFUSION (OUTPATIENT)
Dept: ONCOLOGY | Facility: HOSPITAL | Age: 60
End: 2020-03-15

## 2020-03-15 DIAGNOSIS — C50.812 MALIGNANT NEOPLASM OF OVERLAPPING SITES OF LEFT BREAST IN FEMALE, ESTROGEN RECEPTOR POSITIVE (HCC): Primary | ICD-10-CM

## 2020-03-15 DIAGNOSIS — D70.1 CHEMOTHERAPY-INDUCED NEUTROPENIA (HCC): ICD-10-CM

## 2020-03-15 DIAGNOSIS — T45.1X5A CHEMOTHERAPY-INDUCED NEUTROPENIA (HCC): ICD-10-CM

## 2020-03-15 DIAGNOSIS — Z17.0 MALIGNANT NEOPLASM OF OVERLAPPING SITES OF LEFT BREAST IN FEMALE, ESTROGEN RECEPTOR POSITIVE (HCC): Primary | ICD-10-CM

## 2020-03-15 PROCEDURE — 25010000002 FILGRASTIM-SNDZ 300 MCG/0.5ML SOLUTION PREFILLED SYRINGE: Performed by: INTERNAL MEDICINE

## 2020-03-15 PROCEDURE — 96372 THER/PROPH/DIAG INJ SC/IM: CPT

## 2020-03-15 RX ADMIN — FILGRASTIM-SNDZ 300 MCG: 300 INJECTION, SOLUTION INTRAVENOUS; SUBCUTANEOUS at 12:41

## 2020-03-16 ENCOUNTER — INFUSION (OUTPATIENT)
Dept: ONCOLOGY | Facility: HOSPITAL | Age: 60
End: 2020-03-16

## 2020-03-16 DIAGNOSIS — D70.1 CHEMOTHERAPY-INDUCED NEUTROPENIA (HCC): ICD-10-CM

## 2020-03-16 DIAGNOSIS — T45.1X5A CHEMOTHERAPY-INDUCED NEUTROPENIA (HCC): ICD-10-CM

## 2020-03-16 DIAGNOSIS — Z17.0 MALIGNANT NEOPLASM OF OVERLAPPING SITES OF LEFT BREAST IN FEMALE, ESTROGEN RECEPTOR POSITIVE (HCC): Primary | ICD-10-CM

## 2020-03-16 DIAGNOSIS — C50.812 MALIGNANT NEOPLASM OF OVERLAPPING SITES OF LEFT BREAST IN FEMALE, ESTROGEN RECEPTOR POSITIVE (HCC): Primary | ICD-10-CM

## 2020-03-16 PROCEDURE — 96372 THER/PROPH/DIAG INJ SC/IM: CPT

## 2020-03-16 PROCEDURE — 25010000002 FILGRASTIM-SNDZ 300 MCG/0.5ML SOLUTION PREFILLED SYRINGE: Performed by: INTERNAL MEDICINE

## 2020-03-16 RX ADMIN — FILGRASTIM-SNDZ 300 MCG: 300 INJECTION, SOLUTION INTRAVENOUS; SUBCUTANEOUS at 14:10

## 2020-03-20 ENCOUNTER — APPOINTMENT (OUTPATIENT)
Dept: ONCOLOGY | Facility: HOSPITAL | Age: 60
End: 2020-03-20

## 2020-03-20 ENCOUNTER — INFUSION (OUTPATIENT)
Dept: ONCOLOGY | Facility: HOSPITAL | Age: 60
End: 2020-03-20

## 2020-03-20 ENCOUNTER — APPOINTMENT (OUTPATIENT)
Dept: LAB | Facility: HOSPITAL | Age: 60
End: 2020-03-20

## 2020-03-20 ENCOUNTER — OFFICE VISIT (OUTPATIENT)
Dept: ONCOLOGY | Facility: CLINIC | Age: 60
End: 2020-03-20

## 2020-03-20 VITALS
OXYGEN SATURATION: 96 % | DIASTOLIC BLOOD PRESSURE: 83 MMHG | HEIGHT: 66 IN | SYSTOLIC BLOOD PRESSURE: 131 MMHG | BODY MASS INDEX: 23.43 KG/M2 | WEIGHT: 145.8 LBS | TEMPERATURE: 98.9 F | HEART RATE: 81 BPM | RESPIRATION RATE: 16 BRPM

## 2020-03-20 DIAGNOSIS — Z17.0 MALIGNANT NEOPLASM OF OVERLAPPING SITES OF LEFT BREAST IN FEMALE, ESTROGEN RECEPTOR POSITIVE (HCC): Primary | ICD-10-CM

## 2020-03-20 DIAGNOSIS — G62.9 NEUROPATHY: ICD-10-CM

## 2020-03-20 DIAGNOSIS — C50.812 MALIGNANT NEOPLASM OF OVERLAPPING SITES OF LEFT BREAST IN FEMALE, ESTROGEN RECEPTOR POSITIVE (HCC): Primary | ICD-10-CM

## 2020-03-20 DIAGNOSIS — Z85.3 HISTORY OF LEFT BREAST CANCER: Primary | ICD-10-CM

## 2020-03-20 DIAGNOSIS — Z45.2 ENCOUNTER FOR FITTING AND ADJUSTMENT OF VASCULAR CATHETER: ICD-10-CM

## 2020-03-20 DIAGNOSIS — Z17.0 MALIGNANT NEOPLASM OF OVERLAPPING SITES OF LEFT BREAST IN FEMALE, ESTROGEN RECEPTOR POSITIVE (HCC): ICD-10-CM

## 2020-03-20 DIAGNOSIS — C50.812 MALIGNANT NEOPLASM OF OVERLAPPING SITES OF LEFT BREAST IN FEMALE, ESTROGEN RECEPTOR POSITIVE (HCC): ICD-10-CM

## 2020-03-20 LAB
ALBUMIN SERPL-MCNC: 4.4 G/DL (ref 3.5–5.2)
ALBUMIN/GLOB SERPL: 2 G/DL (ref 1.1–2.4)
ALP SERPL-CCNC: 154 U/L (ref 38–116)
ALT SERPL W P-5'-P-CCNC: 25 U/L (ref 0–33)
ANION GAP SERPL CALCULATED.3IONS-SCNC: 12.1 MMOL/L (ref 5–15)
AST SERPL-CCNC: 30 U/L (ref 0–32)
BASOPHILS # BLD AUTO: 0.04 10*3/MM3 (ref 0–0.2)
BASOPHILS NFR BLD AUTO: 0.6 % (ref 0–1.5)
BILIRUB SERPL-MCNC: 0.3 MG/DL (ref 0.2–1.2)
BUN BLD-MCNC: 10 MG/DL (ref 6–20)
BUN/CREAT SERPL: 17.2 (ref 7.3–30)
CALCIUM SPEC-SCNC: 9.3 MG/DL (ref 8.5–10.2)
CHLORIDE SERPL-SCNC: 102 MMOL/L (ref 98–107)
CO2 SERPL-SCNC: 26.9 MMOL/L (ref 22–29)
CREAT BLD-MCNC: 0.58 MG/DL (ref 0.6–1.1)
DEPRECATED RDW RBC AUTO: 55.8 FL (ref 37–54)
EOSINOPHIL # BLD AUTO: 0.13 10*3/MM3 (ref 0–0.4)
EOSINOPHIL NFR BLD AUTO: 2 % (ref 0.3–6.2)
ERYTHROCYTE [DISTWIDTH] IN BLOOD BY AUTOMATED COUNT: 14.6 % (ref 12.3–15.4)
GFR SERPL CREATININE-BSD FRML MDRD: 106 ML/MIN/1.73
GLOBULIN UR ELPH-MCNC: 2.2 GM/DL (ref 1.8–3.5)
GLUCOSE BLD-MCNC: 90 MG/DL (ref 74–124)
HCT VFR BLD AUTO: 35.1 % (ref 34–46.6)
HGB BLD-MCNC: 11.4 G/DL (ref 12–15.9)
IMM GRANULOCYTES # BLD AUTO: 0.11 10*3/MM3 (ref 0–0.05)
IMM GRANULOCYTES NFR BLD AUTO: 1.7 % (ref 0–0.5)
LYMPHOCYTES # BLD AUTO: 1.27 10*3/MM3 (ref 0.7–3.1)
LYMPHOCYTES NFR BLD AUTO: 19.2 % (ref 19.6–45.3)
MCH RBC QN AUTO: 34 PG (ref 26.6–33)
MCHC RBC AUTO-ENTMCNC: 32.5 G/DL (ref 31.5–35.7)
MCV RBC AUTO: 104.8 FL (ref 79–97)
MONOCYTES # BLD AUTO: 0.82 10*3/MM3 (ref 0.1–0.9)
MONOCYTES NFR BLD AUTO: 12.4 % (ref 5–12)
NEUTROPHILS # BLD AUTO: 4.23 10*3/MM3 (ref 1.7–7)
NEUTROPHILS NFR BLD AUTO: 64.1 % (ref 42.7–76)
NRBC BLD AUTO-RTO: 0 /100 WBC (ref 0–0.2)
PLATELET # BLD AUTO: 267 10*3/MM3 (ref 140–450)
PMV BLD AUTO: 9.1 FL (ref 6–12)
POTASSIUM BLD-SCNC: 4.5 MMOL/L (ref 3.5–4.7)
PROT SERPL-MCNC: 6.6 G/DL (ref 6.3–8)
RBC # BLD AUTO: 3.35 10*6/MM3 (ref 3.77–5.28)
SODIUM BLD-SCNC: 141 MMOL/L (ref 134–145)
WBC NRBC COR # BLD: 6.6 10*3/MM3 (ref 3.4–10.8)

## 2020-03-20 PROCEDURE — 25010000002 DIPHENHYDRAMINE PER 50 MG: Performed by: INTERNAL MEDICINE

## 2020-03-20 PROCEDURE — 80053 COMPREHEN METABOLIC PANEL: CPT

## 2020-03-20 PROCEDURE — 85025 COMPLETE CBC W/AUTO DIFF WBC: CPT

## 2020-03-20 PROCEDURE — 96413 CHEMO IV INFUSION 1 HR: CPT

## 2020-03-20 PROCEDURE — 99215 OFFICE O/P EST HI 40 MIN: CPT | Performed by: INTERNAL MEDICINE

## 2020-03-20 PROCEDURE — 25010000003 HEPARIN LOCK FLUCH PER 10 UNITS: Performed by: INTERNAL MEDICINE

## 2020-03-20 PROCEDURE — 25010000002 DEXAMETHASONE SODIUM PHOSPHATE 100 MG/10ML SOLUTION: Performed by: INTERNAL MEDICINE

## 2020-03-20 PROCEDURE — 96375 TX/PRO/DX INJ NEW DRUG ADDON: CPT

## 2020-03-20 PROCEDURE — 25010000002 PACLITAXEL PER 30 MG: Performed by: INTERNAL MEDICINE

## 2020-03-20 RX ORDER — LANOLIN ALCOHOL/MO/W.PET/CERES
50 CREAM (GRAM) TOPICAL DAILY
Qty: 90 TABLET | Refills: 1 | Status: SHIPPED | OUTPATIENT
Start: 2020-03-20 | End: 2020-05-26

## 2020-03-20 RX ORDER — DIPHENHYDRAMINE HYDROCHLORIDE 50 MG/ML
50 INJECTION INTRAMUSCULAR; INTRAVENOUS AS NEEDED
Status: CANCELLED | OUTPATIENT
Start: 2020-03-20

## 2020-03-20 RX ORDER — HEPARIN SODIUM (PORCINE) LOCK FLUSH IV SOLN 100 UNIT/ML 100 UNIT/ML
500 SOLUTION INTRAVENOUS AS NEEDED
Status: DISCONTINUED | OUTPATIENT
Start: 2020-03-20 | End: 2020-03-20 | Stop reason: HOSPADM

## 2020-03-20 RX ORDER — FAMOTIDINE 10 MG/ML
20 INJECTION, SOLUTION INTRAVENOUS ONCE
Status: CANCELLED | OUTPATIENT
Start: 2020-03-20

## 2020-03-20 RX ORDER — FAMOTIDINE 10 MG/ML
20 INJECTION, SOLUTION INTRAVENOUS ONCE
Status: COMPLETED | OUTPATIENT
Start: 2020-03-20 | End: 2020-03-20

## 2020-03-20 RX ORDER — SODIUM CHLORIDE 9 MG/ML
250 INJECTION, SOLUTION INTRAVENOUS ONCE
Status: COMPLETED | OUTPATIENT
Start: 2020-03-20 | End: 2020-03-20

## 2020-03-20 RX ORDER — SODIUM CHLORIDE 0.9 % (FLUSH) 0.9 %
10 SYRINGE (ML) INJECTION AS NEEDED
Status: DISCONTINUED | OUTPATIENT
Start: 2020-03-20 | End: 2020-03-20 | Stop reason: HOSPADM

## 2020-03-20 RX ORDER — SODIUM CHLORIDE 0.9 % (FLUSH) 0.9 %
10 SYRINGE (ML) INJECTION AS NEEDED
Status: CANCELLED | OUTPATIENT
Start: 2020-03-20

## 2020-03-20 RX ORDER — HEPARIN SODIUM (PORCINE) LOCK FLUSH IV SOLN 100 UNIT/ML 100 UNIT/ML
500 SOLUTION INTRAVENOUS AS NEEDED
Status: CANCELLED | OUTPATIENT
Start: 2020-03-20

## 2020-03-20 RX ORDER — FAMOTIDINE 10 MG/ML
20 INJECTION, SOLUTION INTRAVENOUS AS NEEDED
Status: CANCELLED | OUTPATIENT
Start: 2020-03-20

## 2020-03-20 RX ORDER — SODIUM CHLORIDE 9 MG/ML
250 INJECTION, SOLUTION INTRAVENOUS ONCE
Status: CANCELLED | OUTPATIENT
Start: 2020-03-20

## 2020-03-20 RX ADMIN — PACLITAXEL 140 MG: 6 INJECTION, SOLUTION INTRAVENOUS at 12:24

## 2020-03-20 RX ADMIN — DEXAMETHASONE SODIUM PHOSPHATE 12 MG: 10 INJECTION, SOLUTION INTRAMUSCULAR; INTRAVENOUS at 11:18

## 2020-03-20 RX ADMIN — FAMOTIDINE 20 MG: 10 INJECTION INTRAVENOUS at 11:17

## 2020-03-20 RX ADMIN — DIPHENHYDRAMINE HYDROCHLORIDE 25 MG: 50 INJECTION, SOLUTION INTRAMUSCULAR; INTRAVENOUS at 11:35

## 2020-03-20 RX ADMIN — SODIUM CHLORIDE 250 ML: 9 INJECTION, SOLUTION INTRAVENOUS at 11:17

## 2020-03-20 RX ADMIN — SODIUM CHLORIDE, PRESERVATIVE FREE 10 ML: 5 INJECTION INTRAVENOUS at 14:16

## 2020-03-20 RX ADMIN — Medication 500 UNITS: at 14:17

## 2020-03-20 NOTE — PROGRESS NOTES
Subjective     REASON FOR FOLLOW UP:    1.  T2N1 invasive ductal carcinoma of the left breast.  Ultrasound-showed 3.8 x 3.1 x 2.5 cm mass at 4 o'clock position with 2 abnormal axillary lymph nodes, larger lymph node being 1.4 cm.  Left breast biopsy and axillary lymph node biopsy October 29, 2019, invasive ductal carcinoma, moderately differentiated, grade 2, ER 85%, [place] 10%, HER-2/merna 2+, HER-2 FISH negative.  · 12/06/19: Cycle #1 of dose-dense Adriamycin/Cytoxan with Neulasta for marrow support    · 01/17/20: Last cycle of Adriamycin/Cytoxan given  2.  Severe neutropenia secondary to chemotherapy, with ANC 50 at 1-week after cycle #1 chemotherapy.  Also had moderate thrombocytopenia platelets 92,000.  Patient was given Levaquin for prophylaxis of neutropenia fever.  3.  1/24/2020.  Seventh dose of Zarxio marginal counts, additional Zarxio plan through the weekend with recheck on Monday, January 27  4. 01/31/20: Initiated cycle 1 Taxol  5.  2/7/2020 cycle 2 Taxol delayed due to neutropenia.  Plans to add 3 days of Neupogen injections after each Taxol treatment once we resume.               HISTORY OF PRESENT ILLNESS:  The patient is a 59 y.o. year old female who is currently receiving neoadjuvant chemotherapy with weekly Taxol.  She is tolerating it very well.  She has got slight numbness in 2 fingers but otherwise nothing significant.  She is here to receive Taxol today.  Today is her seventh treatment.  She seems to think the tumor is shrinking compared to before.          PAST MEDICAL HISTORY:  She had a stroke in July 2017 with headache and dizziness.  She went to the ER and was placed on aspirin.  However, she stopped taking it two weeks ago.  She has been taking Aimovig (injection) monthly with Fariba López at [facility].    In 1994, patient was diagnosed with HIV with an unknown cause which is managed by Dr. Natali Subramanian.  As of now, her viral load is good.    Patient has arthritis.  She gets trigger-point  injections in her back with her last one being 2 weeks ago.  She has had multiple ablations.  She also has pain in her SI joint and Dr. Bermudez performed a surgery on this.  She takes Narco for the pain.      She is osteoporotic.  She has had multiple hairline fractures in both her legs.  She had her knees cleaned out by Dr. Sinha.     Patient has had a hysterectomy and still has both of her ovaries.    Patient has vertigo and the muscles in her left ear are weak.  She just has an imbalance.    ONCOLOGIC HISTORY:  Patient is a 59-year-old female who has had a history of HIV since age 34 followed by Dr. Natali Ng at [facility] and was on multiple HIV drugs initially but more recently has been on a single medication TRIUMEQ, with well-controlled HIV.  She follows up with Dr. Dawn , infectious disease who saw her recently and he saw her on 4/8/2019 and has excellent control and suppression of her viral load.  She is very compliant with her medications.    She also has had history of stroke in 2017 for which she was placed on aspirin.  She presented with a headache.  She is very active and works at [employer] full-time.  She also has had history of vertigo in the past  Patient's PCP is Zach Lora.    Patient first noticed the mass in her left breast 5 months ago.  Her last mammogram was 5 years ago.  She had soreness in the left breast and she went to her primary care physician who then ordered a mammogram diagnostic and an ultrasound.  The diagnostic mammogram showed a 3.7 cm mass at 4 o'clock position in the lower outer quadrant of the left breast.  The ultrasound showed 3.8 cm x 3.1 x 2.5 cm mass at 4 o'clock position in the left breast with 2 abnormal appearing left axillary lymph nodes the largest being 1.4 cm.    She then underwent biopsy of both the breast mass as well as the left axillary lymph node and both of them are positive for invasive mammary carcinoma it is invasive ductal  carcinoma with apocrine features, moderately differentiated, grade 2 of 3 with a Jen score of 5.  The left axillary lymph node is also consistent with metastatic mammary carcinoma.  It is ER positive NH positive HER-2/merna negative.  Details as follows    10/21/19 - Bilateral Diagnostic Mammogram and US Breast Left  FINDINGS: Bilateral digital CC and MLO mammographic images were  obtained. No prior examination is available for comparison. Scattered  fibroglandular densities are seen throughout both breasts. A triangular  skin marker represents the area of palpable concern in the lower outer  quadrant of the left breast in the posterior 1/3. At this location there  is an irregular mass that measures on the order of 3.7 cm in greatest  dimension. Internal calcifications are noted. No suspicious findings in  the right breast are appreciated.     ULTRASOUND: Targeted sonographic evaluation of the left breast was  performed through the area of concern in the left axilla. At the 4  o'clock position on the order of 6 cm from the nipple there is an  irregular hypoechoic mass that measures 3.8 x 3.1 x 2.5 cm. Internal  vascularity is noted.     There are 2 abnormal-appearing left axillary lymph nodes, the largest  which measures on the order of 1.4 cm in greatest dimension.     IMPRESSION:  1. There is a 3.8 cm irregular mass in the left breast at the 4 o'clock  position. This is seen in conjunction with an irregular 1.4 cm lymph  node in the left axilla. This is suspicious for malignancy with left  axillary involvement. Correlation with ultrasound-guided biopsy of both  the left breast mass and the lymph node is recommended.  2. There are no findings suspicious for malignancy in the right breast.     BI-RADS CATEGORY 5:     Patient's labs from 11/12/19 are normal.    10/29/19 - Tissue Pathology  1. Left Breast, 4:00, 6 cm FN, U/S-Guided Core Needle Biopsy for a Mass:  A. INVASIVE DUCTAL CARCINOMA WITH APOCRINE  FEATURES, Moderately differentiated;  [place] Histologic Grade II/III (tubule score = 3, nuclear score = 2, mitoses score = 1), measuring at least  9 mm.  B. No ductal or lobular carcinoma in situ is identified in this sample.  C. Focus suspicious for lymphovascular space invasion.  D. See Biomarker Template for hormone receptor studies.  2. Lymph Node, Left Axilla, U/S-Guided Core Needle Biopsy:  A. METASTATIC MAMMARY CARCINOMA (see Comment).  B. Metastatic focus measures 5 mm in greatest extent.  ER+, 85%  DE+, 10%  HER2- Score 2+    11/13/19 - CT Neck Chest Abdomen Pelvis  IMPRESSION:  1. In addition to the irregular approximately 3.8 cm mass within the  lateral aspect of the left breast, there are a few subcentimeter  asymmetric nodules along the lateral margin of the glandular tissue. The  several asymmetric left subpectoral nodes and 1.2 x 1.0 cm left axillary  node are suspicious for tamar metastases. The asymmetric subcentimeter  left supraclavicular and left posterior cervical triangle nodes are  worrisome as well.  2. There is no convincing evidence for metastatic disease within the  abdomen or pelvis.    11/14/19 - Echocardiogram:  Normal.  Calculated EF = 67%.  There is trace mitral valve and tricuspid valve regurgitation.    11/15/19 - Bone Scan  Negative.    11/18/19 - MRI Breast Bilateral  IMPRESSION:  1. Biopsy-proven malignancy in the left breast centered at the 4 o'clock  position with associated surrounding satellite nodules as described. The  entire region of involvement including the satellite nodules and the  dominant mass measure up to 7.5 cm in greatest dimension. Also, left  axillary adenopathy with multiple irregular lymph nodes and loss of  differentiation of the borders of multiple lymph nodes is noted and this  is suspicious for extranodal involvement.  2. There are no findings suspicious for malignancy in the right breast.  BI-RADS CATEGORY 6      12/06/19: Cycle #1 of  Adriamycin/Cytoxan    20: Last cycle of Adriamycin/Cytoxan given without Neulasta.  We will switch from Neulasta to 7 days of Neupogen injections after cycle 4.    We reviewed with patient the US Breast from 20 which shows mild interval partial response to neoadjuvant chemotherapy.      20: Initiated cycle 1 Taxol    Past Medical History:   Diagnosis Date   • Anxiety    • Cerebrovascular accident (CVA) (CMS/[facility]) 2017   • DDD (degenerative disc disease), cervical    • Depression    • GERD (gastroesophageal reflux disease)    • H/O ICH (intracerebral hemorrhage) (CMS/[facility])    • History of stroke    • History of thrombocytopenia    • HIV (human immunodeficiency virus infection) (CMS/[facility])    • Hyperlipidemia    • Insomnia    • Lupus anticoagulant positive    • Malignant neoplasm of overlapping sites of left breast in female, estrogen receptor positive (CMS/[facility]) 2019   • Meniscus tear 2017    RIGHT   • Migraine    • Neck pain     WITH SHOOTING PAIN LEFT RIGHT ARM   • Osteoarthritis    • Osteoporosis    • Seasonal allergies    • Spinal headache    • Tick bite 2014        Past Surgical History:   Procedure Laterality Date   • ADENOIDECTOMY     • CHOLECYSTECTOMY     • HYSTERECTOMY     • KNEE ARTHROSCOPY Right 3/24/2017    Procedure:  RIGHT  KNEE ARTHROSCOPY WITH DEBRIDEMENT CHONDROPLASTY PARTIAL MENISCECTOMY;  Surgeon: Karl Galeas MD;  Location: [facility];  Service:    • KNEE CARTILAGE SURGERY Right    • TONSILLECTOMY     • US GUIDED LYMPH NODE BIOPSY  10/29/2019   • VENOUS ACCESS DEVICE (PORT) INSERTION Right 2019    Procedure: INSERTION VENOUS ACCESS DEVICE RIGHT;  Surgeon: Landen Forman MD;  Location: [facility];  Service: General      OB-GYN:  Menarche 15 years  Menopause-46  Pregnancies- 1 para 1 no miscarriages her first pregnancy was in  at 29 years of age.  She did not breastfeed.  Post menopausal HRT- None.  Hx of birth control  pills- Yes.    Current Outpatient Medications on File Prior to Visit   Medication Sig Dispense Refill   • AIMOVIG 140 MG/ML solution auto-injector INJECT 140 MG INTO THE SKIN EVERY 30 (THIRTY) DAYS.  11   • atorvastatin (LIPITOR) 20 MG tablet TAKE 1 TABLET BY MOUTH EVERY DAY AT NIGHT 90 tablet 3   • baclofen (LIORESAL) 10 MG tablet Take 10 mg by mouth 2 (Two) Times a Day.  2   • CAMBIA 50 MG pack TAKE 1 PACKET AS NEEDED FOR HEADACHES  1   • clonazePAM (KlonoPIN) 0.5 MG tablet TAKE 1 TABLET BY MOUTH 2 (TWO) TIMES A DAY AS NEEDED FOR ANXIETY. 60 tablet 1   • COCONUT OIL PO Take  by mouth.     • colesevelam (WELCHOL) 625 MG tablet TAKE 2 TABLETS BY MOUTH EVERY  tablet 3   • dexamethasone (DECADRON) 4 MG tablet Take 2 tablets in the morning daily on Days 2, 3 & 4.  Take with food. 6 tablet 3   • escitalopram (LEXAPRO) 10 MG tablet Take 1 tablet by mouth Daily. 90 tablet 3   • fluticasone (FLONASE) 50 MCG/ACT nasal spray 2 sprays into the nostril(s) as directed by provider Daily. 3 bottle 3   • ibandronate (BONIVA) 150 MG tablet TAKE 1 TABLET BY MOUTH EVERY 30 (THIRTY) DAYS. 3 tablet 3   • Menaquinone-7 (VITAMIN K2 PO) Take  by mouth.     • NON FORMULARY Collagen peptides powder     • ondansetron (ZOFRAN) 8 MG tablet Take 1 tablet by mouth 3 (Three) Times a Day As Needed for Nausea or Vomiting. 30 tablet 5   • ondansetron ODT (ZOFRAN ODT) 4 MG disintegrating tablet Take 1 tablet by mouth Every 8 (Eight) Hours As Needed for Nausea or Vomiting. 30 tablet 0   • oxyCODONE-acetaminophen (PERCOCET)  MG per tablet TK 1 T PO QID     • prochlorperazine (COMPAZINE) 10 MG tablet Take 1 tablet by mouth Every 6 (Six) Hours As Needed for Nausea or Vomiting. 20 tablet 2   • zolpidem (AMBIEN) 10 MG tablet Take 1 tablet by mouth At Night As Needed for Sleep. 90 tablet 0     No current facility-administered medications on file prior to visit.         ALLERGIES:    Allergies   Allergen Reactions   • Augmentin [Amoxicillin-Pot  Clavulanate] Hives        Social History     Socioeconomic History   • Marital status:      Spouse name: Owen   • Number of children: 1   • Years of education: College   • Highest education level: Not on file   Occupational History   • Occupation:      Employer:  [employer]   Tobacco Use   • Smoking status: Never Smoker   • Smokeless tobacco: Never Used   Substance and Sexual Activity   • Alcohol use: Yes     Comment: occasioonal   • Drug use: No   • Sexual activity: Defer     Birth control/protection: None     Patient does not smoke or do drugs.  She does drink occasionally.    Family History   Problem Relation Age of Onset   • Breast cancer Mother    • Leukemia Mother         CLL?   • Diabetes Mother    • Hyperthyroidism Mother    • Hearing loss Mother    • Dementia Father    • Heart disease Maternal Grandmother    • Diabetes Maternal Grandfather    • Heart disease Maternal Grandfather    • Stroke Maternal Grandfather    • Heart attack Maternal Grandfather    • Osteoporosis Paternal Grandmother    • Heart disease Paternal Grandfather    • Leukemia Maternal Aunt         CLL?   • Throat cancer Paternal Uncle       FAMILY HISTORY:  Her mother had breast cancer at age 60, still alive at 85 and in good health..  Her father had dementia.  Her paternal uncle had prostate cancer.  Her brother had esophageal cancer.        Review of Systems   Constitutional: Negative for activity change, appetite change, chills, diaphoresis, fatigue, fever and unexpected weight change.   HENT: Negative for hearing loss, mouth sores, nosebleeds, sore throat and trouble swallowing.    Respiratory: Negative for cough, chest tightness, shortness of breath and wheezing.    Cardiovascular: Negative for chest pain, palpitations and leg swelling.   Gastrointestinal: Negative for abdominal distention, abdominal pain, constipation, diarrhea, nausea and vomiting.   Genitourinary: Negative for difficulty urinating, dysuria,  frequency, hematuria and urgency.   Musculoskeletal: Negative for joint swelling.        No muscle weakness.   Skin: Negative for rash and wound.   Neurological: Positive for numbness (intermittently in feet 03/20/20-Improved). Negative for dizziness, seizures, syncope, speech difficulty, weakness and headaches.   Hematological: Negative for adenopathy. Does not bruise/bleed easily.   Psychiatric/Behavioral: Negative for behavioral problems, confusion, sleep disturbance and suicidal ideas.       Objective    Vitals:    03/20/20 0957   BP: 131/83   Pulse: 81   Resp: 16   Temp: 98.9 °F (37.2 °C)   SpO2: 96%   Pain 0 out of 10  ECOG 0  Weight 144 pounds   Physical Exam   Pulmonary/Chest:               BREAST: 2/14/2020 :Left breast exam, without skin change, mass in the lower outer quadrant around 4-5 o'clock measures about 4-1/2 x 4 cm.  2/21/2020: Left breast: No evidence of any skin changes and no evidence of left nipple discharge as well as no left axillary adenopathy or left supraclavicular adenopathy.  The mass measures 4.5 x 3.5 cm.  March 20, 2020: The mass measures 3 x 2.5 cm      CONSTITUTIONAL:  Vital signs reviewed.  No distress, looks comfortable.  EYES:  Conjunctiva and lids unremarkable.  PERRLA  EARS,NOSE,MOUTH,THROAT:  Ears and nose appear unremarkable.  Lips, teeth, gums appear unremarkable.  RESPIRATORY:  Normal respiratory effort.  Lungs clear to auscultation bilaterally.  CARDIOVASCULAR:  Normal S1, S2.  No murmurs rubs or gallops.  No significant lower extremity edema.  GASTROINTESTINAL: Abdomen appears unremarkable.  Nontender.  No hepatomegaly.  No splenomegaly.  LYMPHATIC:  No cervical, supraclavicular, axillary lymphadenopathy.  SKIN:  Warm.  No rashes.  PSYCHIATRIC:  Normal judgment and insight.  Normal mood and affect.    RECENT LABS:     Results from last 7 days   Lab Units 03/20/20  0947   WBC 10*3/mm3 6.60   HEMOGLOBIN g/dL 11.4*   HEMATOCRIT % 35.1   PLATELETS 10*3/mm3 267     Lab  Results   Component Value Date    NEUTROABS 4.23 03/20/2020 01/29/20 - US Breast  IMPRESSION:  There are findings suggestive of mild interval partial  response to neoadjuvant chemotherapy.    Assessment/Plan    1. T2N1 invasive ductal carcinoma of the left breast, recently diagnosed.    -Noticed mass in the left breast x5 months  -Diagnostic mammogram showed 3.7 mm mass in the left breast at 4 o'clock position  -Ultrasound-showed 3.8 x 3.1 x 2.5 cm mass at 4 o'clock position with 2 abnormal axillary lymph nodes, larger lymph node being 1.4 cm  -Left breast biopsy and axillary lymph node biopsy October 29, 2019, invasive ductal carcinoma, moderately differentiated, grade 2, ER 85%, [place] 10%, HER-2/merna 2+, HER-2 FISH negative  · CT scans from 11/13/19 show some asymmetric nodules along the lateral margin of the glandular tissue.  The 1.2x1.0 cm left axillary nodes, left supraclavicular, and left posterior cervical triangle nodes are suspicious for tamar metastases.  ·   bone scan from 11/15/19 which was negative.   ·  MRI breast from 11/18/19 which shows the biopsy-proven malignancy in the left breast centered at the 4:00 position.  The region of involvement measures up to 7.5 cm.  There is left axillary adenopathy with multiple irregular lymph nodes and loss of differentiation of the border of multiple lymph nodes which are suspicious for extranodal involvement.  ·    echocardiogram from 11/14/19 which was normal with an ejection fraction of 67%.    · INVITAE from 11/14/19 which was negative.  · Given the size of her tumor and her medical history, patient will be given 4 cycles of Adriamycin/Cytoxan with Neulasta.  After completion of this treatment, patient will be started on 12 cycles of Taxol.  After the tumor shrinks in size, Dr. Forman will perform a lumpectomy.    · Following lumpectomy, patient will begin endocrine therapy.  I have spoken with Dr. Forman who agrees with chemotherapy first due to the  large size of the tumor.   ·  Dr. Dawn agrees chemotherapy will not aggravate her HIV condition.  I also spoke with Dr. Mohan at [facility] who also agrees chemotherapy is the first step.   · 12/06/19: Cycle #1 of Adriamycin/Cytoxan  · 01/17/20: Proceed with final A/C cycle 4 today without Neulasta.  Patient to come 7 days for Neupogen injections.  If she has more pain after the injections, she can be given morphine.  · She is here today, 1/24/2020 for her seventh dose of Zarxio.  She did tolerate Zarxio much better than Neulasta.  Her total white count is within normal limits but slightly lower than we like.  · We reviewed with patient the US Breast from 01/29/20 after 4 cycles of Adriamycin Cytoxan chemotherapy which shows mild interval partial response to neoadjuvant chemotherapy.  The mass has decreased from 3.8 x 2.5 to 3.1 cm to 3.5 x 2.3 x 3.2 cm previously the left axillary lymph node has decreased from 1.4 x 0.7 x 1 cm to 1.1 x 0.6 x 0.9 cm.  · 01/31/20: Initiated cycle 1 Taxol  · 2/7/2020 due for cycle 2 Taxol, white blood cell count 1.77, ANC 0.87.  Treatment held, patient received 3 days of Neupogen injections.  · 2/14/2020 patient returns for evaluation prior to cycle 2.  Her white blood cell count has improved.  We will proceed with treatment today, and plan to give Neupogen injections daily x3 days starting on day #2 of each cycle.  · 2/21/2020 noted left breast mass to be stable to decrease in size and axillary lymph node also stable to decrease in size.  · March 20, 2020, left breast mass appears to have decreased in size by 3 x 2.5 cm and difficult to appreciate left axillary mass    2.  Profound neutropenia due to chemotherapy despite Neulasta:   · On 12/13/2019 at 1 week after cycle #1 chemotherapy, white blood cell count 850, ANC 50 together with a low monocytes 150 .  We started the patient on Levaquin 500 mg daily for 7 days.  No neutropenia fever.    · On 12/20/2019, much improved and  elevated ANC 10,800.  We will proceed ahead with cycle #2 of chemotherapy.  Neulasta will be continued for each chemotherapy.  Due to expected neutropenia, will start the patient on Levaquin 500 mg daily for 7 days, starting on day #4 of each cycle AC chemotherapy.  · Poor tolerance to Neulasta.  Treatment changed to Zarxio 300 mcg daily following cycle 4 AC.  Patient ended up receiving a total of 10 days of Zarxio.  · 2/7/2020 patient due for cycle 2 Taxol, and she is again neutropenic with a white blood cell count of 1.77, ANC 0.87.  Treatment held.  We will give her Zarxio 300 mcg daily for 3 days starting today.  We will also start her on prophylactic Levaquin.    3.  Moderate thrombocytopenia secondary to chemotherapy.    · This happened after cycle 1 Chemotherapy with platelets 92,000 on 12/13/2019.  Patient was asymptomatic.  · Normalized platelets 168,000 on 12/20/2019.  Expecting thrombocytopenia will recur.  Continue to monitor.  · Platelet count 235,000, continue to monitor.    4.  Chemotherapy induced anemia.   · Hemoglobin 9.4 2/7/2020y.  We will continue to closely monitor.   · Ferritin and iron panel previously checked were within normal limits.  We will recheck a B12 level today.  · 3/6/2020 hemoglobin 11.1.  Continue to monitor      5.  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  · On 12/13/2019 patient here for toxicity check and struggled with significant insomnia from steroids following her first cycle.  We discussed taking the dexamethasone, only one 4 mg tablet daily for 3 days with her next cycle of chemotherapy to see if this improves her symptoms.    · On 12/20/2019, I questioned the patient that her nausea and vomiting may be significant due to decreased dose of dexamethasone.  She could use Compazine and Zofran as needed more frequently.  · Patient is taking Ambien for insomnia and that is still not helping that much.  · Will decrease dose of dexamethasone to 2 mg days 2 3 and 4 after chemotherapy in  the morning  · Insomnia much improved with decreased dose of dexamethasone    6. HIV, followed by Dr. Dawn in infectious disease.  Well-controlled and is on a single medication with very low viral load. Has HIV since age 34.     7.  Family history of breast cancer with mother having had breast cancer at age 60.  There is family history of uncle with prostate cancer.  Patient will require genetic referral    8.  Arthritis with severe back pain followed by Dr. Bam Crandall  · And received Percocet    9. Episodes of nosebleeds.  She has had these once a week for 3-4 years.  She did not report this today.    10. Severe body ache post A/C cycle 3.  She took Percocet and Hydrocodone for pain management which did help.  I have discontinued Neulasta as outlined above.    11.  Otalgia: Of the right ear, present for a while.  Dr. Moreno would like the patient to follow-up with ENT, Dr. Stapleton, as she has previously seen him.    12.  Arthralgias for a few days after each treatment, which resolve on their own.    13.  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  Advised starting on B complex.  We will continue to monitor.     PLAN:  1. Proceed with cycle 7 weekly Taxol today.   2. Continue Xarxio x 3 days after each treatment.    3. Start B complex.   4. Follow-up for weekly Taxol x5 weeks  5. Follow-up with nurse practitioner in 2 weeks and 4 weeks currently patient's tumor is responding clinically.  6. Patient follows up with me in 5 weeks at which time we will need to schedule MRI of the breast and follow-up with Dr. Forman for surgery.    Ruby Moreno MD

## 2020-03-21 ENCOUNTER — INFUSION (OUTPATIENT)
Dept: ONCOLOGY | Facility: HOSPITAL | Age: 60
End: 2020-03-21

## 2020-03-21 DIAGNOSIS — T45.1X5A CHEMOTHERAPY-INDUCED NEUTROPENIA (HCC): ICD-10-CM

## 2020-03-21 DIAGNOSIS — Z17.0 MALIGNANT NEOPLASM OF OVERLAPPING SITES OF LEFT BREAST IN FEMALE, ESTROGEN RECEPTOR POSITIVE (HCC): Primary | ICD-10-CM

## 2020-03-21 DIAGNOSIS — D70.1 CHEMOTHERAPY-INDUCED NEUTROPENIA (HCC): ICD-10-CM

## 2020-03-21 DIAGNOSIS — C50.812 MALIGNANT NEOPLASM OF OVERLAPPING SITES OF LEFT BREAST IN FEMALE, ESTROGEN RECEPTOR POSITIVE (HCC): Primary | ICD-10-CM

## 2020-03-21 PROBLEM — G62.9 NEUROPATHY: Status: ACTIVE | Noted: 2020-03-21

## 2020-03-21 PROBLEM — Z85.3 HISTORY OF LEFT BREAST CANCER: Status: ACTIVE | Noted: 2020-03-21

## 2020-03-21 PROCEDURE — 96372 THER/PROPH/DIAG INJ SC/IM: CPT

## 2020-03-21 PROCEDURE — 25010000002 FILGRASTIM-SNDZ 300 MCG/0.5ML SOLUTION PREFILLED SYRINGE: Performed by: INTERNAL MEDICINE

## 2020-03-21 RX ADMIN — FILGRASTIM-SNDZ 300 MCG: 300 INJECTION, SOLUTION INTRAVENOUS; SUBCUTANEOUS at 13:19

## 2020-03-22 ENCOUNTER — INFUSION (OUTPATIENT)
Dept: ONCOLOGY | Facility: HOSPITAL | Age: 60
End: 2020-03-22

## 2020-03-22 VITALS
RESPIRATION RATE: 18 BRPM | HEART RATE: 79 BPM | SYSTOLIC BLOOD PRESSURE: 127 MMHG | TEMPERATURE: 97.2 F | DIASTOLIC BLOOD PRESSURE: 78 MMHG

## 2020-03-22 DIAGNOSIS — D70.1 CHEMOTHERAPY-INDUCED NEUTROPENIA (HCC): ICD-10-CM

## 2020-03-22 DIAGNOSIS — C50.812 MALIGNANT NEOPLASM OF OVERLAPPING SITES OF LEFT BREAST IN FEMALE, ESTROGEN RECEPTOR POSITIVE (HCC): Primary | ICD-10-CM

## 2020-03-22 DIAGNOSIS — Z17.0 MALIGNANT NEOPLASM OF OVERLAPPING SITES OF LEFT BREAST IN FEMALE, ESTROGEN RECEPTOR POSITIVE (HCC): Primary | ICD-10-CM

## 2020-03-22 DIAGNOSIS — T45.1X5A CHEMOTHERAPY-INDUCED NEUTROPENIA (HCC): ICD-10-CM

## 2020-03-22 PROCEDURE — 25010000002 FILGRASTIM-SNDZ 300 MCG/0.5ML SOLUTION PREFILLED SYRINGE: Performed by: INTERNAL MEDICINE

## 2020-03-22 PROCEDURE — 96372 THER/PROPH/DIAG INJ SC/IM: CPT

## 2020-03-22 RX ADMIN — FILGRASTIM-SNDZ 300 MCG: 300 INJECTION, SOLUTION INTRAVENOUS; SUBCUTANEOUS at 13:10

## 2020-03-23 ENCOUNTER — INFUSION (OUTPATIENT)
Dept: ONCOLOGY | Facility: HOSPITAL | Age: 60
End: 2020-03-23

## 2020-03-23 ENCOUNTER — OFFICE VISIT (OUTPATIENT)
Dept: INFECTIOUS DISEASES | Facility: CLINIC | Age: 60
End: 2020-03-23

## 2020-03-23 VITALS — TEMPERATURE: 98 F

## 2020-03-23 VITALS
SYSTOLIC BLOOD PRESSURE: 113 MMHG | DIASTOLIC BLOOD PRESSURE: 72 MMHG | HEIGHT: 66 IN | BODY MASS INDEX: 22.98 KG/M2 | HEART RATE: 81 BPM | TEMPERATURE: 97.9 F | WEIGHT: 143 LBS

## 2020-03-23 DIAGNOSIS — D70.1 CHEMOTHERAPY-INDUCED NEUTROPENIA (HCC): ICD-10-CM

## 2020-03-23 DIAGNOSIS — D05.12 DUCTAL CARCINOMA IN SITU (DCIS) OF LEFT BREAST: ICD-10-CM

## 2020-03-23 DIAGNOSIS — C50.812 MALIGNANT NEOPLASM OF OVERLAPPING SITES OF LEFT BREAST IN FEMALE, ESTROGEN RECEPTOR POSITIVE (HCC): Primary | ICD-10-CM

## 2020-03-23 DIAGNOSIS — Z17.0 MALIGNANT NEOPLASM OF OVERLAPPING SITES OF LEFT BREAST IN FEMALE, ESTROGEN RECEPTOR POSITIVE (HCC): Primary | ICD-10-CM

## 2020-03-23 DIAGNOSIS — Z21 ASYMPTOMATIC HIV INFECTION (HCC): Primary | ICD-10-CM

## 2020-03-23 DIAGNOSIS — T45.1X5A CHEMOTHERAPY-INDUCED NEUTROPENIA (HCC): ICD-10-CM

## 2020-03-23 PROCEDURE — 25010000002 FILGRASTIM-SNDZ 300 MCG/0.5ML SOLUTION PREFILLED SYRINGE: Performed by: INTERNAL MEDICINE

## 2020-03-23 PROCEDURE — 96372 THER/PROPH/DIAG INJ SC/IM: CPT

## 2020-03-23 PROCEDURE — 99213 OFFICE O/P EST LOW 20 MIN: CPT | Performed by: INTERNAL MEDICINE

## 2020-03-23 RX ORDER — ABACAVIR SULFATE, DOLUTEGRAVIR SODIUM, LAMIVUDINE 600; 50; 300 MG/1; MG/1; MG/1
TABLET, FILM COATED ORAL
COMMUNITY
Start: 2020-02-24 | End: 2020-04-03

## 2020-03-23 RX ADMIN — FILGRASTIM-SNDZ 300 MCG: 300 INJECTION, SOLUTION INTRAVENOUS; SUBCUTANEOUS at 14:48

## 2020-03-23 NOTE — PROGRESS NOTES
CC: f/u HIV    HPI: Richa Dolan is a 59 y.o. female here for f/u of her HIV care. Since last visit she was diagnosed with left breast cancer. She is on chemotherapy with Taxol. She is requiring GCSF after each cycles. Current WBC 6. She follows with Dr Moreno with whom I have been in communication.    No missed doses of Triumeq. She continues to tolerate it well. She receives refills in a timely manner through [facility]. Her copay is $0.    Review of Systems: no n/v/d; no CP or SOA    PMH: HIV, migraines, GERD, HLD, CVA, breast cancer    PSH: tonsillectomy, hysterectomy, cholecystectomy, Partial medial meniscectomy (right), port placement    FH: father had dementia    SH: , 1 son, no tobacco use, occasional EtOH, no illicits, works at [employer]    Allergies: Augmentin (hives)    Medications:   Current Outpatient Medications:   •  AIMOVIG 140 MG/ML solution auto-injector, INJECT 140 MG INTO THE SKIN EVERY 30 (THIRTY) DAYS., Disp: , Rfl: 11  •  atorvastatin (LIPITOR) 20 MG tablet, TAKE 1 TABLET BY MOUTH EVERY DAY AT NIGHT, Disp: 90 tablet, Rfl: 3  •  baclofen (LIORESAL) 10 MG tablet, Take 10 mg by mouth 2 (Two) Times a Day., Disp: , Rfl: 2  •  CAMBIA 50 MG pack, TAKE 1 PACKET AS NEEDED FOR HEADACHES, Disp: , Rfl: 1  •  clonazePAM (KlonoPIN) 0.5 MG tablet, TAKE 1 TABLET BY MOUTH 2 (TWO) TIMES A DAY AS NEEDED FOR ANXIETY., Disp: 60 tablet, Rfl: 1  •  COCONUT OIL PO, Take  by mouth., Disp: , Rfl:   •  colesevelam (WELCHOL) 625 MG tablet, TAKE 2 TABLETS BY MOUTH EVERY DAY, Disp: 180 tablet, Rfl: 3  •  dexamethasone (DECADRON) 4 MG tablet, Take 2 tablets in the morning daily on Days 2, 3 & 4.  Take with food., Disp: 6 tablet, Rfl: 3  •  escitalopram (LEXAPRO) 10 MG tablet, Take 1 tablet by mouth Daily., Disp: 90 tablet, Rfl: 3  •  fluticasone (FLONASE) 50 MCG/ACT nasal spray, 2 sprays into the nostril(s) as directed by provider Daily., Disp: 3 bottle, Rfl: 3  •  ibandronate (BONIVA) 150 MG tablet, TAKE 1 TABLET  "BY MOUTH EVERY 30 (THIRTY) DAYS., Disp: 3 tablet, Rfl: 3  •  Menaquinone-7 (VITAMIN K2 PO), Take  by mouth., Disp: , Rfl:   •  NON FORMULARY, Collagen peptides powder, Disp: , Rfl:   •  ondansetron (ZOFRAN) 8 MG tablet, Take 1 tablet by mouth 3 (Three) Times a Day As Needed for Nausea or Vomiting., Disp: 30 tablet, Rfl: 5  •  ondansetron ODT (ZOFRAN ODT) 4 MG disintegrating tablet, Take 1 tablet by mouth Every 8 (Eight) Hours As Needed for Nausea or Vomiting., Disp: 30 tablet, Rfl: 0  •  oxyCODONE-acetaminophen (PERCOCET)  MG per tablet, TK 1 T PO QID, Disp: , Rfl:   •  prochlorperazine (COMPAZINE) 10 MG tablet, Take 1 tablet by mouth Every 6 (Six) Hours As Needed for Nausea or Vomiting., Disp: 20 tablet, Rfl: 2  •  vitamin B-6 (PYRIDOXINE) 50 MG tablet, Take 1 tablet by mouth Daily., Disp: 90 tablet, Rfl: 1  •  zolpidem (AMBIEN) 10 MG tablet, Take 1 tablet by mouth At Night As Needed for Sleep., Disp: 90 tablet, Rfl: 0  No current facility-administered medications for this visit.     OBJECTIVE:  /72   Pulse 81   Temp 97.9 °F (36.6 °C)   Ht 168.1 cm (66.18\")   Wt 64.9 kg (143 lb)   BMI 22.95 kg/m²       GENERAL: Awake and alert, NAD  HEENT: Oropharynx is clear. Good dentition. No thrush.  EYES: No scleral icterus.    HEART: NR  LUNGS: . Normal work of breathing on ambient air.    ABDOMEN: nondistended  SKIN: Warm and dry without cutaneous eruptions   Vasc: port w/o erythema    DIAGNOSTICS:  CBC, CMP, HIV labs reviewed today  Lab Results   Component Value Date    WBC 6.60 03/20/2020    HGB 11.4 (L) 03/20/2020    HCT 35.1 03/20/2020     03/20/2020     Lab Results   Component Value Date    GLUCOSE 90 03/20/2020    BUN 10 03/20/2020    CREATININE 0.58 (L) 03/20/2020    EGFRIFNONA 106 03/20/2020    EGFRIFAFRI >60 05/28/2015    BCR 17.2 03/20/2020    CO2 26.9 03/20/2020    CALCIUM 9.3 03/20/2020    PROTENTOTREF 7.2 10/13/2016    ALBUMIN 4.40 03/20/2020    LABIL2 1.5 04/08/2019    AST 30 03/20/2020 "    ALT 25 03/20/2020     HIV Related Labs:  CD4: 1097/42% (9/2019)  VL < 20 (9/2019)  HIV Genotype: unknown bc undetectable  [id number] - negative  Tspot - negative (9/2019)  RPR - non-reactive (9/2019)  Hep A - immune (7/2016)  Hep B - immune (9/2015)  Hep C - negative (9/2019)  Urine GCCT - negative (9/2015)    Radiology (reviewed report):    1/29/20 Left Breast US: mass decreased in size due to chemotherapy    ASSESSMENT/PLAN:  1. HIV (human immunodeficiency virus infection)   -well controlled on Triumeq 1 tablet daily (ABC/3TC/DTG)  -continue Triumeq daily; refills addressed  -check HIV RNA quantification only today; RX written and will try to have it drawn at oncology office from her port; she is going there now for a visit    2. T2N1 invasive ductal carcinoma of the left breast  -continue treatment with oncology; currently on Taxol cycle #7    RTC 6 months

## 2020-03-24 RX ORDER — DIPHENHYDRAMINE HYDROCHLORIDE 50 MG/ML
50 INJECTION INTRAMUSCULAR; INTRAVENOUS AS NEEDED
Status: CANCELLED | OUTPATIENT
Start: 2020-03-27

## 2020-03-24 RX ORDER — FAMOTIDINE 10 MG/ML
20 INJECTION, SOLUTION INTRAVENOUS AS NEEDED
Status: CANCELLED | OUTPATIENT
Start: 2020-03-27

## 2020-03-24 RX ORDER — FAMOTIDINE 10 MG/ML
20 INJECTION, SOLUTION INTRAVENOUS ONCE
Status: CANCELLED | OUTPATIENT
Start: 2020-03-27

## 2020-03-24 RX ORDER — SODIUM CHLORIDE 9 MG/ML
250 INJECTION, SOLUTION INTRAVENOUS ONCE
Status: CANCELLED | OUTPATIENT
Start: 2020-03-27

## 2020-03-27 ENCOUNTER — INFUSION (OUTPATIENT)
Dept: ONCOLOGY | Facility: HOSPITAL | Age: 60
End: 2020-03-27

## 2020-03-27 ENCOUNTER — TELEPHONE (OUTPATIENT)
Dept: INFECTIOUS DISEASES | Facility: CLINIC | Age: 60
End: 2020-03-27

## 2020-03-27 VITALS
SYSTOLIC BLOOD PRESSURE: 131 MMHG | WEIGHT: 143 LBS | HEART RATE: 81 BPM | OXYGEN SATURATION: 95 % | BODY MASS INDEX: 22.95 KG/M2 | DIASTOLIC BLOOD PRESSURE: 77 MMHG | TEMPERATURE: 98.8 F

## 2020-03-27 DIAGNOSIS — Z45.2 ENCOUNTER FOR FITTING AND ADJUSTMENT OF VASCULAR CATHETER: ICD-10-CM

## 2020-03-27 DIAGNOSIS — C50.812 MALIGNANT NEOPLASM OF OVERLAPPING SITES OF LEFT BREAST IN FEMALE, ESTROGEN RECEPTOR POSITIVE (HCC): Primary | ICD-10-CM

## 2020-03-27 DIAGNOSIS — Z17.0 MALIGNANT NEOPLASM OF OVERLAPPING SITES OF LEFT BREAST IN FEMALE, ESTROGEN RECEPTOR POSITIVE (HCC): Primary | ICD-10-CM

## 2020-03-27 LAB
ALBUMIN SERPL-MCNC: 4.5 G/DL (ref 3.5–5.2)
ALBUMIN/GLOB SERPL: 1.9 G/DL (ref 1.1–2.4)
ALP SERPL-CCNC: 159 U/L (ref 38–116)
ALT SERPL W P-5'-P-CCNC: 23 U/L (ref 0–33)
ANION GAP SERPL CALCULATED.3IONS-SCNC: 13 MMOL/L (ref 5–15)
AST SERPL-CCNC: 26 U/L (ref 0–32)
BASOPHILS # BLD AUTO: 0.03 10*3/MM3 (ref 0–0.2)
BASOPHILS NFR BLD AUTO: 0.5 % (ref 0–1.5)
BILIRUB SERPL-MCNC: 0.2 MG/DL (ref 0.2–1.2)
BUN BLD-MCNC: 8 MG/DL (ref 6–20)
BUN/CREAT SERPL: 14.5 (ref 7.3–30)
CALCIUM SPEC-SCNC: 9.4 MG/DL (ref 8.5–10.2)
CHLORIDE SERPL-SCNC: 102 MMOL/L (ref 98–107)
CO2 SERPL-SCNC: 26 MMOL/L (ref 22–29)
CREAT BLD-MCNC: 0.55 MG/DL (ref 0.6–1.1)
DEPRECATED RDW RBC AUTO: 55.2 FL (ref 37–54)
EOSINOPHIL # BLD AUTO: 0.1 10*3/MM3 (ref 0–0.4)
EOSINOPHIL NFR BLD AUTO: 1.6 % (ref 0.3–6.2)
ERYTHROCYTE [DISTWIDTH] IN BLOOD BY AUTOMATED COUNT: 14.5 % (ref 12.3–15.4)
GFR SERPL CREATININE-BSD FRML MDRD: 113 ML/MIN/1.73
GLOBULIN UR ELPH-MCNC: 2.4 GM/DL (ref 1.8–3.5)
GLUCOSE BLD-MCNC: 92 MG/DL (ref 74–124)
HCT VFR BLD AUTO: 34.6 % (ref 34–46.6)
HGB BLD-MCNC: 11.1 G/DL (ref 12–15.9)
IMM GRANULOCYTES # BLD AUTO: 0.11 10*3/MM3 (ref 0–0.05)
IMM GRANULOCYTES NFR BLD AUTO: 1.7 % (ref 0–0.5)
LYMPHOCYTES # BLD AUTO: 0.97 10*3/MM3 (ref 0.7–3.1)
LYMPHOCYTES NFR BLD AUTO: 15.1 % (ref 19.6–45.3)
MCH RBC QN AUTO: 33.4 PG (ref 26.6–33)
MCHC RBC AUTO-ENTMCNC: 32.1 G/DL (ref 31.5–35.7)
MCV RBC AUTO: 104.2 FL (ref 79–97)
MONOCYTES # BLD AUTO: 0.86 10*3/MM3 (ref 0.1–0.9)
MONOCYTES NFR BLD AUTO: 13.4 % (ref 5–12)
NEUTROPHILS # BLD AUTO: 4.36 10*3/MM3 (ref 1.7–7)
NEUTROPHILS NFR BLD AUTO: 67.7 % (ref 42.7–76)
NRBC BLD AUTO-RTO: 0 /100 WBC (ref 0–0.2)
PLATELET # BLD AUTO: 236 10*3/MM3 (ref 140–450)
PMV BLD AUTO: 9.2 FL (ref 6–12)
POTASSIUM BLD-SCNC: 4 MMOL/L (ref 3.5–4.7)
PROT SERPL-MCNC: 6.9 G/DL (ref 6.3–8)
RBC # BLD AUTO: 3.32 10*6/MM3 (ref 3.77–5.28)
SODIUM BLD-SCNC: 141 MMOL/L (ref 134–145)
WBC NRBC COR # BLD: 6.43 10*3/MM3 (ref 3.4–10.8)

## 2020-03-27 PROCEDURE — 96413 CHEMO IV INFUSION 1 HR: CPT

## 2020-03-27 PROCEDURE — 25010000002 PACLITAXEL PER 30 MG: Performed by: NURSE PRACTITIONER

## 2020-03-27 PROCEDURE — 25010000002 DEXAMETHASONE SODIUM PHOSPHATE 100 MG/10ML SOLUTION: Performed by: NURSE PRACTITIONER

## 2020-03-27 PROCEDURE — 25010000003 HEPARIN LOCK FLUCH PER 10 UNITS: Performed by: INTERNAL MEDICINE

## 2020-03-27 PROCEDURE — 85025 COMPLETE CBC W/AUTO DIFF WBC: CPT

## 2020-03-27 PROCEDURE — 80053 COMPREHEN METABOLIC PANEL: CPT

## 2020-03-27 PROCEDURE — 96375 TX/PRO/DX INJ NEW DRUG ADDON: CPT

## 2020-03-27 PROCEDURE — 25010000002 DIPHENHYDRAMINE PER 50 MG: Performed by: NURSE PRACTITIONER

## 2020-03-27 PROCEDURE — 96367 TX/PROPH/DG ADDL SEQ IV INF: CPT

## 2020-03-27 RX ORDER — SODIUM CHLORIDE 0.9 % (FLUSH) 0.9 %
10 SYRINGE (ML) INJECTION AS NEEDED
Status: CANCELLED | OUTPATIENT
Start: 2020-03-27

## 2020-03-27 RX ORDER — HEPARIN SODIUM (PORCINE) LOCK FLUSH IV SOLN 100 UNIT/ML 100 UNIT/ML
500 SOLUTION INTRAVENOUS AS NEEDED
Status: CANCELLED | OUTPATIENT
Start: 2020-03-27

## 2020-03-27 RX ORDER — SODIUM CHLORIDE 9 MG/ML
250 INJECTION, SOLUTION INTRAVENOUS ONCE
Status: COMPLETED | OUTPATIENT
Start: 2020-03-27 | End: 2020-03-27

## 2020-03-27 RX ORDER — FAMOTIDINE 10 MG/ML
20 INJECTION, SOLUTION INTRAVENOUS ONCE
Status: COMPLETED | OUTPATIENT
Start: 2020-03-27 | End: 2020-03-27

## 2020-03-27 RX ORDER — HEPARIN SODIUM (PORCINE) LOCK FLUSH IV SOLN 100 UNIT/ML 100 UNIT/ML
500 SOLUTION INTRAVENOUS AS NEEDED
Status: DISCONTINUED | OUTPATIENT
Start: 2020-03-27 | End: 2020-03-27 | Stop reason: HOSPADM

## 2020-03-27 RX ORDER — SODIUM CHLORIDE 0.9 % (FLUSH) 0.9 %
10 SYRINGE (ML) INJECTION AS NEEDED
Status: DISCONTINUED | OUTPATIENT
Start: 2020-03-27 | End: 2020-03-27 | Stop reason: HOSPADM

## 2020-03-27 RX ADMIN — PACLITAXEL 140 MG: 6 INJECTION, SOLUTION INTRAVENOUS at 10:06

## 2020-03-27 RX ADMIN — DIPHENHYDRAMINE HYDROCHLORIDE 25 MG: 50 INJECTION, SOLUTION INTRAMUSCULAR; INTRAVENOUS at 08:54

## 2020-03-27 RX ADMIN — DEXAMETHASONE SODIUM PHOSPHATE 12 MG: 10 INJECTION, SOLUTION INTRAMUSCULAR; INTRAVENOUS at 09:15

## 2020-03-27 RX ADMIN — SODIUM CHLORIDE 100 ML: 9 INJECTION, SOLUTION INTRAVENOUS at 08:52

## 2020-03-27 RX ADMIN — FAMOTIDINE 20 MG: 10 INJECTION INTRAVENOUS at 08:52

## 2020-03-27 RX ADMIN — SODIUM CHLORIDE, PRESERVATIVE FREE 10 ML: 5 INJECTION INTRAVENOUS at 11:36

## 2020-03-27 RX ADMIN — Medication 500 UNITS: at 11:36

## 2020-03-28 ENCOUNTER — INFUSION (OUTPATIENT)
Dept: ONCOLOGY | Facility: HOSPITAL | Age: 60
End: 2020-03-28

## 2020-03-28 VITALS
SYSTOLIC BLOOD PRESSURE: 119 MMHG | DIASTOLIC BLOOD PRESSURE: 74 MMHG | RESPIRATION RATE: 18 BRPM | HEART RATE: 77 BPM | TEMPERATURE: 97.1 F | OXYGEN SATURATION: 95 %

## 2020-03-28 DIAGNOSIS — D70.1 CHEMOTHERAPY-INDUCED NEUTROPENIA (HCC): ICD-10-CM

## 2020-03-28 DIAGNOSIS — T45.1X5A CHEMOTHERAPY-INDUCED NEUTROPENIA (HCC): ICD-10-CM

## 2020-03-28 DIAGNOSIS — Z17.0 MALIGNANT NEOPLASM OF OVERLAPPING SITES OF LEFT BREAST IN FEMALE, ESTROGEN RECEPTOR POSITIVE (HCC): Primary | ICD-10-CM

## 2020-03-28 DIAGNOSIS — C50.812 MALIGNANT NEOPLASM OF OVERLAPPING SITES OF LEFT BREAST IN FEMALE, ESTROGEN RECEPTOR POSITIVE (HCC): Primary | ICD-10-CM

## 2020-03-28 PROCEDURE — 96372 THER/PROPH/DIAG INJ SC/IM: CPT

## 2020-03-28 PROCEDURE — 25010000002 FILGRASTIM-SNDZ 300 MCG/0.5ML SOLUTION PREFILLED SYRINGE: Performed by: NURSE PRACTITIONER

## 2020-03-28 RX ADMIN — FILGRASTIM-SNDZ 300 MCG: 300 INJECTION, SOLUTION INTRAVENOUS; SUBCUTANEOUS at 12:47

## 2020-03-29 ENCOUNTER — INFUSION (OUTPATIENT)
Dept: ONCOLOGY | Facility: HOSPITAL | Age: 60
End: 2020-03-29

## 2020-03-29 VITALS
HEART RATE: 79 BPM | RESPIRATION RATE: 18 BRPM | TEMPERATURE: 97 F | SYSTOLIC BLOOD PRESSURE: 115 MMHG | DIASTOLIC BLOOD PRESSURE: 71 MMHG

## 2020-03-29 DIAGNOSIS — D70.1 CHEMOTHERAPY-INDUCED NEUTROPENIA (HCC): ICD-10-CM

## 2020-03-29 DIAGNOSIS — C50.812 MALIGNANT NEOPLASM OF OVERLAPPING SITES OF LEFT BREAST IN FEMALE, ESTROGEN RECEPTOR POSITIVE (HCC): Primary | ICD-10-CM

## 2020-03-29 DIAGNOSIS — T45.1X5A CHEMOTHERAPY-INDUCED NEUTROPENIA (HCC): ICD-10-CM

## 2020-03-29 DIAGNOSIS — Z17.0 MALIGNANT NEOPLASM OF OVERLAPPING SITES OF LEFT BREAST IN FEMALE, ESTROGEN RECEPTOR POSITIVE (HCC): Primary | ICD-10-CM

## 2020-03-29 PROCEDURE — 25010000002 FILGRASTIM-SNDZ 300 MCG/0.5ML SOLUTION PREFILLED SYRINGE: Performed by: NURSE PRACTITIONER

## 2020-03-29 PROCEDURE — 96372 THER/PROPH/DIAG INJ SC/IM: CPT

## 2020-03-29 RX ADMIN — FILGRASTIM-SNDZ 300 MCG: 300 INJECTION, SOLUTION INTRAVENOUS; SUBCUTANEOUS at 12:42

## 2020-03-30 ENCOUNTER — INFUSION (OUTPATIENT)
Dept: ONCOLOGY | Facility: HOSPITAL | Age: 60
End: 2020-03-30

## 2020-03-30 DIAGNOSIS — D70.1 CHEMOTHERAPY-INDUCED NEUTROPENIA (HCC): ICD-10-CM

## 2020-03-30 DIAGNOSIS — Z17.0 MALIGNANT NEOPLASM OF OVERLAPPING SITES OF LEFT BREAST IN FEMALE, ESTROGEN RECEPTOR POSITIVE (HCC): Primary | ICD-10-CM

## 2020-03-30 DIAGNOSIS — T45.1X5A CHEMOTHERAPY-INDUCED NEUTROPENIA (HCC): ICD-10-CM

## 2020-03-30 DIAGNOSIS — C50.812 MALIGNANT NEOPLASM OF OVERLAPPING SITES OF LEFT BREAST IN FEMALE, ESTROGEN RECEPTOR POSITIVE (HCC): Primary | ICD-10-CM

## 2020-03-30 PROCEDURE — 25010000002 FILGRASTIM-SNDZ 300 MCG/0.5ML SOLUTION PREFILLED SYRINGE: Performed by: NURSE PRACTITIONER

## 2020-03-30 PROCEDURE — 96372 THER/PROPH/DIAG INJ SC/IM: CPT

## 2020-03-30 RX ADMIN — FILGRASTIM-SNDZ 300 MCG: 300 INJECTION, SOLUTION INTRAVENOUS; SUBCUTANEOUS at 13:10

## 2020-04-01 DIAGNOSIS — F41.9 ANXIETY: ICD-10-CM

## 2020-04-01 RX ORDER — ESCITALOPRAM OXALATE 10 MG/1
TABLET ORAL
Qty: 90 TABLET | Refills: 3 | Status: SHIPPED | OUTPATIENT
Start: 2020-04-01 | End: 2021-03-05 | Stop reason: SDUPTHER

## 2020-04-03 ENCOUNTER — OFFICE VISIT (OUTPATIENT)
Dept: ONCOLOGY | Facility: CLINIC | Age: 60
End: 2020-04-03

## 2020-04-03 ENCOUNTER — INFUSION (OUTPATIENT)
Dept: ONCOLOGY | Facility: HOSPITAL | Age: 60
End: 2020-04-03

## 2020-04-03 VITALS
HEIGHT: 66 IN | BODY MASS INDEX: 23.37 KG/M2 | TEMPERATURE: 97.6 F | RESPIRATION RATE: 16 BRPM | SYSTOLIC BLOOD PRESSURE: 125 MMHG | DIASTOLIC BLOOD PRESSURE: 76 MMHG | HEART RATE: 78 BPM | WEIGHT: 145.4 LBS | OXYGEN SATURATION: 97 %

## 2020-04-03 VITALS — SYSTOLIC BLOOD PRESSURE: 122 MMHG | DIASTOLIC BLOOD PRESSURE: 77 MMHG | HEART RATE: 77 BPM

## 2020-04-03 DIAGNOSIS — T45.1X5A CHEMOTHERAPY-INDUCED NEUROPATHY (HCC): ICD-10-CM

## 2020-04-03 DIAGNOSIS — Z17.0 MALIGNANT NEOPLASM OF OVERLAPPING SITES OF LEFT BREAST IN FEMALE, ESTROGEN RECEPTOR POSITIVE (HCC): Primary | ICD-10-CM

## 2020-04-03 DIAGNOSIS — T45.1X5A CHEMOTHERAPY-INDUCED NEUTROPENIA (HCC): ICD-10-CM

## 2020-04-03 DIAGNOSIS — C50.812 MALIGNANT NEOPLASM OF OVERLAPPING SITES OF LEFT BREAST IN FEMALE, ESTROGEN RECEPTOR POSITIVE (HCC): Primary | ICD-10-CM

## 2020-04-03 DIAGNOSIS — Z17.0 MALIGNANT NEOPLASM OF OVERLAPPING SITES OF LEFT BREAST IN FEMALE, ESTROGEN RECEPTOR POSITIVE (HCC): ICD-10-CM

## 2020-04-03 DIAGNOSIS — C50.812 MALIGNANT NEOPLASM OF OVERLAPPING SITES OF LEFT BREAST IN FEMALE, ESTROGEN RECEPTOR POSITIVE (HCC): ICD-10-CM

## 2020-04-03 DIAGNOSIS — G62.0 CHEMOTHERAPY-INDUCED NEUROPATHY (HCC): ICD-10-CM

## 2020-04-03 DIAGNOSIS — Z45.2 ENCOUNTER FOR FITTING AND ADJUSTMENT OF VASCULAR CATHETER: ICD-10-CM

## 2020-04-03 DIAGNOSIS — D70.1 CHEMOTHERAPY-INDUCED NEUTROPENIA (HCC): ICD-10-CM

## 2020-04-03 LAB
ALBUMIN SERPL-MCNC: 4.2 G/DL (ref 3.5–5.2)
ALBUMIN/GLOB SERPL: 1.8 G/DL (ref 1.1–2.4)
ALP SERPL-CCNC: 150 U/L (ref 38–116)
ALT SERPL W P-5'-P-CCNC: 20 U/L (ref 0–33)
ANION GAP SERPL CALCULATED.3IONS-SCNC: 10.9 MMOL/L (ref 5–15)
AST SERPL-CCNC: 26 U/L (ref 0–32)
BASOPHILS # BLD AUTO: 0.02 10*3/MM3 (ref 0–0.2)
BASOPHILS NFR BLD AUTO: 0.3 % (ref 0–1.5)
BILIRUB SERPL-MCNC: 0.2 MG/DL (ref 0.2–1.2)
BUN BLD-MCNC: 10 MG/DL (ref 6–20)
BUN/CREAT SERPL: 16.7 (ref 7.3–30)
CALCIUM SPEC-SCNC: 9.3 MG/DL (ref 8.5–10.2)
CHLORIDE SERPL-SCNC: 103 MMOL/L (ref 98–107)
CO2 SERPL-SCNC: 26.1 MMOL/L (ref 22–29)
CREAT BLD-MCNC: 0.6 MG/DL (ref 0.6–1.1)
DEPRECATED RDW RBC AUTO: 55.8 FL (ref 37–54)
EOSINOPHIL # BLD AUTO: 0.1 10*3/MM3 (ref 0–0.4)
EOSINOPHIL NFR BLD AUTO: 1.7 % (ref 0.3–6.2)
ERYTHROCYTE [DISTWIDTH] IN BLOOD BY AUTOMATED COUNT: 14.6 % (ref 12.3–15.4)
GFR SERPL CREATININE-BSD FRML MDRD: 102 ML/MIN/1.73
GLOBULIN UR ELPH-MCNC: 2.4 GM/DL (ref 1.8–3.5)
GLUCOSE BLD-MCNC: 89 MG/DL (ref 74–124)
HCT VFR BLD AUTO: 32.4 % (ref 34–46.6)
HGB BLD-MCNC: 10.6 G/DL (ref 12–15.9)
IMM GRANULOCYTES # BLD AUTO: 0.14 10*3/MM3 (ref 0–0.05)
IMM GRANULOCYTES NFR BLD AUTO: 2.4 % (ref 0–0.5)
LYMPHOCYTES # BLD AUTO: 0.99 10*3/MM3 (ref 0.7–3.1)
LYMPHOCYTES NFR BLD AUTO: 17.3 % (ref 19.6–45.3)
MCH RBC QN AUTO: 34.1 PG (ref 26.6–33)
MCHC RBC AUTO-ENTMCNC: 32.7 G/DL (ref 31.5–35.7)
MCV RBC AUTO: 104.2 FL (ref 79–97)
MONOCYTES # BLD AUTO: 0.72 10*3/MM3 (ref 0.1–0.9)
MONOCYTES NFR BLD AUTO: 12.6 % (ref 5–12)
NEUTROPHILS # BLD AUTO: 3.76 10*3/MM3 (ref 1.7–7)
NEUTROPHILS NFR BLD AUTO: 65.7 % (ref 42.7–76)
NRBC BLD AUTO-RTO: 0 /100 WBC (ref 0–0.2)
PLATELET # BLD AUTO: 246 10*3/MM3 (ref 140–450)
PMV BLD AUTO: 9.1 FL (ref 6–12)
POTASSIUM BLD-SCNC: 4.4 MMOL/L (ref 3.5–4.7)
PROT SERPL-MCNC: 6.6 G/DL (ref 6.3–8)
RBC # BLD AUTO: 3.11 10*6/MM3 (ref 3.77–5.28)
SODIUM BLD-SCNC: 140 MMOL/L (ref 134–145)
WBC NRBC COR # BLD: 5.73 10*3/MM3 (ref 3.4–10.8)

## 2020-04-03 PROCEDURE — 25010000002 DIPHENHYDRAMINE PER 50 MG: Performed by: NURSE PRACTITIONER

## 2020-04-03 PROCEDURE — 85025 COMPLETE CBC W/AUTO DIFF WBC: CPT

## 2020-04-03 PROCEDURE — 80053 COMPREHEN METABOLIC PANEL: CPT

## 2020-04-03 PROCEDURE — 25010000002 PACLITAXEL PER 30 MG: Performed by: NURSE PRACTITIONER

## 2020-04-03 PROCEDURE — 25010000002 DEXAMETHASONE SODIUM PHOSPHATE 100 MG/10ML SOLUTION: Performed by: NURSE PRACTITIONER

## 2020-04-03 PROCEDURE — 99214 OFFICE O/P EST MOD 30 MIN: CPT | Performed by: NURSE PRACTITIONER

## 2020-04-03 PROCEDURE — 36415 COLL VENOUS BLD VENIPUNCTURE: CPT | Performed by: INTERNAL MEDICINE

## 2020-04-03 PROCEDURE — 25010000003 HEPARIN LOCK FLUCH PER 10 UNITS: Performed by: INTERNAL MEDICINE

## 2020-04-03 PROCEDURE — 96413 CHEMO IV INFUSION 1 HR: CPT

## 2020-04-03 PROCEDURE — 96375 TX/PRO/DX INJ NEW DRUG ADDON: CPT

## 2020-04-03 RX ORDER — SODIUM CHLORIDE 0.9 % (FLUSH) 0.9 %
10 SYRINGE (ML) INJECTION AS NEEDED
Status: DISCONTINUED | OUTPATIENT
Start: 2020-04-03 | End: 2020-04-03 | Stop reason: HOSPADM

## 2020-04-03 RX ORDER — SODIUM CHLORIDE 9 MG/ML
250 INJECTION, SOLUTION INTRAVENOUS ONCE
Status: CANCELLED | OUTPATIENT
Start: 2020-04-03

## 2020-04-03 RX ORDER — SODIUM CHLORIDE 9 MG/ML
250 INJECTION, SOLUTION INTRAVENOUS ONCE
Status: COMPLETED | OUTPATIENT
Start: 2020-04-03 | End: 2020-04-03

## 2020-04-03 RX ORDER — HEPARIN SODIUM (PORCINE) LOCK FLUSH IV SOLN 100 UNIT/ML 100 UNIT/ML
500 SOLUTION INTRAVENOUS AS NEEDED
Status: DISCONTINUED | OUTPATIENT
Start: 2020-04-03 | End: 2020-04-03 | Stop reason: HOSPADM

## 2020-04-03 RX ORDER — FAMOTIDINE 10 MG/ML
20 INJECTION, SOLUTION INTRAVENOUS ONCE
Status: COMPLETED | OUTPATIENT
Start: 2020-04-03 | End: 2020-04-03

## 2020-04-03 RX ORDER — HEPARIN SODIUM (PORCINE) LOCK FLUSH IV SOLN 100 UNIT/ML 100 UNIT/ML
500 SOLUTION INTRAVENOUS AS NEEDED
Status: CANCELLED | OUTPATIENT
Start: 2020-04-03

## 2020-04-03 RX ORDER — FAMOTIDINE 10 MG/ML
20 INJECTION, SOLUTION INTRAVENOUS ONCE
Status: CANCELLED | OUTPATIENT
Start: 2020-04-03

## 2020-04-03 RX ORDER — SODIUM CHLORIDE 0.9 % (FLUSH) 0.9 %
10 SYRINGE (ML) INJECTION AS NEEDED
Status: CANCELLED | OUTPATIENT
Start: 2020-04-03

## 2020-04-03 RX ORDER — FAMOTIDINE 10 MG/ML
20 INJECTION, SOLUTION INTRAVENOUS AS NEEDED
Status: CANCELLED | OUTPATIENT
Start: 2020-04-03

## 2020-04-03 RX ORDER — DIPHENHYDRAMINE HYDROCHLORIDE 50 MG/ML
50 INJECTION INTRAMUSCULAR; INTRAVENOUS AS NEEDED
Status: CANCELLED | OUTPATIENT
Start: 2020-04-03

## 2020-04-03 RX ADMIN — FAMOTIDINE 20 MG: 10 INJECTION INTRAVENOUS at 09:48

## 2020-04-03 RX ADMIN — PACLITAXEL 140 MG: 6 INJECTION, SOLUTION INTRAVENOUS at 10:58

## 2020-04-03 RX ADMIN — DIPHENHYDRAMINE HYDROCHLORIDE 25 MG: 50 INJECTION INTRAMUSCULAR; INTRAVENOUS at 10:07

## 2020-04-03 RX ADMIN — DEXAMETHASONE SODIUM PHOSPHATE 12 MG: 10 INJECTION, SOLUTION INTRAMUSCULAR; INTRAVENOUS at 09:48

## 2020-04-03 RX ADMIN — Medication 500 UNITS: at 13:02

## 2020-04-03 RX ADMIN — SODIUM CHLORIDE 250 ML: 9 INJECTION, SOLUTION INTRAVENOUS at 09:48

## 2020-04-03 RX ADMIN — SODIUM CHLORIDE, PRESERVATIVE FREE 10 ML: 5 INJECTION INTRAVENOUS at 13:02

## 2020-04-03 NOTE — PROGRESS NOTES
Subjective     REASON FOR FOLLOW UP:    1.  T2N1 invasive ductal carcinoma of the left breast.  Ultrasound-showed 3.8 x 3.1 x 2.5 cm mass at 4 o'clock position with 2 abnormal axillary lymph nodes, larger lymph node being 1.4 cm.  Left breast biopsy and axillary lymph node biopsy October 29, 2019, invasive ductal carcinoma, moderately differentiated, grade 2, ER 85%, ND 10%, HER-2/merna 2+, HER-2 FISH negative.  · 12/06/19: Cycle #1 of dose-dense Adriamycin/Cytoxan with Neulasta for marrow support    · 01/17/20: Last cycle of Adriamycin/Cytoxan given  2.  Severe neutropenia secondary to chemotherapy, with ANC 50 at 1-week after cycle #1 chemotherapy.  Also had moderate thrombocytopenia platelets 92,000.  Patient was given Levaquin for prophylaxis of neutropenia fever.  3.  1/24/2020.  Seventh dose of Zarxio marginal counts, additional Zarxio plan through the weekend with recheck on Monday, January 27  4. 01/31/20: Initiated cycle 1 Taxol  5.  2/7/2020 cycle 2 Taxol delayed due to neutropenia.  Plans to add 3 days of Neupogen injections after each Taxol treatment once we resume.               HISTORY OF PRESENT ILLNESS:  The patient is a 59 y.o. year old female with the above-mentioned history here today for evaluation and lab review prior to her ninth cycle of weekly Taxol.  She feels like she is overall tolerating treatment quite well.  She does note she noticed some burning in her feet bilaterally yesterday evening while lying in bed.  It is not bothering her at today's visit.  She states that it is intermittent.  She continues on B6 50 mg daily, but did not start taking this until a few days ago.  Otherwise no nausea, vomiting, diarrhea, or constipation.    Patient is complaining of some mid back pain today.  She denies injury. She denies urinary difficulties.  She has pain medication prescribed by her pain doctor, and is also using a heating pad, which helps.       PAST MEDICAL HISTORY:  She had a stroke in July  2017 with headache and dizziness.  She went to the ER and was placed on aspirin.  However, she stopped taking it two weeks ago.  She has been taking Aimovig (injection) monthly with Fariba López at [place].    In 1994, patient was diagnosed with HIV with an unknown cause which is managed by Dr. Natali Subramanian.  As of now, her viral load is good.    Patient has arthritis.  She gets trigger-point injections in her back with her last one being 2 weeks ago.  She has had multiple ablations.  She also has pain in her SI joint and Dr. Bermudez performed a surgery on this.  She takes Narco for the pain.      She is osteoporotic.  She has had multiple hairline fractures in both her legs.  She had her knees cleaned out by Dr. Sinha.     Patient has had a hysterectomy and still has both of her ovaries.    Patient has vertigo and the muscles in her left ear are weak.  She just has an imbalance.    ONCOLOGIC HISTORY:  Patient is a 59-year-old female who has had a history of HIV since age 34 followed by Dr. Natali Ng at [facility] and was on multiple HIV drugs initially but more recently has been on a single medication TRIUMEQ, with well-controlled HIV.  She follows up with Dr. Dawn , infectious disease who saw her recently and he saw her on 4/8/2019 and has excellent control and suppression of her viral load.  She is very compliant with her medications.    She also has had history of stroke in 2017 for which she was placed on aspirin.  She presented with a headache.  She is very active and works at [employer] full-time.  She also has had history of vertigo in the past  Patient's PCP is Zach Lora.    Patient first noticed the mass in her left breast 5 months ago.  Her last mammogram was 5 years ago.  She had soreness in the left breast and she went to her primary care physician who then ordered a mammogram diagnostic and an ultrasound.  The diagnostic mammogram showed a 3.7 cm mass at 4 o'clock  position in the lower outer quadrant of the left breast.  The ultrasound showed 3.8 cm x 3.1 x 2.5 cm mass at 4 o'clock position in the left breast with 2 abnormal appearing left axillary lymph nodes the largest being 1.4 cm.    She then underwent biopsy of both the breast mass as well as the left axillary lymph node and both of them are positive for invasive mammary carcinoma it is invasive ductal carcinoma with apocrine features, moderately differentiated, grade 2 of 3 with a [place] score of 5.  The left axillary lymph node is also consistent with metastatic mammary carcinoma.  It is ER positive NV positive HER-2/merna negative.  Details as follows    10/21/19 - Bilateral Diagnostic Mammogram and US Breast Left  FINDINGS: Bilateral digital CC and MLO mammographic images were  obtained. No prior examination is available for comparison. Scattered  fibroglandular densities are seen throughout both breasts. A triangular  skin marker represents the area of palpable concern in the lower outer  quadrant of the left breast in the posterior 1/3. At this location there  is an irregular mass that measures on the order of 3.7 cm in greatest  dimension. Internal calcifications are noted. No suspicious findings in  the right breast are appreciated.     ULTRASOUND: Targeted sonographic evaluation of the left breast was  performed through the area of concern in the left axilla. At the 4  o'clock position on the order of 6 cm from the nipple there is an  irregular hypoechoic mass that measures 3.8 x 3.1 x 2.5 cm. Internal  vascularity is noted.     There are 2 abnormal-appearing left axillary lymph nodes, the largest  which measures on the order of 1.4 cm in greatest dimension.     IMPRESSION:  1. There is a 3.8 cm irregular mass in the left breast at the 4 o'clock  position. This is seen in conjunction with an irregular 1.4 cm lymph  node in the left axilla. This is suspicious for malignancy with left  axillary involvement.  Correlation with ultrasound-guided biopsy of both  the left breast mass and the lymph node is recommended.  2. There are no findings suspicious for malignancy in the right breast.     BI-RADS CATEGORY 5:     Patient's labs from 11/12/19 are normal.    10/29/19 - Tissue Pathology  1. Left Breast, 4:00, 6 cm FN, U/S-Guided Core Needle Biopsy for a Mass:  A. INVASIVE DUCTAL CARCINOMA WITH APOCRINE FEATURES, Moderately differentiated;  Jen Histologic Grade II/III (tubule score = 3, nuclear score = 2, mitoses score = 1), measuring at least  9 mm.  B. No ductal or lobular carcinoma in situ is identified in this sample.  C. Focus suspicious for lymphovascular space invasion.  D. See Biomarker Template for hormone receptor studies.  2. Lymph Node, Left Axilla, U/S-Guided Core Needle Biopsy:  A. METASTATIC MAMMARY CARCINOMA (see Comment).  B. Metastatic focus measures 5 mm in greatest extent.  ER+, 85%  UT+, 10%  HER2- Score 2+    11/13/19 - CT Neck Chest Abdomen Pelvis  IMPRESSION:  1. In addition to the irregular approximately 3.8 cm mass within the  lateral aspect of the left breast, there are a few subcentimeter  asymmetric nodules along the lateral margin of the glandular tissue. The  several asymmetric left subpectoral nodes and 1.2 x 1.0 cm left axillary  node are suspicious for tamar metastases. The asymmetric subcentimeter  left supraclavicular and left posterior cervical triangle nodes are  worrisome as well.  2. There is no convincing evidence for metastatic disease within the  abdomen or pelvis.    11/14/19 - Echocardiogram:  Normal.  Calculated EF = 67%.  There is trace mitral valve and tricuspid valve regurgitation.    11/15/19 - Bone Scan  Negative.    11/18/19 - MRI Breast Bilateral  IMPRESSION:  1. Biopsy-proven malignancy in the left breast centered at the 4 o'clock  position with associated surrounding satellite nodules as described. The  entire region of involvement including the satellite nodules  and the  dominant mass measure up to 7.5 cm in greatest dimension. Also, left  axillary adenopathy with multiple irregular lymph nodes and loss of  differentiation of the borders of multiple lymph nodes is noted and this  is suspicious for extranodal involvement.  2. There are no findings suspicious for malignancy in the right breast.  BI-RADS CATEGORY 6      12/06/19: Cycle #1 of Adriamycin/Cytoxan    01/17/20: Last cycle of Adriamycin/Cytoxan given without Neulasta.  We will switch from Neulasta to 7 days of Neupogen injections after cycle 4.    We reviewed with patient the US Breast from 01/29/20 which shows mild interval partial response to neoadjuvant chemotherapy.      01/31/20: Initiated cycle 1 Taxol    Past Medical History:   Diagnosis Date   • Anxiety    • Cerebrovascular accident (CVA) (CMS/HCC) 8/22/2017   • DDD (degenerative disc disease), cervical    • Depression    • GERD (gastroesophageal reflux disease)    • H/O ICH (intracerebral hemorrhage) (CMS/HCC)    • History of stroke    • History of thrombocytopenia    • HIV (human immunodeficiency virus infection) (CMS/HCC) 1996   • Hyperlipidemia    • Insomnia    • Lupus anticoagulant positive    • Malignant neoplasm of overlapping sites of left breast in female, estrogen receptor positive (CMS/HCC) 11/12/2019   • Meniscus tear 2017    RIGHT   • Migraine    • Neck pain     WITH SHOOTING PAIN LEFT RIGHT ARM   • Osteoarthritis    • Osteoporosis    • Seasonal allergies    • Spinal headache    • Tick bite 06/2014        Past Surgical History:   Procedure Laterality Date   • ADENOIDECTOMY     • CHOLECYSTECTOMY     • HYSTERECTOMY     • KNEE ARTHROSCOPY Right 3/24/2017    Procedure:  RIGHT  KNEE ARTHROSCOPY WITH DEBRIDEMENT CHONDROPLASTY PARTIAL MENISCECTOMY;  Surgeon: Karl Galeas MD;  Location: [facility];  Service:    • KNEE CARTILAGE SURGERY Right 2007   • TONSILLECTOMY     • US GUIDED LYMPH NODE BIOPSY  10/29/2019   • VENOUS ACCESS DEVICE  (PORT) INSERTION Right 2019    Procedure: INSERTION VENOUS ACCESS DEVICE RIGHT;  Surgeon: Landen Forman MD;  Location: [facility] MAIN OR;  Service: General      OB-GYN:  Menarche 15 years  Menopause-46  Pregnancies- 1 para 1 no miscarriages her first pregnancy was in  at 29 years of age.  She did not breastfeed.  Post menopausal HRT- None.  Hx of birth control pills- Yes.    Current Outpatient Medications on File Prior to Visit   Medication Sig Dispense Refill   • AIMOVIG 140 MG/ML solution auto-injector INJECT 140 MG INTO THE SKIN EVERY 30 (THIRTY) DAYS.  11   • atorvastatin (LIPITOR) 20 MG tablet TAKE 1 TABLET BY MOUTH EVERY DAY AT NIGHT 90 tablet 3   • baclofen (LIORESAL) 10 MG tablet Take 10 mg by mouth 2 (Two) Times a Day.  2   • CAMBIA 50 MG pack TAKE 1 PACKET AS NEEDED FOR HEADACHES  1   • clonazePAM (KlonoPIN) 0.5 MG tablet TAKE 1 TABLET BY MOUTH 2 (TWO) TIMES A DAY AS NEEDED FOR ANXIETY. 60 tablet 1   • colesevelam (WELCHOL) 625 MG tablet TAKE 2 TABLETS BY MOUTH EVERY  tablet 3   • dexamethasone (DECADRON) 4 MG tablet Take 2 tablets in the morning daily on Days 2, 3 & 4.  Take with food. 6 tablet 3   • escitalopram (LEXAPRO) 10 MG tablet TAKE 1 TABLET BY MOUTH EVERY DAY 90 tablet 3   • fluticasone (FLONASE) 50 MCG/ACT nasal spray 2 sprays into the nostril(s) as directed by provider Daily. 3 bottle 3   • ibandronate (BONIVA) 150 MG tablet TAKE 1 TABLET BY MOUTH EVERY 30 (THIRTY) DAYS. 3 tablet 3   • NON FORMULARY Collagen peptides powder     • oxyCODONE-acetaminophen (PERCOCET)  MG per tablet TK 1 T PO QID     • prochlorperazine (COMPAZINE) 10 MG tablet Take 1 tablet by mouth Every 6 (Six) Hours As Needed for Nausea or Vomiting. 20 tablet 2   • vitamin B-6 (PYRIDOXINE) 50 MG tablet Take 1 tablet by mouth Daily. 90 tablet 1   • zolpidem (AMBIEN) 10 MG tablet Take 1 tablet by mouth At Night As Needed for Sleep. 90 tablet 0   • COCONUT OIL PO Take  by mouth.     • ondansetron  (ZOFRAN) 8 MG tablet Take 1 tablet by mouth 3 (Three) Times a Day As Needed for Nausea or Vomiting. 30 tablet 5   • [DISCONTINUED] Menaquinone-7 (VITAMIN K2 PO) Take  by mouth.     • [DISCONTINUED] ondansetron ODT (ZOFRAN ODT) 4 MG disintegrating tablet Take 1 tablet by mouth Every 8 (Eight) Hours As Needed for Nausea or Vomiting. 30 tablet 0   • [DISCONTINUED] TRIUMEQ 600- MG per tablet        No current facility-administered medications on file prior to visit.         ALLERGIES:    Allergies   Allergen Reactions   • Augmentin [Amoxicillin-Pot Clavulanate] Hives        Social History     Socioeconomic History   • Marital status:      Spouse name: Owen   • Number of children: 1   • Years of education: College   • Highest education level: Not on file   Occupational History   • Occupation:      Employer:  [employer]   Tobacco Use   • Smoking status: Never Smoker   • Smokeless tobacco: Never Used   Substance and Sexual Activity   • Alcohol use: Yes     Comment: occasioonal   • Drug use: No   • Sexual activity: Defer     Birth control/protection: None     Patient does not smoke or do drugs.  She does drink occasionally.    Family History   Problem Relation Age of Onset   • Breast cancer Mother    • Leukemia Mother         CLL?   • Diabetes Mother    • Hyperthyroidism Mother    • Hearing loss Mother    • Dementia Father    • Heart disease Maternal Grandmother    • Diabetes Maternal Grandfather    • Heart disease Maternal Grandfather    • Stroke Maternal Grandfather    • Heart attack Maternal Grandfather    • Osteoporosis Paternal Grandmother    • Heart disease Paternal Grandfather    • Leukemia Maternal Aunt         CLL?   • Throat cancer Paternal Uncle       FAMILY HISTORY:  Her mother had breast cancer at age 60, still alive at 85 and in good health..  Her father had dementia.  Her paternal uncle had prostate cancer.  Her brother had esophageal cancer.        Review of Systems    Constitutional: Negative for activity change, appetite change, chills, diaphoresis, fatigue, fever and unexpected weight change.   HENT: Negative for hearing loss, mouth sores, nosebleeds, sore throat and trouble swallowing.    Respiratory: Negative for cough, chest tightness, shortness of breath and wheezing.    Cardiovascular: Negative for chest pain, palpitations and leg swelling.   Gastrointestinal: Negative for abdominal distention, abdominal pain, constipation, diarrhea, nausea and vomiting.   Genitourinary: Negative for difficulty urinating, dysuria, frequency, hematuria and urgency.   Musculoskeletal: Positive for back pain. Negative for joint swelling.   Skin: Negative for rash and wound.   Neurological: Positive for numbness (see HPI). Negative for dizziness, seizures, syncope, speech difficulty, weakness and headaches.   Hematological: Negative for adenopathy. Does not bruise/bleed easily.   Psychiatric/Behavioral: Negative for behavioral problems, confusion, sleep disturbance and suicidal ideas.   4/3/2020 review of systems unchanged from above except as updated.    Objective    Vitals:    04/03/20 0858   BP: 125/76   Pulse: 78   Resp: 16   Temp: 97.6 °F (36.4 °C)   SpO2: 97%   Pain 6 out of 10  ECOG 0  Weight 145 pounds    Physical Exam   Constitutional: She is oriented to person, place, and time. She appears well-developed and well-nourished. No distress.   HENT:   Head: Normocephalic and atraumatic.   Mouth/Throat: Oropharynx is clear and moist and mucous membranes are normal. No oropharyngeal exudate.   Eyes: Pupils are equal, round, and reactive to light.   Neck: Normal range of motion.   Cardiovascular: Normal rate, regular rhythm and normal heart sounds.   No murmur heard.  Pulmonary/Chest: Effort normal and breath sounds normal. No respiratory distress. She has no wheezes. She has no rhonchi. She has no rales.   Abdominal: Soft. Normal appearance and bowel sounds are normal. She exhibits no  distension. There is no hepatosplenomegaly. There is no CVA tenderness.   Musculoskeletal: Normal range of motion. She exhibits no edema.   Neurological: She is alert and oriented to person, place, and time.   Skin: Skin is warm and dry. No rash noted.   Psychiatric: She has a normal mood and affect.   Vitals reviewed.       Physical Exam   Pulmonary/Chest:               BREAST: 2/14/2020 :Left breast exam, without skin change, mass in the lower outer quadrant around 4-5 o'clock measures about 4-1/2 x 4 cm.  2/21/2020: Left breast: No evidence of any skin changes and no evidence of left nipple discharge as well as no left axillary adenopathy or left supraclavicular adenopathy.  The mass measures 4.5 x 3.5 cm.  March 20, 2020: The mass measures 3 x 2.5 cm  4/3/2020: left breast mass measures about 2.5x 2.5 cm. Unable to palpate the left axillary mass.     RECENT LABS:     Results from last 7 days   Lab Units 04/03/20  0905   WBC 10*3/mm3 5.73   HEMOGLOBIN g/dL 10.6*   HEMATOCRIT % 32.4*   PLATELETS 10*3/mm3 246     Lab Results   Component Value Date    NEUTROABS 3.76 04/03/2020 01/29/20 - US Breast  IMPRESSION:  There are findings suggestive of mild interval partial  response to neoadjuvant chemotherapy.    Assessment/Plan    1. T2N1 invasive ductal carcinoma of the left breast, recently diagnosed.    -Noticed mass in the left breast x5 months  -Diagnostic mammogram showed 3.7 mm mass in the left breast at 4 o'clock position  -Ultrasound-showed 3.8 x 3.1 x 2.5 cm mass at 4 o'clock position with 2 abnormal axillary lymph nodes, larger lymph node being 1.4 cm  -Left breast biopsy and axillary lymph node biopsy October 29, 2019, invasive ductal carcinoma, moderately differentiated, grade 2, ER 85%, AZ 10%, HER-2/merna 2+, HER-2 FISH negative  · CT scans from 11/13/19 show some asymmetric nodules along the lateral margin of the glandular tissue.  The 1.2x1.0 cm left axillary nodes, left supraclavicular, and left  posterior cervical triangle nodes are suspicious for tamar metastases.  ·   bone scan from 11/15/19 which was negative.   ·  MRI breast from 11/18/19 which shows the biopsy-proven malignancy in the left breast centered at the 4:00 position.  The region of involvement measures up to 7.5 cm.  There is left axillary adenopathy with multiple irregular lymph nodes and loss of differentiation of the border of multiple lymph nodes which are suspicious for extranodal involvement.  ·    echocardiogram from 11/14/19 which was normal with an ejection fraction of 67%.    · INVITAE from 11/14/19 which was negative.  · Given the size of her tumor and her medical history, patient will be given 4 cycles of Adriamycin/Cytoxan with Neulasta.  After completion of this treatment, patient will be started on 12 cycles of Taxol.  After the tumor shrinks in size, Dr. Forman will perform a lumpectomy.    · Following lumpectomy, patient will begin endocrine therapy.  I have spoken with Dr. Forman who agrees with chemotherapy first due to the large size of the tumor.   ·  Dr. Dawn agrees chemotherapy will not aggravate her HIV condition.  I also spoke with Dr. Mohan at [place] who also agrees chemotherapy is the first step.   · 12/06/19: Cycle #1 of Adriamycin/Cytoxan  · 01/17/20: Proceed with final A/C cycle 4 today without Neulasta.  Patient to come 7 days for Neupogen injections.  If she has more pain after the injections, she can be given morphine.  · She is here today, 1/24/2020 for her seventh dose of Zarxio.  She did tolerate Zarxio much better than Neulasta.  Her total white count is within normal limits but slightly lower than we like.  · We reviewed with patient the US Breast from 01/29/20 after 4 cycles of Adriamycin Cytoxan chemotherapy which shows mild interval partial response to neoadjuvant chemotherapy.  The mass has decreased from 3.8 x 2.5 to 3.1 cm to 3.5 x 2.3 x 3.2 cm previously the left axillary lymph node  has decreased from 1.4 x 0.7 x 1 cm to 1.1 x 0.6 x 0.9 cm.  · 01/31/20: Initiated cycle 1 Taxol  · 2/7/2020 due for cycle 2 Taxol, white blood cell count 1.77, ANC 0.87.  Treatment held, patient received 3 days of Neupogen injections.  · 2/14/2020 patient returns for evaluation prior to cycle 2.  Her white blood cell count has improved.  We will proceed with treatment today, and plan to give Neupogen injections daily x3 days starting on day #2 of each cycle.  · 2/21/2020 noted left breast mass to be stable to decrease in size and axillary lymph node also stable to decrease in size.  · March 20, 2020, left breast mass appears to have decreased in size by 3 x 2.5 cm and difficult to appreciate left axillary mass  · 4/3/2020 stable to slightly improved reduction in size of the left breast mass, however unable to palpate the left axillary mass.  Patient is noting some intermittent burning in her feet bilaterally, more noticeable at night.  She will continue on B6 for now.  We will closely monitor, as we may have to  consider dose reduction in Taxol if her neuropathy worsens.    2.  Profound neutropenia due to chemotherapy despite Neulasta:   · On 12/13/2019 at 1 week after cycle #1 chemotherapy, white blood cell count 850, ANC 50 together with a low monocytes 150 .  We started the patient on Levaquin 500 mg daily for 7 days.  No neutropenia fever.    · On 12/20/2019, much improved and elevated ANC 10,800.  We will proceed ahead with cycle #2 of chemotherapy.  Neulasta will be continued for each chemotherapy.  Due to expected neutropenia, will start the patient on Levaquin 500 mg daily for 7 days, starting on day #4 of each cycle AC chemotherapy.  · Poor tolerance to Neulasta.  Treatment changed to Zarxio 300 mcg daily following cycle 4 AC.  Patient ended up receiving a total of 10 days of Zarxio.  · 2/7/2020 patient due for cycle 2 Taxol, and she is again neutropenic with a white blood cell count of 1.77, ANC 0.87.   Treatment held.  We will give her Zarxio 300 mcg daily for 3 days starting today.  We will also start her on prophylactic Levaquin.  · 4/3/2020 patient continues to do 3 days of Zarxio injections after each Taxol treatment, WBC today 5.73, ANC 3.76.    3.  Moderate thrombocytopenia secondary to chemotherapy.    · This happened after cycle 1 Chemotherapy with platelets 92,000 on 12/13/2019.  Patient was asymptomatic.  · Normalized platelets 168,000 on 12/20/2019.  Expecting thrombocytopenia will recur.  Continue to monitor.  · Platelet count 246,000, continue to monitor.    4.  Chemotherapy induced anemia.   · Hemoglobin 9.4 2/7/2020y.  We will continue to closely monitor.   · Ferritin and iron panel previously checked were within normal limits.  We will recheck a B12 level today.  · 3/6/2020 hemoglobin 11.1.  Continue to monitor.  · 4/3/2020 hemoglobin 10.6.  Continue to monitor.      5.  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  · On 12/13/2019 patient here for toxicity check and struggled with significant insomnia from steroids following her first cycle.  We discussed taking the dexamethasone, only one 4 mg tablet daily for 3 days with her next cycle of chemotherapy to see if this improves her symptoms.    · On 12/20/2019, I questioned the patient that her nausea and vomiting may be significant due to decreased dose of dexamethasone.  She could use Compazine and Zofran as needed more frequently.  · Patient is taking Ambien for insomnia and that is still not helping that much.  · Will decrease dose of dexamethasone to 2 mg days 2 3 and 4 after chemotherapy in the morning  · Insomnia much improved with decreased dose of dexamethasone    6. HIV, followed by Dr. Dawn in infectious disease.  Well-controlled and is on a single medication with very low viral load. Has HIV since age 34.     7.  Family history of breast cancer with mother having had breast cancer at age 60.  There is family history of uncle with prostate  cancer.  Patient will require genetic referral    8.  Arthritis with severe back pain followed by Dr. Bam Crandall  · Percocet    9. Episodes of nosebleeds.  She has had these once a week for 3-4 years.  She did not report this today.    10. Severe body ache post A/C cycle 3.  She took Percocet and Hydrocodone for pain management which did help.  I have discontinued Neulasta as outlined above.    11.  Otalgia: Of the right ear, present for a while.  Dr. Moreno would like the patient to follow-up with ENT, Dr. Stapleton, as she has previously seen him.  Not a complaint today.    12.  Arthralgias for a few days after each treatment, which resolve on their own.    13.  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  Currently on B6 50 mg daily.  We will have to continue to closely monitor, as she does report some burning in her feet last night.  This has resolved today.     PLAN:  1. Proceed with cycle 9 weekly Taxol today.  2. Continue Zarxio x3 days following each treatment.  3. Continue be 650 mg daily.  4. Return in 1 week for cycle 10 weekly Taxol.  5. Return in 2 weeks for follow-up visit with nurse practitioner for evaluation prior to cycle 11 weekly Taxol.  6. Return in 3 weeks for follow-up visit with Dr. Moreno for evaluation prior to her 12th and final cycle of weekly Taxol.  7. Need to consider referral back to Dr. Forman as well as a MRI of the breast following the completion of weekly Taxol.      ОЛЬГА Kim

## 2020-04-04 ENCOUNTER — INFUSION (OUTPATIENT)
Dept: ONCOLOGY | Facility: HOSPITAL | Age: 60
End: 2020-04-04

## 2020-04-04 VITALS
TEMPERATURE: 97.1 F | HEART RATE: 71 BPM | RESPIRATION RATE: 16 BRPM | OXYGEN SATURATION: 97 % | SYSTOLIC BLOOD PRESSURE: 127 MMHG | DIASTOLIC BLOOD PRESSURE: 78 MMHG

## 2020-04-04 DIAGNOSIS — D70.1 CHEMOTHERAPY-INDUCED NEUTROPENIA (HCC): ICD-10-CM

## 2020-04-04 DIAGNOSIS — C50.812 MALIGNANT NEOPLASM OF OVERLAPPING SITES OF LEFT BREAST IN FEMALE, ESTROGEN RECEPTOR POSITIVE (HCC): Primary | ICD-10-CM

## 2020-04-04 DIAGNOSIS — T45.1X5A CHEMOTHERAPY-INDUCED NEUTROPENIA (HCC): ICD-10-CM

## 2020-04-04 DIAGNOSIS — Z17.0 MALIGNANT NEOPLASM OF OVERLAPPING SITES OF LEFT BREAST IN FEMALE, ESTROGEN RECEPTOR POSITIVE (HCC): Primary | ICD-10-CM

## 2020-04-04 PROCEDURE — 96372 THER/PROPH/DIAG INJ SC/IM: CPT

## 2020-04-04 PROCEDURE — 25010000002 FILGRASTIM-SNDZ 300 MCG/0.5ML SOLUTION PREFILLED SYRINGE: Performed by: INTERNAL MEDICINE

## 2020-04-04 RX ADMIN — FILGRASTIM-SNDZ 300 MCG: 300 INJECTION, SOLUTION INTRAVENOUS; SUBCUTANEOUS at 12:51

## 2020-04-05 ENCOUNTER — INFUSION (OUTPATIENT)
Dept: ONCOLOGY | Facility: HOSPITAL | Age: 60
End: 2020-04-05

## 2020-04-05 DIAGNOSIS — D70.1 CHEMOTHERAPY-INDUCED NEUTROPENIA (HCC): ICD-10-CM

## 2020-04-05 DIAGNOSIS — C50.812 MALIGNANT NEOPLASM OF OVERLAPPING SITES OF LEFT BREAST IN FEMALE, ESTROGEN RECEPTOR POSITIVE (HCC): Primary | ICD-10-CM

## 2020-04-05 DIAGNOSIS — Z17.0 MALIGNANT NEOPLASM OF OVERLAPPING SITES OF LEFT BREAST IN FEMALE, ESTROGEN RECEPTOR POSITIVE (HCC): Primary | ICD-10-CM

## 2020-04-05 DIAGNOSIS — T45.1X5A CHEMOTHERAPY-INDUCED NEUTROPENIA (HCC): ICD-10-CM

## 2020-04-05 PROCEDURE — 25010000002 FILGRASTIM-SNDZ 300 MCG/0.5ML SOLUTION PREFILLED SYRINGE: Performed by: INTERNAL MEDICINE

## 2020-04-05 PROCEDURE — 96372 THER/PROPH/DIAG INJ SC/IM: CPT

## 2020-04-05 RX ADMIN — FILGRASTIM-SNDZ 300 MCG: 300 INJECTION, SOLUTION INTRAVENOUS; SUBCUTANEOUS at 13:05

## 2020-04-06 ENCOUNTER — INFUSION (OUTPATIENT)
Dept: ONCOLOGY | Facility: HOSPITAL | Age: 60
End: 2020-04-06

## 2020-04-06 DIAGNOSIS — C50.812 MALIGNANT NEOPLASM OF OVERLAPPING SITES OF LEFT BREAST IN FEMALE, ESTROGEN RECEPTOR POSITIVE (HCC): Primary | ICD-10-CM

## 2020-04-06 DIAGNOSIS — Z17.0 MALIGNANT NEOPLASM OF OVERLAPPING SITES OF LEFT BREAST IN FEMALE, ESTROGEN RECEPTOR POSITIVE (HCC): Primary | ICD-10-CM

## 2020-04-06 DIAGNOSIS — D70.1 CHEMOTHERAPY-INDUCED NEUTROPENIA (HCC): ICD-10-CM

## 2020-04-06 DIAGNOSIS — T45.1X5A CHEMOTHERAPY-INDUCED NEUTROPENIA (HCC): ICD-10-CM

## 2020-04-06 PROCEDURE — 25010000002 FILGRASTIM-SNDZ 300 MCG/0.5ML SOLUTION PREFILLED SYRINGE: Performed by: NURSE PRACTITIONER

## 2020-04-06 PROCEDURE — 96372 THER/PROPH/DIAG INJ SC/IM: CPT

## 2020-04-06 RX ORDER — SODIUM CHLORIDE 9 MG/ML
250 INJECTION, SOLUTION INTRAVENOUS ONCE
Status: CANCELLED | OUTPATIENT
Start: 2020-04-10

## 2020-04-06 RX ORDER — FAMOTIDINE 10 MG/ML
20 INJECTION, SOLUTION INTRAVENOUS ONCE
Status: CANCELLED | OUTPATIENT
Start: 2020-04-10

## 2020-04-06 RX ORDER — DIPHENHYDRAMINE HYDROCHLORIDE 50 MG/ML
50 INJECTION INTRAMUSCULAR; INTRAVENOUS AS NEEDED
Status: CANCELLED | OUTPATIENT
Start: 2020-04-10

## 2020-04-06 RX ORDER — FAMOTIDINE 10 MG/ML
20 INJECTION, SOLUTION INTRAVENOUS AS NEEDED
Status: CANCELLED | OUTPATIENT
Start: 2020-04-10

## 2020-04-06 RX ADMIN — FILGRASTIM-SNDZ 300 MCG: 300 INJECTION, SOLUTION INTRAVENOUS; SUBCUTANEOUS at 13:49

## 2020-04-08 DIAGNOSIS — Z17.0 MALIGNANT NEOPLASM OF OVERLAPPING SITES OF LEFT BREAST IN FEMALE, ESTROGEN RECEPTOR POSITIVE (HCC): Primary | ICD-10-CM

## 2020-04-08 DIAGNOSIS — C50.812 MALIGNANT NEOPLASM OF OVERLAPPING SITES OF LEFT BREAST IN FEMALE, ESTROGEN RECEPTOR POSITIVE (HCC): Primary | ICD-10-CM

## 2020-04-10 ENCOUNTER — TELEPHONE (OUTPATIENT)
Dept: INFECTIOUS DISEASES | Facility: CLINIC | Age: 60
End: 2020-04-10

## 2020-04-10 ENCOUNTER — INFUSION (OUTPATIENT)
Dept: ONCOLOGY | Facility: HOSPITAL | Age: 60
End: 2020-04-10

## 2020-04-10 VITALS
WEIGHT: 146 LBS | DIASTOLIC BLOOD PRESSURE: 73 MMHG | TEMPERATURE: 98.1 F | OXYGEN SATURATION: 93 % | SYSTOLIC BLOOD PRESSURE: 111 MMHG | BODY MASS INDEX: 23.44 KG/M2 | HEART RATE: 69 BPM

## 2020-04-10 DIAGNOSIS — Z45.2 ENCOUNTER FOR FITTING AND ADJUSTMENT OF VASCULAR CATHETER: ICD-10-CM

## 2020-04-10 DIAGNOSIS — Z17.0 MALIGNANT NEOPLASM OF OVERLAPPING SITES OF LEFT BREAST IN FEMALE, ESTROGEN RECEPTOR POSITIVE (HCC): Primary | ICD-10-CM

## 2020-04-10 DIAGNOSIS — C50.812 MALIGNANT NEOPLASM OF OVERLAPPING SITES OF LEFT BREAST IN FEMALE, ESTROGEN RECEPTOR POSITIVE (HCC): Primary | ICD-10-CM

## 2020-04-10 LAB
ALBUMIN SERPL-MCNC: 4.4 G/DL (ref 3.5–5.2)
ALBUMIN/GLOB SERPL: 1.7 G/DL (ref 1.1–2.4)
ALP SERPL-CCNC: 154 U/L (ref 38–116)
ALT SERPL W P-5'-P-CCNC: 21 U/L (ref 0–33)
ANION GAP SERPL CALCULATED.3IONS-SCNC: 12.6 MMOL/L (ref 5–15)
AST SERPL-CCNC: 23 U/L (ref 0–32)
BASOPHILS # BLD AUTO: 0.04 10*3/MM3 (ref 0–0.2)
BASOPHILS NFR BLD AUTO: 0.4 % (ref 0–1.5)
BILIRUB SERPL-MCNC: 0.2 MG/DL (ref 0.2–1.2)
BUN BLD-MCNC: 14 MG/DL (ref 6–20)
BUN/CREAT SERPL: 24.1 (ref 7.3–30)
CALCIUM SPEC-SCNC: 9.3 MG/DL (ref 8.5–10.2)
CHLORIDE SERPL-SCNC: 101 MMOL/L (ref 98–107)
CO2 SERPL-SCNC: 25.4 MMOL/L (ref 22–29)
CREAT BLD-MCNC: 0.58 MG/DL (ref 0.6–1.1)
DEPRECATED RDW RBC AUTO: 56.5 FL (ref 37–54)
EOSINOPHIL # BLD AUTO: 0.12 10*3/MM3 (ref 0–0.4)
EOSINOPHIL NFR BLD AUTO: 1.1 % (ref 0.3–6.2)
ERYTHROCYTE [DISTWIDTH] IN BLOOD BY AUTOMATED COUNT: 14.6 % (ref 12.3–15.4)
GFR SERPL CREATININE-BSD FRML MDRD: 106 ML/MIN/1.73
GLOBULIN UR ELPH-MCNC: 2.6 GM/DL (ref 1.8–3.5)
GLUCOSE BLD-MCNC: 94 MG/DL (ref 74–124)
HCT VFR BLD AUTO: 35.1 % (ref 34–46.6)
HGB BLD-MCNC: 11.3 G/DL (ref 12–15.9)
HIV1 RNA # SERPL NAA+PROBE: <20 COPIES/ML
IMM GRANULOCYTES # BLD AUTO: 0.29 10*3/MM3 (ref 0–0.05)
IMM GRANULOCYTES NFR BLD AUTO: 2.6 % (ref 0–0.5)
LOG10 HIV-1 RNA: NORMAL
LYMPHOCYTES # BLD AUTO: 1.37 10*3/MM3 (ref 0.7–3.1)
LYMPHOCYTES NFR BLD AUTO: 12.4 % (ref 19.6–45.3)
MCH RBC QN AUTO: 33.9 PG (ref 26.6–33)
MCHC RBC AUTO-ENTMCNC: 32.2 G/DL (ref 31.5–35.7)
MCV RBC AUTO: 105.4 FL (ref 79–97)
MONOCYTES # BLD AUTO: 1.27 10*3/MM3 (ref 0.1–0.9)
MONOCYTES NFR BLD AUTO: 11.5 % (ref 5–12)
NEUTROPHILS # BLD AUTO: 7.97 10*3/MM3 (ref 1.7–7)
NEUTROPHILS NFR BLD AUTO: 72 % (ref 42.7–76)
NRBC BLD AUTO-RTO: 0 /100 WBC (ref 0–0.2)
PLATELET # BLD AUTO: 289 10*3/MM3 (ref 140–450)
PMV BLD AUTO: 9.7 FL (ref 6–12)
POTASSIUM BLD-SCNC: 4.5 MMOL/L (ref 3.5–4.7)
PROT SERPL-MCNC: 7 G/DL (ref 6.3–8)
RBC # BLD AUTO: 3.33 10*6/MM3 (ref 3.77–5.28)
SODIUM BLD-SCNC: 139 MMOL/L (ref 134–145)
WBC NRBC COR # BLD: 11.06 10*3/MM3 (ref 3.4–10.8)

## 2020-04-10 PROCEDURE — 25010000002 PACLITAXEL PER 30 MG: Performed by: NURSE PRACTITIONER

## 2020-04-10 PROCEDURE — 25010000002 DIPHENHYDRAMINE PER 50 MG: Performed by: NURSE PRACTITIONER

## 2020-04-10 PROCEDURE — 80053 COMPREHEN METABOLIC PANEL: CPT

## 2020-04-10 PROCEDURE — 96413 CHEMO IV INFUSION 1 HR: CPT

## 2020-04-10 PROCEDURE — 85025 COMPLETE CBC W/AUTO DIFF WBC: CPT

## 2020-04-10 PROCEDURE — 25010000002 DEXAMETHASONE SODIUM PHOSPHATE 100 MG/10ML SOLUTION: Performed by: NURSE PRACTITIONER

## 2020-04-10 PROCEDURE — 96367 TX/PROPH/DG ADDL SEQ IV INF: CPT

## 2020-04-10 PROCEDURE — 25010000003 HEPARIN LOCK FLUCH PER 10 UNITS: Performed by: INTERNAL MEDICINE

## 2020-04-10 PROCEDURE — 96375 TX/PRO/DX INJ NEW DRUG ADDON: CPT

## 2020-04-10 RX ORDER — SODIUM CHLORIDE 0.9 % (FLUSH) 0.9 %
10 SYRINGE (ML) INJECTION AS NEEDED
Status: CANCELLED | OUTPATIENT
Start: 2020-04-10

## 2020-04-10 RX ORDER — SODIUM CHLORIDE 9 MG/ML
250 INJECTION, SOLUTION INTRAVENOUS ONCE
Status: COMPLETED | OUTPATIENT
Start: 2020-04-10 | End: 2020-04-10

## 2020-04-10 RX ORDER — HEPARIN SODIUM (PORCINE) LOCK FLUSH IV SOLN 100 UNIT/ML 100 UNIT/ML
500 SOLUTION INTRAVENOUS AS NEEDED
Status: CANCELLED | OUTPATIENT
Start: 2020-04-10

## 2020-04-10 RX ORDER — FAMOTIDINE 10 MG/ML
20 INJECTION, SOLUTION INTRAVENOUS AS NEEDED
Status: DISCONTINUED | OUTPATIENT
Start: 2020-04-10 | End: 2020-04-10

## 2020-04-10 RX ORDER — DIPHENHYDRAMINE HYDROCHLORIDE 50 MG/ML
50 INJECTION INTRAMUSCULAR; INTRAVENOUS AS NEEDED
Status: DISCONTINUED | OUTPATIENT
Start: 2020-04-10 | End: 2020-04-10

## 2020-04-10 RX ORDER — HEPARIN SODIUM (PORCINE) LOCK FLUSH IV SOLN 100 UNIT/ML 100 UNIT/ML
500 SOLUTION INTRAVENOUS AS NEEDED
Status: DISCONTINUED | OUTPATIENT
Start: 2020-04-10 | End: 2020-04-10 | Stop reason: HOSPADM

## 2020-04-10 RX ORDER — FAMOTIDINE 10 MG/ML
20 INJECTION, SOLUTION INTRAVENOUS ONCE
Status: COMPLETED | OUTPATIENT
Start: 2020-04-10 | End: 2020-04-10

## 2020-04-10 RX ADMIN — DIPHENHYDRAMINE HYDROCHLORIDE 25 MG: 50 INJECTION, SOLUTION INTRAMUSCULAR; INTRAVENOUS at 09:26

## 2020-04-10 RX ADMIN — SODIUM CHLORIDE 250 ML: 9 INJECTION, SOLUTION INTRAVENOUS at 09:26

## 2020-04-10 RX ADMIN — PACLITAXEL 140 MG: 6 INJECTION, SOLUTION INTRAVENOUS at 10:47

## 2020-04-10 RX ADMIN — FAMOTIDINE 20 MG: 10 INJECTION INTRAVENOUS at 09:26

## 2020-04-10 RX ADMIN — SODIUM CHLORIDE, PRESERVATIVE FREE 500 UNITS: 5 INJECTION INTRAVENOUS at 12:43

## 2020-04-10 RX ADMIN — DEXAMETHASONE SODIUM PHOSPHATE 12 MG: 10 INJECTION, SOLUTION INTRAMUSCULAR; INTRAVENOUS at 09:45

## 2020-04-10 NOTE — TELEPHONE ENCOUNTER
----- Message from Denilson Dawn MD sent at 4/10/2020 11:33 AM EDT -----  Please let patient know her HIV viral load remains undetectable. Keep up the great work on the Triumeq.

## 2020-04-11 ENCOUNTER — INFUSION (OUTPATIENT)
Dept: ONCOLOGY | Facility: HOSPITAL | Age: 60
End: 2020-04-11

## 2020-04-11 DIAGNOSIS — T45.1X5A CHEMOTHERAPY-INDUCED NEUTROPENIA (HCC): ICD-10-CM

## 2020-04-11 DIAGNOSIS — Z17.0 MALIGNANT NEOPLASM OF OVERLAPPING SITES OF LEFT BREAST IN FEMALE, ESTROGEN RECEPTOR POSITIVE (HCC): Primary | ICD-10-CM

## 2020-04-11 DIAGNOSIS — C50.812 MALIGNANT NEOPLASM OF OVERLAPPING SITES OF LEFT BREAST IN FEMALE, ESTROGEN RECEPTOR POSITIVE (HCC): Primary | ICD-10-CM

## 2020-04-11 DIAGNOSIS — D70.1 CHEMOTHERAPY-INDUCED NEUTROPENIA (HCC): ICD-10-CM

## 2020-04-11 PROCEDURE — 96372 THER/PROPH/DIAG INJ SC/IM: CPT

## 2020-04-11 PROCEDURE — 25010000002 FILGRASTIM-SNDZ 300 MCG/0.5ML SOLUTION PREFILLED SYRINGE: Performed by: NURSE PRACTITIONER

## 2020-04-11 RX ADMIN — FILGRASTIM-SNDZ 300 MCG: 300 INJECTION, SOLUTION INTRAVENOUS; SUBCUTANEOUS at 12:55

## 2020-04-12 ENCOUNTER — INFUSION (OUTPATIENT)
Dept: ONCOLOGY | Facility: HOSPITAL | Age: 60
End: 2020-04-12

## 2020-04-12 DIAGNOSIS — C50.812 MALIGNANT NEOPLASM OF OVERLAPPING SITES OF LEFT BREAST IN FEMALE, ESTROGEN RECEPTOR POSITIVE (HCC): Primary | ICD-10-CM

## 2020-04-12 DIAGNOSIS — T45.1X5A CHEMOTHERAPY-INDUCED NEUTROPENIA (HCC): ICD-10-CM

## 2020-04-12 DIAGNOSIS — D70.1 CHEMOTHERAPY-INDUCED NEUTROPENIA (HCC): ICD-10-CM

## 2020-04-12 DIAGNOSIS — Z17.0 MALIGNANT NEOPLASM OF OVERLAPPING SITES OF LEFT BREAST IN FEMALE, ESTROGEN RECEPTOR POSITIVE (HCC): Primary | ICD-10-CM

## 2020-04-12 PROCEDURE — 96372 THER/PROPH/DIAG INJ SC/IM: CPT

## 2020-04-12 PROCEDURE — 25010000002 FILGRASTIM-SNDZ 300 MCG/0.5ML SOLUTION PREFILLED SYRINGE: Performed by: NURSE PRACTITIONER

## 2020-04-12 RX ADMIN — FILGRASTIM-SNDZ 300 MCG: 300 INJECTION, SOLUTION INTRAVENOUS; SUBCUTANEOUS at 11:18

## 2020-04-13 ENCOUNTER — INFUSION (OUTPATIENT)
Dept: ONCOLOGY | Facility: HOSPITAL | Age: 60
End: 2020-04-13

## 2020-04-13 DIAGNOSIS — T45.1X5A CHEMOTHERAPY-INDUCED NEUTROPENIA (HCC): ICD-10-CM

## 2020-04-13 DIAGNOSIS — C50.812 MALIGNANT NEOPLASM OF OVERLAPPING SITES OF LEFT BREAST IN FEMALE, ESTROGEN RECEPTOR POSITIVE (HCC): Primary | ICD-10-CM

## 2020-04-13 DIAGNOSIS — D70.1 CHEMOTHERAPY-INDUCED NEUTROPENIA (HCC): ICD-10-CM

## 2020-04-13 DIAGNOSIS — Z17.0 MALIGNANT NEOPLASM OF OVERLAPPING SITES OF LEFT BREAST IN FEMALE, ESTROGEN RECEPTOR POSITIVE (HCC): Primary | ICD-10-CM

## 2020-04-13 PROCEDURE — 25010000002 FILGRASTIM-SNDZ 300 MCG/0.5ML SOLUTION PREFILLED SYRINGE: Performed by: NURSE PRACTITIONER

## 2020-04-13 PROCEDURE — 96372 THER/PROPH/DIAG INJ SC/IM: CPT

## 2020-04-13 RX ADMIN — FILGRASTIM-SNDZ 300 MCG: 300 INJECTION, SOLUTION INTRAVENOUS; SUBCUTANEOUS at 13:02

## 2020-04-17 ENCOUNTER — INFUSION (OUTPATIENT)
Dept: ONCOLOGY | Facility: HOSPITAL | Age: 60
End: 2020-04-17

## 2020-04-17 ENCOUNTER — OFFICE VISIT (OUTPATIENT)
Dept: ONCOLOGY | Facility: CLINIC | Age: 60
End: 2020-04-17

## 2020-04-17 VITALS — HEART RATE: 70 BPM | DIASTOLIC BLOOD PRESSURE: 93 MMHG | SYSTOLIC BLOOD PRESSURE: 137 MMHG

## 2020-04-17 VITALS
SYSTOLIC BLOOD PRESSURE: 129 MMHG | OXYGEN SATURATION: 95 % | TEMPERATURE: 98.2 F | RESPIRATION RATE: 16 BRPM | HEIGHT: 66 IN | BODY MASS INDEX: 23.87 KG/M2 | WEIGHT: 148.5 LBS | DIASTOLIC BLOOD PRESSURE: 90 MMHG | HEART RATE: 82 BPM

## 2020-04-17 DIAGNOSIS — T45.1X5A CHEMOTHERAPY INDUCED NEUTROPENIA (HCC): ICD-10-CM

## 2020-04-17 DIAGNOSIS — Z17.0 MALIGNANT NEOPLASM OF OVERLAPPING SITES OF LEFT BREAST IN FEMALE, ESTROGEN RECEPTOR POSITIVE (HCC): Primary | ICD-10-CM

## 2020-04-17 DIAGNOSIS — C50.812 MALIGNANT NEOPLASM OF OVERLAPPING SITES OF LEFT BREAST IN FEMALE, ESTROGEN RECEPTOR POSITIVE (HCC): ICD-10-CM

## 2020-04-17 DIAGNOSIS — C50.812 MALIGNANT NEOPLASM OF OVERLAPPING SITES OF LEFT BREAST IN FEMALE, ESTROGEN RECEPTOR POSITIVE (HCC): Primary | ICD-10-CM

## 2020-04-17 DIAGNOSIS — G62.0 CHEMOTHERAPY-INDUCED NEUROPATHY (HCC): ICD-10-CM

## 2020-04-17 DIAGNOSIS — C50.912 INVASIVE DUCTAL CARCINOMA OF BREAST, FEMALE, LEFT (HCC): ICD-10-CM

## 2020-04-17 DIAGNOSIS — Z45.2 ENCOUNTER FOR FITTING AND ADJUSTMENT OF VASCULAR CATHETER: ICD-10-CM

## 2020-04-17 DIAGNOSIS — D70.1 CHEMOTHERAPY INDUCED NEUTROPENIA (HCC): ICD-10-CM

## 2020-04-17 DIAGNOSIS — Z17.0 MALIGNANT NEOPLASM OF OVERLAPPING SITES OF LEFT BREAST IN FEMALE, ESTROGEN RECEPTOR POSITIVE (HCC): ICD-10-CM

## 2020-04-17 DIAGNOSIS — T45.1X5A CHEMOTHERAPY-INDUCED NEUROPATHY (HCC): ICD-10-CM

## 2020-04-17 LAB
ALBUMIN SERPL-MCNC: 4.2 G/DL (ref 3.5–5.2)
ALBUMIN/GLOB SERPL: 1.8 G/DL (ref 1.1–2.4)
ALP SERPL-CCNC: 142 U/L (ref 38–116)
ALT SERPL W P-5'-P-CCNC: 20 U/L (ref 0–33)
ANION GAP SERPL CALCULATED.3IONS-SCNC: 12.3 MMOL/L (ref 5–15)
AST SERPL-CCNC: 22 U/L (ref 0–32)
BASOPHILS # BLD AUTO: 0.03 10*3/MM3 (ref 0–0.2)
BASOPHILS NFR BLD AUTO: 0.4 % (ref 0–1.5)
BILIRUB SERPL-MCNC: 0.2 MG/DL (ref 0.2–1.2)
BUN BLD-MCNC: 11 MG/DL (ref 6–20)
BUN/CREAT SERPL: 17.2 (ref 7.3–30)
CALCIUM SPEC-SCNC: 9.2 MG/DL (ref 8.5–10.2)
CANCER AG15-3 SERPL-ACNC: 30.9 U/ML
CHLORIDE SERPL-SCNC: 102 MMOL/L (ref 98–107)
CO2 SERPL-SCNC: 26.7 MMOL/L (ref 22–29)
CREAT BLD-MCNC: 0.64 MG/DL (ref 0.6–1.1)
DEPRECATED RDW RBC AUTO: 55.4 FL (ref 37–54)
EOSINOPHIL # BLD AUTO: 0.09 10*3/MM3 (ref 0–0.4)
EOSINOPHIL NFR BLD AUTO: 1.1 % (ref 0.3–6.2)
ERYTHROCYTE [DISTWIDTH] IN BLOOD BY AUTOMATED COUNT: 14.5 % (ref 12.3–15.4)
GFR SERPL CREATININE-BSD FRML MDRD: 95 ML/MIN/1.73
GLOBULIN UR ELPH-MCNC: 2.4 GM/DL (ref 1.8–3.5)
GLUCOSE BLD-MCNC: 87 MG/DL (ref 74–124)
HCT VFR BLD AUTO: 34.3 % (ref 34–46.6)
HGB BLD-MCNC: 11.1 G/DL (ref 12–15.9)
IMM GRANULOCYTES # BLD AUTO: 0.1 10*3/MM3 (ref 0–0.05)
IMM GRANULOCYTES NFR BLD AUTO: 1.2 % (ref 0–0.5)
LYMPHOCYTES # BLD AUTO: 1.11 10*3/MM3 (ref 0.7–3.1)
LYMPHOCYTES NFR BLD AUTO: 13.3 % (ref 19.6–45.3)
MCH RBC QN AUTO: 33.8 PG (ref 26.6–33)
MCHC RBC AUTO-ENTMCNC: 32.4 G/DL (ref 31.5–35.7)
MCV RBC AUTO: 104.6 FL (ref 79–97)
MONOCYTES # BLD AUTO: 0.75 10*3/MM3 (ref 0.1–0.9)
MONOCYTES NFR BLD AUTO: 9 % (ref 5–12)
NEUTROPHILS # BLD AUTO: 6.24 10*3/MM3 (ref 1.7–7)
NEUTROPHILS NFR BLD AUTO: 75 % (ref 42.7–76)
NRBC BLD AUTO-RTO: 0 /100 WBC (ref 0–0.2)
PLATELET # BLD AUTO: 274 10*3/MM3 (ref 140–450)
PMV BLD AUTO: 9.6 FL (ref 6–12)
POTASSIUM BLD-SCNC: 4.2 MMOL/L (ref 3.5–4.7)
PROT SERPL-MCNC: 6.6 G/DL (ref 6.3–8)
RBC # BLD AUTO: 3.28 10*6/MM3 (ref 3.77–5.28)
SODIUM BLD-SCNC: 141 MMOL/L (ref 134–145)
WBC NRBC COR # BLD: 8.32 10*3/MM3 (ref 3.4–10.8)

## 2020-04-17 PROCEDURE — 25010000002 DIPHENHYDRAMINE PER 50 MG: Performed by: NURSE PRACTITIONER

## 2020-04-17 PROCEDURE — 25010000002 DEXAMETHASONE SODIUM PHOSPHATE 100 MG/10ML SOLUTION: Performed by: NURSE PRACTITIONER

## 2020-04-17 PROCEDURE — 36415 COLL VENOUS BLD VENIPUNCTURE: CPT

## 2020-04-17 PROCEDURE — 80053 COMPREHEN METABOLIC PANEL: CPT

## 2020-04-17 PROCEDURE — 25010000002 PACLITAXEL PER 30 MG: Performed by: NURSE PRACTITIONER

## 2020-04-17 PROCEDURE — 96375 TX/PRO/DX INJ NEW DRUG ADDON: CPT

## 2020-04-17 PROCEDURE — 25010000003 HEPARIN LOCK FLUCH PER 10 UNITS: Performed by: INTERNAL MEDICINE

## 2020-04-17 PROCEDURE — 99214 OFFICE O/P EST MOD 30 MIN: CPT | Performed by: NURSE PRACTITIONER

## 2020-04-17 PROCEDURE — 85025 COMPLETE CBC W/AUTO DIFF WBC: CPT

## 2020-04-17 PROCEDURE — 86300 IMMUNOASSAY TUMOR CA 15-3: CPT | Performed by: NURSE PRACTITIONER

## 2020-04-17 PROCEDURE — 96367 TX/PROPH/DG ADDL SEQ IV INF: CPT

## 2020-04-17 PROCEDURE — 96413 CHEMO IV INFUSION 1 HR: CPT

## 2020-04-17 RX ORDER — DIPHENHYDRAMINE HYDROCHLORIDE 50 MG/ML
50 INJECTION INTRAMUSCULAR; INTRAVENOUS AS NEEDED
Status: CANCELLED | OUTPATIENT
Start: 2020-04-17

## 2020-04-17 RX ORDER — SODIUM CHLORIDE 0.9 % (FLUSH) 0.9 %
10 SYRINGE (ML) INJECTION AS NEEDED
Status: CANCELLED | OUTPATIENT
Start: 2020-04-17

## 2020-04-17 RX ORDER — FAMOTIDINE 10 MG/ML
20 INJECTION, SOLUTION INTRAVENOUS ONCE
Status: CANCELLED | OUTPATIENT
Start: 2020-04-17

## 2020-04-17 RX ORDER — SODIUM CHLORIDE 9 MG/ML
250 INJECTION, SOLUTION INTRAVENOUS ONCE
Status: CANCELLED | OUTPATIENT
Start: 2020-04-17

## 2020-04-17 RX ORDER — HEPARIN SODIUM (PORCINE) LOCK FLUSH IV SOLN 100 UNIT/ML 100 UNIT/ML
500 SOLUTION INTRAVENOUS AS NEEDED
Status: CANCELLED | OUTPATIENT
Start: 2020-04-17

## 2020-04-17 RX ORDER — SODIUM CHLORIDE 9 MG/ML
250 INJECTION, SOLUTION INTRAVENOUS ONCE
Status: COMPLETED | OUTPATIENT
Start: 2020-04-17 | End: 2020-04-17

## 2020-04-17 RX ORDER — FAMOTIDINE 10 MG/ML
20 INJECTION, SOLUTION INTRAVENOUS ONCE
Status: COMPLETED | OUTPATIENT
Start: 2020-04-17 | End: 2020-04-17

## 2020-04-17 RX ORDER — FAMOTIDINE 10 MG/ML
20 INJECTION, SOLUTION INTRAVENOUS AS NEEDED
Status: CANCELLED | OUTPATIENT
Start: 2020-04-17

## 2020-04-17 RX ORDER — HEPARIN SODIUM (PORCINE) LOCK FLUSH IV SOLN 100 UNIT/ML 100 UNIT/ML
500 SOLUTION INTRAVENOUS AS NEEDED
Status: DISCONTINUED | OUTPATIENT
Start: 2020-04-17 | End: 2020-04-17 | Stop reason: HOSPADM

## 2020-04-17 RX ADMIN — PACLITAXEL 140 MG: 6 INJECTION, SOLUTION INTRAVENOUS at 09:31

## 2020-04-17 RX ADMIN — FAMOTIDINE 20 MG: 10 INJECTION INTRAVENOUS at 08:38

## 2020-04-17 RX ADMIN — SODIUM CHLORIDE 250 ML: 9 INJECTION, SOLUTION INTRAVENOUS at 08:31

## 2020-04-17 RX ADMIN — DIPHENHYDRAMINE HYDROCHLORIDE 25 MG: 50 INJECTION, SOLUTION INTRAMUSCULAR; INTRAVENOUS at 08:38

## 2020-04-17 RX ADMIN — DEXAMETHASONE SODIUM PHOSPHATE 12 MG: 10 INJECTION, SOLUTION INTRAMUSCULAR; INTRAVENOUS at 09:01

## 2020-04-17 RX ADMIN — Medication 500 UNITS: at 11:34

## 2020-04-17 NOTE — PROGRESS NOTES
Subjective     REASON FOR FOLLOW UP:    1.  T2N1 invasive ductal carcinoma of the left breast.  Ultrasound-showed 3.8 x 3.1 x 2.5 cm mass at 4 o'clock position with 2 abnormal axillary lymph nodes, larger lymph node being 1.4 cm.  Left breast biopsy and axillary lymph node biopsy October 29, 2019, invasive ductal carcinoma, moderately differentiated, grade 2, ER 85%, IL 10%, HER-2/merna 2+, HER-2 FISH negative.  · 12/06/19: Cycle #1 of dose-dense Adriamycin/Cytoxan with Neulasta for marrow support    · 01/17/20: Last cycle of Adriamycin/Cytoxan given  2.  Severe neutropenia secondary to chemotherapy, with ANC 50 at 1-week after cycle #1 chemotherapy.  Also had moderate thrombocytopenia platelets 92,000.  Patient was given Levaquin for prophylaxis of neutropenia fever.  3. 01/31/20: Initiated cycle 1 Taxol  4.  2/7/2020 cycle 2 Taxol delayed due to neutropenia.  Plans to add 3 days of Neupogen injections after each Taxol treatment once we resume.               HISTORY OF PRESENT ILLNESS:  The patient is a 59 y.o. year old female with the above-mentioned 3, who returns the office today in anticipation of her 11th cycle of neoadjuvant chemotherapy with Taxol.  She has tolerated Taxol well.  She reports taste changes though is able to eat and drink adequately.  She denies nausea vomiting.  She denies changes in her bowel or bladder habits.  She does have some intermittent numbness/burning of her feet which she notices when she is laying down at night.  This does resolve spontaneously.  She has no numbness which is interfering with activities of daily living.  She has no neuropathy in her hands.    She has been receiving Zarxio, 3 days following each Taxol for previous neutropenia.  This does result in intermittent back pain.  She also reports high anxiety having to go to the hospital over the weekend to receive her injection given the current COVID-19 outbreak.  She denies fevers or chills, signs or symptoms of  infection.  She denies cough or shortness of breath.    Past Medical History:   Diagnosis Date   • Anxiety    • Cerebrovascular accident (CVA) (CMS/[facility]) 8/22/2017   • DDD (degenerative disc disease), cervical    • Depression    • GERD (gastroesophageal reflux disease)    • H/O ICH (intracerebral hemorrhage) (CMS/[facility])    • History of stroke    • History of thrombocytopenia    • HIV (human immunodeficiency virus infection) (CMS/[facility]) 1996   • Hyperlipidemia    • Insomnia    • Lupus anticoagulant positive    • Malignant neoplasm of overlapping sites of left breast in female, estrogen receptor positive (CMS/[facility]) 11/12/2019   • Meniscus tear 2017    RIGHT   • Migraine    • Neck pain     WITH SHOOTING PAIN LEFT RIGHT ARM   • Osteoarthritis    • Osteoporosis    • Seasonal allergies    • Spinal headache    • Tick bite 06/2014     PAST MEDICAL HISTORY:  She had a stroke in July 2017 with headache and dizziness.  She went to the ER and was placed on aspirin.  However, she stopped taking it two weeks ago.  She has been taking Aimovig (injection) monthly with Fariba López at [facility].    In 1994, patient was diagnosed with HIV with an unknown cause which is managed by Dr. Natali Subramanian.  As of now, her viral load is good.    Patient has arthritis.  She gets trigger-point injections in her back with her last one being 2 weeks ago.  She has had multiple ablations.  She also has pain in her SI joint and Dr. Bermudez performed a surgery on this.  She takes Narco for the pain.      She is osteoporotic.  She has had multiple hairline fractures in both her legs.  She had her knees cleaned out by Dr. Sinha.     Patient has had a hysterectomy and still has both of her ovaries.    Patient has vertigo and the muscles in her left ear are weak.  She just has an imbalance.    Past Surgical History:   Procedure Laterality Date   • ADENOIDECTOMY     • CHOLECYSTECTOMY     • HYSTERECTOMY     • KNEE ARTHROSCOPY Right 3/24/2017    Procedure:  RIGHT   KNEE ARTHROSCOPY WITH DEBRIDEMENT CHONDROPLASTY PARTIAL MENISCECTOMY;  Surgeon: Karl Galeas MD;  Location: [facility] OR [facility];  Service:    • KNEE CARTILAGE SURGERY Right 2007   • TONSILLECTOMY     • US GUIDED LYMPH NODE BIOPSY  10/29/2019   • VENOUS ACCESS DEVICE (PORT) INSERTION Right 12/2/2019    Procedure: INSERTION VENOUS ACCESS DEVICE RIGHT;  Surgeon: Landen Forman MD;  Location: [facility] OR;  Service: General       ONCOLOGIC HISTORY:  Patient is a 59-year-old female who has had a history of HIV since age 34 followed by Dr. Natali Ng at [facility] and was on multiple HIV drugs initially but more recently has been on a single medication TRIUMEQ, with well-controlled HIV.  She follows up with Dr. Dawn , infectious disease who saw her recently and he saw her on 4/8/2019 and has excellent control and suppression of her viral load.  She is very compliant with her medications.    She also has had history of stroke in 2017 for which she was placed on aspirin.  She presented with a headache.  She is very active and works at [employer] full-time.  She also has had history of vertigo in the past  Patient's PCP is Zach Lora.    Patient first noticed the mass in her left breast 5 months ago.  Her last mammogram was 5 years ago.  She had soreness in the left breast and she went to her primary care physician who then ordered a mammogram diagnostic and an ultrasound.  The diagnostic mammogram showed a 3.7 cm mass at 4 o'clock position in the lower outer quadrant of the left breast.  The ultrasound showed 3.8 cm x 3.1 x 2.5 cm mass at 4 o'clock position in the left breast with 2 abnormal appearing left axillary lymph nodes the largest being 1.4 cm.    She then underwent biopsy of both the breast mass as well as the left axillary lymph node and both of them are positive for invasive mammary carcinoma it is invasive ductal carcinoma with apocrine features, moderately  differentiated, grade 2 of 3 with a Jen score of 5.  The left axillary lymph node is also consistent with metastatic mammary carcinoma.  It is ER positive AK positive HER-2/merna negative.  Details as follows    10/21/19 - Bilateral Diagnostic Mammogram and US Breast Left  FINDINGS: Bilateral digital CC and MLO mammographic images were  obtained. No prior examination is available for comparison. Scattered  fibroglandular densities are seen throughout both breasts. A triangular  skin marker represents the area of palpable concern in the lower outer  quadrant of the left breast in the posterior 1/3. At this location there  is an irregular mass that measures on the order of 3.7 cm in greatest  dimension. Internal calcifications are noted. No suspicious findings in  the right breast are appreciated.     ULTRASOUND: Targeted sonographic evaluation of the left breast was  performed through the area of concern in the left axilla. At the 4  o'clock position on the order of 6 cm from the nipple there is an  irregular hypoechoic mass that measures 3.8 x 3.1 x 2.5 cm. Internal  vascularity is noted.     There are 2 abnormal-appearing left axillary lymph nodes, the largest  which measures on the order of 1.4 cm in greatest dimension.     IMPRESSION:  1. There is a 3.8 cm irregular mass in the left breast at the 4 o'clock  position. This is seen in conjunction with an irregular 1.4 cm lymph  node in the left axilla. This is suspicious for malignancy with left  axillary involvement. Correlation with ultrasound-guided biopsy of both  the left breast mass and the lymph node is recommended.  2. There are no findings suspicious for malignancy in the right breast.     BI-RADS CATEGORY 5:     Patient's labs from 11/12/19 are normal.    10/29/19 - Tissue Pathology  1. Left Breast, 4:00, 6 cm FN, U/S-Guided Core Needle Biopsy for a Mass:  A. INVASIVE DUCTAL CARCINOMA WITH APOCRINE FEATURES, Moderately differentiated;  [place]  Histologic Grade II/III (tubule score = 3, nuclear score = 2, mitoses score = 1), measuring at least  9 mm.  B. No ductal or lobular carcinoma in situ is identified in this sample.  C. Focus suspicious for lymphovascular space invasion.  D. See Biomarker Template for hormone receptor studies.  2. Lymph Node, Left Axilla, U/S-Guided Core Needle Biopsy:  A. METASTATIC MAMMARY CARCINOMA (see Comment).  B. Metastatic focus measures 5 mm in greatest extent.  ER+, 85%  DE+, 10%  HER2- Score 2+    11/13/19 - CT Neck Chest Abdomen Pelvis  IMPRESSION:  1. In addition to the irregular approximately 3.8 cm mass within the  lateral aspect of the left breast, there are a few subcentimeter  asymmetric nodules along the lateral margin of the glandular tissue. The  several asymmetric left subpectoral nodes and 1.2 x 1.0 cm left axillary  node are suspicious for tamar metastases. The asymmetric subcentimeter  left supraclavicular and left posterior cervical triangle nodes are  worrisome as well.  2. There is no convincing evidence for metastatic disease within the  abdomen or pelvis.    11/14/19 - Echocardiogram:  Normal.  Calculated EF = 67%.  There is trace mitral valve and tricuspid valve regurgitation.    11/15/19 - Bone Scan  Negative.    11/18/19 - MRI Breast Bilateral  IMPRESSION:  1. Biopsy-proven malignancy in the left breast centered at the 4 o'clock  position with associated surrounding satellite nodules as described. The  entire region of involvement including the satellite nodules and the  dominant mass measure up to 7.5 cm in greatest dimension. Also, left  axillary adenopathy with multiple irregular lymph nodes and loss of  differentiation of the borders of multiple lymph nodes is noted and this  is suspicious for extranodal involvement.  2. There are no findings suspicious for malignancy in the right breast.  BI-RADS CATEGORY 6      12/06/19: Cycle #1 of Adriamycin/Cytoxan    01/17/20: Last cycle of  Adriamycin/Cytoxan given without Neulasta.  We will switch from Neulasta to 7 days of Neupogen injections after cycle 4.    We reviewed with patient the US Breast from 20 which shows mild interval partial response to neoadjuvant chemotherapy.      20: Initiated cycle 1 Taxol     OB-GYN:  Menarche 15 years  Menopause-46  Pregnancies- 1 para 1 no miscarriages her first pregnancy was in  at 29 years of age.  She did not breastfeed.  Post menopausal HRT- None.  Hx of birth control pills- Yes.    Current Outpatient Medications on File Prior to Visit   Medication Sig Dispense Refill   • AIMOVIG 140 MG/ML solution auto-injector INJECT 140 MG INTO THE SKIN EVERY 30 (THIRTY) DAYS.  11   • atorvastatin (LIPITOR) 20 MG tablet TAKE 1 TABLET BY MOUTH EVERY DAY AT NIGHT 90 tablet 3   • baclofen (LIORESAL) 10 MG tablet Take 10 mg by mouth 2 (Two) Times a Day.  2   • CAMBIA 50 MG pack TAKE 1 PACKET AS NEEDED FOR HEADACHES  1   • clonazePAM (KlonoPIN) 0.5 MG tablet TAKE 1 TABLET BY MOUTH 2 (TWO) TIMES A DAY AS NEEDED FOR ANXIETY. 60 tablet 1   • COCONUT OIL PO Take  by mouth.     • colesevelam (WELCHOL) 625 MG tablet TAKE 2 TABLETS BY MOUTH EVERY  tablet 3   • dexamethasone (DECADRON) 4 MG tablet Take 2 tablets in the morning daily on Days 2, 3 & 4.  Take with food. 6 tablet 3   • escitalopram (LEXAPRO) 10 MG tablet TAKE 1 TABLET BY MOUTH EVERY DAY 90 tablet 3   • fluticasone (FLONASE) 50 MCG/ACT nasal spray 2 sprays into the nostril(s) as directed by provider Daily. 3 bottle 3   • ibandronate (BONIVA) 150 MG tablet TAKE 1 TABLET BY MOUTH EVERY 30 (THIRTY) DAYS. 3 tablet 3   • NON FORMULARY Collagen peptides powder     • ondansetron (ZOFRAN) 8 MG tablet Take 1 tablet by mouth 3 (Three) Times a Day As Needed for Nausea or Vomiting. 30 tablet 5   • oxyCODONE-acetaminophen (PERCOCET)  MG per tablet TK 1 T PO QID     • prochlorperazine (COMPAZINE) 10 MG tablet Take 1 tablet by mouth Every 6 (Six)  Hours As Needed for Nausea or Vomiting. 20 tablet 2   • vitamin B-6 (PYRIDOXINE) 50 MG tablet Take 1 tablet by mouth Daily. 90 tablet 1   • zolpidem (AMBIEN) 10 MG tablet Take 1 tablet by mouth At Night As Needed for Sleep. 90 tablet 0     No current facility-administered medications on file prior to visit.         ALLERGIES:    Allergies   Allergen Reactions   • Augmentin [Amoxicillin-Pot Clavulanate] Hives        Social History     Socioeconomic History   • Marital status:      Spouse name: Owen   • Number of children: 1   • Years of education: College   • Highest education level: Not on file   Occupational History   • Occupation:      Employer: [employer]   Tobacco Use   • Smoking status: Never Smoker   • Smokeless tobacco: Never Used   Substance and Sexual Activity   • Alcohol use: Yes     Comment: occasioonal   • Drug use: No   • Sexual activity: Defer     Birth control/protection: None     Patient does not smoke or do drugs.  She does drink occasionally.    Family History   Problem Relation Age of Onset   • Breast cancer Mother    • Leukemia Mother         CLL?   • Diabetes Mother    • Hyperthyroidism Mother    • Hearing loss Mother    • Dementia Father    • Heart disease Maternal Grandmother    • Diabetes Maternal Grandfather    • Heart disease Maternal Grandfather    • Stroke Maternal Grandfather    • Heart attack Maternal Grandfather    • Osteoporosis Paternal Grandmother    • Heart disease Paternal Grandfather    • Leukemia Maternal Aunt         CLL?   • Throat cancer Paternal Uncle       FAMILY HISTORY:  Her mother had breast cancer at age 60, still alive at 85 and in good health..  Her father had dementia.  Her paternal uncle had prostate cancer.  Her brother had esophageal cancer.    I have reviewed the patient's medical history in detail and updated the computerized patient record.     Review of Systems   Constitutional: Negative for activity change, appetite change,  "chills, diaphoresis, fatigue, fever and unexpected weight change.   HENT: Negative for hearing loss, mouth sores, nosebleeds, sore throat and trouble swallowing.    Respiratory: Negative for cough, chest tightness, shortness of breath and wheezing.    Cardiovascular: Negative for chest pain, palpitations and leg swelling.   Gastrointestinal: Negative for abdominal distention, abdominal pain, constipation, diarrhea, nausea and vomiting.   Genitourinary: Negative for difficulty urinating, dysuria, frequency, hematuria and urgency.   Musculoskeletal: Positive for back pain. Negative for joint swelling.   Skin: Negative for rash and wound.   Neurological: Positive for numbness. Negative for dizziness, seizures, syncope, speech difficulty, weakness and headaches.   Hematological: Negative for adenopathy. Does not bruise/bleed easily.   Psychiatric/Behavioral: Negative for behavioral problems, confusion, sleep disturbance and suicidal ideas.   Review of systems 04/17/2020  unchanged from previous office visit except as updated.      Objective    Vitals:    04/17/20 0805   BP: 129/90   Pulse: 82   Resp: 16   Temp: 98.2 °F (36.8 °C)   TempSrc: Oral   SpO2: 95%   Weight: 67.4 kg (148 lb 8 oz)   Height: 168.1 cm (66.18\")   PainSc:   4   PainLoc: Back       Physical Exam   Constitutional: She is oriented to person, place, and time. She appears well-developed and well-nourished. No distress.   HENT:   Head: Normocephalic and atraumatic.   Mouth/Throat: Mucous membranes are normal.   Eyes: Pupils are equal, round, and reactive to light.   Neck: Normal range of motion.   Pulmonary/Chest: Effort normal. No respiratory distress. She has no rhonchi.   Abdominal: Normal appearance. There is no hepatosplenomegaly. There is no CVA tenderness.   Musculoskeletal: Normal range of motion. She exhibits no edema.   Neurological: She is alert and oriented to person, place, and time.   Skin: Skin is warm and dry. No rash noted.   Psychiatric: " She has a normal mood and affect.   Vitals reviewed.  Physical exam 04/17/2020  unchanged from previous office visit except as updated.       Physical Exam   Pulmonary/Chest:               BREAST: 2/14/2020 :Left breast exam, without skin change, mass in the lower outer quadrant around 4-5 o'clock measures about 4-1/2 x 4 cm.  2/21/2020: Left breast: No evidence of any skin changes and no evidence of left nipple discharge as well as no left axillary adenopathy or left supraclavicular adenopathy.  The mass measures 4.5 x 3.5 cm.  March 20, 2020: The mass measures 3 x 2.5 cm  4/3/2020: left breast mass measures about 2.5x 2.5 cm. Unable to palpate the left axillary mass.   4/17/2020: Able to appreciate a left axillary mass.  Left breast mass measures 2.0 x 2.5 cm approximately.    RECENT LABS:   Results from last 7 days   Lab Units 04/17/20  0756   WBC 10*3/mm3 8.32   NEUTROS ABS 10*3/mm3 6.24   HEMOGLOBIN g/dL 11.1*   HEMATOCRIT % 34.3   PLATELETS 10*3/mm3 274     Results from last 7 days   Lab Units 04/17/20  0756   SODIUM mmol/L 141   POTASSIUM mmol/L 4.2   CHLORIDE mmol/L 102   CO2 mmol/L 26.7   BUN mg/dL 11   CREATININE mg/dL 0.64   CALCIUM mg/dL 9.2   ALBUMIN g/dL 4.20   BILIRUBIN mg/dL 0.2   ALK PHOS U/L 142*   ALT (SGPT) U/L 20   AST (SGOT) U/L 22   GLUCOSE mg/dL 87         01/29/20 - US Breast  IMPRESSION:  There are findings suggestive of mild interval partial  response to neoadjuvant chemotherapy.    Assessment/Plan    1. T2N1 invasive ductal carcinoma of the left breast, recently diagnosed.    -Noticed mass in the left breast x5 months  -Diagnostic mammogram showed 3.7 mm mass in the left breast at 4 o'clock position  -Ultrasound-showed 3.8 x 3.1 x 2.5 cm mass at 4 o'clock position with 2 abnormal axillary lymph nodes, larger lymph node being 1.4 cm  -Left breast biopsy and axillary lymph node biopsy October 29, 2019, invasive ductal carcinoma, moderately differentiated, grade 2, ER 85%, AL 10%, HER-2/merna  2+, HER-2 FISH negative  · CT scans from 11/13/19 show some asymmetric nodules along the lateral margin of the glandular tissue.  The 1.2x1.0 cm left axillary nodes, left supraclavicular, and left posterior cervical triangle nodes are suspicious for tamar metastases.  ·   bone scan from 11/15/19 which was negative.   ·  MRI breast from 11/18/19 which shows the biopsy-proven malignancy in the left breast centered at the 4:00 position.  The region of involvement measures up to 7.5 cm.  There is left axillary adenopathy with multiple irregular lymph nodes and loss of differentiation of the border of multiple lymph nodes which are suspicious for extranodal involvement.  ·    echocardiogram from 11/14/19 which was normal with an ejection fraction of 67%.    · INVITAE from 11/14/19 which was negative.  · Given the size of her tumor and her medical history, patient will be given 4 cycles of Adriamycin/Cytoxan with Neulasta.  After completion of this treatment, patient will be started on 12 cycles of Taxol.  After the tumor shrinks in size, Dr. Forman will perform a lumpectomy.    · Following lumpectomy, patient will begin endocrine therapy.  ·  Dr. Dawn agrees chemotherapy will not aggravate her HIV condition.  Dr. Moreno spoke with Dr. Mohan at [facility] who also agrees chemotherapy is the first step.   · 12/06/19: Cycle #1 of Adriamycin/Cytoxan  · US Breast from 01/29/20 after 4 cycles of Adriamycin Cytoxan chemotherapy which shows mild interval partial response to neoadjuvant chemotherapy.  The mass has decreased from 3.8 x 2.5 to 3.1 cm to 3.5 x 2.3 x 3.2 cm previously the left axillary lymph node has decreased from 1.4 x 0.7 x 1 cm to 1.1 x 0.6 x 0.9 cm.  · 01/31/20: Initiated cycle 1 Taxol  · 2/7/2020 due for cycle 2 Taxol, white blood cell count 1.77, ANC 0.87.  Treatment held, patient received 3 days of Neupogen injections.  · Proceed with cycle 11 today, 4/17/2020.  Discontinue further Neupogen with  improved white blood cells and nearing the end of neoadjuvant therapy.  Continued response on physical exam.    2.  Profound neutropenia due to chemotherapy despite Neulasta:   · Following Adriamycin Cytoxan, the patient experienced neutropenia despite Neulasta.  She did receive Zarxio with cycle 4 Adriamycin Cytoxan  · Cycle 2 Taxol delayed 1 week due to neutropenia  · Zarxio x3 days given following Taxol  · White blood cell 8.32, ANC 6.24 today.  The patient is highly anxious going to the hospital for Zarxio injections in light of COVID-19 outbreak.  Discontinue further Zarxio for remaining 2 weeks of Taxol    3.  Moderate thrombocytopenia secondary to chemotherapy.    · Platelet count has now normalized following Taxol 274,000    4.  Chemotherapy induced anemia.   · Anemia work-up previously negative  · Hemoglobin is improved today at 11.1      5.  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  · On 12/13/2019 patient struggled with significant insomnia from steroids following her first cycle AC.  We discussed taking the dexamethasone, only one 4 mg tablet daily for 3 days with her next cycle of chemotherapy to see if this improves her symptoms.    · Insomnia much improved with decreased dose of dexamethasone  · Insomnia is now resolved with Taxol    6. HIV, followed by Dr. Dawn in infectious disease.  Well-controlled and is on a single medication with very low viral load. Has HIV since age 34.     7.  Family history of breast cancer with mother having had breast cancer at age 60.  There is family history of uncle with prostate cancer.  Patient will require genetic referral    8.  Arthritis with severe back pain followed by Dr. Bam Crandall  · Percocet    9. Episodes of nosebleeds.  She has had these once a week for 3-4 years.  She did not report this today.    10.  Otalgia: Of the right ear, present for a while.  Dr. Moreno would like the patient to follow-up with ENT, Dr. Stapleton, as she has previously seen him.  Not a  complaint today.    12.  Arthralgias for a few days after each treatment, which resolve on their own.    13.  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  Currently on B6 50 mg daily.  We will have to continue to closely monitor.  This is stable.  No dose adjustments Taxol presently    PLAN:  1. Proceed with cycle 11 weekly Taxol today  2. Discontinue Zarxio injections following Taxol  3. Begin B complex daily.  Currently taking B6, the patient will switch to a B complex  4. Return in 1 week for CBC, CMP, MD follow-up in week 12 neoadjuvant therapy with Taxol.  5. Following the completion of neoadjuvant therapy, patient will be referred back to Dr. Forman for MRI of the breast followed by surgery    The patient is on high risk medication requiring close monitoring.  Along with discussing side effects of chemotherapy, evaluated the patient's malignancy with her left breast mass and resolved left axillary adenopathy.  Today's plan to discontinue Zarxio injections was discussed and reviewed with Dr. Moreno who is in agreement.    ОЛЬГА Olguin

## 2020-04-18 ENCOUNTER — APPOINTMENT (OUTPATIENT)
Dept: ONCOLOGY | Facility: HOSPITAL | Age: 60
End: 2020-04-18

## 2020-04-19 ENCOUNTER — APPOINTMENT (OUTPATIENT)
Dept: ONCOLOGY | Facility: HOSPITAL | Age: 60
End: 2020-04-19

## 2020-04-20 ENCOUNTER — APPOINTMENT (OUTPATIENT)
Dept: ONCOLOGY | Facility: HOSPITAL | Age: 60
End: 2020-04-20

## 2020-04-24 ENCOUNTER — APPOINTMENT (OUTPATIENT)
Dept: ONCOLOGY | Facility: HOSPITAL | Age: 60
End: 2020-04-24

## 2020-04-24 ENCOUNTER — OFFICE VISIT (OUTPATIENT)
Dept: ONCOLOGY | Facility: CLINIC | Age: 60
End: 2020-04-24

## 2020-04-24 ENCOUNTER — INFUSION (OUTPATIENT)
Dept: ONCOLOGY | Facility: HOSPITAL | Age: 60
End: 2020-04-24

## 2020-04-24 VITALS
SYSTOLIC BLOOD PRESSURE: 124 MMHG | TEMPERATURE: 98 F | DIASTOLIC BLOOD PRESSURE: 74 MMHG | OXYGEN SATURATION: 97 % | RESPIRATION RATE: 16 BRPM | WEIGHT: 145.2 LBS | BODY MASS INDEX: 23.33 KG/M2 | HEART RATE: 82 BPM | HEIGHT: 66 IN

## 2020-04-24 VITALS — SYSTOLIC BLOOD PRESSURE: 123 MMHG | HEART RATE: 73 BPM | DIASTOLIC BLOOD PRESSURE: 73 MMHG

## 2020-04-24 DIAGNOSIS — Z17.0 MALIGNANT NEOPLASM OF OVERLAPPING SITES OF LEFT BREAST IN FEMALE, ESTROGEN RECEPTOR POSITIVE (HCC): ICD-10-CM

## 2020-04-24 DIAGNOSIS — Z17.0 MALIGNANT NEOPLASM OF OVERLAPPING SITES OF LEFT BREAST IN FEMALE, ESTROGEN RECEPTOR POSITIVE (HCC): Primary | ICD-10-CM

## 2020-04-24 DIAGNOSIS — Z45.2 ENCOUNTER FOR FITTING AND ADJUSTMENT OF VASCULAR CATHETER: ICD-10-CM

## 2020-04-24 DIAGNOSIS — R53.83 OTHER FATIGUE: ICD-10-CM

## 2020-04-24 DIAGNOSIS — C50.812 MALIGNANT NEOPLASM OF OVERLAPPING SITES OF LEFT BREAST IN FEMALE, ESTROGEN RECEPTOR POSITIVE (HCC): ICD-10-CM

## 2020-04-24 DIAGNOSIS — C50.812 MALIGNANT NEOPLASM OF OVERLAPPING SITES OF LEFT BREAST IN FEMALE, ESTROGEN RECEPTOR POSITIVE (HCC): Primary | ICD-10-CM

## 2020-04-24 DIAGNOSIS — D69.6 THROMBOCYTOPENIA (HCC): ICD-10-CM

## 2020-04-24 DIAGNOSIS — D72.819 LEUKOPENIA, UNSPECIFIED TYPE: ICD-10-CM

## 2020-04-24 LAB
ALBUMIN SERPL-MCNC: 4.3 G/DL (ref 3.5–5.2)
ALBUMIN/GLOB SERPL: 1.8 G/DL (ref 1.1–2.4)
ALP SERPL-CCNC: 102 U/L (ref 38–116)
ALT SERPL W P-5'-P-CCNC: 16 U/L (ref 0–33)
ANION GAP SERPL CALCULATED.3IONS-SCNC: 11.8 MMOL/L (ref 5–15)
AST SERPL-CCNC: 21 U/L (ref 0–32)
BASOPHILS # BLD AUTO: 0.04 10*3/MM3 (ref 0–0.2)
BASOPHILS NFR BLD AUTO: 0.6 % (ref 0–1.5)
BILIRUB SERPL-MCNC: 0.2 MG/DL (ref 0.2–1.2)
BUN BLD-MCNC: 11 MG/DL (ref 6–20)
BUN/CREAT SERPL: 19.3 (ref 7.3–30)
CALCIUM SPEC-SCNC: 9.2 MG/DL (ref 8.5–10.2)
CHLORIDE SERPL-SCNC: 103 MMOL/L (ref 98–107)
CO2 SERPL-SCNC: 26.2 MMOL/L (ref 22–29)
CREAT BLD-MCNC: 0.57 MG/DL (ref 0.6–1.1)
DEPRECATED RDW RBC AUTO: 56 FL (ref 37–54)
EOSINOPHIL # BLD AUTO: 0.05 10*3/MM3 (ref 0–0.4)
EOSINOPHIL NFR BLD AUTO: 0.7 % (ref 0.3–6.2)
ERYTHROCYTE [DISTWIDTH] IN BLOOD BY AUTOMATED COUNT: 14.6 % (ref 12.3–15.4)
GFR SERPL CREATININE-BSD FRML MDRD: 109 ML/MIN/1.73
GLOBULIN UR ELPH-MCNC: 2.4 GM/DL (ref 1.8–3.5)
GLUCOSE BLD-MCNC: 95 MG/DL (ref 74–124)
HCT VFR BLD AUTO: 33.2 % (ref 34–46.6)
HGB BLD-MCNC: 10.8 G/DL (ref 12–15.9)
IMM GRANULOCYTES # BLD AUTO: 0.04 10*3/MM3 (ref 0–0.05)
IMM GRANULOCYTES NFR BLD AUTO: 0.6 % (ref 0–0.5)
LYMPHOCYTES # BLD AUTO: 0.85 10*3/MM3 (ref 0.7–3.1)
LYMPHOCYTES NFR BLD AUTO: 11.9 % (ref 19.6–45.3)
MCH RBC QN AUTO: 34.1 PG (ref 26.6–33)
MCHC RBC AUTO-ENTMCNC: 32.5 G/DL (ref 31.5–35.7)
MCV RBC AUTO: 104.7 FL (ref 79–97)
MONOCYTES # BLD AUTO: 0.62 10*3/MM3 (ref 0.1–0.9)
MONOCYTES NFR BLD AUTO: 8.7 % (ref 5–12)
NEUTROPHILS # BLD AUTO: 5.55 10*3/MM3 (ref 1.7–7)
NEUTROPHILS NFR BLD AUTO: 77.5 % (ref 42.7–76)
NRBC BLD AUTO-RTO: 0 /100 WBC (ref 0–0.2)
PLATELET # BLD AUTO: 306 10*3/MM3 (ref 140–450)
PMV BLD AUTO: 9.9 FL (ref 6–12)
POTASSIUM BLD-SCNC: 4.2 MMOL/L (ref 3.5–4.7)
PROT SERPL-MCNC: 6.7 G/DL (ref 6.3–8)
RBC # BLD AUTO: 3.17 10*6/MM3 (ref 3.77–5.28)
SODIUM BLD-SCNC: 141 MMOL/L (ref 134–145)
WBC NRBC COR # BLD: 7.15 10*3/MM3 (ref 3.4–10.8)

## 2020-04-24 PROCEDURE — 25010000002 DIPHENHYDRAMINE PER 50 MG: Performed by: INTERNAL MEDICINE

## 2020-04-24 PROCEDURE — 96413 CHEMO IV INFUSION 1 HR: CPT

## 2020-04-24 PROCEDURE — 25010000002 PACLITAXEL PER 30 MG: Performed by: INTERNAL MEDICINE

## 2020-04-24 PROCEDURE — 96375 TX/PRO/DX INJ NEW DRUG ADDON: CPT

## 2020-04-24 PROCEDURE — 25010000003 HEPARIN LOCK FLUCH PER 10 UNITS: Performed by: INTERNAL MEDICINE

## 2020-04-24 PROCEDURE — 25010000002 DEXAMETHASONE SODIUM PHOSPHATE 100 MG/10ML SOLUTION: Performed by: INTERNAL MEDICINE

## 2020-04-24 PROCEDURE — 96367 TX/PROPH/DG ADDL SEQ IV INF: CPT

## 2020-04-24 PROCEDURE — 80053 COMPREHEN METABOLIC PANEL: CPT

## 2020-04-24 PROCEDURE — 99215 OFFICE O/P EST HI 40 MIN: CPT | Performed by: INTERNAL MEDICINE

## 2020-04-24 PROCEDURE — 85025 COMPLETE CBC W/AUTO DIFF WBC: CPT

## 2020-04-24 RX ORDER — FAMOTIDINE 10 MG/ML
20 INJECTION, SOLUTION INTRAVENOUS AS NEEDED
Status: CANCELLED | OUTPATIENT
Start: 2020-04-24

## 2020-04-24 RX ORDER — SODIUM CHLORIDE 9 MG/ML
250 INJECTION, SOLUTION INTRAVENOUS ONCE
Status: COMPLETED | OUTPATIENT
Start: 2020-04-24 | End: 2020-04-24

## 2020-04-24 RX ORDER — HEPARIN SODIUM (PORCINE) LOCK FLUSH IV SOLN 100 UNIT/ML 100 UNIT/ML
500 SOLUTION INTRAVENOUS AS NEEDED
Status: CANCELLED | OUTPATIENT
Start: 2020-04-24

## 2020-04-24 RX ORDER — SODIUM CHLORIDE 0.9 % (FLUSH) 0.9 %
10 SYRINGE (ML) INJECTION AS NEEDED
Status: DISCONTINUED | OUTPATIENT
Start: 2020-04-24 | End: 2020-04-24 | Stop reason: HOSPADM

## 2020-04-24 RX ORDER — FAMOTIDINE 10 MG/ML
20 INJECTION, SOLUTION INTRAVENOUS ONCE
Status: CANCELLED | OUTPATIENT
Start: 2020-04-24

## 2020-04-24 RX ORDER — SODIUM CHLORIDE 0.9 % (FLUSH) 0.9 %
10 SYRINGE (ML) INJECTION AS NEEDED
Status: CANCELLED | OUTPATIENT
Start: 2020-04-24

## 2020-04-24 RX ORDER — SODIUM CHLORIDE 9 MG/ML
250 INJECTION, SOLUTION INTRAVENOUS ONCE
Status: CANCELLED | OUTPATIENT
Start: 2020-04-24

## 2020-04-24 RX ORDER — HEPARIN SODIUM (PORCINE) LOCK FLUSH IV SOLN 100 UNIT/ML 100 UNIT/ML
500 SOLUTION INTRAVENOUS AS NEEDED
Status: DISCONTINUED | OUTPATIENT
Start: 2020-04-24 | End: 2020-04-24 | Stop reason: HOSPADM

## 2020-04-24 RX ORDER — DIPHENHYDRAMINE HYDROCHLORIDE 50 MG/ML
50 INJECTION INTRAMUSCULAR; INTRAVENOUS AS NEEDED
Status: CANCELLED | OUTPATIENT
Start: 2020-04-24

## 2020-04-24 RX ORDER — FAMOTIDINE 10 MG/ML
20 INJECTION, SOLUTION INTRAVENOUS ONCE
Status: COMPLETED | OUTPATIENT
Start: 2020-04-24 | End: 2020-04-24

## 2020-04-24 RX ADMIN — DIPHENHYDRAMINE HYDROCHLORIDE 25 MG: 50 INJECTION INTRAMUSCULAR; INTRAVENOUS at 10:28

## 2020-04-24 RX ADMIN — FAMOTIDINE 20 MG: 10 INJECTION INTRAVENOUS at 10:12

## 2020-04-24 RX ADMIN — SODIUM CHLORIDE, PRESERVATIVE FREE 10 ML: 5 INJECTION INTRAVENOUS at 12:11

## 2020-04-24 RX ADMIN — SODIUM CHLORIDE 100 ML: 9 INJECTION, SOLUTION INTRAVENOUS at 10:12

## 2020-04-24 RX ADMIN — PACLITAXEL 140 MG: 6 INJECTION, SOLUTION INTRAVENOUS at 11:12

## 2020-04-24 RX ADMIN — DEXAMETHASONE SODIUM PHOSPHATE 12 MG: 10 INJECTION, SOLUTION INTRAMUSCULAR; INTRAVENOUS at 10:12

## 2020-04-24 RX ADMIN — Medication 500 UNITS: at 12:13

## 2020-04-24 NOTE — PROGRESS NOTES
Subjective     REASON FOR FOLLOW UP:    1.  T2N1 invasive ductal carcinoma of the left breast.  Ultrasound-showed 3.8 x 3.1 x 2.5 cm mass at 4 o'clock position with 2 abnormal axillary lymph nodes, larger lymph node being 1.4 cm.  Left breast biopsy and axillary lymph node biopsy October 29, 2019, invasive ductal carcinoma, moderately differentiated, grade 2, ER 85%, TX 10%, HER-2/merna 2+, HER-2 FISH negative.  · 12/06/19: Neoadjuvant chemo therapy, cycle #1 of dose-dense Adriamycin/Cytoxan with Neulasta for marrow support    · 01/17/20: Last cycle of Adriamycin/Cytoxan given  · January 31, 2020: Cycle 1 Taxol  · April 24, 2020: Last cycle, cycle 12 of Taxol        2.  Severe neutropenia secondary to chemotherapy, with ANC 50 at 1-week after cycle #1 chemotherapy.  Also had moderate thrombocytopenia platelets 92,000.  Patient was given Levaquin for prophylaxis of neutropenia fever.    3.  HIV, followed by Dr. Franco from infectious disease.  Well controlled on meds  .               HISTORY OF PRESENT ILLNESS:  The patient is a 59 y.o. year old female here for 12th cycle of neoadjuvant Taxol.  She has tolerated it very well so far.  She has been continuing her treatment for her HIV and doing well.  She has mild neuropathy for which she is taking B6 and B12.    She is to receive an MRI of the breast following this and see Dr. Forman for consideration of surgery.  She has had some response to treatment.  There has been no progression of the breast mass but has decreased.    Past Medical History:   Diagnosis Date   • Anxiety    • Cerebrovascular accident (CVA) (CMS/HCC) 8/22/2017   • DDD (degenerative disc disease), cervical    • Depression    • GERD (gastroesophageal reflux disease)    • H/O ICH (intracerebral hemorrhage) (CMS/HCC)    • History of stroke    • History of thrombocytopenia    • HIV (human immunodeficiency virus infection) (CMS/HCC) 1996   • Hyperlipidemia    • Insomnia    • Lupus anticoagulant  positive    • Malignant neoplasm of overlapping sites of left breast in female, estrogen receptor positive (CMS/HCC) 11/12/2019   • Meniscus tear 2017    RIGHT   • Migraine    • Neck pain     WITH SHOOTING PAIN LEFT RIGHT ARM   • Osteoarthritis    • Osteoporosis    • Seasonal allergies    • Spinal headache    • Tick bite 06/2014     PAST MEDICAL HISTORY:  She had a stroke in July 2017 with headache and dizziness.  She went to the ER and was placed on aspirin.  However, she stopped taking it two weeks ago.  She has been taking Aimovig (injection) monthly with Fariba López at [place].    In 1994, patient was diagnosed with HIV with an unknown cause which is managed by Dr. Natali Subramanian.  As of now, her viral load is good.    Patient has arthritis.  She gets trigger-point injections in her back with her last one being 2 weeks ago.  She has had multiple ablations.  She also has pain in her SI joint and Dr. Bermudez performed a surgery on this.  She takes Narco for the pain.      She is osteoporotic.  She has had multiple hairline fractures in both her legs.  She had her knees cleaned out by Dr. Sinha.     Patient has had a hysterectomy and still has both of her ovaries.    Patient has vertigo and the muscles in her left ear are weak.  She just has an imbalance.    Past Surgical History:   Procedure Laterality Date   • ADENOIDECTOMY     • CHOLECYSTECTOMY     • HYSTERECTOMY     • KNEE ARTHROSCOPY Right 3/24/2017    Procedure:  RIGHT  KNEE ARTHROSCOPY WITH DEBRIDEMENT CHONDROPLASTY PARTIAL MENISCECTOMY;  Surgeon: Karl Galeas MD;  Location: [facility];  Service:    • KNEE CARTILAGE SURGERY Right 2007   • TONSILLECTOMY     • US GUIDED LYMPH NODE BIOPSY  10/29/2019   • VENOUS ACCESS DEVICE (PORT) INSERTION Right 12/2/2019    Procedure: INSERTION VENOUS ACCESS DEVICE RIGHT;  Surgeon: Landen Forman MD;  Location: [facility];  Service: General       ONCOLOGIC HISTORY:  Patient is a 59-year-old female who  has had a history of HIV since age 34 followed by Dr. Natali Ng at [facility] and was on multiple HIV drugs initially but more recently has been on a single medication TRIUMEQ, with well-controlled HIV.  She follows up with Dr. Dawn , infectious disease who saw her recently and he saw her on 4/8/2019 and has excellent control and suppression of her viral load.  She is very compliant with her medications.    She also has had history of stroke in 2017 for which she was placed on aspirin.  She presented with a headache.  She is very active and works at [employer] full-time.  She also has had history of vertigo in the past  Patient's PCP is Zach Lora.    Patient first noticed the mass in her left breast 5 months ago.  Her last mammogram was 5 years ago.  She had soreness in the left breast and she went to her primary care physician who then ordered a mammogram diagnostic and an ultrasound.  The diagnostic mammogram showed a 3.7 cm mass at 4 o'clock position in the lower outer quadrant of the left breast.  The ultrasound showed 3.8 cm x 3.1 x 2.5 cm mass at 4 o'clock position in the left breast with 2 abnormal appearing left axillary lymph nodes the largest being 1.4 cm.    She then underwent biopsy of both the breast mass as well as the left axillary lymph node and both of them are positive for invasive mammary carcinoma it is invasive ductal carcinoma with apocrine features, moderately differentiated, grade 2 of 3 with a [place] score of 5.  The left axillary lymph node is also consistent with metastatic mammary carcinoma.  It is ER positive WI positive HER-2/merna negative.  Details as follows    10/21/19 - Bilateral Diagnostic Mammogram and US Breast Left  FINDINGS: Bilateral digital CC and MLO mammographic images were  obtained. No prior examination is available for comparison. Scattered  fibroglandular densities are seen throughout both breasts. A triangular  skin marker  represents the area of palpable concern in the lower outer  quadrant of the left breast in the posterior 1/3. At this location there  is an irregular mass that measures on the order of 3.7 cm in greatest  dimension. Internal calcifications are noted. No suspicious findings in  the right breast are appreciated.     ULTRASOUND: Targeted sonographic evaluation of the left breast was  performed through the area of concern in the left axilla. At the 4  o'clock position on the order of 6 cm from the nipple there is an  irregular hypoechoic mass that measures 3.8 x 3.1 x 2.5 cm. Internal  vascularity is noted.     There are 2 abnormal-appearing left axillary lymph nodes, the largest  which measures on the order of 1.4 cm in greatest dimension.     IMPRESSION:  1. There is a 3.8 cm irregular mass in the left breast at the 4 o'clock  position. This is seen in conjunction with an irregular 1.4 cm lymph  node in the left axilla. This is suspicious for malignancy with left  axillary involvement. Correlation with ultrasound-guided biopsy of both  the left breast mass and the lymph node is recommended.  2. There are no findings suspicious for malignancy in the right breast.     BI-RADS CATEGORY 5:     Patient's labs from 11/12/19 are normal.    10/29/19 - Tissue Pathology  1. Left Breast, 4:00, 6 cm FN, U/S-Guided Core Needle Biopsy for a Mass:  A. INVASIVE DUCTAL CARCINOMA WITH APOCRINE FEATURES, Moderately differentiated;  Jen Histologic Grade II/III (tubule score = 3, nuclear score = 2, mitoses score = 1), measuring at least  9 mm.  B. No ductal or lobular carcinoma in situ is identified in this sample.  C. Focus suspicious for lymphovascular space invasion.  D. See Biomarker Template for hormone receptor studies.  2. Lymph Node, Left Axilla, U/S-Guided Core Needle Biopsy:  A. METASTATIC MAMMARY CARCINOMA (see Comment).  B. Metastatic focus measures 5 mm in greatest extent.  ER+, 85%  AR+, 10%  HER2- Score  2+    19 - CT Neck Chest Abdomen Pelvis  IMPRESSION:  1. In addition to the irregular approximately 3.8 cm mass within the  lateral aspect of the left breast, there are a few subcentimeter  asymmetric nodules along the lateral margin of the glandular tissue. The  several asymmetric left subpectoral nodes and 1.2 x 1.0 cm left axillary  node are suspicious for tamar metastases. The asymmetric subcentimeter  left supraclavicular and left posterior cervical triangle nodes are  worrisome as well.  2. There is no convincing evidence for metastatic disease within the  abdomen or pelvis.    19 - Echocardiogram:  Normal.  Calculated EF = 67%.  There is trace mitral valve and tricuspid valve regurgitation.    11/15/19 - Bone Scan  Negative.    19 - MRI Breast Bilateral  IMPRESSION:  1. Biopsy-proven malignancy in the left breast centered at the 4 o'clock  position with associated surrounding satellite nodules as described. The  entire region of involvement including the satellite nodules and the  dominant mass measure up to 7.5 cm in greatest dimension. Also, left  axillary adenopathy with multiple irregular lymph nodes and loss of  differentiation of the borders of multiple lymph nodes is noted and this  is suspicious for extranodal involvement.  2. There are no findings suspicious for malignancy in the right breast.  BI-RADS CATEGORY 6      19: Cycle #1 of Adriamycin/Cytoxan    20: Last cycle of Adriamycin/Cytoxan given without Neulasta.  We will switch from Neulasta to 7 days of Neupogen injections after cycle 4.    We reviewed with patient the US Breast from 20 which shows mild interval partial response to neoadjuvant chemotherapy.      20: Initiated cycle 1 Taxol  2020: Last cycle of Taxol, cycle 12    OB-GYN:  Menarche 15 years  Menopause-46  Pregnancies- 1 para 1 no miscarriages her first pregnancy was in  at 29 years of age.  She did not breastfeed.  Post  menopausal HRT- None.  Hx of birth control pills- Yes.    Current Outpatient Medications on File Prior to Visit   Medication Sig Dispense Refill   • AIMOVIG 140 MG/ML solution auto-injector INJECT 140 MG INTO THE SKIN EVERY 30 (THIRTY) DAYS.  11   • atorvastatin (LIPITOR) 20 MG tablet TAKE 1 TABLET BY MOUTH EVERY DAY AT NIGHT 90 tablet 3   • baclofen (LIORESAL) 10 MG tablet Take 10 mg by mouth 2 (Two) Times a Day.  2   • CAMBIA 50 MG pack TAKE 1 PACKET AS NEEDED FOR HEADACHES  1   • clonazePAM (KlonoPIN) 0.5 MG tablet TAKE 1 TABLET BY MOUTH 2 (TWO) TIMES A DAY AS NEEDED FOR ANXIETY. 60 tablet 1   • COCONUT OIL PO Take  by mouth.     • colesevelam (WELCHOL) 625 MG tablet TAKE 2 TABLETS BY MOUTH EVERY  tablet 3   • dexamethasone (DECADRON) 4 MG tablet Take 2 tablets in the morning daily on Days 2, 3 & 4.  Take with food. 6 tablet 3   • escitalopram (LEXAPRO) 10 MG tablet TAKE 1 TABLET BY MOUTH EVERY DAY 90 tablet 3   • fluticasone (FLONASE) 50 MCG/ACT nasal spray 2 sprays into the nostril(s) as directed by provider Daily. 3 bottle 3   • ibandronate (BONIVA) 150 MG tablet TAKE 1 TABLET BY MOUTH EVERY 30 (THIRTY) DAYS. 3 tablet 3   • NON FORMULARY Collagen peptides powder     • ondansetron (ZOFRAN) 8 MG tablet Take 1 tablet by mouth 3 (Three) Times a Day As Needed for Nausea or Vomiting. 30 tablet 5   • oxyCODONE-acetaminophen (PERCOCET)  MG per tablet TK 1 T PO QID     • prochlorperazine (COMPAZINE) 10 MG tablet Take 1 tablet by mouth Every 6 (Six) Hours As Needed for Nausea or Vomiting. 20 tablet 2   • vitamin B-6 (PYRIDOXINE) 50 MG tablet Take 1 tablet by mouth Daily. 90 tablet 1   • zolpidem (AMBIEN) 10 MG tablet Take 1 tablet by mouth At Night As Needed for Sleep. 90 tablet 0     No current facility-administered medications on file prior to visit.         ALLERGIES:    Allergies   Allergen Reactions   • Augmentin [Amoxicillin-Pot Clavulanate] Hives        Social History     Socioeconomic History   •  Marital status:      Spouse name: Owen   • Number of children: 1   • Years of education: College   • Highest education level: Not on file   Occupational History   • Occupation:      Employer:  [employer]   Tobacco Use   • Smoking status: Never Smoker   • Smokeless tobacco: Never Used   Substance and Sexual Activity   • Alcohol use: Yes     Comment: occasioonal   • Drug use: No   • Sexual activity: Defer     Birth control/protection: None     Patient does not smoke or do drugs.  She does drink occasionally.    Family History   Problem Relation Age of Onset   • Breast cancer Mother    • Leukemia Mother         CLL?   • Diabetes Mother    • Hyperthyroidism Mother    • Hearing loss Mother    • Dementia Father    • Heart disease Maternal Grandmother    • Diabetes Maternal Grandfather    • Heart disease Maternal Grandfather    • Stroke Maternal Grandfather    • Heart attack Maternal Grandfather    • Osteoporosis Paternal Grandmother    • Heart disease Paternal Grandfather    • Leukemia Maternal Aunt         CLL?   • Throat cancer Paternal Uncle       FAMILY HISTORY:  Her mother had breast cancer at age 60, still alive at 85 and in good health..  Her father had dementia.  Her paternal uncle had prostate cancer.  Her brother had esophageal cancer.    I have reviewed the patient's medical history in detail and updated the computerized patient record.     Review of Systems   Constitutional: Negative for activity change, appetite change, chills, diaphoresis, fatigue, fever and unexpected weight change.   HENT: Negative for hearing loss, mouth sores, nosebleeds, sore throat and trouble swallowing.    Respiratory: Negative for cough, chest tightness, shortness of breath and wheezing.    Cardiovascular: Negative for chest pain, palpitations and leg swelling.   Gastrointestinal: Negative for abdominal distention, abdominal pain, constipation, diarrhea, nausea and vomiting.   Genitourinary: Negative for  "difficulty urinating, dysuria, frequency, hematuria and urgency.   Musculoskeletal: Positive for back pain (04/24/20-Unchanged). Negative for joint swelling.        No muscle weakness.   Skin: Negative for rash and wound.   Neurological: Positive for numbness (Bilat foot-04/24/20-Improved). Negative for dizziness, seizures, syncope, speech difficulty, weakness and headaches.   Hematological: Negative for adenopathy. Does not bruise/bleed easily.   Psychiatric/Behavioral: Negative for behavioral problems, confusion, sleep disturbance and suicidal ideas.     Patient's review of system is unchanged as of April 24, 2020    Objective    Vitals:    04/24/20 0901   BP: 124/74   Pulse: 82   Resp: 16   Temp: 98 °F (36.7 °C)   TempSrc: Oral   SpO2: 97%   Weight: 65.9 kg (145 lb 3.2 oz)   Height: 168.1 cm (66.18\")   PainSc: 0-No pain       Physical exam 04/17/2020  unchanged from previous office visit except as updated.       Physical Exam   Pulmonary/Chest:               BREAST: 2/14/2020 :Left breast exam, without skin change, mass in the lower outer quadrant around 4-5 o'clock measures about 4-1/2 x 4 cm.  2/21/2020: Left breast: No evidence of any skin changes and no evidence of left nipple discharge as well as no left axillary adenopathy or left supraclavicular adenopathy.  The mass measures 4.5 x 3.5 cm.  March 20, 2020: The mass measures 3 x 2.5 cm  4/3/2020: left breast mass measures about 2.5x 2.5 cm. Unable to palpate the left axillary mass.   4/17/2020: Able to appreciate a left axillary mass.  Left breast mass measures 2.0 x 2.5 cm approximately.  April 24, 2020: Left breast mass, 2.5 x 2.5 cm.  No skin changes.  Difficult to appreciate the left axillary lymph node but questionable pea-sized in the left axilla        CONSTITUTIONAL:  Vital signs reviewed.  No distress, looks comfortable.  EYES:  Conjunctiva and lids unremarkable.  PERRLA  EARS,NOSE,MOUTH,THROAT:  Ears and nose appear unremarkable.  Lips, teeth, " gums appear unremarkable.  RESPIRATORY:  Normal respiratory effort.  Lungs clear to auscultation bilaterally.  CARDIOVASCULAR:  Normal S1, S2.  No murmurs rubs or gallops.  No significant lower extremity edema.  GASTROINTESTINAL: Abdomen appears unremarkable.  Nontender.  No hepatomegaly.  No splenomegaly.  LYMPHATIC:  No cervical, supraclavicular, axillary lymphadenopathy.  SKIN:  Warm.  No rashes.  PSYCHIATRIC:  Normal judgment and insight.  Normal mood and affect.    RECENT LABS:   Results from last 7 days   Lab Units 04/24/20  0850   WBC 10*3/mm3 7.15   NEUTROS ABS 10*3/mm3 5.55   HEMOGLOBIN g/dL 10.8*   HEMATOCRIT % 33.2*   PLATELETS 10*3/mm3 306             01/29/20 - US Breast  IMPRESSION:  There are findings suggestive of mild interval partial  response to neoadjuvant chemotherapy.    Assessment/Plan    1. T2N1 invasive ductal carcinoma of the left breast, recently diagnosed.    -Noticed mass in the left breast x5 months  -Diagnostic mammogram showed 3.7 mm mass in the left breast at 4 o'clock position  -Ultrasound-showed 3.8 x 3.1 x 2.5 cm mass at 4 o'clock position with 2 abnormal axillary lymph nodes, larger lymph node being 1.4 cm  -Left breast biopsy and axillary lymph node biopsy October 29, 2019, invasive ductal carcinoma, moderately differentiated, grade 2, ER 85%, KS 10%, HER-2/merna 2+, HER-2 FISH negative  · CT scans from 11/13/19 show some asymmetric nodules along the lateral margin of the glandular tissue.  The 1.2x1.0 cm left axillary nodes, left supraclavicular, and left posterior cervical triangle nodes are suspicious for tamar metastases.  ·   bone scan from 11/15/19 which was negative.   ·  MRI breast from 11/18/19 which shows the biopsy-proven malignancy in the left breast centered at the 4:00 position.  The region of involvement measures up to 7.5 cm.  There is left axillary adenopathy with multiple irregular lymph nodes and loss of differentiation of the border of multiple lymph nodes  which are suspicious for extranodal involvement.  ·    echocardiogram from 11/14/19 which was normal with an ejection fraction of 67%.    · INVITAE from 11/14/19 which was negative.  · Given the size of her tumor and her medical history, patient will be given 4 cycles of Adriamycin/Cytoxan with Neulasta.  After completion of this treatment, patient will be started on 12 cycles of Taxol.  After the tumor shrinks in size, Dr. Forman will perform a lumpectomy.    · Following lumpectomy, patient will begin endocrine therapy.  ·  Dr. Dawn agrees chemotherapy will not aggravate her HIV condition.  Dr. Moreno spoke with Dr. Mohan at [facility] who also agrees chemotherapy is the first step.   · 12/06/19: Cycle #1 of Adriamycin/Cytoxan  · US Breast from 01/29/20 after 4 cycles of Adriamycin Cytoxan chemotherapy which shows mild interval partial response to neoadjuvant chemotherapy.  The mass has decreased from 3.8 x 2.5 to 3.1 cm to 3.5 x 2.3 x 3.2 cm previously the left axillary lymph node has decreased from 1.4 x 0.7 x 1 cm to 1.1 x 0.6 x 0.9 cm.  · 01/31/20: Initiated cycle 1 Taxol  · April 24, 2020: Completed cycle 12 Taxol      2.  Profound neutropenia due to chemotherapy despite Neulasta:   · Following Adriamycin Cytoxan, the patient experienced neutropenia despite Neulasta.  She did receive Zarxio with cycle 4 Adriamycin Cytoxan  · Cycle 2 Taxol delayed 1 week due to neutropenia  · Zarxio x3 days given following Taxol  · White blood cell 8.32, ANC 6.24 today.  The patient is highly anxious going to the hospital for Zarxio injections in light of COVID-19 outbreak.  Discontinue further Zarxio for remaining 2 weeks of Taxol    3.  Moderate thrombocytopenia secondary to chemotherapy.    · Platelet count has now normalized following Taxol 274,000    4.  Chemotherapy induced anemia.   · Anemia work-up previously negative  · Hemoglobin is improved today at 11.1      5.  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  · On  12/13/2019 patient struggled with significant insomnia from steroids following her first cycle AC.  We discussed taking the dexamethasone, only one 4 mg tablet daily for 3 days with her next cycle of chemotherapy to see if this improves her symptoms.    · Insomnia much improved with decreased dose of dexamethasone  · Insomnia is now resolved with Taxol    6. HIV, followed by Dr. Dawn in infectious disease.  Well-controlled and is on a single medication with very low viral load. Has HIV since age 34.  · Reviewed her HIV medications and she is compliant     7.  Family history of breast cancer with mother having had breast cancer at age 60.  There is family history of uncle with prostate cancer.  Patient will require genetic referral    8.  Arthritis with severe back pain followed by Dr. Bam Crandall  · Percocet    9. Episodes of nosebleeds.  She has had these once a week for 3-4 years.  She did not report this today.    10.  Otalgia: Of the right ear, present for a while.  Dr. Moreno would like the patient to follow-up with ENT, Dr. Stapleton, as she has previously seen him.  Not a complaint today.    12.  Arthralgias for a few days after each treatment, which resolve on their own.    13.  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  Currently on B6 50 mg daily.  We will have to continue to closely monitor.  This is stable.  No dose adjustments Taxol presently    PLAN:  1. Proceed with cycle 12 weekly Taxol today  2. Discontinue Zarxio injections following Taxol  3. Begin B complex daily.  Currently taking B6, the patient will switch to a B complex  4. MRI breast in 2 weeks  5. Refer to Dr. Forman in 3 weeks to evaluate for surgery  Follow-up with me in 3 weeks    MD Karina Garcia MA     Cc: Dr. Landen Lora, MD Denilson Shi MD

## 2020-04-25 ENCOUNTER — APPOINTMENT (OUTPATIENT)
Dept: ONCOLOGY | Facility: HOSPITAL | Age: 60
End: 2020-04-25

## 2020-04-26 ENCOUNTER — APPOINTMENT (OUTPATIENT)
Dept: ONCOLOGY | Facility: HOSPITAL | Age: 60
End: 2020-04-26

## 2020-04-26 DIAGNOSIS — M81.0 OSTEOPOROSIS: ICD-10-CM

## 2020-04-27 ENCOUNTER — APPOINTMENT (OUTPATIENT)
Dept: ONCOLOGY | Facility: HOSPITAL | Age: 60
End: 2020-04-27

## 2020-04-27 RX ORDER — IBANDRONATE SODIUM 150 MG/1
150 TABLET, FILM COATED ORAL
Qty: 3 TABLET | Refills: 3 | Status: SHIPPED | OUTPATIENT
Start: 2020-04-27 | End: 2020-12-01

## 2020-05-05 DIAGNOSIS — C50.812 MALIGNANT NEOPLASM OF OVERLAPPING SITES OF LEFT BREAST IN FEMALE, ESTROGEN RECEPTOR POSITIVE (HCC): Primary | ICD-10-CM

## 2020-05-05 DIAGNOSIS — Z17.0 MALIGNANT NEOPLASM OF OVERLAPPING SITES OF LEFT BREAST IN FEMALE, ESTROGEN RECEPTOR POSITIVE (HCC): Primary | ICD-10-CM

## 2020-05-05 NOTE — PROGRESS NOTES
Order entered for MRI of bilateral breasts with and without contrast to replace previously ordered MRI of breasts bilateral with contrast per request from radiology per order previously placed Dr. Moreno

## 2020-05-06 ENCOUNTER — HOSPITAL ENCOUNTER (OUTPATIENT)
Dept: MRI IMAGING | Facility: HOSPITAL | Age: 60
Discharge: HOME | End: 2020-05-06
Admitting: INTERNAL MEDICINE

## 2020-05-06 DIAGNOSIS — Z17.0 MALIGNANT NEOPLASM OF OVERLAPPING SITES OF LEFT BREAST IN FEMALE, ESTROGEN RECEPTOR POSITIVE (HCC): ICD-10-CM

## 2020-05-06 DIAGNOSIS — C50.812 MALIGNANT NEOPLASM OF OVERLAPPING SITES OF LEFT BREAST IN FEMALE, ESTROGEN RECEPTOR POSITIVE (HCC): ICD-10-CM

## 2020-05-06 PROCEDURE — 77049 MRI BREAST C-+ W/CAD BI: CPT

## 2020-05-06 PROCEDURE — A9577 INJ MULTIHANCE: HCPCS | Performed by: INTERNAL MEDICINE

## 2020-05-06 PROCEDURE — 0 GADOBENATE DIMEGLUMINE 529 MG/ML SOLUTION: Performed by: INTERNAL MEDICINE

## 2020-05-06 RX ADMIN — GADOBENATE DIMEGLUMINE 13 ML: 529 INJECTION, SOLUTION INTRAVENOUS at 12:41

## 2020-05-12 ENCOUNTER — OFFICE VISIT (OUTPATIENT)
Dept: MAMMOGRAPHY | Facility: CLINIC | Age: 60
End: 2020-05-12

## 2020-05-12 ENCOUNTER — TELEPHONE (OUTPATIENT)
Dept: ONCOLOGY | Facility: CLINIC | Age: 60
End: 2020-05-12

## 2020-05-12 VITALS — DIASTOLIC BLOOD PRESSURE: 80 MMHG | SYSTOLIC BLOOD PRESSURE: 128 MMHG

## 2020-05-12 DIAGNOSIS — Z17.0 MALIGNANT NEOPLASM OF OVERLAPPING SITES OF LEFT BREAST IN FEMALE, ESTROGEN RECEPTOR POSITIVE (HCC): Primary | ICD-10-CM

## 2020-05-12 DIAGNOSIS — C50.812 MALIGNANT NEOPLASM OF OVERLAPPING SITES OF LEFT BREAST IN FEMALE, ESTROGEN RECEPTOR POSITIVE (HCC): Primary | ICD-10-CM

## 2020-05-12 PROCEDURE — 99214 OFFICE O/P EST MOD 30 MIN: CPT | Performed by: SURGERY

## 2020-05-12 NOTE — PROGRESS NOTES
Subjective   Richa Dolan is a 59 y.o. female     History of Present Illness she is a nice 59-year-old white female who first presented to my office in November 2019 with a large invasive ductal cancer of the lower outer quadrant left breast.  MRI suggested the tumor was over 5 cm in size and there were some associated satellite lesions.  She also had at least 2 concerning left axillary lymph nodes.  1 of these was biopsied and did reveal metastatic disease.  She was presented at breast cancer conference and it was elected to treat her with neoadjuvant chemotherapy which she has now completed.  According to the oncology notes, the tumor has shrunk a bit but is still obviously present.  She has had a repeat MRI which suggests that the main tumor is about 2.6 cm in greatest dimension but there are still satellite lesions present.  The overall process is over 5 cm in size still.  The left axillary adenopathy seems to have resolved.  The patient is here today to discuss options.    Comorbidities are significant for a history of HIV.        Review of Systems constitutional-the patient denies any significant loss of appetite or weight change.   Musculoskeletal-the patient denies any unusual bony pain.  Past Medical History:   Diagnosis Date   • Anxiety    • Cerebrovascular accident (CVA) (CMS/HCC) 8/22/2017   • DDD (degenerative disc disease), cervical    • Depression    • GERD (gastroesophageal reflux disease)    • H/O ICH (intracerebral hemorrhage) (CMS/HCC)    • History of stroke    • History of thrombocytopenia    • HIV (human immunodeficiency virus infection) (CMS/HCC) 1996   • Hyperlipidemia    • Insomnia    • Lupus anticoagulant positive    • Malignant neoplasm of overlapping sites of left breast in female, estrogen receptor positive (CMS/HCC) 11/12/2019   • Meniscus tear 2017    RIGHT   • Migraine    • Neck pain     WITH SHOOTING PAIN LEFT RIGHT ARM   • Osteoarthritis    • Osteoporosis    • Seasonal allergies     • Spinal headache    • Tick bite 06/2014     Past Surgical History:   Procedure Laterality Date   • ADENOIDECTOMY     • CHOLECYSTECTOMY     • HYSTERECTOMY     • KNEE ARTHROSCOPY Right 3/24/2017    Procedure:  RIGHT  KNEE ARTHROSCOPY WITH DEBRIDEMENT CHONDROPLASTY PARTIAL MENISCECTOMY;  Surgeon: Karl Galeas MD;  Location: [facility];  Service:    • KNEE CARTILAGE SURGERY Right 2007   • TONSILLECTOMY     • US GUIDED LYMPH NODE BIOPSY  10/29/2019   • VENOUS ACCESS DEVICE (PORT) INSERTION Right 12/2/2019    Procedure: INSERTION VENOUS ACCESS DEVICE RIGHT;  Surgeon: Landen Forman MD;  Location: [facility];  Service: General     Family History   Problem Relation Age of Onset   • Breast cancer Mother    • Leukemia Mother         CLL?   • Diabetes Mother    • Hyperthyroidism Mother    • Hearing loss Mother    • Dementia Father    • Heart disease Maternal Grandmother    • Diabetes Maternal Grandfather    • Heart disease Maternal Grandfather    • Stroke Maternal Grandfather    • Heart attack Maternal Grandfather    • Osteoporosis Paternal Grandmother    • Heart disease Paternal Grandfather    • Leukemia Maternal Aunt         CLL?   • Throat cancer Paternal Uncle      Social History     Socioeconomic History   • Marital status:      Spouse name: Owen   • Number of children: 1   • Years of education: College   • Highest education level: Not on file   Occupational History   • Occupation:      Employer:  [employer]   Tobacco Use   • Smoking status: Never Smoker   • Smokeless tobacco: Never Used   Substance and Sexual Activity   • Alcohol use: Yes     Comment: occasioonal   • Drug use: No   • Sexual activity: Defer     Birth control/protection: None       Objective   Physical Exam  Constitutional- slightly overweight white female in no acute distress  Heart-regular rate and rhythm without murmur  Lungs-clear to auscultation  Right breast- there is no skin dimpling or nipple  retraction.  I do not palpate any suspicious masses in the breast.  Left breast- with the patient sitting in her arms raised there is some significant flattening to the skin in the lower outer quadrant.  There is a palpable mass present which measures 3 x 4 cm.  I do not palpate any other masses within the breast.  Lymphatics- there is no cervical or axillary adenopathy  Extremities-good strength in all 4 extremities  Assessment/Plan   Left breast cancer with adjacent satellite lesions.  The office visit lasted 35 minutes with 25 minutes spent in consultation. We again discussed her options.  In regards to the axilla, the patient would be a candidate for a sentinel lymph node biopsy as long as we were able to remove the previously biopsied lymph node.  Should there be residual cancer present in the lymph nodes, she would need an axillary dissection.  In regards to the main tumor in the breast, the patient would still prefer breast conserving surgery.  The area involved is too large to perform a traditional lumpectomy without oncoplastic closure.  I have a call in to the plastic surgeons to discuss options which would include lumpectomy with local rotational flap versus lumpectomy with reduction and contralateral reduction for symmetry.  The patient understands that a mastectomy may still be necessary if margins are not obtainable.    The encounter diagnosis was Malignant neoplasm of overlapping sites of left breast in female, estrogen receptor positive (CMS/HCC).

## 2020-05-12 NOTE — TELEPHONE ENCOUNTER
PT HAS AN APPT WITH DR EDMONDSON ON 5/14/20  @4 PM THEY WILL BE DISCUSSING SURGERY AT THE APPT THE PT WANTS TO KNOW IF SHE CAN BRING HER  TO THAT APPT ? THEY BOTH HAVE MASK THAT THEY CAN WEAR PT WOULD LIKE FOR HIM TO ATTEND THE APPT IF POSSIBLE     PT CONTACT # [phone]   Continue Regimen: Metronidazole Detail Level: Zone

## 2020-05-14 ENCOUNTER — OFFICE VISIT (OUTPATIENT)
Dept: ONCOLOGY | Facility: CLINIC | Age: 60
End: 2020-05-14

## 2020-05-14 ENCOUNTER — LAB (OUTPATIENT)
Dept: LAB | Facility: HOSPITAL | Age: 60
End: 2020-05-14

## 2020-05-14 VITALS
BODY MASS INDEX: 23.8 KG/M2 | OXYGEN SATURATION: 97 % | SYSTOLIC BLOOD PRESSURE: 133 MMHG | DIASTOLIC BLOOD PRESSURE: 82 MMHG | RESPIRATION RATE: 16 BRPM | TEMPERATURE: 98.2 F | HEART RATE: 75 BPM | HEIGHT: 66 IN | WEIGHT: 148.1 LBS

## 2020-05-14 DIAGNOSIS — Z17.0 MALIGNANT NEOPLASM OF OVERLAPPING SITES OF LEFT BREAST IN FEMALE, ESTROGEN RECEPTOR POSITIVE (HCC): Primary | ICD-10-CM

## 2020-05-14 DIAGNOSIS — C50.812 MALIGNANT NEOPLASM OF OVERLAPPING SITES OF LEFT BREAST IN FEMALE, ESTROGEN RECEPTOR POSITIVE (HCC): Primary | ICD-10-CM

## 2020-05-14 DIAGNOSIS — B20 HIV INFECTION, UNSPECIFIED SYMPTOM STATUS (HCC): ICD-10-CM

## 2020-05-14 PROBLEM — Z21 HIV INFECTION: Status: ACTIVE | Noted: 2020-05-14

## 2020-05-14 LAB
ALBUMIN SERPL-MCNC: 4.7 G/DL (ref 3.5–5.2)
ALBUMIN/GLOB SERPL: 2 G/DL
ALP SERPL-CCNC: 134 U/L (ref 39–117)
ALT SERPL W P-5'-P-CCNC: 20 U/L (ref 1–33)
ANION GAP SERPL CALCULATED.3IONS-SCNC: 12.8 MMOL/L (ref 5–15)
AST SERPL-CCNC: 24 U/L (ref 1–32)
BASOPHILS # BLD AUTO: 0.04 10*3/MM3 (ref 0–0.2)
BASOPHILS NFR BLD AUTO: 0.6 % (ref 0–1.5)
BILIRUB SERPL-MCNC: 0.2 MG/DL (ref 0.2–1.2)
BUN BLD-MCNC: 12 MG/DL (ref 6–20)
BUN/CREAT SERPL: 20.7 (ref 7–25)
CALCIUM SPEC-SCNC: 9.5 MG/DL (ref 8.6–10.5)
CHLORIDE SERPL-SCNC: 104 MMOL/L (ref 98–107)
CO2 SERPL-SCNC: 25.2 MMOL/L (ref 22–29)
CREAT BLD-MCNC: 0.58 MG/DL (ref 0.57–1)
DEPRECATED RDW RBC AUTO: 52.7 FL (ref 37–54)
EOSINOPHIL # BLD AUTO: 0.08 10*3/MM3 (ref 0–0.4)
EOSINOPHIL NFR BLD AUTO: 1.3 % (ref 0.3–6.2)
ERYTHROCYTE [DISTWIDTH] IN BLOOD BY AUTOMATED COUNT: 14.1 % (ref 12.3–15.4)
GFR SERPL CREATININE-BSD FRML MDRD: 106 ML/MIN/1.73
GLOBULIN UR ELPH-MCNC: 2.4 GM/DL
GLUCOSE BLD-MCNC: 93 MG/DL (ref 65–99)
HCT VFR BLD AUTO: 38 % (ref 34–46.6)
HGB BLD-MCNC: 12.4 G/DL (ref 12–15.9)
IMM GRANULOCYTES # BLD AUTO: 0.02 10*3/MM3 (ref 0–0.05)
IMM GRANULOCYTES NFR BLD AUTO: 0.3 % (ref 0–0.5)
LYMPHOCYTES # BLD AUTO: 1.16 10*3/MM3 (ref 0.7–3.1)
LYMPHOCYTES NFR BLD AUTO: 18.7 % (ref 19.6–45.3)
MCH RBC QN AUTO: 33 PG (ref 26.6–33)
MCHC RBC AUTO-ENTMCNC: 32.6 G/DL (ref 31.5–35.7)
MCV RBC AUTO: 101.1 FL (ref 79–97)
MONOCYTES # BLD AUTO: 0.84 10*3/MM3 (ref 0.1–0.9)
MONOCYTES NFR BLD AUTO: 13.5 % (ref 5–12)
NEUTROPHILS # BLD AUTO: 4.06 10*3/MM3 (ref 1.7–7)
NEUTROPHILS NFR BLD AUTO: 65.6 % (ref 42.7–76)
NRBC BLD AUTO-RTO: 0 /100 WBC (ref 0–0.2)
PLATELET # BLD AUTO: 273 10*3/MM3 (ref 140–450)
PMV BLD AUTO: 9.2 FL (ref 6–12)
POTASSIUM BLD-SCNC: 4.1 MMOL/L (ref 3.5–5.2)
PROT SERPL-MCNC: 7.1 G/DL (ref 6–8.5)
RBC # BLD AUTO: 3.76 10*6/MM3 (ref 3.77–5.28)
SODIUM BLD-SCNC: 142 MMOL/L (ref 136–145)
WBC NRBC COR # BLD: 6.2 10*3/MM3 (ref 3.4–10.8)

## 2020-05-14 PROCEDURE — 80053 COMPREHEN METABOLIC PANEL: CPT | Performed by: INTERNAL MEDICINE

## 2020-05-14 PROCEDURE — 85025 COMPLETE CBC W/AUTO DIFF WBC: CPT

## 2020-05-14 PROCEDURE — 36415 COLL VENOUS BLD VENIPUNCTURE: CPT

## 2020-05-14 PROCEDURE — 99214 OFFICE O/P EST MOD 30 MIN: CPT | Performed by: INTERNAL MEDICINE

## 2020-05-14 NOTE — PROGRESS NOTES
Subjective     REASON FOR FOLLOW UP:    1.  T2N1 invasive ductal carcinoma of the left breast.  Ultrasound-showed 3.8 x 3.1 x 2.5 cm mass at 4 o'clock position with 2 abnormal axillary lymph nodes, larger lymph node being 1.4 cm.  Left breast biopsy and axillary lymph node biopsy October 29, 2019, invasive ductal carcinoma, moderately differentiated, grade 2, ER 85%, MO 10%, HER-2/merna 2+, HER-2 FISH negative.  · 12/06/19: Neoadjuvant chemo therapy, cycle #1 of dose-dense Adriamycin/Cytoxan with Neulasta for marrow support    · 01/17/20: Last cycle of Adriamycin/Cytoxan given  · January 31, 2020: Cycle 1 Taxol  · April 24, 2020: Last cycle, cycle 12 of Taxol        2.  Severe neutropenia secondary to chemotherapy, with ANC 50 at 1-week after cycle #1 chemotherapy.  Also had moderate thrombocytopenia platelets 92,000.  Patient was given Levaquin for prophylaxis of neutropenia fever.    3.  HIV, followed by Dr. Dawn from infectious disease.  Well controlled on meds  .               HISTORY OF PRESENT ILLNESS:  The patient is a 59 y.o. year old female with history of T2N1 invasive ductal carcinoma of the left breast status post neoadjuvant chemotherapy with Adriamycin Cytoxan followed by weekly Taxol.  She is completed all treatment and currently has had a MRI of the breast which shows partial response to the neoadjuvant chemotherapy with considerable reduction in the left axillary adenopathy and reduction in the size of the index lesion in the left breast as well as other satellite lesions.    Patient has seen Dr. Forman and he discussed with the patient about surgery.  In regards to the main tumor in the breast, the patient prefers to have breast conserving surgery but the area involved is too large to perform traditional lumpectomy without oncoplastic closure.  Saw Dr. Forman referred patient to plastic surgery for options which include lumpectomy with local rotational flap versus lumpectomy with  reduction and contralateral reduction for symmetry.  She may require mastectomy at surgery and consideration of axillary dissection though sentinel lymph node could be performed    Past Medical History:   Diagnosis Date   • Anxiety    • Cerebrovascular accident (CVA) (CMS/Formerly [facility]) 8/22/2017   • DDD (degenerative disc disease), cervical    • Depression    • GERD (gastroesophageal reflux disease)    • H/O ICH (intracerebral hemorrhage) (CMS/Formerly [facility])    • History of stroke    • History of thrombocytopenia    • HIV (human immunodeficiency virus infection) (CMS/Formerly [facility]) 1996   • Hyperlipidemia    • Insomnia    • Lupus anticoagulant positive    • Malignant neoplasm of overlapping sites of left breast in female, estrogen receptor positive (CMS/Formerly [facility]) 11/12/2019   • Meniscus tear 2017    RIGHT   • Migraine    • Neck pain     WITH SHOOTING PAIN LEFT RIGHT ARM   • Osteoarthritis    • Osteoporosis    • Seasonal allergies    • Spinal headache    • Tick bite 06/2014     PAST MEDICAL HISTORY:  She had a stroke in July 2017 with headache and dizziness.  She went to the ER and was placed on aspirin.  However, she stopped taking it two weeks ago.  She has been taking Aimovig (injection) monthly with Fariba López at [place].    In 1994, patient was diagnosed with HIV with an unknown cause which is managed by Dr. Natali Subramanian.  As of now, her viral load is good.    Patient has arthritis.  She gets trigger-point injections in her back with her last one being 2 weeks ago.  She has had multiple ablations.  She also has pain in her SI joint and Dr. Bermudez performed a surgery on this.  She takes Narco for the pain.      She is osteoporotic.  She has had multiple hairline fractures in both her legs.  She had her knees cleaned out by Dr. Sinha.     Patient has had a hysterectomy and still has both of her ovaries.    Patient has vertigo and the muscles in her left ear are weak.  She just has an imbalance.    Past Surgical History:   Procedure Laterality  Date   • ADENOIDECTOMY     • CHOLECYSTECTOMY     • HYSTERECTOMY     • KNEE ARTHROSCOPY Right 3/24/2017    Procedure:  RIGHT  KNEE ARTHROSCOPY WITH DEBRIDEMENT CHONDROPLASTY PARTIAL MENISCECTOMY;  Surgeon: Karl Galeas MD;  Location: [facility];  Service:    • KNEE CARTILAGE SURGERY Right 2007   • TONSILLECTOMY     • US GUIDED LYMPH NODE BIOPSY  10/29/2019   • VENOUS ACCESS DEVICE (PORT) INSERTION Right 12/2/2019    Procedure: INSERTION VENOUS ACCESS DEVICE RIGHT;  Surgeon: Landen Forman MD;  Location: [facility];  Service: General       ONCOLOGIC HISTORY:  Patient is a 59-year-old female who has had a history of HIV since age 34 followed by Dr. Natali Ng at [facility] and was on multiple HIV drugs initially but more recently has been on a single medication TRIUMEQ, with well-controlled HIV.  She follows up with Dr. Dawn , infectious disease who saw her recently and he saw her on 4/8/2019 and has excellent control and suppression of her viral load.  She is very compliant with her medications.    She also has had history of stroke in 2017 for which she was placed on aspirin.  She presented with a headache.  She is very active and works at [employer] full-time.  She also has had history of vertigo in the past  Patient's PCP is Zach Lora.    Patient first noticed the mass in her left breast 5 months ago.  Her last mammogram was 5 years ago.  She had soreness in the left breast and she went to her primary care physician who then ordered a mammogram diagnostic and an ultrasound.  The diagnostic mammogram showed a 3.7 cm mass at 4 o'clock position in the lower outer quadrant of the left breast.  The ultrasound showed 3.8 cm x 3.1 x 2.5 cm mass at 4 o'clock position in the left breast with 2 abnormal appearing left axillary lymph nodes the largest being 1.4 cm.    She then underwent biopsy of both the breast mass as well as the left axillary lymph node and both  of them are positive for invasive mammary carcinoma it is invasive ductal carcinoma with apocrine features, moderately differentiated, grade 2 of 3 with a [place] score of 5.  The left axillary lymph node is also consistent with metastatic mammary carcinoma.  It is ER positive SC positive HER-2/merna negative.  Details as follows    10/21/19 - Bilateral Diagnostic Mammogram and US Breast Left  FINDINGS: Bilateral digital CC and MLO mammographic images were  obtained. No prior examination is available for comparison. Scattered  fibroglandular densities are seen throughout both breasts. A triangular  skin marker represents the area of palpable concern in the lower outer  quadrant of the left breast in the posterior 1/3. At this location there  is an irregular mass that measures on the order of 3.7 cm in greatest  dimension. Internal calcifications are noted. No suspicious findings in  the right breast are appreciated.     ULTRASOUND: Targeted sonographic evaluation of the left breast was  performed through the area of concern in the left axilla. At the 4  o'clock position on the order of 6 cm from the nipple there is an  irregular hypoechoic mass that measures 3.8 x 3.1 x 2.5 cm. Internal  vascularity is noted.     There are 2 abnormal-appearing left axillary lymph nodes, the largest  which measures on the order of 1.4 cm in greatest dimension.     IMPRESSION:  1. There is a 3.8 cm irregular mass in the left breast at the 4 o'clock  position. This is seen in conjunction with an irregular 1.4 cm lymph  node in the left axilla. This is suspicious for malignancy with left  axillary involvement. Correlation with ultrasound-guided biopsy of both  the left breast mass and the lymph node is recommended.  2. There are no findings suspicious for malignancy in the right breast.     BI-RADS CATEGORY 5:     Patient's labs from 11/12/19 are normal.    10/29/19 - Tissue Pathology  1. Left Breast, 4:00, 6 cm FN, U/S-Guided Core  Needle Biopsy for a Mass:  A. INVASIVE DUCTAL CARCINOMA WITH APOCRINE FEATURES, Moderately differentiated;  Jen Histologic Grade II/III (tubule score = 3, nuclear score = 2, mitoses score = 1), measuring at least  9 mm.  B. No ductal or lobular carcinoma in situ is identified in this sample.  C. Focus suspicious for lymphovascular space invasion.  D. See Biomarker Template for hormone receptor studies.  2. Lymph Node, Left Axilla, U/S-Guided Core Needle Biopsy:  A. METASTATIC MAMMARY CARCINOMA (see Comment).  B. Metastatic focus measures 5 mm in greatest extent.  ER+, 85%  WA+, 10%  HER2- Score 2+    11/13/19 - CT Neck Chest Abdomen Pelvis  IMPRESSION:  1. In addition to the irregular approximately 3.8 cm mass within the  lateral aspect of the left breast, there are a few subcentimeter  asymmetric nodules along the lateral margin of the glandular tissue. The  several asymmetric left subpectoral nodes and 1.2 x 1.0 cm left axillary  node are suspicious for tamar metastases. The asymmetric subcentimeter  left supraclavicular and left posterior cervical triangle nodes are  worrisome as well.  2. There is no convincing evidence for metastatic disease within the  abdomen or pelvis.    11/14/19 - Echocardiogram:  Normal.  Calculated EF = 67%.  There is trace mitral valve and tricuspid valve regurgitation.    11/15/19 - Bone Scan  Negative.    11/18/19 - MRI Breast Bilateral  IMPRESSION:  1. Biopsy-proven malignancy in the left breast centered at the 4 o'clock  position with associated surrounding satellite nodules as described. The  entire region of involvement including the satellite nodules and the  dominant mass measure up to 7.5 cm in greatest dimension. Also, left  axillary adenopathy with multiple irregular lymph nodes and loss of  differentiation of the borders of multiple lymph nodes is noted and this  is suspicious for extranodal involvement.  2. There are no findings suspicious for malignancy in the right  breast.  BI-RADS CATEGORY 6      19: Cycle #1 of Adriamycin/Cytoxan    20: Last cycle of Adriamycin/Cytoxan given without Neulasta.  We will switch from Neulasta to 7 days of Neupogen injections after cycle 4.    We reviewed with patient the US Breast from 20 which shows mild interval partial response to neoadjuvant chemotherapy.      20: Initiated cycle 1 Taxol  2020: Last cycle of Taxol, cycle 12    OB-GYN:  Menarche 15 years  Menopause-46  Pregnancies- 1 para 1 no miscarriages her first pregnancy was in  at 29 years of age.  She did not breastfeed.  Post menopausal HRT- None.  Hx of birth control pills- Yes.    Current Outpatient Medications on File Prior to Visit   Medication Sig Dispense Refill   • AIMOVIG 140 MG/ML solution auto-injector INJECT 140 MG INTO THE SKIN EVERY 30 (THIRTY) DAYS.  11   • atorvastatin (LIPITOR) 20 MG tablet TAKE 1 TABLET BY MOUTH EVERY DAY AT NIGHT 90 tablet 3   • baclofen (LIORESAL) 10 MG tablet Take 10 mg by mouth 2 (Two) Times a Day.  2   • CAMBIA 50 MG pack TAKE 1 PACKET AS NEEDED FOR HEADACHES  1   • clonazePAM (KlonoPIN) 0.5 MG tablet TAKE 1 TABLET BY MOUTH 2 (TWO) TIMES A DAY AS NEEDED FOR ANXIETY. 60 tablet 1   • COCONUT OIL PO Take  by mouth.     • colesevelam (WELCHOL) 625 MG tablet TAKE 2 TABLETS BY MOUTH EVERY  tablet 3   • dexamethasone (DECADRON) 4 MG tablet Take 2 tablets in the morning daily on Days 2, 3 & 4.  Take with food. 6 tablet 3   • escitalopram (LEXAPRO) 10 MG tablet TAKE 1 TABLET BY MOUTH EVERY DAY 90 tablet 3   • fluticasone (FLONASE) 50 MCG/ACT nasal spray 2 sprays into the nostril(s) as directed by provider Daily. 3 bottle 3   • ibandronate (BONIVA) 150 MG tablet TAKE 1 TABLET BY MOUTH EVERY 30 (THIRTY) DAYS. 3 tablet 3   • NON FORMULARY Collagen peptides powder     • ondansetron (ZOFRAN) 8 MG tablet Take 1 tablet by mouth 3 (Three) Times a Day As Needed for Nausea or Vomiting. 30 tablet 5   •  oxyCODONE-acetaminophen (PERCOCET)  MG per tablet TK 1 T PO QID     • prochlorperazine (COMPAZINE) 10 MG tablet Take 1 tablet by mouth Every 6 (Six) Hours As Needed for Nausea or Vomiting. 20 tablet 2   • vitamin B-6 (PYRIDOXINE) 50 MG tablet Take 1 tablet by mouth Daily. 90 tablet 1   • zolpidem (AMBIEN) 10 MG tablet Take 1 tablet by mouth At Night As Needed for Sleep. 90 tablet 0     No current facility-administered medications on file prior to visit.         ALLERGIES:    Allergies   Allergen Reactions   • Augmentin [Amoxicillin-Pot Clavulanate] Hives        Social History     Socioeconomic History   • Marital status:      Spouse name: Owen   • Number of children: 1   • Years of education: College   • Highest education level: Not on file   Occupational History   • Occupation:      Employer:  [employer]   Tobacco Use   • Smoking status: Never Smoker   • Smokeless tobacco: Never Used   Substance and Sexual Activity   • Alcohol use: Yes     Comment: occasioonal   • Drug use: No   • Sexual activity: Defer     Birth control/protection: None     Patient does not smoke or do drugs.  She does drink occasionally.    Family History   Problem Relation Age of Onset   • Breast cancer Mother    • Leukemia Mother         CLL?   • Diabetes Mother    • Hyperthyroidism Mother    • Hearing loss Mother    • Dementia Father    • Heart disease Maternal Grandmother    • Diabetes Maternal Grandfather    • Heart disease Maternal Grandfather    • Stroke Maternal Grandfather    • Heart attack Maternal Grandfather    • Osteoporosis Paternal Grandmother    • Heart disease Paternal Grandfather    • Leukemia Maternal Aunt         CLL?   • Throat cancer Paternal Uncle       FAMILY HISTORY:  Her mother had breast cancer at age 60, still alive at 85 and in good health..  Her father had dementia.  Her paternal uncle had prostate cancer.  Her brother had esophageal cancer.    I have reviewed the patient's medical  "history in detail and updated the computerized patient record.     Review of Systems   Constitutional: Negative for activity change, appetite change, chills, diaphoresis, fatigue, fever and unexpected weight change.   HENT: Negative for hearing loss, mouth sores, nosebleeds, sore throat and trouble swallowing.    Respiratory: Negative for cough, chest tightness, shortness of breath and wheezing.    Cardiovascular: Negative for chest pain, palpitations and leg swelling.   Gastrointestinal: Negative for abdominal distention, abdominal pain, constipation, diarrhea, nausea and vomiting.   Genitourinary: Negative for difficulty urinating, dysuria, frequency, hematuria and urgency.   Musculoskeletal: Positive for back pain (05/14/20-Unchanged). Negative for joint swelling.        No muscle weakness.   Skin: Negative for rash and wound.   Neurological: Positive for numbness (Bilat foot-05/14/20-Improved). Negative for dizziness, seizures, syncope, speech difficulty, weakness and headaches.   Hematological: Negative for adenopathy. Does not bruise/bleed easily.   Psychiatric/Behavioral: Negative for behavioral problems, confusion, sleep disturbance and suicidal ideas.     Patient's review of system is unchanged as of April 24, 2020    Objective    Vitals:    05/14/20 1605   BP: 133/82   Pulse: 75   Resp: 16   Temp: 98.2 °F (36.8 °C)   TempSrc: Oral   SpO2: 97%   Weight: 67.2 kg (148 lb 1.6 oz)   Height: 168.1 cm (66.18\")   PainSc: 0-No pain       Physical exam 04/17/2020  unchanged from previous office visit except as updated.       Physical Exam   Pulmonary/Chest:               BREAST: 2/14/2020 :Left breast exam, without skin change, mass in the lower outer quadrant around 4-5 o'clock measures about 4-1/2 x 4 cm.  2/21/2020: Left breast: No evidence of any skin changes and no evidence of left nipple discharge as well as no left axillary adenopathy or left supraclavicular adenopathy.  The mass measures 4.5 x 3.5 cm.  March " 20, 2020: The mass measures 3 x 2.5 cm  4/3/2020: left breast mass measures about 2.5x 2.5 cm. Unable to palpate the left axillary mass.   4/17/2020: Able to appreciate a left axillary mass.  Left breast mass measures 2.0 x 2.5 cm approximately.  April 24, 2020: Left breast mass, 2.5 x 2.5 cm.  No skin changes.  Difficult to appreciate the left axillary lymph node but questionable pea-sized in the left axilla       CONSTITUTIONAL:  Vital signs reviewed.  No distress, looks comfortable.  EYES:  Conjunctiva and lids unremarkable.  PERRLA  EARS,NOSE,MOUTH,THROAT:  Ears and nose appear unremarkable.  Lips, teeth, gums appear unremarkable.  RESPIRATORY:  Normal respiratory effort.  Lungs clear to auscultation bilaterally.  CARDIOVASCULAR:  Normal S1, S2.  No murmurs rubs or gallops.  No significant lower extremity edema.  GASTROINTESTINAL: Abdomen appears unremarkable.  Nontender.  No hepatomegaly.  No splenomegaly.  LYMPHATIC:  No cervical, supraclavicular, axillary lymphadenopathy.  SKIN:  Warm.  No rashes.  PSYCHIATRIC:  Normal judgment and insight.  Normal mood and affect.      RECENT LABS:   Results from last 7 days   Lab Units 05/14/20  1557   WBC 10*3/mm3 6.20   NEUTROS ABS 10*3/mm3 4.06   HEMOGLOBIN g/dL 12.4   HEMATOCRIT % 38.0   PLATELETS 10*3/mm3 273               EXAMINATION: BILATERAL BREAST MRI WITHOUT AND WITH CONTRAST 05/06/20  IMPRESSION:  1. There are findings consistent with interval partial response to  neoadjuvant chemotherapy with considerable reduction in left axillary  adenopathy and reduction in size of the index lesion in the left breast  as well as other satellite lesions. Persistent diffuse left breast skin  thickening is noted.  2. There are no findings suspicious for malignancy in the right breast.     BI-RADS Category 6: Known biopsy-proven malignancy.    01/29/20 - US Breast  IMPRESSION:  There are findings suggestive of mild interval partial  response to neoadjuvant  chemotherapy.    Assessment/Plan    1. T2N1 invasive ductal carcinoma of the left breast, recently diagnosed.    -Noticed mass in the left breast x5 months  -Diagnostic mammogram showed 3.7 mm mass in the left breast at 4 o'clock position  -Ultrasound-showed 3.8 x 3.1 x 2.5 cm mass at 4 o'clock position with 2 abnormal axillary lymph nodes, larger lymph node being 1.4 cm  -Left breast biopsy and axillary lymph node biopsy October 29, 2019, invasive ductal carcinoma, moderately differentiated, grade 2, ER 85%, MT 10%, HER-2/merna 2+, HER-2 FISH negative  · CT scans from 11/13/19 show some asymmetric nodules along the lateral margin of the glandular tissue.  The 1.2x1.0 cm left axillary nodes, left supraclavicular, and left posterior cervical triangle nodes are suspicious for tamar metastases.  ·   bone scan from 11/15/19 which was negative.   ·  MRI breast from 11/18/19 which shows the biopsy-proven malignancy in the left breast centered at the 4:00 position.  The region of involvement measures up to 7.5 cm.  There is left axillary adenopathy with multiple irregular lymph nodes and loss of differentiation of the border of multiple lymph nodes which are suspicious for extranodal involvement.  ·    echocardiogram from 11/14/19 which was normal with an ejection fraction of 67%.    · INVITAE from 11/14/19 which was negative.  · Given the size of her tumor and her medical history, patient will be given 4 cycles of Adriamycin/Cytoxan with Neulasta.  After completion of this treatment, patient will be started on 12 cycles of Taxol.  After the tumor shrinks in size, Dr. Forman will perform a lumpectomy.    · Following lumpectomy, patient will begin endocrine therapy.  ·  Dr. Dawn agrees chemotherapy will not aggravate her HIV condition.  Dr. Moreno spoke with Dr. Mohan at [facility] who also agrees chemotherapy is the first step.   · 12/06/19: Cycle #1 of Adriamycin/Cytoxan  · US Breast from 01/29/20 after 4 cycles  of Adriamycin Cytoxan chemotherapy which shows mild interval partial response to neoadjuvant chemotherapy.  The mass has decreased from 3.8 x 2.5 to 3.1 cm to 3.5 x 2.3 x 3.2 cm previously the left axillary lymph node has decreased from 1.4 x 0.7 x 1 cm to 1.1 x 0.6 x 0.9 cm.  · 01/31/20: Initiated cycle 1 Taxol  · April 24, 2020: Completed cycle 12 Taxol  · MRI breast 5/6/2020 shows significant reduction in the left axillary adenopathy as well as reduction in the index breast lesion.      2.  Profound neutropenia due to chemotherapy despite Neulasta:   · Following Adriamycin Cytoxan, the patient experienced neutropenia despite Neulasta.  She did receive Zarxio with cycle 4 Adriamycin Cytoxan  · Cycle 2 Taxol delayed 1 week due to neutropenia  · Zarxio x3 days given following Taxol  · White blood cell 8.32, ANC 6.24 today.  The patient is highly anxious going to the hospital for Zarxio injections in light of COVID-19 outbreak.  Discontinue further Zarxio for remaining 2 weeks of Taxol    3.  Moderate thrombocytopenia secondary to chemotherapy.    · Platelet count has now normalized following Taxol 274,000    4.  Chemotherapy induced anemia.   · Anemia work-up previously negative  · Hemoglobin is improved today at 11.1      5.  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  · On 12/13/2019 patient struggled with significant insomnia from steroids following her first cycle AC.  We discussed taking the dexamethasone, only one 4 mg tablet daily for 3 days with her next cycle of chemotherapy to see if this improves her symptoms.    · Insomnia much improved with decreased dose of dexamethasone  · Insomnia is now resolved with Taxol    6. HIV, followed by Dr. Dawn in infectious disease.  Well-controlled and is on a single medication with very low viral load. Has HIV since age 34.  · Reviewed her HIV medications and she is compliant     7.  Family history of breast cancer with mother having had breast cancer at age 60.  There is  family history of uncle with prostate cancer.  Patient will require genetic referral    8.  Arthritis with severe back pain followed by Dr. Bam Crandall  · Percocet    9. Episodes of nosebleeds.  She has had these once a week for 3-4 years.  She did not report this today.    10.  Otalgia: Of the right ear, present for a while.  Dr. Moreno would like the patient to follow-up with ENT, Dr. Stapleton, as she has previously seen him.  Not a complaint today.    12.  Arthralgias for a few days after each treatment, which resolve on their own.    13.  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  Currently on B6 50 mg daily.  We will have to continue to closely monitor.  This is stable.  No dose adjustments Taxol presently    PLAN:  1. We will discuss with Dr. Forman about the date for surgery.  2. If there is a delay for surgical treatment because of COVID 19  then we would consider endocrine therapy but hopefully patient will go for surgery right away so we can add endocrine therapy after surgery.  3. She will need referral to radiation oncology after surgery  4. Will plan to start letrozole following surgery  5. Follow-up with me in 4 weeks with labs    MD Karina Garcia MA         Cc: ОЛЬГА Godoy MD Nathan Bullington, MD

## 2020-05-18 ENCOUNTER — APPOINTMENT (OUTPATIENT)
Dept: BONE DENSITY | Facility: HOSPITAL | Age: 60
End: 2020-05-18

## 2020-05-19 ENCOUNTER — HOSPITAL ENCOUNTER (OUTPATIENT)
Dept: BONE DENSITY | Facility: HOSPITAL | Age: 60
Discharge: HOME | End: 2020-05-19
Admitting: INTERNAL MEDICINE

## 2020-05-19 ENCOUNTER — PREP FOR SURGERY (OUTPATIENT)
Dept: OTHER | Facility: HOSPITAL | Age: 60
End: 2020-05-19

## 2020-05-19 DIAGNOSIS — C50.812 MALIGNANT NEOPLASM OF OVERLAPPING SITES OF LEFT BREAST IN FEMALE, ESTROGEN RECEPTOR POSITIVE (HCC): ICD-10-CM

## 2020-05-19 DIAGNOSIS — C50.812 MALIGNANT NEOPLASM OF OVERLAPPING SITES OF LEFT BREAST IN FEMALE, ESTROGEN RECEPTOR POSITIVE (HCC): Primary | ICD-10-CM

## 2020-05-19 DIAGNOSIS — Z17.0 MALIGNANT NEOPLASM OF OVERLAPPING SITES OF LEFT BREAST IN FEMALE, ESTROGEN RECEPTOR POSITIVE (HCC): ICD-10-CM

## 2020-05-19 DIAGNOSIS — Z17.0 MALIGNANT NEOPLASM OF OVERLAPPING SITES OF LEFT BREAST IN FEMALE, ESTROGEN RECEPTOR POSITIVE (HCC): Primary | ICD-10-CM

## 2020-05-19 PROCEDURE — 77080 DXA BONE DENSITY AXIAL: CPT

## 2020-05-19 RX ORDER — CELECOXIB 200 MG/1
400 CAPSULE ORAL ONCE
Status: CANCELLED | OUTPATIENT
Start: 2020-05-28 | End: 2020-05-19

## 2020-05-19 RX ORDER — LIDOCAINE AND PRILOCAINE 25; 25 MG/G; MG/G
CREAM TOPICAL ONCE
Status: CANCELLED | OUTPATIENT
Start: 2020-05-28 | End: 2020-05-19

## 2020-05-19 RX ORDER — ACETAMINOPHEN 500 MG
1000 TABLET ORAL ONCE
Status: CANCELLED | OUTPATIENT
Start: 2020-05-28 | End: 2020-05-19

## 2020-05-19 RX ORDER — DIAZEPAM 5 MG/1
10 TABLET ORAL ONCE
Status: CANCELLED | OUTPATIENT
Start: 2020-05-28 | End: 2020-05-19

## 2020-05-19 RX ORDER — CLINDAMYCIN PHOSPHATE 900 MG/50ML
900 INJECTION INTRAVENOUS ONCE
Status: CANCELLED | OUTPATIENT
Start: 2020-05-28 | End: 2020-05-19

## 2020-05-20 DIAGNOSIS — Z17.0 MALIGNANT NEOPLASM OF OVERLAPPING SITES OF LEFT BREAST IN FEMALE, ESTROGEN RECEPTOR POSITIVE (HCC): Primary | ICD-10-CM

## 2020-05-20 DIAGNOSIS — C50.812 MALIGNANT NEOPLASM OF OVERLAPPING SITES OF LEFT BREAST IN FEMALE, ESTROGEN RECEPTOR POSITIVE (HCC): Primary | ICD-10-CM

## 2020-05-26 ENCOUNTER — APPOINTMENT (OUTPATIENT)
Dept: PREADMISSION TESTING | Facility: HOSPITAL | Age: 60
End: 2020-05-26

## 2020-05-26 VITALS
WEIGHT: 149.25 LBS | DIASTOLIC BLOOD PRESSURE: 84 MMHG | TEMPERATURE: 98.2 F | BODY MASS INDEX: 23.43 KG/M2 | OXYGEN SATURATION: 100 % | RESPIRATION RATE: 16 BRPM | SYSTOLIC BLOOD PRESSURE: 133 MMHG | HEART RATE: 77 BPM | HEIGHT: 67 IN

## 2020-05-26 DIAGNOSIS — Z17.0 MALIGNANT NEOPLASM OF OVERLAPPING SITES OF LEFT BREAST IN FEMALE, ESTROGEN RECEPTOR POSITIVE (HCC): Primary | ICD-10-CM

## 2020-05-26 DIAGNOSIS — C50.812 MALIGNANT NEOPLASM OF OVERLAPPING SITES OF LEFT BREAST IN FEMALE, ESTROGEN RECEPTOR POSITIVE (HCC): Primary | ICD-10-CM

## 2020-05-26 PROCEDURE — 93005 ELECTROCARDIOGRAM TRACING: CPT

## 2020-05-26 PROCEDURE — U0004 COV-19 TEST NON-CDC HGH THRU: HCPCS | Performed by: NURSE PRACTITIONER

## 2020-05-26 PROCEDURE — C9803 HOPD COVID-19 SPEC COLLECT: HCPCS

## 2020-05-26 PROCEDURE — 93010 ELECTROCARDIOGRAM REPORT: CPT | Performed by: INTERNAL MEDICINE

## 2020-05-26 RX ORDER — ASPIRIN 81 MG/1
81 TABLET ORAL DAILY
COMMUNITY
End: 2020-05-29 | Stop reason: HOSPADM

## 2020-05-27 LAB
REF LAB TEST METHOD: NORMAL
SARS-COV-2 RNA RESP QL NAA+PROBE: NOT DETECTED

## 2020-05-28 ENCOUNTER — ANESTHESIA (OUTPATIENT)
Dept: PERIOP | Facility: HOSPITAL | Age: 60
End: 2020-05-28

## 2020-05-28 ENCOUNTER — HOSPITAL ENCOUNTER (OUTPATIENT)
Dept: NUCLEAR MEDICINE | Facility: HOSPITAL | Age: 60
Discharge: HOME | End: 2020-05-28

## 2020-05-28 ENCOUNTER — APPOINTMENT (OUTPATIENT)
Dept: GENERAL RADIOLOGY | Facility: HOSPITAL | Age: 60
End: 2020-05-28

## 2020-05-28 ENCOUNTER — HOSPITAL ENCOUNTER (OUTPATIENT)
Dept: ULTRASOUND IMAGING | Facility: HOSPITAL | Age: 60
Setting detail: HOSPITAL OUTPATIENT SURGERY
Discharge: HOME | End: 2020-05-28

## 2020-05-28 ENCOUNTER — TRANSCRIBE ORDERS (OUTPATIENT)
Dept: ADMINISTRATIVE | Facility: HOSPITAL | Age: 60
End: 2020-05-28

## 2020-05-28 ENCOUNTER — HOSPITAL ENCOUNTER (OUTPATIENT)
Dept: MAMMOGRAPHY | Facility: HOSPITAL | Age: 60
Discharge: HOME | End: 2020-05-28

## 2020-05-28 ENCOUNTER — HOSPITAL ENCOUNTER (OUTPATIENT)
Facility: HOSPITAL | Age: 60
Discharge: HOME | End: 2020-05-29
Attending: SURGERY | Admitting: PLASTIC SURGERY

## 2020-05-28 ENCOUNTER — ANESTHESIA EVENT (OUTPATIENT)
Dept: PERIOP | Facility: HOSPITAL | Age: 60
End: 2020-05-28

## 2020-05-28 DIAGNOSIS — Z17.0 MALIGNANT NEOPLASM OF OVERLAPPING SITES OF LEFT BREAST IN FEMALE, ESTROGEN RECEPTOR POSITIVE (HCC): ICD-10-CM

## 2020-05-28 DIAGNOSIS — C80.1 CANCER (HCC): ICD-10-CM

## 2020-05-28 DIAGNOSIS — C50.812 MALIGNANT NEOPLASM OF OVERLAPPING SITES OF LEFT BREAST IN FEMALE, ESTROGEN RECEPTOR POSITIVE (HCC): ICD-10-CM

## 2020-05-28 PROCEDURE — 25010000002 FENTANYL CITRATE (PF) 100 MCG/2ML SOLUTION: Performed by: STUDENT IN AN ORGANIZED HEALTH CARE EDUCATION/TRAINING PROGRAM

## 2020-05-28 PROCEDURE — 19302 P-MASTECTOMY W/LN REMOVAL: CPT | Performed by: SPECIALIST/TECHNOLOGIST, OTHER

## 2020-05-28 PROCEDURE — 25010000002 MIDAZOLAM PER 1 MG: Performed by: ANESTHESIOLOGY

## 2020-05-28 PROCEDURE — 88360 TUMOR IMMUNOHISTOCHEM/MANUAL: CPT | Performed by: SURGERY

## 2020-05-28 PROCEDURE — 25010000002 DEXAMETHASONE PER 1 MG: Performed by: STUDENT IN AN ORGANIZED HEALTH CARE EDUCATION/TRAINING PROGRAM

## 2020-05-28 PROCEDURE — 25010000003 LIDOCAINE 1 % SOLUTION: Performed by: RADIOLOGY

## 2020-05-28 PROCEDURE — 88377 M/PHMTRC ALYS ISHQUANT/SEMIQ: CPT

## 2020-05-28 PROCEDURE — 25010000002 GENTAMICIN PER 80 MG: Performed by: PLASTIC SURGERY

## 2020-05-28 PROCEDURE — 88341 IMHCHEM/IMCYTCHM EA ADD ANTB: CPT | Performed by: SURGERY

## 2020-05-28 PROCEDURE — 63710000001 ONDANSETRON PER 8 MG: Performed by: PLASTIC SURGERY

## 2020-05-28 PROCEDURE — G0378 HOSPITAL OBSERVATION PER HR: HCPCS

## 2020-05-28 PROCEDURE — 19302 P-MASTECTOMY W/LN REMOVAL: CPT | Performed by: SURGERY

## 2020-05-28 PROCEDURE — 25010000002 HYDROMORPHONE PER 4 MG: Performed by: PLASTIC SURGERY

## 2020-05-28 PROCEDURE — 38792 RA TRACER ID OF SENTINL NODE: CPT

## 2020-05-28 PROCEDURE — 25010000002 HYDROMORPHONE PER 4 MG: Performed by: STUDENT IN AN ORGANIZED HEALTH CARE EDUCATION/TRAINING PROGRAM

## 2020-05-28 PROCEDURE — 38900 IO MAP OF SENT LYMPH NODE: CPT | Performed by: SURGERY

## 2020-05-28 PROCEDURE — 88307 TISSUE EXAM BY PATHOLOGIST: CPT | Performed by: SURGERY

## 2020-05-28 PROCEDURE — 88331 PATH CONSLTJ SURG 1 BLK 1SPC: CPT | Performed by: SURGERY

## 2020-05-28 PROCEDURE — 76098 X-RAY EXAM SURGICAL SPECIMEN: CPT

## 2020-05-28 PROCEDURE — 0 TECHNETIUM FILTERED SULFUR COLLOID: Performed by: SURGERY

## 2020-05-28 PROCEDURE — 25010000002 PHENYLEPHRINE PER 1 ML: Performed by: STUDENT IN AN ORGANIZED HEALTH CARE EDUCATION/TRAINING PROGRAM

## 2020-05-28 PROCEDURE — 25010000002 NEOSTIGMINE PER 0.5 MG: Performed by: ANESTHESIOLOGY

## 2020-05-28 PROCEDURE — A9541 TC99M SULFUR COLLOID: HCPCS | Performed by: SURGERY

## 2020-05-28 PROCEDURE — 25010000002 ONDANSETRON PER 1 MG: Performed by: ANESTHESIOLOGY

## 2020-05-28 PROCEDURE — 25010000003 LIDOCAINE 1 % SOLUTION: Performed by: SURGERY

## 2020-05-28 PROCEDURE — 88342 IMHCHEM/IMCYTCHM 1ST ANTB: CPT | Performed by: SURGERY

## 2020-05-28 PROCEDURE — 25010000002 PROPOFOL 10 MG/ML EMULSION: Performed by: STUDENT IN AN ORGANIZED HEALTH CARE EDUCATION/TRAINING PROGRAM

## 2020-05-28 DEVICE — LIGACLIP MCA MULTIPLE CLIP APPLIERS, 20 MEDIUM CLIPS
Type: IMPLANTABLE DEVICE | Site: BREAST | Status: FUNCTIONAL
Brand: LIGACLIP

## 2020-05-28 RX ORDER — FENTANYL CITRATE 50 UG/ML
INJECTION, SOLUTION INTRAMUSCULAR; INTRAVENOUS AS NEEDED
Status: DISCONTINUED | OUTPATIENT
Start: 2020-05-28 | End: 2020-05-28 | Stop reason: SURG

## 2020-05-28 RX ORDER — GABAPENTIN 100 MG/1
100 CAPSULE ORAL EVERY 8 HOURS SCHEDULED
Status: DISCONTINUED | OUTPATIENT
Start: 2020-05-28 | End: 2020-05-29 | Stop reason: HOSPADM

## 2020-05-28 RX ORDER — FENTANYL CITRATE 50 UG/ML
50 INJECTION, SOLUTION INTRAMUSCULAR; INTRAVENOUS
Status: DISCONTINUED | OUTPATIENT
Start: 2020-05-28 | End: 2020-05-28 | Stop reason: HOSPADM

## 2020-05-28 RX ORDER — GLYCOPYRROLATE 0.2 MG/ML
INJECTION INTRAMUSCULAR; INTRAVENOUS AS NEEDED
Status: DISCONTINUED | OUTPATIENT
Start: 2020-05-28 | End: 2020-05-28 | Stop reason: SURG

## 2020-05-28 RX ORDER — ONDANSETRON 2 MG/ML
4 INJECTION INTRAMUSCULAR; INTRAVENOUS EVERY 6 HOURS PRN
Status: DISCONTINUED | OUTPATIENT
Start: 2020-05-28 | End: 2020-05-29 | Stop reason: HOSPADM

## 2020-05-28 RX ORDER — EPHEDRINE SULFATE 50 MG/ML
5 INJECTION, SOLUTION INTRAVENOUS ONCE AS NEEDED
Status: DISCONTINUED | OUTPATIENT
Start: 2020-05-28 | End: 2020-05-28 | Stop reason: HOSPADM

## 2020-05-28 RX ORDER — PROMETHAZINE HYDROCHLORIDE 25 MG/ML
6.25 INJECTION, SOLUTION INTRAMUSCULAR; INTRAVENOUS
Status: DISCONTINUED | OUTPATIENT
Start: 2020-05-28 | End: 2020-05-28 | Stop reason: HOSPADM

## 2020-05-28 RX ORDER — HYDROCODONE BITARTRATE AND ACETAMINOPHEN 7.5; 325 MG/1; MG/1
1 TABLET ORAL ONCE AS NEEDED
Status: DISCONTINUED | OUTPATIENT
Start: 2020-05-28 | End: 2020-05-28 | Stop reason: HOSPADM

## 2020-05-28 RX ORDER — CLINDAMYCIN PHOSPHATE 900 MG/50ML
900 INJECTION INTRAVENOUS ONCE
Status: COMPLETED | OUTPATIENT
Start: 2020-05-28 | End: 2020-05-28

## 2020-05-28 RX ORDER — LIDOCAINE AND PRILOCAINE 25; 25 MG/G; MG/G
CREAM TOPICAL ONCE
Status: COMPLETED | OUTPATIENT
Start: 2020-05-28 | End: 2020-05-28

## 2020-05-28 RX ORDER — KETAMINE HYDROCHLORIDE 10 MG/ML
INJECTION INTRAMUSCULAR; INTRAVENOUS AS NEEDED
Status: DISCONTINUED | OUTPATIENT
Start: 2020-05-28 | End: 2020-05-28 | Stop reason: SURG

## 2020-05-28 RX ORDER — MIDAZOLAM HYDROCHLORIDE 1 MG/ML
2 INJECTION INTRAMUSCULAR; INTRAVENOUS
Status: DISCONTINUED | OUTPATIENT
Start: 2020-05-28 | End: 2020-05-28 | Stop reason: HOSPADM

## 2020-05-28 RX ORDER — OXYCODONE HYDROCHLORIDE 5 MG/1
5 TABLET ORAL EVERY 4 HOURS PRN
Status: DISCONTINUED | OUTPATIENT
Start: 2020-05-28 | End: 2020-05-29 | Stop reason: HOSPADM

## 2020-05-28 RX ORDER — OXYCODONE HYDROCHLORIDE 5 MG/1
10 TABLET ORAL ONCE
Status: COMPLETED | OUTPATIENT
Start: 2020-05-28 | End: 2020-05-28

## 2020-05-28 RX ORDER — CELECOXIB 200 MG/1
400 CAPSULE ORAL ONCE
Status: COMPLETED | OUTPATIENT
Start: 2020-05-28 | End: 2020-05-28

## 2020-05-28 RX ORDER — DIPHENHYDRAMINE HCL 25 MG
25 CAPSULE ORAL
Status: DISCONTINUED | OUTPATIENT
Start: 2020-05-28 | End: 2020-05-28 | Stop reason: HOSPADM

## 2020-05-28 RX ORDER — PROPOFOL 10 MG/ML
VIAL (ML) INTRAVENOUS AS NEEDED
Status: DISCONTINUED | OUTPATIENT
Start: 2020-05-28 | End: 2020-05-28 | Stop reason: SURG

## 2020-05-28 RX ORDER — LIDOCAINE HYDROCHLORIDE 10 MG/ML
6 INJECTION, SOLUTION INFILTRATION; PERINEURAL ONCE
Status: COMPLETED | OUTPATIENT
Start: 2020-05-28 | End: 2020-05-28

## 2020-05-28 RX ORDER — ONDANSETRON 2 MG/ML
INJECTION INTRAMUSCULAR; INTRAVENOUS AS NEEDED
Status: DISCONTINUED | OUTPATIENT
Start: 2020-05-28 | End: 2020-05-28 | Stop reason: SURG

## 2020-05-28 RX ORDER — ONDANSETRON HYDROCHLORIDE 8 MG/1
8 TABLET, FILM COATED ORAL ONCE
Status: COMPLETED | OUTPATIENT
Start: 2020-05-28 | End: 2020-05-28

## 2020-05-28 RX ORDER — LIDOCAINE HYDROCHLORIDE 10 MG/ML
10 INJECTION, SOLUTION INFILTRATION; PERINEURAL ONCE
Status: COMPLETED | OUTPATIENT
Start: 2020-05-28 | End: 2020-05-28

## 2020-05-28 RX ORDER — SODIUM CHLORIDE 0.9 % (FLUSH) 0.9 %
3 SYRINGE (ML) INJECTION EVERY 12 HOURS SCHEDULED
Status: DISCONTINUED | OUTPATIENT
Start: 2020-05-28 | End: 2020-05-29 | Stop reason: HOSPADM

## 2020-05-28 RX ORDER — ACETAMINOPHEN 325 MG/1
650 TABLET ORAL EVERY 4 HOURS PRN
Status: DISCONTINUED | OUTPATIENT
Start: 2020-05-28 | End: 2020-05-29 | Stop reason: HOSPADM

## 2020-05-28 RX ORDER — NALOXONE HCL 0.4 MG/ML
0.2 VIAL (ML) INJECTION AS NEEDED
Status: DISCONTINUED | OUTPATIENT
Start: 2020-05-28 | End: 2020-05-28 | Stop reason: HOSPADM

## 2020-05-28 RX ORDER — FAMOTIDINE 10 MG/ML
20 INJECTION, SOLUTION INTRAVENOUS ONCE
Status: COMPLETED | OUTPATIENT
Start: 2020-05-28 | End: 2020-05-28

## 2020-05-28 RX ORDER — MAGNESIUM HYDROXIDE 1200 MG/15ML
LIQUID ORAL AS NEEDED
Status: DISCONTINUED | OUTPATIENT
Start: 2020-05-28 | End: 2020-05-28 | Stop reason: HOSPADM

## 2020-05-28 RX ORDER — SODIUM CHLORIDE 0.9 % (FLUSH) 0.9 %
3-10 SYRINGE (ML) INJECTION AS NEEDED
Status: DISCONTINUED | OUTPATIENT
Start: 2020-05-28 | End: 2020-05-29 | Stop reason: HOSPADM

## 2020-05-28 RX ORDER — ONDANSETRON 4 MG/1
4 TABLET, FILM COATED ORAL EVERY 6 HOURS PRN
Status: DISCONTINUED | OUTPATIENT
Start: 2020-05-28 | End: 2020-05-29 | Stop reason: HOSPADM

## 2020-05-28 RX ORDER — HYDROMORPHONE HYDROCHLORIDE 1 MG/ML
0.5 INJECTION, SOLUTION INTRAMUSCULAR; INTRAVENOUS; SUBCUTANEOUS
Status: DISCONTINUED | OUTPATIENT
Start: 2020-05-28 | End: 2020-05-28 | Stop reason: HOSPADM

## 2020-05-28 RX ORDER — ROCURONIUM BROMIDE 10 MG/ML
INJECTION, SOLUTION INTRAVENOUS AS NEEDED
Status: DISCONTINUED | OUTPATIENT
Start: 2020-05-28 | End: 2020-05-28 | Stop reason: SURG

## 2020-05-28 RX ORDER — ONDANSETRON 4 MG/1
8 TABLET, FILM COATED ORAL 3 TIMES DAILY PRN
Status: DISCONTINUED | OUTPATIENT
Start: 2020-05-28 | End: 2020-05-28 | Stop reason: SDUPTHER

## 2020-05-28 RX ORDER — ACETAMINOPHEN 500 MG
1000 TABLET ORAL ONCE
Status: COMPLETED | OUTPATIENT
Start: 2020-05-28 | End: 2020-05-28

## 2020-05-28 RX ORDER — PROMETHAZINE HYDROCHLORIDE 25 MG/1
25 TABLET ORAL ONCE AS NEEDED
Status: DISCONTINUED | OUTPATIENT
Start: 2020-05-28 | End: 2020-05-28 | Stop reason: HOSPADM

## 2020-05-28 RX ORDER — DOCUSATE SODIUM 100 MG/1
100 CAPSULE, LIQUID FILLED ORAL DAILY
Status: DISCONTINUED | OUTPATIENT
Start: 2020-05-29 | End: 2020-05-29 | Stop reason: HOSPADM

## 2020-05-28 RX ORDER — PROMETHAZINE HYDROCHLORIDE 25 MG/1
25 SUPPOSITORY RECTAL ONCE AS NEEDED
Status: DISCONTINUED | OUTPATIENT
Start: 2020-05-28 | End: 2020-05-28 | Stop reason: HOSPADM

## 2020-05-28 RX ORDER — ONDANSETRON 2 MG/ML
4 INJECTION INTRAMUSCULAR; INTRAVENOUS ONCE AS NEEDED
Status: DISCONTINUED | OUTPATIENT
Start: 2020-05-28 | End: 2020-05-28 | Stop reason: HOSPADM

## 2020-05-28 RX ORDER — ESCITALOPRAM OXALATE 10 MG/1
10 TABLET ORAL DAILY
Status: DISCONTINUED | OUTPATIENT
Start: 2020-05-29 | End: 2020-05-29 | Stop reason: HOSPADM

## 2020-05-28 RX ORDER — HYDRALAZINE HYDROCHLORIDE 20 MG/ML
5 INJECTION INTRAMUSCULAR; INTRAVENOUS
Status: DISCONTINUED | OUTPATIENT
Start: 2020-05-28 | End: 2020-05-28 | Stop reason: HOSPADM

## 2020-05-28 RX ORDER — CETIRIZINE HYDROCHLORIDE 10 MG/1
10 TABLET ORAL DAILY
Status: DISCONTINUED | OUTPATIENT
Start: 2020-05-29 | End: 2020-05-29 | Stop reason: HOSPADM

## 2020-05-28 RX ORDER — LIDOCAINE HYDROCHLORIDE 10 MG/ML
0.5 INJECTION, SOLUTION EPIDURAL; INFILTRATION; INTRACAUDAL; PERINEURAL ONCE AS NEEDED
Status: DISCONTINUED | OUTPATIENT
Start: 2020-05-28 | End: 2020-05-28 | Stop reason: HOSPADM

## 2020-05-28 RX ORDER — SODIUM CHLORIDE 0.9 % (FLUSH) 0.9 %
3-10 SYRINGE (ML) INJECTION AS NEEDED
Status: DISCONTINUED | OUTPATIENT
Start: 2020-05-28 | End: 2020-05-28 | Stop reason: HOSPADM

## 2020-05-28 RX ORDER — DIPHENHYDRAMINE HYDROCHLORIDE 50 MG/ML
12.5 INJECTION INTRAMUSCULAR; INTRAVENOUS
Status: DISCONTINUED | OUTPATIENT
Start: 2020-05-28 | End: 2020-05-28 | Stop reason: HOSPADM

## 2020-05-28 RX ORDER — CLINDAMYCIN PHOSPHATE 900 MG/50ML
900 INJECTION INTRAVENOUS EVERY 8 HOURS
Status: DISCONTINUED | OUTPATIENT
Start: 2020-05-28 | End: 2020-05-29 | Stop reason: HOSPADM

## 2020-05-28 RX ORDER — SODIUM CHLORIDE, SODIUM LACTATE, POTASSIUM CHLORIDE, CALCIUM CHLORIDE 600; 310; 30; 20 MG/100ML; MG/100ML; MG/100ML; MG/100ML
125 INJECTION, SOLUTION INTRAVENOUS CONTINUOUS
Status: DISCONTINUED | OUTPATIENT
Start: 2020-05-28 | End: 2020-05-29 | Stop reason: HOSPADM

## 2020-05-28 RX ORDER — MIDAZOLAM HYDROCHLORIDE 1 MG/ML
1 INJECTION INTRAMUSCULAR; INTRAVENOUS
Status: DISCONTINUED | OUTPATIENT
Start: 2020-05-28 | End: 2020-05-28 | Stop reason: HOSPADM

## 2020-05-28 RX ORDER — HYDROMORPHONE HYDROCHLORIDE 1 MG/ML
0.25 INJECTION, SOLUTION INTRAMUSCULAR; INTRAVENOUS; SUBCUTANEOUS
Status: DISCONTINUED | OUTPATIENT
Start: 2020-05-28 | End: 2020-05-29 | Stop reason: HOSPADM

## 2020-05-28 RX ORDER — DEXAMETHASONE SODIUM PHOSPHATE 10 MG/ML
INJECTION INTRAMUSCULAR; INTRAVENOUS AS NEEDED
Status: DISCONTINUED | OUTPATIENT
Start: 2020-05-28 | End: 2020-05-28 | Stop reason: SURG

## 2020-05-28 RX ORDER — ATORVASTATIN CALCIUM 20 MG/1
20 TABLET, FILM COATED ORAL DAILY
Status: DISCONTINUED | OUTPATIENT
Start: 2020-05-29 | End: 2020-05-29 | Stop reason: HOSPADM

## 2020-05-28 RX ORDER — ACETAMINOPHEN 650 MG
TABLET, EXTENDED RELEASE ORAL AS NEEDED
Status: DISCONTINUED | OUTPATIENT
Start: 2020-05-28 | End: 2020-05-28 | Stop reason: HOSPADM

## 2020-05-28 RX ORDER — OXYCODONE AND ACETAMINOPHEN 7.5; 325 MG/1; MG/1
1 TABLET ORAL ONCE AS NEEDED
Status: DISCONTINUED | OUTPATIENT
Start: 2020-05-28 | End: 2020-05-28 | Stop reason: HOSPADM

## 2020-05-28 RX ORDER — SODIUM CHLORIDE, SODIUM LACTATE, POTASSIUM CHLORIDE, CALCIUM CHLORIDE 600; 310; 30; 20 MG/100ML; MG/100ML; MG/100ML; MG/100ML
9 INJECTION, SOLUTION INTRAVENOUS CONTINUOUS
Status: DISCONTINUED | OUTPATIENT
Start: 2020-05-28 | End: 2020-05-29 | Stop reason: HOSPADM

## 2020-05-28 RX ORDER — DIAZEPAM 5 MG/1
10 TABLET ORAL ONCE
Status: COMPLETED | OUTPATIENT
Start: 2020-05-28 | End: 2020-05-28

## 2020-05-28 RX ORDER — LIDOCAINE HYDROCHLORIDE 20 MG/ML
INJECTION, SOLUTION INFILTRATION; PERINEURAL AS NEEDED
Status: DISCONTINUED | OUTPATIENT
Start: 2020-05-28 | End: 2020-05-28 | Stop reason: SURG

## 2020-05-28 RX ORDER — LABETALOL HYDROCHLORIDE 5 MG/ML
5 INJECTION, SOLUTION INTRAVENOUS
Status: DISCONTINUED | OUTPATIENT
Start: 2020-05-28 | End: 2020-05-28 | Stop reason: HOSPADM

## 2020-05-28 RX ORDER — GABAPENTIN 300 MG/1
300 CAPSULE ORAL ONCE
Status: COMPLETED | OUTPATIENT
Start: 2020-05-28 | End: 2020-05-28

## 2020-05-28 RX ORDER — PROMETHAZINE HYDROCHLORIDE 25 MG/ML
12.5 INJECTION, SOLUTION INTRAMUSCULAR; INTRAVENOUS ONCE AS NEEDED
Status: DISCONTINUED | OUTPATIENT
Start: 2020-05-28 | End: 2020-05-28 | Stop reason: HOSPADM

## 2020-05-28 RX ORDER — FLUMAZENIL 0.1 MG/ML
0.2 INJECTION INTRAVENOUS AS NEEDED
Status: DISCONTINUED | OUTPATIENT
Start: 2020-05-28 | End: 2020-05-28 | Stop reason: HOSPADM

## 2020-05-28 RX ORDER — NALOXONE HCL 0.4 MG/ML
0.1 VIAL (ML) INJECTION
Status: DISCONTINUED | OUTPATIENT
Start: 2020-05-28 | End: 2020-05-29 | Stop reason: HOSPADM

## 2020-05-28 RX ORDER — SODIUM CHLORIDE 0.9 % (FLUSH) 0.9 %
3 SYRINGE (ML) INJECTION EVERY 12 HOURS SCHEDULED
Status: DISCONTINUED | OUTPATIENT
Start: 2020-05-28 | End: 2020-05-28 | Stop reason: HOSPADM

## 2020-05-28 RX ORDER — EPHEDRINE SULFATE 50 MG/ML
INJECTION, SOLUTION INTRAVENOUS AS NEEDED
Status: DISCONTINUED | OUTPATIENT
Start: 2020-05-28 | End: 2020-05-28 | Stop reason: SURG

## 2020-05-28 RX ORDER — ACETAMINOPHEN 325 MG/1
650 TABLET ORAL ONCE AS NEEDED
Status: DISCONTINUED | OUTPATIENT
Start: 2020-05-28 | End: 2020-05-28 | Stop reason: HOSPADM

## 2020-05-28 RX ORDER — BACLOFEN 10 MG/1
10 TABLET ORAL 2 TIMES DAILY
Status: DISCONTINUED | OUTPATIENT
Start: 2020-05-28 | End: 2020-05-29 | Stop reason: HOSPADM

## 2020-05-28 RX ADMIN — DEXAMETHASONE SODIUM PHOSPHATE 8 MG: 10 INJECTION INTRAMUSCULAR; INTRAVENOUS at 14:50

## 2020-05-28 RX ADMIN — SODIUM CHLORIDE, POTASSIUM CHLORIDE, SODIUM LACTATE AND CALCIUM CHLORIDE 125 ML/HR: 600; 310; 30; 20 INJECTION, SOLUTION INTRAVENOUS at 11:24

## 2020-05-28 RX ADMIN — HYDROMORPHONE HYDROCHLORIDE 0.5 MG: 1 INJECTION, SOLUTION INTRAMUSCULAR; INTRAVENOUS; SUBCUTANEOUS at 19:59

## 2020-05-28 RX ADMIN — SODIUM CHLORIDE, POTASSIUM CHLORIDE, SODIUM LACTATE AND CALCIUM CHLORIDE: 600; 310; 30; 20 INJECTION, SOLUTION INTRAVENOUS at 16:06

## 2020-05-28 RX ADMIN — BACLOFEN 10 MG: 10 TABLET ORAL at 22:42

## 2020-05-28 RX ADMIN — ACETAMINOPHEN 1000 MG: 500 TABLET, FILM COATED ORAL at 14:19

## 2020-05-28 RX ADMIN — FAMOTIDINE 20 MG: 10 INJECTION, SOLUTION INTRAVENOUS at 11:38

## 2020-05-28 RX ADMIN — EPHEDRINE SULFATE 10 MG: 50 INJECTION INTRAVENOUS at 15:20

## 2020-05-28 RX ADMIN — EPHEDRINE SULFATE 10 MG: 50 INJECTION INTRAVENOUS at 17:10

## 2020-05-28 RX ADMIN — GABAPENTIN 300 MG: 300 CAPSULE ORAL at 14:18

## 2020-05-28 RX ADMIN — ONDANSETRON 8 MG: 8 TABLET, FILM COATED ORAL at 14:19

## 2020-05-28 RX ADMIN — PHENYLEPHRINE HYDROCHLORIDE 100 MCG: 10 INJECTION INTRAVENOUS at 14:50

## 2020-05-28 RX ADMIN — CLINDAMYCIN PHOSPHATE 900 MG: 18 INJECTION, SOLUTION INTRAMUSCULAR; INTRAVENOUS at 14:50

## 2020-05-28 RX ADMIN — DIAZEPAM 10 MG: 5 TABLET ORAL at 11:24

## 2020-05-28 RX ADMIN — FENTANYL CITRATE 50 MCG: 50 INJECTION INTRAMUSCULAR; INTRAVENOUS at 18:39

## 2020-05-28 RX ADMIN — LIDOCAINE HYDROCHLORIDE 60 MG: 20 INJECTION, SOLUTION INFILTRATION; PERINEURAL at 14:41

## 2020-05-28 RX ADMIN — MIDAZOLAM 1 MG: 1 INJECTION INTRAMUSCULAR; INTRAVENOUS at 14:19

## 2020-05-28 RX ADMIN — GABAPENTIN 100 MG: 100 CAPSULE ORAL at 22:42

## 2020-05-28 RX ADMIN — SODIUM CHLORIDE, POTASSIUM CHLORIDE, SODIUM LACTATE AND CALCIUM CHLORIDE: 600; 310; 30; 20 INJECTION, SOLUTION INTRAVENOUS at 18:55

## 2020-05-28 RX ADMIN — EPHEDRINE SULFATE 10 MG: 50 INJECTION INTRAVENOUS at 15:16

## 2020-05-28 RX ADMIN — HYDROMORPHONE HYDROCHLORIDE 0.25 MG: 1 INJECTION, SOLUTION INTRAMUSCULAR; INTRAVENOUS; SUBCUTANEOUS at 21:45

## 2020-05-28 RX ADMIN — PHENYLEPHRINE HYDROCHLORIDE 100 MCG: 10 INJECTION INTRAVENOUS at 14:52

## 2020-05-28 RX ADMIN — PROPOFOL 150 MG: 10 INJECTION, EMULSION INTRAVENOUS at 14:41

## 2020-05-28 RX ADMIN — NEOSTIGMINE METHYLSULFATE 3 MG: 1 INJECTION INTRAMUSCULAR; INTRAVENOUS; SUBCUTANEOUS at 18:56

## 2020-05-28 RX ADMIN — FENTANYL CITRATE 50 MCG: 50 INJECTION, SOLUTION INTRAMUSCULAR; INTRAVENOUS at 19:58

## 2020-05-28 RX ADMIN — CLINDAMYCIN PHOSPHATE 900 MG: 900 INJECTION, SOLUTION INTRAVENOUS at 22:42

## 2020-05-28 RX ADMIN — FENTANYL CITRATE 50 MCG: 50 INJECTION, SOLUTION INTRAMUSCULAR; INTRAVENOUS at 20:06

## 2020-05-28 RX ADMIN — CELECOXIB 400 MG: 200 CAPSULE ORAL at 14:19

## 2020-05-28 RX ADMIN — LIDOCAINE HYDROCHLORIDE 4 ML: 10 INJECTION, SOLUTION INFILTRATION; PERINEURAL at 13:45

## 2020-05-28 RX ADMIN — FENTANYL CITRATE 50 MCG: 50 INJECTION INTRAMUSCULAR; INTRAVENOUS at 18:51

## 2020-05-28 RX ADMIN — ONDANSETRON HYDROCHLORIDE 4 MG: 2 SOLUTION INTRAMUSCULAR; INTRAVENOUS at 18:45

## 2020-05-28 RX ADMIN — HYDROMORPHONE HYDROCHLORIDE 0.5 MG: 1 INJECTION, SOLUTION INTRAMUSCULAR; INTRAVENOUS; SUBCUTANEOUS at 20:40

## 2020-05-28 RX ADMIN — LIDOCAINE HYDROCHLORIDE 6 ML: 10 INJECTION, SOLUTION INFILTRATION; PERINEURAL at 13:09

## 2020-05-28 RX ADMIN — FENTANYL CITRATE 50 MCG: 50 INJECTION INTRAMUSCULAR; INTRAVENOUS at 18:44

## 2020-05-28 RX ADMIN — KETAMINE HYDROCHLORIDE 10 MG: 10 INJECTION INTRAMUSCULAR; INTRAVENOUS at 17:11

## 2020-05-28 RX ADMIN — TECHNETIUM TC 99M SULFUR COLLOID 1 DOSE: KIT at 13:45

## 2020-05-28 RX ADMIN — OXYCODONE HYDROCHLORIDE 10 MG: 5 TABLET ORAL at 14:18

## 2020-05-28 RX ADMIN — LIDOCAINE AND PRILOCAINE 1 APPLICATION: 25; 25 CREAM TOPICAL at 11:04

## 2020-05-28 RX ADMIN — GLYCOPYRROLATE 0.4 MG: 0.2 INJECTION INTRAMUSCULAR; INTRAVENOUS at 18:56

## 2020-05-28 RX ADMIN — ROCURONIUM BROMIDE 50 MG: 10 INJECTION, SOLUTION INTRAVENOUS at 14:41

## 2020-05-28 RX ADMIN — KETAMINE HYDROCHLORIDE 10 MG: 10 INJECTION INTRAMUSCULAR; INTRAVENOUS at 16:04

## 2020-05-28 RX ADMIN — PROPOFOL 50 MG: 10 INJECTION, EMULSION INTRAVENOUS at 14:46

## 2020-05-28 RX ADMIN — KETAMINE HYDROCHLORIDE 30 MG: 10 INJECTION INTRAMUSCULAR; INTRAVENOUS at 14:58

## 2020-05-28 RX ADMIN — FENTANYL CITRATE 50 MCG: 50 INJECTION INTRAMUSCULAR; INTRAVENOUS at 14:41

## 2020-05-28 NOTE — ANESTHESIA PREPROCEDURE EVALUATION
Anesthesia Evaluation     Patient summary reviewed and Nursing notes reviewed   NPO Solid Status: > 8 hours  NPO Liquid Status: > 2 hours           Airway   Mallampati: II  TM distance: >3 FB  Neck ROM: full  Dental - normal exam     Pulmonary - negative pulmonary ROS and normal exam   Cardiovascular - normal exam    (+) hypertension, valvular problems/murmurs TI, hyperlipidemia,       Neuro/Psych  (+) CVA, headaches, psychiatric history Anxiety and Depression,     GI/Hepatic/Renal/Endo    (+)  GERD,  liver disease history of elevated LFT,     Musculoskeletal     (+) neck pain,   Abdominal    Substance History - negative use     OB/GYN negative ob/gyn ROS         Other   arthritis, blood dyscrasia,   history of cancer    ROS/Med Hx Other: HIV                    Anesthesia Plan    ASA 3     general       Anesthetic plan, all risks, benefits, and alternatives have been provided, discussed and informed consent has been obtained with: patient.    Plan discussed with CRNA.

## 2020-05-28 NOTE — ANESTHESIA PROCEDURE NOTES
Airway  Urgency: elective    Date/Time: 5/28/2020 2:45 PM  Airway not difficult    General Information and Staff    Patient location during procedure: OR  Anesthesiologist: Catarino Bowden MD    Indications and Patient Condition  Indications for airway management: airway protection    Preoxygenated: yes  MILS maintained throughout  Mask difficulty assessment: 1 - vent by mask    Final Airway Details  Final airway type: endotracheal airway      Successful airway: ETT  Cuffed: yes   Successful intubation technique: direct laryngoscopy  Endotracheal tube insertion site: oral  Blade: Khoa  Blade size: 3  ETT size (mm): 7.0  Cormack-Lehane Classification: grade I - full view of glottis  Placement verified by: chest auscultation and capnometry   Number of attempts at approach: 1  Assessment: lips, teeth, and gum same as pre-op and atraumatic intubation

## 2020-05-29 ENCOUNTER — READMISSION MANAGEMENT (OUTPATIENT)
Dept: CALL CENTER | Facility: HOSPITAL | Age: 60
End: 2020-05-29

## 2020-05-29 VITALS
SYSTOLIC BLOOD PRESSURE: 121 MMHG | DIASTOLIC BLOOD PRESSURE: 72 MMHG | HEART RATE: 88 BPM | RESPIRATION RATE: 16 BRPM | TEMPERATURE: 97.7 F | BODY MASS INDEX: 23.24 KG/M2 | HEIGHT: 67 IN | OXYGEN SATURATION: 96 % | WEIGHT: 148.06 LBS

## 2020-05-29 PROBLEM — C50.812 MALIGNANT NEOPLASM OF OVERLAPPING SITES OF LEFT BREAST IN FEMALE, ESTROGEN RECEPTOR POSITIVE: Status: RESOLVED | Noted: 2019-11-12 | Resolved: 2020-05-29

## 2020-05-29 PROBLEM — C50.912 INVASIVE DUCTAL CARCINOMA OF BREAST, FEMALE, LEFT: Status: RESOLVED | Noted: 2019-11-12 | Resolved: 2020-05-29

## 2020-05-29 PROBLEM — Z17.0 MALIGNANT NEOPLASM OF OVERLAPPING SITES OF LEFT BREAST IN FEMALE, ESTROGEN RECEPTOR POSITIVE: Status: RESOLVED | Noted: 2019-11-12 | Resolved: 2020-05-29

## 2020-05-29 PROCEDURE — 25010000002 ENOXAPARIN PER 10 MG: Performed by: PLASTIC SURGERY

## 2020-05-29 PROCEDURE — G0378 HOSPITAL OBSERVATION PER HR: HCPCS

## 2020-05-29 RX ORDER — GABAPENTIN 100 MG/1
100 CAPSULE ORAL EVERY 8 HOURS
Qty: 60 CAPSULE | Refills: 1 | Status: SHIPPED | OUTPATIENT
Start: 2020-05-29 | End: 2020-08-25

## 2020-05-29 RX ORDER — CLINDAMYCIN HYDROCHLORIDE 300 MG/1
300 CAPSULE ORAL 4 TIMES DAILY
Qty: 12 CAPSULE | Refills: 0 | Status: SHIPPED | OUTPATIENT
Start: 2020-05-29 | End: 2020-06-01

## 2020-05-29 RX ORDER — DOCUSATE SODIUM 250 MG
250 CAPSULE ORAL DAILY PRN
Qty: 30 CAPSULE | Refills: 1 | Status: SHIPPED | OUTPATIENT
Start: 2020-05-29 | End: 2020-09-29

## 2020-05-29 RX ORDER — ACETAMINOPHEN 325 MG/1
650 TABLET ORAL EVERY 4 HOURS PRN
Qty: 60 TABLET | Refills: 0 | Status: SHIPPED | OUTPATIENT
Start: 2020-05-29 | End: 2020-06-10

## 2020-05-29 RX ORDER — OXYCODONE HYDROCHLORIDE 5 MG/1
5-10 TABLET ORAL EVERY 4 HOURS PRN
Qty: 15 TABLET | Refills: 0 | Status: SHIPPED | OUTPATIENT
Start: 2020-05-29 | End: 2020-06-10

## 2020-05-29 RX ORDER — ONDANSETRON 4 MG/1
4 TABLET, FILM COATED ORAL EVERY 6 HOURS PRN
Qty: 10 TABLET | Refills: 1 | Status: SHIPPED | OUTPATIENT
Start: 2020-05-29 | End: 2020-08-25

## 2020-05-29 RX ADMIN — BACLOFEN 10 MG: 10 TABLET ORAL at 09:02

## 2020-05-29 RX ADMIN — ESCITALOPRAM 10 MG: 10 TABLET, FILM COATED ORAL at 09:02

## 2020-05-29 RX ADMIN — SODIUM CHLORIDE, POTASSIUM CHLORIDE, SODIUM LACTATE AND CALCIUM CHLORIDE 125 ML/HR: 600; 310; 30; 20 INJECTION, SOLUTION INTRAVENOUS at 02:27

## 2020-05-29 RX ADMIN — OXYCODONE HYDROCHLORIDE 5 MG: 5 TABLET ORAL at 06:16

## 2020-05-29 RX ADMIN — ACETAMINOPHEN 650 MG: 325 TABLET, FILM COATED ORAL at 10:47

## 2020-05-29 RX ADMIN — DOCUSATE SODIUM 100 MG: 100 CAPSULE, LIQUID FILLED ORAL at 09:02

## 2020-05-29 RX ADMIN — CLINDAMYCIN PHOSPHATE 900 MG: 900 INJECTION, SOLUTION INTRAVENOUS at 06:13

## 2020-05-29 RX ADMIN — OXYCODONE HYDROCHLORIDE 5 MG: 5 TABLET ORAL at 02:10

## 2020-05-29 RX ADMIN — GABAPENTIN 100 MG: 100 CAPSULE ORAL at 06:13

## 2020-05-29 RX ADMIN — ATORVASTATIN CALCIUM 20 MG: 20 TABLET, FILM COATED ORAL at 09:02

## 2020-05-29 RX ADMIN — CETIRIZINE HYDROCHLORIDE 10 MG: 10 TABLET, FILM COATED ORAL at 09:02

## 2020-05-29 RX ADMIN — OXYCODONE HYDROCHLORIDE 5 MG: 5 TABLET ORAL at 10:47

## 2020-05-29 NOTE — ANESTHESIA POSTPROCEDURE EVALUATION
"Patient: Richa Dolan    Procedure Summary     Date:  05/28/20 Room / Location:  [facility] OR [address] [place] [address] MAIN OR    Anesthesia Start:  1434 Anesthesia Stop:  1917    Procedures:       BREAST LUMPECTOMY WITH SENTINEL NODE BIOPSY AND NEEDLE LOCALIZATION AND axillary dissection (Left Breast)      LEFT LATERAL INTERCOASTAL ARTERY  flap  (Left Chest) Diagnosis:       Malignant neoplasm of overlapping sites of left breast in female, estrogen receptor positive (CMS/HCC)      (Malignant neoplasm of overlapping sites of left breast in female, estrogen receptor positive (CMS/HCC) [C50.812, Z17.0])    Surgeon:  Landen Forman MD; Yusuf Garcia MD Provider:  Al Mendieta MD    Anesthesia Type:  general ASA Status:  3          Anesthesia Type: general    Vitals  Vitals Value Taken Time   /75 5/28/2020  8:55 PM   Temp 37.3 °C (99.2 °F) 5/28/2020  7:11 PM   Pulse 102 5/28/2020  8:55 PM   Resp 16 5/28/2020  8:55 PM   SpO2 96 % 5/28/2020  8:55 PM           Post Anesthesia Care and Evaluation    Patient location during evaluation: bedside  Patient participation: complete - patient participated  Level of consciousness: awake  Pain management: adequate  Airway patency: patent  Anesthetic complications: No anesthetic complications    Cardiovascular status: acceptable  Respiratory status: acceptable  Hydration status: acceptable    Comments: */75 (BP Location: Right arm, Patient Position: Lying)   Pulse 102   Temp 37.3 °C (99.2 °F) (Oral)   Resp 16   Ht 170.2 cm (67\")   Wt 67.2 kg (148 lb 1 oz)   SpO2 96%   BMI 23.19 kg/m²         "

## 2020-05-30 NOTE — OUTREACH NOTE
Prep Survey      Responses   [facility] facility patient discharged from?  [place]   Is LACE score < 7 ?  No   Eligibility  [facility]   Date of Admission  05/28/20   Date of Discharge  05/29/20   Discharge Disposition  Home or Self Care   Discharge diagnosis  left Needle Localization Breast lumpectomy and left axillary dissection    COVID-19 Test Status  Negative   Does the patient have one of the following disease processes/diagnoses(primary or secondary)?  General Surgery   Does the patient have Home health ordered?  No   Is there a DME ordered?  No   Prep survey completed?  Yes          Jennifer Sinha RN

## 2020-06-01 ENCOUNTER — TRANSITIONAL CARE MANAGEMENT TELEPHONE ENCOUNTER (OUTPATIENT)
Dept: CALL CENTER | Facility: HOSPITAL | Age: 60
End: 2020-06-01

## 2020-06-01 NOTE — OUTREACH NOTE
Call Center TCM Note      Responses   [facility] patient discharged from?  [place]   Does the patient have one of the following disease processes/diagnoses(primary or secondary)?  General Surgery   TCM attempt successful?  Yes   Call start time  1132   Call end time  1144   Discharge diagnosis  left Needle Localization Breast lumpectomy and left axillary dissection    Is patient permission given to speak with other caregiver?  Yes   Meds reviewed with patient/caregiver?  Yes   Is the patient having any side effects they believe may be caused by any medication additions or changes?  No   Does the patient have all medications related to this admission filled (includes all antibiotics, pain medications, etc.)  Yes   Is the patient taking all medications as directed (includes completed medication regime)?  Yes   Medication comments  Patient is requesting a refill on her Ambien to help her sleep. Message sent to [facility] office.    Does the patient have a follow up appointment scheduled with their surgeon?  Yes   Has the patient kept scheduled appointments due by today?  N/A   Comments  Patient has a followup with surgeon on Bella 3 2020 she declined followup visit.    Has home health visited the patient within 72 hours of discharge?  N/A   Psychosocial issues?  No   Did the patient receive a copy of their discharge instructions?  Yes   Nursing interventions  Reviewed instructions with patient   What is the patient's perception of their health status since discharge?  Improving   Nursing interventions  Nurse provided patient education   Is the patient /caregiver able to teach back basic post-op care?  Drive as instructed by MD in discharge instructions, Take showers only when approved by MD-sponge bathe until then, No tub bath, swimming, or hot tub until instructed by MD, Keep incision areas clean,dry and protected, Do not remove steri-strips, Lifting as instructed by MD in discharge instructions, Practice  'cough and deep breath'   Is the patient/caregiver able to teach back signs and symptoms of incisional infection?  Increased redness, swelling or pain at the incisonal site, Incisional warmth, Fever, Increased drainage or bleeding, Pus or odor from incision   Is the patient/caregiver able to teach back steps to recovery at home?  Set small, achievable goals for return to baseline health, Make a list of questions for surgeon's appointment, Rest and rebuild strength, gradually increase activity   Is the patient/caregiver able to teach back the hierarchy of who to call/visit for symptoms/problems? PCP, Specialist, Home health nurse, Urgent Care, ED, 911  Yes   TCM call completed?  Yes          Ernesto Padron RN    6/1/2020, 11:44

## 2020-06-02 ENCOUNTER — TELEPHONE (OUTPATIENT)
Dept: MAMMOGRAPHY | Facility: CLINIC | Age: 60
End: 2020-06-02

## 2020-06-03 ENCOUNTER — TELEPHONE (OUTPATIENT)
Dept: MAMMOGRAPHY | Facility: CLINIC | Age: 60
End: 2020-06-03

## 2020-06-03 DIAGNOSIS — G47.00 INSOMNIA, UNSPECIFIED TYPE: ICD-10-CM

## 2020-06-03 RX ORDER — ZOLPIDEM TARTRATE 10 MG/1
10 TABLET ORAL NIGHTLY PRN
Qty: 90 TABLET | Refills: 0 | OUTPATIENT
Start: 2020-06-03

## 2020-06-03 NOTE — TELEPHONE ENCOUNTER
PATIENT NEEDS AN REFILL ON zolpidem (AMBIEN) 10 MG tablet    CONFIRMED [facility] ON [address]     PLEASE ADVISE PATIENT -[phone].

## 2020-06-04 LAB
CYTO UR: NORMAL
LAB AP CASE REPORT: NORMAL
LAB AP DIAGNOSIS COMMENT: NORMAL
LAB AP SPECIAL STAINS: NORMAL
LAB AP SYNOPTIC CHECKLIST: NORMAL
Lab: NORMAL
PATH REPORT.ADDENDUM SPEC: NORMAL
PATH REPORT.FINAL DX SPEC: NORMAL
PATH REPORT.GROSS SPEC: NORMAL

## 2020-06-04 NOTE — TELEPHONE ENCOUNTER
Patient called back in stating she spoke with the pharmacy and was told that the office needs to call her insurance and do a prior authorization for the Zolpidem(Ambien) in order for them to cover it.    Patient call back [phone]

## 2020-06-05 ENCOUNTER — READMISSION MANAGEMENT (OUTPATIENT)
Dept: CALL CENTER | Facility: HOSPITAL | Age: 60
End: 2020-06-05

## 2020-06-05 NOTE — OUTREACH NOTE
General Surgery Week 2 Survey      Responses   [facility] patient discharged from?  [place]   Does the patient have one of the following disease processes/diagnoses(primary or secondary)?  General Surgery   Week 2 attempt successful?  No   Unsuccessful attempts  Attempt 1          Naty Ibarra RN

## 2020-06-09 ENCOUNTER — READMISSION MANAGEMENT (OUTPATIENT)
Dept: CALL CENTER | Facility: HOSPITAL | Age: 60
End: 2020-06-09

## 2020-06-09 NOTE — OUTREACH NOTE
"General Surgery Week 2 Survey      Responses   [facility] patient discharged from?  [place]   Does the patient have one of the following disease processes/diagnoses(primary or secondary)?  General Surgery   Week 2 attempt successful?  Yes   Call start time  1249   Call end time  1252   Discharge diagnosis  left Needle Localization Breast lumpectomy and left axillary dissection    Meds reviewed with patient/caregiver?  Yes   Is the patient taking all medications as directed (includes completed medication regime)?  Yes   Does the patient have a follow up appointment scheduled with their surgeon?  Yes [6/3/20-one drainage tube removed,  6/8/20]   Has the patient kept scheduled appointments due by today?  Yes   What is the patient's perception of their health status since discharge?  Improving [Pt reports, she's \"still hurting\"]   Nursing interventions  Nurse provided patient education   Is the patient /caregiver able to teach back basic post-op care?  Keep incision areas clean,dry and protected, Lifting as instructed by MD in discharge instructions   Is the patient/caregiver able to teach back signs and symptoms of incisional infection?  Pus or odor from incision, Increased drainage or bleeding [Pt has a drainage tube in. ]   Is the patient/caregiver able to teach back steps to recovery at home?  Rest and rebuild strength, gradually increase activity   Week 2 call completed?  Yes          Naz Hawk RN  "

## 2020-06-10 ENCOUNTER — LAB (OUTPATIENT)
Dept: LAB | Facility: HOSPITAL | Age: 60
End: 2020-06-10

## 2020-06-10 ENCOUNTER — OFFICE VISIT (OUTPATIENT)
Dept: ONCOLOGY | Facility: CLINIC | Age: 60
End: 2020-06-10

## 2020-06-10 VITALS
HEART RATE: 77 BPM | BODY MASS INDEX: 23.61 KG/M2 | SYSTOLIC BLOOD PRESSURE: 144 MMHG | DIASTOLIC BLOOD PRESSURE: 83 MMHG | OXYGEN SATURATION: 98 % | WEIGHT: 150.4 LBS | RESPIRATION RATE: 16 BRPM | HEIGHT: 67 IN | TEMPERATURE: 98.6 F

## 2020-06-10 DIAGNOSIS — C50.912 INVASIVE DUCTAL CARCINOMA OF BREAST, FEMALE, LEFT (HCC): ICD-10-CM

## 2020-06-10 DIAGNOSIS — C50.812 MALIGNANT NEOPLASM OF OVERLAPPING SITES OF LEFT BREAST IN FEMALE, ESTROGEN RECEPTOR POSITIVE (HCC): Primary | ICD-10-CM

## 2020-06-10 DIAGNOSIS — Z21 HIV POSITIVE LONG-TERM NON-PROGRESSOR (HCC): ICD-10-CM

## 2020-06-10 DIAGNOSIS — Z17.0 MALIGNANT NEOPLASM OF OVERLAPPING SITES OF LEFT BREAST IN FEMALE, ESTROGEN RECEPTOR POSITIVE (HCC): Primary | ICD-10-CM

## 2020-06-10 LAB
ALBUMIN SERPL-MCNC: 4.3 G/DL (ref 3.5–5.2)
ALBUMIN/GLOB SERPL: 1.5 G/DL (ref 1.1–2.4)
ALP SERPL-CCNC: 100 U/L (ref 38–116)
ALT SERPL W P-5'-P-CCNC: 14 U/L (ref 0–33)
ANION GAP SERPL CALCULATED.3IONS-SCNC: 13.2 MMOL/L (ref 5–15)
AST SERPL-CCNC: 21 U/L (ref 0–32)
BASOPHILS # BLD AUTO: 0.03 10*3/MM3 (ref 0–0.2)
BASOPHILS NFR BLD AUTO: 0.7 % (ref 0–1.5)
BILIRUB SERPL-MCNC: 0.3 MG/DL (ref 0.2–1.2)
BUN BLD-MCNC: 10 MG/DL (ref 6–20)
BUN/CREAT SERPL: 18.9 (ref 7.3–30)
CALCIUM SPEC-SCNC: 9.5 MG/DL (ref 8.5–10.2)
CHLORIDE SERPL-SCNC: 107 MMOL/L (ref 98–107)
CO2 SERPL-SCNC: 23.8 MMOL/L (ref 22–29)
CREAT BLD-MCNC: 0.53 MG/DL (ref 0.6–1.1)
DEPRECATED RDW RBC AUTO: 49.6 FL (ref 37–54)
EOSINOPHIL # BLD AUTO: 0.06 10*3/MM3 (ref 0–0.4)
EOSINOPHIL NFR BLD AUTO: 1.4 % (ref 0.3–6.2)
ERYTHROCYTE [DISTWIDTH] IN BLOOD BY AUTOMATED COUNT: 13.2 % (ref 12.3–15.4)
GFR SERPL CREATININE-BSD FRML MDRD: 118 ML/MIN/1.73
GLOBULIN UR ELPH-MCNC: 2.8 GM/DL (ref 1.8–3.5)
GLUCOSE BLD-MCNC: 87 MG/DL (ref 74–124)
HCT VFR BLD AUTO: 36.9 % (ref 34–46.6)
HGB BLD-MCNC: 11.9 G/DL (ref 12–15.9)
IMM GRANULOCYTES # BLD AUTO: 0 10*3/MM3 (ref 0–0.05)
IMM GRANULOCYTES NFR BLD AUTO: 0 % (ref 0–0.5)
LYMPHOCYTES # BLD AUTO: 1.44 10*3/MM3 (ref 0.7–3.1)
LYMPHOCYTES NFR BLD AUTO: 32.5 % (ref 19.6–45.3)
MCH RBC QN AUTO: 32.8 PG (ref 26.6–33)
MCHC RBC AUTO-ENTMCNC: 32.2 G/DL (ref 31.5–35.7)
MCV RBC AUTO: 101.7 FL (ref 79–97)
MONOCYTES # BLD AUTO: 0.55 10*3/MM3 (ref 0.1–0.9)
MONOCYTES NFR BLD AUTO: 12.4 % (ref 5–12)
NEUTROPHILS # BLD AUTO: 2.35 10*3/MM3 (ref 1.7–7)
NEUTROPHILS NFR BLD AUTO: 53 % (ref 42.7–76)
NRBC BLD AUTO-RTO: 0 /100 WBC (ref 0–0.2)
PLATELET # BLD AUTO: 291 10*3/MM3 (ref 140–450)
PMV BLD AUTO: 9.3 FL (ref 6–12)
POTASSIUM BLD-SCNC: 4.1 MMOL/L (ref 3.5–4.7)
PROT SERPL-MCNC: 7.1 G/DL (ref 6.3–8)
RBC # BLD AUTO: 3.63 10*6/MM3 (ref 3.77–5.28)
SODIUM BLD-SCNC: 144 MMOL/L (ref 134–145)
WBC NRBC COR # BLD: 4.43 10*3/MM3 (ref 3.4–10.8)

## 2020-06-10 PROCEDURE — 99214 OFFICE O/P EST MOD 30 MIN: CPT | Performed by: INTERNAL MEDICINE

## 2020-06-10 PROCEDURE — 36415 COLL VENOUS BLD VENIPUNCTURE: CPT

## 2020-06-10 PROCEDURE — 85025 COMPLETE CBC W/AUTO DIFF WBC: CPT

## 2020-06-10 PROCEDURE — 80053 COMPREHEN METABOLIC PANEL: CPT

## 2020-06-10 RX ORDER — IBUPROFEN 600 MG/1
600 TABLET ORAL 3 TIMES DAILY PRN
COMMUNITY
Start: 2020-06-01 | End: 2020-09-29

## 2020-06-10 RX ORDER — HYDROCODONE BITARTRATE AND ACETAMINOPHEN 5; 325 MG/1; MG/1
1 TABLET ORAL EVERY 6 HOURS PRN
COMMUNITY
Start: 2020-06-01 | End: 2020-08-25

## 2020-06-10 NOTE — PROGRESS NOTES
Subjective     REASON FOR FOLLOW UP:    1.  T2N1 invasive ductal carcinoma of the left breast.  Ultrasound-showed 3.8 x 3.1 x 2.5 cm mass at 4 o'clock position with 2 abnormal axillary lymph nodes, larger lymph node being 1.4 cm.  Left breast biopsy and axillary lymph node biopsy October 29, 2019, invasive ductal carcinoma, moderately differentiated, grade 2, ER 85%, MO 10%, HER-2/merna 2+, HER-2 FISH negative.  · 12/06/19: Neoadjuvant chemo therapy, cycle #1 of dose-dense Adriamycin/Cytoxan with Neulasta for marrow support    · 01/17/20: Last cycle of Adriamycin/Cytoxan given  · January 31, 2020: Cycle 1 Taxol  · April 24, 2020: Last cycle, cycle 12 of Taxol        2.  Severe neutropenia secondary to chemotherapy, with ANC 50 at 1-week after cycle #1 chemotherapy.  Also had moderate thrombocytopenia platelets 92,000.  Patient was given Levaquin for prophylaxis of neutropenia fever.    3.  HIV, followed by Dr. Dawn from infectious disease.  Well controlled on meds  .               HISTORY OF PRESENT ILLNESS:  The patient is a 59 y.o. year old female with history of T2N1 invasive ductal carcinoma of the left breast status post neoadjuvant chemotherapy with Adriamycin Cytoxan followed by weekly Taxol.  She is completed all treatment and currently has had a MRI of the breast which shows partial response to the neoadjuvant chemotherapy with considerable reduction in the left axillary adenopathy and reduction in the size of the index lesion in the left breast as well as other satellite lesions.    Patient is s/p left mastectomy with axillary dissection with reconstruction.  She is healing up nicely.  She has a drain in place.  Pathology showed invasive breast cancer which is 55 x 40 mm in size, grade 2 with focal lymphovascular space invasion with DCIS spanning over 36 x 6 mm.  All margins were negative for cancer.  8 of the additional lymph nodes out of 11 were positive with the largest micrometastasis measuring 5  mm.  Extranodal extension was seen.    Biomarkers on post neoadjuvant tumor is ER 81-90% DC 5% HER-2/merna 2+ by immunohistochemistry and HER-2/merna negative by FISH.    Past Medical History:   Diagnosis Date   • Anxiety    • Cerebrovascular accident (CVA) (CMS/[facility]) 8/22/2017    NO RESIDUAL AFFECT   • DDD (degenerative disc disease), cervical    • Depression    • Elevated cholesterol    • GERD (gastroesophageal reflux disease)    • H/O ICH (intracerebral hemorrhage) (CMS/[facility])    • History of chemotherapy    • History of stroke    • History of thrombocytopenia    • HIV (human immunodeficiency virus infection) (CMS/[facility]) 1996   • Hyperlipidemia    • Insomnia    • Lupus anticoagulant positive    • Malignant neoplasm of overlapping sites of left breast in female, estrogen receptor positive (CMS/[facility]) 11/12/2019   • Meniscus tear 2017    RIGHT   • Migraine    • Neck pain     WITH SHOOTING PAIN LEFT RIGHT ARM   • Osteoarthritis    • Osteoporosis    • Pain management     sees 1 every 3 months for scripts/injection/pain meds   • Seasonal allergies    • Spinal headache     THINKS HAD BLOOD PATCH AFTER EPIDUAL    • Tick bite 06/2014     PAST MEDICAL HISTORY:  She had a stroke in July 2017 with headache and dizziness.  She went to the ER and was placed on aspirin.  However, she stopped taking it two weeks ago.  She has been taking Aimovig (injection) monthly with Fariba López at [place].    In 1994, patient was diagnosed with HIV with an unknown cause which is managed by Dr. Natali Subramanain.  As of now, her viral load is good.    Patient has arthritis.  She gets trigger-point injections in her back with her last one being 2 weeks ago.  She has had multiple ablations.  She also has pain in her SI joint and Dr. Bermudez performed a surgery on this.  She takes Narco for the pain.      She is osteoporotic.  She has had multiple hairline fractures in both her legs.  She had her knees cleaned out by Dr. Sinha.     Patient has had a  hysterectomy and still has both of her ovaries.    Patient has vertigo and the muscles in her left ear are weak.  She just has an imbalance.    Past Surgical History:   Procedure Laterality Date   • ADENOIDECTOMY     • BREAST LUMPECTOMY WITH SENTINEL NODE BIOPSY Left 5/28/2020    Procedure: BREAST LUMPECTOMY WITH SENTINEL NODE BIOPSY AND NEEDLE LOCALIZATION AND axillary dissection;  Surgeon: Landen Foramn MD;  Location: [facility];  Service: General;  Laterality: Left;   • BREAST SURGERY Left 5/28/2020    Procedure: LEFT LATERAL INTERCOASTAL ARTERY  flap ;  Surgeon: Yusuf Garcia MD;  Location: [facility] OR;  Service: Plastics;  Laterality: Left;   • CHOLECYSTECTOMY     • HYSTERECTOMY     • KNEE ARTHROSCOPY Right 3/24/2017    Procedure:  RIGHT  KNEE ARTHROSCOPY WITH DEBRIDEMENT CHONDROPLASTY PARTIAL MENISCECTOMY;  Surgeon: Karl Galeas MD;  Location: [facility] [place], operated by [facility];  Service:    • KNEE CARTILAGE SURGERY Right 2007   • TONSILLECTOMY     • US GUIDED LYMPH NODE BIOPSY  10/29/2019    LEFT BREAST   • VENOUS ACCESS DEVICE (PORT) INSERTION Right 12/2/2019    Procedure: INSERTION VENOUS ACCESS DEVICE RIGHT;  Surgeon: Landen Forman MD;  Location: [facility];  Service: General       ONCOLOGIC HISTORY:  Patient is a 59-year-old female who has had a history of HIV since age 34 followed by Dr. Natali Ng at [facility] and was on multiple HIV drugs initially but more recently has been on a single medication TRIUMEQ, with well-controlled HIV.  She follows up with Dr. Dawn , infectious disease who saw her recently and he saw her on 4/8/2019 and has excellent control and suppression of her viral load.  She is very compliant with her medications.    She also has had history of stroke in 2017 for which she was placed on aspirin.  She presented with a headache.  She is very active and works at [employer] full-time.  She also has had history of  vertigo in the past  Patient's PCP is Zach Lora.    Patient first noticed the mass in her left breast 5 months ago.  Her last mammogram was 5 years ago.  She had soreness in the left breast and she went to her primary care physician who then ordered a mammogram diagnostic and an ultrasound.  The diagnostic mammogram showed a 3.7 cm mass at 4 o'clock position in the lower outer quadrant of the left breast.  The ultrasound showed 3.8 cm x 3.1 x 2.5 cm mass at 4 o'clock position in the left breast with 2 abnormal appearing left axillary lymph nodes the largest being 1.4 cm.    She then underwent biopsy of both the breast mass as well as the left axillary lymph node and both of them are positive for invasive mammary carcinoma it is invasive ductal carcinoma with apocrine features, moderately differentiated, grade 2 of 3 with a Jen score of 5.  The left axillary lymph node is also consistent with metastatic mammary carcinoma.  It is ER positive [place] positive HER-2/merna negative.  Details as follows    10/21/19 - Bilateral Diagnostic Mammogram and US Breast Left  FINDINGS: Bilateral digital CC and MLO mammographic images were  obtained. No prior examination is available for comparison. Scattered  fibroglandular densities are seen throughout both breasts. A triangular  skin marker represents the area of palpable concern in the lower outer  quadrant of the left breast in the posterior 1/3. At this location there  is an irregular mass that measures on the order of 3.7 cm in greatest  dimension. Internal calcifications are noted. No suspicious findings in  the right breast are appreciated.     ULTRASOUND: Targeted sonographic evaluation of the left breast was  performed through the area of concern in the left axilla. At the 4  o'clock position on the order of 6 cm from the nipple there is an  irregular hypoechoic mass that measures 3.8 x 3.1 x 2.5 cm. Internal  vascularity is noted.     There are 2 abnormal-appearing  left axillary lymph nodes, the largest  which measures on the order of 1.4 cm in greatest dimension.     IMPRESSION:  1. There is a 3.8 cm irregular mass in the left breast at the 4 o'clock  position. This is seen in conjunction with an irregular 1.4 cm lymph  node in the left axilla. This is suspicious for malignancy with left  axillary involvement. Correlation with ultrasound-guided biopsy of both  the left breast mass and the lymph node is recommended.  2. There are no findings suspicious for malignancy in the right breast.     BI-RADS CATEGORY 5:     Patient's labs from 11/12/19 are normal.    10/29/19 - Tissue Pathology  1. Left Breast, 4:00, 6 cm FN, U/S-Guided Core Needle Biopsy for a Mass:  A. INVASIVE DUCTAL CARCINOMA WITH APOCRINE FEATURES, Moderately differentiated;  [place] Histologic Grade II/III (tubule score = 3, nuclear score = 2, mitoses score = 1), measuring at least  9 mm.  B. No ductal or lobular carcinoma in situ is identified in this sample.  C. Focus suspicious for lymphovascular space invasion.  D. See Biomarker Template for hormone receptor studies.  2. Lymph Node, Left Axilla, U/S-Guided Core Needle Biopsy:  A. METASTATIC MAMMARY CARCINOMA (see Comment).  B. Metastatic focus measures 5 mm in greatest extent.  ER+, 85%  ND+, 10%  HER2- Score 2+    11/13/19 - CT Neck Chest Abdomen Pelvis  IMPRESSION:  1. In addition to the irregular approximately 3.8 cm mass within the  lateral aspect of the left breast, there are a few subcentimeter  asymmetric nodules along the lateral margin of the glandular tissue. The  several asymmetric left subpectoral nodes and 1.2 x 1.0 cm left axillary  node are suspicious for tamar metastases. The asymmetric subcentimeter  left supraclavicular and left posterior cervical triangle nodes are  worrisome as well.  2. There is no convincing evidence for metastatic disease within the  abdomen or pelvis.    11/14/19 - Echocardiogram:  Normal.  Calculated EF = 67%.   There is trace mitral valve and tricuspid valve regurgitation.    11/15/19 - Bone Scan  Negative.    19 - MRI Breast Bilateral  IMPRESSION:  1. Biopsy-proven malignancy in the left breast centered at the 4 o'clock  position with associated surrounding satellite nodules as described. The  entire region of involvement including the satellite nodules and the  dominant mass measure up to 7.5 cm in greatest dimension. Also, left  axillary adenopathy with multiple irregular lymph nodes and loss of  differentiation of the borders of multiple lymph nodes is noted and this  is suspicious for extranodal involvement.  2. There are no findings suspicious for malignancy in the right breast.  BI-RADS CATEGORY 6      19: Cycle #1 of Adriamycin/Cytoxan    20: Last cycle of Adriamycin/Cytoxan given without Neulasta.  We will switch from Neulasta to 7 days of Neupogen injections after cycle 4.    We reviewed with patient the US Breast from 20 which shows mild interval partial response to neoadjuvant chemotherapy.      20: Initiated cycle 1 Taxol  2020: Last cycle of Taxol, cycle 12    S/p left mastectomy with axillary dissection   Pathology showed invasive breast cancer which is 55 x 40 mm in size, grade 2 with focal lymphovascular space invasion with DCIS spanning over 36 x 6 mm.  All margins were negative for cancer.  8 of the additional lymph nodes out of 11 were positive with the largest micrometastasis measuring 5 mm.  Extranodal extension was seen.    Biomarkers on post neoadjuvant tumor is ER 81-90% HI 5% HER-2/merna 2+ by immunohistochemistry and HER-2/merna negative by FISH.        OB-GYN:  Menarche 15 years  Menopause-46  Pregnancies- 1 para 1 no miscarriages her first pregnancy was in  at 29 years of age.  She did not breastfeed.  Post menopausal HRT- None.  Hx of birth control pills- Yes.    Current Outpatient Medications on File Prior to Visit   Medication Sig Dispense  Refill   • Abacavir-Dolutegravir-Lamivud (Triumeq) 600- MG per tablet Take 1 tablet by mouth Daily.     • AIMOVIG 140 MG/ML solution auto-injector INJECT 140 MG INTO THE SKIN EVERY 30 (THIRTY) DAYS.  11   • atorvastatin (LIPITOR) 20 MG tablet TAKE 1 TABLET BY MOUTH EVERY DAY AT NIGHT (Patient taking differently: Take 20 mg by mouth Daily.) 90 tablet 3   • B COMPLEX VITAMINS PO Take 1 tablet by mouth Daily. HOLD FOR SURGERY     • baclofen (LIORESAL) 10 MG tablet Take 10 mg by mouth 2 (Two) Times a Day.  2   • CAMBIA 50 MG pack TAKE 1 PACKET AS NEEDED FOR HEADACHES  1   • clonazePAM (KlonoPIN) 0.5 MG tablet TAKE 1 TABLET BY MOUTH 2 (TWO) TIMES A DAY AS NEEDED FOR ANXIETY. 60 tablet 1   • colesevelam (WELCHOL) 625 MG tablet TAKE 2 TABLETS BY MOUTH EVERY DAY (Patient taking differently: Take 1,250 mg by mouth Daily. TAKE 2 TABLETS BY MOUTH EVERY DAY) 180 tablet 3   • docusate sodium (COLACE) 250 MG capsule Take 1 capsule by mouth Daily As Needed for Constipation. 30 capsule 1   • escitalopram (LEXAPRO) 10 MG tablet TAKE 1 TABLET BY MOUTH EVERY DAY (Patient taking differently: Take 10 mg by mouth Daily.) 90 tablet 3   • fluticasone (FLONASE) 50 MCG/ACT nasal spray 2 sprays into the nostril(s) as directed by provider Daily. 3 bottle 3   • gabapentin (Neurontin) 100 MG capsule Take 1 capsule by mouth Every 8 (Eight) Hours. 60 capsule 1   • HYDROcodone-acetaminophen (NORCO) 5-325 MG per tablet Take 1 tablet by mouth Every 6 (Six) Hours As Needed.     • ibandronate (BONIVA) 150 MG tablet TAKE 1 TABLET BY MOUTH EVERY 30 (THIRTY) DAYS. 3 tablet 3   • ibuprofen (ADVIL,MOTRIN) 600 MG tablet Take 600 mg by mouth 3 (Three) Times a Day As Needed.     • Loratadine (CLARITIN PO) Take 10 mg by mouth Daily.     • Magnesium Hydroxide (MILK OF MAGNESIA PO) Take 15 mL by mouth Every Night.     • NON FORMULARY Take 1 dose by mouth Daily. Collagen peptides powder      • ondansetron (Zofran) 4 MG tablet Take 1 tablet by mouth Every 6  (Six) Hours As Needed for Nausea or Vomiting. 10 tablet 1   • ondansetron (ZOFRAN) 8 MG tablet Take 1 tablet by mouth 3 (Three) Times a Day As Needed for Nausea or Vomiting. 30 tablet 5   • prochlorperazine (COMPAZINE) 10 MG tablet Take 1 tablet by mouth Every 6 (Six) Hours As Needed for Nausea or Vomiting. 20 tablet 2   • zolpidem (AMBIEN) 10 MG tablet Take 1 tablet by mouth At Night As Needed for Sleep. (Patient taking differently: Take 10 mg by mouth Every Night.) 90 tablet 0   • [DISCONTINUED] acetaminophen (TYLENOL) 325 MG tablet Take 2 tablets by mouth Every 4 (Four) Hours As Needed for Mild Pain . 60 tablet 0   • [DISCONTINUED] COCONUT OIL PO Take 1 teaspoon(s) by mouth Daily. HOLD FOR SURGERY     • [DISCONTINUED] oxyCODONE (ROXICODONE) 5 MG immediate release tablet Take 1-2 tablets by mouth Every 4 (Four) Hours As Needed for Moderate Pain . 15 tablet 0     No current facility-administered medications on file prior to visit.         ALLERGIES:    Allergies   Allergen Reactions   • Augmentin [Amoxicillin-Pot Clavulanate] Hives        Social History     Socioeconomic History   • Marital status:      Spouse name: Owen   • Number of children: 1   • Years of education: College   • Highest education level: Not on file   Occupational History   • Occupation:      Employer:  [employer]   Tobacco Use   • Smoking status: Never Smoker   • Smokeless tobacco: Never Used   Substance and Sexual Activity   • Alcohol use: Yes     Comment: occasioonal   • Drug use: No   • Sexual activity: Defer     Birth control/protection: None     Patient does not smoke or do drugs.  She does drink occasionally.    Family History   Problem Relation Age of Onset   • Breast cancer Mother    • Leukemia Mother         CLL?   • Diabetes Mother    • Hyperthyroidism Mother    • Hearing loss Mother    • Dementia Father    • Heart disease Maternal Grandmother    • Diabetes Maternal Grandfather    • Heart disease Maternal  "Grandfather    • Stroke Maternal Grandfather    • Heart attack Maternal Grandfather    • Osteoporosis Paternal Grandmother    • Heart disease Paternal Grandfather    • Leukemia Maternal Aunt         CLL?   • Throat cancer Paternal Uncle    • Malig Hyperthermia Neg Hx       FAMILY HISTORY:  Her mother had breast cancer at age 60, still alive at 85 and in good health..  Her father had dementia.  Her paternal uncle had prostate cancer.  Her brother had esophageal cancer.    I have reviewed the patient's medical history in detail and updated the computerized patient record.     Review of Systems   Constitutional: Negative for activity change, appetite change, chills, diaphoresis, fatigue, fever and unexpected weight change.   HENT: Negative for hearing loss, mouth sores, nosebleeds, sore throat and trouble swallowing.    Respiratory: Negative for cough, chest tightness, shortness of breath and wheezing.    Cardiovascular: Negative for chest pain, palpitations and leg swelling.   Gastrointestinal: Negative for abdominal distention, abdominal pain, constipation, diarrhea, nausea and vomiting.   Genitourinary: Negative for difficulty urinating, dysuria, frequency, hematuria and urgency.   Musculoskeletal: Positive for back pain (06/10/20-Unchanged). Negative for joint swelling.        No muscle weakness.   Skin: Negative for rash and wound.   Neurological: Positive for numbness (Bilat foot-06/10/20-Improved). Negative for dizziness, seizures, syncope, speech difficulty, weakness and headaches.   Hematological: Negative for adenopathy. Does not bruise/bleed easily.   Psychiatric/Behavioral: Negative for behavioral problems, confusion, sleep disturbance and suicidal ideas.     Patient's review of system is unchanged as of April 24, 2020    Objective    Vitals:    06/10/20 1113   BP: 144/83   Pulse: 77   Resp: 16   Temp: 98.6 °F (37 °C)   TempSrc: Skin   SpO2: 98%   Weight: 68.2 kg (150 lb 6.4 oz)   Height: 170.2 cm (67.01\") "   PainSc:   7   PainLoc: Arm  Comment: Lt armpit drain tube.       Physical exam 04/17/2020  unchanged from previous office visit except as updated.           CONSTITUTIONAL:  Vital signs reviewed.  No distress, looks comfortable.  RESPIRATORY:  Normal respiratory effort.  Lungs clear to auscultation bilaterally.  CARDIOVASCULAR:  Normal S1, S2.  No murmurs rubs or gallops.  No significant lower extremity edema.  S/p left mastectomy with immediate reconstruction and axillary dissection.  Patient is healing up well and has a drain in place      GASTROINTESTINAL: Abdomen appears unremarkable.  Nontender.  No hepatomegaly.  No splenomegaly.  LYMPHATIC:  No cervical, supraclavicular, axillary lymphadenopathy.  SKIN:  Warm.  No rashes.  PSYCHIATRIC:  Normal judgment and insight.  Normal mood and affect.        RECENT LABS:   Results from last 7 days   Lab Units 06/10/20  1108   WBC 10*3/mm3 4.43   NEUTROS ABS 10*3/mm3 2.35   HEMOGLOBIN g/dL 11.9*   HEMATOCRIT % 36.9   PLATELETS 10*3/mm3 291               EXAMINATION: BILATERAL BREAST MRI WITHOUT AND WITH CONTRAST 05/06/20  IMPRESSION:  1. There are findings consistent with interval partial response to  neoadjuvant chemotherapy with considerable reduction in left axillary  adenopathy and reduction in size of the index lesion in the left breast  as well as other satellite lesions. Persistent diffuse left breast skin  thickening is noted.  2. There are no findings suspicious for malignancy in the right breast.     BI-RADS Category 6: Known biopsy-proven malignancy.    01/29/20 - US Breast  IMPRESSION:  There are findings suggestive of mild interval partial  response to neoadjuvant chemotherapy.    Assessment/Plan    1. T2N1 invasive ductal carcinoma of the left breast, recently diagnosed.    -Noticed mass in the left breast x5 months  -Diagnostic mammogram showed 3.7 mm mass in the left breast at 4 o'clock position  -Ultrasound-showed 3.8 x 3.1 x 2.5 cm mass at 4 o'clock  position with 2 abnormal axillary lymph nodes, larger lymph node being 1.4 cm  -Left breast biopsy and axillary lymph node biopsy October 29, 2019, invasive ductal carcinoma, moderately differentiated, grade 2, ER 85%, ID 10%, HER-2/merna 2+, HER-2 FISH negative  · CT scans from 11/13/19 show some asymmetric nodules along the lateral margin of the glandular tissue.  The 1.2x1.0 cm left axillary nodes, left supraclavicular, and left posterior cervical triangle nodes are suspicious for tamar metastases.  ·   bone scan from 11/15/19 which was negative.   ·  MRI breast from 11/18/19 which shows the biopsy-proven malignancy in the left breast centered at the 4:00 position.  The region of involvement measures up to 7.5 cm.  There is left axillary adenopathy with multiple irregular lymph nodes and loss of differentiation of the border of multiple lymph nodes which are suspicious for extranodal involvement.  ·    echocardiogram from 11/14/19 which was normal with an ejection fraction of 67%.    · INVITAE from 11/14/19 which was negative.  · Given the size of her tumor and her medical history, patient will be given 4 cycles of Adriamycin/Cytoxan with Neulasta.  After completion of this treatment, patient will be started on 12 cycles of Taxol.  After the tumor shrinks in size, Dr. Forman will perform a lumpectomy.    · Following lumpectomy, patient will begin endocrine therapy.  ·  Dr. Dawn agrees chemotherapy will not aggravate her HIV condition.  Dr. Moreno spoke with Dr. Mohan at [place] who also agrees chemotherapy is the first step.   · 12/06/19: Cycle #1 of Adriamycin/Cytoxan  · US Breast from 01/29/20 after 4 cycles of Adriamycin Cytoxan chemotherapy which shows mild interval partial response to neoadjuvant chemotherapy.  The mass has decreased from 3.8 x 2.5 to 3.1 cm to 3.5 x 2.3 x 3.2 cm previously the left axillary lymph node has decreased from 1.4 x 0.7 x 1 cm to 1.1 x 0.6 x 0.9 cm.  · 01/31/20:  Initiated cycle 1 Taxol  · April 24, 2020: Completed cycle 12 Taxol  · MRI breast 5/6/2020 shows significant reduction in the left axillary adenopathy as well as reduction in the index breast lesion.  · S/p left modified radical mastectomy with left axillary dissection.  Patient has 55 x 40 x 30 mm invasive carcinoma, grade 2 with DCIS 36 x 6 mm, high-grade with good margins and 8 out of 11 lymph nodes positive with largest metastasis being 5 mm with extracapsular extension.  And there is lymphatic space invasion  · Will follow-up in 2 weeks at which time we will plan to start endocrine therapy.  She has radiation oncology appointment with Dr. Spivey      2.  Profound neutropenia due to chemotherapy despite Neulasta:   · Following Adriamycin Cytoxan, the patient experienced neutropenia despite Neulasta.  She did receive Zarxio with cycle 4 Adriamycin Cytoxan  · Cycle 2 Taxol delayed 1 week due to neutropenia  · Zarxio x3 days given following Taxol  · White blood cell 8.32, ANC 6.24 today.  The patient is highly anxious going to the hospital for Zarxio injections in light of COVID-19 outbreak.  Discontinue further Zarxio for remaining 2 weeks of Taxol    3.  Moderate thrombocytopenia secondary to chemotherapy.    · Platelet count has now normalized following Taxol 274,000    4.  Chemotherapy induced anemia.   · Anemia work-up previously negative  · Hemoglobin is improved today at 11.1      5.  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  · On 12/13/2019 patient struggled with significant insomnia from steroids following her first cycle AC.  We discussed taking the dexamethasone, only one 4 mg tablet daily for 3 days with her next cycle of chemotherapy to see if this improves her symptoms.    · Insomnia much improved with decreased dose of dexamethasone  · Insomnia is now resolved with Taxol    6. HIV, followed by Dr. Dawn in infectious disease.  Well-controlled and is on a single medication with very low viral load. Has HIV  since age 34.  · Reviewed her HIV medications and she is compliant     7.  Family history of breast cancer with mother having had breast cancer at age 60.  There is family history of uncle with prostate cancer.  Patient will require genetic referral    8.  Arthritis with severe back pain followed by Dr. Bam Crandall  · Percocet    9. Episodes of nosebleeds.  She has had these once a week for 3-4 years.  She did not report this today.    10.  Otalgia: Of the right ear, present for a while.  Dr. Moreno would like the patient to follow-up with ENT, Dr. Stapleton, as she has previously seen him.  Not a complaint today.    12.  Arthralgias for a few days after each treatment, which resolve on their own.    13.  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  Currently on B6 50 mg daily.  We will have to continue to closely monitor.  This is stable.  No dose adjustments Taxol presently    PLAN:  1. Reviewed pathology report, from left modified radical mastectomy with axillary dissection with extensive involvement   2. Follow-up with radiation oncology June 29  3. We will plan to start letrozole in 2 weeks.  Have discussed the side effects  4. Given patient has osteoporosis we will get approval for Prolia  5. We will check vitamin D level  6. Patient to start calcium 600 mg twice a day along with vitamin D 2000 international units daily    Follow-up with me in 2 weeks at which time we will start letrozole    MD Karina Garcia MA         Cc: Dr. Landen Lora, MD Denilson Shi MD

## 2020-06-11 ENCOUNTER — OFFICE VISIT (OUTPATIENT)
Dept: MAMMOGRAPHY | Facility: CLINIC | Age: 60
End: 2020-06-11

## 2020-06-11 VITALS — SYSTOLIC BLOOD PRESSURE: 138 MMHG | DIASTOLIC BLOOD PRESSURE: 80 MMHG

## 2020-06-11 DIAGNOSIS — C50.812 MALIGNANT NEOPLASM OF OVERLAPPING SITES OF LEFT BREAST IN FEMALE, ESTROGEN RECEPTOR POSITIVE (HCC): Primary | ICD-10-CM

## 2020-06-11 DIAGNOSIS — Z17.0 MALIGNANT NEOPLASM OF OVERLAPPING SITES OF LEFT BREAST IN FEMALE, ESTROGEN RECEPTOR POSITIVE (HCC): Primary | ICD-10-CM

## 2020-06-11 PROCEDURE — 99024 POSTOP FOLLOW-UP VISIT: CPT | Performed by: SURGERY

## 2020-06-11 NOTE — PROGRESS NOTES
Chief Complaint: Richa Dolan is a  59 y.o. female, initially referred by No ref. provider found , who is here today for a postoperative visit.    History of Present Illness:  In the interim,Richa Dolan has had the following procedure and resultant pathology report: She has undergone left breast conserving surgery.  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  10 of 13 nodes were positive for cancer.  The patient has already completed her chemotherapy and will receive radiation therapy.    She has been following with Dr. Wermerling.  The breast is swollen but otherwise showing no signs of infection.  The patient does have an axillary drain still in place and it is putting out less than 30 cc of fluid per day.  Denies fever or chills.      Current Outpatient Medications:   •  Abacavir-Dolutegravir-Lamivud (Triumeq) 600- MG per tablet, Take 1 tablet by mouth Daily., Disp: , Rfl:   •  AIMOVIG 140 MG/ML solution auto-injector, INJECT 140 MG INTO THE SKIN EVERY 30 (THIRTY) DAYS., Disp: , Rfl: 11  •  atorvastatin (LIPITOR) 20 MG tablet, TAKE 1 TABLET BY MOUTH EVERY DAY AT NIGHT (Patient taking differently: Take 20 mg by mouth Daily.), Disp: 90 tablet, Rfl: 3  •  B COMPLEX VITAMINS PO, Take 1 tablet by mouth Daily. HOLD FOR SURGERY, Disp: , Rfl:   •  baclofen (LIORESAL) 10 MG tablet, Take 10 mg by mouth 2 (Two) Times a Day., Disp: , Rfl: 2  •  CAMBIA 50 MG pack, TAKE 1 PACKET AS NEEDED FOR HEADACHES, Disp: , Rfl: 1  •  clonazePAM (KlonoPIN) 0.5 MG tablet, TAKE 1 TABLET BY MOUTH 2 (TWO) TIMES A DAY AS NEEDED FOR ANXIETY., Disp: 60 tablet, Rfl: 1  •  colesevelam (WELCHOL) 625 MG tablet, TAKE 2 TABLETS BY MOUTH EVERY DAY (Patient taking differently: Take 1,250 mg by mouth Daily. TAKE 2 TABLETS BY MOUTH EVERY DAY), Disp: 180 tablet, Rfl: 3  •  docusate sodium (COLACE) 250 MG capsule, Take 1 capsule by mouth  Daily As Needed for Constipation., Disp: 30 capsule, Rfl: 1  •  escitalopram (LEXAPRO) 10 MG tablet, TAKE 1 TABLET BY MOUTH EVERY DAY (Patient taking differently: Take 10 mg by mouth Daily.), Disp: 90 tablet, Rfl: 3  •  fluticasone (FLONASE) 50 MCG/ACT nasal spray, 2 sprays into the nostril(s) as directed by provider Daily., Disp: 3 bottle, Rfl: 3  •  gabapentin (Neurontin) 100 MG capsule, Take 1 capsule by mouth Every 8 (Eight) Hours., Disp: 60 capsule, Rfl: 1  •  HYDROcodone-acetaminophen (NORCO) 5-325 MG per tablet, Take 1 tablet by mouth Every 6 (Six) Hours As Needed., Disp: , Rfl:   •  ibandronate (BONIVA) 150 MG tablet, TAKE 1 TABLET BY MOUTH EVERY 30 (THIRTY) DAYS., Disp: 3 tablet, Rfl: 3  •  ibuprofen (ADVIL,MOTRIN) 600 MG tablet, Take 600 mg by mouth 3 (Three) Times a Day As Needed., Disp: , Rfl:   •  Loratadine (CLARITIN PO), Take 10 mg by mouth Daily., Disp: , Rfl:   •  Magnesium Hydroxide (MILK OF MAGNESIA PO), Take 15 mL by mouth Every Night., Disp: , Rfl:   •  NON FORMULARY, Take 1 dose by mouth Daily. Collagen peptides powder , Disp: , Rfl:   •  ondansetron (Zofran) 4 MG tablet, Take 1 tablet by mouth Every 6 (Six) Hours As Needed for Nausea or Vomiting., Disp: 10 tablet, Rfl: 1  •  ondansetron (ZOFRAN) 8 MG tablet, Take 1 tablet by mouth 3 (Three) Times a Day As Needed for Nausea or Vomiting., Disp: 30 tablet, Rfl: 5  •  prochlorperazine (COMPAZINE) 10 MG tablet, Take 1 tablet by mouth Every 6 (Six) Hours As Needed for Nausea or Vomiting., Disp: 20 tablet, Rfl: 2  •  zolpidem (AMBIEN) 10 MG tablet, Take 1 tablet by mouth At Night As Needed for Sleep. (Patient taking differently: Take 10 mg by mouth Every Night.), Disp: 90 tablet, Rfl: 0  Physical examination  Left breast- the breast is clearly swollen compared to the other one.  I do not see any significant redness or signs of infection.  The axillary incision is healing nicely and there is a drain in place with serosanguineous  fluid.  Assessment:  Left breast cancer status post breast conserving surgery with plastics closure.  She appears to be recovering well from this and thankfully we have clear margins.    Plan:  Once she has healed properly she will begin radiation therapy.  I would also like to send her to the lymphedema clinic for evaluation.  Plans are to see her back in the office in 2 to 3 months.          EMR Dragon/transcription disclaimer:    Much of this encounter note is an electronic transcription/translocation of spoken language to printed text.  The electronic translation of spoken language may permit erroneous, or at times, nonsensical words or phrases to be inadvertently transcribed.  Although I have reviewed the note from such areas, some may still exist.

## 2020-06-16 ENCOUNTER — READMISSION MANAGEMENT (OUTPATIENT)
Dept: CALL CENTER | Facility: HOSPITAL | Age: 60
End: 2020-06-16

## 2020-06-16 NOTE — OUTREACH NOTE
"General Surgery Week 3 Survey      Responses   [facility] patient discharged from?  [facility]   Does the patient have one of the following disease processes/diagnoses(primary or secondary)?  General Surgery   Week 3 attempt successful?  Yes   Call start time  1552   Call end time  1554   Discharge diagnosis  left Needle Localization Breast lumpectomy and left axillary dissection    Meds reviewed with patient/caregiver?  Yes   Is the patient taking all medications as directed (includes completed medication regime)?  Yes   Has the patient kept scheduled appointments due by today?  Yes   What is the patient's perception of their health status since discharge?  Improving [Pt reports, \"I'm still hurting. It's terribly sore.\"-has impoved.]   Nursing interventions  Nurse provided patient education   Is the patient/caregiver able to teach back signs and symptoms of incisional infection?  Fever   If the patient is a current smoker, are they able to teach back resources for cessation?  -- [Nonsmoker]   Week 3 call completed?  Yes          Naz Hawk RN  "

## 2020-06-18 ENCOUNTER — HOSPITAL ENCOUNTER (OUTPATIENT)
Dept: PHYSICAL THERAPY | Facility: HOSPITAL | Age: 60
Setting detail: THERAPIES SERIES
Discharge: HOME | End: 2020-06-18

## 2020-06-18 VITALS — HEIGHT: 67 IN | BODY MASS INDEX: 23.39 KG/M2 | WEIGHT: 149 LBS

## 2020-06-18 DIAGNOSIS — M79.622 AXILLARY PAIN, LEFT: ICD-10-CM

## 2020-06-18 DIAGNOSIS — M25.612 DECREASED RANGE OF MOTION OF LEFT SHOULDER: ICD-10-CM

## 2020-06-18 DIAGNOSIS — Z91.89 AT RISK FOR LYMPHEDEMA: ICD-10-CM

## 2020-06-18 DIAGNOSIS — N64.89 BREAST EDEMA: ICD-10-CM

## 2020-06-18 DIAGNOSIS — L90.5 SCAR TISSUE: ICD-10-CM

## 2020-06-18 DIAGNOSIS — Z17.0 MALIGNANT NEOPLASM OF OVERLAPPING SITES OF LEFT BREAST IN FEMALE, ESTROGEN RECEPTOR POSITIVE (HCC): Primary | ICD-10-CM

## 2020-06-18 DIAGNOSIS — Z98.890 STATUS POST LEFT BREAST LUMPECTOMY: ICD-10-CM

## 2020-06-18 DIAGNOSIS — C50.812 MALIGNANT NEOPLASM OF OVERLAPPING SITES OF LEFT BREAST IN FEMALE, ESTROGEN RECEPTOR POSITIVE (HCC): Primary | ICD-10-CM

## 2020-06-18 PROCEDURE — 97110 THERAPEUTIC EXERCISES: CPT | Performed by: PHYSICAL THERAPIST

## 2020-06-18 PROCEDURE — 97162 PT EVAL MOD COMPLEX 30 MIN: CPT | Performed by: PHYSICAL THERAPIST

## 2020-06-18 NOTE — THERAPY EVALUATION
Physical Therapy Lymphedema Initial Evaluation  [facility]     Patient Name: Richa Dolan  : 1960  MRN: [id number]  Today's Date: 2020      Visit Date: 2020    Visit Dx:    ICD-10-CM ICD-9-CM   1. Malignant neoplasm of overlapping sites of left breast in female, estrogen receptor positive (CMS/HCC) C50.812 174.8    Z17.0 V86.0   2. Status post left breast lumpectomy Z98.890 V45.89   3. At risk for lymphedema Z91.89 V49.89   4. Decreased range of motion of left shoulder M25.612 719.51   5. Axillary pain, left M79.622 729.5   6. Breast edema N64.89 611.89   7. Scar tissue L90.5 709.2       Patient Active Problem List   Diagnosis   • Abnormal liver function tests   • Hypertension   • Insomnia   • Knee pain   • Leukopenia   • Acute right-sided low back pain without sciatica   • Knee osteoarthritis   • Osteoporosis   • Arthralgia of multiple joints   • Seasonal allergic rhinitis   • Thrombocytopenia (CMS/HCC)   • Bone cyst   • Chronic pain of right knee   • Acquired immunocompromised state (CMS/HCC)   • Bruises easily   • Loss of hair   • Anxiety   • Elevated liver enzymes   • GERD (gastroesophageal reflux disease)   • HIV positive long-term non-progressor (CMS/HCC)   • Hypercholesterolemia   • Lupus anticoagulant positive   • Neck pain, chronic   • Chemotherapy-induced neutropenia (CMS/HCC)   • Tear of medial meniscus of right knee, current   • Chondromalacia, knee   • ICH (intracerebral hemorrhage) (CMS/HCC)   • Stomach discomfort   • Abnormal bruising   • Alopecia   • Headache   • Intractable chronic migraine without aura and without status migrainosus   • Osteoarthritis of right knee   • Need for vaccination   • Mild episode of recurrent major depressive disorder (CMS/HCC)   • Ingrown nail of great toe of right foot   • Left wrist pain   • Breast cancer screening   • Malignant neoplasm of overlapping sites of left breast in female, estrogen receptor positive (CMS/HCC)   • History of  stroke   • Arthritis   • Family history of breast cancer   • Encounter for fitting and adjustment of vascular catheter   • Epistaxis   • Chemotherapy-induced thrombocytopenia   • Anemia associated with chemotherapy   • Substance or medication-induced sleep disorder, 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CMS/[facility])   • Body aches   • Neuropathy   • History of left breast cancer   • Other fatigue   • HIV infection (CMS/[facility])        Past Medical History:   Diagnosis Date   • Anxiety    • Cerebrovascular accident (CVA) (CMS/[facility]) 8/22/2017    NO RESIDUAL AFFECT   • DDD (degenerative disc disease), cervical    • Depression    • Elevated cholesterol    • GERD (gastroesophageal reflux disease)    • H/O ICH (intracerebral hemorrhage) (CMS/[facility])    • History of chemotherapy    • History of stroke    • History of thrombocytopenia    • HIV (human immunodeficiency virus infection) (CMS/[facility]) 1996   • Hyperlipidemia    • Insomnia    • Lupus anticoagulant positive    • Malignant neoplasm of overlapping sites of left breast in female, estrogen receptor positive (CMS/[facility]) 11/12/2019   • Meniscus tear 2017    RIGHT   • Migraine    • Neck pain     WITH SHOOTING PAIN LEFT RIGHT ARM   • Osteoarthritis    • Osteoporosis    • Pain management     sees 1 every 3 months for scripts/injection/pain meds   • Seasonal allergies    • Spinal headache     THINKS HAD BLOOD PATCH AFTER EPIDUAL    • Tick bite 06/2014        Past Surgical History:   Procedure Laterality Date   • ADENOIDECTOMY     • BREAST LUMPECTOMY WITH SENTINEL NODE BIOPSY Left 5/28/2020    Procedure: BREAST LUMPECTOMY WITH SENTINEL NODE BIOPSY AND NEEDLE LOCALIZATION AND axillary dissection;  Surgeon: Landen Forman MD;  Location: [facility];  Service: General;  Laterality: Left;   • BREAST SURGERY Left 5/28/2020    Procedure: LEFT LATERAL INTERCOASTAL ARTERY  flap ;  Surgeon: Yusuf Garcia MD;  Location: [facility];  Service: Plastics;  Laterality: Left;   • CHOLECYSTECTOMY      • HYSTERECTOMY     • KNEE ARTHROSCOPY Right 3/24/2017    Procedure:  RIGHT  KNEE ARTHROSCOPY WITH DEBRIDEMENT CHONDROPLASTY PARTIAL MENISCECTOMY;  Surgeon: Karl Galeas MD;  Location: [facility];  Service:    • KNEE CARTILAGE SURGERY Right 2007   • TONSILLECTOMY     • US GUIDED LYMPH NODE BIOPSY  10/29/2019    LEFT BREAST   • VENOUS ACCESS DEVICE (PORT) INSERTION Right 12/2/2019    Procedure: INSERTION VENOUS ACCESS DEVICE RIGHT;  Surgeon: Landen Forman MD;  Location: [facility];  Service: General       Visit Dx:    ICD-10-CM ICD-9-CM   1. Malignant neoplasm of overlapping sites of left breast in female, estrogen receptor positive (CMS/HCC) C50.812 174.8    Z17.0 V86.0   2. Status post left breast lumpectomy Z98.890 V45.89   3. At risk for lymphedema Z91.89 V49.89   4. Decreased range of motion of left shoulder M25.612 719.51   5. Axillary pain, left M79.622 729.5   6. Breast edema N64.89 611.89   7. Scar tissue L90.5 709.2       Patient History     Row Name 06/18/20 1250             History    Chief Complaint  Pain;Joint stiffness;Tightness;Difficulty with daily activities;Other 1 (comment) risk of lymphedema  -SC      Type of Pain  Shoulder pain;Upper Extremity / Arm;Other pain breast, left  -SC      Date Current Problem(s) Began  05/28/20  -SC      Brief Description of Current Complaint  Patient was diagnosed with left breast cancer in 2019, 12/02/2019 right mediport placement, initiated neoadjuvant chemotherapy, completed April 2020 with minimal SEs, s/p left lumpectomy with axillary dissection and reconstruction for symmetry of breast 05/28/2020, drains have been removed, returns to plastics on Monday 06/22/2020, to consult radiation 06/29/2020 and initiate adjuvant radiation. Retired in March 2020.  with good family support. Stable, well controlled HIV. Does report mild post surgical pain with limited mobility left UE, tightness in left axilla.   -SC      Patient/Caregiver  Goals  Relieve pain;Return to prior level of function;Improve mobility;Improve strength;Know what to do to help the symptoms;Decrease swelling;Heal wound  -SC      Current Tobacco Use  none  -SC      Patient's Rating of General Health  Good  -SC      Hand Dominance  right-handed  -SC      Occupation/sports/leisure activities  retired  -SC      History of Previous Related Injuries  no  -SC      Are you or can you be pregnant  No  -SC         Pain     Pain Location  Shoulder;Arm;Breast left  -SC      Pain at Present  2  -SC      Pain at Best  0  -SC      Pain at Worst  4  -SC      Pain Frequency  Intermittent  -SC      Pain Description  Aching;Burning;Numbness;Pins and needles;Radiating;Tender;Tightness;Tiring  -SC      What Performance Factors Make the Current Problem(s) WORSE?  reaching, lifting, pushing, pulling, use of left UE  -SC      What Performance Factors Make the Current Problem(s) BETTER?  rest, sling position  -SC      Pain Comments  left axilla/lateral breast  -SC      Is your sleep disturbed?  Yes  -SC      Is medication used to assist with sleep?  Unspecified  -SC      What position do you sleep in?  Prone currently sleeping right sidelying  -SC         Fall Risk Assessment    Any falls in the past year:  No  -SC      Previous Functional Level  -- independent  -SC         Services    Are you currently receiving Home Health services  No  -SC         Daily Activities    Primary Language  English  -SC      Are you able to read  Yes  -SC      Are you able to write  Yes  -SC      How does patient learn best?  Listening;Reading;Demonstration  -SC      Teaching needs identified  Home Exercise Program;Management of Condition  -SC      Patient is concerned about/has problems with  Difficulty with self care (i.e. bathing, dressing, toileting:;Flexibility;Grasping objects lifting;Performing home management (household chores, shopping, care of dependents);Performing job responsibilities/community activities (work,  school,;Performing sports, recreation, and play activities;Reaching over head;Repetitive movements of the hand, arm, shoulder;Writing/grasping items with hand(s)  -SC      Does patient have problems with the following?  None  -SC      Barriers to learning  None  -SC      Explanation of Functional Status Problem  independent, right handed  -SC      Pt Participated in POC and Goals  Yes  -SC         Safety    Are you being hurt, hit, or frightened by anyone at home or in your life?  No  -SC      Are you being neglected by a caregiver  No  -SC        User Key  (r) = Recorded By, (t) = Taken By, (c) = Cosigned By    Initials Name Provider Type    SC Yola Valenzuela, PT Physical Therapist          Lymphedema     Row Name 06/18/20 1250             Lymphedema Assessment    Lymphedema Classification  LUE:;at risk/stage 0;secondary;Other: left breast, at risk  -SC      Lymphedema Cancer Related Sx  left;lumpectomy;sentinel node biopsy;axillary dissection;reconstructive  -SC      Lymphedema Surgery Comments  Port 12/02/2019; L lump/AD 05/28/2020  -SC      Lymph Nodes Removed #  13  -SC      Positive Lymph Nodes #  10  -SC      Chemo Received  yes  -SC      Chemo Treatments #/Timeframe  NAC Dec 2019 through April 2020, AC/Taxol  -SC      Adverse Chemo Reactions/Complication  hair loss  -SC      Radiation Therapy Received  yes  -SC      Radiation Treatments #/Timeframe  consult 06/29/2020  -SC      Infections or Cellulitis?  no  -SC      Lymphedema Assessment Comments  high risk left UE/breast  -SC         Lymphedema Edema Assessment    Edema Assessment Comment  negative  -SC         Skin Changes/Observations    Skin Observations Comment  incisions healing well, moderate axillary cording left axilla through mid medial arm  -SC         Lymphedema Sensation    Lymphedema Sensation Tests  light touch  -SC      Lymphedema Light Touch  LUE:;mild impairment;moderate impairment  -SC         Lymphedema Pulses/Capillary Refill     "Lymph Pulses Capillary Refill Comments  intact  -SC         Lymphedema Measurements    Measurement Type(s)  Quick Girth  -SC      Quick Girth Areas  Upper extremities  -SC      Lymphedema Measurements Comments  for sleeve in seated position  -SC         LU Quick Girth (cm)    Axilla  29 cm  -SC      Mid upper arm  23.5 cm  -SC      Elbow  23 cm  -SC      Mid forearm  20.5 cm  -SC      Wrist crease  15.5 cm  -SC      Met-heads  18 cm  -SC      LUE Quick Girth Total  129.5  -SC         Manual Lymphatic Drainage    Manual Therapy  education for cording  -SC         Compression/Skin Care    Compression/Skin Care Comments  education of compression garment through radiation, orders sent  -SC         L-Dex Bioimpedence Screening    L-Dex Measurement Extremity  LUE  -SC      L-Dex Patient Position  Standing  -SC      L-Dex UE Dominate Side  Right  -SC      L-Dex UE At Risk Side  Left  -SC      L-Dex UE Pre Surgical Value  No  -SC      L-Dex UE Score  -4.7  -SC      L-Dex UE Baseline Score  -4.7  -SC      L-Dex UE Value Change  0  -SC      L-Dex UE Comment  WNL, post op baseline set  -SC      Skeletal Muscle Mass (%)  26.6 %  -SC      Fat Mass (%)  34.5 %  -SC      Hy-dex  5.5  -SC      Height  170.2 cm (67\")  -SC      Weight  67.6 kg (149 lb)  -SC      BMI (Calculated)  23.3  -SC        User Key  (r) = Recorded By, (t) = Taken By, (c) = Cosigned By    Initials Name Provider Type    SC Yola Valenzuela PT Physical Therapist            PT Ortho     Row Name 06/18/20 1250       Subjective Comments    Subjective Comments  initial evaluation  -SC       Precautions and Contraindications    Precautions/Limitations  other (see comments)  -SC    Precautions  R mediport  -SC    Contraindications  L AD, no IV/BP L UE, no modalities  -SC       Subjective Pain    Able to rate subjective pain?  yes  -SC    Pre-Treatment Pain Level  2  -SC    Subjective Pain Comment  left axilla  -SC       Posture/Observations    Alignment Options  " Forward head;Cervical lordosis;Thoracic kyphosis;Rounded shoulders;Scapular elevation;Scapular winging;Scoliosis;Lumbar lordosis;Genu valgus;Foot pronation  -SC    Forward Head  Mild;Increased;Sitting posture  -SC    Cervical Lordosis  Normal  -SC    Thoracic Kyphosis  Mild;Increased;Sitting posture  -SC    Rounded Shoulders  Bilateral:;Mild;Increased;Sitting posture  -SC    Scapular Elevation  Right:;Mild;Increased;Elevated;Sitting posture  -SC    Scapular winging  Bilateral:;Normal  -SC    Scoliosis  Normal;Standing posture  -SC    Lumbar lordosis  Mild;Decreased;Sitting posture  -SC    Genu valgus  Bilateral:;Normal;Standing posture  -SC    Foot pronation  Bilateral:;Normal;Standing posture  -SC    Observations  Incision healing  -SC       General ROM    GENERAL ROM COMMENTS  R UE WNLs, L elbow, wrist, hand WNLs, AROM left shoulder seated position flexion 148 degrees, abduction 135 degrees, functional ER 75%, functional IR 50%  -SC       MMT (Manual Muscle Testing)    General MMT Comments  R UE WNLs, L shoulder grossly 2+/5, L scapula 2+-3/5, L elbow flex/ext 4/5  -SC       Gait/Stairs Assessment/Training    Comment (Gait/Stairs)  normal, no balance issues noted  -SC      User Key  (r) = Recorded By, (t) = Taken By, (c) = Cosigned By    Initials Name Provider Type    Yola Rodriguez, PT Physical Therapist                    Therapy Education  Education Details: lymphedema, stages, early intervention/prevent program, bioimpedance test and results, radiation handouts/education, compression garments, prognosis, PT POC, established goals, axillary cording, s/s of lymphedema  Given: Symptoms/condition management, HEP, Pain management, Posture/body mechanics, Mobility training, Edema management, Other (comment)  Program: New  How Provided: Verbal, Demonstration, Written  Provided to: Patient  Level of Understanding: Teach back education performed, Verbalized, Demonstrated      OP Exercises     Row Name 06/18/20  1250             Subjective Comments    Subjective Comments  initial evaluation  -SC         Subjective Pain    Able to rate subjective pain?  yes  -SC      Pre-Treatment Pain Level  2  -SC      Subjective Pain Comment  left axilla  -SC        User Key  (r) = Recorded By, (t) = Taken By, (c) = Cosigned By    Initials Name Provider Type    Yola Rodirguez, PT Physical Therapist                      PT OP Goals     Row Name 06/18/20 1250          PT Short Term Goals    STG Date to Achieve  07/16/20  -SC     STG 1  Patient independent and compliant with initial home exercise program focused on diaphragmatic breathing, range of motion, flexibility to decrease edema and improve lymphatic flow for decreased edema and decreased risk of infection.   -SC     STG 1 Progress  New  -SC     STG 2  Patient demonstrate proper awareness of What is Lymphedema and 18 Steps of Prevention for improved prevention, management, care of symptoms and ease of transition to self-care of condition.   -SC     STG 2 Progress  New  -SC     STG 3  Patient demonstrate independence and compliance with proper skin care during/post radiation for improved mobility, decreased scar tissue, axillary cording and edema.  -SC     STG 3 Progress  New  -SC     STG 4  Patient independent and compliant with breast mobilization techniques for improved mobility, skin care and lymphatic flow.  -SC     STG 4 Progress  New  -SC     STG 5  Patient independent and compliant with daytime OTS prevention compression garments as indicated for decreased risk of lymphedema and infection and safety with transition of self-care of condition.   -SC     STG 5 Progress  New  -SC        Long Term Goals    LTG Date to Achieve  09/17/20  -SC     LTG 1  Patient's bioimpedance score to remain between -10 and 6.5 for decreased risk of developing stage II post lumpectomy lymphedema syndrome left UE and to establish treatment for early intervention of condition if/when indicated.   -SC     LTG 1 Progress  New baseline post op 06/18/2020 -4.7  -SC     LTG 2  Patient independent and compliant with advanced Home Exercise Program and self-care techniques for self-management of condition.   -SC     LTG 2 Progress  New  -SC     LTG 3  Patient able to wear fitted post lumpectomy/radiation bra with padding left (if indicated)  >/=6-8 hours for work schedule for improved compression to lateral flank/left breast.  -SC     LTG 3 Progress  New  -SC     LTG 4  Patient transition to cardiovascular exercise program (walking) >/=150 to 180 mins/week with moderate intensity for improved heart health, weight loss, decreased risk of osteoporosis and improved phase angle/metabolic rate.  -SC     LTG 4 Progress  New  -SC     LTG 5  Patient score </=25% on Quick DASH for improved functional use of L UE home, community, and sleep  -SC     LTG 5 Progress  New initial evaluation 50%  -SC        Time Calculation    PT Goal Re-Cert Due Date  09/17/20  -SC       User Key  (r) = Recorded By, (t) = Taken By, (c) = Cosigned By    Initials Name Provider Type    SC Yola Valenzuela, PT Physical Therapist          PT Assessment/Plan     Row Name 06/18/20 1250          PT Assessment    Functional Limitations  Limitation in home management;Limitations in community activities;Performance in leisure activities;Performance in sport activities  -SC     Impairments  Endurance;Impaired flexibility;Impaired lymphatic circulation;Impaired muscle endurance;Integumentary integrity;Muscle strength;Pain;Peripheral nerve integrity;Poor body mechanics;Posture;Range of motion;Sensation  -SC     Assessment Comments  Mrs. Dolan is a pleasant 59 year old female, diagnosed with left breast cancer in 2019, mediport placement right 12/02/2020, initiated neoadjuvant chemotherapy with oncologist Dr. Moreno Dec 2019, completed April 2020, AC/Taxol, s/p left lumpectomy with axillary dissection 10/13 L ALN (+) with flap reconstruction closure  05/28/2020 performed by surgeons Dr. Forman and Radha, returns to plastics on Monday 06/22/2020. Healing well currently. Does have positive axillary cording and limited mobility of left shoulder with weakness post surgical noted. Minimal SE post chemotherapy. Consults for adjuvant radiation with radiation oncologist Dr. Spivey 06/29/2020. Is at high risk of post lumpectomy lymphedema syndrome left UE/breast due to surgery, lymph node removal, lymph node involvement, radiation, age, and additional medical issues. Baseline post op bioimpedance obtained, L-Dex -4.7. To monitor closely. Orders sent for compression. Will benefit from formal therapy to address these issues.   -SC     Please refer to paper survey for additional self-reported information  Yes  -SC     Rehab Potential  Good  -SC     Patient/caregiver participated in establishment of treatment plan and goals  Yes  -SC     Patient would benefit from skilled therapy intervention  Yes  -SC        PT Plan    Predicted Duration of Therapy Intervention (Therapy Eval)  12 sessions  -SC     Planned CPT's?  PT EVAL MOD COMPLELITY: 83850;PT RE-EVAL: 26536;PT THER PROC EA 15 MIN: 66444;PT THER ACT EA 15 MIN: 19387;PT MANUAL THERAPY EA 15 MIN: 19226;PT NEUROMUSC RE-EDUCATION EA 15 MIN: 73368;PT GAIT TRAINING EA 15 MIN: 19596;PT EVAL AQUA: 74375;PT AQUATIC THERAPY EA 15 MIN: 71492;PT SELF CARE/HOME MGMT/TRAIN EA 15: 95960;PT HOT OR COLD PACK TREAT MCARE;PT BIS XTRACELL FLUID ANALYSIS: 76650  -SC     PT Plan Comments  instruct compression radiation, manual therapies, initiate gentle flexibility program  -SC       User Key  (r) = Recorded By, (t) = Taken By, (c) = Cosigned By    Initials Name Provider Type    Yola Rodriguez, PT Physical Therapist             Outcome Measure Options: Quick DASH  Quick DASH  Open a tight or new jar.: Moderate Difficulty  Do heavy household chores (e.g., wash walls, wash floors): Severe Difficulty  Carry a shopping bag or  briefcase: Mild Difficulty  Wash your back: Mild Difficulty  Use a knife to cut food: Mild Difficulty  Recreational activities in which you take some force or impact through your arm, should or hand (e.g. golf, hammering, tennis, etc.): Moderate Difficulty  During the past week, to what extent has your arm, shoulder, or hand problem interfered with your normal social activites with family, friends, neighbors or groups?: Quite a bit  During the past week, were you limited in your work or other regular daily activities as a result of your arm, shoulder or hand problem?: Moderately Limited  Arm, Shoulder, or hand pain: Moderate  Tingling (pins and needles) in your arm, shoulder, or hand: Severe  During the past week, how much difficulty have you had sleeping because of the pain in your arm, shoulder or hand?: Moderate Difficiculty  Number of Questions Answered: 11  Quick DASH Score: 50         Time Calculation:   Start Time: 1250  Stop Time: 1328  Time Calculation (min): 38 min   Therapy Charges for Today     Code Description Service Date Service Provider Modifiers Qty    51027461560 HC PT THER PROC EA 15 MIN 6/18/2020 Yola Valenzuela, PT GP 1    [id number]  PT EVAL MOD COMPLEXITY 2 6/18/2020 Yola Valenzuela, PT GP 1          PT G-Codes  Outcome Measure Options: Quick DASH  Quick DASH Score: 50         Yola Parekh-Bianca, PT  6/18/2020

## 2020-06-23 ENCOUNTER — OFFICE VISIT (OUTPATIENT)
Dept: ONCOLOGY | Facility: CLINIC | Age: 60
End: 2020-06-23

## 2020-06-23 ENCOUNTER — LAB (OUTPATIENT)
Dept: ONCOLOGY | Facility: HOSPITAL | Age: 60
End: 2020-06-23

## 2020-06-23 VITALS
SYSTOLIC BLOOD PRESSURE: 127 MMHG | RESPIRATION RATE: 16 BRPM | TEMPERATURE: 98.9 F | DIASTOLIC BLOOD PRESSURE: 82 MMHG | OXYGEN SATURATION: 98 % | BODY MASS INDEX: 23.09 KG/M2 | WEIGHT: 147.1 LBS | HEIGHT: 67 IN | HEART RATE: 71 BPM

## 2020-06-23 DIAGNOSIS — Z45.2 ENCOUNTER FOR FITTING AND ADJUSTMENT OF VASCULAR CATHETER: ICD-10-CM

## 2020-06-23 DIAGNOSIS — Z17.0 MALIGNANT NEOPLASM OF OVERLAPPING SITES OF LEFT BREAST IN FEMALE, ESTROGEN RECEPTOR POSITIVE (HCC): Primary | ICD-10-CM

## 2020-06-23 DIAGNOSIS — Z21 HIV POSITIVE LONG-TERM NON-PROGRESSOR (HCC): ICD-10-CM

## 2020-06-23 DIAGNOSIS — C50.812 MALIGNANT NEOPLASM OF OVERLAPPING SITES OF LEFT BREAST IN FEMALE, ESTROGEN RECEPTOR POSITIVE (HCC): Primary | ICD-10-CM

## 2020-06-23 LAB
25(OH)D3 SERPL-MCNC: 34.9 NG/ML (ref 30–100)
ALBUMIN SERPL-MCNC: 4.4 G/DL (ref 3.5–5.2)
ALBUMIN/GLOB SERPL: 1.6 G/DL (ref 1.1–2.4)
ALP SERPL-CCNC: 98 U/L (ref 38–116)
ALT SERPL W P-5'-P-CCNC: 13 U/L (ref 0–33)
ANION GAP SERPL CALCULATED.3IONS-SCNC: 10.7 MMOL/L (ref 5–15)
AST SERPL-CCNC: 22 U/L (ref 0–32)
BASOPHILS # BLD AUTO: 0.02 10*3/MM3 (ref 0–0.2)
BASOPHILS NFR BLD AUTO: 0.5 % (ref 0–1.5)
BILIRUB SERPL-MCNC: 0.4 MG/DL (ref 0.2–1.2)
BUN BLD-MCNC: 13 MG/DL (ref 6–20)
BUN/CREAT SERPL: 21.3 (ref 7.3–30)
CALCIUM SPEC-SCNC: 9.2 MG/DL (ref 8.5–10.2)
CHLORIDE SERPL-SCNC: 105 MMOL/L (ref 98–107)
CO2 SERPL-SCNC: 25.3 MMOL/L (ref 22–29)
CREAT BLD-MCNC: 0.61 MG/DL (ref 0.6–1.1)
DEPRECATED RDW RBC AUTO: 45.6 FL (ref 37–54)
EOSINOPHIL # BLD AUTO: 0.05 10*3/MM3 (ref 0–0.4)
EOSINOPHIL NFR BLD AUTO: 1.2 % (ref 0.3–6.2)
ERYTHROCYTE [DISTWIDTH] IN BLOOD BY AUTOMATED COUNT: 12.2 % (ref 12.3–15.4)
GFR SERPL CREATININE-BSD FRML MDRD: 100 ML/MIN/1.73
GLOBULIN UR ELPH-MCNC: 2.7 GM/DL (ref 1.8–3.5)
GLUCOSE BLD-MCNC: 91 MG/DL (ref 74–124)
HCT VFR BLD AUTO: 37.7 % (ref 34–46.6)
HGB BLD-MCNC: 12.2 G/DL (ref 12–15.9)
IMM GRANULOCYTES # BLD AUTO: 0.01 10*3/MM3 (ref 0–0.05)
IMM GRANULOCYTES NFR BLD AUTO: 0.2 % (ref 0–0.5)
LYMPHOCYTES # BLD AUTO: 1.39 10*3/MM3 (ref 0.7–3.1)
LYMPHOCYTES NFR BLD AUTO: 33.5 % (ref 19.6–45.3)
MCH RBC QN AUTO: 32.8 PG (ref 26.6–33)
MCHC RBC AUTO-ENTMCNC: 32.4 G/DL (ref 31.5–35.7)
MCV RBC AUTO: 101.3 FL (ref 79–97)
MONOCYTES # BLD AUTO: 0.53 10*3/MM3 (ref 0.1–0.9)
MONOCYTES NFR BLD AUTO: 12.8 % (ref 5–12)
NEUTROPHILS # BLD AUTO: 2.15 10*3/MM3 (ref 1.7–7)
NEUTROPHILS NFR BLD AUTO: 51.8 % (ref 42.7–76)
NRBC BLD AUTO-RTO: 0 /100 WBC (ref 0–0.2)
PLATELET # BLD AUTO: 250 10*3/MM3 (ref 140–450)
PMV BLD AUTO: 9.5 FL (ref 6–12)
POTASSIUM BLD-SCNC: 4.2 MMOL/L (ref 3.5–4.7)
PROT SERPL-MCNC: 7.1 G/DL (ref 6.3–8)
RBC # BLD AUTO: 3.72 10*6/MM3 (ref 3.77–5.28)
SODIUM BLD-SCNC: 141 MMOL/L (ref 134–145)
WBC NRBC COR # BLD: 4.15 10*3/MM3 (ref 3.4–10.8)

## 2020-06-23 PROCEDURE — 25010000003 HEPARIN LOCK FLUSH PER 10 UNITS: Performed by: INTERNAL MEDICINE

## 2020-06-23 PROCEDURE — 80053 COMPREHEN METABOLIC PANEL: CPT

## 2020-06-23 PROCEDURE — 99214 OFFICE O/P EST MOD 30 MIN: CPT | Performed by: INTERNAL MEDICINE

## 2020-06-23 PROCEDURE — 85025 COMPLETE CBC W/AUTO DIFF WBC: CPT

## 2020-06-23 PROCEDURE — 36591 DRAW BLOOD OFF VENOUS DEVICE: CPT

## 2020-06-23 PROCEDURE — 36415 COLL VENOUS BLD VENIPUNCTURE: CPT

## 2020-06-23 PROCEDURE — 82306 VITAMIN D 25 HYDROXY: CPT | Performed by: INTERNAL MEDICINE

## 2020-06-23 RX ORDER — LETROZOLE 2.5 MG/1
2.5 TABLET, FILM COATED ORAL DAILY
Qty: 30 TABLET | Refills: 4 | Status: SHIPPED | OUTPATIENT
Start: 2020-06-23 | End: 2020-12-01

## 2020-06-23 RX ORDER — SODIUM CHLORIDE 0.9 % (FLUSH) 0.9 %
10 SYRINGE (ML) INJECTION AS NEEDED
Status: DISCONTINUED | OUTPATIENT
Start: 2020-06-23 | End: 2020-06-23 | Stop reason: HOSPADM

## 2020-06-23 RX ORDER — SODIUM CHLORIDE 0.9 % (FLUSH) 0.9 %
10 SYRINGE (ML) INJECTION AS NEEDED
Status: CANCELLED | OUTPATIENT
Start: 2020-06-23

## 2020-06-23 RX ORDER — HEPARIN SODIUM (PORCINE) LOCK FLUSH IV SOLN 100 UNIT/ML 100 UNIT/ML
500 SOLUTION INTRAVENOUS AS NEEDED
Status: DISCONTINUED | OUTPATIENT
Start: 2020-06-23 | End: 2020-06-23 | Stop reason: HOSPADM

## 2020-06-23 RX ORDER — HEPARIN SODIUM (PORCINE) LOCK FLUSH IV SOLN 100 UNIT/ML 100 UNIT/ML
500 SOLUTION INTRAVENOUS AS NEEDED
Status: CANCELLED | OUTPATIENT
Start: 2020-06-23

## 2020-06-23 RX ADMIN — Medication 10 ML: at 11:14

## 2020-06-23 RX ADMIN — SODIUM CHLORIDE, PRESERVATIVE FREE 500 UNITS: 5 INJECTION INTRAVENOUS at 11:14

## 2020-06-23 NOTE — PROGRESS NOTES
Subjective     REASON FOR FOLLOW UP:    1.  T2N1 invasive ductal carcinoma of the left breast.  Ultrasound-showed 3.8 x 3.1 x 2.5 cm mass at 4 o'clock position with 2 abnormal axillary lymph nodes, larger lymph node being 1.4 cm.  Left breast biopsy and axillary lymph node biopsy October 29, 2019, invasive ductal carcinoma, moderately differentiated, grade 2, ER 85%, VT 10%, HER-2/merna 2+, HER-2 FISH negative.  · 12/06/19: Neoadjuvant chemo therapy, cycle #1 of dose-dense Adriamycin/Cytoxan with Neulasta for marrow support    · 01/17/20: Last cycle of Adriamycin/Cytoxan given  · January 31, 2020: Cycle 1 Taxol  · April 24, 2020: Last cycle, cycle 12 of Taxol        2.  Severe neutropenia secondary to chemotherapy, with ANC 50 at 1-week after cycle #1 chemotherapy.  Also had moderate thrombocytopenia platelets 92,000.  Patient was given Levaquin for prophylaxis of neutropenia fever.    3.  HIV, followed by Dr. Dawn from infectious disease.  Well controlled on meds  .               HISTORY OF PRESENT ILLNESS:  The patient is a 59 y.o. year old female with history of T2N1 invasive ductal carcinoma of the left breast status post neoadjuvant chemotherapy with Adriamycin Cytoxan followed by weekly Taxol.  She is completed all treatment and currently has had a MRI of the breast which shows partial response to the neoadjuvant chemotherapy with considerable reduction in the left axillary adenopathy and reduction in the size of the index lesion in the left breast as well as other satellite lesions.    Patient is s/p left mastectomy with axillary dissection with reconstruction.  She is healing up nicely.  She has a drain in place.  Pathology showed invasive breast cancer which is 55 x 40 mm in size, grade 2 with focal lymphovascular space invasion with DCIS spanning over 36 x 6 mm.  All margins were negative for cancer.  8 of the additional lymph nodes out of 11 were positive with the largest micrometastasis measuring 5  mm.  Extranodal extension was seen.    Biomarkers on post neoadjuvant tumor is ER 81-90% CT 5% HER-2/merna 2+ by immunohistochemistry and HER-2/merna negative by FISH.    Patient is here for follow-up.  She is eligible for aspirin and we well  trial we will check if she is eligible.    Patient also will benefit from Zometa once every 6 months as she is postmenopausal and has multiple lymph nodes positive.      Past Medical History:   Diagnosis Date   • Anxiety    • Cerebrovascular accident (CVA) (CMS/[facility]) 8/22/2017    NO RESIDUAL AFFECT   • DDD (degenerative disc disease), cervical    • Depression    • Elevated cholesterol    • GERD (gastroesophageal reflux disease)    • H/O ICH (intracerebral hemorrhage) (CMS/[facility])    • History of chemotherapy    • History of stroke    • History of thrombocytopenia    • HIV (human immunodeficiency virus infection) (CMS/[facility]) 1996   • Hyperlipidemia    • Insomnia    • Lupus anticoagulant positive    • Malignant neoplasm of overlapping sites of left breast in female, estrogen receptor positive (CMS/[facility]) 11/12/2019   • Meniscus tear 2017    RIGHT   • Migraine    • Neck pain     WITH SHOOTING PAIN LEFT RIGHT ARM   • Osteoarthritis    • Osteoporosis    • Pain management     sees 1 every 3 months for scripts/injection/pain meds   • Seasonal allergies    • Spinal headache     THINKS HAD BLOOD PATCH AFTER EPIDUAL    • Tick bite 06/2014     PAST MEDICAL HISTORY:  She had a stroke in July 2017 with headache and dizziness.  She went to the ER and was placed on aspirin.  However, she stopped taking it two weeks ago.  She has been taking Aimovig (injection) monthly with Fariba López at [facility].    In 1994, patient was diagnosed with HIV with an unknown cause which is managed by Dr. Natali Subramanian.  As of now, her viral load is good.    Patient has arthritis.  She gets trigger-point injections in her back with her last one being 2 weeks ago.  She has had multiple ablations.  She also has pain in her SI  joint and Dr. Bermudez performed a surgery on this.  She takes Narco for the pain.      She is osteoporotic.  She has had multiple hairline fractures in both her legs.  She had her knees cleaned out by Dr. Sinha.     Patient has had a hysterectomy and still has both of her ovaries.    Patient has vertigo and the muscles in her left ear are weak.  She just has an imbalance.    Past Surgical History:   Procedure Laterality Date   • ADENOIDECTOMY     • BREAST LUMPECTOMY WITH SENTINEL NODE BIOPSY Left 5/28/2020    Procedure: BREAST LUMPECTOMY WITH SENTINEL NODE BIOPSY AND NEEDLE LOCALIZATION AND axillary dissection;  Surgeon: Landen Forman MD;  Location: [facility];  Service: General;  Laterality: Left;   • BREAST SURGERY Left 5/28/2020    Procedure: LEFT LATERAL INTERCOASTAL ARTERY  flap ;  Surgeon: Yusuf Garcia MD;  Location: [facility];  Service: Plastics;  Laterality: Left;   • CHOLECYSTECTOMY     • HYSTERECTOMY     • KNEE ARTHROSCOPY Right 3/24/2017    Procedure:  RIGHT  KNEE ARTHROSCOPY WITH DEBRIDEMENT CHONDROPLASTY PARTIAL MENISCECTOMY;  Surgeon: Karl Galeas MD;  Location: [facility];  Service:    • KNEE CARTILAGE SURGERY Right 2007   • TONSILLECTOMY     • US GUIDED LYMPH NODE BIOPSY  10/29/2019    LEFT BREAST   • VENOUS ACCESS DEVICE (PORT) INSERTION Right 12/2/2019    Procedure: INSERTION VENOUS ACCESS DEVICE RIGHT;  Surgeon: Landen Forman MD;  Location: [facility];  Service: General       ONCOLOGIC HISTORY:  Patient is a 59-year-old female who has had a history of HIV since age 34 followed by Dr. Natali Ng at [facility] and was on multiple HIV drugs initially but more recently has been on a single medication TRIUMEQ, with well-controlled HIV.  She follows up with Dr. Dawn , infectious disease who saw her recently and he saw her on 4/8/2019 and has excellent control and suppression of her viral load.  She is very compliant with  her medications.    She also has had history of stroke in 2017 for which she was placed on aspirin.  She presented with a headache.  She is very active and works at [employer] full-time.  She also has had history of vertigo in the past  Patient's PCP is Zach Lora.    Patient first noticed the mass in her left breast 5 months ago.  Her last mammogram was 5 years ago.  She had soreness in the left breast and she went to her primary care physician who then ordered a mammogram diagnostic and an ultrasound.  The diagnostic mammogram showed a 3.7 cm mass at 4 o'clock position in the lower outer quadrant of the left breast.  The ultrasound showed 3.8 cm x 3.1 x 2.5 cm mass at 4 o'clock position in the left breast with 2 abnormal appearing left axillary lymph nodes the largest being 1.4 cm.    She then underwent biopsy of both the breast mass as well as the left axillary lymph node and both of them are positive for invasive mammary carcinoma it is invasive ductal carcinoma with apocrine features, moderately differentiated, grade 2 of 3 with a Gueydan score of 5.  The left axillary lymph node is also consistent with metastatic mammary carcinoma.  It is ER positive NM positive HER-2/merna negative.  Details as follows    10/21/19 - Bilateral Diagnostic Mammogram and US Breast Left  FINDINGS: Bilateral digital CC and MLO mammographic images were  obtained. No prior examination is available for comparison. Scattered  fibroglandular densities are seen throughout both breasts. A triangular  skin marker represents the area of palpable concern in the lower outer  quadrant of the left breast in the posterior 1/3. At this location there  is an irregular mass that measures on the order of 3.7 cm in greatest  dimension. Internal calcifications are noted. No suspicious findings in  the right breast are appreciated.     ULTRASOUND: Targeted sonographic evaluation of the left breast was  performed through the area of  concern in the left axilla. At the 4  o'clock position on the order of 6 cm from the nipple there is an  irregular hypoechoic mass that measures 3.8 x 3.1 x 2.5 cm. Internal  vascularity is noted.     There are 2 abnormal-appearing left axillary lymph nodes, the largest  which measures on the order of 1.4 cm in greatest dimension.     IMPRESSION:  1. There is a 3.8 cm irregular mass in the left breast at the 4 o'clock  position. This is seen in conjunction with an irregular 1.4 cm lymph  node in the left axilla. This is suspicious for malignancy with left  axillary involvement. Correlation with ultrasound-guided biopsy of both  the left breast mass and the lymph node is recommended.  2. There are no findings suspicious for malignancy in the right breast.     BI-RADS CATEGORY 5:     Patient's labs from 11/12/19 are normal.    10/29/19 - Tissue Pathology  1. Left Breast, 4:00, 6 cm FN, U/S-Guided Core Needle Biopsy for a Mass:  A. INVASIVE DUCTAL CARCINOMA WITH APOCRINE FEATURES, Moderately differentiated;  Jen Histologic Grade II/III (tubule score = 3, nuclear score = 2, mitoses score = 1), measuring at least  9 mm.  B. No ductal or lobular carcinoma in situ is identified in this sample.  C. Focus suspicious for lymphovascular space invasion.  D. See Biomarker Template for hormone receptor studies.  2. Lymph Node, Left Axilla, U/S-Guided Core Needle Biopsy:  A. METASTATIC MAMMARY CARCINOMA (see Comment).  B. Metastatic focus measures 5 mm in greatest extent.  ER+, 85%  WA+, 10%  HER2- Score 2+    11/13/19 - CT Neck Chest Abdomen Pelvis  IMPRESSION:  1. In addition to the irregular approximately 3.8 cm mass within the  lateral aspect of the left breast, there are a few subcentimeter  asymmetric nodules along the lateral margin of the glandular tissue. The  several asymmetric left subpectoral nodes and 1.2 x 1.0 cm left axillary  node are suspicious for tamar metastases. The asymmetric subcentimeter  left  supraclavicular and left posterior cervical triangle nodes are  worrisome as well.  2. There is no convincing evidence for metastatic disease within the  abdomen or pelvis.    19 - Echocardiogram:  Normal.  Calculated EF = 67%.  There is trace mitral valve and tricuspid valve regurgitation.    11/15/19 - Bone Scan  Negative.    19 - MRI Breast Bilateral  IMPRESSION:  1. Biopsy-proven malignancy in the left breast centered at the 4 o'clock  position with associated surrounding satellite nodules as described. The  entire region of involvement including the satellite nodules and the  dominant mass measure up to 7.5 cm in greatest dimension. Also, left  axillary adenopathy with multiple irregular lymph nodes and loss of  differentiation of the borders of multiple lymph nodes is noted and this  is suspicious for extranodal involvement.  2. There are no findings suspicious for malignancy in the right breast.  BI-RADS CATEGORY 6      19: Cycle #1 of Adriamycin/Cytoxan    20: Last cycle of Adriamycin/Cytoxan given without Neulasta.  We will switch from Neulasta to 7 days of Neupogen injections after cycle 4.    We reviewed with patient the US Breast from 20 which shows mild interval partial response to neoadjuvant chemotherapy.      20: Initiated cycle 1 Taxol  2020: Last cycle of Taxol, cycle 12    S/p left mastectomy with axillary dissection   Pathology showed invasive breast cancer which is 55 x 40 mm in size, grade 2 with focal lymphovascular space invasion with DCIS spanning over 36 x 6 mm.  All margins were negative for cancer.  8 of the additional lymph nodes out of 11 were positive with the largest micrometastasis measuring 5 mm.  Extranodal extension was seen.    Biomarkers on post neoadjuvant tumor is ER 81-90% AK 5% HER-2/merna 2+ by immunohistochemistry and HER-2/merna negative by FISH.        OB-GYN:  Menarche 15 years  Menopause-46  Pregnancies- 1 para 1 no  miscarriages her first pregnancy was in 1990 at 29 years of age.  She did not breastfeed.  Post menopausal HRT- None.  Hx of birth control pills- Yes.    Current Outpatient Medications on File Prior to Visit   Medication Sig Dispense Refill   • Abacavir-Dolutegravir-Lamivud (Triumeq) 600- MG per tablet Take 1 tablet by mouth Daily.     • AIMOVIG 140 MG/ML solution auto-injector INJECT 140 MG INTO THE SKIN EVERY 30 (THIRTY) DAYS.  11   • atorvastatin (LIPITOR) 20 MG tablet TAKE 1 TABLET BY MOUTH EVERY DAY AT NIGHT (Patient taking differently: Take 20 mg by mouth Daily.) 90 tablet 3   • B COMPLEX VITAMINS PO Take 1 tablet by mouth Daily. HOLD FOR SURGERY     • baclofen (LIORESAL) 10 MG tablet Take 10 mg by mouth 2 (Two) Times a Day.  2   • CAMBIA 50 MG pack TAKE 1 PACKET AS NEEDED FOR HEADACHES  1   • clonazePAM (KlonoPIN) 0.5 MG tablet TAKE 1 TABLET BY MOUTH 2 (TWO) TIMES A DAY AS NEEDED FOR ANXIETY. 60 tablet 1   • colesevelam (WELCHOL) 625 MG tablet TAKE 2 TABLETS BY MOUTH EVERY DAY (Patient taking differently: Take 1,250 mg by mouth Daily. TAKE 2 TABLETS BY MOUTH EVERY DAY) 180 tablet 3   • docusate sodium (COLACE) 250 MG capsule Take 1 capsule by mouth Daily As Needed for Constipation. 30 capsule 1   • escitalopram (LEXAPRO) 10 MG tablet TAKE 1 TABLET BY MOUTH EVERY DAY (Patient taking differently: Take 10 mg by mouth Daily.) 90 tablet 3   • fluticasone (FLONASE) 50 MCG/ACT nasal spray 2 sprays into the nostril(s) as directed by provider Daily. 3 bottle 3   • gabapentin (Neurontin) 100 MG capsule Take 1 capsule by mouth Every 8 (Eight) Hours. 60 capsule 1   • HYDROcodone-acetaminophen (NORCO) 5-325 MG per tablet Take 1 tablet by mouth Every 6 (Six) Hours As Needed.     • ibandronate (BONIVA) 150 MG tablet TAKE 1 TABLET BY MOUTH EVERY 30 (THIRTY) DAYS. 3 tablet 3   • ibuprofen (ADVIL,MOTRIN) 600 MG tablet Take 600 mg by mouth 3 (Three) Times a Day As Needed.     • Loratadine (CLARITIN PO) Take 10 mg by mouth  Daily.     • Magnesium Hydroxide (MILK OF MAGNESIA PO) Take 15 mL by mouth Every Night.     • NON FORMULARY Take 1 dose by mouth Daily. Collagen peptides powder      • ondansetron (Zofran) 4 MG tablet Take 1 tablet by mouth Every 6 (Six) Hours As Needed for Nausea or Vomiting. 10 tablet 1   • ondansetron (ZOFRAN) 8 MG tablet Take 1 tablet by mouth 3 (Three) Times a Day As Needed for Nausea or Vomiting. 30 tablet 5   • prochlorperazine (COMPAZINE) 10 MG tablet Take 1 tablet by mouth Every 6 (Six) Hours As Needed for Nausea or Vomiting. 20 tablet 2   • zolpidem (AMBIEN) 10 MG tablet Take 1 tablet by mouth At Night As Needed for Sleep. (Patient taking differently: Take 10 mg by mouth Every Night.) 90 tablet 0     Current Facility-Administered Medications on File Prior to Visit   Medication Dose Route Frequency Provider Last Rate Last Dose   • [DISCONTINUED] heparin injection 500 Units  500 Units Intravenous PRN Ruby Moreno MD   500 Units at 06/23/20 1114   • [DISCONTINUED] sodium chloride 0.9 % flush 10 mL  10 mL Intravenous PRN Ruby Moreno MD   10 mL at 06/23/20 1114        ALLERGIES:    Allergies   Allergen Reactions   • Augmentin [Amoxicillin-Pot Clavulanate] Hives        Social History     Socioeconomic History   • Marital status:      Spouse name: Owen   • Number of children: 1   • Years of education: College   • Highest education level: Not on file   Occupational History   • Occupation:      Employer:  [employer]   Tobacco Use   • Smoking status: Never Smoker   • Smokeless tobacco: Never Used   Substance and Sexual Activity   • Alcohol use: Yes     Comment: occasioonal   • Drug use: No   • Sexual activity: Defer     Partners: Male     Birth control/protection: None     Comment:      Patient does not smoke or do drugs.  She does drink occasionally.    Family History   Problem Relation Age of Onset   • Breast cancer Mother    • Leukemia Mother         CLL?   • Diabetes  Mother    • Hyperthyroidism Mother    • Hearing loss Mother    • Dementia Father    • Heart disease Maternal Grandmother    • Diabetes Maternal Grandfather    • Heart disease Maternal Grandfather    • Stroke Maternal Grandfather    • Heart attack Maternal Grandfather    • Osteoporosis Paternal Grandmother    • Heart disease Paternal Grandfather    • Leukemia Maternal Aunt         CLL?   • Throat cancer Paternal Uncle    • Malig Hyperthermia Neg Hx       FAMILY HISTORY:  Her mother had breast cancer at age 60, still alive at 85 and in good health..  Her father had dementia.  Her paternal uncle had prostate cancer.  Her brother had esophageal cancer.    I have reviewed the patient's medical history in detail and updated the computerized patient record.     Review of Systems   Constitutional: Negative for activity change, appetite change, chills, diaphoresis, fatigue, fever and unexpected weight change.   HENT: Negative for hearing loss, mouth sores, nosebleeds, sore throat and trouble swallowing.    Respiratory: Negative for cough, chest tightness, shortness of breath and wheezing.    Cardiovascular: Negative for chest pain, palpitations and leg swelling.   Gastrointestinal: Negative for abdominal distention, abdominal pain, constipation, diarrhea, nausea and vomiting.   Genitourinary: Negative for difficulty urinating, dysuria, frequency, hematuria and urgency.   Musculoskeletal: Positive for back pain (06/23/20-Unchanged). Negative for joint swelling.        No muscle weakness.   Skin: Negative for rash and wound.   Neurological: Positive for numbness (Bilat foot-06/23/20-Improved). Negative for dizziness, seizures, syncope, speech difficulty, weakness and headaches.   Hematological: Negative for adenopathy. Does not bruise/bleed easily.   Psychiatric/Behavioral: Negative for behavioral problems, confusion, sleep disturbance and suicidal ideas.     Patient's review of system is unchanged as of April 24,  "2020    Objective    Vitals:    06/23/20 1133   BP: 127/82   Pulse: 71   Resp: 16   Temp: 98.9 °F (37.2 °C)   TempSrc: Skin   SpO2: 98%   Weight: 66.7 kg (147 lb 1.6 oz)   Height: 170.2 cm (67.01\")   PainSc:   5   PainLoc: Breast  Comment: Lt breast       Physical exam     CONSTITUTIONAL:  Vital signs reviewed.  No distress, looks comfortable.  EYES:  Conjunctiva and lids unremarkable.  PERRLA  EARS,NOSE,MOUTH,THROAT:  Ears and nose appear unremarkable.  Lips, teeth, gums appear unremarkable.  RESPIRATORY:  Normal respiratory effort.  Lungs clear to auscultation bilaterally.  CARDIOVASCULAR:  Normal S1, S2.  No murmurs rubs or gallops.  No significant lower extremity edema.  GASTROINTESTINAL: Abdomen appears unremarkable.  Nontender.  No hepatomegaly.  No splenomegaly.  LYMPHATIC:  No cervical, supraclavicular, axillary lymphadenopathy.  SKIN:  Warm.  No rashes.  PSYCHIATRIC:  Normal judgment and insight.  Normal mood and affect.        RECENT LABS:   Results from last 7 days   Lab Units 06/23/20  1107   WBC 10*3/mm3 4.15   NEUTROS ABS 10*3/mm3 2.15   HEMOGLOBIN g/dL 12.2   HEMATOCRIT % 37.7   PLATELETS 10*3/mm3 250               EXAMINATION: BILATERAL BREAST MRI WITHOUT AND WITH CONTRAST 05/06/20  IMPRESSION:  1. There are findings consistent with interval partial response to  neoadjuvant chemotherapy with considerable reduction in left axillary  adenopathy and reduction in size of the index lesion in the left breast  as well as other satellite lesions. Persistent diffuse left breast skin  thickening is noted.  2. There are no findings suspicious for malignancy in the right breast.     BI-RADS Category 6: Known biopsy-proven malignancy.    01/29/20 - US Breast  IMPRESSION:  There are findings suggestive of mild interval partial  response to neoadjuvant chemotherapy.    Assessment/Plan    1. T2N1 invasive ductal carcinoma of the left breast, recently diagnosed.    -Noticed mass in the left breast x5 " months  -Diagnostic mammogram showed 3.7 mm mass in the left breast at 4 o'clock position  -Ultrasound-showed 3.8 x 3.1 x 2.5 cm mass at 4 o'clock position with 2 abnormal axillary lymph nodes, larger lymph node being 1.4 cm  -Left breast biopsy and axillary lymph node biopsy October 29, 2019, invasive ductal carcinoma, moderately differentiated, grade 2, ER 85%, IL 10%, HER-2/merna 2+, HER-2 FISH negative  · CT scans from 11/13/19 show some asymmetric nodules along the lateral margin of the glandular tissue.  The 1.2x1.0 cm left axillary nodes, left supraclavicular, and left posterior cervical triangle nodes are suspicious for tamar metastases.  ·   bone scan from 11/15/19 which was negative.   ·  MRI breast from 11/18/19 which shows the biopsy-proven malignancy in the left breast centered at the 4:00 position.  The region of involvement measures up to 7.5 cm.  There is left axillary adenopathy with multiple irregular lymph nodes and loss of differentiation of the border of multiple lymph nodes which are suspicious for extranodal involvement.  ·    echocardiogram from 11/14/19 which was normal with an ejection fraction of 67%.    · INVITAE from 11/14/19 which was negative.  · Given the size of her tumor and her medical history, patient will be given 4 cycles of Adriamycin/Cytoxan with Neulasta.  After completion of this treatment, patient will be started on 12 cycles of Taxol.  After the tumor shrinks in size, Dr. Forman will perform a lumpectomy.    · Following lumpectomy, patient will begin endocrine therapy.  ·  Dr. Dawn agrees chemotherapy will not aggravate her HIV condition.  Dr. Moreno spoke with Dr. Mohan at [place] who also agrees chemotherapy is the first step.   · 12/06/19: Cycle #1 of Adriamycin/Cytoxan  · US Breast from 01/29/20 after 4 cycles of Adriamycin Cytoxan chemotherapy which shows mild interval partial response to neoadjuvant chemotherapy.  The mass has decreased from 3.8 x 2.5 to  3.1 cm to 3.5 x 2.3 x 3.2 cm previously the left axillary lymph node has decreased from 1.4 x 0.7 x 1 cm to 1.1 x 0.6 x 0.9 cm.  · 01/31/20: Initiated cycle 1 Taxol  · April 24, 2020: Completed cycle 12 Taxol  · MRI breast 5/6/2020 shows significant reduction in the left axillary adenopathy as well as reduction in the index breast lesion.  · S/p left modified radical mastectomy with left axillary dissection.  Patient has 55 x 40 x 30 mm invasive carcinoma, grade 2 with DCIS 36 x 6 mm, high-grade with good margins and 8 out of 11 lymph nodes positive with largest metastasis being 5 mm with extracapsular extension.  And there is lymphatic space invasion  · Will follow-up in 2 weeks at which time we will plan to start endocrine therapy.  She has radiation oncology appointment with Dr. Spivey  · Patient is eligible for Zometa once every 6 months for 2 years.  But given that she is undergoing some work on her teeth we will await starting Zometa until after radiation is complete.  · Patient to start letrozole starting last 2 weeks of radiation      2.  Profound neutropenia due to chemotherapy despite Neulasta:   · Following Adriamycin Cytoxan, the patient experienced neutropenia despite Neulasta.  She did receive Zarxio with cycle 4 Adriamycin Cytoxan  · Cycle 2 Taxol delayed 1 week due to neutropenia  · Zarxio x3 days given following Taxol  · White blood cell 8.32, ANC 6.24 today.  The patient is highly anxious going to the hospital for Zarxio injections in light of COVID-19 outbreak.  Discontinue further Zarxio for remaining 2 weeks of Taxol    3.  Moderate thrombocytopenia secondary to chemotherapy.    · Platelet count has now normalized following Taxol 274,000    4.  Chemotherapy induced anemia.   · Anemia work-up previously negative  · Hemoglobin is improved today at 11.1      5.  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  · On 12/13/2019 patient struggled with significant insomnia from steroids following her first cycle AC.   We discussed taking the dexamethasone, only one 4 mg tablet daily for 3 days with her next cycle of chemotherapy to see if this improves her symptoms.    · Insomnia much improved with decreased dose of dexamethasone  · Insomnia is now resolved with Taxol    6. HIV, followed by Dr. Dawn in infectious disease.  Well-controlled and is on a single medication with very low viral load. Has HIV since age 34.  · Reviewed her HIV medications and she is compliant     7.  Family history of breast cancer with mother having had breast cancer at age 60.  There is family history of uncle with prostate cancer.  Patient will require genetic referral    8.  Arthritis with severe back pain followed by Dr. Bam Crandall  · Percocet    9. Episodes of nosebleeds.  She has had these once a week for 3-4 years.  She did not report this today.    10.  Otalgia: Of the right ear, present for a while.  Dr. Moreno would like the patient to follow-up with ENT, Dr. Stapleton, as she has previously seen him.  Not a complaint today.    12.  Arthralgias for a few days after each treatment, which resolve on their own.    13.  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  Currently on B6 50 mg daily.  We will have to continue to closely monitor.  This is stable.  No dose adjustments Taxol presently    PLAN:  1. Await appointment with radiation oncology on June 29  2. Patient to start letrozole starting last 2 weeks of radiation  3. Plan to start Zometa every 6 months x 2 years but will await her work-up on her teeth prior to starting Zometa.  4. Follow-up with me in 9 weeks to assess toxicity to letrozole  5. Follow-up for port flush in 6 weeks  6. I have discussed with research nurses to see if she is eligible for aspirin or Be Well trial     MD Karina Garcia MA         Cc: ОЛЬГА Godoy MD Nathan Bullington, MD

## 2020-06-25 ENCOUNTER — READMISSION MANAGEMENT (OUTPATIENT)
Dept: CALL CENTER | Facility: HOSPITAL | Age: 60
End: 2020-06-25

## 2020-06-25 NOTE — OUTREACH NOTE
General Surgery Week 4 Survey      Responses   [facility] patient discharged from?  [place]   Does the patient have one of the following disease processes/diagnoses(primary or secondary)?  General Surgery   Week 4 attempt successful?  Yes   Call start time  1558   Call end time  1559   Discharge diagnosis  left Needle Localization Breast lumpectomy and left axillary dissection    Is the patient taking all medications as directed (includes completed medication regime)?  Yes   Has the patient kept scheduled appointments due by today?  Yes   Psychosocial issues?  No   What is the patient's perception of their health status since discharge?  Improving   Nursing interventions  Nurse provided patient education   Is the patient/caregiver able to teach back steps to recovery at home?  Rest and rebuild strength, gradually increase activity   Is the patient/caregiver able to teach back the hierarchy of who to call/visit for symptoms/problems? PCP, Specialist, Home health nurse, Urgent Care, ED, 911  Yes   Week 4 call completed?  Yes   Would the patient like one additional call?  No   Graduated  Yes   Did the patient feel the follow up calls were helpful during their recovery period?  Yes   Was the number of calls appropriate?  Yes          Ernesto Padron RN

## 2020-06-29 ENCOUNTER — CONSULT (OUTPATIENT)
Dept: RADIATION ONCOLOGY | Facility: HOSPITAL | Age: 60
End: 2020-06-29

## 2020-06-29 ENCOUNTER — APPOINTMENT (OUTPATIENT)
Dept: RADIATION ONCOLOGY | Facility: HOSPITAL | Age: 60
End: 2020-06-29

## 2020-06-29 VITALS
WEIGHT: 144.4 LBS | SYSTOLIC BLOOD PRESSURE: 111 MMHG | OXYGEN SATURATION: 98 % | DIASTOLIC BLOOD PRESSURE: 76 MMHG | BODY MASS INDEX: 22.61 KG/M2 | HEART RATE: 78 BPM | TEMPERATURE: 98.4 F

## 2020-06-29 DIAGNOSIS — Z17.0 MALIGNANT NEOPLASM OF OVERLAPPING SITES OF LEFT BREAST IN FEMALE, ESTROGEN RECEPTOR POSITIVE (HCC): Primary | ICD-10-CM

## 2020-06-29 DIAGNOSIS — C50.812 MALIGNANT NEOPLASM OF OVERLAPPING SITES OF LEFT BREAST IN FEMALE, ESTROGEN RECEPTOR POSITIVE (HCC): Primary | ICD-10-CM

## 2020-06-29 PROCEDURE — 99243 OFF/OP CNSLTJ NEW/EST LOW 30: CPT | Performed by: RADIOLOGY

## 2020-06-29 PROCEDURE — 77263 THER RADIOLOGY TX PLNG CPLX: CPT | Performed by: RADIOLOGY

## 2020-06-29 PROCEDURE — 77333 RADIATION TREATMENT AID(S): CPT | Performed by: RADIOLOGY

## 2020-06-29 PROCEDURE — G0463 HOSPITAL OUTPT CLINIC VISIT: HCPCS | Performed by: RADIOLOGY

## 2020-06-29 PROCEDURE — 77290 THER RAD SIMULAJ FIELD CPLX: CPT | Performed by: RADIOLOGY

## 2020-06-29 NOTE — PROGRESS NOTES
DIAGNOSIS and REASON FOR CONSULTATION:   Malignant neoplasm of overlapping sites of left breast in female, estrogen receptor positive (CMS/HCC)  cT3, cN1, cM0, ER+, [place]+, HER2-   ypT3, pN3, cM0, ER+, [place]+, HER2-    - for advice and recommendations regarding the diagnosis    CHIEF COMPLAINT:  For advice and recommendations regarding Malignant neoplasm of overlapping sites of left breast in female, estrogen receptor positive (CMS/HCC)  HISTORY OF PRESENT ILLNESS:  The patient is a 59 y.o. year old female who presented with a palpable left breast mass.  Bilateral diagnostic mammogram on October 21, 2019 showed an irregular mass measuring 3.7 cm in the lower outer quadrant of the left breast and ultrasound on the same date confirmed an irregular mass measuring 3.8 x 3.1 x 2.5 cm at the 4 o'clock position as well as 2 abnormal appearing left axillary nodes with the largest measuring 1.4 cm.  She underwent biopsy and clip placement of both the left breast mass and the axillary node in the breast revealed an invasive ductal carcinoma, moderately differentiated, grade 2 with a focus suspicious for lymphovascular space invasion, positive for estrogen receptors at 85%, positive for progesterone receptors at 10% and equivocal for the HER-2/merna oncogene.  Biopsy from the left axillary node showed a 5 mm focus of metastatic mammary carcinoma.  Further fish revealed a negative HER-2/merna.    She completed staging work-up with CAT scans of the chest, abdomen and pelvis on November 13, 2019 which showed only be 3.8 cm mass in the lateral aspect of the left breast with several asymmetric left subpectoral and axillary nodes but no other evidence of mets.  MRI of the breast in November 18, 2019 confirmed the 3.6 x 3.4 x 3.5 cm mass at the 4 o'clock position of the left breast with multiple irregular satellite nodules measuring up to 1.1 cm in the vicinity of the suspected region of involvement is 7.5 x 6.3 x 5.6 cm.  Additionally  multiple prominent vessels extending from the lateral margin of the mass to be immediate subcutaneous tissues with skin thickening were noted and irregular lymph lymph nodes were appreciated in the left axilla.  Therefore, she appeared to have a cT3 N1 left breast cancer.    She proceeded on with neoadjuvant chemotherapy starting on December 6, 2019 with dose dense Adriamycin/Cytoxan with the last cycle given on January 17, 2020.  Ultrasound of the left breast on January 29, 2020 showed the mass to measure 3.5 x 2.3 x 3.2 cm consistent with a mild response as well as a decrease in the size of the axillary node and therefore she continued on with cycle 1 of Taxol started on January 31, 2020, completed on April 24, 2020 with 12 cycles.  She did have issues with neutropenia but otherwise tolerated well even in the setting of being HIV positive.    Bilateral breast MRI on May 6, 2020 showed the mass to measure 2.5 x 2.1 x 2.6 cm along with the multiple irregular satellite nodules slightly smaller measuring up to 9 mm in greatest dimension with the entire area of involvement now measuring 5.5 x 4.4 x 5.2 cm.  Interval resolution of the lymph nodes in the left axilla was noted and no new areas of abnormality were appreciated.    Therefore she went to surgery on May 28, 2020 and underwent a left-sided lumpectomy with left axillary dissection following a positive sentinel lymph node biopsy with left intercostal  flap for volume replacement.  The final breast pathology revealed an invasive ductal carcinoma, grade 2, measuring 5.5 x 4.0 x 3.0 cm with focal lymphovascular space invasion noted.  There was scattered multifocal ductal carcinoma in situ noted spanning 3.6 x 0.6 x 0.6 cm.  All the margins were negative for both invasive and intraductal disease.  2 of 2 sentinel nodes were involved with metastatic disease measuring 3 and 6 mm respectively with extranodal extension noted in both.  The remainder of the  axillary contents revealed 8 of 11 additional positive nodes with the largest measuring 5 mm with extranodal extension.  Therefore she appeared to have a ypT3 N3A response.      She has discussed her hormonal therapy with plans to start letrozole near the conclusion of the radiation therapy. I was asked to see the patient at the request of the referring provider noted above for advice and recommendations regarding this diagnosis and the need for postoperative radiation therapy.    Clinically, she is doing very well. She has no complaints regarding the breast and is feeling well.    Performance Status: (1) Restricted in physically strenuous activity, ambulatory and able to do work of light nature  Objective   Past Medical History: she  has a past medical history of Anxiety, Cerebrovascular accident (CVA) (CMS/[facility]) (8/22/2017), DDD (degenerative disc disease), cervical, Depression, Elevated cholesterol, GERD (gastroesophageal reflux disease), H/O ICH (intracerebral hemorrhage) (CMS/[facility]), History of chemotherapy, History of stroke, History of thrombocytopenia, HIV (human immunodeficiency virus infection) (CMS/[facility]) (1996), Hyperlipidemia, Insomnia, Lupus anticoagulant positive, Malignant neoplasm of overlapping sites of left breast in female, estrogen receptor positive (CMS/[facility]) (11/12/2019), Meniscus tear (2017), Migraine, Neck pain, Osteoarthritis, Osteoporosis, Pain management, Seasonal allergies, Spinal headache, and Tick bite (06/2014).    Past Surgical History:  she has a past surgical history that includes Adenoidectomy; Hysterectomy; Tonsillectomy; Knee cartilage surgery (Right, 2007); Cholecystectomy; Knee Arthroscopy (Right, 3/24/2017); US Guided Lymph Node Biopsy (10/29/2019); Venous Access Device (Port) (Right, 12/2/2019); breast lumpectomy with sentinel node biopsy (Left, 5/28/2020); and Breast surgery (Left, 5/28/2020).    Meds:    Current Outpatient Medications:   •  Abacavir-Dolutegravir-Lamivud  (Triumeq) 600- MG per tablet, Take 1 tablet by mouth Daily., Disp: , Rfl:   •  AIMOVIG 140 MG/ML solution auto-injector, INJECT 140 MG INTO THE SKIN EVERY 30 (THIRTY) DAYS., Disp: , Rfl: 11  •  atorvastatin (LIPITOR) 20 MG tablet, TAKE 1 TABLET BY MOUTH EVERY DAY AT NIGHT (Patient taking differently: Take 20 mg by mouth Daily.), Disp: 90 tablet, Rfl: 3  •  B COMPLEX VITAMINS PO, Take 1 tablet by mouth Daily. HOLD FOR SURGERY, Disp: , Rfl:   •  baclofen (LIORESAL) 10 MG tablet, Take 10 mg by mouth 2 (Two) Times a Day., Disp: , Rfl: 2  •  CAMBIA 50 MG pack, TAKE 1 PACKET AS NEEDED FOR HEADACHES, Disp: , Rfl: 1  •  clonazePAM (KlonoPIN) 0.5 MG tablet, TAKE 1 TABLET BY MOUTH 2 (TWO) TIMES A DAY AS NEEDED FOR ANXIETY., Disp: 60 tablet, Rfl: 1  •  colesevelam (WELCHOL) 625 MG tablet, TAKE 2 TABLETS BY MOUTH EVERY DAY (Patient taking differently: Take 1,250 mg by mouth Daily. TAKE 2 TABLETS BY MOUTH EVERY DAY), Disp: 180 tablet, Rfl: 3  •  docusate sodium (COLACE) 250 MG capsule, Take 1 capsule by mouth Daily As Needed for Constipation., Disp: 30 capsule, Rfl: 1  •  escitalopram (LEXAPRO) 10 MG tablet, TAKE 1 TABLET BY MOUTH EVERY DAY (Patient taking differently: Take 10 mg by mouth Daily.), Disp: 90 tablet, Rfl: 3  •  fluticasone (FLONASE) 50 MCG/ACT nasal spray, 2 sprays into the nostril(s) as directed by provider Daily., Disp: 3 bottle, Rfl: 3  •  gabapentin (Neurontin) 100 MG capsule, Take 1 capsule by mouth Every 8 (Eight) Hours., Disp: 60 capsule, Rfl: 1  •  HYDROcodone-acetaminophen (NORCO) 5-325 MG per tablet, Take 1 tablet by mouth Every 6 (Six) Hours As Needed., Disp: , Rfl:   •  ibandronate (BONIVA) 150 MG tablet, TAKE 1 TABLET BY MOUTH EVERY 30 (THIRTY) DAYS., Disp: 3 tablet, Rfl: 3  •  ibuprofen (ADVIL,MOTRIN) 600 MG tablet, Take 600 mg by mouth 3 (Three) Times a Day As Needed., Disp: , Rfl:   •  letrozole (FEMARA) 2.5 MG tablet, Take 1 tablet by mouth Daily., Disp: 30 tablet, Rfl: 4  •  Loratadine  (CLARITIN PO), Take 10 mg by mouth Daily., Disp: , Rfl:   •  Magnesium Hydroxide (MILK OF MAGNESIA PO), Take 15 mL by mouth Every Night., Disp: , Rfl:   •  NON FORMULARY, Take 1 dose by mouth Daily. Collagen peptides powder , Disp: , Rfl:   •  ondansetron (Zofran) 4 MG tablet, Take 1 tablet by mouth Every 6 (Six) Hours As Needed for Nausea or Vomiting., Disp: 10 tablet, Rfl: 1  •  ondansetron (ZOFRAN) 8 MG tablet, Take 1 tablet by mouth 3 (Three) Times a Day As Needed for Nausea or Vomiting., Disp: 30 tablet, Rfl: 5  •  prochlorperazine (COMPAZINE) 10 MG tablet, Take 1 tablet by mouth Every 6 (Six) Hours As Needed for Nausea or Vomiting., Disp: 20 tablet, Rfl: 2  •  zolpidem (AMBIEN) 10 MG tablet, Take 1 tablet by mouth At Night As Needed for Sleep. (Patient taking differently: Take 10 mg by mouth Every Night.), Disp: 90 tablet, Rfl: 0    Allergies:    Allergies   Allergen Reactions   • Augmentin [Amoxicillin-Pot Clavulanate] Hives       Family History:  her family history includes Breast cancer in her mother; Dementia in her father; Diabetes in her maternal grandfather and mother; Hearing loss in her mother; Heart attack in her maternal grandfather; Heart disease in her maternal grandfather, maternal grandmother, and paternal grandfather; Hyperthyroidism in her mother; Leukemia in her maternal aunt and mother; Osteoporosis in her paternal grandmother; Stroke in her maternal grandfather; Throat cancer in her paternal uncle.    Social History:  she  reports that she has never smoked. She has never used smokeless tobacco. She reports that she drinks alcohol. She reports that she does not use drugs.    Pertinent Findings on   Review of Systems   Constitutional: Positive for fatigue. Negative for appetite change, chills, diaphoresis, fever and unexpected weight change.   HENT:   Negative for hearing loss, lump/mass, mouth sores, nosebleeds, sore throat, tinnitus, trouble swallowing and voice change.    Eyes: Negative  for eye problems and icterus.   Respiratory: Negative for chest tightness, cough, hemoptysis, shortness of breath and wheezing.    Cardiovascular: Negative for chest pain, leg swelling and palpitations.   Gastrointestinal: Negative for abdominal distention, abdominal pain, blood in stool, constipation, diarrhea, nausea, rectal pain and vomiting.   Endocrine: Negative for hot flashes.   Genitourinary: Negative for bladder incontinence, difficulty urinating, dyspareunia, dysuria, frequency, hematuria, menstrual problem, nocturia, pelvic pain, vaginal bleeding and vaginal discharge.    Musculoskeletal: Positive for arthralgias, back pain, myalgias and neck pain. Negative for flank pain, gait problem and neck stiffness.   Skin: Negative for itching, rash and wound.   Neurological: Positive for headaches. Negative for dizziness, extremity weakness, gait problem, light-headedness, numbness, seizures and speech difficulty.   Hematological: Negative for adenopathy. Does not bruise/bleed easily.   Psychiatric/Behavioral: Negative for confusion, decreased concentration, depression, sleep disturbance and suicidal ideas. The patient is not nervous/anxious.    :     Subjective   Vitals:    06/29/20 1026   BP: 111/76   Pulse: 78   Temp: 98.4 °F (36.9 °C)   TempSrc: Oral   SpO2: 98%   Weight: 65.5 kg (144 lb 6.4 oz)   PainSc: 0-No pain       Pertinent Findings on:  Physical Exam   Constitutional: She appears well-developed and well-nourished. No distress.   HENT:   Head: Normocephalic.   Neck: Normal range of motion. Neck supple.   Pulmonary/Chest: Effort normal. She exhibits no mass and no tenderness. Right breast exhibits no inverted nipple, no mass, no nipple discharge, no skin change and no tenderness. Left breast exhibits no inverted nipple, no mass, no nipple discharge, no skin change and no tenderness.   Right breast is without abnormality as is the right axilla. The left breast shows a healing incision over the lateral  aspect of the breast and running to posterior chest. It is well healed and there is no skin or nipple abnormality, no warmth or erythema.   Musculoskeletal: Normal range of motion.   Lymphadenopathy:     She has no cervical adenopathy.     She has no axillary adenopathy.        Right axillary: No pectoral adenopathy present.        Left axillary: No pectoral adenopathy present.       Right: No supraclavicular adenopathy present.        Left: No supraclavicular adenopathy present.   There is no palpable axillary, supraclavicular nor cervical lymphadenopathy appreciated. No lymphedema is noted in either upper extremity.   Skin: Skin is intact. She is not diaphoretic.   Psychiatric: She has a normal mood and affect. Her speech is normal and behavior is normal. Judgment and thought content normal. Cognition and memory are normal.          Assessment: Malignant neoplasm of overlapping sites of left breast in female, estrogen receptor positive (CMS/HCC)  This assessment comes from my review of the imaging, pathology, physician notes and other pertinent information as mentioned.    Plan:   We reviewed the specifics of her pathology report today as well as the imaging studies obtained prechemo/surgery. We also talked thru the role of radiation therapy in her care given the extent of her tamar disease size of the residual primary disease and the concern for local regional recurrence.  We discussed the role of radiation therapy in decreasing those risks given my review of her extent of disease, risk of recurrence and other contributing medical factors and she expressed understanding.    It is my recommendation that we proceed on with a course of radiation therapy directed at the reconstructed breast and ipsilateral axillary, supraclavicular and superior internal mammary tamar spaces to a total dose of approximately 5000 cGy given over 5 ½ week period of time.  I will then plan on evaluating the need to boost the midline  axillary dose with a posterior portal to insure this area receives 4500 - 5000 cGy as well. She should complete the above mentioned course of treatment within 6 weeks.    We specifically discussed our deep inspiration breath hold (DIBH) technique which allows us to treat only when the breast is removed as much as possible from the heart and lung volume, the slight increased time in treatment delivery and education regarding the breathing techniques and the important benefits and she was agreeable.      We discussed today specifically the acute irritation of the skin and the mild fatigue she should expect.  We discussed the possibility of mild sore throat and dysphagia from the tamar irradiation as well.  We discussed the long term possibility of mild hyperpigmentation and fibrosis of the chest wall as well as a slightly increased risk of lymphedema.  We also discussed the importance of our treatment planning process in protecting the underlying structures, specifically ribs, heart and lung.  We did discuss the small possibility of long term fibrosis in these areas depending on the volume and dose involved and also again, the extent of our planning process to limit these areas as much as we are able.    I believe all her questions were answered to her satisfaction.  She has healed very well and I have recommended we start the treatments within the week which is about 5 weeks out from surgery.  We were able to get her treatment planning process started today and I anticipate getting her treatments underway    Objective   I spent greater than 40 minutes in face-to-face time with the patient and 25 minutes of that time were spent in counseling and coordination of care, including review of imaging and pathology; indications, goals, logistics, alternatives and risks - both common and rare - for my recommendations as well as surveillance and potential outcomes.

## 2020-07-01 ENCOUNTER — APPOINTMENT (OUTPATIENT)
Dept: RADIATION ONCOLOGY | Facility: HOSPITAL | Age: 60
End: 2020-07-01

## 2020-07-02 PROCEDURE — 77334 RADIATION TREATMENT AID(S): CPT | Performed by: RADIOLOGY

## 2020-07-02 PROCEDURE — 77300 RADIATION THERAPY DOSE PLAN: CPT | Performed by: RADIOLOGY

## 2020-07-02 PROCEDURE — 77295 3-D RADIOTHERAPY PLAN: CPT | Performed by: RADIOLOGY

## 2020-07-02 PROCEDURE — 77293 RESPIRATOR MOTION MGMT SIMUL: CPT | Performed by: RADIOLOGY

## 2020-07-06 PROCEDURE — 77300 RADIATION THERAPY DOSE PLAN: CPT | Performed by: RADIOLOGY

## 2020-07-06 PROCEDURE — 77412 RADIATION TX DELIVERY LVL 3: CPT | Performed by: RADIOLOGY

## 2020-07-06 PROCEDURE — 77334 RADIATION TREATMENT AID(S): CPT | Performed by: RADIOLOGY

## 2020-07-06 PROCEDURE — 77280 THER RAD SIMULAJ FIELD SMPL: CPT | Performed by: RADIOLOGY

## 2020-07-06 PROCEDURE — 77427 RADIATION TX MANAGEMENT X5: CPT | Performed by: RADIOLOGY

## 2020-07-07 PROCEDURE — 77336 RADIATION PHYSICS CONSULT: CPT | Performed by: RADIOLOGY

## 2020-07-07 PROCEDURE — 77387 GUIDANCE FOR RADJ TX DLVR: CPT | Performed by: RADIOLOGY

## 2020-07-07 PROCEDURE — 77412 RADIATION TX DELIVERY LVL 3: CPT | Performed by: RADIOLOGY

## 2020-07-07 PROCEDURE — 77334 RADIATION TREATMENT AID(S): CPT | Performed by: RADIOLOGY

## 2020-07-07 PROCEDURE — 77300 RADIATION THERAPY DOSE PLAN: CPT | Performed by: RADIOLOGY

## 2020-07-08 PROCEDURE — 77412 RADIATION TX DELIVERY LVL 3: CPT | Performed by: RADIOLOGY

## 2020-07-08 PROCEDURE — 77387 GUIDANCE FOR RADJ TX DLVR: CPT | Performed by: RADIOLOGY

## 2020-07-09 PROCEDURE — 77387 GUIDANCE FOR RADJ TX DLVR: CPT | Performed by: RADIOLOGY

## 2020-07-09 PROCEDURE — 77412 RADIATION TX DELIVERY LVL 3: CPT | Performed by: RADIOLOGY

## 2020-07-10 ENCOUNTER — RADIATION ONCOLOGY WEEKLY ASSESSMENT (OUTPATIENT)
Dept: RADIATION ONCOLOGY | Facility: HOSPITAL | Age: 60
End: 2020-07-10

## 2020-07-10 ENCOUNTER — TELEPHONE (OUTPATIENT)
Dept: OTHER | Facility: HOSPITAL | Age: 60
End: 2020-07-10

## 2020-07-10 DIAGNOSIS — Z17.0 MALIGNANT NEOPLASM OF OVERLAPPING SITES OF LEFT BREAST IN FEMALE, ESTROGEN RECEPTOR POSITIVE (HCC): Primary | ICD-10-CM

## 2020-07-10 DIAGNOSIS — C50.812 MALIGNANT NEOPLASM OF OVERLAPPING SITES OF LEFT BREAST IN FEMALE, ESTROGEN RECEPTOR POSITIVE (HCC): Primary | ICD-10-CM

## 2020-07-10 PROCEDURE — 77412 RADIATION TX DELIVERY LVL 3: CPT | Performed by: RADIOLOGY

## 2020-07-10 PROCEDURE — 77387 GUIDANCE FOR RADJ TX DLVR: CPT | Performed by: RADIOLOGY

## 2020-07-10 NOTE — TELEPHONE ENCOUNTER
Called MsTerri Magdaleno to see how she was doing. She stated she has started radiation and is doing well. We discussed our survivorship program and she was interested in getting an appointment. That appointment has been made for July 20th. She was thankful for the call and will reach out if any questions or needs arise.

## 2020-07-13 PROCEDURE — 77412 RADIATION TX DELIVERY LVL 3: CPT | Performed by: RADIOLOGY

## 2020-07-13 PROCEDURE — 77427 RADIATION TX MANAGEMENT X5: CPT | Performed by: RADIOLOGY

## 2020-07-13 PROCEDURE — 77387 GUIDANCE FOR RADJ TX DLVR: CPT | Performed by: RADIOLOGY

## 2020-07-13 NOTE — PROGRESS NOTES
Physician Weekly Management Note    Diagnosis:     1. Malignant neoplasm of overlapping sites of left breast in female, estrogen receptor positive (CMS/HCC)     cT3, cN1, cM0, ER+, [place]+, HER2-  pT3, pN3, cM0, ER+, [place]+, HER2-    Reason for Visit: Radiation (5/33)    Concurrent Chemo:   No    Notes on Treatment course, Films, Medical progress and Plan:  Doing well. No problems or questions, cont on.    Performance Status: (1) Restricted in physically strenuous activity, ambulatory and able to do work of light nature  Subjective   ROS - Other than as listed above, as follow:  Constitutional - Normal - Denies lack of appetite, fatigue, fever, night sweats and change in weight.  Neck - Normal - Denies neck masses, muscle weakness, neck pain, decreased range of motion and swelling of the neck.  Breasts - Normal - Denies breast masses, nipple discharge, nipple inversion and pain.  Respiratory - Normal - Denies cough, dyspnea, hemoptysis, hiccoughs, pleuritic chest pain and wheezing.  Gastrointestinal - Normal - Denies abdominal pain, constipation, diarrhea, heartburn / dyspepsia, hematemesis, hemorrhoids, melena / GI bleeding, nausea, pain / cramping, satiety and vomiting.  Musculoskeletal - Normal - Denies arthritis, bone pain, joint pain, muscle weakness and decreased range of motion.   Hematologic/Lymphatic - Normal - Denies easy bruising and tender or enlarged lymph nodes.  There were no vitals filed for this visit.  Objective   PYHSICAL EXAM - Other than listed above, as follows:  Constitutional - Normal - No evidence of impaired alertness, inadequate appearance, premature or advanced chronologic age, uncooperativeness, altered mood and affect and disorientation.  Neck - Normal - No evidence of tender or enlarged lymph nodes, neck abnormalities, restricted range of motion and enlarged thyroid gland.  Breasts - Normal - No change in nipple or incision site.  Chest - Normal - No evidence of chest abnormalities and tender  "or enlarged lymph nodes.  Hematologic/Lymphatic -  Normal - No evidence of tender or enlarged axillae lymph nodes and tender or enlarged neck lymph nodes.     Technical aspects reviewed:  Weekly OBI approved if applicable? Yes  Weekly port films approved?   Yes  Change requests noted if applicable? Yes  Patient setup and plan reviewed?  Yes    Chart Reviewed:  Continue current treatment plan?   Yes  Treatment plan change requested?  No    I have reviewed and marked as \"reviewed\" the current medications, allergies and problem list in the patients EMR.  I have reviewed the patient's medical, surgical  history in detail, reviewed any pertinent lab work  and updated the computerized patient record if needed.    Patient's Care Team:  Patient Care Team:  Zach Lora APRN as PCP - General  Reasor, Bam Medrano MD as Consulting Physician (Pain Medicine)  Denilson Dawn MD as Consulting Physician (Infectious Diseases)  Ruby Moreno MD as Consulting Physician (Hematology and Oncology)  Mag Spivey MD as Consulting Physician (Radiation Oncology)      "

## 2020-07-14 PROCEDURE — 77387 GUIDANCE FOR RADJ TX DLVR: CPT | Performed by: RADIOLOGY

## 2020-07-14 PROCEDURE — 77336 RADIATION PHYSICS CONSULT: CPT | Performed by: RADIOLOGY

## 2020-07-14 PROCEDURE — 77412 RADIATION TX DELIVERY LVL 3: CPT | Performed by: RADIOLOGY

## 2020-07-15 ENCOUNTER — HOSPITAL ENCOUNTER (OUTPATIENT)
Dept: PHYSICAL THERAPY | Facility: HOSPITAL | Age: 60
Setting detail: THERAPIES SERIES
Discharge: HOME | End: 2020-07-15

## 2020-07-15 ENCOUNTER — RADIATION ONCOLOGY WEEKLY ASSESSMENT (OUTPATIENT)
Dept: RADIATION ONCOLOGY | Facility: HOSPITAL | Age: 60
End: 2020-07-15

## 2020-07-15 VITALS — WEIGHT: 142 LBS | HEIGHT: 67 IN | BODY MASS INDEX: 22.29 KG/M2

## 2020-07-15 DIAGNOSIS — C50.812 MALIGNANT NEOPLASM OF OVERLAPPING SITES OF LEFT BREAST IN FEMALE, ESTROGEN RECEPTOR POSITIVE (HCC): Primary | ICD-10-CM

## 2020-07-15 DIAGNOSIS — M25.612 DECREASED RANGE OF MOTION OF LEFT SHOULDER: ICD-10-CM

## 2020-07-15 DIAGNOSIS — Z17.0 MALIGNANT NEOPLASM OF OVERLAPPING SITES OF LEFT BREAST IN FEMALE, ESTROGEN RECEPTOR POSITIVE (HCC): Primary | ICD-10-CM

## 2020-07-15 DIAGNOSIS — Z98.890 STATUS POST LEFT BREAST LUMPECTOMY: ICD-10-CM

## 2020-07-15 DIAGNOSIS — L90.5 SCAR TISSUE: ICD-10-CM

## 2020-07-15 DIAGNOSIS — Z91.89 AT RISK FOR LYMPHEDEMA: ICD-10-CM

## 2020-07-15 DIAGNOSIS — N64.89 BREAST EDEMA: ICD-10-CM

## 2020-07-15 DIAGNOSIS — M79.622 AXILLARY PAIN, LEFT: ICD-10-CM

## 2020-07-15 PROCEDURE — 77412 RADIATION TX DELIVERY LVL 3: CPT | Performed by: RADIOLOGY

## 2020-07-15 PROCEDURE — 97110 THERAPEUTIC EXERCISES: CPT | Performed by: PHYSICAL THERAPIST

## 2020-07-15 PROCEDURE — 97140 MANUAL THERAPY 1/> REGIONS: CPT | Performed by: PHYSICAL THERAPIST

## 2020-07-15 PROCEDURE — 77387 GUIDANCE FOR RADJ TX DLVR: CPT | Performed by: RADIOLOGY

## 2020-07-15 NOTE — PROGRESS NOTES
Physician Weekly Management Note    Diagnosis:     1. Malignant neoplasm of overlapping sites of left breast in female, estrogen receptor positive (CMS/HCC)     cT3, cN1, cM0, ER+, HI+, HER2-  pT3, pN3, cM0, ER+, HI+, HER2-    Reason for Visit: Radiation (8/33)    Concurrent Chemo:   No    Notes on Treatment course, Films, Medical progress and Plan:  Doing well. No problems or questions, cont on.    Performance Status: (1) Restricted in physically strenuous activity, ambulatory and able to do work of light nature  Subjective   ROS - Other than as listed above, as follow:  Constitutional - Normal - Denies lack of appetite, fatigue, fever, night sweats and change in weight.  Neck - Normal - Denies neck masses, muscle weakness, neck pain, decreased range of motion and swelling of the neck.  Breasts - Normal - Denies breast masses, nipple discharge, nipple inversion and pain.  Respiratory - Normal - Denies cough, dyspnea, hemoptysis, hiccoughs, pleuritic chest pain and wheezing.  Gastrointestinal - Normal - Denies abdominal pain, constipation, diarrhea, heartburn / dyspepsia, hematemesis, hemorrhoids, melena / GI bleeding, nausea, pain / cramping, satiety and vomiting.  Musculoskeletal - Normal - Denies arthritis, bone pain, joint pain, muscle weakness and decreased range of motion.   Hematologic/Lymphatic - Normal - Denies easy bruising and tender or enlarged lymph nodes.  There were no vitals filed for this visit.  Objective   PYHSICAL EXAM - Other than listed above, as follows:  Constitutional - Normal - No evidence of impaired alertness, inadequate appearance, premature or advanced chronologic age, uncooperativeness, altered mood and affect and disorientation.  Neck - Normal - No evidence of tender or enlarged lymph nodes, neck abnormalities, restricted range of motion and enlarged thyroid gland.  Breasts - Normal - No change in nipple or incision site.  Chest - Normal - No evidence of chest abnormalities and tender  "or enlarged lymph nodes.  Hematologic/Lymphatic -  Normal - No evidence of tender or enlarged axillae lymph nodes and tender or enlarged neck lymph nodes.     Technical aspects reviewed:  Weekly OBI approved if applicable? Yes  Weekly port films approved?   Yes  Change requests noted if applicable? Yes  Patient setup and plan reviewed?  Yes    Chart Reviewed:  Continue current treatment plan?   Yes  Treatment plan change requested?  No    I have reviewed and marked as \"reviewed\" the current medications, allergies and problem list in the patients EMR.  I have reviewed the patient's medical, surgical  history in detail, reviewed any pertinent lab work  and updated the computerized patient record if needed.    Patient's Care Team:  Patient Care Team:  Zach Lora APRN as PCP - General  Reasor, Bam Medrano MD as Consulting Physician (Pain Medicine)  Denilson Dawn MD as Consulting Physician (Infectious Diseases)  Ruby Moreno MD as Consulting Physician (Hematology and Oncology)  Mag Spivey MD as Consulting Physician (Radiation Oncology)  Landen Forman MD as Surgeon (Breast Surgery)  Yusuf Garica MD as Attending Provider (Plastic Surgery)      "

## 2020-07-15 NOTE — THERAPY TREATMENT NOTE
Outpatient Physical Therapy Lymphedema Treatment Note  [facility]     Patient Name: Richa Dolan  : 1960  MRN: [id number]  Today's Date: 7/15/2020        Visit Date: 07/15/2020    Visit Dx:    ICD-10-CM ICD-9-CM   1. Malignant neoplasm of overlapping sites of left breast in female, estrogen receptor positive (CMS/HCC) C50.812 174.8    Z17.0 V86.0   2. Status post left breast lumpectomy Z98.890 V45.89   3. At risk for lymphedema Z91.89 V49.89   4. Decreased range of motion of left shoulder M25.612 719.51   5. Axillary pain, left M79.622 729.5   6. Breast edema N64.89 611.89   7. Scar tissue L90.5 709.2       Patient Active Problem List   Diagnosis   • Abnormal liver function tests   • Hypertension   • Insomnia   • Knee pain   • Leukopenia   • Acute right-sided low back pain without sciatica   • Knee osteoarthritis   • Osteoporosis   • Arthralgia of multiple joints   • Seasonal allergic rhinitis   • Thrombocytopenia (CMS/HCC)   • Bone cyst   • Chronic pain of right knee   • Acquired immunocompromised state (CMS/HCC)   • Bruises easily   • Loss of hair   • Anxiety   • Elevated liver enzymes   • GERD (gastroesophageal reflux disease)   • HIV positive long-term non-progressor (CMS/HCC)   • Hypercholesterolemia   • Lupus anticoagulant positive   • Neck pain, chronic   • Chemotherapy-induced neutropenia (CMS/HCC)   • Tear of medial meniscus of right knee, current   • Chondromalacia, knee   • ICH (intracerebral hemorrhage) (CMS/HCC)   • Stomach discomfort   • Abnormal bruising   • Alopecia   • Headache   • Intractable chronic migraine without aura and without status migrainosus   • Osteoarthritis of right knee   • Need for vaccination   • Mild episode of recurrent major depressive disorder (CMS/HCC)   • Ingrown nail of great toe of right foot   • Left wrist pain   • Breast cancer screening   • Malignant neoplasm of overlapping sites of left breast in female, estrogen receptor positive (CMS/HCC)   • History  "of stroke   • Arthritis   • Family history of breast cancer   • Encounter for fitting and adjustment of vascular catheter   • Epistaxis   • Chemotherapy-induced thrombocytopenia   • Anemia associated with chemotherapy   • Substance or medication-induced sleep disorder, 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CMS/HCC)   • Body aches   • Neuropathy   • History of left breast cancer   • Other fatigue   • HIV infection (CMS/HCC)        Lymphedema     Row Name 07/15/20 1100             Subjective Pain    Able to rate subjective pain?  yes  -SC      Pre-Treatment Pain Level  1  -SC      Subjective Pain Comment  tightness  -SC         Subjective Comments    Subjective Comments  I am doing ok. Started radiation last week. Just the first day was hard because I had my arm up for about an hour. Felt like a band around the top part of my arm. But doing better now. I can get in the position ok. My cording seems better and I can move my arm better. I still haven't received my sleeve.   -SC         Compression/Skin Care    Compression/Skin Care Comments  eduction of use of compression once arrives, notified [facility] patient had not recieved garments  -SC         L-Dex Bioimpedence Screening    L-Dex Measurement Extremity  LUE  -SC      L-Dex Patient Position  Standing  -SC      L-Dex UE Dominate Side  Right  -SC      L-Dex UE At Risk Side  Left  -SC      L-Dex UE Pre Surgical Value  No  -SC      L-Dex UE Score  -4.5  -SC      L-Dex UE Baseline Score  -4.7  -SC      L-Dex UE Value Change  0.2  -SC      L-Dex UE Comment  WNL, stable for patient  -SC      Skeletal Muscle Mass (%)  26.7 %  -SC      Fat Mass (%)  34.6 %  -SC      Hy-dex  0.9  -SC      Height  170.2 cm (67.01\")  -SC      Weight  64.4 kg (142 lb)  -SC      BMI (Calculated)  22.2  -SC        User Key  (r) = Recorded By, (t) = Taken By, (c) = Cosigned By    Initials Name Provider Type    Yola Rodriguez, PT Physical Therapist                        PT Assessment/Plan     Row Name 07/15/20 " 1100          PT Assessment    Assessment Comments  first f/u after initial evaluation, awaiting compression for radiation, bioimpedance L-Dex stable, -4.5, noted good PROM left shoulder all planes but hypomobility of L GHJ, axillary cording has resolved.   -SC        PT Plan    PT Plan Comments  30 day reassess, bio, quick DASH, manual, progress HEP  -SC       User Key  (r) = Recorded By, (t) = Taken By, (c) = Cosigned By    Initials Name Provider Type    SC Yola Valenzuela, PT Physical Therapist             OP Exercises     Row Name 07/15/20 1100             Subjective Comments    Subjective Comments  I am doing ok. Started radiation last week. Just the first day was hard because I had my arm up for about an hour. Felt like a band around the top part of my arm. But doing better now. I can get in the position ok. My cording seems better and I can move my arm better. I still haven't received my sleeve.   -SC         Subjective Pain    Able to rate subjective pain?  yes  -SC      Pre-Treatment Pain Level  1  -SC      Subjective Pain Comment  tightness  -SC         Exercise 1    Exercise Name 1  reverse shoulder rolls, scapular retraction seated  -SC      Sets 1  2  -SC      Reps 1  10  -SC      Additional Comments  2x/day  -SC         Exercise 2    Exercise Name 2  wallslides standing flexion B  -SC      Sets 2  1  -SC      Reps 2  10  -SC      Time 2  5-10 seconds  -SC      Additional Comments  1x/day  -SC        User Key  (r) = Recorded By, (t) = Taken By, (c) = Cosigned By    Initials Name Provider Type    SC Yola Valenzuela, PT Physical Therapist                     Manual Rx (last 36 hours)      Manual Treatments     Row Name 07/15/20 1100             Manual Rx 1    Manual Rx 1 Location  left shoulder supine on plinth drapped in gown HOB elevated/HL  -SC      Manual Rx 1 Type  AP and inferior joint mobs L GHJ, PROM all planes, gentle myofascial release medial arm with retrograde massage to axilla  -SC       Manual Rx 1 Grade  III-IV  -SC        User Key  (r) = Recorded By, (t) = Taken By, (c) = Cosigned By    Initials Name Provider Type    Yola Rodriguez, PT Physical Therapist          PT OP Goals     Row Name 07/15/20 1100          PT Short Term Goals    STG Date to Achieve  07/16/20  -SC     STG 1  Patient independent and compliant with initial home exercise program focused on diaphragmatic breathing, range of motion, flexibility to decrease edema and improve lymphatic flow for decreased edema and decreased risk of infection.   -SC     STG 1 Progress  Progressing  -SC     STG 1 Progress Comments  instructed today  -SC     STG 2  Patient demonstrate proper awareness of What is Lymphedema and 18 Steps of Prevention for improved prevention, management, care of symptoms and ease of transition to self-care of condition.   -SC     STG 2 Progress  Progressing  -SC     STG 2 Progress Comments  reviewed today  -SC     STG 3  Patient demonstrate independence and compliance with proper skin care during/post radiation for improved mobility, decreased scar tissue, axillary cording and edema.  -SC     STG 3 Progress  Progressing  -SC     STG 3 Progress Comments  education during radiation skin care  -SC     STG 4  Patient independent and compliant with breast mobilization techniques for improved mobility, skin care and lymphatic flow.  -SC     STG 4 Progress  Ongoing  -SC     STG 4 Progress Comments  to instruct post radiation  -SC     STG 5  Patient independent and compliant with daytime OTS prevention compression garments as indicated for decreased risk of lymphedema and infection and safety with transition of self-care of condition.   -SC     STG 5 Progress  Progressing  -SC     STG 5 Progress Comments  instructed today, awaiting arrival of product  -SC        Long Term Goals    LTG Date to Achieve  09/17/20  -SC     LTG 1  Patient's bioimpedance score to remain between -10 and 6.5 for decreased risk of developing  stage II post lumpectomy lymphedema syndrome left UE and to establish treatment for early intervention of condition if/when indicated.  -SC     LTG 1 Progress  Progressing baseline post op 06/18/2020 -4.7  -SC     LTG 1 Progress Comments  currently -4.5, WNL and stable from baseline  -SC     LTG 2  Patient independent and compliant with advanced Home Exercise Program and self-care techniques for self-management of condition.   -SC     LTG 2 Progress  Ongoing  -SC     LTG 3  Patient able to wear fitted post lumpectomy/radiation bra with padding left (if indicated)  >/=6-8 hours for work schedule for improved compression to lateral flank/left breast.  -SC     LTG 3 Progress  Ongoing  -SC     LTG 4  Patient transition to cardiovascular exercise program (walking) >/=150 to 180 mins/week with moderate intensity for improved heart health, weight loss, decreased risk of osteoporosis and improved phase angle/metabolic rate.  -SC     LTG 4 Progress  Ongoing  -SC     LTG 4 Progress Comments  does report full/normal activies at home and yardwork  [facility]  Patient score </=25% on Quick DASH for improved functional use of L UE home, community, and sleep  [facility]  Ongoing initial evaluation 50%  -SC        Time Calculation    PT Goal Re-Cert Due Date  09/17/20  -SC       User Key  (r) = Recorded By, (t) = Taken By, (c) = Cosigned By    Initials Name Provider Type    Yola Rodriguez, PT Physical Therapist          Therapy Education  Education Details: bioimpedance test and results, compression, prognosis and care through radiation, HEP  Given: HEP, Symptoms/condition management, Pain management, Posture/body mechanics, Mobility training, Edema management, Other (comment)  Program: New  How Provided: Verbal, Demonstration, Written  Provided to: Patient  Level of Understanding: Teach back education performed, Verbalized, Demonstrated              Time Calculation:   Start Time: 1015  Stop Time: 1045  Time  Calculation (min): 30 min   Therapy Charges for Today     Code Description Service Date Service Provider Modifiers Qty    38440686357 HC PT THER PROC EA 15 MIN 7/15/2020 Yola Valenzuela, PT GP 1    64821320307 HC PT MANUAL THERAPY EA 15 MIN 7/15/2020 Yola Valenzuela, PT GP 1                    Yola Broderick, PT  7/15/2020

## 2020-07-16 ENCOUNTER — APPOINTMENT (OUTPATIENT)
Dept: PHYSICAL THERAPY | Facility: HOSPITAL | Age: 60
End: 2020-07-16

## 2020-07-16 PROCEDURE — 77412 RADIATION TX DELIVERY LVL 3: CPT | Performed by: RADIOLOGY

## 2020-07-16 PROCEDURE — 77387 GUIDANCE FOR RADJ TX DLVR: CPT | Performed by: RADIOLOGY

## 2020-07-17 PROCEDURE — 77387 GUIDANCE FOR RADJ TX DLVR: CPT | Performed by: RADIOLOGY

## 2020-07-17 PROCEDURE — 77412 RADIATION TX DELIVERY LVL 3: CPT | Performed by: RADIOLOGY

## 2020-07-20 ENCOUNTER — OFFICE VISIT (OUTPATIENT)
Dept: OTHER | Facility: HOSPITAL | Age: 60
End: 2020-07-20

## 2020-07-20 VITALS
WEIGHT: 144.8 LBS | OXYGEN SATURATION: 100 % | SYSTOLIC BLOOD PRESSURE: 126 MMHG | BODY MASS INDEX: 22.67 KG/M2 | DIASTOLIC BLOOD PRESSURE: 81 MMHG | TEMPERATURE: 98.7 F | RESPIRATION RATE: 18 BRPM | HEART RATE: 70 BPM

## 2020-07-20 DIAGNOSIS — C50.812 MALIGNANT NEOPLASM OF OVERLAPPING SITES OF LEFT BREAST IN FEMALE, ESTROGEN RECEPTOR POSITIVE (HCC): Primary | ICD-10-CM

## 2020-07-20 DIAGNOSIS — Z17.0 MALIGNANT NEOPLASM OF OVERLAPPING SITES OF LEFT BREAST IN FEMALE, ESTROGEN RECEPTOR POSITIVE (HCC): Primary | ICD-10-CM

## 2020-07-20 PROCEDURE — 77387 GUIDANCE FOR RADJ TX DLVR: CPT | Performed by: RADIOLOGY

## 2020-07-20 PROCEDURE — 77412 RADIATION TX DELIVERY LVL 3: CPT | Performed by: RADIOLOGY

## 2020-07-20 PROCEDURE — G0463 HOSPITAL OUTPT CLINIC VISIT: HCPCS | Performed by: NURSE PRACTITIONER

## 2020-07-20 PROCEDURE — 77427 RADIATION TX MANAGEMENT X5: CPT | Performed by: RADIOLOGY

## 2020-07-20 PROCEDURE — 99215 OFFICE O/P EST HI 40 MIN: CPT | Performed by: NURSE PRACTITIONER

## 2020-07-20 NOTE — PROGRESS NOTES
[facility] MULTIDISCIPLINARY CLINIC  SURVIVORSHIP VISIT    Richa Dolan is a pleasant 59 y.o. female being followed by Ruby Moreno MD  for T2N1 invasive ductal carcinoma of the left breast. Here today in our Cancer Survivorship Clinic, to review survivorship care plan.    TREATMENT HISTORY:        Malignant neoplasm of overlapping sites of left breast in female, estrogen receptor positive (CMS/HCC)    10/29/2019 Biopsy     1. Left Breast, 4:00, 6 cm FN, U/S-Guided Core Needle Biopsy for a Mass:               A. INVASIVE DUCTAL CARCINOMA WITH APOCRINE FEATURES, Moderately differentiated;                    Jen Histologic Grade II/III (tubule score = 3, nuclear score = 2, mitoses score = 1), measuring at least                   9 mm.               B. No ductal or lobular carcinoma in situ is identified in this sample.               C. Focus suspicious for lymphovascular space invasion.               D. See Biomarker Template for hormone receptor studies.                 2. Lymph Node, Left Axilla, U/S-Guided Core Needle Biopsy:               A. METASTATIC MAMMARY CARCINOMA (see Comment).               B. Metastatic focus measures 5 mm in greatest extent.               C. Negative for extranodal extension in this sample.    Estrogen receptor: Positive at 85%  Progesterone receptor: Positive at 10%  HER2 status: Equivocal at 2+ by IHC; Negative by FISH  Ki 67: 15%      11/12/2019 Initial Diagnosis     Malignant neoplasm of overlapping sites of left breast in female, estrogen receptor positive (CMS/HCC)      11/14/2019 Genetic Testing     Invitae testing neagtive      12/6/2019 - 1/17/2020 Chemotherapy     Adriamycin (Doxorubicin), Cyclophosphamide (Cytoxan) x4 cycles  · 11/14/19 baseline echocardiogram, EF = 67%      1/31/2020 - 4/30/2020 Chemotherapy     OP BREAST PACLitaxel (weekly X 12)      5/18/2020 Surgery     1.  Left [place] Lymph Node #1:                A.  One lymph node  positive for metastatic ductal carcinoma (1/1).                 B.  Metastasis measures 6 mm in single greatest dimension.               C.  Extranodal extension is identified.                 D.  Metallic clip recovered with biopsy site changes noted.      2.  Left [place] Lymph Node #2:                A.  One lymph node involved by metastatic ductal carcinoma (1/1).               B.  Metastasis measures 3 mm in single greatest dimension.               C.  Focal minimal extranodal extension is identified.       3.  Left Breast Lumpectomy:                A. Invasive breast carcinoma (invasive ductal carcinoma) with apocrine features.                            1. Invasive carcinoma measures 55 mm x 40 mm x 30 mm.                            2. Overall [place] grade II (tubular score = 3, nuclear score = 3,                                mitotic score = 1).                            3. Focal lymphovascular space invasion is identified.                            4. Microcalcifications are present in the invasive component.               B. Associated scattered multifocal ductal carcinoma in situ (DCIS).                            1. DCIS spans an area estimated at 36 mm x 6 mm x 6 mm.                            2. High grade, solid and comedo DCIS with apocrine features.                            3. Microcalcification present in DCIS.               C. All margins are negative for invasive carcinoma.                    Invasive carcinoma measures 2 mm from the closest (Posterior) margin of excision.                    All other margins measure at least  3 mm from invasive carcinoma including:                            Anterior margin = 4 mm                            Posterior margin = 2 mm                            Superior margin = 12 mm                            Inferior margin = 8.5 mm                             Lateral margin = 3 mm                            Medial margin =  10 mm                  D.  All margins are negative DCIS.                    DCIS measures 2.5 mm from the closest (Anterior) margin of excision.                    All other margins  measure at least 8 mm from DCIS including:                            Anterior margin =  2.5 mm                            Posterior margin = 20 mm                            Superior margin = 10 mm                            Inferior margin = 12 mm                             Lateral margin =  8 mm                            Medial margin = 10 mm                  E. Focal biopsy site changes are identified, and metallic clip grossly retrieved.               F. No lobular carcinoma in situ nor atypical lobular hyperplasia identified.               G. Non-neoplastic breast tissue with focal fibroadenomatous hyperplasia, focal sclerosing adenosis, adenosis,                    duct ectasia and dense stromal fibrosis.  Microcalcification is identified within benign breast tissue.                H. Previous Biomarkers: Estrogen receptors: Positive (85%), Progesterone receptors: Positive (10%),                    HER/2-merna(IHC): Equivocal (score 2+), HER/2-merna FISH: Not amplified.  Ki-67 =15%  (see [id number]).     4.  Left Axillary Contents:                 A.  Eight of eleven additional lymph nodes positive for metastatic carcinoma (8/11).                            1.  Largest macrometastasis measures 5 mm in greatest dimension.                            2.  Extranodal extension is identified.       Oneill-Acosta grade I : (pNR).  Residual Cancer [place]:  RCB-III.    Pathologic stage: ypT3 pN3a    Repeat biomarkers on post neoadjuvant tumor  Estrogen receptor: Positive at 81-90%  Progesterone receptor: Positive at 5%  HER2 status: Equivocal at 2+ by IHC; Negative by FISH  Ki 67: 8%      5/19/2020 Imaging     BONE MINERAL DENSITOMETRY     HISTORY:  59 years-old female. Menopause at age 45. Previous diagnosis  of osteoporosis and breast cancer.     COMPARISON:   05/11/2016.     TECHNIQUE: The T score compares the patient's bone mineral density with  the peak bone mass of young normal patients. Patients with T-scores  between 1.0 and 2.5 standard deviations below the mean are osteopenic.  Patients with T-scores greater than 2.5 standard deviation below the  mean are osteoporotic.     The Z score compares the patient's bone mineral density with sex and age  matched patients. Z score of -2.0 and lower is an indication of low  mineral density for the patient's age.     FINDINGS: Lumbar T score is  -2.5, representing a 6.5% interval  increase.     Left hip density is lowest at the  femoral neck where the T score is  -3.1, unchanged.      Right hip density is lowest at the  total hip where the T score is -2.9,  representing a 12.4% interval increase.      IMPRESSION:  Osteoporosis at the lumbar spine and both hips. Interval  increase in bone density      6/19/2020 Cancer Staged     Staging form: Breast, AJCC 8th Edition  - Pathologic: pT3, pN3, cM0, ER+, GA+, HER2- - Signed by Ruby Moreno MD on 6/26/2020 7/6/2020 -  Radiation     Radiation to left breast per Mag Spivey MD       Hormonal Therapy     Letrozole 2.5 mg daily to begin after completion of radiation         Allergies as of 07/20/2020 - Reviewed 07/20/2020   Allergen Reaction Noted   • Augmentin [amoxicillin-pot clavulanate] Hives 08/17/2012       MEDICATIONS:  I have reviewed the medication list with the patient and I updated it in the electronic medical record. Medication dosages and frequencies were confirmed to be accurate with the patient.      Review of Systems   Constitutional: Positive for appetite change and fatigue. Negative for activity change, chills, fever and unexpected weight change.   Respiratory: Negative for cough, chest tightness and shortness of breath.    Cardiovascular: Negative for chest pain and leg swelling.   Gastrointestinal: Positive for nausea (with indigestion). Negative for  blood in stool, constipation and diarrhea.   Genitourinary: Negative for hematuria.   Musculoskeletal: Positive for back pain and neck pain (chronic).   Skin: Negative for rash and wound.   Neurological: Positive for numbness (toes, cold sensitivity). Negative for dizziness and light-headedness.   Psychiatric/Behavioral: Positive for sleep disturbance (difficulty staying asleep). Negative for decreased concentration and dysphoric mood. The patient is nervous/anxious.        /81   Pulse 70   Temp 98.7 °F (37.1 °C) (Temporal)   Resp 18   Wt 65.7 kg (144 lb 12.8 oz)   SpO2 100% Comment: room air  BMI 22.67 kg/m²     Wt Readings from Last 3 Encounters:   07/20/20 65.7 kg (144 lb 12.8 oz)   07/15/20 64.4 kg (142 lb)   06/29/20 65.5 kg (144 lb 6.4 oz)       Pain Score    07/20/20 1029   PainSc: 0-No pain         Physical Exam   Constitutional: She is oriented to person, place, and time. She appears well-developed and well-nourished. She is cooperative.   HENT:   Head: Normocephalic and atraumatic.   Mouth/Throat: Oropharynx is clear and moist and mucous membranes are normal.   Cardiovascular: Normal rate and regular rhythm.   Pulmonary/Chest: Effort normal. No respiratory distress.   Abdominal: Soft. Normal appearance.   Musculoskeletal: Normal range of motion.   Neurological: She is alert and oriented to person, place, and time.   Skin: Skin is warm, dry and intact.   Psychiatric: She has a normal mood and affect. Her speech is normal and behavior is normal. Judgment and thought content normal. Cognition and memory are normal.   Vitals reviewed.        Problem List Items Addressed This Visit        Active Problems    Malignant neoplasm of overlapping sites of left breast in female, estrogen receptor positive (CMS/HCC) - Primary            SURVIVORSHIP DISCUSSION HELD TODAY:   Primary patient goal(s): late and long-term effects of dz/tx and wellness     Management of disease and treatment related effects:    Patient comes in today after radiation treatment.  Has begun to notice some fatigue.  Skin remains intact.  Has full range of motion of left upper extremity.  Has been seen by lymphedema clinic for initial evaluation.  She received compression garment over the weekend and has follow-up planned with lymphedema clinic this Thursday to confirm garment fit.  I provided her with some additional written information regarding lymphedema causes, early signs and symptoms, management and prevention.    She will begin letrozole last 2 weeks of radiation. Discussed strategies for preservation of bone mass while on AI therapy including calcium and vitamin D supplementation and importance of weight bearing exercise to include moderate intensity walking most days of the week.    Reports very mild neuropathy of bilateral toes since chemotherapy completion.  Continues to take the 6 daily.  Feels this is returning to baseline.    Reports some difficulty staying asleep.  Is able to initiate sleep with Ambien.  Attributes this to some anxiety.  We discussed fatigue is not uncommon during treatment and for some folks may peak immediately after completion of radiation.  We discussed the importance of maintaining physical activity as tolerated to best manage symptoms of fatigue, mood disruption, sleep disturbances, and weight management.    Psychosocial and spiritual:   · Distress score: 6  · GAD7: 4  · PHQ9: 9    Reports her spouse has been her biggest source of support.  They have been  33 years.  She mentions there are some health concerns for him which have her preoccupied.  While her mother is alive she is not been very supportive during her cancer diagnosis.  We discussed opportunities for support available virtually at [employer], through friend for life cancer support network as well as weekly watch parties sponsored by [place] cancer program and she plans to explore these options.    Advance Care Planning   Advance  Care Planning Discussion:    Patient does not have advance care planning complete.  When asked she would identify her spouse Owen as her healthcare surrogate.  They have had multiple discussions about healthcare decision making in the past.  Brief discussion and written information provided regarding advance care planning and appropriateness for all healthy adults, choosing a healthcare surrogate, prior experiences with loved ones who have been seriously ill, exploration of goals of care in the event of a sudden injury or illness, and upcoming Advance Care Planning classes offered monthly at the [facility].  I have provided her with a copy of the conversations that matter booklet and blank living will forms to review and have encouraged her to call if she has additional questions.           Maintaining personal wellness: We discussed current BMI of 22.7 and recommended target of 20-25 for wellness, cardiovascular health and risk reduction for cancer recurrence and prevention. We discussed availability of oncology registered dietician, Prachi Lizarraga and referral offered. Patient declines at this time. Prachi's contact info and handout describing recommended dietary modifications emphasizing whole foods, plant based diet provided.  She has been experiencing difficulties with some indigestion and loss of appetite and I have encouraged her to keep Dr. Moreno updated on the symptoms and have advised that Prachi may be of assistance should these symptoms persist.               Discussed NCCN recommendations for all cancer survivors of 150 minutes/week moderate intensity exercise, achieve and maintain a healthy BMI, plants-based whole-foods diet, avoid tobacco and second hand smoke, avoid alcohol or minimize alcohol intake - no more than 1 drink in a day for women, 2 drinks in a day for men.     No orders of the defined types were placed in this encounter.      After review of the Survivorship Treatment  Summary & Care Plan, the patient verbalized understanding of recommendations for follow-up. As outlined in the care plan, they were advised to continue with follow-up care in accordance with the NCCN surveillance guidelines while transitioning back to their primary care physician for continued general preventive and healthcare needs. We discussed the importance of healthy eating, exercise and weight management. We reviewed current guidelines for routine screening of other cancers.     A copy of the Survivorship Treatment Summary & Care Plan for Ms. Dolan (see below) was provided to and forwarded to the providers identified on the care team.      Greater than 40 minutes spent with patient, more than half of that spent face-to-face counseling patient extensively on treatment summary, surveillance for recurrence, late and long-term effects of disease and treatment, intimacy and self-image, preventative screening for other cancers, achieving/maintaining healthy BMI and link to risk reduction, adherence to current therapies, management of anxiety/uncertainty and self care strategies.

## 2020-07-21 PROCEDURE — 77412 RADIATION TX DELIVERY LVL 3: CPT | Performed by: RADIOLOGY

## 2020-07-21 PROCEDURE — 77336 RADIATION PHYSICS CONSULT: CPT | Performed by: RADIOLOGY

## 2020-07-21 PROCEDURE — 77387 GUIDANCE FOR RADJ TX DLVR: CPT | Performed by: RADIOLOGY

## 2020-07-22 PROCEDURE — 77412 RADIATION TX DELIVERY LVL 3: CPT | Performed by: RADIOLOGY

## 2020-07-22 PROCEDURE — 77387 GUIDANCE FOR RADJ TX DLVR: CPT | Performed by: RADIOLOGY

## 2020-07-23 ENCOUNTER — RADIATION ONCOLOGY WEEKLY ASSESSMENT (OUTPATIENT)
Dept: RADIATION ONCOLOGY | Facility: HOSPITAL | Age: 60
End: 2020-07-23

## 2020-07-23 ENCOUNTER — HOSPITAL ENCOUNTER (OUTPATIENT)
Dept: PHYSICAL THERAPY | Facility: HOSPITAL | Age: 60
Setting detail: THERAPIES SERIES
Discharge: HOME | End: 2020-07-23

## 2020-07-23 DIAGNOSIS — Z91.89 AT RISK FOR LYMPHEDEMA: ICD-10-CM

## 2020-07-23 DIAGNOSIS — M79.622 AXILLARY PAIN, LEFT: ICD-10-CM

## 2020-07-23 DIAGNOSIS — M25.612 DECREASED RANGE OF MOTION OF LEFT SHOULDER: ICD-10-CM

## 2020-07-23 DIAGNOSIS — Z17.0 MALIGNANT NEOPLASM OF OVERLAPPING SITES OF LEFT BREAST IN FEMALE, ESTROGEN RECEPTOR POSITIVE (HCC): Primary | ICD-10-CM

## 2020-07-23 DIAGNOSIS — C50.812 MALIGNANT NEOPLASM OF OVERLAPPING SITES OF LEFT BREAST IN FEMALE, ESTROGEN RECEPTOR POSITIVE (HCC): Primary | ICD-10-CM

## 2020-07-23 DIAGNOSIS — L90.5 SCAR TISSUE: ICD-10-CM

## 2020-07-23 DIAGNOSIS — Z98.890 STATUS POST LEFT BREAST LUMPECTOMY: ICD-10-CM

## 2020-07-23 DIAGNOSIS — N64.89 BREAST EDEMA: ICD-10-CM

## 2020-07-23 PROCEDURE — 77387 GUIDANCE FOR RADJ TX DLVR: CPT | Performed by: RADIOLOGY

## 2020-07-23 PROCEDURE — 97110 THERAPEUTIC EXERCISES: CPT | Performed by: PHYSICAL THERAPIST

## 2020-07-23 PROCEDURE — 77412 RADIATION TX DELIVERY LVL 3: CPT | Performed by: RADIOLOGY

## 2020-07-23 PROCEDURE — 97140 MANUAL THERAPY 1/> REGIONS: CPT | Performed by: PHYSICAL THERAPIST

## 2020-07-23 NOTE — THERAPY PROGRESS REPORT/RE-CERT
Outpatient Physical Therapy Lymphedema Progress Note  [facility]     Patient Name: Richa Dolan  : 1960  MRN: [id number]  Today's Date: 2020        Visit Date: 2020    Visit Dx:    ICD-10-CM ICD-9-CM   1. Malignant neoplasm of overlapping sites of left breast in female, estrogen receptor positive (CMS/HCC) C50.812 174.8    Z17.0 V86.0   2. Status post left breast lumpectomy Z98.890 V45.89   3. At risk for lymphedema Z91.89 V49.89   4. Decreased range of motion of left shoulder M25.612 719.51   5. Axillary pain, left M79.622 729.5   6. Breast edema N64.89 611.89   7. Scar tissue L90.5 709.2       Patient Active Problem List   Diagnosis   • Abnormal liver function tests   • Hypertension   • Insomnia   • Knee pain   • Leukopenia   • Acute right-sided low back pain without sciatica   • Knee osteoarthritis   • Osteoporosis   • Arthralgia of multiple joints   • Seasonal allergic rhinitis   • Thrombocytopenia (CMS/HCC)   • Bone cyst   • Chronic pain of right knee   • Acquired immunocompromised state (CMS/HCC)   • Bruises easily   • Loss of hair   • Anxiety   • Elevated liver enzymes   • GERD (gastroesophageal reflux disease)   • HIV positive long-term non-progressor (CMS/HCC)   • Hypercholesterolemia   • Lupus anticoagulant positive   • Neck pain, chronic   • Chemotherapy-induced neutropenia (CMS/HCC)   • Tear of medial meniscus of right knee, current   • Chondromalacia, knee   • ICH (intracerebral hemorrhage) (CMS/HCC)   • Stomach discomfort   • Abnormal bruising   • Alopecia   • Headache   • Intractable chronic migraine without aura and without status migrainosus   • Osteoarthritis of right knee   • Need for vaccination   • Mild episode of recurrent major depressive disorder (CMS/HCC)   • Ingrown nail of great toe of right foot   • Left wrist pain   • Breast cancer screening   • Malignant neoplasm of overlapping sites of left breast in female, estrogen receptor positive (CMS/HCC)   • History  of stroke   • Arthritis   • Family history of breast cancer   • Encounter for fitting and adjustment of vascular catheter   • Epistaxis   • Chemotherapy-induced thrombocytopenia   • Anemia associated with chemotherapy   • Substance or medication-induced sleep disorder, 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CMS/HCC)   • Body aches   • Neuropathy   • History of left breast cancer   • Other fatigue   • HIV infection (CMS/HCC)        Lymphedema     Row Name 07/23/20 3192             Subjective Pain    Able to rate subjective pain?  yes  -SC      Pre-Treatment Pain Level  1  -SC      Subjective Pain Comment  tightness/band lateral arm  -SC         Subjective Comments    Subjective Comments  I did get my sleeves in but I am still having issues with that band tightness left arm. Mostly at night when I sit to rest to watch TV. Exercises I do in the [place]orming before radiation. I have no trouble getting into radiation position. No pain really during the day. I had started to wear the sleeve but I think it makes my symptoms worse. I brought it for you to check today.   -SC         Lymphedema Assessment    Lymphedema Classification  LUE:;at risk/stage 0;secondary;Other: left breast, at risk  -SC      Lymphedema Cancer Related Sx  left;lumpectomy;sentinel node biopsy;axillary dissection;reconstructive  -SC      Lymphedema Surgery Comments  Port 12/02/2019; L lump/AD 05/28/2020  -SC      Lymph Nodes Removed #  13  -SC      Positive Lymph Nodes #  10  -SC      Chemo Received  yes  -SC      Chemo Treatments #/Timeframe  NAC Dec 2019 through April 2020, AC/Taxol  -SC      Adverse Chemo Reactions/Complication  hair loss  -SC      Radiation Therapy Received  yes  -SC      Radiation Treatments #/Timeframe  14/33 today  -SC      Infections or Cellulitis?  no  -SC      Lymphedema Assessment Comments  high risk left UE/breast  -SC         Lymphedema Edema Assessment    Edema Assessment Comment  negative  -SC         Skin Changes/Observations    Skin Observations  Comment  mild radiation skin changes, resolution of axillary cording, mild scar tissue axillary incision, moderate lat flap reconstruction   -SC         Lymphedema Sensation    Lymphedema Sensation Tests  light touch  -SC      Lymphedema Light Touch  LUE:;mild impairment;moderate impairment  -SC         Lymphedema Pulses/Capillary Refill    Lymph Pulses Capillary Refill Comments  intact  -SC         Compression/Skin Care    Compression/Skin Care Comments  noted proper fit of OTS compression sleeve, education proper doning and positioning upper arm at axillary fold with reduction of symptoms noted, glove too tight, will return for one size up  -SC        User Key  (r) = Recorded By, (t) = Taken By, (c) = Cosigned By    Initials Name Provider Type    SC Yola Valenzuela, PT Physical Therapist            PT Ortho     Row Name 07/23/20 1045       Posture/Observations    Forward Head  Mild;Increased;Sitting posture  -SC    Cervical Lordosis  Normal  -SC    Thoracic Kyphosis  Mild;Increased;Sitting posture  -SC    Rounded Shoulders  Bilateral:;Mild;Increased;Sitting posture  -SC    Scapular Elevation  Right:;Mild;Increased;Elevated;Sitting posture  -SC    Scapular winging  Bilateral:;Normal  -SC    Scoliosis  Normal;Standing posture  -SC    Lumbar lordosis  Mild;Decreased;Sitting posture  -SC    Genu valgus  Bilateral:;Normal;Standing posture  -SC    Foot pronation  Bilateral:;Normal;Standing posture  -SC    Observations  Incision healing  -SC       General ROM    GENERAL ROM COMMENTS  R UE WNLs, L elbow, hand/wrist WNL, PROM left shoulder WNLs all planes, AAROM left shoulder flexion after session WNLs  -SC       MMT (Manual Muscle Testing)    General MMT Comments  R UE WNLs all planes, L elbow 4+/5, left shoulder 4/5, left scapula 2+-3/5  -SC       Gait/Stairs Assessment/Training    Comment (Gait/Stairs)  normal  -SC      User Key  (r) = Recorded By, (t) = Taken By, (c) = Cosigned By    Initials Name Provider Type     SC Yola Valenzuela, PT Physical Therapist                  PT Assessment/Plan     Row Name 07/23/20 0828          PT Assessment    Functional Limitations  Performance in sport activities;Limitations in community activities;Performance in leisure activities  -SC     Impairments  Endurance;Impaired flexibility;Impaired lymphatic circulation;Impaired muscle endurance;Integumentary integrity;Joint mobility;Pain;Peripheral nerve integrity;Poor body mechanics;Posture;Sensation  -SC     Assessment Comments  Mrs. Dolan is a pleasant 59 year old female, diagnosed with left breast cancer in 2019, mediport placement right 12/02/2020, initiated neoadjuvant chemotherapy with oncologist Dr. Moreno Dec 2019, completed April 2020, AC/Taxol, s/p left lumpectomy with axillary dissection 10/13 L ALN (+) with flap reconstruction closure 05/28/2020 performed by surgeons Dr. Forman and Radha, is currently completed adjuvant radiation oncologist Dr. Spivey, 14/33 today. Is at high risk of post lumpectomy lymphedema syndrome left UE/breast due to surgery, lymph node removal, lymph node involvement, radiation, age, and additional medical issues. Baseline post op bioimpedance obtained, L-Dex -4.7, stable on 07/15/2020, Will reassess post radiation unless otherwise indicated. Resolved axillary cording. Education proper use of compression today. Marked hypomobility of left shoulder that could be contributing to symptoms. To monitor closely through radiation. Orders sent for reorder compression glove.   -SC        PT Plan    Predicted Duration of Therapy Intervention (Therapy Eval)  10 sessions  -SC     PT Plan Comments  continue manual, compression during radiation, set up post radiation assessment, bioimpedance/Quick DASH at that time, assess new compression glove, RTC stabilization program with bands if indicated  -SC       User Key  (r) = Recorded By, (t) = Taken By, (c) = Cosigned By    Initials Name Provider Type    SC  Yola Valenzuela, PT Physical Therapist             OP Exercises     Row Name 07/23/20 1045             Subjective Comments    Subjective Comments  I did get my sleeves in but I am still having issues with that band tightness left arm. Mostly at night when I sit to rest to watch TV. Exercises I do in the [place] before radiation. I have no trouble getting into radiation position. No pain really during the day. I had started to wear the sleeve but I think it makes my symptoms worse. I brought it for you to check today.   -SC         Subjective Pain    Able to rate subjective pain?  yes  -SC      Pre-Treatment Pain Level  1  -SC      Subjective Pain Comment  tightness/band lateral arm  -SC         Total Minutes    12067 - PT Therapeutic Exercise Minutes  12  -SC      78558 - PT Manual Therapy Minutes  23  -SC         Exercise 1    Exercise Name 1  reverse shoulder rolls, scapular retraction seated  -SC      Sets 1  2  -SC      Reps 1  10  -SC      Additional Comments  reviewed but instructed to perform before resting at night and instructed to perform if symptoms of lateral arm pain begin at night to assist with symptom management  -SC        User Key  (r) = Recorded By, (t) = Taken By, (c) = Cosigned By    Initials Name Provider Type    SC Yola Valenzuela, PT Physical Therapist                     Manual Rx (last 36 hours)      Manual Treatments     Row Name 07/23/20 1045             Total Minutes    99120 - PT Manual Therapy Minutes  23  -SC         Manual Rx 1    Manual Rx 1 Location  left shoulder supine on plinth drapped in gown HOB elevated/HL  -SC      Manual Rx 1 Type  AP and inferior joint mobs L GHJ, PROM all planes, gentle myofascial release medial arm with retrograde massage to axilla  -SC      Manual Rx 1 Grade  III-IV  -SC        User Key  (r) = Recorded By, (t) = Taken By, (c) = Cosigned By    Initials Name Provider Type    Yola Rodriguez, PT Physical Therapist          PT OP Goals      Row Name 07/23/20 1045          PT Short Term Goals    STG Date to Achieve  08/20/20  -SC     STG 1  Patient independent and compliant with initial home exercise program focused on diaphragmatic breathing, range of motion, flexibility to decrease edema and improve lymphatic flow for decreased edema and decreased risk of infection.   -SC     STG 1 Progress  Met  -SC     STG 1 Progress Comments  reviewed performance in evenings to assist with lateral arm pain/symptom management  -SC     STG 2  Patient demonstrate proper awareness of What is Lymphedema and 18 Steps of Prevention for improved prevention, management, care of symptoms and ease of transition to self-care of condition.   -SC     STG 2 Progress  Met  -SC     STG 2 Progress Comments  reviewed today and risk factors with radiation  -SC     STG 3  Patient demonstrate independence and compliance with proper skin care during/post radiation for improved mobility, decreased scar tissue, axillary cording and edema.  -SC     STG 3 Progress  Progressing  -SC     STG 3 Progress Comments  compliant during radiation at this time  -SC     STG 4  Patient independent and compliant with breast mobilization techniques for improved mobility, skin care and lymphatic flow.  -SC     STG 4 Progress  Ongoing  -SC     STG 4 Progress Comments  to instruct with post radiation skin care  -SC     STG 5  Patient independent and compliant with daytime OTS prevention compression garments as indicated for decreased risk of lymphedema and infection and safety with transition of self-care of condition.   -SC     STG 5 Progress  Progressing  -SC     STG 5 Progress Comments  education proper donning, wash and care today, will reorder glove to improve fit, instructed to wear daily during radiation 8-10 hours minimum and for symptom management left lateral arm pain  -SC        Long Term Goals    LTG Date to Achieve  09/17/20  -SC     LTG 1  Patient's bioimpedance score to remain between -10 and  6.5 for decreased risk of developing stage II post lumpectomy lymphedema syndrome left UE and to establish treatment for early intervention of condition if/when indicated.  -SC     LTG 1 Progress  Progressing baseline post op 06/18/2020 -4.7  -SC     LTG 1 Progress Comments  -4.5 on 07/15, stable and negative for subclinical lymphedema, to reassess post radiation unless otherwise indicated  -SC     LTG 2  Patient independent and compliant with advanced Home Exercise Program and self-care techniques for self-management of condition.   -SC     LTG 2 Progress  Progressing  -SC     LTG 3  Patient able to wear fitted post lumpectomy/radiation bra with padding left (if indicated)  >/=6-8 hours for work schedule for improved compression to lateral flank/left breast.  -SC     LTG 3 Progress  Ongoing  -SC     LTG 3 Progress Comments  to complete post radiation  -SC     LTG 4  Patient transition to cardiovascular exercise program (walking) >/=150 to 180 mins/week with moderate intensity for improved heart health, weight loss, decreased risk of osteoporosis and improved phase angle/metabolic rate.  -SC     LTG 4 Progress  Progressing  -SC     LTG 4 Progress Comments  increased performance of home walking program at this time  -SC     LTG 5  Patient score </=25% on Quick DASH for improved functional use of L UE home, community, and sleep  -SC     LTG 5 Progress  Ongoing initial evaluation 50%  -[facility] 5 Progress Comments  to reassess post radiation unless otherwise indicated  -SC        Time Calculation    PT Goal Re-Cert Due Date  08/17/20  -SC       User Key  (r) = Recorded By, (t) = Taken By, (c) = Cosigned By    Initials Name Provider Type    Yola Rodriguez, PT Physical Therapist          Therapy Education  Education Details: compression sleeve/glove and proper use with don/doff, wash/care, use through radiation, proper positioning of garment, instructed to wear sleeve 8-10 hours/day, orders sent for new glove  due to too small, education proper performance of HEP to assist with symptoms management as well  Given: HEP, Symptoms/condition management, Pain management, Posture/body mechanics, Mobility training, Edema management, Other (comment)  Program: New  How Provided: Verbal, Demonstration  Provided to: Patient  Level of Understanding: Teach back education performed, Verbalized, Demonstrated              Time Calculation:   Start Time: 1045  Stop Time: 1120  Time Calculation (min): 35 min   Therapy Charges for Today     Code Description Service Date Service Provider Modifiers Qty    [id number]  PT THER PROC EA 15 MIN 7/23/2020 Yola Valenzuela, PT GP 1    [id number]  PT MANUAL THERAPY EA 15 MIN 7/23/2020 Yola Valenzuela, PT GP 2                    Yola Broderick, PT  7/23/2020

## 2020-07-23 NOTE — PROGRESS NOTES
Physician Weekly Management Note    Diagnosis:     1. Malignant neoplasm of overlapping sites of left breast in female, estrogen receptor positive (CMS/HCC)     cT3, cN1, cM0, ER+, ME+, HER2-  pT3, pN3, cM0, ER+, ME+, HER2-    Reason for Visit: Radiation (14/33)    Concurrent Chemo:   No    Notes on Treatment course, Films, Medical progress and Plan:  Doing well. No problems or questions with RT, cont on.  She is having some mild nausea after she eats for the past several days. No new meds, unsure of etiology - does admit to increased anxiety with  seeing a CArdiologist and having health issues. Has anti nausea meds, encouraged her to use those and watch thru weekend. Let me know if that persists. No other neuro sxs.    Performance Status: (1) Restricted in physically strenuous activity, ambulatory and able to do work of light nature  Subjective   ROS - Other than as listed above, as follow:  Constitutional - Normal - Denies lack of appetite, fatigue, fever, night sweats and change in weight.  Neck - Normal - Denies neck masses, muscle weakness, neck pain, decreased range of motion and swelling of the neck.  Breasts - Normal - Denies breast masses, nipple discharge, nipple inversion and pain.  Respiratory - Normal - Denies cough, dyspnea, hemoptysis, hiccoughs, pleuritic chest pain and wheezing.  Gastrointestinal - Normal - Denies abdominal pain, constipation, diarrhea, heartburn / dyspepsia, hematemesis, hemorrhoids, melena / GI bleeding, nausea, pain / cramping, satiety and vomiting.  Musculoskeletal - Normal - Denies arthritis, bone pain, joint pain, muscle weakness and decreased range of motion.   Hematologic/Lymphatic - Normal - Denies easy bruising and tender or enlarged lymph nodes.  There were no vitals filed for this visit.  Objective   PYHSICAL EXAM - Other than listed above, as follows:  Constitutional - Normal - No evidence of impaired alertness, inadequate appearance, premature or advanced  "chronologic age, uncooperativeness, altered mood and affect and disorientation.  Neck - Normal - No evidence of tender or enlarged lymph nodes, neck abnormalities, restricted range of motion and enlarged thyroid gland.  Breasts - Normal - No change in nipple or incision site.  Chest - Normal - No evidence of chest abnormalities and tender or enlarged lymph nodes.  Hematologic/Lymphatic -  Normal - No evidence of tender or enlarged axillae lymph nodes and tender or enlarged neck lymph nodes.     Technical aspects reviewed:  Weekly OBI approved if applicable? Yes  Weekly port films approved?   Yes  Change requests noted if applicable? Yes  Patient setup and plan reviewed?  Yes    Chart Reviewed:  Continue current treatment plan?   Yes  Treatment plan change requested?  No    I have reviewed and marked as \"reviewed\" the current medications, allergies and problem list in the patients EMR.  I have reviewed the patient's medical, surgical  history in detail, reviewed any pertinent lab work  and updated the computerized patient record if needed.    Patient's Care Team:  Patient Care Team:  Zach Lora APRN as PCP - General  Reasor, Bam Medrano MD as Consulting Physician (Pain Medicine)  Denilson Dawn MD as Consulting Physician (Infectious Diseases)  Ruby Moreno MD as Consulting Physician (Hematology and Oncology)  Mag Spivey MD as Consulting Physician (Radiation Oncology)  Landen Forman MD as Surgeon (Breast Surgery)  Yusuf Garcia MD as Attending Provider (Plastic Surgery)      "

## 2020-07-24 PROCEDURE — 77412 RADIATION TX DELIVERY LVL 3: CPT | Performed by: RADIOLOGY

## 2020-07-24 PROCEDURE — 77387 GUIDANCE FOR RADJ TX DLVR: CPT | Performed by: RADIOLOGY

## 2020-07-27 PROCEDURE — 77427 RADIATION TX MANAGEMENT X5: CPT | Performed by: RADIOLOGY

## 2020-07-27 PROCEDURE — 77387 GUIDANCE FOR RADJ TX DLVR: CPT | Performed by: RADIOLOGY

## 2020-07-27 PROCEDURE — 77412 RADIATION TX DELIVERY LVL 3: CPT | Performed by: RADIOLOGY

## 2020-07-28 ENCOUNTER — RADIATION ONCOLOGY WEEKLY ASSESSMENT (OUTPATIENT)
Dept: RADIATION ONCOLOGY | Facility: HOSPITAL | Age: 60
End: 2020-07-28

## 2020-07-28 DIAGNOSIS — C50.812 MALIGNANT NEOPLASM OF OVERLAPPING SITES OF LEFT BREAST IN FEMALE, ESTROGEN RECEPTOR POSITIVE (HCC): Primary | ICD-10-CM

## 2020-07-28 DIAGNOSIS — Z17.0 MALIGNANT NEOPLASM OF OVERLAPPING SITES OF LEFT BREAST IN FEMALE, ESTROGEN RECEPTOR POSITIVE (HCC): Primary | ICD-10-CM

## 2020-07-28 PROCEDURE — 77412 RADIATION TX DELIVERY LVL 3: CPT | Performed by: RADIOLOGY

## 2020-07-28 PROCEDURE — 77387 GUIDANCE FOR RADJ TX DLVR: CPT | Performed by: RADIOLOGY

## 2020-07-28 PROCEDURE — 77336 RADIATION PHYSICS CONSULT: CPT | Performed by: RADIOLOGY

## 2020-07-28 NOTE — PROGRESS NOTES
Physician Weekly Management Note    Diagnosis:     1. Malignant neoplasm of overlapping sites of left breast in female, estrogen receptor positive (CMS/HCC)     cT3, cN1, cM0, ER+, GA+, HER2-  pT3, pN3, cM0, ER+, GA+, HER2-    Reason for Visit: Radiation (14/33)    Concurrent Chemo:   No    Notes on Treatment course, Films, Medical progress and Plan:  Doing well. No problems or questions with RT, cont on. Nausea better. Cont to watch.    Performance Status: (1) Restricted in physically strenuous activity, ambulatory and able to do work of light nature  Subjective   ROS - Other than as listed above, as follow:  Constitutional - Normal - Denies lack of appetite, fatigue, fever, night sweats and change in weight.  Neck - Normal - Denies neck masses, muscle weakness, neck pain, decreased range of motion and swelling of the neck.  Breasts - Normal - Denies breast masses, nipple discharge, nipple inversion and pain.  Respiratory - Normal - Denies cough, dyspnea, hemoptysis, hiccoughs, pleuritic chest pain and wheezing.  Gastrointestinal - Normal - Denies abdominal pain, constipation, diarrhea, heartburn / dyspepsia, hematemesis, hemorrhoids, melena / GI bleeding, nausea, pain / cramping, satiety and vomiting.  Musculoskeletal - Normal - Denies arthritis, bone pain, joint pain, muscle weakness and decreased range of motion.   Hematologic/Lymphatic - Normal - Denies easy bruising and tender or enlarged lymph nodes.  There were no vitals filed for this visit.  Objective   PYHSICAL EXAM - Other than listed above, as follows:  Constitutional - Normal - No evidence of impaired alertness, inadequate appearance, premature or advanced chronologic age, uncooperativeness, altered mood and affect and disorientation.  Neck - Normal - No evidence of tender or enlarged lymph nodes, neck abnormalities, restricted range of motion and enlarged thyroid gland.  Breasts - Normal - No change in nipple or incision site.  Chest - Normal - No  "evidence of chest abnormalities and tender or enlarged lymph nodes.  Hematologic/Lymphatic -  Normal - No evidence of tender or enlarged axillae lymph nodes and tender or enlarged neck lymph nodes.     Technical aspects reviewed:  Weekly OBI approved if applicable? Yes  Weekly port films approved?   Yes  Change requests noted if applicable? Yes  Patient setup and plan reviewed?  Yes    Chart Reviewed:  Continue current treatment plan?   Yes  Treatment plan change requested?  No    I have reviewed and marked as \"reviewed\" the current medications, allergies and problem list in the patients EMR.  I have reviewed the patient's medical, surgical  history in detail, reviewed any pertinent lab work  and updated the computerized patient record if needed.    Patient's Care Team:  Patient Care Team:  Zach Lora APRN as PCP - General  Reasor, Bam Medrano MD as Consulting Physician (Pain Medicine)  Denilson Dawn MD as Consulting Physician (Infectious Diseases)  Ruby Moreno MD as Consulting Physician (Hematology and Oncology)  Mag Spivey MD as Consulting Physician (Radiation Oncology)  Landen Forman MD as Surgeon (Breast Surgery)  Yusuf Garcia MD as Attending Provider (Plastic Surgery)      "

## 2020-07-29 PROCEDURE — 77412 RADIATION TX DELIVERY LVL 3: CPT | Performed by: RADIOLOGY

## 2020-07-29 PROCEDURE — 77387 GUIDANCE FOR RADJ TX DLVR: CPT | Performed by: RADIOLOGY

## 2020-07-30 ENCOUNTER — HOSPITAL ENCOUNTER (OUTPATIENT)
Dept: PHYSICAL THERAPY | Facility: HOSPITAL | Age: 60
Setting detail: THERAPIES SERIES
Discharge: HOME | End: 2020-07-30

## 2020-07-30 DIAGNOSIS — Z17.0 MALIGNANT NEOPLASM OF OVERLAPPING SITES OF LEFT BREAST IN FEMALE, ESTROGEN RECEPTOR POSITIVE (HCC): Primary | ICD-10-CM

## 2020-07-30 DIAGNOSIS — N64.89 BREAST EDEMA: ICD-10-CM

## 2020-07-30 DIAGNOSIS — M79.622 AXILLARY PAIN, LEFT: ICD-10-CM

## 2020-07-30 DIAGNOSIS — M25.612 DECREASED RANGE OF MOTION OF LEFT SHOULDER: ICD-10-CM

## 2020-07-30 DIAGNOSIS — L90.5 SCAR TISSUE: ICD-10-CM

## 2020-07-30 DIAGNOSIS — Z91.89 AT RISK FOR LYMPHEDEMA: ICD-10-CM

## 2020-07-30 DIAGNOSIS — C50.812 MALIGNANT NEOPLASM OF OVERLAPPING SITES OF LEFT BREAST IN FEMALE, ESTROGEN RECEPTOR POSITIVE (HCC): Primary | ICD-10-CM

## 2020-07-30 DIAGNOSIS — Z98.890 STATUS POST LEFT BREAST LUMPECTOMY: ICD-10-CM

## 2020-07-30 PROCEDURE — 77412 RADIATION TX DELIVERY LVL 3: CPT | Performed by: RADIOLOGY

## 2020-07-30 PROCEDURE — 77387 GUIDANCE FOR RADJ TX DLVR: CPT | Performed by: RADIOLOGY

## 2020-07-30 PROCEDURE — 97140 MANUAL THERAPY 1/> REGIONS: CPT | Performed by: PHYSICAL THERAPIST

## 2020-07-30 NOTE — THERAPY TREATMENT NOTE
Outpatient Physical Therapy Lymphedema Treatment Note  [facility]     Patient Name: Richa Dolan  : 1960  MRN: [id number]  Today's Date: 2020        Visit Date: 2020    Visit Dx:    ICD-10-CM ICD-9-CM   1. Malignant neoplasm of overlapping sites of left breast in female, estrogen receptor positive (CMS/HCC) C50.812 174.8    Z17.0 V86.0   2. Status post left breast lumpectomy Z98.890 V45.89   3. At risk for lymphedema Z91.89 V49.89   4. Decreased range of motion of left shoulder M25.612 719.51   5. Axillary pain, left M79.622 729.5   6. Breast edema N64.89 611.89   7. Scar tissue L90.5 709.2       Patient Active Problem List   Diagnosis   • Abnormal liver function tests   • Hypertension   • Insomnia   • Knee pain   • Leukopenia   • Acute right-sided low back pain without sciatica   • Knee osteoarthritis   • Osteoporosis   • Arthralgia of multiple joints   • Seasonal allergic rhinitis   • Thrombocytopenia (CMS/HCC)   • Bone cyst   • Chronic pain of right knee   • Acquired immunocompromised state (CMS/HCC)   • Bruises easily   • Loss of hair   • Anxiety   • Elevated liver enzymes   • GERD (gastroesophageal reflux disease)   • HIV positive long-term non-progressor (CMS/HCC)   • Hypercholesterolemia   • Lupus anticoagulant positive   • Neck pain, chronic   • Chemotherapy-induced neutropenia (CMS/HCC)   • Tear of medial meniscus of right knee, current   • Chondromalacia, knee   • ICH (intracerebral hemorrhage) (CMS/HCC)   • Stomach discomfort   • Abnormal bruising   • Alopecia   • Headache   • Intractable chronic migraine without aura and without status migrainosus   • Osteoarthritis of right knee   • Need for vaccination   • Mild episode of recurrent major depressive disorder (CMS/HCC)   • Ingrown nail of great toe of right foot   • Left wrist pain   • Breast cancer screening   • Malignant neoplasm of overlapping sites of left breast in female, estrogen receptor positive (CMS/HCC)   • History  of stroke   • Arthritis   • Family history of breast cancer   • Encounter for fitting and adjustment of vascular catheter   • Epistaxis   • Chemotherapy-induced thrombocytopenia   • Anemia associated with chemotherapy   • Substance or medication-induced sleep disorder, 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CMS/HCC)   • Body aches   • Neuropathy   • History of left breast cancer   • Other fatigue   • HIV infection (CMS/HCC)        Lymphedema     Row Name 07/30/20 1020             Subjective Pain    Able to rate subjective pain?  yes  -SC      Pre-Treatment Pain Level  3  -SC         Subjective Comments    Subjective Comments  Just tight. Radiation is getting to me because I am so fatigued. That band feeling in my arm is really bothering me. I have been wearing my sleeve. Doing my skin care routine. After I see you it always feels better. Doing my exercises every morning before I go to radiation.   -SC         Compression/Skin Care    Compression/Skin Care Comments  has received updated garments  -SC        User Key  (r) = Recorded By, (t) = Taken By, (c) = Cosigned By    Initials Name Provider Type    SC Yola Valenzuela, PT Physical Therapist                        PT Assessment/Plan     Row Name 07/30/20 1020          PT Assessment    Assessment Comments  marked improved mobility after working on lap flap scar/incision, will progress with lat/scapular stabilization exercise post radiation or as tolerated  -SC        PT Plan    PT Plan Comments  continue manual, post radiation set up with reasessment at that time, progress lat/scapular stabilization exercises with bands as tolerated  -SC       User Key  (r) = Recorded By, (t) = Taken By, (c) = Cosigned By    Initials Name Provider Type    SC Yola Valenzuela, PT Physical Therapist             OP Exercises     Row Name 07/30/20 1020             Subjective Comments    Subjective Comments  Just tight. Radiation is getting to me because I am so fatigued. That band feeling in my arm is  really bothering me. I have been wearing my sleeve. Doing my skin care routine. After I see you it always feels better. Doing my exercises every morning before I go to radiation.   -SC         Subjective Pain    Able to rate subjective pain?  yes  -SC      Pre-Treatment Pain Level  3  -SC         Exercise 1    Exercise Name 1  reverse shoulder rolls, scapular retraction seated  -SC      Sets 1  2  -SC      Reps 1  10  -SC      Additional Comments  instructed to do in radiation room prior to laying down  -SC        User Key  (r) = Recorded By, (t) = Taken By, (c) = Cosigned By    Initials Name Provider Type    Yola Rodriguez, PT Physical Therapist                     Manual Rx (last 36 hours)      Manual Treatments     Row Name 07/30/20 1020             Manual Rx 1    Manual Rx 1 Location  left shoulder supine on plinth drapped in gown HOB elevated/HL  -SC      Manual Rx 1 Type  AP and inferior joint mobs L GHJ, PROM all planes, gentle myofascial release medial arm with retrograde massage to axilla, scar massage lat flap reconstruction, gentle left breast mobilizations without entering radiation field  -SC      Manual Rx 1 Grade  III-IV  -SC        User Key  (r) = Recorded By, (t) = Taken By, (c) = Cosigned By    Initials Name Provider Type    Yola Rodriguez, PT Physical Therapist              Therapy Education  Education Details: compression through radiation, weaning progress, skin care, scar massage lat flap, exercise program with radiation, continuation of care  Given: HEP, Symptoms/condition management, Pain management, Posture/body mechanics, Mobility training, Edema management, Other (comment)  Program: Progressed, Modified  How Provided: Verbal, Demonstration  Provided to: Patient  Level of Understanding: Teach back education performed, Verbalized, Demonstrated              Time Calculation:   Start Time: 1020  Stop Time: 1050  Time Calculation (min): 30 min   Therapy Charges for Today      Code Description Service Date Service Provider Modifiers Qty    51580917316 HC PT MANUAL THERAPY EA 15 MIN 7/30/2020 Yola Valenzuela, PT GP 2                    Yola Broderick, PT  7/30/2020

## 2020-07-31 PROCEDURE — 77412 RADIATION TX DELIVERY LVL 3: CPT | Performed by: RADIOLOGY

## 2020-07-31 PROCEDURE — 77387 GUIDANCE FOR RADJ TX DLVR: CPT | Performed by: RADIOLOGY

## 2020-08-01 ENCOUNTER — APPOINTMENT (OUTPATIENT)
Dept: RADIATION ONCOLOGY | Facility: HOSPITAL | Age: 60
End: 2020-08-01

## 2020-08-03 PROCEDURE — 77412 RADIATION TX DELIVERY LVL 3: CPT | Performed by: RADIOLOGY

## 2020-08-03 PROCEDURE — 77387 GUIDANCE FOR RADJ TX DLVR: CPT | Performed by: RADIOLOGY

## 2020-08-03 PROCEDURE — 77427 RADIATION TX MANAGEMENT X5: CPT | Performed by: RADIOLOGY

## 2020-08-04 ENCOUNTER — INFUSION (OUTPATIENT)
Dept: ONCOLOGY | Facility: HOSPITAL | Age: 60
End: 2020-08-04

## 2020-08-04 DIAGNOSIS — Z45.2 ENCOUNTER FOR FITTING AND ADJUSTMENT OF VASCULAR CATHETER: Primary | ICD-10-CM

## 2020-08-04 PROCEDURE — 77336 RADIATION PHYSICS CONSULT: CPT | Performed by: RADIOLOGY

## 2020-08-04 PROCEDURE — 96523 IRRIG DRUG DELIVERY DEVICE: CPT

## 2020-08-04 PROCEDURE — 77387 GUIDANCE FOR RADJ TX DLVR: CPT | Performed by: RADIOLOGY

## 2020-08-04 PROCEDURE — 77412 RADIATION TX DELIVERY LVL 3: CPT | Performed by: RADIOLOGY

## 2020-08-04 PROCEDURE — 25010000003 HEPARIN LOCK FLUSH PER 10 UNITS: Performed by: INTERNAL MEDICINE

## 2020-08-04 RX ORDER — SODIUM CHLORIDE 0.9 % (FLUSH) 0.9 %
10 SYRINGE (ML) INJECTION AS NEEDED
Status: CANCELLED | OUTPATIENT
Start: 2020-08-04

## 2020-08-04 RX ORDER — HEPARIN SODIUM (PORCINE) LOCK FLUSH IV SOLN 100 UNIT/ML 100 UNIT/ML
500 SOLUTION INTRAVENOUS AS NEEDED
Status: DISCONTINUED | OUTPATIENT
Start: 2020-08-04 | End: 2020-08-04 | Stop reason: HOSPADM

## 2020-08-04 RX ORDER — HEPARIN SODIUM (PORCINE) LOCK FLUSH IV SOLN 100 UNIT/ML 100 UNIT/ML
500 SOLUTION INTRAVENOUS AS NEEDED
Status: CANCELLED | OUTPATIENT
Start: 2020-08-04

## 2020-08-04 RX ORDER — SODIUM CHLORIDE 0.9 % (FLUSH) 0.9 %
10 SYRINGE (ML) INJECTION AS NEEDED
Status: DISCONTINUED | OUTPATIENT
Start: 2020-08-04 | End: 2020-08-04 | Stop reason: HOSPADM

## 2020-08-04 RX ADMIN — Medication 10 ML: at 12:32

## 2020-08-04 RX ADMIN — SODIUM CHLORIDE, PRESERVATIVE FREE 500 UNITS: 5 INJECTION INTRAVENOUS at 12:32

## 2020-08-05 PROCEDURE — 77412 RADIATION TX DELIVERY LVL 3: CPT | Performed by: RADIOLOGY

## 2020-08-05 PROCEDURE — 77387 GUIDANCE FOR RADJ TX DLVR: CPT | Performed by: RADIOLOGY

## 2020-08-06 ENCOUNTER — RADIATION ONCOLOGY WEEKLY ASSESSMENT (OUTPATIENT)
Dept: RADIATION ONCOLOGY | Facility: HOSPITAL | Age: 60
End: 2020-08-06

## 2020-08-06 ENCOUNTER — HOSPITAL ENCOUNTER (OUTPATIENT)
Dept: PHYSICAL THERAPY | Facility: HOSPITAL | Age: 60
Setting detail: THERAPIES SERIES
Discharge: HOME | End: 2020-08-06

## 2020-08-06 DIAGNOSIS — Z98.890 STATUS POST LEFT BREAST LUMPECTOMY: ICD-10-CM

## 2020-08-06 DIAGNOSIS — M79.622 AXILLARY PAIN, LEFT: ICD-10-CM

## 2020-08-06 DIAGNOSIS — L90.5 SCAR TISSUE: ICD-10-CM

## 2020-08-06 DIAGNOSIS — Z91.89 AT RISK FOR LYMPHEDEMA: ICD-10-CM

## 2020-08-06 DIAGNOSIS — C50.812 MALIGNANT NEOPLASM OF OVERLAPPING SITES OF LEFT BREAST IN FEMALE, ESTROGEN RECEPTOR POSITIVE (HCC): Primary | ICD-10-CM

## 2020-08-06 DIAGNOSIS — N64.89 BREAST EDEMA: ICD-10-CM

## 2020-08-06 DIAGNOSIS — Z17.0 MALIGNANT NEOPLASM OF OVERLAPPING SITES OF LEFT BREAST IN FEMALE, ESTROGEN RECEPTOR POSITIVE (HCC): Primary | ICD-10-CM

## 2020-08-06 DIAGNOSIS — M25.612 DECREASED RANGE OF MOTION OF LEFT SHOULDER: ICD-10-CM

## 2020-08-06 PROCEDURE — 77412 RADIATION TX DELIVERY LVL 3: CPT | Performed by: RADIOLOGY

## 2020-08-06 PROCEDURE — 77387 GUIDANCE FOR RADJ TX DLVR: CPT | Performed by: RADIOLOGY

## 2020-08-06 PROCEDURE — 97140 MANUAL THERAPY 1/> REGIONS: CPT | Performed by: PHYSICAL THERAPIST

## 2020-08-06 NOTE — THERAPY TREATMENT NOTE
Outpatient Physical Therapy Lymphedema Treatment Note  [facility]     Patient Name: Richa Dolan  : 1960  MRN: [id number]  Today's Date: 2020        Visit Date: 2020    Visit Dx:    ICD-10-CM ICD-9-CM   1. Malignant neoplasm of overlapping sites of left breast in female, estrogen receptor positive (CMS/HCC) C50.812 174.8    Z17.0 V86.0   2. Status post left breast lumpectomy Z98.890 V45.89   3. At risk for lymphedema Z91.89 V49.89   4. Decreased range of motion of left shoulder M25.612 719.51   5. Axillary pain, left M79.622 729.5   6. Breast edema N64.89 611.89   7. Scar tissue L90.5 709.2       Patient Active Problem List   Diagnosis   • Abnormal liver function tests   • Hypertension   • Insomnia   • Knee pain   • Leukopenia   • Acute right-sided low back pain without sciatica   • Knee osteoarthritis   • Osteoporosis   • Arthralgia of multiple joints   • Seasonal allergic rhinitis   • Thrombocytopenia (CMS/HCC)   • Bone cyst   • Chronic pain of right knee   • Acquired immunocompromised state (CMS/HCC)   • Bruises easily   • Loss of hair   • Anxiety   • Elevated liver enzymes   • GERD (gastroesophageal reflux disease)   • HIV positive long-term non-progressor (CMS/HCC)   • Hypercholesterolemia   • Lupus anticoagulant positive   • Neck pain, chronic   • Chemotherapy-induced neutropenia (CMS/HCC)   • Tear of medial meniscus of right knee, current   • Chondromalacia, knee   • ICH (intracerebral hemorrhage) (CMS/HCC)   • Stomach discomfort   • Abnormal bruising   • Alopecia   • Headache   • Intractable chronic migraine without aura and without status migrainosus   • Osteoarthritis of right knee   • Need for vaccination   • Mild episode of recurrent major depressive disorder (CMS/HCC)   • Ingrown nail of great toe of right foot   • Left wrist pain   • Breast cancer screening   • Malignant neoplasm of overlapping sites of left breast in female, estrogen receptor positive (CMS/HCC)   • History  of stroke   • Arthritis   • Family history of breast cancer   • Encounter for fitting and adjustment of vascular catheter   • Epistaxis   • Chemotherapy-induced thrombocytopenia   • Anemia associated with chemotherapy   • Substance or medication-induced sleep disorder, 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CMS/HCC)   • Body aches   • Neuropathy   • History of left breast cancer   • Other fatigue   • HIV infection (CMS/HCC)        Lymphedema     Row Name 08/06/20 1117             Subjective Pain    Able to rate subjective pain?  yes  -SC      Pre-Treatment Pain Level  3  -SC         Subjective Comments    Subjective Comments  Just fatigue and the shoulder hurting but I am feeling better this week overall. Really helps to come here. I am starting to get some skin changes and it is sore. I have 2 more weeks.   -SC        User Key  (r) = Recorded By, (t) = Taken By, (c) = Cosigned By    Initials Name Provider Type    Yola Rodriguez, PT Physical Therapist                        PT Assessment/Plan     Row Name 08/06/20 1117          PT Plan    PT Plan Comments  continue manual, post radiation set up with continuation of care, progress HEP with lat/scapular stabilization theraband as patient tolerates  -SC       User Key  (r) = Recorded By, (t) = Taken By, (c) = Cosigned By    Initials Name Provider Type    Yola Rodriguez, PT Physical Therapist             OP Exercises     Row Name 08/06/20 1117             Subjective Comments    Subjective Comments  Just fatigue and the shoulder hurting but I am feeling better this week overall. Really helps to come here. I am starting to get some skin changes and it is sore. I have 2 more weeks.   -SC         Subjective Pain    Able to rate subjective pain?  yes  -SC      Pre-Treatment Pain Level  3  -SC        User Key  (r) = Recorded By, (t) = Taken By, (c) = Cosigned By    Initials Name Provider Type    Yola Rodriguez, PT Physical Therapist                     Manual Rx (last 36  hours)      Manual Treatments     Row Name 08/06/20 1117             Manual Rx 1    Manual Rx 1 Location  left shoulder supine on plinth drapped in gown HOB elevated/HL  -SC      Manual Rx 1 Type  AP and inferior joint mobs L GHJ, PROM all planes, gentle myofascial release medial arm with retrograde massage to axilla, scar massage lat flap reconstruction, gentle left breast mobilizations without entering radiation field  -SC      Manual Rx 1 Grade  III-IV  -SC        User Key  (r) = Recorded By, (t) = Taken By, (c) = Cosigned By    Initials Name Provider Type    SC Yola Valenzuela, PT Physical Therapist              Therapy Education  Given: HEP, Symptoms/condition management, Pain management, Posture/body mechanics, Mobility training, Edema management, Other (comment)  Program: Reinforced  How Provided: Verbal  Provided to: Patient  Level of Understanding: Verbalized              Time Calculation:   Start Time: 1117  Stop Time: 1148  Time Calculation (min): 31 min   Therapy Charges for Today     Code Description Service Date Service Provider Modifiers Qty    25648033930 HC PT MANUAL THERAPY EA 15 MIN 8/6/2020 Yola Valenzuela PT GP 2                    Yola Broderick PT  8/6/2020

## 2020-08-06 NOTE — PROGRESS NOTES
Physician Weekly Management Note    Diagnosis:     1. Malignant neoplasm of overlapping sites of left breast in female, estrogen receptor positive (CMS/HCC)     cT3, cN1, cM0, ER+, LA+, HER2-  pT3, pN3, cM0, ER+, LA+, HER2-    Reason for Visit: Radiation (24/33)    Concurrent Chemo:   No    Notes on Treatment course, Films, Medical progress and Plan:  Doing well still. Skin slightly erythematous. Using aquaphor. No problems or questions with RT, cont on.     Performance Status: (1) Restricted in physically strenuous activity, ambulatory and able to do work of light nature  Subjective   ROS - Other than as listed above, as follow:  Constitutional - Normal - Denies lack of appetite, fatigue, fever, night sweats and change in weight.  Neck - Normal - Denies neck masses, muscle weakness, neck pain, decreased range of motion and swelling of the neck.  Breasts - Normal - Denies breast masses, nipple discharge, nipple inversion and pain.  Respiratory - Normal - Denies cough, dyspnea, hemoptysis, hiccoughs, pleuritic chest pain and wheezing.  Gastrointestinal - Normal - Denies abdominal pain, constipation, diarrhea, heartburn / dyspepsia, hematemesis, hemorrhoids, melena / GI bleeding, nausea, pain / cramping, satiety and vomiting.  Musculoskeletal - Normal - Denies arthritis, bone pain, joint pain, muscle weakness and decreased range of motion.   Hematologic/Lymphatic - Normal - Denies easy bruising and tender or enlarged lymph nodes.  There were no vitals filed for this visit.  Objective   PYHSICAL EXAM - Other than listed above, as follows:  Constitutional - Normal - No evidence of impaired alertness, inadequate appearance, premature or advanced chronologic age, uncooperativeness, altered mood and affect and disorientation.  Neck - Normal - No evidence of tender or enlarged lymph nodes, neck abnormalities, restricted range of motion and enlarged thyroid gland.  Breasts - Normal - No change in nipple or incision  "site.  Chest - Normal - No evidence of chest abnormalities and tender or enlarged lymph nodes.  Hematologic/Lymphatic -  Normal - No evidence of tender or enlarged axillae lymph nodes and tender or enlarged neck lymph nodes.     Technical aspects reviewed:  Weekly OBI approved if applicable? Yes  Weekly port films approved?   Yes  Change requests noted if applicable? Yes  Patient setup and plan reviewed?  Yes    Chart Reviewed:  Continue current treatment plan?   Yes  Treatment plan change requested?  No    I have reviewed and marked as \"reviewed\" the current medications, allergies and problem list in the patients EMR.  I have reviewed the patient's medical, surgical  history in detail, reviewed any pertinent lab work  and updated the computerized patient record if needed.    Patient's Care Team:  Patient Care Team:  Zach Lora APRN as PCP - General  Reasor, Bam Medrano MD as Consulting Physician (Pain Medicine)  Denilson Dawn MD as Consulting Physician (Infectious Diseases)  Ruby Moreno MD as Consulting Physician (Hematology and Oncology)  Mag Spivey MD as Consulting Physician (Radiation Oncology)  Landen Forman MD as Surgeon (Breast Surgery)  Yusuf Garcia MD as Attending Provider (Plastic Surgery)      "

## 2020-08-07 PROCEDURE — 77387 GUIDANCE FOR RADJ TX DLVR: CPT | Performed by: RADIOLOGY

## 2020-08-07 PROCEDURE — 77412 RADIATION TX DELIVERY LVL 3: CPT | Performed by: RADIOLOGY

## 2020-08-10 PROCEDURE — 77427 RADIATION TX MANAGEMENT X5: CPT | Performed by: RADIOLOGY

## 2020-08-10 PROCEDURE — 77412 RADIATION TX DELIVERY LVL 3: CPT | Performed by: RADIOLOGY

## 2020-08-10 PROCEDURE — 77387 GUIDANCE FOR RADJ TX DLVR: CPT | Performed by: RADIOLOGY

## 2020-08-11 PROCEDURE — 77387 GUIDANCE FOR RADJ TX DLVR: CPT | Performed by: RADIOLOGY

## 2020-08-11 PROCEDURE — 77336 RADIATION PHYSICS CONSULT: CPT | Performed by: RADIOLOGY

## 2020-08-11 PROCEDURE — 77412 RADIATION TX DELIVERY LVL 3: CPT | Performed by: RADIOLOGY

## 2020-08-12 PROCEDURE — 77412 RADIATION TX DELIVERY LVL 3: CPT | Performed by: RADIOLOGY

## 2020-08-12 PROCEDURE — 77387 GUIDANCE FOR RADJ TX DLVR: CPT | Performed by: RADIOLOGY

## 2020-08-13 PROCEDURE — 77280 THER RAD SIMULAJ FIELD SMPL: CPT | Performed by: RADIOLOGY

## 2020-08-13 PROCEDURE — 77412 RADIATION TX DELIVERY LVL 3: CPT | Performed by: RADIOLOGY

## 2020-08-14 PROCEDURE — 77412 RADIATION TX DELIVERY LVL 3: CPT | Performed by: RADIOLOGY

## 2020-08-14 PROCEDURE — 77387 GUIDANCE FOR RADJ TX DLVR: CPT | Performed by: RADIOLOGY

## 2020-08-17 ENCOUNTER — DOCUMENTATION (OUTPATIENT)
Dept: RADIATION ONCOLOGY | Facility: HOSPITAL | Age: 60
End: 2020-08-17

## 2020-08-17 PROCEDURE — 77412 RADIATION TX DELIVERY LVL 3: CPT | Performed by: RADIOLOGY

## 2020-08-17 PROCEDURE — 77387 GUIDANCE FOR RADJ TX DLVR: CPT | Performed by: RADIOLOGY

## 2020-08-18 ENCOUNTER — APPOINTMENT (OUTPATIENT)
Dept: PHYSICAL THERAPY | Facility: HOSPITAL | Age: 60
End: 2020-08-18

## 2020-08-18 RX ORDER — ABACAVIR SULFATE, DOLUTEGRAVIR SODIUM, LAMIVUDINE 600; 50; 300 MG/1; MG/1; MG/1
TABLET, FILM COATED ORAL
Qty: 90 TABLET | Refills: 3 | Status: SHIPPED | OUTPATIENT
Start: 2020-08-18 | End: 2021-09-10 | Stop reason: SDUPTHER

## 2020-08-19 NOTE — PROGRESS NOTES
RADIATION THERAPY TREATMENT SUMMARY  Patient: Richa Dolan  YOB: 1960  Diagnosis: Malignant neoplasm of overlapping sites of left breast in female, estrogen receptor positive (CMS/HCC)    Richa Dolan has recently completed the course of radiation therapy prescribed for the above-mentioned diagnosis.  Below, please find the specifics of the course of treatment delivered:    Radiation Details:    Tx Start Date  7/6/2020   Tx End date  8/17/2020  Intent   Curative   Total Treatments 31    Prescription Name LEFT SUPRACLAV  Primary/Boost  PRIMARY  Dose Per Fraction 180 cGy/Frac  Number of Fractions 28  Prescribe To  IsodoseLine 100 % 5040 cGy  180 cGy/Frac  Mode    Photon  Technique  TANGENTS  Frequency  5 Times a week  Energy   6X/18X  Prescription Note PRESCRIBED 100% TO CAL[facility] PT    Prescription Name LEFT BREAST  Primary/Boost  PRIMARY  Dose Per Fraction 180 cGy/Frac  Number of Fractions 28  Prescribe To  IsodoseLine 100 % 5040 cGy  180 cGy/Frac  Mode    Photon  Technique  TANGENTS  Frequency  5 Times a week  Energy   6X/18X  Prescription Note PRESCRIBED 100% TO [facility] PT    Prescription Name LEFT POST AXILLA BOOST  Primary/Boost  BOOST  Dose Per Fraction 200 cGy/Frac  Number of Fractions 3  Prescribe To  IsodoseLine 100 % 600 cGy  200 cGy/Frac  Mode    Photon  Technique  Single  Frequency  Once Daily  Energy   18X  Prescription Note PRESCRIBED 100% TO 5CM DEPTH FROM PA     Tolerance and Toxicities: she tolerated the treatments well, skin slightly erythematous, using aquaphor , requiring no treatment breaks. she completed the treatments in 41 elapsed days.    Follow-up Plans:  I have asked the patient to return to see me in approximately 4 weeks .  I have also made a referral to our [facility]orship [facility].

## 2020-08-25 ENCOUNTER — OFFICE VISIT (OUTPATIENT)
Dept: ONCOLOGY | Facility: CLINIC | Age: 60
End: 2020-08-25

## 2020-08-25 ENCOUNTER — INFUSION (OUTPATIENT)
Dept: ONCOLOGY | Facility: HOSPITAL | Age: 60
End: 2020-08-25

## 2020-08-25 VITALS
SYSTOLIC BLOOD PRESSURE: 118 MMHG | RESPIRATION RATE: 16 BRPM | DIASTOLIC BLOOD PRESSURE: 74 MMHG | OXYGEN SATURATION: 95 % | HEART RATE: 72 BPM | TEMPERATURE: 98.4 F | BODY MASS INDEX: 22.98 KG/M2 | HEIGHT: 67 IN | WEIGHT: 146.4 LBS

## 2020-08-25 DIAGNOSIS — Z45.2 ENCOUNTER FOR FITTING AND ADJUSTMENT OF VASCULAR CATHETER: ICD-10-CM

## 2020-08-25 DIAGNOSIS — C50.812 MALIGNANT NEOPLASM OF OVERLAPPING SITES OF LEFT BREAST IN FEMALE, ESTROGEN RECEPTOR POSITIVE (HCC): Primary | ICD-10-CM

## 2020-08-25 DIAGNOSIS — Z17.0 MALIGNANT NEOPLASM OF OVERLAPPING SITES OF LEFT BREAST IN FEMALE, ESTROGEN RECEPTOR POSITIVE (HCC): Primary | ICD-10-CM

## 2020-08-25 DIAGNOSIS — Z21 HIV POSITIVE LONG-TERM NON-PROGRESSOR (HCC): ICD-10-CM

## 2020-08-25 LAB
BASOPHILS # BLD AUTO: 0.02 10*3/MM3 (ref 0–0.2)
BASOPHILS NFR BLD AUTO: 0.5 % (ref 0–1.5)
DEPRECATED RDW RBC AUTO: 46.3 FL (ref 37–54)
EOSINOPHIL # BLD AUTO: 0.09 10*3/MM3 (ref 0–0.4)
EOSINOPHIL NFR BLD AUTO: 2.2 % (ref 0.3–6.2)
ERYTHROCYTE [DISTWIDTH] IN BLOOD BY AUTOMATED COUNT: 12.7 % (ref 12.3–15.4)
HCT VFR BLD AUTO: 38.7 % (ref 34–46.6)
HGB BLD-MCNC: 12.8 G/DL (ref 12–15.9)
IMM GRANULOCYTES # BLD AUTO: 0.01 10*3/MM3 (ref 0–0.05)
IMM GRANULOCYTES NFR BLD AUTO: 0.2 % (ref 0–0.5)
LYMPHOCYTES # BLD AUTO: 0.82 10*3/MM3 (ref 0.7–3.1)
LYMPHOCYTES NFR BLD AUTO: 20.2 % (ref 19.6–45.3)
MCH RBC QN AUTO: 32.6 PG (ref 26.6–33)
MCHC RBC AUTO-ENTMCNC: 33.1 G/DL (ref 31.5–35.7)
MCV RBC AUTO: 98.5 FL (ref 79–97)
MONOCYTES # BLD AUTO: 0.57 10*3/MM3 (ref 0.1–0.9)
MONOCYTES NFR BLD AUTO: 14 % (ref 5–12)
NEUTROPHILS NFR BLD AUTO: 2.55 10*3/MM3 (ref 1.7–7)
NEUTROPHILS NFR BLD AUTO: 62.9 % (ref 42.7–76)
NRBC BLD AUTO-RTO: 0 /100 WBC (ref 0–0.2)
PLATELET # BLD AUTO: 212 10*3/MM3 (ref 140–450)
PMV BLD AUTO: 9 FL (ref 6–12)
RBC # BLD AUTO: 3.93 10*6/MM3 (ref 3.77–5.28)
WBC # BLD AUTO: 4.06 10*3/MM3 (ref 3.4–10.8)

## 2020-08-25 PROCEDURE — 36591 DRAW BLOOD OFF VENOUS DEVICE: CPT

## 2020-08-25 PROCEDURE — G0463 HOSPITAL OUTPT CLINIC VISIT: HCPCS

## 2020-08-25 PROCEDURE — 85025 COMPLETE CBC W/AUTO DIFF WBC: CPT

## 2020-08-25 PROCEDURE — 25010000003 HEPARIN LOCK FLUSH PER 10 UNITS: Performed by: INTERNAL MEDICINE

## 2020-08-25 PROCEDURE — 99214 OFFICE O/P EST MOD 30 MIN: CPT | Performed by: INTERNAL MEDICINE

## 2020-08-25 RX ORDER — HEPARIN SODIUM (PORCINE) LOCK FLUSH IV SOLN 100 UNIT/ML 100 UNIT/ML
500 SOLUTION INTRAVENOUS AS NEEDED
Status: DISCONTINUED | OUTPATIENT
Start: 2020-08-25 | End: 2020-08-25 | Stop reason: HOSPADM

## 2020-08-25 RX ORDER — LANOLIN ALCOHOL/MO/W.PET/CERES
CREAM (GRAM) TOPICAL
COMMUNITY
Start: 2020-06-29 | End: 2020-09-24

## 2020-08-25 RX ORDER — OXYCODONE AND ACETAMINOPHEN 10; 325 MG/1; MG/1
1 TABLET ORAL 4 TIMES DAILY
COMMUNITY
Start: 2020-06-20

## 2020-08-25 RX ORDER — SODIUM CHLORIDE 0.9 % (FLUSH) 0.9 %
10 SYRINGE (ML) INJECTION AS NEEDED
Status: CANCELLED | OUTPATIENT
Start: 2020-08-25

## 2020-08-25 RX ORDER — HEPARIN SODIUM (PORCINE) LOCK FLUSH IV SOLN 100 UNIT/ML 100 UNIT/ML
500 SOLUTION INTRAVENOUS AS NEEDED
Status: CANCELLED | OUTPATIENT
Start: 2020-08-25

## 2020-08-25 RX ORDER — SODIUM CHLORIDE 0.9 % (FLUSH) 0.9 %
10 SYRINGE (ML) INJECTION AS NEEDED
Status: DISCONTINUED | OUTPATIENT
Start: 2020-08-25 | End: 2020-08-25 | Stop reason: HOSPADM

## 2020-08-25 RX ADMIN — SODIUM CHLORIDE, PRESERVATIVE FREE 500 UNITS: 5 INJECTION INTRAVENOUS at 08:37

## 2020-08-25 RX ADMIN — Medication 10 ML: at 08:37

## 2020-08-25 NOTE — PROGRESS NOTES
Subjective     REASON FOR FOLLOW UP:    1.  T2N1 invasive ductal carcinoma of the left breast.  Ultrasound-showed 3.8 x 3.1 x 2.5 cm mass at 4 o'clock position with 2 abnormal axillary lymph nodes, larger lymph node being 1.4 cm.  Left breast biopsy and axillary lymph node biopsy October 29, 2019, invasive ductal carcinoma, moderately differentiated, grade 2, ER 85%, MD 10%, HER-2/merna 2+, HER-2 FISH negative.  · 12/06/19: Neoadjuvant chemo therapy, cycle #1 of dose-dense Adriamycin/Cytoxan with Neulasta for marrow support    · 01/17/20: Last cycle of Adriamycin/Cytoxan given  · January 31, 2020: Cycle 1 Taxol  · April 24, 2020: Last cycle, cycle 12 of Taxol  · Patient is s/p left mastectomy with axillary dissection with reconstruction.  She is healing up nicely.  She has a drain in place.  Pathology showed invasive breast cancer which is 55 x 40 mm in size, grade 2 with focal lymphovascular space invasion with DCIS spanning over 36 x 6 mm.  All margins were negative for cancer.  · 8 of the additional lymph nodes out of 11 were positive with the largest micrometastasis measuring 5 mm.  Extranodal extension was seen.    · Biomarkers on post neoadjuvant tumor is ER 81-90% MD 5% HER-2/merna 2+ by immunohistochemistry and HER-2/merna negative by FISH.  · Radiation July 6, 2020-August 17, 2020.        2.  Severe neutropenia secondary to chemotherapy, with ANC 50 at 1-week after cycle #1 chemotherapy.  Also had moderate thrombocytopenia platelets 92,000.  Patient was given Levaquin for prophylaxis of neutropenia fever.    3.  HIV, followed by Dr. Dawn from infectious disease.  Well controlled on meds  .               HISTORY OF PRESENT ILLNESS:  The patient is a 59 y.o. year old female with history of T2N1 invasive ductal carcinoma of the left breast status post neoadjuvant chemotherapy with Adriamycin Cytoxan followed by weekly Taxol.  She is completed all treatment and currently has had a MRI of the breast which  shows partial response to the neoadjuvant chemotherapy with considerable reduction in the left axillary adenopathy and reduction in the size of the index lesion in the left breast as well as other satellite lesions.    Patient is s/p left mastectomy with axillary dissection with reconstruction.  She is healing up nicely.  She has a drain in place.  Pathology showed invasive breast cancer which is 55 x 40 mm in size, grade 2 with focal lymphovascular space invasion with DCIS spanning over 36 x 6 mm.  All margins were negative for cancer.  8 of the additional lymph nodes out of 11 were positive with the largest micrometastasis measuring 5 mm.  Extranodal extension was seen.    Biomarkers on post neoadjuvant tumor is ER 81-90% KY 5% HER-2/merna 2+ by immunohistochemistry and HER-2/merna negative by FISH.    Patient is here for follow-up.  She is eligible for aspirin and we well  trial we will check if she is eligible.    Patient also will benefit from Zometa once every 6 months as she is postmenopausal and has multiple lymph nodes positive.      Interval history:    Patient is here for follow-up today.  She has completed radiation.  She has started letrozole and has joint pains.  It is bearable at this time with current pain medications that she is on by her pain medicine clinic.  She also has osteoporosis.  Given that she is postmenopausal ER KY positive and 8 lymph nodes positive she will benefit from Zometa every 6 months.  She has already gone to the dentist and has been evaluated without any dental issues.    Past Medical History:   Diagnosis Date   • Anxiety    • Cerebrovascular accident (CVA) (CMS/HCC) 8/22/2017    NO RESIDUAL AFFECT   • DDD (degenerative disc disease), cervical    • Depression    • Elevated cholesterol    • GERD (gastroesophageal reflux disease)    • H/O ICH (intracerebral hemorrhage) (CMS/HCC)    • History of chemotherapy    • History of stroke    • History of thrombocytopenia    • HIV (human  immunodeficiency virus infection) (CMS/[facility]) 1996   • Hyperlipidemia    • Insomnia    • Lupus anticoagulant positive    • Malignant neoplasm of overlapping sites of left breast in female, estrogen receptor positive (CMS/[facility]) 11/12/2019   • Meniscus tear 2017    RIGHT   • Migraine    • Neck pain     WITH SHOOTING PAIN LEFT RIGHT ARM   • Osteoarthritis    • Osteoporosis    • Pain management     sees 1 every 3 months for scripts/injection/pain meds   • Seasonal allergies    • Spinal headache     THINKS HAD BLOOD PATCH AFTER EPIDUAL    • Tick bite 06/2014     PAST MEDICAL HISTORY:  She had a stroke in July 2017 with headache and dizziness.  She went to the ER and was placed on aspirin.  However, she stopped taking it two weeks ago.  She has been taking Aimovig (injection) monthly with Fariba López at [facility].    In 1994, patient was diagnosed with HIV with an unknown cause which is managed by Dr. Natali Subramanian.  As of now, her viral load is good.    Patient has arthritis.  She gets trigger-point injections in her back with her last one being 2 weeks ago.  She has had multiple ablations.  She also has pain in her SI joint and Dr. Bermudez performed a surgery on this.  She takes Narco for the pain.      She is osteoporotic.  She has had multiple hairline fractures in both her legs.  She had her knees cleaned out by Dr. Sinha.     Patient has had a hysterectomy and still has both of her ovaries.    Patient has vertigo and the muscles in her left ear are weak.  She just has an imbalance.    Past Surgical History:   Procedure Laterality Date   • ADENOIDECTOMY     • BREAST LUMPECTOMY WITH SENTINEL NODE BIOPSY Left 5/28/2020    Procedure: BREAST LUMPECTOMY WITH SENTINEL NODE BIOPSY AND NEEDLE LOCALIZATION AND axillary dissection;  Surgeon: Landen Forman MD;  Location: [facility];  Service: General;  Laterality: Left;   • BREAST SURGERY Left 5/28/2020    Procedure: LEFT LATERAL INTERCOASTAL ARTERY  flap ;   Surgeon: Yusuf Garcia MD;  Location: [facility] OR;  Service: Plastics;  Laterality: Left;   • CHOLECYSTECTOMY     • HYSTERECTOMY     • KNEE ARTHROSCOPY Right 3/24/2017    Procedure:  RIGHT  KNEE ARTHROSCOPY WITH DEBRIDEMENT CHONDROPLASTY PARTIAL MENISCECTOMY;  Surgeon: Karl Galeas MD;  Location: [facility];  Service:    • KNEE CARTILAGE SURGERY Right 2007   • TONSILLECTOMY     • US GUIDED LYMPH NODE BIOPSY  10/29/2019    LEFT BREAST   • VENOUS ACCESS DEVICE (PORT) INSERTION Right 12/2/2019    Procedure: INSERTION VENOUS ACCESS DEVICE RIGHT;  Surgeon: Landen Forman MD;  Location: [facility] OR;  Service: General       ONCOLOGIC HISTORY:  Patient is a 59-year-old female who has had a history of HIV since age 34 followed by Dr. Natali Ng at [facility] and was on multiple HIV drugs initially but more recently has been on a single medication TRIUMEQ, with well-controlled HIV.  She follows up with Dr. Dawn , infectious disease who saw her recently and he saw her on 4/8/2019 and has excellent control and suppression of her viral load.  She is very compliant with her medications.    She also has had history of stroke in 2017 for which she was placed on aspirin.  She presented with a headache.  She is very active and works at [employer] full-time.  She also has had history of vertigo in the past  Patient's PCP is Zach Lora.    Patient first noticed the mass in her left breast 5 months ago.  Her last mammogram was 5 years ago.  She had soreness in the left breast and she went to her primary care physician who then ordered a mammogram diagnostic and an ultrasound.  The diagnostic mammogram showed a 3.7 cm mass at 4 o'clock position in the lower outer quadrant of the left breast.  The ultrasound showed 3.8 cm x 3.1 x 2.5 cm mass at 4 o'clock position in the left breast with 2 abnormal appearing left axillary lymph nodes the largest being 1.4 cm.    She then  underwent biopsy of both the breast mass as well as the left axillary lymph node and both of them are positive for invasive mammary carcinoma it is invasive ductal carcinoma with apocrine features, moderately differentiated, grade 2 of 3 with a El Rito score of 5.  The left axillary lymph node is also consistent with metastatic mammary carcinoma.  It is ER positive ID positive HER-2/merna negative.  Details as follows    10/21/19 - Bilateral Diagnostic Mammogram and US Breast Left  FINDINGS: Bilateral digital CC and MLO mammographic images were  obtained. No prior examination is available for comparison. Scattered  fibroglandular densities are seen throughout both breasts. A triangular  skin marker represents the area of palpable concern in the lower outer  quadrant of the left breast in the posterior 1/3. At this location there  is an irregular mass that measures on the order of 3.7 cm in greatest  dimension. Internal calcifications are noted. No suspicious findings in  the right breast are appreciated.     ULTRASOUND: Targeted sonographic evaluation of the left breast was  performed through the area of concern in the left axilla. At the 4  o'clock position on the order of 6 cm from the nipple there is an  irregular hypoechoic mass that measures 3.8 x 3.1 x 2.5 cm. Internal  vascularity is noted.     There are 2 abnormal-appearing left axillary lymph nodes, the largest  which measures on the order of 1.4 cm in greatest dimension.     IMPRESSION:  1. There is a 3.8 cm irregular mass in the left breast at the 4 o'clock  position. This is seen in conjunction with an irregular 1.4 cm lymph  node in the left axilla. This is suspicious for malignancy with left  axillary involvement. Correlation with ultrasound-guided biopsy of both  the left breast mass and the lymph node is recommended.  2. There are no findings suspicious for malignancy in the right breast.     BI-RADS CATEGORY 5:     Patient's labs from 11/12/19 are  normal.    10/29/19 - Tissue Pathology  1. Left Breast, 4:00, 6 cm FN, U/S-Guided Core Needle Biopsy for a Mass:  A. INVASIVE DUCTAL CARCINOMA WITH APOCRINE FEATURES, Moderately differentiated;  Jen Histologic Grade II/III (tubule score = 3, nuclear score = 2, mitoses score = 1), measuring at least  9 mm.  B. No ductal or lobular carcinoma in situ is identified in this sample.  C. Focus suspicious for lymphovascular space invasion.  D. See Biomarker Template for hormone receptor studies.  2. Lymph Node, Left Axilla, U/S-Guided Core Needle Biopsy:  A. METASTATIC MAMMARY CARCINOMA (see Comment).  B. Metastatic focus measures 5 mm in greatest extent.  ER+, 85%  DE+, 10%  HER2- Score 2+    11/13/19 - CT Neck Chest Abdomen Pelvis  IMPRESSION:  1. In addition to the irregular approximately 3.8 cm mass within the  lateral aspect of the left breast, there are a few subcentimeter  asymmetric nodules along the lateral margin of the glandular tissue. The  several asymmetric left subpectoral nodes and 1.2 x 1.0 cm left axillary  node are suspicious for tamar metastases. The asymmetric subcentimeter  left supraclavicular and left posterior cervical triangle nodes are  worrisome as well.  2. There is no convincing evidence for metastatic disease within the  abdomen or pelvis.    11/14/19 - Echocardiogram:  Normal.  Calculated EF = 67%.  There is trace mitral valve and tricuspid valve regurgitation.    11/15/19 - Bone Scan  Negative.    11/18/19 - MRI Breast Bilateral  IMPRESSION:  1. Biopsy-proven malignancy in the left breast centered at the 4 o'clock  position with associated surrounding satellite nodules as described. The  entire region of involvement including the satellite nodules and the  dominant mass measure up to 7.5 cm in greatest dimension. Also, left  axillary adenopathy with multiple irregular lymph nodes and loss of  differentiation of the borders of multiple lymph nodes is noted and this  is suspicious for  extranodal involvement.  2. There are no findings suspicious for malignancy in the right breast.  BI-RADS CATEGORY 6      19: Cycle #1 of Adriamycin/Cytoxan    20: Last cycle of Adriamycin/Cytoxan given without Neulasta.  We will switch from Neulasta to 7 days of Neupogen injections after cycle 4.    We reviewed with patient the US Breast from 20 which shows mild interval partial response to neoadjuvant chemotherapy.      20: Initiated cycle 1 Taxol  2020: Last cycle of Taxol, cycle 12    S/p left mastectomy with axillary dissection   Pathology showed invasive breast cancer which is 55 x 40 mm in size, grade 2 with focal lymphovascular space invasion with DCIS spanning over 36 x 6 mm.  All margins were negative for cancer.  8 of the additional lymph nodes out of 11 were positive with the largest micrometastasis measuring 5 mm.  Extranodal extension was seen.    Biomarkers on post neoadjuvant tumor is ER 81-90% NM 5% HER-2/merna 2+ by immunohistochemistry and HER-2/merna negative by FISH.        OB-GYN:  Menarche 15 years  Menopause-46  Pregnancies- 1 para 1 no miscarriages her first pregnancy was in  at 29 years of age.  She did not breastfeed.  Post menopausal HRT- None.  Hx of birth control pills- Yes.    Current Outpatient Medications on File Prior to Visit   Medication Sig Dispense Refill   • AIMOVIG 140 MG/ML solution auto-injector INJECT 140 MG INTO THE SKIN EVERY 30 (THIRTY) DAYS.  11   • atorvastatin (LIPITOR) 20 MG tablet TAKE 1 TABLET BY MOUTH EVERY DAY AT NIGHT (Patient taking differently: Take 20 mg by mouth Daily.) 90 tablet 3   • baclofen (LIORESAL) 10 MG tablet Take 10 mg by mouth 2 (Two) Times a Day.  2   • CAMBIA 50 MG pack TAKE 1 PACKET AS NEEDED FOR HEADACHES  1   • clonazePAM (KlonoPIN) 0.5 MG tablet TAKE 1 TABLET BY MOUTH 2 (TWO) TIMES A DAY AS NEEDED FOR ANXIETY. 60 tablet 1   • colesevelam (WELCHOL) 625 MG tablet TAKE 2 TABLETS BY MOUTH EVERY DAY  (Patient taking differently: Take 1,250 mg by mouth Daily. TAKE 2 TABLETS BY MOUTH EVERY DAY) 180 tablet 3   • docusate sodium (COLACE) 250 MG capsule Take 1 capsule by mouth Daily As Needed for Constipation. 30 capsule 1   • escitalopram (LEXAPRO) 10 MG tablet TAKE 1 TABLET BY MOUTH EVERY DAY (Patient taking differently: Take 10 mg by mouth Daily.) 90 tablet 3   • fluticasone (FLONASE) 50 MCG/ACT nasal spray 2 sprays into the nostril(s) as directed by provider Daily. 3 bottle 3   • ibandronate (BONIVA) 150 MG tablet TAKE 1 TABLET BY MOUTH EVERY 30 (THIRTY) DAYS. 3 tablet 3   • ibuprofen (ADVIL,MOTRIN) 600 MG tablet Take 600 mg by mouth 3 (Three) Times a Day As Needed.     • letrozole (FEMARA) 2.5 MG tablet Take 1 tablet by mouth Daily. 30 tablet 4   • Loratadine (CLARITIN PO) Take 10 mg by mouth Daily.     • Magnesium Hydroxide (MILK OF MAGNESIA PO) Take 15 mL by mouth Every Night.     • NON FORMULARY Take 1 dose by mouth Daily. Collagen peptides powder      • oxyCODONE-acetaminophen (PERCOCET)  MG per tablet Take 1 tablet by mouth 4 (Four) Times a Day.     • TRIUMEQ 600- MG per tablet TAKE 1 TABLET BY MOUTH EVERY DAY 90 tablet 3   • vitamin B-6 (PYRIDOXINE) 50 MG tablet TAKE 1 TABLET BY MOUTH EVERY DAY     • zolpidem (AMBIEN) 10 MG tablet Take 1 tablet by mouth At Night As Needed for Sleep. (Patient taking differently: Take 10 mg by mouth Every Night.) 90 tablet 0   • gabapentin (Neurontin) 100 MG capsule Take 1 capsule by mouth Every 8 (Eight) Hours. 60 capsule 1   • ondansetron (Zofran) 4 MG tablet Take 1 tablet by mouth Every 6 (Six) Hours As Needed for Nausea or Vomiting. 10 tablet 1   • ondansetron (ZOFRAN) 8 MG tablet Take 1 tablet by mouth 3 (Three) Times a Day As Needed for Nausea or Vomiting. 30 tablet 5   • prochlorperazine (COMPAZINE) 10 MG tablet Take 1 tablet by mouth Every 6 (Six) Hours As Needed for Nausea or Vomiting. 20 tablet 2   • [DISCONTINUED] B COMPLEX VITAMINS PO Take 1 tablet by  mouth Daily. HOLD FOR SURGERY     • [DISCONTINUED] HYDROcodone-acetaminophen (NORCO) 5-325 MG per tablet Take 1 tablet by mouth Every 6 (Six) Hours As Needed.       No current facility-administered medications on file prior to visit.         ALLERGIES:    Allergies   Allergen Reactions   • Augmentin [Amoxicillin-Pot Clavulanate] Hives        Social History     Socioeconomic History   • Marital status:      Spouse name: Owen   • Number of children: 1   • Years of education: College   • Highest education level: Not on file   Occupational History   • Occupation:      Employer: [employer]   Tobacco Use   • Smoking status: Never Smoker   • Smokeless tobacco: Never Used   Substance and Sexual Activity   • Alcohol use: Yes     Frequency: 2-4 times a month     Drinks per session: 1 or 2     Binge frequency: Never     Comment: occasioonal   • Drug use: No   • Sexual activity: Defer     Partners: Male     Birth control/protection: None     Comment:      Patient does not smoke or do drugs.  She does drink occasionally.    Family History   Problem Relation Age of Onset   • Breast cancer Mother    • Leukemia Mother         CLL?   • Diabetes Mother    • Hyperthyroidism Mother    • Hearing loss Mother    • Dementia Father    • Heart disease Maternal Grandmother    • Diabetes Maternal Grandfather    • Heart disease Maternal Grandfather    • Stroke Maternal Grandfather    • Heart attack Maternal Grandfather    • Osteoporosis Paternal Grandmother    • Heart disease Paternal Grandfather    • Leukemia Maternal Aunt         CLL?   • Throat cancer Paternal Uncle    • Malig Hyperthermia Neg Hx       FAMILY HISTORY:  Her mother had breast cancer at age 60, still alive at 85 and in good health..  Her father had dementia.  Her paternal uncle had prostate cancer.  Her brother had esophageal cancer.    I have reviewed the patient's medical history in detail and updated the computerized patient record.  "    Review of Systems   Constitutional: Positive for fatigue (08/25/20-Unchanged). Negative for activity change, appetite change, chills, diaphoresis, fever and unexpected weight change.   HENT: Negative for hearing loss, mouth sores, nosebleeds, sore throat and trouble swallowing.    Respiratory: Negative for cough, chest tightness, shortness of breath and wheezing.    Cardiovascular: Negative for chest pain, palpitations and leg swelling.   Gastrointestinal: Negative for abdominal distention, abdominal pain, constipation, diarrhea, nausea and vomiting.   Genitourinary: Negative for difficulty urinating, dysuria, frequency, hematuria and urgency.   Musculoskeletal: Positive for back pain (08/25/20-Worse). Negative for joint swelling.        No muscle weakness.   Skin: Negative for rash and wound.   Neurological: Positive for numbness (Bilat foot-08/25/20-Improved). Negative for dizziness, seizures, syncope, speech difficulty, weakness and headaches.   Hematological: Negative for adenopathy. Does not bruise/bleed easily.   Psychiatric/Behavioral: Positive for sleep disturbance (08/25/20-Unchanged). Negative for behavioral problems, confusion and suicidal ideas.   I have reviewed and confirmed the accuracy of the ROS as documented by the MA/LPN/RN Ruby Moreno MD  Fatigue is improved but continues to have arthralgias but bearable    Objective    Vitals:    08/25/20 0843   BP: 118/74   Pulse: 72   Resp: 16   Temp: 98.4 °F (36.9 °C)   TempSrc: Skin   SpO2: 95%   Weight: 66.4 kg (146 lb 6.4 oz)   Height: 170.2 cm (67.01\")   PainSc:   5   PainLoc: Generalized  Comment: Joint pain       Physical exam     CONSTITUTIONAL:  Vital signs reviewed.  No distress, looks comfortable.  EYES:  Conjunctiva and lids unremarkable.  PERRLA  EARS,NOSE,MOUTH,THROAT:  Ears and nose appear unremarkable.  Lips, teeth, gums appear unremarkable.  RESPIRATORY:  Normal respiratory effort.  Lungs clear to auscultation " bilaterally.  CARDIOVASCULAR:  Normal S1, S2.  No murmurs rubs or gallops.  No significant lower extremity edema.  GASTROINTESTINAL: Abdomen appears unremarkable.  Nontender.  No hepatomegaly.  No splenomegaly.  LYMPHATIC:  No cervical, supraclavicular, axillary lymphadenopathy.  SKIN:  Warm.  No rashes.  PSYCHIATRIC:  Normal judgment and insight.  Normal mood and affect.  I have reexamined the patient and the results are consistent with the previously documented exam. Ruby Moreno MD         RECENT LABS:   Results from last 7 days   Lab Units 08/25/20  0837   WBC 10*3/mm3 4.06   NEUTROS ABS 10*3/mm3 2.55   HEMOGLOBIN g/dL 12.8   HEMATOCRIT % 38.7   PLATELETS 10*3/mm3 212               EXAMINATION: BILATERAL BREAST MRI WITHOUT AND WITH CONTRAST 05/06/20  IMPRESSION:  1. There are findings consistent with interval partial response to  neoadjuvant chemotherapy with considerable reduction in left axillary  adenopathy and reduction in size of the index lesion in the left breast  as well as other satellite lesions. Persistent diffuse left breast skin  thickening is noted.  2. There are no findings suspicious for malignancy in the right breast.     BI-RADS Category 6: Known biopsy-proven malignancy.    01/29/20 - US Breast  IMPRESSION:  There are findings suggestive of mild interval partial  response to neoadjuvant chemotherapy.    Assessment/Plan    1. T2N1 invasive ductal carcinoma of the left breast, recently diagnosed.    -Noticed mass in the left breast x5 months  -Diagnostic mammogram showed 3.7 mm mass in the left breast at 4 o'clock position  -Ultrasound-showed 3.8 x 3.1 x 2.5 cm mass at 4 o'clock position with 2 abnormal axillary lymph nodes, larger lymph node being 1.4 cm  -Left breast biopsy and axillary lymph node biopsy October 29, 2019, invasive ductal carcinoma, moderately differentiated, grade 2, ER 85%, NE 10%, HER-2/merna 2+, HER-2 FISH negative  · CT scans from 11/13/19 show some asymmetric nodules  along the lateral margin of the glandular tissue.  The 1.2x1.0 cm left axillary nodes, left supraclavicular, and left posterior cervical triangle nodes are suspicious for tamar metastases.  ·   bone scan from 11/15/19 which was negative.   ·  MRI breast from 11/18/19 which shows the biopsy-proven malignancy in the left breast centered at the 4:00 position.  The region of involvement measures up to 7.5 cm.  There is left axillary adenopathy with multiple irregular lymph nodes and loss of differentiation of the border of multiple lymph nodes which are suspicious for extranodal involvement.  ·    echocardiogram from 11/14/19 which was normal with an ejection fraction of 67%.    · INVITAE from 11/14/19 which was negative.  · Given the size of her tumor and her medical history, patient will be given 4 cycles of Adriamycin/Cytoxan with Neulasta.  After completion of this treatment, patient will be started on 12 cycles of Taxol.  After the tumor shrinks in size, Dr. Forman will perform a lumpectomy.    · Following lumpectomy, patient will begin endocrine therapy.  ·  Dr. Dawn agrees chemotherapy will not aggravate her HIV condition.  Dr. Moreno spoke with Dr. Mohan at [facility] who also agrees chemotherapy is the first step.   · 12/06/19: Cycle #1 of Adriamycin/Cytoxan  · US Breast from 01/29/20 after 4 cycles of Adriamycin Cytoxan chemotherapy which shows mild interval partial response to neoadjuvant chemotherapy.  The mass has decreased from 3.8 x 2.5 to 3.1 cm to 3.5 x 2.3 x 3.2 cm previously the left axillary lymph node has decreased from 1.4 x 0.7 x 1 cm to 1.1 x 0.6 x 0.9 cm.  · 01/31/20: Initiated cycle 1 Taxol  · April 24, 2020: Completed cycle 12 Taxol  · MRI breast 5/6/2020 shows significant reduction in the left axillary adenopathy as well as reduction in the index breast lesion.  · S/p left modified radical mastectomy with left axillary dissection.  Patient has 55 x 40 x 30 mm invasive carcinoma,  grade 2 with DCIS 36 x 6 mm, high-grade with good margins and 8 out of 11 lymph nodes positive with largest metastasis being 5 mm with extracapsular extension.  And there is lymphatic space invasion  · Will follow-up in 2 weeks at which time we will plan to start endocrine therapy.  She has radiation oncology appointment with Dr. Spivey  · Patient is eligible for Zometa once every 6 months for 2 years.  But given that she is undergoing some work on her teeth we will await starting Zometa until after radiation is complete.  · XRT completed August 17, 2020  · Started letrozole but has arthralgias      2.  Profound neutropenia due to chemotherapy despite Neulasta:   · Following Adriamycin Cytoxan, the patient experienced neutropenia despite Neulasta.  She did receive Zarxio with cycle 4 Adriamycin Cytoxan  · Cycle 2 Taxol delayed 1 week due to neutropenia  · Zarxio x3 days given following Taxol  · White blood cell 8.32, ANC 6.24 today.  The patient is highly anxious going to the hospital for Zarxio injections in light of COVID-19 outbreak.  Discontinue further Zarxio for remaining 2 weeks of Taxol    3.  Moderate thrombocytopenia secondary to chemotherapy.    · Platelet count has now normalized following Taxol 274,000    4.  Chemotherapy induced anemia.   · Anemia work-up previously negative  · Hemoglobin is improved today at 11.1      5.  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  · On 12/13/2019 patient struggled with significant insomnia from steroids following her first cycle AC.  We discussed taking the dexamethasone, only one 4 mg tablet daily for 3 days with her next cycle of chemotherapy to see if this improves her symptoms.    · Insomnia much improved with decreased dose of dexamethasone  · Insomnia is now resolved with Taxol    6. HIV, followed by Dr. Dawn in infectious disease.  Well-controlled and is on a single medication with very low viral load. Has HIV since age 34.  · Reviewed her HIV medications and she is  compliant     7.  Family history of breast cancer with mother having had breast cancer at age 60.  There is family history of uncle with prostate cancer.  Patient will require genetic referral    8.  Arthritis with severe back pain followed by Dr. Bam Crandall  · Percocet    9. Episodes of nosebleeds.  She has had these once a week for 3-4 years.  She did not report this today.    10.  Otalgia: Of the right ear, present for a while.  Dr. Moreno would like the patient to follow-up with ENT, Dr. Stapleton, as she has previously seen him.  Not a complaint today.    12.  Arthralgias for a few days after each treatment, which resolve on their own.    13.  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  Currently on B6 50 mg daily.  We will have to continue to closely monitor.  This is stable.  No dose adjustments Taxol presently    PLAN:  1. Continue letrozole  2. S/p radiation  3. Plan to start Zometa every 6 months x 2 years .  Get approval  4. I have discussed with research nurses to see if she is eligible for aspirin or Be Well trial   5. Follow-up in 6' weeks with labs with Zometa and port flush.    MD Karina Garcia MA         Cc: ОЛЬГА Godoy MD Nathan Bullington, MD

## 2020-08-26 ENCOUNTER — TELEPHONE (OUTPATIENT)
Dept: FAMILY MEDICINE CLINIC | Facility: CLINIC | Age: 60
End: 2020-08-26

## 2020-08-26 DIAGNOSIS — G47.00 INSOMNIA, UNSPECIFIED TYPE: ICD-10-CM

## 2020-08-26 NOTE — TELEPHONE ENCOUNTER
PATIENT CALLED IN AND NEEDS A REFILL OF zolpidem (AMBIEN) 10 MG tablet    PATIENT HAS A APPOINTMENT 9/29/2020      SENT TO [facility]/pharmacy #[id number] - [place], [place] - [address] AT [facility] - [phone]  - [phone]   [phone]        PATIENT CALL BACK [phone]

## 2020-08-27 ENCOUNTER — HOSPITAL ENCOUNTER (OUTPATIENT)
Dept: PHYSICAL THERAPY | Facility: HOSPITAL | Age: 60
Setting detail: THERAPIES SERIES
Discharge: HOME | End: 2020-08-27

## 2020-08-27 DIAGNOSIS — Z17.0 MALIGNANT NEOPLASM OF OVERLAPPING SITES OF LEFT BREAST IN FEMALE, ESTROGEN RECEPTOR POSITIVE (HCC): Primary | ICD-10-CM

## 2020-08-27 DIAGNOSIS — M25.612 DECREASED RANGE OF MOTION OF LEFT SHOULDER: ICD-10-CM

## 2020-08-27 DIAGNOSIS — N64.89 BREAST EDEMA: ICD-10-CM

## 2020-08-27 DIAGNOSIS — C50.812 MALIGNANT NEOPLASM OF OVERLAPPING SITES OF LEFT BREAST IN FEMALE, ESTROGEN RECEPTOR POSITIVE (HCC): Primary | ICD-10-CM

## 2020-08-27 DIAGNOSIS — Z98.890 STATUS POST LEFT BREAST LUMPECTOMY: ICD-10-CM

## 2020-08-27 DIAGNOSIS — L90.5 SCAR TISSUE: ICD-10-CM

## 2020-08-27 DIAGNOSIS — Z91.89 AT RISK FOR LYMPHEDEMA: ICD-10-CM

## 2020-08-27 DIAGNOSIS — M79.622 AXILLARY PAIN, LEFT: ICD-10-CM

## 2020-08-27 PROCEDURE — 97535 SELF CARE MNGMENT TRAINING: CPT | Performed by: PHYSICAL THERAPIST

## 2020-08-27 PROCEDURE — 97140 MANUAL THERAPY 1/> REGIONS: CPT | Performed by: PHYSICAL THERAPIST

## 2020-08-27 RX ORDER — ZOLPIDEM TARTRATE 10 MG/1
10 TABLET ORAL NIGHTLY PRN
Qty: 90 TABLET | Refills: 0 | OUTPATIENT
Start: 2020-08-27 | End: 2020-12-02

## 2020-08-27 NOTE — THERAPY TREATMENT NOTE
Outpatient Physical Therapy Lymphedema Progress Note  [facility]     Patient Name: Richa Dolan  : 1960  MRN: [id number]  Today's Date: 2020        Visit Date: 2020    Visit Dx:    ICD-10-CM ICD-9-CM   1. Malignant neoplasm of overlapping sites of left breast in female, estrogen receptor positive (CMS/HCC) C50.812 174.8    Z17.0 V86.0   2. Status post left breast lumpectomy Z98.890 V45.89   3. At risk for lymphedema Z91.89 V49.89   4. Decreased range of motion of left shoulder M25.612 719.51   5. Axillary pain, left M79.622 729.5   6. Breast edema N64.89 611.89   7. Scar tissue L90.5 709.2       Patient Active Problem List   Diagnosis   • Abnormal liver function tests   • Hypertension   • Insomnia   • Knee pain   • Leukopenia   • Acute right-sided low back pain without sciatica   • Knee osteoarthritis   • Osteoporosis   • Arthralgia of multiple joints   • Seasonal allergic rhinitis   • Thrombocytopenia (CMS/HCC)   • Bone cyst   • Chronic pain of right knee   • Acquired immunocompromised state (CMS/HCC)   • Bruises easily   • Loss of hair   • Anxiety   • Elevated liver enzymes   • GERD (gastroesophageal reflux disease)   • HIV positive long-term non-progressor (CMS/HCC)   • Hypercholesterolemia   • Lupus anticoagulant positive   • Neck pain, chronic   • Chemotherapy-induced neutropenia (CMS/HCC)   • Tear of medial meniscus of right knee, current   • Chondromalacia, knee   • ICH (intracerebral hemorrhage) (CMS/HCC)   • Stomach discomfort   • Abnormal bruising   • Alopecia   • Headache   • Intractable chronic migraine without aura and without status migrainosus   • Osteoarthritis of right knee   • Need for vaccination   • Mild episode of recurrent major depressive disorder (CMS/HCC)   • Ingrown nail of great toe of right foot   • Left wrist pain   • Breast cancer screening   • Malignant neoplasm of overlapping sites of left breast in female, estrogen receptor positive (CMS/HCC)   • History  of stroke   • Arthritis   • Family history of breast cancer   • Encounter for fitting and adjustment of vascular catheter   • Epistaxis   • Chemotherapy-induced thrombocytopenia   • Anemia associated with chemotherapy   • Substance or medication-induced sleep disorder, 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CMS/HCC)   • Body aches   • Neuropathy   • History of left breast cancer   • Other fatigue   • HIV infection (CMS/HCC)        Lymphedema     Row Name 08/27/20 1135             Lymphedema Assessment    Lymphedema Classification  LUE:;at risk/stage 0;secondary;Other: left breast, at risk  -SC      Lymphedema Cancer Related Sx  left;lumpectomy;sentinel node biopsy;axillary dissection;reconstructive  -SC      Lymphedema Surgery Comments  Port 12/02/2019; L lump/AD 05/28/2020  -SC      Lymph Nodes Removed #  13  -SC      Positive Lymph Nodes #  10  -SC      Chemo Received  yes  -SC      Chemo Treatments #/Timeframe  NAC Dec 2019 through April 2020, AC/Taxol  -SC      Radiation Therapy Received  yes  -SC      Radiation Treatments #/Timeframe  completed 08/18/2020  -SC      Infections or Cellulitis?  no  -SC      Lymphedema Assessment Comments  high risk left UE/breast  -SC         Lymphedema Edema Assessment    Edema Assessment Comment  negative  -SC         Skin Changes/Observations    Skin Observations Comment  mild post radiation skin changes, moderate fibrosis left breast with marked limited mobility lateral region, moderate scar tissue at lat flap and lumpectomy region, very minimal at axillary incision  -SC         Lymphedema Sensation    Lymphedema Sensation Tests  light touch  -SC      Lymphedema Light Touch  LUE:;mild impairment;moderate impairment  -SC         Lymphedema Pulses/Capillary Refill    Lymph Pulses Capillary Refill Comments  intact  -SC         Manual Lymphatic Drainage    Manual Therapy  in depth education post radiation skin care and self breast massage, scar massage  -SC         Compression/Skin Care     Compression/Skin Care Comments  presents with OTS compression sleeve donned correctly, education of weaning this week to decrease use from daily to PRN  -SC         L-Dex Bioimpedence Screening    L-Dex Measurement Extremity  -- assess next session without use of compression  -SC        User Key  (r) = Recorded By, (t) = Taken By, (c) = Cosigned By    Initials Name Provider Type    SC Yola Valenzuela, PT Physical Therapist            PT Ortho     Row Name 08/27/20 7974       Subjective Comments    Subjective Comments  I am pretty sore. Finished radiation on 08/18/2020. Glad to be done. Skin is healing/peeling. Still using aquaphor but I am not as good about taking care of my skin this week. Still wearing my compression. Don't feel swollen in the arm, just the shoulder hurts. I have started the hormone blocker and having a lot of joint pain. I saw the oncologist 2 days ago. She wants me to try to tough it out now because the other medications would be harder for me to take. I go back to her in 6 weeks but I don't know if I will make it with these meds I am hurting so bad.   -SC       Subjective Pain    Able to rate subjective pain?  yes  -SC    Pre-Treatment Pain Level  3  -SC    Subjective Pain Comment  left shoulder  -SC       Posture/Observations    Forward Head  Mild;Increased;Sitting posture  -SC    Cervical Lordosis  Normal  -SC    Thoracic Kyphosis  Mild;Increased;Sitting posture  -SC    Rounded Shoulders  Bilateral:;Mild;Increased;Sitting posture  -SC    Scapular Elevation  Right:;Mild;Increased;Elevated;Sitting posture  -SC    Scapular winging  Bilateral:;Normal  -SC    Scoliosis  Normal;Standing posture  -SC    Lumbar lordosis  Mild;Decreased;Sitting posture  -SC    Genu valgus  Bilateral:;Normal;Standing posture  -SC    Foot pronation  Bilateral:;Normal;Standing posture  -SC    Observations  Incision healing  -SC       General ROM    GENERAL ROM COMMENTS  R UE WNLs, L elbow, hand/wrist WNL, PROM left  shoulder WNLs all planes, AAROM left shoulder flexion after session WNLs  -SC       MMT (Manual Muscle Testing)    General MMT Comments  R UE WNLs all planes, L elbow 4+/5, left shoulder 4/5, left scapula 2+-3/5  -SC       Gait/Stairs Assessment/Training    Comment (Gait/Stairs)  normal  -SC      User Key  (r) = Recorded By, (t) = Taken By, (c) = Cosigned By    Initials Name Provider Type    SC Yola Valenzuela, PT Physical Therapist                  PT Assessment/Plan     Row Name 08/27/20 1135          PT Assessment    Functional Limitations  Performance in sport activities;Limitations in community activities;Performance in leisure activities  -SC     Impairments  Endurance;Impaired flexibility;Impaired lymphatic circulation;Impaired muscle endurance;Integumentary integrity;Joint mobility;Pain;Peripheral nerve integrity;Poor body mechanics;Posture;Sensation  -SC     Assessment Comments  Mrs. Dolan is a pleasant 59 year old female, diagnosed with left breast cancer in 2019, mediport placement right 12/02/2020, initiated neoadjuvant chemotherapy with oncologist Dr. Moreno Dec 2019, completed April 2020, AC/Taxol, s/p left lumpectomy with axillary dissection 10/13 L ALN (+) with flap reconstruction closure 05/28/2020 performed by surgeons Dr. Forman and Radha, completed adjuvant radiation 08/18/2020 with radiation oncologist Dr. Spivey. Is at high risk of post lumpectomy lymphedema syndrome left UE/breast due to surgery, lymph node removal, lymph node involvement, radiation, age, and additional medical issues. Baseline post op bioimpedance obtained, L-Dex -4.7, to reassess next session with weaning of compression and post radiation. Resolved axillary cording. Marked hypomobility of left shoulder that could be contributing to symptoms. To continue formal therapy with addressed persisting left shoulder pain, marked breast changes post radiation and high risk of lymphedema. Continue 1x/week for 4-6 weeks  for optimal rehabilitation  -SC        PT Plan    Predicted Duration of Therapy Intervention (Therapy Eval)  12 sessions  -SC     PT Plan Comments  continue manual, pulleys, weaning of compression, bioimpedance, Quick DASH, progression of scapular stabilization program with theraband  -SC       User Key  (r) = Recorded By, (t) = Taken By, (c) = Cosigned By    Initials Name Provider Type    Yola Rodriguez PT Physical Therapist             OP Exercises     Row Name 08/27/20 1135             Subjective Comments    Subjective Comments  I am pretty sore. Finished radiation on 08/18/2020. Glad to be done. Skin is healing/peeling. Still using aquaphor but I am not as good about taking care of my skin this week. Still wearing my compression. Don't feel swollen in the arm, just the shoulder hurts.   -SC         Subjective Pain    Able to rate subjective pain?  yes  -SC      Pre-Treatment Pain Level  3  -SC      Subjective Pain Comment  left shoulder  -SC         Total Minutes    36190 - PT Manual Therapy Minutes  23  -SC        User Key  (r) = Recorded By, (t) = Taken By, (c) = Cosigned By    Initials Name Provider Type    SC Yola Valenzuela, PT Physical Therapist                     Manual Rx (last 36 hours)      Manual Treatments     Row Name 08/27/20 1135             Total Minutes    43924 - PT Manual Therapy Minutes  23  -SC         Manual Rx 1    Manual Rx 1 Location  left shoulder supine on plinth drapped in gown HOB elevated/HL  -SC      Manual Rx 1 Type  AP and inferior joint mobs L GHJ, PROM all planes, gentle myofascial release medial arm with retrograde massage to axilla, scar massage lat flap reconstruction, gentle left breast mobilizations without entering radiation field  -SC      Manual Rx 1 Grade  III-IV  -SC        User Key  (r) = Recorded By, (t) = Taken By, (c) = Cosigned By    Initials Name Provider Type    Yola Rodriguez PT Physical Therapist          PT OP Goals     Row Name  08/27/20 1135          PT Short Term Goals    STG Date to Achieve  09/17/20  -SC     STG 1  Patient independent and compliant with initial home exercise program focused on diaphragmatic breathing, range of motion, flexibility to decrease edema and improve lymphatic flow for decreased edema and decreased risk of infection.   -SC     STG 1 Progress  Met  -SC     STG 2  Patient demonstrate proper awareness of What is Lymphedema and 18 Steps of Prevention for improved prevention, management, care of symptoms and ease of transition to self-care of condition.   -SC     STG 2 Progress  Met  -SC     STG 3  Patient demonstrate independence and compliance with proper skin care during/post radiation for improved mobility, decreased scar tissue, axillary cording and edema.  -SC     STG 3 Progress  Progressing  -SC     STG 4  Patient independent and compliant with breast mobilization techniques for improved mobility, skin care and lymphatic flow.  -SC     STG 4 Progress  Progressing  -SC     STG 4 Progress Comments  instructed today  -SC     STG 5  Patient independent and compliant with daytime OTS prevention compression garments as indicated for decreased risk of lymphedema and infection and safety with transition of self-care of condition.   -SC     STG 5 Progress  Met  -SC     STG 5 Progress Comments  to intiate weaning progress to transition to PRN/prevention/early intervention use due to completed radiation  -SC        Long Term Goals    LTG Date to Achieve  09/17/20  -SC     LTG 1  Patient's bioimpedance score to remain between -10 and 6.5 for decreased risk of developing stage II post lumpectomy lymphedema syndrome left UE and to establish treatment for early intervention of condition if/when indicated.  -SC     LTG 1 Progress  Progressing baseline post op 06/18/2020 -4.7  -SC     LTG 1 Progress Comments  assess next session with weaning of compression  -SC     LTG 2  Patient independent and compliant with advanced Home  Exercise Program and self-care techniques for self-management of condition.   -SC     LTG 2 Progress  Progressing  -SC     LTG 3  Patient able to wear fitted post lumpectomy/radiation bra with padding left (if indicated)  >/=6-8 hours for work schedule for improved compression to lateral flank/left breast.  [facility]  [facility]  -[facility] 3 Progress Comments  to assess need 4-8 weeks post radiation  -[facility] 4  Patient transition to cardiovascular exercise program (walking) >/=150 to 180 mins/week with moderate intensity for improved heart health, weight loss, decreased risk of osteoporosis and improved phase angle/metabolic rate.  -SC     LTG 4 Progress  Met  -[facility] 5  Patient score </=25% on Quick DASH for improved functional use of L UE home, community, and sleep  -[facility] 5 Progress  Ongoing initial evaluation 50%  -[facility] 5 Progress Comments  assess next session with weaning of compression and completion of radiation  -SC        Time Calculation    PT Goal Re-Cert Due Date  09/17/20  -SC       User Key  (r) = Recorded By, (t) = Taken By, (c) = Cosigned By    Initials Name Provider Type    Yola Rodriguez, PT Physical Therapist          Therapy Education  Education Details: post radiation skin care, scar massage, breast massage, lymphedema, prognosis, weaning from compression, bioimpedance, continuation of care  Given: HEP, Symptoms/condition management, Pain management, Posture/body mechanics, Mobility training, Edema management, Other (comment)  Program: Progressed, Modified  How Provided: Verbal, Written, Demonstration  Provided to: Patient  Level of Understanding: Teach back education performed, Verbalized, Demonstrated  32036 - PT Self Care/Mgmt Minutes: 12              Time Calculation:   Start Time: 1135  Stop Time: 1210  Time Calculation (min): 35 min   Therapy Charges for Today     Code Description Service Date Service Provider Modifiers Qty    [id number]  PT MANUAL  THERAPY EA 15 MIN 8/27/2020 Yola Valenzuela, PT GP 2    [id number] HC PT SELF CARE/MGMT/TRAIN EA 15 MIN 8/27/2020 Yola Valenzuela, PT GP 1                    Yola Broderick, PT  8/27/2020

## 2020-08-31 ENCOUNTER — APPOINTMENT (OUTPATIENT)
Dept: PHYSICAL THERAPY | Facility: HOSPITAL | Age: 60
End: 2020-08-31

## 2020-08-31 ENCOUNTER — TELEPHONE (OUTPATIENT)
Dept: FAMILY MEDICINE CLINIC | Facility: CLINIC | Age: 60
End: 2020-08-31

## 2020-08-31 NOTE — TELEPHONE ENCOUNTER
PATIENT CALLED IN AND IS FOLLOWING UP ON HER REQUEST FOR zolpidem (AMBIEN) 10 MG tablet  PATIENT WILL BE OUT OF MEDICATION BY Wednesday .         SENT TO [facility]/pharmacy #[id number] - [place], [place] - [address] AT [facility] - [phone]  - [phone] FX  [phone]        PATIENT CALL BACK [phone]

## 2020-09-01 ENCOUNTER — TRANSCRIBE ORDERS (OUTPATIENT)
Dept: ADMINISTRATIVE | Facility: HOSPITAL | Age: 60
End: 2020-09-01

## 2020-09-11 ENCOUNTER — OFFICE VISIT (OUTPATIENT)
Dept: MAMMOGRAPHY | Facility: CLINIC | Age: 60
End: 2020-09-11

## 2020-09-11 DIAGNOSIS — Z17.0 MALIGNANT NEOPLASM OF OVERLAPPING SITES OF LEFT BREAST IN FEMALE, ESTROGEN RECEPTOR POSITIVE (HCC): Primary | ICD-10-CM

## 2020-09-11 DIAGNOSIS — C50.812 MALIGNANT NEOPLASM OF OVERLAPPING SITES OF LEFT BREAST IN FEMALE, ESTROGEN RECEPTOR POSITIVE (HCC): Primary | ICD-10-CM

## 2020-09-11 PROCEDURE — 99024 POSTOP FOLLOW-UP VISIT: CPT | Performed by: SURGERY

## 2020-09-11 NOTE — PROGRESS NOTES
Chief Complaint: Richa Dolan is a  60 y.o. female, initially referred by No ref. provider found , who is here today for a postoperative visit.    History of Present Illness:  In the interim,Richa Dolan has completed radiation therapy.  She is on hormone blocking therapy but not exactly happy with how she feels on it.    She has noted no redness, warmth,drainage, swelling at the incision site. Denies fever or chills.      Current Outpatient Medications:   •  AIMOVIG 140 MG/ML solution auto-injector, INJECT 140 MG INTO THE SKIN EVERY 30 (THIRTY) DAYS., Disp: , Rfl: 11  •  atorvastatin (LIPITOR) 20 MG tablet, TAKE 1 TABLET BY MOUTH EVERY DAY AT NIGHT (Patient taking differently: Take 20 mg by mouth Daily.), Disp: 90 tablet, Rfl: 3  •  baclofen (LIORESAL) 10 MG tablet, Take 10 mg by mouth 2 (Two) Times a Day., Disp: , Rfl: 2  •  CAMBIA 50 MG pack, TAKE 1 PACKET AS NEEDED FOR HEADACHES, Disp: , Rfl: 1  •  clonazePAM (KlonoPIN) 0.5 MG tablet, TAKE 1 TABLET BY MOUTH 2 (TWO) TIMES A DAY AS NEEDED FOR ANXIETY., Disp: 60 tablet, Rfl: 1  •  colesevelam (WELCHOL) 625 MG tablet, TAKE 2 TABLETS BY MOUTH EVERY DAY (Patient taking differently: Take 1,250 mg by mouth Daily. TAKE 2 TABLETS BY MOUTH EVERY DAY), Disp: 180 tablet, Rfl: 3  •  docusate sodium (COLACE) 250 MG capsule, Take 1 capsule by mouth Daily As Needed for Constipation., Disp: 30 capsule, Rfl: 1  •  escitalopram (LEXAPRO) 10 MG tablet, TAKE 1 TABLET BY MOUTH EVERY DAY (Patient taking differently: Take 10 mg by mouth Daily.), Disp: 90 tablet, Rfl: 3  •  fluticasone (FLONASE) 50 MCG/ACT nasal spray, 2 sprays into the nostril(s) as directed by provider Daily., Disp: 3 bottle, Rfl: 3  •  ibandronate (BONIVA) 150 MG tablet, TAKE 1 TABLET BY MOUTH EVERY 30 (THIRTY) DAYS., Disp: 3 tablet, Rfl: 3  •  ibuprofen (ADVIL,MOTRIN) 600 MG tablet, Take 600 mg by mouth 3 (Three) Times a Day As Needed., Disp: , Rfl:   •  letrozole (FEMARA) 2.5 MG tablet, Take 1 tablet by  mouth Daily., Disp: 30 tablet, Rfl: 4  •  Loratadine (CLARITIN PO), Take 10 mg by mouth Daily., Disp: , Rfl:   •  Magnesium Hydroxide (MILK OF MAGNESIA PO), Take 15 mL by mouth Every Night., Disp: , Rfl:   •  NON FORMULARY, Take 1 dose by mouth Daily. Collagen peptides powder , Disp: , Rfl:   •  oxyCODONE-acetaminophen (PERCOCET)  MG per tablet, Take 1 tablet by mouth 4 (Four) Times a Day., Disp: , Rfl:   •  TRIUMEQ 600- MG per tablet, TAKE 1 TABLET BY MOUTH EVERY DAY, Disp: 90 tablet, Rfl: 3  •  vitamin B-6 (PYRIDOXINE) 50 MG tablet, TAKE 1 TABLET BY MOUTH EVERY DAY, Disp: , Rfl:   •  zolpidem (AMBIEN) 10 MG tablet, Take 1 tablet by mouth At Night As Needed for Sleep., Disp: 90 tablet, Rfl: 0  Physical examination  Left breast- the incisions generally looks fine.  She has a little flattening to the breast related to the surgery and likely radiation.  There is one rounded hard area near the edge of the areola at the 1 o'clock position.  I think this likely represents some fat necrosis and I am not concerned about it.  Assessment:  Left breast cancer status post breast conserving surgery with a local tissue flap.  She has completed radiation and is now on hormone blocking therapy and doing relatively well.    Plan:  She will continue to follow with the medical oncologist and I will see her in 6 months.          EMR Dragon/transcription disclaimer:    Much of this encounter note is an electronic transcription/translocation of spoken language to printed text.  The electronic translation of spoken language may permit erroneous, or at times, nonsensical words or phrases to be inadvertently transcribed.  Although I have reviewed the note from such areas, some may still exist.

## 2020-09-14 ENCOUNTER — HOSPITAL ENCOUNTER (OUTPATIENT)
Dept: PHYSICAL THERAPY | Facility: HOSPITAL | Age: 60
Setting detail: THERAPIES SERIES
Discharge: HOME | End: 2020-09-14

## 2020-09-14 DIAGNOSIS — C50.812 MALIGNANT NEOPLASM OF OVERLAPPING SITES OF LEFT BREAST IN FEMALE, ESTROGEN RECEPTOR POSITIVE (HCC): Primary | ICD-10-CM

## 2020-09-14 DIAGNOSIS — M75.42 SHOULDER IMPINGEMENT SYNDROME, LEFT: ICD-10-CM

## 2020-09-14 DIAGNOSIS — M25.612 DECREASED RANGE OF MOTION OF LEFT SHOULDER: ICD-10-CM

## 2020-09-14 DIAGNOSIS — N64.89 BREAST EDEMA: ICD-10-CM

## 2020-09-14 DIAGNOSIS — Z91.89 AT RISK FOR LYMPHEDEMA: ICD-10-CM

## 2020-09-14 DIAGNOSIS — M79.622 AXILLARY PAIN, LEFT: ICD-10-CM

## 2020-09-14 DIAGNOSIS — Z98.890 STATUS POST LEFT BREAST LUMPECTOMY: ICD-10-CM

## 2020-09-14 DIAGNOSIS — Z17.0 MALIGNANT NEOPLASM OF OVERLAPPING SITES OF LEFT BREAST IN FEMALE, ESTROGEN RECEPTOR POSITIVE (HCC): Primary | ICD-10-CM

## 2020-09-14 DIAGNOSIS — L90.5 SCAR TISSUE: ICD-10-CM

## 2020-09-14 PROCEDURE — 97110 THERAPEUTIC EXERCISES: CPT | Performed by: PHYSICAL THERAPIST

## 2020-09-14 PROCEDURE — 97164 PT RE-EVAL EST PLAN CARE: CPT | Performed by: PHYSICAL THERAPIST

## 2020-09-14 PROCEDURE — 97140 MANUAL THERAPY 1/> REGIONS: CPT | Performed by: PHYSICAL THERAPIST

## 2020-09-14 NOTE — THERAPY RE-EVALUATION
Physical Therapy Lymphedema Re-Evaluation  [facility]     Patient Name: Richa Dolan  : 1960  MRN: [id number]  Today's Date: 2020      Visit Date: 2020    Visit Dx:    ICD-10-CM ICD-9-CM   1. Malignant neoplasm of overlapping sites of left breast in female, estrogen receptor positive (CMS/HCC)  C50.812 174.8    Z17.0 V86.0   2. Status post left breast lumpectomy  Z98.890 V45.89   3. At risk for lymphedema  Z91.89 V49.89   4. Decreased range of motion of left shoulder  M25.612 719.51   5. Axillary pain, left  M79.622 729.5   6. Breast edema  N64.89 611.89   7. Scar tissue  L90.5 709.2   8. Shoulder impingement syndrome, left  M75.42 726.2       Patient Active Problem List   Diagnosis   • Abnormal liver function tests   • Hypertension   • Insomnia   • Knee pain   • Leukopenia   • Acute right-sided low back pain without sciatica   • Knee osteoarthritis   • Osteoporosis   • Arthralgia of multiple joints   • Seasonal allergic rhinitis   • Thrombocytopenia (CMS/HCC)   • Bone cyst   • Chronic pain of right knee   • Acquired immunocompromised state (CMS/HCC)   • Bruises easily   • Loss of hair   • Anxiety   • Elevated liver enzymes   • GERD (gastroesophageal reflux disease)   • HIV positive long-term non-progressor (CMS/HCC)   • Hypercholesterolemia   • Lupus anticoagulant positive   • Neck pain, chronic   • Chemotherapy-induced neutropenia (CMS/HCC)   • Tear of medial meniscus of right knee, current   • Chondromalacia, knee   • ICH (intracerebral hemorrhage) (CMS/HCC)   • Stomach discomfort   • Abnormal bruising   • Alopecia   • Headache   • Intractable chronic migraine without aura and without status migrainosus   • Osteoarthritis of right knee   • Need for vaccination   • Mild episode of recurrent major depressive disorder (CMS/HCC)   • Ingrown nail of great toe of right foot   • Left wrist pain   • Breast cancer screening   • Malignant neoplasm of overlapping sites of left breast in female,  estrogen receptor positive (CMS/[facility])   • History of stroke   • Arthritis   • Family history of breast cancer   • Encounter for fitting and adjustment of vascular catheter   • Epistaxis   • Chemotherapy-induced thrombocytopenia   • Anemia associated with chemotherapy   • Substance or medication-induced sleep disorder, 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CMS/[facility])   • Body aches   • Neuropathy   • History of left breast cancer   • Other fatigue   • HIV infection (CMS/[facility])        Past Medical History:   Diagnosis Date   • Anxiety    • Cerebrovascular accident (CVA) (CMS/[facility]) 8/22/2017    NO RESIDUAL AFFECT   • DDD (degenerative disc disease), cervical    • Depression    • Elevated cholesterol    • GERD (gastroesophageal reflux disease)    • H/O ICH (intracerebral hemorrhage) (CMS/[facility])    • History of chemotherapy    • History of stroke    • History of thrombocytopenia    • HIV (human immunodeficiency virus infection) (CMS/[facility]) 1996   • Hyperlipidemia    • Insomnia    • Lupus anticoagulant positive    • Malignant neoplasm of overlapping sites of left breast in female, estrogen receptor positive (CMS/[facility]) 11/12/2019   • Meniscus tear 2017    RIGHT   • Migraine    • Neck pain     WITH SHOOTING PAIN LEFT RIGHT ARM   • Osteoarthritis    • Osteoporosis    • Pain management     sees 1 every 3 months for scripts/injection/pain meds   • Seasonal allergies    • Spinal headache     THINKS HAD BLOOD PATCH AFTER EPIDUAL    • Tick bite 06/2014        Past Surgical History:   Procedure Laterality Date   • ADENOIDECTOMY     • BREAST LUMPECTOMY WITH SENTINEL NODE BIOPSY Left 5/28/2020    Procedure: BREAST LUMPECTOMY WITH SENTINEL NODE BIOPSY AND NEEDLE LOCALIZATION AND axillary dissection;  Surgeon: Landen Forman MD;  Location: [facility];  Service: General;  Laterality: Left;   • BREAST SURGERY Left 5/28/2020    Procedure: LEFT LATERAL INTERCOASTAL ARTERY  flap ;  Surgeon: Yusuf Garcia MD;  Location: [facility] OR;   Service: Plastics;  Laterality: Left;   • CHOLECYSTECTOMY     • HYSTERECTOMY     • KNEE ARTHROSCOPY Right 3/24/2017    Procedure:  RIGHT  KNEE ARTHROSCOPY WITH DEBRIDEMENT CHONDROPLASTY PARTIAL MENISCECTOMY;  Surgeon: Karl Galeas MD;  Location: [facility];  Service:    • KNEE CARTILAGE SURGERY Right 2007   • TONSILLECTOMY     • US GUIDED LYMPH NODE BIOPSY  10/29/2019    LEFT BREAST   • VENOUS ACCESS DEVICE (PORT) INSERTION Right 12/2/2019    Procedure: INSERTION VENOUS ACCESS DEVICE RIGHT;  Surgeon: Landen Forman MD;  Location: [facility];  Service: General       Visit Dx:    ICD-10-CM ICD-9-CM   1. Malignant neoplasm of overlapping sites of left breast in female, estrogen receptor positive (CMS/HCC)  C50.812 174.8    Z17.0 V86.0   2. Status post left breast lumpectomy  Z98.890 V45.89   3. At risk for lymphedema  Z91.89 V49.89   4. Decreased range of motion of left shoulder  M25.612 719.51   5. Axillary pain, left  M79.622 729.5   6. Breast edema  N64.89 611.89   7. Scar tissue  L90.5 709.2   8. Shoulder impingement syndrome, left  M75.42 726.2           Lymphedema     Row Name 09/14/20 0900             Lymphedema Assessment    Lymphedema Classification  LUE:;at risk/stage 0;secondary;Other: left breast, at risk  -SC      Lymphedema Cancer Related Sx  left;lumpectomy;sentinel node biopsy;axillary dissection;reconstructive  -SC      Lymphedema Surgery Comments  Port 12/02/2019; L lump/AD 05/28/2020  -SC      Lymph Nodes Removed #  13  -SC      Positive Lymph Nodes #  10  -SC      Chemo Received  yes  -SC      Chemo Treatments #/Timeframe  NAC Dec 2019 through April 2020, AC/Taxol  -SC      Radiation Therapy Received  yes  -SC      Radiation Treatments #/Timeframe  completed 08/18/2020  -SC      Infections or Cellulitis?  no  -SC      Lymphedema Assessment Comments  high risk left UE/breast  -SC         Lymphedema Edema Assessment    Edema Assessment Comment  negative  -SC         Skin  Changes/Observations    Skin Observations Comment  mild post radiation skin changes, moderate fibrosis left breast with marked limited mobility lateral region, moderate scar tissue at lat flap and lumpectomy region, very minimal at axillary incision  -SC         Lymphedema Sensation    Lymphedema Sensation Tests  light touch  -SC      Lymphedema Light Touch  LUE:;mild impairment;moderate impairment  -SC         Lymphedema Pulses/Capillary Refill    Lymph Pulses Capillary Refill Comments  intact  -SC         Manual Lymphatic Drainage    Manual Therapy  reviewed post radiation skin care and scar massage  -SC         Compression/Skin Care    Compression/Skin Care Comments  presents without compression to assess bioimpedance, education weaning from garment  -SC         L-Dex Bioimpedence Screening    L-Dex Measurement Extremity  LUE assess next session without use of compression  -SC      L-Dex Patient Position  Standing  -SC      L-Dex UE Dominate Side  Right  -SC      L-Dex UE At Risk Side  Left  -SC      L-Dex UE Pre Surgical Value  No  -SC      L-Dex UE Score  -3.5  -SC      L-Dex UE Baseline Score  -4.7  -SC      L-Dex UE Value Change  1.2  -SC      L-Dex UE Comment  WNL, stable with weaning from compression, only to wear compression high risk times   -SC        User Key  (r) = Recorded By, (t) = Taken By, (c) = Cosigned By    Initials Name Provider Type    Yola Rodriguez, PT Physical Therapist            PT Ortho     Row Name 09/14/20 0900       Subjective Comments    Subjective Comments  I am doing better now. I thought it was the Letrozole but last week I was so sick. GI, diarrhea and throwing up. Still having the joint pain but the GI is better. I am going to keep taking the medication. My  was a little sick too, not as bad as me. My shoulder has been really tight. I have been going without my sleeve without issues. I saw Dr. Forman last week and go to Dr. Garcia this week.   -SC        Precautions and Contraindications    Precautions/Limitations  other (see comments)  -SC    Precautions  R mediport  -SC    Contraindications  L AD, no IV/BP L UE, no modalities  -SC       Subjective Pain    Able to rate subjective pain?  yes  -SC    Pre-Treatment Pain Level  3  -SC    Subjective Pain Comment  left shoulder  -SC       Posture/Observations    Forward Head  Mild;Increased;Sitting posture  -SC    Cervical Lordosis  Normal  -SC    Thoracic Kyphosis  Mild;Increased;Sitting posture  -SC    Rounded Shoulders  Bilateral:;Mild;Increased;Sitting posture  -SC    Scapular Elevation  Right:;Mild;Increased;Elevated;Sitting posture  -SC    Scapular winging  Bilateral:;Normal  -SC    Scoliosis  Normal;Standing posture  -SC    Lumbar lordosis  Mild;Decreased;Sitting posture  -SC    Genu valgus  Bilateral:;Normal;Standing posture  -SC    Foot pronation  Bilateral:;Normal;Standing posture  -SC    Observations  Incision healing  -SC       Special Tests/Palpation    Special Tests/Palpation  Shoulder  -SC       Shoulder Girdle Accessory Motions    Shoulder Girdle Accessory Motions Tested?  Yes  -SC    Posterior glide of humerus  Left:;Hypomobile;Left pain  -SC    Inferior glide of humerus  Left:;Hypomobile;Left pain  -SC    Lateral distraction  Left:;Hypomobile;Left pain  -SC    Inferior glide of scapula  Left:;Hypomobile  -SC    Retraction mobility of scapula  Left:;Hypomobile  -SC    Upward rotation mobility of scapula  Left:;Hypomobile;Left pain  -SC       Shoulder Impingement/Rotator Cuff Special Tests    Zaidi-Elvin Test (RC Lesion vs. Bursitis)  Left:;Negative  -SC    Neer Impingement Test (RC Lesion vs. Bursitis)  Left:;Positive  -SC    Full Can Test (RC Lesion)  Left:;Negative  -SC    Empty Can Test (RC Lesion)  Left:;Positive  -SC    Drop Arm Test (Full Thickness RC Lesion)  Left:;Negative  -SC    Internal Impingement Sign  Left:;Positive  -SC    Lift-Off Test (Subscapularis Lesion)  Left:;Negative increased  pain with movement with dec mobility  -SC    Shoulder Impingement/Rotator Cuff Special Tests Comments  left positive, improves with manual techniques  -SC       Shoulder Girdle Palpation    Shoulder Girdle Palpation?  Yes  -SC    Long Head of Biceps  Left:;Tender;Guarded/taut;Swollen  -SC    Deltoid  Left:;Tender;Guarded/taut  -SC    Subscapularis  Left:;Tender;Guarded/taut;Trigger point  -SC    Infraspinatus  Left:;Tender;Guarded/taut;Trigger point  -SC    Pect Minor  Left:;Tender;Guarded/taut;Trigger point  -SC    Upper Trap  Left:;Tender;Guarded/taut;Trigger point  -SC    Levator Scapula  Left:;Tender;Guarded/taut;Trigger point  -SC    Rhomboid  Left:;Atrophied  -SC    Lower Trap  Left:;Atrophied  -SC       General ROM    GENERAL ROM COMMENTS  R UE WNLs, L elbow, hand/wrist WNL, PROM left shoulder WNLs all planes, AAROM left shoulder flexion after session WNLs  -SC       MMT (Manual Muscle Testing)    General MMT Comments  R UE WNLs all planes, L elbow 4+/5, left shoulder 4/5, left scapula 2+-3/5  -SC       Gait/Stairs (Locomotion)    Comment (Gait/Stairs)  normal  -SC      User Key  (r) = Recorded By, (t) = Taken By, (c) = Cosigned By    Initials Name Provider Type    Yola Rodriguez, PT Physical Therapist                    Therapy Education  Education Details: bioimpedance test and results, weaning from compression, reviewed post radiation skin care, scar massage, education shoulder impingement, updated HEP and provided green theraband  Given: HEP, Symptoms/condition management, Pain management, Posture/body mechanics, Mobility training, Edema management, Other (comment)  Program: New  How Provided: Verbal, Demonstration, Written  Provided to: Patient  Level of Understanding: Teach back education performed, Verbalized, Demonstrated      OP Exercises     Row Name 09/14/20 0900             Subjective Comments    Subjective Comments  I am doing better now. I thought it was the Letrozole but last week I was  so sick. GI, diarrhea and throwing up. Still having the joint pain but the GI is better. I am going to keep taking the medication. My  was a little sick too, not as bad as me. My shoulder has been really tight. I have been going without my sleeve without issues. I saw Dr. Forman last week and go to Dr. Garcia this week.   -SC         Subjective Pain    Able to rate subjective pain?  yes  -SC      Pre-Treatment Pain Level  3  -SC      Subjective Pain Comment  left shoulder  -SC         Exercise 1    Exercise Name 1  rev shld rolls, scap retraction, should ER standing  -SC      Sets 1  2  -SC      Reps 1  10  -SC      Additional Comments  daily  -SC         Exercise 2    Exercise Name 2  wallslides flex standing B  -SC      Sets 2  1  -SC      Reps 2  5  -SC      Time 2  10 seconds  -SC      Additional Comments  daily  -SC         Exercise 3    Exercise Name 3  should rows, ext, ER B TB  -SC      Sets 3  2  -SC      Reps 3  10-15  -SC      Additional Comments  GTB, provided band for home use, 3-5x/week, can do daily  -SC        User Key  (r) = Recorded By, (t) = Taken By, (c) = Cosigned By    Initials Name Provider Type    SC Yola Valenzuela, PT Physical Therapist                     Manual Rx (last 36 hours)      Manual Treatments     Row Name 09/14/20 0900             Manual Rx 1    Manual Rx 1 Location  left shoulder supine on plinth drapped in gown HOB elevated/HL  -SC      Manual Rx 1 Type  AP and inferior joint mobs L GHJ, PROM all planes, gentle myofascial release medial arm with retrograde massage to axilla, scar massage lat flap reconstruction, gentle left breast mobilizations  -SC      Manual Rx 1 Grade  III-IV  -SC        User Key  (r) = Recorded By, (t) = Taken By, (c) = Cosigned By    Initials Name Provider Type    Yola Rodriguez, PT Physical Therapist          PT OP Goals     Row Name 09/14/20 0900          PT Short Term Goals    STG 1  Patient independent and compliant with  initial home exercise program focused on diaphragmatic breathing, range of motion, flexibility to decrease edema and improve lymphatic flow for decreased edema and decreased risk of infection.   -SC     STG 1 Progress  Met  -SC     STG 2  Patient demonstrate proper awareness of What is Lymphedema and 18 Steps of Prevention for improved prevention, management, care of symptoms and ease of transition to self-care of condition.   -SC     STG 2 Progress  Met  -SC     STG 3  Patient demonstrate independence and compliance with proper skin care during/post radiation for improved mobility, decreased scar tissue, axillary cording and edema.  -SC     STG 3 Progress  Progressing  -SC     STG 4  Patient independent and compliant with breast mobilization techniques for improved mobility, skin care and lymphatic flow.  -SC     STG 4 Progress  Met  -SC     STG 5  Patient independent and compliant with daytime OTS prevention compression garments as indicated for decreased risk of lymphedema and infection and safety with transition of self-care of condition.   -SC     STG 5 Progress  Met  -SC        Long Term Goals    LTG Date to Achieve  12/14/20  -SC     LTG 1  Patient's bioimpedance score to remain between -10 and 6.5 for decreased risk of developing stage II post lumpectomy lymphedema syndrome left UE and to establish treatment for early intervention of condition if/when indicated.  -SC     LTG 1 Progress  Progressing baseline post op 06/18/2020 -4.7  -SC     LTG 1 Progress Comments  currently -3.5, with weaning from compression post radiation, WNL and stable  -SC     LTG 2  Patient independent and compliant with advanced Home Exercise Program and self-care techniques for self-management of condition.   -SC     LTG 2 Progress  Progressing  -SC     LTG 2 Progress Comments  updated with theraband/scapular stabiliation today  -SC     LTG 3  Patient able to wear fitted post lumpectomy/radiation bra with padding left (if indicated)   >/=6-8 hours for work schedule for improved compression to lateral flank/left breast.  -SC     LTG 3 Progress  Ongoing  -SC     LTG 3 Progress Comments  to discuss next session with decreased post radiation skin changes and return to plastics  -SC     LTG 4  Patient transition to cardiovascular exercise program (walking) >/=150 to 180 mins/week with moderate intensity for improved heart health, weight loss, decreased risk of osteoporosis and improved phase angle/metabolic rate.  -SC     LTG 4 Progress  Met  -[facility] 5  Patient score </=25% on Quick DASH for improved functional use of L UE home, community, and sleep  -SC     LTG 5 Progress  Met initial evaluation 50%  -SC     LTG 5 Progress Comments  currently 18%  -[facility] 6  Patient with negative impingement testing left shoulder for improved mobility, posture and decreased pain with ADLs and home activities  -[facility] 6 Progress  New  -SC        Time Calculation    PT Goal Re-Cert Due Date  12/14/20  -SC       User Key  (r) = Recorded By, (t) = Taken By, (c) = Cosigned By    Initials Name Provider Type    SC Yola Valenzuela, PT Physical Therapist          PT Assessment/Plan     Row Name 09/14/20 0900          PT Assessment    Functional Limitations  Limitation in home management;Limitations in community activities;Performance in leisure activities;Performance in sport activities  -SC     Impairments  Endurance;Impaired flexibility;Impaired lymphatic circulation;Impaired muscle endurance;Integumentary integrity;Joint mobility;Muscle strength;Pain;Peripheral nerve integrity;Poor body mechanics;Posture;Range of motion;Sensation  -SC     Assessment Comments  Mrs. Dolan is a pleasant 60 year old female, diagnosed with left breast cancer in 2019, mediport placement right 12/02/2020, initiated neoadjuvant chemotherapy with oncologist Dr. Moreno Dec 2019, completed April 2020, AC/Taxol, s/p left lumpectomy with axillary dissection 10/13 L ALN (+) with  flap reconstruction closure 05/28/2020 performed by surgeons Dr. Forman and Radha, returned to surgeon last week, returns to plastics this week. Completed adjuvant radiation in August 2020. Mild post radiation skin changes. Resolution of axillary cording, however persisting left shoulder impingement and altered joint mobility left GHJ with poor scapular stabilization due to surgery, lat flap, radiation. Is at high risk of post lumpectomy lymphedema syndrome left UE/breast due to surgery, lymph node removal, lymph node involvement, radiation, age, and additional medical issues. Post radiation, weaned from compression bioimpedance obtained today, L-Dex -3.5, stable. To continue OTS compression for prevention/early intervention PRN as indicated. To continue with weekly therapy focusing on left shoulder. To monitor closely.  -SC        PT Plan    Predicted Duration of Therapy Intervention (OT)  12 sessions  -SC     PT Plan Comments  continue to focus on manual, assess updated HEP, pulleys, review post radiation skin care, bra fitting if indicated, discussion of plastics consult  -SC       User Key  (r) = Recorded By, (t) = Taken By, (c) = Cosigned By    Initials Name Provider Type    Yola Rodriguez, PT Physical Therapist             Outcome Measure Options: Quick DASH  Quick DASH  Open a tight or new jar.: Mild Difficulty  Do heavy household chores (e.g., wash walls, wash floors): Mild Difficulty  Carry a shopping bag or briefcase: Mild Difficulty  Wash your back: Mild Difficulty  Use a knife to cut food: No Difficulty  Recreational activities in which you take some force or impact through your arm, should or hand (e.g. golf, hammering, tennis, etc.): Mild Difficulty  During the past week, to what extent has your arm, shoulder, or hand problem interfered with your normal social activites with family, friends, neighbors or groups?: Not at all  During the past week, were you limited in your work or other  regular daily activities as a result of your arm, shoulder or hand problem?: Not limited at all  Arm, Shoulder, or hand pain: Mild  Tingling (pins and needles) in your arm, shoulder, or hand: Mild  During the past week, how much difficulty have you had sleeping because of the pain in your arm, shoulder or hand?: Mild Difficulty  Number of Questions Answered: 11  Quick DASH Score: 18.18         Time Calculation:   Start Time: 0900  Stop Time: 0940  Time Calculation (min): 40 min   Therapy Charges for Today     Code Description Service Date Service Provider Modifiers Qty    [id number]  PT MANUAL THERAPY EA 15 MIN 9/14/2020 Yola Valenzuela, PT GP 1    [id number]  PT THER PROC EA 15 MIN 9/14/2020 Yola Valenzuela, PT GP 1    [id number]  PT RE-EVAL ESTABLISHED PLAN 2 9/14/2020 Yola Valenzuela, PT GP 1          PT G-Codes  Outcome Measure Options: Quick DASH  Quick DASH Score: 18.18         Yola Broderick, YOLANDA  9/14/2020

## 2020-09-21 ENCOUNTER — HOSPITAL ENCOUNTER (OUTPATIENT)
Dept: PHYSICAL THERAPY | Facility: HOSPITAL | Age: 60
Setting detail: THERAPIES SERIES
Discharge: HOME | End: 2020-09-21

## 2020-09-21 DIAGNOSIS — M79.622 AXILLARY PAIN, LEFT: ICD-10-CM

## 2020-09-21 DIAGNOSIS — M25.612 DECREASED RANGE OF MOTION OF LEFT SHOULDER: ICD-10-CM

## 2020-09-21 DIAGNOSIS — M75.42 SHOULDER IMPINGEMENT SYNDROME, LEFT: ICD-10-CM

## 2020-09-21 DIAGNOSIS — Z91.89 AT RISK FOR LYMPHEDEMA: ICD-10-CM

## 2020-09-21 DIAGNOSIS — Z98.890 STATUS POST LEFT BREAST LUMPECTOMY: ICD-10-CM

## 2020-09-21 DIAGNOSIS — Z17.0 MALIGNANT NEOPLASM OF OVERLAPPING SITES OF LEFT BREAST IN FEMALE, ESTROGEN RECEPTOR POSITIVE (HCC): Primary | ICD-10-CM

## 2020-09-21 DIAGNOSIS — N64.89 BREAST EDEMA: ICD-10-CM

## 2020-09-21 DIAGNOSIS — C50.812 MALIGNANT NEOPLASM OF OVERLAPPING SITES OF LEFT BREAST IN FEMALE, ESTROGEN RECEPTOR POSITIVE (HCC): Primary | ICD-10-CM

## 2020-09-21 DIAGNOSIS — L90.5 SCAR TISSUE: ICD-10-CM

## 2020-09-21 PROCEDURE — 97140 MANUAL THERAPY 1/> REGIONS: CPT | Performed by: PHYSICAL THERAPIST

## 2020-09-21 NOTE — THERAPY TREATMENT NOTE
Outpatient Physical Therapy Lymphedema Treatment Note  [facility]     Patient Name: Richa Dolan  : 1960  MRN: [id number]  Today's Date: 2020        Visit Date: 2020    Visit Dx:    ICD-10-CM ICD-9-CM   1. Malignant neoplasm of overlapping sites of left breast in female, estrogen receptor positive (CMS/HCC)  C50.812 174.8    Z17.0 V86.0   2. Status post left breast lumpectomy  Z98.890 V45.89   3. At risk for lymphedema  Z91.89 V49.89   4. Decreased range of motion of left shoulder  M25.612 719.51   5. Breast edema  N64.89 611.89   6. Shoulder impingement syndrome, left  M75.42 726.2   7. Axillary pain, left  M79.622 729.5   8. Scar tissue  L90.5 709.2       Patient Active Problem List   Diagnosis   • Abnormal liver function tests   • Hypertension   • Insomnia   • Knee pain   • Leukopenia   • Acute right-sided low back pain without sciatica   • Knee osteoarthritis   • Osteoporosis   • Arthralgia of multiple joints   • Seasonal allergic rhinitis   • Thrombocytopenia (CMS/HCC)   • Bone cyst   • Chronic pain of right knee   • Acquired immunocompromised state (CMS/HCC)   • Bruises easily   • Loss of hair   • Anxiety   • Elevated liver enzymes   • GERD (gastroesophageal reflux disease)   • HIV positive long-term non-progressor (CMS/HCC)   • Hypercholesterolemia   • Lupus anticoagulant positive   • Neck pain, chronic   • Chemotherapy-induced neutropenia (CMS/HCC)   • Tear of medial meniscus of right knee, current   • Chondromalacia, knee   • ICH (intracerebral hemorrhage) (CMS/HCC)   • Stomach discomfort   • Abnormal bruising   • Alopecia   • Headache   • Intractable chronic migraine without aura and without status migrainosus   • Osteoarthritis of right knee   • Need for vaccination   • Mild episode of recurrent major depressive disorder (CMS/HCC)   • Ingrown nail of great toe of right foot   • Left wrist pain   • Breast cancer screening   • Malignant neoplasm of overlapping sites of left  breast in female, estrogen receptor positive (CMS/HCC)   • History of stroke   • Arthritis   • Family history of breast cancer   • Encounter for fitting and adjustment of vascular catheter   • Epistaxis   • Chemotherapy-induced thrombocytopenia   • Anemia associated with chemotherapy   • Substance or medication-induced sleep disorder, 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CMS/HCC)   • Body aches   • Neuropathy   • History of left breast cancer   • Other fatigue   • HIV infection (CMS/HCC)                          PT Assessment/Plan     Row Name 09/21/20 0914          PT Plan    PT Plan Comments  assess with return to plastics 09/21/2020, continue 1x/week focused on manual, pulleys  -SC       User Key  (r) = Recorded By, (t) = Taken By, (c) = Cosigned By    Initials Name Provider Type    Yola Rodriguez, PT Physical Therapist             OP Exercises     Row Name 09/21/20 0900             Subjective Comments    Subjective Comments  Doing well. I think getting looser. Had a question about one exercise. Otherwise I think my shoulder is getting better. It is all the joint pain with taking the AI. I haven't called the oncologist yet.  -SC         Subjective Pain    Able to rate subjective pain?  yes  -SC      Pre-Treatment Pain Level  2  -SC      Subjective Pain Comment  left shoulder  -SC         Exercise 3    Exercise Name 3  should rows, ext, ER B TB  -SC      Sets 3  2  -SC      Reps 3  10-15  -SC      Additional Comments  GTB, reviewed proper performance  -SC        User Key  (r) = Recorded By, (t) = Taken By, (c) = Cosigned By    Initials Name Provider Type    Yola Rodriguez, PT Physical Therapist                     Manual Rx (last 36 hours)      Manual Treatments     Row Name 09/21/20 0900             Manual Rx 1    Manual Rx 1 Location  left shoulder supine on plinth drapped in gown HOB elevated/HL  -SC      Manual Rx 1 Type  AP and inferior joint mobs L GHJ, PROM all planes, gentle myofascial release medial arm with  retrograde massage to axilla, scar massage lat flap reconstruction, gentle left breast mobilizations  -SC      Manual Rx 1 Grade  III-IV  -SC        User Key  (r) = Recorded By, (t) = Taken By, (c) = Cosigned By    Initials Name Provider Type    Yola Rodriguez, PT Physical Therapist              Therapy Education  Given: Symptoms/condition management, HEP, Pain management, Edema management, Other (comment)  Program: Reinforced  How Provided: Verbal, Demonstration  Provided to: Patient  Level of Understanding: Teach back education performed, Verbalized, Demonstrated              Time Calculation:   Start Time: 0900  Stop Time: 0930  Time Calculation (min): 30 min   Therapy Charges for Today     Code Description Service Date Service Provider Modifiers Qty    18326816548 HC PT MANUAL THERAPY EA 15 MIN 9/21/2020 Yola Valenzuela PT GP 2                    Yola Broderick, YOLANDA  9/21/2020

## 2020-09-22 DIAGNOSIS — Z21 ASYMPTOMATIC HIV INFECTION (HCC): Primary | ICD-10-CM

## 2020-09-24 RX ORDER — PYRIDOXINE HCL (VITAMIN B6) 50 MG
TABLET ORAL
Qty: 90 TABLET | Refills: 1 | Status: SHIPPED | OUTPATIENT
Start: 2020-09-24 | End: 2021-04-12

## 2020-09-28 ENCOUNTER — HOSPITAL ENCOUNTER (OUTPATIENT)
Dept: PHYSICAL THERAPY | Facility: HOSPITAL | Age: 60
Setting detail: THERAPIES SERIES
Discharge: HOME | End: 2020-09-28

## 2020-09-28 DIAGNOSIS — M75.42 SHOULDER IMPINGEMENT SYNDROME, LEFT: ICD-10-CM

## 2020-09-28 DIAGNOSIS — N64.89 BREAST EDEMA: ICD-10-CM

## 2020-09-28 DIAGNOSIS — C50.812 MALIGNANT NEOPLASM OF OVERLAPPING SITES OF LEFT BREAST IN FEMALE, ESTROGEN RECEPTOR POSITIVE (HCC): Primary | ICD-10-CM

## 2020-09-28 DIAGNOSIS — Z91.89 AT RISK FOR LYMPHEDEMA: ICD-10-CM

## 2020-09-28 DIAGNOSIS — M79.622 AXILLARY PAIN, LEFT: ICD-10-CM

## 2020-09-28 DIAGNOSIS — J30.2 SEASONAL ALLERGIC RHINITIS, UNSPECIFIED TRIGGER: ICD-10-CM

## 2020-09-28 DIAGNOSIS — L90.5 SCAR TISSUE: ICD-10-CM

## 2020-09-28 DIAGNOSIS — Z98.890 STATUS POST LEFT BREAST LUMPECTOMY: ICD-10-CM

## 2020-09-28 DIAGNOSIS — M25.612 DECREASED RANGE OF MOTION OF LEFT SHOULDER: ICD-10-CM

## 2020-09-28 DIAGNOSIS — Z17.0 MALIGNANT NEOPLASM OF OVERLAPPING SITES OF LEFT BREAST IN FEMALE, ESTROGEN RECEPTOR POSITIVE (HCC): Primary | ICD-10-CM

## 2020-09-28 PROCEDURE — 97140 MANUAL THERAPY 1/> REGIONS: CPT | Performed by: PHYSICAL THERAPIST

## 2020-09-28 RX ORDER — FLUTICASONE PROPIONATE 50 MCG
2 SPRAY, SUSPENSION (ML) NASAL DAILY
Qty: 3 BOTTLE | Refills: 3 | Status: SHIPPED | OUTPATIENT
Start: 2020-09-28 | End: 2021-09-09 | Stop reason: SDUPTHER

## 2020-09-28 NOTE — THERAPY TREATMENT NOTE
Outpatient Physical Therapy Lymphedema Treatment Note  [facility]     Patient Name: Richa Dolan  : 1960  MRN: [id number]  Today's Date: 2020        Visit Date: 2020    Visit Dx:    ICD-10-CM ICD-9-CM   1. Malignant neoplasm of overlapping sites of left breast in female, estrogen receptor positive (CMS/HCC)  C50.812 174.8    Z17.0 V86.0   2. Status post left breast lumpectomy  Z98.890 V45.89   3. At risk for lymphedema  Z91.89 V49.89   4. Decreased range of motion of left shoulder  M25.612 719.51   5. Shoulder impingement syndrome, left  M75.42 726.2   6. Axillary pain, left  M79.622 729.5   7. Breast edema  N64.89 611.89   8. Scar tissue  L90.5 709.2       Patient Active Problem List   Diagnosis   • Abnormal liver function tests   • Hypertension   • Insomnia   • Knee pain   • Leukopenia   • Acute right-sided low back pain without sciatica   • Knee osteoarthritis   • Osteoporosis   • Arthralgia of multiple joints   • Seasonal allergic rhinitis   • Thrombocytopenia (CMS/HCC)   • Bone cyst   • Chronic pain of right knee   • Acquired immunocompromised state (CMS/HCC)   • Bruises easily   • Loss of hair   • Anxiety   • Elevated liver enzymes   • GERD (gastroesophageal reflux disease)   • HIV positive long-term non-progressor (CMS/HCC)   • Hypercholesterolemia   • Lupus anticoagulant positive   • Neck pain, chronic   • Chemotherapy-induced neutropenia (CMS/HCC)   • Tear of medial meniscus of right knee, current   • Chondromalacia, knee   • ICH (intracerebral hemorrhage) (CMS/HCC)   • Stomach discomfort   • Abnormal bruising   • Alopecia   • Headache   • Intractable chronic migraine without aura and without status migrainosus   • Osteoarthritis of right knee   • Need for vaccination   • Mild episode of recurrent major depressive disorder (CMS/HCC)   • Ingrown nail of great toe of right foot   • Left wrist pain   • Breast cancer screening   • Malignant neoplasm of overlapping sites of left  breast in female, estrogen receptor positive (CMS/HCC)   • History of stroke   • Arthritis   • Family history of breast cancer   • Encounter for fitting and adjustment of vascular catheter   • Epistaxis   • Chemotherapy-induced thrombocytopenia   • Anemia associated with chemotherapy   • Substance or medication-induced sleep disorder, 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CMS/HCC)   • Body aches   • Neuropathy   • History of left breast cancer   • Other fatigue   • HIV infection (CMS/HCC)        Lymphedema     Row Name 09/28/20 0930             Subjective Pain    Able to rate subjective pain?  yes  -SC      Pre-Treatment Pain Level  2  -SC      Subjective Pain Comment  left shoulder  -SC         Subjective Comments    Subjective Comments  My shoulder just feels stiffer. Doing my exercises every other day. I thought about doing them daily. Most of the pain is at end of the day when I sit to watch TV before bed. I did see the plastics and he just wanted me focusing on my post radiation skin care like you said. I do go back to see oncologist next week and I am doing to talk to her about the hormone blocker because I really think that could be playing a factor. My hip and knee have been hurting really bad too since I started it. And just my energy down. I feel more depressed.   -SC         Manual Lymphatic Drainage    Manual Therapy  reviewed post radiation skin care and scar massage  -SC         Compression/Skin Care    Compression/Skin Care Comments  weaned from compression without issue  -SC        User Key  (r) = Recorded By, (t) = Taken By, (c) = Cosigned By    Initials Name Provider Type    Yola Rodriguez, PT Physical Therapist            PT Ortho     Row Name 09/28/20 0930       Shoulder Girdle Accessory Motions    Shoulder Girdle Accessory Motions Tested?  Yes  -SC    Posterior glide of humerus  Left:;Hypomobile;Left pain  -SC    Inferior glide of humerus  Left:;Hypomobile;Left pain  -SC    Lateral distraction   Left:;Hypomobile;Left pain  -SC    Inferior glide of scapula  Left:;Hypomobile  -SC    Retraction mobility of scapula  Left:;Hypomobile  -SC    Upward rotation mobility of scapula  Left:;Hypomobile;Left pain  -SC       Shoulder Impingement/Rotator Cuff Special Tests    Zaidi-Elvin Test (RC Lesion vs. Bursitis)  Left:;Negative  -SC    Neer Impingement Test (RC Lesion vs. Bursitis)  Left:;Positive  -SC    Full Can Test (RC Lesion)  Left:;Negative  -SC    Empty Can Test (RC Lesion)  Left:;Positive  -SC    Drop Arm Test (Full Thickness RC Lesion)  Left:;Negative  -SC    Internal Impingement Sign  Left:;Positive  -SC    Lift-Off Test (Subscapularis Lesion)  Left:;Negative limited due to pain  -SC       Shoulder Girdle Palpation    Long Head of Biceps  Left:;Tender;Guarded/taut;Swollen  -SC    Deltoid  Left:;Tender;Guarded/taut  -SC    Subscapularis  Left:;Tender;Guarded/taut;Trigger point  -SC    Infraspinatus  Left:;Tender;Guarded/taut;Trigger point  -SC    Pect Minor  Left:;Tender;Guarded/taut;Trigger point  -SC    Upper Trap  Left:;Tender;Guarded/taut;Trigger point  -SC    Levator Scapula  Left:;Tender;Guarded/taut;Trigger point  -SC    Rhomboid  Left:;Atrophied  -SC    Lower Trap  Left:;Atrophied  -SC      User Key  (r) = Recorded By, (t) = Taken By, (c) = Cosigned By    Initials Name Provider Type    Yola Rodriguez PT Physical Therapist                  PT Assessment/Plan     Row Name 09/28/20 0930          PT Assessment    Assessment Comments  updated goals  -SC        PT Plan    PT Plan Comments  ortho referral if indicated, continue manual; progress HEP, return to oncologist next week  -SC       User Key  (r) = Recorded By, (t) = Taken By, (c) = Cosigned By    Initials Name Provider Type    Yola Rodriguez, PT Physical Therapist             OP Exercises     Row Name 09/28/20 0930             Subjective Comments    Subjective Comments  My shoulder just feels stiffer. Doing my exercises every  other day. I thought about doing them daily. Most of the pain is at end of the day when I sit to watch TV before bed. I did see the plastics and he just wanted me focusing on my post radiation skin care like you said. I do go back to see oncologist next week and I am doing to talk to her about the hormone blocker because I really think that could be playing a factor. My hip and knee have been hurting really bad too since I started it. And just my energy down. I feel more depressed.   -SC         Subjective Pain    Able to rate subjective pain?  yes  -SC      Pre-Treatment Pain Level  2  -SC      Subjective Pain Comment  left shoulder  -SC        User Key  (r) = Recorded By, (t) = Taken By, (c) = Cosigned By    Initials Name Provider Type    Yola Rodriguez, PT Physical Therapist                     Manual Rx (last 36 hours)      Manual Treatments     Row Name 09/28/20 0930             Manual Rx 1    Manual Rx 1 Location  left shoulder supine on plinth drapped in gown HOB elevated/HL  -SC      Manual Rx 1 Type  AP and inferior joint mobs L GHJ, PROM all planes, gentle myofascial release medial arm with retrograde massage to axilla, scar massage lat flap reconstruction, gentle left breast mobilizations  -SC      Manual Rx 1 Grade  III-IV  -SC        User Key  (r) = Recorded By, (t) = Taken By, (c) = Cosigned By    Initials Name Provider Type    Yola Rodriguez, PT Physical Therapist          PT OP Goals     Row Name 09/28/20 0930          PT Short Term Goals    STG 1  Patient independent and compliant with initial home exercise program focused on diaphragmatic breathing, range of motion, flexibility to decrease edema and improve lymphatic flow for decreased edema and decreased risk of infection.   -SC     STG 1 Progress  Met  -SC     STG 2  Patient demonstrate proper awareness of What is Lymphedema and 18 Steps of Prevention for improved prevention, management, care of symptoms and ease of transition to  self-care of condition.   -SC     STG 2 Progress  Met  -SC     STG 3  Patient demonstrate independence and compliance with proper skin care during/post radiation for improved mobility, decreased scar tissue, axillary cording and edema.  -SC     STG 3 Progress  Met  -SC     STG 4  Patient independent and compliant with breast mobilization techniques for improved mobility, skin care and lymphatic flow.  -SC     STG 4 Progress  Met  -SC     STG 5  Patient independent and compliant with daytime OTS prevention compression garments as indicated for decreased risk of lymphedema and infection and safety with transition of self-care of condition.   -SC     STG 5 Progress  Met  -SC        Long Term Goals    LTG Date to Achieve  12/14/20  -SC     LTG 1  Patient's bioimpedance score to remain between -10 and 6.5 for decreased risk of developing stage II post lumpectomy lymphedema syndrome left UE and to establish treatment for early intervention of condition if/when indicated.  -SC     LTG 1 Progress  Progressing baseline post op 06/18/2020 -4.7  -SC     LTG 2  Patient independent and compliant with advanced Home Exercise Program and self-care techniques for self-management of condition.   -SC     LTG 2 Progress  Progressing  -SC     LTG 3  Patient able to wear fitted post lumpectomy/radiation bra with padding left (if indicated)  >/=6-8 hours for work schedule for improved compression to lateral flank/left breast.  -SC     LTG 3 Progress  Ongoing  -SC     LTG 4  Patient transition to cardiovascular exercise program (walking) >/=150 to 180 mins/week with moderate intensity for improved heart health, weight loss, decreased risk of osteoporosis and improved phase angle/metabolic rate.  -SC     LTG 4 Progress  Met  -SC     LTG 5  Patient score </=25% on Quick DASH for improved functional use of L UE home, community, and sleep  -SC     LTG 5 Progress  Met initial evaluation 50%  -SC     LTG 6  Patient with negative impingement  testing left shoulder for improved mobility, posture and decreased pain with ADLs and home activities  -SC     LTG 6 Progress  Ongoing  -SC     LT 6 Progress Comments  hypomobility of left GHJ noted however full PROM, possible adhesive capsulitis vs impingement  -SC        Time Calculation    PT Goal Re-Cert Due Date  12/14/20  -SC       User Key  (r) = Recorded By, (t) = Taken By, (c) = Cosigned By    Initials Name Provider Type    SC Yola Valenzuela, PT Physical Therapist          Therapy Education  Education Details: continuation of care, possible reasons for symptoms, return to oncologist/AI SE questions/concerns, orthopedic referral if indicated  Given: HEP, Symptoms/condition management, Pain management, Posture/body mechanics, Mobility training, Edema management, Other (comment)  Program: Reinforced  How Provided: Verbal, Demonstration  Provided to: Patient  Level of Understanding: Teach back education performed, Verbalized, Demonstrated              Time Calculation:   Start Time: 0930  Stop Time: 1011  Time Calculation (min): 41 min   Therapy Charges for Today     Code Description Service Date Service Provider Modifiers Qty    50290207201  PT MANUAL THERAPY EA 15 MIN 9/28/2020 Yola Valenzuela, PT GP 3                    Yola Broderick PT  9/28/2020

## 2020-09-29 ENCOUNTER — OFFICE VISIT (OUTPATIENT)
Dept: FAMILY MEDICINE CLINIC | Facility: CLINIC | Age: 60
End: 2020-09-29

## 2020-09-29 VITALS
TEMPERATURE: 97.4 F | HEART RATE: 70 BPM | BODY MASS INDEX: 23.13 KG/M2 | WEIGHT: 147.4 LBS | HEIGHT: 67 IN | SYSTOLIC BLOOD PRESSURE: 118 MMHG | OXYGEN SATURATION: 98 % | DIASTOLIC BLOOD PRESSURE: 76 MMHG

## 2020-09-29 DIAGNOSIS — F19.982 DRUG-INDUCED INSOMNIA (HCC): Primary | ICD-10-CM

## 2020-09-29 DIAGNOSIS — M19.90 ARTHRITIS: ICD-10-CM

## 2020-09-29 PROCEDURE — 99213 OFFICE O/P EST LOW 20 MIN: CPT | Performed by: NURSE PRACTITIONER

## 2020-09-29 RX ORDER — MELOXICAM 15 MG/1
15 TABLET ORAL DAILY
Qty: 90 TABLET | Refills: 3 | Status: SHIPPED | OUTPATIENT
Start: 2020-09-29 | End: 2020-12-07

## 2020-09-29 NOTE — PROGRESS NOTES
Subjective   Richa Dolan is a 60 y.o. female.     History of Present Illness   Richa Dolan 60 y.o. female presents for follow up of insomnia with onset of symptoms years ago. Patient describes symptoms as early morning awakening and frequent night time awakening. Patient has found complete relief with Ambien (Zolpidem). Associated symptoms include: fatigue if unable to take Rx . Patient denies daytime somnolence Symptoms have stabilized.  The patient has failed multiple OTC medications for insomnia.  They are well controlled on current Rx and will continue to try to take Rx PRN.  She will use the lowest effective dose.  The patient has read and signed the [facility] Controlled Substance Contract.  I will continue to see patient for regular follow up appointments and be prescribed the lowest effective dose.  MAGY has been reviewed by me and is updated every 3 months. The patient is aware of the potential for addiction and dependence.  She denies that Ambien (Zolpidem) causes excessive daytime drowsiness and sleep walking.  Patient voices understanding to take Ambien (Zolpidem) and go straight to bed. Patient must be able to sleep 7 hours or more when taking this.  Patient will hold Rx and contact me if they experience any impaired mental alertness the next day.      Since starting her new chemo medication all of her aches and pains have returned she is going to speak to her oncologist about this with the hopes of good to being able to switch medications but in the meantime she is needing something to help her with arthritic pain.    The following portions of the patient's history were reviewed and updated as appropriate: allergies, current medications, past social history and problem list.    Review of Systems   Constitutional: Negative for fever.   Respiratory: Negative for cough and shortness of breath.    Cardiovascular: Negative for chest pain.   Gastrointestinal: Negative for abdominal pain.  "  Neurological: Negative for dizziness.       Objective   /76   Pulse 70   Temp 97.4 °F (36.3 °C)   Ht 170.2 cm (67.01\")   Wt 66.9 kg (147 lb 6.4 oz)   SpO2 98%   BMI 23.08 kg/m²   Physical Exam  Vitals signs reviewed.   Constitutional:       General: She is not in acute distress.     Appearance: She is well-developed.   HENT:      Head: Normocephalic.   Cardiovascular:      Rate and Rhythm: Normal rate and regular rhythm.      Heart sounds: Normal heart sounds.   Pulmonary:      Effort: Pulmonary effort is normal.      Breath sounds: Normal breath sounds.   Neurological:      Mental Status: She is alert and oriented to person, place, and time.      Gait: Gait normal.   Psychiatric:         Behavior: Behavior normal.         Thought Content: Thought content normal.         Judgment: Judgment normal.       The patient has read and signed the [facility] Controlled Substance Contract.  I will continue to see patient for regular follow up appointments.  They are well controlled on their medication.  MAGY has been reviewed by me and is updated every 3 months. The patient is aware of the potential for addiction and dependence.    Assessment/Plan      Diagnosis Plan   1. Drug-induced insomnia (CMS/HCC)     2. Arthritis  meloxicam (Mobic) 15 MG tablet     Cont same  rto in 3 mons   Start Mobic and can use Pepcid if needed definitely take it with food  Zach Lora, ОЛЬГА  9/29/2020  "

## 2020-10-05 ENCOUNTER — LAB (OUTPATIENT)
Dept: LAB | Facility: HOSPITAL | Age: 60
End: 2020-10-05

## 2020-10-05 DIAGNOSIS — Z21 ASYMPTOMATIC HIV INFECTION (HCC): ICD-10-CM

## 2020-10-05 LAB
ANION GAP SERPL CALCULATED.3IONS-SCNC: 10.1 MMOL/L (ref 5–15)
BASOPHILS # BLD AUTO: 0.02 10*3/MM3 (ref 0–0.2)
BASOPHILS NFR BLD AUTO: 0.2 % (ref 0–1.5)
BUN SERPL-MCNC: 10 MG/DL (ref 8–23)
BUN/CREAT SERPL: 13.5 (ref 7–25)
CALCIUM SPEC-SCNC: 9.3 MG/DL (ref 8.6–10.5)
CHLORIDE SERPL-SCNC: 103 MMOL/L (ref 98–107)
CO2 SERPL-SCNC: 25.9 MMOL/L (ref 22–29)
CREAT SERPL-MCNC: 0.74 MG/DL (ref 0.57–1)
DEPRECATED RDW RBC AUTO: 46.4 FL (ref 37–54)
EOSINOPHIL # BLD AUTO: 0.07 10*3/MM3 (ref 0–0.4)
EOSINOPHIL NFR BLD AUTO: 0.9 % (ref 0.3–6.2)
ERYTHROCYTE [DISTWIDTH] IN BLOOD BY AUTOMATED COUNT: 13.1 % (ref 12.3–15.4)
GFR SERPL CREATININE-BSD FRML MDRD: 80 ML/MIN/1.73
GLUCOSE SERPL-MCNC: 83 MG/DL (ref 65–99)
HCT VFR BLD AUTO: 38.1 % (ref 34–46.6)
HCV AB SER DONR QL: NORMAL
HGB BLD-MCNC: 13 G/DL (ref 12–15.9)
IMM GRANULOCYTES # BLD AUTO: 0.02 10*3/MM3 (ref 0–0.05)
IMM GRANULOCYTES NFR BLD AUTO: 0.2 % (ref 0–0.5)
LYMPHOCYTES # BLD AUTO: 1.07 10*3/MM3 (ref 0.7–3.1)
LYMPHOCYTES NFR BLD AUTO: 13.1 % (ref 19.6–45.3)
MCH RBC QN AUTO: 33.1 PG (ref 26.6–33)
MCHC RBC AUTO-ENTMCNC: 34.1 G/DL (ref 31.5–35.7)
MCV RBC AUTO: 96.9 FL (ref 79–97)
MONOCYTES # BLD AUTO: 0.62 10*3/MM3 (ref 0.1–0.9)
MONOCYTES NFR BLD AUTO: 7.6 % (ref 5–12)
NEUTROPHILS NFR BLD AUTO: 6.34 10*3/MM3 (ref 1.7–7)
NEUTROPHILS NFR BLD AUTO: 78 % (ref 42.7–76)
NRBC BLD AUTO-RTO: 0 /100 WBC (ref 0–0.2)
PLATELET # BLD AUTO: 261 10*3/MM3 (ref 140–450)
PMV BLD AUTO: 9.6 FL (ref 6–12)
POTASSIUM SERPL-SCNC: 4.2 MMOL/L (ref 3.5–5.2)
RBC # BLD AUTO: 3.93 10*6/MM3 (ref 3.77–5.28)
SODIUM SERPL-SCNC: 139 MMOL/L (ref 136–145)
WBC # BLD AUTO: 8.14 10*3/MM3 (ref 3.4–10.8)

## 2020-10-05 PROCEDURE — 36415 COLL VENOUS BLD VENIPUNCTURE: CPT

## 2020-10-05 PROCEDURE — 86481 TB AG RESPONSE T-CELL SUSP: CPT

## 2020-10-05 PROCEDURE — 80048 BASIC METABOLIC PNL TOTAL CA: CPT

## 2020-10-05 PROCEDURE — 86803 HEPATITIS C AB TEST: CPT

## 2020-10-05 PROCEDURE — 86592 SYPHILIS TEST NON-TREP QUAL: CPT

## 2020-10-05 PROCEDURE — 87536 HIV-1 QUANT&REVRSE TRNSCRPJ: CPT

## 2020-10-05 PROCEDURE — 86361 T CELL ABSOLUTE COUNT: CPT

## 2020-10-05 PROCEDURE — 85025 COMPLETE CBC W/AUTO DIFF WBC: CPT

## 2020-10-06 ENCOUNTER — INFUSION (OUTPATIENT)
Dept: ONCOLOGY | Facility: HOSPITAL | Age: 60
End: 2020-10-06

## 2020-10-06 ENCOUNTER — OFFICE VISIT (OUTPATIENT)
Dept: ONCOLOGY | Facility: CLINIC | Age: 60
End: 2020-10-06

## 2020-10-06 VITALS
HEART RATE: 70 BPM | OXYGEN SATURATION: 95 % | TEMPERATURE: 98.4 F | DIASTOLIC BLOOD PRESSURE: 75 MMHG | HEIGHT: 67 IN | SYSTOLIC BLOOD PRESSURE: 132 MMHG | WEIGHT: 149.7 LBS | BODY MASS INDEX: 23.49 KG/M2 | RESPIRATION RATE: 16 BRPM

## 2020-10-06 DIAGNOSIS — Z45.2 ENCOUNTER FOR FITTING AND ADJUSTMENT OF VASCULAR CATHETER: ICD-10-CM

## 2020-10-06 DIAGNOSIS — Z17.0 MALIGNANT NEOPLASM OF OVERLAPPING SITES OF LEFT BREAST IN FEMALE, ESTROGEN RECEPTOR POSITIVE (HCC): Primary | ICD-10-CM

## 2020-10-06 DIAGNOSIS — Z21 HIV POSITIVE LONG-TERM NON-PROGRESSOR (HCC): ICD-10-CM

## 2020-10-06 DIAGNOSIS — Z17.0 MALIGNANT NEOPLASM OF OVERLAPPING SITES OF LEFT BREAST IN FEMALE, ESTROGEN RECEPTOR POSITIVE (HCC): ICD-10-CM

## 2020-10-06 DIAGNOSIS — C50.812 MALIGNANT NEOPLASM OF OVERLAPPING SITES OF LEFT BREAST IN FEMALE, ESTROGEN RECEPTOR POSITIVE (HCC): Primary | ICD-10-CM

## 2020-10-06 DIAGNOSIS — C50.812 MALIGNANT NEOPLASM OF OVERLAPPING SITES OF LEFT BREAST IN FEMALE, ESTROGEN RECEPTOR POSITIVE (HCC): ICD-10-CM

## 2020-10-06 LAB
ALBUMIN SERPL-MCNC: 4.1 G/DL (ref 3.5–5.2)
ALBUMIN/GLOB SERPL: 1.4 G/DL (ref 1.1–2.4)
ALP SERPL-CCNC: 86 U/L (ref 38–116)
ALT SERPL W P-5'-P-CCNC: 14 U/L (ref 0–33)
ANION GAP SERPL CALCULATED.3IONS-SCNC: 6.7 MMOL/L (ref 5–15)
AST SERPL-CCNC: 21 U/L (ref 0–32)
BASOPHILS # BLD AUTO: 0 X10E3/UL (ref 0–0.2)
BASOPHILS # BLD AUTO: 0.02 10*3/MM3 (ref 0–0.2)
BASOPHILS NFR BLD AUTO: 0 %
BASOPHILS NFR BLD AUTO: 0.4 % (ref 0–1.5)
BILIRUB SERPL-MCNC: 0.3 MG/DL (ref 0.2–1.2)
BUN SERPL-MCNC: 15 MG/DL (ref 6–20)
BUN/CREAT SERPL: 21.7 (ref 7.3–30)
CALCIUM SPEC-SCNC: 9.5 MG/DL (ref 8.5–10.2)
CD3+CD4+ CELLS # BLD: 315 /UL (ref 359–1519)
CD3+CD4+ CELLS NFR BLD: 31.5 % (ref 30.8–58.5)
CHLORIDE SERPL-SCNC: 105 MMOL/L (ref 98–107)
CO2 SERPL-SCNC: 28.3 MMOL/L (ref 22–29)
CREAT SERPL-MCNC: 0.69 MG/DL (ref 0.6–1.1)
DEPRECATED RDW RBC AUTO: 47.8 FL (ref 37–54)
EOSINOPHIL # BLD AUTO: 0.1 10*3/MM3 (ref 0–0.4)
EOSINOPHIL # BLD AUTO: 0.1 X10E3/UL (ref 0–0.4)
EOSINOPHIL NFR BLD AUTO: 1 %
EOSINOPHIL NFR BLD AUTO: 2.2 % (ref 0.3–6.2)
ERYTHROCYTE [DISTWIDTH] IN BLOOD BY AUTOMATED COUNT: 13 % (ref 12.3–15.4)
ERYTHROCYTE [DISTWIDTH] IN BLOOD BY AUTOMATED COUNT: 13.1 % (ref 11.7–15.4)
GFR SERPL CREATININE-BSD FRML MDRD: 87 ML/MIN/1.73
GLOBULIN UR ELPH-MCNC: 2.9 GM/DL (ref 1.8–3.5)
GLUCOSE SERPL-MCNC: 88 MG/DL (ref 74–124)
HCT VFR BLD AUTO: 35.7 % (ref 34–46.6)
HCT VFR BLD AUTO: 39.7 % (ref 34–46.6)
HGB BLD-MCNC: 11.9 G/DL (ref 12–15.9)
HGB BLD-MCNC: 13.3 G/DL (ref 11.1–15.9)
HIV1 RNA # SERPL NAA+PROBE: <20 COPIES/ML
HIV1 RNA SERPL NAA+PROBE-LOG#: NORMAL {LOG_COPIES}/ML
IMM GRANULOCYTES # BLD AUTO: 0 X10E3/UL (ref 0–0.1)
IMM GRANULOCYTES # BLD AUTO: 0.01 10*3/MM3 (ref 0–0.05)
IMM GRANULOCYTES NFR BLD AUTO: 0 %
IMM GRANULOCYTES NFR BLD AUTO: 0.2 % (ref 0–0.5)
LYMPHOCYTES # BLD AUTO: 1 X10E3/UL (ref 0.7–3.1)
LYMPHOCYTES # BLD AUTO: 1.06 10*3/MM3 (ref 0.7–3.1)
LYMPHOCYTES NFR BLD AUTO: 13 %
LYMPHOCYTES NFR BLD AUTO: 23.6 % (ref 19.6–45.3)
MCH RBC QN AUTO: 32.5 PG (ref 26.6–33)
MCH RBC QN AUTO: 33.1 PG (ref 26.6–33)
MCHC RBC AUTO-ENTMCNC: 33.3 G/DL (ref 31.5–35.7)
MCHC RBC AUTO-ENTMCNC: 33.5 G/DL (ref 31.5–35.7)
MCV RBC AUTO: 97 FL (ref 79–97)
MCV RBC AUTO: 99.2 FL (ref 79–97)
MONOCYTES # BLD AUTO: 0.6 X10E3/UL (ref 0.1–0.9)
MONOCYTES # BLD AUTO: 0.81 10*3/MM3 (ref 0.1–0.9)
MONOCYTES NFR BLD AUTO: 18 % (ref 5–12)
MONOCYTES NFR BLD AUTO: 8 %
NEUTROPHILS # BLD AUTO: 5.9 X10E3/UL (ref 1.4–7)
NEUTROPHILS NFR BLD AUTO: 2.49 10*3/MM3 (ref 1.7–7)
NEUTROPHILS NFR BLD AUTO: 55.6 % (ref 42.7–76)
NEUTROPHILS NFR BLD AUTO: 78 %
NRBC BLD AUTO-RTO: 0 /100 WBC (ref 0–0.2)
PLATELET # BLD AUTO: 207 10*3/MM3 (ref 140–450)
PLATELET # BLD AUTO: 276 X10E3/UL (ref 150–450)
PMV BLD AUTO: 9 FL (ref 6–12)
POTASSIUM SERPL-SCNC: 4.5 MMOL/L (ref 3.5–4.7)
PROT SERPL-MCNC: 7 G/DL (ref 6.3–8)
RBC # BLD AUTO: 3.6 10*6/MM3 (ref 3.77–5.28)
RBC # BLD AUTO: 4.09 X10E6/UL (ref 3.77–5.28)
RPR SER QL: NORMAL
SODIUM SERPL-SCNC: 140 MMOL/L (ref 134–145)
WBC # BLD AUTO: 4.49 10*3/MM3 (ref 3.4–10.8)
WBC # BLD AUTO: 7.6 X10E3/UL (ref 3.4–10.8)

## 2020-10-06 PROCEDURE — 85025 COMPLETE CBC W/AUTO DIFF WBC: CPT

## 2020-10-06 PROCEDURE — 96365 THER/PROPH/DIAG IV INF INIT: CPT

## 2020-10-06 PROCEDURE — 99214 OFFICE O/P EST MOD 30 MIN: CPT | Performed by: INTERNAL MEDICINE

## 2020-10-06 PROCEDURE — 25010000003 HEPARIN LOCK FLUSH PER 10 UNITS: Performed by: INTERNAL MEDICINE

## 2020-10-06 PROCEDURE — 25010000002 ZOLEDRONIC ACID 4 MG/100ML SOLUTION: Performed by: INTERNAL MEDICINE

## 2020-10-06 PROCEDURE — 96374 THER/PROPH/DIAG INJ IV PUSH: CPT

## 2020-10-06 PROCEDURE — 80053 COMPREHEN METABOLIC PANEL: CPT

## 2020-10-06 RX ORDER — SODIUM CHLORIDE 9 MG/ML
250 INJECTION, SOLUTION INTRAVENOUS ONCE
Status: CANCELLED | OUTPATIENT
Start: 2020-10-06

## 2020-10-06 RX ORDER — ZOLEDRONIC ACID 0.04 MG/ML
4 INJECTION, SOLUTION INTRAVENOUS ONCE
Status: COMPLETED | OUTPATIENT
Start: 2020-10-06 | End: 2020-10-06

## 2020-10-06 RX ORDER — SODIUM CHLORIDE 9 MG/ML
250 INJECTION, SOLUTION INTRAVENOUS ONCE
Status: COMPLETED | OUTPATIENT
Start: 2020-10-06 | End: 2020-10-06

## 2020-10-06 RX ORDER — ZOLEDRONIC ACID 0.04 MG/ML
4 INJECTION, SOLUTION INTRAVENOUS ONCE
Status: CANCELLED | OUTPATIENT
Start: 2020-10-06

## 2020-10-06 RX ORDER — SODIUM CHLORIDE 0.9 % (FLUSH) 0.9 %
10 SYRINGE (ML) INJECTION AS NEEDED
Status: DISCONTINUED | OUTPATIENT
Start: 2020-10-06 | End: 2020-10-06 | Stop reason: HOSPADM

## 2020-10-06 RX ORDER — TAMOXIFEN CITRATE 20 MG/1
20 TABLET ORAL DAILY
Qty: 30 TABLET | Refills: 3 | Status: SHIPPED | OUTPATIENT
Start: 2020-10-06 | End: 2021-01-05

## 2020-10-06 RX ORDER — HEPARIN SODIUM (PORCINE) LOCK FLUSH IV SOLN 100 UNIT/ML 100 UNIT/ML
500 SOLUTION INTRAVENOUS AS NEEDED
Status: CANCELLED | OUTPATIENT
Start: 2020-10-06

## 2020-10-06 RX ORDER — HEPARIN SODIUM (PORCINE) LOCK FLUSH IV SOLN 100 UNIT/ML 100 UNIT/ML
500 SOLUTION INTRAVENOUS AS NEEDED
Status: DISCONTINUED | OUTPATIENT
Start: 2020-10-06 | End: 2020-10-06 | Stop reason: HOSPADM

## 2020-10-06 RX ORDER — SODIUM CHLORIDE 0.9 % (FLUSH) 0.9 %
10 SYRINGE (ML) INJECTION AS NEEDED
Status: CANCELLED | OUTPATIENT
Start: 2020-10-06

## 2020-10-06 RX ADMIN — SODIUM CHLORIDE 250 ML: 9 INJECTION, SOLUTION INTRAVENOUS at 09:02

## 2020-10-06 RX ADMIN — Medication 500 UNITS: at 09:30

## 2020-10-06 RX ADMIN — ZOLEDRONIC ACID 4 MG: 0.04 INJECTION, SOLUTION INTRAVENOUS at 09:13

## 2020-10-06 RX ADMIN — SODIUM CHLORIDE, PRESERVATIVE FREE 10 ML: 5 INJECTION INTRAVENOUS at 09:30

## 2020-10-06 NOTE — PROGRESS NOTES
Subjective     REASON FOR FOLLOW UP:    1.  T2N1 invasive ductal carcinoma of the left breast.  Ultrasound-showed 3.8 x 3.1 x 2.5 cm mass at 4 o'clock position with 2 abnormal axillary lymph nodes, larger lymph node being 1.4 cm.  Left breast biopsy and axillary lymph node biopsy October 29, 2019, invasive ductal carcinoma, moderately differentiated, grade 2, ER 85%, ID 10%, HER-2/merna 2+, HER-2 FISH negative.  · 12/06/19: Neoadjuvant chemo therapy, cycle #1 of dose-dense Adriamycin/Cytoxan with Neulasta for marrow support    · 01/17/20: Last cycle of Adriamycin/Cytoxan given  · January 31, 2020: Cycle 1 Taxol  · April 24, 2020: Last cycle, cycle 12 of Taxol  · Patient is s/p left mastectomy with axillary dissection with reconstruction.  She is healing up nicely.  She has a drain in place.  Pathology showed invasive breast cancer which is 55 x 40 mm in size, grade 2 with focal lymphovascular space invasion with DCIS spanning over 36 x 6 mm.  All margins were negative for cancer.  · 8 of the additional lymph nodes out of 11 were positive with the largest micrometastasis measuring 5 mm.  Extranodal extension was seen.    · Biomarkers on post neoadjuvant tumor is ER 81-90% ID 5% HER-2/merna 2+ by immunohistochemistry and HER-2/merna negative by FISH.  · Radiation July 6, 2020-August 17, 2020.        2.  Severe neutropenia secondary to chemotherapy, with ANC 50 at 1-week after cycle #1 chemotherapy.  Also had moderate thrombocytopenia platelets 92,000.  Patient was given Levaquin for prophylaxis of neutropenia fever.    3.  HIV, followed by Dr. Dawn from infectious disease.  Well controlled on meds  .               HISTORY OF PRESENT ILLNESS:  The patient is a 60 y.o. year old female with history of T2N1 invasive ductal carcinoma of the left breast status post neoadjuvant chemotherapy with Adriamycin Cytoxan followed by weekly Taxol.  She is completed all treatment and currently has had a MRI of the breast which  shows partial response to the neoadjuvant chemotherapy with considerable reduction in the left axillary adenopathy and reduction in the size of the index lesion in the left breast as well as other satellite lesions.    Patient is s/p left mastectomy with axillary dissection with reconstruction.  She is healing up nicely.  She has a drain in place.  Pathology showed invasive breast cancer which is 55 x 40 mm in size, grade 2 with focal lymphovascular space invasion with DCIS spanning over 36 x 6 mm.  All margins were negative for cancer.  8 of the additional lymph nodes out of 11 were positive with the largest micrometastasis measuring 5 mm.  Extranodal extension was seen.    Biomarkers on post neoadjuvant tumor is ER 81-90% NH 5% HER-2/merna 2+ by immunohistochemistry and HER-2/merna negative by FISH.    Patient is here for follow-up.  She is eligible for aspirin and we well  trial we will check if she is eligible.    Patient also will benefit from Zometa once every 6 months as she is postmenopausal and has multiple lymph nodes positive.      Interval history:    Patient is currently on letrozole.  It is causing her significant joint pains.  She has started it 2 months ago towards the end of radiation and has significant hip and knee pain.  She has to take Mobic but she is concerned it will cause gastritis.  She states her pain level is definitely increased since she started letrozole.  We discussed about either switching to other aromatase inhibitor therapy like Arimidex or exemestane but they also cause arthralgias.  Hence patient is willing to consider tamoxifen.  I again explained to her the side effects of tamoxifen in length.  I told her about depression.  But her depression is under control.  She is on Lexapro for that.  We did discuss that if her depression gets worse down the line we may have to consider switching to Arimidex or exemestane.    For right now she proved prefer to try tamoxifen.  She is currently  due to start Zometa and she is here for her first cycle of Zometa.    Past Medical History:   Diagnosis Date   • Anxiety    • Cerebrovascular accident (CVA) (CMS/[facility]) 8/22/2017    NO RESIDUAL AFFECT   • DDD (degenerative disc disease), cervical    • Depression    • Elevated cholesterol    • GERD (gastroesophageal reflux disease)    • H/O ICH (intracerebral hemorrhage) (CMS/[facility])    • History of chemotherapy    • History of stroke    • History of thrombocytopenia    • HIV (human immunodeficiency virus infection) (CMS/[facility]) 1996   • Hyperlipidemia    • Insomnia    • Lupus anticoagulant positive    • Malignant neoplasm of overlapping sites of left breast in female, estrogen receptor positive (CMS/[facility]) 11/12/2019   • Meniscus tear 2017    RIGHT   • Migraine    • Neck pain     WITH SHOOTING PAIN LEFT RIGHT ARM   • Osteoarthritis    • Osteoporosis    • Pain management     sees 1 every 3 months for scripts/injection/pain meds   • Seasonal allergies    • Spinal headache     THINKS HAD BLOOD PATCH AFTER EPIDUAL    • Tick bite 06/2014     PAST MEDICAL HISTORY:  She had a stroke in July 2017 with headache and dizziness.  She went to the ER and was placed on aspirin.  However, she stopped taking it two weeks ago.  She has been taking Aimovig (injection) monthly with Fariba López at [facility].    In 1994, patient was diagnosed with HIV with an unknown cause which is managed by Dr. Natali Subramanian.  As of now, her viral load is good.    Patient has arthritis.  She gets trigger-point injections in her back with her last one being 2 weeks ago.  She has had multiple ablations.  She also has pain in her SI joint and Dr. Bermudez performed a surgery on this.  She takes Narco for the pain.      She is osteoporotic.  She has had multiple hairline fractures in both her legs.  She had her knees cleaned out by Dr. Sinha.     Patient has had a hysterectomy and still has both of her ovaries.    Patient has vertigo and the muscles in her left ear are  weak.  She just has an imbalance.    Past Surgical History:   Procedure Laterality Date   • ADENOIDECTOMY     • BREAST LUMPECTOMY WITH SENTINEL NODE BIOPSY Left 5/28/2020    Procedure: BREAST LUMPECTOMY WITH SENTINEL NODE BIOPSY AND NEEDLE LOCALIZATION AND axillary dissection;  Surgeon: Landen Forman MD;  Location: [facility];  Service: General;  Laterality: Left;   • BREAST SURGERY Left 5/28/2020    Procedure: LEFT LATERAL INTERCOASTAL ARTERY  flap ;  Surgeon: Yusuf Gracia MD;  Location: [facility];  Service: Plastics;  Laterality: Left;   • CHOLECYSTECTOMY     • HYSTERECTOMY     • KNEE ARTHROSCOPY Right 3/24/2017    Procedure:  RIGHT  KNEE ARTHROSCOPY WITH DEBRIDEMENT CHONDROPLASTY PARTIAL MENISCECTOMY;  Surgeon: Karl Galeas MD;  Location: [facility];  Service:    • KNEE CARTILAGE SURGERY Right 2007   • TONSILLECTOMY     • US GUIDED LYMPH NODE BIOPSY  10/29/2019    LEFT BREAST   • VENOUS ACCESS DEVICE (PORT) INSERTION Right 12/2/2019    Procedure: INSERTION VENOUS ACCESS DEVICE RIGHT;  Surgeon: Landen Forman MD;  Location: [facility];  Service: General       ONCOLOGIC HISTORY:  Patient is a 59-year-old female who has had a history of HIV since age 34 followed by Dr. Natali Ng at [facility] and was on multiple HIV drugs initially but more recently has been on a single medication TRIUMEQ, with well-controlled HIV.  She follows up with Dr. Dawn , infectious disease who saw her recently and he saw her on 4/8/2019 and has excellent control and suppression of her viral load.  She is very compliant with her medications.    She also has had history of stroke in 2017 for which she was placed on aspirin.  She presented with a headache.  She is very active and works at [employer] full-time.  She also has had history of vertigo in the past  Patient's PCP is Zach Lora.    Patient first noticed the mass in her left breast 5 months  ago.  Her last mammogram was 5 years ago.  She had soreness in the left breast and she went to her primary care physician who then ordered a mammogram diagnostic and an ultrasound.  The diagnostic mammogram showed a 3.7 cm mass at 4 o'clock position in the lower outer quadrant of the left breast.  The ultrasound showed 3.8 cm x 3.1 x 2.5 cm mass at 4 o'clock position in the left breast with 2 abnormal appearing left axillary lymph nodes the largest being 1.4 cm.    She then underwent biopsy of both the breast mass as well as the left axillary lymph node and both of them are positive for invasive mammary carcinoma it is invasive ductal carcinoma with apocrine features, moderately differentiated, grade 2 of 3 with a Jen score of 5.  The left axillary lymph node is also consistent with metastatic mammary carcinoma.  It is ER positive VA positive HER-2/merna negative.  Details as follows    10/21/19 - Bilateral Diagnostic Mammogram and US Breast Left  FINDINGS: Bilateral digital CC and MLO mammographic images were  obtained. No prior examination is available for comparison. Scattered  fibroglandular densities are seen throughout both breasts. A triangular  skin marker represents the area of palpable concern in the lower outer  quadrant of the left breast in the posterior 1/3. At this location there  is an irregular mass that measures on the order of 3.7 cm in greatest  dimension. Internal calcifications are noted. No suspicious findings in  the right breast are appreciated.     ULTRASOUND: Targeted sonographic evaluation of the left breast was  performed through the area of concern in the left axilla. At the 4  o'clock position on the order of 6 cm from the nipple there is an  irregular hypoechoic mass that measures 3.8 x 3.1 x 2.5 cm. Internal  vascularity is noted.     There are 2 abnormal-appearing left axillary lymph nodes, the largest  which measures on the order of 1.4 cm in greatest dimension.      IMPRESSION:  1. There is a 3.8 cm irregular mass in the left breast at the 4 o'clock  position. This is seen in conjunction with an irregular 1.4 cm lymph  node in the left axilla. This is suspicious for malignancy with left  axillary involvement. Correlation with ultrasound-guided biopsy of both  the left breast mass and the lymph node is recommended.  2. There are no findings suspicious for malignancy in the right breast.     BI-RADS CATEGORY 5:     Patient's labs from 11/12/19 are normal.    10/29/19 - Tissue Pathology  1. Left Breast, 4:00, 6 cm FN, U/S-Guided Core Needle Biopsy for a Mass:  A. INVASIVE DUCTAL CARCINOMA WITH APOCRINE FEATURES, Moderately differentiated;  [place] Histologic Grade II/III (tubule score = 3, nuclear score = 2, mitoses score = 1), measuring at least  9 mm.  B. No ductal or lobular carcinoma in situ is identified in this sample.  C. Focus suspicious for lymphovascular space invasion.  D. See Biomarker Template for hormone receptor studies.  2. Lymph Node, Left Axilla, U/S-Guided Core Needle Biopsy:  A. METASTATIC MAMMARY CARCINOMA (see Comment).  B. Metastatic focus measures 5 mm in greatest extent.  ER+, 85%  ND+, 10%  HER2- Score 2+    11/13/19 - CT Neck Chest Abdomen Pelvis  IMPRESSION:  1. In addition to the irregular approximately 3.8 cm mass within the  lateral aspect of the left breast, there are a few subcentimeter  asymmetric nodules along the lateral margin of the glandular tissue. The  several asymmetric left subpectoral nodes and 1.2 x 1.0 cm left axillary  node are suspicious for tamar metastases. The asymmetric subcentimeter  left supraclavicular and left posterior cervical triangle nodes are  worrisome as well.  2. There is no convincing evidence for metastatic disease within the  abdomen or pelvis.    11/14/19 - Echocardiogram:  Normal.  Calculated EF = 67%.  There is trace mitral valve and tricuspid valve regurgitation.    11/15/19 - Bone  Scan  Negative.    19 - MRI Breast Bilateral  IMPRESSION:  1. Biopsy-proven malignancy in the left breast centered at the 4 o'clock  position with associated surrounding satellite nodules as described. The  entire region of involvement including the satellite nodules and the  dominant mass measure up to 7.5 cm in greatest dimension. Also, left  axillary adenopathy with multiple irregular lymph nodes and loss of  differentiation of the borders of multiple lymph nodes is noted and this  is suspicious for extranodal involvement.  2. There are no findings suspicious for malignancy in the right breast.  BI-RADS CATEGORY 6      19: Cycle #1 of Adriamycin/Cytoxan    20: Last cycle of Adriamycin/Cytoxan given without Neulasta.  We will switch from Neulasta to 7 days of Neupogen injections after cycle 4.    We reviewed with patient the US Breast from 20 which shows mild interval partial response to neoadjuvant chemotherapy.      20: Initiated cycle 1 Taxol  2020: Last cycle of Taxol, cycle 12    S/p left mastectomy with axillary dissection   Pathology showed invasive breast cancer which is 55 x 40 mm in size, grade 2 with focal lymphovascular space invasion with DCIS spanning over 36 x 6 mm.  All margins were negative for cancer.  8 of the additional lymph nodes out of 11 were positive with the largest micrometastasis measuring 5 mm.  Extranodal extension was seen.    Biomarkers on post neoadjuvant tumor is ER 81-90% DE 5% HER-2/merna 2+ by immunohistochemistry and HER-2/merna negative by FISH.        OB-GYN:  Menarche 15 years  Menopause-46  Pregnancies- 1 para 1 no miscarriages her first pregnancy was in  at 29 years of age.  She did not breastfeed.  Post menopausal HRT- None.  Hx of birth control pills- Yes.    Current Outpatient Medications on File Prior to Visit   Medication Sig Dispense Refill   • AIMOVIG 140 MG/ML solution auto-injector INJECT 140 MG INTO THE SKIN EVERY  30 (THIRTY) DAYS.  11   • atorvastatin (LIPITOR) 20 MG tablet TAKE 1 TABLET BY MOUTH EVERY DAY AT NIGHT (Patient taking differently: Take 20 mg by mouth Daily.) 90 tablet 3   • baclofen (LIORESAL) 10 MG tablet Take 10 mg by mouth 2 (Two) Times a Day.  2   • clonazePAM (KlonoPIN) 0.5 MG tablet TAKE 1 TABLET BY MOUTH 2 (TWO) TIMES A DAY AS NEEDED FOR ANXIETY. 60 tablet 1   • colesevelam (WELCHOL) 625 MG tablet TAKE 2 TABLETS BY MOUTH EVERY DAY (Patient taking differently: Take 1,250 mg by mouth Daily. TAKE 2 TABLETS BY MOUTH EVERY DAY) 180 tablet 3   • escitalopram (LEXAPRO) 10 MG tablet TAKE 1 TABLET BY MOUTH EVERY DAY (Patient taking differently: Take 10 mg by mouth Daily.) 90 tablet 3   • fluticasone (FLONASE) 50 MCG/ACT nasal spray 2 sprays into the nostril(s) as directed by provider Daily. 3 bottle 3   • ibandronate (BONIVA) 150 MG tablet TAKE 1 TABLET BY MOUTH EVERY 30 (THIRTY) DAYS. 3 tablet 3   • letrozole (FEMARA) 2.5 MG tablet Take 1 tablet by mouth Daily. 30 tablet 4   • Loratadine (CLARITIN PO) Take 10 mg by mouth Daily.     • Magnesium Hydroxide (MILK OF MAGNESIA PO) Take 15 mL by mouth Every Night.     • meloxicam (Mobic) 15 MG tablet Take 1 tablet by mouth Daily. 90 tablet 3   • NON FORMULARY Take 1 dose by mouth Daily. Collagen peptides powder      • oxyCODONE-acetaminophen (PERCOCET)  MG per tablet Take 1 tablet by mouth 4 (Four) Times a Day.     • pyridoxine (VITAMIN B-6) 50 MG tablet TAKE 1 TABLET BY MOUTH EVERY DAY 90 tablet 1   • TRIUMEQ 600- MG per tablet TAKE 1 TABLET BY MOUTH EVERY DAY 90 tablet 3   • zolpidem (AMBIEN) 10 MG tablet Take 1 tablet by mouth At Night As Needed for Sleep. 90 tablet 0     No current facility-administered medications on file prior to visit.         ALLERGIES:    Allergies   Allergen Reactions   • Augmentin [Amoxicillin-Pot Clavulanate] Hives        Social History     Socioeconomic History   • Marital status:      Spouse name: Owen   • Number of  children: 1   • Years of education: College   • Highest education level: Not on file   Occupational History   • Occupation:      Employer:  [employer]   Tobacco Use   • Smoking status: Never Smoker   • Smokeless tobacco: Never Used   Substance and Sexual Activity   • Alcohol use: Yes     Frequency: 2-4 times a month     Drinks per session: 1 or 2     Binge frequency: Never     Comment: occasioonal   • Drug use: No   • Sexual activity: Defer     Partners: Male     Birth control/protection: None     Comment:      Patient does not smoke or do drugs.  She does drink occasionally.    Family History   Problem Relation Age of Onset   • Breast cancer Mother    • Leukemia Mother         CLL?   • Diabetes Mother    • Hyperthyroidism Mother    • Hearing loss Mother    • Dementia Father    • Heart disease Maternal Grandmother    • Diabetes Maternal Grandfather    • Heart disease Maternal Grandfather    • Stroke Maternal Grandfather    • Heart attack Maternal Grandfather    • Osteoporosis Paternal Grandmother    • Heart disease Paternal Grandfather    • Leukemia Maternal Aunt         CLL?   • Throat cancer Paternal Uncle    • Malig Hyperthermia Neg Hx       FAMILY HISTORY:  Her mother had breast cancer at age 60, still alive at 85 and in good health..  Her father had dementia.  Her paternal uncle had prostate cancer.  Her brother had esophageal cancer.    I have reviewed the patient's medical history in detail and updated the computerized patient record.     Review of Systems   Constitutional: Positive for fatigue (10/06/20-Unchanged). Negative for activity change, appetite change, chills, diaphoresis, fever and unexpected weight change.   HENT: Negative for hearing loss, mouth sores, nosebleeds, sore throat and trouble swallowing.    Respiratory: Negative for cough, chest tightness, shortness of breath and wheezing.    Cardiovascular: Negative for chest pain, palpitations and leg swelling.  "  Gastrointestinal: Negative for abdominal distention, abdominal pain, constipation, diarrhea, nausea and vomiting.   Genitourinary: Negative for difficulty urinating, dysuria, frequency, hematuria and urgency.   Musculoskeletal: Positive for arthralgias (Rt Hip and Rt Knee-10/06/20) and back pain (10/06/20-Unchanged). Negative for joint swelling.        No muscle weakness.   Skin: Negative for rash and wound.   Neurological: Positive for numbness (Bilat foot-10/06/20-Improved). Negative for dizziness, seizures, syncope, speech difficulty, weakness and headaches.   Hematological: Negative for adenopathy. Does not bruise/bleed easily.   Psychiatric/Behavioral: Positive for sleep disturbance (10/06/20-Unchanged). Negative for behavioral problems, confusion and suicidal ideas.   All other systems reviewed and are negative.  I have reviewed and confirmed the accuracy of the ROS as documented by the MA/LPN/TERESA Moreno MD  Patient does have fatigue related to recent radiation which will take time to improve.  She also complains of slight pain in the left armpit.  I have reviewed and confirmed the accuracy of the ROS as documented by the MA/LPN/TERESA Moreno MD        Objective    Vitals:    10/06/20 0809   BP: 132/75   Pulse: 70   Resp: 16   Temp: 98.4 °F (36.9 °C)   TempSrc: Skin   SpO2: 95%   Weight: 67.9 kg (149 lb 11.2 oz)   Height: 170.2 cm (67.01\")   PainSc: 0-No pain       Physical exam     CONSTITUTIONAL:  Vital signs reviewed.  No distress, looks comfortable.  EYES:  Conjunctiva and lids unremarkable.  PERRLA  EARS,NOSE,MOUTH,THROAT:  Ears and nose appear unremarkable.  Lips, teeth, gums appear unremarkable.  RESPIRATORY:  Normal respiratory effort.  Lungs clear to auscultation bilaterally.  CARDIOVASCULAR:  Normal S1, S2.  No murmurs rubs or gallops.  No significant lower extremity edema  BREAST: Right breast: No skin changes, no evidence of breast mass, no nipple discharge, no evidence of any right " axillary adenopathy or right supraclavicular adenopathy  Left breast: S/p left mastectomy with reconstruction, well-healed, there is no evidence of any skin changes or any masses felt in the left axilla.  She is slightly tender in the left axilla.  No evidence of left supraclavicular lymphadenopathy  GASTROINTESTINAL: Abdomen appears unremarkable.  Nontender.  No hepatomegaly.  No splenomegaly.  LYMPHATIC:  No cervical, supraclavicular, axillary lymphadenopathy.  SKIN:  Warm.  No rashes.  PSYCHIATRIC:  Normal judgment and insight.  Normal mood and affect.  I have reexamined the patient and the results are consistent with the previously documented exam. Ruby Moreno MD         RECENT LABS:   Results from last 7 days   Lab Units 10/06/20  0807 10/05/20  1018   WBC 10*3/mm3 4.49 8.14   NEUTROS ABS 10*3/mm3 2.49 6.34   HEMOGLOBIN g/dL 11.9* 13.0   HEMATOCRIT % 35.7 38.1   PLATELETS 10*3/mm3 207 261     Results from last 7 days   Lab Units 10/06/20  0807 10/05/20  1018   SODIUM mmol/L 140 139   POTASSIUM mmol/L 4.5 4.2   CHLORIDE mmol/L 105 103   CO2 mmol/L 28.3 25.9   BUN mg/dL 15 10   CREATININE mg/dL 0.69 0.74   CALCIUM mg/dL 9.5 9.3   ALBUMIN g/dL 4.10  --    BILIRUBIN mg/dL 0.3  --    ALK PHOS U/L 86  --    ALT (SGPT) U/L 14  --    AST (SGOT) U/L 21  --    GLUCOSE mg/dL 88 83           EXAMINATION: BILATERAL BREAST MRI WITHOUT AND WITH CONTRAST 05/06/20  IMPRESSION:  1. There are findings consistent with interval partial response to  neoadjuvant chemotherapy with considerable reduction in left axillary  adenopathy and reduction in size of the index lesion in the left breast  as well as other satellite lesions. Persistent diffuse left breast skin  thickening is noted.  2. There are no findings suspicious for malignancy in the right breast.     BI-RADS Category 6: Known biopsy-proven malignancy.    01/29/20 - US Breast  IMPRESSION:  There are findings suggestive of mild interval partial  response to neoadjuvant  chemotherapy.    Assessment/Plan    1. T2N1 invasive ductal carcinoma of the left breast, recently diagnosed.    -Noticed mass in the left breast x5 months  -Diagnostic mammogram showed 3.7 mm mass in the left breast at 4 o'clock position  -Ultrasound-showed 3.8 x 3.1 x 2.5 cm mass at 4 o'clock position with 2 abnormal axillary lymph nodes, larger lymph node being 1.4 cm  -Left breast biopsy and axillary lymph node biopsy October 29, 2019, invasive ductal carcinoma, moderately differentiated, grade 2, ER 85%, NE 10%, HER-2/merna 2+, HER-2 FISH negative  · CT scans from 11/13/19 show some asymmetric nodules along the lateral margin of the glandular tissue.  The 1.2x1.0 cm left axillary nodes, left supraclavicular, and left posterior cervical triangle nodes are suspicious for tamar metastases.  ·   bone scan from 11/15/19 which was negative.   ·  MRI breast from 11/18/19 which shows the biopsy-proven malignancy in the left breast centered at the 4:00 position.  The region of involvement measures up to 7.5 cm.  There is left axillary adenopathy with multiple irregular lymph nodes and loss of differentiation of the border of multiple lymph nodes which are suspicious for extranodal involvement.  ·    echocardiogram from 11/14/19 which was normal with an ejection fraction of 67%.    · INVITAE from 11/14/19 which was negative.  · Given the size of her tumor and her medical history, patient will be given 4 cycles of Adriamycin/Cytoxan with Neulasta.  After completion of this treatment, patient will be started on 12 cycles of Taxol.  After the tumor shrinks in size, Dr. Forman will perform a lumpectomy.    · Following lumpectomy, patient will begin endocrine therapy.  ·  Dr. Dawn agrees chemotherapy will not aggravate her HIV condition.  Dr. Moreno spoke with Dr. Mohan at [facility] who also agrees chemotherapy is the first step.   · 12/06/19: Cycle #1 of Adriamycin/Cytoxan  · US Breast from 01/29/20 after 4 cycles  of Adriamycin Cytoxan chemotherapy which shows mild interval partial response to neoadjuvant chemotherapy.  The mass has decreased from 3.8 x 2.5 to 3.1 cm to 3.5 x 2.3 x 3.2 cm previously the left axillary lymph node has decreased from 1.4 x 0.7 x 1 cm to 1.1 x 0.6 x 0.9 cm.  · 01/31/20: Initiated cycle 1 Taxol  · April 24, 2020: Completed cycle 12 Taxol  · MRI breast 5/6/2020 shows significant reduction in the left axillary adenopathy as well as reduction in the index breast lesion.  · S/p left modified radical mastectomy with left axillary dissection.  Patient has 55 x 40 x 30 mm invasive carcinoma, grade 2 with DCIS 36 x 6 mm, high-grade with good margins and 8 out of 11 lymph nodes positive with largest metastasis being 5 mm with extracapsular extension.  And there is lymphatic space invasion  · Will follow-up in 2 weeks at which time we will plan to start endocrine therapy.  She has radiation oncology appointment with Dr. Spivey  · Patient is eligible for Zometa once every 6 months for 2 years.  But given that she is undergoing some work on her teeth we will await starting Zometa until after radiation is complete.  · XRT completed August 17, 2020  · Started letrozole August 2020 but has arthralgias  · October 6, 2020 had severe arthralgias secondary to letrozole.  We will switch to tamoxifen starting November 2020      2.  Profound neutropenia due to chemotherapy despite Neulasta:   · Following Adriamycin Cytoxan, the patient experienced neutropenia despite Neulasta.  She did receive Zarxio with cycle 4 Adriamycin Cytoxan  · Cycle 2 Taxol delayed 1 week due to neutropenia  · Zarxio x3 days given following Taxol  · White blood cell 8.32, ANC 6.24 today.  The patient is highly anxious going to the hospital for Zarxio injections in light of COVID-19 outbreak.  Discontinue further Zarxio for remaining 2 weeks of Taxol    3.  Moderate thrombocytopenia secondary to chemotherapy.    · Platelet count has now  normalized following Taxol 274,000    4.  Chemotherapy induced anemia.   · Anemia work-up previously negative  · Hemoglobin is improved today at 11.1  · Hemoglobin improved 11.9      5.  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  · On 12/13/2019 patient struggled with significant insomnia from steroids following her first cycle AC.  We discussed taking the dexamethasone, only one 4 mg tablet daily for 3 days with her next cycle of chemotherapy to see if this improves her symptoms.    · Insomnia much improved with decreased dose of dexamethasone  · Insomnia is now resolved with Taxol.  Not on steroids and resolved    6. HIV, followed by Dr. Dawn in infectious disease.  Well-controlled and is on a single medication with very low viral load. Has HIV since age 34.  · Reviewed her HIV medications and she is compliant  · Stable     7.  Family history of breast cancer with mother having had breast cancer at age 60.  There is family history of uncle with prostate cancer.  Patient will require genetic referral    8.  Arthritis with severe back pain followed by Dr. Bam Crandall  · Percocet    9. Episodes of nosebleeds.  She has had these once a week for 3-4 years.  She did not report this today.    10.  Otalgia: Of the right ear, present for a while.  Dr. Moreno would like the patient to follow-up with ENT, Dr. Stapleton, as she has previously seen him.  Not a complaint today.    12.  Arthralgias for a few days after each treatment, which resolve on their own.    13.  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  Currently on B6 50 mg daily.  We will have to continue to closely monitor.  This is stable.  No dose adjustments Taxol presently    PLAN:  1. Discontinue letrozole  2. Start tamoxifen starting November 2020  3. S/p radiation  4. Therapy stable  5. Patient is due to receive Zometa today and will receive it every 6 months x 2 years  6. I have discussed with research nurses to see if she is eligible for  aspirin or Be Well trial , patient has had history of CVA in the past and is not eligible for aspirin trial.  She is not eligible for we will trial because her BMI is not high enough per protocol  7. Follow-up with me in 2 months with port flush in 1 month in 2-month.    Ruby Moreno MD      Cc: Dr. Landen Lora, MD Denilson Shi MD

## 2020-10-07 ENCOUNTER — OFFICE VISIT (OUTPATIENT)
Dept: INFECTIOUS DISEASES | Facility: CLINIC | Age: 60
End: 2020-10-07

## 2020-10-07 VITALS
HEIGHT: 67 IN | DIASTOLIC BLOOD PRESSURE: 72 MMHG | WEIGHT: 147 LBS | SYSTOLIC BLOOD PRESSURE: 110 MMHG | HEART RATE: 71 BPM | TEMPERATURE: 98.4 F | BODY MASS INDEX: 23.07 KG/M2

## 2020-10-07 DIAGNOSIS — D05.12 DUCTAL CARCINOMA IN SITU (DCIS) OF LEFT BREAST: ICD-10-CM

## 2020-10-07 DIAGNOSIS — Z21 ASYMPTOMATIC HIV INFECTION (HCC): Primary | ICD-10-CM

## 2020-10-07 DIAGNOSIS — Z79.2 LONG TERM (CURRENT) USE OF ANTIBIOTICS: ICD-10-CM

## 2020-10-07 LAB
TSPOT INTERPRETATION: NEGATIVE
TSPOT NIL CONTROL INTERPRETATION: NORMAL
TSPOT PANEL A: 1
TSPOT PANEL B: 2
TSPOT POS CONTROL INTERPRETATION: NORMAL

## 2020-10-07 PROCEDURE — 99213 OFFICE O/P EST LOW 20 MIN: CPT | Performed by: INTERNAL MEDICINE

## 2020-10-07 NOTE — PROGRESS NOTES
CC: f/u HIV    HPI: Richa Dolan is a 60 y.o. female here for f/u of her HIV care. No new issues from an HIV standpoint. No missed doses of Triumeq. No obvious side effects. Refills through [facility].    I reviewed her most recent oncology clinic note which states that she had severe arthralgias secondary to letrozole so is switching to tamoxifen starting November 2020. She says she will be on this for 5 years. She finished radiation about 6 weeks ago.     She recently received Zometa infusion for osteoporosis.    She says her port is doing well. No erythema or pain.       Review of Systems:   No n/v/d    PMH:   HIV  Breast cancer  CVA  Migraines  HLD  GERD    PSH: tonsillectomy, hysterectomy, cholecystectomy, Partial medial meniscectomy (right), port placement, breast lumpectomy with sentinel lymph node biopsy    FH: father had dementia    SH: , 1 son, no tobacco use, occasional EtOH, no illicits, retired from [employer]    Allergies: Augmentin (hives)    Medications:   Current Outpatient Medications:   •  AIMOVIG 140 MG/ML solution auto-injector, INJECT 140 MG INTO THE SKIN EVERY 30 (THIRTY) DAYS., Disp: , Rfl: 11  •  atorvastatin (LIPITOR) 20 MG tablet, TAKE 1 TABLET BY MOUTH EVERY DAY AT NIGHT (Patient taking differently: Take 20 mg by mouth Daily.), Disp: 90 tablet, Rfl: 3  •  baclofen (LIORESAL) 10 MG tablet, Take 10 mg by mouth 2 (Two) Times a Day., Disp: , Rfl: 2  •  clonazePAM (KlonoPIN) 0.5 MG tablet, TAKE 1 TABLET BY MOUTH 2 (TWO) TIMES A DAY AS NEEDED FOR ANXIETY., Disp: 60 tablet, Rfl: 1  •  colesevelam (WELCHOL) 625 MG tablet, TAKE 2 TABLETS BY MOUTH EVERY DAY (Patient taking differently: Take 1,250 mg by mouth Daily. TAKE 2 TABLETS BY MOUTH EVERY DAY), Disp: 180 tablet, Rfl: 3  •  escitalopram (LEXAPRO) 10 MG tablet, TAKE 1 TABLET BY MOUTH EVERY DAY (Patient taking differently: Take 10 mg by mouth Daily.), Disp: 90 tablet, Rfl: 3  •  fluticasone (FLONASE) 50 MCG/ACT nasal spray, 2 sprays into  "the nostril(s) as directed by provider Daily., Disp: 3 bottle, Rfl: 3  •  ibandronate (BONIVA) 150 MG tablet, TAKE 1 TABLET BY MOUTH EVERY 30 (THIRTY) DAYS., Disp: 3 tablet, Rfl: 3  •  letrozole (FEMARA) 2.5 MG tablet, Take 1 tablet by mouth Daily., Disp: 30 tablet, Rfl: 4  •  Loratadine (CLARITIN PO), Take 10 mg by mouth Daily., Disp: , Rfl:   •  Magnesium Hydroxide (MILK OF MAGNESIA PO), Take 15 mL by mouth Every Night., Disp: , Rfl:   •  meloxicam (Mobic) 15 MG tablet, Take 1 tablet by mouth Daily., Disp: 90 tablet, Rfl: 3  •  NON FORMULARY, Take 1 dose by mouth Daily. Collagen peptides powder , Disp: , Rfl:   •  oxyCODONE-acetaminophen (PERCOCET)  MG per tablet, Take 1 tablet by mouth 4 (Four) Times a Day., Disp: , Rfl:   •  pyridoxine (VITAMIN B-6) 50 MG tablet, TAKE 1 TABLET BY MOUTH EVERY DAY, Disp: 90 tablet, Rfl: 1  •  tamoxifen (NOLVADEX) 20 MG chemo tablet, Take 1 tablet by mouth Daily for 90 days., Disp: 30 tablet, Rfl: 3  •  TRIUMEQ 600- MG per tablet, TAKE 1 TABLET BY MOUTH EVERY DAY, Disp: 90 tablet, Rfl: 3  •  zolpidem (AMBIEN) 10 MG tablet, Take 1 tablet by mouth At Night As Needed for Sleep., Disp: 90 tablet, Rfl: 0    OBJECTIVE:  /72   Pulse 71   Temp 98.4 °F (36.9 °C)   Ht 170.2 cm (67.01\")   Wt 66.7 kg (147 lb)   BMI 23.02 kg/m²     GENERAL: Awake, alert, NAD  ENT:  wearing mask  EYES: No scleral icterus  HEART: NR  LUNGS: . Normal work of breathing on ambient air.    ABDOMEN: non-distended  SKIN: no rashes  Vasc: port w/o erythema    DIAGNOSTICS:  CBC, CMP, HIV labs reviewed today  Lab Results   Component Value Date    WBC 4.49 10/06/2020    HGB 11.9 (L) 10/06/2020    HCT 35.7 10/06/2020     10/06/2020     Lab Results   Component Value Date    GLUCOSE 88 10/06/2020    BUN 15 10/06/2020    CREATININE 0.69 10/06/2020    EGFRIFNONA 87 10/06/2020    EGFRIFAFRI >60 05/28/2015    BCR 21.7 10/06/2020    CO2 28.3 10/06/2020    CALCIUM 9.5 10/06/2020    PROTENTOTREF 7.2 " 10/13/2016    ALBUMIN 4.10 10/06/2020    LABIL2 1.5 04/08/2019    AST 21 10/06/2020    ALT 14 10/06/2020     HIV Related Labs:  CD4: 315/31% (10/2020)  VL < 20 (10/2020)  HIV Genotype: unknown bc undetectable  [id number] - negative  Tspot - negative (10/2020)  RPR - non-reactive (10/2020)  Hep A - immune (7/2016)  Hep B - immune (9/2015)  Hep C - negative (10/2020)  Urine GCCT - negative (9/2015)    Radiology (reviewed report):    No new imaging today    ASSESSMENT/PLAN:  1. Asymptomatic HIV  -well controlled on Triumeq 1 tablet daily (ABC/3TC/DTG)  -continue Triumeq daily; [facility]  -reviewed most recent labs from 10/5/20; HIV remains suppressed; CD4 lower due to chemotherapy; no OI prophylaxis needed at this time    2. T2N1 invasive ductal carcinoma of the left breast  -continue treatment with oncology    3. Long term use of antibiotics  -reviewed monitoring labs    RTC 6 months

## 2020-10-13 ENCOUNTER — HOSPITAL ENCOUNTER (OUTPATIENT)
Dept: PHYSICAL THERAPY | Facility: HOSPITAL | Age: 60
Setting detail: THERAPIES SERIES
Discharge: HOME | End: 2020-10-13

## 2020-10-13 DIAGNOSIS — N64.89 BREAST EDEMA: ICD-10-CM

## 2020-10-13 DIAGNOSIS — M25.612 DECREASED RANGE OF MOTION OF LEFT SHOULDER: ICD-10-CM

## 2020-10-13 DIAGNOSIS — C50.812 MALIGNANT NEOPLASM OF OVERLAPPING SITES OF LEFT BREAST IN FEMALE, ESTROGEN RECEPTOR POSITIVE (HCC): Primary | ICD-10-CM

## 2020-10-13 DIAGNOSIS — Z98.890 STATUS POST LEFT BREAST LUMPECTOMY: ICD-10-CM

## 2020-10-13 DIAGNOSIS — L90.5 SCAR TISSUE: ICD-10-CM

## 2020-10-13 DIAGNOSIS — Z17.0 MALIGNANT NEOPLASM OF OVERLAPPING SITES OF LEFT BREAST IN FEMALE, ESTROGEN RECEPTOR POSITIVE (HCC): Primary | ICD-10-CM

## 2020-10-13 DIAGNOSIS — Z91.89 AT RISK FOR LYMPHEDEMA: ICD-10-CM

## 2020-10-13 DIAGNOSIS — M75.42 SHOULDER IMPINGEMENT SYNDROME, LEFT: ICD-10-CM

## 2020-10-13 DIAGNOSIS — M79.622 AXILLARY PAIN, LEFT: ICD-10-CM

## 2020-10-13 PROCEDURE — 97140 MANUAL THERAPY 1/> REGIONS: CPT | Performed by: PHYSICAL THERAPIST

## 2020-10-13 NOTE — THERAPY TREATMENT NOTE
Outpatient Physical Therapy Lymphedema Treatment Note  [facility]     Patient Name: Richa Dolan  : 1960  MRN: [id number]  Today's Date: 10/13/2020        Visit Date: 10/13/2020    Visit Dx:    ICD-10-CM ICD-9-CM   1. Malignant neoplasm of overlapping sites of left breast in female, estrogen receptor positive (CMS/HCC)  C50.812 174.8    Z17.0 V86.0   2. Status post left breast lumpectomy  Z98.890 V45.89   3. At risk for lymphedema  Z91.89 V49.89   4. Decreased range of motion of left shoulder  M25.612 719.51   5. Shoulder impingement syndrome, left  M75.42 726.2   6. Axillary pain, left  M79.622 729.5   7. Breast edema  N64.89 611.89   8. Scar tissue  L90.5 709.2       Patient Active Problem List   Diagnosis   • Abnormal liver function tests   • Hypertension   • Insomnia   • Knee pain   • Leukopenia   • Acute right-sided low back pain without sciatica   • Knee osteoarthritis   • Osteoporosis   • Arthralgia of multiple joints   • Seasonal allergic rhinitis   • Thrombocytopenia (CMS/HCC)   • Bone cyst   • Chronic pain of right knee   • Acquired immunocompromised state (CMS/HCC)   • Bruises easily   • Loss of hair   • Anxiety   • Elevated liver enzymes   • GERD (gastroesophageal reflux disease)   • HIV positive long-term non-progressor (CMS/HCC)   • Hypercholesterolemia   • Lupus anticoagulant positive   • Neck pain, chronic   • Chemotherapy-induced neutropenia (CMS/HCC)   • Tear of medial meniscus of right knee, current   • Chondromalacia, knee   • ICH (intracerebral hemorrhage) (CMS/HCC)   • Stomach discomfort   • Abnormal bruising   • Alopecia   • Headache   • Intractable chronic migraine without aura and without status migrainosus   • Osteoarthritis of right knee   • Need for vaccination   • Mild episode of recurrent major depressive disorder (CMS/HCC)   • Ingrown nail of great toe of right foot   • Left wrist pain   • Breast cancer screening   • Malignant neoplasm of overlapping sites of left  breast in female, estrogen receptor positive (CMS/HCC)   • History of stroke   • Arthritis   • Family history of breast cancer   • Encounter for fitting and adjustment of vascular catheter   • Epistaxis   • Chemotherapy-induced thrombocytopenia   • Anemia associated with chemotherapy   • Substance or medication-induced sleep disorder, 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CMS/HCC)   • Body aches   • Neuropathy   • History of left breast cancer   • Other fatigue   • HIV infection (CMS/HCC)        Lymphedema     Row Name 10/13/20 1000             Subjective Pain    Able to rate subjective pain?  yes  -SC      Pre-Treatment Pain Level  2  -SC      Subjective Pain Comment  left shoulder/breast  -SC         Subjective Comments    Subjective Comments  I did see Dr. Moreno. Off the AI. To start Tamoxifen in Nov 2020. I have only been off the meds about a week. I feel the same but starting yesterday evening I noticed the left breast was really tight.   -SC         Manual Lymphatic Drainage    Manual Lymphatic Drainage  initial sequence;opened regional lymph nodes;opened anastamoses;extremity treatment  -SC      Initial Sequence  supraclavicular;shoulder collectors;abdomen;diaphragmatic breathing  -SC      Supraclavicular  right;left  -SC      Shoulder Collectors  right;left  -SC      Abdomen  superficial  -SC      Diaphragmatic Breathing  during MLD and instructed with self massage  -SC      Opened Regional Lymph Nodes  axillary;inguinal;ribs  -SC      Axillary  right;left  -SC      Inguinal  left  -SC      Ribs  lateral left and sternal  -SC      Opened Anastamoses  anterior axillo-axillary;axillo-inguinal  -SC      Axillo-Inguinal  left  -SC      Extremity Treatment  simple/brief MLD;MLD to proximal limb only  -SC      Simple/Brief MLD  left  -SC      MLD to Proximal Limb Only  left  -SC      Manual Lymphatic Drainage Comments  left breast MLD, instructed self massage as well  -SC      Manual Therapy  MLD left breast, scar massage lumpectomy  and lat flap incision, myofascial release left pec, anterior shoulder, medial arm, axilla, lateral flank, subscapularis release, AP and inferior joint mobs L GHJ, lateral distractions, inferior oscillations, PROM all planes  -SC         Compression/Skin Care    Compression/Skin Care Comments  education return to OTS compression sleeve if indicated, education of kinesiotape, swell spot and bra fitting indicated  -SC        User Key  (r) = Recorded By, (t) = Taken By, (c) = Cosigned By    Initials Name Provider Type    Yola Rodriguez, PT Physical Therapist                        PT Assessment/Plan     Row Name 10/13/20 1000          PT Assessment    Assessment Comments  marked scar tissue noted left breast, initiated MLD. 30 day recert due next session.  -SC        PT Plan    Predicted Duration of Therapy Intervention (OT)  30 day recert, update goals, quick DASH or lymphatic scale, kinesiotape, review self massage  -SC       User Key  (r) = Recorded By, (t) = Taken By, (c) = Cosigned By    Initials Name Provider Type    Yola Rodriguez, PT Physical Therapist             OP Exercises     Row Name 10/13/20 1000             Subjective Comments    Subjective Comments  I did see Dr. Moreno. Off the AI. To start Tamoxifen in Nov 2020. I have only been off the meds about a week. I feel the same but starting yesterday evening I noticed the left breast was really tight.   -SC         Subjective Pain    Able to rate subjective pain?  yes  -SC      Pre-Treatment Pain Level  2  -SC      Subjective Pain Comment  left shoulder/breast  -SC        User Key  (r) = Recorded By, (t) = Taken By, (c) = Cosigned By    Initials Name Provider Type    Yola Rodriguez PT Physical Therapist                      PT OP Goals     Row Name 10/13/20 1000          PT Short Term Goals    STG 1  Patient independent and compliant with initial home exercise program focused on diaphragmatic breathing, range of motion, flexibility  to decrease edema and improve lymphatic flow for decreased edema and decreased risk of infection.   -SC     STG 1 Progress  Met  -SC     STG 2  Patient demonstrate proper awareness of What is Lymphedema and 18 Steps of Prevention for improved prevention, management, care of symptoms and ease of transition to self-care of condition.   -SC     STG 2 Progress  Met  -SC     STG 3  Patient demonstrate independence and compliance with proper skin care during/post radiation for improved mobility, decreased scar tissue, axillary cording and edema.  -SC     STG 3 Progress  Met  -SC     STG 4  Patient independent and compliant with breast mobilization techniques for improved mobility, skin care and lymphatic flow.  -SC     STG 4 Progress  Met  -SC     STG 5  Patient independent and compliant with daytime OTS prevention compression garments as indicated for decreased risk of lymphedema and infection and safety with transition of self-care of condition.   -SC     STG 5 Progress  Met  -SC        Long Term Goals    LTG Date to Achieve  12/14/20  -SC     LTG 1  Patient's bioimpedance score to remain between -10 and 6.5 for decreased risk of developing stage II post lumpectomy lymphedema syndrome left UE and to establish treatment for early intervention of condition if/when indicated.  -SC     LTG 1 Progress  Progressing baseline post op 06/18/2020 -4.7  -SC     LTG 1 Progress Comments  insurance does not cover at this time  -SC     LTG 2  Patient independent and compliant with advanced Home Exercise Program and self-care techniques for self-management of condition.   -SC     LTG 2 Progress  Progressing  -SC     LTG 2 Progress Comments  updated today  -SC     LTG 3  Patient able to wear fitted post lumpectomy/radiation bra with padding left (if indicated)  >/=6-8 hours for work schedule for improved compression to lateral flank/left breast.  -SC     LTG 3 Progress  Ongoing  -SC     LTG 3 Progress Comments  compliant with sports  bra, new onset of scar tissue left breast, may refer out  -SC     LTG 4  Patient transition to cardiovascular exercise program (walking) >/=150 to 180 mins/week with moderate intensity for improved heart health, weight loss, decreased risk of osteoporosis and improved phase angle/metabolic rate.  -SC     LTG 4 Progress  Met  -[facility] 5  Patient score </=25% on Quick DASH for improved functional use of L UE home, community, and sleep  -[facility] 5 Progress  Met initial evaluation 50%  -[facility] 6  Patient with negative impingement testing left shoulder for improved mobility, posture and decreased pain with ADLs and home activities  [facility]  Met  -[facility] 6 Progress Comments  hypomobility due to myofascial issues, negative impingement noted today  -SC        Time Calculation    PT Goal Re-Cert Due Date  12/14/20  -SC       User Key  (r) = Recorded By, (t) = Taken By, (c) = Cosigned By    Initials Name Provider Type    SC Yola Valenzuela, PT Physical Therapist          Therapy Education  Education Details: lumpectomy scar massage, self breast lymphatic massage, kinesiotape, swell spot, bra fitting, post radiation skin care, continuation of care, change of AI to Tamoxifen  Given: HEP, Symptoms/condition management, Pain management, Posture/body mechanics, Mobility training, Edema management, Other (comment)  Program: New  How Provided: Verbal, Demonstration, Written  Provided to: Patient  Level of Understanding: Teach back education performed, Verbalized, Demonstrated              Time Calculation:   Start Time: 1003  Stop Time: 1043  Time Calculation (min): 40 min   Therapy Charges for Today     Code Description Service Date Service Provider Modifiers Qty    [id number]  PT MANUAL THERAPY EA 15 MIN 10/13/2020 Yola Valenzuela PT GP 3                    Yola Broderick PT  10/13/2020

## 2020-10-22 ENCOUNTER — HOSPITAL ENCOUNTER (OUTPATIENT)
Dept: PHYSICAL THERAPY | Facility: HOSPITAL | Age: 60
Setting detail: THERAPIES SERIES
Discharge: HOME | End: 2020-10-22

## 2020-10-22 DIAGNOSIS — Z91.89 AT RISK FOR LYMPHEDEMA: ICD-10-CM

## 2020-10-22 DIAGNOSIS — N64.89 BREAST EDEMA: ICD-10-CM

## 2020-10-22 DIAGNOSIS — M25.612 DECREASED RANGE OF MOTION OF LEFT SHOULDER: ICD-10-CM

## 2020-10-22 DIAGNOSIS — C50.812 MALIGNANT NEOPLASM OF OVERLAPPING SITES OF LEFT BREAST IN FEMALE, ESTROGEN RECEPTOR POSITIVE (HCC): Primary | ICD-10-CM

## 2020-10-22 DIAGNOSIS — M79.622 AXILLARY PAIN, LEFT: ICD-10-CM

## 2020-10-22 DIAGNOSIS — L90.5 SCAR TISSUE: ICD-10-CM

## 2020-10-22 DIAGNOSIS — Z98.890 STATUS POST LEFT BREAST LUMPECTOMY: ICD-10-CM

## 2020-10-22 DIAGNOSIS — Z17.0 MALIGNANT NEOPLASM OF OVERLAPPING SITES OF LEFT BREAST IN FEMALE, ESTROGEN RECEPTOR POSITIVE (HCC): Primary | ICD-10-CM

## 2020-10-22 DIAGNOSIS — M75.42 SHOULDER IMPINGEMENT SYNDROME, LEFT: ICD-10-CM

## 2020-10-22 PROCEDURE — 97140 MANUAL THERAPY 1/> REGIONS: CPT | Performed by: PHYSICAL THERAPIST

## 2020-10-22 PROCEDURE — 97110 THERAPEUTIC EXERCISES: CPT | Performed by: PHYSICAL THERAPIST

## 2020-10-23 NOTE — THERAPY TREATMENT NOTE
Outpatient Physical Therapy Lymphedema Progress Note  [facility]     Patient Name: Richa Dolan  : 1960  MRN: [id number]  Today's Date: 10/23/2020        Visit Date: 10/22/2020    Visit Dx:    ICD-10-CM ICD-9-CM   1. Malignant neoplasm of overlapping sites of left breast in female, estrogen receptor positive (CMS/HCC)  C50.812 174.8    Z17.0 V86.0   2. Status post left breast lumpectomy  Z98.890 V45.89   3. At risk for lymphedema  Z91.89 V49.89   4. Decreased range of motion of left shoulder  M25.612 719.51   5. Shoulder impingement syndrome, left  M75.42 726.2   6. Axillary pain, left  M79.622 729.5   7. Breast edema  N64.89 611.89   8. Scar tissue  L90.5 709.2       Patient Active Problem List   Diagnosis   • Abnormal liver function tests   • Hypertension   • Insomnia   • Knee pain   • Leukopenia   • Acute right-sided low back pain without sciatica   • Knee osteoarthritis   • Osteoporosis   • Arthralgia of multiple joints   • Seasonal allergic rhinitis   • Thrombocytopenia (CMS/HCC)   • Bone cyst   • Chronic pain of right knee   • Acquired immunocompromised state (CMS/HCC)   • Bruises easily   • Loss of hair   • Anxiety   • Elevated liver enzymes   • GERD (gastroesophageal reflux disease)   • HIV positive long-term non-progressor (CMS/HCC)   • Hypercholesterolemia   • Lupus anticoagulant positive   • Neck pain, chronic   • Chemotherapy-induced neutropenia (CMS/HCC)   • Tear of medial meniscus of right knee, current   • Chondromalacia, knee   • ICH (intracerebral hemorrhage) (CMS/HCC)   • Stomach discomfort   • Abnormal bruising   • Alopecia   • Headache   • Intractable chronic migraine without aura and without status migrainosus   • Osteoarthritis of right knee   • Need for vaccination   • Mild episode of recurrent major depressive disorder (CMS/HCC)   • Ingrown nail of great toe of right foot   • Left wrist pain   • Breast cancer screening   • Malignant neoplasm of overlapping sites of left  breast in female, estrogen receptor positive (CMS/HCC)   • History of stroke   • Arthritis   • Family history of breast cancer   • Encounter for fitting and adjustment of vascular catheter   • Epistaxis   • Chemotherapy-induced thrombocytopenia   • Anemia associated with chemotherapy   • Substance or medication-induced sleep disorder, 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CMS/HCC)   • Body aches   • Neuropathy   • History of left breast cancer   • Other fatigue   • HIV infection (CMS/HCC)        Lymphedema     Row Name 10/22/20 0947             Subjective Pain    Able to rate subjective pain?  yes  -SC      Pre-Treatment Pain Level  5  -SC      Subjective Pain Comment  lef shoulder/breast/left arm 8/10 at worse, take medication to assist  -SC         Lymphedema Assessment    Lymphedema Classification  LUE:;at risk/stage 0;secondary;Other: left breast, at risk  -SC      Lymphedema Cancer Related Sx  left;lumpectomy;sentinel node biopsy;axillary dissection;reconstructive  -SC      Lymphedema Surgery Comments  Port 12/02/2019; L lump/AD 05/28/2020  -SC      Lymph Nodes Removed #  13  -SC      Positive Lymph Nodes #  10  -SC      Chemo Received  yes  -SC      Chemo Treatments #/Timeframe  NAC Dec 2019 through April 2020, AC/Taxol  -SC      Radiation Therapy Received  yes  -SC      Radiation Treatments #/Timeframe  completed 08/18/2020  -SC      Infections or Cellulitis?  no  -SC      Lymphedema Assessment Comments  possible stage 1 left breast, high risk left UE  -SC         Posture/Observations    Alignment Options  Forward head;Cervical lordosis;Thoracic kyphosis;Rounded shoulders;Scapular elevation;Scapular winging;Scoliosis;Lumbar lordosis;Genu valgus;Foot pronation  -SC      Forward Head  Mild;Increased;Sitting posture  -SC      Cervical Lordosis  Normal  -SC      Thoracic Kyphosis  Mild;Increased;Sitting posture  -SC      Rounded Shoulders  Bilateral:;Mild;Increased;Sitting posture  -SC      Scapular Elevation   Right:;Mild;Increased;Elevated;Sitting posture  -SC      Scapular winging  Bilateral:;Normal  -SC      Scoliosis  Normal;Standing posture  -SC      Lumbar lordosis  Mild;Decreased;Sitting posture  -SC      Genu valgus  Bilateral:;Normal;Standing posture  -SC      Foot pronation  Bilateral:;Normal;Standing posture  -SC      Observations  Incision healing  -SC         Lymphedema Edema Assessment    Edema Assessment Comment  mild watery edema left medial breast, thickening of skin and scar tissue at lumpectomy site left breast, fibrosis inferior breast, does soften with manual techniques  -SC         Skin Changes/Observations    Skin Observations Comment  mild post radiation skin changes, moderate fibrosis left breast with marked limited mobility lateral region, moderate scar tissue at lat flap and lumpectomy region, very minimal at axillary incision  -SC         Lymphedema Sensation    Lymphedema Sensation Tests  light touch  -SC      Lymphedema Light Touch  LUE:;mild impairment;moderate impairment  -SC         Lymphedema Measurements    Measurement Type(s)  --  -SC      Quick Girth Areas  --  -SC         Manual Lymphatic Drainage    Manual Lymphatic Drainage  initial sequence;opened regional lymph nodes;opened anastamoses;extremity treatment  -SC      Initial Sequence  supraclavicular;shoulder collectors;abdomen;diaphragmatic breathing  -SC      Supraclavicular  right;left  -SC      Shoulder Collectors  right;left  -SC      Abdomen  superficial  -SC      Opened Regional Lymph Nodes  axillary;inguinal;ribs  -SC      Axillary  right;left  -SC      Inguinal  left  -SC      Opened Anastamoses  anterior axillo-axillary;axillo-inguinal  -SC      Axillo-Inguinal  left  -SC      Extremity Treatment  simple/brief MLD;MLD to proximal limb only  -SC      Simple/Brief MLD  left  -SC      MLD to Proximal Limb Only  left  -SC      Manual Lymphatic Drainage Comments  left breast MLD, reviewed self massage techniques  -SC       Manual Therapy  MLD left breast, scar massage lumpectomy and lat flap incision, myofascial release left pec, anterior shoulder, medial arm, axilla, lateral flank, subscapularis release, AP and inferior joint mobs L GHJ, lateral distractions, inferior oscillations, PROM all planes, kinesiotape lift left breast, scar release left lumpectomy, and Y strip left medial breast, education of use and wear time and removal if indicated  -SC         Compression/Skin Care    Compression/Skin Care Comments  education to return to use of OTS compression for a couple days to determine progress, education of swell spot, to order  -SC         L-Dex Bioimpedence Screening    L-Dex Measurement Extremity  -- insurance does not cover at this time  -SC      L-Dex Patient Position  --  -SC      L-Dex UE Dominate Side  --  -SC      L-Dex UE At Risk Side  --  -SC      L-Dex UE Pre Surgical Value  --  -SC      L-Dex UE Baseline Score  --  -SC      Skeletal Muscle Mass (%)  --  -SC      Fat Mass (%)  --  -SC      Hy-dex  --  -SC        User Key  (r) = Recorded By, (t) = Taken By, (c) = Cosigned By    Initials Name Provider Type    SC Yola Valenzuela PT Physical Therapist            PT Ortho     Row Name 10/22/20 0724       Subjective Comments    Subjective Comments  My hip and joints are feeling better since being off the AI. I am suppose to start Tamoxifen 11/03 I think. I am nervous. My left arm has been so tight and the breast does feel firmer.   -SC       Precautions and Contraindications    Precautions/Limitations  other (see comments)  -SC    Precautions  R mediport  -SC    Contraindications  L AD, no IV/BP L UE, no modalities  -SC       Shoulder Girdle Accessory Motions    Shoulder Girdle Accessory Motions Tested?  Yes  -SC    Posterior glide of humerus  Left:;Hypomobile;Left pain  -SC    Inferior glide of humerus  Left:;Hypomobile;Left pain  -SC    Lateral distraction  Left:;Hypomobile;Left pain  -SC    Inferior glide of scapula   Left:;Hypomobile  -SC    Retraction mobility of scapula  Left:;Hypomobile  -SC    Upward rotation mobility of scapula  Left:;Hypomobile;Left pain  -SC       Shoulder Impingement/Rotator Cuff Special Tests    Zaidi-Elvin Test (RC Lesion vs. Bursitis)  Left:;Negative  -SC    Neer Impingement Test (RC Lesion vs. Bursitis)  Left:;Positive  -SC    Full Can Test (RC Lesion)  Left:;Negative  -SC    Empty Can Test (RC Lesion)  Left:;Positive  -SC    Drop Arm Test (Full Thickness RC Lesion)  Left:;Negative  -SC    Internal Impingement Sign  Left:;Positive  -SC    Lift-Off Test (Subscapularis Lesion)  Left:;Negative increased pain and dec mobility with movement  -SC       Shoulder Girdle Palpation    Long Head of Biceps  Left:;Tender;Guarded/taut;Swollen  -SC    Deltoid  Left:;Tender;Guarded/taut  -SC    Subscapularis  Left:;Tender;Guarded/taut;Trigger point  -SC    Infraspinatus  Left:;Tender;Guarded/taut;Trigger point  -SC    Pect Minor  Left:;Tender;Guarded/taut;Trigger point  -SC    Upper Trap  Left:;Tender;Guarded/taut;Trigger point  -SC    Levator Scapula  Left:;Tender;Guarded/taut;Trigger point  -SC    Rhomboid  Left:;Atrophied  -SC    Lower Trap  Left:;Atrophied  -SC       General ROM    GENERAL ROM COMMENTS  R UE WNLs, L elbow, hand/wrist WNL, PROM left shoulder WNLs all planes, AAROM left shoulder flexion after session WNLs  -SC       MMT (Manual Muscle Testing)    General MMT Comments  R UE WNLs all planes, L elbow 4+/5, left shoulder 4/5, left scapula 2+-3/5  -SC       Gait/Stairs (Locomotion)    Comment (Gait/Stairs)  normal  -SC      User Key  (r) = Recorded By, (t) = Taken By, (c) = Cosigned By    Initials Name Provider Type    Yola Rodriguez PT Physical Therapist                  PT Assessment/Plan     Row Name 10/22/20 6173          PT Assessment    Functional Limitations  Limitation in home management;Limitations in community activities;Performance in leisure activities;Performance in sport  activities  -SC     Impairments  Endurance;Impaired flexibility;Impaired lymphatic circulation;Impaired muscle endurance;Integumentary integrity;Joint mobility;Muscle strength;Pain;Peripheral nerve integrity;Poor body mechanics;Posture;Range of motion;Sensation  -SC     Assessment Comments  Mrs. Dolan is a pleasant 60 year old female, diagnosed with left breast cancer in 2019, mediport placement right 12/02/2020, initiated neoadjuvant chemotherapy with oncologist Dr. Moreno Dec 2019, completed April 2020, AC/Taxol, s/p left lumpectomy with axillary dissection 10/13 L ALN (+) with flap reconstruction closure 05/28/2020 performed by surgeons Dr. Forman and Radha, returned to surgeon last week, returns to plastics this week. Completed adjuvant radiation in August 2020. Mild post radiation skin changes. Resolution of axillary cording, however persisting left shoulder impingement and altered joint mobility left GHJ with poor scapular stabilization due to surgery, lat flap, radiation. Is at high risk of post lumpectomy lymphedema syndrome left UE/breast due to surgery, lymph node removal, lymph node involvement, radiation, age, and additional medical issues. Post radiation, weaned from compression bioimpedance was stable and negative for left UE L-Dex -3.5, stable in Sept 2020. Insurance no longer covers bioimpedance at this time. She returned last session with increased scar tissue to left breast and returns today with positive mild lymphedema left breast with increased fibrosis and scar tissue to left breast as well. High risk of lymphedema left UE. Initiated kinesiotape. Education of swell spot. To refer for bra fitting. Will monitor closely.  -SC        PT Plan    Predicted Duration of Therapy Intervention (PT)  12 sessions  -SC     PT Plan Comments  continue MLD, bakari spot, kinesiotape, referral for bra fitting, elevation, progression of HEP, bioimpedance, Quick DASH, circumferential measurements left UE   -SC       User Key  (r) = Recorded By, (t) = Taken By, (c) = Cosigned By    Initials Name Provider Type    Yola Rodriguez PT Physical Therapist             OP Exercises     Row Name 10/22/20 0947             Subjective Comments    Subjective Comments  My hip and joints are feeling better since being off the AI. I am suppose to start Tamoxifen 11/03 I think. I am nervous. My left arm has been so tight and the breast does feel firmer.   -SC         Subjective Pain    Able to rate subjective pain?  yes  -SC      Pre-Treatment Pain Level  5  -SC      Subjective Pain Comment  lef shoulder/breast/left arm 8/10 at worse, take medication to assist  -SC        User Key  (r) = Recorded By, (t) = Taken By, (c) = Cosigned By    Initials Name Provider Type    Yola Rodriguez PT Physical Therapist                      PT OP Goals     Row Name 10/22/20 0947          PT Short Term Goals    STG 1  Patient independent and compliant with initial home exercise program focused on diaphragmatic breathing, range of motion, flexibility to decrease edema and improve lymphatic flow for decreased edema and decreased risk of infection.   -SC     STG 1 Progress  Met  -SC     STG 2  Patient demonstrate proper awareness of What is Lymphedema and 18 Steps of Prevention for improved prevention, management, care of symptoms and ease of transition to self-care of condition.   -SC     STG 2 Progress  Met  -SC     STG 3  Patient demonstrate independence and compliance with proper skin care during/post radiation for improved mobility, decreased scar tissue, axillary cording and edema.  -SC     STG 3 Progress  Met  -SC     STG 4  Patient independent and compliant with breast mobilization techniques for improved mobility, skin care and lymphatic flow.  -SC     STG 4 Progress  Met  -SC     STG 5  Patient independent and compliant with daytime OTS prevention compression garments as indicated for decreased risk of lymphedema and infection  and safety with transition of self-care of condition.   -SC     STG 5 Progress  Met  -SC        Long Term Goals    LTG Date to Achieve  12/14/20  -SC     LTG 1  Patient's bioimpedance score to remain between -10 and 6.5 for decreased risk of developing stage II post lumpectomy lymphedema syndrome left UE and to establish treatment for early intervention of condition if/when indicated.  -SC     LTG 1 Progress  Progressing baseline post op 06/18/2020 -4.7  -SC     LTG 1 Progress Comments  insurance does not cover at this time  -SC     LTG 2  Patient independent and compliant with advanced Home Exercise Program and self-care techniques for self-management of condition.   -SC     LTG 2 Progress  Progressing  -SC     LTG 2 Progress Comments  progressed and modified today  -SC     LTG 3  Patient able to wear fitted post lumpectomy/radiation bra with padding left (if indicated)  >/=6-8 hours for work schedule for improved compression to lateral flank/left breast.  -SC     LTG 3 Progress  Ongoing  -SC     LTG 3 Progress Comments  to refer next treatment session pending progress with kinesiotape and swell spot  -SC     LTG 4  Patient transition to cardiovascular exercise program (walking) >/=150 to 180 mins/week with moderate intensity for improved heart health, weight loss, decreased risk of osteoporosis and improved phase angle/metabolic rate.  -SC     LTG 4 Progress  Met  -SC     LTG 5  Patient score </=25% on Quick DASH for improved functional use of L UE home, community, and sleep  -SC     LTG 5 Progress  Met initial evaluation 50%  -SC     LTG 6  Patient with negative impingement testing left shoulder for improved mobility, posture and decreased pain with ADLs and home activities  -SC     LTG 6 Progress  Met  -SC     LTG 7  Patient with minimal fibrosis left breast for decreased risk of stage II lymphedema left and progression of decrease/infections  -SC     LTG 7 Progress  New  -SC        Time Calculation    PT Goal  Re-Cert Due Date  12/14/20  -SC       User Key  (r) = Recorded By, (t) = Taken By, (c) = Cosigned By    Initials Name Provider Type    Yola Rodriguez, PT Physical Therapist          Therapy Education  Education Details: lymphedema of breast, reversibility, treatment, compression, kinesiotape, self massage, HEP/stretching, elevation positions for left UE in seated, standing, and bed  Given: HEP, Symptoms/condition management, Pain management, Posture/body mechanics, Mobility training, Edema management, Other (comment)  Program: New  How Provided: Verbal, Demonstration, Written  Provided to: Patient  Level of Understanding: Teach back education performed, Verbalized, Demonstrated              Time Calculation:   Start Time: 0947  Stop Time: 1028  Time Calculation (min): 41 min   Therapy Charges for Today     Code Description Service Date Service Provider Modifiers Qty    28246707532  PT MANUAL THERAPY EA 15 MIN 10/22/2020 Yola Valenzuela, PT GP 2    [id number]  PT THER PROC EA 15 MIN 10/22/2020 Yola Valenzuela, PT GP 1                    Yola Broderick, YOLANDA  10/23/2020

## 2020-11-03 ENCOUNTER — CLINICAL SUPPORT (OUTPATIENT)
Dept: ONCOLOGY | Facility: HOSPITAL | Age: 60
End: 2020-11-03

## 2020-11-03 ENCOUNTER — INFUSION (OUTPATIENT)
Dept: ONCOLOGY | Facility: HOSPITAL | Age: 60
End: 2020-11-03

## 2020-11-03 DIAGNOSIS — Z17.0 MALIGNANT NEOPLASM OF OVERLAPPING SITES OF LEFT BREAST IN FEMALE, ESTROGEN RECEPTOR POSITIVE (HCC): Primary | ICD-10-CM

## 2020-11-03 DIAGNOSIS — C50.812 MALIGNANT NEOPLASM OF OVERLAPPING SITES OF LEFT BREAST IN FEMALE, ESTROGEN RECEPTOR POSITIVE (HCC): Primary | ICD-10-CM

## 2020-11-03 DIAGNOSIS — Z45.2 ENCOUNTER FOR FITTING AND ADJUSTMENT OF VASCULAR CATHETER: ICD-10-CM

## 2020-11-03 LAB
BASOPHILS # BLD AUTO: 0.01 10*3/MM3 (ref 0–0.2)
BASOPHILS NFR BLD AUTO: 0.2 % (ref 0–1.5)
DEPRECATED RDW RBC AUTO: 48 FL (ref 37–54)
EOSINOPHIL # BLD AUTO: 0.05 10*3/MM3 (ref 0–0.4)
EOSINOPHIL NFR BLD AUTO: 1.1 % (ref 0.3–6.2)
ERYTHROCYTE [DISTWIDTH] IN BLOOD BY AUTOMATED COUNT: 13 % (ref 12.3–15.4)
HCT VFR BLD AUTO: 37.7 % (ref 34–46.6)
HGB BLD-MCNC: 12.6 G/DL (ref 12–15.9)
IMM GRANULOCYTES # BLD AUTO: 0.01 10*3/MM3 (ref 0–0.05)
IMM GRANULOCYTES NFR BLD AUTO: 0.2 % (ref 0–0.5)
LYMPHOCYTES # BLD AUTO: 1.32 10*3/MM3 (ref 0.7–3.1)
LYMPHOCYTES NFR BLD AUTO: 28.6 % (ref 19.6–45.3)
MCH RBC QN AUTO: 33.5 PG (ref 26.6–33)
MCHC RBC AUTO-ENTMCNC: 33.4 G/DL (ref 31.5–35.7)
MCV RBC AUTO: 100.3 FL (ref 79–97)
MONOCYTES # BLD AUTO: 0.55 10*3/MM3 (ref 0.1–0.9)
MONOCYTES NFR BLD AUTO: 11.9 % (ref 5–12)
NEUTROPHILS NFR BLD AUTO: 2.67 10*3/MM3 (ref 1.7–7)
NEUTROPHILS NFR BLD AUTO: 58 % (ref 42.7–76)
NRBC BLD AUTO-RTO: 0 /100 WBC (ref 0–0.2)
PLATELET # BLD AUTO: 242 10*3/MM3 (ref 140–450)
PMV BLD AUTO: 9.5 FL (ref 6–12)
RBC # BLD AUTO: 3.76 10*6/MM3 (ref 3.77–5.28)
WBC # BLD AUTO: 4.61 10*3/MM3 (ref 3.4–10.8)

## 2020-11-03 PROCEDURE — 36591 DRAW BLOOD OFF VENOUS DEVICE: CPT

## 2020-11-03 PROCEDURE — 25010000003 HEPARIN LOCK FLUSH PER 10 UNITS: Performed by: INTERNAL MEDICINE

## 2020-11-03 PROCEDURE — 85025 COMPLETE CBC W/AUTO DIFF WBC: CPT

## 2020-11-03 RX ORDER — HEPARIN SODIUM (PORCINE) LOCK FLUSH IV SOLN 100 UNIT/ML 100 UNIT/ML
500 SOLUTION INTRAVENOUS AS NEEDED
Status: DISCONTINUED | OUTPATIENT
Start: 2020-11-03 | End: 2020-11-03 | Stop reason: HOSPADM

## 2020-11-03 RX ORDER — SODIUM CHLORIDE 0.9 % (FLUSH) 0.9 %
10 SYRINGE (ML) INJECTION AS NEEDED
Status: CANCELLED | OUTPATIENT
Start: 2020-11-03

## 2020-11-03 RX ORDER — HEPARIN SODIUM (PORCINE) LOCK FLUSH IV SOLN 100 UNIT/ML 100 UNIT/ML
500 SOLUTION INTRAVENOUS AS NEEDED
Status: CANCELLED | OUTPATIENT
Start: 2020-11-03

## 2020-11-03 RX ORDER — SODIUM CHLORIDE 0.9 % (FLUSH) 0.9 %
10 SYRINGE (ML) INJECTION AS NEEDED
Status: DISCONTINUED | OUTPATIENT
Start: 2020-11-03 | End: 2020-11-03 | Stop reason: HOSPADM

## 2020-11-03 RX ADMIN — Medication 500 UNITS: at 13:21

## 2020-11-03 RX ADMIN — SODIUM CHLORIDE, PRESERVATIVE FREE 10 ML: 5 INJECTION INTRAVENOUS at 13:21

## 2020-11-03 NOTE — NURSING NOTE
Pt here for Rn review today. CBC reviewed with pt. Counts are stable at this time. Pt is feeling well, other than some fatigue. Pt states she is starting a new oral chemo today called Tamoxifen because the letrozole was causing too much joint pain. Pt has no other c/o at this time. Copy of labs given to pt. F/u appts reviewed w/ pt. Pt v/u.            Lab Results   Component Value Date    WBC 4.61 11/03/2020    HGB 12.6 11/03/2020    HCT 37.7 11/03/2020    .3 (H) 11/03/2020     11/03/2020

## 2020-11-05 ENCOUNTER — HOSPITAL ENCOUNTER (OUTPATIENT)
Dept: PHYSICAL THERAPY | Facility: HOSPITAL | Age: 60
Setting detail: THERAPIES SERIES
Discharge: HOME | End: 2020-11-05

## 2020-11-05 DIAGNOSIS — L90.5 SCAR TISSUE: ICD-10-CM

## 2020-11-05 DIAGNOSIS — N64.89 BREAST EDEMA: ICD-10-CM

## 2020-11-05 DIAGNOSIS — Z17.0 MALIGNANT NEOPLASM OF OVERLAPPING SITES OF LEFT BREAST IN FEMALE, ESTROGEN RECEPTOR POSITIVE (HCC): Primary | ICD-10-CM

## 2020-11-05 DIAGNOSIS — Z91.89 AT RISK FOR LYMPHEDEMA: ICD-10-CM

## 2020-11-05 DIAGNOSIS — M79.622 AXILLARY PAIN, LEFT: ICD-10-CM

## 2020-11-05 DIAGNOSIS — M25.612 DECREASED RANGE OF MOTION OF LEFT SHOULDER: ICD-10-CM

## 2020-11-05 DIAGNOSIS — Z98.890 STATUS POST LEFT BREAST LUMPECTOMY: ICD-10-CM

## 2020-11-05 DIAGNOSIS — C50.812 MALIGNANT NEOPLASM OF OVERLAPPING SITES OF LEFT BREAST IN FEMALE, ESTROGEN RECEPTOR POSITIVE (HCC): Primary | ICD-10-CM

## 2020-11-05 DIAGNOSIS — M75.42 SHOULDER IMPINGEMENT SYNDROME, LEFT: ICD-10-CM

## 2020-11-05 PROCEDURE — 97140 MANUAL THERAPY 1/> REGIONS: CPT | Performed by: PHYSICAL THERAPIST

## 2020-11-05 NOTE — THERAPY TREATMENT NOTE
Outpatient Physical Therapy Lymphedema Treatment Note  Psychiatric     Patient Name: Richa Dolan  : 1960  MRN: [id number]  Today's Date: 2020        Visit Date: 2020    Visit Dx:    ICD-10-CM ICD-9-CM   1. Malignant neoplasm of overlapping sites of left breast in female, estrogen receptor positive (CMS/HCC)  C50.812 174.8    Z17.0 V86.0   2. Status post left breast lumpectomy  Z98.890 V45.89   3. At risk for lymphedema  Z91.89 V49.89   4. Decreased range of motion of left shoulder  M25.612 719.51   5. Shoulder impingement syndrome, left  M75.42 726.2   6. Axillary pain, left  M79.622 729.5   7. Breast edema  N64.89 611.89   8. Scar tissue  L90.5 709.2       Patient Active Problem List   Diagnosis   • Abnormal liver function tests   • Hypertension   • Insomnia   • Knee pain   • Leukopenia   • Acute right-sided low back pain without sciatica   • Knee osteoarthritis   • Osteoporosis   • Arthralgia of multiple joints   • Seasonal allergic rhinitis   • Thrombocytopenia (CMS/HCC)   • Bone cyst   • Chronic pain of right knee   • Acquired immunocompromised state (CMS/HCC)   • Bruises easily   • Loss of hair   • Anxiety   • Elevated liver enzymes   • GERD (gastroesophageal reflux disease)   • HIV positive long-term non-progressor (CMS/HCC)   • Hypercholesterolemia   • Lupus anticoagulant positive   • Neck pain, chronic   • Chemotherapy-induced neutropenia (CMS/HCC)   • Tear of medial meniscus of right knee, current   • Chondromalacia, knee   • ICH (intracerebral hemorrhage) (CMS/HCC)   • Stomach discomfort   • Abnormal bruising   • Alopecia   • Headache   • Intractable chronic migraine without aura and without status migrainosus   • Osteoarthritis of right knee   • Need for vaccination   • Mild episode of recurrent major depressive disorder (CMS/HCC)   • Ingrown nail of great toe of right foot   • Left wrist pain   • Breast cancer screening   • Malignant neoplasm of overlapping sites of left  breast in female, estrogen receptor positive (CMS/HCC)   • History of stroke   • Arthritis   • Family history of breast cancer   • Encounter for fitting and adjustment of vascular catheter   • Epistaxis   • Chemotherapy-induced thrombocytopenia   • Anemia associated with chemotherapy   • Substance or medication-induced sleep disorder, 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CMS/HCC)   • Body aches   • Neuropathy   • History of left breast cancer   • Other fatigue   • HIV infection (CMS/HCC)        Lymphedema     Row Name 11/05/20 1005             Subjective Pain    Able to rate subjective pain?  yes  -SC      Pre-Treatment Pain Level  2  -SC         Subjective Comments    Subjective Comments  I am feeling much better with my joint pain being off the AI. I started Tamoxifen 2 days ago. I go back to the oncologist 12/1 for one month assessment and port flush. My breast is still thick. No pain in the breast. I have been doing my massage. I wasn't sure if the tape did anything but it didn't hurt. I still have the band feeling in the arm.   -SC         Manual Lymphatic Drainage    Manual Lymphatic Drainage  initial sequence;opened regional lymph nodes;opened anastamoses;extremity treatment  -SC      Initial Sequence  supraclavicular;shoulder collectors;abdomen;diaphragmatic breathing  -SC      Supraclavicular  right;left  -SC      Shoulder Collectors  right;left  -SC      Abdomen  superficial  -SC      Opened Regional Lymph Nodes  axillary;inguinal;ribs  -SC      Axillary  right;left  -SC      Inguinal  left  -SC      Opened Anastamoses  anterior axillo-axillary;axillo-inguinal  -SC      Axillo-Inguinal  left  -SC      Extremity Treatment  simple/brief MLD;MLD to proximal limb only  -SC      Simple/Brief MLD  left  -SC      MLD to Proximal Limb Only  left  -SC      Manual Lymphatic Drainage Comments  left breast MLD  -SC      Manual Therapy  MLD left breast, scar massage lumpectomy and lat flap incision, myofascial release left pec, anterior  shoulder, medial arm, axilla, lateral flank, subscapularis release, AP and inferior joint mobs L GHJ, lateral distractions, inferior oscillations, PROM all planes, kinesiotape lift left breast, scar release left lumpectomy, and Y strip left medial breast, education of use and wear time and removal if indicated  -SC         Compression/Skin Care    Compression/Skin Care Comments  education of swell spot, link sent for patient to order  -SC        User Key  (r) = Recorded By, (t) = Taken By, (c) = Cosigned By    Initials Name Provider Type    Yola Rodriguez, PT Physical Therapist                        PT Assessment/Plan     Row Name 11/05/20 1005          PT Assessment    Assessment Comments  positive lymphedema left breast  -SC        PT Plan    PT Plan Comments  assess kinesiotape, instruct swell spot, referral for bra fitting if indicated, elevation, progression of HEP, bioimpedance, Quick dASH, circumferential measurements left UE, continue MLD  -SC       User Key  (r) = Recorded By, (t) = Taken By, (c) = Cosigned By    Initials Name Provider Type    Yola Rodriguez, PT Physical Therapist             OP Exercises     Row Name 11/05/20 1005             Subjective Comments    Subjective Comments  I am feeling much better with my joint pain being off the AI. I started Tamoxifen 2 days ago. I go back to the oncologist 12/1 for one month assessment and port flush. My breast is still thick. No pain in the breast. I have been doing my massage. I wasn't sure if the tape did anything but it didn't hurt. I still have the band feeling in the arm.   -SC         Subjective Pain    Able to rate subjective pain?  yes  -SC      Pre-Treatment Pain Level  2  -SC        User Key  (r) = Recorded By, (t) = Taken By, (c) = Cosigned By    Initials Name Provider Type    Yola Rodriguez PT Physical Therapist                      PT OP Goals     Row Name 11/05/20 1005          PT Short Term Goals    STG 1  Patient  independent and compliant with initial home exercise program focused on diaphragmatic breathing, range of motion, flexibility to decrease edema and improve lymphatic flow for decreased edema and decreased risk of infection.   -SC     STG 1 Progress  Met  -SC     STG 2  Patient demonstrate proper awareness of What is Lymphedema and 18 Steps of Prevention for improved prevention, management, care of symptoms and ease of transition to self-care of condition.   -SC     STG 2 Progress  Met  -SC     STG 3  Patient demonstrate independence and compliance with proper skin care during/post radiation for improved mobility, decreased scar tissue, axillary cording and edema.  -SC     STG 3 Progress  Met  -SC     STG 4  Patient independent and compliant with breast mobilization techniques for improved mobility, skin care and lymphatic flow.  -SC     STG 4 Progress  Met  -SC     STG 5  Patient independent and compliant with daytime OTS prevention compression garments as indicated for decreased risk of lymphedema and infection and safety with transition of self-care of condition.   -SC     STG 5 Progress  Met  -SC        Long Term Goals    LTG Date to Achieve  12/14/20  -SC     LTG 1  Patient's bioimpedance score to remain between -10 and 6.5 for decreased risk of developing stage II post lumpectomy lymphedema syndrome left UE and to establish treatment for early intervention of condition if/when indicated.  -SC     LTG 1 Progress  Progressing baseline post op 06/18/2020 -4.7  -SC     LTG 1 Progress Comments  insurance does not cover at this time however will encourage to reassess due to positive lymphedema left breast  -SC     LTG 2  Patient independent and compliant with advanced Home Exercise Program and self-care techniques for self-management of condition.   -SC     LTG 2 Progress  Progressing  -SC     LTG 2 Progress Comments  compliant with current program  -SC     LTG 3  Patient able to wear fitted post lumpectomy/radiation  bra with padding left (if indicated)  >/=6-8 hours for work schedule for improved compression to lateral flank/left breast.  -SC     LTG 3 Progress  Ongoing  -SC     LTG 3 Progress Comments  to discuss next session pending progress with kinesiotape and swell spot  -SC     LTG 4  Patient transition to cardiovascular exercise program (walking) >/=150 to 180 mins/week with moderate intensity for improved heart health, weight loss, decreased risk of osteoporosis and improved phase angle/metabolic rate.  -SC     LTG 4 Progress  [facility]  [facility]  Patient score </=25% on Quick DASH for improved functional use of L UE home, community, and sleep  -SC     LTG 5 Progress  Met initial evaluation 50%  -[facility] 6  Patient with negative impingement testing left shoulder for improved mobility, posture and decreased pain with ADLs and home activities  -[facility] 6 Progress  Met  -[facility] 7  Patient with minimal fibrosis left breast for decreased risk of stage II lymphedema left and progression of decrease/infections  -[facility] 7 Progress  Ongoing  -SC        Time Calculation    PT Goal Re-Cert Due Date  12/14/20  -SC       User Key  (r) = Recorded By, (t) = Taken By, (c) = Cosigned By    Initials Name Provider Type    Yola Rodriguez, PT Physical Therapist          Therapy Education  Education Details: nerve vs lymph node removal symptoms left UE, lymphedema left breast, swell spot, use of kinesiotape  Given: HEP, Symptoms/condition management, Pain management, Posture/body mechanics, Mobility training, Edema management, Other (comment)  Program: Progressed, Modified  How Provided: Verbal, Demonstration, Written  Provided to: Patient  Level of Understanding: Teach back education performed, Verbalized, Demonstrated              Time Calculation:   Start Time: 1005  Stop Time: 1035  Time Calculation (min): 30 min   Therapy Charges for Today     Code Description Service Date Service Provider Modifiers Qty     [id number]  PT MANUAL THERAPY EA 15 MIN 11/5/2020 Yola Valenzuela, PT GP 2                    Yola Broderick, PT  11/5/2020

## 2020-11-10 ENCOUNTER — HOSPITAL ENCOUNTER (OUTPATIENT)
Dept: PHYSICAL THERAPY | Facility: HOSPITAL | Age: 60
Setting detail: THERAPIES SERIES
Discharge: HOME | End: 2020-11-10

## 2020-11-10 DIAGNOSIS — Z17.0 MALIGNANT NEOPLASM OF OVERLAPPING SITES OF LEFT BREAST IN FEMALE, ESTROGEN RECEPTOR POSITIVE (HCC): Primary | ICD-10-CM

## 2020-11-10 DIAGNOSIS — N64.89 BREAST EDEMA: ICD-10-CM

## 2020-11-10 DIAGNOSIS — L90.5 SCAR TISSUE: ICD-10-CM

## 2020-11-10 DIAGNOSIS — M75.42 SHOULDER IMPINGEMENT SYNDROME, LEFT: ICD-10-CM

## 2020-11-10 DIAGNOSIS — M79.622 AXILLARY PAIN, LEFT: ICD-10-CM

## 2020-11-10 DIAGNOSIS — C50.812 MALIGNANT NEOPLASM OF OVERLAPPING SITES OF LEFT BREAST IN FEMALE, ESTROGEN RECEPTOR POSITIVE (HCC): Primary | ICD-10-CM

## 2020-11-10 DIAGNOSIS — M25.612 DECREASED RANGE OF MOTION OF LEFT SHOULDER: ICD-10-CM

## 2020-11-10 DIAGNOSIS — Z98.890 STATUS POST LEFT BREAST LUMPECTOMY: ICD-10-CM

## 2020-11-10 DIAGNOSIS — Z91.89 AT RISK FOR LYMPHEDEMA: ICD-10-CM

## 2020-11-10 PROCEDURE — 97140 MANUAL THERAPY 1/> REGIONS: CPT | Performed by: PHYSICAL THERAPIST

## 2020-11-10 NOTE — THERAPY TREATMENT NOTE
Outpatient Physical Therapy Lymphedema Treatment Note  [facility]     Patient Name: Richa Dolan  : 1960  MRN: [id number]  Today's Date: 11/10/2020        Visit Date: 11/10/2020    Visit Dx:    ICD-10-CM ICD-9-CM   1. Malignant neoplasm of overlapping sites of left breast in female, estrogen receptor positive (CMS/HCC)  C50.812 174.8    Z17.0 V86.0   2. Status post left breast lumpectomy  Z98.890 V45.89   3. At risk for lymphedema  Z91.89 V49.89   4. Decreased range of motion of left shoulder  M25.612 719.51   5. Shoulder impingement syndrome, left  M75.42 726.2   6. Axillary pain, left  M79.622 729.5   7. Breast edema  N64.89 611.89   8. Scar tissue  L90.5 709.2       Patient Active Problem List   Diagnosis   • Abnormal liver function tests   • Hypertension   • Insomnia   • Knee pain   • Leukopenia   • Acute right-sided low back pain without sciatica   • Knee osteoarthritis   • Osteoporosis   • Arthralgia of multiple joints   • Seasonal allergic rhinitis   • Thrombocytopenia (CMS/HCC)   • Bone cyst   • Chronic pain of right knee   • Acquired immunocompromised state (CMS/HCC)   • Bruises easily   • Loss of hair   • Anxiety   • Elevated liver enzymes   • GERD (gastroesophageal reflux disease)   • HIV positive long-term non-progressor (CMS/HCC)   • Hypercholesterolemia   • Lupus anticoagulant positive   • Neck pain, chronic   • Chemotherapy-induced neutropenia (CMS/HCC)   • Tear of medial meniscus of right knee, current   • Chondromalacia, knee   • ICH (intracerebral hemorrhage) (CMS/HCC)   • Stomach discomfort   • Abnormal bruising   • Alopecia   • Headache   • Intractable chronic migraine without aura and without status migrainosus   • Osteoarthritis of right knee   • Need for vaccination   • Mild episode of recurrent major depressive disorder (CMS/HCC)   • Ingrown nail of great toe of right foot   • Left wrist pain   • Breast cancer screening   • Malignant neoplasm of overlapping sites of left  breast in female, estrogen receptor positive (CMS/HCC)   • History of stroke   • Arthritis   • Family history of breast cancer   • Encounter for fitting and adjustment of vascular catheter   • Epistaxis   • Chemotherapy-induced thrombocytopenia   • Anemia associated with chemotherapy   • Substance or medication-induced sleep disorder, 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CMS/HCC)   • Body aches   • Neuropathy   • History of left breast cancer   • Other fatigue   • HIV infection (CMS/HCC)        Lymphedema     Row Name 11/10/20 1024             Subjective Pain    Able to rate subjective pain?  yes  -SC      Pre-Treatment Pain Level  0  -SC         Subjective Comments    Subjective Comments  I have been elevating my arm more and that does help the pain at night. I think the tape really helps. I am holding on buying the swell spot because I just don't know if I would use it.   -SC         Manual Lymphatic Drainage    Manual Lymphatic Drainage  initial sequence;opened regional lymph nodes;opened anastamoses;extremity treatment  -SC      Initial Sequence  supraclavicular;shoulder collectors;abdomen;diaphragmatic breathing  -SC      Supraclavicular  right;left  -SC      Shoulder Collectors  right;left  -SC      Abdomen  superficial  -SC      Opened Regional Lymph Nodes  axillary;inguinal;ribs  -SC      Axillary  right;left  -SC      Inguinal  left  -SC      Opened Anastamoses  anterior axillo-axillary;axillo-inguinal  -SC      Axillo-Inguinal  left  -SC      Extremity Treatment  simple/brief MLD;MLD to proximal limb only  -SC      Simple/Brief MLD  left  -SC      MLD to Proximal Limb Only  left  -SC      Manual Lymphatic Drainage Comments  left breast MLD  -SC      Manual Therapy  MLD left breast, scar massage lumpectomy and lat flap incision, myofascial release left pec, anterior shoulder, medial arm, axilla, lateral flank, subscapularis release, AP and inferior joint mobs L GHJ, lateral distractions, inferior oscillations, PROM all planes,  kinesiotape lift left breast, scar release left lumpectomy, and Y strip left medial breast, education of use and wear time and removal if indicated  -SC         Compression/Skin Care    Compression/Skin Care Comments  review of swell spot, to hold one week, possible encouragement of purchase next week pending progress  -SC        User Key  (r) = Recorded By, (t) = Taken By, (c) = Cosigned By    Initials Name Provider Type    Yola Rodriguez, PT Physical Therapist                        PT Assessment/Plan     Row Name 11/10/20 0274          PT Assessment    Assessment Comments  persisting skin thickening, post radiation skin changes, lymphedema left breast with mild impingement left shoulder and tightness posterior arm into axilla/lat flap reconstruction, responds quite well to manual and kinesiotape techniques. May benefit from swell spot, home pneumatic compression pump, and bra fitting pending progress. Patient not interested in products at this time due to limited results and compliance with OTS compression sleeve.  -SC        PT Plan    PT Plan Comments  reassess, determine need for bra fitting, swell spot, pump, bio/quick dash and circumferential due at next 30 day vs recert  -SC       User Key  (r) = Recorded By, (t) = Taken By, (c) = Cosigned By    Initials Name Provider Type    Yola Rodriguez, PT Physical Therapist             OP Exercises     Row Name 11/10/20 9652             Subjective Comments    Subjective Comments  I have been elevating my arm more and that does help the pain at night. I think the tape really helps. I am holding on buying the swell spot because I just don't know if I would use it.   -SC         Subjective Pain    Able to rate subjective pain?  yes  -SC      Pre-Treatment Pain Level  0  -SC        User Key  (r) = Recorded By, (t) = Taken By, (c) = Cosigned By    Initials Name Provider Type    Yola Rodriguez, PT Physical Therapist                          Therapy  Education  Education Details: swell spot, elevation, kinesiotape, continuation of care  Given: HEP, Symptoms/condition management, Pain management, Posture/body mechanics, Mobility training, Edema management, Other (comment)  Program: Reinforced  How Provided: Verbal  Provided to: Patient  Level of Understanding: Verbalized              Time Calculation:   Start Time: 1615  Stop Time: 1655  Time Calculation (min): 40 min   Therapy Charges for Today     Code Description Service Date Service Provider Modifiers Qty    [id number] HC PT MANUAL THERAPY EA 15 MIN 11/10/2020 Yola Valenzuela, PT GP 3                    Yola Broderick, PT  11/10/2020

## 2020-11-17 ENCOUNTER — HOSPITAL ENCOUNTER (OUTPATIENT)
Dept: PHYSICAL THERAPY | Facility: HOSPITAL | Age: 60
Setting detail: THERAPIES SERIES
Discharge: HOME | End: 2020-11-17

## 2020-11-17 DIAGNOSIS — C50.812 MALIGNANT NEOPLASM OF OVERLAPPING SITES OF LEFT BREAST IN FEMALE, ESTROGEN RECEPTOR POSITIVE (HCC): Primary | ICD-10-CM

## 2020-11-17 DIAGNOSIS — M25.612 DECREASED RANGE OF MOTION OF LEFT SHOULDER: ICD-10-CM

## 2020-11-17 DIAGNOSIS — Z91.89 AT RISK FOR LYMPHEDEMA: ICD-10-CM

## 2020-11-17 DIAGNOSIS — N64.89 BREAST EDEMA: ICD-10-CM

## 2020-11-17 DIAGNOSIS — L90.5 SCAR TISSUE: ICD-10-CM

## 2020-11-17 DIAGNOSIS — M79.622 AXILLARY PAIN, LEFT: ICD-10-CM

## 2020-11-17 DIAGNOSIS — Z17.0 MALIGNANT NEOPLASM OF OVERLAPPING SITES OF LEFT BREAST IN FEMALE, ESTROGEN RECEPTOR POSITIVE (HCC): Primary | ICD-10-CM

## 2020-11-17 DIAGNOSIS — Z98.890 STATUS POST LEFT BREAST LUMPECTOMY: ICD-10-CM

## 2020-11-17 DIAGNOSIS — M75.42 SHOULDER IMPINGEMENT SYNDROME, LEFT: ICD-10-CM

## 2020-11-17 DIAGNOSIS — Z85.3 HISTORY OF LEFT BREAST CANCER: ICD-10-CM

## 2020-11-17 PROCEDURE — 97140 MANUAL THERAPY 1/> REGIONS: CPT | Performed by: PHYSICAL THERAPIST

## 2020-11-17 PROCEDURE — 97110 THERAPEUTIC EXERCISES: CPT | Performed by: PHYSICAL THERAPIST

## 2020-11-17 NOTE — THERAPY TREATMENT NOTE
Outpatient Physical Therapy Lymphedema Treatment Note  [facility]     Patient Name: Richa Dolan  : 1960  MRN: [id number]  Today's Date: 2020        Visit Date: 2020    Visit Dx:    ICD-10-CM ICD-9-CM   1. Malignant neoplasm of overlapping sites of left breast in female, estrogen receptor positive (CMS/HCC)  C50.812 174.8    Z17.0 V86.0   2. Status post left breast lumpectomy  Z98.890 V45.89   3. At risk for lymphedema  Z91.89 V49.89   4. Shoulder impingement syndrome, left  M75.42 726.2   5. Decreased range of motion of left shoulder  M25.612 719.51   6. Axillary pain, left  M79.622 729.5   7. Breast edema  N64.89 611.89   8. Scar tissue  L90.5 709.2   9. History of left breast cancer  Z85.3 V10.3       Patient Active Problem List   Diagnosis   • Abnormal liver function tests   • Hypertension   • Insomnia   • Knee pain   • Leukopenia   • Acute right-sided low back pain without sciatica   • Knee osteoarthritis   • Osteoporosis   • Arthralgia of multiple joints   • Seasonal allergic rhinitis   • Thrombocytopenia (CMS/HCC)   • Bone cyst   • Chronic pain of right knee   • Acquired immunocompromised state (CMS/HCC)   • Bruises easily   • Loss of hair   • Anxiety   • Elevated liver enzymes   • GERD (gastroesophageal reflux disease)   • HIV positive long-term non-progressor (CMS/HCC)   • Hypercholesterolemia   • Lupus anticoagulant positive   • Neck pain, chronic   • Chemotherapy-induced neutropenia (CMS/HCC)   • Tear of medial meniscus of right knee, current   • Chondromalacia, knee   • ICH (intracerebral hemorrhage) (CMS/HCC)   • Stomach discomfort   • Abnormal bruising   • Alopecia   • Headache   • Intractable chronic migraine without aura and without status migrainosus   • Osteoarthritis of right knee   • Need for vaccination   • Mild episode of recurrent major depressive disorder (CMS/HCC)   • Ingrown nail of great toe of right foot   • Left wrist pain   • Breast cancer screening   •  Malignant neoplasm of overlapping sites of left breast in female, estrogen receptor positive (CMS/HCC)   • History of stroke   • Arthritis   • Family history of breast cancer   • Encounter for fitting and adjustment of vascular catheter   • Epistaxis   • Chemotherapy-induced thrombocytopenia   • Anemia associated with chemotherapy   • Substance or medication-induced sleep disorder, 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CMS/HCC)   • Body aches   • Neuropathy   • History of left breast cancer   • Other fatigue   • HIV infection (CMS/HCC)        Lymphedema     Row Name 11/17/20 7221             Subjective Pain    Able to rate subjective pain?  yes  -SC      Pre-Treatment Pain Level  0  -SC         Subjective Comments    Subjective Comments  Arm hurts about the same. Mostly around 730. Trying to prop up, elevated, massage, not sure if it helps but doesn't hurt. The tape does seem to help. If insurance covers the bra and swell spot then I am definitely interested. I have been doing my self massage to the left breast several times a day to try to keep it loose.   -SC         Manual Lymphatic Drainage    Manual Lymphatic Drainage  initial sequence;opened regional lymph nodes;opened anastamoses;extremity treatment  -SC      Initial Sequence  supraclavicular;shoulder collectors;abdomen;diaphragmatic breathing  -SC      Supraclavicular  right;left  -SC      Shoulder Collectors  right;left  -SC      Abdomen  superficial  -SC      Opened Regional Lymph Nodes  axillary;inguinal;ribs  -SC      Axillary  right;left  -SC      Inguinal  left  -SC      Opened Anastamoses  anterior axillo-axillary;axillo-inguinal  -SC      Axillo-Inguinal  left  -SC      Extremity Treatment  simple/brief MLD;MLD to proximal limb only  -SC      Simple/Brief MLD  left  -SC      MLD to Proximal Limb Only  left  -SC      Manual Lymphatic Drainage Comments  left breast MLD  -SC      Manual Therapy  MLD left breast, scar massage lumpectomy and lat flap incision, myofascial  release left pec, anterior shoulder, medial arm, axilla, lateral flank, subscapularis release, AP and inferior joint mobs L GHJ, lateral distractions, inferior oscillations, PROM all planes, kinesiotape lift left breast, scar release left lumpectomy, and Y strip left medial breast, education of use and wear time and removal if indicated  -SC         Compression/Skin Care    Compression/Skin Care Comments  fitting completed for compression bra and swell spot, order sent to [facility] for ordering process  -SC        User Key  (r) = Recorded By, (t) = Taken By, (c) = Cosigned By    Initials Name Provider Type    Yola Rodriguez, PT Physical Therapist                        PT Assessment/Plan     Row Name 11/17/20 7091          PT Assessment    Assessment Comments  orders sent for compression bra and swell spot to assist with post radiation fibrosis and scar tissue, mild lymphedema left breast persisting, responds well to MLD and kinesiotape, compliant with home program.  -SC        PT Plan    PT Plan Comments  bra, swell spot, pump if indicated, bio, quick DASH, circumferential at next 30 day reassess  -SC       User Key  (r) = Recorded By, (t) = Taken By, (c) = Cosigned By    Initials Name Provider Type    Yola Rodriguez, PT Physical Therapist             OP Exercises     Row Name 11/17/20 8074             Subjective Comments    Subjective Comments  Arm hurts about the same. Mostly around 730. Trying to prop up, elevated, massage, not sure if it helps but doesn't hurt. The tape does seem to help. If insurance covers the bra and swell spot then I am definitely interested. I have been doing my self massage to the left breast several times a day to try to keep it loose.   -SC         Subjective Pain    Able to rate subjective pain?  yes  -SC      Pre-Treatment Pain Level  0  -SC        User Key  (r) = Recorded By, (t) = Taken By, (c) = Cosigned By    Initials Name Provider Type    Yola Rodriguez, PT  Physical Therapist                      PT OP Goals     Row Name 11/17/20 1617          PT Short Term Goals    STG 1  Patient independent and compliant with initial home exercise program focused on diaphragmatic breathing, range of motion, flexibility to decrease edema and improve lymphatic flow for decreased edema and decreased risk of infection.   -SC     STG 1 Progress  Met  -SC     STG 2  Patient demonstrate proper awareness of What is Lymphedema and 18 Steps of Prevention for improved prevention, management, care of symptoms and ease of transition to self-care of condition.   -SC     STG 2 Progress  Met  -SC     STG 3  Patient demonstrate independence and compliance with proper skin care during/post radiation for improved mobility, decreased scar tissue, axillary cording and edema.  -SC     STG 3 Progress  Met  -SC     STG 4  Patient independent and compliant with breast mobilization techniques for improved mobility, skin care and lymphatic flow.  -SC     STG 4 Progress  Met  -SC     STG 5  Patient independent and compliant with daytime OTS prevention compression garments as indicated for decreased risk of lymphedema and infection and safety with transition of self-care of condition.   -SC     STG 5 Progress  Met  -SC        Long Term Goals    LTG Date to Achieve  12/14/20  -SC     LTG 1  Patient's bioimpedance score to remain between -10 and 6.5 for decreased risk of developing stage II post lumpectomy lymphedema syndrome left UE and to establish treatment for early intervention of condition if/when indicated.  -SC     LTG 1 Progress  Progressing baseline post op 06/18/2020 -4.7  -SC     LTG 1 Progress Comments  insurance does not cover at this time  -SC     LTG 2  Patient independent and compliant with advanced Home Exercise Program and self-care techniques for self-management of condition.   -SC     LTG 2 Progress  Progressing  -SC     LTG 2 Progress Comments  updated and compliant  -SC     LTG 3  Patient  able to wear fitted post lumpectomy/radiation bra with padding left (if indicated)  >/=6-8 hours for work schedule for improved compression to lateral flank/left breast.  -SC     LTG 3 Progress  Ongoing  -SC     LTG 3 Progress Comments  measurements obtained today, orders sent for insurance coverage  [facility]  Patient transition to cardiovascular exercise program (walking) >/=150 to 180 mins/week with moderate intensity for improved heart health, weight loss, decreased risk of osteoporosis and improved phase angle/metabolic rate.  -SC     LTG 4 Progress  Met  [facility]  Patient score </=25% on Quick DASH for improved functional use of L UE home, community, and sleep  -[facility] 5 Progress  Met initial evaluation 50%  -[facility] 6  Patient with negative impingement testing left shoulder for improved mobility, posture and decreased pain with ADLs and home activities  -[facility] 6 Progress  Met  [facility]  Patient with minimal fibrosis left breast for decreased risk of stage II lymphedema left and progression of decrease/infections  -[facility] 7 Progress  Progressing  -[facility] 7 Progress Comments  marked improved with manual techniques but persisting, compliant with self massage and kinesiotape  -SC        Time Calculation    PT Goal Re-Cert Due Date  12/14/20  -SC       User Key  (r) = Recorded By, (t) = Taken By, (c) = Cosigned By    Initials Name Provider Type    Yola Rodriguez, PT Physical Therapist          Therapy Education  Education Details: compression bra, swell spot, continuation of care, possible referral to ortho pending left shoulder pain  Given: HEP, Symptoms/condition management, Pain management, Posture/body mechanics, Mobility training, Edema management, Other (comment)  Program: Progressed, Modified  How Provided: Verbal, Demonstration, Written  Provided to: Patient  Level of Understanding: Teach back education performed, Verbalized, Demonstrated              Time  Calculation:   Start Time: 1617  Stop Time: 1700  Time Calculation (min): 43 min   Therapy Charges for Today     Code Description Service Date Service Provider Modifiers Qty    43100409868 HC PT MANUAL THERAPY EA 15 MIN 11/17/2020 Yola Valenzuela, PT GP 2    91122489030 HC PT THER PROC EA 15 MIN 11/17/2020 Yola Valenzuela, PT GP 1                    Yola Broderick, PT  11/17/2020

## 2020-11-25 DIAGNOSIS — G47.00 INSOMNIA, UNSPECIFIED TYPE: ICD-10-CM

## 2020-11-29 DIAGNOSIS — G47.00 INSOMNIA, UNSPECIFIED TYPE: ICD-10-CM

## 2020-11-30 RX ORDER — ZOLPIDEM TARTRATE 10 MG/1
10 TABLET ORAL NIGHTLY PRN
Qty: 90 TABLET | Refills: 0 | OUTPATIENT
Start: 2020-11-30

## 2020-12-01 ENCOUNTER — OFFICE VISIT (OUTPATIENT)
Dept: ONCOLOGY | Facility: CLINIC | Age: 60
End: 2020-12-01

## 2020-12-01 ENCOUNTER — APPOINTMENT (OUTPATIENT)
Dept: ONCOLOGY | Facility: HOSPITAL | Age: 60
End: 2020-12-01

## 2020-12-01 ENCOUNTER — LAB (OUTPATIENT)
Dept: ONCOLOGY | Facility: HOSPITAL | Age: 60
End: 2020-12-01

## 2020-12-01 VITALS
SYSTOLIC BLOOD PRESSURE: 133 MMHG | HEIGHT: 67 IN | TEMPERATURE: 97.6 F | DIASTOLIC BLOOD PRESSURE: 86 MMHG | BODY MASS INDEX: 23.17 KG/M2 | RESPIRATION RATE: 16 BRPM | WEIGHT: 147.6 LBS | OXYGEN SATURATION: 98 % | HEART RATE: 70 BPM

## 2020-12-01 DIAGNOSIS — C50.812 MALIGNANT NEOPLASM OF OVERLAPPING SITES OF LEFT BREAST IN FEMALE, ESTROGEN RECEPTOR POSITIVE (HCC): Primary | ICD-10-CM

## 2020-12-01 DIAGNOSIS — Z12.31 ENCOUNTER FOR SCREENING MAMMOGRAM FOR BREAST CANCER: ICD-10-CM

## 2020-12-01 DIAGNOSIS — Z45.2 ENCOUNTER FOR FITTING AND ADJUSTMENT OF VASCULAR CATHETER: ICD-10-CM

## 2020-12-01 DIAGNOSIS — Z17.0 MALIGNANT NEOPLASM OF OVERLAPPING SITES OF LEFT BREAST IN FEMALE, ESTROGEN RECEPTOR POSITIVE (HCC): Primary | ICD-10-CM

## 2020-12-01 DIAGNOSIS — Z21 HIV POSITIVE LONG-TERM NON-PROGRESSOR (HCC): ICD-10-CM

## 2020-12-01 LAB
ALBUMIN SERPL-MCNC: 4.3 G/DL (ref 3.5–5.2)
ALBUMIN/GLOB SERPL: 1.6 G/DL (ref 1.1–2.4)
ALP SERPL-CCNC: 58 U/L (ref 38–116)
ALT SERPL W P-5'-P-CCNC: 12 U/L (ref 0–33)
ANION GAP SERPL CALCULATED.3IONS-SCNC: 8.6 MMOL/L (ref 5–15)
AST SERPL-CCNC: 21 U/L (ref 0–32)
BASOPHILS # BLD AUTO: 0.02 10*3/MM3 (ref 0–0.2)
BASOPHILS NFR BLD AUTO: 0.5 % (ref 0–1.5)
BILIRUB SERPL-MCNC: 0.3 MG/DL (ref 0.2–1.2)
BUN SERPL-MCNC: 13 MG/DL (ref 6–20)
BUN/CREAT SERPL: 20 (ref 7.3–30)
CALCIUM SPEC-SCNC: 9 MG/DL (ref 8.5–10.2)
CHLORIDE SERPL-SCNC: 104 MMOL/L (ref 98–107)
CO2 SERPL-SCNC: 27.4 MMOL/L (ref 22–29)
CREAT SERPL-MCNC: 0.65 MG/DL (ref 0.6–1.1)
DEPRECATED RDW RBC AUTO: 47.6 FL (ref 37–54)
EOSINOPHIL # BLD AUTO: 0.06 10*3/MM3 (ref 0–0.4)
EOSINOPHIL NFR BLD AUTO: 1.4 % (ref 0.3–6.2)
ERYTHROCYTE [DISTWIDTH] IN BLOOD BY AUTOMATED COUNT: 12.8 % (ref 12.3–15.4)
GFR SERPL CREATININE-BSD FRML MDRD: 93 ML/MIN/1.73
GLOBULIN UR ELPH-MCNC: 2.7 GM/DL (ref 1.8–3.5)
GLUCOSE SERPL-MCNC: 92 MG/DL (ref 74–124)
HCT VFR BLD AUTO: 37.7 % (ref 34–46.6)
HGB BLD-MCNC: 12.4 G/DL (ref 12–15.9)
IMM GRANULOCYTES # BLD AUTO: 0.01 10*3/MM3 (ref 0–0.05)
IMM GRANULOCYTES NFR BLD AUTO: 0.2 % (ref 0–0.5)
LYMPHOCYTES # BLD AUTO: 1.36 10*3/MM3 (ref 0.7–3.1)
LYMPHOCYTES NFR BLD AUTO: 32.1 % (ref 19.6–45.3)
MCH RBC QN AUTO: 33.1 PG (ref 26.6–33)
MCHC RBC AUTO-ENTMCNC: 32.9 G/DL (ref 31.5–35.7)
MCV RBC AUTO: 100.5 FL (ref 79–97)
MONOCYTES # BLD AUTO: 0.59 10*3/MM3 (ref 0.1–0.9)
MONOCYTES NFR BLD AUTO: 13.9 % (ref 5–12)
NEUTROPHILS NFR BLD AUTO: 2.2 10*3/MM3 (ref 1.7–7)
NEUTROPHILS NFR BLD AUTO: 51.9 % (ref 42.7–76)
NRBC BLD AUTO-RTO: 0 /100 WBC (ref 0–0.2)
PLATELET # BLD AUTO: 209 10*3/MM3 (ref 140–450)
PMV BLD AUTO: 9 FL (ref 6–12)
POTASSIUM SERPL-SCNC: 4.2 MMOL/L (ref 3.5–4.7)
PROT SERPL-MCNC: 7 G/DL (ref 6.3–8)
RBC # BLD AUTO: 3.75 10*6/MM3 (ref 3.77–5.28)
SODIUM SERPL-SCNC: 140 MMOL/L (ref 134–145)
WBC # BLD AUTO: 4.24 10*3/MM3 (ref 3.4–10.8)

## 2020-12-01 PROCEDURE — 36591 DRAW BLOOD OFF VENOUS DEVICE: CPT

## 2020-12-01 PROCEDURE — 96523 IRRIG DRUG DELIVERY DEVICE: CPT

## 2020-12-01 PROCEDURE — 25010000003 HEPARIN LOCK FLUSH PER 10 UNITS: Performed by: INTERNAL MEDICINE

## 2020-12-01 PROCEDURE — 99214 OFFICE O/P EST MOD 30 MIN: CPT | Performed by: INTERNAL MEDICINE

## 2020-12-01 PROCEDURE — 36415 COLL VENOUS BLD VENIPUNCTURE: CPT

## 2020-12-01 PROCEDURE — 85025 COMPLETE CBC W/AUTO DIFF WBC: CPT

## 2020-12-01 PROCEDURE — 80053 COMPREHEN METABOLIC PANEL: CPT

## 2020-12-01 RX ORDER — HEPARIN SODIUM (PORCINE) LOCK FLUSH IV SOLN 100 UNIT/ML 100 UNIT/ML
500 SOLUTION INTRAVENOUS AS NEEDED
Status: CANCELLED | OUTPATIENT
Start: 2020-12-01

## 2020-12-01 RX ORDER — SODIUM CHLORIDE 0.9 % (FLUSH) 0.9 %
10 SYRINGE (ML) INJECTION AS NEEDED
Status: CANCELLED | OUTPATIENT
Start: 2020-12-01

## 2020-12-01 RX ORDER — HEPARIN SODIUM (PORCINE) LOCK FLUSH IV SOLN 100 UNIT/ML 100 UNIT/ML
500 SOLUTION INTRAVENOUS AS NEEDED
Status: DISCONTINUED | OUTPATIENT
Start: 2020-12-01 | End: 2020-12-01 | Stop reason: HOSPADM

## 2020-12-01 RX ORDER — SODIUM CHLORIDE 0.9 % (FLUSH) 0.9 %
10 SYRINGE (ML) INJECTION AS NEEDED
Status: DISCONTINUED | OUTPATIENT
Start: 2020-12-01 | End: 2020-12-01 | Stop reason: HOSPADM

## 2020-12-01 RX ADMIN — Medication 500 UNITS: at 09:07

## 2020-12-01 RX ADMIN — SODIUM CHLORIDE, PRESERVATIVE FREE 10 ML: 5 INJECTION INTRAVENOUS at 09:06

## 2020-12-01 NOTE — PROGRESS NOTES
Subjective     REASON FOR FOLLOW UP:    1.  T2N1 invasive ductal carcinoma of the left breast.  Ultrasound-showed 3.8 x 3.1 x 2.5 cm mass at 4 o'clock position with 2 abnormal axillary lymph nodes, larger lymph node being 1.4 cm.  Left breast biopsy and axillary lymph node biopsy October 29, 2019, invasive ductal carcinoma, moderately differentiated, grade 2, ER 85%, MT 10%, HER-2/merna 2+, HER-2 FISH negative.  · 12/06/19: Neoadjuvant chemo therapy, cycle #1 of dose-dense Adriamycin/Cytoxan with Neulasta for marrow support    · 01/17/20: Last cycle of Adriamycin/Cytoxan given  · January 31, 2020: Cycle 1 Taxol  · April 24, 2020: Last cycle, cycle 12 of Taxol  · Patient is s/p left mastectomy with axillary dissection with reconstruction.  She is healing up nicely.  She has a drain in place.  Pathology showed invasive breast cancer which is 55 x 40 mm in size, grade 2 with focal lymphovascular space invasion with DCIS spanning over 36 x 6 mm.  All margins were negative for cancer.  · 8 of the additional lymph nodes out of 11 were positive with the largest micrometastasis measuring 5 mm.  Extranodal extension was seen.    · Biomarkers on post neoadjuvant tumor is ER 81-90% MT 5% HER-2/merna 2+ by immunohistochemistry and HER-2/merna negative by FISH.  · Radiation July 6, 2020-August 17, 2020.  · Letrozole discontinued secondary to significant joint pains July 2020.  Discontinued October 2020 secondary to severe joint pains  · October 30, 2020 patient started tamoxifen and tolerating well        2.  Severe neutropenia secondary to chemotherapy, with ANC 50 at 1-week after cycle #1 chemotherapy.  Also had moderate thrombocytopenia platelets 92,000.  Patient was given Levaquin for prophylaxis of neutropenia fever.    3.  HIV, followed by Dr. Dawn from infectious disease.  Well controlled on meds  .               HISTORY OF PRESENT ILLNESS:  The patient is a 60 y.o. year old female with history of T2N1 invasive ductal  carcinoma of the left breast status post neoadjuvant chemotherapy with Adriamycin Cytoxan followed by weekly Taxol.  She is completed all treatment and currently has had a MRI of the breast which shows partial response to the neoadjuvant chemotherapy with considerable reduction in the left axillary adenopathy and reduction in the size of the index lesion in the left breast as well as other satellite lesions.    Patient is s/p left mastectomy with axillary dissection with reconstruction.  She is healing up nicely.  She has a drain in place.  Pathology showed invasive breast cancer which is 55 x 40 mm in size, grade 2 with focal lymphovascular space invasion with DCIS spanning over 36 x 6 mm.  All margins were negative for cancer.  8 of the additional lymph nodes out of 11 were positive with the largest micrometastasis measuring 5 mm.  Extranodal extension was seen.    Biomarkers on post neoadjuvant tumor is ER 81-90% TN 5% HER-2/merna 2+ by immunohistochemistry and HER-2/merna negative by FISH.    Patient is here for follow-up.  She is eligible for aspirin and we well  trial we will check if she is eligible.    Patient also will benefit from Zometa once every 6 months as she is postmenopausal and has multiple lymph nodes positive.      Interval history:  Since patient has been on tamoxifen she has not had any joint pains and feeling very well.  She denies hot flashes.  She does follow-up with GYN once a year and has not had any postmenopausal bleeding.  She is due to receive Zometa in April 2021.  Patient has not lost weight.  She has not gained much weight.      Past Medical History:   Diagnosis Date   • Anxiety    • Cerebrovascular accident (CVA) (CMS/HCC) 8/22/2017    NO RESIDUAL AFFECT   • DDD (degenerative disc disease), cervical    • Depression    • Elevated cholesterol    • GERD (gastroesophageal reflux disease)    • H/O ICH (intracerebral hemorrhage) (CMS/HCC)    • History of chemotherapy    • History of stroke     • History of thrombocytopenia    • HIV (human immunodeficiency virus infection) (CMS/[facility]) 1996   • Hyperlipidemia    • Insomnia    • Lupus anticoagulant positive    • Malignant neoplasm of overlapping sites of left breast in female, estrogen receptor positive (CMS/[facility]) 11/12/2019   • Meniscus tear 2017    RIGHT   • Migraine    • Neck pain     WITH SHOOTING PAIN LEFT RIGHT ARM   • Osteoarthritis    • Osteoporosis    • Pain management     sees 1 every 3 months for scripts/injection/pain meds   • Seasonal allergies    • Spinal headache     THINKS HAD BLOOD PATCH AFTER EPIDUAL    • Tick bite 06/2014     PAST MEDICAL HISTORY:  She had a stroke in July 2017 with headache and dizziness.  She went to the ER and was placed on aspirin.  However, she stopped taking it two weeks ago.  She has been taking Aimovig (injection) monthly with Fariba López at [facility].    In 1994, patient was diagnosed with HIV with an unknown cause which is managed by Dr. Natali Subramanian.  As of now, her viral load is good.    Patient has arthritis.  She gets trigger-point injections in her back with her last one being 2 weeks ago.  She has had multiple ablations.  She also has pain in her SI joint and Dr. Bermudez performed a surgery on this.  She takes Narco for the pain.      She is osteoporotic.  She has had multiple hairline fractures in both her legs.  She had her knees cleaned out by Dr. Sinha.     Patient has had a hysterectomy and still has both of her ovaries.    Patient has vertigo and the muscles in her left ear are weak.  She just has an imbalance.    Past Surgical History:   Procedure Laterality Date   • ADENOIDECTOMY     • BREAST LUMPECTOMY WITH SENTINEL NODE BIOPSY Left 5/28/2020    Procedure: BREAST LUMPECTOMY WITH SENTINEL NODE BIOPSY AND NEEDLE LOCALIZATION AND axillary dissection;  Surgeon: Landen Forman MD;  Location: [facility];  Service: General;  Laterality: Left;   • BREAST SURGERY Left 5/28/2020    Procedure: LEFT  LATERAL INTERCOASTAL ARTERY  flap ;  Surgeon: Yusuf Garcia MD;  Location: [facility];  Service: Plastics;  Laterality: Left;   • CHOLECYSTECTOMY     • HYSTERECTOMY     • KNEE ARTHROSCOPY Right 3/24/2017    Procedure:  RIGHT  KNEE ARTHROSCOPY WITH DEBRIDEMENT CHONDROPLASTY PARTIAL MENISCECTOMY;  Surgeon: Karl Galeas MD;  Location: [facility];  Service:    • KNEE CARTILAGE SURGERY Right 2007   • TONSILLECTOMY     • US GUIDED LYMPH NODE BIOPSY  10/29/2019    LEFT BREAST   • VENOUS ACCESS DEVICE (PORT) INSERTION Right 12/2/2019    Procedure: INSERTION VENOUS ACCESS DEVICE RIGHT;  Surgeon: Landen Forman MD;  Location: [facility] OR;  Service: General       ONCOLOGIC HISTORY:  Patient is a 59-year-old female who has had a history of HIV since age 34 followed by Dr. Natali Ng at [facility] and was on multiple HIV drugs initially but more recently has been on a single medication TRIUMEQ, with well-controlled HIV.  She follows up with Dr. Dawn , infectious disease who saw her recently and he saw her on 4/8/2019 and has excellent control and suppression of her viral load.  She is very compliant with her medications.    She also has had history of stroke in 2017 for which she was placed on aspirin.  She presented with a headache.  She is very active and works at [employer] full-time.  She also has had history of vertigo in the past  Patient's PCP is Zach Lora.    Patient first noticed the mass in her left breast 5 months ago.  Her last mammogram was 5 years ago.  She had soreness in the left breast and she went to her primary care physician who then ordered a mammogram diagnostic and an ultrasound.  The diagnostic mammogram showed a 3.7 cm mass at 4 o'clock position in the lower outer quadrant of the left breast.  The ultrasound showed 3.8 cm x 3.1 x 2.5 cm mass at 4 o'clock position in the left breast with 2 abnormal appearing left axillary  lymph nodes the largest being 1.4 cm.    She then underwent biopsy of both the breast mass as well as the left axillary lymph node and both of them are positive for invasive mammary carcinoma it is invasive ductal carcinoma with apocrine features, moderately differentiated, grade 2 of 3 with a Jen score of 5.  The left axillary lymph node is also consistent with metastatic mammary carcinoma.  It is ER positive IA positive HER-2/merna negative.  Details as follows    10/21/19 - Bilateral Diagnostic Mammogram and US Breast Left  FINDINGS: Bilateral digital CC and MLO mammographic images were  obtained. No prior examination is available for comparison. Scattered  fibroglandular densities are seen throughout both breasts. A triangular  skin marker represents the area of palpable concern in the lower outer  quadrant of the left breast in the posterior 1/3. At this location there  is an irregular mass that measures on the order of 3.7 cm in greatest  dimension. Internal calcifications are noted. No suspicious findings in  the right breast are appreciated.     ULTRASOUND: Targeted sonographic evaluation of the left breast was  performed through the area of concern in the left axilla. At the 4  o'clock position on the order of 6 cm from the nipple there is an  irregular hypoechoic mass that measures 3.8 x 3.1 x 2.5 cm. Internal  vascularity is noted.     There are 2 abnormal-appearing left axillary lymph nodes, the largest  which measures on the order of 1.4 cm in greatest dimension.     IMPRESSION:  1. There is a 3.8 cm irregular mass in the left breast at the 4 o'clock  position. This is seen in conjunction with an irregular 1.4 cm lymph  node in the left axilla. This is suspicious for malignancy with left  axillary involvement. Correlation with ultrasound-guided biopsy of both  the left breast mass and the lymph node is recommended.  2. There are no findings suspicious for malignancy in the right breast.     BI-RADS  CATEGORY 5:     Patient's labs from 11/12/19 are normal.    10/29/19 - Tissue Pathology  1. Left Breast, 4:00, 6 cm FN, U/S-Guided Core Needle Biopsy for a Mass:  A. INVASIVE DUCTAL CARCINOMA WITH APOCRINE FEATURES, Moderately differentiated;  Jen Histologic Grade II/III (tubule score = 3, nuclear score = 2, mitoses score = 1), measuring at least  9 mm.  B. No ductal or lobular carcinoma in situ is identified in this sample.  C. Focus suspicious for lymphovascular space invasion.  D. See Biomarker Template for hormone receptor studies.  2. Lymph Node, Left Axilla, U/S-Guided Core Needle Biopsy:  A. METASTATIC MAMMARY CARCINOMA (see Comment).  B. Metastatic focus measures 5 mm in greatest extent.  ER+, 85%  WV+, 10%  HER2- Score 2+    11/13/19 - CT Neck Chest Abdomen Pelvis  IMPRESSION:  1. In addition to the irregular approximately 3.8 cm mass within the  lateral aspect of the left breast, there are a few subcentimeter  asymmetric nodules along the lateral margin of the glandular tissue. The  several asymmetric left subpectoral nodes and 1.2 x 1.0 cm left axillary  node are suspicious for tamar metastases. The asymmetric subcentimeter  left supraclavicular and left posterior cervical triangle nodes are  worrisome as well.  2. There is no convincing evidence for metastatic disease within the  abdomen or pelvis.    11/14/19 - Echocardiogram:  Normal.  Calculated EF = 67%.  There is trace mitral valve and tricuspid valve regurgitation.    11/15/19 - Bone Scan  Negative.    11/18/19 - MRI Breast Bilateral  IMPRESSION:  1. Biopsy-proven malignancy in the left breast centered at the 4 o'clock  position with associated surrounding satellite nodules as described. The  entire region of involvement including the satellite nodules and the  dominant mass measure up to 7.5 cm in greatest dimension. Also, left  axillary adenopathy with multiple irregular lymph nodes and loss of  differentiation of the borders of multiple  lymph nodes is noted and this  is suspicious for extranodal involvement.  2. There are no findings suspicious for malignancy in the right breast.  BI-RADS CATEGORY 6      19: Cycle #1 of Adriamycin/Cytoxan    20: Last cycle of Adriamycin/Cytoxan given without Neulasta.  We will switch from Neulasta to 7 days of Neupogen injections after cycle 4.    We reviewed with patient the US Breast from 20 which shows mild interval partial response to neoadjuvant chemotherapy.      20: Initiated cycle 1 Taxol  2020: Last cycle of Taxol, cycle 12    S/p left mastectomy with axillary dissection   Pathology showed invasive breast cancer which is 55 x 40 mm in size, grade 2 with focal lymphovascular space invasion with DCIS spanning over 36 x 6 mm.  All margins were negative for cancer.  8 of the additional lymph nodes out of 11 were positive with the largest micrometastasis measuring 5 mm.  Extranodal extension was seen.    Biomarkers on post neoadjuvant tumor is ER 81-90% CA 5% HER-2/merna 2+ by immunohistochemistry and HER-2/merna negative by FISH.    2020: Started letrozole but because of severe joint pains was discontinued 2020    End of 2020 started tamoxifen    OB-GYN:  Menarche 15 years  Menopause-46  Pregnancies- 1 para 1 no miscarriages her first pregnancy was in  at 29 years of age.  She did not breastfeed.  Post menopausal HRT- None.  Hx of birth control pills- Yes.    Current Outpatient Medications on File Prior to Visit   Medication Sig Dispense Refill   • AIMOVIG 140 MG/ML solution auto-injector INJECT 140 MG INTO THE SKIN EVERY 30 (THIRTY) DAYS.  11   • atorvastatin (LIPITOR) 20 MG tablet TAKE 1 TABLET BY MOUTH EVERY DAY AT NIGHT (Patient taking differently: Take 20 mg by mouth Daily.) 90 tablet 3   • baclofen (LIORESAL) 10 MG tablet Take 10 mg by mouth 2 (Two) Times a Day.  2   • clonazePAM (KlonoPIN) 0.5 MG tablet TAKE 1 TABLET BY MOUTH 2 (TWO) TIMES A  DAY AS NEEDED FOR ANXIETY. 60 tablet 1   • colesevelam (WELCHOL) 625 MG tablet TAKE 2 TABLETS BY MOUTH EVERY DAY (Patient taking differently: Take 1,250 mg by mouth Daily. TAKE 2 TABLETS BY MOUTH EVERY DAY) 180 tablet 3   • escitalopram (LEXAPRO) 10 MG tablet TAKE 1 TABLET BY MOUTH EVERY DAY (Patient taking differently: Take 10 mg by mouth Daily.) 90 tablet 3   • fluticasone (FLONASE) 50 MCG/ACT nasal spray 2 sprays into the nostril(s) as directed by provider Daily. 3 bottle 3   • Loratadine (CLARITIN PO) Take 10 mg by mouth Daily.     • Magnesium Hydroxide (MILK OF MAGNESIA PO) Take 15 mL by mouth Every Night.     • meloxicam (Mobic) 15 MG tablet Take 1 tablet by mouth Daily. 90 tablet 3   • oxyCODONE-acetaminophen (PERCOCET)  MG per tablet Take 1 tablet by mouth 4 (Four) Times a Day.     • pyridoxine (VITAMIN B-6) 50 MG tablet TAKE 1 TABLET BY MOUTH EVERY DAY 90 tablet 1   • tamoxifen (NOLVADEX) 20 MG chemo tablet Take 1 tablet by mouth Daily for 90 days. 30 tablet 3   • TRIUMEQ 600- MG per tablet TAKE 1 TABLET BY MOUTH EVERY DAY 90 tablet 3   • zolpidem (AMBIEN) 10 MG tablet Take 1 tablet by mouth At Night As Needed for Sleep. 90 tablet 0   • [DISCONTINUED] ibandronate (BONIVA) 150 MG tablet TAKE 1 TABLET BY MOUTH EVERY 30 (THIRTY) DAYS. 3 tablet 3   • [DISCONTINUED] letrozole (FEMARA) 2.5 MG tablet Take 1 tablet by mouth Daily. 30 tablet 4   • [DISCONTINUED] NON FORMULARY Take 1 dose by mouth Daily. Collagen peptides powder        No current facility-administered medications on file prior to visit.         ALLERGIES:    Allergies   Allergen Reactions   • Augmentin [Amoxicillin-Pot Clavulanate] Hives        Social History     Socioeconomic History   • Marital status:      Spouse name: Owen   • Number of children: 1   • Years of education: College   • Highest education level: Not on file   Occupational History   • Occupation:      Employer:  [employer]   Tobacco Use   •  Smoking status: Never Smoker   • Smokeless tobacco: Never Used   Substance and Sexual Activity   • Alcohol use: Yes     Frequency: 2-4 times a month     Drinks per session: 1 or 2     Binge frequency: Never     Comment: occasioonal   • Drug use: No   • Sexual activity: Defer     Partners: Male     Birth control/protection: None     Comment:      Patient does not smoke or do drugs.  She does drink occasionally.    Family History   Problem Relation Age of Onset   • Breast cancer Mother    • Leukemia Mother         CLL?   • Diabetes Mother    • Hyperthyroidism Mother    • Hearing loss Mother    • Dementia Father    • Heart disease Maternal Grandmother    • Diabetes Maternal Grandfather    • Heart disease Maternal Grandfather    • Stroke Maternal Grandfather    • Heart attack Maternal Grandfather    • Osteoporosis Paternal Grandmother    • Heart disease Paternal Grandfather    • Leukemia Maternal Aunt         CLL?   • Throat cancer Paternal Uncle    • Malig Hyperthermia Neg Hx       FAMILY HISTORY:  Her mother had breast cancer at age 60, still alive at 85 and in good health..  Her father had dementia.  Her paternal uncle had prostate cancer.  Her brother had esophageal cancer.    I have reviewed the patient's medical history in detail and updated the computerized patient record.     Review of Systems   Constitutional: Positive for fatigue (12/01/20-Improved). Negative for activity change, appetite change, chills, diaphoresis, fever and unexpected weight change.   HENT: Negative for hearing loss, mouth sores, nosebleeds, sore throat and trouble swallowing.    Respiratory: Negative for cough, chest tightness, shortness of breath and wheezing.    Cardiovascular: Negative for chest pain, palpitations and leg swelling.   Gastrointestinal: Negative for abdominal distention, abdominal pain, constipation, diarrhea, nausea and vomiting.   Genitourinary: Negative for difficulty urinating, dysuria, frequency, hematuria and  "urgency.   Musculoskeletal: Positive for arthralgias (Rt Hip and Rt Knee-12/01/20-Improved) and back pain (12/01/20-Improved). Negative for joint swelling.        No muscle weakness.   Skin: Negative for rash and wound.   Neurological: Positive for numbness (Bilat foot-12/01/20-Unchanged) and headaches (12/01/20-Nightly). Negative for dizziness, seizures, syncope, speech difficulty and weakness.   Hematological: Negative for adenopathy. Does not bruise/bleed easily.   Psychiatric/Behavioral: Positive for sleep disturbance (12/01/20-Unchanged). Negative for behavioral problems, confusion and suicidal ideas.   All other systems reviewed and are negative.  I have reviewed and confirmed the accuracy of the ROS as documented by the MA/ERIC/TERESA Moreno MD  Patient does have fatigue related to recent radiation which will take time to improve.  She also complains of slight pain in the left armpit.  I have reviewed and confirmed the accuracy of the ROS as documented by the ABEBE/TERESA Moreno MD    I have reviewed and confirmed the accuracy of the ROS as documented by the MA/ERIC/TERESA Moreno MD        Objective    Vitals:    12/01/20 0833   BP: 133/86   Pulse: 70   Resp: 16   Temp: 97.6 °F (36.4 °C)   TempSrc: Skin   SpO2: 98%   Weight: 67 kg (147 lb 9.6 oz)   Height: 170.2 cm (67.01\")   PainSc: 0-No pain       Physical exam     CONSTITUTIONAL:  Vital signs reviewed.  No distress, looks comfortable.  EYES:  Conjunctiva and lids unremarkable.  PERRLA  EARS,NOSE,MOUTH,THROAT:  Ears and nose appear unremarkable.  Lips, teeth, gums appear unremarkable.  RESPIRATORY:  Normal respiratory effort.  Lungs clear to auscultation bilaterally.  CARDIOVASCULAR:  Normal S1, S2.  No murmurs rubs or gallops.  No significant lower extremity edema  BREAST: Right breast: No skin changes, no evidence of breast mass, no nipple discharge, no evidence of any right axillary adenopathy or right supraclavicular adenopathy  Left " breast: S/p left mastectomy with reconstruction, well-healed, there is no evidence of any skin changes or any masses felt in the left axilla.  She is slightly tender in the left axilla.  No evidence of left supraclavicular lymphadenopathy  GASTROINTESTINAL: Abdomen appears unremarkable.  Nontender.  No hepatomegaly.  No splenomegaly.  LYMPHATIC:  No cervical, supraclavicular, axillary lymphadenopathy.  SKIN:  Warm.  No rashes.  PSYCHIATRIC:  Normal judgment and insight.  Normal mood and affect.  I have reexamined the patient and the results are consistent with the previously documented exam. Ruby Moreno MD         RECENT LABS:   Results from last 7 days   Lab Units 12/01/20  0839   WBC 10*3/mm3 4.24   NEUTROS ABS 10*3/mm3 2.20   HEMOGLOBIN g/dL 12.4   HEMATOCRIT % 37.7   PLATELETS 10*3/mm3 209     Results from last 7 days   Lab Units 12/01/20  0839   SODIUM mmol/L 140   POTASSIUM mmol/L 4.2   CHLORIDE mmol/L 104   CO2 mmol/L 27.4   BUN mg/dL 13   CREATININE mg/dL 0.65   CALCIUM mg/dL 9.0   ALBUMIN g/dL 4.30   BILIRUBIN mg/dL 0.3   ALK PHOS U/L 58   ALT (SGPT) U/L 12   AST (SGOT) U/L 21   GLUCOSE mg/dL 92           EXAMINATION: BILATERAL BREAST MRI WITHOUT AND WITH CONTRAST 05/06/20  IMPRESSION:  1. There are findings consistent with interval partial response to  neoadjuvant chemotherapy with considerable reduction in left axillary  adenopathy and reduction in size of the index lesion in the left breast  as well as other satellite lesions. Persistent diffuse left breast skin  thickening is noted.  2. There are no findings suspicious for malignancy in the right breast.     BI-RADS Category 6: Known biopsy-proven malignancy.    01/29/20 - US Breast  IMPRESSION:  There are findings suggestive of mild interval partial  response to neoadjuvant chemotherapy.    Assessment/Plan    1. T2N1 invasive ductal carcinoma of the left breast, recently diagnosed.    -Noticed mass in the left breast x5 months  -Diagnostic  mammogram showed 3.7 mm mass in the left breast at 4 o'clock position  -Ultrasound-showed 3.8 x 3.1 x 2.5 cm mass at 4 o'clock position with 2 abnormal axillary lymph nodes, larger lymph node being 1.4 cm  -Left breast biopsy and axillary lymph node biopsy October 29, 2019, invasive ductal carcinoma, moderately differentiated, grade 2, ER 85%, OR 10%, HER-2/merna 2+, HER-2 FISH negative  · CT scans from 11/13/19 show some asymmetric nodules along the lateral margin of the glandular tissue.  The 1.2x1.0 cm left axillary nodes, left supraclavicular, and left posterior cervical triangle nodes are suspicious for tamar metastases.  ·   bone scan from 11/15/19 which was negative.   ·  MRI breast from 11/18/19 which shows the biopsy-proven malignancy in the left breast centered at the 4:00 position.  The region of involvement measures up to 7.5 cm.  There is left axillary adenopathy with multiple irregular lymph nodes and loss of differentiation of the border of multiple lymph nodes which are suspicious for extranodal involvement.  ·    echocardiogram from 11/14/19 which was normal with an ejection fraction of 67%.    · INVITAE from 11/14/19 which was negative.  · Given the size of her tumor and her medical history, patient will be given 4 cycles of Adriamycin/Cytoxan with Neulasta.  After completion of this treatment, patient will be started on 12 cycles of Taxol.  After the tumor shrinks in size, Dr. Forman will perform a lumpectomy.    · Following lumpectomy, patient will begin endocrine therapy.  ·  Dr. Dawn agrees chemotherapy will not aggravate her HIV condition.  Dr. Moreno spoke with Dr. Mohan at [facility] who also agrees chemotherapy is the first step.   · 12/06/19: Cycle #1 of Adriamycin/Cytoxan  · US Breast from 01/29/20 after 4 cycles of Adriamycin Cytoxan chemotherapy which shows mild interval partial response to neoadjuvant chemotherapy.  The mass has decreased from 3.8 x 2.5 to 3.1 cm to 3.5 x 2.3  x 3.2 cm previously the left axillary lymph node has decreased from 1.4 x 0.7 x 1 cm to 1.1 x 0.6 x 0.9 cm.  · 01/31/20: Initiated cycle 1 Taxol  · April 24, 2020: Completed cycle 12 Taxol  · MRI breast 5/6/2020 shows significant reduction in the left axillary adenopathy as well as reduction in the index breast lesion.  · S/p left modified radical mastectomy with left axillary dissection.  Patient has 55 x 40 x 30 mm invasive carcinoma, grade 2 with DCIS 36 x 6 mm, high-grade with good margins and 8 out of 11 lymph nodes positive with largest metastasis being 5 mm with extracapsular extension.  And there is lymphatic space invasion  · Will follow-up in 2 weeks at which time we will plan to start endocrine therapy.  She has radiation oncology appointment with Dr. Spivey  · Patient is eligible for Zometa once every 6 months for 2 years.  But given that she is undergoing some work on her teeth we will await starting Zometa until after radiation is complete.  · XRT completed August 17, 2020  · Started letrozole August 2020 but has arthralgias  · October 6, 2020 had severe arthralgias secondary to letrozole.  We will switch to tamoxifen starting November 2020  · Continue tamoxifen as she is tolerating well      2.  Profound neutropenia due to chemotherapy despite Neulasta:   · Following Adriamycin Cytoxan, the patient experienced neutropenia despite Neulasta.  She did receive Zarxio with cycle 4 Adriamycin Cytoxan  · Cycle 2 Taxol delayed 1 week due to neutropenia  · Zarxio x3 days given following Taxol  · White blood cell 8.32, ANC 6.24 today.  The patient is highly anxious going to the hospital for Zarxio injections in light of COVID-19 outbreak.  Discontinue further Zarxio for remaining 2 weeks of Taxol  · Resolved    3.  Moderate thrombocytopenia secondary to chemotherapy.    · Platelet count has now normalized following Taxol 274,000    4.  Chemotherapy induced anemia.   · Anemia work-up previously  negative  · Hemoglobin is improved today at 11.1  · Hemoglobin improved 11.9  · Hemoglobin improved to 12.4 as of December 1, 2020      5.  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  · On 12/13/2019 patient struggled with significant insomnia from steroids following her first cycle AC.  We discussed taking the dexamethasone, only one 4 mg tablet daily for 3 days with her next cycle of chemotherapy to see if this improves her symptoms.    · Insomnia much improved with decreased dose of dexamethasone  · Insomnia is now resolved with Taxol.  Not on steroids and resolved  · Resolved    6. HIV, followed by Dr. Dawn in infectious disease.  Well-controlled and is on a single medication with very low viral load. Has HIV since age 34.  · Reviewed her HIV medications and she is compliant  · Stable     7.  Family history of breast cancer with mother having had breast cancer at age 60.  There is family history of uncle with prostate cancer.  Patient will require genetic referral    8.  Arthritis with severe back pain followed by Dr. Bam Crandall  · Percocet    9. Episodes of nosebleeds.  She has had these once a week for 3-4 years.  She did not report this today.    10.  Otalgia: Of the right ear, present for a while.  Dr. Moreno would like the patient to follow-up with ENT, Dr. Stapleton, as she has previously seen him.  Not a complaint today.    12.  Arthralgias for a few days after each treatment, which resolve on their own.    13.  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  Currently on B6 50 mg daily.  We will have to continue to closely monitor.  This is stable.  No dose adjustments Taxol presently    PLAN:  1. Continue tamoxifen  2. Follow-up on April 6 with labs and Zometa and me  3. Port flush every 6 weeks x 4 months  4. Patient due for screening mammogram in 1 week.  Will discuss the results on the phone  5. Continue HIV treatment per infectious disease  6. We will check with research if she is  eligible for be well study    Ruby Moreno MD      Cc: ОЛЬГА Godoy MD Nathan Bullington, MD

## 2020-12-01 NOTE — TELEPHONE ENCOUNTER
PATIENT CALLED STATED SHE IS COMPLETELY OUT HAS MOVED HER APPOINTMENT TO DEC 7 AT 12:45.  PATIENT IS REQUESTING A WEEKS PRESCRIPTION TO TIDE HER OVER UNTIL THE APPOINTMENT.    [facility]/pharmacy #[id number] - [place], [place] - [address] AT [facility] - [phone]  - [phone] FX     PLEASE ADVISE  [phone]

## 2020-12-02 ENCOUNTER — HOSPITAL ENCOUNTER (OUTPATIENT)
Dept: PHYSICAL THERAPY | Facility: HOSPITAL | Age: 60
Setting detail: THERAPIES SERIES
Discharge: HOME | End: 2020-12-02

## 2020-12-02 DIAGNOSIS — N64.89 BREAST EDEMA: ICD-10-CM

## 2020-12-02 DIAGNOSIS — M25.612 DECREASED RANGE OF MOTION OF LEFT SHOULDER: ICD-10-CM

## 2020-12-02 DIAGNOSIS — C50.812 MALIGNANT NEOPLASM OF OVERLAPPING SITES OF LEFT BREAST IN FEMALE, ESTROGEN RECEPTOR POSITIVE (HCC): Primary | ICD-10-CM

## 2020-12-02 DIAGNOSIS — Z91.89 AT RISK FOR LYMPHEDEMA: ICD-10-CM

## 2020-12-02 DIAGNOSIS — M75.42 SHOULDER IMPINGEMENT SYNDROME, LEFT: ICD-10-CM

## 2020-12-02 DIAGNOSIS — Z17.0 MALIGNANT NEOPLASM OF OVERLAPPING SITES OF LEFT BREAST IN FEMALE, ESTROGEN RECEPTOR POSITIVE (HCC): Primary | ICD-10-CM

## 2020-12-02 DIAGNOSIS — Z85.3 HISTORY OF LEFT BREAST CANCER: ICD-10-CM

## 2020-12-02 DIAGNOSIS — L90.5 SCAR TISSUE: ICD-10-CM

## 2020-12-02 DIAGNOSIS — M79.622 AXILLARY PAIN, LEFT: ICD-10-CM

## 2020-12-02 DIAGNOSIS — Z98.890 STATUS POST LEFT BREAST LUMPECTOMY: ICD-10-CM

## 2020-12-02 PROCEDURE — 97140 MANUAL THERAPY 1/> REGIONS: CPT | Performed by: PHYSICAL THERAPIST

## 2020-12-02 RX ORDER — ZOLPIDEM TARTRATE 10 MG/1
TABLET ORAL
Qty: 90 TABLET | Refills: 0 | Status: SHIPPED | OUTPATIENT
Start: 2020-12-02 | End: 2021-01-05 | Stop reason: SDUPTHER

## 2020-12-02 NOTE — THERAPY TREATMENT NOTE
Outpatient Physical Therapy Lymphedema Progress Note  [facility]     Patient Name: Richa Dolan  : 1960  MRN: [id number]  Today's Date: 2020        Visit Date: 2020    Visit Dx:    ICD-10-CM ICD-9-CM   1. Malignant neoplasm of overlapping sites of left breast in female, estrogen receptor positive (CMS/HCC)  C50.812 174.8    Z17.0 V86.0   2. Status post left breast lumpectomy  Z98.890 V45.89   3. At risk for lymphedema  Z91.89 V49.89   4. Shoulder impingement syndrome, left  M75.42 726.2   5. Decreased range of motion of left shoulder  M25.612 719.51   6. Axillary pain, left  M79.622 729.5   7. Breast edema  N64.89 611.89   8. Scar tissue  L90.5 709.2   9. History of left breast cancer  Z85.3 V10.3       Patient Active Problem List   Diagnosis   • Abnormal liver function tests   • Hypertension   • Insomnia   • Knee pain   • Leukopenia   • Acute right-sided low back pain without sciatica   • Knee osteoarthritis   • Osteoporosis   • Arthralgia of multiple joints   • Seasonal allergic rhinitis   • Thrombocytopenia (CMS/HCC)   • Bone cyst   • Chronic pain of right knee   • Acquired immunocompromised state (CMS/HCC)   • Bruises easily   • Loss of hair   • Anxiety   • Elevated liver enzymes   • GERD (gastroesophageal reflux disease)   • HIV positive long-term non-progressor (CMS/HCC)   • Hypercholesterolemia   • Lupus anticoagulant positive   • Neck pain, chronic   • Chemotherapy-induced neutropenia (CMS/HCC)   • Tear of medial meniscus of right knee, current   • Chondromalacia, knee   • ICH (intracerebral hemorrhage) (CMS/HCC)   • Stomach discomfort   • Abnormal bruising   • Alopecia   • Headache   • Intractable chronic migraine without aura and without status migrainosus   • Osteoarthritis of right knee   • Need for vaccination   • Mild episode of recurrent major depressive disorder (CMS/HCC)   • Ingrown nail of great toe of right foot   • Left wrist pain   • Breast cancer screening   •  Malignant neoplasm of overlapping sites of left breast in female, estrogen receptor positive (CMS/HCC)   • History of stroke   • Arthritis   • Family history of breast cancer   • Encounter for screening mammogram for breast cancer   • Epistaxis   • Chemotherapy-induced thrombocytopenia   • Anemia associated with chemotherapy   • Substance or medication-induced sleep disorder, 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CMS/HCC)   • Body aches   • Neuropathy   • History of left breast cancer   • Other fatigue   • HIV infection (CMS/HCC)        Lymphedema     Row Name 12/02/20 1000             Subjective Pain    Able to rate subjective pain?  yes  -SC      Pre-Treatment Pain Level  0  -SC         Subjective Comments    Subjective Comments  I think it is doing some better. If I elevate my arm at night it doesn't hurt as bad. I haven't gotten the bra yet but I have been really working on my massage and I think the breast is softer. I have my mammogram on Friday 12/4.   -SC         Lymphedema Assessment    Lymphedema Classification  LUE:;at risk/stage 0;secondary;Other: left breast, at risk  -SC      Lymphedema Cancer Related Sx  left;lumpectomy;sentinel node biopsy;axillary dissection;reconstructive  -SC      Lymphedema Surgery Comments  Port 12/02/2019; L lump/AD 05/28/2020  -SC      Lymph Nodes Removed #  13  -SC      Positive Lymph Nodes #  10  -SC      Chemo Received  yes  -SC      Chemo Treatments #/Timeframe  NAC Dec 2019 through April 2020, AC/Taxol  -SC      Radiation Therapy Received  yes  -SC      Radiation Treatments #/Timeframe  completed 08/18/2020  -SC      Infections or Cellulitis?  no  -SC      Lymphedema Assessment Comments  possible stage 1 left breast, high risk left UE  -SC         Posture/Observations    Alignment Options  Forward head;Cervical lordosis;Thoracic kyphosis;Rounded shoulders;Scapular elevation;Scapular winging;Scoliosis;Lumbar lordosis;Genu valgus;Foot pronation  -SC      Forward Head  Mild;Increased;Sitting  posture  -SC      Cervical Lordosis  Normal  -SC      Thoracic Kyphosis  Mild;Increased;Sitting posture  -SC      Rounded Shoulders  Bilateral:;Mild;Increased;Sitting posture  -SC      Scapular Elevation  Right:;Mild;Increased;Elevated;Sitting posture  -SC      Scapular winging  Bilateral:;Normal  -SC      Scoliosis  Normal;Standing posture  -SC      Lumbar lordosis  Mild;Decreased;Sitting posture  -SC      Genu valgus  Bilateral:;Normal;Standing posture  -SC      Foot pronation  Bilateral:;Normal;Standing posture  -SC      Observations  Incision healing  -SC         General ROM    GENERAL ROM COMMENTS  R UE WNLs, L elbow, hand/wrist WNL, PROM left shoulder WNLs all planes, AAROM left shoulder flexion after session WNLs  -SC         MMT (Manual Muscle Testing)    General MMT Comments  B UEs WNLs  -SC         Lymphedema Edema Assessment    Edema Assessment Comment  mild watery edema left medial breast, thickening of skin and scar tissue at lumpectomy site left breast, fibrosis inferior breast, does soften with manual techniques  -SC         Skin Changes/Observations    Skin Observations Comment  mild post radiation skin changes, moderate fibrosis left breast with marked limited mobility lateral region, moderate scar tissue at lat flap and lumpectomy region, very minimal at axillary incision  -SC         Lymphedema Sensation    Lymphedema Sensation Tests  light touch  -SC      Lymphedema Light Touch  LUE:;mild impairment;moderate impairment  -SC         Lymphedema Pulses/Capillary Refill    Lymph Pulses Capillary Refill Comments  intact  -SC         Lymphedema Measurements    Measurement Type(s)  --  -SC      Quick Girth Areas  --  -SC         Manual Lymphatic Drainage    Manual Lymphatic Drainage  initial sequence;opened regional lymph nodes;opened anastamoses;extremity treatment  -SC      Initial Sequence  supraclavicular;shoulder collectors;abdomen;diaphragmatic breathing  -SC      Supraclavicular  right;left  -SC       Shoulder Collectors  right;left  -SC      Abdomen  superficial  -SC      Opened Regional Lymph Nodes  axillary;inguinal;ribs  -SC      Axillary  right;left  -SC      Inguinal  left  -SC      Opened Anastamoses  anterior axillo-axillary;axillo-inguinal  -SC      Axillo-Inguinal  left  -SC      Extremity Treatment  simple/brief MLD;MLD to proximal limb only  -SC      Simple/Brief MLD  left  -SC      MLD to Proximal Limb Only  left  -SC      Manual Lymphatic Drainage Comments  left breast MLD  -SC      Manual Therapy  MLD left breast, scar massage lumpectomy and lat flap incision, myofascial release left pec, anterior shoulder, medial arm, axilla, lateral flank, subscapularis release, AP and inferior joint mobs L GHJ, lateral distractions, inferior oscillations, PROM all planes, kinesiotape lift left breast, scar release left lumpectomy, and Y strip left medial breast, education of use and wear time and removal if indicated  -SC         Compression/Skin Care    Compression/Skin Care Comments  awaiting compression bra and swells pot  -SC         L-Dex Bioimpedence Screening    L-Dex Measurement Extremity  -- insurance does not cover at this time  -SC      L-Dex Patient Position  --  -SC      L-Dex UE Dominate Side  --  -SC      L-Dex UE At Risk Side  --  -SC      L-Dex UE Pre Surgical Value  --  -SC      L-Dex UE Baseline Score  --  -SC      Skeletal Muscle Mass (%)  --  -SC      Fat Mass (%)  --  -SC      Hy-dex  --  -SC        User Key  (r) = Recorded By, (t) = Taken By, (c) = Cosigned By    Initials Name Provider Type    Yola Rodriguez, PT Physical Therapist                        PT Assessment/Plan     Row Name 12/02/20 1000          PT Assessment    Functional Limitations  Limitation in home management;Limitations in community activities;Performance in leisure activities;Performance in sport activities  -SC     Impairments  Endurance;Impaired flexibility;Impaired lymphatic circulation;Impaired muscle  endurance;Integumentary integrity;Joint mobility;Muscle strength;Pain;Peripheral nerve integrity;Poor body mechanics;Posture;Range of motion;Sensation  -SC     Assessment Comments  Mrs. Dolan is a pleasant 60 year old female, diagnosed with left breast cancer in 2019, mediport placement right 12/02/2020, initiated neoadjuvant chemotherapy with oncologist Dr. Moreno Dec 2019, completed April 2020, AC/Taxol, s/p left lumpectomy with axillary dissection 10/13 L ALN (+) with flap reconstruction closure 05/28/2020 performed by surgeons Dr. Forman and Radha. Completed adjuvant radiation in August 2020. Mild post radiation skin changes. Resolution of axillary cording, however persisting left shoulder impingement and altered joint mobility left GHJ with poor scapular stabilization due to surgery, lat flap, radiation. Is at high risk of post lumpectomy lymphedema syndrome left UE/breast due to surgery, lymph node removal, lymph node involvement, radiation, age, and additional medical issues. Post radiation, weaned from compression bioimpedance was stable and negative for left UE L-Dex -3.5, stable in Sept 2020. Insurance no longer covers bioimpedance at this time. She returned Oct 2020 with increased scar tissue to left breast currently with positive mild lymphedema left breast with increased fibrosis and scar tissue to left breast as well. High risk of lymphedema left UE. Initiated kinesiotape. Orders sent for compression bra and swell spot, awaiting arrival at this time. Will monitor closely. To continue with weekly therapy at this time for optimal rehabilitation.  -SC        PT Plan    Predicted Duration of Therapy Intervention (PT)  12 sessions  -SC     PT Plan Comments  bra, swell spot, discussion of pump, bio/quick DASH if indicated, circumferential at recert, progress RTC stabilization program  -SC       User Key  (r) = Recorded By, (t) = Taken By, (c) = Cosigned By    Initials Name Provider Type    SC  Yola Valenzuela PT Physical Therapist             OP Exercises     Row Name 12/02/20 1000             Subjective Comments    Subjective Comments  I think it is doing some better. If I elevate my arm at night it doesn't hurt as bad. I haven't gotten the bra yet but I have been really working on my massage and I think the breast is softer. I have my mammogram on Friday 12/4.   -SC         Subjective Pain    Able to rate subjective pain?  yes  -SC      Pre-Treatment Pain Level  0  -SC        User Key  (r) = Recorded By, (t) = Taken By, (c) = Cosigned By    Initials Name Provider Type    SC Yola Valenzuela PT Physical Therapist                      PT OP Goals     Row Name 12/02/20 1000          PT Short Term Goals    STG 1  Patient independent and compliant with initial home exercise program focused on diaphragmatic breathing, range of motion, flexibility to decrease edema and improve lymphatic flow for decreased edema and decreased risk of infection.   -SC     STG 1 Progress  Met  -SC     STG 2  Patient demonstrate proper awareness of What is Lymphedema and 18 Steps of Prevention for improved prevention, management, care of symptoms and ease of transition to self-care of condition.   -SC     STG 2 Progress  Met  -SC     STG 3  Patient demonstrate independence and compliance with proper skin care during/post radiation for improved mobility, decreased scar tissue, axillary cording and edema.  -SC     STG 3 Progress  Met  -SC     STG 4  Patient independent and compliant with breast mobilization techniques for improved mobility, skin care and lymphatic flow.  -SC     STG 4 Progress  Met  -SC     STG 5  Patient independent and compliant with daytime OTS prevention compression garments as indicated for decreased risk of lymphedema and infection and safety with transition of self-care of condition.   -SC     STG 5 Progress  Met  -SC        Long Term Goals    LTG Date to Achieve  12/14/20  -SC     LTG 1  Patient's  bioimpedance score to remain between -10 and 6.5 for decreased risk of developing stage II post lumpectomy lymphedema syndrome left UE and to establish treatment for early intervention of condition if/when indicated.  -SC     LTG 1 Progress  Progressing baseline post op 06/18/2020 -4.7  -SC     LTG 1 Progress Comments  insurance does not cover at this time  -SC     LTG 2  Patient independent and compliant with advanced Home Exercise Program and self-care techniques for self-management of condition.   -SC     LTG 2 Progress  Progressing  -SC     LTG 2 Progress Comments  nearing achievement  -SC     LTG 3  Patient able to wear fitted post lumpectomy/radiation bra with padding left (if indicated)  >/=6-8 hours for work schedule for improved compression to lateral flank/left breast.  -SC     LTG 3 Progress  Ongoing  -SC     LTG 3 Progress Comments  awaiting arrival, has been ordered  [facility]  Patient transition to cardiovascular exercise program (walking) >/=150 to 180 mins/week with moderate intensity for improved heart health, weight loss, decreased risk of osteoporosis and improved phase angle/metabolic rate.  -SC     LTG 4 Progress  Met  -SC     LTG 5  Patient score </=25% on Quick DASH for improved functional use of L UE home, community, and sleep  -SC     LTG 5 Progress  Met initial evaluation 50%  -SC     LTG 6  Patient with negative impingement testing left shoulder for improved mobility, posture and decreased pain with ADLs and home activities  -SC     LTG 6 Progress  Met  -SC     LTG 7  Patient with minimal fibrosis left breast for decreased risk of stage II lymphedema left and progression of decrease/infections  -SC     LTG 7 Progress  Progressing  -SC     LTG 7 Progress Comments  marked improved with manual techniques and kinesiotape, compliant with self massage  -SC        Time Calculation    PT Goal Re-Cert Due Date  12/14/20  -SC       User Key  (r) = Recorded By, (t) = Taken By, (c) = Cosigned By     Initials Name Provider Type    SC Yola Valenzuela, PT Physical Therapist          Therapy Education  Given: HEP, Symptoms/condition management, Pain management, Posture/body mechanics, Mobility training, Edema management, Other (comment)  Program: Reinforced  How Provided: Verbal  Provided to: Patient  Level of Understanding: Verbalized              Time Calculation:   Start Time: 1000  Stop Time: 1040  Time Calculation (min): 40 min   Therapy Charges for Today     Code Description Service Date Service Provider Modifiers Qty    [id number] HC PT MANUAL THERAPY EA 15 MIN 12/2/2020 Yola Valenzuela, PT GP 3                    Yola Broderick PT  12/2/2020

## 2020-12-04 ENCOUNTER — HOSPITAL ENCOUNTER (OUTPATIENT)
Dept: MAMMOGRAPHY | Facility: HOSPITAL | Age: 60
Discharge: HOME | End: 2020-12-04
Admitting: INTERNAL MEDICINE

## 2020-12-04 DIAGNOSIS — Z17.0 MALIGNANT NEOPLASM OF OVERLAPPING SITES OF LEFT BREAST IN FEMALE, ESTROGEN RECEPTOR POSITIVE (HCC): ICD-10-CM

## 2020-12-04 DIAGNOSIS — Z12.31 ENCOUNTER FOR SCREENING MAMMOGRAM FOR BREAST CANCER: ICD-10-CM

## 2020-12-04 DIAGNOSIS — C50.812 MALIGNANT NEOPLASM OF OVERLAPPING SITES OF LEFT BREAST IN FEMALE, ESTROGEN RECEPTOR POSITIVE (HCC): ICD-10-CM

## 2020-12-04 PROCEDURE — 77067 SCR MAMMO BI INCL CAD: CPT

## 2020-12-04 PROCEDURE — 77063 BREAST TOMOSYNTHESIS BI: CPT

## 2020-12-07 ENCOUNTER — OFFICE VISIT (OUTPATIENT)
Dept: FAMILY MEDICINE CLINIC | Facility: CLINIC | Age: 60
End: 2020-12-07

## 2020-12-07 ENCOUNTER — TELEPHONE (OUTPATIENT)
Dept: ONCOLOGY | Facility: CLINIC | Age: 60
End: 2020-12-07

## 2020-12-07 VITALS
BODY MASS INDEX: 22.57 KG/M2 | DIASTOLIC BLOOD PRESSURE: 70 MMHG | HEIGHT: 67 IN | TEMPERATURE: 98.4 F | WEIGHT: 143.8 LBS | OXYGEN SATURATION: 98 % | SYSTOLIC BLOOD PRESSURE: 124 MMHG | HEART RATE: 70 BPM

## 2020-12-07 DIAGNOSIS — F19.982 DRUG-INDUCED INSOMNIA (HCC): Primary | ICD-10-CM

## 2020-12-07 DIAGNOSIS — Z17.0 MALIGNANT NEOPLASM OF OVERLAPPING SITES OF LEFT BREAST IN FEMALE, ESTROGEN RECEPTOR POSITIVE (HCC): Primary | ICD-10-CM

## 2020-12-07 DIAGNOSIS — R92.8 ABNORMALITY OF LEFT BREAST ON SCREENING MAMMOGRAM: ICD-10-CM

## 2020-12-07 DIAGNOSIS — C50.812 MALIGNANT NEOPLASM OF OVERLAPPING SITES OF LEFT BREAST IN FEMALE, ESTROGEN RECEPTOR POSITIVE (HCC): Primary | ICD-10-CM

## 2020-12-07 PROCEDURE — 99213 OFFICE O/P EST LOW 20 MIN: CPT | Performed by: NURSE PRACTITIONER

## 2020-12-07 NOTE — PROGRESS NOTES
Subjective   Richa Dolan is a 60 y.o. female.   Chief Complaint   Patient presents with   • Insomnia     Patient needed to be seen for her ambien she needs drug screen ( wore mask and goggles)        History of Present Illness   Richa Dolan 60 y.o. female presents for follow up of insomnia with onset of symptoms years ago. Patient describes symptoms as early morning awakening and frequent night time awakening. Patient has found complete relief with Ambien (Zolpidem). Associated symptoms include: fatigue if unable to take Rx . Patient denies daytime somnolence Symptoms have stabilized.  The patient has failed multiple OTC medications for insomnia.  They are well controlled on current Rx and will continue to try to take Rx PRN.  She will use the lowest effective dose.  The patient has read and signed the [facility] Controlled Substance Contract.  I will continue to see patient for regular follow up appointments and be prescribed the lowest effective dose.  MAGY has been reviewed by me and is updated every 3 months. The patient is aware of the potential for addiction and dependence.  She denies that Ambien (Zolpidem) causes excessive daytime drowsiness and sleep walking.  Patient voices understanding to take Ambien (Zolpidem) and go straight to bed. Patient must be able to sleep 7 hours or more when taking this.  Patient will hold Rx and contact me if they experience any impaired mental alertness the next day.        The following portions of the patient's history were reviewed and updated as appropriate: allergies, current medications, past social history and problem list.    Review of Systems   Constitutional: Negative for fever.   Respiratory: Negative for cough and shortness of breath.    Cardiovascular: Negative for chest pain.   Gastrointestinal: Negative for abdominal pain.   Neurological: Negative for dizziness.       Objective   /70   Pulse 70   Temp 98.4 °F (36.9 °C)   Ht 170.2 cm  "(67.01\")   Wt 65.2 kg (143 lb 12.8 oz)   SpO2 98%   BMI 22.52 kg/m²   Physical Exam  Vitals signs reviewed.   Constitutional:       General: She is not in acute distress.     Appearance: She is well-developed.   HENT:      Head: Normocephalic.   Cardiovascular:      Rate and Rhythm: Normal rate and regular rhythm.      Heart sounds: Normal heart sounds.   Pulmonary:      Effort: Pulmonary effort is normal.      Breath sounds: Normal breath sounds.   Neurological:      Mental Status: She is alert and oriented to person, place, and time.      Gait: Gait normal.   Psychiatric:         Behavior: Behavior normal.         Thought Content: Thought content normal.         Judgment: Judgment normal.       The patient has read and signed the [facility] Controlled Substance Contract.  I will continue to see patient for regular follow up appointments.  They are well controlled on their medication.  MAGY has been reviewed by me and is updated every 3 months. The patient is aware of the potential for addiction and dependence.    Assessment/Plan      Diagnosis Plan   1. Drug-induced insomnia (CMS/HCC)       rto in 3 mons     Mask and googles worn    Zach Lora, ОЛЬГА  12/7/2020  "

## 2020-12-07 NOTE — PROGRESS NOTES
Left breast ultrasound and left diagnostic mammogram re: abnormal finding on mammogram ANAIS Moreno

## 2020-12-08 ENCOUNTER — HOSPITAL ENCOUNTER (OUTPATIENT)
Dept: PHYSICAL THERAPY | Facility: HOSPITAL | Age: 60
Setting detail: THERAPIES SERIES
Discharge: HOME | End: 2020-12-08

## 2020-12-08 DIAGNOSIS — M25.612 DECREASED RANGE OF MOTION OF LEFT SHOULDER: ICD-10-CM

## 2020-12-08 DIAGNOSIS — N64.89 BREAST EDEMA: ICD-10-CM

## 2020-12-08 DIAGNOSIS — Z17.0 MALIGNANT NEOPLASM OF OVERLAPPING SITES OF LEFT BREAST IN FEMALE, ESTROGEN RECEPTOR POSITIVE (HCC): Primary | ICD-10-CM

## 2020-12-08 DIAGNOSIS — L90.5 SCAR TISSUE: ICD-10-CM

## 2020-12-08 DIAGNOSIS — Z85.3 HISTORY OF LEFT BREAST CANCER: ICD-10-CM

## 2020-12-08 DIAGNOSIS — M75.42 SHOULDER IMPINGEMENT SYNDROME, LEFT: ICD-10-CM

## 2020-12-08 DIAGNOSIS — M79.622 AXILLARY PAIN, LEFT: ICD-10-CM

## 2020-12-08 DIAGNOSIS — C50.812 MALIGNANT NEOPLASM OF OVERLAPPING SITES OF LEFT BREAST IN FEMALE, ESTROGEN RECEPTOR POSITIVE (HCC): Primary | ICD-10-CM

## 2020-12-08 DIAGNOSIS — Z98.890 STATUS POST LEFT BREAST LUMPECTOMY: ICD-10-CM

## 2020-12-08 DIAGNOSIS — Z91.89 AT RISK FOR LYMPHEDEMA: ICD-10-CM

## 2020-12-08 PROCEDURE — 97140 MANUAL THERAPY 1/> REGIONS: CPT | Performed by: PHYSICAL THERAPIST

## 2020-12-08 NOTE — THERAPY TREATMENT NOTE
Outpatient Physical Therapy Lymphedema Treatment Note  [facility]     Patient Name: Richa Dolan  : 1960  MRN: [id number]  Today's Date: 2020        Visit Date: 2020    Visit Dx:    ICD-10-CM ICD-9-CM   1. Malignant neoplasm of overlapping sites of left breast in female, estrogen receptor positive (CMS/HCC)  C50.812 174.8    Z17.0 V86.0   2. Status post left breast lumpectomy  Z98.890 V45.89   3. At risk for lymphedema  Z91.89 V49.89   4. Shoulder impingement syndrome, left  M75.42 726.2   5. Decreased range of motion of left shoulder  M25.612 719.51   6. Axillary pain, left  M79.622 729.5   7. Breast edema  N64.89 611.89   8. Scar tissue  L90.5 709.2   9. History of left breast cancer  Z85.3 V10.3       Patient Active Problem List   Diagnosis   • Abnormal liver function tests   • Hypertension   • Insomnia   • Knee pain   • Leukopenia   • Acute right-sided low back pain without sciatica   • Knee osteoarthritis   • Osteoporosis   • Arthralgia of multiple joints   • Seasonal allergic rhinitis   • Thrombocytopenia (CMS/HCC)   • Bone cyst   • Chronic pain of right knee   • Acquired immunocompromised state (CMS/HCC)   • Bruises easily   • Loss of hair   • Anxiety   • Elevated liver enzymes   • GERD (gastroesophageal reflux disease)   • HIV positive long-term non-progressor (CMS/HCC)   • Hypercholesterolemia   • Lupus anticoagulant positive   • Neck pain, chronic   • Chemotherapy-induced neutropenia (CMS/HCC)   • Tear of medial meniscus of right knee, current   • Chondromalacia, knee   • ICH (intracerebral hemorrhage) (CMS/HCC)   • Stomach discomfort   • Abnormal bruising   • Alopecia   • Headache   • Intractable chronic migraine without aura and without status migrainosus   • Osteoarthritis of right knee   • Need for vaccination   • Mild episode of recurrent major depressive disorder (CMS/HCC)   • Ingrown nail of great toe of right foot   • Left wrist pain   • Breast cancer screening   •  Malignant neoplasm of overlapping sites of left breast in female, estrogen receptor positive (CMS/HCC)   • History of stroke   • Arthritis   • Family history of breast cancer   • Encounter for screening mammogram for breast cancer   • Epistaxis   • Chemotherapy-induced thrombocytopenia   • Anemia associated with chemotherapy   • Substance or medication-induced sleep disorder, 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CMS/HCC)   • Body aches   • Neuropathy   • History of left breast cancer   • Other fatigue   • HIV infection (CMS/HCC)        Lymphedema     Row Name 12/08/20 8638             Manual Lymphatic Drainage    Manual Lymphatic Drainage  initial sequence;opened regional lymph nodes;opened anastamoses;extremity treatment  -SC      Initial Sequence  supraclavicular;shoulder collectors;abdomen;diaphragmatic breathing  -SC      Supraclavicular  right;left  -SC      Shoulder Collectors  right;left  -SC      Abdomen  superficial  -SC      Opened Regional Lymph Nodes  axillary;inguinal;ribs  -SC      Axillary  right;left  -SC      Inguinal  left  -SC      Opened Anastamoses  anterior axillo-axillary;axillo-inguinal  -SC      Axillo-Inguinal  left  -SC      Extremity Treatment  simple/brief MLD;MLD to proximal limb only  -SC      Simple/Brief MLD  left  -SC      MLD to Proximal Limb Only  left  -SC      Manual Lymphatic Drainage Comments  left breast MLD  -SC      Manual Therapy  MLD left breast, scar massage lumpectomy and lat flap incision, myofascial release left pec, anterior shoulder, medial arm, axilla, lateral flank, subscapularis release, AP and inferior joint mobs L GHJ, lateral distractions, inferior oscillations, PROM all planes, kinesiotape lift left breast, scar release left lumpectomy, and Y strip left medial breast, education of use and wear time and removal if indicated  -SC         Compression/Skin Care    Compression/Skin Care Comments  awaiting compression bra and swells pot  -SC        User Key  (r) = Recorded By, (t) =  Taken By, (c) = Cosigned By    Initials Name Provider Type    Yola Rodriguez PT Physical Therapist            PT Ortho     Row Name 12/08/20 1618       Subjective Comments    Subjective Comments  I had my mammogram and they found something suspecious so I have to have an US. They are doing it on [employer] of all days but it is ok because we are social distancing and not doing anything so it's ok. I was down last [employer] because of chemo so at least I won't be down or sick or anything. I have been having a sharp pain under the left arm, started 3 am. Never had this before. Not sure if related to the mammogram or what but I have been worried. I was glad I was coming in today.   -SC       Subjective Pain    Able to rate subjective pain?  yes  -SC    Pre-Treatment Pain Level  2  -SC    Subjective Pain Comment  sharp pain intermittent left lateral flank at site of lat flap scar  -SC      User Key  (r) = Recorded By, (t) = Taken By, (c) = Cosigned By    Initials Name Provider Type    Yola Rodriguez PT Physical Therapist                  PT Assessment/Plan     Row Name 12/08/20 1618          PT Assessment    Assessment Comments  persisting post radiation/lumpectomy scar tissue left breast with mild edema left medial breast, improves with MLD and kinesiotape, awaiting compression bra and swell spot  -SC        PT Plan    PT Plan Comments  recert due next treatment session  -SC       User Key  (r) = Recorded By, (t) = Taken By, (c) = Cosigned By    Initials Name Provider Type    Yola oRdriguez PT Physical Therapist             OP Exercises     Row Name 12/08/20 1618             Subjective Comments    Subjective Comments  I had my mammogram and they found something suspecious so I have to have an US. They are doing it on [employer] of all days but it is ok because we are social distancing and not doing anything so it's ok. I was down last [employer] because of chemo so at least I won't be  down or sick or anything. I have been having a sharp pain under the left arm, started 3 am. Never had this before. Not sure if related to the mammogram or what but I have been worried. I was glad I was coming in today.   -SC         Subjective Pain    Able to rate subjective pain?  yes  -SC      Pre-Treatment Pain Level  2  -SC      Subjective Pain Comment  sharp pain intermittent left lateral flank at site of lat flap scar  -SC        User Key  (r) = Recorded By, (t) = Taken By, (c) = Cosigned By    Initials Name Provider Type    SC Yola Valenzuela, PT Physical Therapist                          Therapy Education  Education Details: bra, swell spot, mammogram results, US, continuation of care, possible ortho consult due to persisting left shoulder pain  Given: HEP, Symptoms/condition management, Pain management, Posture/body mechanics, Mobility training, Edema management, Other (comment)  Program: Modified  How Provided: Verbal, Demonstration  Provided to: Patient  Level of Understanding: Teach back education performed, Verbalized, Demonstrated              Time Calculation:   Start Time: 1615  Stop Time: 1655  Time Calculation (min): 40 min   Therapy Charges for Today     Code Description Service Date Service Provider Modifiers Qty    [id number]  PT MANUAL THERAPY EA 15 MIN 12/8/2020 Yola Valenzuela, PT GP 3                    Yola Broderick PT  12/8/2020

## 2020-12-15 ENCOUNTER — HOSPITAL ENCOUNTER (OUTPATIENT)
Dept: PHYSICAL THERAPY | Facility: HOSPITAL | Age: 60
Setting detail: THERAPIES SERIES
Discharge: HOME | End: 2020-12-15

## 2020-12-15 DIAGNOSIS — Z91.89 AT RISK FOR LYMPHEDEMA: ICD-10-CM

## 2020-12-15 DIAGNOSIS — M79.622 AXILLARY PAIN, LEFT: ICD-10-CM

## 2020-12-15 DIAGNOSIS — Z17.0 MALIGNANT NEOPLASM OF OVERLAPPING SITES OF LEFT BREAST IN FEMALE, ESTROGEN RECEPTOR POSITIVE (HCC): Primary | ICD-10-CM

## 2020-12-15 DIAGNOSIS — M25.612 DECREASED RANGE OF MOTION OF LEFT SHOULDER: ICD-10-CM

## 2020-12-15 DIAGNOSIS — M75.42 SHOULDER IMPINGEMENT SYNDROME, LEFT: ICD-10-CM

## 2020-12-15 DIAGNOSIS — N64.89 BREAST EDEMA: ICD-10-CM

## 2020-12-15 DIAGNOSIS — Z85.3 HISTORY OF LEFT BREAST CANCER: ICD-10-CM

## 2020-12-15 DIAGNOSIS — Z98.890 STATUS POST LEFT BREAST LUMPECTOMY: ICD-10-CM

## 2020-12-15 DIAGNOSIS — L90.5 SCAR TISSUE: ICD-10-CM

## 2020-12-15 DIAGNOSIS — C50.812 MALIGNANT NEOPLASM OF OVERLAPPING SITES OF LEFT BREAST IN FEMALE, ESTROGEN RECEPTOR POSITIVE (HCC): Primary | ICD-10-CM

## 2020-12-15 PROCEDURE — 97140 MANUAL THERAPY 1/> REGIONS: CPT | Performed by: PHYSICAL THERAPIST

## 2020-12-16 ENCOUNTER — TELEPHONE (OUTPATIENT)
Dept: OTHER | Facility: HOSPITAL | Age: 60
End: 2020-12-16

## 2020-12-16 NOTE — THERAPY RE-EVALUATION
Physical Therapy Lymphedema Re-Evaluation  [facility]     Patient Name: Richa Dolan  : 1960  MRN: [id number]  Today's Date: 2020      Visit Date: 12/15/2020    Visit Dx:    ICD-10-CM ICD-9-CM   1. Malignant neoplasm of overlapping sites of left breast in female, estrogen receptor positive (CMS/HCC)  C50.812 174.8    Z17.0 V86.0   2. Status post left breast lumpectomy  Z98.890 V45.89   3. At risk for lymphedema  Z91.89 V49.89   4. Shoulder impingement syndrome, left  M75.42 726.2   5. Decreased range of motion of left shoulder  M25.612 719.51   6. Axillary pain, left  M79.622 729.5   7. Breast edema  N64.89 611.89   8. Scar tissue  L90.5 709.2   9. History of left breast cancer  Z85.3 V10.3       Patient Active Problem List   Diagnosis   • Abnormal liver function tests   • Hypertension   • Insomnia   • Knee pain   • Leukopenia   • Acute right-sided low back pain without sciatica   • Knee osteoarthritis   • Osteoporosis   • Arthralgia of multiple joints   • Seasonal allergic rhinitis   • Thrombocytopenia (CMS/HCC)   • Bone cyst   • Chronic pain of right knee   • Acquired immunocompromised state (CMS/HCC)   • Bruises easily   • Loss of hair   • Anxiety   • Elevated liver enzymes   • GERD (gastroesophageal reflux disease)   • HIV positive long-term non-progressor (CMS/HCC)   • Hypercholesterolemia   • Lupus anticoagulant positive   • Neck pain, chronic   • Chemotherapy-induced neutropenia (CMS/HCC)   • Tear of medial meniscus of right knee, current   • Chondromalacia, knee   • ICH (intracerebral hemorrhage) (CMS/HCC)   • Stomach discomfort   • Abnormal bruising   • Alopecia   • Headache   • Intractable chronic migraine without aura and without status migrainosus   • Osteoarthritis of right knee   • Need for vaccination   • Mild episode of recurrent major depressive disorder (CMS/HCC)   • Ingrown nail of great toe of right foot   • Left wrist pain   • Breast cancer screening   • Malignant  neoplasm of overlapping sites of left breast in female, estrogen receptor positive (CMS/HCC)   • History of stroke   • Arthritis   • Family history of breast cancer   • Encounter for screening mammogram for breast cancer   • Epistaxis   • Chemotherapy-induced thrombocytopenia   • Anemia associated with chemotherapy   • Substance or medication-induced sleep disorder, 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CMS/[facility])   • Body aches   • Neuropathy   • History of left breast cancer   • Other fatigue   • HIV infection (CMS/[facility])        Past Medical History:   Diagnosis Date   • Anxiety    • Cerebrovascular accident (CVA) (CMS/[facility]) 8/22/2017    NO RESIDUAL AFFECT   • DDD (degenerative disc disease), cervical    • Depression    • Drug therapy    • Elevated cholesterol    • GERD (gastroesophageal reflux disease)    • H/O ICH (intracerebral hemorrhage) (CMS/[facility])    • History of chemotherapy    • History of stroke    • History of thrombocytopenia    • HIV (human immunodeficiency virus infection) (CMS/[facility]) 1996   • Hx of radiation therapy    • Hyperlipidemia    • Insomnia    • Lupus anticoagulant positive    • Malignant neoplasm of overlapping sites of left breast in female, estrogen receptor positive (CMS/[facility]) 11/12/2019   • Meniscus tear 2017    RIGHT   • Migraine    • Neck pain     WITH SHOOTING PAIN LEFT RIGHT ARM   • Osteoarthritis    • Osteoporosis    • Pain management     sees 1 every 3 months for scripts/injection/pain meds   • Seasonal allergies    • Spinal headache     THINKS HAD BLOOD PATCH AFTER EPIDUAL    • Tick bite 06/2014        Past Surgical History:   Procedure Laterality Date   • ADENOIDECTOMY     • BREAST LUMPECTOMY     • BREAST LUMPECTOMY WITH SENTINEL NODE BIOPSY Left 5/28/2020    Procedure: BREAST LUMPECTOMY WITH SENTINEL NODE BIOPSY AND NEEDLE LOCALIZATION AND axillary dissection;  Surgeon: Landen Forman MD;  Location: [facility];  Service: General;  Laterality: Left;   • BREAST SURGERY Left 5/28/2020    Procedure:  LEFT LATERAL INTERCOASTAL ARTERY  flap ;  Surgeon: Yusuf Garcia MD;  Location: [facility] OR;  Service: Plastics;  Laterality: Left;   • CHOLECYSTECTOMY     • HYSTERECTOMY     • KNEE ARTHROSCOPY Right 3/24/2017    Procedure:  RIGHT  KNEE ARTHROSCOPY WITH DEBRIDEMENT CHONDROPLASTY PARTIAL MENISCECTOMY;  Surgeon: Karl Galeas MD;  Location: [facility];  Service:    • KNEE CARTILAGE SURGERY Right 2007   • TONSILLECTOMY     • US GUIDED LYMPH NODE BIOPSY  10/29/2019    LEFT BREAST   • VENOUS ACCESS DEVICE (PORT) INSERTION Right 12/2/2019    Procedure: INSERTION VENOUS ACCESS DEVICE RIGHT;  Surgeon: Landen Forman MD;  Location: [facility] OR;  Service: General       Visit Dx:    ICD-10-CM ICD-9-CM   1. Malignant neoplasm of overlapping sites of left breast in female, estrogen receptor positive (CMS/[facility])  C50.812 174.8    Z17.0 V86.0   2. Status post left breast lumpectomy  Z98.890 V45.89   3. At risk for lymphedema  Z91.89 V49.89   4. Shoulder impingement syndrome, left  M75.42 726.2   5. Decreased range of motion of left shoulder  M25.612 719.51   6. Axillary pain, left  M79.622 729.5   7. Breast edema  N64.89 611.89   8. Scar tissue  L90.5 709.2   9. History of left breast cancer  Z85.3 V10.3           Lymphedema     Row Name 12/15/20 9134             Lymphedema Assessment    Lymphedema Classification  LUE:;at risk/stage 0;secondary;Other: left breast, at risk  -SC      Lymphedema Cancer Related Sx  left;lumpectomy;sentinel node biopsy;axillary dissection;reconstructive  -SC      Lymphedema Surgery Comments  Port 12/02/2019; L lump/AD 05/28/2020  -SC      Lymph Nodes Removed #  13  -SC      Positive Lymph Nodes #  10  -SC      Chemo Received  yes  -SC      Chemo Treatments #/Timeframe  NAC Dec 2019 through April 2020, AC/Taxol  -SC      Radiation Therapy Received  yes  -SC      Radiation Treatments #/Timeframe  completed 08/18/2020  -SC      Infections or Cellulitis?  no  -SC       Lymphedema Assessment Comments  stage 1 to early stage 2 left breast, high risk left UE  -SC         Lymphedema Edema Assessment    Ptting Edema Category  By grade out of 4  -SC      Pitting Edema  + 1/4 (+1 of 4);Mild;Moderate left breast, negative in left UE  -SC      Edema Assessment Comment  mild watery edema left medial breast, thickening of skin and scar tissue at lumpectomy site left breast, fibrosis inferior breast, does soften with manual techniques, negative axillary cording or scar tissue, good mobility of lat flap scar with negative edema noted in region  -SC         Skin Changes/Observations    Skin Observations Comment  mild post radiation skin changes, moderate fibrosis left breast with marked limited mobility lateral region, moderate scar tissue at lat flap and lumpectomy region, very minimal at axillary incision  -SC         Lymphedema Sensation    Lymphedema Sensation Tests  light touch  -SC      Lymphedema Light Touch  LUE:;mild impairment;moderate impairment  -SC         Lymphedema Pulses/Capillary Refill    Lymph Pulses Capillary Refill Comments  intact  -SC         Manual Lymphatic Drainage    Manual Lymphatic Drainage  initial sequence;opened regional lymph nodes;opened anastamoses;extremity treatment  -SC      Initial Sequence  supraclavicular;shoulder collectors;abdomen;diaphragmatic breathing  -SC      Supraclavicular  right;left  -SC      Shoulder Collectors  right;left  -SC      Abdomen  superficial  -SC      Opened Regional Lymph Nodes  axillary;inguinal;ribs  -SC      Axillary  right;left  -SC      Inguinal  left  -SC      Opened Anastamoses  anterior axillo-axillary;axillo-inguinal  -SC      Axillo-Inguinal  left  -SC      Extremity Treatment  simple/brief MLD;MLD to proximal limb only  -SC      Simple/Brief MLD  left  -SC      MLD to Proximal Limb Only  left  -SC      Manual Lymphatic Drainage Comments  left breast MLD  -SC      Manual Therapy  MLD left breast, scar massage  lumpectomy and lat flap incision, myofascial release left pec, anterior shoulder, medial arm, axilla, lateral flank, subscapularis release, AP and inferior joint mobs L GHJ, lateral distractions, inferior oscillations, PROM all planes, kinesiotape lift left breast, scar release left lumpectomy, and Y strip left medial breast, education of use and wear time and removal if indicated  -SC         Compression/Skin Care    Compression/Skin Care Comments  awaiting compression bra and swell spot, referral to [facility] for bra fitting, education of home pneumatic compression pump  -SC         L-Dex Bioimpedence Screening    L-Dex Measurement Extremity  -- insurance does not cover at this time  -SC      L-Dex UE Score  -0.3  -SC      L-Dex UE Baseline Score  -4.7  -SC      L-Dex UE Value Change  4.4  -SC      L-Dex UE Comment  elevated 4.4, will monitor closely  -SC        User Key  (r) = Recorded By, (t) = Taken By, (c) = Cosigned By    Initials Name Provider Type    Yola Rodriguez, PT Physical Therapist            PT Ortho     Row Name 12/15/20 9992       Subjective Comments    Subjective Comments  I am doing ok. Just worried about the testing I have coming up next week. I do think when I have the tape on I feel better in the arm. It is every night when I sit down to rest and watch TV that my left arm starts to hurt. Like a band, been that way since surgery. If I elevate it does feel better. It doesn't limit me during the day. I have been doing my exercises. I would be interested in a bra fitting if it would help like the tape. Yesterday and today I have been having sharp pains in the breast and to the side. I don't know if it is waking up or more fluid or what but it has been bothering me.   -SC       Precautions and Contraindications    Precautions/Limitations  other (see comments)  -SC    Precautions  R mediport  -SC    Contraindications  L AD, no IV/BP L UE, no modalities  -SC       Subjective Pain     Able to rate subjective pain?  yes  -SC    Pre-Treatment Pain Level  2  -SC    Subjective Pain Comment  left breast into lateral flank/lap flap scar  -SC       Posture/Observations    Alignment Options  Forward head;Cervical lordosis;Thoracic kyphosis;Rounded shoulders;Scapular elevation;Scapular winging;Scoliosis;Lumbar lordosis;Genu valgus;Foot pronation  -SC    Forward Head  Mild;Increased;Sitting posture  -SC    Cervical Lordosis  Normal  -SC    Thoracic Kyphosis  Mild;Increased;Sitting posture  -SC    Rounded Shoulders  Bilateral:;Mild;Increased;Sitting posture  -SC    Scapular Elevation  Right:;Mild;Increased;Elevated;Sitting posture  -SC    Scapular winging  Bilateral:;Normal  -SC    Scoliosis  Normal;Standing posture  -SC    Lumbar lordosis  Mild;Decreased;Sitting posture  -SC    Genu valgus  Bilateral:;Normal;Standing posture  -SC    Foot pronation  Bilateral:;Normal;Standing posture  -SC    Observations  Incision healing;Edema;Erythema;Muscle atrophy  -SC       Special Tests/Palpation    Special Tests/Palpation  Shoulder  -SC       Shoulder Girdle Accessory Motions    Shoulder Girdle Accessory Motions Tested?  Yes  -SC    Posterior glide of humerus  Left:;Hypomobile;Left pain  -SC    Inferior glide of humerus  Left:;Hypomobile;Left pain  -SC    Lateral distraction  Left:;Hypomobile;Left pain  -SC    Inferior glide of scapula  Left:;Hypomobile  -SC    Retraction mobility of scapula  Left:;Hypomobile  -SC    Upward rotation mobility of scapula  Left:;Hypomobile;Left pain  -SC       Shoulder Impingement/Rotator Cuff Special Tests    Zaidi-Elvin Test (RC Lesion vs. Bursitis)  Left:;Negative  -SC    Neer Impingement Test (RC Lesion vs. Bursitis)  Left:;Positive  -SC    Full Can Test (RC Lesion)  Left:;Negative  -SC    Empty Can Test (RC Lesion)  Left:;Positive  -SC    Drop Arm Test (Full Thickness RC Lesion)  Left:;Negative  -SC    Internal Impingement Sign  Left:;Positive  -SC    Lift-Off Test  (Subscapularis Lesion)  Left:;Negative  -SC       Shoulder Girdle Palpation    Shoulder Girdle Palpation?  Yes  -SC    Long Head of Biceps  Left:;Tender;Guarded/taut;Swollen  -SC    Deltoid  Left:;Tender;Guarded/taut  -SC    Subscapularis  Left:;Tender;Guarded/taut;Trigger point  -SC    Infraspinatus  Left:;Tender;Guarded/taut;Trigger point  -SC    Pect Minor  Left:;Tender;Guarded/taut;Trigger point  -SC    Upper Trap  Left:;Tender;Guarded/taut;Trigger point  -SC    Levator Scapula  Left:;Tender;Guarded/taut;Trigger point  -SC    Rhomboid  Left:;Atrophied  -SC    Lower Trap  Left:;Atrophied  -SC       General ROM    GENERAL ROM COMMENTS  R UE WNLs, L elbow, hand/wrist WNL, PROM left shoulder WNLs all planes, AAROM left shoulder flexion after session WNLs however marked impingement with testing OH positions L GHJ   -SC       MMT (Manual Muscle Testing)    General MMT Comments  R UE WNLs, L GHJ 5/5, L scapula 3-3+/5  -SC       Flexibility    Flexibility Tested?  Upper Extremity  -SC       Pathomechanics    Upper Extremity Pathomechanics  Excessive scapular elevation;Limited scapular upward rotation;Limited thoracic extension;Limited glenohumeral internal rotation  -SC       Gait/Stairs (Locomotion)    Bilateral Gait Deviations  forward flexed posture  -SC      User Key  (r) = Recorded By, (t) = Taken By, (c) = Cosigned By    Initials Name Provider Type    SC Yola Valenzuela, PT Physical Therapist                    Therapy Education  Given: HEP, Symptoms/condition management, Pain management, Posture/body mechanics, Mobility training, Edema management, Other (comment)  Program: Progressed, Modified  How Provided: Verbal, Demonstration, Written  Provided to: Patient  Level of Understanding: Teach back education performed, Verbalized, Demonstrated      OP Exercises     Row Name 12/15/20 3852             Subjective Comments    Subjective Comments  I am doing ok. Just worried about the testing I have coming up next  week. I do think when I have the tape on I feel better in the arm. It is every night when I sit down to rest and watch TV that my left arm starts to hurt. Like a band, been that way since surgery. If I elevate it does feel better. It doesn't limit me during the day. I have been doing my exercises. I would be interested in a bra fitting if it would help like the tape. Yesterday and today I have been having sharp pains in the breast and to the side. I don't know if it is waking up or more fluid or what but it has been bothering me.   -SC         Subjective Pain    Able to rate subjective pain?  yes  -SC      Pre-Treatment Pain Level  2  -SC      Subjective Pain Comment  left breast into lateral flank/lap flap scar  -SC        User Key  (r) = Recorded By, (t) = Taken By, (c) = Cosigned By    Initials Name Provider Type    SC Yola Valenzuela, PT Physical Therapist                      PT OP Goals     Row Name 12/15/20 0430          PT Short Term Goals    STG 1  Patient independent and compliant with initial home exercise program focused on diaphragmatic breathing, range of motion, flexibility to decrease edema and improve lymphatic flow for decreased edema and decreased risk of infection.   -SC     STG 1 Progress  Met  -SC     STG 2  Patient demonstrate proper awareness of What is Lymphedema and 18 Steps of Prevention for improved prevention, management, care of symptoms and ease of transition to self-care of condition.   -SC     STG 2 Progress  Met  -SC     STG 3  Patient demonstrate independence and compliance with proper skin care during/post radiation for improved mobility, decreased scar tissue, axillary cording and edema.  -SC     STG 3 Progress  Met  -SC     STG 4  Patient independent and compliant with breast mobilization techniques for improved mobility, skin care and lymphatic flow.  -SC     STG 4 Progress  Met  -SC     STG 5  Patient independent and compliant with daytime OTS prevention compression  garments as indicated for decreased risk of lymphedema and infection and safety with transition of self-care of condition.   -SC     STG 5 Progress  Met  -SC        Long Term Goals    LTG Date to Achieve  03/17/21  -SC     LTG 1  Patient's bioimpedance score to remain between -10 and 6.5 for decreased risk of developing stage II post lumpectomy lymphedema syndrome left UE and to establish treatment for early intervention of condition if/when indicated.  -SC     LTG 1 Progress  Progressing baseline post op 06/18/2020 -4.7  -SC     LTG 1 Progress Comments  currently -0.3, which is a delta 4.4 increase for patient, to monitor closely, high risk of lymphedema left UE  -SC     LTG 2  Patient independent and compliant with advanced Home Exercise Program and self-care techniques for self-management of condition.   -SC     LTG 2 Progress  Progressing  -SC     LTG 3  Patient able to wear fitted post lumpectomy/radiation bra with padding left (if indicated)  >/=6-8 hours for work schedule for improved compression to lateral flank/left breast.  -SC     LTG 3 Progress  Ongoing  -SC     LTG 3 Progress Comments  referral sent to [facility], awaiting bra and swell spot from [employer] pending insurance  -SC     LTG 4  Patient transition to cardiovascular exercise program (walking) >/=150 to 180 mins/week with moderate intensity for improved heart health, weight loss, decreased risk of osteoporosis and improved phase angle/metabolic rate.  -SC     LTG 4 Progress  Met  -[facility] 5  Patient score </=25% on Quick DASH for improved functional use of L UE home, community, and sleep  [facility]  Met initial evaluation 50%  -[facility] 5 Progress Comments  currently 9.09%, improved from 50% at 06/18/20 and 18.18% at 09/14/20 so improvement of shoulder sympotms noted  -SC     LTG 6  Patient with negative impingement testing left shoulder for improved mobility, posture and decreased pain with ADLs and home activities   -SC     LTG 6 Progress  Progressing  -SC     LTG 6 Progress Comments  improved but persisting  -SC     LTG 7  Patient with minimal fibrosis left breast for decreased risk of stage II lymphedema left and progression of decrease/infections  -SC     LTG 7 Progress  Progressing  -SC     LTG 7 Progress Comments  softens with MLD  -SC        Time Calculation    PT Goal Re-Cert Due Date  03/16/21  -SC       User Key  (r) = Recorded By, (t) = Taken By, (c) = Cosigned By    Initials Name Provider Type    SC Yola Valenzuela, PT Physical Therapist          PT Assessment/Plan     Row Name 12/15/20 1615          PT Assessment    Functional Limitations  Limitation in home management;Limitations in community activities;Performance in leisure activities;Performance in sport activities  -SC     Impairments  Endurance;Impaired flexibility;Impaired lymphatic circulation;Impaired muscle endurance;Integumentary integrity;Joint mobility;Muscle strength;Pain;Peripheral nerve integrity;Poor body mechanics;Posture;Range of motion;Sensation  -SC     Assessment Comments  Mrs. Dolan is a pleasant 60 year old female, diagnosed with left breast cancer in 2019, mediport placement right 12/02/2019, initiated neoadjuvant chemotherapy with oncologist Dr. Moreno Dec 2019, completed April 2020, AC/Taxol, s/p left lumpectomy with axillary dissection 10/13 L ALN (+) with flap reconstruction closure 05/28/2020 performed by surgeons Dr. Forman and Radha. Completed adjuvant radiation in August 2020. Mild post radiation skin changes. Resolution of axillary cording, however persisting left shoulder impingement and altered joint mobility left GHJ with poor scapular stabilization due to surgery, lat flap, radiation. Is at high risk of post lumpectomy lymphedema syndrome left UE/breast due to surgery, lymph node removal, lymph node involvement, radiation, age, and additional medical issues. Post radiation, weaned from compression bioimpedance was  stable and negative for left UE L-Dex -3.5, stable in Sept 2020. Reassessed today due to persisting symptoms, left breast edema, and upcoming mammo/US. Current L-Dex is -0.3, which is WNL however elevated near delta 5 for patient. To monitor closely. Richa hamilton returned Oct 2020 with increased scar tissue to left breast currently with positive mild lymphedema left breast with increased fibrosis and scar tissue to left breast as well. High risk of lymphedema left UE. Initiated kinesiotape. Orders sent for compression bra and swell spot, awaiting arrival at this time. Referral to [facility] for bra fitting. Education of home pneumatic compression pump. Will monitor closely. To continue with weekly therapy at this time for optimal rehabilitation. Possible referral to ortho due to persisting left shoulder pain pending results of US/Mammo scheduled 12/24/20.  -SC        PT Plan    Predicted Duration of Therapy Intervention (PT)  12 sessions  -SC     Predicted Duration of Therapy Intervention (OT)  bra fitting, pump, self massage, kinesiotape, progress RTC stabilization program, continuation of care, US/Mammo 12/24/20  -SC       User Key  (r) = Recorded By, (t) = Taken By, (c) = Cosigned By    Initials Name Provider Type    Yola Rodriguez, PT Physical Therapist             Outcome Measure Options: Quick DASH  Quick DASH  Open a tight or new jar.: No Difficulty  Do heavy household chores (e.g., wash walls, wash floors): Mild Difficulty  Carry a shopping bag or briefcase: No Difficulty  Wash your back: No Difficulty  Use a knife to cut food: No Difficulty  Recreational activities in which you take some force or impact through your arm, should or hand (e.g. golf, hammering, tennis, etc.): No Difficulty  During the past week, to what extent has your arm, shoulder, or hand problem interfered with your normal social activites with family, friends, neighbors or groups?: Not at all  During the past week, were  you limited in your work or other regular daily activities as a result of your arm, shoulder or hand problem?: Not limited at all  Arm, Shoulder, or hand pain: None  Tingling (pins and needles) in your arm, shoulder, or hand: Moderate  During the past week, how much difficulty have you had sleeping because of the pain in your arm, shoulder or hand?: Mild Difficulty  Number of Questions Answered: 11  Quick DASH Score: 9.09         Time Calculation:   Start Time: 1615  Stop Time: 1655  Time Calculation (min): 40 min   Therapy Charges for Today     Code Description Service Date Service Provider Modifiers Qty    [id number] HC PT MANUAL THERAPY EA 15 MIN 12/15/2020 Yola Valenzuela, PT GP 3          PT G-Codes  Outcome Measure Options: Quick DASH  Quick DASH Score: 9.09         Yola Broderick, PT  12/16/2020

## 2020-12-16 NOTE — TELEPHONE ENCOUNTER
Called MsTerri Magdaleno to see how she was doing. She was interested in getting support. We discussed [employer] and [employer] and she was interested in both. Emailed her info on [employer] and sent a referral to [employer]. We also discussed our supportive oncology clinic and she stated she wanted to try the support groups first. She will let me know if any other needs arise.

## 2020-12-21 DIAGNOSIS — R10.9 STOMACH DISCOMFORT: ICD-10-CM

## 2020-12-22 RX ORDER — COLESEVELAM 180 1/1
1250 TABLET ORAL DAILY
Qty: 60 TABLET | Refills: 0 | Status: SHIPPED | OUTPATIENT
Start: 2020-12-22 | End: 2021-01-18

## 2020-12-23 DIAGNOSIS — Z17.0 MALIGNANT NEOPLASM OF OVERLAPPING SITES OF LEFT BREAST IN FEMALE, ESTROGEN RECEPTOR POSITIVE (HCC): Primary | ICD-10-CM

## 2020-12-23 DIAGNOSIS — Z98.890 HISTORY OF LUMPECTOMY OF LEFT BREAST: ICD-10-CM

## 2020-12-23 DIAGNOSIS — C50.812 MALIGNANT NEOPLASM OF OVERLAPPING SITES OF LEFT BREAST IN FEMALE, ESTROGEN RECEPTOR POSITIVE (HCC): Primary | ICD-10-CM

## 2020-12-24 ENCOUNTER — HOSPITAL ENCOUNTER (OUTPATIENT)
Dept: ULTRASOUND IMAGING | Facility: HOSPITAL | Age: 60
End: 2020-12-24

## 2020-12-24 ENCOUNTER — HOSPITAL ENCOUNTER (OUTPATIENT)
Dept: MAMMOGRAPHY | Facility: HOSPITAL | Age: 60
Discharge: HOME | End: 2020-12-24
Admitting: INTERNAL MEDICINE

## 2020-12-24 DIAGNOSIS — C50.812 MALIGNANT NEOPLASM OF OVERLAPPING SITES OF LEFT BREAST IN FEMALE, ESTROGEN RECEPTOR POSITIVE (HCC): ICD-10-CM

## 2020-12-24 DIAGNOSIS — Z17.0 MALIGNANT NEOPLASM OF OVERLAPPING SITES OF LEFT BREAST IN FEMALE, ESTROGEN RECEPTOR POSITIVE (HCC): ICD-10-CM

## 2020-12-24 DIAGNOSIS — R92.8 ABNORMALITY OF LEFT BREAST ON SCREENING MAMMOGRAM: ICD-10-CM

## 2020-12-24 PROCEDURE — 77065 DX MAMMO INCL CAD UNI: CPT

## 2020-12-28 ENCOUNTER — TELEPHONE (OUTPATIENT)
Dept: ONCOLOGY | Facility: CLINIC | Age: 60
End: 2020-12-28

## 2020-12-28 DIAGNOSIS — R92.8 ABNORMALITY OF LEFT BREAST ON SCREENING MAMMOGRAM: ICD-10-CM

## 2020-12-28 DIAGNOSIS — C50.812 MALIGNANT NEOPLASM OF OVERLAPPING SITES OF LEFT BREAST IN FEMALE, ESTROGEN RECEPTOR POSITIVE (HCC): Primary | ICD-10-CM

## 2020-12-28 DIAGNOSIS — Z17.0 MALIGNANT NEOPLASM OF OVERLAPPING SITES OF LEFT BREAST IN FEMALE, ESTROGEN RECEPTOR POSITIVE (HCC): Primary | ICD-10-CM

## 2020-12-28 NOTE — PROGRESS NOTES
Order entered for Stereotactic biopsy of left breast re: abnormal diagnostic mammogram V.O. Dr. Moreno

## 2020-12-28 NOTE — PROGRESS NOTES
Subjective     HISTORY OF PRESENT ILLNESS:     Order entered for Stereotactic biopsy of left breast re: abnormal diagnostic mammogram of left breast V.O. Dr. Moreno    ***    Past Medical History, Past Surgical History, Social History, Family History have been reviewed and are without significant changes except as mentioned.    Review of Systems   A comprehensive 14 point review of systems was performed and was negative except as mentioned.    Medications:  The current medication list was reviewed in the EMR    ALLERGIES:    Allergies   Allergen Reactions   • Augmentin [Amoxicillin-Pot Clavulanate] Hives       Objective      There were no vitals filed for this visit.  Current Status 12/1/2020   ECOG score 0       Physical Exam  ***    RECENT LABS:  Hematology WBC   Date Value Ref Range Status   12/01/2020 4.24 3.40 - 10.80 10*3/mm3 Final   10/05/2020 7.6 3.4 - 10.8 x10E3/uL Final   04/08/2019 6.06 4.5 - 11.0 10*3/uL Final     RBC   Date Value Ref Range Status   12/01/2020 3.75 (L) 3.77 - 5.28 10*6/mm3 Final   10/05/2020 4.09 3.77 - 5.28 x10E6/uL Final   04/08/2019 4.47 4.0 - 5.2 10*6/uL Final     Hemoglobin   Date Value Ref Range Status   12/01/2020 12.4 12.0 - 15.9 g/dL Final   04/08/2019 15.0 12.0 - 16.0 g/dL Final     Hematocrit   Date Value Ref Range Status   12/01/2020 37.7 34.0 - 46.6 % Final   04/08/2019 43.8 36.0 - 46.0 % Final     Platelets   Date Value Ref Range Status   12/01/2020 209 140 - 450 10*3/mm3 Final   04/08/2019 272 140 - 440 10*3/uL Final              Assessment/Plan   ***                  12/28/2020      CC:

## 2020-12-28 NOTE — TELEPHONE ENCOUNTER
----- Message from Caty Reed RN sent at 12/28/2020 10:05 AM EST -----  Order has been entered for stereotactic biopsy of left breast.  Please schedule with patient in one to two weeks, as she is expecting your call.    She will need to follow up with Dr. Moreno one week after biopsy.    Thank you,  Caty  ----- Message -----  From: Ruby Moreno MD  Sent: 12/24/2020   5:39 PM EST  To: Caty Reed RN    Can you please schedule stereotactic biopsy of the left breast mass.  Her diagnostic mammogram showed abnormality 0.4 cm and they wanted her as to biopsy that.  Patient is expecting a call from you.  Please arrange for that in 1 to 2 weeks.  And notify the patient.  Also make sure that she follows up with us after the biopsy, 1 week after the biopsy.  Thanks  Ruby Moreno MD

## 2020-12-29 ENCOUNTER — APPOINTMENT (OUTPATIENT)
Dept: PHYSICAL THERAPY | Facility: HOSPITAL | Age: 60
End: 2020-12-29

## 2020-12-30 ENCOUNTER — APPOINTMENT (OUTPATIENT)
Dept: LAB | Facility: HOSPITAL | Age: 60
End: 2020-12-30

## 2021-01-05 ENCOUNTER — HOSPITAL ENCOUNTER (OUTPATIENT)
Dept: PHYSICAL THERAPY | Facility: HOSPITAL | Age: 61
Setting detail: THERAPIES SERIES
Discharge: HOME | End: 2021-01-05

## 2021-01-05 DIAGNOSIS — Z85.3 HISTORY OF LEFT BREAST CANCER: ICD-10-CM

## 2021-01-05 DIAGNOSIS — M25.612 DECREASED RANGE OF MOTION OF LEFT SHOULDER: ICD-10-CM

## 2021-01-05 DIAGNOSIS — G47.00 INSOMNIA, UNSPECIFIED TYPE: ICD-10-CM

## 2021-01-05 DIAGNOSIS — C50.812 MALIGNANT NEOPLASM OF OVERLAPPING SITES OF LEFT BREAST IN FEMALE, ESTROGEN RECEPTOR POSITIVE (HCC): Primary | ICD-10-CM

## 2021-01-05 DIAGNOSIS — L90.5 SCAR TISSUE: ICD-10-CM

## 2021-01-05 DIAGNOSIS — M75.42 SHOULDER IMPINGEMENT SYNDROME, LEFT: ICD-10-CM

## 2021-01-05 DIAGNOSIS — Z98.890 STATUS POST LEFT BREAST LUMPECTOMY: ICD-10-CM

## 2021-01-05 DIAGNOSIS — N64.89 BREAST EDEMA: ICD-10-CM

## 2021-01-05 DIAGNOSIS — M79.622 AXILLARY PAIN, LEFT: ICD-10-CM

## 2021-01-05 DIAGNOSIS — Z17.0 MALIGNANT NEOPLASM OF OVERLAPPING SITES OF LEFT BREAST IN FEMALE, ESTROGEN RECEPTOR POSITIVE (HCC): Primary | ICD-10-CM

## 2021-01-05 DIAGNOSIS — Z91.89 AT RISK FOR LYMPHEDEMA: ICD-10-CM

## 2021-01-05 PROCEDURE — 97140 MANUAL THERAPY 1/> REGIONS: CPT | Performed by: PHYSICAL THERAPIST

## 2021-01-05 RX ORDER — ZOLPIDEM TARTRATE 10 MG/1
10 TABLET ORAL NIGHTLY PRN
Qty: 90 TABLET | Refills: 0 | Status: SHIPPED | OUTPATIENT
Start: 2021-01-05 | End: 2021-03-09

## 2021-01-05 RX ORDER — TAMOXIFEN CITRATE 20 MG/1
TABLET ORAL
Qty: 90 TABLET | Refills: 1 | Status: SHIPPED | OUTPATIENT
Start: 2021-01-05 | End: 2021-05-05

## 2021-01-05 NOTE — THERAPY TREATMENT NOTE
Outpatient Physical Therapy Lymphedema Treatment Note  [facility]     Patient Name: Richa Dolan  : 1960  MRN: [id number]  Today's Date: 2021        Visit Date: 2021    Visit Dx:    ICD-10-CM ICD-9-CM   1. Malignant neoplasm of overlapping sites of left breast in female, estrogen receptor positive (CMS/HCC)  C50.812 174.8    Z17.0 V86.0   2. Status post left breast lumpectomy  Z98.890 V45.89   3. At risk for lymphedema  Z91.89 V49.89   4. Shoulder impingement syndrome, left  M75.42 726.2   5. Decreased range of motion of left shoulder  M25.612 719.51   6. Axillary pain, left  M79.622 729.5   7. Breast edema  N64.89 611.89   8. Scar tissue  L90.5 709.2   9. History of left breast cancer  Z85.3 V10.3       Patient Active Problem List   Diagnosis   • Abnormal liver function tests   • Hypertension   • Insomnia   • Knee pain   • Leukopenia   • Acute right-sided low back pain without sciatica   • Knee osteoarthritis   • Osteoporosis   • Arthralgia of multiple joints   • Seasonal allergic rhinitis   • Thrombocytopenia (CMS/HCC)   • Bone cyst   • Chronic pain of right knee   • Acquired immunocompromised state (CMS/HCC)   • Bruises easily   • Loss of hair   • Anxiety   • Elevated liver enzymes   • GERD (gastroesophageal reflux disease)   • HIV positive long-term non-progressor (CMS/HCC)   • Hypercholesterolemia   • Lupus anticoagulant positive   • Neck pain, chronic   • Chemotherapy-induced neutropenia (CMS/HCC)   • Tear of medial meniscus of right knee, current   • Chondromalacia, knee   • ICH (intracerebral hemorrhage) (CMS/HCC)   • Stomach discomfort   • Abnormal bruising   • Alopecia   • Headache   • Intractable chronic migraine without aura and without status migrainosus   • Osteoarthritis of right knee   • Need for vaccination   • Mild episode of recurrent major depressive disorder (CMS/HCC)   • Ingrown nail of great toe of right foot   • Left wrist pain   • Breast cancer screening   •  Malignant neoplasm of overlapping sites of left breast in female, estrogen receptor positive (CMS/HCC)   • History of stroke   • Arthritis   • Family history of breast cancer   • Encounter for screening mammogram for breast cancer   • Epistaxis   • Chemotherapy-induced thrombocytopenia   • Anemia associated with chemotherapy   • Substance or medication-induced sleep disorder, 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CMS/HCC)   • Body aches   • Neuropathy   • History of left breast cancer   • Other fatigue   • HIV infection (CMS/HCC)        Lymphedema     Row Name 01/05/21 0280             Subjective Pain    Able to rate subjective pain?  yes  -SC      Pre-Treatment Pain Level  0  -SC         Subjective Comments    Subjective Comments  I have my biopsy on Friday 01/08/21 but Dr. COOLEY isn't worried so I am not either. I have been doing a lot of scar massage. My shoulder is the same.  -SC         Manual Lymphatic Drainage    Manual Lymphatic Drainage  initial sequence;opened regional lymph nodes;opened anastamoses;extremity treatment  -SC      Initial Sequence  supraclavicular;shoulder collectors;abdomen;diaphragmatic breathing  -SC      Supraclavicular  right;left  -SC      Shoulder Collectors  right;left  -SC      Abdomen  superficial  -SC      Opened Regional Lymph Nodes  axillary;inguinal;ribs  -SC      Axillary  right;left  -SC      Inguinal  left  -SC      Opened Anastamoses  anterior axillo-axillary;axillo-inguinal  -SC      Axillo-Inguinal  left  -SC      Extremity Treatment  simple/brief MLD;MLD to proximal limb only  -SC      Simple/Brief MLD  left  -SC      MLD to Proximal Limb Only  left  -SC      Manual Lymphatic Drainage Comments  left breast MLD  -SC      Manual Therapy  MLD left breast, scar massage lumpectomy and lat flap incision, myofascial release left pec, anterior shoulder, medial arm, axilla, lateral flank, subscapularis release, AP and inferior joint mobs L GHJ, lateral distractions, inferior oscillations, PROM all planes,  kinesiotape lift left breast, scar release left lumpectomy, and Y strip left medial breast, education of use and wear time and removal if indicated  -SC         Compression/Skin Care    Compression/Skin Care Comments  awaiting compression bra and swell spot, referral to [facility] for bra fitting, education of home pneumatic compression pump  -SC        User Key  (r) = Recorded By, (t) = Taken By, (c) = Cosigned By    Initials Name Provider Type    Yola Rodriguez, PT Physical Therapist                        PT Assessment/Plan     Row Name 01/05/21 1615          PT Assessment    Assessment Comments  assessed goals. Biopsy 01/08/2021  -SC        PT Plan    PT Plan Comments  continue MLD, progress shoulder, scapular stabilization program, update HEP, referral to ortho if indicated, review biopsy results  -SC       User Key  (r) = Recorded By, (t) = Taken By, (c) = Cosigned By    Initials Name Provider Type    Yola Rodriguez, PT Physical Therapist             OP Exercises     Row Name 01/05/21 1617             Subjective Comments    Subjective Comments  I have my biopsy on Friday 01/08/21 but Dr. COOELY isn't worried so I am not either. I have been doing a lot of scar massage. My shoulder is the same.  -SC         Subjective Pain    Able to rate subjective pain?  yes  -SC      Pre-Treatment Pain Level  0  -SC        User Key  (r) = Recorded By, (t) = Taken By, (c) = Cosigned By    Initials Name Provider Type    Yola Rodriguez, PT Physical Therapist                      PT OP Goals     Row Name 01/05/21 1617          PT Short Term Goals    STG 1  Patient independent and compliant with initial home exercise program focused on diaphragmatic breathing, range of motion, flexibility to decrease edema and improve lymphatic flow for decreased edema and decreased risk of infection.   -SC     STG 1 Progress  Met  -SC     STG 2  Patient demonstrate proper awareness of What is Lymphedema and 18 Steps  of Prevention for improved prevention, management, care of symptoms and ease of transition to self-care of condition.   -SC     STG 2 Progress  Met  -SC     STG 3  Patient demonstrate independence and compliance with proper skin care during/post radiation for improved mobility, decreased scar tissue, axillary cording and edema.  -SC     STG 3 Progress  Met  -SC     STG 4  Patient independent and compliant with breast mobilization techniques for improved mobility, skin care and lymphatic flow.  -SC     STG 4 Progress  Met  -SC     STG 5  Patient independent and compliant with daytime OTS prevention compression garments as indicated for decreased risk of lymphedema and infection and safety with transition of self-care of condition.   -SC     STG 5 Progress  Met  -SC        Long Term Goals    LTG Date to Achieve  21  -SC     LTG 1  Patient's bioimpedance score to remain between -10 and 6.5 for decreased risk of developing stage II post lumpectomy lymphedema syndrome left UE and to establish treatment for early intervention of condition if/when indicated.  -SC     LTG 1 Progress  Progressing baseline post op 2020 -4.7  -SC     LTG 1 Progress Comments  insurance does not cover at this time  -SC     LTG 2  Patient independent and compliant with advanced Home Exercise Program and self-care techniques for self-management of condition.   -SC     LTG 2 Progress  Progressing  -SC     LTG 3  Patient able to wear fitted post lumpectomy/radiation bra with padding left (if indicated)  >/=6-8 hours for work schedule for improved compression to lateral flank/left breast.  -SC     LTG 3 Progress  Ongoing  -SC     LTG 3 Progress Comments  referral to [facility], to reschedule fitting (canceled due to )  -SC     LTG 4  Patient transition to cardiovascular exercise program (walking) >/=150 to 180 mins/week with moderate intensity for improved heart health, weight loss, decreased risk of osteoporosis and improved  phase angle/metabolic rate.  -SC     LTG 4 Progress  Met  -[facility] 5  Patient score </=25% on Quick DASH for improved functional use of L UE home, community, and sleep  -[facility] 5 Progress  Met initial evaluation 50%  -[facility] 6  Patient with negative impingement testing left shoulder for improved mobility, posture and decreased pain with ADLs and home activities  -[facility]  Progressing  -[facility] 6 Progress Comments  mild persisting but improved mobility noted  -[facility] 7  Patient with minimal fibrosis left breast for decreased risk of stage II lymphedema left and progression of decrease/infections  -SC     LTG 7 Progress  Progressing  -[facility] 7 Progress Comments  marked improved, compliant with massage, biopsy 01/08/2021  -SC        Time Calculation    PT Goal Re-Cert Due Date  03/17/21  -SC       User Key  (r) = Recorded By, (t) = Taken By, (c) = Cosigned By    Initials Name Provider Type    SC Yola Valenzuela, PT Physical Therapist          Therapy Education  Education Details: bra fitting, kinesiotape  Given: HEP, Symptoms/condition management, Pain management, Posture/body mechanics, Mobility training, Edema management, Other (comment)  Program: Reinforced  How Provided: Verbal, Demonstration  Provided to: Patient  Level of Understanding: Teach back education performed, Verbalized, Demonstrated              Time Calculation:   Start Time: 1617  Stop Time: 1648  Time Calculation (min): 31 min   Therapy Charges for Today     Code Description Service Date Service Provider Modifiers Qty    41595029578  PT MANUAL THERAPY EA 15 MIN 1/5/2021 Yola Valenzuela, PT GP 2                    Yola Broderick PT  1/5/2021

## 2021-01-07 NOTE — PROGRESS NOTES
Ms. thibodeaux. for biopsy. Arrival/appt. times & location confirmed. Medical history & medications reviewed. Meds. restrictions. expl. Adv. to wear a soft bra. Okay to eat/drink prior to arrival. Questions addressed. Adv. visitors with outpt. not allowed.

## 2021-01-08 ENCOUNTER — HOSPITAL ENCOUNTER (OUTPATIENT)
Dept: MAMMOGRAPHY | Facility: HOSPITAL | Age: 61
Discharge: HOME | End: 2021-01-08
Admitting: RADIOLOGY

## 2021-01-08 VITALS
RESPIRATION RATE: 16 BRPM | SYSTOLIC BLOOD PRESSURE: 128 MMHG | HEART RATE: 65 BPM | HEIGHT: 68 IN | OXYGEN SATURATION: 97 % | BODY MASS INDEX: 22.73 KG/M2 | DIASTOLIC BLOOD PRESSURE: 81 MMHG | WEIGHT: 150 LBS | TEMPERATURE: 98.4 F

## 2021-01-08 DIAGNOSIS — Z17.0 MALIGNANT NEOPLASM OF OVERLAPPING SITES OF LEFT BREAST IN FEMALE, ESTROGEN RECEPTOR POSITIVE (HCC): ICD-10-CM

## 2021-01-08 DIAGNOSIS — C50.812 MALIGNANT NEOPLASM OF OVERLAPPING SITES OF LEFT BREAST IN FEMALE, ESTROGEN RECEPTOR POSITIVE (HCC): ICD-10-CM

## 2021-01-08 DIAGNOSIS — R92.8 ABNORMALITY OF LEFT BREAST ON SCREENING MAMMOGRAM: ICD-10-CM

## 2021-01-08 PROCEDURE — 88305 TISSUE EXAM BY PATHOLOGIST: CPT | Performed by: INTERNAL MEDICINE

## 2021-01-08 PROCEDURE — 25010000003 LIDOCAINE 1 % SOLUTION: Performed by: INTERNAL MEDICINE

## 2021-01-08 RX ORDER — LIDOCAINE HYDROCHLORIDE 10 MG/ML
1 INJECTION, SOLUTION INFILTRATION; PERINEURAL ONCE
Status: COMPLETED | OUTPATIENT
Start: 2021-01-08 | End: 2021-01-08

## 2021-01-08 RX ORDER — LIDOCAINE HYDROCHLORIDE AND EPINEPHRINE 20; 5 MG/ML; UG/ML
10 INJECTION, SOLUTION EPIDURAL; INFILTRATION; INTRACAUDAL; PERINEURAL ONCE
Status: COMPLETED | OUTPATIENT
Start: 2021-01-08 | End: 2021-01-08

## 2021-01-08 RX ADMIN — LIDOCAINE HYDROCHLORIDE 1 ML: 10 INJECTION, SOLUTION INFILTRATION; PERINEURAL at 13:21

## 2021-01-08 RX ADMIN — LIDOCAINE HYDROCHLORIDE,EPINEPHRINE BITARTRATE 10 ML: 20; .005 INJECTION, SOLUTION EPIDURAL; INFILTRATION; INTRACAUDAL; PERINEURAL at 13:23

## 2021-01-08 NOTE — NURSING NOTE
Biopsy site to left underside laterally x 2sites.  Dr. Thompson had difficullt with the biopsy site 1 and had to do another site. Sites clear with Dermabond dry and intact. No firmness or swelling noted at or around biopsy site. Denies pain. Ice pack with protective covering applied to biopsy site. Bruising  is not noted. Discharge instructions discussed with understanding voiced by patient. Copies provided to patient. No distress noted. To home via private vehicle

## 2021-01-09 ENCOUNTER — TELEPHONE (OUTPATIENT)
Dept: INTERVENTIONAL RADIOLOGY/VASCULAR | Facility: HOSPITAL | Age: 61
End: 2021-01-09

## 2021-01-11 LAB
LAB AP CASE REPORT: NORMAL
LAB AP DIAGNOSIS COMMENT: NORMAL
PATH REPORT.FINAL DX SPEC: NORMAL
PATH REPORT.GROSS SPEC: NORMAL

## 2021-01-12 ENCOUNTER — INFUSION (OUTPATIENT)
Dept: ONCOLOGY | Facility: HOSPITAL | Age: 61
End: 2021-01-12

## 2021-01-12 ENCOUNTER — HOSPITAL ENCOUNTER (OUTPATIENT)
Dept: PHYSICAL THERAPY | Facility: HOSPITAL | Age: 61
Setting detail: THERAPIES SERIES
Discharge: HOME | End: 2021-01-12

## 2021-01-12 DIAGNOSIS — N64.89 BREAST EDEMA: ICD-10-CM

## 2021-01-12 DIAGNOSIS — M75.42 SHOULDER IMPINGEMENT SYNDROME, LEFT: ICD-10-CM

## 2021-01-12 DIAGNOSIS — M79.622 AXILLARY PAIN, LEFT: ICD-10-CM

## 2021-01-12 DIAGNOSIS — C50.812 MALIGNANT NEOPLASM OF OVERLAPPING SITES OF LEFT BREAST IN FEMALE, ESTROGEN RECEPTOR POSITIVE (HCC): Primary | ICD-10-CM

## 2021-01-12 DIAGNOSIS — M25.612 DECREASED RANGE OF MOTION OF LEFT SHOULDER: ICD-10-CM

## 2021-01-12 DIAGNOSIS — Z91.89 AT RISK FOR LYMPHEDEMA: ICD-10-CM

## 2021-01-12 DIAGNOSIS — Z12.31 ENCOUNTER FOR SCREENING MAMMOGRAM FOR BREAST CANCER: Primary | ICD-10-CM

## 2021-01-12 DIAGNOSIS — L90.5 SCAR TISSUE: ICD-10-CM

## 2021-01-12 DIAGNOSIS — Z17.0 MALIGNANT NEOPLASM OF OVERLAPPING SITES OF LEFT BREAST IN FEMALE, ESTROGEN RECEPTOR POSITIVE (HCC): Primary | ICD-10-CM

## 2021-01-12 DIAGNOSIS — Z85.3 HISTORY OF LEFT BREAST CANCER: ICD-10-CM

## 2021-01-12 DIAGNOSIS — Z98.890 STATUS POST LEFT BREAST LUMPECTOMY: ICD-10-CM

## 2021-01-12 PROCEDURE — 96523 IRRIG DRUG DELIVERY DEVICE: CPT

## 2021-01-12 PROCEDURE — 97140 MANUAL THERAPY 1/> REGIONS: CPT | Performed by: PHYSICAL THERAPIST

## 2021-01-12 PROCEDURE — 25010000003 HEPARIN LOCK FLUSH PER 10 UNITS: Performed by: INTERNAL MEDICINE

## 2021-01-12 RX ORDER — HEPARIN SODIUM (PORCINE) LOCK FLUSH IV SOLN 100 UNIT/ML 100 UNIT/ML
500 SOLUTION INTRAVENOUS AS NEEDED
Status: DISCONTINUED | OUTPATIENT
Start: 2021-01-12 | End: 2021-01-12 | Stop reason: HOSPADM

## 2021-01-12 RX ORDER — SODIUM CHLORIDE 0.9 % (FLUSH) 0.9 %
10 SYRINGE (ML) INJECTION AS NEEDED
Status: DISCONTINUED | OUTPATIENT
Start: 2021-01-12 | End: 2021-01-12 | Stop reason: HOSPADM

## 2021-01-12 RX ORDER — SODIUM CHLORIDE 0.9 % (FLUSH) 0.9 %
10 SYRINGE (ML) INJECTION AS NEEDED
Status: CANCELLED | OUTPATIENT
Start: 2021-01-12

## 2021-01-12 RX ORDER — HEPARIN SODIUM (PORCINE) LOCK FLUSH IV SOLN 100 UNIT/ML 100 UNIT/ML
300 SOLUTION INTRAVENOUS ONCE
Status: CANCELLED | OUTPATIENT
Start: 2021-01-12

## 2021-01-12 RX ORDER — SODIUM CHLORIDE 0.9 % (FLUSH) 0.9 %
20 SYRINGE (ML) INJECTION AS NEEDED
Status: CANCELLED | OUTPATIENT
Start: 2021-01-12

## 2021-01-12 RX ORDER — HEPARIN SODIUM (PORCINE) LOCK FLUSH IV SOLN 100 UNIT/ML 100 UNIT/ML
500 SOLUTION INTRAVENOUS AS NEEDED
Status: CANCELLED | OUTPATIENT
Start: 2021-01-12

## 2021-01-12 RX ADMIN — Medication 500 UNITS: at 13:12

## 2021-01-12 RX ADMIN — SODIUM CHLORIDE, PRESERVATIVE FREE 10 ML: 5 INJECTION INTRAVENOUS at 13:12

## 2021-01-12 NOTE — THERAPY TREATMENT NOTE
Outpatient Physical Therapy Lymphedema Treatment Note  [facility]     Patient Name: Richa Dolan  : 1960  MRN: [id number]  Today's Date: 2021        Visit Date: 2021    Visit Dx:    ICD-10-CM ICD-9-CM   1. Malignant neoplasm of overlapping sites of left breast in female, estrogen receptor positive (CMS/HCC)  C50.812 174.8    Z17.0 V86.0   2. Status post left breast lumpectomy  Z98.890 V45.89   3. At risk for lymphedema  Z91.89 V49.89   4. Shoulder impingement syndrome, left  M75.42 726.2   5. Decreased range of motion of left shoulder  M25.612 719.51   6. Axillary pain, left  M79.622 729.5   7. Breast edema  N64.89 611.89   8. Scar tissue  L90.5 709.2   9. History of left breast cancer  Z85.3 V10.3       Patient Active Problem List   Diagnosis   • Abnormal liver function tests   • Hypertension   • Insomnia   • Knee pain   • Leukopenia   • Acute right-sided low back pain without sciatica   • Knee osteoarthritis   • Osteoporosis   • Arthralgia of multiple joints   • Seasonal allergic rhinitis   • Thrombocytopenia (CMS/HCC)   • Bone cyst   • Chronic pain of right knee   • Acquired immunocompromised state (CMS/HCC)   • Bruises easily   • Loss of hair   • Anxiety   • Elevated liver enzymes   • GERD (gastroesophageal reflux disease)   • HIV positive long-term non-progressor (CMS/HCC)   • Hypercholesterolemia   • Lupus anticoagulant positive   • Neck pain, chronic   • Chemotherapy-induced neutropenia (CMS/HCC)   • Tear of medial meniscus of right knee, current   • Chondromalacia, knee   • ICH (intracerebral hemorrhage) (CMS/HCC)   • Stomach discomfort   • Abnormal bruising   • Alopecia   • Headache   • Intractable chronic migraine without aura and without status migrainosus   • Osteoarthritis of right knee   • Need for vaccination   • Mild episode of recurrent major depressive disorder (CMS/HCC)   • Ingrown nail of great toe of right foot   • Left wrist pain   • Breast cancer screening   •  Malignant neoplasm of overlapping sites of left breast in female, estrogen receptor positive (CMS/HCC)   • History of stroke   • Arthritis   • Family history of breast cancer   • Encounter for screening mammogram for breast cancer   • Epistaxis   • Chemotherapy-induced thrombocytopenia   • Anemia associated with chemotherapy   • Substance or medication-induced sleep disorder, 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CMS/HCC)   • Body aches   • Neuropathy   • History of left breast cancer   • Other fatigue   • HIV infection (CMS/HCC)        Lymphedema     Row Name 01/12/21 4659             Subjective Pain    Able to rate subjective pain?  yes  -SC      Pre-Treatment Pain Level  4  -SC      Subjective Pain Comment  left breast biopsy site  -SC         Subjective Comments    Subjective Comments  Just waiting on my results of the biopsy so I am super sore because she went in 2 places. I haven't been able to do  my massage as much so my breast and my shoulder are more sore.   -SC         Manual Lymphatic Drainage    Manual Lymphatic Drainage  initial sequence;opened regional lymph nodes;opened anastamoses;extremity treatment  -SC      Initial Sequence  supraclavicular;shoulder collectors;abdomen;diaphragmatic breathing  -SC      Supraclavicular  right;left  -SC      Shoulder Collectors  right;left  -SC      Abdomen  superficial  -SC      Opened Regional Lymph Nodes  axillary;inguinal;ribs  -SC      Axillary  right;left  -SC      Inguinal  left  -SC      Opened Anastamoses  anterior axillo-axillary;axillo-inguinal  -SC      Axillo-Inguinal  left  -SC      Extremity Treatment  simple/brief MLD;MLD to proximal limb only  -SC      Simple/Brief MLD  left  -SC      MLD to Proximal Limb Only  left  -SC      Manual Lymphatic Drainage Comments  left breast MLD  -SC      Manual Therapy  MLD left breast, scar massage lumpectomy and lat flap incision, myofascial release left pec, anterior shoulder, medial arm, axilla, lateral flank, subscapularis release, AP  and inferior joint mobs L GHJ, lateral distractions, inferior oscillations, PROM all planes, kinesiotape lift left breast, scar release left lumpectomy, and Y strip left medial breast, education of use and wear time and removal if indicated  -SC         Compression/Skin Care    Compression/Skin Care Comments  to have bra fitting in 2 weeks, scheduled  -SC        User Key  (r) = Recorded By, (t) = Taken By, (c) = Cosigned By    Initials Name Provider Type    Yola Rodriguez, PT Physical Therapist                        PT Assessment/Plan     Row Name 01/12/21 1420          PT Plan    PT Plan Comments  continue MLD, bra fitting, swell spot, progress scapular stabilization program, possible referral to ortho pending biopsy results  -SC       User Key  (r) = Recorded By, (t) = Taken By, (c) = Cosigned By    Initials Name Provider Type    SC Yola Valenzuela, PT Physical Therapist             OP Exercises     Row Name 01/12/21 1420             Subjective Comments    Subjective Comments  Just waiting on my results of the biopsy so I am super sore because she went in 2 places. I haven't been able to do  my massage as much so my breast and my shoulder are more sore.   -SC         Subjective Pain    Able to rate subjective pain?  yes  -SC      Pre-Treatment Pain Level  4  -SC      Subjective Pain Comment  left breast biopsy site  -SC        User Key  (r) = Recorded By, (t) = Taken By, (c) = Cosigned By    Initials Name Provider Type    SC Yola Valenzuela, PT Physical Therapist                          Therapy Education  Given: Symptoms/condition management  Program: Reinforced  How Provided: Verbal  Provided to: Patient  Level of Understanding: Verbalized              Time Calculation:   Start Time: 1620  Stop Time: 1658  Time Calculation (min): 38 min   Therapy Charges for Today     Code Description Service Date Service Provider Modifiers Qty    61135189239 HC PT MANUAL THERAPY EA 15 MIN 1/12/2021 Bianca  Yola YOUNG, PT GP 3                    Yola Broderick, PT  1/12/2021

## 2021-01-18 DIAGNOSIS — R10.9 STOMACH DISCOMFORT: ICD-10-CM

## 2021-01-18 RX ORDER — COLESEVELAM 180 1/1
TABLET ORAL
Qty: 60 TABLET | Refills: 0 | Status: SHIPPED | OUTPATIENT
Start: 2021-01-18 | End: 2021-02-11

## 2021-01-20 ENCOUNTER — OFFICE VISIT (OUTPATIENT)
Dept: ONCOLOGY | Facility: CLINIC | Age: 61
End: 2021-01-20

## 2021-01-20 ENCOUNTER — LAB (OUTPATIENT)
Dept: ONCOLOGY | Facility: HOSPITAL | Age: 61
End: 2021-01-20

## 2021-01-20 VITALS
WEIGHT: 150.6 LBS | RESPIRATION RATE: 16 BRPM | HEART RATE: 64 BPM | BODY MASS INDEX: 22.82 KG/M2 | TEMPERATURE: 98.4 F | HEIGHT: 68 IN | OXYGEN SATURATION: 99 % | SYSTOLIC BLOOD PRESSURE: 125 MMHG | DIASTOLIC BLOOD PRESSURE: 79 MMHG

## 2021-01-20 DIAGNOSIS — C50.812 MALIGNANT NEOPLASM OF OVERLAPPING SITES OF LEFT BREAST IN FEMALE, ESTROGEN RECEPTOR POSITIVE (HCC): Primary | ICD-10-CM

## 2021-01-20 DIAGNOSIS — Z17.0 MALIGNANT NEOPLASM OF OVERLAPPING SITES OF LEFT BREAST IN FEMALE, ESTROGEN RECEPTOR POSITIVE (HCC): Primary | ICD-10-CM

## 2021-01-20 DIAGNOSIS — N64.1 FAT NECROSIS OF LEFT BREAST: ICD-10-CM

## 2021-01-20 DIAGNOSIS — B20 HIV INFECTION, UNSPECIFIED SYMPTOM STATUS (HCC): ICD-10-CM

## 2021-01-20 DIAGNOSIS — Z12.31 ENCOUNTER FOR SCREENING MAMMOGRAM FOR BREAST CANCER: ICD-10-CM

## 2021-01-20 LAB
ALBUMIN SERPL-MCNC: 4.1 G/DL (ref 3.5–5.2)
ALBUMIN/GLOB SERPL: 1.6 G/DL
ALP SERPL-CCNC: 60 U/L (ref 39–117)
ALT SERPL W P-5'-P-CCNC: 14 U/L (ref 1–33)
ANION GAP SERPL CALCULATED.3IONS-SCNC: 4.2 MMOL/L (ref 5–15)
AST SERPL-CCNC: 19 U/L (ref 1–32)
BASOPHILS # BLD AUTO: 0.02 10*3/MM3 (ref 0–0.2)
BASOPHILS NFR BLD AUTO: 0.4 % (ref 0–1.5)
BILIRUB SERPL-MCNC: 0.3 MG/DL (ref 0–1.2)
BUN SERPL-MCNC: 14 MG/DL (ref 8–23)
BUN/CREAT SERPL: 23.3 (ref 7–25)
CALCIUM SPEC-SCNC: 8.7 MG/DL (ref 8.6–10.5)
CHLORIDE SERPL-SCNC: 107 MMOL/L (ref 98–107)
CO2 SERPL-SCNC: 29.8 MMOL/L (ref 22–29)
CREAT SERPL-MCNC: 0.6 MG/DL (ref 0.57–1)
DEPRECATED RDW RBC AUTO: 46.9 FL (ref 37–54)
EOSINOPHIL # BLD AUTO: 0.08 10*3/MM3 (ref 0–0.4)
EOSINOPHIL NFR BLD AUTO: 1.8 % (ref 0.3–6.2)
ERYTHROCYTE [DISTWIDTH] IN BLOOD BY AUTOMATED COUNT: 12.6 % (ref 12.3–15.4)
GFR SERPL CREATININE-BSD FRML MDRD: 102 ML/MIN/1.73
GLOBULIN UR ELPH-MCNC: 2.6 GM/DL
GLUCOSE SERPL-MCNC: 85 MG/DL (ref 65–99)
HCT VFR BLD AUTO: 36.7 % (ref 34–46.6)
HGB BLD-MCNC: 12.1 G/DL (ref 12–15.9)
IMM GRANULOCYTES # BLD AUTO: 0.01 10*3/MM3 (ref 0–0.05)
IMM GRANULOCYTES NFR BLD AUTO: 0.2 % (ref 0–0.5)
LYMPHOCYTES # BLD AUTO: 1.32 10*3/MM3 (ref 0.7–3.1)
LYMPHOCYTES NFR BLD AUTO: 28.9 % (ref 19.6–45.3)
MCH RBC QN AUTO: 33 PG (ref 26.6–33)
MCHC RBC AUTO-ENTMCNC: 33 G/DL (ref 31.5–35.7)
MCV RBC AUTO: 100 FL (ref 79–97)
MONOCYTES # BLD AUTO: 0.58 10*3/MM3 (ref 0.1–0.9)
MONOCYTES NFR BLD AUTO: 12.7 % (ref 5–12)
NEUTROPHILS NFR BLD AUTO: 2.55 10*3/MM3 (ref 1.7–7)
NEUTROPHILS NFR BLD AUTO: 56 % (ref 42.7–76)
NRBC BLD AUTO-RTO: 0 /100 WBC (ref 0–0.2)
PLATELET # BLD AUTO: 222 10*3/MM3 (ref 140–450)
PMV BLD AUTO: 9.7 FL (ref 6–12)
POTASSIUM SERPL-SCNC: 4 MMOL/L (ref 3.5–5.2)
PROT SERPL-MCNC: 6.7 G/DL (ref 6–8.5)
RBC # BLD AUTO: 3.67 10*6/MM3 (ref 3.77–5.28)
SODIUM SERPL-SCNC: 141 MMOL/L (ref 136–145)
WBC # BLD AUTO: 4.56 10*3/MM3 (ref 3.4–10.8)

## 2021-01-20 PROCEDURE — 25010000003 HEPARIN LOCK FLUSH PER 10 UNITS: Performed by: INTERNAL MEDICINE

## 2021-01-20 PROCEDURE — 99214 OFFICE O/P EST MOD 30 MIN: CPT | Performed by: INTERNAL MEDICINE

## 2021-01-20 PROCEDURE — 85025 COMPLETE CBC W/AUTO DIFF WBC: CPT | Performed by: INTERNAL MEDICINE

## 2021-01-20 PROCEDURE — 80053 COMPREHEN METABOLIC PANEL: CPT | Performed by: INTERNAL MEDICINE

## 2021-01-20 PROCEDURE — 36591 DRAW BLOOD OFF VENOUS DEVICE: CPT

## 2021-01-20 PROCEDURE — 36415 COLL VENOUS BLD VENIPUNCTURE: CPT

## 2021-01-20 RX ORDER — SODIUM CHLORIDE 0.9 % (FLUSH) 0.9 %
10 SYRINGE (ML) INJECTION AS NEEDED
Status: CANCELLED | OUTPATIENT
Start: 2021-01-20

## 2021-01-20 RX ORDER — SODIUM CHLORIDE 0.9 % (FLUSH) 0.9 %
20 SYRINGE (ML) INJECTION AS NEEDED
Status: CANCELLED | OUTPATIENT
Start: 2021-01-20

## 2021-01-20 RX ORDER — HEPARIN SODIUM (PORCINE) LOCK FLUSH IV SOLN 100 UNIT/ML 100 UNIT/ML
500 SOLUTION INTRAVENOUS AS NEEDED
Status: CANCELLED | OUTPATIENT
Start: 2021-01-20

## 2021-01-20 RX ORDER — HEPARIN SODIUM (PORCINE) LOCK FLUSH IV SOLN 100 UNIT/ML 100 UNIT/ML
300 SOLUTION INTRAVENOUS ONCE
Status: CANCELLED | OUTPATIENT
Start: 2021-01-20

## 2021-01-20 RX ORDER — SODIUM CHLORIDE 0.9 % (FLUSH) 0.9 %
10 SYRINGE (ML) INJECTION AS NEEDED
Status: DISCONTINUED | OUTPATIENT
Start: 2021-01-20 | End: 2021-01-20 | Stop reason: HOSPADM

## 2021-01-20 RX ORDER — SODIUM CHLORIDE 0.9 % (FLUSH) 0.9 %
20 SYRINGE (ML) INJECTION AS NEEDED
Status: DISCONTINUED | OUTPATIENT
Start: 2021-01-20 | End: 2021-01-20 | Stop reason: HOSPADM

## 2021-01-20 RX ORDER — HEPARIN SODIUM (PORCINE) LOCK FLUSH IV SOLN 100 UNIT/ML 100 UNIT/ML
500 SOLUTION INTRAVENOUS AS NEEDED
Status: DISCONTINUED | OUTPATIENT
Start: 2021-01-20 | End: 2021-01-20 | Stop reason: HOSPADM

## 2021-01-20 RX ORDER — HEPARIN SODIUM (PORCINE) LOCK FLUSH IV SOLN 100 UNIT/ML 100 UNIT/ML
300 SOLUTION INTRAVENOUS ONCE
Status: DISCONTINUED | OUTPATIENT
Start: 2021-01-20 | End: 2021-01-20 | Stop reason: HOSPADM

## 2021-01-20 RX ADMIN — SODIUM CHLORIDE, PRESERVATIVE FREE 10 ML: 5 INJECTION INTRAVENOUS at 08:27

## 2021-01-20 RX ADMIN — Medication 500 UNITS: at 08:27

## 2021-01-20 NOTE — PROGRESS NOTES
Subjective     REASON FOR FOLLOW UP:    1.  T2N1 invasive ductal carcinoma of the left breast.  Ultrasound-showed 3.8 x 3.1 x 2.5 cm mass at 4 o'clock position with 2 abnormal axillary lymph nodes, larger lymph node being 1.4 cm.  Left breast biopsy and axillary lymph node biopsy October 29, 2019, invasive ductal carcinoma, moderately differentiated, grade 2, ER 85%, IL 10%, HER-2/merna 2+, HER-2 FISH negative.  · 12/06/19: Neoadjuvant chemo therapy, cycle #1 of dose-dense Adriamycin/Cytoxan with Neulasta for marrow support    · 01/17/20: Last cycle of Adriamycin/Cytoxan given  · January 31, 2020: Cycle 1 Taxol  · April 24, 2020: Last cycle, cycle 12 of Taxol  · Patient is s/p left mastectomy with axillary dissection with reconstruction.  She is healing up nicely.  She has a drain in place.  Pathology showed invasive breast cancer which is 55 x 40 mm in size, grade 2 with focal lymphovascular space invasion with DCIS spanning over 36 x 6 mm.  All margins were negative for cancer.  · 8 of the additional lymph nodes out of 11 were positive with the largest micrometastasis measuring 5 mm.  Extranodal extension was seen.    · Biomarkers on post neoadjuvant tumor is ER 81-90% IL 5% HER-2/merna 2+ by immunohistochemistry and HER-2/merna negative by FISH.  · Radiation July 6, 2020-August 17, 2020.  · Letrozole discontinued secondary to significant joint pains July 2020.  Discontinued October 2020 secondary to severe joint pains  · October 30, 2020 patient started tamoxifen and tolerating well        2.  Severe neutropenia secondary to chemotherapy, with ANC 50 at 1-week after cycle #1 chemotherapy.  Also had moderate thrombocytopenia platelets 92,000.  Patient was given Levaquin for prophylaxis of neutropenia fever.    3.  HIV, followed by Dr. Dawn from infectious disease.  Well controlled on meds    4.  Screening mammogram December 7, 2020 showed indeterminate calcifications lower outer middle left breast.  Diagnostic  mammogram showed 0.4 cm calcifications in the left breast.  Stereotactic biopsy January 8, 2021 consistent with organizing fat necrosis.       .               HISTORY OF PRESENT ILLNESS:  The patient is a 60 y.o. year old female with history of T2N1 invasive ductal carcinoma of the left breast status post neoadjuvant chemotherapy with Adriamycin Cytoxan followed by weekly Taxol.  She is completed all treatment and currently has had a MRI of the breast which shows partial response to the neoadjuvant chemotherapy with considerable reduction in the left axillary adenopathy and reduction in the size of the index lesion in the left breast as well as other satellite lesions.    Patient is s/p left mastectomy with axillary dissection with reconstruction.  She is healing up nicely.  She has a drain in place.  Pathology showed invasive breast cancer which is 55 x 40 mm in size, grade 2 with focal lymphovascular space invasion with DCIS spanning over 36 x 6 mm.  All margins were negative for cancer.  8 of the additional lymph nodes out of 11 were positive with the largest micrometastasis measuring 5 mm.  Extranodal extension was seen.    Biomarkers on post neoadjuvant tumor is ER 81-90% WI 5% HER-2/merna 2+ by immunohistochemistry and HER-2/merna negative by FISH.    Patient is here for follow-up.  She is eligible for aspirin and we well  trial we will check if she is eligible.    Patient also will benefit from Zometa once every 6 months as she is postmenopausal and has multiple lymph nodes positive.      Interval history: Patient is tolerating tamoxifen very well.  Her recent screening mammogram had shown abnormality in the left lower outer quadrant of the left breast which on diagnostic mammogram showed 0.4 cm area of calcifications which is suspicious.  However stereotactic biopsy came back negative and start necrosis.  Patient is stable from her HIV medications and follows up with Dr. Franco.  We discussed that she needs  to take her vaccination as soon as she can get it for the COVID-19.        Past Medical History:   Diagnosis Date   • Anxiety    • Cerebrovascular accident (CVA) (CMS/[facility]) 8/22/2017    NO RESIDUAL AFFECT   • DDD (degenerative disc disease), cervical    • Depression    • Drug therapy    • Elevated cholesterol    • GERD (gastroesophageal reflux disease)    • H/O ICH (intracerebral hemorrhage) (CMS/[facility])    • History of chemotherapy    • History of stroke    • History of thrombocytopenia    • HIV (human immunodeficiency virus infection) (CMS/[facility]) 1996   • Hx of radiation therapy    • Hyperlipidemia    • Insomnia    • Lupus anticoagulant positive    • Malignant neoplasm of overlapping sites of left breast in female, estrogen receptor positive (CMS/[facility]) 11/12/2019   • Meniscus tear 2017    RIGHT   • Migraine    • Neck pain     WITH SHOOTING PAIN LEFT RIGHT ARM   • Osteoarthritis    • Osteoporosis    • Pain management     sees 1 every 3 months for scripts/injection/pain meds   • Seasonal allergies    • Spinal headache     THINKS HAD BLOOD PATCH AFTER EPIDUAL    • Tick bite 06/2014     PAST MEDICAL HISTORY:  She had a stroke in July 2017 with headache and dizziness.  She went to the ER and was placed on aspirin.  However, she stopped taking it two weeks ago.  She has been taking Aimovig (injection) monthly with Fariba López at [facility].    In 1994, patient was diagnosed with HIV with an unknown cause which is managed by Dr. Natali Subramanian.  As of now, her viral load is good.    Patient has arthritis.  She gets trigger-point injections in her back with her last one being 2 weeks ago.  She has had multiple ablations.  She also has pain in her SI joint and Dr. Bermudez performed a surgery on this.  She takes Narco for the pain.      She is osteoporotic.  She has had multiple hairline fractures in both her legs.  She had her knees cleaned out by Dr. Sinha.     Patient has had a hysterectomy and still has both of her  ovaries.    Patient has vertigo and the muscles in her left ear are weak.  She just has an imbalance.    Past Surgical History:   Procedure Laterality Date   • ADENOIDECTOMY     • BREAST LUMPECTOMY     • BREAST LUMPECTOMY WITH SENTINEL NODE BIOPSY Left 5/28/2020    Procedure: BREAST LUMPECTOMY WITH SENTINEL NODE BIOPSY AND NEEDLE LOCALIZATION AND axillary dissection;  Surgeon: Landen Forman MD;  Location: [facility];  Service: General;  Laterality: Left;   • BREAST SURGERY Left 5/28/2020    Procedure: LEFT LATERAL INTERCOASTAL ARTERY  flap ;  Surgeon: Yusuf Garcia MD;  Location: [facility] OR;  Service: Plastics;  Laterality: Left;   • CHOLECYSTECTOMY     • HYSTERECTOMY     • KNEE ARTHROSCOPY Right 3/24/2017    Procedure:  RIGHT  KNEE ARTHROSCOPY WITH DEBRIDEMENT CHONDROPLASTY PARTIAL MENISCECTOMY;  Surgeon: Karl Galeas MD;  Location: [facility];  Service:    • KNEE CARTILAGE SURGERY Right 2007   • TONSILLECTOMY     • US GUIDED LYMPH NODE BIOPSY  10/29/2019    LEFT BREAST   • VENOUS ACCESS DEVICE (PORT) INSERTION Right 12/2/2019    Procedure: INSERTION VENOUS ACCESS DEVICE RIGHT;  Surgeon: Landen Forman MD;  Location: [facility];  Service: General       ONCOLOGIC HISTORY:  Patient is a 59-year-old female who has had a history of HIV since age 34 followed by Dr. Natali Ng at [facility] and was on multiple HIV drugs initially but more recently has been on a single medication TRIUMEQ, with well-controlled HIV.  She follows up with Dr. Dawn , infectious disease who saw her recently and he saw her on 4/8/2019 and has excellent control and suppression of her viral load.  She is very compliant with her medications.    She also has had history of stroke in 2017 for which she was placed on aspirin.  She presented with a headache.  She is very active and works at [employer] full-time.  She also has had history of vertigo in the  past  Patient's PCP is Zach Lora.    Patient first noticed the mass in her left breast 5 months ago.  Her last mammogram was 5 years ago.  She had soreness in the left breast and she went to her primary care physician who then ordered a mammogram diagnostic and an ultrasound.  The diagnostic mammogram showed a 3.7 cm mass at 4 o'clock position in the lower outer quadrant of the left breast.  The ultrasound showed 3.8 cm x 3.1 x 2.5 cm mass at 4 o'clock position in the left breast with 2 abnormal appearing left axillary lymph nodes the largest being 1.4 cm.    She then underwent biopsy of both the breast mass as well as the left axillary lymph node and both of them are positive for invasive mammary carcinoma it is invasive ductal carcinoma with apocrine features, moderately differentiated, grade 2 of 3 with a [place] score of 5.  The left axillary lymph node is also consistent with metastatic mammary carcinoma.  It is ER positive NM positive HER-2/merna negative.  Details as follows    10/21/19 - Bilateral Diagnostic Mammogram and US Breast Left  FINDINGS: Bilateral digital CC and MLO mammographic images were  obtained. No prior examination is available for comparison. Scattered  fibroglandular densities are seen throughout both breasts. A triangular  skin marker represents the area of palpable concern in the lower outer  quadrant of the left breast in the posterior 1/3. At this location there  is an irregular mass that measures on the order of 3.7 cm in greatest  dimension. Internal calcifications are noted. No suspicious findings in  the right breast are appreciated.     ULTRASOUND: Targeted sonographic evaluation of the left breast was  performed through the area of concern in the left axilla. At the 4  o'clock position on the order of 6 cm from the nipple there is an  irregular hypoechoic mass that measures 3.8 x 3.1 x 2.5 cm. Internal  vascularity is noted.     There are 2 abnormal-appearing left axillary  lymph nodes, the largest  which measures on the order of 1.4 cm in greatest dimension.     IMPRESSION:  1. There is a 3.8 cm irregular mass in the left breast at the 4 o'clock  position. This is seen in conjunction with an irregular 1.4 cm lymph  node in the left axilla. This is suspicious for malignancy with left  axillary involvement. Correlation with ultrasound-guided biopsy of both  the left breast mass and the lymph node is recommended.  2. There are no findings suspicious for malignancy in the right breast.     BI-RADS CATEGORY 5:     Patient's labs from 11/12/19 are normal.    10/29/19 - Tissue Pathology  1. Left Breast, 4:00, 6 cm FN, U/S-Guided Core Needle Biopsy for a Mass:  A. INVASIVE DUCTAL CARCINOMA WITH APOCRINE FEATURES, Moderately differentiated;  Jen Histologic Grade II/III (tubule score = 3, nuclear score = 2, mitoses score = 1), measuring at least  9 mm.  B. No ductal or lobular carcinoma in situ is identified in this sample.  C. Focus suspicious for lymphovascular space invasion.  D. See Biomarker Template for hormone receptor studies.  2. Lymph Node, Left Axilla, U/S-Guided Core Needle Biopsy:  A. METASTATIC MAMMARY CARCINOMA (see Comment).  B. Metastatic focus measures 5 mm in greatest extent.  ER+, 85%  WV+, 10%  HER2- Score 2+    11/13/19 - CT Neck Chest Abdomen Pelvis  IMPRESSION:  1. In addition to the irregular approximately 3.8 cm mass within the  lateral aspect of the left breast, there are a few subcentimeter  asymmetric nodules along the lateral margin of the glandular tissue. The  several asymmetric left subpectoral nodes and 1.2 x 1.0 cm left axillary  node are suspicious for tamar metastases. The asymmetric subcentimeter  left supraclavicular and left posterior cervical triangle nodes are  worrisome as well.  2. There is no convincing evidence for metastatic disease within the  abdomen or pelvis.    11/14/19 - Echocardiogram:  Normal.  Calculated EF = 67%.  There is trace  mitral valve and tricuspid valve regurgitation.    11/15/19 - Bone Scan  Negative.    19 - MRI Breast Bilateral  IMPRESSION:  1. Biopsy-proven malignancy in the left breast centered at the 4 o'clock  position with associated surrounding satellite nodules as described. The  entire region of involvement including the satellite nodules and the  dominant mass measure up to 7.5 cm in greatest dimension. Also, left  axillary adenopathy with multiple irregular lymph nodes and loss of  differentiation of the borders of multiple lymph nodes is noted and this  is suspicious for extranodal involvement.  2. There are no findings suspicious for malignancy in the right breast.  BI-RADS CATEGORY 6      19: Cycle #1 of Adriamycin/Cytoxan    20: Last cycle of Adriamycin/Cytoxan given without Neulasta.  We will switch from Neulasta to 7 days of Neupogen injections after cycle 4.    We reviewed with patient the US Breast from 20 which shows mild interval partial response to neoadjuvant chemotherapy.      20: Initiated cycle 1 Taxol  2020: Last cycle of Taxol, cycle 12    S/p left mastectomy with axillary dissection   Pathology showed invasive breast cancer which is 55 x 40 mm in size, grade 2 with focal lymphovascular space invasion with DCIS spanning over 36 x 6 mm.  All margins were negative for cancer.  8 of the additional lymph nodes out of 11 were positive with the largest micrometastasis measuring 5 mm.  Extranodal extension was seen.    Biomarkers on post neoadjuvant tumor is ER 81-90% TN 5% HER-2/merna 2+ by immunohistochemistry and HER-2/merna negative by FISH.    2020: Started letrozole but because of severe joint pains was discontinued 2020    End of 2020 started tamoxifen    OB-GYN:  Menarche 15 years  Menopause-46  Pregnancies- 1 para 1 no miscarriages her first pregnancy was in  at 29 years of age.  She did not breastfeed.  Post menopausal HRT- None.  Hx  of birth control pills- Yes.    Current Outpatient Medications on File Prior to Visit   Medication Sig Dispense Refill   • AIMOVIG 140 MG/ML solution auto-injector INJECT 140 MG INTO THE SKIN EVERY 30 (THIRTY) DAYS.  11   • atorvastatin (LIPITOR) 20 MG tablet TAKE 1 TABLET BY MOUTH EVERY DAY AT NIGHT (Patient taking differently: Take 20 mg by mouth Daily.) 90 tablet 3   • baclofen (LIORESAL) 10 MG tablet Take 10 mg by mouth 2 (Two) Times a Day.  2   • clonazePAM (KlonoPIN) 0.5 MG tablet TAKE 1 TABLET BY MOUTH 2 (TWO) TIMES A DAY AS NEEDED FOR ANXIETY. 60 tablet 1   • colesevelam (WELCHOL) 625 MG tablet TAKE 2 TABLETS BY MOUTH EVERY DAY 60 tablet 0   • escitalopram (LEXAPRO) 10 MG tablet TAKE 1 TABLET BY MOUTH EVERY DAY (Patient taking differently: Take 10 mg by mouth Daily.) 90 tablet 3   • fluticasone (FLONASE) 50 MCG/ACT nasal spray 2 sprays into the nostril(s) as directed by provider Daily. 3 bottle 3   • Loratadine (CLARITIN PO) Take 10 mg by mouth Daily.     • Magnesium Hydroxide (MILK OF MAGNESIA PO) Take 15 mL by mouth Every Night.     • oxyCODONE-acetaminophen (PERCOCET)  MG per tablet Take 1 tablet by mouth 4 (Four) Times a Day.     • pyridoxine (VITAMIN B-6) 50 MG tablet TAKE 1 TABLET BY MOUTH EVERY DAY 90 tablet 1   • Rimegepant Sulfate 75 MG tablet dispersible Take 75 mg by mouth.     • tamoxifen (NOLVADEX) 20 MG chemo tablet TAKE 1 TABLET BY MOUTH EVERY DAY 90 tablet 1   • TRIUMEQ 600- MG per tablet TAKE 1 TABLET BY MOUTH EVERY DAY 90 tablet 3   • zolpidem (AMBIEN) 10 MG tablet Take 1 tablet by mouth At Night As Needed for Sleep. 90 tablet 0     No current facility-administered medications on file prior to visit.         ALLERGIES:    Allergies   Allergen Reactions   • Augmentin [Amoxicillin-Pot Clavulanate] Hives        Social History     Socioeconomic History   • Marital status:      Spouse name: Owen   • Number of children: 1   • Years of education: College   • Highest education  level: Not on file   Occupational History   • Occupation:      Employer:  [employer]   Tobacco Use   • Smoking status: Never Smoker   • Smokeless tobacco: Never Used   Substance and Sexual Activity   • Alcohol use: Yes     Frequency: 2-4 times a month     Drinks per session: 1 or 2     Binge frequency: Never     Comment: occasioonal   • Drug use: No   • Sexual activity: Defer     Partners: Male     Birth control/protection: None     Comment:      Patient does not smoke or do drugs.  She does drink occasionally.    Family History   Problem Relation Age of Onset   • Breast cancer Mother    • Leukemia Mother         CLL?   • Diabetes Mother    • Hyperthyroidism Mother    • Hearing loss Mother    • Dementia Father    • Heart disease Maternal Grandmother    • Diabetes Maternal Grandfather    • Heart disease Maternal Grandfather    • Stroke Maternal Grandfather    • Heart attack Maternal Grandfather    • Osteoporosis Paternal Grandmother    • Heart disease Paternal Grandfather    • Leukemia Maternal Aunt         CLL?   • Throat cancer Paternal Uncle    • Malig Hyperthermia Neg Hx       FAMILY HISTORY:  Her mother had breast cancer at age 60, still alive at 85 and in good health..  Her father had dementia.  Her paternal uncle had prostate cancer.  Her brother had esophageal cancer.    I have reviewed the patient's medical history in detail and updated the computerized patient record.     Review of Systems   Constitutional: Positive for fatigue (01/20/21-Unchanged). Negative for activity change, appetite change, chills, diaphoresis, fever and unexpected weight change.   HENT: Negative for hearing loss, mouth sores, nosebleeds, sore throat and trouble swallowing.    Respiratory: Negative for cough, chest tightness, shortness of breath and wheezing.    Cardiovascular: Negative for chest pain, palpitations and leg swelling.   Gastrointestinal: Negative for abdominal distention, abdominal pain,  "constipation, diarrhea, nausea and vomiting.   Genitourinary: Negative for difficulty urinating, dysuria, frequency, hematuria and urgency.   Musculoskeletal: Positive for arthralgias (Rt Hip and Rt Knee-01/20/21-Unchanged) and back pain (01/20/21-Unchanged). Negative for joint swelling.        No muscle weakness.   Skin: Negative for rash and wound.   Neurological: Positive for numbness (Bilat foot-01/20/21-Unchanged) and headaches (01/20/21-Unchanged). Negative for dizziness, seizures, syncope, speech difficulty and weakness.   Hematological: Negative for adenopathy. Does not bruise/bleed easily.   Psychiatric/Behavioral: Positive for sleep disturbance (01/20/21-Unchanged). Negative for behavioral problems, confusion and suicidal ideas.   All other systems reviewed and are negative.  I have reviewed and confirmed the accuracy of the ROS as documented by the MA/ERIC/TERESA Moreno MD  Patient does have fatigue related to recent radiation which will take time to improve.  She also complains of slight pain in the left armpit.    I have reviewed and confirmed the accuracy of the ROS as documented by the MA/ERIC/TERESA Moreno MD    I have reviewed and confirmed the accuracy of the ROS as documented by the MA/ERIC/TERESA Moreno MD        Objective    Vitals:    01/20/21 0818   BP: 125/79   Pulse: 64   Resp: 16   Temp: 98.4 °F (36.9 °C)   TempSrc: Temporal   SpO2: 99%   Weight: 68.3 kg (150 lb 9.6 oz)   Height: 172.7 cm (67.99\")   PainSc:   5   PainLoc: Back  Comment: Back and neck pain       Physical exam     CONSTITUTIONAL:  Vital signs reviewed.  No distress, looks comfortable.  EYES:  Conjunctiva and lids unremarkable.  PERRLA  EARS,NOSE,MOUTH,THROAT:  Ears and nose appear unremarkable.  Lips, teeth, gums appear unremarkable.  RESPIRATORY:  Normal respiratory effort.  Lungs clear to auscultation bilaterally.  CARDIOVASCULAR:  Normal S1, S2.  No murmurs rubs or gallops.  No significant lower extremity " edema  BREAST: Right breast: No skin changes, no evidence of breast mass, no nipple discharge, no evidence of any right axillary adenopathy or right supraclavicular adenopathy  Left breast: S/p left mastectomy with reconstruction, well-healed, there is no evidence of any skin changes or any masses felt in the left axilla.  She is slightly tender in the left axilla.  No evidence of left supraclavicular lymphadenopathy  GASTROINTESTINAL: Abdomen appears unremarkable.  Nontender.  No hepatomegaly.  No splenomegaly.  LYMPHATIC:  No cervical, supraclavicular, axillary lymphadenopathy.  SKIN:  Warm.  No rashes.  PSYCHIATRIC:  Normal judgment and insight.  Normal mood and affect.    I have reexamined the patient and the results are consistent with the previously documented exam. Ruby Moreno MD     RECENT LABS:   Results from last 7 days   Lab Units 01/20/21  0820   WBC 10*3/mm3 4.56   NEUTROS ABS 10*3/mm3 2.55   HEMOGLOBIN g/dL 12.1   HEMATOCRIT % 36.7   PLATELETS 10*3/mm3 222               Tissue Pathology Exam 01/08/21    Final Diagnosis   1. Breast, Left 6:00 o'clock Position, Core Biopsies for Calcifications.               A. Dense fibrous stroma/stromal fibrosis with minute foci of acute inflammation, clustered histiocytes,       associated microcalcifications and giant cells consistent with organizing fat necrosis.               B. Focal periductal mild chronic inflammation.  Mec/kds         MAMMO DIAGNOSTIC LEFT W CAD-12/24/20    IMPRESSION:  Suspicious 0.4 cm of calcifications in the left breast. Further  evaluation with stereotactic core needle biopsy is recommended.     Findings and recommendations were discussed with the patient in person  at the time of her examination.     BI-RADS Category 4: Suspicious         EXAMINATION: BILATERAL BREAST MRI WITHOUT AND WITH CONTRAST 05/06/20  IMPRESSION:  1. There are findings consistent with interval partial response to  neoadjuvant chemotherapy with considerable  reduction in left axillary  adenopathy and reduction in size of the index lesion in the left breast  as well as other satellite lesions. Persistent diffuse left breast skin  thickening is noted.  2. There are no findings suspicious for malignancy in the right breast.     BI-RADS Category 6: Known biopsy-proven malignancy.    01/29/20 - US Breast  IMPRESSION:  There are findings suggestive of mild interval partial  response to neoadjuvant chemotherapy.    Assessment/Plan    1. T2N1 invasive ductal carcinoma of the left breast, recently diagnosed.    -Noticed mass in the left breast x5 months  -Diagnostic mammogram showed 3.7 mm mass in the left breast at 4 o'clock position  -Ultrasound-showed 3.8 x 3.1 x 2.5 cm mass at 4 o'clock position with 2 abnormal axillary lymph nodes, larger lymph node being 1.4 cm  -Left breast biopsy and axillary lymph node biopsy October 29, 2019, invasive ductal carcinoma, moderately differentiated, grade 2, ER 85%, MT 10%, HER-2/merna 2+, HER-2 FISH negative  · CT scans from 11/13/19 show some asymmetric nodules along the lateral margin of the glandular tissue.  The 1.2x1.0 cm left axillary nodes, left supraclavicular, and left posterior cervical triangle nodes are suspicious for tamar metastases.  ·   bone scan from 11/15/19 which was negative.   ·  MRI breast from 11/18/19 which shows the biopsy-proven malignancy in the left breast centered at the 4:00 position.  The region of involvement measures up to 7.5 cm.  There is left axillary adenopathy with multiple irregular lymph nodes and loss of differentiation of the border of multiple lymph nodes which are suspicious for extranodal involvement.  ·    echocardiogram from 11/14/19 which was normal with an ejection fraction of 67%.    · INVITAE from 11/14/19 which was negative.  · Given the size of her tumor and her medical history, patient will be given 4 cycles of Adriamycin/Cytoxan with Neulasta.  After completion of this treatment,  patient will be started on 12 cycles of Taxol.  After the tumor shrinks in size, Dr. Forman will perform a lumpectomy.    · Following lumpectomy, patient will begin endocrine therapy.  ·  Dr. Dawn agrees chemotherapy will not aggravate her HIV condition.  Dr. Moreno spoke with Dr. Mohan at [place] who also agrees chemotherapy is the first step.   · 12/06/19: Cycle #1 of Adriamycin/Cytoxan  · US Breast from 01/29/20 after 4 cycles of Adriamycin Cytoxan chemotherapy which shows mild interval partial response to neoadjuvant chemotherapy.  The mass has decreased from 3.8 x 2.5 to 3.1 cm to 3.5 x 2.3 x 3.2 cm previously the left axillary lymph node has decreased from 1.4 x 0.7 x 1 cm to 1.1 x 0.6 x 0.9 cm.  · 01/31/20: Initiated cycle 1 Taxol  · April 24, 2020: Completed cycle 12 Taxol  · MRI breast 5/6/2020 shows significant reduction in the left axillary adenopathy as well as reduction in the index breast lesion.  · S/p left modified radical mastectomy with left axillary dissection.  Patient has 55 x 40 x 30 mm invasive carcinoma, grade 2 with DCIS 36 x 6 mm, high-grade with good margins and 8 out of 11 lymph nodes positive with largest metastasis being 5 mm with extracapsular extension.  And there is lymphatic space invasion  · Will follow-up in 2 weeks at which time we will plan to start endocrine therapy.  She has radiation oncology appointment with Dr. Spivey  · Patient is eligible for Zometa once every 6 months for 2 years.  But given that she is undergoing some work on her teeth we will await starting Zometa until after radiation is complete.  · XRT completed August 17, 2020  · Started letrozole August 2020 but has arthralgias  · October 6, 2020 had severe arthralgias secondary to letrozole.  We will switch to tamoxifen starting November 2020  · Continue tamoxifen as she is tolerating well  · Reviewed recent screening mammogram in December 2020 as well as diagnostic mammogram which showed 0.4 cm  suspicious calcification in the lower left which on biopsy is consistent with fat necrosis      2.  Profound neutropenia due to chemotherapy despite Neulasta:   · Following Adriamycin Cytoxan, the patient experienced neutropenia despite Neulasta.  She did receive Zarxio with cycle 4 Adriamycin Cytoxan  · Cycle 2 Taxol delayed 1 week due to neutropenia  · Zarxio x3 days given following Taxol  · White blood cell 8.32, ANC 6.24 today.  The patient is highly anxious going to the hospital for Zarxio injections in light of COVID-19 outbreak.  Discontinue further Zarxio for remaining 2 weeks of Taxol  · Resolved    3.  Moderate thrombocytopenia secondary to chemotherapy.    · Platelet count has now normalized following Taxol 274,000.  Resolved    4.  Chemotherapy induced anemia.   · Anemia work-up previously negative  · Hemoglobin is improved today at 11.1  · Hemoglobin improved 11.9  · Hemoglobin improved to 12.4 as of December 1, 2020  · January 28, 2020: Hemoglobin 12.1, white count 4.56, platelet 222      5.  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  · On 12/13/2019 patient struggled with significant insomnia from steroids following her first cycle AC.  We discussed taking the dexamethasone, only one 4 mg tablet daily for 3 days with her next cycle of chemotherapy to see if this improves her symptoms.    · Insomnia much improved with decreased dose of dexamethasone  · Insomnia is now resolved with Taxol.  Not on steroids and resolved  · Resolved    6. HIV, followed by Dr. Dawn in infectious disease.  Well-controlled and is on a single medication with very low viral load. Has HIV since age 34.  · Reviewed her HIV medications and she is compliant  · Stable     7.  Family history of breast cancer with mother having had breast cancer at age 60.  There is family history of uncle with prostate cancer.  Patient will require genetic referral    8.  Arthritis with severe back pain followed by Dr. Bam Crandall  · Percocet    9.  Episodes of nosebleeds.  She has had these once a week for 3-4 years.  She did not report this today.    10.  Otalgia: Of the right ear, present for a while.  Dr. Moreno would like the patient to follow-up with ENT, Dr. Stapleton, as she has previously seen him.  Not a complaint today.    12.  Arthralgias for a few days after each treatment, which resolve on their own.    13.  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  Currently on B6,  50 mg daily.  We will have to continue to closely monitor.  This is stable.  No dose adjustments Taxol presently    PLAN:  1. Continue tamoxifen  2. Patient is due for Zometa on April 6, 2021  3. Reviewed results of both screening mammogram and diagnostic mammogram with patient treatment which showed 0.4 cm calcifications in the left lower quadrant close to the lumpectomy site  4. Reviewed the pathology results with the patient which showed fat necrosis on the stereotactic biopsy  5. Follow-up April 6, 2021 for Zometa  6. We will get port flush every 6 weeks    Ruby Moreno MD      Cc: ОЛЬГА Godoy MD Nathan Bullington, MD

## 2021-01-22 ENCOUNTER — TELEPHONE (OUTPATIENT)
Dept: ONCOLOGY | Facility: CLINIC | Age: 61
End: 2021-01-22

## 2021-01-22 NOTE — TELEPHONE ENCOUNTER
Patient said she had port flush scheduled 2/23 at [facility] and it got r/s to 3/3 at [place].  She wants it to be at the [facility] location, please.    [phone]

## 2021-01-26 ENCOUNTER — HOSPITAL ENCOUNTER (OUTPATIENT)
Dept: PHYSICAL THERAPY | Facility: HOSPITAL | Age: 61
Setting detail: THERAPIES SERIES
Discharge: HOME | End: 2021-01-26

## 2021-01-26 DIAGNOSIS — N64.89 BREAST EDEMA: ICD-10-CM

## 2021-01-26 DIAGNOSIS — M75.42 SHOULDER IMPINGEMENT SYNDROME, LEFT: ICD-10-CM

## 2021-01-26 DIAGNOSIS — Z91.89 AT RISK FOR LYMPHEDEMA: ICD-10-CM

## 2021-01-26 DIAGNOSIS — Z85.3 HISTORY OF LEFT BREAST CANCER: ICD-10-CM

## 2021-01-26 DIAGNOSIS — C50.812 MALIGNANT NEOPLASM OF OVERLAPPING SITES OF LEFT BREAST IN FEMALE, ESTROGEN RECEPTOR POSITIVE (HCC): Primary | ICD-10-CM

## 2021-01-26 DIAGNOSIS — M25.612 DECREASED RANGE OF MOTION OF LEFT SHOULDER: ICD-10-CM

## 2021-01-26 DIAGNOSIS — L90.5 SCAR TISSUE: ICD-10-CM

## 2021-01-26 DIAGNOSIS — M79.622 AXILLARY PAIN, LEFT: ICD-10-CM

## 2021-01-26 DIAGNOSIS — Z17.0 MALIGNANT NEOPLASM OF OVERLAPPING SITES OF LEFT BREAST IN FEMALE, ESTROGEN RECEPTOR POSITIVE (HCC): Primary | ICD-10-CM

## 2021-01-26 DIAGNOSIS — Z98.890 STATUS POST LEFT BREAST LUMPECTOMY: ICD-10-CM

## 2021-01-26 PROCEDURE — 97140 MANUAL THERAPY 1/> REGIONS: CPT | Performed by: PHYSICAL THERAPIST

## 2021-01-26 NOTE — THERAPY TREATMENT NOTE
Outpatient Physical Therapy Lymphedema Progress Note  [facility]     Patient Name: Richa Dolan  : 1960  MRN: [id number]  Today's Date: 2021        Visit Date: 2021    Visit Dx:    ICD-10-CM ICD-9-CM   1. Malignant neoplasm of overlapping sites of left breast in female, estrogen receptor positive (CMS/HCC)  C50.812 174.8    Z17.0 V86.0   2. Status post left breast lumpectomy  Z98.890 V45.89   3. At risk for lymphedema  Z91.89 V49.89   4. Shoulder impingement syndrome, left  M75.42 726.2   5. Decreased range of motion of left shoulder  M25.612 719.51   6. Axillary pain, left  M79.622 729.5   7. Breast edema  N64.89 611.89   8. Scar tissue  L90.5 709.2   9. History of left breast cancer  Z85.3 V10.3       Patient Active Problem List   Diagnosis   • Abnormal liver function tests   • Hypertension   • Insomnia   • Knee pain   • Leukopenia   • Acute right-sided low back pain without sciatica   • Knee osteoarthritis   • Osteoporosis   • Arthralgia of multiple joints   • Seasonal allergic rhinitis   • Thrombocytopenia (CMS/HCC)   • Bone cyst   • Chronic pain of right knee   • Acquired immunocompromised state (CMS/HCC)   • Bruises easily   • Loss of hair   • Anxiety   • Elevated liver enzymes   • GERD (gastroesophageal reflux disease)   • HIV positive long-term non-progressor (CMS/HCC)   • Hypercholesterolemia   • Lupus anticoagulant positive   • Neck pain, chronic   • Chemotherapy-induced neutropenia (CMS/HCC)   • Tear of medial meniscus of right knee, current   • Chondromalacia, knee   • ICH (intracerebral hemorrhage) (CMS/HCC)   • Stomach discomfort   • Abnormal bruising   • Alopecia   • Headache   • Intractable chronic migraine without aura and without status migrainosus   • Osteoarthritis of right knee   • Need for vaccination   • Mild episode of recurrent major depressive disorder (CMS/HCC)   • Ingrown nail of great toe of right foot   • Left wrist pain   • Breast cancer screening   •  Malignant neoplasm of overlapping sites of left breast in female, estrogen receptor positive (CMS/HCC)   • History of stroke   • Arthritis   • Family history of breast cancer   • Encounter for screening mammogram for breast cancer   • Epistaxis   • Chemotherapy-induced thrombocytopenia   • Anemia associated with chemotherapy   • Substance or medication-induced sleep disorder, 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CMS/HCC)   • Body aches   • Neuropathy   • History of left breast cancer   • Other fatigue   • HIV infection (CMS/HCC)   • Fat necrosis of left breast        Lymphedema     Row Name 01/26/21 1395             Subjective Pain    Able to rate subjective pain?  yes  -SC      Pre-Treatment Pain Level  0  -SC         Lymphedema Assessment    Lymphedema Classification  LUE:;at risk/stage 0;secondary;Other: left breast, at risk  -SC      Lymphedema Cancer Related Sx  left;lumpectomy;sentinel node biopsy;axillary dissection;reconstructive  -SC      Lymphedema Surgery Comments  Port 12/02/2019; L lump/AD 05/28/2020  -SC      Lymph Nodes Removed #  13  -SC      Positive Lymph Nodes #  10  -SC      Chemo Received  yes  -SC      Chemo Treatments #/Timeframe  NAC Dec 2019 through April 2020, AC/Taxol  -SC      Radiation Therapy Received  yes  -SC      Radiation Treatments #/Timeframe  completed 08/18/2020  -SC      Infections or Cellulitis?  no  -SC      Lymphedema Assessment Comments  stage 1 to early stage 2 left breast, high risk left UE  -SC         Posture/Observations    Alignment Options  Forward head;Cervical lordosis;Thoracic kyphosis;Rounded shoulders;Scapular elevation;Scapular winging;Scoliosis;Lumbar lordosis;Genu valgus;Foot pronation  -SC      Forward Head  Mild;Increased;Sitting posture  -SC      Cervical Lordosis  Normal  -SC      Thoracic Kyphosis  Mild;Increased;Sitting posture  -SC      Rounded Shoulders  Bilateral:;Mild;Increased;Sitting posture  -SC      Scapular Elevation  Right:;Mild;Increased;Elevated;Sitting posture   -SC      Scapular winging  Bilateral:;Normal  -SC      Scoliosis  Normal;Standing posture  -SC      Lumbar lordosis  Mild;Decreased;Sitting posture  -SC      Genu valgus  Bilateral:;Normal;Standing posture  -SC      Foot pronation  Bilateral:;Normal;Standing posture  -SC      Observations  Incision healing;Edema;Erythema;Muscle atrophy  -SC         General ROM    GENERAL ROM COMMENTS  R UE WNLs, L elbow, hand/wrist WNL, PROM left shoulder WNLs all planes, AAROM left shoulder flexion after session WNLs however marked impingement with testing OH positions L GHJ   -SC         MMT (Manual Muscle Testing)    General MMT Comments  R UE WNLs, L GHJ 5/5, L scapula 3-3+/5  -SC         Lymphedema Edema Assessment    Ptting Edema Category  By grade out of 4  -SC      Pitting Edema  + 1/4 (+1 of 4);Mild;Moderate left breast, negative in left UE  -SC      Edema Assessment Comment  mild watery edema left medial breast, thickening of skin and scar tissue at lumpectomy site left breast, fibrosis inferior breast, does soften with manual techniques, negative axillary cording or scar tissue, good mobility of lat flap scar with negative edema noted in region  -SC         Skin Changes/Observations    Skin Observations Comment  mild post radiation skin changes, moderate fibrosis left breast with marked limited mobility lateral region, moderate scar tissue at lat flap and lumpectomy region, very minimal at axillary incision  -SC         Lymphedema Sensation    Lymphedema Sensation Tests  light touch  -SC      Lymphedema Light Touch  LUE:;mild impairment;moderate impairment  -SC         Lymphedema Pulses/Capillary Refill    Lymph Pulses Capillary Refill Comments  intact  -SC         Manual Lymphatic Drainage    Manual Lymphatic Drainage  initial sequence;opened regional lymph nodes;opened anastamoses;extremity treatment  -SC      Initial Sequence  supraclavicular;shoulder collectors;abdomen;diaphragmatic breathing  -SC      Supraclavicular   right;left  -SC      Shoulder Collectors  right;left  -SC      Abdomen  superficial  -SC      Opened Regional Lymph Nodes  axillary;inguinal;ribs  -SC      Axillary  right;left  -SC      Inguinal  left  -SC      Opened Anastamoses  anterior axillo-axillary;axillo-inguinal  -SC      Axillo-Inguinal  left  -SC      Extremity Treatment  simple/brief MLD;MLD to proximal limb only  -SC      Simple/Brief MLD  left  -SC      MLD to Proximal Limb Only  left  -SC      Manual Lymphatic Drainage Comments  left breast MLD  -SC      Manual Therapy  MLD left breast, scar massage lumpectomy and lat flap incision, myofascial release left pec, anterior shoulder, medial arm, axilla, lateral flank, subscapularis release, AP and inferior joint mobs L GHJ, lateral distractions, inferior oscillations, PROM all planes, kinesiotape lift left breast, scar release left lumpectomy, and Y strip left medial breast, education of use and wear time and removal if indicated  -SC         L-Dex Bioimpedence Screening    L-Dex Measurement Extremity  -- insurance does not cover at this time  -SC      L-Dex UE Baseline Score  --  -SC        User Key  (r) = Recorded By, (t) = Taken By, (c) = Cosigned By    Initials Name Provider Type    SC Yola Valenzuela, PT Physical Therapist            PT Ortho     Row Name 01/26/21 3700       Subjective Comments    Subjective Comments  I am doing ok. Glad to know my biopsy results are all good. I did see my neurologist last week because I had a mini stroke in 2017, everything is stable but because of my new cancer diagnoses she wants me to get an MRI for my brain to make sure no cancer or issues. I am having trouble with my recall too so will be good to know. I have had so much going on I don't really want to see an ortho about my shoulder yet. Mostly bothers me with prolonged sitting, rest but the elevation does help. I have to call and schedule my bra fitting.   -SC       Precautions and Contraindications     Precautions/Limitations  other (see comments)  -SC    Precautions  R mediport  -SC    Contraindications  L AD, no IV/BP L UE, no modalities  -SC       Special Tests/Palpation    Special Tests/Palpation  Shoulder  -SC       Shoulder Girdle Accessory Motions    Shoulder Girdle Accessory Motions Tested?  Yes  -SC    Posterior glide of humerus  Left:;Hypomobile;Left pain  -SC    Inferior glide of humerus  Left:;Hypomobile;Left pain  -SC    Lateral distraction  Left:;Hypomobile;Left pain  -SC    Inferior glide of scapula  Left:;Hypomobile  -SC    Retraction mobility of scapula  Left:;Hypomobile  -SC    Upward rotation mobility of scapula  Left:;Hypomobile;Left pain  -SC       Shoulder Impingement/Rotator Cuff Special Tests    Zaidi-Elvin Test (RC Lesion vs. Bursitis)  Left:;Negative  -SC    Neer Impingement Test (RC Lesion vs. Bursitis)  Left:;Positive  -SC    Full Can Test (RC Lesion)  Left:;Negative  -SC    Empty Can Test (RC Lesion)  Left:;Positive  -SC    Drop Arm Test (Full Thickness RC Lesion)  Left:;Negative  -SC    Internal Impingement Sign  Left:;Positive  -SC    Lift-Off Test (Subscapularis Lesion)  Left:;Negative  -SC       Shoulder Girdle Palpation    Shoulder Girdle Palpation?  Yes  -SC    Long Head of Biceps  Left:;Tender;Guarded/taut;Swollen  -SC    Deltoid  Left:;Tender;Guarded/taut  -SC    Subscapularis  Left:;Tender;Guarded/taut;Trigger point  -SC    Infraspinatus  Left:;Tender;Guarded/taut;Trigger point  -SC    Pect Minor  Left:;Tender;Guarded/taut;Trigger point  -SC    Upper Trap  Left:;Tender;Guarded/taut;Trigger point  -SC    Levator Scapula  Left:;Tender;Guarded/taut;Trigger point  -SC    Rhomboid  Left:;Atrophied  -SC    Lower Trap  Left:;Atrophied  -SC       Flexibility    Flexibility Tested?  Upper Extremity  -SC       Pathomechanics    Upper Extremity Pathomechanics  Excessive scapular elevation;Limited scapular upward rotation;Limited thoracic extension;Limited glenohumeral internal  rotation  -SC       Gait/Stairs (Locomotion)    Bilateral Gait Deviations  forward flexed posture  -SC      User Key  (r) = Recorded By, (t) = Taken By, (c) = Cosigned By    Initials Name Provider Type    SC Yola Valenzuela, PT Physical Therapist                  PT Assessment/Plan     Row Name 01/26/21 1615          PT Assessment    Functional Limitations  Limitation in home management;Limitations in community activities;Performance in leisure activities;Performance in sport activities  -SC     Impairments  Endurance;Impaired flexibility;Impaired lymphatic circulation;Impaired muscle endurance;Integumentary integrity;Joint mobility;Muscle strength;Pain;Peripheral nerve integrity;Poor body mechanics;Posture;Range of motion;Sensation  -SC     Assessment Comments  Mrs. Dolan is a pleasant 60 year old female, diagnosed with left breast cancer in 2019, mediport placement right 12/02/2019, initiated neoadjuvant chemotherapy with oncologist Dr. Moreno Dec 2019, completed April 2020, AC/Taxol, s/p left lumpectomy with axillary dissection 10/13 L ALN (+) with flap reconstruction closure 05/28/2020 performed by surgeons Dr. Forman and Radha. Completed adjuvant radiation in August 2020. Mild post radiation skin changes. Resolution of axillary cording, however persisting left shoulder impingement and altered joint mobility left GHJ with poor scapular stabilization due to surgery, lat flap, radiation. Is at high risk of post lumpectomy lymphedema syndrome left UE/breast due to surgery, lymph node removal, lymph node involvement, radiation, age, and additional medical issues. Post radiation, weaned from compression bioimpedance was stable and negative for left UE L-Dex -3.5, stable in Sept 2020. Reassessed in Dec 2020 due to persisting symptoms, left breast edema, and upcoming mammo/US. Current L-Dex is -0.3, which is WNL however elevated near delta 5 for patient. To monitor closely. Richa did returned Oct 2020  with increased scar tissue to left breast currently with positive mild lymphedema left breast with increased fibrosis and scar tissue to left breast as well. High risk of lymphedema left UE. Initiated kinesiotape. Orders sent for compression bra and swell spot, awaiting arrival at this time. Referral to [facility] for bra fitting. Education of home pneumatic compression pump. Will monitor closely. Did have biopsy left breast 01/08/21, negative at this time. Did have neurology consult and scheduled for brain MRI on 01/29/21 as well. Did provide komprex II channel foam pad for left breast today. To continue with weekly therapy at this time for optimal rehabilitation. Possible referral to ortho due to persisting left shoulder pain pending progress.  -SC        PT Plan    Predicted Duration of Therapy Intervention (PT)  12 sessions  -SC     PT Plan Comments  assess komprex II channel foam pad and bra, discussion of night garment, pump, referral ortho pending progress  -SC       User Key  (r) = Recorded By, (t) = Taken By, (c) = Cosigned By    Initials Name Provider Type    Yola Rodriguez, PT Physical Therapist             OP Exercises     Row Name 01/26/21 5565             Subjective Comments    Subjective Comments  I am doing ok. Glad to know my biopsy results are all good. I did see my neurologist last week because I had a mini stroke in 2017, everything is stable but because of my new cancer diagnoses she wants me to get an MRI for my brain to make sure no cancer or issues. I am having trouble with my recall too so will be good to know. I have had so much going on I don't really want to see an ortho about my shoulder yet. Mostly bothers me with prolonged sitting, rest but the elevation does help. I have to call and schedule my bra fitting.   -SC         Subjective Pain    Able to rate subjective pain?  yes  -SC      Pre-Treatment Pain Level  0  -SC        User Key  (r) = Recorded By, (t) = Taken  By, (c) = Cosigned By    Initials Name Provider Type    Yola Rodriguez, PT Physical Therapist                      PT OP Goals     Row Name 01/26/21 1615          PT Short Term Goals    STG 1  Patient independent and compliant with initial home exercise program focused on diaphragmatic breathing, range of motion, flexibility to decrease edema and improve lymphatic flow for decreased edema and decreased risk of infection.   -SC     STG 1 Progress  Met  -SC     STG 2  Patient demonstrate proper awareness of What is Lymphedema and 18 Steps of Prevention for improved prevention, management, care of symptoms and ease of transition to self-care of condition.   -SC     STG 2 Progress  Met  -SC     STG 3  Patient demonstrate independence and compliance with proper skin care during/post radiation for improved mobility, decreased scar tissue, axillary cording and edema.  -SC     STG 3 Progress  Met  -SC     STG 4  Patient independent and compliant with breast mobilization techniques for improved mobility, skin care and lymphatic flow.  -SC     STG 4 Progress  Met  -SC     STG 5  Patient independent and compliant with daytime OTS prevention compression garments as indicated for decreased risk of lymphedema and infection and safety with transition of self-care of condition.   -SC     STG 5 Progress  Met  -SC        Long Term Goals    LTG Date to Achieve  03/17/21  -SC     LTG 1  Patient's bioimpedance score to remain between -10 and 6.5 for decreased risk of developing stage II post lumpectomy lymphedema syndrome left UE and to establish treatment for early intervention of condition if/when indicated.  -SC     LTG 1 Progress  Progressing baseline post op 06/18/2020 -4.7  -SC     LTG 1 Progress Comments  insurance does not cover, was slightly elevated in Dec 2020, to reassess Feb to April 2021 pending progress  -SC     LTG 2  Patient independent and compliant with advanced Home Exercise Program and self-care techniques  for self-management of condition.   -SC     LTG 2 Progress  Progressing  -SC     LTG 3  Patient able to wear fitted post lumpectomy/radiation bra with padding left (if indicated)  >/=6-8 hours for work schedule for improved compression to lateral flank/left breast.  -SC     LTG 3 Progress  Ongoing  -SC     LTG 3 Progress Comments  encouraged fitting this week, provided komprex II channel foam pad today to assess need for swell spot with bra fitting  -SC     LTG 4  Patient transition to cardiovascular exercise program (walking) >/=150 to 180 mins/week with moderate intensity for improved heart health, weight loss, decreased risk of osteoporosis and improved phase angle/metabolic rate.  -SC     LTG 4 Progress  Met  -[facility] 5  Patient score </=25% on Quick DASH for improved functional use of L UE home, community, and sleep  -[facility] 5 Progress  Met initial evaluation 50%  -[facility] 6  Patient with negative impingement testing left shoulder for improved mobility, posture and decreased pain with ADLs and home activities  [facility]  [facility]  [facility] Comments  mild persisting but improved mobility noted  -SC     LTG 7  Patient with minimal fibrosis left breast for decreased risk of stage II lymphedema left and progression of decrease/infections  -SC     LTG 7 Progress  Progressing  -SC     LTG 7 Progress Comments  increased noted with recent biopsy, fitting of komprex II channel foam pad to assist with formal therapy as well. discussion of pump if indicated  -SC        Time Calculation    PT Goal Re-Cert Due Date  03/17/21  -SC       User Key  (r) = Recorded By, (t) = Taken By, (c) = Cosigned By    Initials Name Provider Type    Yola Rodriguez PT Physical Therapist          Therapy Education  Education Details: provided fitted komprex II channel foam pad for left breast, education wear/care/use and washing instructions. Encouraged bra fitting with swell spot. education of  orthopedic referral if indicated. Education of pump and night garment if indicated  Given: HEP, Symptoms/condition management, Pain management, Posture/body mechanics, Mobility training, Edema management, Other (comment)  Program: Progressed, Modified  How Provided: Verbal, Demonstration, Written  Provided to: Patient  Level of Understanding: Teach back education performed, Verbalized, Demonstrated    Outcome Measure Options: Quick DASH  Quick DASH  Open a tight or new jar.: No Difficulty  Do heavy household chores (e.g., wash walls, wash floors): Mild Difficulty  Carry a shopping bag or briefcase: No Difficulty  Wash your back: No Difficulty  Use a knife to cut food: No Difficulty  Recreational activities in which you take some force or impact through your arm, should or hand (e.g. golf, hammering, tennis, etc.): Mild Difficulty  During the past week, to what extent has your arm, shoulder, or hand problem interfered with your normal social activites with family, friends, neighbors or groups?: Not at all  During the past week, were you limited in your work or other regular daily activities as a result of your arm, shoulder or hand problem?: Not limited at all  Arm, Shoulder, or hand pain: Mild  Tingling (pins and needles) in your arm, shoulder, or hand: Mild  During the past week, how much difficulty have you had sleeping because of the pain in your arm, shoulder or hand?: Mild Difficulty  Number of Questions Answered: 11  Quick DASH Score: 11.36         Time Calculation:   Start Time: 1615  Stop Time: 1655  Time Calculation (min): 40 min   Therapy Charges for Today     Code Description Service Date Service Provider Modifiers Qty    [id number] HC PT MANUAL THERAPY EA 15 MIN 1/26/2021 Yola Valenzuela, PT GP 3          PT G-Codes  Outcome Measure Options: Quick DASH  Quick DASH Score: 11.36         Yola Broderick, YOLANDA  1/26/2021

## 2021-02-02 ENCOUNTER — HOSPITAL ENCOUNTER (OUTPATIENT)
Dept: PHYSICAL THERAPY | Facility: HOSPITAL | Age: 61
Setting detail: THERAPIES SERIES
Discharge: HOME | End: 2021-02-02

## 2021-02-02 DIAGNOSIS — C50.812 MALIGNANT NEOPLASM OF OVERLAPPING SITES OF LEFT BREAST IN FEMALE, ESTROGEN RECEPTOR POSITIVE (HCC): Primary | ICD-10-CM

## 2021-02-02 DIAGNOSIS — M25.612 DECREASED RANGE OF MOTION OF LEFT SHOULDER: ICD-10-CM

## 2021-02-02 DIAGNOSIS — M79.622 AXILLARY PAIN, LEFT: ICD-10-CM

## 2021-02-02 DIAGNOSIS — L90.5 SCAR TISSUE: ICD-10-CM

## 2021-02-02 DIAGNOSIS — N64.89 BREAST EDEMA: ICD-10-CM

## 2021-02-02 DIAGNOSIS — Z91.89 AT RISK FOR LYMPHEDEMA: ICD-10-CM

## 2021-02-02 DIAGNOSIS — M75.42 SHOULDER IMPINGEMENT SYNDROME, LEFT: ICD-10-CM

## 2021-02-02 DIAGNOSIS — Z17.0 MALIGNANT NEOPLASM OF OVERLAPPING SITES OF LEFT BREAST IN FEMALE, ESTROGEN RECEPTOR POSITIVE (HCC): Primary | ICD-10-CM

## 2021-02-02 DIAGNOSIS — Z85.3 HISTORY OF LEFT BREAST CANCER: ICD-10-CM

## 2021-02-02 DIAGNOSIS — Z98.890 STATUS POST LEFT BREAST LUMPECTOMY: ICD-10-CM

## 2021-02-02 PROCEDURE — 97140 MANUAL THERAPY 1/> REGIONS: CPT | Performed by: PHYSICAL THERAPIST

## 2021-02-02 NOTE — THERAPY TREATMENT NOTE
Outpatient Physical Therapy Lymphedema Treatment Note  [facility]     Patient Name: Richa Dolan  : 1960  MRN: [id number]  Today's Date: 2021        Visit Date: 2021    Visit Dx:    ICD-10-CM ICD-9-CM   1. Malignant neoplasm of overlapping sites of left breast in female, estrogen receptor positive (CMS/HCC)  C50.812 174.8    Z17.0 V86.0   2. Status post left breast lumpectomy  Z98.890 V45.89   3. At risk for lymphedema  Z91.89 V49.89   4. Shoulder impingement syndrome, left  M75.42 726.2   5. Decreased range of motion of left shoulder  M25.612 719.51   6. Axillary pain, left  M79.622 729.5   7. Breast edema  N64.89 611.89   8. Scar tissue  L90.5 709.2   9. History of left breast cancer  Z85.3 V10.3       Patient Active Problem List   Diagnosis   • Abnormal liver function tests   • Hypertension   • Insomnia   • Knee pain   • Leukopenia   • Acute right-sided low back pain without sciatica   • Knee osteoarthritis   • Osteoporosis   • Arthralgia of multiple joints   • Seasonal allergic rhinitis   • Thrombocytopenia (CMS/HCC)   • Bone cyst   • Chronic pain of right knee   • Acquired immunocompromised state (CMS/HCC)   • Bruises easily   • Loss of hair   • Anxiety   • Elevated liver enzymes   • GERD (gastroesophageal reflux disease)   • HIV positive long-term non-progressor (CMS/HCC)   • Hypercholesterolemia   • Lupus anticoagulant positive   • Neck pain, chronic   • Chemotherapy-induced neutropenia (CMS/HCC)   • Tear of medial meniscus of right knee, current   • Chondromalacia, knee   • ICH (intracerebral hemorrhage) (CMS/HCC)   • Stomach discomfort   • Abnormal bruising   • Alopecia   • Headache   • Intractable chronic migraine without aura and without status migrainosus   • Osteoarthritis of right knee   • Need for vaccination   • Mild episode of recurrent major depressive disorder (CMS/HCC)   • Ingrown nail of great toe of right foot   • Left wrist pain   • Breast cancer screening   •  Malignant neoplasm of overlapping sites of left breast in female, estrogen receptor positive (CMS/HCC)   • History of stroke   • Arthritis   • Family history of breast cancer   • Encounter for screening mammogram for breast cancer   • Epistaxis   • Chemotherapy-induced thrombocytopenia   • Anemia associated with chemotherapy   • Substance or medication-induced sleep disorder, 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CMS/HCC)   • Body aches   • Neuropathy   • History of left breast cancer   • Other fatigue   • HIV infection (CMS/HCC)   • Fat necrosis of left breast        Lymphedema     Row Name 02/02/21 3887             Subjective Pain    Able to rate subjective pain?  yes  -SC      Pre-Treatment Pain Level  0  -SC         Subjective Comments    Subjective Comments  I am doing better. I like the foam. I am going for my bra fitting tomorrow.   -SC         Manual Lymphatic Drainage    Manual Lymphatic Drainage  initial sequence;opened regional lymph nodes;opened anastamoses;extremity treatment  -SC      Initial Sequence  supraclavicular;shoulder collectors;abdomen;diaphragmatic breathing  -SC      Supraclavicular  right;left  -SC      Shoulder Collectors  right;left  -SC      Abdomen  superficial  -SC      Opened Regional Lymph Nodes  axillary;inguinal;ribs  -SC      Axillary  right;left  -SC      Inguinal  left  -SC      Opened Anastamoses  anterior axillo-axillary;axillo-inguinal  -SC      Axillo-Inguinal  left  -SC      Extremity Treatment  simple/brief MLD;MLD to proximal limb only  -SC      Simple/Brief MLD  left  -SC      MLD to Proximal Limb Only  left  -SC      Manual Lymphatic Drainage Comments  left breast MLD  -SC      Manual Therapy  MLD left breast, scar massage lumpectomy and lat flap incision, myofascial release left pec, anterior shoulder, medial arm, axilla, lateral flank, subscapularis release, AP and inferior joint mobs L GHJ, lateral distractions, inferior oscillations, PROM all planes, kinesiotape lift left breast, scar  release left lumpectomy, and Y strip left medial breast, education of use and wear time and removal if indicated  -SC         Compression/Skin Care    Compression/Skin Care Comments  going for bra fitting tomorrow 02/03/21  -SC        User Key  (r) = Recorded By, (t) = Taken By, (c) = Cosigned By    Initials Name Provider Type    Yola Rodriguez, PT Physical Therapist                        PT Assessment/Plan     Row Name 02/02/21 1615          PT Plan    PT Plan Comments  assess bra fitting, night garment, pump, swell spot  -SC       User Key  (r) = Recorded By, (t) = Taken By, (c) = Cosigned By    Initials Name Provider Type    SC Yola Valenzuela, PT Physical Therapist             OP Exercises     Row Name 02/02/21 1615             Subjective Comments    Subjective Comments  I am doing better. I like the foam. I am going for my bra fitting tomorrow.   -SC         Subjective Pain    Able to rate subjective pain?  yes  -SC      Pre-Treatment Pain Level  0  -SC        User Key  (r) = Recorded By, (t) = Taken By, (c) = Cosigned By    Initials Name Provider Type    SC Yola Valenzuela, PT Physical Therapist                          Therapy Education  Education Details: bra fitting, continuation of care, night garment, swell spot, pump  Given: Symptoms/condition management, Edema management, Other (comment)  Program: Progressed, Modified  How Provided: Verbal, Demonstration, Written  Provided to: Patient  Level of Understanding: Teach back education performed, Verbalized, Demonstrated              Time Calculation:   Start Time: 1615  Stop Time: 1655  Time Calculation (min): 40 min   Therapy Charges for Today     Code Description Service Date Service Provider Modifiers Qty    04101582143  PT MANUAL THERAPY EA 15 MIN 2/2/2021 Yola Valenzuela, PT GP 3                    Yola Broderick PT  2/2/2021

## 2021-02-09 ENCOUNTER — HOSPITAL ENCOUNTER (OUTPATIENT)
Dept: PHYSICAL THERAPY | Facility: HOSPITAL | Age: 61
Setting detail: THERAPIES SERIES
Discharge: HOME | End: 2021-02-09

## 2021-02-09 DIAGNOSIS — Z98.890 STATUS POST LEFT BREAST LUMPECTOMY: ICD-10-CM

## 2021-02-09 DIAGNOSIS — Z91.89 AT RISK FOR LYMPHEDEMA: ICD-10-CM

## 2021-02-09 DIAGNOSIS — N64.89 BREAST EDEMA: ICD-10-CM

## 2021-02-09 DIAGNOSIS — Z85.3 HISTORY OF LEFT BREAST CANCER: ICD-10-CM

## 2021-02-09 DIAGNOSIS — M75.42 SHOULDER IMPINGEMENT SYNDROME, LEFT: ICD-10-CM

## 2021-02-09 DIAGNOSIS — M25.612 DECREASED RANGE OF MOTION OF LEFT SHOULDER: ICD-10-CM

## 2021-02-09 DIAGNOSIS — C50.812 MALIGNANT NEOPLASM OF OVERLAPPING SITES OF LEFT BREAST IN FEMALE, ESTROGEN RECEPTOR POSITIVE (HCC): Primary | ICD-10-CM

## 2021-02-09 DIAGNOSIS — L90.5 SCAR TISSUE: ICD-10-CM

## 2021-02-09 DIAGNOSIS — Z17.0 MALIGNANT NEOPLASM OF OVERLAPPING SITES OF LEFT BREAST IN FEMALE, ESTROGEN RECEPTOR POSITIVE (HCC): Primary | ICD-10-CM

## 2021-02-09 DIAGNOSIS — M79.622 AXILLARY PAIN, LEFT: ICD-10-CM

## 2021-02-09 PROCEDURE — 97140 MANUAL THERAPY 1/> REGIONS: CPT | Performed by: PHYSICAL THERAPIST

## 2021-02-09 NOTE — THERAPY TREATMENT NOTE
Outpatient Physical Therapy Lymphedema Treatment Note  [facility]     Patient Name: Richa Dolan  : 1960  MRN: [id number]  Today's Date: 2021        Visit Date: 2021    Visit Dx:    ICD-10-CM ICD-9-CM   1. Malignant neoplasm of overlapping sites of left breast in female, estrogen receptor positive (CMS/HCC)  C50.812 174.8    Z17.0 V86.0   2. Status post left breast lumpectomy  Z98.890 V45.89   3. At risk for lymphedema  Z91.89 V49.89   4. Decreased range of motion of left shoulder  M25.612 719.51   5. Breast edema  N64.89 611.89   6. History of left breast cancer  Z85.3 V10.3   7. Scar tissue  L90.5 709.2   8. Axillary pain, left  M79.622 729.5   9. Shoulder impingement syndrome, left  M75.42 726.2       Patient Active Problem List   Diagnosis   • Abnormal liver function tests   • Hypertension   • Insomnia   • Knee pain   • Leukopenia   • Acute right-sided low back pain without sciatica   • Knee osteoarthritis   • Osteoporosis   • Arthralgia of multiple joints   • Seasonal allergic rhinitis   • Thrombocytopenia (CMS/HCC)   • Bone cyst   • Chronic pain of right knee   • Acquired immunocompromised state (CMS/HCC)   • Bruises easily   • Loss of hair   • Anxiety   • Elevated liver enzymes   • GERD (gastroesophageal reflux disease)   • HIV positive long-term non-progressor (CMS/HCC)   • Hypercholesterolemia   • Lupus anticoagulant positive   • Neck pain, chronic   • Chemotherapy-induced neutropenia (CMS/HCC)   • Tear of medial meniscus of right knee, current   • Chondromalacia, knee   • ICH (intracerebral hemorrhage) (CMS/HCC)   • Stomach discomfort   • Abnormal bruising   • Alopecia   • Headache   • Intractable chronic migraine without aura and without status migrainosus   • Osteoarthritis of right knee   • Need for vaccination   • Mild episode of recurrent major depressive disorder (CMS/HCC)   • Ingrown nail of great toe of right foot   • Left wrist pain   • Breast cancer screening   •  Malignant neoplasm of overlapping sites of left breast in female, estrogen receptor positive (CMS/HCC)   • History of stroke   • Arthritis   • Family history of breast cancer   • Encounter for screening mammogram for breast cancer   • Epistaxis   • Chemotherapy-induced thrombocytopenia   • Anemia associated with chemotherapy   • Substance or medication-induced sleep disorder, 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CMS/HCC)   • Body aches   • Neuropathy   • History of left breast cancer   • Other fatigue   • HIV infection (CMS/HCC)   • Fat necrosis of left breast        Lymphedema     Row Name 02/09/21 8267             Subjective Pain    Able to rate subjective pain?  yes  -SC (r) RP (t) SC (c)      Pre-Treatment Pain Level  0  -SC (r) RP (t) SC (c)         Subjective Comments    Subjective Comments  Pt reported that she is doing okay today. Pt reported that she went for the bra fitting, and is wearing the bra but does not love it. Pt reported that she did buy a compression tank top to wear as well. Patient reports wearing the komprex pad daily and does seem to help. Arm pain feels about the same. I take my hormone blocker between 6-8pm every night. Pain in arm starts about 9 pm nightly.   -SC         Manual Lymphatic Drainage    Manual Lymphatic Drainage  initial sequence;opened regional lymph nodes;opened anastamoses;extremity treatment  -SC (r) RP (t) SC (c)      Initial Sequence  supraclavicular;shoulder collectors;abdomen;diaphragmatic breathing  -SC (r) RP (t) SC (c)      Supraclavicular  right;left  -SC (r) RP (t) SC (c)      Shoulder Collectors  right;left  -SC (r) RP (t) SC (c)      Abdomen  superficial  -SC (r) RP (t) SC (c)      Opened Regional Lymph Nodes  axillary;inguinal;ribs  -SC (r) RP (t) SC (c)      Axillary  right;left  -SC (r) RP (t) SC (c)      Inguinal  left  -SC (r) RP (t) SC (c)      Opened Anastamoses  anterior axillo-axillary;axillo-inguinal  -SC (r) RP (t) SC (c)      Axillo-Inguinal  left  -SC (r) RP (t) SC (c)   "    Extremity Treatment  simple/brief MLD;MLD to proximal limb only  -SC (r) RP (t) SC (c)      Simple/Brief MLD  left  -SC (r) RP (t) SC (c)      MLD to Proximal Limb Only  left  -SC (r) RP (t) SC (c)      Manual Lymphatic Drainage Comments  left breast MLD  -SC (r) RP (t) SC (c)      Manual Therapy  MLD left breast, scar massage lumpectomy and lat flap incision, myofascial release left pec, anterior shoulder, medial arm, axilla, lateral flank, subscapularis release, AP and inferior joint mobs L GHJ, lateral distractions, inferior oscillations, PROM all planes, gua sha/myofascial release left pec, into lateral breast and inferior breast at incision site/lymphedema of lumpectomy site  -SC         Compression/Skin Care    Compression/Skin Care Comments  Pt presents to PT wearing custom bra and reports that she doesn't love the feel of them but will continue to use to attempt to \"get used to them\" Did get a wearease compression tank top and does like the fit/feel of that.  -SC        User Key  (r) = Recorded By, (t) = Taken By, (c) = Cosigned By    Initials Name Provider Type    SC Yola Valenzuela, PT Physical Therapist    Zuly Russ, YOLANDA Student PT Student                        PT Assessment/Plan     Row Name 02/09/21 0122          PT Assessment    Functional Limitations  Limitations in functional capacity and performance;Performance in leisure activities;Limitations in community activities;Performance in sport activities  -SC (r) RP (t) SC (c)     Impairments  Endurance;Impaired flexibility;Impaired lymphatic circulation;Impaired muscle endurance;Integumentary integrity;Joint mobility;Muscle strength;Pain;Peripheral nerve integrity;Poor body mechanics;Posture;Range of motion;Sensation  -SC (r) RP (t) SC (c)     Assessment Comments  Assessed and updated pt goals. Pt responds well to manual therapy/gua sha techniques and experiences softening of scar tissue due to radiation changes. Pt does experience " persisting L shoulder pain and decrease in ROM. Pt was fitted for custom bra and pt presented to PT wearing bra.  -SC (r) RP (t) SC (c)        PT Plan    PT Plan Comments  compliance w/ custom bra wear, swell spot, night garment use around painful symptoms  -SC (r) RP (t) SC (c)       User Key  (r) = Recorded By, (t) = Taken By, (c) = Cosigned By    Initials Name Provider Type    Yola Rodriguez, PT Physical Therapist    Zuly Russ, PT Student PT Student             OP Exercises     Row Name 02/09/21 1612             Subjective Comments    Subjective Comments  Pt reported that she is doing okay today. Pt reported that she went for the bra fitting, and is wearing the bra but does not love it. Pt reported that she did buy a compression tank top to wear as well. Patient reports wearing the komprex pad daily and does seem to help. Arm pain feels about the same. I take my hormone blocker between 6-8pm every night. Pain in arm starts about 9 pm nightly.   -SC         Subjective Pain    Able to rate subjective pain?  yes  -SC (r) RP (t) SC (c)      Pre-Treatment Pain Level  0  -SC (r) RP (t) SC (c)        User Key  (r) = Recorded By, (t) = Taken By, (c) = Cosigned By    Initials Name Provider Type    Yola Rodriguez, PT Physical Therapist    Zuly Russ, PT Student PT Student                      PT OP Goals     Row Name 02/09/21 1612          PT Short Term Goals    STG 1  Patient independent and compliant with initial home exercise program focused on diaphragmatic breathing, range of motion, flexibility to decrease edema and improve lymphatic flow for decreased edema and decreased risk of infection.   -SC (r) RP (t) SC (c)     STG 1 Progress  Met  -SC (r) RP (t) SC (c)     STG 2  Patient demonstrate proper awareness of What is Lymphedema and 18 Steps of Prevention for improved prevention, management, care of symptoms and ease of transition to self-care of condition.   -SC (r) RP (t) SC (c)      STG 2 Progress  Met  -SC (r) RP (t) SC (c)     STG 3  Patient demonstrate independence and compliance with proper skin care during/post radiation for improved mobility, decreased scar tissue, axillary cording and edema.  -SC (r) RP (t) SC (c)     STG 3 Progress  Met  -SC (r) RP (t) SC (c)     STG 4  Patient independent and compliant with breast mobilization techniques for improved mobility, skin care and lymphatic flow.  -SC (r) RP (t) SC (c)     STG 4 Progress  Met  -SC (r) RP (t) SC (c)     STG 5  Patient independent and compliant with daytime OTS prevention compression garments as indicated for decreased risk of lymphedema and infection and safety with transition of self-care of condition.   -SC (r) RP (t) SC (c)     STG 5 Progress  Met  -SC (r) RP (t) SC (c)        Long Term Goals    LTG Date to Achieve  03/17/21  -SC (r) RP (t) SC (c)     LTG 1  Patient's bioimpedance score to remain between -10 and 6.5 for decreased risk of developing stage II post lumpectomy lymphedema syndrome left UE and to establish treatment for early intervention of condition if/when indicated.  -SC (r) RP (t) SC (c)     LTG 1 Progress  Progressing baseline post op 06/18/2020 -4.7  -SC (r) RP (t) SC (c)     LTG 1 Progress Comments  insurance does not cover, was slightly elevated in Dec 2020, to reassess Feb to April 2021 pending progress  -SC (r) RP (t) SC (c)     LTG 2  Patient independent and compliant with advanced Home Exercise Program and self-care techniques for self-management of condition.   -SC (r) RP (t) SC (c)     LTG 2 Progress  Progressing  -SC (r) RP (t) SC (c)     LTG 3  Patient able to wear fitted post lumpectomy/radiation bra with padding left (if indicated)  >/=6-8 hours for work schedule for improved compression to lateral flank/left breast.  -SC (r) RP (t) SC (c)     LTG 3 Progress  Progressing  -SC     LTG 3 Progress Comments  Pt went to be fitted for custom bra, and presented to PT wearing the bra.  -SC (r) RP  (t) SC (c)     LTG 4  Patient transition to cardiovascular exercise program (walking) >/=150 to 180 mins/week with moderate intensity for improved heart health, weight loss, decreased risk of osteoporosis and improved phase angle/metabolic rate.  -SC (r) RP (t) SC (c)     LTG 4 Progress  Met  -SC (r) RP (t) SC (c)     LTG 5  Patient score </=25% on Quick DASH for improved functional use of L UE home, community, and sleep  -SC (r) RP (t) SC (c)     LTG 5 Progress  Met initial evaluation 50%  -SC (r) RP (t) SC (c)     LTG 6  Patient with negative impingement testing left shoulder for improved mobility, posture and decreased pain with ADLs and home activities  -SC (r) RP (t) SC (c)     LTG 6 Progress  Progressing  -SC (r) RP (t) SC (c)     LTG 6 Progress Comments  Pt ROM improves w/ application of manual therapy/gua sha techniques  -SC (r) RP (t) SC (c)     LTG 7  Patient with minimal fibrosis left breast for decreased risk of stage II lymphedema left and progression of decrease/infections  -SC (r) RP (t) SC (c)     LTG 7 Progress  Progressing  -SC (r) RP (t) SC (c)     LTG 7 Progress Comments  Pt experiencing persistent scar tissue  -SC (r) RP (t) SC (c)        Time Calculation    PT Goal Re-Cert Due Date  03/17/21  -SC (r) RP (t) SC (c)       User Key  (r) = Recorded By, (t) = Taken By, (c) = Cosigned By    Initials Name Provider Type    Yola Rodriguez, PT Physical Therapist    RP Zuly Contreras, PT Student PT Student          Therapy Education  Education Details: cont. of care, assess custom bra fit, cont. to wear komprex pad, cont. to wear night garments  Given: Edema management, Symptoms/condition management, Other (comment), Posture/body mechanics  Program: Reinforced, Progressed, Modified  How Provided: Verbal, Demonstration, Written  Provided to: Patient              Time Calculation:   Start Time: 1612  Stop Time: 1650  Time Calculation (min): 38 min   Therapy Charges for Today     Code Description  Service Date Service Provider Modifiers Qty    16866051107 HC PT MANUAL THERAPY EA 15 MIN 2/9/2021 Yola Valenzuela, PT GP 3                    Yola Broderick, PT  2/9/2021

## 2021-02-11 ENCOUNTER — TELEPHONE (OUTPATIENT)
Dept: MAMMOGRAPHY | Facility: CLINIC | Age: 61
End: 2021-02-11

## 2021-02-11 DIAGNOSIS — R10.9 STOMACH DISCOMFORT: ICD-10-CM

## 2021-02-11 RX ORDER — COLESEVELAM 180 1/1
TABLET ORAL
Qty: 60 TABLET | Refills: 0 | Status: SHIPPED | OUTPATIENT
Start: 2021-02-11 | End: 2021-03-08

## 2021-02-22 DIAGNOSIS — C50.812 MALIGNANT NEOPLASM OF OVERLAPPING SITES OF LEFT BREAST IN FEMALE, ESTROGEN RECEPTOR POSITIVE (HCC): Primary | ICD-10-CM

## 2021-02-22 DIAGNOSIS — Z17.0 MALIGNANT NEOPLASM OF OVERLAPPING SITES OF LEFT BREAST IN FEMALE, ESTROGEN RECEPTOR POSITIVE (HCC): Primary | ICD-10-CM

## 2021-02-23 ENCOUNTER — HOSPITAL ENCOUNTER (OUTPATIENT)
Dept: PHYSICAL THERAPY | Facility: HOSPITAL | Age: 61
Setting detail: THERAPIES SERIES
Discharge: HOME | End: 2021-02-23

## 2021-02-23 DIAGNOSIS — M79.622 AXILLARY PAIN, LEFT: ICD-10-CM

## 2021-02-23 DIAGNOSIS — M75.42 SHOULDER IMPINGEMENT SYNDROME, LEFT: ICD-10-CM

## 2021-02-23 DIAGNOSIS — N64.89 BREAST EDEMA: ICD-10-CM

## 2021-02-23 DIAGNOSIS — C50.812 MALIGNANT NEOPLASM OF OVERLAPPING SITES OF LEFT BREAST IN FEMALE, ESTROGEN RECEPTOR POSITIVE (HCC): Primary | ICD-10-CM

## 2021-02-23 DIAGNOSIS — Z17.0 MALIGNANT NEOPLASM OF OVERLAPPING SITES OF LEFT BREAST IN FEMALE, ESTROGEN RECEPTOR POSITIVE (HCC): Primary | ICD-10-CM

## 2021-02-23 DIAGNOSIS — Z98.890 STATUS POST LEFT BREAST LUMPECTOMY: ICD-10-CM

## 2021-02-23 DIAGNOSIS — M25.612 DECREASED RANGE OF MOTION OF LEFT SHOULDER: ICD-10-CM

## 2021-02-23 DIAGNOSIS — Z85.3 HISTORY OF LEFT BREAST CANCER: ICD-10-CM

## 2021-02-23 DIAGNOSIS — L90.5 SCAR TISSUE: ICD-10-CM

## 2021-02-23 DIAGNOSIS — Z91.89 AT RISK FOR LYMPHEDEMA: ICD-10-CM

## 2021-02-23 PROCEDURE — 97535 SELF CARE MNGMENT TRAINING: CPT

## 2021-02-23 PROCEDURE — 97140 MANUAL THERAPY 1/> REGIONS: CPT

## 2021-02-23 PROCEDURE — 97110 THERAPEUTIC EXERCISES: CPT

## 2021-03-02 ENCOUNTER — HOSPITAL ENCOUNTER (OUTPATIENT)
Dept: PHYSICAL THERAPY | Facility: HOSPITAL | Age: 61
Setting detail: THERAPIES SERIES
Discharge: HOME | End: 2021-03-02

## 2021-03-02 DIAGNOSIS — Z85.3 HISTORY OF LEFT BREAST CANCER: ICD-10-CM

## 2021-03-02 DIAGNOSIS — M25.612 DECREASED RANGE OF MOTION OF LEFT SHOULDER: ICD-10-CM

## 2021-03-02 DIAGNOSIS — L90.5 SCAR TISSUE: ICD-10-CM

## 2021-03-02 DIAGNOSIS — C50.812 MALIGNANT NEOPLASM OF OVERLAPPING SITES OF LEFT BREAST IN FEMALE, ESTROGEN RECEPTOR POSITIVE (HCC): Primary | ICD-10-CM

## 2021-03-02 DIAGNOSIS — Z91.89 AT RISK FOR LYMPHEDEMA: ICD-10-CM

## 2021-03-02 DIAGNOSIS — M75.42 SHOULDER IMPINGEMENT SYNDROME, LEFT: ICD-10-CM

## 2021-03-02 DIAGNOSIS — N64.89 BREAST EDEMA: ICD-10-CM

## 2021-03-02 DIAGNOSIS — Z17.0 MALIGNANT NEOPLASM OF OVERLAPPING SITES OF LEFT BREAST IN FEMALE, ESTROGEN RECEPTOR POSITIVE (HCC): Primary | ICD-10-CM

## 2021-03-02 DIAGNOSIS — M79.622 AXILLARY PAIN, LEFT: ICD-10-CM

## 2021-03-02 DIAGNOSIS — Z98.890 STATUS POST LEFT BREAST LUMPECTOMY: ICD-10-CM

## 2021-03-02 PROCEDURE — 97110 THERAPEUTIC EXERCISES: CPT

## 2021-03-02 PROCEDURE — 97140 MANUAL THERAPY 1/> REGIONS: CPT

## 2021-03-02 NOTE — THERAPY TREATMENT NOTE
Outpatient Physical Therapy Lymphedema Progress Note  [facility]     Patient Name: Richa Dolan  : 1960  MRN: [id number]  Today's Date: 3/2/2021        Visit Date: 2021    Visit Dx:    ICD-10-CM ICD-9-CM   1. Malignant neoplasm of overlapping sites of left breast in female, estrogen receptor positive (CMS/HCC)  C50.812 174.8    Z17.0 V86.0   2. Status post left breast lumpectomy  Z98.890 V45.89   3. At risk for lymphedema  Z91.89 V49.89   4. Decreased range of motion of left shoulder  M25.612 719.51   5. Breast edema  N64.89 611.89   6. History of left breast cancer  Z85.3 V10.3   7. Scar tissue  L90.5 709.2   8. Axillary pain, left  M79.622 729.5   9. Shoulder impingement syndrome, left  M75.42 726.2       Patient Active Problem List   Diagnosis   • Abnormal liver function tests   • Hypertension   • Insomnia   • Knee pain   • Leukopenia   • Acute right-sided low back pain without sciatica   • Knee osteoarthritis   • Osteoporosis   • Arthralgia of multiple joints   • Seasonal allergic rhinitis   • Thrombocytopenia (CMS/HCC)   • Bone cyst   • Chronic pain of right knee   • Acquired immunocompromised state (CMS/HCC)   • Bruises easily   • Loss of hair   • Anxiety   • Elevated liver enzymes   • GERD (gastroesophageal reflux disease)   • HIV positive long-term non-progressor (CMS/HCC)   • Hypercholesterolemia   • Lupus anticoagulant positive   • Neck pain, chronic   • Chemotherapy-induced neutropenia (CMS/HCC)   • Tear of medial meniscus of right knee, current   • Chondromalacia, knee   • ICH (intracerebral hemorrhage) (CMS/HCC)   • Stomach discomfort   • Abnormal bruising   • Alopecia   • Headache   • Intractable chronic migraine without aura and without status migrainosus   • Osteoarthritis of right knee   • Need for vaccination   • Mild episode of recurrent major depressive disorder (CMS/HCC)   • Ingrown nail of great toe of right foot   • Left wrist pain   • Breast cancer screening   •  Malignant neoplasm of overlapping sites of left breast in female, estrogen receptor positive (CMS/HCC)   • History of stroke   • Arthritis   • Family history of breast cancer   • Encounter for screening mammogram for breast cancer   • Epistaxis   • Chemotherapy-induced thrombocytopenia   • Anemia associated with chemotherapy   • Substance or medication-induced sleep disorder, 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CMS/HCC)   • Body aches   • Neuropathy   • History of left breast cancer   • Other fatigue   • HIV infection (CMS/HCC)   • Fat necrosis of left breast        Lymphedema     Row Name 03/02/21 4812             Subjective Pain    Able to rate subjective pain?  yes  -SC (r) RP (t) SC (c)      Pre-Treatment Pain Level  0  -SC (r) RP (t) SC (c)         Subjective Comments    Subjective Comments  Pt reported that she noticed some clicking in her L shoulder when performing her HEP. Pt also reported feeling really sore after performing the exercises, but reported that she has been compliant w/ program.   -SC (r) RP (t) SC (c)         Lymphedema Assessment    Lymphedema Classification  LUE:;at risk/stage 0;secondary;Other: left breast, at risk  -SC (r) RP (t) SC (c)      Lymphedema Cancer Related Sx  left;lumpectomy;sentinel node biopsy;axillary dissection;reconstructive  -SC (r) RP (t) SC (c)      Lymphedema Surgery Comments  Port 12/02/2019; L lump/AD 05/28/2020  -SC (r) RP (t) SC (c)      Lymph Nodes Removed #  13  -SC (r) RP (t) SC (c)      Positive Lymph Nodes #  10  -SC (r) RP (t) SC (c)      Chemo Received  yes  -SC (r) RP (t) SC (c)      Chemo Treatments #/Timeframe  NAC Dec 2019 through April 2020, AC/Taxol  -SC (r) RP (t) SC (c)      Radiation Therapy Received  yes  -SC (r) RP (t) SC (c)      Radiation Treatments #/Timeframe  completed 08/18/2020  -SC (r) RP (t) SC (c)      Infections or Cellulitis?  no  -SC (r) RP (t) SC (c)         Posture/Observations    Alignment Options  Forward head;Cervical lordosis;Thoracic  kyphosis;Rounded shoulders;Scapular elevation;Scapular winging;Scoliosis;Lumbar lordosis;Genu valgus;Foot pronation  -SC (r) RP (t) SC (c)      Forward Head  Mild;Increased;Sitting posture  -SC (r) RP (t) SC (c)      Cervical Lordosis  Normal  -SC (r) RP (t) SC (c)      Thoracic Kyphosis  Mild;Increased;Sitting posture  -SC (r) RP (t) SC (c)      Rounded Shoulders  Bilateral:;Mild;Increased;Sitting posture  -SC (r) RP (t) SC (c)      Scapular Elevation  Right:;Mild;Increased;Elevated;Sitting posture  -SC (r) RP (t) SC (c)      Scapular winging  Bilateral:;Normal  -SC (r) RP (t) SC (c)      Scoliosis  Normal;Standing posture  -SC (r) RP (t) SC (c)      Lumbar lordosis  Mild;Decreased;Sitting posture  -SC (r) RP (t) SC (c)      Genu valgus  Bilateral:;Normal;Standing posture  -SC (r) RP (t) SC (c)      Foot pronation  Bilateral:;Normal;Standing posture  -SC (r) RP (t) SC (c)      Observations  Incision healing;Edema;Erythema;Muscle atrophy  -SC (r) RP (t) SC (c)         General ROM    GENERAL ROM COMMENTS  R UE WNLs, L elbow, hand/wrist WNL, PROM left shoulder WNLs all planes, AAROM left shoulder flexion after session WNLs however marked impingement with testing OH positions L GHJ   -SC (r) RP (t) SC (c)         MMT (Manual Muscle Testing)    General MMT Comments  R UE WNLs, L GHJ 5/5, L scapula 3-3+/5  -SC (r) RP (t) SC (c)         Lymphedema Edema Assessment    Ptting Edema Category  By grade out of 4  -SC (r) RP (t) SC (c)      Pitting Edema  + 1/4 (+1 of 4);Mild;Moderate left breast, negative in left UE  -SC (r) RP (t) SC (c)      Edema Assessment Comment  mild watery edema left medial breast, thickening of skin and scar tissue at lumpectomy site left breast, fibrosis inferior breast, does soften with manual techniques, negative axillary cording or scar tissue, good mobility of lat flap scar with negative edema noted in region  -SC (r) RP (t) SC (c)         Skin Changes/Observations    Skin Observations Comment   mild post radiation skin changes, moderate fibrosis left breast with marked limited mobility lateral region, moderate scar tissue at lat flap and lumpectomy region, very minimal at axillary incision  -SC (r) RP (t) SC (c)         Lymphedema Sensation    Lymphedema Sensation Tests  light touch  -SC (r) RP (t) SC (c)      Lymphedema Light Touch  LUE:;mild impairment;moderate impairment  -SC (r) RP (t) SC (c)         Lymphedema Pulses/Capillary Refill    Lymph Pulses Capillary Refill Comments  intact  -SC (r) RP (t) SC (c)         Manual Lymphatic Drainage    Manual Lymphatic Drainage  initial sequence;opened regional lymph nodes;opened anastamoses;extremity treatment  -SC      Initial Sequence  supraclavicular;shoulder collectors;abdomen;diaphragmatic breathing  -SC      Supraclavicular  right;left  -SC      Shoulder Collectors  right;left  -SC      Abdomen  superficial  -SC      Opened Regional Lymph Nodes  axillary;inguinal;ribs  -SC      Axillary  right;left  -SC      Inguinal  left  -SC      Opened Anastamoses  anterior axillo-axillary;axillo-inguinal  -SC      Axillo-Inguinal  left  -SC      Extremity Treatment  simple/brief MLD;MLD to proximal limb only  -SC      Simple/Brief MLD  left  -SC      MLD to Proximal Limb Only  left  -SC      Manual Lymphatic Drainage Comments  focus of lymphatic massage left breast  -SC      Manual Therapy  MLD left breast, scar massage lumpectomy and lat flap incision, myofascial release left pec, anterior shoulder, medial arm, axilla, lateral flank, subscapularis release, AP and inferior joint mobs L GHJ, lateral distractions, inferior oscillations, PROM all planes, gua sha/myofascial release left pec, into lateral breast and inferior breast at incision site/lymphedema of lumpectomy site. Applied steristip and education of use.   -SC (r) RP (t) SC (c)         Compression/Skin Care    Compression/Skin Care Comments  Pt presented to therapy wearing custom bra.   -SC (r) RP (t) SC (c)          L-Dex Bioimpedence Screening    L-Dex Measurement Extremity  -- insurance does not cover at this time  -SC (r) RP (t) SC (c)        User Key  (r) = Recorded By, (t) = Taken By, (c) = Cosigned By    Initials Name Provider Type    SC Yola Valenzuela, PT Physical Therapist    RP Zuly Contreras, PT Student PT Student            PT Ortho     Row Name 03/02/21 1745       Precautions and Contraindications    Precautions/Limitations  other (see comments)  -SC    Precautions  R mediport  -SC    Contraindications  L AD, no IV/BP L UE, no modalities  -SC       Shoulder Girdle Accessory Motions    Posterior glide of humerus  Left:;Hypomobile;Left pain improved  -SC    Inferior glide of humerus  Left:;Hypomobile;Left pain improved  -SC    Lateral distraction  Left:;Hypomobile;Left pain improved  -SC       Shoulder Impingement/Rotator Cuff Special Tests    Neer Impingement Test (RC Lesion vs. Bursitis)  Left:;Positive  -SC    Full Can Test (RC Lesion)  Left:;Positive  -SC       Shoulder Girdle Palpation    Long Head of Biceps  Left:;Tender;Guarded/taut;Swollen snapping/rolling of tendon, inflammed  -SC      User Key  (r) = Recorded By, (t) = Taken By, (c) = Cosigned By    Initials Name Provider Type    SC Yola Valenzuela, PT Physical Therapist                  PT Assessment/Plan     Row Name 03/02/21 4063          PT Assessment    Functional Limitations  Limitations in functional capacity and performance;Performance in leisure activities;Limitations in community activities;Performance in sport activities  -SC (r) RP (t) SC (c)     Impairments  Endurance;Impaired flexibility;Impaired lymphatic circulation;Impaired muscle endurance;Integumentary integrity;Joint mobility;Muscle strength;Pain;Peripheral nerve integrity;Poor body mechanics;Posture;Range of motion;Sensation  -SC (r) RP (t) SC (c)     Assessment Comments  Mrs. Dolan is a pleasant 60 year old female, diagnosed with left breast cancer in 2019,  mediport placement right 12/02/2019, initiated neoadjuvant chemotherapy with oncologist Dr. Moreno Dec 2019, completed April 2020, AC/Taxol, s/p left lumpectomy with axillary dissection 10/13 L ALN (+) with flap reconstruction closure 05/28/2020 performed by surgeons Dr. Forman and Radha. Completed adjuvant radiation in August 2020. Mild post radiation skin changes. Resolution of axillary cording, however persisting left shoulder impingement and altered joint mobility left GHJ with poor scapular stabilization due to surgery, lat flap, radiation. Is at high risk of post lumpectomy lymphedema syndrome left UE/breast due to surgery, lymph node removal, lymph node involvement, radiation, age, and additional medical issues. Post radiation, weaned from compression bioimpedance was stable and negative for left UE L-Dex -3.5, stable in Sept 2020. Reassessed in Dec 2020 due to persisting symptoms, left breast edema, and upcoming mammo/US. Current L-Dex is -0.3, which is WNL however elevated near delta 5 for patient. To monitor closely. Richa did returned Oct 2020 with increased scar tissue to left breast currently with positive mild lymphedema left breast with increased fibrosis and scar tissue to left breast as well. High risk of lymphedema left UE. Pt is still experiencing persistent L shoulder pain, and was also experiencing a popping/clicking sensation w/ HEP and excessive soreness. Pt’s L shoulder responds well to manual therapy techniques, and pt’s L breast soft tissue demonstrated improved tissue mobility and malleability w/ cont manual therapy/gua sha techniques. Pt would continue to benefit from skilled therapy in order to address persistent L shoulder pain and L breast tissue fibrosis and scarring.  -SC (r) RP (t) SC (c)     Rehab Potential  Good  -SC (r) RP (t) SC (c)     Patient/caregiver participated in establishment of treatment plan and goals  Yes  -SC (r) RP (t) SC (c)     Patient would benefit from  skilled therapy intervention  Yes  -SC (r) RP (t) SC (c)        PT Plan    Predicted Duration of Therapy Intervention (PT)  12 sessions  -SC (r) RP (t) SC (c)     PT Plan Comments  return to self-massage, cont w/ manual therapy/gua sha techniques, adjust HEP as indicated, k-tape as indicated for soft tissue fibrosis  -SC (r) RP (t) SC (c)       User Key  (r) = Recorded By, (t) = Taken By, (c) = Cosigned By    Initials Name Provider Type    SC Yola Valenzuela, PT Physical Therapist    RP Zuly Contreras, PT Student PT Student             OP Exercises     Row Name 03/02/21 6080             Subjective Comments    Subjective Comments  Pt reported that she noticed some clicking in her L shoulder when performing her HEP. Pt also reported feeling really sore after performing the exercises, but reported that she has been compliant w/ program.   -SC (r) RP (t) SC (c)         Subjective Pain    Able to rate subjective pain?  yes  -SC (r) RP (t) SC (c)      Pre-Treatment Pain Level  0  -SC (r) RP (t) SC (c)         Total Minutes    38234 - PT Therapeutic Exercise Minutes  15  -SC (r) RP (t) SC (c)      73040 - PT Manual Therapy Minutes  30  -SC (r) RP (t) SC (c)         Exercise 1    Exercise Name 1  SL ER L   -SC (r) RP (t) SC (c)      Sets 1  2  -SC (r) RP (t) SC (c)      Reps 1  10  -SC (r) RP (t) SC (c)         Exercise 2    Exercise Name 2  PNF shld D2 TB HL  -SC (r) RP (t) SC (c)      Sets 2  1  -SC (r) RP (t) SC (c)      Reps 2  10  -SC (r) RP (t) SC (c)      Additional Comments  RTB, early range w/ SA punch  -SC (r) RP (t) SC (c)         Exercise 3    Exercise Name 3  shld horz abd TB HL  -SC (r) RP (t) SC (c)      Sets 3  2  -SC (r) RP (t) SC (c)      Reps 3  10  -SC (r) RP (t) SC (c)      Additional Comments  RTB  -SC (r) RP (t) SC (c)         Exercise 4    Exercise Name 4  shld rows/ext TB standing B  -SC (r) RP (t) SC (c)      Sets 4  2  -SC (r) RP (t) SC (c)      Reps 4  10  -SC (r) RP (t) SC (c)       Additional Comments  RTB  -SC (r) RP (t) SC (c)        User Key  (r) = Recorded By, (t) = Taken By, (c) = Cosigned By    Initials Name Provider Type    Yola Rodriguez, PT Physical Therapist    Zuly Russ, PT Student PT Student                     Manual Rx (last 36 hours)      Manual Treatments     Row Name 03/02/21 1615             Total Minutes    23579 - PT Manual Therapy Minutes  30  -SC (r) RP (t) SC (c)         Manual Rx 1    Manual Rx 1 Location  left shoulder supine on plinth drapped in gown HOB elevated/HL  -SC (r) RP (t) SC (c)      Manual Rx 1 Type  AP and inferior joint mobs L GHJ, PROM all planes, gentle myofascial release medial arm with retrograde massage to axilla, scar massage lat flap reconstruction, gentle left breast mobilizations  -SC (r) RP (t) SC (c)      Manual Rx 1 Grade  III-IV  -SC (r) RP (t) SC (c)        User Key  (r) = Recorded By, (t) = Taken By, (c) = Cosigned By    Initials Name Provider Type    Yola Rodriguez, PT Physical Therapist    Zuly Russ, PT Student PT Student          PT OP Goals     Row Name 03/02/21 1713 03/02/21 1615       PT Short Term Goals    STG 1  --  Patient independent and compliant with initial home exercise program focused on diaphragmatic breathing, range of motion, flexibility to decrease edema and improve lymphatic flow for decreased edema and decreased risk of infection.   -SC (r) RP (t) SC (c)    STG 1 Progress  --  Met  -SC (r) RP (t) SC (c)    STG 2  --  Patient demonstrate proper awareness of What is Lymphedema and 18 Steps of Prevention for improved prevention, management, care of symptoms and ease of transition to self-care of condition.   -SC (r) RP (t) SC (c)    STG 2 Progress  --  Met  -SC (r) RP (t) SC (c)    STG 3  --  Patient demonstrate independence and compliance with proper skin care during/post radiation for improved mobility, decreased scar tissue, axillary cording and edema.  -SC (r) RP (t) SC (c)    STG 3  Progress  --  Met  -SC (r) RP (t) SC (c)    STG 4  --  Patient independent and compliant with breast mobilization techniques for improved mobility, skin care and lymphatic flow.  -SC (r) RP (t) SC (c)    STG 4 Progress  --  Met  -SC (r) RP (t) SC (c)    STG 5  --  Patient independent and compliant with daytime OTS prevention compression garments as indicated for decreased risk of lymphedema and infection and safety with transition of self-care of condition.   -SC (r) RP (t) SC (c)    STG 5 Progress  --  Met  -SC (r) RP (t) SC (c)       Long Term Goals    LTG Date to Achieve  --  03/17/21  -SC (r) RP (t) SC (c)    LTG 1  --  Patient's bioimpedance score to remain between -10 and 6.5 for decreased risk of developing stage II post lumpectomy lymphedema syndrome left UE and to establish treatment for early intervention of condition if/when indicated.  -SC (r) RP (t) SC (c)    LTG 1 Progress  --  -SC  Progressing baseline post op 06/18/2020 -4.7  -SC (r) RP (t) SC (c)    LTG 1 Progress Comments  --  insurance does not cover at this time  -SC    LTG 2  --  Patient independent and compliant with advanced Home Exercise Program and self-care techniques for self-management of condition.   -SC (r) RP (t) SC (c)    LTG 2 Progress  --  Progressing  -SC (r) RP (t) SC (c)    LTG 2 Progress Comments  --  reviewed, modified HEP with transition to red theraband to assist with symptoms however patient compliant with updated HEP  -SC    LTG 3  --  Patient able to wear fitted post lumpectomy/radiation bra with padding left (if indicated)  >/=6-8 hours for work schedule for improved compression to lateral flank/left breast.  -SC (r) RP (t) SC (c)    LTG 3 Progress  --  Met  -SC    LTG 4  --  Patient transition to cardiovascular exercise program (walking) >/=150 to 180 mins/week with moderate intensity for improved heart health, weight loss, decreased risk of osteoporosis and improved phase angle/metabolic rate.  -SC (r) RP (t) SC (c)     LTG 4 Progress  --  Met  -SC (r) RP (t) SC (c)    LTG 5  --  Patient score </=25% on Quick DASH for improved functional use of L UE home, community, and sleep  -SC (r) RP (t) SC (c)    LTG 5 Progress  --  -SC  Met initial evaluation 50%  -SC (r) RP (t) SC (c)    LTG 6  --  Patient with negative impingement testing left shoulder for improved mobility, posture and decreased pain with ADLs and home activities  -SC (r) RP (t) SC (c)    LTG 6 Progress  --  Progressing  -SC (r) RP (t) SC (c)    LTG 6 Progress Comments  --  Pt experiencing a popping/clicking sensation w/ D2 flexion exercise. Changed t-band color to less resistance and altered to stay within early range of exercise  -SC (r) RP (t) SC (c)    LTG 7  --  Patient with minimal fibrosis left breast for decreased risk of stage II lymphedema left and progression of decrease/infections  -SC (r) RP (t) SC (c)    LTG 7 Progress  --  Progressing  -SC (r) RP (t) SC (c)    LTG 7 Progress Comments  --  Pt's soft tissue responds well to manual therapy/soft tissue mobilization techniques; applied k-tape to assist w/ soft tissue softening  -SC (r) RP (t) SC (c)    LTG 8  --  Pt will achieve 4/5 MMT of bilateral LT, MT, and SA  -SC (r) RP (t) SC (c)    LTG 8 Progress  --  New  -SC (r) RP (t) SC (c)    LTG 9  --  Pt will achieve 4/5 MMT of L shoulder IR and L shoulder ER  -SC (r) RP (t) SC (c)    LTG 9 Progress  --  New  -SC (r) RP (t) SC (c)       Time Calculation    PT Goal Re-Cert Due Date  --  03/17/21  -SC (r) RP (t) SC (c)      User Key  (r) = Recorded By, (t) = Taken By, (c) = Cosigned By    Initials Name Provider Type    SC Yola Valenzuela, PT Physical Therapist    Zuly Russ, PT Student PT Student          Therapy Education  Education Details: cont w/ manual therapy, altered HEP d/t popping/clicking and persisting soreness, resume self-massage  Given: Edema management, Symptoms/condition management, Other (comment), Posture/body mechanics,  Bandaging/dressing change, HEP, Pain management, Mobility training  Program: Modified, Reinforced  How Provided: Verbal, Demonstration, Written  Provided to: Patient  Level of Understanding: Teach back education performed, Verbalized, Demonstrated              Time Calculation:   Start Time: 1615  Stop Time: 1705  Time Calculation (min): 50 min                 Yola Broderick, PT  3/2/2021

## 2021-03-03 ENCOUNTER — INFUSION (OUTPATIENT)
Dept: ONCOLOGY | Facility: HOSPITAL | Age: 61
End: 2021-03-03

## 2021-03-03 DIAGNOSIS — Z12.31 ENCOUNTER FOR SCREENING MAMMOGRAM FOR BREAST CANCER: Primary | ICD-10-CM

## 2021-03-03 PROCEDURE — 25010000003 HEPARIN LOCK FLUSH PER 10 UNITS: Performed by: INTERNAL MEDICINE

## 2021-03-03 PROCEDURE — 96523 IRRIG DRUG DELIVERY DEVICE: CPT

## 2021-03-03 RX ORDER — HEPARIN SODIUM (PORCINE) LOCK FLUSH IV SOLN 100 UNIT/ML 100 UNIT/ML
300 SOLUTION INTRAVENOUS ONCE
Status: CANCELLED | OUTPATIENT
Start: 2021-03-03

## 2021-03-03 RX ORDER — SODIUM CHLORIDE 0.9 % (FLUSH) 0.9 %
10 SYRINGE (ML) INJECTION AS NEEDED
Status: DISCONTINUED | OUTPATIENT
Start: 2021-03-03 | End: 2021-03-03 | Stop reason: HOSPADM

## 2021-03-03 RX ORDER — HEPARIN SODIUM (PORCINE) LOCK FLUSH IV SOLN 100 UNIT/ML 100 UNIT/ML
500 SOLUTION INTRAVENOUS AS NEEDED
Status: DISCONTINUED | OUTPATIENT
Start: 2021-03-03 | End: 2021-03-03 | Stop reason: HOSPADM

## 2021-03-03 RX ORDER — SODIUM CHLORIDE 0.9 % (FLUSH) 0.9 %
10 SYRINGE (ML) INJECTION AS NEEDED
Status: CANCELLED | OUTPATIENT
Start: 2021-03-03

## 2021-03-03 RX ORDER — SODIUM CHLORIDE 0.9 % (FLUSH) 0.9 %
20 SYRINGE (ML) INJECTION AS NEEDED
Status: CANCELLED | OUTPATIENT
Start: 2021-03-03

## 2021-03-03 RX ORDER — HEPARIN SODIUM (PORCINE) LOCK FLUSH IV SOLN 100 UNIT/ML 100 UNIT/ML
500 SOLUTION INTRAVENOUS AS NEEDED
Status: CANCELLED | OUTPATIENT
Start: 2021-03-03

## 2021-03-03 RX ADMIN — Medication 500 UNITS: at 13:04

## 2021-03-03 RX ADMIN — SODIUM CHLORIDE, PRESERVATIVE FREE 10 ML: 5 INJECTION INTRAVENOUS at 13:04

## 2021-03-05 DIAGNOSIS — F41.9 ANXIETY: ICD-10-CM

## 2021-03-05 RX ORDER — ESCITALOPRAM OXALATE 10 MG/1
10 TABLET ORAL DAILY
Qty: 90 TABLET | Refills: 3 | Status: SHIPPED | OUTPATIENT
Start: 2021-03-05 | End: 2022-02-15

## 2021-03-07 DIAGNOSIS — R10.9 STOMACH DISCOMFORT: ICD-10-CM

## 2021-03-08 ENCOUNTER — OFFICE VISIT (OUTPATIENT)
Dept: FAMILY MEDICINE CLINIC | Facility: CLINIC | Age: 61
End: 2021-03-08

## 2021-03-08 VITALS
DIASTOLIC BLOOD PRESSURE: 72 MMHG | HEART RATE: 62 BPM | WEIGHT: 151.4 LBS | HEIGHT: 68 IN | OXYGEN SATURATION: 98 % | BODY MASS INDEX: 22.94 KG/M2 | TEMPERATURE: 96.9 F | SYSTOLIC BLOOD PRESSURE: 132 MMHG

## 2021-03-08 DIAGNOSIS — Z13.1 SCREENING FOR DIABETES MELLITUS: ICD-10-CM

## 2021-03-08 DIAGNOSIS — G47.00 INSOMNIA, UNSPECIFIED TYPE: ICD-10-CM

## 2021-03-08 DIAGNOSIS — Z00.00 ROUTINE GENERAL MEDICAL EXAMINATION AT A HEALTH CARE FACILITY: Primary | ICD-10-CM

## 2021-03-08 DIAGNOSIS — E78.00 HYPERCHOLESTEROLEMIA: ICD-10-CM

## 2021-03-08 DIAGNOSIS — Z13.0 SCREENING FOR IRON DEFICIENCY ANEMIA: ICD-10-CM

## 2021-03-08 PROCEDURE — 99396 PREV VISIT EST AGE 40-64: CPT | Performed by: NURSE PRACTITIONER

## 2021-03-08 RX ORDER — COLESEVELAM 180 1/1
TABLET ORAL
Qty: 180 TABLET | Refills: 3 | Status: SHIPPED | OUTPATIENT
Start: 2021-03-08 | End: 2022-02-04

## 2021-03-08 NOTE — PROGRESS NOTES
"Chief Complaint  Annual Exam (Patient is not fasting she is needing her ambien refilled she needs a drug screen ( wore mask and goggles) )    Subjective          Richa Dolan presents to [facility]  History of Present Illness  Well Adult Physical: Patient here for a comprehensive physical exam.The patient reports ambien refill  Do you take any herbs or supplements that were not prescribed by a doctor? no Are you taking calcium supplements? no Are you taking aspirin daily? no    Taking Ambien as directed without any issues, working well and needing refill today. Med already called.    Objective   Vital Signs:   /72   Pulse 62   Temp 96.9 °F (36.1 °C)   Ht 172.7 cm (67.99\")   Wt 68.7 kg (151 lb 6.4 oz)   SpO2 98%   BMI 23.03 kg/m²     Physical Exam  Vitals reviewed.   Constitutional:       General: She is not in acute distress.     Appearance: She is well-developed. She is not diaphoretic.   HENT:      Head: Normocephalic and atraumatic. Hair is normal.      Right Ear: Hearing, tympanic membrane, ear canal and external ear normal. No decreased hearing noted. No drainage.      Left Ear: Hearing, tympanic membrane, ear canal and external ear normal. No decreased hearing noted.      Nose: No nasal deformity.   Eyes:      General: Lids are normal.         Right eye: No discharge.         Left eye: No discharge.      Conjunctiva/sclera: Conjunctivae normal.      Pupils: Pupils are equal, round, and reactive to light.   Neck:      Thyroid: No thyromegaly.      Vascular: No JVD.      Trachea: No tracheal deviation.   Cardiovascular:      Rate and Rhythm: Normal rate and regular rhythm.      Pulses: Normal pulses.      Heart sounds: Normal heart sounds. No murmur. No friction rub. No gallop.    Pulmonary:      Effort: Pulmonary effort is normal. No respiratory distress.      Breath sounds: Normal breath sounds. No wheezing or rales.   Chest:      Chest wall: No tenderness. "   Abdominal:      General: Bowel sounds are normal. There is no distension.      Palpations: Abdomen is soft. There is no mass.      Tenderness: There is no abdominal tenderness. There is no guarding or rebound.      Hernia: No hernia is present.   Musculoskeletal:         General: No tenderness or deformity. Normal range of motion.      Cervical back: Normal range of motion and neck supple.   Lymphadenopathy:      Cervical: No cervical adenopathy.   Skin:     General: Skin is warm and dry.      Findings: No erythema or rash.   Neurological:      Mental Status: She is alert and oriented to person, place, and time.      Cranial Nerves: No cranial nerve deficit.      Motor: No abnormal muscle tone.      Coordination: Coordination normal.      Deep Tendon Reflexes: Reflexes are normal and symmetric. Reflexes normal.   Psychiatric:         Behavior: Behavior normal.         Thought Content: Thought content normal.         Judgment: Judgment normal.        Result Review :                 Assessment and Plan    Diagnoses and all orders for this visit:    1. Routine general medical examination at a health care facility (Primary)    2. Insomnia, unspecified type    3. Hypercholesterolemia  -     Cancel: Comprehensive Metabolic Panel  -     Lipid Panel With LDL / HDL Ratio    4. Screening for iron deficiency anemia  -     Cancel: CBC & Differential    5. Screening for diabetes mellitus  -     Cancel: Comprehensive Metabolic Panel        Follow Up   Return in about 3 months (around 6/8/2021) for Recheck.  Patient was given instructions and counseling regarding her condition or for health maintenance advice. Please see specific information pulled into the AVS if appropriate.     Pt doesn't want colonoscopy now.    Discussed weight, diet and exercise  Follow up after labs    Mask and googles worn

## 2021-03-09 ENCOUNTER — HOSPITAL ENCOUNTER (OUTPATIENT)
Dept: PHYSICAL THERAPY | Facility: HOSPITAL | Age: 61
Setting detail: THERAPIES SERIES
Discharge: HOME | End: 2021-03-09

## 2021-03-09 DIAGNOSIS — M75.42 SHOULDER IMPINGEMENT SYNDROME, LEFT: ICD-10-CM

## 2021-03-09 DIAGNOSIS — L90.5 SCAR TISSUE: ICD-10-CM

## 2021-03-09 DIAGNOSIS — C50.812 MALIGNANT NEOPLASM OF OVERLAPPING SITES OF LEFT BREAST IN FEMALE, ESTROGEN RECEPTOR POSITIVE (HCC): Primary | ICD-10-CM

## 2021-03-09 DIAGNOSIS — N64.89 BREAST EDEMA: ICD-10-CM

## 2021-03-09 DIAGNOSIS — Z17.0 MALIGNANT NEOPLASM OF OVERLAPPING SITES OF LEFT BREAST IN FEMALE, ESTROGEN RECEPTOR POSITIVE (HCC): Primary | ICD-10-CM

## 2021-03-09 DIAGNOSIS — M25.612 DECREASED RANGE OF MOTION OF LEFT SHOULDER: ICD-10-CM

## 2021-03-09 DIAGNOSIS — M79.622 AXILLARY PAIN, LEFT: ICD-10-CM

## 2021-03-09 DIAGNOSIS — Z91.89 AT RISK FOR LYMPHEDEMA: ICD-10-CM

## 2021-03-09 DIAGNOSIS — Z85.3 HISTORY OF LEFT BREAST CANCER: ICD-10-CM

## 2021-03-09 DIAGNOSIS — Z98.890 STATUS POST LEFT BREAST LUMPECTOMY: ICD-10-CM

## 2021-03-09 LAB
CHOLEST SERPL-MCNC: 137 MG/DL (ref 100–199)
HDLC SERPL-MCNC: 66 MG/DL
LDLC SERPL CALC-MCNC: 52 MG/DL (ref 0–99)
LDLC/HDLC SERPL: 0.8 RATIO (ref 0–3.2)
TRIGL SERPL-MCNC: 106 MG/DL (ref 0–149)
VLDLC SERPL CALC-MCNC: 19 MG/DL (ref 5–40)

## 2021-03-09 PROCEDURE — 97140 MANUAL THERAPY 1/> REGIONS: CPT

## 2021-03-09 RX ORDER — ZOLPIDEM TARTRATE 10 MG/1
TABLET ORAL
Qty: 30 TABLET | Refills: 2 | Status: SHIPPED | OUTPATIENT
Start: 2021-03-09 | End: 2021-06-09 | Stop reason: SDUPTHER

## 2021-03-09 NOTE — THERAPY TREATMENT NOTE
Outpatient Physical Therapy Lymphedema Treatment Note  [facility]     Patient Name: Richa Dolan  : 1960  MRN: [id number]  Today's Date: 3/9/2021        Visit Date: 2021    Visit Dx:    ICD-10-CM ICD-9-CM   1. Malignant neoplasm of overlapping sites of left breast in female, estrogen receptor positive (CMS/HCC)  C50.812 174.8    Z17.0 V86.0   2. Status post left breast lumpectomy  Z98.890 V45.89   3. At risk for lymphedema  Z91.89 V49.89   4. Decreased range of motion of left shoulder  M25.612 719.51   5. Breast edema  N64.89 611.89   6. History of left breast cancer  Z85.3 V10.3   7. Scar tissue  L90.5 709.2   8. Axillary pain, left  M79.622 729.5   9. Shoulder impingement syndrome, left  M75.42 726.2       Patient Active Problem List   Diagnosis   • Abnormal liver function tests   • Hypertension   • Insomnia   • Knee pain   • Leukopenia   • Acute right-sided low back pain without sciatica   • Knee osteoarthritis   • Osteoporosis   • Arthralgia of multiple joints   • Seasonal allergic rhinitis   • Thrombocytopenia (CMS/HCC)   • Bone cyst   • Chronic pain of right knee   • Acquired immunocompromised state (CMS/HCC)   • Bruises easily   • Loss of hair   • Anxiety   • Elevated liver enzymes   • GERD (gastroesophageal reflux disease)   • HIV positive long-term non-progressor (CMS/HCC)   • Hypercholesterolemia   • Lupus anticoagulant positive   • Neck pain, chronic   • Chemotherapy-induced neutropenia (CMS/HCC)   • Tear of medial meniscus of right knee, current   • Chondromalacia, knee   • ICH (intracerebral hemorrhage) (CMS/HCC)   • Stomach discomfort   • Abnormal bruising   • Alopecia   • Headache   • Intractable chronic migraine without aura and without status migrainosus   • Osteoarthritis of right knee   • Need for vaccination   • Mild episode of recurrent major depressive disorder (CMS/HCC)   • Ingrown nail of great toe of right foot   • Left wrist pain   • Breast cancer screening   •  Malignant neoplasm of overlapping sites of left breast in female, estrogen receptor positive (CMS/HCC)   • History of stroke   • Arthritis   • Family history of breast cancer   • Screening for diabetes mellitus   • Epistaxis   • Chemotherapy-induced thrombocytopenia   • Anemia associated with chemotherapy   • Substance or medication-induced sleep disorder, 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CMS/HCC)   • Body aches   • Neuropathy   • History of left breast cancer   • Other fatigue   • HIV infection (CMS/HCC)   • Fat necrosis of left breast        Lymphedema     Row Name 03/09/21 0681             Subjective Pain    Able to rate subjective pain?  yes  -SC (r) RP (t) SC (c)      Pre-Treatment Pain Level  0  -SC (r) RP (t) SC (c)         Subjective Comments    Subjective Comments  Pt reported that her R arm is sore after getting the COVID vaccine. Pt reported that her L arm has felt a little sore and she hasn't done her exercises bc of the soreness.   -SC (r) RP (t) SC (c)         Manual Lymphatic Drainage    Manual Lymphatic Drainage  initial sequence;opened regional lymph nodes;opened anastamoses;extremity treatment  -SC (r) RP (t) SC (c)      Initial Sequence  supraclavicular;shoulder collectors;abdomen;diaphragmatic breathing  -SC (r) RP (t) SC (c)      Supraclavicular  right;left  -SC (r) RP (t) SC (c)      Shoulder Collectors  right;left  -SC (r) RP (t) SC (c)      Abdomen  superficial  -SC (r) RP (t) SC (c)      Opened Regional Lymph Nodes  axillary;inguinal;ribs  -SC (r) RP (t) SC (c)      Axillary  right;left  -SC (r) RP (t) SC (c)      Inguinal  left  -SC (r) RP (t) SC (c)      Opened Anastamoses  anterior axillo-axillary;axillo-inguinal  -SC (r) RP (t) SC (c)      Axillo-Inguinal  left  -SC (r) RP (t) SC (c)      Extremity Treatment  simple/brief MLD;MLD to proximal limb only  -SC (r) RP (t) SC (c)      Simple/Brief MLD  left  -SC (r) RP (t) SC (c)      MLD to Proximal Limb Only  left  -SC (r) RP (t) SC (c)      Manual Lymphatic  Drainage Comments  focus of lymphatic massage left breast  -SC (r) RP (t) SC (c)      Manual Therapy  MLD left breast, scar massage lumpectomy and lat flap incision, myofascial release left pec, anterior shoulder, medial arm, axilla, lateral flank, subscapularis release, AP and inferior joint mobs L GHJ, lateral distractions, inferior oscillations, PROM all planes, gua sha/myofascial release left pec, into lateral breast and inferior breast at incision site/lymphedema of lumpectomy site. kinesiotape lift left breast, scar release left lumpectomy, and Y strip left medial breast, education of use and wear time and removal if indicated  -SC         Compression/Skin Care    Compression/Skin Care Comments  Pt presented to therapy wearing custom bra.   -SC (r) RP (t) SC (c)        User Key  (r) = Recorded By, (t) = Taken By, (c) = Cosigned By    Initials Name Provider Type    Yola Rodriguez, PT Physical Therapist    Zuly Russ, PT Student PT Student                        PT Assessment/Plan     Row Name 03/09/21 1614          PT Assessment    Functional Limitations  Limitations in functional capacity and performance;Performance in leisure activities;Limitations in community activities;Performance in sport activities  -SC (r) RP (t) SC (c)     Impairments  Endurance;Impaired flexibility;Impaired lymphatic circulation;Impaired muscle endurance;Integumentary integrity;Joint mobility;Muscle strength;Pain;Peripheral nerve integrity;Poor body mechanics;Posture;Range of motion;Sensation  -SC (r) RP (t) SC (c)        PT Plan    PT Plan Comments  self massage, cont w/ manual therapy, update HEP as indicated, k-tape as indicated for soft tissue fibrosis  -SC (r) RP (t) SC (c)       User Key  (r) = Recorded By, (t) = Taken By, (c) = Cosigned By    Initials Name Provider Type    Yola Rdoriguez PT Physical Therapist    Zuly Russ, PT Student PT Student             OP Exercises     Row Name 03/09/21  1614             Subjective Comments    Subjective Comments  Pt reported that her R arm is sore after getting the COVID vaccine. Pt reported that her L arm has felt a little sore and she hasn't done her exercises bc of the soreness.   -SC (r) RP (t) SC (c)         Subjective Pain    Able to rate subjective pain?  yes  -SC (r) RP (t) SC (c)      Pre-Treatment Pain Level  0  -SC (r) RP (t) SC (c)        User Key  (r) = Recorded By, (t) = Taken By, (c) = Cosigned By    Initials Name Provider Type    SC Yola Valenzuela, PT Physical Therapist    Zuly Russ, PT Student PT Student                     Manual Rx (last 36 hours)      Manual Treatments     Row Name 03/09/21 1614             Manual Rx 1    Manual Rx 1 Location  left shoulder supine on plinth drapped in gown HOB elevated/HL  -SC (r) RP (t) SC (c)      Manual Rx 1 Type  AP and inferior joint mobs L GHJ, PROM all planes, gentle myofascial release medial arm with retrograde massage to axilla, scar massage lat flap reconstruction, gentle left breast mobilizations  -SC (r) RP (t) SC (c)      Manual Rx 1 Grade  III-IV  -SC (r) RP (t) SC (c)        User Key  (r) = Recorded By, (t) = Taken By, (c) = Cosigned By    Initials Name Provider Type    SC Yola Valenzuela, PT Physical Therapist    Zuly Russ, PT Student PT Student              Therapy Education  Education Details: cont w/ manual therapy, resume HEP once soreness is gone, self massage, techniques for how to protect shoulder following 's back surgery to avoid potential flare up  Given: Edema management, Symptoms/condition management, Other (comment), Posture/body mechanics, Bandaging/dressing change, HEP, Pain management, Mobility training  Program: Reinforced, Progressed  How Provided: Verbal, Demonstration  Provided to: Patient  Level of Understanding: Teach back education performed, Verbalized, Demonstrated         Care provided by Zuly Contreras PT Student under direct  supervision of Yola Valenzuela, DPT, CLT UE      Time Calculation:   Start Time: 1614  Stop Time: 1700  Time Calculation (min): 46 min                 Yola Broderick, PT  3/9/2021

## 2021-03-16 ENCOUNTER — HOSPITAL ENCOUNTER (OUTPATIENT)
Dept: PHYSICAL THERAPY | Facility: HOSPITAL | Age: 61
Setting detail: THERAPIES SERIES
Discharge: HOME | End: 2021-03-16

## 2021-03-16 DIAGNOSIS — Z85.3 HISTORY OF LEFT BREAST CANCER: ICD-10-CM

## 2021-03-16 DIAGNOSIS — N64.89 BREAST EDEMA: ICD-10-CM

## 2021-03-16 DIAGNOSIS — C50.812 MALIGNANT NEOPLASM OF OVERLAPPING SITES OF LEFT BREAST IN FEMALE, ESTROGEN RECEPTOR POSITIVE (HCC): Primary | ICD-10-CM

## 2021-03-16 DIAGNOSIS — M75.42 SHOULDER IMPINGEMENT SYNDROME, LEFT: ICD-10-CM

## 2021-03-16 DIAGNOSIS — M79.622 AXILLARY PAIN, LEFT: ICD-10-CM

## 2021-03-16 DIAGNOSIS — Z17.0 MALIGNANT NEOPLASM OF OVERLAPPING SITES OF LEFT BREAST IN FEMALE, ESTROGEN RECEPTOR POSITIVE (HCC): Primary | ICD-10-CM

## 2021-03-16 DIAGNOSIS — L90.5 SCAR TISSUE: ICD-10-CM

## 2021-03-16 DIAGNOSIS — Z91.89 AT RISK FOR LYMPHEDEMA: ICD-10-CM

## 2021-03-16 DIAGNOSIS — M25.612 DECREASED RANGE OF MOTION OF LEFT SHOULDER: ICD-10-CM

## 2021-03-16 DIAGNOSIS — Z98.890 STATUS POST LEFT BREAST LUMPECTOMY: ICD-10-CM

## 2021-03-16 PROCEDURE — 97110 THERAPEUTIC EXERCISES: CPT | Performed by: PHYSICAL THERAPIST

## 2021-03-16 PROCEDURE — 97140 MANUAL THERAPY 1/> REGIONS: CPT | Performed by: PHYSICAL THERAPIST

## 2021-03-16 NOTE — THERAPY RE-EVALUATION
Physical Therapy Lymphedema Re-Evaluation  [facility]     Patient Name: Richa Dolan  : 1960  MRN: [id number]  Today's Date: 3/16/2021      Visit Date: 2021    Visit Dx:    ICD-10-CM ICD-9-CM   1. Malignant neoplasm of overlapping sites of left breast in female, estrogen receptor positive (CMS/HCC)  C50.812 174.8    Z17.0 V86.0   2. Status post left breast lumpectomy  Z98.890 V45.89   3. At risk for lymphedema  Z91.89 V49.89   4. Decreased range of motion of left shoulder  M25.612 719.51   5. Breast edema  N64.89 611.89   6. History of left breast cancer  Z85.3 V10.3   7. Scar tissue  L90.5 709.2   8. Axillary pain, left  M79.622 729.5   9. Shoulder impingement syndrome, left  M75.42 726.2       Patient Active Problem List   Diagnosis   • Abnormal liver function tests   • Hypertension   • Insomnia   • Knee pain   • Leukopenia   • Acute right-sided low back pain without sciatica   • Knee osteoarthritis   • Osteoporosis   • Arthralgia of multiple joints   • Seasonal allergic rhinitis   • Thrombocytopenia (CMS/HCC)   • Bone cyst   • Chronic pain of right knee   • Acquired immunocompromised state (CMS/HCC)   • Bruises easily   • Loss of hair   • Anxiety   • Elevated liver enzymes   • GERD (gastroesophageal reflux disease)   • HIV positive long-term non-progressor (CMS/HCC)   • Hypercholesterolemia   • Lupus anticoagulant positive   • Neck pain, chronic   • Chemotherapy-induced neutropenia (CMS/HCC)   • Tear of medial meniscus of right knee, current   • Chondromalacia, knee   • ICH (intracerebral hemorrhage) (CMS/HCC)   • Stomach discomfort   • Abnormal bruising   • Alopecia   • Headache   • Intractable chronic migraine without aura and without status migrainosus   • Osteoarthritis of right knee   • Need for vaccination   • Mild episode of recurrent major depressive disorder (CMS/HCC)   • Ingrown nail of great toe of right foot   • Left wrist pain   • Breast cancer screening   • Malignant neoplasm  of overlapping sites of left breast in female, estrogen receptor positive (CMS/HCC)   • History of stroke   • Arthritis   • Family history of breast cancer   • Screening for diabetes mellitus   • Epistaxis   • Chemotherapy-induced thrombocytopenia   • Anemia associated with chemotherapy   • Substance or medication-induced sleep disorder, 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CMS/[facility])   • Body aches   • Neuropathy   • History of left breast cancer   • Other fatigue   • HIV infection (CMS/[facility])   • Fat necrosis of left breast        Past Medical History:   Diagnosis Date   • Anxiety    • Cerebrovascular accident (CVA) (CMS/[facility]) 8/22/2017    NO RESIDUAL AFFECT   • DDD (degenerative disc disease), cervical    • Depression    • Drug therapy    • Elevated cholesterol    • GERD (gastroesophageal reflux disease)    • H/O ICH (intracerebral hemorrhage) (CMS/[facility])    • History of chemotherapy    • History of stroke    • History of thrombocytopenia    • HIV (human immunodeficiency virus infection) (CMS/[facility]) 1996   • Hx of radiation therapy    • Hyperlipidemia    • Insomnia    • Lupus anticoagulant positive    • Malignant neoplasm of overlapping sites of left breast in female, estrogen receptor positive (CMS/HCC) 11/12/2019   • Meniscus tear 2017    RIGHT   • Migraine    • Neck pain     WITH SHOOTING PAIN LEFT RIGHT ARM   • Osteoarthritis    • Osteoporosis    • Pain management     sees 1 every 3 months for scripts/injection/pain meds   • Seasonal allergies    • Spinal headache     THINKS HAD BLOOD PATCH AFTER EPIDUAL    • Tick bite 06/2014        Past Surgical History:   Procedure Laterality Date   • ADENOIDECTOMY     • BREAST LUMPECTOMY     • BREAST LUMPECTOMY WITH SENTINEL NODE BIOPSY Left 5/28/2020    Procedure: BREAST LUMPECTOMY WITH SENTINEL NODE BIOPSY AND NEEDLE LOCALIZATION AND axillary dissection;  Surgeon: Landen Forman MD;  Location: [facility];  Service: General;  Laterality: Left;   • BREAST SURGERY Left 5/28/2020    Procedure:  LEFT LATERAL INTERCOASTAL ARTERY  flap ;  Surgeon: Yusuf Garcia MD;  Location: [facility] OR;  Service: Plastics;  Laterality: Left;   • CHOLECYSTECTOMY     • HYSTERECTOMY     • KNEE ARTHROSCOPY Right 3/24/2017    Procedure:  RIGHT  KNEE ARTHROSCOPY WITH DEBRIDEMENT CHONDROPLASTY PARTIAL MENISCECTOMY;  Surgeon: Karl Galeas MD;  Location: [facility];  Service:    • KNEE CARTILAGE SURGERY Right 2007   • TONSILLECTOMY     • US GUIDED LYMPH NODE BIOPSY  10/29/2019    LEFT BREAST   • VENOUS ACCESS DEVICE (PORT) INSERTION Right 12/2/2019    Procedure: INSERTION VENOUS ACCESS DEVICE RIGHT;  Surgeon: Landen Forman MD;  Location: [facility] OR;  Service: General       Visit Dx:    ICD-10-CM ICD-9-CM   1. Malignant neoplasm of overlapping sites of left breast in female, estrogen receptor positive (CMS/HCC)  C50.812 174.8    Z17.0 V86.0   2. Status post left breast lumpectomy  Z98.890 V45.89   3. At risk for lymphedema  Z91.89 V49.89   4. Decreased range of motion of left shoulder  M25.612 719.51   5. Breast edema  N64.89 611.89   6. History of left breast cancer  Z85.3 V10.3   7. Scar tissue  L90.5 709.2   8. Axillary pain, left  M79.622 729.5   9. Shoulder impingement syndrome, left  M75.42 726.2           Lymphedema     Row Name 03/16/21 1615             Subjective Pain    Able to rate subjective pain?  yes  -SC (r) RP (t) SC (c)      Pre-Treatment Pain Level  0  -SC (r) RP (t) SC (c)         Subjective Comments    Subjective Comments  Pt reported that she is doing good, but hasn't been able to do her exercises as much d/t having to assist w/ taking care of her  after surgery.  -SC (r) RP (t) SC (c)         Lymphedema Assessment    Lymphedema Classification  LUE:;at risk/stage 0;secondary;Other: left breast, at risk  -SC (r) RP (t) SC (c)      Lymphedema Cancer Related Sx  left;lumpectomy;sentinel node biopsy;axillary dissection;reconstructive  -SC (r) RP (t) SC (c)       Lymphedema Surgery Comments  Port 12/02/2019; L lump/AD 05/28/2020  -SC (r) RP (t) SC (c)      Lymph Nodes Removed #  13  -SC (r) RP (t) SC (c)      Positive Lymph Nodes #  10  -SC (r) RP (t) SC (c)      Chemo Received  yes  -SC (r) RP (t) SC (c)      Chemo Treatments #/Timeframe  NAC Dec 2019 through April 2020, AC/Taxol  -SC (r) RP (t) SC (c)      Radiation Therapy Received  yes  -SC (r) RP (t) SC (c)      Radiation Treatments #/Timeframe  completed 08/18/2020  -SC (r) RP (t) SC (c)      Infections or Cellulitis?  no  -SC (r) RP (t) SC (c)      Lymphedema Assessment Comments  high risk  -SC         Posture/Observations    Alignment Options  Forward head;Cervical lordosis;Thoracic kyphosis;Rounded shoulders;Scapular elevation;Scapular winging;Scoliosis;Lumbar lordosis;Genu valgus;Foot pronation  -SC (r) RP (t) SC (c)      Forward Head  Mild;Increased;Sitting posture  -SC (r) RP (t) SC (c)      Cervical Lordosis  Normal  -SC (r) RP (t) SC (c)      Thoracic Kyphosis  Mild;Increased;Sitting posture  -SC (r) RP (t) SC (c)      Rounded Shoulders  Bilateral:;Mild;Increased;Sitting posture  -SC (r) RP (t) SC (c)      Scapular Elevation  Right:;Mild;Increased;Elevated;Sitting posture  -SC (r) RP (t) SC (c)      Scapular winging  Bilateral:;Normal  -SC (r) RP (t) SC (c)      Scoliosis  Normal;Standing posture  -SC (r) RP (t) SC (c)      Lumbar lordosis  Mild;Decreased;Sitting posture  -SC (r) RP (t) SC (c)      Genu valgus  Bilateral:;Normal;Standing posture  -SC (r) RP (t) SC (c)      Foot pronation  Bilateral:;Normal;Standing posture  -SC (r) RP (t) SC (c)      Observations  Incision healing;Edema;Erythema;Muscle atrophy  -SC (r) RP (t) SC (c)         Lymphedema Edema Assessment    Ptting Edema Category  By grade out of 4  -SC (r) RP (t) SC (c)      Pitting Edema  + 1/4 (+1 of 4);Mild;Moderate left breast, negative in left UE  -SC (r) RP (t) SC (c)      Edema Assessment Comment  mild watery edema left medial breast,  thickening of skin and scar tissue at lumpectomy site left breast, fibrosis inferior breast, does soften with manual techniques, negative axillary cording or scar tissue, good mobility of lat flap scar with negative edema noted in region  -SC (r) RP (t) SC (c)         Skin Changes/Observations    Skin Observations Comment  mild post radiation skin changes, moderate fibrosis left breast with marked limited mobility lateral region, moderate scar tissue at lat flap and lumpectomy region, very minimal at axillary incision  -SC (r) RP (t) SC (c)         Lymphedema Sensation    Lymphedema Sensation Tests  light touch  -SC (r) RP (t) SC (c)      Lymphedema Light Touch  LUE:;mild impairment;moderate impairment  -SC (r) RP (t) SC (c)         Lymphedema Pulses/Capillary Refill    Lymph Pulses Capillary Refill Comments  intact  -SC (r) RP (t) SC (c)         Manual Lymphatic Drainage    Manual Lymphatic Drainage  initial sequence;opened regional lymph nodes;opened anastamoses;extremity treatment  -SC (r) RP (t) SC (c)      Initial Sequence  supraclavicular;shoulder collectors;abdomen;diaphragmatic breathing  -SC (r) RP (t) SC (c)      Supraclavicular  right;left  -SC (r) RP (t) SC (c)      Shoulder Collectors  right;left  -SC (r) RP (t) SC (c)      Abdomen  superficial  -SC (r) RP (t) SC (c)      Opened Regional Lymph Nodes  axillary;inguinal;ribs  -SC (r) RP (t) SC (c)      Axillary  right;left  -SC (r) RP (t) SC (c)      Inguinal  left  -SC (r) RP (t) SC (c)      Opened Anastamoses  anterior axillo-axillary;axillo-inguinal  -SC (r) RP (t) SC (c)      Axillo-Inguinal  left  -SC (r) RP (t) SC (c)      Extremity Treatment  simple/brief MLD;MLD to proximal limb only  -SC (r) RP (t) SC (c)      Simple/Brief MLD  left  -SC (r) RP (t) SC (c)      MLD to Proximal Limb Only  left  -SC (r) RP (t) SC (c)      Manual Lymphatic Drainage Comments  focus of lymphatic massage left breast  -SC (r) RP (t) SC (c)      Manual Therapy  MLD left  breast, scar massage lumpectomy and lat flap incision, myofascial release left pec, anterior shoulder, medial arm, axilla, lateral flank, subscapularis release, AP and inferior joint mobs L GHJ, lateral distractions, inferior oscillations, PROM all planes, gua sha/myofascial release left pec, into lateral breast and inferior breast at incision site/lymphedema of lumpectomy site. kinesiotape lift left breast, scar release left lumpectomy, and Y strip left medial breast, education of use and wear time and removal if indicated  -SC (r) RP (t) SC (c)         Compression/Skin Care    Compression/Skin Care Comments  Pt presented to therapy wearing custom bra.   -SC (r) RP (t) SC (c)         L-Dex Bioimpedence Screening    L-Dex Measurement Extremity  -- insurance does not cover at this time  -SC (r) RP (t) SC (c)        User Key  (r) = Recorded By, (t) = Taken By, (c) = Cosigned By    Initials Name Provider Type    SC Yola Valenzuela, PT Physical Therapist    RP Zuly Contreras, YOLANDA Student PT Student            PT Ortho     Row Name 03/16/21 1830       Precautions and Contraindications    Precautions/Limitations  other (see comments)  -SC    Precautions  R mediport  -SC    Contraindications  L AD, no IV/BP L UE, no modalities  -SC       Shoulder Girdle Accessory Motions    Posterior glide of humerus  Left:;Hypomobile;Left pain  -SC    Inferior glide of humerus  Left:;Hypomobile;Left pain  -SC    Lateral distraction  Left:;Hypomobile;Left pain  -SC       Shoulder Impingement/Rotator Cuff Special Tests    Zaidi-Elvin Test (RC Lesion vs. Bursitis)  Left:;Negative  -SC    Neer Impingement Test (RC Lesion vs. Bursitis)  Left:;Positive  -SC    Full Can Test (RC Lesion)  Left:;Positive  -SC    Empty Can Test (RC Lesion)  Left:;Positive  -SC    Drop Arm Test (Full Thickness RC Lesion)  Left:;Negative  -SC    Internal Impingement Sign  Left:;Positive  -SC    Lift-Off Test (Subscapularis Lesion)  Left:;Negative limited  range of motion with increased pain  -SC       Shoulder Girdle Palpation    Long Head of Biceps  Left:;Tender;Guarded/taut;Swollen  -SC       General ROM    GENERAL ROM COMMENTS  R UE WNLs, L elbow, hand/wrist WNL, PROM left shoulder WNLs all planes, AAROM left shoulder flexion after session WNLs however marked impingement with testing OH positions L GHJ   -SC       MMT (Manual Muscle Testing)    General MMT Comments  R UE WNLs, L GHJ 5/5, L scapula 3-3+/5  -SC      User Key  (r) = Recorded By, (t) = Taken By, (c) = Cosigned By    Initials Name Provider Type    Yola Rodriguez, PT Physical Therapist                    Therapy Education  Education Details: kinesiotape, swell spot, continuation of care, reviewed and progress HEP, provided green theraband for updated HEP  Given: HEP, Symptoms/condition management, Pain management, Posture/body mechanics, Mobility training, Edema management, Other (comment)  Program: Progressed, Modified  How Provided: Verbal, Demonstration, Written  Provided to: Patient  Level of Understanding: Teach back education performed, Verbalized, Demonstrated      OP Exercises     Row Name 03/16/21 9200             Subjective Comments    Subjective Comments  Pt reported that she is doing good, but hasn't been able to do her exercises as much d/t having to assist w/ taking care of her  after surgery.  -SC (r) RP (t) SC (c)         Subjective Pain    Able to rate subjective pain?  yes  -SC (r) RP (t) SC (c)      Pre-Treatment Pain Level  0  -SC (r) RP (t) SC (c)         Exercise 2    Exercise Name 2  PNF shld D2 TB HL  -SC      Sets 2  1  -SC      Reps 2  10  -SC      Additional Comments  GTB, supine  -SC         Exercise 3    Exercise Name 3  shld horz abd TB HL  -SC      Sets 3  2  -SC      Reps 3  10  -SC      Additional Comments  GTB, supine  -SC         Exercise 4    Exercise Name 4  shld rows/ext TB standing B  -SC      Sets 4  2  -SC      Reps 4  10  -SC      Additional  Comments  GTB  -SC         Exercise 5    Exercise Name 5  SAP B HL  -SC      Sets 5  2  -SC      Reps 5  10  -SC      Additional Comments  0#  -SC        User Key  (r) = Recorded By, (t) = Taken By, (c) = Cosigned By    Initials Name Provider Type    Yola Rodriguez, PT Physical Therapist    RP Zuly Contreras, PT Student PT Student                     Manual Rx (last 36 hours)      Manual Treatments     Row Name 03/16/21 1615             Manual Rx 1    Manual Rx 1 Location  left shoulder supine on plinth drapped in gown HOB elevated/HL  -SC      Manual Rx 1 Type  AP and inferior joint mobs L GHJ, PROM all planes, gentle myofascial release medial arm with retrograde massage to axilla, scar massage lat flap reconstruction, gentle left breast mobilizations  -SC      Manual Rx 1 Grade  III-IV  -SC        User Key  (r) = Recorded By, (t) = Taken By, (c) = Cosigned By    Initials Name Provider Type    Yola Rodriguez, PT Physical Therapist          PT OP Goals     Row Name 03/16/21 1615          PT Short Term Goals    STG 1  Patient independent and compliant with initial home exercise program focused on diaphragmatic breathing, range of motion, flexibility to decrease edema and improve lymphatic flow for decreased edema and decreased risk of infection.   -SC (r) RP (t) SC (c)     STG 1 Progress  Met  -SC (r) RP (t) SC (c)     STG 2  Patient demonstrate proper awareness of What is Lymphedema and 18 Steps of Prevention for improved prevention, management, care of symptoms and ease of transition to self-care of condition.   -SC (r) RP (t) SC (c)     STG 2 Progress  Met  -SC (r) RP (t) SC (c)     STG 3  Patient demonstrate independence and compliance with proper skin care during/post radiation for improved mobility, decreased scar tissue, axillary cording and edema.  -SC (r) RP (t) SC (c)     STG 3 Progress  Met  -SC (r) RP (t) SC (c)     STG 4  Patient independent and compliant with breast mobilization  techniques for improved mobility, skin care and lymphatic flow.  -SC (r) RP (t) SC (c)     STG 4 Progress  Met  -SC (r) RP (t) SC (c)     STG 5  Patient independent and compliant with daytime OTS prevention compression garments as indicated for decreased risk of lymphedema and infection and safety with transition of self-care of condition.   -SC (r) RP (t) SC (c)     STG 5 Progress  Met  -SC (r) RP (t) SC (c)        Long Term Goals    LTG Date to Achieve  06/17/21  -SC (r) RP (t) SC (c)     LTG 1  Patient's bioimpedance score to remain between -10 and 6.5 for decreased risk of developing stage II post lumpectomy lymphedema syndrome left UE and to establish treatment for early intervention of condition if/when indicated.  -SC (r) RP (t) SC (c)     LTG 1 Progress  Progressing baseline post op 06/18/2020 -4.7  -SC (r) RP (t) SC (c)     LTG 1 Progress Comments  insurance does not cover at this time  -SC (r) RP (t) SC (c)     LTG 2  Patient independent and compliant with advanced Home Exercise Program and self-care techniques for self-management of condition.   -SC (r) RP (t) SC (c)     LTG 2 Progress  Progressing  -SC (r) RP (t) SC (c)     LTG 2 Progress Comments  Pt has not been able to do exercises as regularly d/t 's surgery but has been compliant w/ HEP   -SC (r) RP (t) SC (c)     LTG 3  Patient able to wear fitted post lumpectomy/radiation bra with padding left (if indicated)  >/=6-8 hours for work schedule for improved compression to lateral flank/left breast.  -SC (r) RP (t) SC (c)     LTG 3 Progress  Met  -SC (r) RP (t) SC (c)     LTG 4  Patient transition to cardiovascular exercise program (walking) >/=150 to 180 mins/week with moderate intensity for improved heart health, weight loss, decreased risk of osteoporosis and improved phase angle/metabolic rate.  -SC (r) RP (t) SC (c)     LTG 4 Progress  Met  -SC (r) RP (t) SC (c)     LTG 5  Patient score </=25% on Quick DASH for improved functional use of L  UE home, community, and sleep  -SC (r) RP (t) SC (c)     LTG 5 Progress  Met initial evaluation 50%  -SC (r) RP (t) SC (c)     LTG 6  Patient with negative impingement testing left shoulder for improved mobility, posture and decreased pain with ADLs and home activities  -SC (r) RP (t) SC (c)     LTG 6 Progress  Progressing  -SC (r) RP (t) SC (c)     LTG 6 Progress Comments  Modified pt's D2 flexion exercise to avoid end range ROM d/t popping/clicking  -SC (r) RP (t) SC (c)     LTG 7  Patient with minimal fibrosis left breast for decreased risk of stage II lymphedema left and progression of decrease/infections  -SC (r) RP (t) SC (c)     LTG 7 Progress  Progressing  -SC (r) RP (t) SC (c)     LTG 8  Pt will achieve 4/5 MMT of bilateral LT, MT, and SA  -SC (r) RP (t) SC (c)     LTG 8 Progress  Progressing  -SC (r) RP (t) SC (c)     LTG 8 Progress Comments  Pt is compliant w/ HEP focused on strengthening posterior scapular strengthening exercises  -SC (r) RP (t) SC (c)     LTG 9  Pt will achieve 4/5 MMT of L shoulder IR and L shoulder ER  -SC (r) RP (t) SC (c)     LTG 9 Progress  Progressing  -SC (r) RP (t) SC (c)     LTG 9 Progress Comments  Pt is compliant w/ HEP focused on strengthening the RC  -SC (r) RP (t) SC (c)        Time Calculation    PT Goal Re-Cert Due Date  06/16/21  -SC (r) RP (t) SC (c)       User Key  (r) = Recorded By, (t) = Taken By, (c) = Cosigned By    Initials Name Provider Type    SC Yola Valenzuela, PT Physical Therapist    Zuly Russ, PT Student PT Student          PT Assessment/Plan     Row Name 03/16/21 9591          PT Assessment    Functional Limitations  Limitations in functional capacity and performance;Performance in leisure activities;Limitations in community activities;Performance in sport activities  -SC     Impairments  Endurance;Impaired flexibility;Impaired lymphatic circulation;Impaired muscle endurance;Integumentary integrity;Joint mobility;Muscle  strength;Pain;Peripheral nerve integrity;Poor body mechanics;Posture;Range of motion;Sensation  -SC     Assessment Comments  Mrs. Dolan is a pleasant 60 year old female, diagnosed with left breast cancer in 2019, mediport placement right 12/02/2019, initiated neoadjuvant chemotherapy with oncologist Dr. Moreno Dec 2019, completed April 2020, AC/Taxol, s/p left lumpectomy with axillary dissection 10/13 L ALN (+) with flap reconstruction closure 05/28/2020 performed by surgeons Dr. Forman and Radha. Completed adjuvant radiation in August 2020. Mild post radiation skin changes. Resolution of axillary cording, however persisting left shoulder impingement and altered joint mobility left GHJ with poor scapular stabilization due to surgery, lat flap, radiation. Is at high risk of post lumpectomy lymphedema syndrome left UE/breast due to surgery, lymph node removal, lymph node involvement, radiation, age, and additional medical issues. Post radiation, weaned from compression bioimpedance was stable and negative for left UE L-Dex -3.5, stable in Sept 2020. Reassessed in Dec 2020 due to persisting symptoms, left breast edema, and upcoming mammo/US. Current L-Dex is -0.3, which is WNL however elevated near delta 5 for patient. To monitor closely. Richa did returned Oct 2020 with increased scar tissue to left breast currently with positive mild lymphedema left breast with increased fibrosis and scar tissue to left breast as well. High risk of lymphedema left UE. Pt is still experiencing persistent L shoulder pain, and was also experiencing a popping/clicking sensation w/ HEP and excessive soreness. Pt’s L shoulder responds well to manual therapy techniques, and pt’s L breast soft tissue demonstrated improved tissue mobility and malleability w/ cont manual therapy/gua sha techniques. Pt would continue to benefit from skilled therapy in order to address persistent L shoulder pain and L breast tissue fibrosis and  scarring. Orders sent today for swell spot for left breast lymphedema.  -SC        PT Plan    PT Plan Comments  swell spot, continuation of care, progression of HEP, continue Kinesiotape as indicated  -SC       User Key  (r) = Recorded By, (t) = Taken By, (c) = Cosigned By    Initials Name Provider Type    SC Yola Valenzuela, PT Physical Therapist               Care provided by Zuly Contreras PT Student under direct supervision of RHODA HinsonT, CLT UE         Time Calculation:   Start Time: 1615  Stop Time: 1655  Time Calculation (min): 40 min   Therapy Charges for Today     Code Description Service Date Service Provider Modifiers Qty    89971360690 HC PT THER PROC EA 15 MIN 3/16/2021 Yola Valenzuela, PT GP 1    60287562503 HC PT MANUAL THERAPY EA 15 MIN 3/16/2021 Yola Valenzuela, PT GP 2                    Yola Broderick PT  3/16/2021

## 2021-03-19 ENCOUNTER — BULK ORDERING (OUTPATIENT)
Dept: CASE MANAGEMENT | Facility: OTHER | Age: 61
End: 2021-03-19

## 2021-03-19 DIAGNOSIS — Z23 IMMUNIZATION DUE: ICD-10-CM

## 2021-03-23 ENCOUNTER — HOSPITAL ENCOUNTER (OUTPATIENT)
Dept: PHYSICAL THERAPY | Facility: HOSPITAL | Age: 61
Setting detail: THERAPIES SERIES
Discharge: HOME | End: 2021-03-23

## 2021-03-23 DIAGNOSIS — M25.612 DECREASED RANGE OF MOTION OF LEFT SHOULDER: ICD-10-CM

## 2021-03-23 DIAGNOSIS — C50.812 MALIGNANT NEOPLASM OF OVERLAPPING SITES OF LEFT BREAST IN FEMALE, ESTROGEN RECEPTOR POSITIVE (HCC): Primary | ICD-10-CM

## 2021-03-23 DIAGNOSIS — Z85.3 HISTORY OF LEFT BREAST CANCER: ICD-10-CM

## 2021-03-23 DIAGNOSIS — M75.42 SHOULDER IMPINGEMENT SYNDROME, LEFT: ICD-10-CM

## 2021-03-23 DIAGNOSIS — Z17.0 MALIGNANT NEOPLASM OF OVERLAPPING SITES OF LEFT BREAST IN FEMALE, ESTROGEN RECEPTOR POSITIVE (HCC): Primary | ICD-10-CM

## 2021-03-23 DIAGNOSIS — N64.89 BREAST EDEMA: ICD-10-CM

## 2021-03-23 DIAGNOSIS — L90.5 SCAR TISSUE: ICD-10-CM

## 2021-03-23 DIAGNOSIS — Z91.89 AT RISK FOR LYMPHEDEMA: ICD-10-CM

## 2021-03-23 DIAGNOSIS — M79.622 AXILLARY PAIN, LEFT: ICD-10-CM

## 2021-03-23 DIAGNOSIS — Z98.890 STATUS POST LEFT BREAST LUMPECTOMY: ICD-10-CM

## 2021-03-23 PROCEDURE — 97140 MANUAL THERAPY 1/> REGIONS: CPT | Performed by: PHYSICAL THERAPIST

## 2021-03-23 NOTE — THERAPY TREATMENT NOTE
Outpatient Physical Therapy Lymphedema Treatment Note  [facility]     Patient Name: Richa Dolan  : 1960  MRN: [id number]  Today's Date: 3/23/2021        Visit Date: 2021    Visit Dx:    ICD-10-CM ICD-9-CM   1. Malignant neoplasm of overlapping sites of left breast in female, estrogen receptor positive (CMS/HCC)  C50.812 174.8    Z17.0 V86.0   2. Status post left breast lumpectomy  Z98.890 V45.89   3. At risk for lymphedema  Z91.89 V49.89   4. Decreased range of motion of left shoulder  M25.612 719.51   5. Breast edema  N64.89 611.89   6. History of left breast cancer  Z85.3 V10.3   7. Scar tissue  L90.5 709.2   8. Axillary pain, left  M79.622 729.5   9. Shoulder impingement syndrome, left  M75.42 726.2       Patient Active Problem List   Diagnosis   • Abnormal liver function tests   • Hypertension   • Insomnia   • Knee pain   • Leukopenia   • Acute right-sided low back pain without sciatica   • Knee osteoarthritis   • Osteoporosis   • Arthralgia of multiple joints   • Seasonal allergic rhinitis   • Thrombocytopenia (CMS/HCC)   • Bone cyst   • Chronic pain of right knee   • Acquired immunocompromised state (CMS/HCC)   • Bruises easily   • Loss of hair   • Anxiety   • Elevated liver enzymes   • GERD (gastroesophageal reflux disease)   • HIV positive long-term non-progressor (CMS/HCC)   • Hypercholesterolemia   • Lupus anticoagulant positive   • Neck pain, chronic   • Chemotherapy-induced neutropenia (CMS/HCC)   • Tear of medial meniscus of right knee, current   • Chondromalacia, knee   • ICH (intracerebral hemorrhage) (CMS/HCC)   • Stomach discomfort   • Abnormal bruising   • Alopecia   • Headache   • Intractable chronic migraine without aura and without status migrainosus   • Osteoarthritis of right knee   • Need for vaccination   • Mild episode of recurrent major depressive disorder (CMS/HCC)   • Ingrown nail of great toe of right foot   • Left wrist pain   • Breast cancer screening   •  Malignant neoplasm of overlapping sites of left breast in female, estrogen receptor positive (CMS/HCC)   • History of stroke   • Arthritis   • Family history of breast cancer   • Screening for diabetes mellitus   • Epistaxis   • Chemotherapy-induced thrombocytopenia   • Anemia associated with chemotherapy   • Substance or medication-induced sleep disorder, 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CMS/HCC)   • Body aches   • Neuropathy   • History of left breast cancer   • Other fatigue   • HIV infection (CMS/HCC)   • Fat necrosis of left breast        Lymphedema     Row Name 03/23/21 4010             Subjective Pain    Able to rate subjective pain?  yes  -SC      Pre-Treatment Pain Level  0  -SC         Subjective Comments    Subjective Comments  I saw Dr. Garcia. He said he could do more reconstruction bilaterally to help cosmetically. I think we are making good progress so I told him I wanted to keep working on it and no surgery. Follow up in one year. My left shoulder is doing much better. Right is a little sore today.   -SC         Manual Lymphatic Drainage    Manual Lymphatic Drainage  initial sequence;opened regional lymph nodes;opened anastamoses;extremity treatment  -SC      Initial Sequence  supraclavicular;shoulder collectors;abdomen;diaphragmatic breathing  -SC      Supraclavicular  right;left  -SC      Shoulder Collectors  right;left  -SC      Abdomen  superficial  -SC      Opened Regional Lymph Nodes  axillary;inguinal;ribs  -SC      Axillary  right;left  -SC      Inguinal  left  -SC      Opened Anastamoses  anterior axillo-axillary;axillo-inguinal  -SC      Axillo-Inguinal  left  -SC      Extremity Treatment  simple/brief MLD;MLD to proximal limb only  -SC      Simple/Brief MLD  left  -SC      MLD to Proximal Limb Only  left  -SC      Manual Lymphatic Drainage Comments  focus of lymphatic massage left breast  -SC      Manual Therapy  MLD left breast, scar massage lumpectomy and lat flap incision, myofascial release left pec,  anterior shoulder, medial arm, axilla, lateral flank, subscapularis release, AP and inferior joint mobs L GHJ, lateral distractions, inferior oscillations, PROM all planes, gua sha/myofascial release left pec, into lateral breast and inferior breast at incision site/lymphedema of lumpectomy site. kinesiotape lift left breast, scar release left lumpectomy, and Y strip left medial breast, education of use and wear time and removal if indicated  -SC         Compression/Skin Care    Compression/Skin Care Comments  Pt presented to therapy wearing custom bra, has ordered swell spot  -SC        User Key  (r) = Recorded By, (t) = Taken By, (c) = Cosigned By    Initials Name Provider Type    Yola Rodriguez, PT Physical Therapist                        PT Assessment/Plan     Row Name 03/23/21 1430          PT Plan    PT Plan Comments  swell spot education, exercises bilateral shoulders  -SC       User Key  (r) = Recorded By, (t) = Taken By, (c) = Cosigned By    Initials Name Provider Type    Yola Rodriguez, PT Physical Therapist             OP Exercises     Row Name 03/23/21 1430             Subjective Comments    Subjective Comments  I saw Dr. Garcia. He said he could do more reconstruction bilaterally to help cosmetically. I think we are making good progress so I told him I wanted to keep working on it and no surgery. Follow up in one year. My left shoulder is doing much better. Right is a little sore today.   -SC         Subjective Pain    Able to rate subjective pain?  yes  -SC      Pre-Treatment Pain Level  0  -SC        User Key  (r) = Recorded By, (t) = Taken By, (c) = Cosigned By    Initials Name Provider Type    Yola Rodriguez, PT Physical Therapist                     Manual Rx (last 36 hours)      Manual Treatments     Row Name 03/23/21 1430             Manual Rx 1    Manual Rx 1 Location  left and right shoulder supine on plinth drapped in gown HOB elevated/HL  -SC      Manual Rx 1 Type   AP and inferior joint mobs L GHJ, PROM all planes, gentle myofascial release medial arm with retrograde massage to axilla, scar massage lat flap reconstruction, gentle left breast mobilizations, joint mobilizations right shoulder with gentle PROM  -SC      Manual Rx 1 Grade  III-IV  -SC        User Key  (r) = Recorded By, (t) = Taken By, (c) = Cosigned By    Initials Name Provider Type    SC Yola Valenzuela, PT Physical Therapist              Therapy Education  Education Details: continuation of care, reconstruction options, conservative treatment, update HEP and progression of exercises for bilateral shoulders including doorway stretch  Given: Symptoms/condition management, HEP, Pain management, Posture/body mechanics, Mobility training, Edema management, Other (comment)  Program: Reinforced, Progressed  How Provided: Verbal, Demonstration  Provided to: Patient  Level of Understanding: Teach back education performed, Verbalized, Demonstrated          Care provided by Zuly Contreras Student PT under direct supervision of Yola Valenzuela DPT, CLT UE    Time Calculation:   Start Time: 1630  Stop Time: 1715  Time Calculation (min): 45 min   Therapy Charges for Today     Code Description Service Date Service Provider Modifiers Qty    82049039366 HC PT MANUAL THERAPY EA 15 MIN 3/23/2021 Yola Valenzuela PT GP 3                    Yola Broderick PT  3/23/2021

## 2021-03-30 ENCOUNTER — HOSPITAL ENCOUNTER (OUTPATIENT)
Dept: PHYSICAL THERAPY | Facility: HOSPITAL | Age: 61
Setting detail: THERAPIES SERIES
Discharge: HOME | End: 2021-03-30

## 2021-03-30 DIAGNOSIS — Z91.89 AT RISK FOR LYMPHEDEMA: ICD-10-CM

## 2021-03-30 DIAGNOSIS — M25.612 DECREASED RANGE OF MOTION OF LEFT SHOULDER: ICD-10-CM

## 2021-03-30 DIAGNOSIS — L90.5 SCAR TISSUE: ICD-10-CM

## 2021-03-30 DIAGNOSIS — Z17.0 MALIGNANT NEOPLASM OF OVERLAPPING SITES OF LEFT BREAST IN FEMALE, ESTROGEN RECEPTOR POSITIVE (HCC): Primary | ICD-10-CM

## 2021-03-30 DIAGNOSIS — Z98.890 STATUS POST LEFT BREAST LUMPECTOMY: ICD-10-CM

## 2021-03-30 DIAGNOSIS — M75.42 SHOULDER IMPINGEMENT SYNDROME, LEFT: ICD-10-CM

## 2021-03-30 DIAGNOSIS — C50.812 MALIGNANT NEOPLASM OF OVERLAPPING SITES OF LEFT BREAST IN FEMALE, ESTROGEN RECEPTOR POSITIVE (HCC): Primary | ICD-10-CM

## 2021-03-30 DIAGNOSIS — N64.89 BREAST EDEMA: ICD-10-CM

## 2021-03-30 DIAGNOSIS — Z85.3 HISTORY OF LEFT BREAST CANCER: ICD-10-CM

## 2021-03-30 DIAGNOSIS — M79.622 AXILLARY PAIN, LEFT: ICD-10-CM

## 2021-03-30 PROCEDURE — 97140 MANUAL THERAPY 1/> REGIONS: CPT | Performed by: PHYSICAL THERAPIST

## 2021-03-30 NOTE — THERAPY TREATMENT NOTE
Outpatient Physical Therapy Lymphedema Treatment Note  [facility]     Patient Name: Richa Dolan  : 1960  MRN: [id number]  Today's Date: 3/30/2021        Visit Date: 2021    Visit Dx:    ICD-10-CM ICD-9-CM   1. Malignant neoplasm of overlapping sites of left breast in female, estrogen receptor positive (CMS/HCC)  C50.812 174.8    Z17.0 V86.0   2. Status post left breast lumpectomy  Z98.890 V45.89   3. At risk for lymphedema  Z91.89 V49.89   4. Decreased range of motion of left shoulder  M25.612 719.51   5. Breast edema  N64.89 611.89   6. History of left breast cancer  Z85.3 V10.3   7. Scar tissue  L90.5 709.2   8. Axillary pain, left  M79.622 729.5   9. Shoulder impingement syndrome, left  M75.42 726.2       Patient Active Problem List   Diagnosis   • Abnormal liver function tests   • Hypertension   • Insomnia   • Knee pain   • Leukopenia   • Acute right-sided low back pain without sciatica   • Knee osteoarthritis   • Osteoporosis   • Arthralgia of multiple joints   • Seasonal allergic rhinitis   • Thrombocytopenia (CMS/HCC)   • Bone cyst   • Chronic pain of right knee   • Acquired immunocompromised state (CMS/HCC)   • Bruises easily   • Loss of hair   • Anxiety   • Elevated liver enzymes   • GERD (gastroesophageal reflux disease)   • HIV positive long-term non-progressor (CMS/HCC)   • Hypercholesterolemia   • Lupus anticoagulant positive   • Neck pain, chronic   • Chemotherapy-induced neutropenia (CMS/HCC)   • Tear of medial meniscus of right knee, current   • Chondromalacia, knee   • ICH (intracerebral hemorrhage) (CMS/HCC)   • Stomach discomfort   • Abnormal bruising   • Alopecia   • Headache   • Intractable chronic migraine without aura and without status migrainosus   • Osteoarthritis of right knee   • Need for vaccination   • Mild episode of recurrent major depressive disorder (CMS/HCC)   • Ingrown nail of great toe of right foot   • Left wrist pain   • Breast cancer screening   •  Malignant neoplasm of overlapping sites of left breast in female, estrogen receptor positive (CMS/HCC)   • History of stroke   • Arthritis   • Family history of breast cancer   • Screening for diabetes mellitus   • Epistaxis   • Chemotherapy-induced thrombocytopenia   • Anemia associated with chemotherapy   • Substance or medication-induced sleep disorder, 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CMS/HCC)   • Body aches   • Neuropathy   • History of left breast cancer   • Other fatigue   • HIV infection (CMS/HCC)   • Fat necrosis of left breast        Lymphedema     Row Name 03/30/21 3167             Subjective Pain    Able to rate subjective pain?  yes  -SC      Pre-Treatment Pain Level  3  -SC      Subjective Pain Comment  right arm, 2nd COVID vaccine right arm 03/29/21  -SC         Subjective Comments    Subjective Comments  Got my second COVID vaccine yesterday so my right arm is sore. I did my exercises on Saturday and Sunday knowing I would have it done. I did get my swell spot. I don't love it but I only tried it with my compression bra. I will try with something lighter and see if that helps. I have felt like the top of my left arm was swollen last 2 days. Maybe from the covid vaccine.   -SC         Manual Lymphatic Drainage    Manual Lymphatic Drainage  initial sequence;opened regional lymph nodes;opened anastamoses;extremity treatment  -SC      Initial Sequence  supraclavicular;shoulder collectors;abdomen;diaphragmatic breathing  -SC      Supraclavicular  right;left  -SC      Shoulder Collectors  right;left  -SC      Abdomen  superficial  -SC      Opened Regional Lymph Nodes  axillary;inguinal;ribs  -SC      Axillary  right;left  -SC      Inguinal  left  -SC      Opened Anastamoses  anterior axillo-axillary;axillo-inguinal  -SC      Axillo-Inguinal  left  -SC      Extremity Treatment  simple/brief MLD;MLD to proximal limb only  -SC      Simple/Brief MLD  left  -SC      MLD to Proximal Limb Only  left  -SC      Manual Lymphatic  Drainage Comments  focus of lymphatic massage left breast  -SC      Manual Therapy  MLD left breast, scar massage lumpectomy and lat flap incision, myofascial release left pec, anterior shoulder, medial arm, axilla, lateral flank, subscapularis release, AP and inferior joint mobs L GHJ, lateral distractions, inferior oscillations, PROM all planes, gua sha/myofascial release left pec, into lateral breast and inferior breast at incision site/lymphedema of lumpectomy site. kinesiotape lift left breast, scar release left lumpectomy, and Y strip left medial breast, education of use and wear time and removal if indicated  -SC         Compression/Skin Care    Compression/Skin Care Comments  Pt presented to therapy wearing custom bra, has received swell spot  -SC        User Key  (r) = Recorded By, (t) = Taken By, (c) = Cosigned By    Initials Name Provider Type    SC Yola Valenzuela, PT Physical Therapist                        PT Assessment/Plan     Row Name 03/30/21 7117          PT Assessment    Assessment Comments  mild edema reports left proximal arm. education of proper use of swell spot with elevation. Compliant with HEP. possible SE with 2nd COVID vaccine. Reassess next session if indicated  -SC        PT Plan    PT Plan Comments  progression of RTC stabilization exercise program bilaterally, continue lymphedema care left breast as indicated, left arm edema assessment  -SC       User Key  (r) = Recorded By, (t) = Taken By, (c) = Cosigned By    Initials Name Provider Type    Yola Rodriguez, PT Physical Therapist             OP Exercises     Row Name 03/30/21 3930             Subjective Comments    Subjective Comments  Got my second COVID vaccine yesterday so my right arm is sore. I did my exercises on Saturday and Sunday knowing I would have it done. I did get my swell spot. I don't love it but I only tried it with my compression bra. I will try with something lighter and see if that helps. I have felt  like the top of my left arm was swollen last 2 days. Maybe from the covid vaccine.   -SC         Subjective Pain    Able to rate subjective pain?  yes  -SC      Pre-Treatment Pain Level  3  -SC      Subjective Pain Comment  right arm, 2nd COVID vaccine right arm 03/29/21  -SC        User Key  (r) = Recorded By, (t) = Taken By, (c) = Cosigned By    Initials Name Provider Type    Yola Rodriguez, PT Physical Therapist                     Manual Rx (last 36 hours)      Manual Treatments     Row Name 03/30/21 1617             Manual Rx 1    Manual Rx 1 Location  left and right shoulder supine on plinth drapped in gown HOB elevated/HL  -SC      Manual Rx 1 Type  AP and inferior joint mobs L GHJ, PROM all planes, gentle myofascial release medial arm with retrograde massage to axilla, scar massage lat flap reconstruction, gentle left breast mobilizations, joint mobilizations right shoulder with gentle PROM  -SC      Manual Rx 1 Grade  III-IV  -SC        User Key  (r) = Recorded By, (t) = Taken By, (c) = Cosigned By    Initials Name Provider Type    SC Yola Valenzuela, PT Physical Therapist              Therapy Education  Education Details: axillary swelling post COVID vaccine, continuation of care, proper use of swell spot  Given: HEP, Symptoms/condition management, Posture/body mechanics, Pain management, Mobility training, Edema management, Other (comment)  Program: Modified  How Provided: Verbal, Demonstration  Provided to: Patient  Level of Understanding: Teach back education performed, Verbalized, Demonstrated              Time Calculation:   Start Time: 1617  Stop Time: 1705  Time Calculation (min): 48 min   Therapy Charges for Today     Code Description Service Date Service Provider Modifiers Qty    67851522401 HC PT MANUAL THERAPY EA 15 MIN 3/30/2021 Yola Valenzuela, PT GP 3            Care provided by Zuly Contreras PT Student under direct supervision of Yola Valenzuela DPT, CLT ROBERT Aceves  LEIGHANN Broderick, PT  3/30/2021

## 2021-04-01 ENCOUNTER — LAB (OUTPATIENT)
Dept: LAB | Facility: HOSPITAL | Age: 61
End: 2021-04-01

## 2021-04-01 DIAGNOSIS — Z79.2 LONG TERM (CURRENT) USE OF ANTIBIOTICS: ICD-10-CM

## 2021-04-01 DIAGNOSIS — Z21 ASYMPTOMATIC HIV INFECTION (HCC): ICD-10-CM

## 2021-04-01 LAB
BASOPHILS # BLD AUTO: 0.02 10*3/MM3 (ref 0–0.2)
BASOPHILS NFR BLD AUTO: 0.5 % (ref 0–1.5)
DEPRECATED RDW RBC AUTO: 43.9 FL (ref 37–54)
EOSINOPHIL # BLD AUTO: 0.06 10*3/MM3 (ref 0–0.4)
EOSINOPHIL NFR BLD AUTO: 1.6 % (ref 0.3–6.2)
ERYTHROCYTE [DISTWIDTH] IN BLOOD BY AUTOMATED COUNT: 12.1 % (ref 12.3–15.4)
HCT VFR BLD AUTO: 35.3 % (ref 34–46.6)
HGB BLD-MCNC: 11.9 G/DL (ref 12–15.9)
IMM GRANULOCYTES # BLD AUTO: 0.01 10*3/MM3 (ref 0–0.05)
IMM GRANULOCYTES NFR BLD AUTO: 0.3 % (ref 0–0.5)
LYMPHOCYTES # BLD AUTO: 1.29 10*3/MM3 (ref 0.7–3.1)
LYMPHOCYTES NFR BLD AUTO: 34.9 % (ref 19.6–45.3)
MCH RBC QN AUTO: 33.9 PG (ref 26.6–33)
MCHC RBC AUTO-ENTMCNC: 33.7 G/DL (ref 31.5–35.7)
MCV RBC AUTO: 100.6 FL (ref 79–97)
MONOCYTES # BLD AUTO: 0.53 10*3/MM3 (ref 0.1–0.9)
MONOCYTES NFR BLD AUTO: 14.3 % (ref 5–12)
NEUTROPHILS NFR BLD AUTO: 1.79 10*3/MM3 (ref 1.7–7)
NEUTROPHILS NFR BLD AUTO: 48.4 % (ref 42.7–76)
NRBC BLD AUTO-RTO: 0 /100 WBC (ref 0–0.2)
PLATELET # BLD AUTO: 231 10*3/MM3 (ref 140–450)
PMV BLD AUTO: 9.6 FL (ref 6–12)
RBC # BLD AUTO: 3.51 10*6/MM3 (ref 3.77–5.28)
WBC # BLD AUTO: 3.7 10*3/MM3 (ref 3.4–10.8)

## 2021-04-01 PROCEDURE — 36415 COLL VENOUS BLD VENIPUNCTURE: CPT

## 2021-04-01 PROCEDURE — 86361 T CELL ABSOLUTE COUNT: CPT

## 2021-04-01 PROCEDURE — 85025 COMPLETE CBC W/AUTO DIFF WBC: CPT

## 2021-04-01 PROCEDURE — 87536 HIV-1 QUANT&REVRSE TRNSCRPJ: CPT

## 2021-04-02 LAB
BASOPHILS # BLD AUTO: 0 X10E3/UL (ref 0–0.2)
BASOPHILS NFR BLD AUTO: 1 %
CD3+CD4+ CELLS # BLD: 356 /UL (ref 359–1519)
CD3+CD4+ CELLS NFR BLD: 29.7 % (ref 30.8–58.5)
EOSINOPHIL # BLD AUTO: 0.1 X10E3/UL (ref 0–0.4)
EOSINOPHIL NFR BLD AUTO: 2 %
ERYTHROCYTE [DISTWIDTH] IN BLOOD BY AUTOMATED COUNT: 11.8 % (ref 11.7–15.4)
HCT VFR BLD AUTO: 34.9 % (ref 34–46.6)
HGB BLD-MCNC: 12.2 G/DL (ref 11.1–15.9)
HIV1 RNA # SERPL NAA+PROBE: 50 COPIES/ML
HIV1 RNA SERPL NAA+PROBE-LOG#: 1.7 LOG10COPY/ML
IMM GRANULOCYTES # BLD AUTO: 0 X10E3/UL (ref 0–0.1)
IMM GRANULOCYTES NFR BLD AUTO: 0 %
LYMPHOCYTES # BLD AUTO: 1.2 X10E3/UL (ref 0.7–3.1)
LYMPHOCYTES NFR BLD AUTO: 33 %
MCH RBC QN AUTO: 35 PG (ref 26.6–33)
MCHC RBC AUTO-ENTMCNC: 35 G/DL (ref 31.5–35.7)
MCV RBC AUTO: 100 FL (ref 79–97)
MONOCYTES # BLD AUTO: 0.5 X10E3/UL (ref 0.1–0.9)
MONOCYTES NFR BLD AUTO: 14 %
NEUTROPHILS # BLD AUTO: 1.8 X10E3/UL (ref 1.4–7)
NEUTROPHILS NFR BLD AUTO: 50 %
PLATELET # BLD AUTO: 247 X10E3/UL (ref 150–450)
RBC # BLD AUTO: 3.49 X10E6/UL (ref 3.77–5.28)
WBC # BLD AUTO: 3.6 X10E3/UL (ref 3.4–10.8)

## 2021-04-05 ENCOUNTER — OFFICE VISIT (OUTPATIENT)
Dept: INFECTIOUS DISEASES | Facility: CLINIC | Age: 61
End: 2021-04-05

## 2021-04-05 VITALS
WEIGHT: 152 LBS | HEIGHT: 68 IN | BODY MASS INDEX: 23.04 KG/M2 | DIASTOLIC BLOOD PRESSURE: 78 MMHG | HEART RATE: 67 BPM | SYSTOLIC BLOOD PRESSURE: 124 MMHG | TEMPERATURE: 97.5 F

## 2021-04-05 DIAGNOSIS — Z79.2 LONG TERM (CURRENT) USE OF ANTIBIOTICS: ICD-10-CM

## 2021-04-05 DIAGNOSIS — Z21 ASYMPTOMATIC HIV INFECTION (HCC): Primary | ICD-10-CM

## 2021-04-05 DIAGNOSIS — R30.0 DYSURIA: ICD-10-CM

## 2021-04-05 DIAGNOSIS — D05.12 DUCTAL CARCINOMA IN SITU (DCIS) OF LEFT BREAST: ICD-10-CM

## 2021-04-05 PROCEDURE — 99213 OFFICE O/P EST LOW 20 MIN: CPT | Performed by: INTERNAL MEDICINE

## 2021-04-05 NOTE — PROGRESS NOTES
CC: f/u HIV    HPI: Richa Dolan is a 60 y.o. female here for f/u of her HIV care. No new issues from an HIV standpoint. No missed doses of Triumeq. No obvious side effects. Refills through [facility].    For her breast cancer, she is currently on Tamoxifen.    She has now been vaccinated against COVID-19. She received her second dose (Pfizer) about one week ago). She plans to go visit her son (who is also now vaccinated) next week. She is very excited about this!    She reports occasional dysuria. It improves with OTC meds and increased H2O intake but always seems to return.      Review of Systems:   No n/v/d    PMH:   HIV  Breast cancer  CVA  Migraines  HLD  GERD    Past Surgical History:   Procedure Laterality Date   • ADENOIDECTOMY     • BREAST LUMPECTOMY     • BREAST LUMPECTOMY WITH SENTINEL NODE BIOPSY Left 5/28/2020    Procedure: BREAST LUMPECTOMY WITH SENTINEL NODE BIOPSY AND NEEDLE LOCALIZATION AND axillary dissection;  Surgeon: Landen Forman MD;  Location: [facility];  Service: General;  Laterality: Left;   • BREAST SURGERY Left 5/28/2020    Procedure: LEFT LATERAL INTERCOASTAL ARTERY  flap ;  Surgeon: Yusuf Garcia MD;  Location: [facility];  Service: Plastics;  Laterality: Left;   • CHOLECYSTECTOMY     • HYSTERECTOMY     • KNEE ARTHROSCOPY Right 3/24/2017    Procedure:  RIGHT  KNEE ARTHROSCOPY WITH DEBRIDEMENT CHONDROPLASTY PARTIAL MENISCECTOMY;  Surgeon: Karl Galeas MD;  Location: [facility];  Service:    • KNEE CARTILAGE SURGERY Right 2007   • TONSILLECTOMY     • US GUIDED LYMPH NODE BIOPSY  10/29/2019    LEFT BREAST   • VENOUS ACCESS DEVICE (PORT) INSERTION Right 12/2/2019    Procedure: INSERTION VENOUS ACCESS DEVICE RIGHT;  Surgeon: Landen Forman MD;  Location: [facility];  Service: General     FH: father had dementia    SH: , 1 son, no tobacco use, occasional EtOH, no illicits, retired from [employer]    Allergies: Augmentin  "(hives)    Medications:   Current Outpatient Medications:   •  AIMOVIG 140 MG/ML solution auto-injector, INJECT 140 MG INTO THE SKIN EVERY 30 (THIRTY) DAYS., Disp: , Rfl: 11  •  atorvastatin (LIPITOR) 20 MG tablet, TAKE 1 TABLET BY MOUTH EVERY DAY AT NIGHT (Patient taking differently: Take 20 mg by mouth Daily.), Disp: 90 tablet, Rfl: 3  •  baclofen (LIORESAL) 10 MG tablet, Take 10 mg by mouth 2 (Two) Times a Day., Disp: , Rfl: 2  •  clonazePAM (KlonoPIN) 0.5 MG tablet, TAKE 1 TABLET BY MOUTH 2 (TWO) TIMES A DAY AS NEEDED FOR ANXIETY., Disp: 60 tablet, Rfl: 1  •  colesevelam (WELCHOL) 625 MG tablet, TAKE 2 TABLETS BY MOUTH EVERY DAY, Disp: 180 tablet, Rfl: 3  •  escitalopram (LEXAPRO) 10 MG tablet, Take 1 tablet by mouth Daily., Disp: 90 tablet, Rfl: 3  •  fluticasone (FLONASE) 50 MCG/ACT nasal spray, 2 sprays into the nostril(s) as directed by provider Daily., Disp: 3 bottle, Rfl: 3  •  Loratadine (CLARITIN PO), Take 10 mg by mouth Daily., Disp: , Rfl:   •  Magnesium Hydroxide (MILK OF MAGNESIA PO), Take 15 mL by mouth Every Night., Disp: , Rfl:   •  oxyCODONE-acetaminophen (PERCOCET)  MG per tablet, Take 1 tablet by mouth 4 (Four) Times a Day., Disp: , Rfl:   •  pyridoxine (VITAMIN B-6) 50 MG tablet, TAKE 1 TABLET BY MOUTH EVERY DAY, Disp: 90 tablet, Rfl: 1  •  Rimegepant Sulfate 75 MG tablet dispersible, Take 75 mg by mouth., Disp: , Rfl:   •  tamoxifen (NOLVADEX) 20 MG chemo tablet, TAKE 1 TABLET BY MOUTH EVERY DAY, Disp: 90 tablet, Rfl: 1  •  TRIUMEQ 600- MG per tablet, TAKE 1 TABLET BY MOUTH EVERY DAY, Disp: 90 tablet, Rfl: 3  •  zolpidem (AMBIEN) 10 MG tablet, TAKE 1 TABLET AT BEDTIME AS NEEDED FOR SLEEP, Disp: 30 tablet, Rfl: 2    OBJECTIVE:  /78   Pulse 67   Temp 97.5 °F (36.4 °C)   Ht 172.7 cm (67.99\")   Wt 68.9 kg (152 lb)   BMI 23.12 kg/m²       GENERAL: Awake, alert, NAD  ENT:  wearing mask  EYES: No scleral icterus  HEART: NR  LUNGS: . Normal work of breathing on ambient air.  " "  ABDOMEN: soft, non-distended  SKIN: no rashes  Vasc: R chest port w/o erythema    DIAGNOSTICS:  CBC, CMP, HIV labs reviewed today  Lab Results   Component Value Date    WBC 3.6 04/01/2021    WBC 3.70 04/01/2021    HGB 12.2 04/01/2021    HGB 11.9 (L) 04/01/2021    HCT 34.9 04/01/2021    HCT 35.3 04/01/2021     04/01/2021     04/01/2021     Lab Results   Component Value Date    GLUCOSE 85 01/20/2021    BUN 14 01/20/2021    CREATININE 0.60 01/20/2021    EGFRIFNONA 102 01/20/2021    EGFRIFAFRI >60 05/28/2015    BCR 23.3 01/20/2021    CO2 29.8 (H) 01/20/2021    CALCIUM 8.7 01/20/2021    PROTENTOTREF 7.2 10/13/2016    ALBUMIN 4.10 01/20/2021    LABIL2 1.5 04/08/2019    AST 19 01/20/2021    ALT 14 01/20/2021     HIV Related Labs:  CD4: 356/29% (4/2021)  VL 50 (4/2021)  HIV Genotype: unknown bc undetectable  [id number] - negative  Tspot - negative (10/2020)  RPR - non-reactive (10/2020)  Hep A - immune (7/2016)  Hep B - immune (9/2015)  Hep C - negative (10/2020)  Urine GCCT - negative (9/2015)    Radiology (reviewed report):    No new imaging today    ASSESSMENT/PLAN:  1. Asymptomatic HIV  -excellent compliance w/ Triumeq; refills addressed  -labs checked prior to clinic appt show a \"blip\" in her viral load to 50 copies; offered reassurance; repeat HIV RNA today or tomorrow  -no OI prophylaxis needed at this time    2. T2N1 invasive ductal carcinoma of the left breast  -currently on tamoxifen  -follows w/ oncology; infusion planned for Zometa tomorrow    3. Long term use of antibiotics  -reviewed monitoring labs    4. Dysuria - new  -check w/ micro and culture if indicated    RTC 6 months or sooner if needed      "

## 2021-04-06 ENCOUNTER — OFFICE VISIT (OUTPATIENT)
Dept: ONCOLOGY | Facility: CLINIC | Age: 61
End: 2021-04-06

## 2021-04-06 ENCOUNTER — APPOINTMENT (OUTPATIENT)
Dept: PHYSICAL THERAPY | Facility: HOSPITAL | Age: 61
End: 2021-04-06

## 2021-04-06 ENCOUNTER — INFUSION (OUTPATIENT)
Dept: ONCOLOGY | Facility: HOSPITAL | Age: 61
End: 2021-04-06

## 2021-04-06 ENCOUNTER — TELEPHONE (OUTPATIENT)
Dept: INFECTIOUS DISEASES | Facility: CLINIC | Age: 61
End: 2021-04-06

## 2021-04-06 VITALS
OXYGEN SATURATION: 95 % | HEIGHT: 68 IN | RESPIRATION RATE: 16 BRPM | DIASTOLIC BLOOD PRESSURE: 74 MMHG | BODY MASS INDEX: 22.96 KG/M2 | SYSTOLIC BLOOD PRESSURE: 124 MMHG | HEART RATE: 80 BPM | WEIGHT: 151.5 LBS | TEMPERATURE: 98 F

## 2021-04-06 DIAGNOSIS — Z17.0 MALIGNANT NEOPLASM OF OVERLAPPING SITES OF LEFT BREAST IN FEMALE, ESTROGEN RECEPTOR POSITIVE (HCC): Primary | ICD-10-CM

## 2021-04-06 DIAGNOSIS — C50.812 MALIGNANT NEOPLASM OF OVERLAPPING SITES OF LEFT BREAST IN FEMALE, ESTROGEN RECEPTOR POSITIVE (HCC): Primary | ICD-10-CM

## 2021-04-06 DIAGNOSIS — Z21 HIV POSITIVE LONG-TERM NON-PROGRESSOR (HCC): ICD-10-CM

## 2021-04-06 DIAGNOSIS — C50.812 MALIGNANT NEOPLASM OF OVERLAPPING SITES OF LEFT BREAST IN FEMALE, ESTROGEN RECEPTOR POSITIVE (HCC): ICD-10-CM

## 2021-04-06 DIAGNOSIS — M19.90 ARTHRITIS: ICD-10-CM

## 2021-04-06 DIAGNOSIS — Z13.1 SCREENING FOR DIABETES MELLITUS: ICD-10-CM

## 2021-04-06 DIAGNOSIS — Z17.0 MALIGNANT NEOPLASM OF OVERLAPPING SITES OF LEFT BREAST IN FEMALE, ESTROGEN RECEPTOR POSITIVE (HCC): ICD-10-CM

## 2021-04-06 LAB
ALBUMIN SERPL-MCNC: 4.1 G/DL (ref 3.5–5.2)
ALBUMIN/GLOB SERPL: 1.4 G/DL (ref 1.1–2.4)
ALP SERPL-CCNC: 62 U/L (ref 38–116)
ALT SERPL W P-5'-P-CCNC: 18 U/L (ref 0–33)
ANION GAP SERPL CALCULATED.3IONS-SCNC: 8 MMOL/L (ref 5–15)
AST SERPL-CCNC: 21 U/L (ref 0–32)
BASOPHILS # BLD AUTO: 0.04 10*3/MM3 (ref 0–0.2)
BASOPHILS NFR BLD AUTO: 1 % (ref 0–1.5)
BILIRUB SERPL-MCNC: 0.3 MG/DL (ref 0.2–1.2)
BILIRUB UR QL STRIP: NEGATIVE
BUN SERPL-MCNC: 18 MG/DL (ref 6–20)
BUN/CREAT SERPL: 26.1 (ref 7.3–30)
CALCIUM SPEC-SCNC: 9 MG/DL (ref 8.5–10.2)
CHLORIDE SERPL-SCNC: 104 MMOL/L (ref 98–107)
CLARITY UR: CLEAR
CO2 SERPL-SCNC: 27 MMOL/L (ref 22–29)
COLOR UR: YELLOW
CREAT SERPL-MCNC: 0.69 MG/DL (ref 0.6–1.1)
DEPRECATED RDW RBC AUTO: 46.3 FL (ref 37–54)
EOSINOPHIL # BLD AUTO: 0.04 10*3/MM3 (ref 0–0.4)
EOSINOPHIL NFR BLD AUTO: 1 % (ref 0.3–6.2)
ERYTHROCYTE [DISTWIDTH] IN BLOOD BY AUTOMATED COUNT: 12.2 % (ref 12.3–15.4)
GFR SERPL CREATININE-BSD FRML MDRD: 87 ML/MIN/1.73
GLOBULIN UR ELPH-MCNC: 2.9 GM/DL (ref 1.8–3.5)
GLUCOSE SERPL-MCNC: 140 MG/DL (ref 74–124)
GLUCOSE UR STRIP-MCNC: NEGATIVE MG/DL
HCT VFR BLD AUTO: 35.8 % (ref 34–46.6)
HGB BLD-MCNC: 11.9 G/DL (ref 12–15.9)
HGB UR QL STRIP.AUTO: NEGATIVE
IMM GRANULOCYTES # BLD AUTO: 0 10*3/MM3 (ref 0–0.05)
IMM GRANULOCYTES NFR BLD AUTO: 0 % (ref 0–0.5)
KETONES UR QL STRIP: NEGATIVE
LEUKOCYTE ESTERASE UR QL STRIP.AUTO: NEGATIVE
LYMPHOCYTES # BLD AUTO: 1.51 10*3/MM3 (ref 0.7–3.1)
LYMPHOCYTES NFR BLD AUTO: 38.9 % (ref 19.6–45.3)
MAGNESIUM SERPL-MCNC: 2.1 MG/DL (ref 1.8–2.5)
MCH RBC QN AUTO: 34.4 PG (ref 26.6–33)
MCHC RBC AUTO-ENTMCNC: 33.2 G/DL (ref 31.5–35.7)
MCV RBC AUTO: 103.5 FL (ref 79–97)
MONOCYTES # BLD AUTO: 0.42 10*3/MM3 (ref 0.1–0.9)
MONOCYTES NFR BLD AUTO: 10.8 % (ref 5–12)
NEUTROPHILS NFR BLD AUTO: 1.87 10*3/MM3 (ref 1.7–7)
NEUTROPHILS NFR BLD AUTO: 48.3 % (ref 42.7–76)
NITRITE UR QL STRIP: NEGATIVE
NRBC BLD AUTO-RTO: 0 /100 WBC (ref 0–0.2)
PH UR STRIP.AUTO: 5.5 [PH] (ref 4.5–8)
PHOSPHATE SERPL-MCNC: 3.6 MG/DL (ref 2.5–4.5)
PLATELET # BLD AUTO: 237 10*3/MM3 (ref 140–450)
PMV BLD AUTO: 9 FL (ref 6–12)
POTASSIUM SERPL-SCNC: 4.2 MMOL/L (ref 3.5–4.7)
PROT SERPL-MCNC: 7 G/DL (ref 6.3–8)
PROT UR QL STRIP: NEGATIVE
RBC # BLD AUTO: 3.46 10*6/MM3 (ref 3.77–5.28)
SODIUM SERPL-SCNC: 139 MMOL/L (ref 134–145)
SP GR UR STRIP: 1.02 (ref 1–1.03)
UROBILINOGEN UR QL STRIP: NORMAL
WBC # BLD AUTO: 3.88 10*3/MM3 (ref 3.4–10.8)

## 2021-04-06 PROCEDURE — 83735 ASSAY OF MAGNESIUM: CPT

## 2021-04-06 PROCEDURE — 25010000002 ZOLEDRONIC ACID 4 MG/100ML SOLUTION: Performed by: INTERNAL MEDICINE

## 2021-04-06 PROCEDURE — 85025 COMPLETE CBC W/AUTO DIFF WBC: CPT

## 2021-04-06 PROCEDURE — 80053 COMPREHEN METABOLIC PANEL: CPT

## 2021-04-06 PROCEDURE — 81003 URINALYSIS AUTO W/O SCOPE: CPT | Performed by: INTERNAL MEDICINE

## 2021-04-06 PROCEDURE — 96374 THER/PROPH/DIAG INJ IV PUSH: CPT

## 2021-04-06 PROCEDURE — 25010000003 HEPARIN LOCK FLUSH PER 10 UNITS: Performed by: INTERNAL MEDICINE

## 2021-04-06 PROCEDURE — 84100 ASSAY OF PHOSPHORUS: CPT

## 2021-04-06 PROCEDURE — 25010000002 HEPARIN LOCK FLUSH PER 10 UNITS: Performed by: INTERNAL MEDICINE

## 2021-04-06 PROCEDURE — 99214 OFFICE O/P EST MOD 30 MIN: CPT | Performed by: INTERNAL MEDICINE

## 2021-04-06 RX ORDER — HEPARIN SODIUM (PORCINE) LOCK FLUSH IV SOLN 100 UNIT/ML 100 UNIT/ML
300 SOLUTION INTRAVENOUS ONCE
Status: CANCELLED | OUTPATIENT
Start: 2021-04-06

## 2021-04-06 RX ORDER — SODIUM CHLORIDE 0.9 % (FLUSH) 0.9 %
20 SYRINGE (ML) INJECTION AS NEEDED
Status: CANCELLED | OUTPATIENT
Start: 2021-04-06

## 2021-04-06 RX ORDER — HEPARIN SODIUM (PORCINE) LOCK FLUSH IV SOLN 100 UNIT/ML 100 UNIT/ML
500 SOLUTION INTRAVENOUS AS NEEDED
Status: DISCONTINUED | OUTPATIENT
Start: 2021-04-06 | End: 2021-04-06 | Stop reason: HOSPADM

## 2021-04-06 RX ORDER — SODIUM CHLORIDE 9 MG/ML
250 INJECTION, SOLUTION INTRAVENOUS ONCE
Status: CANCELLED | OUTPATIENT
Start: 2021-04-06

## 2021-04-06 RX ORDER — SODIUM CHLORIDE 0.9 % (FLUSH) 0.9 %
10 SYRINGE (ML) INJECTION AS NEEDED
Status: CANCELLED | OUTPATIENT
Start: 2021-04-06

## 2021-04-06 RX ORDER — ZOLEDRONIC ACID 0.04 MG/ML
4 INJECTION, SOLUTION INTRAVENOUS ONCE
Status: CANCELLED | OUTPATIENT
Start: 2021-04-06

## 2021-04-06 RX ORDER — ZOLEDRONIC ACID 0.04 MG/ML
4 INJECTION, SOLUTION INTRAVENOUS ONCE
Status: COMPLETED | OUTPATIENT
Start: 2021-04-06 | End: 2021-04-06

## 2021-04-06 RX ORDER — HEPARIN SODIUM (PORCINE) LOCK FLUSH IV SOLN 100 UNIT/ML 100 UNIT/ML
500 SOLUTION INTRAVENOUS AS NEEDED
Status: CANCELLED | OUTPATIENT
Start: 2021-04-06

## 2021-04-06 RX ORDER — SODIUM CHLORIDE 9 MG/ML
250 INJECTION, SOLUTION INTRAVENOUS ONCE
Status: COMPLETED | OUTPATIENT
Start: 2021-04-06 | End: 2021-04-06

## 2021-04-06 RX ADMIN — ZOLEDRONIC ACID 4 MG: 0.04 INJECTION, SOLUTION INTRAVENOUS at 13:47

## 2021-04-06 RX ADMIN — Medication 500 UNITS: at 14:08

## 2021-04-06 RX ADMIN — SODIUM CHLORIDE 250 ML: 9 INJECTION, SOLUTION INTRAVENOUS at 13:47

## 2021-04-06 NOTE — TELEPHONE ENCOUNTER
----- Message from Denilson Dawn MD sent at 4/6/2021  1:05 PM EDT -----  Please let Mrs. Dolan know that her urine test was clear. There is no evidence of infection. We will call her again when we have the HIV viral load result.

## 2021-04-06 NOTE — PROGRESS NOTES
Subjective     REASON FOR FOLLOW UP:    1.  T2N1 invasive ductal carcinoma of the left breast.  Ultrasound-showed 3.8 x 3.1 x 2.5 cm mass at 4 o'clock position with 2 abnormal axillary lymph nodes, larger lymph node being 1.4 cm.  Left breast biopsy and axillary lymph node biopsy October 29, 2019, invasive ductal carcinoma, moderately differentiated, grade 2, ER 85%, AZ 10%, HER-2/merna 2+, HER-2 FISH negative.  · 12/06/19: Neoadjuvant chemo therapy, cycle #1 of dose-dense Adriamycin/Cytoxan with Neulasta for marrow support    · 01/17/20: Last cycle of Adriamycin/Cytoxan given  · January 31, 2020: Cycle 1 Taxol  · April 24, 2020: Last cycle, cycle 12 of Taxol  · Patient is s/p left mastectomy with axillary dissection with reconstruction.  She is healing up nicely.  She has a drain in place.  Pathology showed invasive breast cancer which is 55 x 40 mm in size, grade 2 with focal lymphovascular space invasion with DCIS spanning over 36 x 6 mm.  All margins were negative for cancer.  · 8 of the additional lymph nodes out of 11 were positive with the largest micrometastasis measuring 5 mm.  Extranodal extension was seen.    · Biomarkers on post neoadjuvant tumor is ER 81-90% AZ 5% HER-2/merna 2+ by immunohistochemistry and HER-2/merna negative by FISH.  · Radiation July 6, 2020-August 17, 2020.  · Letrozole discontinued secondary to significant joint pains July 2020.  Discontinued October 2020 secondary to severe joint pains  · October 30, 2020 patient started tamoxifen and tolerating well        2.  Severe neutropenia secondary to chemotherapy, with ANC 50 at 1-week after cycle #1 chemotherapy.  Also had moderate thrombocytopenia platelets 92,000.  Patient was given Levaquin for prophylaxis of neutropenia fever.    3.  HIV, followed by Dr. Dawn from infectious disease.  Well controlled on meds    4.  Screening mammogram December 7, 2020 showed indeterminate calcifications lower outer middle left breast.  Diagnostic  mammogram showed 0.4 cm calcifications in the left breast.  Stereotactic biopsy January 8, 2021 consistent with organizing fat necrosis.       .               HISTORY OF PRESENT ILLNESS:  The patient is a 60 y.o. year old female with history of T2N1 invasive ductal carcinoma of the left breast status post neoadjuvant chemotherapy with Adriamycin Cytoxan followed by weekly Taxol.  She is completed all treatment and currently has had a MRI of the breast which shows partial response to the neoadjuvant chemotherapy with considerable reduction in the left axillary adenopathy and reduction in the size of the index lesion in the left breast as well as other satellite lesions.    Patient is s/p left mastectomy with axillary dissection with reconstruction.  She is healing up nicely.  She has a drain in place.  Pathology showed invasive breast cancer which is 55 x 40 mm in size, grade 2 with focal lymphovascular space invasion with DCIS spanning over 36 x 6 mm.  All margins were negative for cancer.  8 of the additional lymph nodes out of 11 were positive with the largest micrometastasis measuring 5 mm.  Extranodal extension was seen.    Biomarkers on post neoadjuvant tumor is ER 81-90% OK 5% HER-2/merna 2+ by immunohistochemistry and HER-2/merna negative by FISH.    Patient is here for follow-up.  She is eligible for aspirin and we well  trial we will check if she is eligible.    Patient also will benefit from Zometa once every 6 months as she is postmenopausal and has multiple lymph nodes positive.      Interval history:     Patient had urinary tract infection.  She has seen Dr. Dawn yesterday and he ordered a urine culture.  Urine analysis is negative.  Patient has hot flashes secondary to tamoxifen.  She is here to receive cycle 2 of IV Zometa.  She also complains of some vaginal dryness secondary to tamoxifen.  She denies any chest pain shortness of breath or cough.  No vaginal bleeding, no swelling of the lower  extremities    Patient also complains of swelling of the left upper extremity.  She had COVID-19 vaccine done 2 weeks ago but on the right side.  She has an appointment with lymphedema clinic next week.    Past Medical History:   Diagnosis Date   • Anxiety    • Cerebrovascular accident (CVA) (CMS/[facility]) 8/22/2017    NO RESIDUAL AFFECT   • DDD (degenerative disc disease), cervical    • Depression    • Drug therapy    • Elevated cholesterol    • GERD (gastroesophageal reflux disease)    • H/O ICH (intracerebral hemorrhage) (CMS/[facility])    • History of chemotherapy    • History of stroke    • History of thrombocytopenia    • HIV (human immunodeficiency virus infection) (CMS/[facility]) 1996   • Hx of radiation therapy    • Hyperlipidemia    • Insomnia    • Lupus anticoagulant positive    • Malignant neoplasm of overlapping sites of left breast in female, estrogen receptor positive (CMS/[facility]) 11/12/2019   • Meniscus tear 2017    RIGHT   • Migraine    • Neck pain     WITH SHOOTING PAIN LEFT RIGHT ARM   • Osteoarthritis    • Osteoporosis    • Pain management     sees 1 every 3 months for scripts/injection/pain meds   • Seasonal allergies    • Spinal headache     THINKS HAD BLOOD PATCH AFTER EPIDUAL    • Tick bite 06/2014     PAST MEDICAL HISTORY:  She had a stroke in July 2017 with headache and dizziness.  She went to the ER and was placed on aspirin.  However, she stopped taking it two weeks ago.  She has been taking Aimovig (injection) monthly with Fariba López at [facility].    In 1994, patient was diagnosed with HIV with an unknown cause which is managed by Dr. Natali Subramanian.  As of now, her viral load is good.    Patient has arthritis.  She gets trigger-point injections in her back with her last one being 2 weeks ago.  She has had multiple ablations.  She also has pain in her SI joint and Dr. Bermudez performed a surgery on this.  She takes Narco for the pain.      She is osteoporotic.  She has had multiple hairline fractures in both  her legs.  She had her knees cleaned out by Dr. Sinha.     Patient has had a hysterectomy and still has both of her ovaries.    Patient has vertigo and the muscles in her left ear are weak.  She just has an imbalance.    Past Surgical History:   Procedure Laterality Date   • ADENOIDECTOMY     • BREAST LUMPECTOMY     • BREAST LUMPECTOMY WITH SENTINEL NODE BIOPSY Left 5/28/2020    Procedure: BREAST LUMPECTOMY WITH SENTINEL NODE BIOPSY AND NEEDLE LOCALIZATION AND axillary dissection;  Surgeon: Landen Forman MD;  Location: [facility];  Service: General;  Laterality: Left;   • BREAST SURGERY Left 5/28/2020    Procedure: LEFT LATERAL INTERCOASTAL ARTERY  flap ;  Surgeon: Yusuf Garcia MD;  Location: [facility];  Service: Plastics;  Laterality: Left;   • CHOLECYSTECTOMY     • HYSTERECTOMY     • KNEE ARTHROSCOPY Right 3/24/2017    Procedure:  RIGHT  KNEE ARTHROSCOPY WITH DEBRIDEMENT CHONDROPLASTY PARTIAL MENISCECTOMY;  Surgeon: Karl Galeas MD;  Location: [facility];  Service:    • KNEE CARTILAGE SURGERY Right 2007   • TONSILLECTOMY     • US GUIDED LYMPH NODE BIOPSY  10/29/2019    LEFT BREAST   • VENOUS ACCESS DEVICE (PORT) INSERTION Right 12/2/2019    Procedure: INSERTION VENOUS ACCESS DEVICE RIGHT;  Surgeon: Landen Forman MD;  Location: [facility];  Service: General       ONCOLOGIC HISTORY:  Patient is a 59-year-old female who has had a history of HIV since age 34 followed by Dr. Natali Ng at [facility] and was on multiple HIV drugs initially but more recently has been on a single medication TRIUMEQ, with well-controlled HIV.  She follows up with Dr. Dawn , infectious disease who saw her recently and he saw her on 4/8/2019 and has excellent control and suppression of her viral load.  She is very compliant with her medications.    She also has had history of stroke in 2017 for which she was placed on aspirin.  She presented with a headache.   She is very active and works at [employer] full-time.  She also has had history of vertigo in the past  Patient's PCP is Zach Lora.    Patient first noticed the mass in her left breast 5 months ago.  Her last mammogram was 5 years ago.  She had soreness in the left breast and she went to her primary care physician who then ordered a mammogram diagnostic and an ultrasound.  The diagnostic mammogram showed a 3.7 cm mass at 4 o'clock position in the lower outer quadrant of the left breast.  The ultrasound showed 3.8 cm x 3.1 x 2.5 cm mass at 4 o'clock position in the left breast with 2 abnormal appearing left axillary lymph nodes the largest being 1.4 cm.    She then underwent biopsy of both the breast mass as well as the left axillary lymph node and both of them are positive for invasive mammary carcinoma it is invasive ductal carcinoma with apocrine features, moderately differentiated, grade 2 of 3 with a Firebaugh score of 5.  The left axillary lymph node is also consistent with metastatic mammary carcinoma.  It is ER positive [place] positive HER-2/merna negative.  Details as follows    10/21/19 - Bilateral Diagnostic Mammogram and US Breast Left  FINDINGS: Bilateral digital CC and MLO mammographic images were  obtained. No prior examination is available for comparison. Scattered  fibroglandular densities are seen throughout both breasts. A triangular  skin marker represents the area of palpable concern in the lower outer  quadrant of the left breast in the posterior 1/3. At this location there  is an irregular mass that measures on the order of 3.7 cm in greatest  dimension. Internal calcifications are noted. No suspicious findings in  the right breast are appreciated.     ULTRASOUND: Targeted sonographic evaluation of the left breast was  performed through the area of concern in the left axilla. At the 4  o'clock position on the order of 6 cm from the nipple there is an  irregular hypoechoic mass that  measures 3.8 x 3.1 x 2.5 cm. Internal  vascularity is noted.     There are 2 abnormal-appearing left axillary lymph nodes, the largest  which measures on the order of 1.4 cm in greatest dimension.     IMPRESSION:  1. There is a 3.8 cm irregular mass in the left breast at the 4 o'clock  position. This is seen in conjunction with an irregular 1.4 cm lymph  node in the left axilla. This is suspicious for malignancy with left  axillary involvement. Correlation with ultrasound-guided biopsy of both  the left breast mass and the lymph node is recommended.  2. There are no findings suspicious for malignancy in the right breast.     BI-RADS CATEGORY 5:     Patient's labs from 11/12/19 are normal.    10/29/19 - Tissue Pathology  1. Left Breast, 4:00, 6 cm FN, U/S-Guided Core Needle Biopsy for a Mass:  A. INVASIVE DUCTAL CARCINOMA WITH APOCRINE FEATURES, Moderately differentiated;  Auxier Histologic Grade II/III (tubule score = 3, nuclear score = 2, mitoses score = 1), measuring at least  9 mm.  B. No ductal or lobular carcinoma in situ is identified in this sample.  C. Focus suspicious for lymphovascular space invasion.  D. See Biomarker Template for hormone receptor studies.  2. Lymph Node, Left Axilla, U/S-Guided Core Needle Biopsy:  A. METASTATIC MAMMARY CARCINOMA (see Comment).  B. Metastatic focus measures 5 mm in greatest extent.  ER+, 85%  OH+, 10%  HER2- Score 2+    11/13/19 - CT Neck Chest Abdomen Pelvis  IMPRESSION:  1. In addition to the irregular approximately 3.8 cm mass within the  lateral aspect of the left breast, there are a few subcentimeter  asymmetric nodules along the lateral margin of the glandular tissue. The  several asymmetric left subpectoral nodes and 1.2 x 1.0 cm left axillary  node are suspicious for tamar metastases. The asymmetric subcentimeter  left supraclavicular and left posterior cervical triangle nodes are  worrisome as well.  2. There is no convincing evidence for metastatic disease  within the  abdomen or pelvis.    19 - Echocardiogram:  Normal.  Calculated EF = 67%.  There is trace mitral valve and tricuspid valve regurgitation.    11/15/19 - Bone Scan  Negative.    19 - MRI Breast Bilateral  IMPRESSION:  1. Biopsy-proven malignancy in the left breast centered at the 4 o'clock  position with associated surrounding satellite nodules as described. The  entire region of involvement including the satellite nodules and the  dominant mass measure up to 7.5 cm in greatest dimension. Also, left  axillary adenopathy with multiple irregular lymph nodes and loss of  differentiation of the borders of multiple lymph nodes is noted and this  is suspicious for extranodal involvement.  2. There are no findings suspicious for malignancy in the right breast.  BI-RADS CATEGORY 6      19: Cycle #1 of Adriamycin/Cytoxan    20: Last cycle of Adriamycin/Cytoxan given without Neulasta.  We will switch from Neulasta to 7 days of Neupogen injections after cycle 4.    We reviewed with patient the US Breast from 20 which shows mild interval partial response to neoadjuvant chemotherapy.      20: Initiated cycle 1 Taxol  2020: Last cycle of Taxol, cycle 12    S/p left mastectomy with axillary dissection   Pathology showed invasive breast cancer which is 55 x 40 mm in size, grade 2 with focal lymphovascular space invasion with DCIS spanning over 36 x 6 mm.  All margins were negative for cancer.  8 of the additional lymph nodes out of 11 were positive with the largest micrometastasis measuring 5 mm.  Extranodal extension was seen.    Biomarkers on post neoadjuvant tumor is ER 81-90% SD 5% HER-2/merna 2+ by immunohistochemistry and HER-2/merna negative by FISH.    2020: Started letrozole but because of severe joint pains was discontinued 2020    End of 2020 started tamoxifen    OB-GYN:  Menarche 15 years  Menopause-46  Pregnancies- 1 para 1 no miscarriages  her first pregnancy was in 1990 at 29 years of age.  She did not breastfeed.  Post menopausal HRT- None.  Hx of birth control pills- Yes.    Current Outpatient Medications on File Prior to Visit   Medication Sig Dispense Refill   • AIMOVIG 140 MG/ML solution auto-injector INJECT 140 MG INTO THE SKIN EVERY 30 (THIRTY) DAYS.  11   • atorvastatin (LIPITOR) 20 MG tablet TAKE 1 TABLET BY MOUTH EVERY DAY AT NIGHT (Patient taking differently: Take 20 mg by mouth Daily.) 90 tablet 3   • baclofen (LIORESAL) 10 MG tablet Take 10 mg by mouth 2 (Two) Times a Day.  2   • clonazePAM (KlonoPIN) 0.5 MG tablet TAKE 1 TABLET BY MOUTH 2 (TWO) TIMES A DAY AS NEEDED FOR ANXIETY. 60 tablet 1   • colesevelam (WELCHOL) 625 MG tablet TAKE 2 TABLETS BY MOUTH EVERY  tablet 3   • escitalopram (LEXAPRO) 10 MG tablet Take 1 tablet by mouth Daily. 90 tablet 3   • fluticasone (FLONASE) 50 MCG/ACT nasal spray 2 sprays into the nostril(s) as directed by provider Daily. 3 bottle 3   • Loratadine (CLARITIN PO) Take 10 mg by mouth Daily.     • Magnesium Hydroxide (MILK OF MAGNESIA PO) Take 15 mL by mouth Every Night.     • oxyCODONE-acetaminophen (PERCOCET)  MG per tablet Take 1 tablet by mouth 4 (Four) Times a Day.     • pyridoxine (VITAMIN B-6) 50 MG tablet TAKE 1 TABLET BY MOUTH EVERY DAY 90 tablet 1   • Rimegepant Sulfate 75 MG tablet dispersible Take 75 mg by mouth.     • tamoxifen (NOLVADEX) 20 MG chemo tablet TAKE 1 TABLET BY MOUTH EVERY DAY 90 tablet 1   • TRIUMEQ 600- MG per tablet TAKE 1 TABLET BY MOUTH EVERY DAY 90 tablet 3   • zolpidem (AMBIEN) 10 MG tablet TAKE 1 TABLET AT BEDTIME AS NEEDED FOR SLEEP 30 tablet 2     No current facility-administered medications on file prior to visit.        ALLERGIES:    Allergies   Allergen Reactions   • Augmentin [Amoxicillin-Pot Clavulanate] Hives        Social History     Socioeconomic History   • Marital status:      Spouse name: Owen   • Number of children: 1   • Years of  education: College   • Highest education level: Not on file   Tobacco Use   • Smoking status: Never Smoker   • Smokeless tobacco: Never Used   Substance and Sexual Activity   • Alcohol use: Yes     Comment: occasioonal   • Drug use: No   • Sexual activity: Defer     Partners: Male     Birth control/protection: None     Comment:      Patient does not smoke or do drugs.  She does drink occasionally.    Family History   Problem Relation Age of Onset   • Breast cancer Mother    • Leukemia Mother         CLL?   • Diabetes Mother    • Hyperthyroidism Mother    • Hearing loss Mother    • Dementia Father    • Heart disease Maternal Grandmother    • Diabetes Maternal Grandfather    • Heart disease Maternal Grandfather    • Stroke Maternal Grandfather    • Heart attack Maternal Grandfather    • Osteoporosis Paternal Grandmother    • Heart disease Paternal Grandfather    • Leukemia Maternal Aunt         CLL?   • Throat cancer Paternal Uncle    • Malig Hyperthermia Neg Hx       FAMILY HISTORY:  Her mother had breast cancer at age 60, still alive at 85 and in good health..  Her father had dementia.  Her paternal uncle had prostate cancer.  Her brother had esophageal cancer.    I have reviewed the patient's medical history in detail and updated the computerized patient record.     Review of Systems   Constitutional: Positive for fatigue (04/06/21-Unchanged). Negative for activity change, appetite change, chills, diaphoresis, fever and unexpected weight change.   HENT: Negative for hearing loss, mouth sores, nosebleeds, sore throat and trouble swallowing.    Respiratory: Negative for cough, chest tightness, shortness of breath and wheezing.    Cardiovascular: Negative for chest pain, palpitations and leg swelling.   Gastrointestinal: Negative for abdominal distention, abdominal pain, constipation, diarrhea, nausea and vomiting.   Genitourinary: Positive for flank pain (Pain with urination x3 weeks 04/06/21). Negative for  "difficulty urinating, dysuria, frequency, hematuria and urgency.   Musculoskeletal: Positive for arthralgias (Rt Hip and Rt Knee-04/06/21-Unchanged) and back pain (04/06/21-Unchanged). Negative for joint swelling.        No muscle weakness.   Skin: Negative for rash and wound.   Neurological: Positive for numbness (Bilat foot-04/06/21-Unchanged) and headaches (04/06/21-Unchanged). Negative for dizziness, seizures, syncope, speech difficulty and weakness.   Hematological: Negative for adenopathy. Does not bruise/bleed easily.   Psychiatric/Behavioral: Positive for sleep disturbance (04/06/21-Improved). Negative for behavioral problems, confusion and suicidal ideas.   All other systems reviewed and are negative.  I  I have reviewed and confirmed the accuracy of the ROS as documented by the MA/ERIC/TERESA Moreno MD  I have reviewed and confirmed the accuracy of the ROS as documented by the MA/ERIC/TERESA Moreno MD          Objective    Vitals:    04/06/21 1231   BP: 124/74   Pulse: 80   Resp: 16   Temp: 98 °F (36.7 °C)   TempSrc: Temporal   SpO2: 95%   Weight: 68.7 kg (151 lb 8 oz)   Height: 172.7 cm (67.99\")   PainSc: 0-No pain       Physical exam     CONSTITUTIONAL:  Vital signs reviewed.  No distress, looks comfortable.  EYES:  Conjunctiva and lids unremarkable.  PERRLA  EARS,NOSE,MOUTH,THROAT:  Ears and nose appear unremarkable.  Lips, teeth, gums appear unremarkable.  RESPIRATORY:  Normal respiratory effort.  Lungs clear to auscultation bilaterally.  CARDIOVASCULAR:  Normal S1, S2.  No murmurs rubs or gallops.  No significant lower extremity edema  GASTROINTESTINAL: Abdomen appears unremarkable.  Nontender.  No hepatomegaly.  No splenomegaly.  LYMPHATIC:  No cervical, supraclavicular, axillary lymphadenopathy.  SKIN:  Warm.  No rashes.  PSYCHIATRIC:  Normal judgment and insight.  Normal mood and affect.    I have reexamined the patient and the results are consistent with the previously documented exam. " Ruby Moreno MD       RECENT LABS:   Results from last 7 days   Lab Units 04/06/21  1233 04/01/21  1346   WBC 10*3/mm3 3.88 3.6  3.70   NEUTROS ABS 10*3/mm3 1.87 1.79  1.8   HEMOGLOBIN g/dL 11.9* 11.9*  12.2   HEMATOCRIT % 35.8 35.3  34.9   PLATELETS 10*3/mm3 237 231  247               Tissue Pathology Exam 01/08/21    Final Diagnosis   1. Breast, Left 6:00 o'clock Position, Core Biopsies for Calcifications.               A. Dense fibrous stroma/stromal fibrosis with minute foci of acute inflammation, clustered histiocytes,       associated microcalcifications and giant cells consistent with organizing fat necrosis.               B. Focal periductal mild chronic inflammation.  Mec/kds         MAMMO DIAGNOSTIC LEFT W CAD-12/24/20    IMPRESSION:  Suspicious 0.4 cm of calcifications in the left breast. Further  evaluation with stereotactic core needle biopsy is recommended.     Findings and recommendations were discussed with the patient in person  at the time of her examination.     BI-RADS Category 4: Suspicious         EXAMINATION: BILATERAL BREAST MRI WITHOUT AND WITH CONTRAST 05/06/20  IMPRESSION:  1. There are findings consistent with interval partial response to  neoadjuvant chemotherapy with considerable reduction in left axillary  adenopathy and reduction in size of the index lesion in the left breast  as well as other satellite lesions. Persistent diffuse left breast skin  thickening is noted.  2. There are no findings suspicious for malignancy in the right breast.     BI-RADS Category 6: Known biopsy-proven malignancy.    01/29/20 - US Breast  IMPRESSION:  There are findings suggestive of mild interval partial  response to neoadjuvant chemotherapy.    Assessment/Plan    1. T2N1 invasive ductal carcinoma of the left breast, recently diagnosed.    -Noticed mass in the left breast x5 months  -Diagnostic mammogram showed 3.7 mm mass in the left breast at 4 o'clock position  -Ultrasound-showed 3.8 x 3.1 x  2.5 cm mass at 4 o'clock position with 2 abnormal axillary lymph nodes, larger lymph node being 1.4 cm  -Left breast biopsy and axillary lymph node biopsy October 29, 2019, invasive ductal carcinoma, moderately differentiated, grade 2, ER 85%, SD 10%, HER-2/merna 2+, HER-2 FISH negative  · CT scans from 11/13/19 show some asymmetric nodules along the lateral margin of the glandular tissue.  The 1.2x1.0 cm left axillary nodes, left supraclavicular, and left posterior cervical triangle nodes are suspicious for tamar metastases.  ·   bone scan from 11/15/19 which was negative.   ·  MRI breast from 11/18/19 which shows the biopsy-proven malignancy in the left breast centered at the 4:00 position.  The region of involvement measures up to 7.5 cm.  There is left axillary adenopathy with multiple irregular lymph nodes and loss of differentiation of the border of multiple lymph nodes which are suspicious for extranodal involvement.  ·    echocardiogram from 11/14/19 which was normal with an ejection fraction of 67%.    · INVITAE from 11/14/19 which was negative.  · Given the size of her tumor and her medical history, patient will be given 4 cycles of Adriamycin/Cytoxan with Neulasta.  After completion of this treatment, patient will be started on 12 cycles of Taxol.  After the tumor shrinks in size, Dr. Forman will perform a lumpectomy.    · Following lumpectomy, patient will begin endocrine therapy.  ·  Dr. Dawn agrees chemotherapy will not aggravate her HIV condition.  Dr. Moreno spoke with Dr. Mohan at [facility] who also agrees chemotherapy is the first step.   · 12/06/19: Cycle #1 of Adriamycin/Cytoxan  · US Breast from 01/29/20 after 4 cycles of Adriamycin Cytoxan chemotherapy which shows mild interval partial response to neoadjuvant chemotherapy.  The mass has decreased from 3.8 x 2.5 to 3.1 cm to 3.5 x 2.3 x 3.2 cm previously the left axillary lymph node has decreased from 1.4 x 0.7 x 1 cm to 1.1 x 0.6 x  0.9 cm.  · 01/31/20: Initiated cycle 1 Taxol  · April 24, 2020: Completed cycle 12 Taxol  · MRI breast 5/6/2020 shows significant reduction in the left axillary adenopathy as well as reduction in the index breast lesion.  · S/p left modified radical mastectomy with left axillary dissection.  Patient has 55 x 40 x 30 mm invasive carcinoma, grade 2 with DCIS 36 x 6 mm, high-grade with good margins and 8 out of 11 lymph nodes positive with largest metastasis being 5 mm with extracapsular extension.  And there is lymphatic space invasion  · Will follow-up in 2 weeks at which time we will plan to start endocrine therapy.  She has radiation oncology appointment with Dr. Spivey  · Patient is eligible for Zometa once every 6 months for 2 years.  But given that she is undergoing some work on her teeth we will await starting Zometa until after radiation is complete.  · XRT completed August 17, 2020  · Started letrozole August 2020 but has arthralgias  · October 6, 2020 had severe arthralgias secondary to letrozole.  We will switch to tamoxifen starting November 2020  · Continue tamoxifen as she is tolerating well  · Reviewed recent screening mammogram in December 2020 as well as diagnostic mammogram which showed 0.4 cm suspicious calcification in the lower left which on biopsy is consistent with fat necrosis      2.  Profound neutropenia due to chemotherapy despite Neulasta:   · Following Adriamycin Cytoxan, the patient experienced neutropenia despite Neulasta.  She did receive Zarxio with cycle 4 Adriamycin Cytoxan  · Cycle 2 Taxol delayed 1 week due to neutropenia  · Zarxio x3 days given following Taxol  · White blood cell 8.32, ANC 6.24 today.  The patient is highly anxious going to the hospital for Zarxio injections in light of COVID-19 outbreak.  Discontinue further Zarxio for remaining 2 weeks of Taxol  · Resolved    3.  Moderate thrombocytopenia secondary to chemotherapy.    · Platelet count has now normalized  following Taxol 274,000.  Resolved    4.  Chemotherapy induced anemia.   · Anemia work-up previously negative  · Hemoglobin is improved today at 11.1  · Hemoglobin improved 11.9  · Hemoglobin improved to 12.4 as of December 1, 2020  · January 28, 2020: Hemoglobin 12.1, white count 4.56, platelet 222      5.  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  · On 12/13/2019 patient struggled with significant insomnia from steroids following her first cycle AC.  We discussed taking the dexamethasone, only one 4 mg tablet daily for 3 days with her next cycle of chemotherapy to see if this improves her symptoms.    · Insomnia much improved with decreased dose of dexamethasone  · Insomnia is now resolved with Taxol.  Not on steroids and resolved  · Resolved    6. HIV, followed by Dr. Dawn in infectious disease.  Well-controlled and is on a single medication with very low viral load. Has HIV since age 34.  · Reviewed her HIV medications and she is compliant  · Stable     7.  Family history of breast cancer with mother having had breast cancer at age 60.  There is family history of uncle with prostate cancer.  Patient will require genetic referral    8.  Arthritis with severe back pain followed by Dr. Bam Crandall  · Percocet    9. Episodes of nosebleeds.  She has had these once a week for 3-4 years.  She did not report this today.    10.  Otalgia: Of the right ear, present for a while.  Dr. Moreno would like the patient to follow-up with ENT, Dr. Stapleton, as she has previously seen him.  Not a complaint today.    12.  Arthralgias for a few days after each treatment, which resolve on their own.  · Patient was on aromatase inhibitor which had caused her significant arthralgias and hence letrozole was discontinued and placed on tamoxifen    13.  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  Currently on B6,  50 mg daily.  We will have to continue to closely monitor.  This is stable.  No dose adjustments Taxol      14.  Vaginal dryness but no vaginal discharge    PLAN:  1. We will give Zometa cycle 2 today  2. Patient to continue calcium 600 mg twice a day and vitamin D3  3. Continue tamoxifen  4. Discussed about consideration of Effexor for hot flashes but it is not very significant and more ever patient has been on Lexapro for her anxiety which actually helps her.    5. Follow-up with me in 3 months with labs  6. Next mammogram due in December 2021 and bone density due in May 2022    Ruby Moreno MD      Cc: ОЛЬГА Godoy MD Nathan Bullington, MD

## 2021-04-07 LAB
HIV1 RNA # SERPL NAA+PROBE: 30 COPIES/ML
HIV1 RNA SERPL NAA+PROBE-LOG#: 1.48 LOG10COPY/ML

## 2021-04-12 RX ORDER — PYRIDOXINE HCL (VITAMIN B6) 50 MG
TABLET ORAL
Qty: 90 TABLET | Refills: 1 | Status: SHIPPED | OUTPATIENT
Start: 2021-04-12 | End: 2021-06-29 | Stop reason: SDUPTHER

## 2021-04-13 ENCOUNTER — APPOINTMENT (OUTPATIENT)
Dept: PHYSICAL THERAPY | Facility: HOSPITAL | Age: 61
End: 2021-04-13

## 2021-04-20 ENCOUNTER — APPOINTMENT (OUTPATIENT)
Dept: PHYSICAL THERAPY | Facility: HOSPITAL | Age: 61
End: 2021-04-20

## 2021-04-27 ENCOUNTER — APPOINTMENT (OUTPATIENT)
Dept: PHYSICAL THERAPY | Facility: HOSPITAL | Age: 61
End: 2021-04-27

## 2021-05-05 RX ORDER — TAMOXIFEN CITRATE 20 MG/1
TABLET ORAL
Qty: 90 TABLET | Refills: 1 | Status: SHIPPED | OUTPATIENT
Start: 2021-05-05 | End: 2021-08-26 | Stop reason: SDDI

## 2021-05-18 ENCOUNTER — INFUSION (OUTPATIENT)
Dept: ONCOLOGY | Facility: HOSPITAL | Age: 61
End: 2021-05-18

## 2021-05-18 DIAGNOSIS — Z13.1 SCREENING FOR DIABETES MELLITUS: Primary | ICD-10-CM

## 2021-05-18 PROCEDURE — 96523 IRRIG DRUG DELIVERY DEVICE: CPT

## 2021-05-18 PROCEDURE — 25010000002 HEPARIN LOCK FLUSH PER 10 UNITS: Performed by: INTERNAL MEDICINE

## 2021-05-18 RX ORDER — HEPARIN SODIUM (PORCINE) LOCK FLUSH IV SOLN 100 UNIT/ML 100 UNIT/ML
300 SOLUTION INTRAVENOUS ONCE
Status: CANCELLED | OUTPATIENT
Start: 2021-05-18

## 2021-05-18 RX ORDER — HEPARIN SODIUM (PORCINE) LOCK FLUSH IV SOLN 100 UNIT/ML 100 UNIT/ML
500 SOLUTION INTRAVENOUS AS NEEDED
Status: CANCELLED | OUTPATIENT
Start: 2021-05-18

## 2021-05-18 RX ORDER — SODIUM CHLORIDE 0.9 % (FLUSH) 0.9 %
10 SYRINGE (ML) INJECTION AS NEEDED
Status: CANCELLED | OUTPATIENT
Start: 2021-05-18

## 2021-05-18 RX ORDER — SODIUM CHLORIDE 0.9 % (FLUSH) 0.9 %
10 SYRINGE (ML) INJECTION AS NEEDED
Status: DISCONTINUED | OUTPATIENT
Start: 2021-05-18 | End: 2021-05-18 | Stop reason: HOSPADM

## 2021-05-18 RX ORDER — HEPARIN SODIUM (PORCINE) LOCK FLUSH IV SOLN 100 UNIT/ML 100 UNIT/ML
500 SOLUTION INTRAVENOUS AS NEEDED
Status: DISCONTINUED | OUTPATIENT
Start: 2021-05-18 | End: 2021-05-18 | Stop reason: HOSPADM

## 2021-05-18 RX ORDER — SODIUM CHLORIDE 0.9 % (FLUSH) 0.9 %
20 SYRINGE (ML) INJECTION AS NEEDED
Status: CANCELLED | OUTPATIENT
Start: 2021-05-18

## 2021-05-18 RX ADMIN — SODIUM CHLORIDE, PRESERVATIVE FREE 10 ML: 5 INJECTION INTRAVENOUS at 14:02

## 2021-05-18 RX ADMIN — Medication 500 UNITS: at 14:02

## 2021-05-27 RX ORDER — ATORVASTATIN CALCIUM 20 MG/1
20 TABLET, FILM COATED ORAL
Qty: 90 TABLET | Refills: 3 | Status: SHIPPED | OUTPATIENT
Start: 2021-05-27 | End: 2022-04-11

## 2021-06-09 ENCOUNTER — OFFICE VISIT (OUTPATIENT)
Dept: FAMILY MEDICINE CLINIC | Facility: CLINIC | Age: 61
End: 2021-06-09

## 2021-06-09 VITALS
HEIGHT: 68 IN | TEMPERATURE: 98 F | HEART RATE: 65 BPM | DIASTOLIC BLOOD PRESSURE: 78 MMHG | OXYGEN SATURATION: 97 % | BODY MASS INDEX: 22.82 KG/M2 | WEIGHT: 150.6 LBS | SYSTOLIC BLOOD PRESSURE: 130 MMHG

## 2021-06-09 DIAGNOSIS — G47.00 INSOMNIA, UNSPECIFIED TYPE: ICD-10-CM

## 2021-06-09 DIAGNOSIS — M25.551 RIGHT HIP PAIN: ICD-10-CM

## 2021-06-09 DIAGNOSIS — Z79.899 HIGH RISK MEDICATION USE: Primary | ICD-10-CM

## 2021-06-09 PROCEDURE — 99214 OFFICE O/P EST MOD 30 MIN: CPT | Performed by: NURSE PRACTITIONER

## 2021-06-09 RX ORDER — ZOLPIDEM TARTRATE 10 MG/1
10 TABLET ORAL NIGHTLY
Qty: 90 TABLET | Refills: 0 | Status: SHIPPED | OUTPATIENT
Start: 2021-06-09 | End: 2021-09-09 | Stop reason: SDUPTHER

## 2021-06-09 NOTE — PROGRESS NOTES
Chief Complaint  Insomnia (Patient is here for 3 month f/u on ambien needing refill needs drug screen ( wore mask and goggles) ) and Hip Pain (Patient is having right hip pain )    Subjective          Richa Dolan presents to [facility]  History of Present Illness  Richa Dolan 60 y.o. female presents for follow up of insomnia with onset of symptoms years ago. Patient describes symptoms as early morning awakening and frequent night time awakening. Patient has found complete relief with Ambien (Zolpidem). Associated symptoms include: fatigue if unable to take Rx . Patient denies daytime somnolence Symptoms have stabilized.  The patient has failed multiple OTC medications for insomnia.  They are well controlled on current Rx and will continue to try to take Rx PRN.  She will use the lowest effective dose.  The patient has read and signed the [facility] Controlled Substance Contract.  I will continue to see patient for regular follow up appointments and be prescribed the lowest effective dose.  MAGY has been reviewed by me and is updated every 3 months. The patient is aware of the potential for addiction and dependence.  She denies that Ambien (Zolpidem) causes excessive daytime drowsiness and sleep walking.  Patient voices understanding to take Ambien (Zolpidem) and go straight to bed. Patient must be able to sleep 7 hours or more when taking this.  Patient will hold Rx and contact me if they experience any impaired mental alertness the next day.      Right hip pain- for over 1 year.  Did see pain management about this and had hip injection which did not work very well then she went back for another hip injection but they never called her to schedule it.  The pain is constant at this point. PS 7 causes her to limp and has decreased ROM  Both her parents had a replacement so she is concerned that this is what is going to be happening to her. She has done PT as well without  "much help      Objective   Vital Signs:   /78   Pulse 65   Temp 98 °F (36.7 °C)   Ht 172.7 cm (67.99\")   Wt 68.3 kg (150 lb 9.6 oz)   SpO2 97%   BMI 22.90 kg/m²     Physical Exam  Vitals reviewed.   Constitutional:       General: She is not in acute distress.     Appearance: She is well-developed.   HENT:      Head: Normocephalic.   Cardiovascular:      Rate and Rhythm: Normal rate and regular rhythm.      Heart sounds: Normal heart sounds.   Pulmonary:      Effort: Pulmonary effort is normal.      Breath sounds: Normal breath sounds.   Neurological:      Mental Status: She is alert and oriented to person, place, and time.      Gait: Gait normal.   Psychiatric:         Behavior: Behavior normal.         Thought Content: Thought content normal.         Judgment: Judgment normal.        Result Review :     Xray- hip    Findings- normal  No comparison               Assessment and Plan    Diagnoses and all orders for this visit:    1. High risk medication use (Primary)  -     ToxASSURE Select 13 (MW) - Urine, Clean Catch    2. Insomnia, unspecified type  -     zolpidem (AMBIEN) 10 MG tablet; Take 1 tablet by mouth Every Night. for sleep  Dispense: 90 tablet; Refill: 0    3. Right hip pain  -     XR Hip With or Without Pelvis 2 - 3 View Right  -     MRI Hip Right Without Contrast        Follow Up   No follow-ups on file.  Patient was given instructions and counseling regarding her condition or for health maintenance advice. Please see specific information pulled into the AVS if appropriate.     Cont same with meds  rto n 3 mons   Follow up after mrI    Mask and googles worn    "

## 2021-06-14 LAB — DRUGS UR: NORMAL

## 2021-06-28 NOTE — PROGRESS NOTES
Subjective     REASON FOR FOLLOW UP:    1.  T2N1 invasive ductal carcinoma of the left breast.  Ultrasound-showed 3.8 x 3.1 x 2.5 cm mass at 4 o'clock position with 2 abnormal axillary lymph nodes, larger lymph node being 1.4 cm.  Left breast biopsy and axillary lymph node biopsy October 29, 2019, invasive ductal carcinoma, moderately differentiated, grade 2, ER 85%, SC 10%, HER-2/merna 2+, HER-2 FISH negative.  · 12/06/19: Neoadjuvant chemo therapy, cycle #1 of dose-dense Adriamycin/Cytoxan with Neulasta for marrow support    · 01/17/20: Last cycle of Adriamycin/Cytoxan given  · January 31, 2020: Cycle 1 Taxol  · April 24, 2020: Last cycle, cycle 12 of Taxol  · Patient is s/p left mastectomy with axillary dissection with reconstruction.  She is healing up nicely.  She has a drain in place.  Pathology showed invasive breast cancer which is 55 x 40 mm in size, grade 2 with focal lymphovascular space invasion with DCIS spanning over 36 x 6 mm.  All margins were negative for cancer.  · 8 of the additional lymph nodes out of 11 were positive with the largest micrometastasis measuring 5 mm.  Extranodal extension was seen.  · Biomarkers on post neoadjuvant tumor is ER 81-90% SC 5% HER-2/merna 2+ by immunohistochemistry and HER-2/merna negative by FISH.  · Radiation July 6, 2020-August 17, 2020.  · Letrozole initiated July 2020.  · Letrozole discontinued October 2020 secondary to severe joint pain.  · October 30, 2020 patient starting tamoxifen and tolerating well    2.  HIV, followed by Dr. Dawn from infectious disease.  Well controlled on meds    3.  Screening mammogram December 7, 2020 showed indeterminate calcifications lower outer middle left breast. Diagnostic mammogram showed 0.4 cm calcifications in the left breast.  Stereotactic biopsy January 8, 2021 consistent with organizing fat necrosis.              HISTORY OF PRESENT ILLNESS:  The patient is a 60 y.o. year old female with the above medical history who  returns today for 3-month follow-up, continuing on tamoxifen.  Patient also receiving Zometa every 6-month as she is postmenopausal and had multiple lymph nodes positive.  This was last given 4/6/2021.    As she is reviewed back today, she is overall continuing to tolerate tamoxifen well.  Her main issue continues to be vaginal dryness.  She is asking what she can do to help with this.    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  She continues on vitamin B6, requesting a refill today.    Otherwise patient denies any new concerns today.    Past Medical History:   Diagnosis Date   • Anxiety    • Cerebrovascular accident (CVA) (CMS/Formerly [facility]) 8/22/2017    NO RESIDUAL AFFECT   • DDD (degenerative disc disease), cervical    • Depression    • Drug therapy    • Elevated cholesterol    • GERD (gastroesophageal reflux disease)    • H/O ICH (intracerebral hemorrhage) (CMS/Formerly [facility])    • History of chemotherapy    • History of stroke    • History of thrombocytopenia    • HIV (human immunodeficiency virus infection) (CMS/Formerly [facility]) 1996   • Hx of radiation therapy    • Hyperlipidemia    • Insomnia    • Lupus anticoagulant positive    • Malignant neoplasm of overlapping sites of left breast in female, estrogen receptor positive (CMS/Formerly [facility]) 11/12/2019   • Meniscus tear 2017    RIGHT   • Migraine    • Neck pain     WITH SHOOTING PAIN LEFT RIGHT ARM   • Osteoarthritis    • Osteoporosis    • Pain management     sees 1 every 3 months for scripts/injection/pain meds   • Seasonal allergies    • Spinal headache     THINKS HAD BLOOD PATCH AFTER EPIDUAL    • Tick bite 06/2014     PAST MEDICAL HISTORY:  She had a stroke in July 2017 with headache and dizziness.  She went to the ER and was placed on aspirin.  However, she stopped taking it two weeks ago.  She has been taking Aimovig (injection) monthly with Fariba López at [place].    In 1994, patient was diagnosed with HIV with an unknown cause which  is managed by Dr. Natali Subramanian.  As of now, her viral load is good.    Patient has arthritis.  She gets trigger-point injections in her back with her last one being 2 weeks ago.  She has had multiple ablations.  She also has pain in her SI joint and Dr. Bermudez performed a surgery on this.  She takes Narco for the pain.      She is osteoporotic.  She has had multiple hairline fractures in both her legs.  She had her knees cleaned out by Dr. Sinha.     Patient has had a hysterectomy and still has both of her ovaries.    Patient has vertigo and the muscles in her left ear are weak.  She just has an imbalance.    Past Surgical History:   Procedure Laterality Date   • ADENOIDECTOMY     • BREAST LUMPECTOMY     • BREAST LUMPECTOMY WITH SENTINEL NODE BIOPSY Left 5/28/2020    Procedure: BREAST LUMPECTOMY WITH SENTINEL NODE BIOPSY AND NEEDLE LOCALIZATION AND axillary dissection;  Surgeon: Landen Forman MD;  Location: [facility];  Service: General;  Laterality: Left;   • BREAST SURGERY Left 5/28/2020    Procedure: LEFT LATERAL INTERCOASTAL ARTERY  flap ;  Surgeon: Yusuf Garcia MD;  Location: [facility];  Service: Plastics;  Laterality: Left;   • CHOLECYSTECTOMY     • HYSTERECTOMY     • KNEE ARTHROSCOPY Right 3/24/2017    Procedure:  RIGHT  KNEE ARTHROSCOPY WITH DEBRIDEMENT CHONDROPLASTY PARTIAL MENISCECTOMY;  Surgeon: Karl Galeas MD;  Location: [facility];  Service:    • KNEE CARTILAGE SURGERY Right 2007   • TONSILLECTOMY     • US GUIDED LYMPH NODE BIOPSY  10/29/2019    LEFT BREAST   • VENOUS ACCESS DEVICE (PORT) INSERTION Right 12/2/2019    Procedure: INSERTION VENOUS ACCESS DEVICE RIGHT;  Surgeon: Landen Forman MD;  Location: [facility];  Service: General       ONCOLOGIC HISTORY:  Patient is a 59-year-old female who has had a history of HIV since age 34 followed by Dr. Natali Ng at [facility] and was on multiple HIV drugs initially but more recently  has been on a single medication TRIUMEQ, with well-controlled HIV.  She follows up with Dr. Dawn , infectious disease who saw her recently and he saw her on 4/8/2019 and has excellent control and suppression of her viral load.  She is very compliant with her medications.    She also has had history of stroke in 2017 for which she was placed on aspirin.  She presented with a headache.  She is very active and works at [employer] full-time.  She also has had history of vertigo in the past  Patient's PCP is Zach Lora.    Patient first noticed the mass in her left breast 5 months ago.  Her last mammogram was 5 years ago.  She had soreness in the left breast and she went to her primary care physician who then ordered a mammogram diagnostic and an ultrasound.  The diagnostic mammogram showed a 3.7 cm mass at 4 o'clock position in the lower outer quadrant of the left breast.  The ultrasound showed 3.8 cm x 3.1 x 2.5 cm mass at 4 o'clock position in the left breast with 2 abnormal appearing left axillary lymph nodes the largest being 1.4 cm.    She then underwent biopsy of both the breast mass as well as the left axillary lymph node and both of them are positive for invasive mammary carcinoma it is invasive ductal carcinoma with apocrine features, moderately differentiated, grade 2 of 3 with a Glendora score of 5.  The left axillary lymph node is also consistent with metastatic mammary carcinoma.  It is ER positive MT positive HER-2/merna negative.  Details as follows    10/21/19 - Bilateral Diagnostic Mammogram and US Breast Left  FINDINGS: Bilateral digital CC and MLO mammographic images were  obtained. No prior examination is available for comparison. Scattered  fibroglandular densities are seen throughout both breasts. A triangular  skin marker represents the area of palpable concern in the lower outer  quadrant of the left breast in the posterior 1/3. At this location there  is an irregular mass that  measures on the order of 3.7 cm in greatest  dimension. Internal calcifications are noted. No suspicious findings in  the right breast are appreciated.     ULTRASOUND: Targeted sonographic evaluation of the left breast was  performed through the area of concern in the left axilla. At the 4  o'clock position on the order of 6 cm from the nipple there is an  irregular hypoechoic mass that measures 3.8 x 3.1 x 2.5 cm. Internal  vascularity is noted.     There are 2 abnormal-appearing left axillary lymph nodes, the largest  which measures on the order of 1.4 cm in greatest dimension.     IMPRESSION:  1. There is a 3.8 cm irregular mass in the left breast at the 4 o'clock  position. This is seen in conjunction with an irregular 1.4 cm lymph  node in the left axilla. This is suspicious for malignancy with left  axillary involvement. Correlation with ultrasound-guided biopsy of both  the left breast mass and the lymph node is recommended.  2. There are no findings suspicious for malignancy in the right breast.     BI-RADS CATEGORY 5:     Patient's labs from 11/12/19 are normal.    10/29/19 - Tissue Pathology  1. Left Breast, 4:00, 6 cm FN, U/S-Guided Core Needle Biopsy for a Mass:  A. INVASIVE DUCTAL CARCINOMA WITH APOCRINE FEATURES, Moderately differentiated;  Jen Histologic Grade II/III (tubule score = 3, nuclear score = 2, mitoses score = 1), measuring at least  9 mm.  B. No ductal or lobular carcinoma in situ is identified in this sample.  C. Focus suspicious for lymphovascular space invasion.  D. See Biomarker Template for hormone receptor studies.  2. Lymph Node, Left Axilla, U/S-Guided Core Needle Biopsy:  A. METASTATIC MAMMARY CARCINOMA (see Comment).  B. Metastatic focus measures 5 mm in greatest extent.  ER+, 85%  IN+, 10%  HER2- Score 2+    11/13/19 - CT Neck Chest Abdomen Pelvis  IMPRESSION:  1. In addition to the irregular approximately 3.8 cm mass within the  lateral aspect of the left breast, there  are a few subcentimeter  asymmetric nodules along the lateral margin of the glandular tissue. The  several asymmetric left subpectoral nodes and 1.2 x 1.0 cm left axillary  node are suspicious for tamar metastases. The asymmetric subcentimeter  left supraclavicular and left posterior cervical triangle nodes are  worrisome as well.  2. There is no convincing evidence for metastatic disease within the  abdomen or pelvis.    11/14/19 - Echocardiogram:  Normal.  Calculated EF = 67%.  There is trace mitral valve and tricuspid valve regurgitation.    11/15/19 - Bone Scan  Negative.    11/18/19 - MRI Breast Bilateral  IMPRESSION:  1. Biopsy-proven malignancy in the left breast centered at the 4 o'clock  position with associated surrounding satellite nodules as described. The  entire region of involvement including the satellite nodules and the  dominant mass measure up to 7.5 cm in greatest dimension. Also, left  axillary adenopathy with multiple irregular lymph nodes and loss of  differentiation of the borders of multiple lymph nodes is noted and this  is suspicious for extranodal involvement.  2. There are no findings suspicious for malignancy in the right breast.  BI-RADS CATEGORY 6      12/06/19: Cycle #1 of Adriamycin/Cytoxan    01/17/20: Last cycle of Adriamycin/Cytoxan given without Neulasta.  We will switch from Neulasta to 7 days of Neupogen injections after cycle 4.    We reviewed with patient the US Breast from 01/29/20 which shows mild interval partial response to neoadjuvant chemotherapy.      01/31/20: Initiated cycle 1 Taxol  April 24, 2020: Last cycle of Taxol, cycle 12    S/p left mastectomy with axillary dissection   Pathology showed invasive breast cancer which is 55 x 40 mm in size, grade 2 with focal lymphovascular space invasion with DCIS spanning over 36 x 6 mm.  All margins were negative for cancer.  8 of the additional lymph nodes out of 11 were positive with the largest micrometastasis measuring 5  mm.  Extranodal extension was seen.    Biomarkers on post neoadjuvant tumor is ER 81-90% NH 5% HER-2/merna 2+ by immunohistochemistry and HER-2/merna negative by FISH.    2020: Started letrozole but because of severe joint pains was discontinued 2020    End of 2020 started tamoxifen    OB-GYN:  Menarche 15 years  Menopause-46  Pregnancies- 1 para 1 no miscarriages her first pregnancy was in  at 29 years of age.  She did not breastfeed.  Post menopausal HRT- None.  Hx of birth control pills- Yes.    Current Outpatient Medications on File Prior to Visit   Medication Sig Dispense Refill   • AIMOVIG 140 MG/ML solution auto-injector INJECT 140 MG INTO THE SKIN EVERY 30 (THIRTY) DAYS.  11   • atorvastatin (LIPITOR) 20 MG tablet Take 1 tablet by mouth every night at bedtime. 90 tablet 3   • baclofen (LIORESAL) 10 MG tablet Take 10 mg by mouth 2 (Two) Times a Day.  2   • clonazePAM (KlonoPIN) 0.5 MG tablet TAKE 1 TABLET BY MOUTH 2 (TWO) TIMES A DAY AS NEEDED FOR ANXIETY. 60 tablet 1   • colesevelam (WELCHOL) 625 MG tablet TAKE 2 TABLETS BY MOUTH EVERY  tablet 3   • escitalopram (LEXAPRO) 10 MG tablet Take 1 tablet by mouth Daily. 90 tablet 3   • fluticasone (FLONASE) 50 MCG/ACT nasal spray 2 sprays into the nostril(s) as directed by provider Daily. 3 bottle 3   • Loratadine (CLARITIN PO) Take 10 mg by mouth Daily.     • Magnesium Hydroxide (MILK OF MAGNESIA PO) Take 15 mL by mouth Every Night.     • oxyCODONE-acetaminophen (PERCOCET)  MG per tablet Take 1 tablet by mouth 4 (Four) Times a Day.     • Rimegepant Sulfate 75 MG tablet dispersible Take 75 mg by mouth.     • tamoxifen (NOLVADEX) 20 MG chemo tablet TAKE 1 TABLET BY MOUTH EVERY DAY 90 tablet 1   • TRIUMEQ 600- MG per tablet TAKE 1 TABLET BY MOUTH EVERY DAY 90 tablet 3   • zolpidem (AMBIEN) 10 MG tablet Take 1 tablet by mouth Every Night. for sleep 90 tablet 0   • [DISCONTINUED] pyridoxine (VITAMIN B-6) 50 MG tablet TAKE  1 TABLET BY MOUTH EVERY DAY 90 tablet 1     Current Facility-Administered Medications on File Prior to Visit   Medication Dose Route Frequency Provider Last Rate Last Admin   • [DISCONTINUED] heparin injection 500 Units  500 Units Intravenous PRN Ruby Moreno MD   500 Units at 06/29/21 1209   • [DISCONTINUED] sodium chloride 0.9 % flush 10 mL  10 mL Intravenous PRN Ruby Moreno MD   10 mL at 06/29/21 1208        ALLERGIES:    Allergies   Allergen Reactions   • Augmentin [Amoxicillin-Pot Clavulanate] Hives        Social History     Socioeconomic History   • Marital status:      Spouse name: Owen   • Number of children: 1   • Years of education: College   • Highest education level: Not on file   Tobacco Use   • Smoking status: Never Smoker   • Smokeless tobacco: Never Used   Substance and Sexual Activity   • Alcohol use: Yes     Comment: occasioonal   • Drug use: No   • Sexual activity: Defer     Partners: Male     Birth control/protection: None     Comment:      Patient does not smoke or do drugs.  She does drink occasionally.    Family History   Problem Relation Age of Onset   • Breast cancer Mother    • Leukemia Mother         CLL?   • Diabetes Mother    • Hyperthyroidism Mother    • Hearing loss Mother    • Dementia Father    • Heart disease Maternal Grandmother    • Diabetes Maternal Grandfather    • Heart disease Maternal Grandfather    • Stroke Maternal Grandfather    • Heart attack Maternal Grandfather    • Osteoporosis Paternal Grandmother    • Heart disease Paternal Grandfather    • Leukemia Maternal Aunt         CLL?   • Throat cancer Paternal Uncle    • Malig Hyperthermia Neg Hx       FAMILY HISTORY:  Her mother had breast cancer at age 60, still alive at 85 and in good health..  Her father had dementia.  Her paternal uncle had prostate cancer.  Her brother had esophageal cancer.    I have reviewed the patient's medical history in detail and updated the computerized patient record.  "    Review of Systems   Constitutional: Negative for activity change, appetite change, chills, diaphoresis, fatigue, fever and unexpected weight change.   HENT: Negative for hearing loss, mouth sores, nosebleeds, sore throat and trouble swallowing.    Respiratory: Negative for cough, chest tightness, shortness of breath and wheezing.    Cardiovascular: Negative for chest pain, palpitations and leg swelling.   Gastrointestinal: Negative for abdominal distention, abdominal pain, constipation, diarrhea, nausea and vomiting.   Genitourinary: Negative for difficulty urinating, dysuria, flank pain, frequency, hematuria and urgency.   Musculoskeletal: Positive for arthralgias (Rt Hip - same). Negative for back pain and joint swelling.        No muscle weakness.   Skin: Negative for rash and wound.   Neurological: Positive for numbness (Bilat foot - stable). Negative for dizziness, seizures, syncope, speech difficulty, weakness and headaches.   Hematological: Negative for adenopathy. Does not bruise/bleed easily.   Psychiatric/Behavioral: Negative for behavioral problems, confusion, sleep disturbance and suicidal ideas.   All other systems reviewed and are negative.    I have reviewed and updated the accuracy of the ROS as documented  Nikki Walters, APRN      Objective    Vitals:    06/29/21 1221   BP: 123/82   Pulse: 64   Resp: 16   Temp: 98.2 °F (36.8 °C)   TempSrc: Temporal   SpO2: 97%   Weight: 68.5 kg (151 lb)   Height: 172.7 cm (67.99\")   PainSc: 0-No pain       PHYSICAL EXAM:  CONSTITUTIONAL:  Vital signs reviewed.  No distress, looks comfortable.  EYES:  Conjunctiva and lids unremarkable.  PERRLA  EARS,NOSE,MOUTH,THROAT:  Ears and nose appear unremarkable.  Lips, teeth, gums appear unremarkable.  RESPIRATORY:  Normal respiratory effort.  Lungs clear to auscultation bilaterally.  BREASTS: Right breast benign; left breast status post lumpectomy with chronic area of fatty necrosis, unchanged.  CARDIOVASCULAR:  " Normal S1, S2.  No murmurs rubs or gallops.  No significant lower extremity edema  GASTROINTESTINAL: Abdomen appears unremarkable.  Nontender.  No hepatomegaly.  No splenomegaly.  LYMPHATIC:  No cervical, supraclavicular, axillary lymphadenopathy.  SKIN:  Warm.  No rashes.  PSYCHIATRIC:  Normal judgment and insight.  Normal mood and affect.    I have reexamined the patient and the results are consistent with the previously documented exam. ОЛЬГА Sher       RECENT LABS:   Results from last 7 days   Lab Units 06/29/21  1200   WBC 10*3/mm3 4.51   NEUTROS ABS 10*3/mm3 2.35   HEMOGLOBIN g/dL 12.5   HEMATOCRIT % 37.7   PLATELETS 10*3/mm3 237               Tissue Pathology Exam 01/08/21    Final Diagnosis   1. Breast, Left 6:00 o'clock Position, Core Biopsies for Calcifications.               A. Dense fibrous stroma/stromal fibrosis with minute foci of acute inflammation, clustered histiocytes,       associated microcalcifications and giant cells consistent with organizing fat necrosis.               B. Focal periductal mild chronic inflammation.  Mec/kds         MAMMO DIAGNOSTIC LEFT W CAD-12/24/20    IMPRESSION:  Suspicious 0.4 cm of calcifications in the left breast. Further  evaluation with stereotactic core needle biopsy is recommended.     Findings and recommendations were discussed with the patient in person  at the time of her examination.     BI-RADS Category 4: Suspicious         EXAMINATION: BILATERAL BREAST MRI WITHOUT AND WITH CONTRAST 05/06/20  IMPRESSION:  1. There are findings consistent with interval partial response to  neoadjuvant chemotherapy with considerable reduction in left axillary  adenopathy and reduction in size of the index lesion in the left breast  as well as other satellite lesions. Persistent diffuse left breast skin  thickening is noted.  2. There are no findings suspicious for malignancy in the right breast.     BI-RADS Category 6: Known biopsy-proven  malignancy.    01/29/20 - US Breast  IMPRESSION:  There are findings suggestive of mild interval partial  response to neoadjuvant chemotherapy.    Assessment/Plan    1. T2N1 invasive ductal carcinoma of the left breast, recently diagnosed.    -Noticed mass in the left breast x5 months  -Diagnostic mammogram showed 3.7 mm mass in the left breast at 4 o'clock position  -Ultrasound-showed 3.8 x 3.1 x 2.5 cm mass at 4 o'clock position with 2 abnormal axillary lymph nodes, larger lymph node being 1.4 cm  -Left breast biopsy and axillary lymph node biopsy October 29, 2019, invasive ductal carcinoma, moderately differentiated, grade 2, ER 85%, SD 10%, HER-2/merna 2+, HER-2 FISH negative  · CT scans from 11/13/19 show some asymmetric nodules along the lateral margin of the glandular tissue.  The 1.2x1.0 cm left axillary nodes, left supraclavicular, and left posterior cervical triangle nodes are suspicious for tamar metastases.  ·   bone scan from 11/15/19 which was negative.   ·  MRI breast from 11/18/19 which shows the biopsy-proven malignancy in the left breast centered at the 4:00 position.  The region of involvement measures up to 7.5 cm.  There is left axillary adenopathy with multiple irregular lymph nodes and loss of differentiation of the border of multiple lymph nodes which are suspicious for extranodal involvement.  ·    echocardiogram from 11/14/19 which was normal with an ejection fraction of 67%.    · INVITAE from 11/14/19 which was negative.  · Given the size of her tumor and her medical history, patient will be given 4 cycles of Adriamycin/Cytoxan with Neulasta.  After completion of this treatment, patient will be started on 12 cycles of Taxol.  After the tumor shrinks in size, Dr. Forman will perform a lumpectomy.    · Following lumpectomy, patient will begin endocrine therapy.  · Dr. Dawn agrees chemotherapy will not aggravate her HIV condition.  Dr. Moreon spoke with Dr. Mohan at [place] who  also agrees chemotherapy is the first step.   · 12/06/19: Cycle #1 of Adriamycin/Cytoxan  · US Breast from 01/29/20 after 4 cycles of Adriamycin Cytoxan chemotherapy which shows mild interval partial response to neoadjuvant chemotherapy.  The mass has decreased from 3.8 x 2.5 to 3.1 cm to 3.5 x 2.3 x 3.2 cm previously the left axillary lymph node has decreased from 1.4 x 0.7 x 1 cm to 1.1 x 0.6 x 0.9 cm.  · 01/31/20: Initiated cycle 1 Taxol  · April 24, 2020: Completed cycle 12 Taxol  · MRI breast 5/6/2020 shows significant reduction in the left axillary adenopathy as well as reduction in the index breast lesion.  · S/p left modified radical mastectomy with left axillary dissection.  Patient has 55 x 40 x 30 mm invasive carcinoma, grade 2 with DCIS 36 x 6 mm, high-grade with good margins and 8 out of 11 lymph nodes positive with largest metastasis being 5 mm with extracapsular extension.  And there is lymphatic space invasion  · Will follow-up in 2 weeks at which time we will plan to start endocrine therapy.  She has radiation oncology appointment with Dr. Spivey  · Patient is eligible for Zometa once every 6 months for 2 years.  But given that she is undergoing some work on her teeth we will await starting Zometa until after radiation is complete.  · XRT completed August 17, 2020  · Started letrozole August 2020 but has arthralgias  · October 6, 2020 had severe arthralgias secondary to letrozole.  We will switch to tamoxifen starting November 2020  · 1/8/2021 screening mammogram in December 2020 as well as diagnostic mammogram which showed 0.4 cm suspicious calcification in the lower left which on biopsy is consistent with fat necrosis.  · Continue tamoxifen, tolerating well overall.    2. HIV, followed by Dr. Dawn in infectious disease.  Well-controlled and is on a single medication with very low viral load. Has HIV since age 34.  · Reviewed her HIV medications and she is compliant  · Stable     3.  Family  history of breast cancer with mother having had breast cancer at age 60.  There is family history of uncle with prostate cancer.  Patient will require genetic referral    4.  Arthritis with severe back pain followed by Dr. Bam Crandall  · Continue Percocet    5. Episodes of nosebleeds.  She has had these once a week for 3-4 years. She did not report this today.    6.  Vaginal dryness related to tamoxifen, ongoing.  Patient provided handouts for recommendations of a vaginal lubricant and vaginal moisturizer to use for hopeful improvement.    7.  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  Patient continues on B6.    PLAN:  1. Continue tamoxifen.  2. Vitamin B6 prescription refilled per patient request.  3. Patient to obtain vaginal lubricant and/or moisturizer to help with dryness as outlined above.  4. Return in 6 weeks for Mediport flush.  5. Return in 3 months for follow-up with Dr. Moreno and next dose of Zometa.  Plan to give a total of 2 years of therapy which will be through 10/2022  6. Continue calcium 600 mg twice a day and vitamin D3  7. Next mammogram due in December 2021 and bone density due in May 2022    ОЛЬГА Sher      Cc: Dr. Landen Lora, MD Denilson Shi MD

## 2021-06-29 ENCOUNTER — OFFICE VISIT (OUTPATIENT)
Dept: ONCOLOGY | Facility: CLINIC | Age: 61
End: 2021-06-29

## 2021-06-29 ENCOUNTER — LAB (OUTPATIENT)
Dept: ONCOLOGY | Facility: HOSPITAL | Age: 61
End: 2021-06-29

## 2021-06-29 VITALS
BODY MASS INDEX: 22.88 KG/M2 | TEMPERATURE: 98.2 F | WEIGHT: 151 LBS | SYSTOLIC BLOOD PRESSURE: 123 MMHG | DIASTOLIC BLOOD PRESSURE: 82 MMHG | OXYGEN SATURATION: 97 % | RESPIRATION RATE: 16 BRPM | HEART RATE: 64 BPM | HEIGHT: 68 IN

## 2021-06-29 DIAGNOSIS — G62.9 NEUROPATHY: ICD-10-CM

## 2021-06-29 DIAGNOSIS — Z79.810 LONG-TERM CURRENT USE OF TAMOXIFEN: ICD-10-CM

## 2021-06-29 DIAGNOSIS — Z13.1 SCREENING FOR DIABETES MELLITUS: ICD-10-CM

## 2021-06-29 DIAGNOSIS — Z17.0 MALIGNANT NEOPLASM OF OVERLAPPING SITES OF LEFT BREAST IN FEMALE, ESTROGEN RECEPTOR POSITIVE (HCC): Primary | ICD-10-CM

## 2021-06-29 DIAGNOSIS — N89.8 VAGINAL DRYNESS: ICD-10-CM

## 2021-06-29 DIAGNOSIS — C50.812 MALIGNANT NEOPLASM OF OVERLAPPING SITES OF LEFT BREAST IN FEMALE, ESTROGEN RECEPTOR POSITIVE (HCC): Primary | ICD-10-CM

## 2021-06-29 PROBLEM — N94.10 DYSPAREUNIA IN FEMALE: Status: ACTIVE | Noted: 2021-06-29

## 2021-06-29 PROBLEM — D64.81 ANEMIA ASSOCIATED WITH CHEMOTHERAPY: Status: RESOLVED | Noted: 2019-12-21 | Resolved: 2021-06-29

## 2021-06-29 PROBLEM — T45.1X5A ANEMIA ASSOCIATED WITH CHEMOTHERAPY: Status: RESOLVED | Noted: 2019-12-21 | Resolved: 2021-06-29

## 2021-06-29 PROBLEM — T45.1X5A CHEMOTHERAPY-INDUCED THROMBOCYTOPENIA: Status: RESOLVED | Noted: 2019-12-21 | Resolved: 2021-06-29

## 2021-06-29 PROBLEM — D69.59 CHEMOTHERAPY-INDUCED THROMBOCYTOPENIA: Status: RESOLVED | Noted: 2019-12-21 | Resolved: 2021-06-29

## 2021-06-29 LAB
ALBUMIN SERPL-MCNC: 4.3 G/DL (ref 3.5–5.2)
ALBUMIN/GLOB SERPL: 1.6 G/DL (ref 1.1–2.4)
ALP SERPL-CCNC: 51 U/L (ref 38–116)
ALT SERPL W P-5'-P-CCNC: 13 U/L (ref 0–33)
ANION GAP SERPL CALCULATED.3IONS-SCNC: 7.8 MMOL/L (ref 5–15)
AST SERPL-CCNC: 20 U/L (ref 0–32)
BASOPHILS # BLD AUTO: 0.03 10*3/MM3 (ref 0–0.2)
BASOPHILS NFR BLD AUTO: 0.7 % (ref 0–1.5)
BILIRUB SERPL-MCNC: 0.4 MG/DL (ref 0.2–1.2)
BUN SERPL-MCNC: 10 MG/DL (ref 6–20)
BUN/CREAT SERPL: 14.1 (ref 7.3–30)
CALCIUM SPEC-SCNC: 9.2 MG/DL (ref 8.5–10.2)
CHLORIDE SERPL-SCNC: 103 MMOL/L (ref 98–107)
CO2 SERPL-SCNC: 28.2 MMOL/L (ref 22–29)
CREAT SERPL-MCNC: 0.71 MG/DL (ref 0.6–1.1)
DEPRECATED RDW RBC AUTO: 44.2 FL (ref 37–54)
EOSINOPHIL # BLD AUTO: 0.11 10*3/MM3 (ref 0–0.4)
EOSINOPHIL NFR BLD AUTO: 2.4 % (ref 0.3–6.2)
ERYTHROCYTE [DISTWIDTH] IN BLOOD BY AUTOMATED COUNT: 11.9 % (ref 12.3–15.4)
GFR SERPL CREATININE-BSD FRML MDRD: 84 ML/MIN/1.73
GLOBULIN UR ELPH-MCNC: 2.7 GM/DL (ref 1.8–3.5)
GLUCOSE SERPL-MCNC: 84 MG/DL (ref 74–124)
HCT VFR BLD AUTO: 37.7 % (ref 34–46.6)
HGB BLD-MCNC: 12.5 G/DL (ref 12–15.9)
IMM GRANULOCYTES # BLD AUTO: 0 10*3/MM3 (ref 0–0.05)
IMM GRANULOCYTES NFR BLD AUTO: 0 % (ref 0–0.5)
LYMPHOCYTES # BLD AUTO: 1.41 10*3/MM3 (ref 0.7–3.1)
LYMPHOCYTES NFR BLD AUTO: 31.3 % (ref 19.6–45.3)
MCH RBC QN AUTO: 33.5 PG (ref 26.6–33)
MCHC RBC AUTO-ENTMCNC: 33.2 G/DL (ref 31.5–35.7)
MCV RBC AUTO: 101.1 FL (ref 79–97)
MONOCYTES # BLD AUTO: 0.61 10*3/MM3 (ref 0.1–0.9)
MONOCYTES NFR BLD AUTO: 13.5 % (ref 5–12)
NEUTROPHILS NFR BLD AUTO: 2.35 10*3/MM3 (ref 1.7–7)
NEUTROPHILS NFR BLD AUTO: 52.1 % (ref 42.7–76)
NRBC BLD AUTO-RTO: 0 /100 WBC (ref 0–0.2)
PLATELET # BLD AUTO: 237 10*3/MM3 (ref 140–450)
PMV BLD AUTO: 9.4 FL (ref 6–12)
POTASSIUM SERPL-SCNC: 4.3 MMOL/L (ref 3.5–4.7)
PROT SERPL-MCNC: 7 G/DL (ref 6.3–8)
RBC # BLD AUTO: 3.73 10*6/MM3 (ref 3.77–5.28)
SODIUM SERPL-SCNC: 139 MMOL/L (ref 134–145)
WBC # BLD AUTO: 4.51 10*3/MM3 (ref 3.4–10.8)

## 2021-06-29 PROCEDURE — 99214 OFFICE O/P EST MOD 30 MIN: CPT | Performed by: NURSE PRACTITIONER

## 2021-06-29 PROCEDURE — 36591 DRAW BLOOD OFF VENOUS DEVICE: CPT

## 2021-06-29 PROCEDURE — 85025 COMPLETE CBC W/AUTO DIFF WBC: CPT

## 2021-06-29 PROCEDURE — 25010000002 HEPARIN LOCK FLUSH PER 10 UNITS: Performed by: INTERNAL MEDICINE

## 2021-06-29 PROCEDURE — 80053 COMPREHEN METABOLIC PANEL: CPT

## 2021-06-29 PROCEDURE — 36415 COLL VENOUS BLD VENIPUNCTURE: CPT

## 2021-06-29 RX ORDER — HEPARIN SODIUM (PORCINE) LOCK FLUSH IV SOLN 100 UNIT/ML 100 UNIT/ML
300 SOLUTION INTRAVENOUS ONCE
Status: CANCELLED | OUTPATIENT
Start: 2021-06-29

## 2021-06-29 RX ORDER — LANOLIN ALCOHOL/MO/W.PET/CERES
50 CREAM (GRAM) TOPICAL DAILY
Qty: 90 TABLET | Refills: 2 | Status: SHIPPED | OUTPATIENT
Start: 2021-06-29

## 2021-06-29 RX ORDER — SODIUM CHLORIDE 0.9 % (FLUSH) 0.9 %
10 SYRINGE (ML) INJECTION AS NEEDED
Status: CANCELLED | OUTPATIENT
Start: 2021-06-29

## 2021-06-29 RX ORDER — SODIUM CHLORIDE 0.9 % (FLUSH) 0.9 %
10 SYRINGE (ML) INJECTION AS NEEDED
Status: DISCONTINUED | OUTPATIENT
Start: 2021-06-29 | End: 2021-06-29 | Stop reason: HOSPADM

## 2021-06-29 RX ORDER — SODIUM CHLORIDE 0.9 % (FLUSH) 0.9 %
20 SYRINGE (ML) INJECTION AS NEEDED
Status: CANCELLED | OUTPATIENT
Start: 2021-06-29

## 2021-06-29 RX ORDER — HEPARIN SODIUM (PORCINE) LOCK FLUSH IV SOLN 100 UNIT/ML 100 UNIT/ML
500 SOLUTION INTRAVENOUS AS NEEDED
Status: DISCONTINUED | OUTPATIENT
Start: 2021-06-29 | End: 2021-06-29 | Stop reason: HOSPADM

## 2021-06-29 RX ORDER — HEPARIN SODIUM (PORCINE) LOCK FLUSH IV SOLN 100 UNIT/ML 100 UNIT/ML
500 SOLUTION INTRAVENOUS AS NEEDED
Status: CANCELLED | OUTPATIENT
Start: 2021-06-29

## 2021-06-29 RX ADMIN — SODIUM CHLORIDE, PRESERVATIVE FREE 10 ML: 5 INJECTION INTRAVENOUS at 12:08

## 2021-06-29 RX ADMIN — Medication 500 UNITS: at 12:09

## 2021-07-17 ENCOUNTER — HOSPITAL ENCOUNTER (OUTPATIENT)
Dept: MRI IMAGING | Facility: HOSPITAL | Age: 61
Discharge: HOME | End: 2021-07-17
Admitting: NURSE PRACTITIONER

## 2021-07-17 PROCEDURE — 73721 MRI JNT OF LWR EXTRE W/O DYE: CPT

## 2021-07-20 ENCOUNTER — TELEPHONE (OUTPATIENT)
Dept: FAMILY MEDICINE CLINIC | Facility: CLINIC | Age: 61
End: 2021-07-20

## 2021-07-20 DIAGNOSIS — M25.551 RIGHT HIP PAIN: Primary | ICD-10-CM

## 2021-07-20 NOTE — TELEPHONE ENCOUNTER
Patient received her MRI results since they were normal but she is still having the hip pain she wants to know what the next step is?

## 2021-08-09 DIAGNOSIS — M19.90 ARTHRITIS: ICD-10-CM

## 2021-08-09 RX ORDER — MELOXICAM 15 MG/1
TABLET ORAL
Qty: 90 TABLET | Refills: 3 | Status: SHIPPED | OUTPATIENT
Start: 2021-08-09 | End: 2021-09-09

## 2021-08-09 NOTE — TELEPHONE ENCOUNTER
Rx Refill Note  Requested Prescriptions     Pending Prescriptions Disp Refills   • meloxicam (MOBIC) 15 MG tablet [Pharmacy Med Name: MELOXICAM 15 MG TABLET] 90 tablet 3     Sig: TAKE 1 TABLET BY MOUTH EVERY DAY      Last office visit with prescribing clinician: 6/9/2021      Next office visit with prescribing clinician: 9/9/2021     STATES YOU DISCONTINUED MEDICATION        Ann Vásquez MA  08/09/21, 15:51 EDT

## 2021-08-10 ENCOUNTER — INFUSION (OUTPATIENT)
Dept: ONCOLOGY | Facility: HOSPITAL | Age: 61
End: 2021-08-10

## 2021-08-10 ENCOUNTER — TELEPHONE (OUTPATIENT)
Dept: PHARMACY | Facility: HOSPITAL | Age: 61
End: 2021-08-10

## 2021-08-10 DIAGNOSIS — Z17.0 MALIGNANT NEOPLASM OF OVERLAPPING SITES OF LEFT BREAST IN FEMALE, ESTROGEN RECEPTOR POSITIVE (HCC): ICD-10-CM

## 2021-08-10 DIAGNOSIS — Z13.1 SCREENING FOR DIABETES MELLITUS: Primary | ICD-10-CM

## 2021-08-10 DIAGNOSIS — C50.812 MALIGNANT NEOPLASM OF OVERLAPPING SITES OF LEFT BREAST IN FEMALE, ESTROGEN RECEPTOR POSITIVE (HCC): ICD-10-CM

## 2021-08-10 LAB
BASOPHILS # BLD AUTO: 0.02 10*3/MM3 (ref 0–0.2)
BASOPHILS NFR BLD AUTO: 0.5 % (ref 0–1.5)
DEPRECATED RDW RBC AUTO: 45.8 FL (ref 37–54)
EOSINOPHIL # BLD AUTO: 0.05 10*3/MM3 (ref 0–0.4)
EOSINOPHIL NFR BLD AUTO: 1.2 % (ref 0.3–6.2)
ERYTHROCYTE [DISTWIDTH] IN BLOOD BY AUTOMATED COUNT: 12.4 % (ref 12.3–15.4)
HCT VFR BLD AUTO: 34.9 % (ref 34–46.6)
HGB BLD-MCNC: 11.8 G/DL (ref 12–15.9)
IMM GRANULOCYTES # BLD AUTO: 0 10*3/MM3 (ref 0–0.05)
IMM GRANULOCYTES NFR BLD AUTO: 0 % (ref 0–0.5)
LYMPHOCYTES # BLD AUTO: 1.33 10*3/MM3 (ref 0.7–3.1)
LYMPHOCYTES NFR BLD AUTO: 33.2 % (ref 19.6–45.3)
MCH RBC QN AUTO: 33.9 PG (ref 26.6–33)
MCHC RBC AUTO-ENTMCNC: 33.8 G/DL (ref 31.5–35.7)
MCV RBC AUTO: 100.3 FL (ref 79–97)
MONOCYTES # BLD AUTO: 0.42 10*3/MM3 (ref 0.1–0.9)
MONOCYTES NFR BLD AUTO: 10.5 % (ref 5–12)
NEUTROPHILS NFR BLD AUTO: 2.19 10*3/MM3 (ref 1.7–7)
NEUTROPHILS NFR BLD AUTO: 54.6 % (ref 42.7–76)
NRBC BLD AUTO-RTO: 0 /100 WBC (ref 0–0.2)
PLATELET # BLD AUTO: 194 10*3/MM3 (ref 140–450)
PMV BLD AUTO: 9.5 FL (ref 6–12)
RBC # BLD AUTO: 3.48 10*6/MM3 (ref 3.77–5.28)
WBC # BLD AUTO: 4.01 10*3/MM3 (ref 3.4–10.8)

## 2021-08-10 PROCEDURE — 36591 DRAW BLOOD OFF VENOUS DEVICE: CPT

## 2021-08-10 PROCEDURE — 85025 COMPLETE CBC W/AUTO DIFF WBC: CPT

## 2021-08-10 PROCEDURE — 25010000002 HEPARIN LOCK FLUSH PER 10 UNITS: Performed by: INTERNAL MEDICINE

## 2021-08-10 RX ORDER — SODIUM CHLORIDE 0.9 % (FLUSH) 0.9 %
10 SYRINGE (ML) INJECTION AS NEEDED
Status: DISCONTINUED | OUTPATIENT
Start: 2021-08-10 | End: 2021-08-10 | Stop reason: HOSPADM

## 2021-08-10 RX ORDER — HEPARIN SODIUM (PORCINE) LOCK FLUSH IV SOLN 100 UNIT/ML 100 UNIT/ML
300 SOLUTION INTRAVENOUS ONCE
Status: CANCELLED | OUTPATIENT
Start: 2021-08-10

## 2021-08-10 RX ORDER — HEPARIN SODIUM (PORCINE) LOCK FLUSH IV SOLN 100 UNIT/ML 100 UNIT/ML
500 SOLUTION INTRAVENOUS AS NEEDED
Status: CANCELLED | OUTPATIENT
Start: 2021-08-10

## 2021-08-10 RX ORDER — SODIUM CHLORIDE 0.9 % (FLUSH) 0.9 %
20 SYRINGE (ML) INJECTION AS NEEDED
Status: CANCELLED | OUTPATIENT
Start: 2021-08-10

## 2021-08-10 RX ORDER — HEPARIN SODIUM (PORCINE) LOCK FLUSH IV SOLN 100 UNIT/ML 100 UNIT/ML
500 SOLUTION INTRAVENOUS AS NEEDED
Status: DISCONTINUED | OUTPATIENT
Start: 2021-08-10 | End: 2021-08-10 | Stop reason: HOSPADM

## 2021-08-10 RX ORDER — SODIUM CHLORIDE 0.9 % (FLUSH) 0.9 %
10 SYRINGE (ML) INJECTION AS NEEDED
Status: CANCELLED | OUTPATIENT
Start: 2021-08-10

## 2021-08-10 RX ADMIN — Medication 500 UNITS: at 13:03

## 2021-08-10 RX ADMIN — SODIUM CHLORIDE, PRESERVATIVE FREE 10 ML: 5 INJECTION INTRAVENOUS at 13:02

## 2021-08-10 NOTE — TELEPHONE ENCOUNTER
Called pt to tell her that there was still one refill left from last time at pharmacy.  told me he would check it out and call me back.

## 2021-08-11 ENCOUNTER — TELEPHONE (OUTPATIENT)
Dept: ONCOLOGY | Facility: CLINIC | Age: 61
End: 2021-08-11

## 2021-08-11 RX ORDER — TAMOXIFEN CITRATE 20 MG/1
20 TABLET ORAL DAILY
Qty: 90 TABLET | Refills: 1 | OUTPATIENT
Start: 2021-08-11

## 2021-08-11 NOTE — TELEPHONE ENCOUNTER
----- Message from Richa Dolan sent at 8/11/2021 12:17 PM EDT -----  Regarding: Test Results Question  Contact: [phone]  Yesterday when i was at [facility] to get a port flush, i ask if they were checking my blood for anything else. I told the nurse i had been feeling bad or tired for 3-4 weeks now.  She said i could call back for the test results later.  I can see the test results and some are flagged low. Is this what is making me feel poorly it seems to be dragging on for weeks.  Is there anything i can do to help my RBC?

## 2021-08-11 NOTE — TELEPHONE ENCOUNTER
"LENGTHY CONVERSATION WITH PT ABOUT EXTREME FATIGUE.  HAD LABS DONE YESTERDAY WHICH LOOKED GOOD.  REVIEWED LABS WITH PT.  STATES SHE SLEEPS 8-9 HOURS EVERY NIGHT AND WAKES UP FATIGUED.  CAN'T DO ANYTHING FOR ANY LENGTH OF TIME DUE TO FATIGUE.  STATES, \" I FEEL LIKE I'M DRAGGING ALL THE TIME.\"  FEELS THERE IS \"SOMETHING WRONG.\"   DENIES SOA, ANY S/S OF BLEEDING, OR CHEST DISCOMFORT.  NO FEVER OR CHILLS.  NO COUGH.  DOES REPORT FRONTAL BUCHANAN'S 3 DAYS OUT OF 7 AND THEY ARE DIFFERENT THAN HER NORMAL MIGRAINES.  NO N/V.  HAS ABD ISSUES WITH DIARRHEA WHICH IS NOTHING NEW.  SEE GI FOR THIS ISSUE.  REPORTS SOME JOINT PAIN IN HER ARMS AND LEGS, BUT ALSO FEELS LIKE HER BONES IN HER UPPER ARMS AND LEGS HURT.  REQUESTING A PET SCAN??  HER JOINT PAIN IS BETTER NOW THAT SHE IS ON THE TAMOXIFEN.  HAS THE NEUROPATHY IN HER FEET, BUT DOES NOT WANT TO TAKE NEURONTIN OR OTHER MED FOR THIS AT THIS TIME.  INFORMED PT. I WILL SEND THIS MESSAGE TO DR. EDMONDSON'S CLINICAL NURSE, SD MICHAELS.  WE WILL GET BACK WITH HER EITHER TODAY OR TOMORROW.  PT VERBALIZES UNDERSTANDING.  "

## 2021-08-11 NOTE — PROGRESS NOTES
Subjective     REASON FOR FOLLOW UP:    1.  T2N1 invasive ductal carcinoma of the left breast.  Ultrasound-showed 3.8 x 3.1 x 2.5 cm mass at 4 o'clock position with 2 abnormal axillary lymph nodes, larger lymph node being 1.4 cm.  Left breast biopsy and axillary lymph node biopsy October 29, 2019, invasive ductal carcinoma, moderately differentiated, grade 2, ER 85%, TN 10%, HER-2/merna 2+, HER-2 FISH negative.  · 12/06/19: Neoadjuvant chemo therapy, cycle #1 of dose-dense Adriamycin/Cytoxan with Neulasta for marrow support    · 01/17/20: Last cycle of Adriamycin/Cytoxan given  · January 31, 2020: Cycle 1 Taxol  · April 24, 2020: Last cycle, cycle 12 of Taxol  · Patient is s/p left mastectomy with axillary dissection with reconstruction.  She is healing up nicely.  She has a drain in place.  Pathology showed invasive breast cancer which is 55 x 40 mm in size, grade 2 with focal lymphovascular space invasion with DCIS spanning over 36 x 6 mm.  All margins were negative for cancer.  · 8 of the additional lymph nodes out of 11 were positive with the largest micrometastasis measuring 5 mm.  Extranodal extension was seen.  · Biomarkers on post neoadjuvant tumor is ER 81-90% TN 5% HER-2/merna 2+ by immunohistochemistry and HER-2/merna negative by FISH.  · Radiation July 6, 2020-August 17, 2020.  · Letrozole initiated July 2020.  · Letrozole discontinued October 2020 secondary to severe joint pain.  · October 30, 2020 patient starting tamoxifen and tolerating well    2.  HIV, followed by Dr. Dawn from infectious disease.  Well controlled on meds    3.  Screening mammogram December 7, 2020 showed indeterminate calcifications lower outer middle left breast. Diagnostic mammogram showed 0.4 cm calcifications in the left breast.  Stereotactic biopsy January 8, 2021 consistent with organizing fat necrosis.              HISTORY OF PRESENT ILLNESS:  The patient is a 60 y.o. year old female with the above medical history who  returns today for triage visit complaining of extreme fatigue.  She continues on tamoxifen nightly.  She states that she is sleeping 8 to 9 hours every night and wakes up feeling fatigued.  She does report sometimes having difficulty sleeping or staying asleep due to joint pain.  She does take Percocet 10 mg at nighttime along with Ambien, however still has difficulty falling and staying asleep.  She does report that her diet remains stable, however she continues to gain weight.  Her bowels are moving regularly.  She denies nausea or vomiting.    She does report feeling very concerned about the joint aches and fatigue.  The fatigue is severe enough that after waking up in the morning she is not able to do her normal daily functions.  Many times she will be up for a few hours and then have to lay down for a few hours during the day.  She is concerned this may be related to her malignancy, so this has caused her additional stress.    Past Medical History:   Diagnosis Date   • Anxiety    • Cerebrovascular accident (CVA) (CMS/[facility]) 8/22/2017    NO RESIDUAL AFFECT   • DDD (degenerative disc disease), cervical    • Depression    • Drug therapy    • Elevated cholesterol    • GERD (gastroesophageal reflux disease)    • H/O ICH (intracerebral hemorrhage) (CMS/[facility])    • History of chemotherapy    • History of stroke    • History of thrombocytopenia    • HIV (human immunodeficiency virus infection) (CMS/[facility]) 1996   • Hx of radiation therapy    • Hyperlipidemia    • Insomnia    • Lupus anticoagulant positive    • Malignant neoplasm of overlapping sites of left breast in female, estrogen receptor positive (CMS/[facility]) 11/12/2019   • Meniscus tear 2017    RIGHT   • Migraine    • Neck pain     WITH SHOOTING PAIN LEFT RIGHT ARM   • Osteoarthritis    • Osteoporosis    • Pain management     sees 1 every 3 months for scripts/injection/pain meds   • Seasonal allergies    • Spinal headache     THINKS HAD BLOOD PATCH AFTER EPIDUAL    • Tick  bite 06/2014     PAST MEDICAL HISTORY:  She had a stroke in July 2017 with headache and dizziness.  She went to the ER and was placed on aspirin.  However, she stopped taking it two weeks ago.  She has been taking Aimovig (injection) monthly with Fariba López at [facility].    In 1994, patient was diagnosed with HIV with an unknown cause which is managed by Dr. Natali Subramanian.  As of now, her viral load is good.    Patient has arthritis.  She gets trigger-point injections in her back with her last one being 2 weeks ago.  She has had multiple ablations.  She also has pain in her SI joint and Dr. Bermudez performed a surgery on this.  She takes Narco for the pain.      She is osteoporotic.  She has had multiple hairline fractures in both her legs.  She had her knees cleaned out by Dr. Sinha.     Patient has had a hysterectomy and still has both of her ovaries.    Patient has vertigo and the muscles in her left ear are weak.  She just has an imbalance.    Past Surgical History:   Procedure Laterality Date   • ADENOIDECTOMY     • BREAST LUMPECTOMY     • BREAST LUMPECTOMY WITH SENTINEL NODE BIOPSY Left 5/28/2020    Procedure: BREAST LUMPECTOMY WITH SENTINEL NODE BIOPSY AND NEEDLE LOCALIZATION AND axillary dissection;  Surgeon: Landen Forman MD;  Location: [facility];  Service: General;  Laterality: Left;   • BREAST SURGERY Left 5/28/2020    Procedure: LEFT LATERAL INTERCOASTAL ARTERY  flap ;  Surgeon: Yusuf Garcia MD;  Location: [facility];  Service: Plastics;  Laterality: Left;   • CHOLECYSTECTOMY     • HYSTERECTOMY     • KNEE ARTHROSCOPY Right 3/24/2017    Procedure:  RIGHT  KNEE ARTHROSCOPY WITH DEBRIDEMENT CHONDROPLASTY PARTIAL MENISCECTOMY;  Surgeon: Karl Galeas MD;  Location: [facility];  Service:    • KNEE CARTILAGE SURGERY Right 2007   • TONSILLECTOMY     • US GUIDED LYMPH NODE BIOPSY  10/29/2019    LEFT BREAST   • VENOUS ACCESS DEVICE (PORT) INSERTION Right 12/2/2019     Procedure: INSERTION VENOUS ACCESS DEVICE RIGHT;  Surgeon: Landen Forman MD;  Location: [facility];  Service: General       ONCOLOGIC HISTORY:  Patient is a 59-year-old female who has had a history of HIV since age 34 followed by Dr. Natali Ng at [facility] and was on multiple HIV drugs initially but more recently has been on a single medication TRIUMEQ, with well-controlled HIV.  She follows up with Dr. Dawn , infectious disease who saw her recently and he saw her on 4/8/2019 and has excellent control and suppression of her viral load.  She is very compliant with her medications.    She also has had history of stroke in 2017 for which she was placed on aspirin.  She presented with a headache.  She is very active and works at [employer] full-time.  She also has had history of vertigo in the past  Patient's PCP is Zach Lora.    Patient first noticed the mass in her left breast 5 months ago.  Her last mammogram was 5 years ago.  She had soreness in the left breast and she went to her primary care physician who then ordered a mammogram diagnostic and an ultrasound.  The diagnostic mammogram showed a 3.7 cm mass at 4 o'clock position in the lower outer quadrant of the left breast.  The ultrasound showed 3.8 cm x 3.1 x 2.5 cm mass at 4 o'clock position in the left breast with 2 abnormal appearing left axillary lymph nodes the largest being 1.4 cm.    She then underwent biopsy of both the breast mass as well as the left axillary lymph node and both of them are positive for invasive mammary carcinoma it is invasive ductal carcinoma with apocrine features, moderately differentiated, grade 2 of 3 with a Jen score of 5.  The left axillary lymph node is also consistent with metastatic mammary carcinoma.  It is ER positive [place] positive HER-2/merna negative.  Details as follows    10/21/19 - Bilateral Diagnostic Mammogram and US Breast Left  FINDINGS: Bilateral digital CC and  O mammographic images were  obtained. No prior examination is available for comparison. Scattered  fibroglandular densities are seen throughout both breasts. A triangular  skin marker represents the area of palpable concern in the lower outer  quadrant of the left breast in the posterior 1/3. At this location there  is an irregular mass that measures on the order of 3.7 cm in greatest  dimension. Internal calcifications are noted. No suspicious findings in  the right breast are appreciated.     ULTRASOUND: Targeted sonographic evaluation of the left breast was  performed through the area of concern in the left axilla. At the 4  o'clock position on the order of 6 cm from the nipple there is an  irregular hypoechoic mass that measures 3.8 x 3.1 x 2.5 cm. Internal  vascularity is noted.     There are 2 abnormal-appearing left axillary lymph nodes, the largest  which measures on the order of 1.4 cm in greatest dimension.     IMPRESSION:  1. There is a 3.8 cm irregular mass in the left breast at the 4 o'clock  position. This is seen in conjunction with an irregular 1.4 cm lymph  node in the left axilla. This is suspicious for malignancy with left  axillary involvement. Correlation with ultrasound-guided biopsy of both  the left breast mass and the lymph node is recommended.  2. There are no findings suspicious for malignancy in the right breast.     BI-RADS CATEGORY 5:     Patient's labs from 11/12/19 are normal.    10/29/19 - Tissue Pathology  1. Left Breast, 4:00, 6 cm FN, U/S-Guided Core Needle Biopsy for a Mass:  A. INVASIVE DUCTAL CARCINOMA WITH APOCRINE FEATURES, Moderately differentiated;  [place] Histologic Grade II/III (tubule score = 3, nuclear score = 2, mitoses score = 1), measuring at least  9 mm.  B. No ductal or lobular carcinoma in situ is identified in this sample.  C. Focus suspicious for lymphovascular space invasion.  D. See Biomarker Template for hormone receptor studies.  2. Lymph Node, Left  Axilla, U/S-Guided Core Needle Biopsy:  A. METASTATIC MAMMARY CARCINOMA (see Comment).  B. Metastatic focus measures 5 mm in greatest extent.  ER+, 85%  AL+, 10%  HER2- Score 2+    11/13/19 - CT Neck Chest Abdomen Pelvis  IMPRESSION:  1. In addition to the irregular approximately 3.8 cm mass within the  lateral aspect of the left breast, there are a few subcentimeter  asymmetric nodules along the lateral margin of the glandular tissue. The  several asymmetric left subpectoral nodes and 1.2 x 1.0 cm left axillary  node are suspicious for tamar metastases. The asymmetric subcentimeter  left supraclavicular and left posterior cervical triangle nodes are  worrisome as well.  2. There is no convincing evidence for metastatic disease within the  abdomen or pelvis.    11/14/19 - Echocardiogram:  Normal.  Calculated EF = 67%.  There is trace mitral valve and tricuspid valve regurgitation.    11/15/19 - Bone Scan  Negative.    11/18/19 - MRI Breast Bilateral  IMPRESSION:  1. Biopsy-proven malignancy in the left breast centered at the 4 o'clock  position with associated surrounding satellite nodules as described. The  entire region of involvement including the satellite nodules and the  dominant mass measure up to 7.5 cm in greatest dimension. Also, left  axillary adenopathy with multiple irregular lymph nodes and loss of  differentiation of the borders of multiple lymph nodes is noted and this  is suspicious for extranodal involvement.  2. There are no findings suspicious for malignancy in the right breast.  BI-RADS CATEGORY 6      12/06/19: Cycle #1 of Adriamycin/Cytoxan    01/17/20: Last cycle of Adriamycin/Cytoxan given without Neulasta.  We will switch from Neulasta to 7 days of Neupogen injections after cycle 4.    We reviewed with patient the US Breast from 01/29/20 which shows mild interval partial response to neoadjuvant chemotherapy.      01/31/20: Initiated cycle 1 Taxol  April 24, 2020: Last cycle of Taxol, cycle  12    S/p left mastectomy with axillary dissection   Pathology showed invasive breast cancer which is 55 x 40 mm in size, grade 2 with focal lymphovascular space invasion with DCIS spanning over 36 x 6 mm.  All margins were negative for cancer.  8 of the additional lymph nodes out of 11 were positive with the largest micrometastasis measuring 5 mm.  Extranodal extension was seen.    Biomarkers on post neoadjuvant tumor is ER 81-90% IN 5% HER-2/merna 2+ by immunohistochemistry and HER-2/merna negative by FISH.    2020: Started letrozole but because of severe joint pains was discontinued 2020    End of 2020 started tamoxifen    OB-GYN:  Menarche 15 years  Menopause-46  Pregnancies- 1 para 1 no miscarriages her first pregnancy was in  at 29 years of age.  She did not breastfeed.  Post menopausal HRT- None.  Hx of birth control pills- Yes.    Current Outpatient Medications on File Prior to Visit   Medication Sig Dispense Refill   • AIMOVIG 140 MG/ML solution auto-injector INJECT 140 MG INTO THE SKIN EVERY 30 (THIRTY) DAYS.  11   • atorvastatin (LIPITOR) 20 MG tablet Take 1 tablet by mouth every night at bedtime. 90 tablet 3   • baclofen (LIORESAL) 10 MG tablet Take 10 mg by mouth 2 (Two) Times a Day.  2   • clonazePAM (KlonoPIN) 0.5 MG tablet TAKE 1 TABLET BY MOUTH 2 (TWO) TIMES A DAY AS NEEDED FOR ANXIETY. 60 tablet 1   • colesevelam (WELCHOL) 625 MG tablet TAKE 2 TABLETS BY MOUTH EVERY  tablet 3   • escitalopram (LEXAPRO) 10 MG tablet Take 1 tablet by mouth Daily. 90 tablet 3   • fluticasone (FLONASE) 50 MCG/ACT nasal spray 2 sprays into the nostril(s) as directed by provider Daily. 3 bottle 3   • Loratadine (CLARITIN PO) Take 10 mg by mouth Daily.     • Magnesium Hydroxide (MILK OF MAGNESIA PO) Take 15 mL by mouth Every Night.     • meloxicam (MOBIC) 15 MG tablet TAKE 1 TABLET BY MOUTH EVERY DAY 90 tablet 3   • oxyCODONE-acetaminophen (PERCOCET)  MG per tablet Take 1 tablet by  mouth 4 (Four) Times a Day.     • Rimegepant Sulfate 75 MG tablet dispersible Take 75 mg by mouth.     • tamoxifen (NOLVADEX) 20 MG chemo tablet TAKE 1 TABLET BY MOUTH EVERY DAY 90 tablet 1   • TRIUMEQ 600- MG per tablet TAKE 1 TABLET BY MOUTH EVERY DAY 90 tablet 3   • vitamin B-6 (pyridoxine) 50 MG tablet Take 1 tablet by mouth Daily. 90 tablet 2   • zolpidem (AMBIEN) 10 MG tablet Take 1 tablet by mouth Every Night. for sleep 90 tablet 0     Current Facility-Administered Medications on File Prior to Visit   Medication Dose Route Frequency Provider Last Rate Last Admin   • [DISCONTINUED] heparin injection 500 Units  500 Units Intravenous PRN Ruby Moreno MD   500 Units at 08/10/21 1303   • [DISCONTINUED] sodium chloride 0.9 % flush 10 mL  10 mL Intravenous PRN Ruby Moreno MD   10 mL at 08/10/21 1302        ALLERGIES:    Allergies   Allergen Reactions   • Augmentin [Amoxicillin-Pot Clavulanate] Hives        Social History     Socioeconomic History   • Marital status:      Spouse name: Owen   • Number of children: 1   • Years of education: College   • Highest education level: Not on file   Tobacco Use   • Smoking status: Never Smoker   • Smokeless tobacco: Never Used   Substance and Sexual Activity   • Alcohol use: Yes     Comment: occasioonal   • Drug use: No   • Sexual activity: Defer     Partners: Male     Birth control/protection: None     Comment:      Patient does not smoke or do drugs.  She does drink occasionally.    Family History   Problem Relation Age of Onset   • Breast cancer Mother    • Leukemia Mother         CLL?   • Diabetes Mother    • Hyperthyroidism Mother    • Hearing loss Mother    • Dementia Father    • Heart disease Maternal Grandmother    • Diabetes Maternal Grandfather    • Heart disease Maternal Grandfather    • Stroke Maternal Grandfather    • Heart attack Maternal Grandfather    • Osteoporosis Paternal Grandmother    • Heart disease Paternal Grandfather    •  Leukemia Maternal Aunt         CLL?   • Throat cancer Paternal Uncle    • Malig Hyperthermia Neg Hx       FAMILY HISTORY:  Her mother had breast cancer at age 60, still alive at 85 and in good health..  Her father had dementia.  Her paternal uncle had prostate cancer.  Her brother had esophageal cancer.    I have reviewed the patient's medical history in detail and updated the computerized patient record.     Review of Systems   Constitutional: Negative for activity change, appetite change, chills, diaphoresis, fatigue, fever and unexpected weight change.   HENT: Negative for hearing loss, mouth sores, nosebleeds, sore throat and trouble swallowing.    Respiratory: Negative for cough, chest tightness, shortness of breath and wheezing.    Cardiovascular: Negative for chest pain, palpitations and leg swelling.   Gastrointestinal: Negative for abdominal distention, abdominal pain, constipation, diarrhea, nausea and vomiting.   Genitourinary: Negative for difficulty urinating, dysuria, flank pain, frequency, hematuria and urgency.   Musculoskeletal: Positive for arthralgias. Negative for back pain and joint swelling.        No muscle weakness.   Skin: Negative for rash and wound.   Neurological: Positive for numbness. Negative for dizziness, seizures, syncope, speech difficulty, weakness and headaches.   Hematological: Negative for adenopathy. Does not bruise/bleed easily.   Psychiatric/Behavioral: Negative for behavioral problems, confusion, sleep disturbance and suicidal ideas.   All other systems reviewed and are negative.    I have reviewed and updated the accuracy of the ROS as documented  ОЛЬГА Lopes      Objective    There were no vitals filed for this visit.    PHYSICAL EXAM:  CONSTITUTIONAL:  Vital signs reviewed.  No distress, looks comfortable.  EYES:  Conjunctiva and lids unremarkable.  PERRLA  EARS,NOSE,MOUTH,THROAT:  Ears and nose appear unremarkable.  Lips, teeth, gums appear  unremarkable.  RESPIRATORY:  Normal respiratory effort.  Lungs clear to auscultation bilaterally.  BREASTS: Right breast benign; left breast status post lumpectomy with chronic area of fatty necrosis, unchanged.  CARDIOVASCULAR:  Normal S1, S2.  No murmurs rubs or gallops.  No significant lower extremity edema  GASTROINTESTINAL: Abdomen appears unremarkable.  Nontender.  No hepatomegaly.  No splenomegaly.  LYMPHATIC:  No cervical, supraclavicular, axillary lymphadenopathy.  SKIN:  Warm.  No rashes.  PSYCHIATRIC:  Normal judgment and insight.  Normal mood and affect.    I have reexamined the patient and the results are consistent with the previously documented exam. ОЛЬГА Lopes       RECENT LABS:   Results from last 7 days   Lab Units 08/10/21  1306   WBC 10*3/mm3 4.01   NEUTROS ABS 10*3/mm3 2.19   HEMOGLOBIN g/dL 11.8*   HEMATOCRIT % 34.9   PLATELETS 10*3/mm3 194               Tissue Pathology Exam 01/08/21    Final Diagnosis   1. Breast, Left 6:00 o'clock Position, Core Biopsies for Calcifications.               A. Dense fibrous stroma/stromal fibrosis with minute foci of acute inflammation, clustered histiocytes,       associated microcalcifications and giant cells consistent with organizing fat necrosis.               B. Focal periductal mild chronic inflammation.  Mec/kds         MAMMO DIAGNOSTIC LEFT W CAD-12/24/20    IMPRESSION:  Suspicious 0.4 cm of calcifications in the left breast. Further  evaluation with stereotactic core needle biopsy is recommended.     Findings and recommendations were discussed with the patient in person  at the time of her examination.     BI-RADS Category 4: Suspicious         EXAMINATION: BILATERAL BREAST MRI WITHOUT AND WITH CONTRAST 05/06/20  IMPRESSION:  1. There are findings consistent with interval partial response to  neoadjuvant chemotherapy with considerable reduction in left axillary  adenopathy and reduction in size of the index lesion in the left breast  as well  as other satellite lesions. Persistent diffuse left breast skin  thickening is noted.  2. There are no findings suspicious for malignancy in the right breast.     BI-RADS Category 6: Known biopsy-proven malignancy.    01/29/20 - US Breast  IMPRESSION:  There are findings suggestive of mild interval partial  response to neoadjuvant chemotherapy.    Assessment/Plan    1. T2N1 invasive ductal carcinoma of the left breast, recently diagnosed.    -Noticed mass in the left breast x5 months  -Diagnostic mammogram showed 3.7 mm mass in the left breast at 4 o'clock position  -Ultrasound-showed 3.8 x 3.1 x 2.5 cm mass at 4 o'clock position with 2 abnormal axillary lymph nodes, larger lymph node being 1.4 cm  -Left breast biopsy and axillary lymph node biopsy October 29, 2019, invasive ductal carcinoma, moderately differentiated, grade 2, ER 85%, CA 10%, HER-2/merna 2+, HER-2 FISH negative  · CT scans from 11/13/19 show some asymmetric nodules along the lateral margin of the glandular tissue.  The 1.2x1.0 cm left axillary nodes, left supraclavicular, and left posterior cervical triangle nodes are suspicious for tamar metastases.  ·   bone scan from 11/15/19 which was negative.   ·  MRI breast from 11/18/19 which shows the biopsy-proven malignancy in the left breast centered at the 4:00 position.  The region of involvement measures up to 7.5 cm.  There is left axillary adenopathy with multiple irregular lymph nodes and loss of differentiation of the border of multiple lymph nodes which are suspicious for extranodal involvement.  ·    echocardiogram from 11/14/19 which was normal with an ejection fraction of 67%.    · INVITAE from 11/14/19 which was negative.  · Given the size of her tumor and her medical history, patient will be given 4 cycles of Adriamycin/Cytoxan with Neulasta.  After completion of this treatment, patient will be started on 12 cycles of Taxol.  After the tumor shrinks in size, Dr. Forman will perform a  lumpectomy.    · Following lumpectomy, patient will begin endocrine therapy.  · Dr. Dawn agrees chemotherapy will not aggravate her HIV condition.  Dr. Moreno spoke with Dr. Mohan at [place] who also agrees chemotherapy is the first step.   · 12/06/19: Cycle #1 of Adriamycin/Cytoxan  · US Breast from 01/29/20 after 4 cycles of Adriamycin Cytoxan chemotherapy which shows mild interval partial response to neoadjuvant chemotherapy.  The mass has decreased from 3.8 x 2.5 to 3.1 cm to 3.5 x 2.3 x 3.2 cm previously the left axillary lymph node has decreased from 1.4 x 0.7 x 1 cm to 1.1 x 0.6 x 0.9 cm.  · 01/31/20: Initiated cycle 1 Taxol  · April 24, 2020: Completed cycle 12 Taxol  · MRI breast 5/6/2020 shows significant reduction in the left axillary adenopathy as well as reduction in the index breast lesion.  · S/p left modified radical mastectomy with left axillary dissection.  Patient has 55 x 40 x 30 mm invasive carcinoma, grade 2 with DCIS 36 x 6 mm, high-grade with good margins and 8 out of 11 lymph nodes positive with largest metastasis being 5 mm with extracapsular extension.  And there is lymphatic space invasion  · Will follow-up in 2 weeks at which time we will plan to start endocrine therapy.  She has radiation oncology appointment with Dr. Spivey  · Patient is eligible for Zometa once every 6 months for 2 years.  But given that she is undergoing some work on her teeth we will await starting Zometa until after radiation is complete.  · XRT completed August 17, 2020  · Started letrozole August 2020 but has arthralgias  · October 6, 2020 had severe arthralgias secondary to letrozole.  We will switch to tamoxifen starting November 2020  · 1/8/2021 screening mammogram in December 2020 as well as diagnostic mammogram which showed 0.4 cm suspicious calcification in the lower left which on biopsy is consistent with fat necrosis.  · 8/12/2021, continues on tamoxifen daily.  She has been experiencing  arthralgias throughout the day that she controls with Percocet 10 mg every 4 hours as needed.  She is having difficulty sleeping at night due to the arthralgias, despite taking Percocet 10 mg and Ambien nightly.  We will hold tamoxifen until MD follow-up to see if her symptoms improve.    2. HIV, followed by Dr. Dawn in infectious disease.  Well-controlled and is on a single medication with very low viral load. Has HIV since age 34.  · Reviewed her HIV medications and she is compliant  · Stable     3.  Family history of breast cancer with mother having had breast cancer at age 60.  There is family history of uncle with prostate cancer.  Patient will require genetic referral    4.  Arthritis with severe back pain followed by Dr. Bam Crandall  · Continue Percocet    5. Episodes of nosebleeds.  She has had these once a week for 3-4 years. She did not report this today.    6.  Vaginal dryness related to tamoxifen, ongoing.  Patient provided handouts for recommendations of a vaginal lubricant and vaginal moisturizer to use for hopeful improvement.    7.  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  Patient continues on B6.    8. Fatigue  · 8/12/2021, the patient is seen today for triage visit due to concern about fatigue.  She reports sleeping 8 to 9 hours a night, however when she wakes in the morning she is still fatigued.  She reports having difficulty falling asleep and staying asleep due to arthralgias from tamoxifen.  She takes Ambien and 10 mg of Percocet at nighttime, and still experiencing difficulty sleeping.  Once falling asleep, she wakes up multiple times.  In the morning when she wakes up she still feels very fatigued and is only able to do daily activities for a few hours then must lay down for the afternoon.  She also reports experiencing weight gain despite not changing her eating habits.  Today we have drawn a TSH, free T4, and vitamin B12 level and are within normal limits.  We will  also hold tamoxifen until MD follow-up to see if the patient's arthralgias improve, therefore improving her sleep.    PLAN:   1. Hold tamoxifen until MD follow-up.  2. Continue vitamin B6 for neuropathy.  3. Return in 6 weeks for MD follow-up and Zometa.  Plan to give a total of 2 years of therapy which will be through 10/2022.  4. Continue calcium 600 mg twice a day and vitamin D3  5. Next mammogram due in December 2021 and bone density due in May 2022  6. Continue Percocet 10 mg every 4 hours as needed for pain.  7. Continue Ambien nightly for insomnia.  8. Vitamin B12, TSH, free T4 drawn today and all within normal limits.    The patient is on high risk medication that requires close monitoring for toxicity and is experiencing side effects.  The patient was discussed with Dr. Moreno who agrees with the above-mentioned plan.    ОЛЬГА Lopes  08/12/2021    Cc: ОЛЬГА Godoy MD Nathan Bullington, MD

## 2021-08-11 NOTE — TELEPHONE ENCOUNTER
Tamoxifen denied. Should have 1 refill remaining at pharmacy. Talked to  yesterday- was supposed to call Naty back if any problems getting it. Never called back.

## 2021-08-11 NOTE — TELEPHONE ENCOUNTER
Call to patient to let her know we are setting her up for appointment with NP and labs tomorrow, 8/12/21.  Patient verbalized understanding and thanks.

## 2021-08-12 ENCOUNTER — OFFICE VISIT (OUTPATIENT)
Dept: ONCOLOGY | Facility: CLINIC | Age: 61
End: 2021-08-12

## 2021-08-12 ENCOUNTER — LAB (OUTPATIENT)
Dept: ONCOLOGY | Facility: HOSPITAL | Age: 61
End: 2021-08-12

## 2021-08-12 VITALS
HEIGHT: 68 IN | HEART RATE: 66 BPM | WEIGHT: 152.2 LBS | OXYGEN SATURATION: 94 % | RESPIRATION RATE: 18 BRPM | DIASTOLIC BLOOD PRESSURE: 83 MMHG | BODY MASS INDEX: 23.07 KG/M2 | SYSTOLIC BLOOD PRESSURE: 121 MMHG | TEMPERATURE: 97.5 F

## 2021-08-12 DIAGNOSIS — Z13.1 SCREENING FOR DIABETES MELLITUS: ICD-10-CM

## 2021-08-12 DIAGNOSIS — C50.812 MALIGNANT NEOPLASM OF OVERLAPPING SITES OF LEFT BREAST IN FEMALE, ESTROGEN RECEPTOR POSITIVE (HCC): Primary | ICD-10-CM

## 2021-08-12 DIAGNOSIS — Z79.810 LONG-TERM CURRENT USE OF TAMOXIFEN: ICD-10-CM

## 2021-08-12 DIAGNOSIS — R53.83 OTHER FATIGUE: ICD-10-CM

## 2021-08-12 DIAGNOSIS — Z17.0 MALIGNANT NEOPLASM OF OVERLAPPING SITES OF LEFT BREAST IN FEMALE, ESTROGEN RECEPTOR POSITIVE (HCC): Primary | ICD-10-CM

## 2021-08-12 DIAGNOSIS — M25.50 ARTHRALGIA OF MULTIPLE JOINTS: ICD-10-CM

## 2021-08-12 DIAGNOSIS — Z21 HIV POSITIVE LONG-TERM NON-PROGRESSOR (HCC): ICD-10-CM

## 2021-08-12 LAB
ALBUMIN SERPL-MCNC: 4.4 G/DL (ref 3.5–5.2)
ALBUMIN/GLOB SERPL: 1.8 G/DL
ALP SERPL-CCNC: 51 U/L (ref 39–117)
ALT SERPL W P-5'-P-CCNC: 15 U/L (ref 1–33)
ANION GAP SERPL CALCULATED.3IONS-SCNC: 7 MMOL/L (ref 5–15)
AST SERPL-CCNC: 21 U/L (ref 1–32)
BILIRUB SERPL-MCNC: 0.3 MG/DL (ref 0–1.2)
BUN SERPL-MCNC: 11 MG/DL (ref 8–23)
BUN/CREAT SERPL: 15.7 (ref 7–25)
CALCIUM SPEC-SCNC: 9.3 MG/DL (ref 8.6–10.5)
CHLORIDE SERPL-SCNC: 107 MMOL/L (ref 98–107)
CO2 SERPL-SCNC: 28 MMOL/L (ref 22–29)
CREAT SERPL-MCNC: 0.7 MG/DL (ref 0.57–1)
GFR SERPL CREATININE-BSD FRML MDRD: 85 ML/MIN/1.73
GLOBULIN UR ELPH-MCNC: 2.4 GM/DL
GLUCOSE SERPL-MCNC: 76 MG/DL (ref 65–99)
POTASSIUM SERPL-SCNC: 4 MMOL/L (ref 3.5–5.2)
PROT SERPL-MCNC: 6.8 G/DL (ref 6–8.5)
SODIUM SERPL-SCNC: 142 MMOL/L (ref 136–145)
T4 FREE SERPL-MCNC: 1.05 NG/DL (ref 0.93–1.7)
TSH SERPL DL<=0.05 MIU/L-ACNC: 1.67 UIU/ML (ref 0.27–4.2)
VIT B12 BLD-MCNC: >2000 PG/ML (ref 211–946)

## 2021-08-12 PROCEDURE — 84439 ASSAY OF FREE THYROXINE: CPT | Performed by: NURSE PRACTITIONER

## 2021-08-12 PROCEDURE — 25010000002 HEPARIN LOCK FLUSH PER 10 UNITS: Performed by: INTERNAL MEDICINE

## 2021-08-12 PROCEDURE — 36591 DRAW BLOOD OFF VENOUS DEVICE: CPT

## 2021-08-12 PROCEDURE — 99214 OFFICE O/P EST MOD 30 MIN: CPT | Performed by: NURSE PRACTITIONER

## 2021-08-12 PROCEDURE — 82607 VITAMIN B-12: CPT | Performed by: NURSE PRACTITIONER

## 2021-08-12 PROCEDURE — 80053 COMPREHEN METABOLIC PANEL: CPT | Performed by: NURSE PRACTITIONER

## 2021-08-12 PROCEDURE — 84443 ASSAY THYROID STIM HORMONE: CPT | Performed by: NURSE PRACTITIONER

## 2021-08-12 RX ORDER — HEPARIN SODIUM (PORCINE) LOCK FLUSH IV SOLN 100 UNIT/ML 100 UNIT/ML
500 SOLUTION INTRAVENOUS AS NEEDED
Status: DISCONTINUED | OUTPATIENT
Start: 2021-08-12 | End: 2021-08-12 | Stop reason: HOSPADM

## 2021-08-12 RX ORDER — SODIUM CHLORIDE 0.9 % (FLUSH) 0.9 %
20 SYRINGE (ML) INJECTION AS NEEDED
Status: CANCELLED | OUTPATIENT
Start: 2021-08-12

## 2021-08-12 RX ORDER — SODIUM CHLORIDE 0.9 % (FLUSH) 0.9 %
10 SYRINGE (ML) INJECTION AS NEEDED
Status: DISCONTINUED | OUTPATIENT
Start: 2021-08-12 | End: 2021-08-12 | Stop reason: HOSPADM

## 2021-08-12 RX ORDER — HEPARIN SODIUM (PORCINE) LOCK FLUSH IV SOLN 100 UNIT/ML 100 UNIT/ML
500 SOLUTION INTRAVENOUS AS NEEDED
Status: CANCELLED | OUTPATIENT
Start: 2021-08-12

## 2021-08-12 RX ORDER — SODIUM CHLORIDE 0.9 % (FLUSH) 0.9 %
10 SYRINGE (ML) INJECTION AS NEEDED
Status: CANCELLED | OUTPATIENT
Start: 2021-08-12

## 2021-08-12 RX ORDER — HEPARIN SODIUM (PORCINE) LOCK FLUSH IV SOLN 100 UNIT/ML 100 UNIT/ML
300 SOLUTION INTRAVENOUS ONCE
Status: CANCELLED | OUTPATIENT
Start: 2021-08-12

## 2021-08-12 RX ADMIN — SODIUM CHLORIDE, PRESERVATIVE FREE 10 ML: 5 INJECTION INTRAVENOUS at 11:13

## 2021-08-12 RX ADMIN — Medication 500 UNITS: at 11:13

## 2021-08-24 ENCOUNTER — HOSPITAL ENCOUNTER (OUTPATIENT)
Dept: PHYSICAL THERAPY | Facility: HOSPITAL | Age: 61
Setting detail: THERAPIES SERIES
Discharge: HOME | End: 2021-08-24

## 2021-08-24 VITALS — WEIGHT: 152 LBS | HEIGHT: 68 IN | BODY MASS INDEX: 23.04 KG/M2

## 2021-08-24 DIAGNOSIS — Z17.0 MALIGNANT NEOPLASM OF OVERLAPPING SITES OF LEFT BREAST IN FEMALE, ESTROGEN RECEPTOR POSITIVE (HCC): Primary | ICD-10-CM

## 2021-08-24 DIAGNOSIS — C50.812 MALIGNANT NEOPLASM OF OVERLAPPING SITES OF LEFT BREAST IN FEMALE, ESTROGEN RECEPTOR POSITIVE (HCC): Primary | ICD-10-CM

## 2021-08-24 DIAGNOSIS — M75.42 SHOULDER IMPINGEMENT SYNDROME, LEFT: ICD-10-CM

## 2021-08-24 DIAGNOSIS — Z91.89 AT RISK FOR LYMPHEDEMA: ICD-10-CM

## 2021-08-24 DIAGNOSIS — Z85.3 HISTORY OF LEFT BREAST CANCER: ICD-10-CM

## 2021-08-24 DIAGNOSIS — N64.89 BREAST EDEMA: ICD-10-CM

## 2021-08-24 DIAGNOSIS — L90.5 SCAR TISSUE: ICD-10-CM

## 2021-08-24 DIAGNOSIS — Z98.890 STATUS POST LEFT BREAST LUMPECTOMY: ICD-10-CM

## 2021-08-24 DIAGNOSIS — M25.612 DECREASED RANGE OF MOTION OF LEFT SHOULDER: ICD-10-CM

## 2021-08-24 DIAGNOSIS — M79.622 AXILLARY PAIN, LEFT: ICD-10-CM

## 2021-08-24 PROCEDURE — 97535 SELF CARE MNGMENT TRAINING: CPT | Performed by: PHYSICAL THERAPIST

## 2021-08-24 PROCEDURE — 97164 PT RE-EVAL EST PLAN CARE: CPT | Performed by: PHYSICAL THERAPIST

## 2021-08-24 NOTE — THERAPY RE-EVALUATION
Physical Therapy Lymphedema Re-Evaluation  [facility]     Patient Name: Richa Dolan  : 1960  MRN: [id number]  Today's Date: 2021      Visit Date: 2021    Visit Dx:    ICD-10-CM ICD-9-CM   1. Malignant neoplasm of overlapping sites of left breast in female, estrogen receptor positive (CMS/HCC)  C50.812 174.8    Z17.0 V86.0   2. Status post left breast lumpectomy  Z98.890 V45.89   3. At risk for lymphedema  Z91.89 V49.89   4. Decreased range of motion of left shoulder  M25.612 719.51   5. Breast edema  N64.89 611.89   6. History of left breast cancer  Z85.3 V10.3   7. Scar tissue  L90.5 709.2   8. Axillary pain, left  M79.622 729.5   9. Shoulder impingement syndrome, left  M75.42 726.2       Patient Active Problem List   Diagnosis   • Abnormal liver function tests   • Hypertension   • Insomnia   • Knee pain   • Leukopenia   • Acute right-sided low back pain without sciatica   • Knee osteoarthritis   • Osteoporosis   • Arthralgia of multiple joints   • Seasonal allergic rhinitis   • Thrombocytopenia (CMS/HCC)   • Right hip pain   • Bone cyst   • Chronic pain of right knee   • Acquired immunocompromised state (CMS/HCC)   • Bruises easily   • Loss of hair   • Anxiety   • Elevated liver enzymes   • GERD (gastroesophageal reflux disease)   • HIV positive long-term non-progressor (CMS/HCC)   • Hypercholesterolemia   • Lupus anticoagulant positive   • Neck pain, chronic   • Tear of medial meniscus of right knee, current   • Chondromalacia, knee   • ICH (intracerebral hemorrhage) (CMS/HCC)   • Stomach discomfort   • Abnormal bruising   • Alopecia   • Headache   • Intractable chronic migraine without aura and without status migrainosus   • Osteoarthritis of right knee   • Need for vaccination   • Mild episode of recurrent major depressive disorder (CMS/HCC)   • Ingrown nail of great toe of right foot   • Left wrist pain   • Breast cancer screening   • Malignant neoplasm of overlapping sites of  left breast in female, estrogen receptor positive (CMS/[facility])   • History of stroke   • Arthritis   • Family history of breast cancer   • Screening for diabetes mellitus   • Epistaxis   • Substance or medication-induced sleep disorder, 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CMS/[facility])   • Body aches   • Neuropathy   • History of left breast cancer   • Other fatigue   • HIV infection (CMS/[facility])   • Fat necrosis of left breast   • High risk medication use        Past Medical History:   Diagnosis Date   • Anxiety    • Cerebrovascular accident (CVA) (CMS/[facility]) 8/22/2017    NO RESIDUAL AFFECT   • DDD (degenerative disc disease), cervical    • Depression    • Drug therapy    • Elevated cholesterol    • GERD (gastroesophageal reflux disease)    • H/O ICH (intracerebral hemorrhage) (CMS/[facility])    • History of chemotherapy    • History of stroke    • History of thrombocytopenia    • HIV (human immunodeficiency virus infection) (CMS/[facility]) 1996   • Hx of radiation therapy    • Hyperlipidemia    • Insomnia    • Lupus anticoagulant positive    • Malignant neoplasm of overlapping sites of left breast in female, estrogen receptor positive (CMS/HCC) 11/12/2019   • Meniscus tear 2017    RIGHT   • Migraine    • Neck pain     WITH SHOOTING PAIN LEFT RIGHT ARM   • Osteoarthritis    • Osteoporosis    • Pain management     sees 1 every 3 months for scripts/injection/pain meds   • Seasonal allergies    • Spinal headache     THINKS HAD BLOOD PATCH AFTER EPIDUAL    • Tick bite 06/2014        Past Surgical History:   Procedure Laterality Date   • ADENOIDECTOMY     • BREAST LUMPECTOMY     • BREAST LUMPECTOMY WITH SENTINEL NODE BIOPSY Left 5/28/2020    Procedure: BREAST LUMPECTOMY WITH SENTINEL NODE BIOPSY AND NEEDLE LOCALIZATION AND axillary dissection;  Surgeon: Landen Forman MD;  Location: [facility];  Service: General;  Laterality: Left;   • BREAST SURGERY Left 5/28/2020    Procedure: LEFT LATERAL INTERCOASTAL ARTERY  flap ;  Surgeon: Radha  Yusuf Batista MD;  Location: [facility] OR;  Service: Plastics;  Laterality: Left;   • CHOLECYSTECTOMY     • HYSTERECTOMY     • KNEE ARTHROSCOPY Right 3/24/2017    Procedure:  RIGHT  KNEE ARTHROSCOPY WITH DEBRIDEMENT CHONDROPLASTY PARTIAL MENISCECTOMY;  Surgeon: Karl Galeas MD;  Location: [facility]  Service:    • KNEE CARTILAGE SURGERY Right 2007   • TONSILLECTOMY     • US GUIDED LYMPH NODE BIOPSY  10/29/2019    LEFT BREAST   • VENOUS ACCESS DEVICE (PORT) INSERTION Right 12/2/2019    Procedure: INSERTION VENOUS ACCESS DEVICE RIGHT;  Surgeon: Landen Forman MD;  Location: [facility] OR;  Service: General       Visit Dx:    ICD-10-CM ICD-9-CM   1. Malignant neoplasm of overlapping sites of left breast in female, estrogen receptor positive (CMS/HCC)  C50.812 174.8    Z17.0 V86.0   2. Status post left breast lumpectomy  Z98.890 V45.89   3. At risk for lymphedema  Z91.89 V49.89   4. Decreased range of motion of left shoulder  M25.612 719.51   5. Breast edema  N64.89 611.89   6. History of left breast cancer  Z85.3 V10.3   7. Scar tissue  L90.5 709.2   8. Axillary pain, left  M79.622 729.5   9. Shoulder impingement syndrome, left  M75.42 726.2           Lymphedema     Row Name 08/24/21 1445             Subjective Pain    Able to rate subjective pain?  yes  -SC      Pre-Treatment Pain Level  0  -SC      Subjective Pain Comment  tightness  -SC         Lymphedema Assessment    Lymphedema Classification  LUE:;at risk/stage 0;secondary;Other: left breast, at risk  -SC      Lymphedema Cancer Related Sx  left;lumpectomy;sentinel node biopsy;axillary dissection;reconstructive  -SC      Lymphedema Surgery Comments  Port 12/02/2019; L lump/AD 05/28/2020  -SC      Lymph Nodes Removed #  13  -SC      Positive Lymph Nodes #  10  -SC      Chemo Received  yes  -SC      Chemo Treatments #/Timeframe  Port 12/02/2019; L lump/AD 05/28/2020  -SC      Radiation Therapy Received  yes  -SC      Radiation Treatments  #/Timeframe  completed 08/18/2020  -SC      Infections or Cellulitis?  no  -SC      Lymphedema Assessment Comments  high risk, negative currently  -SC         Lymphedema Edema Assessment    Ptting Edema Category  By grade out of 4  -SC      Pitting Edema  + 1/4 (+1 of 4) negative  -SC      Edema Assessment Comment  negative  -SC         Skin Changes/Observations    Skin Observations Comment  retraction at biopsy site left breast but otherwise lymphatics well healed, dicussion of reconstruction options if indicated  -SC         Lymphedema Sensation    Lymphedema Sensation Tests  light touch  -SC      Lymphedema Light Touch  LUE:;mild impairment;moderate impairment  -SC         Lymphedema Pulses/Capillary Refill    Lymph Pulses Capillary Refill Comments  intact  -SC         Lymphedema Measurements    Lymphedema Measurements Comments  intact  -SC         LUE Quick Girth (cm)    Axilla  26.5 cm  -SC      Mid upper arm  24 cm  -SC      Elbow  22 cm  -SC      Mid forearm  20 cm  -SC      Wrist crease  14.7 cm  -SC      Met-heads  16.8 cm  -SC      LUE Quick Girth Total  124  -SC         Manual Lymphatic Drainage    Manual Lymphatic Drainage  initial sequence;opened regional lymph nodes;opened anastamoses;extremity treatment  -SC      Initial Sequence  supraclavicular;shoulder collectors;abdomen;diaphragmatic breathing  -SC      Supraclavicular  right;left  -SC      Shoulder Collectors  right;left  -SC      Abdomen  superficial  -SC      Opened Regional Lymph Nodes  axillary;inguinal;ribs  -SC      Axillary  right;left  -SC      Inguinal  left  -SC      Opened Anastamoses  anterior axillo-axillary;axillo-inguinal  -SC      Axillo-Inguinal  left  -SC      Extremity Treatment  simple/brief MLD;MLD to proximal limb only  -SC      Simple/Brief MLD  left  -SC      MLD to Proximal Limb Only  left  -SC      Manual Lymphatic Drainage Comments  focus of lymphatic massage left breast  -SC      Manual Therapy  PROM left shoulder all  "planes with reassessment  -SC         L-Dex Bioimpedence Screening    L-Dex Measurement Extremity  LUE  -SC      L-Dex Patient Position  Standing  -SC      L-Dex UE Dominate Side  Right  -SC      L-Dex UE At Risk Side  Left  -SC      L-Dex UE Pre Surgical Value  No  -SC      L-Dex UE Score  1.3  -SC      L-Dex UE Baseline Score  -4.7  -SC      L-Dex UE Value Change  6  -SC      L-Dex UE Comment  WNL, elevated but improved since Dec 2020 (4.4)  -SC      Height  172.7 cm (67.99\")  -SC      Weight  68.9 kg (152 lb)  -SC      BMI (Calculated)  23.1  -SC        User Key  (r) = Recorded By, (t) = Taken By, (c) = Cosigned By    Initials Name Provider Type    SC Yola Valenzuela, PT Physical Therapist            PT Ortho     Row Name 08/24/21 1445       Precautions and Contraindications    Precautions  R mediport  -SC    Contraindications  L AD, no IV/BP L UE, no modalities  -SC       Posture/Observations    Alignment Options  Forward head;Cervical lordosis;Thoracic kyphosis;Rounded shoulders;Scapular elevation;Scapular winging;Scoliosis;Lumbar lordosis;Genu valgus;Foot pronation  -SC    Forward Head  Mild;Increased;Sitting posture  -SC    Cervical Lordosis  Normal  -SC    Thoracic Kyphosis  Mild;Increased;Sitting posture  -SC    Rounded Shoulders  Bilateral:;Mild;Increased;Sitting posture  -SC    Scapular Elevation  Right:;Mild;Increased;Elevated;Sitting posture  -SC    Scapular winging  Bilateral:;Normal  -SC    Scoliosis  Normal;Standing posture  -SC    Lumbar lordosis  Mild;Decreased;Sitting posture  -SC    Genu valgus  Bilateral:;Normal;Standing posture  -SC    Foot pronation  Bilateral:;Normal;Standing posture  -SC    Observations  Incision healing;Muscle atrophy  -SC    Posture/Observations Comments  near normal posturing  -SC       Shoulder Girdle Accessory Motions    Posterior glide of humerus  Left:;WNL  -SC    Inferior glide of humerus  Left:;WNL  -SC    Lateral distraction  Left:;WNL  -SC       Shoulder " Impingement/Rotator Cuff Special Tests    Zaidi-Elvin Test (RC Lesion vs. Bursitis)  Left:;Negative  -SC    Neer Impingement Test (RC Lesion vs. Bursitis)  Left:;Negative  -SC    Empty Can Test (RC Lesion)  --  -SC    Drop Arm Test (Full Thickness RC Lesion)  --  -SC    Internal Impingement Sign  --  -SC       Shoulder Girdle Palpation    Long Head of Biceps  --  -SC       General ROM    GENERAL ROM COMMENTS  B UEs WNLs  -SC       MMT (Manual Muscle Testing)    General MMT Comments  B UEs WNLs  -SC       Gait/Stairs (Locomotion)    Comment (Gait/Stairs)  normal  -SC      User Key  (r) = Recorded By, (t) = Taken By, (c) = Cosigned By    Initials Name Provider Type    Yola Rodriguez, PT Physical Therapist                    Therapy Education  Education Details: bioimpedance test and results, continuation of care, early intervention techniques, reconstruction options, prognosis, bioimpedance protocol  Given: HEP, Symptoms/condition management, Pain management, Posture/body mechanics, Mobility training, Edema management, Other (comment)  Program: Modified  How Provided: Verbal, Demonstration  Provided to: Patient  Level of Understanding: Teach back education performed, Verbalized, Demonstrated  27496 - PT Self Care/Mgmt Minutes: 12      OP Exercises     Row Name 08/24/21 2638             Subjective Pain    Able to rate subjective pain?  yes  -SC      Pre-Treatment Pain Level  0  -SC      Subjective Pain Comment  tightness  -SC        User Key  (r) = Recorded By, (t) = Taken By, (c) = Cosigned By    Initials Name Provider Type    Yola Rodriguez PT Physical Therapist                      PT OP Goals     Row Name 08/24/21 1448          PT Short Term Goals    STG 1  Patient independent and compliant with initial home exercise program focused on diaphragmatic breathing, range of motion, flexibility to decrease edema and improve lymphatic flow for decreased edema and decreased risk of infection.   -SC      STG 1 Progress  Met  -SC     STG 2  Patient demonstrate proper awareness of What is Lymphedema and 18 Steps of Prevention for improved prevention, management, care of symptoms and ease of transition to self-care of condition.   -SC     STG 2 Progress  Met  -SC     STG 3  Patient demonstrate independence and compliance with proper skin care during/post radiation for improved mobility, decreased scar tissue, axillary cording and edema.  -SC     STG 3 Progress  Met  -SC     STG 4  Patient independent and compliant with breast mobilization techniques for improved mobility, skin care and lymphatic flow.  -SC     STG 4 Progress  Met  -SC     STG 5  Patient independent and compliant with daytime OTS prevention compression garments as indicated for decreased risk of lymphedema and infection and safety with transition of self-care of condition.   -SC     STG 5 Progress  Met  -SC        Long Term Goals    LTG Date to Achieve  11/26/21  -SC     LTG 1  Patient's bioimpedance score to remain between -10 and 6.5 for decreased risk of developing stage II post lumpectomy lymphedema syndrome left UE and to establish treatment for early intervention of condition if/when indicated.  -SC     LTG 1 Progress  Progressing baseline post op 06/18/2020 -4.7  -SC     LTG 1 Progress Comments  currently 1.3, improved since Dec 2020, WNL, negative for subclinical lymphedema left UE at this time  -SC     LTG 2  Patient independent and compliant with advanced Home Exercise Program and self-care techniques for self-management of condition.   -SC     LTG 2 Progress  Met  -SC     LTG 3  Patient able to wear fitted post lumpectomy/radiation bra with padding left (if indicated)  >/=6-8 hours for work schedule for improved compression to lateral flank/left breast.  -SC     LTG 3 Progress  Met  -SC     LTG 4  Patient transition to cardiovascular exercise program (walking) >/=150 to 180 mins/week with moderate intensity for improved heart health, weight  loss, decreased risk of osteoporosis and improved phase angle/metabolic rate.  -SC     LTG 4 Progress  Met  -SC     LTG 5  Patient score </=25% on Quick DASH for improved functional use of L UE home, community, and sleep  -SC     LTG 5 Progress  Met initial evaluation 50%  -SC     LTG 6  Patient with negative impingement testing left shoulder for improved mobility, posture and decreased pain with ADLs and home activities  -SC     LTG 6 Progress  Met  -SC     LTG 7  Patient with minimal fibrosis left breast for decreased risk of stage II lymphedema left and progression of decrease/infections  -SC     LTG 7 Progress  Met  -SC     LTG 8  Pt will achieve 4/5 MMT of bilateral LT, MT, and SA  -SC     LTG 8 Progress  Met  -SC     LTG 9  Pt will achieve 4/5 MMT of L shoulder IR and L shoulder ER  -SC     LTG 9 Progress  Progressing  -SC        Time Calculation    PT Goal Re-Cert Due Date  11/23/21  -SC       User Key  (r) = Recorded By, (t) = Taken By, (c) = Cosigned By    Initials Name Provider Type    Yola Rodriguez, PT Physical Therapist          PT Assessment/Plan     Row Name 08/24/21 1445          PT Assessment    Functional Limitations  Limitations in functional capacity and performance;Performance in leisure activities;Limitations in community activities;Performance in sport activities due to fatigue/energy, not related to shoulder or lymphedema currently  -SC     Impairments  Endurance;Impaired lymphatic circulation  -SC     Assessment Comments  Mrs. Dolan is a pleasant 60 year old female, diagnosed with left breast cancer in 2019, mediport placement right 12/02/2019, initiated neoadjuvant chemotherapy with oncologist Dr. Moreno Dec 2019, completed April 2020, AC/Taxol, s/p left lumpectomy with axillary dissection 10/13 L ALN (+) with flap reconstruction closure 05/28/2020 performed by surgeons Dr. Forman and Radha. Completed adjuvant radiation in August 2020. Mild post radiation skin changes.  Patient was treated regularly until March 2021 for persisting left shoulder pain/impingement and left breast lymphedema. Patients returns today after being on hold from formal therapy from April to August 2021 due to PT’s schedule. Patient’s current L-Dex is WNL 1.3. Negative s/s of lymphedema. Circumferential measurements improved. Negative left breast lymphedema. Negative impingement testing left with good mobility/range of motion. Achieved all established goals. Due to good progress, to continue bioimpedance protocol and return in 3 months for reassessment unless otherwise indicated. She does have increased fatigue, malaise, generalized joint pain and weight gain with decreased energy and appetite. Referral back to oncology due to new onset symptoms. Has been on hold from Tamoxifen 2 weeks.  -SC        PT Plan    PT Plan Comments  bioimpedance protocol, to return in 3 months unless otherwise indicated, PT to contact oncology support for patient  -SC       User Key  (r) = Recorded By, (t) = Taken By, (c) = Cosigned By    Initials Name Provider Type    Yola Rodriguez, PT Physical Therapist             Outcome Measure Options: Quick DASH  Quick DASH  Open a tight or new jar.: Mild Difficulty  Do heavy household chores (e.g., wash walls, wash floors): Mild Difficulty  Carry a shopping bag or briefcase: Mild Difficulty  Wash your back: Mild Difficulty  Use a knife to cut food: No Difficulty  Recreational activities in which you take some force or impact through your arm, should or hand (e.g. golf, hammering, tennis, etc.): Mild Difficulty  During the past week, to what extent has your arm, shoulder, or hand problem interfered with your normal social activites with family, friends, neighbors or groups?: Slightly  During the past week, were you limited in your work or other regular daily activities as a result of your arm, shoulder or hand problem?: Not limited at all  Arm, Shoulder, or hand pain: None  Tingling  (pins and needles) in your arm, shoulder, or hand: Mild  During the past week, how much difficulty have you had sleeping because of the pain in your arm, shoulder or hand?: Mild Difficulty  Number of Questions Answered: 11  Quick DASH Score: 18.18         Time Calculation:   Start Time: 1445  Stop Time: 1520  Time Calculation (min): 35 min  Timed Charges  92930 - PT Self Care/Mgmt Minutes: 12  Untimed Charges  PT Eval/Re-eval Minutes: 23  Total Minutes  Timed Charges Total Minutes: 12  Untimed Charges Total Minutes: 23   Total Minutes: 35   Therapy Charges for Today     Code Description Service Date Service Provider Modifiers Qty    06369547365 HC PT SELF CARE/MGMT/TRAIN EA 15 MIN 8/24/2021 Yola Valenzuela, PT GP 1    88971755955 HC PT RE-EVAL ESTABLISHED PLAN 2 8/24/2021 Yola Valenzuela, PT GP 1          PT G-Codes  Outcome Measure Options: Quick DASH  Quick DASH Score: 18.18         Yola Broderick PT  8/24/2021

## 2021-08-26 ENCOUNTER — LAB (OUTPATIENT)
Dept: ONCOLOGY | Facility: HOSPITAL | Age: 61
End: 2021-08-26

## 2021-08-26 ENCOUNTER — OFFICE VISIT (OUTPATIENT)
Dept: ONCOLOGY | Facility: CLINIC | Age: 61
End: 2021-08-26

## 2021-08-26 VITALS
WEIGHT: 150.4 LBS | SYSTOLIC BLOOD PRESSURE: 124 MMHG | HEIGHT: 68 IN | DIASTOLIC BLOOD PRESSURE: 80 MMHG | TEMPERATURE: 98 F | OXYGEN SATURATION: 98 % | HEART RATE: 67 BPM | RESPIRATION RATE: 16 BRPM | BODY MASS INDEX: 22.79 KG/M2

## 2021-08-26 DIAGNOSIS — T73.2XXD FATIGUE DUE TO EXPOSURE, SUBSEQUENT ENCOUNTER: ICD-10-CM

## 2021-08-26 DIAGNOSIS — Z17.0 MALIGNANT NEOPLASM OF OVERLAPPING SITES OF LEFT BREAST IN FEMALE, ESTROGEN RECEPTOR POSITIVE (HCC): Primary | ICD-10-CM

## 2021-08-26 DIAGNOSIS — C50.812 MALIGNANT NEOPLASM OF OVERLAPPING SITES OF LEFT BREAST IN FEMALE, ESTROGEN RECEPTOR POSITIVE (HCC): Primary | ICD-10-CM

## 2021-08-26 DIAGNOSIS — R52 BODY ACHES: ICD-10-CM

## 2021-08-26 DIAGNOSIS — Z13.1 SCREENING FOR DIABETES MELLITUS: ICD-10-CM

## 2021-08-26 LAB
ALBUMIN SERPL-MCNC: 4.1 G/DL (ref 3.5–5.2)
ALBUMIN/GLOB SERPL: 1.6 G/DL (ref 1.1–2.4)
ALP SERPL-CCNC: 54 U/L (ref 38–116)
ALT SERPL W P-5'-P-CCNC: 11 U/L (ref 0–33)
ANION GAP SERPL CALCULATED.3IONS-SCNC: 8.9 MMOL/L (ref 5–15)
AST SERPL-CCNC: 20 U/L (ref 0–32)
BASOPHILS # BLD AUTO: 0.02 10*3/MM3 (ref 0–0.2)
BASOPHILS NFR BLD AUTO: 0.4 % (ref 0–1.5)
BILIRUB SERPL-MCNC: 0.2 MG/DL (ref 0.2–1.2)
BUN SERPL-MCNC: 13 MG/DL (ref 6–20)
BUN/CREAT SERPL: 19.4 (ref 7.3–30)
CALCIUM SPEC-SCNC: 8.9 MG/DL (ref 8.5–10.2)
CHLORIDE SERPL-SCNC: 106 MMOL/L (ref 98–107)
CO2 SERPL-SCNC: 28.1 MMOL/L (ref 22–29)
CREAT SERPL-MCNC: 0.67 MG/DL (ref 0.6–1.1)
DEPRECATED RDW RBC AUTO: 46.5 FL (ref 37–54)
EOSINOPHIL # BLD AUTO: 0.06 10*3/MM3 (ref 0–0.4)
EOSINOPHIL NFR BLD AUTO: 1.1 % (ref 0.3–6.2)
ERYTHROCYTE [DISTWIDTH] IN BLOOD BY AUTOMATED COUNT: 12.4 % (ref 12.3–15.4)
GFR SERPL CREATININE-BSD FRML MDRD: 90 ML/MIN/1.73
GLOBULIN UR ELPH-MCNC: 2.5 GM/DL (ref 1.8–3.5)
GLUCOSE SERPL-MCNC: 135 MG/DL (ref 74–124)
HCT VFR BLD AUTO: 36.1 % (ref 34–46.6)
HGB BLD-MCNC: 12.1 G/DL (ref 12–15.9)
IMM GRANULOCYTES # BLD AUTO: 0.01 10*3/MM3 (ref 0–0.05)
IMM GRANULOCYTES NFR BLD AUTO: 0.2 % (ref 0–0.5)
LYMPHOCYTES # BLD AUTO: 1.62 10*3/MM3 (ref 0.7–3.1)
LYMPHOCYTES NFR BLD AUTO: 28.7 % (ref 19.6–45.3)
MCH RBC QN AUTO: 33.8 PG (ref 26.6–33)
MCHC RBC AUTO-ENTMCNC: 33.5 G/DL (ref 31.5–35.7)
MCV RBC AUTO: 100.8 FL (ref 79–97)
MONOCYTES # BLD AUTO: 0.6 10*3/MM3 (ref 0.1–0.9)
MONOCYTES NFR BLD AUTO: 10.6 % (ref 5–12)
NEUTROPHILS NFR BLD AUTO: 3.33 10*3/MM3 (ref 1.7–7)
NEUTROPHILS NFR BLD AUTO: 59 % (ref 42.7–76)
NRBC BLD AUTO-RTO: 0 /100 WBC (ref 0–0.2)
PLATELET # BLD AUTO: 214 10*3/MM3 (ref 140–450)
PMV BLD AUTO: 9.8 FL (ref 6–12)
POTASSIUM SERPL-SCNC: 4.1 MMOL/L (ref 3.5–4.7)
PROT SERPL-MCNC: 6.6 G/DL (ref 6.3–8)
RBC # BLD AUTO: 3.58 10*6/MM3 (ref 3.77–5.28)
SODIUM SERPL-SCNC: 143 MMOL/L (ref 134–145)
WBC # BLD AUTO: 5.64 10*3/MM3 (ref 3.4–10.8)

## 2021-08-26 PROCEDURE — 80053 COMPREHEN METABOLIC PANEL: CPT

## 2021-08-26 PROCEDURE — 99213 OFFICE O/P EST LOW 20 MIN: CPT | Performed by: NURSE PRACTITIONER

## 2021-08-26 PROCEDURE — 36591 DRAW BLOOD OFF VENOUS DEVICE: CPT

## 2021-08-26 PROCEDURE — 85025 COMPLETE CBC W/AUTO DIFF WBC: CPT

## 2021-08-26 PROCEDURE — 25010000002 HEPARIN LOCK FLUSH PER 10 UNITS: Performed by: INTERNAL MEDICINE

## 2021-08-26 PROCEDURE — 36415 COLL VENOUS BLD VENIPUNCTURE: CPT

## 2021-08-26 RX ORDER — SODIUM CHLORIDE 0.9 % (FLUSH) 0.9 %
20 SYRINGE (ML) INJECTION AS NEEDED
Status: CANCELLED | OUTPATIENT
Start: 2021-08-26

## 2021-08-26 RX ORDER — SODIUM CHLORIDE 0.9 % (FLUSH) 0.9 %
10 SYRINGE (ML) INJECTION AS NEEDED
Status: DISCONTINUED | OUTPATIENT
Start: 2021-08-26 | End: 2021-08-26 | Stop reason: HOSPADM

## 2021-08-26 RX ORDER — HEPARIN SODIUM (PORCINE) LOCK FLUSH IV SOLN 100 UNIT/ML 100 UNIT/ML
500 SOLUTION INTRAVENOUS AS NEEDED
Status: CANCELLED | OUTPATIENT
Start: 2021-08-26

## 2021-08-26 RX ORDER — HEPARIN SODIUM (PORCINE) LOCK FLUSH IV SOLN 100 UNIT/ML 100 UNIT/ML
500 SOLUTION INTRAVENOUS AS NEEDED
Status: DISCONTINUED | OUTPATIENT
Start: 2021-08-26 | End: 2021-08-26 | Stop reason: HOSPADM

## 2021-08-26 RX ORDER — HEPARIN SODIUM (PORCINE) LOCK FLUSH IV SOLN 100 UNIT/ML 100 UNIT/ML
300 SOLUTION INTRAVENOUS ONCE
Status: CANCELLED | OUTPATIENT
Start: 2021-08-26

## 2021-08-26 RX ORDER — SODIUM CHLORIDE 0.9 % (FLUSH) 0.9 %
10 SYRINGE (ML) INJECTION AS NEEDED
Status: CANCELLED | OUTPATIENT
Start: 2021-08-26

## 2021-08-26 RX ADMIN — Medication 500 UNITS: at 14:57

## 2021-08-26 RX ADMIN — Medication 10 ML: at 14:57

## 2021-08-26 NOTE — PROGRESS NOTES
Subjective     REASON FOR FOLLOW UP:    1.  T2N1 invasive ductal carcinoma of the left breast.  Ultrasound-showed 3.8 x 3.1 x 2.5 cm mass at 4 o'clock position with 2 abnormal axillary lymph nodes, larger lymph node being 1.4 cm.  Left breast biopsy and axillary lymph node biopsy October 29, 2019, invasive ductal carcinoma, moderately differentiated, grade 2, ER 85%, MT 10%, HER-2/merna 2+, HER-2 FISH negative.  · 12/06/19: Neoadjuvant chemo therapy, cycle #1 of dose-dense Adriamycin/Cytoxan with Neulasta for marrow support    · 01/17/20: Last cycle of Adriamycin/Cytoxan given  · January 31, 2020: Cycle 1 Taxol  · April 24, 2020: Last cycle, cycle 12 of Taxol  · Patient is s/p left mastectomy with axillary dissection with reconstruction.  She is healing up nicely.  She has a drain in place.  Pathology showed invasive breast cancer which is 55 x 40 mm in size, grade 2 with focal lymphovascular space invasion with DCIS spanning over 36 x 6 mm.  All margins were negative for cancer.  · 8 of the additional lymph nodes out of 11 were positive with the largest micrometastasis measuring 5 mm.  Extranodal extension was seen.  · Biomarkers on post neoadjuvant tumor is ER 81-90% MT 5% HER-2/merna 2+ by immunohistochemistry and HER-2/merna negative by FISH.  · Radiation July 6, 2020-August 17, 2020.  · Letrozole initiated July 2020.  · Letrozole discontinued October 2020 secondary to severe joint pain.  · October 30, 2020 patient starting tamoxifen and tolerating well    2.  HIV, followed by Dr. Dawn from infectious disease.  Well controlled on meds    3.  Screening mammogram December 7, 2020 showed indeterminate calcifications lower outer middle left breast. Diagnostic mammogram showed 0.4 cm calcifications in the left breast.  Stereotactic biopsy January 8, 2021 consistent with organizing fat necrosis.              HISTORY OF PRESENT ILLNESS:  The patient is a 60 y.o. year old female with the above medical history who is  seen today in short-term follow-up.  She was just seen 2 weeks ago as a triage visit by ОЛЬГА Lopes, complaining of extreme fatigue and body aches.  We checked labs including thyroid studies and B12.  Thyroid was completely normal.  B12 was actually elevated and patient does take vitamin B supplements which she notes recently holding.  We did have her hold tamoxifen concerned that this was the cause for her symptoms.    I have reviewed with her today the results of her recent lab work showing no concerning findings to attribute to her fatigue and aches.  As she is reviewed back today she continues to have body aches that she states are no better off tamoxifen.  We discussed is only been 2 weeks and it may take a little longer.  We discussed that we can also obtain a CA 15-3 today to look for possible elevation of tumor marker.  This could help guide need for any possible imaging.    Patient does have underlying HIV, on long-term medications for this with no changes in the last few years.  Discussed that I am not sure of any possible chemo of side effects with these drugs that could be contributing to her symptoms potentially.    Otherwise patient denies new concerns at this time.    Past Medical History:   Diagnosis Date   • Anxiety    • Cerebrovascular accident (CVA) (CMS/HCC) 8/22/2017    NO RESIDUAL AFFECT   • DDD (degenerative disc disease), cervical    • Depression    • Drug therapy    • Elevated cholesterol    • GERD (gastroesophageal reflux disease)    • H/O ICH (intracerebral hemorrhage) (CMS/HCC)    • History of chemotherapy    • History of stroke    • History of thrombocytopenia    • HIV (human immunodeficiency virus infection) (CMS/HCC) 1996   • Hx of radiation therapy    • Hyperlipidemia    • Insomnia    • Lupus anticoagulant positive    • Malignant neoplasm of overlapping sites of left breast in female, estrogen receptor positive (CMS/HCC) 11/12/2019   • Meniscus tear 2017    RIGHT   • Migraine     • Neck pain     WITH SHOOTING PAIN LEFT RIGHT ARM   • Osteoarthritis    • Osteoporosis    • Pain management     sees 1 every 3 months for scripts/injection/pain meds   • Seasonal allergies    • Spinal headache     THINKS HAD BLOOD PATCH AFTER EPIDUAL    • Tick bite 06/2014     PAST MEDICAL HISTORY:  She had a stroke in July 2017 with headache and dizziness.  She went to the ER and was placed on aspirin.  However, she stopped taking it two weeks ago.  She has been taking Aimovig (injection) monthly with Fariba López at [place].    In 1994, patient was diagnosed with HIV with an unknown cause which is managed by Dr. Natali Subramanian.  As of now, her viral load is good.    Patient has arthritis.  She gets trigger-point injections in her back with her last one being 2 weeks ago.  She has had multiple ablations.  She also has pain in her SI joint and Dr. Bermudez performed a surgery on this.  She takes Narco for the pain.      She is osteoporotic.  She has had multiple hairline fractures in both her legs.  She had her knees cleaned out by Dr. Sinha.     Patient has had a hysterectomy and still has both of her ovaries.    Patient has vertigo and the muscles in her left ear are weak.  She just has an imbalance.    Past Surgical History:   Procedure Laterality Date   • ADENOIDECTOMY     • BREAST LUMPECTOMY     • BREAST LUMPECTOMY WITH SENTINEL NODE BIOPSY Left 5/28/2020    Procedure: BREAST LUMPECTOMY WITH SENTINEL NODE BIOPSY AND NEEDLE LOCALIZATION AND axillary dissection;  Surgeon: Landen Forman MD;  Location: [facility];  Service: General;  Laterality: Left;   • BREAST SURGERY Left 5/28/2020    Procedure: LEFT LATERAL INTERCOASTAL ARTERY  flap ;  Surgeon: Yusuf Garcia MD;  Location: [facility];  Service: Plastics;  Laterality: Left;   • CHOLECYSTECTOMY     • HYSTERECTOMY     • KNEE ARTHROSCOPY Right 3/24/2017    Procedure:  RIGHT  KNEE ARTHROSCOPY WITH DEBRIDEMENT CHONDROPLASTY PARTIAL  MENISCECTOMY;  Surgeon: Karl Galeas MD;  Location: [facility];  Service:    • KNEE CARTILAGE SURGERY Right 2007   • TONSILLECTOMY     • US GUIDED LYMPH NODE BIOPSY  10/29/2019    LEFT BREAST   • VENOUS ACCESS DEVICE (PORT) INSERTION Right 12/2/2019    Procedure: INSERTION VENOUS ACCESS DEVICE RIGHT;  Surgeon: Landen Forman MD;  Location: [facility];  Service: General       ONCOLOGIC HISTORY:  Patient is a 59-year-old female who has had a history of HIV since age 34 followed by Dr. Natali Ng at [facility] and was on multiple HIV drugs initially but more recently has been on a single medication TRIUMEQ, with well-controlled HIV.  She follows up with Dr. Dawn , infectious disease who saw her recently and he saw her on 4/8/2019 and has excellent control and suppression of her viral load.  She is very compliant with her medications.    She also has had history of stroke in 2017 for which she was placed on aspirin.  She presented with a headache.  She is very active and works at [employer] full-time.  She also has had history of vertigo in the past  Patient's PCP is Zach Lora.    Patient first noticed the mass in her left breast 5 months ago.  Her last mammogram was 5 years ago.  She had soreness in the left breast and she went to her primary care physician who then ordered a mammogram diagnostic and an ultrasound.  The diagnostic mammogram showed a 3.7 cm mass at 4 o'clock position in the lower outer quadrant of the left breast.  The ultrasound showed 3.8 cm x 3.1 x 2.5 cm mass at 4 o'clock position in the left breast with 2 abnormal appearing left axillary lymph nodes the largest being 1.4 cm.    She then underwent biopsy of both the breast mass as well as the left axillary lymph node and both of them are positive for invasive mammary carcinoma it is invasive ductal carcinoma with apocrine features, moderately differentiated, grade 2 of 3 with a [place]  score of 5.  The left axillary lymph node is also consistent with metastatic mammary carcinoma.  It is ER positive SC positive HER-2/merna negative.  Details as follows    10/21/19 - Bilateral Diagnostic Mammogram and US Breast Left  FINDINGS: Bilateral digital CC and MLO mammographic images were  obtained. No prior examination is available for comparison. Scattered  fibroglandular densities are seen throughout both breasts. A triangular  skin marker represents the area of palpable concern in the lower outer  quadrant of the left breast in the posterior 1/3. At this location there  is an irregular mass that measures on the order of 3.7 cm in greatest  dimension. Internal calcifications are noted. No suspicious findings in  the right breast are appreciated.     ULTRASOUND: Targeted sonographic evaluation of the left breast was  performed through the area of concern in the left axilla. At the 4  o'clock position on the order of 6 cm from the nipple there is an  irregular hypoechoic mass that measures 3.8 x 3.1 x 2.5 cm. Internal  vascularity is noted.     There are 2 abnormal-appearing left axillary lymph nodes, the largest  which measures on the order of 1.4 cm in greatest dimension.     IMPRESSION:  1. There is a 3.8 cm irregular mass in the left breast at the 4 o'clock  position. This is seen in conjunction with an irregular 1.4 cm lymph  node in the left axilla. This is suspicious for malignancy with left  axillary involvement. Correlation with ultrasound-guided biopsy of both  the left breast mass and the lymph node is recommended.  2. There are no findings suspicious for malignancy in the right breast.     BI-RADS CATEGORY 5:     Patient's labs from 11/12/19 are normal.    10/29/19 - Tissue Pathology  1. Left Breast, 4:00, 6 cm FN, U/S-Guided Core Needle Biopsy for a Mass:  A. INVASIVE DUCTAL CARCINOMA WITH APOCRINE FEATURES, Moderately differentiated;  Jen Histologic Grade II/III (tubule score = 3, nuclear  score = 2, mitoses score = 1), measuring at least  9 mm.  B. No ductal or lobular carcinoma in situ is identified in this sample.  C. Focus suspicious for lymphovascular space invasion.  D. See Biomarker Template for hormone receptor studies.  2. Lymph Node, Left Axilla, U/S-Guided Core Needle Biopsy:  A. METASTATIC MAMMARY CARCINOMA (see Comment).  B. Metastatic focus measures 5 mm in greatest extent.  ER+, 85%  TX+, 10%  HER2- Score 2+    11/13/19 - CT Neck Chest Abdomen Pelvis  IMPRESSION:  1. In addition to the irregular approximately 3.8 cm mass within the  lateral aspect of the left breast, there are a few subcentimeter  asymmetric nodules along the lateral margin of the glandular tissue. The  several asymmetric left subpectoral nodes and 1.2 x 1.0 cm left axillary  node are suspicious for tamar metastases. The asymmetric subcentimeter  left supraclavicular and left posterior cervical triangle nodes are  worrisome as well.  2. There is no convincing evidence for metastatic disease within the  abdomen or pelvis.    11/14/19 - Echocardiogram:  Normal.  Calculated EF = 67%.  There is trace mitral valve and tricuspid valve regurgitation.    11/15/19 - Bone Scan  Negative.    11/18/19 - MRI Breast Bilateral  IMPRESSION:  1. Biopsy-proven malignancy in the left breast centered at the 4 o'clock  position with associated surrounding satellite nodules as described. The  entire region of involvement including the satellite nodules and the  dominant mass measure up to 7.5 cm in greatest dimension. Also, left  axillary adenopathy with multiple irregular lymph nodes and loss of  differentiation of the borders of multiple lymph nodes is noted and this  is suspicious for extranodal involvement.  2. There are no findings suspicious for malignancy in the right breast.  BI-RADS CATEGORY 6      12/06/19: Cycle #1 of Adriamycin/Cytoxan    01/17/20: Last cycle of Adriamycin/Cytoxan given without Neulasta.  We will switch from  Neulasta to 7 days of Neupogen injections after cycle 4.    We reviewed with patient the US Breast from 20 which shows mild interval partial response to neoadjuvant chemotherapy.      20: Initiated cycle 1 Taxol  2020: Last cycle of Taxol, cycle 12    S/p left mastectomy with axillary dissection   Pathology showed invasive breast cancer which is 55 x 40 mm in size, grade 2 with focal lymphovascular space invasion with DCIS spanning over 36 x 6 mm.  All margins were negative for cancer.  8 of the additional lymph nodes out of 11 were positive with the largest micrometastasis measuring 5 mm.  Extranodal extension was seen.    Biomarkers on post neoadjuvant tumor is ER 81-90% UT 5% HER-2/merna 2+ by immunohistochemistry and HER-2/merna negative by FISH.    2020: Started letrozole but because of severe joint pains was discontinued 2020    End of 2020 started tamoxifen    OB-GYN:  Menarche 15 years  Menopause-46  Pregnancies- 1 para 1 no miscarriages her first pregnancy was in  at 29 years of age.  She did not breastfeed.  Post menopausal HRT- None.  Hx of birth control pills- Yes.    Current Outpatient Medications on File Prior to Visit   Medication Sig Dispense Refill   • AIMOVIG 140 MG/ML solution auto-injector INJECT 140 MG INTO THE SKIN EVERY 30 (THIRTY) DAYS.  11   • atorvastatin (LIPITOR) 20 MG tablet Take 1 tablet by mouth every night at bedtime. 90 tablet 3   • baclofen (LIORESAL) 10 MG tablet Take 10 mg by mouth 2 (Two) Times a Day.  2   • clonazePAM (KlonoPIN) 0.5 MG tablet TAKE 1 TABLET BY MOUTH 2 (TWO) TIMES A DAY AS NEEDED FOR ANXIETY. 60 tablet 1   • colesevelam (WELCHOL) 625 MG tablet TAKE 2 TABLETS BY MOUTH EVERY  tablet 3   • escitalopram (LEXAPRO) 10 MG tablet Take 1 tablet by mouth Daily. 90 tablet 3   • fluticasone (FLONASE) 50 MCG/ACT nasal spray 2 sprays into the nostril(s) as directed by provider Daily. 3 bottle 3   • Loratadine (CLARITIN  PO) Take 10 mg by mouth Daily.     • Magnesium Hydroxide (MILK OF MAGNESIA PO) Take 15 mL by mouth Every Night.     • meloxicam (MOBIC) 15 MG tablet TAKE 1 TABLET BY MOUTH EVERY DAY 90 tablet 3   • oxyCODONE-acetaminophen (PERCOCET)  MG per tablet Take 1 tablet by mouth 4 (Four) Times a Day.     • Rimegepant Sulfate 75 MG tablet dispersible Take 75 mg by mouth.     • TRIUMEQ 600- MG per tablet TAKE 1 TABLET BY MOUTH EVERY DAY 90 tablet 3   • vitamin B-6 (pyridoxine) 50 MG tablet Take 1 tablet by mouth Daily. 90 tablet 2   • zolpidem (AMBIEN) 10 MG tablet Take 1 tablet by mouth Every Night. for sleep 90 tablet 0   • [DISCONTINUED] tamoxifen (NOLVADEX) 20 MG chemo tablet TAKE 1 TABLET BY MOUTH EVERY DAY 90 tablet 1     No current facility-administered medications on file prior to visit.        ALLERGIES:    Allergies   Allergen Reactions   • Augmentin [Amoxicillin-Pot Clavulanate] Hives        Social History     Socioeconomic History   • Marital status:      Spouse name: Owen   • Number of children: 1   • Years of education: College   • Highest education level: Not on file   Tobacco Use   • Smoking status: Never Smoker   • Smokeless tobacco: Never Used   Substance and Sexual Activity   • Alcohol use: Yes     Comment: occasioonal   • Drug use: No   • Sexual activity: Defer     Partners: Male     Birth control/protection: None     Comment:      Patient does not smoke or do drugs.  She does drink occasionally.    Family History   Problem Relation Age of Onset   • Breast cancer Mother    • Leukemia Mother         CLL?   • Diabetes Mother    • Hyperthyroidism Mother    • Hearing loss Mother    • Dementia Father    • Heart disease Maternal Grandmother    • Diabetes Maternal Grandfather    • Heart disease Maternal Grandfather    • Stroke Maternal Grandfather    • Heart attack Maternal Grandfather    • Osteoporosis Paternal Grandmother    • Heart disease Paternal Grandfather    • Leukemia Maternal  "Aunt         CLL?   • Throat cancer Paternal Uncle    • Malig Hyperthermia Neg Hx       FAMILY HISTORY:  Her mother had breast cancer at age 60, still alive at 85 and in good health..  Her father had dementia.  Her paternal uncle had prostate cancer.  Her brother had esophageal cancer.    I have reviewed the patient's medical history in detail and updated the computerized patient record.     Review of Systems   Constitutional: Positive for fatigue. Negative for activity change, appetite change, chills, diaphoresis, fever and unexpected weight change.   HENT: Negative for hearing loss, mouth sores, nosebleeds, sore throat and trouble swallowing.    Respiratory: Negative for cough, chest tightness, shortness of breath and wheezing.    Cardiovascular: Negative for chest pain, palpitations and leg swelling.   Gastrointestinal: Negative for abdominal distention, abdominal pain, constipation, diarrhea, nausea and vomiting.   Genitourinary: Negative for difficulty urinating, dysuria, flank pain, frequency, hematuria and urgency.   Musculoskeletal: Positive for arthralgias. Negative for back pain and joint swelling.        No muscle weakness.   Skin: Negative for rash and wound.   Neurological: Positive for numbness. Negative for dizziness, seizures, syncope, speech difficulty, weakness and headaches.   Hematological: Negative for adenopathy. Does not bruise/bleed easily.   Psychiatric/Behavioral: Negative for behavioral problems, confusion, sleep disturbance and suicidal ideas.   All other systems reviewed and are negative.    I have reviewed and updated the accuracy of the ROS as documented  Nikki Walters, APRN      Objective    Vitals:    08/26/21 1525   BP: 124/80   Pulse: 67   Resp: 16   Temp: 98 °F (36.7 °C)   TempSrc: Temporal   SpO2: 98%   Weight: 68.2 kg (150 lb 6.4 oz)   Height: 172.7 cm (67.99\")   PainSc: 0-No pain       PHYSICAL EXAM:  CONSTITUTIONAL:  Vital signs reviewed.  No distress, looks " comfortable.  EYES:  Conjunctiva and lids unremarkable.  PERRLA  EARS,NOSE,MOUTH,THROAT:  Ears and nose appear unremarkable.  Lips, teeth, gums appear unremarkable.  RESPIRATORY:  Normal respiratory effort.  Lungs clear to auscultation bilaterally.  BREASTS: Right breast benign; left breast status post lumpectomy with chronic area of fatty necrosis, unchanged.  CARDIOVASCULAR:  Normal S1, S2.  No murmurs rubs or gallops.  No significant lower extremity edema  GASTROINTESTINAL: Abdomen appears unremarkable.  Nontender.  No hepatomegaly.  No splenomegaly.  LYMPHATIC:  No cervical, supraclavicular, axillary lymphadenopathy.  SKIN:  Warm.  No rashes.  PSYCHIATRIC:  Normal judgment and insight.  Normal mood and affect.    I have reexamined the patient and the results are consistent with the previously documented exam. Nikki Walters, ОЛЬГА       RECENT LABS:   Results from last 7 days   Lab Units 08/26/21  1443   WBC 10*3/mm3 5.64   NEUTROS ABS 10*3/mm3 3.33   HEMOGLOBIN g/dL 12.1   HEMATOCRIT % 36.1   PLATELETS 10*3/mm3 214     Results from last 7 days   Lab Units 08/26/21  1443   SODIUM mmol/L 143   POTASSIUM mmol/L 4.1   CHLORIDE mmol/L 106   CO2 mmol/L 28.1   BUN mg/dL 13   CREATININE mg/dL 0.67   CALCIUM mg/dL 8.9   ALBUMIN g/dL 4.10   BILIRUBIN mg/dL 0.2   ALK PHOS U/L 54   ALT (SGPT) U/L 11   AST (SGOT) U/L 20   GLUCOSE mg/dL 135*           Tissue Pathology Exam 01/08/21    Final Diagnosis   1. Breast, Left 6:00 o'clock Position, Core Biopsies for Calcifications.               A. Dense fibrous stroma/stromal fibrosis with minute foci of acute inflammation, clustered histiocytes,       associated microcalcifications and giant cells consistent with organizing fat necrosis.               B. Focal periductal mild chronic inflammation.  Mec/kds         MAMMO DIAGNOSTIC LEFT W CAD-12/24/20    IMPRESSION:  Suspicious 0.4 cm of calcifications in the left breast. Further  evaluation with stereotactic core needle  biopsy is recommended.     Findings and recommendations were discussed with the patient in person  at the time of her examination.     BI-RADS Category 4: Suspicious         EXAMINATION: BILATERAL BREAST MRI WITHOUT AND WITH CONTRAST 05/06/20  IMPRESSION:  1. There are findings consistent with interval partial response to  neoadjuvant chemotherapy with considerable reduction in left axillary  adenopathy and reduction in size of the index lesion in the left breast  as well as other satellite lesions. Persistent diffuse left breast skin  thickening is noted.  2. There are no findings suspicious for malignancy in the right breast.     BI-RADS Category 6: Known biopsy-proven malignancy.    01/29/20 - US Breast  IMPRESSION:  There are findings suggestive of mild interval partial  response to neoadjuvant chemotherapy.    Assessment/Plan    1. T2N1 invasive ductal carcinoma of the left breast, recently diagnosed.    -Noticed mass in the left breast x5 months  -Diagnostic mammogram showed 3.7 mm mass in the left breast at 4 o'clock position  -Ultrasound-showed 3.8 x 3.1 x 2.5 cm mass at 4 o'clock position with 2 abnormal axillary lymph nodes, larger lymph node being 1.4 cm  -Left breast biopsy and axillary lymph node biopsy October 29, 2019, invasive ductal carcinoma, moderately differentiated, grade 2, ER 85%, MO 10%, HER-2/merna 2+, HER-2 FISH negative  · CT scans from 11/13/19 show some asymmetric nodules along the lateral margin of the glandular tissue.  The 1.2x1.0 cm left axillary nodes, left supraclavicular, and left posterior cervical triangle nodes are suspicious for tamar metastases.  ·   bone scan from 11/15/19 which was negative.   ·  MRI breast from 11/18/19 which shows the biopsy-proven malignancy in the left breast centered at the 4:00 position.  The region of involvement measures up to 7.5 cm.  There is left axillary adenopathy with multiple irregular lymph nodes and loss of differentiation of the border of  multiple lymph nodes which are suspicious for extranodal involvement.  ·    echocardiogram from 11/14/19 which was normal with an ejection fraction of 67%.    · INVITAE from 11/14/19 which was negative.  · Given the size of her tumor and her medical history, patient will be given 4 cycles of Adriamycin/Cytoxan with Neulasta.  After completion of this treatment, patient will be started on 12 cycles of Taxol.  After the tumor shrinks in size, Dr. Forman will perform a lumpectomy.    · Following lumpectomy, patient will begin endocrine therapy.  · Dr. Dawn agrees chemotherapy will not aggravate her HIV condition.  Dr. Moreno spoke with Dr. Mohan at [facility] who also agrees chemotherapy is the first step.   · 12/06/19: Cycle #1 of Adriamycin/Cytoxan  · US Breast from 01/29/20 after 4 cycles of Adriamycin Cytoxan chemotherapy which shows mild interval partial response to neoadjuvant chemotherapy.  The mass has decreased from 3.8 x 2.5 to 3.1 cm to 3.5 x 2.3 x 3.2 cm previously the left axillary lymph node has decreased from 1.4 x 0.7 x 1 cm to 1.1 x 0.6 x 0.9 cm.  · 01/31/20: Initiated cycle 1 Taxol  · April 24, 2020: Completed cycle 12 Taxol  · MRI breast 5/6/2020 shows significant reduction in the left axillary adenopathy as well as reduction in the index breast lesion.  · S/p left modified radical mastectomy with left axillary dissection.  Patient has 55 x 40 x 30 mm invasive carcinoma, grade 2 with DCIS 36 x 6 mm, high-grade with good margins and 8 out of 11 lymph nodes positive with largest metastasis being 5 mm with extracapsular extension.  And there is lymphatic space invasion  · Will follow-up in 2 weeks at which time we will plan to start endocrine therapy.  She has radiation oncology appointment with Dr. Spivey  · Patient is eligible for Zometa once every 6 months for 2 years.  But given that she is undergoing some work on her teeth we will await starting Zometa until after radiation is  complete.  · XRT completed August 17, 2020  · Started letrozole August 2020 but has arthralgias  · October 6, 2020 had severe arthralgias secondary to letrozole.  We will switch to tamoxifen starting November 2020  · 1/8/2021 screening mammogram in December 2020 as well as diagnostic mammogram which showed 0.4 cm suspicious calcification in the lower left which on biopsy is consistent with fat necrosis.  · 8/12/2021 tamoxifen placed on hold due to significant arthralgias and worsening fatigue.     2. HIV, followed by Dr. Dawn in infectious disease.  Well-controlled and is on a single medication with very low viral load. Has HIV since age 34.  · Reviewed her HIV medications and she is compliant  · Stable     3.  Family history of breast cancer with mother having had breast cancer at age 60.  There is family history of uncle with prostate cancer.  Patient will require genetic referral    4.  Arthritis with severe back pain followed by Dr. Bam Crandall  · Continue Percocet    5. Episodes of nosebleeds.  She has had these once a week for 3-4 years. She did not report this today.    6.  Vaginal dryness related to tamoxifen, ongoing.  Patient provided handouts for recommendations of a vaginal lubricant and vaginal moisturizer to use for hopeful improvement.    7.  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  Patient continues on B6.    8. Fatigue  · 8/12/2021 patient seen as triage visit for worsening fatigue and diffuse arthralgias.  Reporting increased sleeping at night and still taking naps in the day.  Labs checked including thyroid studies which were unremarkable and B12 level actually elevated at greater than 2000.  Patient taking vitamin B supplements at that time.  Patient instructed to hold tamoxifen.  · 8/26/2021 reviewed today recent lab work for above with no evidence of thyroid dysfunction or vitamin B12 abnormality.  Patient notes no improvement being off tamoxifen for 2 weeks.  We will check  a CA 15-3 to help further guide decision making.  If this is elevated we may then pursue imaging.  Discussed that she may just need to be off the tamoxifen longer to see improvement.  Patient does have underlying HIV as well and unclear if maybe some of her chronic medications for this could be contributing to her current symptoms.    PLAN:   1. Check CA 15-3 today.  Once this is available I will review results and if elevated discussed with Dr. Moreno any additional work-up necessary before seeing the patient back.  2. Continue to hold tamoxifen until seen in MD follow-up.  3. Otherwise patient will return in 4 weeks for MD follow-up and Zometa.  Plan to give a total of 2 years of therapy which will be through 10/2022.  4. Continue calcium 600 mg twice a day and vitamin D3  5. Next mammogram due in December 2021 and bone density due in May 2022  6. Continue Percocet 10 mg every 4 hours as needed for pain.  7. Continue Ambien nightly for insomnia.      ОЛЬГА Sher  08/26/21      Cc: ОЛЬГА Godoy MD Nathan Bullington, MD

## 2021-09-09 ENCOUNTER — OFFICE VISIT (OUTPATIENT)
Dept: FAMILY MEDICINE CLINIC | Facility: CLINIC | Age: 61
End: 2021-09-09

## 2021-09-09 VITALS
WEIGHT: 151.6 LBS | HEART RATE: 68 BPM | TEMPERATURE: 98 F | DIASTOLIC BLOOD PRESSURE: 66 MMHG | HEIGHT: 68 IN | SYSTOLIC BLOOD PRESSURE: 110 MMHG | BODY MASS INDEX: 22.97 KG/M2 | OXYGEN SATURATION: 99 %

## 2021-09-09 DIAGNOSIS — R73.09 ELEVATED GLUCOSE: ICD-10-CM

## 2021-09-09 DIAGNOSIS — F41.9 ANXIETY: ICD-10-CM

## 2021-09-09 DIAGNOSIS — G47.00 INSOMNIA, UNSPECIFIED TYPE: Primary | ICD-10-CM

## 2021-09-09 DIAGNOSIS — J30.2 SEASONAL ALLERGIC RHINITIS, UNSPECIFIED TRIGGER: ICD-10-CM

## 2021-09-09 LAB — HBA1C MFR BLD: 5.2 %

## 2021-09-09 PROCEDURE — 99214 OFFICE O/P EST MOD 30 MIN: CPT | Performed by: NURSE PRACTITIONER

## 2021-09-09 RX ORDER — FLUTICASONE PROPIONATE 50 MCG
2 SPRAY, SUSPENSION (ML) NASAL DAILY
Qty: 3 ML | Refills: 3 | Status: SHIPPED | OUTPATIENT
Start: 2021-09-09 | End: 2022-08-29

## 2021-09-09 RX ORDER — CLONAZEPAM 0.5 MG/1
0.5 TABLET ORAL 2 TIMES DAILY PRN
Qty: 60 TABLET | Refills: 0 | Status: SHIPPED | OUTPATIENT
Start: 2021-09-09 | End: 2021-12-10 | Stop reason: SDUPTHER

## 2021-09-09 RX ORDER — ZOLPIDEM TARTRATE 10 MG/1
10 TABLET ORAL NIGHTLY
Qty: 90 TABLET | Refills: 0 | Status: SHIPPED | OUTPATIENT
Start: 2021-09-09 | End: 2021-12-10 | Stop reason: SDUPTHER

## 2021-09-09 NOTE — PROGRESS NOTES
Chief Complaint  Insomnia (Patient is needing her ambien refilled last drug screen in june ( wore mask and goggles) ), Anxiety (Patient is needing her clonazepam refilled ), Allergies (Patient is needing her flonase refilled. ), and lab results (Patient had labs drawn through her oncologist she is wanting to ask stephanie about )    Subjective          Richa Dolan presents to [facility]  History of Present Illness  Richa Dolan 61 y.o. female presents for follow up of insomnia with onset of symptoms years ago. Patient describes symptoms as early morning awakening and frequent night time awakening. Patient has found complete relief with Ambien (Zolpidem). Associated symptoms include: fatigue if unable to take Rx . Patient denies daytime somnolence Symptoms have stabilized.  The patient has failed multiple OTC medications for insomnia.  They are well controlled on current Rx and will continue to try to take Rx PRN.  She will use the lowest effective dose.  The patient has read and signed the [facility] Controlled Substance Contract.  I will continue to see patient for regular follow up appointments and be prescribed the lowest effective dose.  MAGY has been reviewed by me and is updated every 3 months. The patient is aware of the potential for addiction and dependence.  She denies that Ambien (Zolpidem) causes excessive daytime drowsiness and sleep walking.  Patient voices understanding to take Ambien (Zolpidem) and go straight to bed. Patient must be able to sleep 7 hours or more when taking this.  Patient will hold Rx and contact me if they experience any impaired mental alertness the next day.      Anxiety- using Clonazepam on a prn basis for anxiety are requesting a refill today.  Last script was given in 2019    Allergies- flonase working well to control allergies.  Needing new script.  Using daily as directed    Elevated blood sugar on last labs- a1c today   Objective  "  Vital Signs:   /66   Pulse 68   Temp 98 °F (36.7 °C)   Ht 172.7 cm (67.99\")   Wt 68.8 kg (151 lb 9.6 oz)   SpO2 99%   BMI 23.06 kg/m²     Physical Exam  Vitals reviewed.   Constitutional:       General: She is not in acute distress.     Appearance: She is well-developed.   HENT:      Head: Normocephalic.   Cardiovascular:      Rate and Rhythm: Normal rate and regular rhythm.      Heart sounds: Normal heart sounds.   Pulmonary:      Effort: Pulmonary effort is normal.      Breath sounds: Normal breath sounds.   Neurological:      Mental Status: She is alert and oriented to person, place, and time.      Gait: Gait normal.   Psychiatric:         Behavior: Behavior normal.         Thought Content: Thought content normal.         Judgment: Judgment normal.        Result Review :   The following data was reviewed by: ОЛЬГА Mccoy on 09/09/2021:  CMP    CMP 6/29/21 8/12/21 8/26/21   Glucose 84 76 135 (A)   BUN 10 11 13   Creatinine 0.71 0.70 0.67   eGFR Non African Am 84 85 90   Sodium 139 142 143   Potassium 4.3 4.0 4.1   Chloride 103 107 106   Calcium 9.2 9.3 8.9   Albumin 4.30 4.40 4.10   Total Bilirubin 0.4 0.3 0.2   Alkaline Phosphatase 51 51 54   AST (SGOT) 20 21 20   ALT (SGPT) 13 15 11   (A) Abnormal value            CBC w/diff    CBC w/Diff 6/29/21 8/10/21 8/26/21   WBC 4.51 4.01 5.64   RBC 3.73 (A) 3.48 (A) 3.58 (A)   Hemoglobin 12.5 11.8 (A) 12.1   Hematocrit 37.7 34.9 36.1   .1 (A) 100.3 (A) 100.8 (A)   MCH 33.5 (A) 33.9 (A) 33.8 (A)   MCHC 33.2 33.8 33.5   RDW 11.9 (A) 12.4 12.4   Platelets 237 194 214   Neutrophil Rel % 52.1 54.6 59.0   Immature Granulocyte Rel % 0.0 0.0 0.2   Lymphocyte Rel % 31.3 33.2 28.7   Monocyte Rel % 13.5 (A) 10.5 10.6   Eosinophil Rel % 2.4 1.2 1.1   Basophil Rel % 0.7 0.5 0.4   (A) Abnormal value            Lipid Panel    Lipid Panel 3/8/21   Total Cholesterol 137   Triglycerides 106   HDL Cholesterol 66   VLDL Cholesterol 19   LDL Cholesterol  52 "   LDL/HDL Ratio 0.8      Comments are available for some flowsheets but are not being displayed.           TSH    TSH 8/12/21   TSH 1.670                     Assessment and Plan    Diagnoses and all orders for this visit:    1. Insomnia, unspecified type (Primary)  -     zolpidem (AMBIEN) 10 MG tablet; Take 1 tablet by mouth Every Night. for sleep  Dispense: 90 tablet; Refill: 0    2. Anxiety  -     clonazePAM (KlonoPIN) 0.5 MG tablet; Take 1 tablet by mouth 2 (Two) Times a Day As Needed for Anxiety.  Dispense: 60 tablet; Refill: 0    3. Seasonal allergic rhinitis, unspecified trigger  -     fluticasone (FLONASE) 50 MCG/ACT nasal spray; 2 sprays into the nostril(s) as directed by provider Daily.  Dispense: 3 mL; Refill: 3    4. Elevated glucose  -     POC Glycosylated Hemoglobin (Hb A1C)        Follow Up   Return in about 3 months (around 12/9/2021) for Recheck.  Patient was given instructions and counseling regarding her condition or for health maintenance advice. Please see specific information pulled into the AVS if appropriate.     rto in 3 mons   Cont same with meds    Mask and googles worn

## 2021-09-10 ENCOUNTER — TELEPHONE (OUTPATIENT)
Dept: ONCOLOGY | Facility: CLINIC | Age: 61
End: 2021-09-10

## 2021-09-10 NOTE — TELEPHONE ENCOUNTER
Caller: DEJA BOYD    Relationship to patient: SELF    Best call back number: [phone]    Patient is needing: TO CHECK ON TUMOR MARKER FOR BLOOD WORK. NO ONE HAS REACHED OUT TO HER.

## 2021-09-13 RX ORDER — ABACAVIR SULFATE, DOLUTEGRAVIR SODIUM, LAMIVUDINE 600; 50; 300 MG/1; MG/1; MG/1
1 TABLET, FILM COATED ORAL DAILY
Qty: 90 TABLET | Refills: 3 | Status: SHIPPED | OUTPATIENT
Start: 2021-09-13 | End: 2022-09-15

## 2021-09-15 ENCOUNTER — TELEPHONE (OUTPATIENT)
Dept: GENERAL RADIOLOGY | Facility: HOSPITAL | Age: 61
End: 2021-09-15

## 2021-09-15 NOTE — TELEPHONE ENCOUNTER
----- Message from Caty Reed RN sent at 9/15/2021 10:15 AM EDT -----  Please move appointment out to 10/6/21 or after, as her Zometa is not due until then.    Thank you,  Caty

## 2021-09-21 ENCOUNTER — APPOINTMENT (OUTPATIENT)
Dept: ONCOLOGY | Facility: HOSPITAL | Age: 61
End: 2021-09-21

## 2021-09-21 ENCOUNTER — OFFICE VISIT (OUTPATIENT)
Dept: ONCOLOGY | Facility: CLINIC | Age: 61
End: 2021-09-21

## 2021-09-21 ENCOUNTER — OFFICE VISIT (OUTPATIENT)
Dept: ORTHOPEDIC SURGERY | Facility: CLINIC | Age: 61
End: 2021-09-21

## 2021-09-21 ENCOUNTER — INFUSION (OUTPATIENT)
Dept: ONCOLOGY | Facility: HOSPITAL | Age: 61
End: 2021-09-21

## 2021-09-21 VITALS
RESPIRATION RATE: 16 BRPM | HEART RATE: 64 BPM | SYSTOLIC BLOOD PRESSURE: 134 MMHG | TEMPERATURE: 97.7 F | DIASTOLIC BLOOD PRESSURE: 89 MMHG | BODY MASS INDEX: 23.02 KG/M2 | HEIGHT: 68 IN | OXYGEN SATURATION: 100 % | WEIGHT: 151.9 LBS

## 2021-09-21 VITALS — WEIGHT: 151 LBS | TEMPERATURE: 97.5 F | HEIGHT: 66 IN | BODY MASS INDEX: 24.27 KG/M2

## 2021-09-21 DIAGNOSIS — Z17.0 MALIGNANT NEOPLASM OF OVERLAPPING SITES OF LEFT BREAST IN FEMALE, ESTROGEN RECEPTOR POSITIVE (HCC): Primary | ICD-10-CM

## 2021-09-21 DIAGNOSIS — Z13.1 SCREENING FOR DIABETES MELLITUS: ICD-10-CM

## 2021-09-21 DIAGNOSIS — Z12.31 ENCOUNTER FOR SCREENING MAMMOGRAM FOR BREAST CANCER: ICD-10-CM

## 2021-09-21 DIAGNOSIS — M54.50 LUMBAR PAIN: Primary | ICD-10-CM

## 2021-09-21 DIAGNOSIS — C50.812 MALIGNANT NEOPLASM OF OVERLAPPING SITES OF LEFT BREAST IN FEMALE, ESTROGEN RECEPTOR POSITIVE (HCC): Primary | ICD-10-CM

## 2021-09-21 DIAGNOSIS — Z85.3 HISTORY OF BREAST CANCER: ICD-10-CM

## 2021-09-21 LAB
ALBUMIN SERPL-MCNC: 3.9 G/DL (ref 3.5–5.2)
ALBUMIN/GLOB SERPL: 1.7 G/DL (ref 1.1–2.4)
ALP SERPL-CCNC: 60 U/L (ref 38–116)
ALT SERPL W P-5'-P-CCNC: 12 U/L (ref 0–33)
ANION GAP SERPL CALCULATED.3IONS-SCNC: 8.4 MMOL/L (ref 5–15)
AST SERPL-CCNC: 19 U/L (ref 0–32)
BASOPHILS # BLD AUTO: 0.02 10*3/MM3 (ref 0–0.2)
BASOPHILS NFR BLD AUTO: 0.5 % (ref 0–1.5)
BILIRUB SERPL-MCNC: 0.2 MG/DL (ref 0.2–1.2)
BUN SERPL-MCNC: 12 MG/DL (ref 6–20)
BUN/CREAT SERPL: 18.2 (ref 7.3–30)
CALCIUM SPEC-SCNC: 8.4 MG/DL (ref 8.5–10.2)
CHLORIDE SERPL-SCNC: 107 MMOL/L (ref 98–107)
CO2 SERPL-SCNC: 27.6 MMOL/L (ref 22–29)
CREAT SERPL-MCNC: 0.66 MG/DL (ref 0.6–1.1)
DEPRECATED RDW RBC AUTO: 46.1 FL (ref 37–54)
EOSINOPHIL # BLD AUTO: 0.06 10*3/MM3 (ref 0–0.4)
EOSINOPHIL NFR BLD AUTO: 1.5 % (ref 0.3–6.2)
ERYTHROCYTE [DISTWIDTH] IN BLOOD BY AUTOMATED COUNT: 12.5 % (ref 12.3–15.4)
GFR SERPL CREATININE-BSD FRML MDRD: 91 ML/MIN/1.73
GLOBULIN UR ELPH-MCNC: 2.3 GM/DL (ref 1.8–3.5)
GLUCOSE SERPL-MCNC: 94 MG/DL (ref 74–124)
HCT VFR BLD AUTO: 34.8 % (ref 34–46.6)
HGB BLD-MCNC: 11.6 G/DL (ref 12–15.9)
IMM GRANULOCYTES # BLD AUTO: 0.01 10*3/MM3 (ref 0–0.05)
IMM GRANULOCYTES NFR BLD AUTO: 0.2 % (ref 0–0.5)
LYMPHOCYTES # BLD AUTO: 1.73 10*3/MM3 (ref 0.7–3.1)
LYMPHOCYTES NFR BLD AUTO: 42.4 % (ref 19.6–45.3)
MAGNESIUM SERPL-MCNC: 1.6 MG/DL (ref 1.8–2.5)
MCH RBC QN AUTO: 33.6 PG (ref 26.6–33)
MCHC RBC AUTO-ENTMCNC: 33.3 G/DL (ref 31.5–35.7)
MCV RBC AUTO: 100.9 FL (ref 79–97)
MONOCYTES # BLD AUTO: 0.53 10*3/MM3 (ref 0.1–0.9)
MONOCYTES NFR BLD AUTO: 13 % (ref 5–12)
NEUTROPHILS NFR BLD AUTO: 1.73 10*3/MM3 (ref 1.7–7)
NEUTROPHILS NFR BLD AUTO: 42.4 % (ref 42.7–76)
NRBC BLD AUTO-RTO: 0 /100 WBC (ref 0–0.2)
PHOSPHATE SERPL-MCNC: 3.1 MG/DL (ref 2.5–4.5)
PLATELET # BLD AUTO: 196 10*3/MM3 (ref 140–450)
PMV BLD AUTO: 9.5 FL (ref 6–12)
POTASSIUM SERPL-SCNC: 3.9 MMOL/L (ref 3.5–4.7)
PROT SERPL-MCNC: 6.2 G/DL (ref 6.3–8)
RBC # BLD AUTO: 3.45 10*6/MM3 (ref 3.77–5.28)
SODIUM SERPL-SCNC: 143 MMOL/L (ref 134–145)
WBC # BLD AUTO: 4.08 10*3/MM3 (ref 3.4–10.8)

## 2021-09-21 PROCEDURE — 84100 ASSAY OF PHOSPHORUS: CPT

## 2021-09-21 PROCEDURE — 36591 DRAW BLOOD OFF VENOUS DEVICE: CPT

## 2021-09-21 PROCEDURE — 82728 ASSAY OF FERRITIN: CPT | Performed by: INTERNAL MEDICINE

## 2021-09-21 PROCEDURE — 83540 ASSAY OF IRON: CPT | Performed by: INTERNAL MEDICINE

## 2021-09-21 PROCEDURE — 72100 X-RAY EXAM L-S SPINE 2/3 VWS: CPT | Performed by: ORTHOPAEDIC SURGERY

## 2021-09-21 PROCEDURE — 85025 COMPLETE CBC W/AUTO DIFF WBC: CPT

## 2021-09-21 PROCEDURE — 80053 COMPREHEN METABOLIC PANEL: CPT

## 2021-09-21 PROCEDURE — 36415 COLL VENOUS BLD VENIPUNCTURE: CPT | Performed by: INTERNAL MEDICINE

## 2021-09-21 PROCEDURE — 83735 ASSAY OF MAGNESIUM: CPT

## 2021-09-21 PROCEDURE — 84466 ASSAY OF TRANSFERRIN: CPT | Performed by: INTERNAL MEDICINE

## 2021-09-21 PROCEDURE — 25010000002 HEPARIN LOCK FLUSH PER 10 UNITS: Performed by: INTERNAL MEDICINE

## 2021-09-21 PROCEDURE — 99214 OFFICE O/P EST MOD 30 MIN: CPT | Performed by: INTERNAL MEDICINE

## 2021-09-21 PROCEDURE — 99203 OFFICE O/P NEW LOW 30 MIN: CPT | Performed by: ORTHOPAEDIC SURGERY

## 2021-09-21 RX ORDER — SODIUM CHLORIDE 0.9 % (FLUSH) 0.9 %
10 SYRINGE (ML) INJECTION AS NEEDED
Status: CANCELLED | OUTPATIENT
Start: 2021-09-21

## 2021-09-21 RX ORDER — ANASTROZOLE 1 MG/1
1 TABLET ORAL DAILY
Qty: 30 TABLET | Refills: 5 | Status: SHIPPED | OUTPATIENT
Start: 2021-09-21 | End: 2021-12-28

## 2021-09-21 RX ORDER — HEPARIN SODIUM (PORCINE) LOCK FLUSH IV SOLN 100 UNIT/ML 100 UNIT/ML
500 SOLUTION INTRAVENOUS AS NEEDED
Status: DISCONTINUED | OUTPATIENT
Start: 2021-09-21 | End: 2021-09-21 | Stop reason: HOSPADM

## 2021-09-21 RX ORDER — SODIUM CHLORIDE 0.9 % (FLUSH) 0.9 %
20 SYRINGE (ML) INJECTION AS NEEDED
Status: CANCELLED | OUTPATIENT
Start: 2021-09-21

## 2021-09-21 RX ORDER — HEPARIN SODIUM (PORCINE) LOCK FLUSH IV SOLN 100 UNIT/ML 100 UNIT/ML
300 SOLUTION INTRAVENOUS ONCE
Status: CANCELLED | OUTPATIENT
Start: 2021-09-21

## 2021-09-21 RX ORDER — SODIUM CHLORIDE 0.9 % (FLUSH) 0.9 %
10 SYRINGE (ML) INJECTION AS NEEDED
Status: DISCONTINUED | OUTPATIENT
Start: 2021-09-21 | End: 2021-09-21 | Stop reason: HOSPADM

## 2021-09-21 RX ORDER — HEPARIN SODIUM (PORCINE) LOCK FLUSH IV SOLN 100 UNIT/ML 100 UNIT/ML
500 SOLUTION INTRAVENOUS AS NEEDED
Status: CANCELLED | OUTPATIENT
Start: 2021-09-21

## 2021-09-21 RX ADMIN — Medication 500 UNITS: at 08:43

## 2021-09-21 RX ADMIN — Medication 10 ML: at 08:43

## 2021-09-21 NOTE — PROGRESS NOTES
Subjective     REASON FOR FOLLOW UP:    1.  T2N1 invasive ductal carcinoma of the left breast.  Ultrasound-showed 3.8 x 3.1 x 2.5 cm mass at 4 o'clock position with 2 abnormal axillary lymph nodes, larger lymph node being 1.4 cm.  Left breast biopsy and axillary lymph node biopsy October 29, 2019, invasive ductal carcinoma, moderately differentiated, grade 2, ER 85%, WI 10%, HER-2/merna 2+, HER-2 FISH negative.  · 12/06/19: Neoadjuvant chemo therapy, cycle #1 of dose-dense Adriamycin/Cytoxan with Neulasta for marrow support    · 01/17/20: Last cycle of Adriamycin/Cytoxan given  · January 31, 2020: Cycle 1 Taxol  · April 24, 2020: Last cycle, cycle 12 of Taxol  · Patient is s/p left mastectomy with axillary dissection with reconstruction.  She is healing up nicely.  She has a drain in place.  Pathology showed invasive breast cancer which is 55 x 40 mm in size, grade 2 with focal lymphovascular space invasion with DCIS spanning over 36 x 6 mm.  All margins were negative for cancer.  · 8 of the additional lymph nodes out of 11 were positive with the largest micrometastasis measuring 5 mm.  Extranodal extension was seen.  · Biomarkers on post neoadjuvant tumor is ER 81-90% WI 5% HER-2/merna 2+ by immunohistochemistry and HER-2/merna negative by FISH.  · Radiation July 6, 2020-August 17, 2020.  · Letrozole initiated July 2020.  · Letrozole discontinued October 2020 secondary to severe joint pain.  · October 30, 2020 patient starting tamoxifen and tolerating well  · August 2021: Tamoxifen discontinued secondary to depression and mental fogginess and fatigue  · September 21, 2021: Arimidex started    2.  HIV, followed by Dr. Dawn from infectious disease.  Well controlled on meds    3.  Screening mammogram December 7, 2020 showed indeterminate calcifications lower outer middle left breast. Diagnostic mammogram showed 0.4 cm calcifications in the left breast.  Stereotactic biopsy January 8, 2021 consistent with organizing  fat necrosis.              HISTORY OF PRESENT ILLNESS:  The patient is a 61 y.o. year old female with the above medical history who is seen today in short-term follow-up.      Patient is a 61-year-old female with history of T2N1 invasive ductal carcinoma of the left breast who had been on letrozole after completion of chemotherapy and it caused her severe neuralgias and it was discontinued.  We placed her on tamoxifen and that caused her to have depression and fogginess of the brain.  As a result we had given her 6 weeks break and she is here for follow-up.  Patient certainly will benefit from endocrine therapy and we discussed about trying Arimidex though it can potentially cause joint pains and if it does we could consider adding Cymbalta.    Patient states after tamoxifen was discontinued her depression as well as mental fogginess has completely resolved.    Patient also had complained of fatigue and her B12, TSH were normal.  She is mildly anemic with a hemoglobin of 11.6 and we will check iron studies.    Past Medical History:   Diagnosis Date   • Anxiety    • Cerebrovascular accident (CVA) (CMS/HCC) 8/22/2017    NO RESIDUAL AFFECT   • DDD (degenerative disc disease), cervical    • Depression    • Drug therapy    • Elevated cholesterol    • GERD (gastroesophageal reflux disease)    • H/O ICH (intracerebral hemorrhage) (CMS/[facility])    • History of chemotherapy    • History of stroke    • History of thrombocytopenia    • HIV (human immunodeficiency virus infection) (CMS/[facility]) 1996   • Hx of radiation therapy    • Hyperlipidemia    • Insomnia    • Lupus anticoagulant positive    • Malignant neoplasm of overlapping sites of left breast in female, estrogen receptor positive (CMS/HCC) 11/12/2019   • Meniscus tear 2017    RIGHT   • Migraine    • Neck pain     WITH SHOOTING PAIN LEFT RIGHT ARM   • Osteoarthritis    • Osteoporosis    • Pain management     sees 1 every 3 months for scripts/injection/pain meds   • Seasonal  allergies    • Spinal headache     THINKS HAD BLOOD PATCH AFTER EPIDUAL    • Tick bite 06/2014     PAST MEDICAL HISTORY:  She had a stroke in July 2017 with headache and dizziness.  She went to the ER and was placed on aspirin.  However, she stopped taking it two weeks ago.  She has been taking Aimovig (injection) monthly with Fariba López at [place].    In 1994, patient was diagnosed with HIV with an unknown cause which is managed by Dr. Natali Subramanian.  As of now, her viral load is good.    Patient has arthritis.  She gets trigger-point injections in her back with her last one being 2 weeks ago.  She has had multiple ablations.  She also has pain in her SI joint and Dr. Bermudez performed a surgery on this.  She takes Narco for the pain.      She is osteoporotic.  She has had multiple hairline fractures in both her legs.  She had her knees cleaned out by Dr. Sinha.     Patient has had a hysterectomy and still has both of her ovaries.    Patient has vertigo and the muscles in her left ear are weak.  She just has an imbalance.    Past Surgical History:   Procedure Laterality Date   • ADENOIDECTOMY     • BREAST LUMPECTOMY     • BREAST LUMPECTOMY WITH SENTINEL NODE BIOPSY Left 5/28/2020    Procedure: BREAST LUMPECTOMY WITH SENTINEL NODE BIOPSY AND NEEDLE LOCALIZATION AND axillary dissection;  Surgeon: Landen Forman MD;  Location: [facility];  Service: General;  Laterality: Left;   • BREAST SURGERY Left 5/28/2020    Procedure: LEFT LATERAL INTERCOASTAL ARTERY  flap ;  Surgeon: Yusuf Garcia MD;  Location: [facility];  Service: Plastics;  Laterality: Left;   • CHOLECYSTECTOMY     • HYSTERECTOMY     • KNEE ARTHROSCOPY Right 3/24/2017    Procedure:  RIGHT  KNEE ARTHROSCOPY WITH DEBRIDEMENT CHONDROPLASTY PARTIAL MENISCECTOMY;  Surgeon: Karl Galeas MD;  Location: [facility];  Service:    • KNEE CARTILAGE SURGERY Right 2007   • TONSILLECTOMY     • US GUIDED LYMPH NODE BIOPSY   10/29/2019    LEFT BREAST   • VENOUS ACCESS DEVICE (PORT) INSERTION Right 12/2/2019    Procedure: INSERTION VENOUS ACCESS DEVICE RIGHT;  Surgeon: Landen Forman MD;  Location: [facility];  Service: General       ONCOLOGIC HISTORY:  Patient is a 59-year-old female who has had a history of HIV since age 34 followed by Dr. Natali Ng at [facility] and was on multiple HIV drugs initially but more recently has been on a single medication TRIUMEQ, with well-controlled HIV.  She follows up with Dr. Dawn , infectious disease who saw her recently and he saw her on 4/8/2019 and has excellent control and suppression of her viral load.  She is very compliant with her medications.    She also has had history of stroke in 2017 for which she was placed on aspirin.  She presented with a headache.  She is very active and works at [employer] full-time.  She also has had history of vertigo in the past  Patient's PCP is Zach Lora.    Patient first noticed the mass in her left breast 5 months ago.  Her last mammogram was 5 years ago.  She had soreness in the left breast and she went to her primary care physician who then ordered a mammogram diagnostic and an ultrasound.  The diagnostic mammogram showed a 3.7 cm mass at 4 o'clock position in the lower outer quadrant of the left breast.  The ultrasound showed 3.8 cm x 3.1 x 2.5 cm mass at 4 o'clock position in the left breast with 2 abnormal appearing left axillary lymph nodes the largest being 1.4 cm.    She then underwent biopsy of both the breast mass as well as the left axillary lymph node and both of them are positive for invasive mammary carcinoma it is invasive ductal carcinoma with apocrine features, moderately differentiated, grade 2 of 3 with a Browder score of 5.  The left axillary lymph node is also consistent with metastatic mammary carcinoma.  It is ER positive ID positive HER-2/merna negative.  Details as follows    10/21/19 -  Bilateral Diagnostic Mammogram and US Breast Left  FINDINGS: Bilateral digital CC and MLO mammographic images were  obtained. No prior examination is available for comparison. Scattered  fibroglandular densities are seen throughout both breasts. A triangular  skin marker represents the area of palpable concern in the lower outer  quadrant of the left breast in the posterior 1/3. At this location there  is an irregular mass that measures on the order of 3.7 cm in greatest  dimension. Internal calcifications are noted. No suspicious findings in  the right breast are appreciated.     ULTRASOUND: Targeted sonographic evaluation of the left breast was  performed through the area of concern in the left axilla. At the 4  o'clock position on the order of 6 cm from the nipple there is an  irregular hypoechoic mass that measures 3.8 x 3.1 x 2.5 cm. Internal  vascularity is noted.     There are 2 abnormal-appearing left axillary lymph nodes, the largest  which measures on the order of 1.4 cm in greatest dimension.     IMPRESSION:  1. There is a 3.8 cm irregular mass in the left breast at the 4 o'clock  position. This is seen in conjunction with an irregular 1.4 cm lymph  node in the left axilla. This is suspicious for malignancy with left  axillary involvement. Correlation with ultrasound-guided biopsy of both  the left breast mass and the lymph node is recommended.  2. There are no findings suspicious for malignancy in the right breast.     BI-RADS CATEGORY 5:     Patient's labs from 11/12/19 are normal.    10/29/19 - Tissue Pathology  1. Left Breast, 4:00, 6 cm FN, U/S-Guided Core Needle Biopsy for a Mass:  A. INVASIVE DUCTAL CARCINOMA WITH APOCRINE FEATURES, Moderately differentiated;  [place] Histologic Grade II/III (tubule score = 3, nuclear score = 2, mitoses score = 1), measuring at least  9 mm.  B. No ductal or lobular carcinoma in situ is identified in this sample.  C. Focus suspicious for lymphovascular space  invasion.  D. See Biomarker Template for hormone receptor studies.  2. Lymph Node, Left Axilla, U/S-Guided Core Needle Biopsy:  A. METASTATIC MAMMARY CARCINOMA (see Comment).  B. Metastatic focus measures 5 mm in greatest extent.  ER+, 85%  KY+, 10%  HER2- Score 2+    11/13/19 - CT Neck Chest Abdomen Pelvis  IMPRESSION:  1. In addition to the irregular approximately 3.8 cm mass within the  lateral aspect of the left breast, there are a few subcentimeter  asymmetric nodules along the lateral margin of the glandular tissue. The  several asymmetric left subpectoral nodes and 1.2 x 1.0 cm left axillary  node are suspicious for tamar metastases. The asymmetric subcentimeter  left supraclavicular and left posterior cervical triangle nodes are  worrisome as well.  2. There is no convincing evidence for metastatic disease within the  abdomen or pelvis.    11/14/19 - Echocardiogram:  Normal.  Calculated EF = 67%.  There is trace mitral valve and tricuspid valve regurgitation.    11/15/19 - Bone Scan  Negative.    11/18/19 - MRI Breast Bilateral  IMPRESSION:  1. Biopsy-proven malignancy in the left breast centered at the 4 o'clock  position with associated surrounding satellite nodules as described. The  entire region of involvement including the satellite nodules and the  dominant mass measure up to 7.5 cm in greatest dimension. Also, left  axillary adenopathy with multiple irregular lymph nodes and loss of  differentiation of the borders of multiple lymph nodes is noted and this  is suspicious for extranodal involvement.  2. There are no findings suspicious for malignancy in the right breast.  BI-RADS CATEGORY 6      12/06/19: Cycle #1 of Adriamycin/Cytoxan    01/17/20: Last cycle of Adriamycin/Cytoxan given without Neulasta.  We will switch from Neulasta to 7 days of Neupogen injections after cycle 4.    We reviewed with patient the US Breast from 01/29/20 which shows mild interval partial response to neoadjuvant  chemotherapy.      20: Initiated cycle 1 Taxol  2020: Last cycle of Taxol, cycle 12    S/p left mastectomy with axillary dissection   Pathology showed invasive breast cancer which is 55 x 40 mm in size, grade 2 with focal lymphovascular space invasion with DCIS spanning over 36 x 6 mm.  All margins were negative for cancer.  8 of the additional lymph nodes out of 11 were positive with the largest micrometastasis measuring 5 mm.  Extranodal extension was seen.    Biomarkers on post neoadjuvant tumor is ER 81-90% HI 5% HER-2/merna 2+ by immunohistochemistry and HER-2/merna negative by FISH.    2020: Started letrozole but because of severe joint pains was discontinued 2020    End of 2020 started tamoxifen    OB-GYN:  Menarche 15 years  Menopause-46  Pregnancies- 1 para 1 no miscarriages her first pregnancy was in  at 29 years of age.  She did not breastfeed.  Post menopausal HRT- None.  Hx of birth control pills- Yes.    Current Outpatient Medications on File Prior to Visit   Medication Sig Dispense Refill   • Abacavir-Dolutegravir-Lamivud (Triumeq) 600- MG per tablet Take 1 tablet by mouth Daily. 90 tablet 3   • AIMOVIG 140 MG/ML solution auto-injector INJECT 140 MG INTO THE SKIN EVERY 30 (THIRTY) DAYS.  11   • atorvastatin (LIPITOR) 20 MG tablet Take 1 tablet by mouth every night at bedtime. 90 tablet 3   • baclofen (LIORESAL) 10 MG tablet Take 10 mg by mouth 2 (Two) Times a Day.  2   • clonazePAM (KlonoPIN) 0.5 MG tablet Take 1 tablet by mouth 2 (Two) Times a Day As Needed for Anxiety. 60 tablet 0   • colesevelam (WELCHOL) 625 MG tablet TAKE 2 TABLETS BY MOUTH EVERY  tablet 3   • escitalopram (LEXAPRO) 10 MG tablet Take 1 tablet by mouth Daily. 90 tablet 3   • fluticasone (FLONASE) 50 MCG/ACT nasal spray 2 sprays into the nostril(s) as directed by provider Daily. 3 mL 3   • Loratadine (CLARITIN PO) Take 10 mg by mouth Daily.     • Magnesium Hydroxide (MILK OF  MAGNESIA PO) Take 15 mL by mouth Every Night.     • oxyCODONE-acetaminophen (PERCOCET)  MG per tablet Take 1 tablet by mouth 4 (Four) Times a Day.     • vitamin B-6 (pyridoxine) 50 MG tablet Take 1 tablet by mouth Daily. 90 tablet 2   • zolpidem (AMBIEN) 10 MG tablet Take 1 tablet by mouth Every Night. for sleep 90 tablet 0     No current facility-administered medications on file prior to visit.        ALLERGIES:    Allergies   Allergen Reactions   • Augmentin [Amoxicillin-Pot Clavulanate] Hives        Social History     Socioeconomic History   • Marital status:      Spouse name: Owen   • Number of children: 1   • Years of education: College   • Highest education level: Not on file   Tobacco Use   • Smoking status: Never Smoker   • Smokeless tobacco: Never Used   Substance and Sexual Activity   • Alcohol use: Yes     Comment: occasioonal   • Drug use: No   • Sexual activity: Defer     Partners: Male     Birth control/protection: None     Comment:      Patient does not smoke or do drugs.  She does drink occasionally.    Family History   Problem Relation Age of Onset   • Breast cancer Mother    • Leukemia Mother         CLL?   • Diabetes Mother    • Hyperthyroidism Mother    • Hearing loss Mother    • Dementia Father    • Heart disease Maternal Grandmother    • Diabetes Maternal Grandfather    • Heart disease Maternal Grandfather    • Stroke Maternal Grandfather    • Heart attack Maternal Grandfather    • Osteoporosis Paternal Grandmother    • Heart disease Paternal Grandfather    • Leukemia Maternal Aunt         CLL?   • Throat cancer Paternal Uncle    • Malig Hyperthermia Neg Hx       FAMILY HISTORY:  Her mother had breast cancer at age 60, still alive at 85 and in good health..  Her father had dementia.  Her paternal uncle had prostate cancer.  Her brother had esophageal cancer.    I have reviewed the patient's medical history in detail and updated the computerized patient record.     Review  "of Systems   Constitutional: Positive for fatigue (09/21/21-Unchanged). Negative for activity change, appetite change, chills, diaphoresis, fever and unexpected weight change.   HENT: Negative for hearing loss, mouth sores, nosebleeds, sore throat and trouble swallowing.    Respiratory: Negative for cough, chest tightness, shortness of breath and wheezing.    Cardiovascular: Negative for chest pain, palpitations and leg swelling.   Gastrointestinal: Negative for abdominal distention, abdominal pain, constipation, diarrhea, nausea and vomiting.   Genitourinary: Negative for difficulty urinating, dysuria, flank pain, frequency, hematuria and urgency.   Musculoskeletal: Positive for arthralgias (09/21/21-Unchanged). Negative for back pain and joint swelling.        No muscle weakness.   Skin: Negative for rash and wound.   Neurological: Positive for numbness (Bilat foot 09/21/21-Unchanged). Negative for dizziness, seizures, syncope, speech difficulty, weakness and headaches.   Hematological: Negative for adenopathy. Does not bruise/bleed easily.   Psychiatric/Behavioral: Negative for behavioral problems, confusion, sleep disturbance and suicidal ideas.   All other systems reviewed and are negative.    I have reviewed and updated the accuracy of the ROS as documented  Ruby Moreno MD      Objective    Vitals:    09/21/21 0852   BP: 134/89   Pulse: 64   Resp: 16   Temp: 97.7 °F (36.5 °C)   TempSrc: Temporal   SpO2: 100%   Weight: 68.9 kg (151 lb 14.4 oz)   Height: 172.7 cm (67.99\")   PainSc:   5   PainLoc: Back       PHYSICAL EXAM:    CONSTITUTIONAL:  Vital signs reviewed.  No distress, looks comfortable.  EYES:  Conjunctivae and lids unremarkable.  PERRLA  EARS,NOSE,MOUTH,THROAT:  Ears and nose appear unremarkable.  Lips, teeth, gums appear unremarkable.  RESPIRATORY:  Normal respiratory effort.  Lungs clear to auscultation bilaterally.  CARDIOVASCULAR:  Normal S1, S2.  No murmurs rubs or gallops.  No significant " lower extremity edema.  GASTROINTESTINAL: Abdomen appears unremarkable.  Nontender.  No hepatomegaly.  No splenomegaly.  LYMPHATIC:  No cervical, supraclavicular, axillary lymphadenopathy.  SKIN:  Warm.  No rashes.  PSYCHIATRIC:  Normal judgment and insight.  Normal mood and affect.      RECENT LABS:   Results from last 7 days   Lab Units 09/21/21  0850   WBC 10*3/mm3 4.08   NEUTROS ABS 10*3/mm3 1.73   HEMOGLOBIN g/dL 11.6*   HEMATOCRIT % 34.8   PLATELETS 10*3/mm3 196     Results from last 7 days   Lab Units 09/21/21  0850   SODIUM mmol/L 143   POTASSIUM mmol/L 3.9   CHLORIDE mmol/L 107   CO2 mmol/L 27.6   BUN mg/dL 12   CREATININE mg/dL 0.66   CALCIUM mg/dL 8.4*   ALBUMIN g/dL 3.90   BILIRUBIN mg/dL 0.2   ALK PHOS U/L 60   ALT (SGPT) U/L 12   AST (SGOT) U/L 19   GLUCOSE mg/dL 94   MAGNESIUM mg/dL 1.6*         Assessment/Plan    1. T2N1 invasive ductal carcinoma of the left breast, recently diagnosed.    -Noticed mass in the left breast x5 months  -Diagnostic mammogram showed 3.7 mm mass in the left breast at 4 o'clock position  -Ultrasound-showed 3.8 x 3.1 x 2.5 cm mass at 4 o'clock position with 2 abnormal axillary lymph nodes, larger lymph node being 1.4 cm  -Left breast biopsy and axillary lymph node biopsy October 29, 2019, invasive ductal carcinoma, moderately differentiated, grade 2, ER 85%, SD 10%, HER-2/merna 2+, HER-2 FISH negative  · CT scans from 11/13/19 show some asymmetric nodules along the lateral margin of the glandular tissue.  The 1.2x1.0 cm left axillary nodes, left supraclavicular, and left posterior cervical triangle nodes are suspicious for tamar metastases.  ·   bone scan from 11/15/19 which was negative.   ·  MRI breast from 11/18/19 which shows the biopsy-proven malignancy in the left breast centered at the 4:00 position.  The region of involvement measures up to 7.5 cm.  There is left axillary adenopathy with multiple irregular lymph nodes and loss of differentiation of the border of  multiple lymph nodes which are suspicious for extranodal involvement.  ·    echocardiogram from 11/14/19 which was normal with an ejection fraction of 67%.    · INVITAE from 11/14/19 which was negative.  · Given the size of her tumor and her medical history, patient will be given 4 cycles of Adriamycin/Cytoxan with Neulasta.  After completion of this treatment, patient will be started on 12 cycles of Taxol.  After the tumor shrinks in size, Dr. Forman will perform a lumpectomy.    · Following lumpectomy, patient will begin endocrine therapy.  · Dr. Dawn agrees chemotherapy will not aggravate her HIV condition.  Dr. Moreno spoke with Dr. Mohan at [facility] who also agrees chemotherapy is the first step.   · 12/06/19: Cycle #1 of Adriamycin/Cytoxan  · US Breast from 01/29/20 after 4 cycles of Adriamycin Cytoxan chemotherapy which shows mild interval partial response to neoadjuvant chemotherapy.  The mass has decreased from 3.8 x 2.5 to 3.1 cm to 3.5 x 2.3 x 3.2 cm previously the left axillary lymph node has decreased from 1.4 x 0.7 x 1 cm to 1.1 x 0.6 x 0.9 cm.  · 01/31/20: Initiated cycle 1 Taxol  · April 24, 2020: Completed cycle 12 Taxol  · MRI breast 5/6/2020 shows significant reduction in the left axillary adenopathy as well as reduction in the index breast lesion.  · S/p left modified radical mastectomy with left axillary dissection.  Patient has 55 x 40 x 30 mm invasive carcinoma, grade 2 with DCIS 36 x 6 mm, high-grade with good margins and 8 out of 11 lymph nodes positive with largest metastasis being 5 mm with extracapsular extension.  And there is lymphatic space invasion  · Will follow-up in 2 weeks at which time we will plan to start endocrine therapy.  She has radiation oncology appointment with Dr. Spivey  · Patient is eligible for Zometa once every 6 months for 2 years.  But given that she is undergoing some work on her teeth we will await starting Zometa until after radiation is  complete.  · XRT completed August 17, 2020  · Started letrozole August 2020 but has arthralgias  · October 6, 2020 had severe arthralgias secondary to letrozole.  We will switch to tamoxifen starting November 2020  · 1/8/2021 screening mammogram in December 2020 as well as diagnostic mammogram which showed 0.4 cm suspicious calcification in the lower left which on biopsy is consistent with fat necrosis.  · 8/12/2021 tamoxifen placed on hold due to significant arthralgias and worsening fatigue.   · September 21, 2021: Patient's depression, mental fogginess has resolved since tamoxifen discontinued.  She has mild worsening fatigue her TSH and B12 levels are normal.  We will need to check iron studies  · Discussed about switching to Arimidex to see if she could tolerate that better    2. HIV, followed by Dr. Dawn in infectious disease.  Well-controlled and is on a single medication with very low viral load. Has HIV since age 34.  · Reviewed her HIV medications and she is compliant  · Stable     3.  Family history of breast cancer with mother having had breast cancer at age 60.  There is family history of uncle with prostate cancer.  Patient will require genetic referral    4.  Arthritis with severe back pain followed by Dr. Bam Crandall  · Continue Percocet    5. Episodes of nosebleeds.  She has had these once a week for 3-4 years. She did not report this today.    6.  Vaginal dryness related to tamoxifen, ongoing.  Patient provided handouts for recommendations of a vaginal lubricant and vaginal moisturizer to use for hopeful improvement.  · Tamoxifen discussed    7.  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  Patient continues on B6.    8. Fatigue  · 8/12/2021 patient seen as triage visit for worsening fatigue and diffuse arthralgias.  Reporting increased sleeping at night and still taking naps in the day.  Labs checked including thyroid studies which were unremarkable and B12 level actually elevated  at greater than 2000.  Patient taking vitamin B supplements at that time.  Patient instructed to hold tamoxifen.  · 8/26/2021 reviewed today recent lab work for above with no evidence of thyroid dysfunction or vitamin B12 abnormality.  Patient notes no improvement being off tamoxifen for 2 weeks.  We will check a CA 15-3 to help further guide decision making.  If this is elevated we may then pursue imaging.  Discussed that she may just need to be off the tamoxifen longer to see improvement.  Patient does have underlying HIV as well and unclear if maybe some of her chronic medications for this could be contributing to her current symptoms.    PLAN:   · Start Arimidex  · Follow-up with nurse practitioner on October 5 for Zometa and labs  · We will cancel CA 15-3  · Since stopping tamoxifen patient's mental depression has significantly resolved  · We will check iron studies  · Follow-up for mammogram on December 6 with plans to follow-up with me in 2 months    Spent 20 minutes with 50% of the time in dictation and coordination of care    Ruby Moreno MD  09/21/21      Cc: ОЛЬГА Godoy MD Nathan Bullington, MD

## 2021-09-21 NOTE — PROGRESS NOTES
New patient or new problem visit    CC: Low back pain    HPI: 61-year-old with recent history of breast cancer remains on chemotherapy has right hip pain.  Hip work-up was negative.  The pain is in the right buttock area and worse with activity no fever chills weight loss bowel or bladder complaints    PFSH: See attached    ROS: As above    PE: Mild lumbar tenderness good strength in the legs straight leg raise is negative    XRAY: Plain film x-rays of the lumbar spine are fairly unremarkable very well-maintained lumbar lordosis no obvious metastatic lesions.  CT scan of the abdomen and pelvis when viewed for spinal bone windows from a couple years ago look pretty good.  Etiologies did not comment on the bone windows.    Other: n/a    Impression: Lumbar back pain possible lumbar stenosis    Plan: Lets get MRI for further evaluation and to rule out any metastatic change..  We will get with and without contrast.  If she fails to improve options could include epidurals first then physical therapy or vice versa.

## 2021-09-22 LAB
FERRITIN SERPL-MCNC: 100.6 NG/ML (ref 13–150)
IRON 24H UR-MRATE: 54 MCG/DL (ref 37–145)
IRON SATN MFR SERPL: 16 % (ref 14–48)
TIBC SERPL-MCNC: 337 MCG/DL (ref 249–505)
TRANSFERRIN SERPL-MCNC: 241 MG/DL (ref 200–360)

## 2021-10-05 ENCOUNTER — INFUSION (OUTPATIENT)
Dept: ONCOLOGY | Facility: HOSPITAL | Age: 61
End: 2021-10-05

## 2021-10-05 ENCOUNTER — OFFICE VISIT (OUTPATIENT)
Dept: ONCOLOGY | Facility: CLINIC | Age: 61
End: 2021-10-05

## 2021-10-05 ENCOUNTER — OFFICE VISIT (OUTPATIENT)
Dept: INFECTIOUS DISEASES | Facility: CLINIC | Age: 61
End: 2021-10-05

## 2021-10-05 VITALS
HEIGHT: 66 IN | DIASTOLIC BLOOD PRESSURE: 76 MMHG | RESPIRATION RATE: 16 BRPM | OXYGEN SATURATION: 99 % | WEIGHT: 148.4 LBS | SYSTOLIC BLOOD PRESSURE: 119 MMHG | HEART RATE: 70 BPM | BODY MASS INDEX: 23.85 KG/M2 | TEMPERATURE: 97.3 F

## 2021-10-05 VITALS
DIASTOLIC BLOOD PRESSURE: 81 MMHG | HEIGHT: 66 IN | BODY MASS INDEX: 23.85 KG/M2 | SYSTOLIC BLOOD PRESSURE: 122 MMHG | TEMPERATURE: 98.9 F | HEART RATE: 68 BPM | WEIGHT: 148.4 LBS

## 2021-10-05 DIAGNOSIS — Z17.0 MALIGNANT NEOPLASM OF OVERLAPPING SITES OF LEFT BREAST IN FEMALE, ESTROGEN RECEPTOR POSITIVE (HCC): ICD-10-CM

## 2021-10-05 DIAGNOSIS — C50.812 MALIGNANT NEOPLASM OF OVERLAPPING SITES OF LEFT BREAST IN FEMALE, ESTROGEN RECEPTOR POSITIVE (HCC): Primary | ICD-10-CM

## 2021-10-05 DIAGNOSIS — Z17.0 MALIGNANT NEOPLASM OF OVERLAPPING SITES OF LEFT BREAST IN FEMALE, ESTROGEN RECEPTOR POSITIVE (HCC): Primary | ICD-10-CM

## 2021-10-05 DIAGNOSIS — R52 BODY ACHES: ICD-10-CM

## 2021-10-05 DIAGNOSIS — M25.50 ARTHRALGIA, UNSPECIFIED JOINT: ICD-10-CM

## 2021-10-05 DIAGNOSIS — Z79.899 HIGH RISK MEDICATION USE: ICD-10-CM

## 2021-10-05 DIAGNOSIS — Z21 ASYMPTOMATIC HIV INFECTION (HCC): Primary | ICD-10-CM

## 2021-10-05 DIAGNOSIS — C50.812 MALIGNANT NEOPLASM OF OVERLAPPING SITES OF LEFT BREAST IN FEMALE, ESTROGEN RECEPTOR POSITIVE (HCC): ICD-10-CM

## 2021-10-05 LAB
ALBUMIN SERPL-MCNC: 4.4 G/DL (ref 3.5–5.2)
ALBUMIN/GLOB SERPL: 1.9 G/DL (ref 1.1–2.4)
ALP SERPL-CCNC: 57 U/L (ref 38–116)
ALT SERPL W P-5'-P-CCNC: 14 U/L (ref 0–33)
ANION GAP SERPL CALCULATED.3IONS-SCNC: 8.8 MMOL/L (ref 5–15)
AST SERPL-CCNC: 23 U/L (ref 0–32)
BASOPHILS # BLD AUTO: 0.02 10*3/MM3 (ref 0–0.2)
BASOPHILS NFR BLD AUTO: 0.5 % (ref 0–1.5)
BILIRUB SERPL-MCNC: 0.2 MG/DL (ref 0.2–1.2)
BUN SERPL-MCNC: 12 MG/DL (ref 6–20)
BUN/CREAT SERPL: 19.7 (ref 7.3–30)
CALCIUM SPEC-SCNC: 8.7 MG/DL (ref 8.5–10.2)
CHLORIDE SERPL-SCNC: 104 MMOL/L (ref 98–107)
CO2 SERPL-SCNC: 27.2 MMOL/L (ref 22–29)
CREAT SERPL-MCNC: 0.61 MG/DL (ref 0.6–1.1)
DEPRECATED RDW RBC AUTO: 46.1 FL (ref 37–54)
EOSINOPHIL # BLD AUTO: 0.06 10*3/MM3 (ref 0–0.4)
EOSINOPHIL NFR BLD AUTO: 1.4 % (ref 0.3–6.2)
ERYTHROCYTE [DISTWIDTH] IN BLOOD BY AUTOMATED COUNT: 12.4 % (ref 12.3–15.4)
GFR SERPL CREATININE-BSD FRML MDRD: 100 ML/MIN/1.73
GLOBULIN UR ELPH-MCNC: 2.3 GM/DL (ref 1.8–3.5)
GLUCOSE SERPL-MCNC: 80 MG/DL (ref 74–124)
HCT VFR BLD AUTO: 37.4 % (ref 34–46.6)
HGB BLD-MCNC: 12.3 G/DL (ref 12–15.9)
IMM GRANULOCYTES # BLD AUTO: 0.01 10*3/MM3 (ref 0–0.05)
IMM GRANULOCYTES NFR BLD AUTO: 0.2 % (ref 0–0.5)
LYMPHOCYTES # BLD AUTO: 1.62 10*3/MM3 (ref 0.7–3.1)
LYMPHOCYTES NFR BLD AUTO: 37.2 % (ref 19.6–45.3)
MAGNESIUM SERPL-MCNC: 1.8 MG/DL (ref 1.8–2.5)
MCH RBC QN AUTO: 33.2 PG (ref 26.6–33)
MCHC RBC AUTO-ENTMCNC: 32.9 G/DL (ref 31.5–35.7)
MCV RBC AUTO: 100.8 FL (ref 79–97)
MONOCYTES # BLD AUTO: 0.55 10*3/MM3 (ref 0.1–0.9)
MONOCYTES NFR BLD AUTO: 12.6 % (ref 5–12)
NEUTROPHILS NFR BLD AUTO: 2.09 10*3/MM3 (ref 1.7–7)
NEUTROPHILS NFR BLD AUTO: 48.1 % (ref 42.7–76)
NRBC BLD AUTO-RTO: 0 /100 WBC (ref 0–0.2)
PHOSPHATE SERPL-MCNC: 4.1 MG/DL (ref 2.5–4.5)
PLATELET # BLD AUTO: 213 10*3/MM3 (ref 140–450)
PMV BLD AUTO: 9.8 FL (ref 6–12)
POTASSIUM SERPL-SCNC: 4.1 MMOL/L (ref 3.5–4.7)
PROT SERPL-MCNC: 6.7 G/DL (ref 6.3–8)
RBC # BLD AUTO: 3.71 10*6/MM3 (ref 3.77–5.28)
SODIUM SERPL-SCNC: 140 MMOL/L (ref 134–145)
WBC # BLD AUTO: 4.35 10*3/MM3 (ref 3.4–10.8)

## 2021-10-05 PROCEDURE — 25010000002 ZOLEDRONIC ACID 4 MG/100ML SOLUTION: Performed by: INTERNAL MEDICINE

## 2021-10-05 PROCEDURE — 99213 OFFICE O/P EST LOW 20 MIN: CPT | Performed by: INTERNAL MEDICINE

## 2021-10-05 PROCEDURE — 96374 THER/PROPH/DIAG INJ IV PUSH: CPT

## 2021-10-05 PROCEDURE — 84100 ASSAY OF PHOSPHORUS: CPT

## 2021-10-05 PROCEDURE — 85025 COMPLETE CBC W/AUTO DIFF WBC: CPT

## 2021-10-05 PROCEDURE — 96375 TX/PRO/DX INJ NEW DRUG ADDON: CPT

## 2021-10-05 PROCEDURE — 83735 ASSAY OF MAGNESIUM: CPT

## 2021-10-05 PROCEDURE — 99214 OFFICE O/P EST MOD 30 MIN: CPT | Performed by: NURSE PRACTITIONER

## 2021-10-05 PROCEDURE — 80053 COMPREHEN METABOLIC PANEL: CPT

## 2021-10-05 RX ORDER — ZOLEDRONIC ACID 0.04 MG/ML
4 INJECTION, SOLUTION INTRAVENOUS ONCE
Status: COMPLETED | OUTPATIENT
Start: 2021-10-05 | End: 2021-10-05

## 2021-10-05 RX ORDER — SODIUM CHLORIDE 9 MG/ML
250 INJECTION, SOLUTION INTRAVENOUS ONCE
Status: COMPLETED | OUTPATIENT
Start: 2021-10-05 | End: 2021-10-05

## 2021-10-05 RX ADMIN — SODIUM CHLORIDE 250 ML: 9 INJECTION, SOLUTION INTRAVENOUS at 15:51

## 2021-10-05 RX ADMIN — ZOLEDRONIC ACID 4 MG: 0.04 INJECTION, SOLUTION INTRAVENOUS at 15:52

## 2021-10-05 NOTE — PROGRESS NOTES
Subjective   This was an audio and video enabled telemedicine encounter.    REASON FOR FOLLOW UP:    1.  T2N1 invasive ductal carcinoma of the left breast.  Ultrasound-showed 3.8 x 3.1 x 2.5 cm mass at 4 o'clock position with 2 abnormal axillary lymph nodes, larger lymph node being 1.4 cm.  Left breast biopsy and axillary lymph node biopsy October 29, 2019, invasive ductal carcinoma, moderately differentiated, grade 2, ER 85%, KY 10%, HER-2/merna 2+, HER-2 FISH negative.  · 12/06/19: Neoadjuvant chemo therapy, cycle #1 of dose-dense Adriamycin/Cytoxan with Neulasta for marrow support    · 01/17/20: Last cycle of Adriamycin/Cytoxan given  · January 31, 2020: Cycle 1 Taxol  · April 24, 2020: Last cycle, cycle 12 of Taxol  · Patient is s/p left mastectomy with axillary dissection with reconstruction.  She is healing up nicely.  She has a drain in place.  Pathology showed invasive breast cancer which is 55 x 40 mm in size, grade 2 with focal lymphovascular space invasion with DCIS spanning over 36 x 6 mm.  All margins were negative for cancer.  · 8 of the additional lymph nodes out of 11 were positive with the largest micrometastasis measuring 5 mm.  Extranodal extension was seen.  · Biomarkers on post neoadjuvant tumor is ER 81-90% KY 5% HER-2/merna 2+ by immunohistochemistry and HER-2/merna negative by FISH.  · Radiation July 6, 2020-August 17, 2020.  · Letrozole initiated July 2020.  · Letrozole discontinued October 2020 secondary to severe joint pain.  · October 30, 2020 patient starting tamoxifen and tolerating well  · August 2021: Tamoxifen discontinued secondary to depression and mental fogginess and fatigue  · September 21, 2021: Arimidex started    2.  HIV, followed by Dr. Dawn from infectious disease.  Well controlled on meds    3.  Screening mammogram December 7, 2020 showed indeterminate calcifications lower outer middle left breast. Diagnostic mammogram showed 0.4 cm calcifications in the left breast.   Stereotactic biopsy January 8, 2021 consistent with organizing fat necrosis.              HISTORY OF PRESENT ILLNESS:  The patient is a 61 y.o. year old female with the above-mentioned history who is here today for lab review and follow-up due for Zometa.  She is also due for toxicity check after she was started on Arimidex 2 weeks ago.  She states about 4 to 5 days after starting the medication she began having generalized arthralgias in her joints.  She took Tylenol without much relief.  She is also tried taking pain medication which only gave her some mild relief as well.  She has had one issue with hot flash.    She had previously tried taking letrozole and did not tolerated due to severe joint pain.  She also did not tolerate tamoxifen due to depression and mental fogginess.    Patient's last Zometa was in April and she tolerated that well.  She continues to follow closely with Dr. Dawn, who she saw today.  She has no other new problems or concerns.    Past Medical History:   Diagnosis Date   • Anxiety    • Cerebrovascular accident (CVA) ([facility]) 8/22/2017    NO RESIDUAL AFFECT   • DDD (degenerative disc disease), cervical    • Depression    • Drug therapy    • Elevated cholesterol    • GERD (gastroesophageal reflux disease)    • H/O ICH (intracerebral hemorrhage) ([facility])    • History of chemotherapy    • History of stroke    • History of thrombocytopenia    • HIV (human immunodeficiency virus infection) ([facility]) 1996   • Hx of radiation therapy    • Hyperlipidemia    • Insomnia    • Lupus anticoagulant positive    • Malignant neoplasm of overlapping sites of left breast in female, estrogen receptor positive ([facility]) 11/12/2019   • Meniscus tear 2017    RIGHT   • Migraine    • Neck pain     WITH SHOOTING PAIN LEFT RIGHT ARM   • Osteoarthritis    • Osteoporosis    • Pain management     sees 1 every 3 months for scripts/injection/pain meds   • Seasonal allergies    • Spinal headache     THINKS HAD BLOOD PATCH  AFTER EPIDUAL    • Tick bite 06/2014     PAST MEDICAL HISTORY:  She had a stroke in July 2017 with headache and dizziness.  She went to the ER and was placed on aspirin.  However, she stopped taking it two weeks ago.  She has been taking Aimovig (injection) monthly with Fariba López at [place].    In 1994, patient was diagnosed with HIV with an unknown cause which is managed by Dr. Natali Subramanian.  As of now, her viral load is good.    Patient has arthritis.  She gets trigger-point injections in her back with her last one being 2 weeks ago.  She has had multiple ablations.  She also has pain in her SI joint and Dr. Bermudez performed a surgery on this.  She takes Narco for the pain.      She is osteoporotic.  She has had multiple hairline fractures in both her legs.  She had her knees cleaned out by Dr. Sinha.     Patient has had a hysterectomy and still has both of her ovaries.    Patient has vertigo and the muscles in her left ear are weak.  She just has an imbalance.    Past Surgical History:   Procedure Laterality Date   • ADENOIDECTOMY     • BREAST LUMPECTOMY     • BREAST LUMPECTOMY WITH SENTINEL NODE BIOPSY Left 5/28/2020    Procedure: BREAST LUMPECTOMY WITH SENTINEL NODE BIOPSY AND NEEDLE LOCALIZATION AND axillary dissection;  Surgeon: Landen Forman MD;  Location: [facility];  Service: General;  Laterality: Left;   • BREAST SURGERY Left 5/28/2020    Procedure: LEFT LATERAL INTERCOASTAL ARTERY  flap ;  Surgeon: Yusuf Garcia MD;  Location: [facility];  Service: Plastics;  Laterality: Left;   • CHOLECYSTECTOMY     • HYSTERECTOMY     • KNEE ARTHROSCOPY Right 3/24/2017    Procedure:  RIGHT  KNEE ARTHROSCOPY WITH DEBRIDEMENT CHONDROPLASTY PARTIAL MENISCECTOMY;  Surgeon: Karl Galeas MD;  Location: [facility];  Service:    • KNEE CARTILAGE SURGERY Right 2007   • TONSILLECTOMY     • US GUIDED LYMPH NODE BIOPSY  10/29/2019    LEFT BREAST   • VENOUS ACCESS DEVICE (PORT)  INSERTION Right 12/2/2019    Procedure: INSERTION VENOUS ACCESS DEVICE RIGHT;  Surgeon: Landen Forman MD;  Location: [facility];  Service: General       ONCOLOGIC HISTORY:  Patient is a 59-year-old female who has had a history of HIV since age 34 followed by Dr. Natali Ng at [facility] and was on multiple HIV drugs initially but more recently has been on a single medication TRIUMEQ, with well-controlled HIV.  She follows up with Dr. Dawn , infectious disease who saw her recently and he saw her on 4/8/2019 and has excellent control and suppression of her viral load.  She is very compliant with her medications.    She also has had history of stroke in 2017 for which she was placed on aspirin.  She presented with a headache.  She is very active and works at [employer] full-time.  She also has had history of vertigo in the past  Patient's PCP is Zach Lora.    Patient first noticed the mass in her left breast 5 months ago.  Her last mammogram was 5 years ago.  She had soreness in the left breast and she went to her primary care physician who then ordered a mammogram diagnostic and an ultrasound.  The diagnostic mammogram showed a 3.7 cm mass at 4 o'clock position in the lower outer quadrant of the left breast.  The ultrasound showed 3.8 cm x 3.1 x 2.5 cm mass at 4 o'clock position in the left breast with 2 abnormal appearing left axillary lymph nodes the largest being 1.4 cm.    She then underwent biopsy of both the breast mass as well as the left axillary lymph node and both of them are positive for invasive mammary carcinoma it is invasive ductal carcinoma with apocrine features, moderately differentiated, grade 2 of 3 with a Lockwood score of 5.  The left axillary lymph node is also consistent with metastatic mammary carcinoma.  It is ER positive [place] positive HER-2/merna negative.  Details as follows    10/21/19 - Bilateral Diagnostic Mammogram and US Breast  Left  FINDINGS: Bilateral digital CC and MLO mammographic images were  obtained. No prior examination is available for comparison. Scattered  fibroglandular densities are seen throughout both breasts. A triangular  skin marker represents the area of palpable concern in the lower outer  quadrant of the left breast in the posterior 1/3. At this location there  is an irregular mass that measures on the order of 3.7 cm in greatest  dimension. Internal calcifications are noted. No suspicious findings in  the right breast are appreciated.     ULTRASOUND: Targeted sonographic evaluation of the left breast was  performed through the area of concern in the left axilla. At the 4  o'clock position on the order of 6 cm from the nipple there is an  irregular hypoechoic mass that measures 3.8 x 3.1 x 2.5 cm. Internal  vascularity is noted.     There are 2 abnormal-appearing left axillary lymph nodes, the largest  which measures on the order of 1.4 cm in greatest dimension.     IMPRESSION:  1. There is a 3.8 cm irregular mass in the left breast at the 4 o'clock  position. This is seen in conjunction with an irregular 1.4 cm lymph  node in the left axilla. This is suspicious for malignancy with left  axillary involvement. Correlation with ultrasound-guided biopsy of both  the left breast mass and the lymph node is recommended.  2. There are no findings suspicious for malignancy in the right breast.     BI-RADS CATEGORY 5:     Patient's labs from 11/12/19 are normal.    10/29/19 - Tissue Pathology  1. Left Breast, 4:00, 6 cm FN, U/S-Guided Core Needle Biopsy for a Mass:  A. INVASIVE DUCTAL CARCINOMA WITH APOCRINE FEATURES, Moderately differentiated;  [place] Histologic Grade II/III (tubule score = 3, nuclear score = 2, mitoses score = 1), measuring at least  9 mm.  B. No ductal or lobular carcinoma in situ is identified in this sample.  C. Focus suspicious for lymphovascular space invasion.  D. See Biomarker Template for hormone  receptor studies.  2. Lymph Node, Left Axilla, U/S-Guided Core Needle Biopsy:  A. METASTATIC MAMMARY CARCINOMA (see Comment).  B. Metastatic focus measures 5 mm in greatest extent.  ER+, 85%  MT+, 10%  HER2- Score 2+    11/13/19 - CT Neck Chest Abdomen Pelvis  IMPRESSION:  1. In addition to the irregular approximately 3.8 cm mass within the  lateral aspect of the left breast, there are a few subcentimeter  asymmetric nodules along the lateral margin of the glandular tissue. The  several asymmetric left subpectoral nodes and 1.2 x 1.0 cm left axillary  node are suspicious for tamar metastases. The asymmetric subcentimeter  left supraclavicular and left posterior cervical triangle nodes are  worrisome as well.  2. There is no convincing evidence for metastatic disease within the  abdomen or pelvis.    11/14/19 - Echocardiogram:  Normal.  Calculated EF = 67%.  There is trace mitral valve and tricuspid valve regurgitation.    11/15/19 - Bone Scan  Negative.    11/18/19 - MRI Breast Bilateral  IMPRESSION:  1. Biopsy-proven malignancy in the left breast centered at the 4 o'clock  position with associated surrounding satellite nodules as described. The  entire region of involvement including the satellite nodules and the  dominant mass measure up to 7.5 cm in greatest dimension. Also, left  axillary adenopathy with multiple irregular lymph nodes and loss of  differentiation of the borders of multiple lymph nodes is noted and this  is suspicious for extranodal involvement.  2. There are no findings suspicious for malignancy in the right breast.  BI-RADS CATEGORY 6      12/06/19: Cycle #1 of Adriamycin/Cytoxan    01/17/20: Last cycle of Adriamycin/Cytoxan given without Neulasta.  We will switch from Neulasta to 7 days of Neupogen injections after cycle 4.    We reviewed with patient the US Breast from 01/29/20 which shows mild interval partial response to neoadjuvant chemotherapy.      01/31/20: Initiated cycle 1  Taxol  2020: Last cycle of Taxol, cycle 12    S/p left mastectomy with axillary dissection   Pathology showed invasive breast cancer which is 55 x 40 mm in size, grade 2 with focal lymphovascular space invasion with DCIS spanning over 36 x 6 mm.  All margins were negative for cancer.  8 of the additional lymph nodes out of 11 were positive with the largest micrometastasis measuring 5 mm.  Extranodal extension was seen.    Biomarkers on post neoadjuvant tumor is ER 81-90% IN 5% HER-2/merna 2+ by immunohistochemistry and HER-2/merna negative by FISH.    2020: Started letrozole but because of severe joint pains was discontinued 2020    End of 2020 started tamoxifen    OB-GYN:  Menarche 15 years  Menopause-46  Pregnancies- 1 para 1 no miscarriages her first pregnancy was in  at 29 years of age.  She did not breastfeed.  Post menopausal HRT- None.  Hx of birth control pills- Yes.    Current Outpatient Medications on File Prior to Visit   Medication Sig Dispense Refill   • Abacavir-Dolutegravir-Lamivud (Triumeq) 600- MG per tablet Take 1 tablet by mouth Daily. 90 tablet 3   • AIMOVIG 140 MG/ML solution auto-injector INJECT 140 MG INTO THE SKIN EVERY 30 (THIRTY) DAYS.  11   • anastrozole (Arimidex) 1 MG tablet Take 1 tablet by mouth Daily. 30 tablet 5   • atorvastatin (LIPITOR) 20 MG tablet Take 1 tablet by mouth every night at bedtime. 90 tablet 3   • baclofen (LIORESAL) 10 MG tablet Take 10 mg by mouth 2 (Two) Times a Day.  2   • clonazePAM (KlonoPIN) 0.5 MG tablet Take 1 tablet by mouth 2 (Two) Times a Day As Needed for Anxiety. 60 tablet 0   • colesevelam (WELCHOL) 625 MG tablet TAKE 2 TABLETS BY MOUTH EVERY  tablet 3   • escitalopram (LEXAPRO) 10 MG tablet Take 1 tablet by mouth Daily. 90 tablet 3   • fluticasone (FLONASE) 50 MCG/ACT nasal spray 2 sprays into the nostril(s) as directed by provider Daily. 3 mL 3   • Loratadine (CLARITIN PO) Take 10 mg by mouth Daily.      • Magnesium Hydroxide (MILK OF MAGNESIA PO) Take 15 mL by mouth Every Night.     • oxyCODONE-acetaminophen (PERCOCET)  MG per tablet Take 1 tablet by mouth 4 (Four) Times a Day.     • vitamin B-6 (pyridoxine) 50 MG tablet Take 1 tablet by mouth Daily. 90 tablet 2   • zolpidem (AMBIEN) 10 MG tablet Take 1 tablet by mouth Every Night. for sleep 90 tablet 0     Current Facility-Administered Medications on File Prior to Visit   Medication Dose Route Frequency Provider Last Rate Last Admin   • [COMPLETED] sodium chloride 0.9 % infusion 250 mL  250 mL Intravenous Once Ruby Moreno MD   Stopped at 10/05/21 1612   • [COMPLETED] zoledronic acid (ZOMETA) infusion 4 mg/100 mL (premix)  4 mg Intravenous Once Ruby Moreno MD   Stopped at 10/05/21 1607        ALLERGIES:    Allergies   Allergen Reactions   • Augmentin [Amoxicillin-Pot Clavulanate] Hives        Social History     Socioeconomic History   • Marital status:      Spouse name: Owen   • Number of children: 1   • Years of education: College   • Highest education level: Not on file   Tobacco Use   • Smoking status: Never Smoker   • Smokeless tobacco: Never Used   Substance and Sexual Activity   • Alcohol use: Yes     Comment: occasioonal   • Drug use: No   • Sexual activity: Defer     Partners: Male     Birth control/protection: None     Comment:      Patient does not smoke or do drugs.  She does drink occasionally.    Family History   Problem Relation Age of Onset   • Breast cancer Mother    • Leukemia Mother         CLL?   • Diabetes Mother    • Hyperthyroidism Mother    • Hearing loss Mother    • Dementia Father    • Heart disease Maternal Grandmother    • Diabetes Maternal Grandfather    • Heart disease Maternal Grandfather    • Stroke Maternal Grandfather    • Heart attack Maternal Grandfather    • Osteoporosis Paternal Grandmother    • Heart disease Paternal Grandfather    • Leukemia Maternal Aunt         CLL?   • Throat cancer  "Paternal Uncle    • Malig Hyperthermia Neg Hx       FAMILY HISTORY:  Her mother had breast cancer at age 60, still alive at 85 and in good health..  Her father had dementia.  Her paternal uncle had prostate cancer.  Her brother had esophageal cancer.    I have reviewed the patient's medical history in detail and updated the computerized patient record.     ROS: as per HPI    Objective    Vitals:    10/05/21 1513   BP: 119/76   Pulse: 70   Resp: 16   Temp: 97.3 °F (36.3 °C)   TempSrc: Temporal   SpO2: 99%   Weight: 67.3 kg (148 lb 6.4 oz)   Height: 167.6 cm (65.98\")   PainSc: 8  Comment: joint pain       PHYSICAL EXAM: Limited due to telehealth visit.  Physical Exam  Constitutional:       General: She is not in acute distress.     Appearance: She is well-developed.   Pulmonary:      Effort: Pulmonary effort is normal. No respiratory distress.   Skin:     General: Skin is warm and dry.   Neurological:      Mental Status: She is alert and oriented to person, place, and time.             RECENT LABS:   Results from last 7 days   Lab Units 10/05/21  1353   WBC 10*3/mm3 4.35   NEUTROS ABS 10*3/mm3 2.09   HEMOGLOBIN g/dL 12.3   HEMATOCRIT % 37.4   PLATELETS 10*3/mm3 213     Results from last 7 days   Lab Units 10/05/21  1353   SODIUM mmol/L 140   POTASSIUM mmol/L 4.1   CHLORIDE mmol/L 104   CO2 mmol/L 27.2   BUN mg/dL 12   CREATININE mg/dL 0.61   CALCIUM mg/dL 8.7   ALBUMIN g/dL 4.40   BILIRUBIN mg/dL 0.2   ALK PHOS U/L 57   ALT (SGPT) U/L 14   AST (SGOT) U/L 23   GLUCOSE mg/dL 80   MAGNESIUM mg/dL 1.8         Assessment/Plan    1. T2N1 invasive ductal carcinoma of the left breast, recently diagnosed.    -Noticed mass in the left breast x5 months  -Diagnostic mammogram showed 3.7 mm mass in the left breast at 4 o'clock position  -Ultrasound-showed 3.8 x 3.1 x 2.5 cm mass at 4 o'clock position with 2 abnormal axillary lymph nodes, larger lymph node being 1.4 cm  -Left breast biopsy and axillary lymph node biopsy October 29, " 2019, invasive ductal carcinoma, moderately differentiated, grade 2, ER 85%, GA 10%, HER-2/merna 2+, HER-2 FISH negative  · CT scans from 11/13/19 show some asymmetric nodules along the lateral margin of the glandular tissue.  The 1.2x1.0 cm left axillary nodes, left supraclavicular, and left posterior cervical triangle nodes are suspicious for tamar metastases.  ·   bone scan from 11/15/19 which was negative.   ·  MRI breast from 11/18/19 which shows the biopsy-proven malignancy in the left breast centered at the 4:00 position.  The region of involvement measures up to 7.5 cm.  There is left axillary adenopathy with multiple irregular lymph nodes and loss of differentiation of the border of multiple lymph nodes which are suspicious for extranodal involvement.  ·    echocardiogram from 11/14/19 which was normal with an ejection fraction of 67%.    · INVITAE from 11/14/19 which was negative.  · Given the size of her tumor and her medical history, patient will be given 4 cycles of Adriamycin/Cytoxan with Neulasta.  After completion of this treatment, patient will be started on 12 cycles of Taxol.  After the tumor shrinks in size, Dr. Forman will perform a lumpectomy.    · Following lumpectomy, patient will begin endocrine therapy.  · Dr. Dawn agrees chemotherapy will not aggravate her HIV condition.  Dr. Moreno spoke with Dr. Mohan at [facility] who also agrees chemotherapy is the first step.   · 12/06/19: Cycle #1 of Adriamycin/Cytoxan  · US Breast from 01/29/20 after 4 cycles of Adriamycin Cytoxan chemotherapy which shows mild interval partial response to neoadjuvant chemotherapy.  The mass has decreased from 3.8 x 2.5 to 3.1 cm to 3.5 x 2.3 x 3.2 cm previously the left axillary lymph node has decreased from 1.4 x 0.7 x 1 cm to 1.1 x 0.6 x 0.9 cm.  · 01/31/20: Initiated cycle 1 Taxol  · April 24, 2020: Completed cycle 12 Taxol  · MRI breast 5/6/2020 shows significant reduction in the left axillary  adenopathy as well as reduction in the index breast lesion.  · S/p left modified radical mastectomy with left axillary dissection.  Patient has 55 x 40 x 30 mm invasive carcinoma, grade 2 with DCIS 36 x 6 mm, high-grade with good margins and 8 out of 11 lymph nodes positive with largest metastasis being 5 mm with extracapsular extension.  And there is lymphatic space invasion  · Will follow-up in 2 weeks at which time we will plan to start endocrine therapy.  She has radiation oncology appointment with Dr. Spivey  · Patient is eligible for Zometa once every 6 months for 2 years.  But given that she is undergoing some work on her teeth we will await starting Zometa until after radiation is complete.  · XRT completed August 17, 2020  · Started letrozole August 2020 but has arthralgias  · October 6, 2020 had severe arthralgias secondary to letrozole.  We will switch to tamoxifen starting November 2020  · 1/8/2021 screening mammogram in December 2020 as well as diagnostic mammogram which showed 0.4 cm suspicious calcification in the lower left which on biopsy is consistent with fat necrosis.  · 8/12/2021 tamoxifen placed on hold due to significant arthralgias and worsening fatigue.   · September 21, 2021: Patient's depression, mental fogginess has resolved since tamoxifen discontinued.  She has mild worsening fatigue her TSH and B12 levels are normal.  We will need to check iron studies  · Discussed about switching to Arimidex to see if she could tolerate that better  · Patient initiated Arimidex 9/21/2021.  · Returns 10/5/2021 complaining of significant generalized arthralgias.  Reviewed with Dr. Moreno.  We were initially going to try her on Cymbalta, but the patient is already taking Lexapro, and doing well on this.  We will therefore discontinue Arimidex.  We will give her a 6-week break and reevaluate things in 6 weeks.  Could try at that time switching to Aromasin.    2. HIV, followed by Dr. Dawn in  infectious disease.  Well-controlled and is on a single medication with very low viral load. Has HIV since age 34.  · Reviewed her HIV medications and she is compliant  · Stable     3.  Family history of breast cancer with mother having had breast cancer at age 60.  There is family history of uncle with prostate cancer.  Patient will require genetic referral    4.  Arthritis with severe back pain followed by Dr. Bam Crandall  · Continue Percocet    5. Episodes of nosebleeds.  She has had these once a week for 3-4 years. She did not report this today.    6.  Vaginal dryness related to tamoxifen, ongoing.  Patient provided handouts for recommendations of a vaginal lubricant and vaginal moisturizer to use for hopeful improvement.  · Tamoxifen discussed    7.  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  Patient continues on B6.    8. Fatigue  · 8/12/2021 patient seen as triage visit for worsening fatigue and diffuse arthralgias.  Reporting increased sleeping at night and still taking naps in the day.  Labs checked including thyroid studies which were unremarkable and B12 level actually elevated at greater than 2000.  Patient taking vitamin B supplements at that time.  Patient instructed to hold tamoxifen.  · 8/26/2021 reviewed today recent lab work for above with no evidence of thyroid dysfunction or vitamin B12 abnormality.  Patient notes no improvement being off tamoxifen for 2 weeks.  We will check a CA 15-3 to help further guide decision making.  If this is elevated we may then pursue imaging.  Discussed that she may just need to be off the tamoxifen longer to see improvement.  Patient does have underlying HIV as well and unclear if maybe some of her chronic medications for this could be contributing to her current symptoms.    PLAN:   · Discontinue Arimidex.  · Zometa today.  · Return for follow-up visit in 6 weeks with Dr. Moreno with repeat CBC and CMP.  · At that time we will reevaluate her symptoms  and consider switching to possibly Aromasin.  · Patient due for mammogram in December.  · Continue to follow closely with Dr. Dawn.  · Call/return sooner should she develop any new concerns or problems.    Patient is on high risk medication requiring close monitoring for toxicity      Charlene Mars, ОЛЬГА  10/05/21      Cc: Dr. Landen Lora, MD Denilson Shi MD

## 2021-10-05 NOTE — PROGRESS NOTES
CC: f/u HIV    HPI: Richa Dolan is a 61 y.o. female here for f/u of her HIV care. No new issues from an HIV standpoint. No missed doses of Triumeq. No obvious side effects.     For her breast cancer, she is currently on Arimidex.    She has received her COVID-19 booster.     Review of Systems:   No n/v/d    PMH:   HIV  Breast cancer  CVA  Migraines  HLD  GERD    Past Surgical History:   Procedure Laterality Date   • ADENOIDECTOMY     • BREAST LUMPECTOMY     • BREAST LUMPECTOMY WITH SENTINEL NODE BIOPSY Left 5/28/2020    Procedure: BREAST LUMPECTOMY WITH SENTINEL NODE BIOPSY AND NEEDLE LOCALIZATION AND axillary dissection;  Surgeon: Landen Forman MD;  Location: [facility];  Service: General;  Laterality: Left;   • BREAST SURGERY Left 5/28/2020    Procedure: LEFT LATERAL INTERCOASTAL ARTERY  flap ;  Surgeon: Yusuf Garcia MD;  Location: [facility];  Service: Plastics;  Laterality: Left;   • CHOLECYSTECTOMY     • HYSTERECTOMY     • KNEE ARTHROSCOPY Right 3/24/2017    Procedure:  RIGHT  KNEE ARTHROSCOPY WITH DEBRIDEMENT CHONDROPLASTY PARTIAL MENISCECTOMY;  Surgeon: Karl Galeas MD;  Location: [facility];  Service:    • KNEE CARTILAGE SURGERY Right 2007   • TONSILLECTOMY     • US GUIDED LYMPH NODE BIOPSY  10/29/2019    LEFT BREAST   • VENOUS ACCESS DEVICE (PORT) INSERTION Right 12/2/2019    Procedure: INSERTION VENOUS ACCESS DEVICE RIGHT;  Surgeon: Landen Forman MD;  Location: [facility];  Service: General     FH: father had dementia    SH: , 1 son, no tobacco use, occasional EtOH, no illicits, retired from [employer]    Allergies: Augmentin (hives)    Medications:   Current Outpatient Medications:   •  Abacavir-Dolutegravir-Lamivud (Triumeq) 600- MG per tablet, Take 1 tablet by mouth Daily., Disp: 90 tablet, Rfl: 3  •  AIMOVIG 140 MG/ML solution auto-injector, INJECT 140 MG INTO THE SKIN EVERY 30 (THIRTY) DAYS., Disp: , Rfl: 11  •   "anastrozole (Arimidex) 1 MG tablet, Take 1 tablet by mouth Daily., Disp: 30 tablet, Rfl: 5  •  atorvastatin (LIPITOR) 20 MG tablet, Take 1 tablet by mouth every night at bedtime., Disp: 90 tablet, Rfl: 3  •  baclofen (LIORESAL) 10 MG tablet, Take 10 mg by mouth 2 (Two) Times a Day., Disp: , Rfl: 2  •  clonazePAM (KlonoPIN) 0.5 MG tablet, Take 1 tablet by mouth 2 (Two) Times a Day As Needed for Anxiety., Disp: 60 tablet, Rfl: 0  •  colesevelam (WELCHOL) 625 MG tablet, TAKE 2 TABLETS BY MOUTH EVERY DAY, Disp: 180 tablet, Rfl: 3  •  escitalopram (LEXAPRO) 10 MG tablet, Take 1 tablet by mouth Daily., Disp: 90 tablet, Rfl: 3  •  fluticasone (FLONASE) 50 MCG/ACT nasal spray, 2 sprays into the nostril(s) as directed by provider Daily., Disp: 3 mL, Rfl: 3  •  Loratadine (CLARITIN PO), Take 10 mg by mouth Daily., Disp: , Rfl:   •  Magnesium Hydroxide (MILK OF MAGNESIA PO), Take 15 mL by mouth Every Night., Disp: , Rfl:   •  oxyCODONE-acetaminophen (PERCOCET)  MG per tablet, Take 1 tablet by mouth 4 (Four) Times a Day., Disp: , Rfl:   •  vitamin B-6 (pyridoxine) 50 MG tablet, Take 1 tablet by mouth Daily., Disp: 90 tablet, Rfl: 2  •  zolpidem (AMBIEN) 10 MG tablet, Take 1 tablet by mouth Every Night. for sleep, Disp: 90 tablet, Rfl: 0    OBJECTIVE:  /81   Pulse 68   Temp 98.9 °F (37.2 °C)   Ht 167.6 cm (65.98\")   Wt 67.3 kg (148 lb 6.4 oz)   BMI 23.96 kg/m²     GENERAL: Awake, alert, NAD  ENT:  wearing mask  EYES: No scleral icterus  HEART: NR  LUNGS: . Normal work of breathing on ambient air.    ABDOMEN: soft, non-distended  SKIN: no rashes  Vasc: R chest port w/o erythema    DIAGNOSTICS:  CBC, CMP, HIV labs reviewed today  Lab Results   Component Value Date    WBC 4.08 09/21/2021    HGB 11.6 (L) 09/21/2021    HCT 34.8 09/21/2021     09/21/2021     Lab Results   Component Value Date    GLUCOSE 94 09/21/2021    BUN 12 09/21/2021    CREATININE 0.66 09/21/2021    EGFRIFNONA 91 09/21/2021    EGFRIFAFRI " >60 05/28/2015    BCR 18.2 09/21/2021    CO2 27.6 09/21/2021    CALCIUM 8.4 (L) 09/21/2021    PROTENTOTREF 7.2 10/13/2016    ALBUMIN 3.90 09/21/2021    LABIL2 1.5 04/08/2019    AST 19 09/21/2021    ALT 12 09/21/2021     HIV Related Labs:  CD4: 356/29% (4/2021)  VL 30 (4/2021)  HIV Genotype: unknown bc undetectable  [id number] - negative  Tspot - negative (10/2020)  RPR - non-reactive (10/2020)  Hep A - immune (7/2016)  Hep B - immune (9/2015)  Hep C - negative (10/2020)  Urine GCCT - negative (9/2015)    Radiology (reviewed report):    No new imaging today    ASSESSMENT/PLAN:  1. Asymptomatic HIV  -excellent compliance w/ Triumeq; refills addressed  -check HIV RNA, CD4, CBC, and CMP today    2. T2N1 invasive ductal carcinoma of the left breast  -currently on Arimidex  - Zometa infusions    3. Long term use of antibiotics  -reviewed monitoring labs      RTC 6 months or sooner if needed

## 2021-10-05 NOTE — TELEPHONE ENCOUNTER
Called to let Ms. Dolan know that since she is taking Lexapro, Dr. Moreno did not want to start her on the Cymbalta, as originally discussed, therefore instructed her to stop taking the Arimidex.  Let her know that Dr. Moreno was going to see her in 6 weeks and they would discuss starting a different treatment.  Patient verbalized understanding.

## 2021-10-06 LAB
HIV1 RNA # SERPL NAA+PROBE: 40 COPIES/ML
HIV1 RNA SERPL NAA+PROBE-LOG#: 1.6 LOG10COPY/ML

## 2021-10-11 NOTE — PROGRESS NOTES
Chief Complaint: Richa Dolan is a 59 y.o.. female here today for Breast Cancer (Left IDC)        History of Present Illness:  Patient presents with newly diagnosed breast cancer. Left IDC.  The patient is a 59-year-old white female who 6 months ago noticed a possible firmness in the left breast.  She just thought it might be fibrocystic nodularity but several months ago she hit herself in that breast and it just felt different to her.  She had not had mammograms performed for years and had those studies performed.  Her diagnostic imaging revealed an irregular mass measuring 3.7 cm in the posterior one third of the left breast in the lower outer quadrant.  The right breast looked okay.  An ultrasound was performed and the mass in question measured 3.8 cm in greatest dimension.  There were 2 abnormal appearing left axillary lymph nodes with the largest one measuring 1.4 cm in size.  Ultrasound-guided biopsy was recommended and has revealed a grade 2 invasive ductal cancer with a focus suspicious for LDI.  The lymph node was also biopsied and revealed metastatic mammary cancer.  The tumor was noted to be ER positive at 85%, W[place] positive at 10%, and the HER-2 was negative by FISH.  The Ki-67 was 15%.    She has already seen the medical oncologist who ordered some staging studies.  The CT scan of the neck describes some left posterior cervical lymph nodes which are asymmetric but normal in size.  The chest reveals the enhancing mass in the breast.  There was also some some subcentimeter nodular densities in the lateral margin of the breast measuring about 9 mm.  It also described several suspicious left subpectoral nodes and a worrisome left axillary lymph node.  The internal mammary chain looked okay and there were no lung nodules.  The abdomen was also clear.  Patient also had a bone scan which was negative.    Her history is complicated by the fact that she has HIV and is been treated since the late 90s for this.   She also had a stroke in 2017.    I have personally reviewed the studies as well as the reports and agree with the findings.    Review of Systems:  Review of Systems   Constitutional: Positive for unexpected weight change.        Weight loss of 9 lbs in 2 wks.    Endocrine: Positive for hot flashes.   Musculoskeletal: Positive for back pain, neck pain and neck stiffness.   Psychiatric/Behavioral: Positive for sleep disturbance. The patient is nervous/anxious.    All other systems reviewed and are negative.       Past Medical and Surgical History:  Breast Biopsy History:  Patient has had the following breast biopsies:11/2019 Left-malignant  Breast Cancer HIstory:  Patient does not have a past medical history of breast cancer.  Breast Operations, and year:  None  Social History     Tobacco Use   Smoking Status Never Smoker   Smokeless Tobacco Never Used     Obstetric History:  Patient is postmenopausal due to removal of her uterus in the following year:2006   Number of pregnancies:1  Number of live births: 1  Number of abortions or miscarriages: 0  Age of delivery of first child: 29  Patient did not breast feed.  Length of time taking birth control pills:10 years  Patient has never taken hormone replacement    Past Surgical History:   Procedure Laterality Date   • ADENOIDECTOMY     • CHOLECYSTECTOMY     • HYSTERECTOMY     • KNEE ARTHROSCOPY Right 3/24/2017    Procedure:  RIGHT  KNEE ARTHROSCOPY WITH DEBRIDEMENT CHONDROPLASTY PARTIAL MENISCECTOMY;  Surgeon: Karl Galeas MD;  [place] [facility] [place] [facility] [place]  Service:    • KNEE CARTILAGE SURGERY Right 2007   • TONSILLECTOMY     • US GUIDED LYMPH NODE BIOPSY  10/29/2019       Past Medical History:   Diagnosis Date   • Anxiety    • Cerebrovascular accident (CVA) (CMS/[facility]) 8/22/2017   • DDD (degenerative disc disease), cervical    • Depression    • GERD (gastroesophageal reflux disease)    • H/O ICH (intracerebral hemorrhage) (CMS/[facility])    • History of stroke    •  History of thrombocytopenia    • HIV (human immunodeficiency virus infection) (CMS/[facility]) 1996   • Hyperlipidemia    • Insomnia    • Lupus anticoagulant positive    • Malignant neoplasm of overlapping sites of left breast in female, estrogen receptor positive (CMS/[facility]) 11/12/2019   • Meniscus tear 2017    RIGHT   • Migraine    • Neck pain     WITH SHOOTING PAIN LEFT RIGHT ARM   • Osteoarthritis    • Osteoporosis    • Seasonal allergies    • Spinal headache    • Tick bite 06/2014       Prior Hospitalizations, other than for surgery or childbirth, and year:  Tick bite 2016    Social History:  Patient is .  Patient has one sons.    Family History:  Family History   Problem Relation Age of Onset   • Breast cancer Mother    • Leukemia Mother         CLL?   • Diabetes Mother    • Hyperthyroidism Mother    • Hearing loss Mother    • Dementia Father    • Heart disease Maternal Grandmother    • Diabetes Maternal Grandfather    • Heart disease Maternal Grandfather    • Stroke Maternal Grandfather    • Heart attack Maternal Grandfather    • Osteoporosis Paternal Grandmother    • Heart disease Paternal Grandfather    • Leukemia Maternal Aunt         CLL?   • Throat cancer Paternal Uncle        Vital Signs:  Vitals:    11/19/19 0918   BP: 122/78       Medications:    Current Outpatient Prescriptions:     Current Outpatient Medications:   •  AIMOVIG 140 MG/ML solution auto-injector, INJECT 140 MG INTO THE SKIN EVERY 30 (THIRTY) DAYS., Disp: , Rfl: 11  •  atorvastatin (LIPITOR) 20 MG tablet, Take 1 tablet by mouth Every Night., Disp: 90 tablet, Rfl: 3  •  baclofen (LIORESAL) 10 MG tablet, Take 10 mg by mouth 2 (Two) Times a Day., Disp: , Rfl: 2  •  [place] 50 MG pack, TAKE 1 PACKET AS NEEDED FOR HEADACHES, Disp: , Rfl: 1  •  clonazePAM (KlonoPIN) 0.5 MG tablet, Take 1 tablet by mouth 2 (Two) Times a Day As Needed for Anxiety., Disp: 60 tablet, Rfl: 0  •  COCONUT OIL PO, Take  by mouth., Disp: , Rfl:   •  colesevelam (WELCHOL)  625 MG tablet, TAKE 2 TABLETS BY MOUTH EVERY DAY, Disp: 180 tablet, Rfl: 3  •  escitalopram (LEXAPRO) 10 MG tablet, Take 1 tablet by mouth Daily., Disp: 90 tablet, Rfl: 3  •  fluticasone (FLONASE) 50 MCG/ACT nasal spray, 2 sprays into the nostril(s) as directed by provider Daily., Disp: 3 bottle, Rfl: 3  •  GLUCOSAMINE CHONDROITIN COMPLX PO, Take  by mouth., Disp: , Rfl:   •  HYDROcodone-acetaminophen (NORCO)  MG per tablet, take 1 tablet by mouth three times a day, Disp: , Rfl: 0  •  ibandronate (BONIVA) 150 MG tablet, TAKE 1 TABLET BY MOUTH EVERY 30 (THIRTY) DAYS., Disp: 3 tablet, Rfl: 3  •  NON FORMULARY, Collagen peptides powder, Disp: , Rfl:   •  ondansetron ODT (ZOFRAN ODT) 4 MG disintegrating tablet, Take 1 tablet by mouth Every 8 (Eight) Hours As Needed for Nausea or Vomiting., Disp: 30 tablet, Rfl: 0  •  TRIUMEQ 600- MG per tablet, TAKE 1 TABLET BY MOUTH EVERY DAY, Disp: 90 tablet, Rfl: 3  •  TURMERIC PO, Take  by mouth., Disp: , Rfl:   •  zolpidem (AMBIEN) 10 MG tablet, Take 1 tablet by mouth At Night As Needed for Sleep., Disp: 90 tablet, Rfl: 0    Physical Examination:  General Appearance:   Patient is in no distress.  She is well kept and has an average build.   Psychiatric:  Patient with appropriate mood and affect. Alert and oriented to self, time, and place.    Breast, RIGHT:  medium sized, symmetric with the contralateral side.  Breast skin is without erythema, edema, rashes.  There are no visible abnormalities upon inspection during the arm-raising maneuver or with hands on hips in the sitting position. There is no nipple retraction, discharge or nipple/areolar skin changes.There are no masses palpable in the sitting or supine positions.    Breast, LEFT:  medium sized, symmetric with the contralateral side.  Breast skin is without erythema, edema, rashes.  There are no visible abnormalities upon inspection during the arm-raising maneuver or with hands on hips in the sitting position.  Dr Buck Dr. Ferrer There is no nipple retraction, discharge or nipple/areolar skin changes.There is a large palpable mass in the lower outer quadrant.  Today it measures about 5.5 x 4.5 cm.    Lymphatic:  There is no  cervical, infraclavicular, or supraclavicular adenopathy bilaterally.  She was a little tender in the left axilla with some bruising but I could not feel any definite concerning lymphadenopathy.  Eyes:  Pupils are round and reactive to light.  Cardiovascular:  Heart rate and rhythm are regular.  Respiratory:  Lungs are clear bilaterally with no crackles or wheezes in any lung field.  Gastrointestinal:  Abdomen is soft, nondistended, and nontender.  There was no obvious hepatosplenomegaly or abdominal mass.  There are  scars from previous hysterectomy.    Musculoskeletal:  Good strength in all 4 extremities.   There is good range of motion in both shoulders.    Skin:  No new skin lesions or rashes on the skin excluding the breast (see breast exam above).    Cancer Staging  Primary Tumor: T2  Regional Lymph Nodes:  N1  Distant Mets: M0  Clinical Stage Group: IIA    Assessment:  1. Malignant neoplasm of lower-outer quadrant of left breast of female, estrogen receptor positive (CMS/HCC)          Plan:  She was accompanied today by her .  The office visit lasted 55 minutes with 45 minutes spent in face-to-face consultation.    We began the conversation discussing her pathology report.  We talked about the origin of most of breast cancers from either the ducts or the lobules.  The difference between invasive and in situ disease was explained and the visual was drawn for her.  When invasion has occurred, the lymph nodes need to be evaluated.  When there is in situ disease the lymph nodes should be considered if the area of concern is widespread or it is high-grade.  We also discussed the significance of hormone receptors.  They often can give us some idea how the breast cancer will behave and potentially lead us to offer  neoadjuvant chemotherapy.    Next we discussed the surgical options which include breast conserving therapy versus a mastectomy.  With breast conserving therapy we are talking about a lumpectomy with margins, lymph node evaluation, and radiation treatment.  The radiation treatment is generally given to the entire breast for 6 weeks.  The side effects consist of local skin change and potential injury to nearby structures such as the lung or the heart.  A mastectomy would involve removing the breast tissues with preservation of the pectoral muscles and evaluation of the lymph nodes if indicated.  The potential for reconstruction by either an implant or autologous tissue was discussed.  This could be performed on a delayed basis or immediately depending on a multitude of factors.  The survival rates for these 2 procedures are equivalent but there are incidences where one may be favored over the other.  There are times when a lumpectomy is not possible due to the large tumor to breast ratio or the location of the tumor.  Previous radiation to the chest wall or collagen disorders such as scleroderma would make radiation treatment and possible and therefore exclude breast conserving therapy as an option.    Her MRI has been performed but the report is not back.  I am uncertain whether this area noted on her CT scan will prove to be anything of concern.  If she indeed has additional sites of cancer in the breast she certainly would not be any sort of lumpectomy candidate.  I am also interested to see if the subpectoral nodes are of concern on MRI.  One could consider neoadjuvant chemotherapy but with her HIV I am not certain how feasible that he has.  It appears that she will be scheduled for presentation at our breast cancer conference which I think is a good idea.  She has an appointment with the medical oncologist next week and we should have more information back by then.      CPT coding:    Next Appointment:  No  Follow-up on file.            EMR Dragon/transcription disclaimer:    Much of this encounter note is an electronic transcription/translocation of spoken language to printed text.  The electronic translation of spoken language may permit erroneous, or at times, nonsensical words or phrases to be inadvertently transcribed.  Although I have reviewed the note from such areas, some may still exist.

## 2021-10-12 DIAGNOSIS — Z21 ASYMPTOMATIC HIV INFECTION (HCC): Primary | ICD-10-CM

## 2021-10-18 ENCOUNTER — LAB (OUTPATIENT)
Dept: LAB | Facility: HOSPITAL | Age: 61
End: 2021-10-18

## 2021-10-18 DIAGNOSIS — Z21 ASYMPTOMATIC HIV INFECTION (HCC): ICD-10-CM

## 2021-10-18 LAB
ALBUMIN SERPL-MCNC: 4.8 G/DL (ref 3.5–5.2)
ALBUMIN/GLOB SERPL: 1.9 G/DL
ALP SERPL-CCNC: 67 U/L (ref 39–117)
ALT SERPL W P-5'-P-CCNC: 18 U/L (ref 1–33)
ANION GAP SERPL CALCULATED.3IONS-SCNC: 6.3 MMOL/L (ref 5–15)
AST SERPL-CCNC: 25 U/L (ref 1–32)
BASOPHILS # BLD AUTO: 0.02 10*3/MM3 (ref 0–0.2)
BASOPHILS NFR BLD AUTO: 0.5 % (ref 0–1.5)
BILIRUB SERPL-MCNC: 0.3 MG/DL (ref 0–1.2)
BUN SERPL-MCNC: 9 MG/DL (ref 8–23)
BUN/CREAT SERPL: 13.4 (ref 7–25)
CALCIUM SPEC-SCNC: 10.2 MG/DL (ref 8.6–10.5)
CHLORIDE SERPL-SCNC: 103 MMOL/L (ref 98–107)
CO2 SERPL-SCNC: 30.7 MMOL/L (ref 22–29)
CREAT SERPL-MCNC: 0.67 MG/DL (ref 0.57–1)
DEPRECATED RDW RBC AUTO: 43.3 FL (ref 37–54)
EOSINOPHIL # BLD AUTO: 0.06 10*3/MM3 (ref 0–0.4)
EOSINOPHIL NFR BLD AUTO: 1.4 % (ref 0.3–6.2)
ERYTHROCYTE [DISTWIDTH] IN BLOOD BY AUTOMATED COUNT: 12.1 % (ref 12.3–15.4)
GFR SERPL CREATININE-BSD FRML MDRD: 89 ML/MIN/1.73
GLOBULIN UR ELPH-MCNC: 2.5 GM/DL
GLUCOSE SERPL-MCNC: 88 MG/DL (ref 65–99)
HCT VFR BLD AUTO: 38.3 % (ref 34–46.6)
HGB BLD-MCNC: 13.2 G/DL (ref 12–15.9)
IMM GRANULOCYTES # BLD AUTO: 0.01 10*3/MM3 (ref 0–0.05)
IMM GRANULOCYTES NFR BLD AUTO: 0.2 % (ref 0–0.5)
LYMPHOCYTES # BLD AUTO: 1.84 10*3/MM3 (ref 0.7–3.1)
LYMPHOCYTES NFR BLD AUTO: 41.6 % (ref 19.6–45.3)
MCH RBC QN AUTO: 33.8 PG (ref 26.6–33)
MCHC RBC AUTO-ENTMCNC: 34.5 G/DL (ref 31.5–35.7)
MCV RBC AUTO: 98 FL (ref 79–97)
MONOCYTES # BLD AUTO: 0.55 10*3/MM3 (ref 0.1–0.9)
MONOCYTES NFR BLD AUTO: 12.4 % (ref 5–12)
NEUTROPHILS NFR BLD AUTO: 1.94 10*3/MM3 (ref 1.7–7)
NEUTROPHILS NFR BLD AUTO: 43.9 % (ref 42.7–76)
NRBC BLD AUTO-RTO: 0 /100 WBC (ref 0–0.2)
PLATELET # BLD AUTO: 277 10*3/MM3 (ref 140–450)
PMV BLD AUTO: 9.6 FL (ref 6–12)
POTASSIUM SERPL-SCNC: 4.6 MMOL/L (ref 3.5–5.2)
PROT SERPL-MCNC: 7.3 G/DL (ref 6–8.5)
RBC # BLD AUTO: 3.91 10*6/MM3 (ref 3.77–5.28)
SODIUM SERPL-SCNC: 140 MMOL/L (ref 136–145)
WBC # BLD AUTO: 4.42 10*3/MM3 (ref 3.4–10.8)

## 2021-10-18 PROCEDURE — 36415 COLL VENOUS BLD VENIPUNCTURE: CPT | Performed by: INTERNAL MEDICINE

## 2021-10-18 PROCEDURE — 80053 COMPREHEN METABOLIC PANEL: CPT | Performed by: INTERNAL MEDICINE

## 2021-10-18 PROCEDURE — 86361 T CELL ABSOLUTE COUNT: CPT | Performed by: INTERNAL MEDICINE

## 2021-10-18 PROCEDURE — 85025 COMPLETE CBC W/AUTO DIFF WBC: CPT | Performed by: INTERNAL MEDICINE

## 2021-10-19 LAB
BASOPHILS # BLD AUTO: 0 X10E3/UL (ref 0–0.2)
BASOPHILS NFR BLD AUTO: 1 %
CD3+CD4+ CELLS # BLD: 502 /UL (ref 359–1519)
CD3+CD4+ CELLS NFR BLD: 27.9 % (ref 30.8–58.5)
EOSINOPHIL # BLD AUTO: 0.1 X10E3/UL (ref 0–0.4)
EOSINOPHIL NFR BLD AUTO: 1 %
ERYTHROCYTE [DISTWIDTH] IN BLOOD BY AUTOMATED COUNT: 12.2 % (ref 11.7–15.4)
HCT VFR BLD AUTO: 39.3 % (ref 34–46.6)
HGB BLD-MCNC: 13.3 G/DL (ref 11.1–15.9)
IMM GRANULOCYTES # BLD AUTO: 0 X10E3/UL (ref 0–0.1)
IMM GRANULOCYTES NFR BLD AUTO: 0 %
LYMPHOCYTES # BLD AUTO: 1.8 X10E3/UL (ref 0.7–3.1)
LYMPHOCYTES NFR BLD AUTO: 41 %
MCH RBC QN AUTO: 34.2 PG (ref 26.6–33)
MCHC RBC AUTO-ENTMCNC: 33.8 G/DL (ref 31.5–35.7)
MCV RBC AUTO: 101 FL (ref 79–97)
MONOCYTES # BLD AUTO: 0.5 X10E3/UL (ref 0.1–0.9)
MONOCYTES NFR BLD AUTO: 12 %
NEUTROPHILS # BLD AUTO: 1.9 X10E3/UL (ref 1.4–7)
NEUTROPHILS NFR BLD AUTO: 45 %
PLATELET # BLD AUTO: 288 X10E3/UL (ref 150–450)
RBC # BLD AUTO: 3.89 X10E6/UL (ref 3.77–5.28)
WBC # BLD AUTO: 4.3 X10E3/UL (ref 3.4–10.8)

## 2021-11-15 ENCOUNTER — LAB (OUTPATIENT)
Dept: LAB | Facility: HOSPITAL | Age: 61
End: 2021-11-15

## 2021-11-15 ENCOUNTER — OFFICE VISIT (OUTPATIENT)
Dept: ONCOLOGY | Facility: CLINIC | Age: 61
End: 2021-11-15

## 2021-11-15 VITALS
DIASTOLIC BLOOD PRESSURE: 77 MMHG | TEMPERATURE: 96.8 F | HEIGHT: 66 IN | BODY MASS INDEX: 23.7 KG/M2 | RESPIRATION RATE: 16 BRPM | OXYGEN SATURATION: 99 % | WEIGHT: 147.5 LBS | HEART RATE: 73 BPM | SYSTOLIC BLOOD PRESSURE: 132 MMHG

## 2021-11-15 DIAGNOSIS — C50.812 MALIGNANT NEOPLASM OF OVERLAPPING SITES OF LEFT BREAST IN FEMALE, ESTROGEN RECEPTOR POSITIVE (HCC): ICD-10-CM

## 2021-11-15 DIAGNOSIS — Z17.0 MALIGNANT NEOPLASM OF OVERLAPPING SITES OF LEFT BREAST IN FEMALE, ESTROGEN RECEPTOR POSITIVE (HCC): ICD-10-CM

## 2021-11-15 DIAGNOSIS — M25.50 ARTHRALGIA, UNSPECIFIED JOINT: ICD-10-CM

## 2021-11-15 DIAGNOSIS — Z15.03 MUTATION IN HOXB13 GENE: ICD-10-CM

## 2021-11-15 DIAGNOSIS — C50.812 MALIGNANT NEOPLASM OF OVERLAPPING SITES OF LEFT BREAST IN FEMALE, ESTROGEN RECEPTOR POSITIVE (HCC): Primary | ICD-10-CM

## 2021-11-15 DIAGNOSIS — Z17.0 MALIGNANT NEOPLASM OF OVERLAPPING SITES OF LEFT BREAST IN FEMALE, ESTROGEN RECEPTOR POSITIVE (HCC): Primary | ICD-10-CM

## 2021-11-15 DIAGNOSIS — Z21 HIV POSITIVE LONG-TERM NON-PROGRESSOR (HCC): ICD-10-CM

## 2021-11-15 LAB
ALBUMIN SERPL-MCNC: 4.7 G/DL (ref 3.5–5.2)
ALBUMIN/GLOB SERPL: 1.7 G/DL (ref 1.1–2.4)
ALP SERPL-CCNC: 65 U/L (ref 38–116)
ALT SERPL W P-5'-P-CCNC: 27 U/L (ref 0–33)
ANION GAP SERPL CALCULATED.3IONS-SCNC: 11.2 MMOL/L (ref 5–15)
AST SERPL-CCNC: 32 U/L (ref 0–32)
BASOPHILS # BLD AUTO: 0.02 10*3/MM3 (ref 0–0.2)
BASOPHILS NFR BLD AUTO: 0.4 % (ref 0–1.5)
BILIRUB SERPL-MCNC: 0.4 MG/DL (ref 0.2–1.2)
BUN SERPL-MCNC: 7 MG/DL (ref 6–20)
BUN/CREAT SERPL: 10.4 (ref 7.3–30)
CALCIUM SPEC-SCNC: 9.6 MG/DL (ref 8.5–10.2)
CHLORIDE SERPL-SCNC: 104 MMOL/L (ref 98–107)
CO2 SERPL-SCNC: 26.8 MMOL/L (ref 22–29)
CREAT SERPL-MCNC: 0.67 MG/DL (ref 0.6–1.1)
DEPRECATED RDW RBC AUTO: 46.5 FL (ref 37–54)
EOSINOPHIL # BLD AUTO: 0.03 10*3/MM3 (ref 0–0.4)
EOSINOPHIL NFR BLD AUTO: 0.7 % (ref 0.3–6.2)
ERYTHROCYTE [DISTWIDTH] IN BLOOD BY AUTOMATED COUNT: 12.5 % (ref 12.3–15.4)
GFR SERPL CREATININE-BSD FRML MDRD: 89 ML/MIN/1.73
GLOBULIN UR ELPH-MCNC: 2.8 GM/DL (ref 1.8–3.5)
GLUCOSE SERPL-MCNC: 85 MG/DL (ref 74–124)
HCT VFR BLD AUTO: 40.5 % (ref 34–46.6)
HGB BLD-MCNC: 13.5 G/DL (ref 12–15.9)
IMM GRANULOCYTES # BLD AUTO: 0.01 10*3/MM3 (ref 0–0.05)
IMM GRANULOCYTES NFR BLD AUTO: 0.2 % (ref 0–0.5)
LYMPHOCYTES # BLD AUTO: 1.82 10*3/MM3 (ref 0.7–3.1)
LYMPHOCYTES NFR BLD AUTO: 39.9 % (ref 19.6–45.3)
MCH RBC QN AUTO: 33.5 PG (ref 26.6–33)
MCHC RBC AUTO-ENTMCNC: 33.3 G/DL (ref 31.5–35.7)
MCV RBC AUTO: 100.5 FL (ref 79–97)
MONOCYTES # BLD AUTO: 0.49 10*3/MM3 (ref 0.1–0.9)
MONOCYTES NFR BLD AUTO: 10.7 % (ref 5–12)
NEUTROPHILS NFR BLD AUTO: 2.19 10*3/MM3 (ref 1.7–7)
NEUTROPHILS NFR BLD AUTO: 48.1 % (ref 42.7–76)
NRBC BLD AUTO-RTO: 0 /100 WBC (ref 0–0.2)
PLATELET # BLD AUTO: 228 10*3/MM3 (ref 140–450)
PMV BLD AUTO: 9.6 FL (ref 6–12)
POTASSIUM SERPL-SCNC: 3.8 MMOL/L (ref 3.5–4.7)
PROT SERPL-MCNC: 7.5 G/DL (ref 6.3–8)
RBC # BLD AUTO: 4.03 10*6/MM3 (ref 3.77–5.28)
SODIUM SERPL-SCNC: 142 MMOL/L (ref 134–145)
WBC # BLD AUTO: 4.56 10*3/MM3 (ref 3.4–10.8)

## 2021-11-15 PROCEDURE — 85025 COMPLETE CBC W/AUTO DIFF WBC: CPT

## 2021-11-15 PROCEDURE — 36415 COLL VENOUS BLD VENIPUNCTURE: CPT

## 2021-11-15 PROCEDURE — 80053 COMPREHEN METABOLIC PANEL: CPT

## 2021-11-15 PROCEDURE — 99215 OFFICE O/P EST HI 40 MIN: CPT | Performed by: INTERNAL MEDICINE

## 2021-11-15 RX ORDER — ACYCLOVIR 200 MG/1
CAPSULE ORAL
COMMUNITY
Start: 2021-10-01 | End: 2022-04-21

## 2021-11-15 NOTE — PROGRESS NOTES
Subjective       REASON FOR FOLLOW UP:    1.  T2N1 invasive ductal carcinoma of the left breast.  Ultrasound-showed 3.8 x 3.1 x 2.5 cm mass at 4 o'clock position with 2 abnormal axillary lymph nodes, larger lymph node being 1.4 cm.  Left breast biopsy and axillary lymph node biopsy October 29, 2019, invasive ductal carcinoma, moderately differentiated, grade 2, ER 85%, HI 10%, HER-2/merna 2+, HER-2 FISH negative.  · 12/06/19: Neoadjuvant chemo therapy, cycle #1 of dose-dense Adriamycin/Cytoxan with Neulasta for marrow support    · 01/17/20: Last cycle of Adriamycin/Cytoxan given  · January 31, 2020: Cycle 1 Taxol  · April 24, 2020: Last cycle, cycle 12 of Taxol  · Patient is s/p left mastectomy with axillary dissection with reconstruction.  She is healing up nicely.  She has a drain in place.  Pathology showed invasive breast cancer which is 55 x 40 mm in size, grade 2 with focal lymphovascular space invasion with DCIS spanning over 36 x 6 mm.  All margins were negative for cancer.  · 8 of the additional lymph nodes out of 11 were positive with the largest micrometastasis measuring 5 mm.  Extranodal extension was seen.  · Biomarkers on post neoadjuvant tumor is ER 81-90% HI 5% HER-2/merna 2+ by immunohistochemistry and HER-2/merna negative by FISH.  · Radiation July 6, 2020-August 17, 2020.  · Letrozole initiated July 2020.  · Letrozole discontinued October 2020 secondary to severe joint pain.  · October 30, 2020 patient starting tamoxifen and tolerating well  · August 2021: Tamoxifen discontinued secondary to depression and mental fogginess and fatigue  · September 21, 2021: Arimidex started    2.  HIV, followed by Dr. Dawn from infectious disease.  Well controlled on meds    3.  Screening mammogram December 7, 2020 showed indeterminate calcifications lower outer middle left breast. Diagnostic mammogram showed 0.4 cm calcifications in the left breast.  Stereotactic biopsy January 8, 2021 consistent with  organizing fat necrosis.              HISTORY OF PRESENT ILLNESS:  The patient is a 61 y.o. year old female with history of T2N1 invasive ductal carcinoma of the left breast s/p neoadjuvant chemotherapy with Adriamycin Cytoxan followed by Taxol, s/p left mastectomy with axillary dissection and had 55 x 40 mm grade 2 invasive breast cancer with DCIS spanning over 36 x 6 mm with 8 out of 11 lymph nodes positive with extranodal extension s/p radiation.  Subsequently patient had tried letrozole which caused her severe arthralgias and could not tolerate.  She tried tamoxifen but that caused her significant depression and fogginess and that was discontinued.  Currently she is on Arimidex.  Even that is causing her severe arthralgias and stiffness..    She still has the port.  Currently we will continue the port as the patient prefers to do so.  She is receiving Zometa every 6 months with plans of giving total of 4 treatments given that she was ER positive HER-2 negative postmenopausal invasive ductal carcinoma of the breast.  Her last treatment is April 5, 2022.  With Zometa.    Given that her Arimidex is causing her significant arthralgias and stiffness we will give a break for 6 weeks and see how she tolerates.  We can consider Aromasin at her next appointment and if she has arthralgias severe with it then she could try Evista.    Past Medical History:   Diagnosis Date   • Anxiety    • Cerebrovascular accident (CVA) ([facility]) 8/22/2017    NO RESIDUAL AFFECT   • DDD (degenerative disc disease), cervical    • Depression    • Drug therapy    • Elevated cholesterol    • GERD (gastroesophageal reflux disease)    • H/O ICH (intracerebral hemorrhage) (CMS/HCC)    • History of chemotherapy    • History of stroke    • History of thrombocytopenia    • HIV (human immunodeficiency virus infection) ([facility]) 1996   • Hx of radiation therapy    • Hyperlipidemia    • Insomnia    • Lupus anticoagulant positive    • Malignant neoplasm of  overlapping sites of left breast in female, estrogen receptor positive (HCC) 11/12/2019   • Meniscus tear 2017    RIGHT   • Migraine    • Neck pain     WITH SHOOTING PAIN LEFT RIGHT ARM   • Osteoarthritis    • Osteoporosis    • Pain management     sees 1 every 3 months for scripts/injection/pain meds   • Seasonal allergies    • Spinal headache     THINKS HAD BLOOD PATCH AFTER EPIDUAL    • Tick bite 06/2014     PAST MEDICAL HISTORY:  She had a stroke in July 2017 with headache and dizziness.  She went to the ER and was placed on aspirin.  However, she stopped taking it two weeks ago.  She has been taking Aimovig (injection) monthly with Fariba López at [facility].    In 1994, patient was diagnosed with HIV with an unknown cause which is managed by Dr. Natali Subramanian.  As of now, her viral load is good.    Patient has arthritis.  She gets trigger-point injections in her back with her last one being 2 weeks ago.  She has had multiple ablations.  She also has pain in her SI joint and Dr. Bermudez performed a surgery on this.  She takes Narco for the pain.      She is osteoporotic.  She has had multiple hairline fractures in both her legs.  She had her knees cleaned out by Dr. Sinha.     Patient has had a hysterectomy and still has both of her ovaries.    Patient has vertigo and the muscles in her left ear are weak.  She just has an imbalance.    Past Surgical History:   Procedure Laterality Date   • ADENOIDECTOMY     • BREAST LUMPECTOMY     • BREAST LUMPECTOMY WITH SENTINEL NODE BIOPSY Left 5/28/2020    Procedure: BREAST LUMPECTOMY WITH SENTINEL NODE BIOPSY AND NEEDLE LOCALIZATION AND axillary dissection;  Surgeon: Landen Forman MD;  Location: [facility];  Service: General;  Laterality: Left;   • BREAST SURGERY Left 5/28/2020    Procedure: LEFT LATERAL INTERCOASTAL ARTERY  flap ;  Surgeon: Yusuf Garcia MD;  Location: [facility] OR;  Service: Plastics;  Laterality: Left;   • CHOLECYSTECTOMY     •  HYSTERECTOMY     • KNEE ARTHROSCOPY Right 3/24/2017    Procedure:  RIGHT  KNEE ARTHROSCOPY WITH DEBRIDEMENT CHONDROPLASTY PARTIAL MENISCECTOMY;  Surgeon: Karl Galeas MD;  Location: [facility];  Service:    • KNEE CARTILAGE SURGERY Right 2007   • TONSILLECTOMY     • US GUIDED LYMPH NODE BIOPSY  10/29/2019    LEFT BREAST   • VENOUS ACCESS DEVICE (PORT) INSERTION Right 12/2/2019    Procedure: INSERTION VENOUS ACCESS DEVICE RIGHT;  Surgeon: Landen Forman MD;  Location: [facility];  Service: General       ONCOLOGIC HISTORY:  Patient is a 59-year-old female who has had a history of HIV since age 34 followed by Dr. Natali Ng at [facility] and was on multiple HIV drugs initially but more recently has been on a single medication TRIUMEQ, with well-controlled HIV.  She follows up with Dr. Dawn , infectious disease who saw her recently and he saw her on 4/8/2019 and has excellent control and suppression of her viral load.  She is very compliant with her medications.    She also has had history of stroke in 2017 for which she was placed on aspirin.  She presented with a headache.  She is very active and works at [employer] full-time.  She also has had history of vertigo in the past  Patient's PCP is Zach Lora.    Patient first noticed the mass in her left breast 5 months ago.  Her last mammogram was 5 years ago.  She had soreness in the left breast and she went to her primary care physician who then ordered a mammogram diagnostic and an ultrasound.  The diagnostic mammogram showed a 3.7 cm mass at 4 o'clock position in the lower outer quadrant of the left breast.  The ultrasound showed 3.8 cm x 3.1 x 2.5 cm mass at 4 o'clock position in the left breast with 2 abnormal appearing left axillary lymph nodes the largest being 1.4 cm.    She then underwent biopsy of both the breast mass as well as the left axillary lymph node and both of them are positive for invasive  mammary carcinoma it is invasive ductal carcinoma with apocrine features, moderately differentiated, grade 2 of 3 with a [place] score of 5.  The left axillary lymph node is also consistent with metastatic mammary carcinoma.  It is ER positive AL positive HER-2/merna negative.  Details as follows    10/21/19 - Bilateral Diagnostic Mammogram and US Breast Left  FINDINGS: Bilateral digital CC and MLO mammographic images were  obtained. No prior examination is available for comparison. Scattered  fibroglandular densities are seen throughout both breasts. A triangular  skin marker represents the area of palpable concern in the lower outer  quadrant of the left breast in the posterior 1/3. At this location there  is an irregular mass that measures on the order of 3.7 cm in greatest  dimension. Internal calcifications are noted. No suspicious findings in  the right breast are appreciated.     ULTRASOUND: Targeted sonographic evaluation of the left breast was  performed through the area of concern in the left axilla. At the 4  o'clock position on the order of 6 cm from the nipple there is an  irregular hypoechoic mass that measures 3.8 x 3.1 x 2.5 cm. Internal  vascularity is noted.     There are 2 abnormal-appearing left axillary lymph nodes, the largest  which measures on the order of 1.4 cm in greatest dimension.     IMPRESSION:  1. There is a 3.8 cm irregular mass in the left breast at the 4 o'clock  position. This is seen in conjunction with an irregular 1.4 cm lymph  node in the left axilla. This is suspicious for malignancy with left  axillary involvement. Correlation with ultrasound-guided biopsy of both  the left breast mass and the lymph node is recommended.  2. There are no findings suspicious for malignancy in the right breast.     BI-RADS CATEGORY 5:     Patient's labs from 11/12/19 are normal.    10/29/19 - Tissue Pathology  1. Left Breast, 4:00, 6 cm FN, U/S-Guided Core Needle Biopsy for a Mass:  A.  INVASIVE DUCTAL CARCINOMA WITH APOCRINE FEATURES, Moderately differentiated;  [place] Histologic Grade II/III (tubule score = 3, nuclear score = 2, mitoses score = 1), measuring at least  9 mm.  B. No ductal or lobular carcinoma in situ is identified in this sample.  C. Focus suspicious for lymphovascular space invasion.  D. See Biomarker Template for hormone receptor studies.  2. Lymph Node, Left Axilla, U/S-Guided Core Needle Biopsy:  A. METASTATIC MAMMARY CARCINOMA (see Comment).  B. Metastatic focus measures 5 mm in greatest extent.  ER+, 85%  TX+, 10%  HER2- Score 2+    11/13/19 - CT Neck Chest Abdomen Pelvis  IMPRESSION:  1. In addition to the irregular approximately 3.8 cm mass within the  lateral aspect of the left breast, there are a few subcentimeter  asymmetric nodules along the lateral margin of the glandular tissue. The  several asymmetric left subpectoral nodes and 1.2 x 1.0 cm left axillary  node are suspicious for tamar metastases. The asymmetric subcentimeter  left supraclavicular and left posterior cervical triangle nodes are  worrisome as well.  2. There is no convincing evidence for metastatic disease within the  abdomen or pelvis.    11/14/19 - Echocardiogram:  Normal.  Calculated EF = 67%.  There is trace mitral valve and tricuspid valve regurgitation.    11/15/19 - Bone Scan  Negative.    11/18/19 - MRI Breast Bilateral  IMPRESSION:  1. Biopsy-proven malignancy in the left breast centered at the 4 o'clock  position with associated surrounding satellite nodules as described. The  entire region of involvement including the satellite nodules and the  dominant mass measure up to 7.5 cm in greatest dimension. Also, left  axillary adenopathy with multiple irregular lymph nodes and loss of  differentiation of the borders of multiple lymph nodes is noted and this  is suspicious for extranodal involvement.  2. There are no findings suspicious for malignancy in the right breast.  BI-RADS CATEGORY  6      19: Cycle #1 of Adriamycin/Cytoxan    20: Last cycle of Adriamycin/Cytoxan given without Neulasta.  We will switch from Neulasta to 7 days of Neupogen injections after cycle 4.    We reviewed with patient the US Breast from 20 which shows mild interval partial response to neoadjuvant chemotherapy.      20: Initiated cycle 1 Taxol  2020: Last cycle of Taxol, cycle 12    S/p left mastectomy with axillary dissection   Pathology showed invasive breast cancer which is 55 x 40 mm in size, grade 2 with focal lymphovascular space invasion with DCIS spanning over 36 x 6 mm.  All margins were negative for cancer.  8 of the additional lymph nodes out of 11 were positive with the largest micrometastasis measuring 5 mm.  Extranodal extension was seen.    Biomarkers on post neoadjuvant tumor is ER 81-90% MA 5% HER-2/merna 2+ by immunohistochemistry and HER-2/merna negative by FISH.    2020: Started letrozole but because of severe joint pains was discontinued 2020    End of 2020 started tamoxifen    OB-GYN:  Menarche 15 years  Menopause-46  Pregnancies- 1 para 1 no miscarriages her first pregnancy was in  at 29 years of age.  She did not breastfeed.  Post menopausal HRT- None.  Hx of birth control pills- Yes.    Current Outpatient Medications on File Prior to Visit   Medication Sig Dispense Refill   • Abacavir-Dolutegravir-Lamivud (Triumeq) 600- MG per tablet Take 1 tablet by mouth Daily. 90 tablet 3   • acyclovir (ZOVIRAX) 200 MG capsule      • AIMOVIG 140 MG/ML solution auto-injector INJECT 140 MG INTO THE SKIN EVERY 30 (THIRTY) DAYS.  11   • anastrozole (Arimidex) 1 MG tablet Take 1 tablet by mouth Daily. 30 tablet 5   • atorvastatin (LIPITOR) 20 MG tablet Take 1 tablet by mouth every night at bedtime. 90 tablet 3   • baclofen (LIORESAL) 10 MG tablet Take 10 mg by mouth 2 (Two) Times a Day.  2   • clonazePAM (KlonoPIN) 0.5 MG tablet Take 1 tablet by  mouth 2 (Two) Times a Day As Needed for Anxiety. 60 tablet 0   • colesevelam (WELCHOL) 625 MG tablet TAKE 2 TABLETS BY MOUTH EVERY  tablet 3   • escitalopram (LEXAPRO) 10 MG tablet Take 1 tablet by mouth Daily. 90 tablet 3   • fluticasone (FLONASE) 50 MCG/ACT nasal spray 2 sprays into the nostril(s) as directed by provider Daily. 3 mL 3   • Loratadine (CLARITIN PO) Take 10 mg by mouth Daily.     • Magnesium Hydroxide (MILK OF MAGNESIA PO) Take 15 mL by mouth Every Night.     • oxyCODONE-acetaminophen (PERCOCET)  MG per tablet Take 1 tablet by mouth 4 (Four) Times a Day.     • vitamin B-6 (pyridoxine) 50 MG tablet Take 1 tablet by mouth Daily. 90 tablet 2   • zolpidem (AMBIEN) 10 MG tablet Take 1 tablet by mouth Every Night. for sleep 90 tablet 0     No current facility-administered medications on file prior to visit.        ALLERGIES:    Allergies   Allergen Reactions   • Augmentin [Amoxicillin-Pot Clavulanate] Hives        Social History     Socioeconomic History   • Marital status:      Spouse name: Owen   • Number of children: 1   • Years of education: College   Tobacco Use   • Smoking status: Never Smoker   • Smokeless tobacco: Never Used   Substance and Sexual Activity   • Alcohol use: Yes     Comment: occasioonal   • Drug use: No   • Sexual activity: Defer     Partners: Male     Birth control/protection: None     Comment:      Patient does not smoke or do drugs.  She does drink occasionally.    Family History   Problem Relation Age of Onset   • Breast cancer Mother    • Leukemia Mother         CLL?   • Diabetes Mother    • Hyperthyroidism Mother    • Hearing loss Mother    • Dementia Father    • Heart disease Maternal Grandmother    • Diabetes Maternal Grandfather    • Heart disease Maternal Grandfather    • Stroke Maternal Grandfather    • Heart attack Maternal Grandfather    • Osteoporosis Paternal Grandmother    • Heart disease Paternal Grandfather    • Leukemia Maternal Aunt   "       CLL?   • Throat cancer Paternal Uncle    • Malig Hyperthermia Neg Hx       FAMILY HISTORY:  Her mother had breast cancer at age 60, still alive at 85 and in good health..  Her father had dementia.  Her paternal uncle had prostate cancer.  Her brother had esophageal cancer.    I have reviewed the patient's medical history in detail and updated the computerized patient record.     ROS: as per HPI    Objective    Vitals:    11/15/21 1540   BP: 132/77   Pulse: 73   Resp: 16   Temp: 96.8 °F (36 °C)   TempSrc: Temporal   SpO2: 99%   Weight: 66.9 kg (147 lb 8 oz)   Height: 167.6 cm (65.98\")   PainSc: 0-No pain         Review of Systems   Constitutional: Negative for appetite change, chills, diaphoresis, fatigue, fever and unexpected weight change.   HENT: Negative for hearing loss, sore throat and trouble swallowing.    Respiratory: Negative for cough, chest tightness, shortness of breath and wheezing.    Cardiovascular: Negative for chest pain, palpitations and leg swelling.   Gastrointestinal: Negative for abdominal distention, abdominal pain, constipation, diarrhea, nausea and vomiting.   Genitourinary: Negative for dysuria, frequency, hematuria and urgency.   Musculoskeletal: Negative for joint swelling.        No muscle weakness.   Skin: Negative for rash and wound.   Neurological: Positive for numbness (Bilat foot). Negative for seizures, syncope, speech difficulty, weakness and headaches.   Hematological: Negative for adenopathy. Does not bruise/bleed easily.   Psychiatric/Behavioral: Positive for sleep disturbance. Negative for behavioral problems, confusion and suicidal ideas.   All other systems reviewed and are negative.      Physical exam    CONSTITUTIONAL:  Vital signs reviewed.  No distress, looks comfortable.  EYES:  Conjunctivae and lids unremarkable.  PERRLA  EARS,NOSE,MOUTH,THROAT:  Ears and nose appear unremarkable.  Lips, teeth, gums appear unremarkable.  RESPIRATORY:  Normal respiratory effort.  " Lungs clear to auscultation bilaterally.  BREAST: Right breast: No skin changes, no evidence of breast mass, no nipple discharge, no evidence of any right axillary adenopathy or right supraclavicular adenopathy  Left breast: S/p left mastectomy, no chest wall lesions as well as no left axillary adenopathy or left supraclavicular adenopathy.  CARDIOVASCULAR:  Normal S1, S2.  No murmurs rubs or gallops.  No significant lower extremity edema.  GASTROINTESTINAL: Abdomen appears unremarkable.  Nontender.  No hepatomegaly.  No splenomegaly.  LYMPHATIC:  No cervical, supraclavicular, axillary lymphadenopathy.  SKIN:  Warm.  No rashes.  PSYCHIATRIC:  Normal judgment and insight.  Normal mood and affect.    RECENT LABS:   Results from last 7 days   Lab Units 11/15/21  1532   WBC 10*3/mm3 4.56   NEUTROS ABS 10*3/mm3 2.19   HEMOGLOBIN g/dL 13.5   HEMATOCRIT % 40.5   PLATELETS 10*3/mm3 228     Results from last 7 days   Lab Units 11/15/21  1532   SODIUM mmol/L 142   POTASSIUM mmol/L 3.8   CHLORIDE mmol/L 104   CO2 mmol/L 26.8   BUN mg/dL 7   CREATININE mg/dL 0.67   CALCIUM mg/dL 9.6   ALBUMIN g/dL 4.70   BILIRUBIN mg/dL 0.4   ALK PHOS U/L 65   ALT (SGPT) U/L 27   AST (SGOT) U/L 32   GLUCOSE mg/dL 85         Assessment/Plan    1. T2N1 invasive ductal carcinoma of the left breast, recently diagnosed.    -Noticed mass in the left breast x5 months  -Diagnostic mammogram showed 3.7 mm mass in the left breast at 4 o'clock position  -Ultrasound-showed 3.8 x 3.1 x 2.5 cm mass at 4 o'clock position with 2 abnormal axillary lymph nodes, larger lymph node being 1.4 cm  -Left breast biopsy and axillary lymph node biopsy October 29, 2019, invasive ductal carcinoma, moderately differentiated, grade 2, ER 85%, SC 10%, HER-2/merna 2+, HER-2 FISH negative  · CT scans from 11/13/19 show some asymmetric nodules along the lateral margin of the glandular tissue.  The 1.2x1.0 cm left axillary nodes, left supraclavicular, and left posterior cervical  triangle nodes are suspicious for tamar metastases.  ·   bone scan from 11/15/19 which was negative.   ·  MRI breast from 11/18/19 which shows the biopsy-proven malignancy in the left breast centered at the 4:00 position.  The region of involvement measures up to 7.5 cm.  There is left axillary adenopathy with multiple irregular lymph nodes and loss of differentiation of the border of multiple lymph nodes which are suspicious for extranodal involvement.  ·    echocardiogram from 11/14/19 which was normal with an ejection fraction of 67%.    · INVITAE from 11/14/19 which was negative.  · Given the size of her tumor and her medical history, patient will be given 4 cycles of Adriamycin/Cytoxan with Neulasta.  After completion of this treatment, patient will be started on 12 cycles of Taxol.  After the tumor shrinks in size, Dr. Forman will perform a lumpectomy.    · Following lumpectomy, patient will begin endocrine therapy.  · Dr. Dawn agrees chemotherapy will not aggravate her HIV condition.  Dr. Moreno spoke with Dr. Mohan at [facility] who also agrees chemotherapy is the first step.   · 12/06/19: Cycle #1 of Adriamycin/Cytoxan  · US Breast from 01/29/20 after 4 cycles of Adriamycin Cytoxan chemotherapy which shows mild interval partial response to neoadjuvant chemotherapy.  The mass has decreased from 3.8 x 2.5 to 3.1 cm to 3.5 x 2.3 x 3.2 cm previously the left axillary lymph node has decreased from 1.4 x 0.7 x 1 cm to 1.1 x 0.6 x 0.9 cm.  · 01/31/20: Initiated cycle 1 Taxol  · April 24, 2020: Completed cycle 12 Taxol  · MRI breast 5/6/2020 shows significant reduction in the left axillary adenopathy as well as reduction in the index breast lesion.  · S/p left modified radical mastectomy with left axillary dissection.  Patient has 55 x 40 x 30 mm invasive carcinoma, grade 2 with DCIS 36 x 6 mm, high-grade with good margins and 8 out of 11 lymph nodes positive with largest metastasis being 5 mm with  extracapsular extension.  And there is lymphatic space invasion  · Will follow-up in 2 weeks at which time we will plan to start endocrine therapy.  She has radiation oncology appointment with Dr. Spivey  · Patient is eligible for Zometa once every 6 months for 2 years.  But given that she is undergoing some work on her teeth we will await starting Zometa until after radiation is complete.  · XRT completed August 17, 2020  · Started letrozole August 2020 but has arthralgias  · October 6, 2020 had severe arthralgias secondary to letrozole.  We will switch to tamoxifen starting November 2020  · 1/8/2021 screening mammogram in December 2020 as well as diagnostic mammogram which showed 0.4 cm suspicious calcification in the lower left which on biopsy is consistent with fat necrosis.  · 8/12/2021 tamoxifen placed on hold due to significant arthralgias and worsening fatigue.   · September 21, 2021: Patient's depression, mental fogginess has resolved since tamoxifen discontinued.  She has mild worsening fatigue her TSH and B12 levels are normal.  We will need to check iron studies  · Discussed about switching to Arimidex to see if she could tolerate that better  · Patient initiated Arimidex 9/21/2021.  · Returns 10/5/2021 complaining of significant generalized arthralgias.  Reviewed with Dr. Moreno.  We were initially going to try her on Cymbalta, but the patient is already taking Lexapro, and doing well on this.  We will therefore discontinue Arimidex.  We will give her a 6-week break and reevaluate things in 6 weeks.  Could try at that time switching to Aromasin.  · November 15, 2021: Patient continued with Arimidex even though she was asked to hold it last time.  Her arthralgias worsened.  Also the stiffness in the joints worsened significantly.  Discussed with patient that we need to hold Arimidex for 6 weeks and try Aromasin at her next appointment.    2. HIV, followed by Dr. Dawn in infectious disease.   Well-controlled and is on a single medication with very low viral load. Has HIV since age 34.  · Reviewed her HIV medications and she is compliant  · Patient followed by infectious disease for her HIV and currently on treatment.  Stay     3.  Family history of breast cancer with mother having had breast cancer at age 60.  There is family history of uncle with prostate cancer.  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    4.  Arthritis with severe back pain followed by Dr. Bam Crandall  · Continue Percocet  · Patient has arthralgias which have worsened on Arimidex as well    5. Episodes of nosebleeds.  She has had these once a week for 3-4 years. She did not report this today.    6.  Vaginal dryness related to tamoxifen, ongoing.  Patient provided handouts for recommendations of a vaginal lubricant and vaginal moisturizer to use for hopeful improvement.  · Tamoxifen discussed    7.  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  Patient continues on B6.    8. Fatigue  · 8/12/2021 patient seen as triage visit for worsening fatigue and diffuse arthralgias.  Reporting increased sleeping at night and still taking naps in the day.  Labs checked including thyroid studies which were unremarkable and B12 level actually elevated at greater than 2000.  Patient taking vitamin B supplements at that time.  Patient instructed to hold tamoxifen.  · 8/26/2021 reviewed today recent lab work for above with no evidence of thyroid dysfunction or vitamin B12 abnormality.  Patient notes no improvement being off tamoxifen for 2 weeks.  We will check a CA 15-3 to help further guide decision making.  If this is elevated we may then pursue imaging.  Discussed that she may just need to be off the tamoxifen longer to see improvement.  Patient does have underlying HIV as well and unclear if maybe some  of her chronic medications for this could be contributing to her current symptoms.    PLAN:   · Discontinue Arimidex.  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  · Port flush today and again in 6 weeks  · Follow-up in 6 weeks with me and if arthralgias are resolved we can try Aromasin to see if she can tolerate that      Patient is on high risk medication requiring close monitoring for toxicity  I spent 40 total minutes, face-to-face, caring for Richa zarco.  Greater than 50% of this time involved counseling and/or coordination of care as documented within this note.      Ruby Moreno MD      Cc: ОЛЬГА Godoy MD Nathan Bullington, MD

## 2021-11-16 PROBLEM — Z15.03 MUTATION IN HOXB13 GENE: Status: ACTIVE | Noted: 2021-11-16

## 2021-11-30 ENCOUNTER — HOSPITAL ENCOUNTER (OUTPATIENT)
Dept: PHYSICAL THERAPY | Facility: HOSPITAL | Age: 61
Setting detail: THERAPIES SERIES
Discharge: HOME | End: 2021-11-30

## 2021-11-30 DIAGNOSIS — Z98.890 STATUS POST LEFT BREAST LUMPECTOMY: ICD-10-CM

## 2021-11-30 DIAGNOSIS — C50.812 MALIGNANT NEOPLASM OF OVERLAPPING SITES OF LEFT BREAST IN FEMALE, ESTROGEN RECEPTOR POSITIVE (HCC): Primary | ICD-10-CM

## 2021-11-30 DIAGNOSIS — M75.42 SHOULDER IMPINGEMENT SYNDROME, LEFT: ICD-10-CM

## 2021-11-30 DIAGNOSIS — M25.612 DECREASED RANGE OF MOTION OF LEFT SHOULDER: ICD-10-CM

## 2021-11-30 DIAGNOSIS — Z17.0 MALIGNANT NEOPLASM OF OVERLAPPING SITES OF LEFT BREAST IN FEMALE, ESTROGEN RECEPTOR POSITIVE (HCC): Primary | ICD-10-CM

## 2021-11-30 DIAGNOSIS — Z85.3 HISTORY OF LEFT BREAST CANCER: ICD-10-CM

## 2021-11-30 DIAGNOSIS — M79.622 AXILLARY PAIN, LEFT: ICD-10-CM

## 2021-11-30 DIAGNOSIS — L90.5 SCAR TISSUE: ICD-10-CM

## 2021-11-30 DIAGNOSIS — Z91.89 AT RISK FOR LYMPHEDEMA: ICD-10-CM

## 2021-11-30 DIAGNOSIS — N64.89 BREAST EDEMA: ICD-10-CM

## 2021-11-30 PROCEDURE — 97164 PT RE-EVAL EST PLAN CARE: CPT | Performed by: PHYSICAL THERAPIST

## 2021-11-30 PROCEDURE — 97535 SELF CARE MNGMENT TRAINING: CPT | Performed by: PHYSICAL THERAPIST

## 2021-12-10 ENCOUNTER — OFFICE VISIT (OUTPATIENT)
Dept: FAMILY MEDICINE CLINIC | Facility: CLINIC | Age: 61
End: 2021-12-10

## 2021-12-10 VITALS
OXYGEN SATURATION: 99 % | WEIGHT: 148 LBS | HEART RATE: 80 BPM | HEIGHT: 66 IN | BODY MASS INDEX: 23.78 KG/M2 | TEMPERATURE: 97.8 F | SYSTOLIC BLOOD PRESSURE: 124 MMHG | DIASTOLIC BLOOD PRESSURE: 80 MMHG

## 2021-12-10 DIAGNOSIS — F41.9 ANXIETY: ICD-10-CM

## 2021-12-10 DIAGNOSIS — G47.00 INSOMNIA, UNSPECIFIED TYPE: ICD-10-CM

## 2021-12-10 DIAGNOSIS — Z23 NEED FOR INFLUENZA VACCINATION: Primary | ICD-10-CM

## 2021-12-10 PROCEDURE — 99213 OFFICE O/P EST LOW 20 MIN: CPT | Performed by: NURSE PRACTITIONER

## 2021-12-10 PROCEDURE — 90686 IIV4 VACC NO PRSV 0.5 ML IM: CPT | Performed by: NURSE PRACTITIONER

## 2021-12-10 PROCEDURE — 90471 IMMUNIZATION ADMIN: CPT | Performed by: NURSE PRACTITIONER

## 2021-12-10 RX ORDER — ZOLPIDEM TARTRATE 10 MG/1
10 TABLET ORAL NIGHTLY
Qty: 90 TABLET | Refills: 0 | Status: SHIPPED | OUTPATIENT
Start: 2021-12-10 | End: 2022-03-10 | Stop reason: SDUPTHER

## 2021-12-10 RX ORDER — CLONAZEPAM 0.5 MG/1
0.5 TABLET ORAL 2 TIMES DAILY PRN
Qty: 60 TABLET | Refills: 0 | Status: SHIPPED | OUTPATIENT
Start: 2021-12-10 | End: 2022-10-17

## 2021-12-10 NOTE — PROGRESS NOTES
"Chief Complaint  Insomnia (Patient is needing her ambien refilled last drug screen was in june ( wore mask and goggles) ) and Anxiety (Patient is wanting a refill for clonazepam for anxiety )    Subjective          Richa Dolan presents to [facility]  History of Present Illness  Richa Dolan 61 y.o. female presents for follow up of insomnia with onset of symptoms years ago. Patient describes symptoms as early morning awakening and frequent night time awakening. Patient has found complete relief with Ambien (Zolpidem). Associated symptoms include: fatigue if unable to take Rx . Patient denies daytime somnolence Symptoms have stabilized.  The patient has failed multiple OTC medications for insomnia.  They are well controlled on current Rx and will continue to try to take Rx PRN.  She will use the lowest effective dose.  The patient has read and signed the [facility] Controlled Substance Contract.  I will continue to see patient for regular follow up appointments and be prescribed the lowest effective dose.  MAGY has been reviewed by me and is updated every 3 months. The patient is aware of the potential for addiction and dependence.  She denies that Ambien (Zolpidem) causes excessive daytime drowsiness and sleep walking.  Patient voices understanding to take Ambien (Zolpidem) and go straight to bed. Patient must be able to sleep 7 hours or more when taking this and no alcohol.  Patient will hold Rx and contact me if they experience any impaired mental alertness the next day.      Anxiety- requesting a refill of Clonazepam. Last script was in sept and only using when intense anxiety    Objective   Vital Signs:   /80   Pulse 80   Temp 97.8 °F (36.6 °C)   Ht 167.6 cm (65.98\")   Wt 67.1 kg (148 lb)   SpO2 99%   BMI 23.90 kg/m²     Physical Exam  Vitals reviewed.   Constitutional:       General: She is not in acute distress.     Appearance: She is well-developed. "   HENT:      Head: Normocephalic.   Cardiovascular:      Rate and Rhythm: Normal rate and regular rhythm.      Heart sounds: Normal heart sounds.   Pulmonary:      Effort: Pulmonary effort is normal.      Breath sounds: Normal breath sounds.   Neurological:      Mental Status: She is alert and oriented to person, place, and time.      Gait: Gait normal.   Psychiatric:         Behavior: Behavior normal.         Thought Content: Thought content normal.         Judgment: Judgment normal.        Result Review :   The following data was reviewed by: ОЛЬГА Mccoy on 12/10/2021:  CMP    CMP 10/5/21 10/18/21 11/15/21   Glucose 80 88 85   BUN 12 9 7   Creatinine 0.61 0.67 0.67   eGFR Non African Am 100 89 89   Sodium 140 140 142   Potassium 4.1 4.6 3.8   Chloride 104 103 104   Calcium 8.7 10.2 9.6   Albumin 4.40 4.80 4.70   Total Bilirubin 0.2 0.3 0.4   Alkaline Phosphatase 57 67 65   AST (SGOT) 23 25 32   ALT (SGPT) 14 18 27           CBC w/diff    CBC w/Diff 10/5/21 10/18/21 10/18/21 11/15/21     1239 1239    WBC 4.35 4.42 4.3 4.56   RBC 3.71 (A) 3.91 3.89 4.03   Hemoglobin 12.3 13.2 13.3 13.5   Hematocrit 37.4 38.3 39.3 40.5   .8 (A) 98.0 (A) 101 (A) 100.5 (A)   MCH 33.2 (A) 33.8 (A) 34.2 (A) 33.5 (A)   MCHC 32.9 34.5 33.8 33.3   RDW 12.4 12.1 (A) 12.2 12.5   Platelets 213 277 288 228   Neutrophil Rel % 48.1 43.9 45 48.1   Immature Granulocyte Rel % 0.2 0.2  0.2   Lymphocyte Rel % 37.2 41.6 41 39.9   Monocyte Rel % 12.6 (A) 12.4 (A) 12 10.7   Eosinophil Rel % 1.4 1.4 1 0.7   Basophil Rel % 0.5 0.5 1 0.4   (A) Abnormal value            Lipid Panel    Lipid Panel 3/8/21   Total Cholesterol 137   Triglycerides 106   HDL Cholesterol 66   VLDL Cholesterol 19   LDL Cholesterol  52   LDL/HDL Ratio 0.8      Comments are available for some flowsheets but are not being displayed.           TSH    TSH 8/12/21   TSH 1.670           Most Recent A1C    HGBA1C Most Recent 9/9/21   Hemoglobin A1C 5.2                      Assessment and Plan    Diagnoses and all orders for this visit:    1. Insomnia, unspecified type  -     zolpidem (AMBIEN) 10 MG tablet; Take 1 tablet by mouth Every Night. for sleep  Dispense: 90 tablet; Refill: 0    2. Anxiety  -     clonazePAM (KlonoPIN) 0.5 MG tablet; Take 1 tablet by mouth 2 (Two) Times a Day As Needed for Anxiety.  Dispense: 60 tablet; Refill: 0        Follow Up   Return in about 3 months (around 3/10/2022) for Recheck.  Patient was given instructions and counseling regarding her condition or for health maintenance advice. Please see specific information pulled into the AVS if appropriate.     rto in 3 mons   Cont same with meds    Mask and googles worn

## 2021-12-27 ENCOUNTER — HOSPITAL ENCOUNTER (OUTPATIENT)
Dept: MAMMOGRAPHY | Facility: HOSPITAL | Age: 61
Discharge: HOME | End: 2021-12-27
Admitting: INTERNAL MEDICINE

## 2021-12-27 DIAGNOSIS — C50.812 MALIGNANT NEOPLASM OF OVERLAPPING SITES OF LEFT BREAST IN FEMALE, ESTROGEN RECEPTOR POSITIVE (HCC): ICD-10-CM

## 2021-12-27 DIAGNOSIS — Z12.31 ENCOUNTER FOR SCREENING MAMMOGRAM FOR BREAST CANCER: ICD-10-CM

## 2021-12-27 DIAGNOSIS — Z17.0 MALIGNANT NEOPLASM OF OVERLAPPING SITES OF LEFT BREAST IN FEMALE, ESTROGEN RECEPTOR POSITIVE (HCC): ICD-10-CM

## 2021-12-27 PROCEDURE — 77063 BREAST TOMOSYNTHESIS BI: CPT

## 2021-12-27 PROCEDURE — 77067 SCR MAMMO BI INCL CAD: CPT

## 2021-12-28 ENCOUNTER — INFUSION (OUTPATIENT)
Dept: ONCOLOGY | Facility: HOSPITAL | Age: 61
End: 2021-12-28

## 2021-12-28 ENCOUNTER — OFFICE VISIT (OUTPATIENT)
Dept: ONCOLOGY | Facility: CLINIC | Age: 61
End: 2021-12-28

## 2021-12-28 VITALS
RESPIRATION RATE: 16 BRPM | HEART RATE: 68 BPM | TEMPERATURE: 98.2 F | OXYGEN SATURATION: 97 % | SYSTOLIC BLOOD PRESSURE: 137 MMHG | BODY MASS INDEX: 23.66 KG/M2 | HEIGHT: 66 IN | DIASTOLIC BLOOD PRESSURE: 79 MMHG | WEIGHT: 147.2 LBS

## 2021-12-28 DIAGNOSIS — Z17.0 MALIGNANT NEOPLASM OF OVERLAPPING SITES OF LEFT BREAST IN FEMALE, ESTROGEN RECEPTOR POSITIVE (HCC): ICD-10-CM

## 2021-12-28 DIAGNOSIS — C50.812 MALIGNANT NEOPLASM OF OVERLAPPING SITES OF LEFT BREAST IN FEMALE, ESTROGEN RECEPTOR POSITIVE (HCC): ICD-10-CM

## 2021-12-28 DIAGNOSIS — M19.90 ARTHRITIS: Primary | ICD-10-CM

## 2021-12-28 DIAGNOSIS — Z21 HIV POSITIVE LONG-TERM NON-PROGRESSOR (HCC): ICD-10-CM

## 2021-12-28 DIAGNOSIS — Z79.899 HIGH RISK MEDICATION USE: ICD-10-CM

## 2021-12-28 DIAGNOSIS — M25.50 ARTHRALGIA, UNSPECIFIED JOINT: ICD-10-CM

## 2021-12-28 DIAGNOSIS — Z13.1 SCREENING FOR DIABETES MELLITUS: Primary | ICD-10-CM

## 2021-12-28 LAB
ALBUMIN SERPL-MCNC: 4.6 G/DL (ref 3.5–5.2)
ALBUMIN/GLOB SERPL: 1.8 G/DL (ref 1.1–2.4)
ALP SERPL-CCNC: 70 U/L (ref 38–116)
ALT SERPL W P-5'-P-CCNC: 19 U/L (ref 0–33)
ANION GAP SERPL CALCULATED.3IONS-SCNC: 10.6 MMOL/L (ref 5–15)
AST SERPL-CCNC: 24 U/L (ref 0–32)
BASOPHILS # BLD AUTO: 0.01 10*3/MM3 (ref 0–0.2)
BASOPHILS NFR BLD AUTO: 0.2 % (ref 0–1.5)
BILIRUB SERPL-MCNC: 0.4 MG/DL (ref 0.2–1.2)
BUN SERPL-MCNC: 7 MG/DL (ref 6–20)
BUN/CREAT SERPL: 10.6 (ref 7.3–30)
CALCIUM SPEC-SCNC: 9.6 MG/DL (ref 8.5–10.2)
CHLORIDE SERPL-SCNC: 103 MMOL/L (ref 98–107)
CO2 SERPL-SCNC: 27.4 MMOL/L (ref 22–29)
CREAT SERPL-MCNC: 0.66 MG/DL (ref 0.6–1.1)
DEPRECATED RDW RBC AUTO: 46.2 FL (ref 37–54)
EOSINOPHIL # BLD AUTO: 0.03 10*3/MM3 (ref 0–0.4)
EOSINOPHIL NFR BLD AUTO: 0.7 % (ref 0.3–6.2)
ERYTHROCYTE [DISTWIDTH] IN BLOOD BY AUTOMATED COUNT: 12.4 % (ref 12.3–15.4)
GFR SERPL CREATININE-BSD FRML MDRD: 91 ML/MIN/1.73
GLOBULIN UR ELPH-MCNC: 2.6 GM/DL (ref 1.8–3.5)
GLUCOSE SERPL-MCNC: 92 MG/DL (ref 74–124)
HCT VFR BLD AUTO: 37.3 % (ref 34–46.6)
HGB BLD-MCNC: 12.7 G/DL (ref 12–15.9)
IMM GRANULOCYTES # BLD AUTO: 0.01 10*3/MM3 (ref 0–0.05)
IMM GRANULOCYTES NFR BLD AUTO: 0.2 % (ref 0–0.5)
LYMPHOCYTES # BLD AUTO: 1.48 10*3/MM3 (ref 0.7–3.1)
LYMPHOCYTES NFR BLD AUTO: 33.9 % (ref 19.6–45.3)
MCH RBC QN AUTO: 34 PG (ref 26.6–33)
MCHC RBC AUTO-ENTMCNC: 34 G/DL (ref 31.5–35.7)
MCV RBC AUTO: 100 FL (ref 79–97)
MONOCYTES # BLD AUTO: 0.46 10*3/MM3 (ref 0.1–0.9)
MONOCYTES NFR BLD AUTO: 10.6 % (ref 5–12)
NEUTROPHILS NFR BLD AUTO: 2.37 10*3/MM3 (ref 1.7–7)
NEUTROPHILS NFR BLD AUTO: 54.4 % (ref 42.7–76)
NRBC BLD AUTO-RTO: 0 /100 WBC (ref 0–0.2)
PLATELET # BLD AUTO: 219 10*3/MM3 (ref 140–450)
PMV BLD AUTO: 9.6 FL (ref 6–12)
POTASSIUM SERPL-SCNC: 4.1 MMOL/L (ref 3.5–4.7)
PROT SERPL-MCNC: 7.2 G/DL (ref 6.3–8)
RBC # BLD AUTO: 3.73 10*6/MM3 (ref 3.77–5.28)
SODIUM SERPL-SCNC: 141 MMOL/L (ref 134–145)
WBC NRBC COR # BLD: 4.36 10*3/MM3 (ref 3.4–10.8)

## 2021-12-28 PROCEDURE — 82172 ASSAY OF APOLIPOPROTEIN: CPT

## 2021-12-28 PROCEDURE — 36591 DRAW BLOOD OFF VENOUS DEVICE: CPT

## 2021-12-28 PROCEDURE — 85025 COMPLETE CBC W/AUTO DIFF WBC: CPT

## 2021-12-28 PROCEDURE — 80053 COMPREHEN METABOLIC PANEL: CPT

## 2021-12-28 PROCEDURE — 83010 ASSAY OF HAPTOGLOBIN QUANT: CPT

## 2021-12-28 PROCEDURE — 99214 OFFICE O/P EST MOD 30 MIN: CPT | Performed by: INTERNAL MEDICINE

## 2021-12-28 PROCEDURE — 82465 ASSAY BLD/SERUM CHOLESTEROL: CPT

## 2021-12-28 PROCEDURE — 84478 ASSAY OF TRIGLYCERIDES: CPT

## 2021-12-28 PROCEDURE — 82977 ASSAY OF GGT: CPT

## 2021-12-28 PROCEDURE — 25010000002 HEPARIN LOCK FLUSH PER 10 UNITS: Performed by: INTERNAL MEDICINE

## 2021-12-28 RX ORDER — HEPARIN SODIUM (PORCINE) LOCK FLUSH IV SOLN 100 UNIT/ML 100 UNIT/ML
300 SOLUTION INTRAVENOUS ONCE
Status: CANCELLED | OUTPATIENT
Start: 2021-12-28

## 2021-12-28 RX ORDER — SODIUM CHLORIDE 0.9 % (FLUSH) 0.9 %
20 SYRINGE (ML) INJECTION AS NEEDED
Status: CANCELLED | OUTPATIENT
Start: 2021-12-28

## 2021-12-28 RX ORDER — SODIUM CHLORIDE 0.9 % (FLUSH) 0.9 %
10 SYRINGE (ML) INJECTION AS NEEDED
Status: CANCELLED | OUTPATIENT
Start: 2021-12-28

## 2021-12-28 RX ORDER — SODIUM CHLORIDE 0.9 % (FLUSH) 0.9 %
10 SYRINGE (ML) INJECTION AS NEEDED
Status: DISCONTINUED | OUTPATIENT
Start: 2021-12-28 | End: 2021-12-28 | Stop reason: HOSPADM

## 2021-12-28 RX ORDER — EXEMESTANE 25 MG/1
25 TABLET ORAL DAILY
Qty: 30 TABLET | Refills: 5 | Status: SHIPPED | OUTPATIENT
Start: 2021-12-28 | End: 2022-01-27

## 2021-12-28 RX ORDER — HEPARIN SODIUM (PORCINE) LOCK FLUSH IV SOLN 100 UNIT/ML 100 UNIT/ML
500 SOLUTION INTRAVENOUS AS NEEDED
Status: CANCELLED | OUTPATIENT
Start: 2021-12-28

## 2021-12-28 RX ORDER — HEPARIN SODIUM (PORCINE) LOCK FLUSH IV SOLN 100 UNIT/ML 100 UNIT/ML
500 SOLUTION INTRAVENOUS AS NEEDED
Status: DISCONTINUED | OUTPATIENT
Start: 2021-12-28 | End: 2021-12-28 | Stop reason: HOSPADM

## 2021-12-28 RX ADMIN — Medication 10 ML: at 13:53

## 2021-12-28 RX ADMIN — Medication 500 UNITS: at 13:53

## 2021-12-28 NOTE — PROGRESS NOTES
Subjective       REASON FOR FOLLOW UP:    1.  T2N1 invasive ductal carcinoma of the left breast.  Ultrasound-showed 3.8 x 3.1 x 2.5 cm mass at 4 o'clock position with 2 abnormal axillary lymph nodes, larger lymph node being 1.4 cm.  Left breast biopsy and axillary lymph node biopsy October 29, 2019, invasive ductal carcinoma, moderately differentiated, grade 2, ER 85%, HI 10%, HER-2/merna 2+, HER-2 FISH negative.  · 12/06/19: Neoadjuvant chemo therapy, cycle #1 of dose-dense Adriamycin/Cytoxan with Neulasta for marrow support    · 01/17/20: Last cycle of Adriamycin/Cytoxan given  · January 31, 2020: Cycle 1 Taxol  · April 24, 2020: Last cycle, cycle 12 of Taxol  · Patient is s/p left mastectomy with axillary dissection with reconstruction.  She is healing up nicely.  She has a drain in place.  Pathology showed invasive breast cancer which is 55 x 40 mm in size, grade 2 with focal lymphovascular space invasion with DCIS spanning over 36 x 6 mm.  All margins were negative for cancer.  · 8 of the additional lymph nodes out of 11 were positive with the largest micrometastasis measuring 5 mm.  Extranodal extension was seen.  · Biomarkers on post neoadjuvant tumor is ER 81-90% HI 5% HER-2/merna 2+ by immunohistochemistry and HER-2/merna negative by FISH.  · Radiation July 6, 2020-August 17, 2020.  · Letrozole initiated July 2020.  · Letrozole discontinued October 2020 secondary to severe joint pain.  · October 30, 2020 patient starting tamoxifen and tolerating well  · August 2021: Tamoxifen discontinued secondary to depression and mental fogginess and fatigue  · September 21, 2021: Arimidex started    2.  HIV, followed by Dr. Dawn from infectious disease.  Well controlled on meds    3.  Screening mammogram December 7, 2020 showed indeterminate calcifications lower outer middle left breast. Diagnostic mammogram showed 0.4 cm calcifications in the left breast.  Stereotactic biopsy January 8, 2021 consistent with  organizing fat necrosis.              HISTORY OF PRESENT ILLNESS:  The patient is a 61 y.o. year old female with history of T2N1 invasive ductal carcinoma of the left breast s/p neoadjuvant chemotherapy with Adriamycin Cytoxan followed by Taxol, s/p left mastectomy with axillary dissection and had 55 x 40 mm grade 2 invasive breast cancer with DCIS spanning over 36 x 6 mm with 8 out of 11 lymph nodes positive with extranodal extension s/p radiation.  Subsequently patient had tried letrozole which caused her severe arthralgias and could not tolerate.  She tried tamoxifen but that caused her significant depression and fogginess and that was discontinued.  Currently she is on Arimidex.  Even that is causing her severe arthralgias and stiffness..    Patient could not tolerate Femara, tamoxifen, Arimidex.  The tamoxifen caused depression Femara and Arimidex caused significant arthralgias.  As a result we discussed about switching to Aromasin currently.  She was off Arimidex and the arthralgias almost resolved though still present.  I'm unsure if she has underlying rheumatological issues.  Patient has been is on Effexor for 21 years.  We discussed about Cymbalta helping with joint pains related to Arimidex or Aromasin but given that she does not want to stop Effexor we are not going to start Cymbalta    However she can be referred to rheumatology to see if there is any underlying rheumatological cause for her significant arthritis.    Past Medical History:   Diagnosis Date   • Anxiety    • Cerebrovascular accident (CVA) ([facility]) 8/22/2017    NO RESIDUAL AFFECT   • DDD (degenerative disc disease), cervical    • Depression    • Drug therapy    • Elevated cholesterol    • Fat necrosis of left breast    • GERD (gastroesophageal reflux disease)    • H/O ICH (intracerebral hemorrhage) (CMS/[facility])    • History of chemotherapy    • History of stroke    • History of thrombocytopenia    • HIV (human immunodeficiency virus infection)  ([facility]) 1996   • Hx of radiation therapy    • Hyperlipidemia    • Insomnia    • Lupus anticoagulant positive    • Malignant neoplasm of overlapping sites of left breast in female, estrogen receptor positive ([facility]) 11/12/2019   • Meniscus tear 2017    RIGHT   • Migraine    • Neck pain     WITH SHOOTING PAIN LEFT RIGHT ARM   • Osteoarthritis    • Osteoporosis    • Pain management     sees 1 every 3 months for scripts/injection/pain meds   • Seasonal allergies    • Spinal headache     THINKS HAD BLOOD PATCH AFTER EPIDUAL    • Tick bite 06/2014     PAST MEDICAL HISTORY:  She had a stroke in July 2017 with headache and dizziness.  She went to the ER and was placed on aspirin.  However, she stopped taking it two weeks ago.  She has been taking Aimovig (injection) monthly with Fariba López at [facility].    In 1994, patient was diagnosed with HIV with an unknown cause which is managed by Dr. Natali Subramanian.  As of now, her viral load is good.    Patient has arthritis.  She gets trigger-point injections in her back with her last one being 2 weeks ago.  She has had multiple ablations.  She also has pain in her SI joint and Dr. Bermudez performed a surgery on this.  She takes Narco for the pain.      She is osteoporotic.  She has had multiple hairline fractures in both her legs.  She had her knees cleaned out by Dr. Sinha.     Patient has had a hysterectomy and still has both of her ovaries.    Patient has vertigo and the muscles in her left ear are weak.  She just has an imbalance.    Past Surgical History:   Procedure Laterality Date   • ADENOIDECTOMY     • BREAST LUMPECTOMY     • BREAST LUMPECTOMY WITH SENTINEL NODE BIOPSY Left 5/28/2020    Procedure: BREAST LUMPECTOMY WITH SENTINEL NODE BIOPSY AND NEEDLE LOCALIZATION AND axillary dissection;  Surgeon: Landen Forman MD;  Location: [facility];  Service: General;  Laterality: Left;   • BREAST SURGERY Left 5/28/2020    Procedure: LEFT LATERAL INTERCOASTAL ARTERY   flap ;  Surgeon: Yusuf Garcia MD;  Location: [facility] OR;  Service: Plastics;  Laterality: Left;   • CHOLECYSTECTOMY     • HYSTERECTOMY     • KNEE ARTHROSCOPY Right 3/24/2017    Procedure:  RIGHT  KNEE ARTHROSCOPY WITH DEBRIDEMENT CHONDROPLASTY PARTIAL MENISCECTOMY;  Surgeon: Karl Galeas MD;  Location: [facility] OR [facility];  Service:    • KNEE CARTILAGE SURGERY Right 2007   • TONSILLECTOMY     • US GUIDED LYMPH NODE BIOPSY  10/29/2019    LEFT BREAST   • VENOUS ACCESS DEVICE (PORT) INSERTION Right 12/2/2019    Procedure: INSERTION VENOUS ACCESS DEVICE RIGHT;  Surgeon: Landen Forman MD;  Location: [facility] OR;  Service: General       ONCOLOGIC HISTORY:  Patient is a 59-year-old female who has had a history of HIV since age 34 followed by Dr. Natali Ng at [facility] and was on multiple HIV drugs initially but more recently has been on a single medication TRIUMEQ, with well-controlled HIV.  She follows up with Dr. Dawn , infectious disease who saw her recently and he saw her on 4/8/2019 and has excellent control and suppression of her viral load.  She is very compliant with her medications.    She also has had history of stroke in 2017 for which she was placed on aspirin.  She presented with a headache.  She is very active and works at [employer] full-time.  She also has had history of vertigo in the past  Patient's PCP is Zach Lora.    Patient first noticed the mass in her left breast 5 months ago.  Her last mammogram was 5 years ago.  She had soreness in the left breast and she went to her primary care physician who then ordered a mammogram diagnostic and an ultrasound.  The diagnostic mammogram showed a 3.7 cm mass at 4 o'clock position in the lower outer quadrant of the left breast.  The ultrasound showed 3.8 cm x 3.1 x 2.5 cm mass at 4 o'clock position in the left breast with 2 abnormal appearing left axillary lymph nodes the largest being 1.4  cm.    She then underwent biopsy of both the breast mass as well as the left axillary lymph node and both of them are positive for invasive mammary carcinoma it is invasive ductal carcinoma with apocrine features, moderately differentiated, grade 2 of 3 with a Jen score of 5.  The left axillary lymph node is also consistent with metastatic mammary carcinoma.  It is ER positive MT positive HER-2/merna negative.  Details as follows    10/21/19 - Bilateral Diagnostic Mammogram and US Breast Left  FINDINGS: Bilateral digital CC and MLO mammographic images were  obtained. No prior examination is available for comparison. Scattered  fibroglandular densities are seen throughout both breasts. A triangular  skin marker represents the area of palpable concern in the lower outer  quadrant of the left breast in the posterior 1/3. At this location there  is an irregular mass that measures on the order of 3.7 cm in greatest  dimension. Internal calcifications are noted. No suspicious findings in  the right breast are appreciated.     ULTRASOUND: Targeted sonographic evaluation of the left breast was  performed through the area of concern in the left axilla. At the 4  o'clock position on the order of 6 cm from the nipple there is an  irregular hypoechoic mass that measures 3.8 x 3.1 x 2.5 cm. Internal  vascularity is noted.     There are 2 abnormal-appearing left axillary lymph nodes, the largest  which measures on the order of 1.4 cm in greatest dimension.     IMPRESSION:  1. There is a 3.8 cm irregular mass in the left breast at the 4 o'clock  position. This is seen in conjunction with an irregular 1.4 cm lymph  node in the left axilla. This is suspicious for malignancy with left  axillary involvement. Correlation with ultrasound-guided biopsy of both  the left breast mass and the lymph node is recommended.  2. There are no findings suspicious for malignancy in the right breast.     BI-RADS CATEGORY 5:     Patient's labs  from 11/12/19 are normal.    10/29/19 - Tissue Pathology  1. Left Breast, 4:00, 6 cm FN, U/S-Guided Core Needle Biopsy for a Mass:  A. INVASIVE DUCTAL CARCINOMA WITH APOCRINE FEATURES, Moderately differentiated;  Jen Histologic Grade II/III (tubule score = 3, nuclear score = 2, mitoses score = 1), measuring at least  9 mm.  B. No ductal or lobular carcinoma in situ is identified in this sample.  C. Focus suspicious for lymphovascular space invasion.  D. See Biomarker Template for hormone receptor studies.  2. Lymph Node, Left Axilla, U/S-Guided Core Needle Biopsy:  A. METASTATIC MAMMARY CARCINOMA (see Comment).  B. Metastatic focus measures 5 mm in greatest extent.  ER+, 85%  WY+, 10%  HER2- Score 2+    11/13/19 - CT Neck Chest Abdomen Pelvis  IMPRESSION:  1. In addition to the irregular approximately 3.8 cm mass within the  lateral aspect of the left breast, there are a few subcentimeter  asymmetric nodules along the lateral margin of the glandular tissue. The  several asymmetric left subpectoral nodes and 1.2 x 1.0 cm left axillary  node are suspicious for tamar metastases. The asymmetric subcentimeter  left supraclavicular and left posterior cervical triangle nodes are  worrisome as well.  2. There is no convincing evidence for metastatic disease within the  abdomen or pelvis.    11/14/19 - Echocardiogram:  Normal.  Calculated EF = 67%.  There is trace mitral valve and tricuspid valve regurgitation.    11/15/19 - Bone Scan  Negative.    11/18/19 - MRI Breast Bilateral  IMPRESSION:  1. Biopsy-proven malignancy in the left breast centered at the 4 o'clock  position with associated surrounding satellite nodules as described. The  entire region of involvement including the satellite nodules and the  dominant mass measure up to 7.5 cm in greatest dimension. Also, left  axillary adenopathy with multiple irregular lymph nodes and loss of  differentiation of the borders of multiple lymph nodes is noted and  this  is suspicious for extranodal involvement.  2. There are no findings suspicious for malignancy in the right breast.  BI-RADS CATEGORY 6      19: Cycle #1 of Adriamycin/Cytoxan    20: Last cycle of Adriamycin/Cytoxan given without Neulasta.  We will switch from Neulasta to 7 days of Neupogen injections after cycle 4.    We reviewed with patient the US Breast from 20 which shows mild interval partial response to neoadjuvant chemotherapy.      20: Initiated cycle 1 Taxol  2020: Last cycle of Taxol, cycle 12    S/p left mastectomy with axillary dissection   Pathology showed invasive breast cancer which is 55 x 40 mm in size, grade 2 with focal lymphovascular space invasion with DCIS spanning over 36 x 6 mm.  All margins were negative for cancer.  8 of the additional lymph nodes out of 11 were positive with the largest micrometastasis measuring 5 mm.  Extranodal extension was seen.    Biomarkers on post neoadjuvant tumor is ER 81-90% IA 5% HER-2/merna 2+ by immunohistochemistry and HER-2/merna negative by FISH.    2020: Started letrozole but because of severe joint pains was discontinued 2020    End of 2020 started tamoxifen    OB-GYN:  Menarche 15 years  Menopause-46  Pregnancies- 1 para 1 no miscarriages her first pregnancy was in  at 29 years of age.  She did not breastfeed.  Post menopausal HRT- None.  Hx of birth control pills- Yes.    Current Outpatient Medications on File Prior to Visit   Medication Sig Dispense Refill   • Abacavir-Dolutegravir-Lamivud (Triumeq) 600- MG per tablet Take 1 tablet by mouth Daily. 90 tablet 3   • acyclovir (ZOVIRAX) 200 MG capsule      • AIMOVIG 140 MG/ML solution auto-injector INJECT 140 MG INTO THE SKIN EVERY 30 (THIRTY) DAYS.  11   • atorvastatin (LIPITOR) 20 MG tablet Take 1 tablet by mouth every night at bedtime. 90 tablet 3   • baclofen (LIORESAL) 10 MG tablet Take 10 mg by mouth 2 (Two) Times a Day.  2   •  clonazePAM (KlonoPIN) 0.5 MG tablet Take 1 tablet by mouth 2 (Two) Times a Day As Needed for Anxiety. 60 tablet 0   • colesevelam (WELCHOL) 625 MG tablet TAKE 2 TABLETS BY MOUTH EVERY  tablet 3   • escitalopram (LEXAPRO) 10 MG tablet Take 1 tablet by mouth Daily. 90 tablet 3   • fluticasone (FLONASE) 50 MCG/ACT nasal spray 2 sprays into the nostril(s) as directed by provider Daily. 3 mL 3   • Loratadine (CLARITIN PO) Take 10 mg by mouth Daily.     • Magnesium Hydroxide (MILK OF MAGNESIA PO) Take 15 mL by mouth Every Night.     • oxyCODONE-acetaminophen (PERCOCET)  MG per tablet Take 1 tablet by mouth 4 (Four) Times a Day.     • vitamin B-6 (pyridoxine) 50 MG tablet Take 1 tablet by mouth Daily. 90 tablet 2   • zolpidem (AMBIEN) 10 MG tablet Take 1 tablet by mouth Every Night. for sleep 90 tablet 0   • [DISCONTINUED] anastrozole (Arimidex) 1 MG tablet Take 1 tablet by mouth Daily. 30 tablet 5     No current facility-administered medications on file prior to visit.        ALLERGIES:    Allergies   Allergen Reactions   • Augmentin [Amoxicillin-Pot Clavulanate] Hives        Social History     Socioeconomic History   • Marital status:      Spouse name: Owen   • Number of children: 1   • Years of education: College   Tobacco Use   • Smoking status: Never Smoker   • Smokeless tobacco: Never Used   Substance and Sexual Activity   • Alcohol use: Yes     Comment: occasioonal   • Drug use: No   • Sexual activity: Defer     Partners: Male     Birth control/protection: None     Comment:      Patient does not smoke or do drugs.  She does drink occasionally.    Family History   Problem Relation Age of Onset   • Breast cancer Mother    • Leukemia Mother         CLL?   • Diabetes Mother    • Hyperthyroidism Mother    • Hearing loss Mother    • Dementia Father    • Heart disease Maternal Grandmother    • Diabetes Maternal Grandfather    • Heart disease Maternal Grandfather    • Stroke Maternal Grandfather  "   • Heart attack Maternal Grandfather    • Osteoporosis Paternal Grandmother    • Heart disease Paternal Grandfather    • Leukemia Maternal Aunt         CLL?   • Throat cancer Paternal Uncle    • Malig Hyperthermia Neg Hx       FAMILY HISTORY:  Her mother had breast cancer at age 60, still alive at 85 and in good health..  Her father had dementia.  Her paternal uncle had prostate cancer.  Her brother had esophageal cancer.    I have reviewed the patient's medical history in detail and updated the computerized patient record.     ROS: as per HPI    Objective    Vitals:    12/28/21 1401   BP: 137/79   Pulse: 68   Resp: 16   Temp: 98.2 °F (36.8 °C)   TempSrc: Temporal   SpO2: 97%   Weight: 66.8 kg (147 lb 3.2 oz)   Height: 167.6 cm (65.98\")   PainSc:   7   PainLoc: Back         Review of Systems   Constitutional: Negative for appetite change, chills, diaphoresis, fatigue, fever and unexpected weight change.   HENT: Negative for hearing loss, sore throat and trouble swallowing.    Respiratory: Negative for cough, chest tightness, shortness of breath and wheezing.    Cardiovascular: Negative for chest pain, palpitations and leg swelling.   Gastrointestinal: Negative for abdominal distention, abdominal pain, constipation, diarrhea, nausea and vomiting.   Genitourinary: Negative for dysuria, frequency, hematuria and urgency.   Musculoskeletal: Negative for joint swelling.        No muscle weakness.   Skin: Negative for rash and wound.   Neurological: Positive for numbness (Bilat foot). Negative for seizures, syncope, speech difficulty, weakness and headaches.   Hematological: Negative for adenopathy. Does not bruise/bleed easily.   Psychiatric/Behavioral: Positive for sleep disturbance. Negative for behavioral problems, confusion and suicidal ideas.   All other systems reviewed and are negative.      Physical exam        CONSTITUTIONAL:  Vital signs reviewed.  No distress, looks comfortable.  EYES:  Conjunctivae and lids " unremarkable.  PERRLA  EARS,NOSE,MOUTH,THROAT:  Ears and nose appear unremarkable.  Lips, teeth, gums appear unremarkable.  RESPIRATORY:  Normal respiratory effort.  Lungs clear to auscultation bilaterally.  CARDIOVASCULAR:  Normal S1, S2.  No murmurs rubs or gallops.  No significant lower extremity edema.  GASTROINTESTINAL: Abdomen appears unremarkable.  Nontender.  No hepatomegaly.  No splenomegaly.  LYMPHATIC:  No cervical, supraclavicular, axillary lymphadenopathy.  SKIN:  Warm.  No rashes.  PSYCHIATRIC:  Normal judgment and insight.  Normal mood and affect.      RECENT LABS:   Results from last 7 days   Lab Units 12/28/21  1354   WBC 10*3/mm3 4.36   NEUTROS ABS 10*3/mm3 2.37   HEMOGLOBIN g/dL 12.7   HEMATOCRIT % 37.3   PLATELETS 10*3/mm3 219     Results from last 7 days   Lab Units 12/28/21  1354   SODIUM mmol/L 141   POTASSIUM mmol/L 4.1   CHLORIDE mmol/L 103   CO2 mmol/L 27.4   BUN mg/dL 7   CREATININE mg/dL 0.66   CALCIUM mg/dL 9.6   ALBUMIN g/dL 4.60   BILIRUBIN mg/dL 0.4   ALK PHOS U/L 70   ALT (SGPT) U/L 19   AST (SGOT) U/L 24   GLUCOSE mg/dL 92         Assessment/Plan    1. T2N1 invasive ductal carcinoma of the left breast, recently diagnosed.    -Noticed mass in the left breast x5 months  -Diagnostic mammogram showed 3.7 mm mass in the left breast at 4 o'clock position  -Ultrasound-showed 3.8 x 3.1 x 2.5 cm mass at 4 o'clock position with 2 abnormal axillary lymph nodes, larger lymph node being 1.4 cm  -Left breast biopsy and axillary lymph node biopsy October 29, 2019, invasive ductal carcinoma, moderately differentiated, grade 2, ER 85%, MT 10%, HER-2/merna 2+, HER-2 FISH negative  · CT scans from 11/13/19 show some asymmetric nodules along the lateral margin of the glandular tissue.  The 1.2x1.0 cm left axillary nodes, left supraclavicular, and left posterior cervical triangle nodes are suspicious for tamar metastases.  ·   bone scan from 11/15/19 which was negative.   ·  MRI breast from 11/18/19 which  shows the biopsy-proven malignancy in the left breast centered at the 4:00 position.  The region of involvement measures up to 7.5 cm.  There is left axillary adenopathy with multiple irregular lymph nodes and loss of differentiation of the border of multiple lymph nodes which are suspicious for extranodal involvement.  ·    echocardiogram from 11/14/19 which was normal with an ejection fraction of 67%.    · INVITAE from 11/14/19 which was negative.  · Given the size of her tumor and her medical history, patient will be given 4 cycles of Adriamycin/Cytoxan with Neulasta.  After completion of this treatment, patient will be started on 12 cycles of Taxol.  After the tumor shrinks in size, Dr. Forman will perform a lumpectomy.    · Following lumpectomy, patient will begin endocrine therapy.  · Dr. Dawn agrees chemotherapy will not aggravate her HIV condition.  Dr. Moreno spoke with Dr. Mohan at [facility] who also agrees chemotherapy is the first step.   · 12/06/19: Cycle #1 of Adriamycin/Cytoxan  · US Breast from 01/29/20 after 4 cycles of Adriamycin Cytoxan chemotherapy which shows mild interval partial response to neoadjuvant chemotherapy.  The mass has decreased from 3.8 x 2.5 to 3.1 cm to 3.5 x 2.3 x 3.2 cm previously the left axillary lymph node has decreased from 1.4 x 0.7 x 1 cm to 1.1 x 0.6 x 0.9 cm.  · 01/31/20: Initiated cycle 1 Taxol  · April 24, 2020: Completed cycle 12 Taxol  · MRI breast 5/6/2020 shows significant reduction in the left axillary adenopathy as well as reduction in the index breast lesion.  · S/p left modified radical mastectomy with left axillary dissection.  Patient has 55 x 40 x 30 mm invasive carcinoma, grade 2 with DCIS 36 x 6 mm, high-grade with good margins and 8 out of 11 lymph nodes positive with largest metastasis being 5 mm with extracapsular extension.  And there is lymphatic space invasion  · Will follow-up in 2 weeks at which time we will plan to start endocrine  therapy.  She has radiation oncology appointment with Dr. Spivey  · Patient is eligible for Zometa once every 6 months for 2 years.  But given that she is undergoing some work on her teeth we will await starting Zometa until after radiation is complete.  · XRT completed August 17, 2020  · Started letrozole August 2020 but has arthralgias  · October 6, 2020 had severe arthralgias secondary to letrozole.  We will switch to tamoxifen starting November 2020  · 1/8/2021 screening mammogram in December 2020 as well as diagnostic mammogram which showed 0.4 cm suspicious calcification in the lower left which on biopsy is consistent with fat necrosis.  · 8/12/2021 tamoxifen placed on hold due to significant arthralgias and worsening fatigue.   · September 21, 2021: Patient's depression, mental fogginess has resolved since tamoxifen discontinued.  She has mild worsening fatigue her TSH and B12 levels are normal.  We will need to check iron studies  · Discussed about switching to Arimidex to see if she could tolerate that better  · Patient initiated Arimidex 9/21/2021.  · Returns 10/5/2021 complaining of significant generalized arthralgias.  Reviewed with Dr. Moreno.  We were initially going to try her on Cymbalta, but the patient is already taking Lexapro, and doing well on this.  We will therefore discontinue Arimidex.  We will give her a 6-week break and reevaluate things in 6 weeks.  Could try at that time switching to Aromasin.  · November 15, 2021: Patient continued with Arimidex even though she was asked to hold it last time.  Her arthralgias worsened.  Also the stiffness in the joints worsened significantly.  Discussed with patient that we need to hold Arimidex for 6 weeks and try Aromasin at her next appointment.  · December 28, 2021: ArthralgiaS improved after holding Arimidex.  · We will plan to give a trial of Aromasin and if patient has arthralgias associated with that we can consider switching to Evista    2.  HIV, followed by Dr. Dawn in infectious disease.  Well-controlled and is on a single medication with very low viral load. Has HIV since age 34.  · Reviewed her HIV medications and she is compliant  · Patient followed by infectious disease for her HIV and currently on treatment.  Stay     3.  Family history of breast cancer with mother having had breast cancer at age 60.  There is family history of uncle with prostate cancer.  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    4.  Arthritis with severe back pain followed by Dr. Bam Crandall  · Continue Percocet  · Patient has arthralgias which have worsened on Arimidex as well    5. Episodes of nosebleeds.  She has had these once a week for 3-4 years. She did not report this today.    6.  Vaginal dryness related to tamoxifen, ongoing.  Patient provided handouts for recommendations of a vaginal lubricant and vaginal moisturizer to use for hopeful improvement.  · Tamoxifen discussed    7.  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  Patient continues on B6.    8. Fatigue  · 8/12/2021 patient seen as triage visit for worsening fatigue and diffuse arthralgias.  Reporting increased sleeping at night and still taking naps in the day.  Labs checked including thyroid studies which were unremarkable and B12 level actually elevated at greater than 2000.  Patient taking vitamin B supplements at that time.  Patient instructed to hold tamoxifen.  · 8/26/2021 reviewed today recent lab work for above with no evidence of thyroid dysfunction or vitamin B12 abnormality.  Patient notes no improvement being off tamoxifen for 2 weeks.  We will check a CA 15-3 to help further guide decision making.  If this is elevated we may then pursue imaging.  Discussed that she may just need to be off the tamoxifen longer to see improvement.  Patient  does have underlying HIV as well and unclear if maybe some of her chronic medications for this could be contributing to her current symptoms.    PLAN:   · Will start Aromasin  · Patient has had port flush today  · If worsening arthralgias with Aromasin we can consider Evista as she is unable to tolerate any other drugs like Femara, tamoxifen, Arimidex  · I will also refer her to rheumatology to see if she has any underlying rheumatological cause for her arthralgias, will refer to Dr. Tegan Gruber.  · Follow-up with me in 6 weeks with labs and port flush    Ruby Moreno MD      Cc: ОЛЬГА Godoy MD Nathan Bullington, MD

## 2022-01-31 RX ORDER — TAMOXIFEN CITRATE 20 MG/1
TABLET ORAL
Refills: 1 | OUTPATIENT
Start: 2022-01-31

## 2022-02-04 DIAGNOSIS — R10.9 STOMACH DISCOMFORT: ICD-10-CM

## 2022-02-04 RX ORDER — COLESEVELAM 180 1/1
TABLET ORAL
Qty: 180 TABLET | Refills: 1 | Status: SHIPPED | OUTPATIENT
Start: 2022-02-04

## 2022-02-04 NOTE — TELEPHONE ENCOUNTER
Rx Refill Note  Requested Prescriptions     Pending Prescriptions Disp Refills   • colesevelam (WELCHOL) 625 MG tablet [Pharmacy Med Name: COLESEVELAM 625 MG TABLET] 180 tablet 3     Sig: TAKE 2 TABLETS BY MOUTH EVERY DAY      Last office visit with prescribing clinician: 12/10/2021      Next office visit with prescribing clinician: 3/10/2022            Aishwarya Ross  02/04/22, 14:17 EST

## 2022-02-08 ENCOUNTER — OFFICE VISIT (OUTPATIENT)
Dept: ONCOLOGY | Facility: CLINIC | Age: 62
End: 2022-02-08

## 2022-02-08 ENCOUNTER — LAB (OUTPATIENT)
Dept: ONCOLOGY | Facility: HOSPITAL | Age: 62
End: 2022-02-08

## 2022-02-08 VITALS
SYSTOLIC BLOOD PRESSURE: 123 MMHG | HEART RATE: 76 BPM | TEMPERATURE: 97.8 F | WEIGHT: 145.8 LBS | BODY MASS INDEX: 23.43 KG/M2 | HEIGHT: 66 IN | RESPIRATION RATE: 16 BRPM | DIASTOLIC BLOOD PRESSURE: 78 MMHG | OXYGEN SATURATION: 98 %

## 2022-02-08 DIAGNOSIS — C50.812 MALIGNANT NEOPLASM OF OVERLAPPING SITES OF LEFT BREAST IN FEMALE, ESTROGEN RECEPTOR POSITIVE: Primary | ICD-10-CM

## 2022-02-08 DIAGNOSIS — Z21 HIV POSITIVE LONG-TERM NON-PROGRESSOR: ICD-10-CM

## 2022-02-08 DIAGNOSIS — Z13.1 SCREENING FOR DIABETES MELLITUS: ICD-10-CM

## 2022-02-08 DIAGNOSIS — Z79.899 HIGH RISK MEDICATION USE: ICD-10-CM

## 2022-02-08 DIAGNOSIS — Z17.0 MALIGNANT NEOPLASM OF OVERLAPPING SITES OF LEFT BREAST IN FEMALE, ESTROGEN RECEPTOR POSITIVE: Primary | ICD-10-CM

## 2022-02-08 LAB
ALBUMIN SERPL-MCNC: 4.5 G/DL (ref 3.5–5.2)
ALBUMIN/GLOB SERPL: 1.7 G/DL (ref 1.1–2.4)
ALP SERPL-CCNC: 70 U/L (ref 38–116)
ALT SERPL W P-5'-P-CCNC: 15 U/L (ref 0–33)
ANION GAP SERPL CALCULATED.3IONS-SCNC: 9.8 MMOL/L (ref 5–15)
AST SERPL-CCNC: 17 U/L (ref 0–32)
BASOPHILS # BLD AUTO: 0.03 10*3/MM3 (ref 0–0.2)
BASOPHILS NFR BLD AUTO: 0.5 % (ref 0–1.5)
BILIRUB SERPL-MCNC: 0.3 MG/DL (ref 0.2–1.2)
BUN SERPL-MCNC: 13 MG/DL (ref 6–20)
BUN/CREAT SERPL: 17.8 (ref 7.3–30)
CALCIUM SPEC-SCNC: 9.1 MG/DL (ref 8.5–10.2)
CHLORIDE SERPL-SCNC: 103 MMOL/L (ref 98–107)
CO2 SERPL-SCNC: 28.2 MMOL/L (ref 22–29)
CREAT SERPL-MCNC: 0.73 MG/DL (ref 0.6–1.1)
DEPRECATED RDW RBC AUTO: 46.6 FL (ref 37–54)
EOSINOPHIL # BLD AUTO: 0.06 10*3/MM3 (ref 0–0.4)
EOSINOPHIL NFR BLD AUTO: 1 % (ref 0.3–6.2)
ERYTHROCYTE [DISTWIDTH] IN BLOOD BY AUTOMATED COUNT: 12.4 % (ref 12.3–15.4)
GFR SERPL CREATININE-BSD FRML MDRD: 81 ML/MIN/1.73
GLOBULIN UR ELPH-MCNC: 2.6 GM/DL (ref 1.8–3.5)
GLUCOSE SERPL-MCNC: 176 MG/DL (ref 74–124)
HCT VFR BLD AUTO: 38.2 % (ref 34–46.6)
HGB BLD-MCNC: 12.8 G/DL (ref 12–15.9)
IMM GRANULOCYTES # BLD AUTO: 0.01 10*3/MM3 (ref 0–0.05)
IMM GRANULOCYTES NFR BLD AUTO: 0.2 % (ref 0–0.5)
LYMPHOCYTES # BLD AUTO: 1.2 10*3/MM3 (ref 0.7–3.1)
LYMPHOCYTES NFR BLD AUTO: 20.3 % (ref 19.6–45.3)
MCH RBC QN AUTO: 34 PG (ref 26.6–33)
MCHC RBC AUTO-ENTMCNC: 33.5 G/DL (ref 31.5–35.7)
MCV RBC AUTO: 101.6 FL (ref 79–97)
MONOCYTES # BLD AUTO: 0.6 10*3/MM3 (ref 0.1–0.9)
MONOCYTES NFR BLD AUTO: 10.2 % (ref 5–12)
NEUTROPHILS NFR BLD AUTO: 4 10*3/MM3 (ref 1.7–7)
NEUTROPHILS NFR BLD AUTO: 67.8 % (ref 42.7–76)
NRBC BLD AUTO-RTO: 0 /100 WBC (ref 0–0.2)
PLATELET # BLD AUTO: 244 10*3/MM3 (ref 140–450)
PMV BLD AUTO: 9.4 FL (ref 6–12)
POTASSIUM SERPL-SCNC: 3.7 MMOL/L (ref 3.5–4.7)
PROT SERPL-MCNC: 7.1 G/DL (ref 6.3–8)
RBC # BLD AUTO: 3.76 10*6/MM3 (ref 3.77–5.28)
SODIUM SERPL-SCNC: 141 MMOL/L (ref 134–145)
WBC NRBC COR # BLD: 5.9 10*3/MM3 (ref 3.4–10.8)

## 2022-02-08 PROCEDURE — 80053 COMPREHEN METABOLIC PANEL: CPT

## 2022-02-08 PROCEDURE — 99214 OFFICE O/P EST MOD 30 MIN: CPT | Performed by: INTERNAL MEDICINE

## 2022-02-08 PROCEDURE — 25010000002 HEPARIN LOCK FLUSH PER 10 UNITS: Performed by: INTERNAL MEDICINE

## 2022-02-08 PROCEDURE — 36591 DRAW BLOOD OFF VENOUS DEVICE: CPT

## 2022-02-08 PROCEDURE — 36415 COLL VENOUS BLD VENIPUNCTURE: CPT

## 2022-02-08 PROCEDURE — 85025 COMPLETE CBC W/AUTO DIFF WBC: CPT

## 2022-02-08 RX ORDER — HEPARIN SODIUM (PORCINE) LOCK FLUSH IV SOLN 100 UNIT/ML 100 UNIT/ML
500 SOLUTION INTRAVENOUS AS NEEDED
Status: DISCONTINUED | OUTPATIENT
Start: 2022-02-08 | End: 2022-02-08 | Stop reason: HOSPADM

## 2022-02-08 RX ORDER — SODIUM CHLORIDE 0.9 % (FLUSH) 0.9 %
10 SYRINGE (ML) INJECTION AS NEEDED
Status: DISCONTINUED | OUTPATIENT
Start: 2022-02-08 | End: 2022-02-08 | Stop reason: HOSPADM

## 2022-02-08 RX ORDER — SODIUM CHLORIDE 0.9 % (FLUSH) 0.9 %
10 SYRINGE (ML) INJECTION AS NEEDED
Status: CANCELLED | OUTPATIENT
Start: 2022-02-08

## 2022-02-08 RX ORDER — HEPARIN SODIUM (PORCINE) LOCK FLUSH IV SOLN 100 UNIT/ML 100 UNIT/ML
500 SOLUTION INTRAVENOUS AS NEEDED
Status: CANCELLED | OUTPATIENT
Start: 2022-02-08

## 2022-02-08 RX ORDER — EXEMESTANE 25 MG/1
25 TABLET ORAL DAILY
Qty: 90 TABLET | Refills: 2 | Status: SHIPPED | OUTPATIENT
Start: 2022-02-08 | End: 2022-05-09

## 2022-02-08 RX ORDER — RIMEGEPANT SULFATE 75 MG/75MG
1 TABLET, ORALLY DISINTEGRATING ORAL DAILY PRN
COMMUNITY
Start: 2022-01-13

## 2022-02-08 RX ADMIN — Medication 500 UNITS: at 14:59

## 2022-02-08 RX ADMIN — Medication 10 ML: at 14:59

## 2022-02-08 NOTE — PROGRESS NOTES
Subjective       REASON FOR FOLLOW UP:    1.  T2N1 invasive ductal carcinoma of the left breast.  Ultrasound-showed 3.8 x 3.1 x 2.5 cm mass at 4 o'clock position with 2 abnormal axillary lymph nodes, larger lymph node being 1.4 cm.  Left breast biopsy and axillary lymph node biopsy October 29, 2019, invasive ductal carcinoma, moderately differentiated, grade 2, ER 85%, NE 10%, HER-2/merna 2+, HER-2 FISH negative.  · 12/06/19: Neoadjuvant chemo therapy, cycle #1 of dose-dense Adriamycin/Cytoxan with Neulasta for marrow support    · 01/17/20: Last cycle of Adriamycin/Cytoxan given  · January 31, 2020: Cycle 1 Taxol  · April 24, 2020: Last cycle, cycle 12 of Taxol  · Patient is s/p left mastectomy with axillary dissection with reconstruction.  She is healing up nicely.  She has a drain in place.  Pathology showed invasive breast cancer which is 55 x 40 mm in size, grade 2 with focal lymphovascular space invasion with DCIS spanning over 36 x 6 mm.  All margins were negative for cancer.  · 8 of the additional lymph nodes out of 11 were positive with the largest micrometastasis measuring 5 mm.  Extranodal extension was seen.  · Biomarkers on post neoadjuvant tumor is ER 81-90% NE 5% HER-2/merna 2+ by immunohistochemistry and HER-2/merna negative by FISH.  · Radiation July 6, 2020-August 17, 2020.  · Letrozole initiated July 2020.  · Letrozole discontinued October 2020 secondary to severe joint pain.  · October 30, 2020 patient starting tamoxifen and tolerating well  · August 2021: Tamoxifen discontinued secondary to depression and mental fogginess and fatigue  · September 21, 2021: Arimidex started  · Severe arthralgias secondary to Arimidex, will start Aromasin  · Patient is tolerating Aromasin well    2.  HIV, followed by Dr. Dawn from infectious disease.  Well controlled on meds    3.  Screening mammogram December 7, 2020 showed indeterminate calcifications lower outer middle left breast. Diagnostic mammogram showed  0.4 cm calcifications in the left breast.  Stereotactic biopsy January 8, 2021 consistent with organizing fat necrosis.              HISTORY OF PRESENT ILLNESS:  The patient is a 61 y.o. year old female with history of T2N1 invasive ductal carcinoma of the left breast s/p neoadjuvant chemotherapy with Adriamycin Cytoxan followed by Taxol, s/p left mastectomy with axillary dissection and had 55 x 40 mm grade 2 invasive breast cancer with DCIS spanning over 36 x 6 mm with 8 out of 11 lymph nodes positive with extranodal extension s/p radiation.  Subsequently patient had tried letrozole which caused her severe arthralgias and could not tolerate.  She tried tamoxifen but that caused her significant depression and fogginess and that was discontinued.  Currently she is on Arimidex.  Even that is causing her severe arthralgias and stiffness..    Patient could not tolerate Femara, tamoxifen, Arimidex.  We switched her to Aromasin and she is tolerating it very well.  She also went to rheumatologist who is evaluating her.  She is followed by pain medicine clinic and is on hydrocodone which she takes as needed for pain.    Past Medical History:   Diagnosis Date   • Anxiety    • Cerebrovascular accident (CVA) (Formerly [facility]) 8/22/2017    NO RESIDUAL AFFECT   • DDD (degenerative disc disease), cervical    • Depression    • Drug therapy    • Elevated cholesterol    • Fat necrosis of left breast    • GERD (gastroesophageal reflux disease)    • H/O ICH (intracerebral hemorrhage) (CMS/Formerly [facility])    • History of chemotherapy    • History of stroke    • History of thrombocytopenia    • HIV (human immunodeficiency virus infection) (Formerly [facility]) 1996   • Hx of radiation therapy    • Hyperlipidemia    • Insomnia    • Lupus anticoagulant positive    • Malignant neoplasm of overlapping sites of left breast in female, estrogen receptor positive (Formerly [facility]) 11/12/2019   • Meniscus tear 2017    RIGHT   • Migraine    • Neck pain     WITH SHOOTING PAIN LEFT RIGHT ARM   •  Osteoarthritis    • Osteoporosis    • Pain management     sees 1 every 3 months for scripts/injection/pain meds   • Seasonal allergies    • Spinal headache     THINKS HAD BLOOD PATCH AFTER EPIDUAL    • Tick bite 06/2014     PAST MEDICAL HISTORY:  She had a stroke in July 2017 with headache and dizziness.  She went to the ER and was placed on aspirin.  However, she stopped taking it two weeks ago.  She has been taking Aimovig (injection) monthly with Fariba López at [facility].    In 1994, patient was diagnosed with HIV with an unknown cause which is managed by Dr. Natali Subramanian.  As of now, her viral load is good.    Patient has arthritis.  She gets trigger-point injections in her back with her last one being 2 weeks ago.  She has had multiple ablations.  She also has pain in her SI joint and Dr. Bermudez performed a surgery on this.  She takes Narco for the pain.      She is osteoporotic.  She has had multiple hairline fractures in both her legs.  She had her knees cleaned out by Dr. Sinha.     Patient has had a hysterectomy and still has both of her ovaries.    Patient has vertigo and the muscles in her left ear are weak.  She just has an imbalance.    Past Surgical History:   Procedure Laterality Date   • ADENOIDECTOMY     • BREAST LUMPECTOMY     • BREAST LUMPECTOMY WITH SENTINEL NODE BIOPSY Left 5/28/2020    Procedure: BREAST LUMPECTOMY WITH SENTINEL NODE BIOPSY AND NEEDLE LOCALIZATION AND axillary dissection;  Surgeon: Landen Forman MD;  Location: [facility];  Service: General;  Laterality: Left;   • BREAST SURGERY Left 5/28/2020    Procedure: LEFT LATERAL INTERCOASTAL ARTERY  flap ;  Surgeon: Yusuf Garcia MD;  Location: [facility] OR;  Service: Plastics;  Laterality: Left;   • CHOLECYSTECTOMY     • HYSTERECTOMY     • KNEE ARTHROSCOPY Right 3/24/2017    Procedure:  RIGHT  KNEE ARTHROSCOPY WITH DEBRIDEMENT CHONDROPLASTY PARTIAL MENISCECTOMY;  Surgeon: Karl Galeas MD;   Location: [facility];  Service:    • KNEE CARTILAGE SURGERY Right 2007   • TONSILLECTOMY     • US GUIDED LYMPH NODE BIOPSY  10/29/2019    LEFT BREAST   • VENOUS ACCESS DEVICE (PORT) INSERTION Right 12/2/2019    Procedure: INSERTION VENOUS ACCESS DEVICE RIGHT;  Surgeon: Landen Forman MD;  Location: [facility];  Service: General       ONCOLOGIC HISTORY:  Patient is a 59-year-old female who has had a history of HIV since age 34 followed by Dr. Natali Ng at [facility] and was on multiple HIV drugs initially but more recently has been on a single medication TRIUMEQ, with well-controlled HIV.  She follows up with Dr. Dawn , infectious disease who saw her recently and he saw her on 4/8/2019 and has excellent control and suppression of her viral load.  She is very compliant with her medications.    She also has had history of stroke in 2017 for which she was placed on aspirin.  She presented with a headache.  She is very active and works at [employer] full-time.  She also has had history of vertigo in the past  Patient's PCP is Zach Lora.    Patient first noticed the mass in her left breast 5 months ago.  Her last mammogram was 5 years ago.  She had soreness in the left breast and she went to her primary care physician who then ordered a mammogram diagnostic and an ultrasound.  The diagnostic mammogram showed a 3.7 cm mass at 4 o'clock position in the lower outer quadrant of the left breast.  The ultrasound showed 3.8 cm x 3.1 x 2.5 cm mass at 4 o'clock position in the left breast with 2 abnormal appearing left axillary lymph nodes the largest being 1.4 cm.    She then underwent biopsy of both the breast mass as well as the left axillary lymph node and both of them are positive for invasive mammary carcinoma it is invasive ductal carcinoma with apocrine features, moderately differentiated, grade 2 of 3 with a Jen score of 5.  The left axillary lymph node is also  consistent with metastatic mammary carcinoma.  It is ER positive ID positive HER-2/merna negative.  Details as follows    10/21/19 - Bilateral Diagnostic Mammogram and US Breast Left  FINDINGS: Bilateral digital CC and MLO mammographic images were  obtained. No prior examination is available for comparison. Scattered  fibroglandular densities are seen throughout both breasts. A triangular  skin marker represents the area of palpable concern in the lower outer  quadrant of the left breast in the posterior 1/3. At this location there  is an irregular mass that measures on the order of 3.7 cm in greatest  dimension. Internal calcifications are noted. No suspicious findings in  the right breast are appreciated.     ULTRASOUND: Targeted sonographic evaluation of the left breast was  performed through the area of concern in the left axilla. At the 4  o'clock position on the order of 6 cm from the nipple there is an  irregular hypoechoic mass that measures 3.8 x 3.1 x 2.5 cm. Internal  vascularity is noted.     There are 2 abnormal-appearing left axillary lymph nodes, the largest  which measures on the order of 1.4 cm in greatest dimension.     IMPRESSION:  1. There is a 3.8 cm irregular mass in the left breast at the 4 o'clock  position. This is seen in conjunction with an irregular 1.4 cm lymph  node in the left axilla. This is suspicious for malignancy with left  axillary involvement. Correlation with ultrasound-guided biopsy of both  the left breast mass and the lymph node is recommended.  2. There are no findings suspicious for malignancy in the right breast.     BI-RADS CATEGORY 5:     Patient's labs from 11/12/19 are normal.    10/29/19 - Tissue Pathology  1. Left Breast, 4:00, 6 cm FN, U/S-Guided Core Needle Biopsy for a Mass:  A. INVASIVE DUCTAL CARCINOMA WITH APOCRINE FEATURES, Moderately differentiated;  [place] Histologic Grade II/III (tubule score = 3, nuclear score = 2, mitoses score = 1), measuring at  least  9 mm.  B. No ductal or lobular carcinoma in situ is identified in this sample.  C. Focus suspicious for lymphovascular space invasion.  D. See Biomarker Template for hormone receptor studies.  2. Lymph Node, Left Axilla, U/S-Guided Core Needle Biopsy:  A. METASTATIC MAMMARY CARCINOMA (see Comment).  B. Metastatic focus measures 5 mm in greatest extent.  ER+, 85%  IN+, 10%  HER2- Score 2+    11/13/19 - CT Neck Chest Abdomen Pelvis  IMPRESSION:  1. In addition to the irregular approximately 3.8 cm mass within the  lateral aspect of the left breast, there are a few subcentimeter  asymmetric nodules along the lateral margin of the glandular tissue. The  several asymmetric left subpectoral nodes and 1.2 x 1.0 cm left axillary  node are suspicious for tamar metastases. The asymmetric subcentimeter  left supraclavicular and left posterior cervical triangle nodes are  worrisome as well.  2. There is no convincing evidence for metastatic disease within the  abdomen or pelvis.    11/14/19 - Echocardiogram:  Normal.  Calculated EF = 67%.  There is trace mitral valve and tricuspid valve regurgitation.    11/15/19 - Bone Scan  Negative.    11/18/19 - MRI Breast Bilateral  IMPRESSION:  1. Biopsy-proven malignancy in the left breast centered at the 4 o'clock  position with associated surrounding satellite nodules as described. The  entire region of involvement including the satellite nodules and the  dominant mass measure up to 7.5 cm in greatest dimension. Also, left  axillary adenopathy with multiple irregular lymph nodes and loss of  differentiation of the borders of multiple lymph nodes is noted and this  is suspicious for extranodal involvement.  2. There are no findings suspicious for malignancy in the right breast.  BI-RADS CATEGORY 6      12/06/19: Cycle #1 of Adriamycin/Cytoxan    01/17/20: Last cycle of Adriamycin/Cytoxan given without Neulasta.  We will switch from Neulasta to 7 days of Neupogen injections after  cycle 4.    We reviewed with patient the US Breast from 20 which shows mild interval partial response to neoadjuvant chemotherapy.      20: Initiated cycle 1 Taxol  2020: Last cycle of Taxol, cycle 12    S/p left mastectomy with axillary dissection   Pathology showed invasive breast cancer which is 55 x 40 mm in size, grade 2 with focal lymphovascular space invasion with DCIS spanning over 36 x 6 mm.  All margins were negative for cancer.  8 of the additional lymph nodes out of 11 were positive with the largest micrometastasis measuring 5 mm.  Extranodal extension was seen.    Biomarkers on post neoadjuvant tumor is ER 81-90% VA 5% HER-2/merna 2+ by immunohistochemistry and HER-2/merna negative by FISH.    2020: Started letrozole but because of severe joint pains was discontinued 2020    End of 2020 started tamoxifen    OB-GYN:  Menarche 15 years  Menopause-46  Pregnancies- 1 para 1 no miscarriages her first pregnancy was in  at 29 years of age.  She did not breastfeed.  Post menopausal HRT- None.  Hx of birth control pills- Yes.    Current Outpatient Medications on File Prior to Visit   Medication Sig Dispense Refill   • Abacavir-Dolutegravir-Lamivud (Triumeq) 600- MG per tablet Take 1 tablet by mouth Daily. 90 tablet 3   • acyclovir (ZOVIRAX) 200 MG capsule      • AIMOVIG 140 MG/ML solution auto-injector INJECT 140 MG INTO THE SKIN EVERY 30 (THIRTY) DAYS.  11   • atorvastatin (LIPITOR) 20 MG tablet Take 1 tablet by mouth every night at bedtime. 90 tablet 3   • baclofen (LIORESAL) 10 MG tablet Take 10 mg by mouth 2 (Two) Times a Day.  2   • clonazePAM (KlonoPIN) 0.5 MG tablet Take 1 tablet by mouth 2 (Two) Times a Day As Needed for Anxiety. 60 tablet 0   • colesevelam (WELCHOL) 625 MG tablet TAKE 2 TABLETS BY MOUTH EVERY  tablet 1   • escitalopram (LEXAPRO) 10 MG tablet Take 1 tablet by mouth Daily. 90 tablet 3   • fluticasone (FLONASE) 50 MCG/ACT nasal  spray 2 sprays into the nostril(s) as directed by provider Daily. 3 mL 3   • Loratadine (CLARITIN PO) Take 10 mg by mouth Daily.     • Magnesium Hydroxide (MILK OF MAGNESIA PO) Take 15 mL by mouth Every Night.     • Nurtec 75 MG dispersible tablet Take 1 tablet by mouth Daily As Needed.     • oxyCODONE-acetaminophen (PERCOCET)  MG per tablet Take 1 tablet by mouth 4 (Four) Times a Day.     • vitamin B-6 (pyridoxine) 50 MG tablet Take 1 tablet by mouth Daily. 90 tablet 2   • zolpidem (AMBIEN) 10 MG tablet Take 1 tablet by mouth Every Night. for sleep 90 tablet 0     No current facility-administered medications on file prior to visit.        ALLERGIES:    Allergies   Allergen Reactions   • Augmentin [Amoxicillin-Pot Clavulanate] Hives        Social History     Socioeconomic History   • Marital status:      Spouse name: Owen   • Number of children: 1   • Years of education: College   Tobacco Use   • Smoking status: Never Smoker   • Smokeless tobacco: Never Used   Substance and Sexual Activity   • Alcohol use: Yes     Comment: occasioonal   • Drug use: No   • Sexual activity: Defer     Partners: Male     Birth control/protection: None     Comment:      Patient does not smoke or do drugs.  She does drink occasionally.    Family History   Problem Relation Age of Onset   • Breast cancer Mother    • Leukemia Mother         CLL?   • Diabetes Mother    • Hyperthyroidism Mother    • Hearing loss Mother    • Dementia Father    • Heart disease Maternal Grandmother    • Diabetes Maternal Grandfather    • Heart disease Maternal Grandfather    • Stroke Maternal Grandfather    • Heart attack Maternal Grandfather    • Osteoporosis Paternal Grandmother    • Heart disease Paternal Grandfather    • Leukemia Maternal Aunt         CLL?   • Throat cancer Paternal Uncle    • Malig Hyperthermia Neg Hx       FAMILY HISTORY:  Her mother had breast cancer at age 60, still alive at 85 and in good health..  Her father had  "dementia.  Her paternal uncle had prostate cancer.  Her brother had esophageal cancer.    I have reviewed the patient's medical history in detail and updated the computerized patient record.     ROS: as per HPI    Objective    Vitals:    02/08/22 1453   BP: 123/78   Pulse: 76   Resp: 16   Temp: 97.8 °F (36.6 °C)   TempSrc: Temporal   SpO2: 98%   Weight: 66.1 kg (145 lb 12.8 oz)   Height: 167.6 cm (65.98\")   PainSc:   6   PainLoc: Back  Comment: Back and bilat foot         Review of Systems   Constitutional: Negative for appetite change, chills, diaphoresis, fatigue, fever and unexpected weight change.   HENT: Negative for hearing loss, sore throat and trouble swallowing.    Respiratory: Negative for cough, chest tightness, shortness of breath and wheezing.    Cardiovascular: Negative for chest pain, palpitations and leg swelling.   Gastrointestinal: Negative for abdominal distention, abdominal pain, constipation, diarrhea, nausea and vomiting.   Genitourinary: Negative for dysuria, frequency, hematuria and urgency.   Musculoskeletal: Negative for joint swelling.        No muscle weakness.   Skin: Negative for rash and wound.   Neurological: Positive for numbness (Bilat foot). Negative for seizures, syncope, speech difficulty, weakness and headaches.   Hematological: Negative for adenopathy. Does not bruise/bleed easily.   Psychiatric/Behavioral: Positive for sleep disturbance. Negative for behavioral problems, confusion and suicidal ideas.   All other systems reviewed and are negative.      Physical exam      CONSTITUTIONAL:  Vital signs reviewed.  No distress, looks comfortable.  EYES:  Conjunctivae and lids unremarkable.  PERRLA  EARS,NOSE,MOUTH,THROAT:  Ears and nose appear unremarkable.  Lips, teeth, gums appear unremarkable.  RESPIRATORY:  Normal respiratory effort.  Lungs clear to auscultation bilaterally.  CARDIOVASCULAR:  Normal S1, S2.  No murmurs rubs or gallops.  No significant lower extremity " edema.  GASTROINTESTINAL: Abdomen appears unremarkable.  Nontender.  No hepatomegaly.  No splenomegaly.  LYMPHATIC:  No cervical, supraclavicular, axillary lymphadenopathy.  SKIN:  Warm.  No rashes.  PSYCHIATRIC:  Normal judgment and insight.  Normal mood and affect.    RECENT LABS:   Results from last 7 days   Lab Units 02/08/22  1452   WBC 10*3/mm3 5.90   NEUTROS ABS 10*3/mm3 4.00   HEMOGLOBIN g/dL 12.8   HEMATOCRIT % 38.2   PLATELETS 10*3/mm3 244     Results from last 7 days   Lab Units 02/08/22  1452   SODIUM mmol/L 141   POTASSIUM mmol/L 3.7   CHLORIDE mmol/L 103   CO2 mmol/L 28.2   BUN mg/dL 13   CREATININE mg/dL 0.73   CALCIUM mg/dL 9.1   ALBUMIN g/dL 4.50   BILIRUBIN mg/dL 0.3   ALK PHOS U/L 70   ALT (SGPT) U/L 15   AST (SGOT) U/L 17   GLUCOSE mg/dL 176*         Assessment/Plan    1. T2N1 invasive ductal carcinoma of the left breast, recently diagnosed.    -Noticed mass in the left breast x5 months  -Diagnostic mammogram showed 3.7 mm mass in the left breast at 4 o'clock position  -Ultrasound-showed 3.8 x 3.1 x 2.5 cm mass at 4 o'clock position with 2 abnormal axillary lymph nodes, larger lymph node being 1.4 cm  -Left breast biopsy and axillary lymph node biopsy October 29, 2019, invasive ductal carcinoma, moderately differentiated, grade 2, ER 85%, CA 10%, HER-2/merna 2+, HER-2 FISH negative  · CT scans from 11/13/19 show some asymmetric nodules along the lateral margin of the glandular tissue.  The 1.2x1.0 cm left axillary nodes, left supraclavicular, and left posterior cervical triangle nodes are suspicious for tamar metastases.  ·   bone scan from 11/15/19 which was negative.   ·  MRI breast from 11/18/19 which shows the biopsy-proven malignancy in the left breast centered at the 4:00 position.  The region of involvement measures up to 7.5 cm.  There is left axillary adenopathy with multiple irregular lymph nodes and loss of differentiation of the border of multiple lymph nodes which are suspicious for  extranodal involvement.  ·    echocardiogram from 11/14/19 which was normal with an ejection fraction of 67%.    · INVITAE from 11/14/19 which was negative.  · Given the size of her tumor and her medical history, patient will be given 4 cycles of Adriamycin/Cytoxan with Neulasta.  After completion of this treatment, patient will be started on 12 cycles of Taxol.  After the tumor shrinks in size, Dr. Forman will perform a lumpectomy.    · Following lumpectomy, patient will begin endocrine therapy.  · Dr. Dawn agrees chemotherapy will not aggravate her HIV condition.  Dr. Moreno spoke with Dr. Mohan at [facility] who also agrees chemotherapy is the first step.   · 12/06/19: Cycle #1 of Adriamycin/Cytoxan  · US Breast from 01/29/20 after 4 cycles of Adriamycin Cytoxan chemotherapy which shows mild interval partial response to neoadjuvant chemotherapy.  The mass has decreased from 3.8 x 2.5 to 3.1 cm to 3.5 x 2.3 x 3.2 cm previously the left axillary lymph node has decreased from 1.4 x 0.7 x 1 cm to 1.1 x 0.6 x 0.9 cm.  · 01/31/20: Initiated cycle 1 Taxol  · April 24, 2020: Completed cycle 12 Taxol  · MRI breast 5/6/2020 shows significant reduction in the left axillary adenopathy as well as reduction in the index breast lesion.  · S/p left modified radical mastectomy with left axillary dissection.  Patient has 55 x 40 x 30 mm invasive carcinoma, grade 2 with DCIS 36 x 6 mm, high-grade with good margins and 8 out of 11 lymph nodes positive with largest metastasis being 5 mm with extracapsular extension.  And there is lymphatic space invasion  · Will follow-up in 2 weeks at which time we will plan to start endocrine therapy.  She has radiation oncology appointment with Dr. Spivey  · Patient is eligible for Zometa once every 6 months for 2 years.  But given that she is undergoing some work on her teeth we will await starting Zometa until after radiation is complete.  · XRT completed August 17, 2020  · Started  letrozole August 2020 but has arthralgias  · October 6, 2020 had severe arthralgias secondary to letrozole.  We will switch to tamoxifen starting November 2020  · 1/8/2021 screening mammogram in December 2020 as well as diagnostic mammogram which showed 0.4 cm suspicious calcification in the lower left which on biopsy is consistent with fat necrosis.  · 8/12/2021 tamoxifen placed on hold due to significant arthralgias and worsening fatigue.   · September 21, 2021: Patient's depression, mental fogginess has resolved since tamoxifen discontinued.  She has mild worsening fatigue her TSH and B12 levels are normal.  We will need to check iron studies  · Discussed about switching to Arimidex to see if she could tolerate that better  · Patient initiated Arimidex 9/21/2021.  · Returns 10/5/2021 complaining of significant generalized arthralgias.  Reviewed with Dr. Moreno.  We were initially going to try her on Cymbalta, but the patient is already taking Lexapro, and doing well on this.  We will therefore discontinue Arimidex.  We will give her a 6-week break and reevaluate things in 6 weeks.  Could try at that time switching to Aromasin.  · November 15, 2021: Patient continued with Arimidex even though she was asked to hold it last time.  Her arthralgias worsened.  Also the stiffness in the joints worsened significantly.  Discussed with patient that we need to hold Arimidex for 6 weeks and try Aromasin at her next appointment.  · December 28, 2021: ArthralgiaS improved after holding Arimidex.  · December 28, 2021: Started Aromasin    2. HIV, followed by Dr. Dawn in infectious disease.  Well-controlled and is on a single medication with very low viral load. Has HIV since age 34.  · Reviewed her HIV medications and she is compliant  · Patient followed by infectious disease for her HIV and currently on treatment.  Stay     3.  Family history of breast cancer with mother having had breast cancer at age 60.  There is  family history of uncle with prostate cancer.  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    4.  Arthritis with severe back pain followed by Dr. Bam Crandall  · Continue Percocet  · Patient has arthralgias which have worsened on Arimidex as well  · Patient seen by rheumatologist    5. Episodes of nosebleeds.  She has had these once a week for 3-4 years. She did not report this today.    6.  Vaginal dryness related to tamoxifen, ongoing.  Patient provided handouts for recommendations of a vaginal lubricant and vaginal moisturizer to use for hopeful improvement.  · Tamoxifen discontinued    7.  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  Patient continues on B6.    8. Fatigue  · 8/12/2021 patient seen as triage visit for worsening fatigue and diffuse arthralgias.  Reporting increased sleeping at night and still taking naps in the day.  Labs checked including thyroid studies which were unremarkable and B12 level actually elevated at greater than 2000.  Patient taking vitamin B supplements at that time.  Patient instructed to hold tamoxifen.  · 8/26/2021 reviewed today recent lab work for above with no evidence of thyroid dysfunction or vitamin B12 abnormality.  Patient notes no improvement being off tamoxifen for 2 weeks.  We will check a CA 15-3 to help further guide decision making.  If this is elevated we may then pursue imaging.  Discussed that she may just need to be off the tamoxifen longer to see improvement.  Patient does have underlying HIV as well and unclear if maybe some of her chronic medications for this could be contributing to her current symptoms.    PLAN:   · Continue Aromasin as she is tolerating this better  · Follow-up with me April 5 for last cycle of Zometa with labs  · Continue calcium and vitamin D supplements  · Reviewed records from  rheumatology  · Patient continues to follow-up with Dr. Dawn for her HIV medications      Monitoring high risk medication  Ruby Moreno MD      Cc: ОЛЬГА Godoy  · MD Denilson Bishop MD

## 2022-02-15 DIAGNOSIS — F41.9 ANXIETY: ICD-10-CM

## 2022-02-15 RX ORDER — ESCITALOPRAM OXALATE 10 MG/1
TABLET ORAL
Qty: 90 TABLET | Refills: 3 | Status: SHIPPED | OUTPATIENT
Start: 2022-02-15 | End: 2022-12-22

## 2022-03-10 ENCOUNTER — OFFICE VISIT (OUTPATIENT)
Dept: FAMILY MEDICINE CLINIC | Facility: CLINIC | Age: 62
End: 2022-03-10

## 2022-03-10 VITALS
WEIGHT: 145 LBS | TEMPERATURE: 97.3 F | HEIGHT: 67 IN | BODY MASS INDEX: 22.76 KG/M2 | DIASTOLIC BLOOD PRESSURE: 72 MMHG | SYSTOLIC BLOOD PRESSURE: 132 MMHG | HEART RATE: 68 BPM | OXYGEN SATURATION: 98 %

## 2022-03-10 DIAGNOSIS — G47.00 INSOMNIA, UNSPECIFIED TYPE: ICD-10-CM

## 2022-03-10 DIAGNOSIS — Z12.11 COLON CANCER SCREENING: Primary | ICD-10-CM

## 2022-03-10 PROCEDURE — 99213 OFFICE O/P EST LOW 20 MIN: CPT | Performed by: NURSE PRACTITIONER

## 2022-03-10 RX ORDER — ZOLPIDEM TARTRATE 10 MG/1
10 TABLET ORAL NIGHTLY
Qty: 90 TABLET | Refills: 0 | Status: SHIPPED | OUTPATIENT
Start: 2022-03-10 | End: 2022-06-06 | Stop reason: SDUPTHER

## 2022-03-10 NOTE — PROGRESS NOTES
"Chief Complaint  Insomnia (Refill on medication)    Subjective          Richa Dolan presents to [facility]  History of Present Illness  Richa Dolan 61 y.o. female presents for follow up of insomnia with onset of symptoms years ago. Patient describes symptoms as early morning awakening and frequent night time awakening. Patient has found complete relief with Ambien (Zolpidem). Associated symptoms include: fatigue if unable to take Rx . Patient denies daytime somnolence Symptoms have stabilized.  The patient has failed multiple OTC medications for insomnia.  They are well controlled on current Rx and will continue to try to take Rx PRN.  She will use the lowest effective dose.  The patient has read and signed the [facility] Controlled Substance Contract.  I will continue to see patient for regular follow up appointments and be prescribed the lowest effective dose.  MAGY has been reviewed by me and is updated every 3 months. The patient is aware of the potential for addiction and dependence.  She denies that Ambien (Zolpidem) causes excessive daytime drowsiness and sleep walking.  Patient voices understanding to take Ambien (Zolpidem) and go straight to bed. Patient must be able to sleep 7 hours or more when taking this and no alcohol.  Patient will hold Rx and contact me if they experience any impaired mental alertness the next day.        Objective   Vital Signs:   /72   Pulse 68   Temp 97.3 °F (36.3 °C)   Ht 170.2 cm (67\")   Wt 65.8 kg (145 lb)   SpO2 98%   BMI 22.71 kg/m²     Physical Exam  Vitals reviewed.   Constitutional:       General: She is not in acute distress.     Appearance: She is well-developed.   HENT:      Head: Normocephalic.   Cardiovascular:      Rate and Rhythm: Normal rate and regular rhythm.      Heart sounds: Normal heart sounds.   Pulmonary:      Effort: Pulmonary effort is normal.      Breath sounds: Normal breath sounds. "   Neurological:      Mental Status: She is alert and oriented to person, place, and time.      Gait: Gait normal.   Psychiatric:         Behavior: Behavior normal.         Thought Content: Thought content normal.         Judgment: Judgment normal.        Result Review :{Labs  Result Review  Imaging  Med Tab  Media  Procedures :23}   The following data was reviewed by: ОЛЬГА Mccoy on 03/10/2022:  CMP    CMP 11/15/21 12/28/21 2/8/22   Glucose 85 92 176 (A)   BUN 7 7 13   Creatinine 0.67 0.66 0.73   eGFR Non African Am 89 91 81   Sodium 142 141 141   Potassium 3.8 4.1 3.7   Chloride 104 103 103   Calcium 9.6 9.6 9.1   Albumin 4.70 4.60 4.50   Total Bilirubin 0.4 0.4 0.3   Alkaline Phosphatase 65 70 70   AST (SGOT) 32 24 17   ALT (SGPT) 27 19 15   (A) Abnormal value            CBC w/diff    CBC w/Diff 11/15/21 12/28/21 2/8/22   WBC 4.56 4.36 5.90   RBC 4.03 3.73 (A) 3.76 (A)   Hemoglobin 13.5 12.7 12.8   Hematocrit 40.5 37.3 38.2   .5 (A) 100.0 (A) 101.6 (A)   MCH 33.5 (A) 34.0 (A) 34.0 (A)   MCHC 33.3 34.0 33.5   RDW 12.5 12.4 12.4   Platelets 228 219 244   Neutrophil Rel % 48.1 54.4 67.8   Immature Granulocyte Rel % 0.2 0.2 0.2   Lymphocyte Rel % 39.9 33.9 20.3   Monocyte Rel % 10.7 10.6 10.2   Eosinophil Rel % 0.7 0.7 1.0   Basophil Rel % 0.4 0.2 0.5   (A) Abnormal value                TSH    TSH 8/12/21   TSH 1.670           Most Recent A1C    HGBA1C Most Recent 9/9/21   Hemoglobin A1C 5.2                     Assessment and Plan    Diagnoses and all orders for this visit:    1. Colon cancer screening (Primary)  -     Cologuard - Stool, Per Rectum; Future    2. Insomnia, unspecified type  -     zolpidem (AMBIEN) 10 MG tablet; Take 1 tablet by mouth Every Night. for sleep  Dispense: 90 tablet; Refill: 0        Follow Up   Return in about 3 months (around 6/10/2022) for Recheck.  Patient was given instructions and counseling regarding her condition or for health maintenance advice. Please see  specific information pulled into the AVS if appropriate.     rto in 3 mons     Mask and googles worn

## 2022-04-05 ENCOUNTER — INFUSION (OUTPATIENT)
Dept: ONCOLOGY | Facility: HOSPITAL | Age: 62
End: 2022-04-05

## 2022-04-05 ENCOUNTER — OFFICE VISIT (OUTPATIENT)
Dept: ONCOLOGY | Facility: CLINIC | Age: 62
End: 2022-04-05

## 2022-04-05 ENCOUNTER — LAB (OUTPATIENT)
Dept: LAB | Facility: HOSPITAL | Age: 62
End: 2022-04-05

## 2022-04-05 ENCOUNTER — OFFICE VISIT (OUTPATIENT)
Dept: INFECTIOUS DISEASES | Facility: CLINIC | Age: 62
End: 2022-04-05

## 2022-04-05 VITALS
SYSTOLIC BLOOD PRESSURE: 123 MMHG | TEMPERATURE: 97.9 F | DIASTOLIC BLOOD PRESSURE: 83 MMHG | RESPIRATION RATE: 18 BRPM | BODY MASS INDEX: 22.76 KG/M2 | WEIGHT: 145 LBS | HEART RATE: 61 BPM | HEIGHT: 67 IN

## 2022-04-05 VITALS
HEART RATE: 71 BPM | DIASTOLIC BLOOD PRESSURE: 80 MMHG | RESPIRATION RATE: 16 BRPM | TEMPERATURE: 98.6 F | OXYGEN SATURATION: 99 % | SYSTOLIC BLOOD PRESSURE: 124 MMHG | WEIGHT: 144.6 LBS | HEIGHT: 67 IN | BODY MASS INDEX: 22.7 KG/M2

## 2022-04-05 DIAGNOSIS — Z17.0 MALIGNANT NEOPLASM OF OVERLAPPING SITES OF LEFT BREAST IN FEMALE, ESTROGEN RECEPTOR POSITIVE: Primary | ICD-10-CM

## 2022-04-05 DIAGNOSIS — Z17.0 MALIGNANT NEOPLASM OF OVERLAPPING SITES OF LEFT BREAST IN FEMALE, ESTROGEN RECEPTOR POSITIVE: ICD-10-CM

## 2022-04-05 DIAGNOSIS — C50.812 MALIGNANT NEOPLASM OF OVERLAPPING SITES OF LEFT BREAST IN FEMALE, ESTROGEN RECEPTOR POSITIVE: Primary | ICD-10-CM

## 2022-04-05 DIAGNOSIS — Z78.0 POSTMENOPAUSAL: ICD-10-CM

## 2022-04-05 DIAGNOSIS — D05.12 DUCTAL CARCINOMA IN SITU (DCIS) OF LEFT BREAST: ICD-10-CM

## 2022-04-05 DIAGNOSIS — Z21 ASYMPTOMATIC HIV INFECTION: Primary | ICD-10-CM

## 2022-04-05 DIAGNOSIS — Z79.811 USE OF ANASTROZOLE (ARIMIDEX): ICD-10-CM

## 2022-04-05 DIAGNOSIS — Z79.2 LONG TERM (CURRENT) USE OF ANTIBIOTICS: ICD-10-CM

## 2022-04-05 DIAGNOSIS — Z12.11 COLON CANCER SCREENING: ICD-10-CM

## 2022-04-05 DIAGNOSIS — C50.812 MALIGNANT NEOPLASM OF OVERLAPPING SITES OF LEFT BREAST IN FEMALE, ESTROGEN RECEPTOR POSITIVE: ICD-10-CM

## 2022-04-05 LAB
ALBUMIN SERPL-MCNC: 4.6 G/DL (ref 3.5–5.2)
ALBUMIN/GLOB SERPL: 1.8 G/DL (ref 1.1–2.4)
ALP SERPL-CCNC: 72 U/L (ref 38–116)
ALT SERPL W P-5'-P-CCNC: 15 U/L (ref 0–33)
ANION GAP SERPL CALCULATED.3IONS-SCNC: 8.1 MMOL/L (ref 5–15)
AST SERPL-CCNC: 20 U/L (ref 0–32)
BASOPHILS # BLD AUTO: 0.02 10*3/MM3 (ref 0–0.2)
BASOPHILS # BLD AUTO: 0.02 10*3/MM3 (ref 0–0.2)
BASOPHILS NFR BLD AUTO: 0.4 % (ref 0–1.5)
BASOPHILS NFR BLD AUTO: 0.5 % (ref 0–1.5)
BILIRUB SERPL-MCNC: 0.5 MG/DL (ref 0.2–1.2)
BUN SERPL-MCNC: 12 MG/DL (ref 6–20)
BUN/CREAT SERPL: 16.4 (ref 7.3–30)
CALCIUM SPEC-SCNC: 9.6 MG/DL (ref 8.5–10.2)
CHLORIDE SERPL-SCNC: 103 MMOL/L (ref 98–107)
CO2 SERPL-SCNC: 29.9 MMOL/L (ref 22–29)
CREAT SERPL-MCNC: 0.73 MG/DL (ref 0.6–1.1)
DEPRECATED RDW RBC AUTO: 46.9 FL (ref 37–54)
DEPRECATED RDW RBC AUTO: 47.7 FL (ref 37–54)
EGFRCR SERPLBLD CKD-EPI 2021: 93.7 ML/MIN/1.73
EOSINOPHIL # BLD AUTO: 0.05 10*3/MM3 (ref 0–0.4)
EOSINOPHIL # BLD AUTO: 0.06 10*3/MM3 (ref 0–0.4)
EOSINOPHIL NFR BLD AUTO: 1.1 % (ref 0.3–6.2)
EOSINOPHIL NFR BLD AUTO: 1.2 % (ref 0.3–6.2)
ERYTHROCYTE [DISTWIDTH] IN BLOOD BY AUTOMATED COUNT: 12.5 % (ref 12.3–15.4)
ERYTHROCYTE [DISTWIDTH] IN BLOOD BY AUTOMATED COUNT: 12.6 % (ref 12.3–15.4)
GLOBULIN UR ELPH-MCNC: 2.6 GM/DL (ref 1.8–3.5)
GLUCOSE SERPL-MCNC: 82 MG/DL (ref 74–124)
HCT VFR BLD AUTO: 39.1 % (ref 34–46.6)
HCT VFR BLD AUTO: 43.2 % (ref 34–46.6)
HGB BLD-MCNC: 13.2 G/DL (ref 12–15.9)
HGB BLD-MCNC: 14.3 G/DL (ref 12–15.9)
IMM GRANULOCYTES # BLD AUTO: 0.01 10*3/MM3 (ref 0–0.05)
IMM GRANULOCYTES # BLD AUTO: 0.01 10*3/MM3 (ref 0–0.05)
IMM GRANULOCYTES NFR BLD AUTO: 0.2 % (ref 0–0.5)
IMM GRANULOCYTES NFR BLD AUTO: 0.2 % (ref 0–0.5)
LYMPHOCYTES # BLD AUTO: 1.54 10*3/MM3 (ref 0.7–3.1)
LYMPHOCYTES # BLD AUTO: 2.04 10*3/MM3 (ref 0.7–3.1)
LYMPHOCYTES NFR BLD AUTO: 37.4 % (ref 19.6–45.3)
LYMPHOCYTES NFR BLD AUTO: 37.6 % (ref 19.6–45.3)
MAGNESIUM SERPL-MCNC: 2.1 MG/DL (ref 1.8–2.5)
MCH RBC QN AUTO: 34.1 PG (ref 26.6–33)
MCH RBC QN AUTO: 34.4 PG (ref 26.6–33)
MCHC RBC AUTO-ENTMCNC: 33.1 G/DL (ref 31.5–35.7)
MCHC RBC AUTO-ENTMCNC: 33.8 G/DL (ref 31.5–35.7)
MCV RBC AUTO: 101.8 FL (ref 79–97)
MCV RBC AUTO: 103.1 FL (ref 79–97)
MONOCYTES # BLD AUTO: 0.44 10*3/MM3 (ref 0.1–0.9)
MONOCYTES # BLD AUTO: 0.48 10*3/MM3 (ref 0.1–0.9)
MONOCYTES NFR BLD AUTO: 11.7 % (ref 5–12)
MONOCYTES NFR BLD AUTO: 8.1 % (ref 5–12)
NEUTROPHILS NFR BLD AUTO: 2 10*3/MM3 (ref 1.7–7)
NEUTROPHILS NFR BLD AUTO: 2.88 10*3/MM3 (ref 1.7–7)
NEUTROPHILS NFR BLD AUTO: 48.8 % (ref 42.7–76)
NEUTROPHILS NFR BLD AUTO: 52.8 % (ref 42.7–76)
NRBC BLD AUTO-RTO: 0 /100 WBC (ref 0–0.2)
NRBC BLD AUTO-RTO: 0 /100 WBC (ref 0–0.2)
PHOSPHATE SERPL-MCNC: 4 MG/DL (ref 2.5–4.5)
PLATELET # BLD AUTO: 229 10*3/MM3 (ref 140–450)
PLATELET # BLD AUTO: 264 10*3/MM3 (ref 140–450)
PMV BLD AUTO: 9.5 FL (ref 6–12)
PMV BLD AUTO: 9.9 FL (ref 6–12)
POTASSIUM SERPL-SCNC: 4.1 MMOL/L (ref 3.5–4.7)
PROT SERPL-MCNC: 7.2 G/DL (ref 6.3–8)
RBC # BLD AUTO: 3.84 10*6/MM3 (ref 3.77–5.28)
RBC # BLD AUTO: 4.19 10*6/MM3 (ref 3.77–5.28)
SODIUM SERPL-SCNC: 141 MMOL/L (ref 134–145)
WBC NRBC COR # BLD: 4.1 10*3/MM3 (ref 3.4–10.8)
WBC NRBC COR # BLD: 5.45 10*3/MM3 (ref 3.4–10.8)

## 2022-04-05 PROCEDURE — 80053 COMPREHEN METABOLIC PANEL: CPT

## 2022-04-05 PROCEDURE — 99213 OFFICE O/P EST LOW 20 MIN: CPT | Performed by: INTERNAL MEDICINE

## 2022-04-05 PROCEDURE — 87536 HIV-1 QUANT&REVRSE TRNSCRPJ: CPT | Performed by: INTERNAL MEDICINE

## 2022-04-05 PROCEDURE — 86361 T CELL ABSOLUTE COUNT: CPT | Performed by: INTERNAL MEDICINE

## 2022-04-05 PROCEDURE — 96374 THER/PROPH/DIAG INJ IV PUSH: CPT

## 2022-04-05 PROCEDURE — 25010000002 HEPARIN LOCK FLUSH PER 10 UNITS: Performed by: INTERNAL MEDICINE

## 2022-04-05 PROCEDURE — 83735 ASSAY OF MAGNESIUM: CPT

## 2022-04-05 PROCEDURE — 85025 COMPLETE CBC W/AUTO DIFF WBC: CPT

## 2022-04-05 PROCEDURE — 85025 COMPLETE CBC W/AUTO DIFF WBC: CPT | Performed by: INTERNAL MEDICINE

## 2022-04-05 PROCEDURE — 36415 COLL VENOUS BLD VENIPUNCTURE: CPT | Performed by: INTERNAL MEDICINE

## 2022-04-05 PROCEDURE — 99214 OFFICE O/P EST MOD 30 MIN: CPT | Performed by: INTERNAL MEDICINE

## 2022-04-05 PROCEDURE — 84100 ASSAY OF PHOSPHORUS: CPT

## 2022-04-05 PROCEDURE — 25010000002 ZOLEDRONIC ACID PER 1 MG: Performed by: INTERNAL MEDICINE

## 2022-04-05 RX ORDER — LANOLIN ALCOHOL/MO/W.PET/CERES
1000 CREAM (GRAM) TOPICAL DAILY
COMMUNITY

## 2022-04-05 RX ORDER — SODIUM CHLORIDE 9 MG/ML
250 INJECTION, SOLUTION INTRAVENOUS ONCE
Status: COMPLETED | OUTPATIENT
Start: 2022-04-05 | End: 2022-04-05

## 2022-04-05 RX ORDER — ZOLEDRONIC ACID 0.04 MG/ML
4 INJECTION, SOLUTION INTRAVENOUS ONCE
Status: CANCELLED | OUTPATIENT
Start: 2022-04-05

## 2022-04-05 RX ORDER — HEPARIN SODIUM (PORCINE) LOCK FLUSH IV SOLN 100 UNIT/ML 100 UNIT/ML
500 SOLUTION INTRAVENOUS AS NEEDED
Status: CANCELLED | OUTPATIENT
Start: 2022-04-05

## 2022-04-05 RX ORDER — SODIUM CHLORIDE 0.9 % (FLUSH) 0.9 %
10 SYRINGE (ML) INJECTION AS NEEDED
Status: CANCELLED | OUTPATIENT
Start: 2022-04-05

## 2022-04-05 RX ORDER — HEPARIN SODIUM (PORCINE) LOCK FLUSH IV SOLN 100 UNIT/ML 100 UNIT/ML
500 SOLUTION INTRAVENOUS AS NEEDED
Status: DISCONTINUED | OUTPATIENT
Start: 2022-04-05 | End: 2022-04-05 | Stop reason: HOSPADM

## 2022-04-05 RX ORDER — SODIUM CHLORIDE 9 MG/ML
250 INJECTION, SOLUTION INTRAVENOUS ONCE
Status: CANCELLED | OUTPATIENT
Start: 2022-04-05

## 2022-04-05 RX ADMIN — Medication 500 UNITS: at 12:30

## 2022-04-05 RX ADMIN — ZOLEDRONIC ACID 4 MG: 4 INJECTION, SOLUTION, CONCENTRATE INTRAVENOUS at 12:12

## 2022-04-05 RX ADMIN — SODIUM CHLORIDE 250 ML: 9 INJECTION, SOLUTION INTRAVENOUS at 12:12

## 2022-04-05 NOTE — PROGRESS NOTES
Subjective       REASON FOR FOLLOW UP:    1.  T2N1 invasive ductal carcinoma of the left breast.  Ultrasound-showed 3.8 x 3.1 x 2.5 cm mass at 4 o'clock position with 2 abnormal axillary lymph nodes, larger lymph node being 1.4 cm.  Left breast biopsy and axillary lymph node biopsy October 29, 2019, invasive ductal carcinoma, moderately differentiated, grade 2, ER 85%, IL 10%, HER-2/merna 2+, HER-2 FISH negative.  · 12/06/19: Neoadjuvant chemo therapy, cycle #1 of dose-dense Adriamycin/Cytoxan with Neulasta for marrow support    · 01/17/20: Last cycle of Adriamycin/Cytoxan given  · January 31, 2020: Cycle 1 Taxol  · April 24, 2020: Last cycle, cycle 12 of Taxol  · Patient is s/p left mastectomy with axillary dissection with reconstruction.  She is healing up nicely.  She has a drain in place.  Pathology showed invasive breast cancer which is 55 x 40 mm in size, grade 2 with focal lymphovascular space invasion with DCIS spanning over 36 x 6 mm.  All margins were negative for cancer.  · 8 of the additional lymph nodes out of 11 were positive with the largest micrometastasis measuring 5 mm.  Extranodal extension was seen.  · Biomarkers on post neoadjuvant tumor is ER 81-90% IL 5% HER-2/merna 2+ by immunohistochemistry and HER-2/merna negative by FISH.  · Radiation July 6, 2020-August 17, 2020.  · Letrozole initiated July 2020.  · Letrozole discontinued October 2020 secondary to severe joint pain.  · October 30, 2020 patient starting tamoxifen and tolerating well  · August 2021: Tamoxifen discontinued secondary to depression and mental fogginess and fatigue  · September 21, 2021: Arimidex started  · Severe arthralgias secondary to Arimidex, will start Aromasin  · Patient is tolerating Aromasin well    2.  HIV, followed by Dr. Dawn from infectious disease.  Well controlled on meds    3.  Screening mammogram December 7, 2020 showed indeterminate calcifications lower outer middle left breast. Diagnostic mammogram showed  0.4 cm calcifications in the left breast.  Stereotactic biopsy January 8, 2021 consistent with organizing fat necrosis.              HISTORY OF PRESENT ILLNESS:  The patient is a 61 y.o. year old female with history of T2N1 invasive ductal carcinoma of the left breast s/p neoadjuvant chemotherapy with Adriamycin Cytoxan followed by Taxol, s/p left mastectomy with axillary dissection and had 55 x 40 mm grade 2 invasive breast cancer with DCIS spanning over 36 x 6 mm with 8 out of 11 lymph nodes positive with extranodal extension s/p radiation.  Subsequently patient had tried letrozole which caused her severe arthralgias and could not tolerate.  She tried tamoxifen but that caused her significant depression and fogginess and that was discontinued.  Currently she is on Arimidex.  Even that is causing her severe arthralgias and stiffness..    Patient could not tolerate Femara, tamoxifen, Arimidex.  We switched her to Aromasin and she is tolerating it very well.  She also went to rheumatologist who is evaluating her.  She is followed by pain medicine clinic and is on hydrocodone which she takes as needed for pain.    Interval history:     Patient was unable to tolerate Femara or tamoxifen and Arimidex but has been able to tolerate Aromasin.  She is doing well with Aromasin though she has some chronic joint pains which are tolerable.  She is due to receive a bone density.  Her last screening mammogram was December 27, 2021 which was negative.  More recently she had seen a rheumatologist who has x-rayed her knees and bilateral feet.    Past Medical History:   Diagnosis Date   • Anxiety    • Cerebrovascular accident (CVA) ([facility]) 8/22/2017    NO RESIDUAL AFFECT   • DDD (degenerative disc disease), cervical    • Depression    • Drug therapy    • Elevated cholesterol    • Fat necrosis of left breast    • GERD (gastroesophageal reflux disease)    • H/O ICH (intracerebral hemorrhage) (CMS/[facility])    • History of chemotherapy    •  History of stroke    • History of thrombocytopenia    • HIV (human immunodeficiency virus infection) ([facility]) 1996   • Hx of radiation therapy    • Hyperlipidemia    • Insomnia    • Lupus anticoagulant positive    • Malignant neoplasm of overlapping sites of left breast in female, estrogen receptor positive (HCC) 11/12/2019   • Meniscus tear 2017    RIGHT   • Migraine    • Neck pain     WITH SHOOTING PAIN LEFT RIGHT ARM   • Osteoarthritis    • Osteoporosis    • Pain management     sees 1 every 3 months for scripts/injection/pain meds   • Seasonal allergies    • Spinal headache     THINKS HAD BLOOD PATCH AFTER EPIDUAL    • Tick bite 06/2014     PAST MEDICAL HISTORY:  She had a stroke in July 2017 with headache and dizziness.  She went to the ER and was placed on aspirin.  However, she stopped taking it two weeks ago.  She has been taking Aimovig (injection) monthly with Fariba López at [place].    In 1994, patient was diagnosed with HIV with an unknown cause which is managed by Dr. Natali Subramanian.  As of now, her viral load is good.    Patient has arthritis.  She gets trigger-point injections in her back with her last one being 2 weeks ago.  She has had multiple ablations.  She also has pain in her SI joint and Dr. Bermudez performed a surgery on this.  She takes Narco for the pain.      She is osteoporotic.  She has had multiple hairline fractures in both her legs.  She had her knees cleaned out by Dr. Sinha.     Patient has had a hysterectomy and still has both of her ovaries.    Patient has vertigo and the muscles in her left ear are weak.  She just has an imbalance.    Past Surgical History:   Procedure Laterality Date   • ADENOIDECTOMY     • BREAST LUMPECTOMY     • BREAST LUMPECTOMY WITH SENTINEL NODE BIOPSY Left 5/28/2020    Procedure: BREAST LUMPECTOMY WITH SENTINEL NODE BIOPSY AND NEEDLE LOCALIZATION AND axillary dissection;  Surgeon: Landen Forman MD;  [facility] OR;  Service: General;   Laterality: Left;   • BREAST SURGERY Left 5/28/2020    Procedure: LEFT LATERAL INTERCOASTAL ARTERY  flap ;  Surgeon: Yusuf Garcia MD;  Location: [facility];  Service: Plastics;  Laterality: Left;   • CHOLECYSTECTOMY     • HYSTERECTOMY     • KNEE ARTHROSCOPY Right 3/24/2017    Procedure:  RIGHT  KNEE ARTHROSCOPY WITH DEBRIDEMENT CHONDROPLASTY PARTIAL MENISCECTOMY;  Surgeon: Karl Galeas MD;  Location: [facility];  Service:    • KNEE CARTILAGE SURGERY Right 2007   • TONSILLECTOMY     • US GUIDED LYMPH NODE BIOPSY  10/29/2019    LEFT BREAST   • VENOUS ACCESS DEVICE (PORT) INSERTION Right 12/2/2019    Procedure: INSERTION VENOUS ACCESS DEVICE RIGHT;  Surgeon: Landen Forman MD;  Location: [facility];  Service: General       ONCOLOGIC HISTORY:  Patient is a 59-year-old female who has had a history of HIV since age 34 followed by Dr. Natali Ng at [facility] and was on multiple HIV drugs initially but more recently has been on a single medication TRIUMEQ, with well-controlled HIV.  She follows up with Dr. Dawn , infectious disease who saw her recently and he saw her on 4/8/2019 and has excellent control and suppression of her viral load.  She is very compliant with her medications.    She also has had history of stroke in 2017 for which she was placed on aspirin.  She presented with a headache.  She is very active and works at [employer] full-time.  She also has had history of vertigo in the past  Patient's PCP is aZch Lora.    Patient first noticed the mass in her left breast 5 months ago.  Her last mammogram was 5 years ago.  She had soreness in the left breast and she went to her primary care physician who then ordered a mammogram diagnostic and an ultrasound.  The diagnostic mammogram showed a 3.7 cm mass at 4 o'clock position in the lower outer quadrant of the left breast.  The ultrasound showed 3.8 cm x 3.1 x 2.5 cm mass at 4 o'clock  position in the left breast with 2 abnormal appearing left axillary lymph nodes the largest being 1.4 cm.    She then underwent biopsy of both the breast mass as well as the left axillary lymph node and both of them are positive for invasive mammary carcinoma it is invasive ductal carcinoma with apocrine features, moderately differentiated, grade 2 of 3 with a [place] score of 5.  The left axillary lymph node is also consistent with metastatic mammary carcinoma.  It is ER positive ND positive HER-2/merna negative.  Details as follows    10/21/19 - Bilateral Diagnostic Mammogram and US Breast Left  FINDINGS: Bilateral digital CC and MLO mammographic images were  obtained. No prior examination is available for comparison. Scattered  fibroglandular densities are seen throughout both breasts. A triangular  skin marker represents the area of palpable concern in the lower outer  quadrant of the left breast in the posterior 1/3. At this location there  is an irregular mass that measures on the order of 3.7 cm in greatest  dimension. Internal calcifications are noted. No suspicious findings in  the right breast are appreciated.     ULTRASOUND: Targeted sonographic evaluation of the left breast was  performed through the area of concern in the left axilla. At the 4  o'clock position on the order of 6 cm from the nipple there is an  irregular hypoechoic mass that measures 3.8 x 3.1 x 2.5 cm. Internal  vascularity is noted.     There are 2 abnormal-appearing left axillary lymph nodes, the largest  which measures on the order of 1.4 cm in greatest dimension.     IMPRESSION:  1. There is a 3.8 cm irregular mass in the left breast at the 4 o'clock  position. This is seen in conjunction with an irregular 1.4 cm lymph  node in the left axilla. This is suspicious for malignancy with left  axillary involvement. Correlation with ultrasound-guided biopsy of both  the left breast mass and the lymph node is recommended.  2. There are no  findings suspicious for malignancy in the right breast.     BI-RADS CATEGORY 5:     Patient's labs from 11/12/19 are normal.    10/29/19 - Tissue Pathology  1. Left Breast, 4:00, 6 cm FN, U/S-Guided Core Needle Biopsy for a Mass:  A. INVASIVE DUCTAL CARCINOMA WITH APOCRINE FEATURES, Moderately differentiated;  [place] Histologic Grade II/III (tubule score = 3, nuclear score = 2, mitoses score = 1), measuring at least  9 mm.  B. No ductal or lobular carcinoma in situ is identified in this sample.  C. Focus suspicious for lymphovascular space invasion.  D. See Biomarker Template for hormone receptor studies.  2. Lymph Node, Left Axilla, U/S-Guided Core Needle Biopsy:  A. METASTATIC MAMMARY CARCINOMA (see Comment).  B. Metastatic focus measures 5 mm in greatest extent.  ER+, 85%  SC+, 10%  HER2- Score 2+    11/13/19 - CT Neck Chest Abdomen Pelvis  IMPRESSION:  1. In addition to the irregular approximately 3.8 cm mass within the  lateral aspect of the left breast, there are a few subcentimeter  asymmetric nodules along the lateral margin of the glandular tissue. The  several asymmetric left subpectoral nodes and 1.2 x 1.0 cm left axillary  node are suspicious for tamar metastases. The asymmetric subcentimeter  left supraclavicular and left posterior cervical triangle nodes are  worrisome as well.  2. There is no convincing evidence for metastatic disease within the  abdomen or pelvis.    11/14/19 - Echocardiogram:  Normal.  Calculated EF = 67%.  There is trace mitral valve and tricuspid valve regurgitation.    11/15/19 - Bone Scan  Negative.    11/18/19 - MRI Breast Bilateral  IMPRESSION:  1. Biopsy-proven malignancy in the left breast centered at the 4 o'clock  position with associated surrounding satellite nodules as described. The  entire region of involvement including the satellite nodules and the  dominant mass measure up to 7.5 cm in greatest dimension. Also, left  axillary adenopathy with multiple irregular  lymph nodes and loss of  differentiation of the borders of multiple lymph nodes is noted and this  is suspicious for extranodal involvement.  2. There are no findings suspicious for malignancy in the right breast.  BI-RADS CATEGORY 6      19: Cycle #1 of Adriamycin/Cytoxan    20: Last cycle of Adriamycin/Cytoxan given without Neulasta.  We will switch from Neulasta to 7 days of Neupogen injections after cycle 4.    We reviewed with patient the US Breast from 20 which shows mild interval partial response to neoadjuvant chemotherapy.      20: Initiated cycle 1 Taxol  2020: Last cycle of Taxol, cycle 12    S/p left mastectomy with axillary dissection   Pathology showed invasive breast cancer which is 55 x 40 mm in size, grade 2 with focal lymphovascular space invasion with DCIS spanning over 36 x 6 mm.  All margins were negative for cancer.  8 of the additional lymph nodes out of 11 were positive with the largest micrometastasis measuring 5 mm.  Extranodal extension was seen.    Biomarkers on post neoadjuvant tumor is ER 81-90% OH 5% HER-2/merna 2+ by immunohistochemistry and HER-2/merna negative by FISH.    2020: Started letrozole but because of severe joint pains was discontinued 2020    End of 2020 started tamoxifen    OB-GYN:  Menarche 15 years  Menopause-46  Pregnancies- 1 para 1 no miscarriages her first pregnancy was in  at 29 years of age.  She did not breastfeed.  Post menopausal HRT- None.  Hx of birth control pills- Yes.    Current Outpatient Medications on File Prior to Visit   Medication Sig Dispense Refill   • Abacavir-Dolutegravir-Lamivud (Triumeq) 600- MG per tablet Take 1 tablet by mouth Daily. 90 tablet 3   • acyclovir (ZOVIRAX) 200 MG capsule      • AIMOVIG 140 MG/ML solution auto-injector INJECT 140 MG INTO THE SKIN EVERY 30 (THIRTY) DAYS.  11   • atorvastatin (LIPITOR) 20 MG tablet Take 1 tablet by mouth every night at bedtime. 90  tablet 3   • baclofen (LIORESAL) 10 MG tablet Take 10 mg by mouth 2 (Two) Times a Day.  2   • clonazePAM (KlonoPIN) 0.5 MG tablet Take 1 tablet by mouth 2 (Two) Times a Day As Needed for Anxiety. 60 tablet 0   • colesevelam (WELCHOL) 625 MG tablet TAKE 2 TABLETS BY MOUTH EVERY  tablet 1   • escitalopram (LEXAPRO) 10 MG tablet TAKE 1 TABLET BY MOUTH EVERY DAY 90 tablet 3   • exemestane (Aromasin) 25 MG chemo tablet Take 1 tablet by mouth Daily for 90 days. 90 tablet 2   • fluticasone (FLONASE) 50 MCG/ACT nasal spray 2 sprays into the nostril(s) as directed by provider Daily. 3 mL 3   • Loratadine (CLARITIN PO) Take 10 mg by mouth Daily.     • Magnesium Hydroxide (MILK OF MAGNESIA PO) Take 15 mL by mouth Every Night.     • Nurtec 75 MG dispersible tablet Take 1 tablet by mouth Daily As Needed.     • oxyCODONE-acetaminophen (PERCOCET)  MG per tablet Take 1 tablet by mouth 4 (Four) Times a Day.     • vitamin B-12 (CYANOCOBALAMIN) 1000 MCG tablet Take 1,000 mcg by mouth Daily.     • vitamin B-6 (pyridoxine) 50 MG tablet Take 1 tablet by mouth Daily. 90 tablet 2   • zolpidem (AMBIEN) 10 MG tablet Take 1 tablet by mouth Every Night. for sleep 90 tablet 0     No current facility-administered medications on file prior to visit.        ALLERGIES:    Allergies   Allergen Reactions   • Augmentin [Amoxicillin-Pot Clavulanate] Hives        Social History     Socioeconomic History   • Marital status:      Spouse name: Owen   • Number of children: 1   • Years of education: College   Tobacco Use   • Smoking status: Never Smoker   • Smokeless tobacco: Never Used   Substance and Sexual Activity   • Alcohol use: Yes     Comment: occasioonal   • Drug use: No   • Sexual activity: Defer     Partners: Male     Birth control/protection: None     Comment:      Patient does not smoke or do drugs.  She does drink occasionally.    Family History   Problem Relation Age of Onset   • Breast cancer Mother    • Leukemia  "Mother         CLL?   • Diabetes Mother    • Hyperthyroidism Mother    • Hearing loss Mother    • Dementia Father    • Heart disease Maternal Grandmother    • Diabetes Maternal Grandfather    • Heart disease Maternal Grandfather    • Stroke Maternal Grandfather    • Heart attack Maternal Grandfather    • Osteoporosis Paternal Grandmother    • Heart disease Paternal Grandfather    • Leukemia Maternal Aunt         CLL?   • Throat cancer Paternal Uncle    • Malig Hyperthermia Neg Hx       FAMILY HISTORY:  Her mother had breast cancer at age 60, still alive at 85 and in good health..  Her father had dementia.  Her paternal uncle had prostate cancer.  Her brother had esophageal cancer.    I have reviewed the patient's medical history in detail and updated the computerized patient record.     ROS: as per HPI    Objective    Vitals:    04/05/22 1001   BP: 124/80   Pulse: 71   Resp: 16   Temp: 98.6 °F (37 °C)   TempSrc: Temporal   SpO2: 99%   Weight: 65.6 kg (144 lb 9.6 oz)   Height: 170.2 cm (67.01\")   PainSc: 0-No pain         Review of Systems   Constitutional: Negative for appetite change, chills, diaphoresis, fatigue, fever and unexpected weight change.   HENT: Negative for hearing loss, sore throat and trouble swallowing.    Respiratory: Negative for cough, chest tightness, shortness of breath and wheezing.    Cardiovascular: Negative for chest pain, palpitations and leg swelling.   Gastrointestinal: Negative for abdominal distention, abdominal pain, constipation, diarrhea, nausea and vomiting.   Genitourinary: Negative for dysuria, frequency, hematuria and urgency.   Musculoskeletal: Negative for joint swelling.        No muscle weakness.   Skin: Negative for rash and wound.   Neurological: Positive for numbness (Bilat foot). Negative for seizures, syncope, speech difficulty, weakness and headaches.   Hematological: Negative for adenopathy. Does not bruise/bleed easily.   Psychiatric/Behavioral: Positive for sleep " disturbance. Negative for behavioral problems, confusion and suicidal ideas.   All other systems reviewed and are negative.      Physical exam      CONSTITUTIONAL:  Vital signs reviewed.  No distress, looks comfortable.  EYES:  Conjunctivae and lids unremarkable.  PERRLA  EARS,NOSE,MOUTH,THROAT:  Ears and nose appear unremarkable.  Lips, teeth, gums appear unremarkable.  RESPIRATORY:  Normal respiratory effort.  Lungs clear to auscultation bilaterally.  CARDIOVASCULAR:  Normal S1, S2.  No murmurs rubs or gallops.  No significant lower extremity edema.  GASTROINTESTINAL: Abdomen appears unremarkable.  Nontender.  No hepatomegaly.  No splenomegaly.  LYMPHATIC:  No cervical, supraclavicular, axillary lymphadenopathy.  SKIN:  Warm.  No rashes.  PSYCHIATRIC:  Normal judgment and insight.  Normal mood and affect.    RECENT LABS:   Results from last 7 days   Lab Units 04/05/22  1337 04/05/22  1049   WBC 10*3/mm3 5.45 4.10   NEUTROS ABS 10*3/mm3 2.88 2.00   HEMOGLOBIN g/dL 14.3 13.2   HEMATOCRIT % 43.2 39.1   PLATELETS 10*3/mm3 264 229     Results from last 7 days   Lab Units 04/05/22  1049   SODIUM mmol/L 141   POTASSIUM mmol/L 4.1   CHLORIDE mmol/L 103   CO2 mmol/L 29.9*   BUN mg/dL 12   CREATININE mg/dL 0.73   CALCIUM mg/dL 9.6   ALBUMIN g/dL 4.60   BILIRUBIN mg/dL 0.5   ALK PHOS U/L 72   ALT (SGPT) U/L 15   AST (SGOT) U/L 20   GLUCOSE mg/dL 82   MAGNESIUM mg/dL 2.1         Assessment/Plan    1. T2N1 invasive ductal carcinoma of the left breast, recently diagnosed.    -Noticed mass in the left breast x5 months  -Diagnostic mammogram showed 3.7 mm mass in the left breast at 4 o'clock position  -Ultrasound-showed 3.8 x 3.1 x 2.5 cm mass at 4 o'clock position with 2 abnormal axillary lymph nodes, larger lymph node being 1.4 cm  -Left breast biopsy and axillary lymph node biopsy October 29, 2019, invasive ductal carcinoma, moderately differentiated, grade 2, ER 85%, TN 10%, HER-2/merna 2+, HER-2 FISH negative  · CT scans from  11/13/19 show some asymmetric nodules along the lateral margin of the glandular tissue.  The 1.2x1.0 cm left axillary nodes, left supraclavicular, and left posterior cervical triangle nodes are suspicious for tamar metastases.  ·   bone scan from 11/15/19 which was negative.   ·  MRI breast from 11/18/19 which shows the biopsy-proven malignancy in the left breast centered at the 4:00 position.  The region of involvement measures up to 7.5 cm.  There is left axillary adenopathy with multiple irregular lymph nodes and loss of differentiation of the border of multiple lymph nodes which are suspicious for extranodal involvement.  ·    echocardiogram from 11/14/19 which was normal with an ejection fraction of 67%.    · INVITAE from 11/14/19 which was negative.  · Given the size of her tumor and her medical history, patient will be given 4 cycles of Adriamycin/Cytoxan with Neulasta.  After completion of this treatment, patient will be started on 12 cycles of Taxol.  After the tumor shrinks in size, Dr. Forman will perform a lumpectomy.    · Following lumpectomy, patient will begin endocrine therapy.  · Dr. Dawn agrees chemotherapy will not aggravate her HIV condition.  Dr. Moreno spoke with Dr. Mohan at [place] who also agrees chemotherapy is the first step.   · 12/06/19: Cycle #1 of Adriamycin/Cytoxan  · US Breast from 01/29/20 after 4 cycles of Adriamycin Cytoxan chemotherapy which shows mild interval partial response to neoadjuvant chemotherapy.  The mass has decreased from 3.8 x 2.5 to 3.1 cm to 3.5 x 2.3 x 3.2 cm previously the left axillary lymph node has decreased from 1.4 x 0.7 x 1 cm to 1.1 x 0.6 x 0.9 cm.  · 01/31/20: Initiated cycle 1 Taxol  · April 24, 2020: Completed cycle 12 Taxol  · MRI breast 5/6/2020 shows significant reduction in the left axillary adenopathy as well as reduction in the index breast lesion.  · S/p left modified radical mastectomy with left axillary dissection.  Patient has  55 x 40 x 30 mm invasive carcinoma, grade 2 with DCIS 36 x 6 mm, high-grade with good margins and 8 out of 11 lymph nodes positive with largest metastasis being 5 mm with extracapsular extension.  And there is lymphatic space invasion  · Will follow-up in 2 weeks at which time we will plan to start endocrine therapy.  She has radiation oncology appointment with Dr. Spivey  · Patient is eligible for Zometa once every 6 months for 2 years.  But given that she is undergoing some work on her teeth we will await starting Zometa until after radiation is complete.  · XRT completed August 17, 2020  · Started letrozole August 2020 but has arthralgias  · October 6, 2020 had severe arthralgias secondary to letrozole.  We will switch to tamoxifen starting November 2020  · 1/8/2021 screening mammogram in December 2020 as well as diagnostic mammogram which showed 0.4 cm suspicious calcification in the lower left which on biopsy is consistent with fat necrosis.  · 8/12/2021 tamoxifen placed on hold due to significant arthralgias and worsening fatigue.   · September 21, 2021: Patient's depression, mental fogginess has resolved since tamoxifen discontinued.  She has mild worsening fatigue her TSH and B12 levels are normal.  We will need to check iron studies  · Discussed about switching to Arimidex to see if she could tolerate that better  · Patient initiated Arimidex 9/21/2021.  · Returns 10/5/2021 complaining of significant generalized arthralgias.  Reviewed with Dr. Moreno.  We were initially going to try her on Cymbalta, but the patient is already taking Lexapro, and doing well on this.  We will therefore discontinue Arimidex.  We will give her a 6-week break and reevaluate things in 6 weeks.  Could try at that time switching to Aromasin.  · November 15, 2021: Patient continued with Arimidex even though she was asked to hold it last time.  Her arthralgias worsened.  Also the stiffness in the joints worsened significantly.   Discussed with patient that we need to hold Arimidex for 6 weeks and try Aromasin at her next appointment.  · December 28, 2021: ArthralgiaS improved after holding Arimidex.  · December 28, 2021: Started Aromasin    2. HIV, followed by Dr. Dawn in infectious disease.  Well-controlled and is on a single medication with very low viral load. Has HIV since age 34.  · Reviewed her HIV medications and she is compliant  · Patient followed by infectious disease for her HIV and currently on treatment.  Stay  · Currently is followed by Dr. Dawn on anti-HIV medications     3.  Family history of breast cancer with mother having had breast cancer at age 60.  There is family history of uncle with prostate cancer.  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    4.  Arthritis with severe back pain followed by Dr. Bam Crandall  · Continue Percocet  · Patient has arthralgias which have worsened on Arimidex as well  · Patient seen by rheumatologist    5. Episodes of nosebleeds.  She has had these once a week for 3-4 years. She did not report this today.    6.  Vaginal dryness related to tamoxifen, ongoing.  Patient provided handouts for recommendations of a vaginal lubricant and vaginal moisturizer to use for hopeful improvement.  · Tamoxifen discontinued    7.  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  Patient continues on B6.  Chronic and stable    8. Fatigue  · 8/12/2021 patient seen as triage visit for worsening fatigue and diffuse arthralgias.  Reporting increased sleeping at night and still taking naps in the day.  Labs checked including thyroid studies which were unremarkable and B12 level actually elevated at greater than 2000.  Patient taking vitamin B supplements at that time.  Patient instructed to hold tamoxifen.  · 8/26/2021 reviewed today recent lab work for  above with no evidence of thyroid dysfunction or vitamin B12 abnormality.  Patient notes no improvement being off tamoxifen for 2 weeks.  We will check a CA 15-3 to help further guide decision making.  If this is elevated we may then pursue imaging.  Discussed that she may just need to be off the tamoxifen longer to see improvement.  Patient does have underlying HIV as well and unclear if maybe some of her chronic medications for this could be contributing to her current symptoms.  · Patient continues with fatigue but slightly improved now    PLAN:   · Continue Aromasin as she is tolerating this better  · We will give IV Zometa, patient is tolerated well this is her fourth last dose  · Will check DEXA scan  · Discussed with her to get dental evaluation and subsequently we can consider starting Prolia as patient is on Aromasin and does have osteopenia  · We will refer to Dr. Forman for port removal.  · Follow-up with me in 4 months with lab      Monitoring high risk medication  Ruby Moreno MD      Cc: ОЛЬГА Godoy  · MD Denilson Bishop MD

## 2022-04-05 NOTE — PROGRESS NOTES
CC: f/u HIV    HPI: Richa Dolan is a 61 y.o. female here for f/u of her HIV care. No new issues from an HIV standpoint. No missed doses of Triumeq. No obvious side effects. Refills through [facility].     For her breast cancer, she is currently on Aromasin. She's had some numbness/tingling and joint aches. She also gets Zometa infusions.     She has been caring for her 87-year old mother.     Review of Systems:   No n/v/d    PMH:   HIV  Breast cancer  CVA  Migraines  HLD  GERD    Past Surgical History:   Procedure Laterality Date   • ADENOIDECTOMY     • BREAST LUMPECTOMY     • BREAST LUMPECTOMY WITH SENTINEL NODE BIOPSY Left 5/28/2020    Procedure: BREAST LUMPECTOMY WITH SENTINEL NODE BIOPSY AND NEEDLE LOCALIZATION AND axillary dissection;  Surgeon: Landen Forman MD;  Location: [facility];  Service: General;  Laterality: Left;   • BREAST SURGERY Left 5/28/2020    Procedure: LEFT LATERAL INTERCOASTAL ARTERY  flap ;  Surgeon: Yusuf Garcia MD;  Location: [facility];  Service: Plastics;  Laterality: Left;   • CHOLECYSTECTOMY     • HYSTERECTOMY     • KNEE ARTHROSCOPY Right 3/24/2017    Procedure:  RIGHT  KNEE ARTHROSCOPY WITH DEBRIDEMENT CHONDROPLASTY PARTIAL MENISCECTOMY;  Surgeon: Karl Galeas MD;  Location: [facility], operated by [facility];  Service:    • KNEE CARTILAGE SURGERY Right 2007   • TONSILLECTOMY     • US GUIDED LYMPH NODE BIOPSY  10/29/2019    LEFT BREAST   • VENOUS ACCESS DEVICE (PORT) INSERTION Right 12/2/2019    Procedure: INSERTION VENOUS ACCESS DEVICE RIGHT;  Surgeon: Landen Forman MD;  Location: [facility];  Service: General     FH: father had dementia    SH:     1 son  No tob/EtOH/illicits   Retired from [employer]    Allergies: Augmentin (hives)    Medications:   Current Outpatient Medications:   •  Abacavir-Dolutegravir-Lamivud (Triumeq) 600- MG per tablet, Take 1 tablet by mouth Daily., Disp: 90 tablet, Rfl: 3  •  acyclovir (ZOVIRAX) 200 MG  "capsule, , Disp: , Rfl:   •  AIMOVIG 140 MG/ML solution auto-injector, INJECT 140 MG INTO THE SKIN EVERY 30 (THIRTY) DAYS., Disp: , Rfl: 11  •  atorvastatin (LIPITOR) 20 MG tablet, Take 1 tablet by mouth every night at bedtime., Disp: 90 tablet, Rfl: 3  •  baclofen (LIORESAL) 10 MG tablet, Take 10 mg by mouth 2 (Two) Times a Day., Disp: , Rfl: 2  •  clonazePAM (KlonoPIN) 0.5 MG tablet, Take 1 tablet by mouth 2 (Two) Times a Day As Needed for Anxiety., Disp: 60 tablet, Rfl: 0  •  colesevelam (WELCHOL) 625 MG tablet, TAKE 2 TABLETS BY MOUTH EVERY DAY, Disp: 180 tablet, Rfl: 1  •  escitalopram (LEXAPRO) 10 MG tablet, TAKE 1 TABLET BY MOUTH EVERY DAY, Disp: 90 tablet, Rfl: 3  •  exemestane (Aromasin) 25 MG chemo tablet, Take 1 tablet by mouth Daily for 90 days., Disp: 90 tablet, Rfl: 2  •  fluticasone (FLONASE) 50 MCG/ACT nasal spray, 2 sprays into the nostril(s) as directed by provider Daily., Disp: 3 mL, Rfl: 3  •  Loratadine (CLARITIN PO), Take 10 mg by mouth Daily., Disp: , Rfl:   •  Magnesium Hydroxide (MILK OF MAGNESIA PO), Take 15 mL by mouth Every Night., Disp: , Rfl:   •  Nurtec 75 MG dispersible tablet, Take 1 tablet by mouth Daily As Needed., Disp: , Rfl:   •  oxyCODONE-acetaminophen (PERCOCET)  MG per tablet, Take 1 tablet by mouth 4 (Four) Times a Day., Disp: , Rfl:   •  vitamin B-12 (CYANOCOBALAMIN) 1000 MCG tablet, Take 1,000 mcg by mouth Daily., Disp: , Rfl:   •  vitamin B-6 (pyridoxine) 50 MG tablet, Take 1 tablet by mouth Daily., Disp: 90 tablet, Rfl: 2  •  zolpidem (AMBIEN) 10 MG tablet, Take 1 tablet by mouth Every Night. for sleep, Disp: 90 tablet, Rfl: 0  No current facility-administered medications for this visit.    Facility-Administered Medications Ordered in Other Visits:   •  heparin injection 500 Units, 500 Units, Intravenous, PRN, Ruby Moreno MD, 500 Units at 04/05/22 1230    OBJECTIVE:  /83   Pulse 61   Temp 97.9 °F (36.6 °C)   Resp 18   Ht 170.2 cm (67.01\")   Wt 65.8 " kg (145 lb)   BMI 22.70 kg/m²     GENERAL: Awake, alert, NAD  ENT:  wearing mask  EYES: No scleral icterus  HEART: NR  LUNGS: . Normal work of breathing on RA   ABDOMEN: soft, non-distended  SKIN: no rashes  Vasc: R chest port w/o erythema    DIAGNOSTICS:  CBC, CMP, HIV labs reviewed today  Lab Results   Component Value Date    WBC 4.10 04/05/2022    HGB 13.2 04/05/2022    HCT 39.1 04/05/2022     04/05/2022     Lab Results   Component Value Date    GLUCOSE 82 04/05/2022    BUN 12 04/05/2022    CREATININE 0.73 04/05/2022    EGFRIFNONA 81 02/08/2022    EGFRIFAFRI >60 05/28/2015    BCR 16.4 04/05/2022    CO2 29.9 (H) 04/05/2022    CALCIUM 9.6 04/05/2022    PROTENTOTREF 7.2 10/13/2016    ALBUMIN 4.60 04/05/2022    LABIL2 1.5 04/08/2019    AST 20 04/05/2022    ALT 15 04/05/2022     HIV Related Labs:  CD4: 502/27% (10/2021)  VL 40 copies (10/2021)  HIV Genotype: unknown bc undetectable  [id number] - negative  Tspot - negative (10/2020)  RPR - non-reactive (10/2020)  Hep A - immune (7/2016)  Hep B - immune (9/2015)  Hep C - negative (10/2020)  Urine GCCT - negative (9/2015)    ASSESSMENT/PLAN:  1. Asymptomatic HIV  -excellent compliance w/ Triumeq; refills addressed  -check CBC, HIV RNA, CD4 today; BMP done earlier today    2. T2N1 invasive ductal carcinoma of the left breast  -currently on Aromasin  - Zometa infusions    3. Long term use of antibiotics  -reviewed monitoring labs    RTC 6 months or sooner if needed

## 2022-04-06 LAB
BASOPHILS # BLD AUTO: 0 X10E3/UL (ref 0–0.2)
BASOPHILS NFR BLD AUTO: 1 %
CD3+CD4+ CELLS # BLD: 647 /UL (ref 359–1519)
CD3+CD4+ CELLS NFR BLD: 30.8 % (ref 30.8–58.5)
EOSINOPHIL # BLD AUTO: 0 X10E3/UL (ref 0–0.4)
EOSINOPHIL NFR BLD AUTO: 1 %
ERYTHROCYTE [DISTWIDTH] IN BLOOD BY AUTOMATED COUNT: 12.3 % (ref 11.7–15.4)
HCT VFR BLD AUTO: 42.4 % (ref 34–46.6)
HGB BLD-MCNC: 14.2 G/DL (ref 11.1–15.9)
HIV1 RNA # SERPL NAA+PROBE: 60 COPIES/ML
HIV1 RNA SERPL NAA+PROBE-LOG#: 1.78 LOG10COPY/ML
IMM GRANULOCYTES # BLD AUTO: 0 X10E3/UL (ref 0–0.1)
IMM GRANULOCYTES NFR BLD AUTO: 0 %
LYMPHOCYTES # BLD AUTO: 2.1 X10E3/UL (ref 0.7–3.1)
LYMPHOCYTES NFR BLD AUTO: 38 %
MCH RBC QN AUTO: 34.3 PG (ref 26.6–33)
MCHC RBC AUTO-ENTMCNC: 33.5 G/DL (ref 31.5–35.7)
MCV RBC AUTO: 102 FL (ref 79–97)
MONOCYTES # BLD AUTO: 0.5 X10E3/UL (ref 0.1–0.9)
MONOCYTES NFR BLD AUTO: 8 %
NEUTROPHILS # BLD AUTO: 2.9 X10E3/UL (ref 1.4–7)
NEUTROPHILS NFR BLD AUTO: 52 %
PLATELET # BLD AUTO: 259 X10E3/UL (ref 150–450)
RBC # BLD AUTO: 4.14 X10E6/UL (ref 3.77–5.28)
WBC # BLD AUTO: 5.5 X10E3/UL (ref 3.4–10.8)

## 2022-04-07 DIAGNOSIS — Z45.2 ENCOUNTER FOR FITTING AND ADJUSTMENT OF VASCULAR CATHETER: ICD-10-CM

## 2022-04-07 DIAGNOSIS — Z17.0 MALIGNANT NEOPLASM OF OVERLAPPING SITES OF LEFT BREAST IN FEMALE, ESTROGEN RECEPTOR POSITIVE: Primary | ICD-10-CM

## 2022-04-07 DIAGNOSIS — C50.812 MALIGNANT NEOPLASM OF OVERLAPPING SITES OF LEFT BREAST IN FEMALE, ESTROGEN RECEPTOR POSITIVE: Primary | ICD-10-CM

## 2022-04-11 RX ORDER — TAMOXIFEN CITRATE 20 MG/1
TABLET ORAL
Qty: 90 TABLET | Refills: 0 | Status: SHIPPED | OUTPATIENT
Start: 2022-04-11 | End: 2022-04-21

## 2022-04-11 RX ORDER — ATORVASTATIN CALCIUM 20 MG/1
TABLET, FILM COATED ORAL
Qty: 90 TABLET | Refills: 1 | Status: SHIPPED | OUTPATIENT
Start: 2022-04-11 | End: 2022-10-21

## 2022-04-11 NOTE — TELEPHONE ENCOUNTER
Rx Refill Note  Requested Prescriptions     Pending Prescriptions Disp Refills   • atorvastatin (LIPITOR) 20 MG tablet [Pharmacy Med Name: ATORVASTATIN 20 MG TABLET] 90 tablet 3     Sig: TAKE 1 TABLET BY MOUTH EVERYDAY AT BEDTIME      Last office visit with prescribing clinician: 3/10/2022      Next office visit with prescribing clinician: 6/21/2022            Horacio Fernández MA  04/11/22, 10:09 EDT

## 2022-04-21 ENCOUNTER — OFFICE VISIT (OUTPATIENT)
Dept: SURGERY | Facility: CLINIC | Age: 62
End: 2022-04-21

## 2022-04-21 VITALS — HEIGHT: 67 IN | WEIGHT: 144.6 LBS | BODY MASS INDEX: 22.7 KG/M2

## 2022-04-21 DIAGNOSIS — Z45.2 ENCOUNTER FOR VENOUS ACCESS DEVICE CARE: Primary | ICD-10-CM

## 2022-04-21 PROCEDURE — 99213 OFFICE O/P EST LOW 20 MIN: CPT | Performed by: SURGERY

## 2022-04-21 NOTE — PROGRESS NOTES
Subjective   Richa Dolan is a 61 y.o. female who presents to the office in surgical consultation from Zach Lora APRN and Ruby Moreno MD for Mediport that is no longer needed.    History of Present Illness     The patient has a history of left breast cancer and underwent a left mastectomy with axillary dissection followed by reconstruction.  She was then treated with adjuvant chemotherapy and has completed all her treatments.  She no longer needs her Mediport.    Review of Systems   Constitutional: Negative for fatigue and fever.   Respiratory: Negative for chest tightness and shortness of breath.    Cardiovascular: Negative for chest pain and palpitations.   Gastrointestinal: Negative for abdominal pain, blood in stool, constipation, diarrhea, nausea and vomiting.     Past Medical History:   Diagnosis Date   • Anxiety    • Cerebrovascular accident (CVA) ([facility]) 8/22/2017    NO RESIDUAL AFFECT   • DDD (degenerative disc disease), cervical    • Depression    • Drug therapy    • Elevated cholesterol    • Fat necrosis of left breast    • GERD (gastroesophageal reflux disease)    • H/O ICH (intracerebral hemorrhage) (CMS/HCC)    • History of chemotherapy    • History of stroke    • History of thrombocytopenia    • HIV (human immunodeficiency virus infection) ([facility]) 1996   • Hx of radiation therapy    • Hyperlipidemia    • Insomnia    • Lupus anticoagulant positive    • Malignant neoplasm of overlapping sites of left breast in female, estrogen receptor positive ([facility]) 11/12/2019   • Meniscus tear 2017    RIGHT   • Migraine    • Neck pain     WITH SHOOTING PAIN LEFT RIGHT ARM   • Osteoarthritis    • Osteoporosis    • Pain management     sees 1 every 3 months for scripts/injection/pain meds   • Seasonal allergies    • Spinal headache     THINKS HAD BLOOD PATCH AFTER EPIDUAL    • Tick bite 06/2014     Past Surgical History:   Procedure Laterality Date   • ADENOIDECTOMY     • BREAST LUMPECTOMY     • BREAST  LUMPECTOMY WITH SENTINEL NODE BIOPSY Left 5/28/2020    Procedure: BREAST LUMPECTOMY WITH SENTINEL NODE BIOPSY AND NEEDLE LOCALIZATION AND axillary dissection;  Surgeon: Landen Forman MD;  Location: [facility];  Service: General;  Laterality: Left;   • BREAST SURGERY Left 5/28/2020    Procedure: LEFT LATERAL INTERCOASTAL ARTERY  flap ;  Surgeon: Yusuf Garcia MD;  Location: [facility];  Service: Plastics;  Laterality: Left;   • CHOLECYSTECTOMY     • HYSTERECTOMY     • KNEE ARTHROSCOPY Right 3/24/2017    Procedure:  RIGHT  KNEE ARTHROSCOPY WITH DEBRIDEMENT CHONDROPLASTY PARTIAL MENISCECTOMY;  Surgeon: Karl Galeas MD;  Location: [facility];  Service:    • KNEE CARTILAGE SURGERY Right 2007   • TONSILLECTOMY     • US GUIDED LYMPH NODE BIOPSY  10/29/2019    LEFT BREAST   • VENOUS ACCESS DEVICE (PORT) INSERTION Right 12/2/2019    Procedure: INSERTION VENOUS ACCESS DEVICE RIGHT;  Surgeon: Landen Forman MD;  Location: [facility];  Service: General     Family History   Problem Relation Age of Onset   • Kidney disease Mother    • Hypertension Mother    • Heart disease Mother    • Arthritis Mother    • Breast cancer Mother    • Leukemia Mother         CLL?   • Diabetes Mother    • Hyperthyroidism Mother    • Hearing loss Mother    • Arthritis Father    • Dementia Father    • Leukemia Maternal Aunt         CLL?   • Throat cancer Paternal Uncle    • Heart disease Maternal Grandmother    • Diabetes Maternal Grandfather    • Heart disease Maternal Grandfather    • Stroke Maternal Grandfather    • Heart attack Maternal Grandfather    • Osteoporosis Paternal Grandmother    • Heart disease Paternal Grandfather    • Malig Hyperthermia Neg Hx      Social History     Socioeconomic History   • Marital status:      Spouse name: Owen   • Number of children: 1   • Years of education: College   Tobacco Use   • Smoking status: Never Smoker   • Smokeless tobacco: Never Used    Vaping Use   • Vaping Use: Never used   Substance and Sexual Activity   • Alcohol use: Yes     Comment: occasioonal   • Drug use: No   • Sexual activity: Defer     Partners: Male     Birth control/protection: None     Comment:        Objective   Physical Exam  Constitutional:       Appearance: Normal appearance. She is well-developed. She is not toxic-appearing.   Eyes:      General: No scleral icterus.  Pulmonary:      Effort: Pulmonary effort is normal. No respiratory distress.   Chest:      Comments: Mediport site is clean with no evidence of infection.  Skin:     General: Skin is warm and dry.   Neurological:      Mental Status: She is alert and oriented to person, place, and time.   Psychiatric:         Behavior: Behavior normal.         Thought Content: Thought content normal.         Judgment: Judgment normal.         Assessment/Plan       The encounter diagnosis was Encounter for venous access device care.    The patient has a Mediport that is no longer needed.  She will be scheduled for Mediport removal.  The patient understands the indications, alternatives, risks, and benefits of the procedure and wishes to proceed.

## 2022-05-23 ENCOUNTER — PRE-ADMISSION TESTING (OUTPATIENT)
Dept: PREADMISSION TESTING | Facility: HOSPITAL | Age: 62
End: 2022-05-23
Payer: COMMERCIAL

## 2022-05-23 VITALS
HEIGHT: 67 IN | DIASTOLIC BLOOD PRESSURE: 81 MMHG | OXYGEN SATURATION: 100 % | HEART RATE: 73 BPM | WEIGHT: 141.4 LBS | SYSTOLIC BLOOD PRESSURE: 132 MMHG | BODY MASS INDEX: 22.19 KG/M2 | RESPIRATION RATE: 20 BRPM | TEMPERATURE: 97.8 F

## 2022-05-23 LAB
ANION GAP SERPL CALCULATED.3IONS-SCNC: 11.7 MMOL/L (ref 5–15)
BUN SERPL-MCNC: 7 MG/DL (ref 8–23)
BUN/CREAT SERPL: 10.6 (ref 7–25)
CALCIUM SPEC-SCNC: 9.4 MG/DL (ref 8.6–10.5)
CHLORIDE SERPL-SCNC: 103 MMOL/L (ref 98–107)
CO2 SERPL-SCNC: 26.3 MMOL/L (ref 22–29)
CREAT SERPL-MCNC: 0.66 MG/DL (ref 0.57–1)
DEPRECATED RDW RBC AUTO: 47.3 FL (ref 37–54)
EGFRCR SERPLBLD CKD-EPI 2021: 99.9 ML/MIN/1.73
ERYTHROCYTE [DISTWIDTH] IN BLOOD BY AUTOMATED COUNT: 12.3 % (ref 12.3–15.4)
GLUCOSE SERPL-MCNC: 89 MG/DL (ref 65–99)
HCT VFR BLD AUTO: 41.6 % (ref 34–46.6)
HGB BLD-MCNC: 14.1 G/DL (ref 12–15.9)
MCH RBC QN AUTO: 34.8 PG (ref 26.6–33)
MCHC RBC AUTO-ENTMCNC: 33.9 G/DL (ref 31.5–35.7)
MCV RBC AUTO: 102.7 FL (ref 79–97)
PLATELET # BLD AUTO: 227 10*3/MM3 (ref 140–450)
PMV BLD AUTO: 9.7 FL (ref 6–12)
POTASSIUM SERPL-SCNC: 4.3 MMOL/L (ref 3.5–5.2)
QT INTERVAL: 409 MS
RBC # BLD AUTO: 4.05 10*6/MM3 (ref 3.77–5.28)
SARS-COV-2 ORF1AB RESP QL NAA+PROBE: NOT DETECTED
SODIUM SERPL-SCNC: 141 MMOL/L (ref 136–145)
WBC NRBC COR # BLD: 5.42 10*3/MM3 (ref 3.4–10.8)

## 2022-05-23 PROCEDURE — 80048 BASIC METABOLIC PNL TOTAL CA: CPT

## 2022-05-23 PROCEDURE — 85027 COMPLETE CBC AUTOMATED: CPT

## 2022-05-23 PROCEDURE — 36415 COLL VENOUS BLD VENIPUNCTURE: CPT

## 2022-05-23 PROCEDURE — C9803 HOPD COVID-19 SPEC COLLECT: HCPCS

## 2022-05-23 PROCEDURE — U0004 COV-19 TEST NON-CDC HGH THRU: HCPCS

## 2022-05-23 PROCEDURE — 93010 ELECTROCARDIOGRAM REPORT: CPT | Performed by: INTERNAL MEDICINE

## 2022-05-23 PROCEDURE — 93005 ELECTROCARDIOGRAM TRACING: CPT

## 2022-05-23 NOTE — DISCHARGE INSTRUCTIONS
Take the following medications the morning of surgery:  OXYCODONE, CLONAZEPAM, ESCITALOPRAM, TRIUMEQ    ARRIVAL TIME FOR SURGERY IS 7:30 AM      If you are on prescription narcotic pain medication to control your pain you may also take that medication the morning of surgery.    General Instructions:  Do not eat solid food after midnight the night before surgery.  You may drink clear liquids day of surgery but must stop at least one hour before your hospital arrival time.  It is beneficial for you to have a clear drink that contains carbohydrates the day of surgery.  We suggest a 12 to 20 ounce bottle of Gatorade or Powerade for non-diabetic patients or a 12 to 20 ounce bottle of G2 or Powerade Zero for diabetic patients. (Pediatric patients, are not advised to drink a 12 to 20 ounce carbohydrate drink)    Clear liquids are liquids you can see through.  Nothing red in color.     Plain water                               Sports drinks  Sodas                                   Gelatin (Jell-O)  Fruit juices without pulp such as white grape juice and apple juice  Popsicles that contain no fruit or yogurt  Tea or coffee (no cream or milk added)  Gatorade / Powerade  G2 / Powerade Zero    Infants may have breast milk up to four hours before surgery.  Infants drinking formula may drink formula up to six hours before surgery.   Patients who avoid smoking, chewing tobacco and alcohol for 4 weeks prior to surgery have a reduced risk of post-operative complications.  Quit smoking as many days before surgery as you can.  Do not smoke, use chewing tobacco or drink alcohol the day of surgery.   If applicable bring your C-PAP/ BI-PAP machine.  Bring any papers given to you in the doctor’s office.  Wear clean comfortable clothes.  Do not wear contact lenses, false eyelashes or make-up.  Bring a case for your glasses.   Bring crutches or walker if applicable.  Remove all piercings.  Leave jewelry and any other valuables at  home.  Hair extensions with metal clips must be removed prior to surgery.  The Pre-Admission Testing nurse will instruct you to bring medications if unable to obtain an accurate list in Pre-Admission Testing.        If you were given a blood bank ID arm band remember to bring it with you the day of surgery.    Preventing a Surgical Site Infection:  For 2 to 3 days before surgery, avoid shaving with a razor because the razor can irritate skin and make it easier to develop an infection.    Any areas of open skin can increase the risk of a post-operative wound infection by allowing bacteria to enter and travel throughout the body.  Notify your surgeon if you have any skin wounds / rashes even if it is not near the expected surgical site.  The area will need assessed to determine if surgery should be delayed until it is healed.  The night prior to surgery shower using a fresh bar of anti-bacterial soap (such as Dial) and clean washcloth.  Sleep in a clean bed with clean clothing.  Do not allow pets to sleep with you.  Shower on the morning of surgery using a fresh bar of anti-bacterial soap (such as Dial) and clean washcloth.  Dry with a clean towel and dress in clean clothing.  Ask your surgeon if you will be receiving antibiotics prior to surgery.  Make sure you, your family, and all healthcare providers clean their hands with soap and water or an alcohol based hand  before caring for you or your wound.    Day of surgery:  Your arrival time is approximately two hours before your scheduled surgery time.  Upon arrival, a Pre-op nurse and Anesthesiologist will review your health history, obtain vital signs, and answer questions you may have.  The only belongings needed at this time will be a list of your home medications and if applicable your C-PAP/BI-PAP machine.  A Pre-op nurse will start an IV and you may receive medication in preparation for surgery, including something to help you relax.     Please be  aware that surgery does come with discomfort.  We want to make every effort to control your discomfort so please discuss any uncontrolled symptoms with your nurse.   Your doctor will most likely have prescribed pain medications.      If you are going home after surgery you will receive individualized written care instructions before being discharged.  A responsible adult must drive you to and from the hospital on the day of your surgery and stay with you for 24 hours.  Discharge prescriptions can be filled by the hospital pharmacy during regular pharmacy hours.  If you are having surgery late in the day/evening your prescription may be e-prescribed to your pharmacy.  Please verify your pharmacy hours or chose a 24 hour pharmacy to avoid not having access to your prescription because your pharmacy has closed for the day.    If you are staying overnight following surgery, you will be transported to your hospital room following the recovery period.  [facility] has all private rooms.    If you have any questions please call Pre-Admission Testing at [phone].  Deductibles and co-payments are collected on the day of service. Please be prepared to pay the required co-pay, deductible or deposit on the day of service as defined by your plan.    Patient Education for Self-Quarantine Process    Following your COVID testing, we strongly recommend that you wear a mask when you are with other people and practice social distancing.   Limit your activities to only required outings.  Wash your hands with soap and water frequently for at least 20 seconds.   Avoid touching your eyes, nose and mouth with unwashed hands.  Do not share anything - utensils, drinking glasses, food from the same bowl.   Sanitize household surfaces daily. Include all high touch areas (door handles, light switches, phones, countertops, etc.)    Call your surgeon immediately if you experience any of the following symptoms:  Sore  Throat  Shortness of Breath or difficulty breathing  Cough  Chills  Body soreness or muscle pain  Headache  Fever  New loss of taste or smell  Do not arrive for your surgery ill.  Your procedure will need to be rescheduled to another time.  You will need to call your physician before the day of surgery to avoid any unnecessary exposure to hospital staff as well as other patients.       CHLORHEXIDINE CLOTH INSTRUCTIONS  The morning of surgery follow these instructions using the Chlorhexidine cloths you've been given.  These steps reduce bacteria on the body.  Do not use the cloths near your eyes, ears mouth, genitalia or on open wounds.  Throw the cloths away after use but do not try to flush them down a toilet.      Open and remove one cloth at a time from the package.    Leave the cloth unfolded and begin the bathing.  Massage the skin with the cloths using gentle pressure to remove bacteria.  Do not scrub harshly.   Follow the steps below with one 2% CHG cloth per area (6 total cloths).  One cloth for neck, shoulders and chest.  One cloth for both arms, hands, fingers and underarms (do underarms last).  One cloth for the abdomen followed by groin.  One cloth for right leg and foot including between the toes.  One cloth for left leg and foot including between the toes.  The last cloth is to be used for the back of the neck, back and buttocks.    Allow the CHG to air dry 3 minutes on the skin which will give it time to work and decrease the chance of irritation.  The skin may feel sticky until it is dry.  Do not rinse with water or any other liquid or you will lose the beneficial effects of the CHG.  If mild skin irritation occurs, do rinse the skin to remove the CHG.  Report this to the nurse at time of admission.  Do not apply lotions, creams, ointments, deodorants or perfumes after using the clothes. Dress in clean clothes before coming to the hospital.

## 2022-05-24 ENCOUNTER — ANESTHESIA EVENT (OUTPATIENT)
Dept: PERIOP | Facility: HOSPITAL | Age: 62
End: 2022-05-24
Payer: COMMERCIAL

## 2022-05-25 ENCOUNTER — ANESTHESIA (OUTPATIENT)
Dept: PERIOP | Facility: HOSPITAL | Age: 62
End: 2022-05-25
Payer: COMMERCIAL

## 2022-05-25 ENCOUNTER — HOSPITAL ENCOUNTER (OUTPATIENT)
Facility: HOSPITAL | Age: 62
Setting detail: HOSPITAL OUTPATIENT SURGERY
Discharge: HOME | End: 2022-05-25
Attending: SURGERY | Admitting: SURGERY
Payer: COMMERCIAL

## 2022-05-25 VITALS
SYSTOLIC BLOOD PRESSURE: 112 MMHG | RESPIRATION RATE: 16 BRPM | BODY MASS INDEX: 22.29 KG/M2 | DIASTOLIC BLOOD PRESSURE: 76 MMHG | WEIGHT: 142 LBS | TEMPERATURE: 98.3 F | OXYGEN SATURATION: 99 % | HEIGHT: 67 IN | HEART RATE: 63 BPM

## 2022-05-25 PROCEDURE — 25010000002 PROPOFOL 10 MG/ML EMULSION: Performed by: NURSE ANESTHETIST, CERTIFIED REGISTERED

## 2022-05-25 PROCEDURE — S0260 H&P FOR SURGERY: HCPCS | Performed by: SURGERY

## 2022-05-25 PROCEDURE — 25010000002 ONDANSETRON PER 1 MG: Performed by: NURSE ANESTHETIST, CERTIFIED REGISTERED

## 2022-05-25 PROCEDURE — 36590 REMOVAL TUNNELED CV CATH: CPT | Performed by: SURGERY

## 2022-05-25 PROCEDURE — 25010000002 MIDAZOLAM PER 1 MG: Performed by: ANESTHESIOLOGY

## 2022-05-25 RX ORDER — MIDAZOLAM HYDROCHLORIDE 1 MG/ML
1 INJECTION INTRAMUSCULAR; INTRAVENOUS
Status: DISCONTINUED | OUTPATIENT
Start: 2022-05-25 | End: 2022-05-25 | Stop reason: HOSPADM

## 2022-05-25 RX ORDER — PROMETHAZINE HYDROCHLORIDE 25 MG/1
25 SUPPOSITORY RECTAL ONCE AS NEEDED
Status: DISCONTINUED | OUTPATIENT
Start: 2022-05-25 | End: 2022-05-25 | Stop reason: HOSPADM

## 2022-05-25 RX ORDER — OXYCODONE AND ACETAMINOPHEN 7.5; 325 MG/1; MG/1
1 TABLET ORAL EVERY 4 HOURS PRN
Status: DISCONTINUED | OUTPATIENT
Start: 2022-05-25 | End: 2022-05-25 | Stop reason: HOSPADM

## 2022-05-25 RX ORDER — ONDANSETRON 2 MG/ML
INJECTION INTRAMUSCULAR; INTRAVENOUS AS NEEDED
Status: DISCONTINUED | OUTPATIENT
Start: 2022-05-25 | End: 2022-05-25 | Stop reason: SURG

## 2022-05-25 RX ORDER — DIPHENHYDRAMINE HYDROCHLORIDE 50 MG/ML
12.5 INJECTION INTRAMUSCULAR; INTRAVENOUS
Status: DISCONTINUED | OUTPATIENT
Start: 2022-05-25 | End: 2022-05-25 | Stop reason: HOSPADM

## 2022-05-25 RX ORDER — LABETALOL HYDROCHLORIDE 5 MG/ML
5 INJECTION, SOLUTION INTRAVENOUS
Status: DISCONTINUED | OUTPATIENT
Start: 2022-05-25 | End: 2022-05-25 | Stop reason: HOSPADM

## 2022-05-25 RX ORDER — HYDROMORPHONE HYDROCHLORIDE 1 MG/ML
0.5 INJECTION, SOLUTION INTRAMUSCULAR; INTRAVENOUS; SUBCUTANEOUS
Status: DISCONTINUED | OUTPATIENT
Start: 2022-05-25 | End: 2022-05-25 | Stop reason: HOSPADM

## 2022-05-25 RX ORDER — LIDOCAINE HYDROCHLORIDE 10 MG/ML
INJECTION, SOLUTION EPIDURAL; INFILTRATION; INTRACAUDAL; PERINEURAL AS NEEDED
Status: DISCONTINUED | OUTPATIENT
Start: 2022-05-25 | End: 2022-05-25 | Stop reason: HOSPADM

## 2022-05-25 RX ORDER — ONDANSETRON 2 MG/ML
4 INJECTION INTRAMUSCULAR; INTRAVENOUS ONCE AS NEEDED
Status: DISCONTINUED | OUTPATIENT
Start: 2022-05-25 | End: 2022-05-25 | Stop reason: HOSPADM

## 2022-05-25 RX ORDER — PROPOFOL 10 MG/ML
VIAL (ML) INTRAVENOUS CONTINUOUS PRN
Status: DISCONTINUED | OUTPATIENT
Start: 2022-05-25 | End: 2022-05-25 | Stop reason: SURG

## 2022-05-25 RX ORDER — SODIUM CHLORIDE 0.9 % (FLUSH) 0.9 %
3-10 SYRINGE (ML) INJECTION AS NEEDED
Status: DISCONTINUED | OUTPATIENT
Start: 2022-05-25 | End: 2022-05-25 | Stop reason: HOSPADM

## 2022-05-25 RX ORDER — FENTANYL CITRATE 50 UG/ML
50 INJECTION, SOLUTION INTRAMUSCULAR; INTRAVENOUS
Status: DISCONTINUED | OUTPATIENT
Start: 2022-05-25 | End: 2022-05-25 | Stop reason: HOSPADM

## 2022-05-25 RX ORDER — IBUPROFEN 600 MG/1
600 TABLET ORAL ONCE AS NEEDED
Status: DISCONTINUED | OUTPATIENT
Start: 2022-05-25 | End: 2022-05-25 | Stop reason: HOSPADM

## 2022-05-25 RX ORDER — LIDOCAINE HYDROCHLORIDE 20 MG/ML
INJECTION, SOLUTION INFILTRATION; PERINEURAL AS NEEDED
Status: DISCONTINUED | OUTPATIENT
Start: 2022-05-25 | End: 2022-05-25 | Stop reason: SURG

## 2022-05-25 RX ORDER — FLUMAZENIL 0.1 MG/ML
0.2 INJECTION INTRAVENOUS AS NEEDED
Status: DISCONTINUED | OUTPATIENT
Start: 2022-05-25 | End: 2022-05-25 | Stop reason: HOSPADM

## 2022-05-25 RX ORDER — PROMETHAZINE HYDROCHLORIDE 25 MG/1
25 TABLET ORAL ONCE AS NEEDED
Status: DISCONTINUED | OUTPATIENT
Start: 2022-05-25 | End: 2022-05-25 | Stop reason: HOSPADM

## 2022-05-25 RX ORDER — LIDOCAINE HYDROCHLORIDE 10 MG/ML
0.5 INJECTION, SOLUTION EPIDURAL; INFILTRATION; INTRACAUDAL; PERINEURAL ONCE AS NEEDED
Status: DISCONTINUED | OUTPATIENT
Start: 2022-05-25 | End: 2022-05-25 | Stop reason: HOSPADM

## 2022-05-25 RX ORDER — SODIUM CHLORIDE 0.9 % (FLUSH) 0.9 %
3 SYRINGE (ML) INJECTION EVERY 12 HOURS SCHEDULED
Status: DISCONTINUED | OUTPATIENT
Start: 2022-05-25 | End: 2022-05-25 | Stop reason: HOSPADM

## 2022-05-25 RX ORDER — NALOXONE HCL 0.4 MG/ML
0.2 VIAL (ML) INJECTION AS NEEDED
Status: DISCONTINUED | OUTPATIENT
Start: 2022-05-25 | End: 2022-05-25 | Stop reason: HOSPADM

## 2022-05-25 RX ORDER — SODIUM CHLORIDE, SODIUM LACTATE, POTASSIUM CHLORIDE, CALCIUM CHLORIDE 600; 310; 30; 20 MG/100ML; MG/100ML; MG/100ML; MG/100ML
9 INJECTION, SOLUTION INTRAVENOUS CONTINUOUS
Status: DISCONTINUED | OUTPATIENT
Start: 2022-05-25 | End: 2022-05-25 | Stop reason: HOSPADM

## 2022-05-25 RX ORDER — FAMOTIDINE 10 MG/ML
20 INJECTION, SOLUTION INTRAVENOUS ONCE
Status: COMPLETED | OUTPATIENT
Start: 2022-05-25 | End: 2022-05-25

## 2022-05-25 RX ORDER — DIPHENHYDRAMINE HCL 25 MG
25 CAPSULE ORAL
Status: DISCONTINUED | OUTPATIENT
Start: 2022-05-25 | End: 2022-05-25 | Stop reason: HOSPADM

## 2022-05-25 RX ORDER — HYDRALAZINE HYDROCHLORIDE 20 MG/ML
5 INJECTION INTRAMUSCULAR; INTRAVENOUS
Status: DISCONTINUED | OUTPATIENT
Start: 2022-05-25 | End: 2022-05-25 | Stop reason: HOSPADM

## 2022-05-25 RX ORDER — EPHEDRINE SULFATE 50 MG/ML
5 INJECTION, SOLUTION INTRAVENOUS ONCE AS NEEDED
Status: DISCONTINUED | OUTPATIENT
Start: 2022-05-25 | End: 2022-05-25 | Stop reason: HOSPADM

## 2022-05-25 RX ORDER — PROPOFOL 10 MG/ML
VIAL (ML) INTRAVENOUS AS NEEDED
Status: DISCONTINUED | OUTPATIENT
Start: 2022-05-25 | End: 2022-05-25 | Stop reason: SURG

## 2022-05-25 RX ORDER — HYDROCODONE BITARTRATE AND ACETAMINOPHEN 7.5; 325 MG/1; MG/1
1 TABLET ORAL ONCE AS NEEDED
Status: DISCONTINUED | OUTPATIENT
Start: 2022-05-25 | End: 2022-05-25 | Stop reason: HOSPADM

## 2022-05-25 RX ORDER — MAGNESIUM HYDROXIDE 1200 MG/15ML
LIQUID ORAL AS NEEDED
Status: DISCONTINUED | OUTPATIENT
Start: 2022-05-25 | End: 2022-05-25 | Stop reason: HOSPADM

## 2022-05-25 RX ADMIN — SODIUM CHLORIDE, POTASSIUM CHLORIDE, SODIUM LACTATE AND CALCIUM CHLORIDE 9 ML/HR: 600; 310; 30; 20 INJECTION, SOLUTION INTRAVENOUS at 08:25

## 2022-05-25 RX ADMIN — LIDOCAINE HYDROCHLORIDE 60 MG: 20 INJECTION, SOLUTION INFILTRATION; PERINEURAL at 09:06

## 2022-05-25 RX ADMIN — Medication 25 MG: at 09:11

## 2022-05-25 RX ADMIN — PROPOFOL 100 MCG/KG/MIN: 10 INJECTION, EMULSION INTRAVENOUS at 09:06

## 2022-05-25 RX ADMIN — FAMOTIDINE 20 MG: 10 INJECTION INTRAVENOUS at 08:25

## 2022-05-25 RX ADMIN — ONDANSETRON 4 MG: 2 INJECTION INTRAMUSCULAR; INTRAVENOUS at 09:20

## 2022-05-25 RX ADMIN — MIDAZOLAM HYDROCHLORIDE 1 MG: 2 INJECTION, SOLUTION INTRAMUSCULAR; INTRAVENOUS at 08:25

## 2022-05-25 RX ADMIN — Medication 25 MG: at 09:06

## 2022-05-25 NOTE — H&P
Subjective      Richa Dolan is a 61 y.o. female who presents for a Mediport that is no longer needed.     History of Present Illness      The patient has a history of left breast cancer and underwent a left mastectomy with axillary dissection followed by reconstruction.  She was then treated with adjuvant chemotherapy and has completed all her treatments.  She no longer needs her Mediport.     Review of Systems   Constitutional: Negative for fatigue and fever.   Respiratory: Negative for chest tightness and shortness of breath.    Cardiovascular: Negative for chest pain and palpitations.   Gastrointestinal: Negative for abdominal pain, blood in stool, constipation, diarrhea, nausea and vomiting.      Medical History        Past Medical History:   Diagnosis Date   • Anxiety     • Cerebrovascular accident (CVA) ([facility]) 8/22/2017     NO RESIDUAL AFFECT   • DDD (degenerative disc disease), cervical     • Depression     • Drug therapy     • Elevated cholesterol     • Fat necrosis of left breast     • GERD (gastroesophageal reflux disease)     • H/O ICH (intracerebral hemorrhage) ([facility])     • History of chemotherapy     • History of stroke     • History of thrombocytopenia     • HIV (human immunodeficiency virus infection) ([facility]) 1996   • Hx of radiation therapy     • Hyperlipidemia     • Insomnia     • Lupus anticoagulant positive     • Malignant neoplasm of overlapping sites of left breast in female, estrogen receptor positive ([facility]) 11/12/2019   • Meniscus tear 2017     RIGHT   • Migraine     • Neck pain       WITH SHOOTING PAIN LEFT RIGHT ARM   • Osteoarthritis     • Osteoporosis     • Pain management       sees 1 every 3 months for scripts/injection/pain meds   • Seasonal allergies     • Spinal headache       THINKS HAD BLOOD PATCH AFTER EPIDUAL    • Tick bite 06/2014         Surgical History         Past Surgical History:   Procedure Laterality Date   • ADENOIDECTOMY       • BREAST LUMPECTOMY       • BREAST  LUMPECTOMY WITH SENTINEL NODE BIOPSY Left 5/28/2020     Procedure: BREAST LUMPECTOMY WITH SENTINEL NODE BIOPSY AND NEEDLE LOCALIZATION AND axillary dissection;  Surgeon: Landen Forman MD;  Location: [facility];  Service: General;  Laterality: Left;   • BREAST SURGERY Left 5/28/2020     Procedure: LEFT LATERAL INTERCOASTAL ARTERY  flap ;  Surgeon: Yusuf Garcia MD;  Location: [facility];  Service: Plastics;  Laterality: Left;   • CHOLECYSTECTOMY       • HYSTERECTOMY       • KNEE ARTHROSCOPY Right 3/24/2017     Procedure:  RIGHT  KNEE ARTHROSCOPY WITH DEBRIDEMENT CHONDROPLASTY PARTIAL MENISCECTOMY;  Surgeon: Karl Galeas MD;  Location: [facility];  Service:    • KNEE CARTILAGE SURGERY Right 2007   • TONSILLECTOMY       • US GUIDED LYMPH NODE BIOPSY   10/29/2019     LEFT BREAST   • VENOUS ACCESS DEVICE (PORT) INSERTION Right 12/2/2019     Procedure: INSERTION VENOUS ACCESS DEVICE RIGHT;  Surgeon: Landen Forman MD;  Location: [facility];  Service: General               Family History   Problem Relation Age of Onset   • Kidney disease Mother     • Hypertension Mother     • Heart disease Mother     • Arthritis Mother     • Breast cancer Mother     • Leukemia Mother           CLL?   • Diabetes Mother     • Hyperthyroidism Mother     • Hearing loss Mother     • Arthritis Father     • Dementia Father     • Leukemia Maternal Aunt           CLL?   • Throat cancer Paternal Uncle     • Heart disease Maternal Grandmother     • Diabetes Maternal Grandfather     • Heart disease Maternal Grandfather     • Stroke Maternal Grandfather     • Heart attack Maternal Grandfather     • Osteoporosis Paternal Grandmother     • Heart disease Paternal Grandfather     • Malig Hyperthermia Neg Hx        Social History   Social History            Socioeconomic History   • Marital status:        Spouse name: Owen   • Number of children: 1   • Years of education: College    Tobacco Use   • Smoking status: Never Smoker   • Smokeless tobacco: Never Used   Vaping Use   • Vaping Use: Never used   Substance and Sexual Activity   • Alcohol use: Yes       Comment: occasioonal   • Drug use: No   • Sexual activity: Defer       Partners: Male       Birth control/protection: None       Comment:                   Objective      Physical Exam  Constitutional:       Appearance: Normal appearance. She is well-developed. She is not toxic-appearing.   Eyes:      General: No scleral icterus.  Pulmonary:      Effort: Pulmonary effort is normal. No respiratory distress.   Chest:      Comments: Mediport site is clean with no evidence of infection.  Skin:     General: Skin is warm and dry.   Neurological:      Mental Status: She is alert and oriented to person, place, and time.   Psychiatric:         Behavior: Behavior normal.         Thought Content: Thought content normal.         Judgment: Judgment normal.                  Assessment/Plan            The encounter diagnosis was Encounter for venous access device care.     The patient has a Mediport that is no longer needed.  She will be scheduled for Mediport removal.  The patient understands the indications, alternatives, risks, and benefits of the procedure and wishes to proceed.

## 2022-05-25 NOTE — ANESTHESIA PREPROCEDURE EVALUATION
Anesthesia Evaluation     Patient summary reviewed and Nursing notes reviewed                Airway   Mallampati: II  TM distance: >3 FB  Neck ROM: full  Dental      Pulmonary - negative pulmonary ROS   Cardiovascular     ECG reviewed  Rhythm: regular  Rate: normal    (+) hypertension, hyperlipidemia,       Neuro/Psych  (+) CVA, headaches, dizziness/light headedness,    GI/Hepatic/Renal/Endo    (+)  GERD,      Musculoskeletal     (+) neck pain,   Abdominal    Substance History - negative use     OB/GYN negative ob/gyn ROS         Other   arthritis,    history of cancer                    Anesthesia Plan    ASA 3     MAC   (I have reviewed the patient's history with the patient and the chart, including all pertinent laboratory results and imaging. I have explained the risks of anesthesia including but not limited to dental damage, corneal abrasion, nerve injury, MI, stroke, and death. Questions asked and answered. Anesthetic plan discussed with patient and team as indicated. Patient expressed understanding of the above.  )  intravenous induction     Anesthetic plan, all risks, benefits, and alternatives have been provided, discussed and informed consent has been obtained with: patient.    Plan discussed with CRNA.        CODE STATUS:

## 2022-05-25 NOTE — ANESTHESIA POSTPROCEDURE EVALUATION
Patient: Richa Dolan    Procedure Summary     Date: 05/25/22 Room / Location: [facility] OR 03 / [facility] MAIN OR    Anesthesia Start: 0904 Anesthesia Stop: 0942    Procedure: Mediport Removal (N/A ) Diagnosis:       Encounter for venous access device care      (Encounter for venous access device care [Z45.2])    Surgeons: Cesar Vasquez Jr., MD Provider: Sonny Meyer MD    Anesthesia Type: MAC ASA Status: 3          Anesthesia Type: MAC    Vitals  Vitals Value Taken Time   /78 05/25/22 0947   Temp     Pulse 72 05/25/22 0952   Resp     SpO2 95 % 05/25/22 0952   Vitals shown include unvalidated device data.        Post Anesthesia Care and Evaluation    Patient location during evaluation: PHASE II  Patient participation: complete - patient participated  Level of consciousness: awake  Pain score: 1  Pain management: adequate  Anesthetic complications: No anesthetic complications  PONV Status: none  Cardiovascular status: acceptable  Respiratory status: acceptable  Hydration status: acceptable

## 2022-05-25 NOTE — OP NOTE
Surgeon: Cesar Vasquez Jr., M.D.    Pre-Operative Diagnosis:     Encounter for venous access device care [Z45.2]    Post-Operative Diagnosis:    Mediport no longer needed    Procedure Performed:     Mediport removal    Indications:     The patient is a pleasant 61-year-old female with a history of left breast cancer that required a left breast mastectomy with axillary dissection followed by reconstruction as well as chemotherapy.  She is completed all treatments and no longer needs the Mediport which was present for chemotherapy access.  She presents for Mediport removal.  The patient understands the indications, alternatives, risks, and benefits of the procedure and wishes to proceed.    Procedure:     The patient was identified and taken to the operating room where she was placed in the supine position on the operating table.  Monitors were placed and she underwent a Mac anesthesia and was appropriately monitored throughout the case by the anesthesia personnel.  The right chest and shoulder were prepped and draped in the standard surgical fashion.  The area of the previous Mediport was infiltrated with 1% lidocaine without epinephrine.  The previous Mediport incision was opened with a scalpel carried through the skin into the subcutaneous tissue.  The subcutaneous tissue was divided using Bovie electrocautery down to the Mediport which was freed from the pocket.  The Mediport was completely removed and noted to be intact.  The tract to the subclavian vein was oversewn with 2-0 Vicryl suture.  Hemostasis was noted to be adequate.  The skin was closed with a 4-0 Monocryl in a subcuticular fashion followed by benzoin and Steri-Strips.  The sponge, needle, and instrument counts were correct at the end the case.  The patient tolerated the procedure well and was transferred to the recovery area in stable condition.    Estimated Blood Loss:      0 mL    Specimens:     None    Cesar Vasquez Jr., M.D.

## 2022-06-06 ENCOUNTER — OFFICE VISIT (OUTPATIENT)
Dept: FAMILY MEDICINE CLINIC | Facility: CLINIC | Age: 62
End: 2022-06-06

## 2022-06-06 VITALS
TEMPERATURE: 98 F | SYSTOLIC BLOOD PRESSURE: 120 MMHG | WEIGHT: 144 LBS | OXYGEN SATURATION: 98 % | BODY MASS INDEX: 22.6 KG/M2 | HEART RATE: 64 BPM | DIASTOLIC BLOOD PRESSURE: 80 MMHG | HEIGHT: 67 IN | RESPIRATION RATE: 16 BRPM

## 2022-06-06 DIAGNOSIS — G47.00 INSOMNIA, UNSPECIFIED TYPE: ICD-10-CM

## 2022-06-06 DIAGNOSIS — Z79.899 HIGH RISK MEDICATION USE: Primary | ICD-10-CM

## 2022-06-06 PROCEDURE — 99213 OFFICE O/P EST LOW 20 MIN: CPT | Performed by: NURSE PRACTITIONER

## 2022-06-06 RX ORDER — ZOLPIDEM TARTRATE 10 MG/1
10 TABLET ORAL NIGHTLY
Qty: 90 TABLET | Refills: 0 | Status: SHIPPED | OUTPATIENT
Start: 2022-06-06 | End: 2022-08-12 | Stop reason: SDUPTHER

## 2022-06-06 RX ORDER — DOXYCYCLINE HYCLATE 100 MG
100 TABLET ORAL 2 TIMES DAILY
COMMUNITY
Start: 2022-05-31 | End: 2022-08-12

## 2022-06-06 NOTE — PROGRESS NOTES
"Chief Complaint  Insomnia (3 month follow up. )    Subjective        Richa Dolan presents to [facility]  History of Present Illness  Richa Dolan 61 y.o. female presents for follow up of insomnia with onset of symptoms years ago. Patient describes symptoms as early morning awakening and frequent night time awakening. Patient has found complete relief with Ambien (Zolpidem). Associated symptoms include: fatigue if unable to take Rx . Patient denies daytime somnolence Symptoms have stabilized.  The patient has failed multiple OTC medications for insomnia.  They are well controlled on current Rx and will continue to try to take Rx PRN.  She will use the lowest effective dose.  The patient has read and signed the [facility] Controlled Substance Contract.  I will continue to see patient for regular follow up appointments and be prescribed the lowest effective dose.  MAGY has been reviewed by me and is updated every 3 months. The patient is aware of the potential for addiction and dependence.  She denies that Ambien (Zolpidem) causes excessive daytime drowsiness and sleep walking.  Patient voices understanding to take Ambien (Zolpidem) and go straight to bed. Patient must be able to sleep 7 hours or more when taking this and no alcohol.  Patient will hold Rx and contact me if they experience any impaired mental alertness the next day.        Objective   Vital Signs:  /80   Pulse 64   Temp 98 °F (36.7 °C)   Resp 16   Ht 170.2 cm (67\")   Wt 65.3 kg (144 lb)   SpO2 98%   BMI 22.55 kg/m²     BMI is within normal parameters. No other follow-up for BMI required.      Physical Exam  Vitals reviewed.   Constitutional:       General: She is not in acute distress.     Appearance: She is well-developed.   HENT:      Head: Normocephalic.   Cardiovascular:      Rate and Rhythm: Normal rate and regular rhythm.      Heart sounds: Normal heart sounds.   Pulmonary:      Effort: " Pulmonary effort is normal.      Breath sounds: Normal breath sounds.   Neurological:      Mental Status: She is alert and oriented to person, place, and time.      Gait: Gait normal.   Psychiatric:         Behavior: Behavior normal.         Thought Content: Thought content normal.         Judgment: Judgment normal.        Result Review :  The following data was reviewed by: ОЛЬГА Mccoy on 06/06/2022:  CMP    CMP 2/8/22 4/5/22 5/23/22   Glucose 176 (A) 82 89   BUN 13 12 7 (A)   Creatinine 0.73 0.73 0.66   eGFR Non African Am 81     Sodium 141 141 141   Potassium 3.7 4.1 4.3   Chloride 103 103 103   Calcium 9.1 9.6 9.4   Albumin 4.50 4.60    Total Bilirubin 0.3 0.5    Alkaline Phosphatase 70 72    AST (SGOT) 17 20    ALT (SGPT) 15 15    (A) Abnormal value            CBC w/diff    CBC w/Diff 2/8/22 4/5/22 4/5/22 4/5/22 5/23/22     1049 1337 1337    WBC 5.90 4.10 5.45 5.5 5.42   RBC 3.76 (A) 3.84 4.19 4.14 4.05   Hemoglobin 12.8 13.2 14.3 14.2 14.1   Hematocrit 38.2 39.1 43.2 42.4 41.6   .6 (A) 101.8 (A) 103.1 (A) 102 (A) 102.7 (A)   MCH 34.0 (A) 34.4 (A) 34.1 (A) 34.3 (A) 34.8 (A)   MCHC 33.5 33.8 33.1 33.5 33.9   RDW 12.4 12.6 12.5 12.3 12.3   Platelets 244 229 264 259 227   Neutrophil Rel % 67.8 48.8 52.8 52    Immature Granulocyte Rel % 0.2 0.2 0.2     Lymphocyte Rel % 20.3 37.6 37.4 38    Monocyte Rel % 10.2 11.7 8.1 8    Eosinophil Rel % 1.0 1.2 1.1 1    Basophil Rel % 0.5 0.5 0.4 1    (A) Abnormal value                TSH    TSH 8/12/21   TSH 1.670           Most Recent A1C    HGBA1C Most Recent 9/9/21   Hemoglobin A1C 5.2                     Assessment and Plan   Diagnoses and all orders for this visit:    1. High risk medication use (Primary)  -     ToxASSURE Select 13 (MW) - Urine, Clean Catch    2. Insomnia, unspecified type  -     zolpidem (AMBIEN) 10 MG tablet; Take 1 tablet by mouth Every Night. for sleep  Dispense: 90 tablet; Refill: 0             Follow Up   Return in about 3 months  (around 9/6/2022) for Recheck.  Patient was given instructions and counseling regarding her condition or for health maintenance advice. Please see specific information pulled into the AVS if appropriate.     rto in 3 mons   Get cholesterol is next visit- not fasting today  Mask and googles worn

## 2022-06-09 ENCOUNTER — PRIOR AUTHORIZATION (OUTPATIENT)
Dept: FAMILY MEDICINE CLINIC | Facility: CLINIC | Age: 62
End: 2022-06-09

## 2022-06-11 LAB — DRUGS UR: NORMAL

## 2022-07-28 ENCOUNTER — HOSPITAL ENCOUNTER (OUTPATIENT)
Dept: BONE DENSITY | Facility: HOSPITAL | Age: 62
Discharge: HOME | End: 2022-07-28
Admitting: INTERNAL MEDICINE

## 2022-07-28 DIAGNOSIS — Z17.0 MALIGNANT NEOPLASM OF OVERLAPPING SITES OF LEFT BREAST IN FEMALE, ESTROGEN RECEPTOR POSITIVE: ICD-10-CM

## 2022-07-28 DIAGNOSIS — Z79.811 USE OF ANASTROZOLE (ARIMIDEX): ICD-10-CM

## 2022-07-28 DIAGNOSIS — Z78.0 POSTMENOPAUSAL: ICD-10-CM

## 2022-07-28 DIAGNOSIS — C50.812 MALIGNANT NEOPLASM OF OVERLAPPING SITES OF LEFT BREAST IN FEMALE, ESTROGEN RECEPTOR POSITIVE: ICD-10-CM

## 2022-07-28 PROCEDURE — 77080 DXA BONE DENSITY AXIAL: CPT

## 2022-08-09 ENCOUNTER — OFFICE VISIT (OUTPATIENT)
Dept: ONCOLOGY | Facility: CLINIC | Age: 62
End: 2022-08-09

## 2022-08-09 ENCOUNTER — LAB (OUTPATIENT)
Dept: LAB | Facility: HOSPITAL | Age: 62
End: 2022-08-09

## 2022-08-09 VITALS
OXYGEN SATURATION: 97 % | RESPIRATION RATE: 12 BRPM | SYSTOLIC BLOOD PRESSURE: 134 MMHG | BODY MASS INDEX: 22.44 KG/M2 | TEMPERATURE: 97.9 F | HEART RATE: 96 BPM | WEIGHT: 143 LBS | DIASTOLIC BLOOD PRESSURE: 82 MMHG | HEIGHT: 67 IN

## 2022-08-09 DIAGNOSIS — Z21 HIV POSITIVE LONG-TERM NON-PROGRESSOR: ICD-10-CM

## 2022-08-09 DIAGNOSIS — Z79.899 HIGH RISK MEDICATION USE: ICD-10-CM

## 2022-08-09 DIAGNOSIS — C50.812 MALIGNANT NEOPLASM OF OVERLAPPING SITES OF LEFT BREAST IN FEMALE, ESTROGEN RECEPTOR POSITIVE: ICD-10-CM

## 2022-08-09 DIAGNOSIS — Z17.0 MALIGNANT NEOPLASM OF OVERLAPPING SITES OF LEFT BREAST IN FEMALE, ESTROGEN RECEPTOR POSITIVE: ICD-10-CM

## 2022-08-09 DIAGNOSIS — C50.812 MALIGNANT NEOPLASM OF OVERLAPPING SITES OF LEFT BREAST IN FEMALE, ESTROGEN RECEPTOR POSITIVE: Primary | ICD-10-CM

## 2022-08-09 DIAGNOSIS — M81.0 OSTEOPOROSIS, UNSPECIFIED OSTEOPOROSIS TYPE, UNSPECIFIED PATHOLOGICAL FRACTURE PRESENCE: ICD-10-CM

## 2022-08-09 DIAGNOSIS — Z17.0 MALIGNANT NEOPLASM OF OVERLAPPING SITES OF LEFT BREAST IN FEMALE, ESTROGEN RECEPTOR POSITIVE: Primary | ICD-10-CM

## 2022-08-09 LAB
ALBUMIN SERPL-MCNC: 4.8 G/DL (ref 3.5–5.2)
ALBUMIN/GLOB SERPL: 1.7 G/DL (ref 1.1–2.4)
ALP SERPL-CCNC: 86 U/L (ref 38–116)
ALT SERPL W P-5'-P-CCNC: 15 U/L (ref 0–33)
ANION GAP SERPL CALCULATED.3IONS-SCNC: 12.7 MMOL/L (ref 5–15)
AST SERPL-CCNC: 19 U/L (ref 0–32)
BASOPHILS # BLD AUTO: 0.01 10*3/MM3 (ref 0–0.2)
BASOPHILS NFR BLD AUTO: 0.2 % (ref 0–1.5)
BILIRUB SERPL-MCNC: 0.3 MG/DL (ref 0.2–1.2)
BUN SERPL-MCNC: 10 MG/DL (ref 6–20)
BUN/CREAT SERPL: 14.3 (ref 7.3–30)
CALCIUM SPEC-SCNC: 9.6 MG/DL (ref 8.5–10.2)
CHLORIDE SERPL-SCNC: 103 MMOL/L (ref 98–107)
CO2 SERPL-SCNC: 26.3 MMOL/L (ref 22–29)
CREAT SERPL-MCNC: 0.7 MG/DL (ref 0.6–1.1)
DEPRECATED RDW RBC AUTO: 46.5 FL (ref 37–54)
EGFRCR SERPLBLD CKD-EPI 2021: 98.5 ML/MIN/1.73
EOSINOPHIL # BLD AUTO: 0.03 10*3/MM3 (ref 0–0.4)
EOSINOPHIL NFR BLD AUTO: 0.6 % (ref 0.3–6.2)
ERYTHROCYTE [DISTWIDTH] IN BLOOD BY AUTOMATED COUNT: 12.2 % (ref 12.3–15.4)
GLOBULIN UR ELPH-MCNC: 2.8 GM/DL (ref 1.8–3.5)
GLUCOSE SERPL-MCNC: 92 MG/DL (ref 74–124)
HCT VFR BLD AUTO: 38.6 % (ref 34–46.6)
HGB BLD-MCNC: 13.1 G/DL (ref 12–15.9)
IMM GRANULOCYTES # BLD AUTO: 0.01 10*3/MM3 (ref 0–0.05)
IMM GRANULOCYTES NFR BLD AUTO: 0.2 % (ref 0–0.5)
LYMPHOCYTES # BLD AUTO: 1.54 10*3/MM3 (ref 0.7–3.1)
LYMPHOCYTES NFR BLD AUTO: 28.4 % (ref 19.6–45.3)
MCH RBC QN AUTO: 34.9 PG (ref 26.6–33)
MCHC RBC AUTO-ENTMCNC: 33.9 G/DL (ref 31.5–35.7)
MCV RBC AUTO: 102.9 FL (ref 79–97)
MONOCYTES # BLD AUTO: 0.55 10*3/MM3 (ref 0.1–0.9)
MONOCYTES NFR BLD AUTO: 10.1 % (ref 5–12)
NEUTROPHILS NFR BLD AUTO: 3.29 10*3/MM3 (ref 1.7–7)
NEUTROPHILS NFR BLD AUTO: 60.5 % (ref 42.7–76)
NRBC BLD AUTO-RTO: 0 /100 WBC (ref 0–0.2)
PLATELET # BLD AUTO: 244 10*3/MM3 (ref 140–450)
PMV BLD AUTO: 9.2 FL (ref 6–12)
POTASSIUM SERPL-SCNC: 4.2 MMOL/L (ref 3.5–4.7)
PROT SERPL-MCNC: 7.6 G/DL (ref 6.3–8)
RBC # BLD AUTO: 3.75 10*6/MM3 (ref 3.77–5.28)
SODIUM SERPL-SCNC: 142 MMOL/L (ref 134–145)
WBC NRBC COR # BLD: 5.43 10*3/MM3 (ref 3.4–10.8)

## 2022-08-09 PROCEDURE — 36415 COLL VENOUS BLD VENIPUNCTURE: CPT

## 2022-08-09 PROCEDURE — 99214 OFFICE O/P EST MOD 30 MIN: CPT | Performed by: INTERNAL MEDICINE

## 2022-08-09 PROCEDURE — 85025 COMPLETE CBC W/AUTO DIFF WBC: CPT

## 2022-08-09 PROCEDURE — 80053 COMPREHEN METABOLIC PANEL: CPT

## 2022-08-09 RX ORDER — EXEMESTANE 25 MG/1
25 TABLET ORAL DAILY
COMMUNITY
Start: 2022-06-27 | End: 2022-12-23

## 2022-08-09 NOTE — PROGRESS NOTES
Subjective       REASON FOR FOLLOW UP:    1.  T2N1 invasive ductal carcinoma of the left breast.  Ultrasound-showed 3.8 x 3.1 x 2.5 cm mass at 4 o'clock position with 2 abnormal axillary lymph nodes, larger lymph node being 1.4 cm.  Left breast biopsy and axillary lymph node biopsy October 29, 2019, invasive ductal carcinoma, moderately differentiated, grade 2, ER 85%, NH 10%, HER-2/merna 2+, HER-2 FISH negative.  · 12/06/19: Neoadjuvant chemo therapy, cycle #1 of dose-dense Adriamycin/Cytoxan with Neulasta for marrow support    · 01/17/20: Last cycle of Adriamycin/Cytoxan given  · January 31, 2020: Cycle 1 Taxol  · April 24, 2020: Last cycle, cycle 12 of Taxol  · Patient is s/p left mastectomy with axillary dissection with reconstruction.  She is healing up nicely.  She has a drain in place.  Pathology showed invasive breast cancer which is 55 x 40 mm in size, grade 2 with focal lymphovascular space invasion with DCIS spanning over 36 x 6 mm.  All margins were negative for cancer.  · 8 of the additional lymph nodes out of 11 were positive with the largest micrometastasis measuring 5 mm.  Extranodal extension was seen.  · Biomarkers on post neoadjuvant tumor is ER 81-90% NH 5% HER-2/merna 2+ by immunohistochemistry and HER-2/merna negative by FISH.  · Radiation July 6, 2020-August 17, 2020.  · Letrozole initiated July 2020.  · Letrozole discontinued October 2020 secondary to severe joint pain.  · October 30, 2020 patient starting tamoxifen and tolerating well  · August 2021: Tamoxifen discontinued secondary to depression and mental fogginess and fatigue  · September 21, 2021: Arimidex started  · Severe arthralgias secondary to Arimidex, will start Aromasin  · Patient is tolerating Aromasin well    2.  HIV, followed by Dr. Dawn from infectious disease.  Well controlled on meds    3.  Screening mammogram December 7, 2020 showed indeterminate calcifications lower outer middle left breast. Diagnostic mammogram showed  0.4 cm calcifications in the left breast.  Stereotactic biopsy January 8, 2021 consistent with organizing fat necrosis.              HISTORY OF PRESENT ILLNESS:  The patient is a 61 y.o. year old female with history of T2N1 invasive ductal carcinoma of the left breast s/p neoadjuvant chemotherapy with Adriamycin Cytoxan followed by Taxol, s/p left mastectomy with axillary dissection and had 55 x 40 mm grade 2 invasive breast cancer with DCIS spanning over 36 x 6 mm with 8 out of 11 lymph nodes positive with extranodal extension s/p radiation.  Subsequently patient had tried letrozole which caused her severe arthralgias and could not tolerate.  She tried tamoxifen but that caused her significant depression and fogginess and that was discontinued.  Currently she is on Arimidex.  Even that is causing her severe arthralgias and stiffness..    Patient could not tolerate Femara, tamoxifen, Arimidex.  We switched her to Aromasin and she is tolerating it very well.  She also went to rheumatologist who is evaluating her.  She is followed by pain medicine clinic and is on hydrocodone which she takes as needed for pain.    Interval history:    Patient is here for follow-up.  She has not been able to tolerate Arimidex, Femara, tamoxifen and currently is on Aromasin.  She continues to have joint pains and some mild joint stiffness but she prefers to stay on Aromasin.  She has not lost weight.  Screening mammogram was December 27, 2021 which is negative.  Bone density was July 28, 2022 which shows osteoporosis.  Her last Zometa was 6 months ago.  She was receiving every 6 months for 2 years as she had high risk for breast cancer.  Last Zometa was April 5, 2022.    Currently we want to switch her to Prolia and we will do so at her next appointment.  We have asked her for dental evaluation prior to starting Prolia.    Past Medical History:   Diagnosis Date   • Anxiety    • Cerebrovascular accident (CVA) ([facility]) 08/22/2017    NO  RESIDUAL AFFECT   • DDD (degenerative disc disease), cervical    • Depression    • Drug therapy    • Elevated cholesterol    • Fat necrosis of left breast    • GERD (gastroesophageal reflux disease)    • H/O ICH (intracerebral hemorrhage) (CMS/HCC)    • History of chemotherapy    • History of heart murmur in childhood    • History of stroke     PATIENT STATES HAD A MINI STROKE, NO RESIDUAL EFFECTS   • History of thrombocytopenia    • HIV (human immunodeficiency virus infection) ([facility]) 1996   • Hx of radiation therapy    • Hyperlipidemia    • Insomnia    • Lupus anticoagulant positive    • Malignant neoplasm of overlapping sites of left breast in female, estrogen receptor positive ([facility]) 11/12/2019   • Meniscus tear 2017    RIGHT   • Migraine    • Neck pain     WITH SHOOTING PAIN LEFT RIGHT ARM   • Neuropathy    • Osteoarthritis    • Osteoporosis    • Pain management     sees 1 every 3 months for scripts/injection/pain meds   • Seasonal allergies    • Spinal headache     THINKS HAD BLOOD PATCH AFTER EPIDUAL    • Tick bite 06/2014   • Upper back pain    • Vertigo      PAST MEDICAL HISTORY:  She had a stroke in July 2017 with headache and dizziness.  She went to the ER and was placed on aspirin.  However, she stopped taking it two weeks ago.  She has been taking Aimovig (injection) monthly with Fariba López at [place].    In 1994, patient was diagnosed with HIV with an unknown cause which is managed by Dr. Natali Subramanian.  As of now, her viral load is good.    Patient has arthritis.  She gets trigger-point injections in her back with her last one being 2 weeks ago.  She has had multiple ablations.  She also has pain in her SI joint and Dr. Bermudez performed a surgery on this.  She takes Narco for the pain.      She is osteoporotic.  She has had multiple hairline fractures in both her legs.  She had her knees cleaned out by Dr. Sinha.     Patient has had a hysterectomy and still has both of her ovaries.    Patient has vertigo  and the muscles in her left ear are weak.  She just has an imbalance.    Past Surgical History:   Procedure Laterality Date   • ADENOIDECTOMY     • BREAST LUMPECTOMY     • BREAST LUMPECTOMY WITH SENTINEL NODE BIOPSY Left 5/28/2020    Procedure: BREAST LUMPECTOMY WITH SENTINEL NODE BIOPSY AND NEEDLE LOCALIZATION AND axillary dissection;  Surgeon: Landen Forman MD;  Location: [facility];  Service: General;  Laterality: Left;   • BREAST SURGERY Left 5/28/2020    Procedure: LEFT LATERAL INTERCOASTAL ARTERY  flap ;  Surgeon: Yusuf Garcia MD;  Location: [facility];  Service: Plastics;  Laterality: Left;   • CHOLECYSTECTOMY     • HYSTERECTOMY     • KNEE ARTHROSCOPY Right 3/24/2017    Procedure:  RIGHT  KNEE ARTHROSCOPY WITH DEBRIDEMENT CHONDROPLASTY PARTIAL MENISCECTOMY;  Surgeon: Karl Galeas MD;  Location: [facility];  Service:    • KNEE CARTILAGE SURGERY Right 2007   • TONSILLECTOMY     • US GUIDED LYMPH NODE BIOPSY  10/29/2019    LEFT BREAST   • VENOUS ACCESS DEVICE (PORT) INSERTION Right 12/2/2019    Procedure: INSERTION VENOUS ACCESS DEVICE RIGHT;  Surgeon: Landen Forman MD;  Location: [facility];  Service: General   • VENOUS ACCESS DEVICE (PORT) REMOVAL N/A 5/25/2022    Procedure: Mediport Removal;  Surgeon: Cesar Vasquez Jr., MD;  Location: [facility];  Service: General;  Laterality: N/A;       ONCOLOGIC HISTORY:  Patient is a 59-year-old female who has had a history of HIV since age 34 followed by Dr. Natali Ng at [facility] and was on multiple HIV drugs initially but more recently has been on a single medication TRIUMEQ, with well-controlled HIV.  She follows up with Dr. Dawn , infectious disease who saw her recently and he saw her on 4/8/2019 and has excellent control and suppression of her viral load.  She is very compliant with her medications.    She also has had history of stroke in 2017 for which she was placed on  aspirin.  She presented with a headache.  She is very active and works at [employer] full-time.  She also has had history of vertigo in the past  Patient's PCP is Zach Lora.    Patient first noticed the mass in her left breast 5 months ago.  Her last mammogram was 5 years ago.  She had soreness in the left breast and she went to her primary care physician who then ordered a mammogram diagnostic and an ultrasound.  The diagnostic mammogram showed a 3.7 cm mass at 4 o'clock position in the lower outer quadrant of the left breast.  The ultrasound showed 3.8 cm x 3.1 x 2.5 cm mass at 4 o'clock position in the left breast with 2 abnormal appearing left axillary lymph nodes the largest being 1.4 cm.    She then underwent biopsy of both the breast mass as well as the left axillary lymph node and both of them are positive for invasive mammary carcinoma it is invasive ductal carcinoma with apocrine features, moderately differentiated, grade 2 of 3 with a Jen score of 5.  The left axillary lymph node is also consistent with metastatic mammary carcinoma.  It is ER positive SD positive HER-2/merna negative.  Details as follows    10/21/19 - Bilateral Diagnostic Mammogram and US Breast Left  FINDINGS: Bilateral digital CC and MLO mammographic images were  obtained. No prior examination is available for comparison. Scattered  fibroglandular densities are seen throughout both breasts. A triangular  skin marker represents the area of palpable concern in the lower outer  quadrant of the left breast in the posterior 1/3. At this location there  is an irregular mass that measures on the order of 3.7 cm in greatest  dimension. Internal calcifications are noted. No suspicious findings in  the right breast are appreciated.     ULTRASOUND: Targeted sonographic evaluation of the left breast was  performed through the area of concern in the left axilla. At the 4  o'clock position on the order of 6 cm from the nipple there  is an  irregular hypoechoic mass that measures 3.8 x 3.1 x 2.5 cm. Internal  vascularity is noted.     There are 2 abnormal-appearing left axillary lymph nodes, the largest  which measures on the order of 1.4 cm in greatest dimension.     IMPRESSION:  1. There is a 3.8 cm irregular mass in the left breast at the 4 o'clock  position. This is seen in conjunction with an irregular 1.4 cm lymph  node in the left axilla. This is suspicious for malignancy with left  axillary involvement. Correlation with ultrasound-guided biopsy of both  the left breast mass and the lymph node is recommended.  2. There are no findings suspicious for malignancy in the right breast.     BI-RADS CATEGORY 5:     Patient's labs from 11/12/19 are normal.    10/29/19 - Tissue Pathology  1. Left Breast, 4:00, 6 cm FN, U/S-Guided Core Needle Biopsy for a Mass:  A. INVASIVE DUCTAL CARCINOMA WITH APOCRINE FEATURES, Moderately differentiated;  Jen Histologic Grade II/III (tubule score = 3, nuclear score = 2, mitoses score = 1), measuring at least  9 mm.  B. No ductal or lobular carcinoma in situ is identified in this sample.  C. Focus suspicious for lymphovascular space invasion.  D. See Biomarker Template for hormone receptor studies.  2. Lymph Node, Left Axilla, U/S-Guided Core Needle Biopsy:  A. METASTATIC MAMMARY CARCINOMA (see Comment).  B. Metastatic focus measures 5 mm in greatest extent.  ER+, 85%  DE+, 10%  HER2- Score 2+    11/13/19 - CT Neck Chest Abdomen Pelvis  IMPRESSION:  1. In addition to the irregular approximately 3.8 cm mass within the  lateral aspect of the left breast, there are a few subcentimeter  asymmetric nodules along the lateral margin of the glandular tissue. The  several asymmetric left subpectoral nodes and 1.2 x 1.0 cm left axillary  node are suspicious for tamar metastases. The asymmetric subcentimeter  left supraclavicular and left posterior cervical triangle nodes are  worrisome as well.  2. There is no  convincing evidence for metastatic disease within the  abdomen or pelvis.    11/14/19 - Echocardiogram:  Normal.  Calculated EF = 67%.  There is trace mitral valve and tricuspid valve regurgitation.    11/15/19 - Bone Scan  Negative.    11/18/19 - MRI Breast Bilateral  IMPRESSION:  1. Biopsy-proven malignancy in the left breast centered at the 4 o'clock  position with associated surrounding satellite nodules as described. The  entire region of involvement including the satellite nodules and the  dominant mass measure up to 7.5 cm in greatest dimension. Also, left  axillary adenopathy with multiple irregular lymph nodes and loss of  differentiation of the borders of multiple lymph nodes is noted and this  is suspicious for extranodal involvement.  2. There are no findings suspicious for malignancy in the right breast.  BI-RADS CATEGORY 6      12/06/19: Cycle #1 of Adriamycin/Cytoxan    01/17/20: Last cycle of Adriamycin/Cytoxan given without Neulasta.  We will switch from Neulasta to 7 days of Neupogen injections after cycle 4.    We reviewed with patient the  Breast from 01/29/20 which shows mild interval partial response to neoadjuvant chemotherapy.      01/31/20: Initiated cycle 1 Taxol  April 24, 2020: Last cycle of Taxol, cycle 12    S/p left mastectomy with axillary dissection   Pathology showed invasive breast cancer which is 55 x 40 mm in size, grade 2 with focal lymphovascular space invasion with DCIS spanning over 36 x 6 mm.  All margins were negative for cancer.  8 of the additional lymph nodes out of 11 were positive with the largest micrometastasis measuring 5 mm.  Extranodal extension was seen.    Biomarkers on post neoadjuvant tumor is ER 81-90% VA 5% HER-2/merna 2+ by immunohistochemistry and HER-2/merna negative by FISH.    June 2020: Started letrozole but because of severe joint pains was discontinued October 2020    End of October 2020 started tamoxifen    OB-GYN:  Menarche 15  years  Menopause-46  Pregnancies- 1 para 1 no miscarriages her first pregnancy was in  at 29 years of age.  She did not breastfeed.  Post menopausal HRT- None.  Hx of birth control pills- Yes.    Current Outpatient Medications on File Prior to Visit   Medication Sig Dispense Refill   • Abacavir-Dolutegravir-Lamivud (Triumeq) 600- MG per tablet Take 1 tablet by mouth Daily. 90 tablet 3   • AIMOVIG 140 MG/ML solution auto-injector Inject 140 mg under the skin into the appropriate area as directed Every 30 (Thirty) Days. HAS NOT TAKEN IN SEVERAL MONTHS  11   • Aspirin 81 MG capsule Take 1 tablet by mouth Daily.     • atorvastatin (LIPITOR) 20 MG tablet TAKE 1 TABLET BY MOUTH EVERYDAY AT BEDTIME (Patient taking differently: Take 20 mg by mouth Every Night.) 90 tablet 1   • baclofen (LIORESAL) 10 MG tablet Take 10 mg by mouth 2 (Two) Times a Day.  2   • clonazePAM (KlonoPIN) 0.5 MG tablet Take 1 tablet by mouth 2 (Two) Times a Day As Needed for Anxiety. 60 tablet 0   • colesevelam (WELCHOL) 625 MG tablet TAKE 2 TABLETS BY MOUTH EVERY DAY (Patient taking differently: Take 1,250 mg by mouth Daily.) 180 tablet 1   • doxycycline (VIBRAMYICN) 100 MG tablet Take 100 mg by mouth 2 (Two) Times a Day. for 7 days     • escitalopram (LEXAPRO) 10 MG tablet TAKE 1 TABLET BY MOUTH EVERY DAY (Patient taking differently: Take 10 mg by mouth Daily.) 90 tablet 3   • exemestane (AROMASIN) 25 MG chemo tablet Take 25 mg by mouth Daily.     • fluticasone (FLONASE) 50 MCG/ACT nasal spray 2 sprays into the nostril(s) as directed by provider Daily. 3 mL 3   • Loratadine (CLARITIN PO) Take 10 mg by mouth Daily.     • Nurtec 75 MG dispersible tablet Take 1 tablet by mouth Daily As Needed.     • oxyCODONE-acetaminophen (PERCOCET)  MG per tablet Take 1 tablet by mouth 4 (Four) Times a Day.     • vitamin B-12 (CYANOCOBALAMIN) 1000 MCG tablet Take 1,000 mcg by mouth Daily.     • vitamin B-6 (pyridoxine) 50 MG tablet Take 1  tablet by mouth Daily. 90 tablet 2   • zolpidem (AMBIEN) 10 MG tablet Take 1 tablet by mouth Every Night. for sleep 90 tablet 0     No current facility-administered medications on file prior to visit.        ALLERGIES:    Allergies   Allergen Reactions   • Augmentin [Amoxicillin-Pot Clavulanate] Hives        Social History     Socioeconomic History   • Marital status:      Spouse name: Owen   • Number of children: 1   • Years of education: College   Tobacco Use   • Smoking status: Never Smoker   • Smokeless tobacco: Never Used   Vaping Use   • Vaping Use: Never used   Substance and Sexual Activity   • Alcohol use: Yes     Comment: OCCASSIONALLY   • Drug use: No   • Sexual activity: Defer     Partners: Male     Birth control/protection: None     Comment:      Patient does not smoke or do drugs.  She does drink occasionally.    Family History   Problem Relation Age of Onset   • Kidney disease Mother    • Hypertension Mother    • Heart disease Mother    • Arthritis Mother    • Breast cancer Mother    • Leukemia Mother         CLL?   • Diabetes Mother    • Hyperthyroidism Mother    • Hearing loss Mother    • Arthritis Father    • Dementia Father    • Leukemia Maternal Aunt         CLL?   • Throat cancer Paternal Uncle    • Heart disease Maternal Grandmother    • Diabetes Maternal Grandfather    • Heart disease Maternal Grandfather    • Stroke Maternal Grandfather    • Heart attack Maternal Grandfather    • Osteoporosis Paternal Grandmother    • Heart disease Paternal Grandfather    • Malig Hyperthermia Neg Hx       FAMILY HISTORY:  Her mother had breast cancer at age 60, still alive at 85 and in good health..  Her father had dementia.  Her paternal uncle had prostate cancer.  Her brother had esophageal cancer.    I have reviewed the patient's medical history in detail and updated the computerized patient record.     ROS: as per HPI    Objective    Vitals:    08/09/22 1520   BP: 134/82   Pulse: 96   Resp:  "12   Temp: 97.9 °F (36.6 °C)   SpO2: 97%   Weight: 64.9 kg (143 lb)   Height: 170.2 cm (67\")   PainSc:   4         Review of Systems   Constitutional: Negative for appetite change, chills, diaphoresis, fatigue, fever and unexpected weight change.   HENT: Negative for hearing loss, sore throat and trouble swallowing.    Respiratory: Negative for cough, chest tightness, shortness of breath and wheezing.    Cardiovascular: Negative for chest pain, palpitations and leg swelling.   Gastrointestinal: Negative for abdominal distention, abdominal pain, constipation, diarrhea, nausea and vomiting.   Genitourinary: Negative for dysuria, frequency, hematuria and urgency.   Musculoskeletal: Negative for joint swelling.        No muscle weakness.   Skin: Negative for rash and wound.   Neurological: Positive for numbness (Bilat foot). Negative for seizures, syncope, speech difficulty, weakness and headaches.   Hematological: Negative for adenopathy. Does not bruise/bleed easily.   Psychiatric/Behavioral: Positive for sleep disturbance. Negative for behavioral problems, confusion and suicidal ideas.   All other systems reviewed and are negative.      Physical exam      CONSTITUTIONAL:  Vital signs reviewed.  No distress, looks comfortable.  RESPIRATORY:  Normal respiratory effort.  Lungs clear to auscultation bilaterally.  BREAST: Right breast: No skin changes, no evidence of breast mass, no nipple discharge, no evidence of any right axillary adenopathy or right supraclavicular adenopathy  Left breast: No evidence of any skin changes, no evidence of any left breast mass and no evidence of left nipple discharge as well as no left axillary adenopathy or left supraclavicular adenopathy.  CARDIOVASCULAR:  Normal S1, S2.  No murmurs rubs or gallops.  No significant lower extremity edema.  GASTROINTESTINAL: Abdomen appears unremarkable.  Nontender.  No hepatomegaly.  No splenomegaly.  LYMPHATIC:  No cervical, supraclavicular, axillary " lymphadenopathy.  SKIN:  Warm.  No rashes.  PSYCHIATRIC:  Normal judgment and insight.  Normal mood and affect..    RECENT LABS:   Results from last 7 days   Lab Units 08/09/22  1457   WBC 10*3/mm3 5.43   NEUTROS ABS 10*3/mm3 3.29   HEMOGLOBIN g/dL 13.1   HEMATOCRIT % 38.6   PLATELETS 10*3/mm3 244     Results from last 7 days   Lab Units 08/09/22  1457   SODIUM mmol/L 142   POTASSIUM mmol/L 4.2   CHLORIDE mmol/L 103   CO2 mmol/L 26.3   BUN mg/dL 10   CREATININE mg/dL 0.70   CALCIUM mg/dL 9.6   ALBUMIN g/dL 4.80   BILIRUBIN mg/dL 0.3   ALK PHOS U/L 86   ALT (SGPT) U/L 15   AST (SGOT) U/L 19   GLUCOSE mg/dL 92         Assessment & Plan    1. T2N1 invasive ductal carcinoma of the left breast, recently diagnosed.    -Noticed mass in the left breast x5 months  -Diagnostic mammogram showed 3.7 mm mass in the left breast at 4 o'clock position  -Ultrasound-showed 3.8 x 3.1 x 2.5 cm mass at 4 o'clock position with 2 abnormal axillary lymph nodes, larger lymph node being 1.4 cm  -Left breast biopsy and axillary lymph node biopsy October 29, 2019, invasive ductal carcinoma, moderately differentiated, grade 2, ER 85%, DC 10%, HER-2/merna 2+, HER-2 FISH negative  · CT scans from 11/13/19 show some asymmetric nodules along the lateral margin of the glandular tissue.  The 1.2x1.0 cm left axillary nodes, left supraclavicular, and left posterior cervical triangle nodes are suspicious for tamar metastases.  ·   bone scan from 11/15/19 which was negative.   ·  MRI breast from 11/18/19 which shows the biopsy-proven malignancy in the left breast centered at the 4:00 position.  The region of involvement measures up to 7.5 cm.  There is left axillary adenopathy with multiple irregular lymph nodes and loss of differentiation of the border of multiple lymph nodes which are suspicious for extranodal involvement.  ·    echocardiogram from 11/14/19 which was normal with an ejection fraction of 67%.    · INVITAE from 11/14/19 which was  negative.  · Given the size of her tumor and her medical history, patient will be given 4 cycles of Adriamycin/Cytoxan with Neulasta.  After completion of this treatment, patient will be started on 12 cycles of Taxol.  After the tumor shrinks in size, Dr. Forman will perform a lumpectomy.    · Following lumpectomy, patient will begin endocrine therapy.  · Dr. Dawn agrees chemotherapy will not aggravate her HIV condition.  Dr. Moreno spoke with Dr. Mohan at [place] who also agrees chemotherapy is the first step.   · 12/06/19: Cycle #1 of Adriamycin/Cytoxan  · US Breast from 01/29/20 after 4 cycles of Adriamycin Cytoxan chemotherapy which shows mild interval partial response to neoadjuvant chemotherapy.  The mass has decreased from 3.8 x 2.5 to 3.1 cm to 3.5 x 2.3 x 3.2 cm previously the left axillary lymph node has decreased from 1.4 x 0.7 x 1 cm to 1.1 x 0.6 x 0.9 cm.  · 01/31/20: Initiated cycle 1 Taxol  · April 24, 2020: Completed cycle 12 Taxol  · MRI breast 5/6/2020 shows significant reduction in the left axillary adenopathy as well as reduction in the index breast lesion.  · S/p left modified radical mastectomy with left axillary dissection.  Patient has 55 x 40 x 30 mm invasive carcinoma, grade 2 with DCIS 36 x 6 mm, high-grade with good margins and 8 out of 11 lymph nodes positive with largest metastasis being 5 mm with extracapsular extension.  And there is lymphatic space invasion  · Will follow-up in 2 weeks at which time we will plan to start endocrine therapy.  She has radiation oncology appointment with Dr. Spivey  · Patient is eligible for Zometa once every 6 months for 2 years.  But given that she is undergoing some work on her teeth we will await starting Zometa until after radiation is complete.  · XRT completed August 17, 2020  · Started letrozole August 2020 but has arthralgias  · October 6, 2020 had severe arthralgias secondary to letrozole.  We will switch to tamoxifen starting  November 2020  · 1/8/2021 screening mammogram in December 2020 as well as diagnostic mammogram which showed 0.4 cm suspicious calcification in the lower left which on biopsy is consistent with fat necrosis.  · 8/12/2021 tamoxifen placed on hold due to significant arthralgias and worsening fatigue.   · September 21, 2021: Patient's depression, mental fogginess has resolved since tamoxifen discontinued.  She has mild worsening fatigue her TSH and B12 levels are normal.  We will need to check iron studies  · Discussed about switching to Arimidex to see if she could tolerate that better  · Patient initiated Arimidex 9/21/2021.  · Returns 10/5/2021 complaining of significant generalized arthralgias.  Reviewed with Dr. Moreno.  We were initially going to try her on Cymbalta, but the patient is already taking Lexapro, and doing well on this.  We will therefore discontinue Arimidex.  We will give her a 6-week break and reevaluate things in 6 weeks.  Could try at that time switching to Aromasin.  · November 15, 2021: Patient continued with Arimidex even though she was asked to hold it last time.  Her arthralgias worsened.  Also the stiffness in the joints worsened significantly.  Discussed with patient that we need to hold Arimidex for 6 weeks and try Aromasin at her next appointment.  · December 28, 2021: ArthralgiaS improved after holding Arimidex.  · December 28, 2021: Started Aromasin  · August 9, 2022: Patient continues to have some mild arthralgias and stiffness and left hip pain but manageable with use of Aromasin and prefers to continue Aromasin.      2. HIV, followed by Dr. Dawn in infectious disease.  Well-controlled and is on a single medication with very low viral load. Has HIV since age 34.  · Reviewed her HIV medications and she is compliant  · Patient followed by infectious disease for her HIV and currently on treatment.  Stay  · Currently is followed by Dr. Dawn on anti-HIV medications     3.   Family history of breast cancer with mother having had breast cancer at age 60.  There is family history of uncle with prostate cancer.  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    4.  Arthritis with severe back pain followed by Dr. Bam Crandall  · Continue Percocet  · Patient has arthralgias which have worsened on Arimidex as well  · Patient seen by rheumatologist  · Patient has continued arthralgias and joint stiffness, currently on Aromasin and wants to continue it    5. Episodes of nosebleeds.  She has had these once a week for 3-4 years. She did not report this today.    6.  Vaginal dryness related to tamoxifen, ongoing.  Patient provided handouts for recommendations of a vaginal lubricant and vaginal moisturizer to use for hopeful improvement.  · Tamoxifen discontinued secondary to mood swings    7.  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  Patient continues on B6.  Chronic and stable    8. Fatigue  · 8/12/2021 patient seen as triage visit for worsening fatigue and diffuse arthralgias.  Reporting increased sleeping at night and still taking naps in the day.  Labs checked including thyroid studies which were unremarkable and B12 level actually elevated at greater than 2000.  Patient taking vitamin B supplements at that time.  Patient instructed to hold tamoxifen.  · 8/26/2021 reviewed today recent lab work for above with no evidence of thyroid dysfunction or vitamin B12 abnormality.  Patient notes no improvement being off tamoxifen for 2 weeks.  We will check a CA 15-3 to help further guide decision making.  If this is elevated we may then pursue imaging.  Discussed that she may just need to be off the tamoxifen longer to see improvement.  Patient does have underlying HIV as well and unclear if maybe some of her chronic medications for this could be  contributing to her current symptoms.  · Patient continues with fatigue but slightly improved now    PLAN:   · Continue Aromasin as she is tolerating this better than any other endocrine therapy  · S/p port removal  · Completed Zometa every 6 months x 2 years last treatment April 5, 2022  · Reviewed DEXA scan which shows osteoporosis  · We will plan on starting Prolia prior to which she will undergo dental clearance  · Follow-up with me in 3 months with labs and Prolia      Monitoring high risk medication  Ruby Moreno MD      Cc: ОЛЬГА Godoy  · MD Denilson Bishop MD

## 2022-08-10 PROBLEM — M81.8 OTHER OSTEOPOROSIS WITHOUT CURRENT PATHOLOGICAL FRACTURE: Status: ACTIVE | Noted: 2022-08-10

## 2022-08-10 PROBLEM — Z79.811 USE OF EXEMESTANE (AROMASIN): Status: ACTIVE | Noted: 2022-08-10

## 2022-08-12 ENCOUNTER — OFFICE VISIT (OUTPATIENT)
Dept: FAMILY MEDICINE CLINIC | Facility: CLINIC | Age: 62
End: 2022-08-12

## 2022-08-12 ENCOUNTER — TELEPHONE (OUTPATIENT)
Dept: PEDIATRICS | Facility: OTHER | Age: 62
End: 2022-08-12

## 2022-08-12 VITALS
WEIGHT: 144.8 LBS | DIASTOLIC BLOOD PRESSURE: 72 MMHG | SYSTOLIC BLOOD PRESSURE: 126 MMHG | OXYGEN SATURATION: 99 % | TEMPERATURE: 98 F | HEIGHT: 67 IN | BODY MASS INDEX: 22.73 KG/M2 | HEART RATE: 66 BPM

## 2022-08-12 DIAGNOSIS — E78.5 HYPERLIPIDEMIA, UNSPECIFIED HYPERLIPIDEMIA TYPE: ICD-10-CM

## 2022-08-12 DIAGNOSIS — F33.0 MILD EPISODE OF RECURRENT MAJOR DEPRESSIVE DISORDER: ICD-10-CM

## 2022-08-12 DIAGNOSIS — K52.9 CHRONIC DIARRHEA: ICD-10-CM

## 2022-08-12 DIAGNOSIS — G47.00 INSOMNIA, UNSPECIFIED TYPE: Primary | ICD-10-CM

## 2022-08-12 DIAGNOSIS — F41.9 ANXIETY: ICD-10-CM

## 2022-08-12 PROBLEM — R73.09 ELEVATED GLUCOSE: Status: RESOLVED | Noted: 2021-09-09 | Resolved: 2022-08-12

## 2022-08-12 PROBLEM — Z23 NEED FOR VACCINATION: Status: RESOLVED | Noted: 2018-04-05 | Resolved: 2022-08-12

## 2022-08-12 PROCEDURE — 99214 OFFICE O/P EST MOD 30 MIN: CPT | Performed by: FAMILY MEDICINE

## 2022-08-12 RX ORDER — CLONAZEPAM 0.5 MG/1
0.5 TABLET ORAL 2 TIMES DAILY PRN
Qty: 15 TABLET | Refills: 0 | Status: CANCELLED | OUTPATIENT
Start: 2022-08-12

## 2022-08-12 RX ORDER — ZOLPIDEM TARTRATE 10 MG/1
10 TABLET ORAL NIGHTLY
Qty: 90 TABLET | Refills: 0 | Status: SHIPPED | OUTPATIENT
Start: 2022-09-10 | End: 2022-09-15 | Stop reason: SDUPTHER

## 2022-08-12 NOTE — PROGRESS NOTES
"Chief Complaint  Insomnia (RX REFILLS), Hyperlipidemia, Migraine, and Anxiety (CLONAZEPAM AS NEEDED)     New patient visit to me  3-month follow-up for chronic insomnia and anxiety  And  1 year follow-up for hyperlipidemia, chronic diarrhea, and depression    Previous PCP here at [facility]/NP/Jacqueline Lora.  Last visit in June for chronic med refills only.  No medication changes made at that visit.  Urine Tox screen updated for compliance.    Currently sees many specialists, including pain management, hematologist/oncologist, and infectious disease doctor.  No recent medication changes made by any specialist.  Routine labs (CBC, CMP) are done by her infectious disease specialist, although no lipids have been checked in over 1 year and she is on Lipitor 20 mg daily.  Takes WelChol more for chronic diarrhea/\"chronic stomach pains.\"  Tries to maintain a healthy low-cholesterol diet and regular cardio exercise.  Weight remains stable.    No particular complaints or concerns today.  Just needs chronic med refills.  Compliant with and tolerates all medications without side effects.  Takes Ambien 10 mg nightly for chronic insomnia x many, many years.  Also, takes clonazepam on an as-needed basis along with her Lexapro for chronic ACACIA.  Currently does not need a refill for clonazepam, last refill was for quantity of 60 in December 2021.    Review of Systems   Constitutional: Negative for fever and unexpected weight change.   Respiratory: Negative for cough and shortness of breath.         Subjective          Richa Dolan presents to [facility]    Objective   Vital Signs:   Vitals:    08/12/22 0954   BP: 126/72   BP Location: Right arm   Patient Position: Sitting   Cuff Size: Adult   Pulse: 66   Temp: 98 °F (36.7 °C)   SpO2: 99%   Weight: 65.7 kg (144 lb 12.8 oz)   Height: 170.2 cm (67\")      Physical Exam  Vitals and nursing note reviewed.   Constitutional:       Appearance: Normal " appearance. She is well-developed.   HENT:      Head: Normocephalic and atraumatic.      Nose: Nose normal.   Eyes:      Conjunctiva/sclera: Conjunctivae normal.      Pupils: Pupils are equal, round, and reactive to light.   Neck:      Thyroid: No thyromegaly.   Cardiovascular:      Rate and Rhythm: Normal rate and regular rhythm.      Heart sounds: Normal heart sounds. No murmur heard.  Pulmonary:      Effort: Pulmonary effort is normal.      Breath sounds: Normal breath sounds.   Abdominal:      General: Abdomen is flat. Bowel sounds are normal. There is no distension.      Palpations: Abdomen is soft. There is no hepatomegaly, splenomegaly or mass.      Tenderness: There is no abdominal tenderness. There is no guarding or rebound.      Hernia: No hernia is present.   Musculoskeletal:         General: Normal range of motion.      Cervical back: Normal range of motion and neck supple.      Right lower leg: No edema.      Left lower leg: No edema.   Lymphadenopathy:      Cervical: No cervical adenopathy.   Skin:     General: Skin is warm.   Neurological:      General: No focal deficit present.      Mental Status: She is alert.   Psychiatric:         Mood and Affect: Mood normal.         Behavior: Behavior normal.         Thought Content: Thought content normal.         Judgment: Judgment normal.        Result Review :     Common labs    Common Labsle 4/5/22 4/5/22 4/5/22 4/5/22 5/23/22 5/23/22 8/9/22 8/9/22    1049 1049 1337 1337 1303 1303 1457 1457   Glucose  82    89  92   BUN  12    7 (A)  10   Creatinine  0.73    0.66  0.70   Sodium  141    141  142   Potassium  4.1    4.3  4.2   Chloride  103    103  103   Calcium  9.6    9.4  9.6   Albumin  4.60      4.80   Total Bilirubin  0.5      0.3   Alkaline Phosphatase  72      86   AST (SGOT)  20      19   ALT (SGPT)  15      15   WBC 4.10  5.45 5.5 5.42  5.43    Hemoglobin 13.2  14.3 14.2 14.1  13.1    Hematocrit 39.1  43.2 42.4 41.6  38.6    Platelets 229  264 259  227  244    (A) Abnormal value                A1C Last 3 Results    HGBA1C Last 3 Results 9/9/21   Hemoglobin A1C 5.2               Lab Results   Component Value Date    FERRITIN 100.60 09/21/2021    ZYUV97IJ 34.9 06/23/2020    FOLATE 13.9 08/01/2019               Assessment and Plan    Diagnoses and all orders for this visit:    1. Insomnia, unspecified type (Primary)  -controlled  Urine Tox screen/June 2022 and Juan J report both reviewed, appropriate.  Plan to refill Ambien as directed below for 90-day supply, next refill due on 9/10/2022.  However, discussed with patient in depth today that she will need to find a new PCP as I am not accepting new patients on controlled medications due to having a full panel size and being booked out for 4 to 6 months.  -     zolpidem (AMBIEN) 10 MG tablet; Take 1 tablet by mouth Every Night. for sleep  Dispense: 90 tablet; Refill: 0    2. Anxiety  -controlled  Continue Lexapro 10 mg daily, will refill upon request  Also, takes clonazepam on rare occasions.  No refill requested at this time.    3. Mild episode of recurrent major depressive disorder (HCC)  -controlled, continue Lexapro 10 mg daily    4. Hyperlipidemia, unspecified hyperlipidemia type  -controlled?  Overdue to check lipids, TSH.  CMP up-to-date and therapeutic.  If LDL cholesterol remains therapeutic then plan to continue Lipitor 20 mg daily.  Will refill upon request.  Needs low-carb/low calorie/low cholesterol diet and increase cardio exercise to greater than 150 minutes weekly  -     Lipid Panel With LDL / HDL Ratio  -     TSH    5. Chronic diarrhea  -stable  Plan to continue WelChol as prescribed.        Follow Up   Return in about 3 months (around 11/12/2022) for Recheck, will need a new provider, not accepting new patients on controlled meds.  Patient was given instructions and counseling regarding her condition or for health maintenance advice. Please see specific information pulled into the AVS if  appropriate.

## 2022-08-12 NOTE — PATIENT INSTRUCTIONS
Recommend low fat/low calorie diet and exercise greater than 150 minutes of cardio per week.      Discussed with patient about finding a new PCP, as I am not excepting new patients on controlled medications due to already having a more than a full panel

## 2022-08-13 LAB
CHOLEST SERPL-MCNC: 127 MG/DL (ref 100–199)
HDLC SERPL-MCNC: 62 MG/DL
LDLC SERPL CALC-MCNC: 54 MG/DL (ref 0–99)
LDLC/HDLC SERPL: 0.9 RATIO (ref 0–3.2)
TRIGL SERPL-MCNC: 46 MG/DL (ref 0–149)
TSH SERPL DL<=0.005 MIU/L-ACNC: 2.51 UIU/ML (ref 0.45–4.5)
VLDLC SERPL CALC-MCNC: 11 MG/DL (ref 5–40)

## 2022-08-16 ENCOUNTER — OFFICE VISIT (OUTPATIENT)
Dept: INTERNAL MEDICINE | Age: 62
End: 2022-08-16

## 2022-08-16 VITALS
WEIGHT: 144 LBS | OXYGEN SATURATION: 98 % | HEART RATE: 70 BPM | SYSTOLIC BLOOD PRESSURE: 130 MMHG | DIASTOLIC BLOOD PRESSURE: 80 MMHG | HEIGHT: 67 IN | BODY MASS INDEX: 22.6 KG/M2 | TEMPERATURE: 97.8 F

## 2022-08-16 DIAGNOSIS — G89.29 CHRONIC RIGHT SI JOINT PAIN: ICD-10-CM

## 2022-08-16 DIAGNOSIS — Z21 HIV POSITIVE LONG-TERM NON-PROGRESSOR: ICD-10-CM

## 2022-08-16 DIAGNOSIS — Z85.3 HISTORY OF LEFT BREAST CANCER: ICD-10-CM

## 2022-08-16 DIAGNOSIS — Z79.899 HIGH RISK MEDICATION USE: ICD-10-CM

## 2022-08-16 DIAGNOSIS — Z76.89 ENCOUNTER TO ESTABLISH CARE: Primary | ICD-10-CM

## 2022-08-16 DIAGNOSIS — M53.3 CHRONIC RIGHT SI JOINT PAIN: ICD-10-CM

## 2022-08-16 PROCEDURE — 99213 OFFICE O/P EST LOW 20 MIN: CPT

## 2022-08-16 NOTE — PROGRESS NOTES
"    I N T E R N A L  M E D I C I N E  Miri Montesinos, APRN    ENCOUNTER DATE:  08/16/2022    Richa Dolan / 61 y.o. / female      CHIEF COMPLAINT / REASON FOR OFFICE VISIT     Establish Care and Hypertension      ASSESSMENT & PLAN     Diagnoses and all orders for this visit:    1. Encounter to establish care (Primary)    2. History of left breast cancer    3. HIV positive long-term non-progressor (HCC)    4. Chronic right SI joint pain  -     Ambulatory Referral to Physical Therapy Evaluate and treat    5. High risk medication use  -     Compliance Drug Analysis, Ur - Urine, Clean Catch         SUMMARY/DISCUSSION  • Agreeable for PT.  • Will need to have MRI completed, as ordered by Dr. Mora.    • Aware to go to ER for any worsening back pain, numbness, tingling, weakness, bowel/ bladder changes, saddle anesthesia.        Next Appointment with me: Visit date not found    Return in about 2 months (around 10/16/2022) for Annual physical.      VITAL SIGNS     Visit Vitals  /80   Pulse 70   Temp 97.8 °F (36.6 °C) (Temporal)   Ht 170.2 cm (67.01\")   Wt 65.3 kg (144 lb)   SpO2 98%   BMI 22.55 kg/m²             Wt Readings from Last 3 Encounters:   08/16/22 65.3 kg (144 lb)   08/12/22 65.7 kg (144 lb 12.8 oz)   08/09/22 64.9 kg (143 lb)     Body mass index is 22.55 kg/m².        MEDICATIONS AT THE TIME OF OFFICE VISIT     Current Outpatient Medications on File Prior to Visit   Medication Sig Dispense Refill   • Abacavir-Dolutegravir-Lamivud (Triumeq) 600- MG per tablet Take 1 tablet by mouth Daily. 90 tablet 3   • AIMOVIG 140 MG/ML solution auto-injector Inject 140 mg under the skin into the appropriate area as directed Every 30 (Thirty) Days. HAS NOT TAKEN IN SEVERAL MONTHS  11   • Aspirin 81 MG capsule Take 1 tablet by mouth Daily.     • atorvastatin (LIPITOR) 20 MG tablet TAKE 1 TABLET BY MOUTH EVERYDAY AT BEDTIME (Patient taking differently: Take 20 mg by mouth Every Night.) 90 tablet 1   • baclofen " (LIORESAL) 10 MG tablet Take 10 mg by mouth 2 (Two) Times a Day.  2   • clonazePAM (KlonoPIN) 0.5 MG tablet Take 1 tablet by mouth 2 (Two) Times a Day As Needed for Anxiety. 60 tablet 0   • colesevelam (WELCHOL) 625 MG tablet TAKE 2 TABLETS BY MOUTH EVERY DAY (Patient taking differently: Take 1,250 mg by mouth Daily.) 180 tablet 1   • escitalopram (LEXAPRO) 10 MG tablet TAKE 1 TABLET BY MOUTH EVERY DAY (Patient taking differently: Take 10 mg by mouth Daily.) 90 tablet 3   • exemestane (AROMASIN) 25 MG chemo tablet Take 25 mg by mouth Daily.     • fluticasone (FLONASE) 50 MCG/ACT nasal spray 2 sprays into the nostril(s) as directed by provider Daily. 3 mL 3   • Loratadine (CLARITIN PO) Take 10 mg by mouth Daily.     • Nurtec 75 MG dispersible tablet Take 1 tablet by mouth Daily As Needed.     • oxyCODONE-acetaminophen (PERCOCET)  MG per tablet Take 1 tablet by mouth 4 (Four) Times a Day.     • vitamin B-12 (CYANOCOBALAMIN) 1000 MCG tablet Take 1,000 mcg by mouth Daily.     • vitamin B-6 (pyridoxine) 50 MG tablet Take 1 tablet by mouth Daily. 90 tablet 2   • [START ON 9/10/2022] zolpidem (AMBIEN) 10 MG tablet Take 1 tablet by mouth Every Night. for sleep 90 tablet 0     No current facility-administered medications on file prior to visit.        HISTORY OF PRESENT ILLNESS     Followed by pain management, Dr. Crandall, for chronic lumbar back pain with radiation to R posterior hip, buttock area.  Prescribed Percocet, Baclofen.  Klonopin PRN.  Receives trigger point injections every 6 weeks.  Denies numbness, tingling, weakness, saddle anesthesia.  Does report 6 month history of constipation.      Followed by hematology/ oncology, Dr. Moreno, for breast CA hx every 6 months.  S/P lumpectomy.  Exemestane.  Daily baby asprin.    Followed by infectious disease, Dr. Dawn, for HIV hx.  On Triumeq.      Migraines: Aimovig once monthly.  Nurtec PRN.      HLD: Atorvastin 20 mg daily and Welchol.    Depression and  "anxiety: Stable on Lexapro 10 mg daily.    Insomnia: Ambien 10 mg nightly.      Followed by orthopedic surgery, Dr. Mora, for which he ordered XR lumbar spine (results unavailable at this time) and MRI lumbar spine which pt has not yet had done.  Dr. Mora's note from September 21, 2021 did note plain XR of \"lumbar spine are fairly unremarkable very well-maintained lumbar lordosis with no obvious metastatic lesions.\"  Recommend MRI for further evaluation and to rule out any metastatic change, with and without contrast.  Also, recommend possible epidural or physical therapy.        Patient Care Team:  Miri Montesinos APRN as PCP - General (Family Medicine)  Karley, Bam Medrano MD as Consulting Physician (Pain Medicine)  Denilson Dawn MD as Consulting Physician (Infectious Diseases)  Ruby Moreno MD as Consulting Physician (Hematology and Oncology)  Mag Spivey MD (Inactive) as Consulting Physician (Radiation Oncology)  Yusuf Garcia MD as Attending Provider (Plastic Surgery)  Luiz Mora MD as Surgeon (Orthopedic Surgery)    REVIEW OF SYSTEMS     Review of Systems   Constitutional: Negative for chills, fever and unexpected weight change.   Respiratory: Negative for cough, chest tightness and shortness of breath.    Cardiovascular: Negative for chest pain, palpitations and leg swelling.   Gastrointestinal: Positive for constipation (Chronic x 6 months).   Genitourinary: Negative for difficulty urinating.   Musculoskeletal: Positive for back pain (Chronic lumbar with radiation to R buttock). Negative for gait problem and joint swelling.   Neurological: Negative for dizziness, weakness, light-headedness and headaches.   Psychiatric/Behavioral: The patient is not nervous/anxious.           PHYSICAL EXAMINATION     Physical Exam  Vitals reviewed.   Constitutional:       General: She is not in acute distress.     Appearance: Normal appearance. She is not ill-appearing, " toxic-appearing or diaphoretic.   HENT:      Head: Normocephalic and atraumatic.   Cardiovascular:      Rate and Rhythm: Normal rate and regular rhythm.      Heart sounds: Normal heart sounds.   Pulmonary:      Effort: Pulmonary effort is normal.      Breath sounds: Normal breath sounds.   Musculoskeletal:         General: No swelling or deformity.      Lumbar back: Tenderness (R SI) present.      Right lower leg: No edema.      Left lower leg: No edema.   Neurological:      Mental Status: She is alert and oriented to person, place, and time. Mental status is at baseline.   Psychiatric:         Mood and Affect: Mood normal.         Behavior: Behavior normal.         Thought Content: Thought content normal.         Judgment: Judgment normal.           REVIEWED DATA     Labs:           Imaging:            Medical Tests:           Summary of old records / correspondence / consultant report:           Request outside records:

## 2022-08-17 ENCOUNTER — TELEPHONE (OUTPATIENT)
Dept: INTERNAL MEDICINE | Age: 62
End: 2022-08-17

## 2022-08-19 ENCOUNTER — TELEPHONE (OUTPATIENT)
Dept: INTERNAL MEDICINE | Age: 62
End: 2022-08-19

## 2022-08-19 ENCOUNTER — TELEPHONE (OUTPATIENT)
Dept: ORTHOPEDIC SURGERY | Facility: CLINIC | Age: 62
End: 2022-08-19

## 2022-08-19 NOTE — TELEPHONE ENCOUNTER
----- Message from ОЛЬГА Rincon sent at 8/19/2022 12:29 PM EDT -----  Thank you!  Are you able to call and let the pt know please?     ----- Message -----  From: Bibiana Izquierdo  Sent: 8/19/2022  12:27 PM EDT  To: ОЛЬГА Rincon    She would need to go through Dr. Mora's office, since he ordered it.   1) She needs to make sure he still wants her to have it  2) I believe he does those in the office      I hope that helps.   ----- Message -----  From: Miri Montesinos APRN  Sent: 8/19/2022  10:22 AM EDT  To: Bibiana Izquierdo    This pt has an outstanding order for MRI Lumbar Spine With & Without Contrast [[id number]] (Order [id number]).  She says she is willing to schedule, but isn't sure how to go about doing so.  It was placed last September 2021.      Is the order still active?  If so, what information can we give to pt to help schedule?    Thank you.

## 2022-08-19 NOTE — TELEPHONE ENCOUNTER
PT notified to call Dr. Mora's office since he is the ordering provider to see if he still wants her to have it, since the order is almost a year old.     PT understood and will call his office.

## 2022-08-19 NOTE — TELEPHONE ENCOUNTER
Caller: Richa Dolan    Relationship: Self    Best call back number: [phone]     What is the best time to reach you: ANYTIME    Who are you requesting to speak with (clinical staff, provider,  specific staff member): PROVIDER    Do you know the name of the person who called: CRISTINA BARBER    What was the call regarding: PATIENT RETURNING TO CRISTINA BARBER CALL SHE IS UNSURE WHAT THE CALL WAS ABOUT    Do you require a callback: YES

## 2022-08-19 NOTE — TELEPHONE ENCOUNTER
Personally discussed outstanding MRI order with pt, and importance for having completed due to personal history of breast CA.  She states she would like to have done and plans to do so, but has been unable to complete up until this point due to insurance concerns/ and family health care concerns.

## 2022-08-19 NOTE — TELEPHONE ENCOUNTER
Caller: Richa Dolan    Relationship to patient: Self    Best call back number: [phone]    Chief complaint: LOW BACK PAIN    Type of visit: MRI    Requested date: ASAP     Additional notes: PATIENT STATES THAT DR. TUBBS PUT IN AN ORDER FOR AN MRI AWHILE AGO, BUT DUE TO HER MOTHER'S HEALTH ISSUES SHE NEVER HAD IT DONE. SHE IS ASKING FOR ASSISTANCE IN GETTING THIS SCHEDULED.

## 2022-08-22 LAB — DRUGS UR: NORMAL

## 2022-08-26 DIAGNOSIS — J30.2 SEASONAL ALLERGIC RHINITIS, UNSPECIFIED TRIGGER: ICD-10-CM

## 2022-08-26 NOTE — TELEPHONE ENCOUNTER
HUB SENT REQUEST TO INCORRECT OFFICE     Rx Refill Note  Requested Prescriptions     Pending Prescriptions Disp Refills   • fluticasone (FLONASE) 50 MCG/ACT nasal spray [Pharmacy Med Name: FLUTICASONE PROP 50 MCG SPRAY] 48 mL 3     Si SPRAYS INTO THE NOSTRIL(S) AS DIRECTED BY PROVIDER DAILY.      Last office visit with prescribing clinician: 2022      Next office visit with prescribing clinician: Visit date not found            Tamiko Hernandes MA/LMR  22, 16:35 EDT

## 2022-08-29 RX ORDER — FLUTICASONE PROPIONATE 50 MCG
2 SPRAY, SUSPENSION (ML) NASAL DAILY
Qty: 48 ML | Refills: 3 | Status: SHIPPED | OUTPATIENT
Start: 2022-08-29

## 2022-09-01 ENCOUNTER — HOSPITAL ENCOUNTER (OUTPATIENT)
Dept: MRI IMAGING | Facility: HOSPITAL | Age: 62
Discharge: HOME | End: 2022-09-01
Admitting: ORTHOPAEDIC SURGERY

## 2022-09-01 DIAGNOSIS — M54.50 LUMBAR PAIN: ICD-10-CM

## 2022-09-01 DIAGNOSIS — Z85.3 HISTORY OF BREAST CANCER: ICD-10-CM

## 2022-09-01 PROCEDURE — 0 GADOBENATE DIMEGLUMINE 529 MG/ML SOLUTION: Performed by: ORTHOPAEDIC SURGERY

## 2022-09-01 PROCEDURE — 72158 MRI LUMBAR SPINE W/O & W/DYE: CPT

## 2022-09-01 PROCEDURE — 82565 ASSAY OF CREATININE: CPT

## 2022-09-01 PROCEDURE — A9577 INJ MULTIHANCE: HCPCS | Performed by: ORTHOPAEDIC SURGERY

## 2022-09-01 RX ADMIN — GADOBENATE DIMEGLUMINE 13.06 ML: 529 INJECTION, SOLUTION INTRAVENOUS at 13:06

## 2022-09-02 LAB — CREAT BLDA-MCNC: 0.7 MG/DL (ref 0.6–1.3)

## 2022-09-14 DIAGNOSIS — G47.00 INSOMNIA, UNSPECIFIED TYPE: ICD-10-CM

## 2022-09-14 RX ORDER — ZOLPIDEM TARTRATE 10 MG/1
10 TABLET ORAL NIGHTLY
Qty: 30 TABLET | Refills: 0 | Status: CANCELLED | OUTPATIENT
Start: 2022-09-14

## 2022-09-15 ENCOUNTER — TREATMENT (OUTPATIENT)
Dept: PHYSICAL THERAPY | Facility: CLINIC | Age: 62
End: 2022-09-15

## 2022-09-15 DIAGNOSIS — G89.29 CHRONIC SI JOINT PAIN: Primary | ICD-10-CM

## 2022-09-15 DIAGNOSIS — M53.3 CHRONIC SI JOINT PAIN: Primary | ICD-10-CM

## 2022-09-15 DIAGNOSIS — G47.00 INSOMNIA, UNSPECIFIED TYPE: ICD-10-CM

## 2022-09-15 PROCEDURE — 97110 THERAPEUTIC EXERCISES: CPT | Performed by: PHYSICAL THERAPIST

## 2022-09-15 PROCEDURE — 97112 NEUROMUSCULAR REEDUCATION: CPT | Performed by: PHYSICAL THERAPIST

## 2022-09-15 PROCEDURE — 97161 PT EVAL LOW COMPLEX 20 MIN: CPT | Performed by: PHYSICAL THERAPIST

## 2022-09-15 RX ORDER — ABACAVIR SULFATE, DOLUTEGRAVIR SODIUM, LAMIVUDINE 600; 50; 300 MG/1; MG/1; MG/1
TABLET, FILM COATED ORAL
Qty: 90 TABLET | Refills: 3 | Status: SHIPPED | OUTPATIENT
Start: 2022-09-15 | End: 2023-04-06 | Stop reason: SDUPTHER

## 2022-09-15 NOTE — TELEPHONE ENCOUNTER
Last Ov- 8/16/2022   Nex OV- 10/17/2022     [facility]-8-16-22  [id number]-8-22-22

## 2022-09-15 NOTE — PROGRESS NOTES
Physical Therapy Initial Evaluation and Plan of Care    Patient: Richa Dolan   : 1960  Diagnosis/ICD-10 Code:  Chronic SI joint pain [M53.3, G89.29]  Referring practitioner: ОЛЬГА Rincon  Date of Initial Visit: 9/15/2022  Today's Date: 9/15/2022  Patient seen for 1 session         Visit Diagnoses:    ICD-10-CM ICD-9-CM   1. Chronic SI joint pain  M53.3 724.6    G89.29 338.29         Subjective Questionnaire: LEFS 42      Subjective Evaluation    History of Present Illness  Mechanism of injury: Patent reports that her back has bothered her off and on for about 3 years.  Patient denies an injury 3 years ago.  Patient got an injection in the low back last year, which helped for a limited amount of time.  Reports having trouble sleeping at night.    History of breast cancer in 2019.      Patient Occupation: Retired Pain  Current pain ratin  At worst pain ratin  Location: right lumbar spine  Quality: dull ache and throbbing  Relieving factors: heat and medications  Exacerbated by: sitting or standing for long periods.  Progression: worsening    Diagnostic Tests  MRI studies: abnormal (disc protrusion at L3-4)             Objective          Postural Observations    Additional Postural Observation Details  Normal sit to stand.  Patient ambulates with a normal gait pattern.    Palpation     Additional Palpation Details  TTP to the right PSIS and bilateral lumbar pvms.    Active Range of Motion     Lumbar   Flexion: Active lumbar flexion: WNL. with pain  Extension: Active lumbar extension: WNL. with pain  Left lateral flexion: Active left lumbar lateral flexion: WNL. with pain  Right lateral flexion: Active right lumbar lateral flexion: WNL. with pain    Strength/Myotome Testing     Left Hip   Planes of Motion   Flexion: 4  Abduction: 4  Adduction: 4    Right Hip   Planes of Motion   Flexion: 4  Abduction: 4  Adduction: 4    Left Ankle/Foot   Dorsiflexion: 5    Right Ankle/Foot   Dorsiflexion:  5    Tests     Additional Tests Details  + TIM on the right for LBP          Assessment & Plan     Assessment  Impairments: activity intolerance, impaired physical strength, lacks appropriate home exercise program and pain with function    Assessment details: Patient presents with c/o pain, TTP, decreased strength and positive special testing which is limiting her ability to perform ADL'S.    Barriers to therapy: none  Prognosis: good  Prognosis details: STG's to be met by 2 weeks  1)  Independent with HEP  2)  Decrease pain by 50% or more  3)  No pain with AROM of the lumbar spine    LTG's to be met by 4 weeks  1)  Independent with HEP progression  2)  Decrease pain by 75% or more  3)  Increase strength for the LE to 4+/5  4)  Negative special testing  5)  No TTP present  6)  Patient to perform ADL'S without limitations       Plan  Therapy options: will be seen for skilled therapy services  Planned therapy interventions: strengthening, stretching, therapeutic activities, home exercise program and neuromuscular re-education  Frequency: 2x week  Duration in weeks: 4  Treatment plan discussed with: patient            Timed:         Manual Therapy:    0     mins  62939;     Therapeutic Exercise:    15     mins  52443;     Neuromuscular Louann:    8    mins  14996;    Therapeutic Activity:     0     mins  93828;     Gait Trainin     mins  03569;     Ultrasound:     0     mins  69679;          Un-Timed:  Electrical Stimulation:    0     mins  59004 ( );    Low Eval     15     Mins  73381  Mod Eval     0     Mins  83272  High Eval                       0     Mins  12794        Timed Treatment:   23   mins   Total Treatment:     38   mins          PT: Keshawn Blanca PT     [place] License [id number]  Electronically signed by Keshawn Blanca PT, 09/15/22, 1:25 PM EDT    Certification Period: 9/15/2022 thru 2022  I certify that the therapy services are furnished while this patient is under my care.  The  services outlined above are required by this patient, and will be reviewed every 90 days.    Miri Montesinos Aprn  [address] [address]   NPI: [id number]      Keshawn Blanca, PT   License number: [id number]        Physician Signature:__________________________________________________    PHYSICIAN: Miri Montesinos APRN      DATE:     Please sign and return via fax to [email or web address] . Thank you, [facility].

## 2022-09-16 ENCOUNTER — TELEPHONE (OUTPATIENT)
Dept: INTERNAL MEDICINE | Age: 62
End: 2022-09-16

## 2022-09-16 DIAGNOSIS — G47.00 INSOMNIA, UNSPECIFIED TYPE: Primary | ICD-10-CM

## 2022-09-16 RX ORDER — ZOLPIDEM TARTRATE 5 MG/1
5 TABLET ORAL NIGHTLY PRN
Qty: 30 TABLET | Refills: 0 | Status: SHIPPED | OUTPATIENT
Start: 2022-09-16 | End: 2022-10-17 | Stop reason: SDUPTHER

## 2022-09-16 NOTE — TELEPHONE ENCOUNTER
Personally called pt.  Discussed with pt at length regarding concerns for continuing on Ambien, in view of black box warning, and her being prescribed Percocet and clonazepam.  She has been on Ambien for 12-15 years.  She has tried trazodone without improvement.  She has never had a sleep study.  Reports lifelong difficulty initiating sleep, but once asleep, she is usually able to obtain 7-8 hours nightly.  She is agreeable to work towards titration off of Ambien and will start with 5 mg nightly.  She was educated on sleep hygiene measures.  She is agreeable for sleep medicine referral.

## 2022-09-16 NOTE — TELEPHONE ENCOUNTER
Discussed with APRN. Work toward goal of limiting zolpidem use. Lower dose to 5 mg (was on 10 mg).

## 2022-09-20 ENCOUNTER — TELEPHONE (OUTPATIENT)
Dept: ORTHOPEDIC SURGERY | Facility: CLINIC | Age: 62
End: 2022-09-20

## 2022-09-20 ENCOUNTER — TREATMENT (OUTPATIENT)
Dept: PHYSICAL THERAPY | Facility: CLINIC | Age: 62
End: 2022-09-20

## 2022-09-20 DIAGNOSIS — M53.3 CHRONIC SI JOINT PAIN: Primary | ICD-10-CM

## 2022-09-20 DIAGNOSIS — G89.29 CHRONIC SI JOINT PAIN: Primary | ICD-10-CM

## 2022-09-20 PROCEDURE — 97112 NEUROMUSCULAR REEDUCATION: CPT | Performed by: PHYSICAL THERAPIST

## 2022-09-20 PROCEDURE — 97110 THERAPEUTIC EXERCISES: CPT | Performed by: PHYSICAL THERAPIST

## 2022-09-20 NOTE — PROGRESS NOTES
Physical Therapy Daily Treatment Note      Patient: Richa Dolan   : 1960  Referring practitioner: ОЛЬГА Rincon  Date of Initial Visit: Type: THERAPY  Noted: 9/15/2022  Today's Date: 2022  Patient seen for 2 sessions       Visit Diagnoses:    ICD-10-CM ICD-9-CM   1. Chronic SI joint pain  M53.3 724.6    G89.29 338.29         Subjective Patient reports soreness in the right hip region, rates the pain at 6/10.    Objective   See Exercise, Manual, and Modality Logs for complete treatment.       Assessment/Plan  Subjective reports are slightly improved from the previous visit (7/10).  Added PPT, hip abd and clams, in addition to progressing the previous strengthening exercises.  Patient tolerated the exercise routine without an increase in pain.  Plan to progress the core stabilization exercises.      Timed:         Manual Therapy:    0     mins  79469;     Therapeutic Exercise:    24     mins  39273;     Neuromuscular Louann:    8    mins  86570;    Therapeutic Activity:     0     mins  84519;     Gait Trainin     mins  35320;     Ultrasound:     0     mins  79577;    Work Conditioning      __0_  mins  62306      Un-Timed:  Electrical Stimulation:    0     mins  47669 ( );        Timed Treatment:   32   mins   Total Treatment:     32   mins    Keshawn Blanca, PT  [place] License: [id number]

## 2022-09-20 NOTE — TELEPHONE ENCOUNTER
Caller: PATIENT     Relationship: SELF     Best call back number: [phone]    Caller requesting test results: PATIENT       What test was performed: MRI     When was the test performed: 09/02/22     Where was the test performed: [facility]     Additional notes:  PT. STATES THAT SHE HAD AN MRI OF LUMBAR SPINE DONE 09/02/22 AT [facility].   CALLING TO SEE IF DR. TUBBS HAS REVIEWED THE RESULTS.  PLEASE CALL TO ADVISE.

## 2022-09-20 NOTE — TELEPHONE ENCOUNTER
Please inform her that I reviewed the MRI personally.  There is a small disc bulge on the right side at L3-4 but that does not normally cause hip pain.  There is no evidence whatsoever of metastatic change so that is a relief.  If she is still hurting as much as before it could be worthwhile trying an epidural steroid injection to see if that provides relief.  We talked about this in the office.  If she is feeling better than at this point we could start with physical therapy first.  Either way have her follow-up in 4 to 6 weeks if symptoms persist.

## 2022-09-22 ENCOUNTER — TREATMENT (OUTPATIENT)
Dept: PHYSICAL THERAPY | Facility: CLINIC | Age: 62
End: 2022-09-22

## 2022-09-22 DIAGNOSIS — M53.3 CHRONIC SI JOINT PAIN: Primary | ICD-10-CM

## 2022-09-22 DIAGNOSIS — G89.29 CHRONIC SI JOINT PAIN: Primary | ICD-10-CM

## 2022-09-22 PROCEDURE — 97110 THERAPEUTIC EXERCISES: CPT | Performed by: PHYSICAL THERAPIST

## 2022-09-22 NOTE — PROGRESS NOTES
Physical Therapy Daily Treatment Note      Patient: Richa Dolan   : 1960  Referring practitioner: ОЛЬГА Rincon  Date of Initial Visit: Type: THERAPY  Noted: 9/15/2022  Today's Date: 2022  Patient seen for 3 sessions       Visit Diagnoses:    ICD-10-CM ICD-9-CM   1. Chronic SI joint pain  M53.3 724.6    G89.29 338.29         Subjective Patient reports that the back was hurting bad the night after PT (7/10); currently rates the pain at 4/10.    Objective   See Exercise, Manual, and Modality Logs for complete treatment.       Assessment/Plan  Subjective reports are improved from the previous visit (6/10).  The KT was not performed today secondary to it still being on from the last visit.  The exercise routine was not progressed secondary to increased pain after the last session.  Patient had minimal pain with the glut sets, therefore was instructed to decrease the amount she was glenn the muscles and the pain decreased.      Timed:         Manual Therapy:    0     mins  42776;     Therapeutic Exercise:    23     mins  33824;     Neuromuscular Louann:    0    mins  13200;    Therapeutic Activity:     0     mins  34333;     Gait Trainin     mins  73291;     Ultrasound:     0     mins  73845;    Work Conditioning      __0_  mins  51870      Un-Timed:  Electrical Stimulation:    0     mins  68138 ( );        Timed Treatment:   23   mins   Total Treatment:     23   mins    Keshawn Blanca, PT  [place] License: [id number]

## 2022-09-23 NOTE — TELEPHONE ENCOUNTER
Called patient back and she will do PT for a while and call me back if she decides to get the Epidural injection

## 2022-09-23 NOTE — TELEPHONE ENCOUNTER
I called and spoke to Mrs Dolan and she states she has already started PT. She was asking about the Epidural when she lost  her cell service

## 2022-09-23 NOTE — TELEPHONE ENCOUNTER
HUB AGENT ATTEMPTED CLINICAL WARM TRANSFER - NO ANSWER     Caller: Richa Dolan    Relationship: Self    Best call back number: [phone]     What is the best time to reach you: ANY TIME     Do you know the name of the person who called: PATIENT AWARE SHE WAS SPEAKING w/MAURICE SUBRAMANIAN     What was the call regarding: HAD DISCUSSED ALREADY STARTING PT & WAS ASKING ABOUT AN EPIDURAL WHEN CALL DROPPED     Do you require a callback: YES, PLEASE CALL / LEAVE VMAIL TO FINISH CONVERSATION     THANKS

## 2022-09-27 ENCOUNTER — TREATMENT (OUTPATIENT)
Dept: PHYSICAL THERAPY | Facility: CLINIC | Age: 62
End: 2022-09-27

## 2022-09-27 DIAGNOSIS — G89.29 CHRONIC SI JOINT PAIN: Primary | ICD-10-CM

## 2022-09-27 DIAGNOSIS — M53.3 CHRONIC SI JOINT PAIN: Primary | ICD-10-CM

## 2022-09-27 PROCEDURE — 97112 NEUROMUSCULAR REEDUCATION: CPT | Performed by: PHYSICAL THERAPIST

## 2022-09-27 PROCEDURE — 97110 THERAPEUTIC EXERCISES: CPT | Performed by: PHYSICAL THERAPIST

## 2022-09-27 NOTE — PROGRESS NOTES
Physical Therapy Daily Treatment Note      Patient: Richa Dolan   : 1960  Referring practitioner: ОЛЬГА Rincon  Date of Initial Visit: Type: THERAPY  Noted: 9/15/2022  Today's Date: 2022  Patient seen for 4 sessions       Visit Diagnoses:    ICD-10-CM ICD-9-CM   1. Chronic SI joint pain  M53.3 724.6    G89.29 338.29       Subjective Patient reports that the back is ok, rates the pain at 3/10.    Objective   See Exercise, Manual, and Modality Logs for complete treatment.       Assessment/Plan  Subjective reports are improved from the previous visit (4/10).  Added bridges, standing hip abduction and standing hip extension, in addition to progressing the previous strengthening exercises.  Patient tolerated the increase in the routine without an increase in pain.  Continued with the KT secondary to the patient noting an improvement with it on.      Timed:         Manual Therapy:    0     mins  78867;     Therapeutic Exercise:    26     mins  09787;    Neuromuscular Louann:    8    mins  35191;    Therapeutic Activity:     0     mins  67073;     Gait Trainin     mins  03859;     Ultrasound:     0     mins  66811;    Work Conditioning      __0_  mins  13674      Un-Timed:  Electrical Stimulation:    0     mins  81956 ( );        Timed Treatment:   34   mins   Total Treatment:     34   mins    Keshawn Blanca, PT  [place] License: [id number]

## 2022-09-29 ENCOUNTER — TREATMENT (OUTPATIENT)
Dept: PHYSICAL THERAPY | Facility: CLINIC | Age: 62
End: 2022-09-29

## 2022-09-29 DIAGNOSIS — G89.29 CHRONIC SI JOINT PAIN: Primary | ICD-10-CM

## 2022-09-29 DIAGNOSIS — M53.3 CHRONIC SI JOINT PAIN: Primary | ICD-10-CM

## 2022-09-29 PROCEDURE — 97110 THERAPEUTIC EXERCISES: CPT | Performed by: PHYSICAL THERAPIST

## 2022-09-29 NOTE — PROGRESS NOTES
Physical Therapy Daily Treatment Note      Patient: Richa Dolan   : 1960  Referring practitioner: ОЛЬГА Rincon  Date of Initial Visit: Type: THERAPY  Noted: 9/15/2022  Today's Date: 2022  Patient seen for 5 sessions       Visit Diagnoses:    ICD-10-CM ICD-9-CM   1. Chronic SI joint pain  M53.3 724.6    G89.29 338.29         Subjective Patient reports that she is hurting today, rates the pain at /10.    Objective   See Exercise, Manual, and Modality Logs for complete treatment.       Assessment/Plan  Subjective reports of pain are elevated from the previous visit (3/10).  The KT was not performed today secondary to it still being on from the previous visit.  Added prayer stretch to help with the tightness in the lumbar spine.  Patient performed the exercise routine with minimal reports of pain, but stated that it was tolerable.      Timed:         Manual Therapy:    0     mins  33057;     Therapeutic Exercise:    33     mins  63603;     Neuromuscular Louann:    0    mins  01707;    Therapeutic Activity:     0     mins  75240;     Gait Trainin     mins  77395;     Ultrasound:     0     mins  77402;    Work Conditioning      __0_  mins  65259      Un-Timed:  Electrical Stimulation:    0     mins  58562 ( );        Timed Treatment:   33   mins   Total Treatment:     33   mins    Keshawn Blanca, PT  [place] License: [id number]

## 2022-10-04 ENCOUNTER — OFFICE VISIT (OUTPATIENT)
Dept: INFECTIOUS DISEASES | Facility: CLINIC | Age: 62
End: 2022-10-04

## 2022-10-04 ENCOUNTER — TREATMENT (OUTPATIENT)
Dept: PHYSICAL THERAPY | Facility: CLINIC | Age: 62
End: 2022-10-04

## 2022-10-04 ENCOUNTER — LAB (OUTPATIENT)
Dept: LAB | Facility: HOSPITAL | Age: 62
End: 2022-10-04

## 2022-10-04 VITALS
SYSTOLIC BLOOD PRESSURE: 150 MMHG | RESPIRATION RATE: 16 BRPM | HEART RATE: 67 BPM | DIASTOLIC BLOOD PRESSURE: 91 MMHG | HEIGHT: 67 IN | TEMPERATURE: 97.5 F | BODY MASS INDEX: 22.22 KG/M2 | WEIGHT: 141.6 LBS

## 2022-10-04 DIAGNOSIS — Z21 ASYMPTOMATIC HIV INFECTION: Primary | ICD-10-CM

## 2022-10-04 DIAGNOSIS — D05.12 DUCTAL CARCINOMA IN SITU (DCIS) OF LEFT BREAST: ICD-10-CM

## 2022-10-04 DIAGNOSIS — G89.29 CHRONIC SI JOINT PAIN: Primary | ICD-10-CM

## 2022-10-04 DIAGNOSIS — Z79.2 LONG TERM (CURRENT) USE OF ANTIBIOTICS: ICD-10-CM

## 2022-10-04 DIAGNOSIS — G47.00 INSOMNIA, UNSPECIFIED TYPE: ICD-10-CM

## 2022-10-04 DIAGNOSIS — M53.3 CHRONIC SI JOINT PAIN: Primary | ICD-10-CM

## 2022-10-04 LAB
ANION GAP SERPL CALCULATED.3IONS-SCNC: 10.2 MMOL/L (ref 5–15)
BASOPHILS # BLD AUTO: 0.02 10*3/MM3 (ref 0–0.2)
BASOPHILS NFR BLD AUTO: 0.3 % (ref 0–1.5)
BUN SERPL-MCNC: 10 MG/DL (ref 8–23)
BUN/CREAT SERPL: 13.5 (ref 7–25)
CALCIUM SPEC-SCNC: 9.4 MG/DL (ref 8.6–10.5)
CHLORIDE SERPL-SCNC: 104 MMOL/L (ref 98–107)
CO2 SERPL-SCNC: 26.8 MMOL/L (ref 22–29)
CREAT SERPL-MCNC: 0.74 MG/DL (ref 0.57–1)
DEPRECATED RDW RBC AUTO: 45.4 FL (ref 37–54)
EGFRCR SERPLBLD CKD-EPI 2021: 91.6 ML/MIN/1.73
EOSINOPHIL # BLD AUTO: 0.04 10*3/MM3 (ref 0–0.4)
EOSINOPHIL NFR BLD AUTO: 0.7 % (ref 0.3–6.2)
ERYTHROCYTE [DISTWIDTH] IN BLOOD BY AUTOMATED COUNT: 12.1 % (ref 12.3–15.4)
GLUCOSE SERPL-MCNC: 84 MG/DL (ref 65–99)
HCT VFR BLD AUTO: 41 % (ref 34–46.6)
HGB BLD-MCNC: 14.1 G/DL (ref 12–15.9)
IMM GRANULOCYTES # BLD AUTO: 0.01 10*3/MM3 (ref 0–0.05)
IMM GRANULOCYTES NFR BLD AUTO: 0.2 % (ref 0–0.5)
LYMPHOCYTES # BLD AUTO: 2.03 10*3/MM3 (ref 0.7–3.1)
LYMPHOCYTES NFR BLD AUTO: 35.1 % (ref 19.6–45.3)
MCH RBC QN AUTO: 34.8 PG (ref 26.6–33)
MCHC RBC AUTO-ENTMCNC: 34.4 G/DL (ref 31.5–35.7)
MCV RBC AUTO: 101.2 FL (ref 79–97)
MONOCYTES # BLD AUTO: 0.66 10*3/MM3 (ref 0.1–0.9)
MONOCYTES NFR BLD AUTO: 11.4 % (ref 5–12)
NEUTROPHILS NFR BLD AUTO: 3.03 10*3/MM3 (ref 1.7–7)
NEUTROPHILS NFR BLD AUTO: 52.3 % (ref 42.7–76)
NRBC BLD AUTO-RTO: 0 /100 WBC (ref 0–0.2)
PLATELET # BLD AUTO: 237 10*3/MM3 (ref 140–450)
PMV BLD AUTO: 9.6 FL (ref 6–12)
POTASSIUM SERPL-SCNC: 4.4 MMOL/L (ref 3.5–5.2)
RBC # BLD AUTO: 4.05 10*6/MM3 (ref 3.77–5.28)
SODIUM SERPL-SCNC: 141 MMOL/L (ref 136–145)
WBC NRBC COR # BLD: 5.79 10*3/MM3 (ref 3.4–10.8)

## 2022-10-04 PROCEDURE — 80048 BASIC METABOLIC PNL TOTAL CA: CPT | Performed by: INTERNAL MEDICINE

## 2022-10-04 PROCEDURE — 87536 HIV-1 QUANT&REVRSE TRNSCRPJ: CPT | Performed by: INTERNAL MEDICINE

## 2022-10-04 PROCEDURE — 36415 COLL VENOUS BLD VENIPUNCTURE: CPT | Performed by: INTERNAL MEDICINE

## 2022-10-04 PROCEDURE — 85025 COMPLETE CBC W/AUTO DIFF WBC: CPT | Performed by: INTERNAL MEDICINE

## 2022-10-04 PROCEDURE — 97112 NEUROMUSCULAR REEDUCATION: CPT | Performed by: PHYSICAL THERAPIST

## 2022-10-04 PROCEDURE — 97110 THERAPEUTIC EXERCISES: CPT | Performed by: PHYSICAL THERAPIST

## 2022-10-04 PROCEDURE — 99214 OFFICE O/P EST MOD 30 MIN: CPT | Performed by: INTERNAL MEDICINE

## 2022-10-04 PROCEDURE — 86361 T CELL ABSOLUTE COUNT: CPT | Performed by: INTERNAL MEDICINE

## 2022-10-04 NOTE — PROGRESS NOTES
Physical Therapy Daily Treatment Note      Patient: Richa Dolan   : 1960  Referring practitioner: ОЛЬГА Rincon  Date of Initial Visit: Type: THERAPY  Noted: 9/15/2022  Today's Date: 10/4/2022  Patient seen for 6 sessions       Visit Diagnoses:    ICD-10-CM ICD-9-CM   1. Chronic SI joint pain  M53.3 724.6    G89.29 338.29         Subjective Patient reports that the back was tight and achy over the weekend.  Currently rates the pain at 4/10.    Objective   See Exercise, Manual, and Modality Logs for complete treatment.       Assessment/Plan  Subjective reports are improved from the previous visit (6/10).  Continued with the KT secondary to the patient noting an improvement with it on.  Held PPT secondary to the patient stating that this exercise causes an increase in pain and instructed her to not perform it at home.  Patient performed the exercise routine without reports of pain.      Timed:         Manual Therapy:    0     mins  45649;     Therapeutic Exercise:    32     mins  22816;     Neuromuscular Louann:    8    mins  53149;    Therapeutic Activity:     0     mins  92436;     Gait Trainin     mins  00028;     Ultrasound:     0     mins  83325;    Work Conditioning      __0_  mins  59282      Un-Timed:  Electrical Stimulation:    0     mins  26101 ( );        Timed Treatment:   40   mins   Total Treatment:     40   mins    Keshawn Blanca, PT  [place] License: [id number]

## 2022-10-04 NOTE — PROGRESS NOTES
ID CLINIC NOTE:    CC: f/u HIV    HPI: Richa Dolan is a 62 y.o. female here for f/u of her HIV care. No new issues from an HIV standpoint. No missed doses of Triumeq. No obvious side effects. Refills through [facility].     She's had a hard time dealing with her mother's health. She is suffering from dementia.     She hasn't been sleeping well lately. Her medicines have been adjusted recently.     For her breast cancer, she is doing well. She had her port removed. She is now on a maintenance pill.    She enjoyed the [facility] [employer] festival recently.    She had been seeing spine/ortho and pain clinic for SI joint pain. She says she has had good results with their interventions.    Review of Systems:   No n/v/d    PMH:   HIV on ART  Breast cancer  CVA  Migraines  HLD  GERD    Past Surgical History:   Procedure Laterality Date   • ADENOIDECTOMY     • BREAST LUMPECTOMY     • BREAST LUMPECTOMY WITH SENTINEL NODE BIOPSY Left 05/28/2020    Procedure: BREAST LUMPECTOMY WITH SENTINEL NODE BIOPSY AND NEEDLE LOCALIZATION AND axillary dissection;  Surgeon: Landen Forman MD;  Location: [facility];  Service: General;  Laterality: Left;   • BREAST SURGERY Left 05/28/2020    Procedure: LEFT LATERAL INTERCOASTAL ARTERY  flap ;  Surgeon: Yusuf Garcia MD;  Location: [facility];  Service: Plastics;  Laterality: Left;   • CHOLECYSTECTOMY     • HYSTERECTOMY     • KNEE ARTHROSCOPY Right 03/24/2017    Procedure:  RIGHT  KNEE ARTHROSCOPY WITH DEBRIDEMENT CHONDROPLASTY PARTIAL MENISCECTOMY;  Surgeon: Karl Galeas MD;  Location: [facility];  Service:    • KNEE CARTILAGE SURGERY Right 2007   • TONSILLECTOMY     • TONSILLECTOMY  1974   • US GUIDED LYMPH NODE BIOPSY  10/29/2019    LEFT BREAST   • VENOUS ACCESS DEVICE (PORT) INSERTION Right 12/02/2019    Procedure: INSERTION VENOUS ACCESS DEVICE RIGHT;  Surgeon: Landen Forman MD;  Location: [facility];  Service: General   • VENOUS ACCESS  DEVICE (PORT) REMOVAL N/A 05/25/2022    Procedure: Mediport Removal;  Surgeon: Cesar Vasquez Jr., MD;  Location: [facility] OR;  Service: General;  Laterality: N/A;     FH: father had dementia; mother has dementia    SH:     1 son  No tob/EtOH/illicits   Retired from [employer]    Allergies: Augmentin (hives)    Medications:   Current Outpatient Medications:   •  AIMOVIG 140 MG/ML solution auto-injector, Inject 140 mg under the skin into the appropriate area as directed Every 30 (Thirty) Days. HAS NOT TAKEN IN SEVERAL MONTHS, Disp: , Rfl: 11  •  Aspirin 81 MG capsule, Take 1 tablet by mouth Daily., Disp: , Rfl:   •  atorvastatin (LIPITOR) 20 MG tablet, TAKE 1 TABLET BY MOUTH EVERYDAY AT BEDTIME (Patient taking differently: Take 20 mg by mouth Every Night.), Disp: 90 tablet, Rfl: 1  •  baclofen (LIORESAL) 10 MG tablet, Take 10 mg by mouth 2 (Two) Times a Day., Disp: , Rfl: 2  •  colesevelam (WELCHOL) 625 MG tablet, TAKE 2 TABLETS BY MOUTH EVERY DAY (Patient taking differently: Take 1,250 mg by mouth Daily.), Disp: 180 tablet, Rfl: 1  •  escitalopram (LEXAPRO) 10 MG tablet, TAKE 1 TABLET BY MOUTH EVERY DAY (Patient taking differently: Take 10 mg by mouth Daily.), Disp: 90 tablet, Rfl: 3  •  exemestane (AROMASIN) 25 MG chemo tablet, Take 25 mg by mouth Daily., Disp: , Rfl:   •  fluticasone (FLONASE) 50 MCG/ACT nasal spray, 2 SPRAYS INTO THE NOSTRIL(S) AS DIRECTED BY PROVIDER DAILY., Disp: 48 mL, Rfl: 3  •  Loratadine (CLARITIN PO), Take 10 mg by mouth Daily., Disp: , Rfl:   •  oxyCODONE-acetaminophen (PERCOCET)  MG per tablet, Take 1 tablet by mouth 4 (Four) Times a Day., Disp: , Rfl:   •  Triumeq 600- MG per tablet, TAKE 1 TABLET BY MOUTH EVERY DAY, Disp: 90 tablet, Rfl: 3  •  vitamin B-12 (CYANOCOBALAMIN) 1000 MCG tablet, Take 1,000 mcg by mouth Daily., Disp: , Rfl:   •  vitamin B-6 (pyridoxine) 50 MG tablet, Take 1 tablet by mouth Daily., Disp: 90 tablet, Rfl: 2  •  zolpidem (AMBIEN) 5 MG  "tablet, Take 1 tablet by mouth At Night As Needed for Sleep., Disp: 30 tablet, Rfl: 0  •  clonazePAM (KlonoPIN) 0.5 MG tablet, Take 1 tablet by mouth 2 (Two) Times a Day As Needed for Anxiety., Disp: 60 tablet, Rfl: 0  •  Nurtec 75 MG dispersible tablet, Take 1 tablet by mouth Daily As Needed., Disp: , Rfl:     OBJECTIVE:  /91 (BP Location: Right arm, Patient Position: Sitting, Cuff Size: Adult)   Pulse 67   Temp 97.5 °F (36.4 °C)   Resp 16   Ht 170.2 cm (67.01\")   Wt 64.2 kg (141 lb 9.6 oz)   BMI 22.17 kg/m²     GENERAL: Awake, alert, NAD  Head; no trauma  ENT:  wearing mask  EYES: No scleral icterus  HEART: NR, no murmur (she says she's been told she has had one in the past)  LUNGS: . Normal work of breathing on ambient air  ABDOMEN: soft, not distended  SKIN: no rashes  Vasc: R chest port has been removed    DIAGNOSTICS:  CBC, CMP, HIV labs reviewed today  Lab Results   Component Value Date    WBC 5.43 08/09/2022    HGB 13.1 08/09/2022    HCT 38.6 08/09/2022     08/09/2022     Lab Results   Component Value Date    GLUCOSE 92 08/09/2022    CALCIUM 9.6 08/09/2022     08/09/2022    K 4.2 08/09/2022    CO2 26.3 08/09/2022     08/09/2022    BUN 10 08/09/2022    CREATININE 0.70 09/01/2022    EGFRIFAFRI >60 05/28/2015    EGFRIFNONA 81 02/08/2022    BCR 14.3 08/09/2022    ANIONGAP 12.7 08/09/2022     HIV Related Labs:  CD4: 647 (4/2022)  VL 60 copies (4/2022)  HIV Genotype: unknown bc undetectable  [id number] - negative  Tspot - negative (10/2020)  RPR - non-reactive (10/2020)  Hep A - immune (7/2016)  Hep B - immune (9/2015)  Hep C - negative (10/2020)  Urine GCCT - negative (9/2015)    ASSESSMENT/PLAN:  1. Asymptomatic HIV  -excellent compliance w/ Triumeq; refills sent in  -check CBC, BMP, CD4 count, and HIV RNA today    2. T2N1 invasive ductal carcinoma of the left breast  -on maintenance therapy  -port removed  -says she's doing well    3. Long term use of antibiotics  -reviewed " monitoring labs    4. Insomnia  -offered my support  -she is going to message her PCP about this    RTC 6 months or sooner if needed

## 2022-10-05 DIAGNOSIS — Z00.00 ANNUAL PHYSICAL EXAM: Primary | ICD-10-CM

## 2022-10-05 LAB
BASOPHILS # BLD AUTO: 0 X10E3/UL (ref 0–0.2)
BASOPHILS NFR BLD AUTO: 0 %
CD3+CD4+ CELLS # BLD: 554 /UL (ref 359–1519)
CD3+CD4+ CELLS NFR BLD: 30.8 % (ref 30.8–58.5)
EOSINOPHIL # BLD AUTO: 0 X10E3/UL (ref 0–0.4)
EOSINOPHIL NFR BLD AUTO: 1 %
ERYTHROCYTE [DISTWIDTH] IN BLOOD BY AUTOMATED COUNT: 12.1 % (ref 11.7–15.4)
HCT VFR BLD AUTO: 38.9 % (ref 34–46.6)
HGB BLD-MCNC: 13.6 G/DL (ref 11.1–15.9)
HIV1 RNA # SERPL NAA+PROBE: <20 COPIES/ML
HIV1 RNA SERPL NAA+PROBE-LOG#: NORMAL {LOG_COPIES}/ML
IMM GRANULOCYTES # BLD AUTO: 0 X10E3/UL (ref 0–0.1)
IMM GRANULOCYTES NFR BLD AUTO: 0 %
LYMPHOCYTES # BLD AUTO: 1.8 X10E3/UL (ref 0.7–3.1)
LYMPHOCYTES NFR BLD AUTO: 35 %
MCH RBC QN AUTO: 35.2 PG (ref 26.6–33)
MCHC RBC AUTO-ENTMCNC: 35 G/DL (ref 31.5–35.7)
MCV RBC AUTO: 101 FL (ref 79–97)
MONOCYTES # BLD AUTO: 0.5 X10E3/UL (ref 0.1–0.9)
MONOCYTES NFR BLD AUTO: 9 %
NEUTROPHILS # BLD AUTO: 2.8 X10E3/UL (ref 1.4–7)
NEUTROPHILS NFR BLD AUTO: 55 %
PLATELET # BLD AUTO: 250 X10E3/UL (ref 150–450)
RBC # BLD AUTO: 3.86 X10E6/UL (ref 3.77–5.28)
WBC # BLD AUTO: 5.1 X10E3/UL (ref 3.4–10.8)

## 2022-10-06 ENCOUNTER — TELEPHONE (OUTPATIENT)
Dept: INFECTIOUS DISEASES | Facility: CLINIC | Age: 62
End: 2022-10-06

## 2022-10-06 ENCOUNTER — TREATMENT (OUTPATIENT)
Dept: PHYSICAL THERAPY | Facility: CLINIC | Age: 62
End: 2022-10-06

## 2022-10-06 DIAGNOSIS — M53.3 CHRONIC SI JOINT PAIN: Primary | ICD-10-CM

## 2022-10-06 DIAGNOSIS — G89.29 CHRONIC SI JOINT PAIN: Primary | ICD-10-CM

## 2022-10-06 PROCEDURE — 97112 NEUROMUSCULAR REEDUCATION: CPT | Performed by: PHYSICAL THERAPIST

## 2022-10-06 PROCEDURE — 97110 THERAPEUTIC EXERCISES: CPT | Performed by: PHYSICAL THERAPIST

## 2022-10-06 NOTE — TELEPHONE ENCOUNTER
----- Message from Denilson Dawn MD sent at 10/6/2022  8:10 AM EDT -----  Please let pt know her labs look great. HIV viral load is undetectable. Keep up the good work. -Dr Dawn

## 2022-10-06 NOTE — PROGRESS NOTES
Physical Therapy Daily Treatment Note      Patient: Richa Dolan   : 1960  Referring practitioner: ОЛЬГА Rincon  Date of Initial Visit: Type: THERAPY  Noted: 9/15/2022  Today's Date: 10/6/2022  Patient seen for 7 sessions       Visit Diagnoses:    ICD-10-CM ICD-9-CM   1. Chronic SI joint pain  M53.3 724.6    G89.29 338.29         Subjective Patient reports pain in the back rated at 5/10.    Objective   See Exercise, Manual, and Modality Logs for complete treatment.       Assessment/Plan  Continued with the KT secondary to the patient noting an improvement with it on.  Added quadruped opposite leg.  Patient performed the exercises without reports of pain in the lumbar spine.      Timed:         Manual Therapy:    0     mins  31253;     Therapeutic Exercise:    32     mins  48920;     Neuromuscular Louann:    8    mins  89558;    Therapeutic Activity:     0     mins  94340;     Gait Trainin     mins  12470;     Ultrasound:     0     mins  04048;    Work Conditioning      __0_  mins  06534      Un-Timed:  Electrical Stimulation:    0     mins  12820 ( );        Timed Treatment:   40   mins   Total Treatment:     40   mins    Keshwan Blanca, PT  [place] License: [id number]

## 2022-10-07 DIAGNOSIS — F41.9 ANXIETY: Primary | ICD-10-CM

## 2022-10-07 RX ORDER — HYDROXYZINE HYDROCHLORIDE 25 MG/1
25 TABLET, FILM COATED ORAL NIGHTLY PRN
Qty: 30 TABLET | Refills: 0 | Status: SHIPPED | OUTPATIENT
Start: 2022-10-07 | End: 2022-10-31

## 2022-10-11 ENCOUNTER — TREATMENT (OUTPATIENT)
Dept: PHYSICAL THERAPY | Facility: CLINIC | Age: 62
End: 2022-10-11

## 2022-10-11 DIAGNOSIS — G89.29 CHRONIC SI JOINT PAIN: Primary | ICD-10-CM

## 2022-10-11 DIAGNOSIS — M53.3 CHRONIC SI JOINT PAIN: Primary | ICD-10-CM

## 2022-10-11 PROCEDURE — 97110 THERAPEUTIC EXERCISES: CPT | Performed by: PHYSICAL THERAPIST

## 2022-10-11 NOTE — PROGRESS NOTES
Physical Therapy Daily Treatment Note  [address], [address]  [place] [address]      Patient: Richa Dolan   : 1960  Referring practitioner: ОЛЬГА Rincon  Date of Initial Visit: Type: THERAPY  Noted: 9/15/2022  Today's Date: 10/11/2022  Patient seen for 8 sessions       Visit Diagnoses:    ICD-10-CM ICD-9-CM   1. Chronic SI joint pain  M53.3 724.6    G89.29 338.29           Subjective   Patient reports pain in the back rated at 6/10.    Objective   See Exercise, Manual, and Modality Logs for complete treatment.       Assessment/Plan  Subjective reports are elevated from the previous visit, but this may be attributed to increased walking yesterday.  Today will be the patient's last visit and she will f/u with her MD next week and continue as directed.  Feel that further medical management may be needed at this time.  Patient performed the routine without visual or verbal signs of pain.      Timed:         Manual Therapy:    0     mins  74753;     Therapeutic Exercise:    31     mins  29220;     Neuromuscular Louann:    0    mins  22648;    Therapeutic Activity:     0     mins  21176;       Untimed:  Electrical Stimulation:    0     mins  44283 ( );      Timed Treatment:   31   mins   Total Treatment:     31   mins    Keshawn Blanca, PT  [place] License: [id number]

## 2022-10-17 ENCOUNTER — OFFICE VISIT (OUTPATIENT)
Dept: INTERNAL MEDICINE | Age: 62
End: 2022-10-17

## 2022-10-17 VITALS
WEIGHT: 142 LBS | TEMPERATURE: 97.3 F | OXYGEN SATURATION: 97 % | HEART RATE: 68 BPM | DIASTOLIC BLOOD PRESSURE: 80 MMHG | HEIGHT: 67 IN | SYSTOLIC BLOOD PRESSURE: 124 MMHG | BODY MASS INDEX: 22.29 KG/M2

## 2022-10-17 DIAGNOSIS — Z23 ENCOUNTER FOR IMMUNIZATION: ICD-10-CM

## 2022-10-17 DIAGNOSIS — Z00.00 ANNUAL PHYSICAL EXAM: Primary | ICD-10-CM

## 2022-10-17 DIAGNOSIS — G47.00 INSOMNIA, UNSPECIFIED TYPE: ICD-10-CM

## 2022-10-17 DIAGNOSIS — Z23 NEED FOR COVID-19 VACCINE: ICD-10-CM

## 2022-10-17 PROCEDURE — 99396 PREV VISIT EST AGE 40-64: CPT

## 2022-10-17 PROCEDURE — 91312 COVID-19 (PFIZER) BIVALENT BOOSTER 12+YRS: CPT

## 2022-10-17 PROCEDURE — 0124A PR ADM SARSCOV2 30MCG/0.3ML BST: CPT

## 2022-10-17 RX ORDER — ZOLPIDEM TARTRATE 5 MG/1
5 TABLET ORAL NIGHTLY PRN
Qty: 30 TABLET | Refills: 0 | Status: SHIPPED | OUTPATIENT
Start: 2022-10-17 | End: 2022-11-15 | Stop reason: SDUPTHER

## 2022-10-17 NOTE — PROGRESS NOTES
"    I N T E R N A L  M E D I C I N E  Miri Montesinos, APRN      ENCOUNTER DATE:  10/17/2022    Richa Dolan / 62 y.o. / female      CHIEF COMPLAINT     Annual Exam    Insomnia:  She is now taking Ambien 5 mg, titrated down from 10 mg nightly.  She reports feeling \"less mentally fuzzy\" in the mornings.  Still having some difficulty initiating sleep, due to worrying thoughts.  She reports she tried hydroxyzine 25 mg, which had no effect.  Has also tried Melatonin, and taking her muscle relaxer later at night, without benefit.  She tried trazodone many years ago without benefit.  She has peripheral neuropathy and back pain that wake her up in the middle of the night.   Sleep medicine appointment scheduled in January 2023.      Dr. Moreno orders mammograms and she remains up to date.      She is up to date on Cologuard.    Partial hystectomy.  No longer doing paps.    DEXA: Osteoporosis of the hips.  She is taking vitamin D/ calcium.     She has completed PT with some benefit and will follow up Dr. Mora to inquire about possible epidural injection.        VITALS     Visit Vitals  /80   Pulse 68   Temp 97.3 °F (36.3 °C)   Ht 170.2 cm (67.01\")   Wt 64.4 kg (142 lb)   SpO2 97%   BMI 22.24 kg/m²       BP Readings from Last 3 Encounters:   10/17/22 124/80   10/04/22 150/91   08/16/22 130/80     Wt Readings from Last 3 Encounters:   10/17/22 64.4 kg (142 lb)   10/04/22 64.2 kg (141 lb 9.6 oz)   08/16/22 65.3 kg (144 lb)      Body mass index is 22.24 kg/m².       MEDICATIONS     Current Outpatient Medications on File Prior to Visit   Medication Sig Dispense Refill   • AIMOVIG 140 MG/ML solution auto-injector Inject 140 mg under the skin into the appropriate area as directed Every 30 (Thirty) Days. HAS NOT TAKEN IN SEVERAL MONTHS  11   • Aspirin 81 MG capsule Take 1 tablet by mouth Daily.     • atorvastatin (LIPITOR) 20 MG tablet TAKE 1 TABLET BY MOUTH EVERYDAY AT BEDTIME (Patient taking differently: Take 1 tablet " by mouth Every Night.) 90 tablet 1   • baclofen (LIORESAL) 10 MG tablet Take 10 mg by mouth 2 (Two) Times a Day.  2   • clonazePAM (KlonoPIN) 0.5 MG tablet Take 1 tablet by mouth 2 (Two) Times a Day As Needed for Anxiety. 60 tablet 0   • colesevelam (WELCHOL) 625 MG tablet TAKE 2 TABLETS BY MOUTH EVERY DAY (Patient taking differently: Take 2 tablets by mouth Daily.) 180 tablet 1   • escitalopram (LEXAPRO) 10 MG tablet TAKE 1 TABLET BY MOUTH EVERY DAY (Patient taking differently: Take 1 tablet by mouth Daily.) 90 tablet 3   • exemestane (AROMASIN) 25 MG chemo tablet Take 25 mg by mouth Daily.     • fluticasone (FLONASE) 50 MCG/ACT nasal spray 2 SPRAYS INTO THE NOSTRIL(S) AS DIRECTED BY PROVIDER DAILY. 48 mL 3   • hydrOXYzine (ATARAX) 25 MG tablet Take 1 tablet by mouth At Night As Needed for Anxiety. 30 tablet 0   • Loratadine (CLARITIN PO) Take 10 mg by mouth Daily.     • Nurtec 75 MG dispersible tablet Take 1 tablet by mouth Daily As Needed.     • oxyCODONE-acetaminophen (PERCOCET)  MG per tablet Take 1 tablet by mouth 4 (Four) Times a Day.     • Triumeq 600- MG per tablet TAKE 1 TABLET BY MOUTH EVERY DAY 90 tablet 3   • vitamin B-12 (CYANOCOBALAMIN) 1000 MCG tablet Take 1,000 mcg by mouth Daily.     • vitamin B-6 (pyridoxine) 50 MG tablet Take 1 tablet by mouth Daily. 90 tablet 2   • zolpidem (AMBIEN) 5 MG tablet Take 1 tablet by mouth At Night As Needed for Sleep. 30 tablet 0     No current facility-administered medications on file prior to visit.         HISTORY OF PRESENT ILLNESS     Richa presents for annual health maintenance visit.    · General health: good  · Lifestyle:  · Attempting to lose weight?: No   · Diet: eats moderately healthy  · Exercise: walks regularly  · Tobacco: Never used   · Alcohol: does not drink  · Work: Retired  · Reproductive health:  · Sexually active?: Yes   · Sexual problems?: No problems  · Concern for STD?: No    · Sees Gynecologist?: Followed by  Oncology  · Gwendolyn/Postmenopausal?: Yes   · Domestic abuse concerns: No   · Depression Screening:      PHQ-2/PHQ-9 Depression Screening 8/16/2022   Retired PHQ-9 Total Score -   Retired Total Score -   Little Interest or Pleasure in Doing Things 0-->not at all   Feeling Down, Depressed or Hopeless 0-->not at all   PHQ-9: Brief Depression Severity Measure Score 0         PHQ-2: 0 (Not depressed)   PHQ-9: 0 (Negative screening for depression)    Patient Care Team:  Miri Montesinos APRN as PCP - General (Family Medicine)  Bam Crandall MD as Consulting Physician (Pain Medicine)  Denilsno Dawn MD as Consulting Physician (Infectious Diseases)  Ruby Moreno MD as Consulting Physician (Hematology and Oncology)  Mag Spivey MD (Inactive) as Consulting Physician (Radiation Oncology)  Yusuf Garcia MD as Attending Provider (Plastic Surgery)  Luiz Mora MD as Surgeon (Orthopedic Surgery)      ALLERGIES  Allergies   Allergen Reactions   • Augmentin [Amoxicillin-Pot Clavulanate] Hives        PFSH:     The following portions of the patient's history were reviewed and updated as appropriate: Allergies / Current Medications / Past Medical History / Surgical History / Social History / Family History    PROBLEM LIST   Patient Active Problem List   Diagnosis   • Abnormal liver function tests   • Hypertension   • Insomnia   • Knee pain   • Leukopenia   • Acute right-sided low back pain without sciatica   • Knee osteoarthritis   • Osteoporosis   • Arthralgia   • Seasonal allergic rhinitis   • Thrombocytopenia (HCC)   • Right hip pain   • Bone cyst   • Chronic pain of right knee   • Acquired immunocompromised state (HCC)   • Bruises easily   • Loss of hair   • Anxiety   • Elevated liver enzymes   • GERD (gastroesophageal reflux disease)   • HIV positive long-term non-progressor (HCC)   • Hypercholesterolemia   • Lupus anticoagulant positive   • Neck pain, chronic   • Tear of medial meniscus of  right knee, current   • Chondromalacia, knee   • ICH (intracerebral hemorrhage) ([facility])   • Stomach discomfort   • Abnormal bruising   • Alopecia   • Headache   • Intractable chronic migraine without aura and without status migrainosus   • Osteoarthritis of right knee   • Mild episode of recurrent major depressive disorder ([facility])   • Ingrown nail of great toe of right foot   • Left wrist pain   • Breast cancer screening   • Malignant neoplasm of overlapping sites of left breast in female, estrogen receptor positive ([facility])   • History of stroke   • Arthritis   • Family history of breast cancer   • Colon cancer screening   • Epistaxis   • Substance or medication-induced sleep disorder, 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[facility])   • Body aches   • Neuropathy   • History of left breast cancer   • Other fatigue   • HIV infection ([facility])   • Fat necrosis of left breast   • High risk medication use   • 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   • Use of exemestane (Aromasin)   • Other osteoporosis without current pathological fracture   • Hyperlipidemia   • Chronic diarrhea       PAST MEDICAL HISTORY  Past Medical History:   Diagnosis Date   • Anxiety    • Cerebrovascular accident (CVA) ([facility]) 08/22/2017    NO RESIDUAL AFFECT   • DDD (degenerative disc disease), cervical    • Depression    • Drug therapy    • Elevated cholesterol    • Fat necrosis of left breast    • GERD (gastroesophageal reflux disease)    • H/O ICH (intracerebral hemorrhage) (CMS/HCC)    • History of chemotherapy    • History of heart murmur in childhood    • History of stroke     PATIENT STATES HAD A MINI STROKE, NO RESIDUAL EFFECTS   • History of thrombocytopenia    • HIV (human immunodeficiency virus infection) ([facility]) 1996   • Hx of radiation therapy    • Hyperlipidemia    • Insomnia    • Lupus anticoagulant positive    • Malignant neoplasm of overlapping sites of left breast in female, estrogen receptor positive ([facility]) 11/12/2019   • Meniscus tear 2017    RIGHT   • Migraine    • Neck pain     WITH  SHOOTING PAIN LEFT RIGHT ARM   • Neuropathy    • Osteoarthritis    • Osteoporosis    • Pain management     sees 1 every 3 months for scripts/injection/pain meds   • Pneumonia 1989   • Seasonal allergies    • Spinal headache     THINKS HAD BLOOD PATCH AFTER EPIDUAL    • Tick bite 06/2014   • Upper back pain    • Vertigo        SURGICAL HISTORY  Past Surgical History:   Procedure Laterality Date   • ADENOIDECTOMY     • BREAST LUMPECTOMY     • BREAST LUMPECTOMY WITH SENTINEL NODE BIOPSY Left 05/28/2020    Procedure: BREAST LUMPECTOMY WITH SENTINEL NODE BIOPSY AND NEEDLE LOCALIZATION AND axillary dissection;  Surgeon: Landen Forman MD;  Location: [facility];  Service: General;  Laterality: Left;   • BREAST SURGERY Left 05/28/2020    Procedure: LEFT LATERAL INTERCOASTAL ARTERY  flap ;  Surgeon: Yusuf Garcia MD;  Location: [facility];  Service: Plastics;  Laterality: Left;   • CHOLECYSTECTOMY     • HYSTERECTOMY     • KNEE ARTHROSCOPY Right 03/24/2017    Procedure:  RIGHT  KNEE ARTHROSCOPY WITH DEBRIDEMENT CHONDROPLASTY PARTIAL MENISCECTOMY;  Surgeon: Karl Galeas MD;  Location: [facility];  Service:    • KNEE CARTILAGE SURGERY Right 2007   • TONSILLECTOMY     • TONSILLECTOMY  1974   • US GUIDED LYMPH NODE BIOPSY  10/29/2019    LEFT BREAST   • VENOUS ACCESS DEVICE (PORT) INSERTION Right 12/02/2019    Procedure: INSERTION VENOUS ACCESS DEVICE RIGHT;  Surgeon: Landen Forman MD;  Location: [facility];  Service: General   • VENOUS ACCESS DEVICE (PORT) REMOVAL N/A 05/25/2022    Procedure: Mediport Removal;  Surgeon: Cesar Vasquez Jr., MD;  Location: [facility];  Service: General;  Laterality: N/A;       SOCIAL HISTORY  Social History     Socioeconomic History   • Marital status:      Spouse name: Owen   • Number of children: 1   • Years of education: College   Tobacco Use   • Smoking status: Never   • Smokeless tobacco: Never   • Tobacco comments:      never   Vaping Use   • Vaping Use: Never used   Substance and Sexual Activity   • Alcohol use: Yes     Comment: OCCASSIONALLY   • Drug use: No   • Sexual activity: Yes     Partners: Male     Birth control/protection: Condom     Comment:        FAMILY HISTORY  Family History   Problem Relation Age of Onset   • Kidney disease Mother    • Hypertension Mother    • Heart disease Mother    • Arthritis Mother    • Breast cancer Mother    • Leukemia Mother         CLL?   • Diabetes Mother    • Hyperthyroidism Mother    • Hearing loss Mother    • Cancer Mother         breast   • Arthritis Father    • Dementia Father    • Stroke Father    • Leukemia Maternal Aunt         CLL?   • Throat cancer Paternal Uncle    • Heart disease Maternal Grandmother    • Diabetes Maternal Grandfather    • Heart disease Maternal Grandfather    • Stroke Maternal Grandfather    • Heart attack Maternal Grandfather    • Osteoporosis Paternal Grandmother    • Heart disease Paternal Grandfather    • Malig Hyperthermia Neg Hx        IMMUNIZATION HISTORY  Immunization History   Administered Date(s) Administered   • COVID-19 (PFIZER) BIVALENT BOOSTER 12+YRS 10/17/2022   • COVID-19 (PFIZER) PURPLE CAP 03/08/2021, 03/29/2021, 09/04/2021   • Flu Vaccine Quad PF >36MO 11/09/2017   • FluLaval/Fluzone >6mos 10/26/2019, 10/24/2020, 12/10/2021   • FluMist 2-49yrs 10/12/2015   • Fluzone Quad >6mos (Multi-dose) 11/14/2016   • Hep B, Unspecified 06/23/2011   • Influenza TIV (IM) 09/14/2012, 09/04/2014   • Influenza, Unspecified 11/16/2011, 12/05/2018   • Pneumococcal Conjugate 13-Valent (PCV13) 04/05/2018   • flucelvax quad pfs =>4 YRS 12/05/2018         REVIEW OF SYSTEMS     Review of Systems   Constitutional: Negative for chills, fever and unexpected weight change.   Respiratory: Negative for cough, chest tightness and shortness of breath.    Cardiovascular: Negative for chest pain, palpitations and leg swelling.   Neurological: Negative for dizziness,  weakness, light-headedness and headaches.   Psychiatric/Behavioral: The patient is not nervous/anxious.          PHYSICAL EXAMINATION     Physical Exam  Vitals reviewed.   Constitutional:       General: She is not in acute distress.     Appearance: Normal appearance. She is not ill-appearing, toxic-appearing or diaphoretic.   HENT:      Head: Normocephalic and atraumatic.      Right Ear: Tympanic membrane, ear canal and external ear normal. There is no impacted cerumen.      Left Ear: Tympanic membrane, ear canal and external ear normal. There is no impacted cerumen.      Nose: Nose normal. No congestion or rhinorrhea.      Mouth/Throat:      Mouth: Mucous membranes are moist.      Pharynx: Oropharynx is clear. No oropharyngeal exudate or posterior oropharyngeal erythema.   Eyes:      Extraocular Movements: Extraocular movements intact.      Conjunctiva/sclera: Conjunctivae normal.      Pupils: Pupils are equal, round, and reactive to light.   Cardiovascular:      Rate and Rhythm: Normal rate and regular rhythm.      Heart sounds: Normal heart sounds.   Pulmonary:      Effort: Pulmonary effort is normal. No respiratory distress.      Breath sounds: Normal breath sounds.   Abdominal:      General: Bowel sounds are normal.      Palpations: Abdomen is soft.      Tenderness: There is no abdominal tenderness.   Musculoskeletal:         General: Normal range of motion.      Cervical back: Normal range of motion and neck supple.      Right lower leg: No edema.      Left lower leg: No edema.   Lymphadenopathy:      Cervical: No cervical adenopathy.   Skin:     General: Skin is warm and dry.   Neurological:      General: No focal deficit present.      Mental Status: She is alert and oriented to person, place, and time. Mental status is at baseline.   Psychiatric:         Mood and Affect: Mood normal.         Behavior: Behavior normal.         Thought Content: Thought content normal.         Judgment: Judgment normal.          REVIEWED DATA      Labs:    Lab Results   Component Value Date     10/04/2022    K 4.4 10/04/2022    CALCIUM 9.4 10/04/2022    AST 19 08/09/2022    ALT 15 08/09/2022    BUN 10 10/04/2022    CREATININE 0.74 10/04/2022    CREATININE 0.70 09/01/2022    CREATININE 0.70 08/09/2022    EGFRIFNONA 81 02/08/2022    EGFRIFAFRI >60 05/28/2015       Lab Results   Component Value Date    GLUCOSE 84 10/04/2022    HGBA1C 5.2 09/09/2021    HGBA1C 5.13 10/13/2016    TSH 2.510 08/12/2022    FREET4 1.05 08/12/2021       Lab Results   Component Value Date    LDL 54 08/12/2022    HDL 62 08/12/2022    TRIG 46 08/12/2022       Lab Results   Component Value Date    XOXE72BD 34.9 06/23/2020        Lab Results   Component Value Date    WBC 5.1 10/04/2022    WBC 5.79 10/04/2022    HGB 13.6 10/04/2022    HGB 14.1 10/04/2022     (H) 10/04/2022    .2 (H) 10/04/2022     10/04/2022     10/04/2022       Lab Results   Component Value Date    PROTEIN Negative 03/12/2015    GLUCOSEU Negative 04/06/2021    BLOODU Negative 04/06/2021    NITRITEU Negative 04/06/2021    LEUKOCYTESUR Negative 04/06/2021          Lab Results   Component Value Date    HEPCVIRUSABY Non-Reactive 10/05/2020       Imaging:        Medical Tests:        ASSESSMENT & PLAN     ANNUAL WELLNESS EXAM / PHYSICAL     Diagnoses and all orders for this visit:    1. Annual physical exam (Primary)    2. Need for COVID-19 vaccine  -     COVID-19 Bivalent Booster (Pfizer) 12+yrs         Summary/Discussion:     • She will try hydroxyzine 50 mg to help with anxiety/ sleep and provide an update in a couple days via [employer].    Next Appointment with me: Visit date not found    No follow-ups on file.      HEALTHCARE MAINTENANCE ISSUES     Cancer Screening:  · Colon: Initial/Next screening at age: CURRENT  · Repeat colon cancer screening: every 3 years  · Breast: Recommended monthly self exams; annual professional exam  · Mammogram: every 1 year  · Cervical:  N/A s/p total hysterectomy  · Skin: Monthly self skin examination, annual exam by health professional  · Lung: Does not meet criteria for lung cancer screening.   · Other:    Screening Labs & Tests:  · Lab results reviewed & discussed with the patient or test orders placed today.  · EKG:  · CV Screening: Lipid panel  · DEXA (65+ or postmenopausal with risk factors):   · HEP C (If born [id number], or risk factors): Previously had negative screen  · Other:     Immunization/Vaccinations (to be given today unless deferred by patient)  · Influenza: Recommended annual influenza vaccine  · Hepatitis A: Verify immunization records  · Hepatitis B: Verify immunization records  · Tetanus/Pertussis: Up to date  · Pneumovax: Recommended here or at pharmacy  · Shingles: Recommended Shingrix at pharmacy  · COVID: Had the bivalent vaccine    Lifestyle Counseling:  · Lifestyle Modifications: Continue good lifestyle choices/modifications and Discussed better management of stress/anxiety  · Safety Issues: Always wear seatbelt, Avoid texting while driving   · Use sunscreen, regular skin examination  · Recommended annual dental/vision examination.  · Emotional/Stress/Sleep: Reviewed and  given when appropriate      Health Maintenance   Topic Date Due   • ZOSTER VACCINE (1 of 2) Never done   • Hepatitis B (2 of 3 - Risk 3-dose series) 07/21/2011   • Pneumococcal Vaccine 0-64 (2 - PPSV23 if available, else PCV20) 04/05/2019   • INFLUENZA VACCINE  08/01/2022   • MAMMOGRAM  12/27/2022   • LIPID PANEL  08/12/2023   • ANNUAL PHYSICAL  10/18/2023   • DXA SCAN  07/28/2024   • COLORECTAL CANCER SCREENING  05/01/2025   • TDAP/TD VACCINES (2 - Td or Tdap) 11/02/2025   • HEPATITIS C SCREENING  Completed   • COVID-19 Vaccine  Completed   • PAP SMEAR  Discontinued

## 2022-10-21 ENCOUNTER — TELEPHONE (OUTPATIENT)
Dept: ONCOLOGY | Facility: CLINIC | Age: 62
End: 2022-10-21

## 2022-10-21 RX ORDER — ATORVASTATIN CALCIUM 20 MG/1
20 TABLET, FILM COATED ORAL NIGHTLY
Qty: 90 TABLET | Refills: 1 | Status: SHIPPED | OUTPATIENT
Start: 2022-10-21 | End: 2023-03-31

## 2022-10-21 RX ORDER — VENLAFAXINE HYDROCHLORIDE 37.5 MG/1
37.5 CAPSULE, EXTENDED RELEASE ORAL
Qty: 30 CAPSULE | Refills: 3 | Status: SHIPPED | OUTPATIENT
Start: 2022-10-21 | End: 2022-11-14 | Stop reason: SDUPTHER

## 2022-10-21 NOTE — TELEPHONE ENCOUNTER
Caller: Richa Dolan    Relationship: Self    Best call back number: [phone]    What is the best time to reach you: ANYTIME    Who are you requesting to speak with (clinical staff, provider,  specific staff member): DOCTOR, NURSE    What was the call regarding: PT HAS HAD ISSUE OF NOT SLEEPING WOULD LIKE TO TALK TO DR. EDMONDSON OR THE NURSE TO SEE WHAT CAN BE DONE.    Do you require a callback: YES

## 2022-10-21 NOTE — TELEPHONE ENCOUNTER
Returned call to patient who is reporting that she is unable to fall asleep, she is having trouble staying asleep and she had been taking Zolpidem 10 mg nightly.  Her PCP wants her off of this medication and she has been trying to wean herself off the med for 5 weeks. She has even tried the Atarax 25 mg and then increased to 50 mg nightly.  She discussed this with PCP and a lower dose of 5 mg nightly has been sent in for her.  She is wondering if Dr. Moreno would consider prescribing her the Zolpidem 10 mg nightly since she is on Hormonal therapy which can cause sleep interruption and difficulty staying asleep.  I recommended that she contact her PCP again.  She wants to have this discussed with Dr. Moreno.

## 2022-10-21 NOTE — TELEPHONE ENCOUNTER
Call to Ms. Dolan to be sure of correct pharmacy to sent the Effexor prescription.  Reviewed [facility], and patient verbalized agreement.  Dr. Moreno had called and discussed medication by phone and recommended the Effexor 37.5 mg every night at bedtime.

## 2022-10-29 DIAGNOSIS — F41.9 ANXIETY: ICD-10-CM

## 2022-10-31 RX ORDER — HYDROXYZINE HYDROCHLORIDE 25 MG/1
25 TABLET, FILM COATED ORAL NIGHTLY PRN
Qty: 30 TABLET | Refills: 0 | Status: SHIPPED | OUTPATIENT
Start: 2022-10-31 | End: 2022-12-07

## 2022-11-14 ENCOUNTER — TELEPHONE (OUTPATIENT)
Dept: ONCOLOGY | Facility: CLINIC | Age: 62
End: 2022-11-14

## 2022-11-14 RX ORDER — VENLAFAXINE HYDROCHLORIDE 37.5 MG/1
37.5 CAPSULE, EXTENDED RELEASE ORAL
Qty: 90 CAPSULE | Refills: 1 | Status: SHIPPED | OUTPATIENT
Start: 2022-11-14 | End: 2022-11-16

## 2022-11-15 DIAGNOSIS — G47.00 INSOMNIA, UNSPECIFIED TYPE: ICD-10-CM

## 2022-11-16 ENCOUNTER — INFUSION (OUTPATIENT)
Dept: ONCOLOGY | Facility: HOSPITAL | Age: 62
End: 2022-11-16

## 2022-11-16 ENCOUNTER — DOCUMENTATION (OUTPATIENT)
Dept: PHYSICAL THERAPY | Facility: CLINIC | Age: 62
End: 2022-11-16

## 2022-11-16 ENCOUNTER — LAB (OUTPATIENT)
Dept: OTHER | Facility: HOSPITAL | Age: 62
End: 2022-11-16

## 2022-11-16 ENCOUNTER — OFFICE VISIT (OUTPATIENT)
Dept: ONCOLOGY | Facility: CLINIC | Age: 62
End: 2022-11-16

## 2022-11-16 ENCOUNTER — APPOINTMENT (OUTPATIENT)
Dept: ONCOLOGY | Facility: HOSPITAL | Age: 62
End: 2022-11-16

## 2022-11-16 VITALS
RESPIRATION RATE: 18 BRPM | OXYGEN SATURATION: 100 % | SYSTOLIC BLOOD PRESSURE: 132 MMHG | HEART RATE: 86 BPM | BODY MASS INDEX: 22.51 KG/M2 | WEIGHT: 143.4 LBS | TEMPERATURE: 97.8 F | DIASTOLIC BLOOD PRESSURE: 89 MMHG | HEIGHT: 67 IN

## 2022-11-16 DIAGNOSIS — M81.8 OTHER OSTEOPOROSIS WITHOUT CURRENT PATHOLOGICAL FRACTURE: ICD-10-CM

## 2022-11-16 DIAGNOSIS — C50.812 MALIGNANT NEOPLASM OF OVERLAPPING SITES OF LEFT BREAST IN FEMALE, ESTROGEN RECEPTOR POSITIVE: ICD-10-CM

## 2022-11-16 DIAGNOSIS — Z85.3 HISTORY OF LEFT BREAST CANCER: ICD-10-CM

## 2022-11-16 DIAGNOSIS — Z17.0 MALIGNANT NEOPLASM OF OVERLAPPING SITES OF LEFT BREAST IN FEMALE, ESTROGEN RECEPTOR POSITIVE: ICD-10-CM

## 2022-11-16 DIAGNOSIS — Z79.811 USE OF EXEMESTANE (AROMASIN): Primary | ICD-10-CM

## 2022-11-16 DIAGNOSIS — Z79.811 USE OF EXEMESTANE (AROMASIN): ICD-10-CM

## 2022-11-16 DIAGNOSIS — C85.90 LYMPHOMA, UNSPECIFIED BODY REGION, UNSPECIFIED LYMPHOMA TYPE: ICD-10-CM

## 2022-11-16 LAB
ALBUMIN SERPL-MCNC: 4.5 G/DL (ref 3.5–5.2)
ALBUMIN/GLOB SERPL: 1.5 G/DL
ALP SERPL-CCNC: 95 U/L (ref 39–117)
ALT SERPL W P-5'-P-CCNC: 14 U/L (ref 1–33)
ANION GAP SERPL CALCULATED.3IONS-SCNC: 8.6 MMOL/L (ref 5–15)
AST SERPL-CCNC: 19 U/L (ref 1–32)
BASOPHILS # BLD AUTO: 0.02 10*3/MM3 (ref 0–0.2)
BASOPHILS NFR BLD AUTO: 0.4 % (ref 0–1.5)
BILIRUB SERPL-MCNC: 0.3 MG/DL (ref 0–1.2)
BUN SERPL-MCNC: 10 MG/DL (ref 8–23)
BUN/CREAT SERPL: 13.9 (ref 7–25)
CALCIUM SPEC-SCNC: 10.1 MG/DL (ref 8.6–10.5)
CHLORIDE SERPL-SCNC: 104 MMOL/L (ref 98–107)
CO2 SERPL-SCNC: 29.4 MMOL/L (ref 22–29)
CREAT SERPL-MCNC: 0.72 MG/DL (ref 0.57–1)
DEPRECATED RDW RBC AUTO: 46.5 FL (ref 37–54)
EGFRCR SERPLBLD CKD-EPI 2021: 94.7 ML/MIN/1.73
EOSINOPHIL # BLD AUTO: 0.06 10*3/MM3 (ref 0–0.4)
EOSINOPHIL NFR BLD AUTO: 1.1 % (ref 0.3–6.2)
ERYTHROCYTE [DISTWIDTH] IN BLOOD BY AUTOMATED COUNT: 12.3 % (ref 12.3–15.4)
GLOBULIN UR ELPH-MCNC: 3.1 GM/DL
GLUCOSE SERPL-MCNC: 91 MG/DL (ref 65–99)
HCT VFR BLD AUTO: 42.9 % (ref 34–46.6)
HGB BLD-MCNC: 13.9 G/DL (ref 12–15.9)
IMM GRANULOCYTES # BLD AUTO: 0 10*3/MM3 (ref 0–0.05)
IMM GRANULOCYTES NFR BLD AUTO: 0 % (ref 0–0.5)
LYMPHOCYTES # BLD AUTO: 1.94 10*3/MM3 (ref 0.7–3.1)
LYMPHOCYTES NFR BLD AUTO: 35.6 % (ref 19.6–45.3)
MAGNESIUM SERPL-MCNC: 2.1 MG/DL (ref 1.6–2.4)
MCH RBC QN AUTO: 33 PG (ref 26.6–33)
MCHC RBC AUTO-ENTMCNC: 32.4 G/DL (ref 31.5–35.7)
MCV RBC AUTO: 101.9 FL (ref 79–97)
MONOCYTES # BLD AUTO: 0.72 10*3/MM3 (ref 0.1–0.9)
MONOCYTES NFR BLD AUTO: 13.2 % (ref 5–12)
NEUTROPHILS NFR BLD AUTO: 2.71 10*3/MM3 (ref 1.7–7)
NEUTROPHILS NFR BLD AUTO: 49.7 % (ref 42.7–76)
NRBC BLD AUTO-RTO: 0 /100 WBC (ref 0–0.2)
PHOSPHATE SERPL-MCNC: 4 MG/DL (ref 2.5–4.5)
PLATELET # BLD AUTO: 285 10*3/MM3 (ref 140–450)
PMV BLD AUTO: 9.3 FL (ref 6–12)
POTASSIUM SERPL-SCNC: 4.1 MMOL/L (ref 3.5–5.2)
PROT SERPL-MCNC: 7.6 G/DL (ref 6–8.5)
RBC # BLD AUTO: 4.21 10*6/MM3 (ref 3.77–5.28)
SODIUM SERPL-SCNC: 142 MMOL/L (ref 136–145)
WBC NRBC COR # BLD: 5.45 10*3/MM3 (ref 3.4–10.8)

## 2022-11-16 PROCEDURE — 25010000002 DENOSUMAB 60 MG/ML SOLUTION PREFILLED SYRINGE: Performed by: INTERNAL MEDICINE

## 2022-11-16 PROCEDURE — 99214 OFFICE O/P EST MOD 30 MIN: CPT | Performed by: INTERNAL MEDICINE

## 2022-11-16 PROCEDURE — 84100 ASSAY OF PHOSPHORUS: CPT | Performed by: INTERNAL MEDICINE

## 2022-11-16 PROCEDURE — 85025 COMPLETE CBC W/AUTO DIFF WBC: CPT | Performed by: INTERNAL MEDICINE

## 2022-11-16 PROCEDURE — 96372 THER/PROPH/DIAG INJ SC/IM: CPT

## 2022-11-16 PROCEDURE — 80053 COMPREHEN METABOLIC PANEL: CPT | Performed by: INTERNAL MEDICINE

## 2022-11-16 PROCEDURE — 83735 ASSAY OF MAGNESIUM: CPT | Performed by: INTERNAL MEDICINE

## 2022-11-16 PROCEDURE — 36415 COLL VENOUS BLD VENIPUNCTURE: CPT

## 2022-11-16 RX ORDER — AMITRIPTYLINE HYDROCHLORIDE 25 MG/1
25 TABLET, FILM COATED ORAL NIGHTLY
COMMUNITY
Start: 2022-11-01 | End: 2022-12-15 | Stop reason: DRUGHIGH

## 2022-11-16 RX ORDER — ZOLPIDEM TARTRATE 5 MG/1
5 TABLET ORAL NIGHTLY PRN
Qty: 25 TABLET | Refills: 0 | Status: SHIPPED | OUTPATIENT
Start: 2022-11-16 | End: 2022-12-15 | Stop reason: SDUPTHER

## 2022-11-16 RX ADMIN — DENOSUMAB 60 MG: 60 INJECTION SUBCUTANEOUS at 15:38

## 2022-11-16 NOTE — PROGRESS NOTES
Subjective       REASON FOR FOLLOW UP:    1.  T2N1 invasive ductal carcinoma of the left breast.  Ultrasound-showed 3.8 x 3.1 x 2.5 cm mass at 4 o'clock position with 2 abnormal axillary lymph nodes, larger lymph node being 1.4 cm.  Left breast biopsy and axillary lymph node biopsy October 29, 2019, invasive ductal carcinoma, moderately differentiated, grade 2, ER 85%, AL 10%, HER-2/merna 2+, HER-2 FISH negative.  · 12/06/19: Neoadjuvant chemo therapy, cycle #1 of dose-dense Adriamycin/Cytoxan with Neulasta for marrow support    · 01/17/20: Last cycle of Adriamycin/Cytoxan given  · January 31, 2020: Cycle 1 Taxol  · April 24, 2020: Last cycle, cycle 12 of Taxol  · Patient is s/p left mastectomy with axillary dissection with reconstruction.  She is healing up nicely.  She has a drain in place.  Pathology showed invasive breast cancer which is 55 x 40 mm in size, grade 2 with focal lymphovascular space invasion with DCIS spanning over 36 x 6 mm.  All margins were negative for cancer.  · 8 of the additional lymph nodes out of 11 were positive with the largest micrometastasis measuring 5 mm.  Extranodal extension was seen.  · Biomarkers on post neoadjuvant tumor is ER 81-90% AL 5% HER-2/merna 2+ by immunohistochemistry and HER-2/merna negative by FISH.  · Radiation July 6, 2020-August 17, 2020.  · Letrozole initiated July 2020.  · Letrozole discontinued October 2020 secondary to severe joint pain.  · October 30, 2020 patient starting tamoxifen and tolerating well  · August 2021: Tamoxifen discontinued secondary to depression and mental fogginess and fatigue  · September 21, 2021: Arimidex started  · Severe arthralgias secondary to Arimidex, will start Aromasin  · Patient is tolerating Aromasin well    2.  HIV, followed by Dr. Dawn from infectious disease.  Well controlled on meds    3.  Screening mammogram December 7, 2020 showed indeterminate calcifications lower outer middle left breast. Diagnostic mammogram showed  0.4 cm calcifications in the left breast.  Stereotactic biopsy January 8, 2021 consistent with organizing fat necrosis.              HISTORY OF PRESENT ILLNESS:  The patient is a 62 y.o. year old female with history of T2N1 invasive ductal carcinoma of the left breast s/p neoadjuvant chemotherapy with Adriamycin Cytoxan followed by Taxol, s/p left mastectomy with axillary dissection and had 55 x 40 mm grade 2 invasive breast cancer with DCIS spanning over 36 x 6 mm with 8 out of 11 lymph nodes positive with extranodal extension s/p radiation.  Subsequently patient had tried letrozole which caused her severe arthralgias and could not tolerate.  She tried tamoxifen but that caused her significant depression and fogginess and that was discontinued.  Currently she is on Arimidex.  Even that is causing her severe arthralgias and stiffness..    Patient could not tolerate Femara, tamoxifen, Arimidex.  We switched her to Aromasin and she is tolerating it very well.  She also went to rheumatologist who is evaluating her.  She is followed by pain medicine clinic and is on hydrocodone which she takes as needed for pain.    Interval history:    Patient is currently on Aromasin.  The joint pains are not significant.  But insomnia is significant.  For the last 10 years her primary care was giving her Ambien 10 mg but now she has a new primary care who took patient off Ambien and currently she is suffering with insomnia secondary to that    Interval history: Patient is doing well.  She has insomnia and wants something to be done with her insomnia.  She has been started by Dr. Moffett on Effexor and subsequently is switched /    Past Medical History:   Diagnosis Date   • Anxiety    • Cerebrovascular accident (CVA) ([facility]) 08/22/2017    NO RESIDUAL AFFECT   • DDD (degenerative disc disease), cervical    • Depression    • Drug therapy    • Elevated cholesterol    • Fat necrosis of left breast    • GERD (gastroesophageal reflux disease)     • H/O ICH (intracerebral hemorrhage) (CMS/HCC)    • History of chemotherapy    • History of heart murmur in childhood    • History of stroke     PATIENT STATES HAD A MINI STROKE, NO RESIDUAL EFFECTS   • History of thrombocytopenia    • HIV (human immunodeficiency virus infection) ([facility]) 1996   • Hx of radiation therapy    • Hyperlipidemia    • Insomnia    • Lupus anticoagulant positive    • Malignant neoplasm of overlapping sites of left breast in female, estrogen receptor positive ([facility]) 11/12/2019   • Meniscus tear 2017    RIGHT   • Migraine    • Neck pain     WITH SHOOTING PAIN LEFT RIGHT ARM   • Neuropathy    • Osteoarthritis    • Osteoporosis    • Pain management     sees 1 every 3 months for scripts/injection/pain meds   • Pneumonia 1989   • Seasonal allergies    • Spinal headache     THINKS HAD BLOOD PATCH AFTER EPIDUAL    • Tick bite 06/2014   • Upper back pain    • Vertigo      PAST MEDICAL HISTORY:  She had a stroke in July 2017 with headache and dizziness.  She went to the ER and was placed on aspirin.  However, she stopped taking it two weeks ago.  She has been taking Aimovig (injection) monthly with Fariba López at [place].    In 1994, patient was diagnosed with HIV with an unknown cause which is managed by Dr. Natali Subramanian.  As of now, her viral load is good.    Patient has arthritis.  She gets trigger-point injections in her back with her last one being 2 weeks ago.  She has had multiple ablations.  She also has pain in her SI joint and Dr. Bermudez performed a surgery on this.  She takes Narco for the pain.      She is osteoporotic.  She has had multiple hairline fractures in both her legs.  She had her knees cleaned out by Dr. Sinha.     Patient has had a hysterectomy and still has both of her ovaries.    Patient has vertigo and the muscles in her left ear are weak.  She just has an imbalance.    Past Surgical History:   Procedure Laterality Date   • ADENOIDECTOMY     • BREAST LUMPECTOMY     • BREAST  LUMPECTOMY WITH SENTINEL NODE BIOPSY Left 05/28/2020    Procedure: BREAST LUMPECTOMY WITH SENTINEL NODE BIOPSY AND NEEDLE LOCALIZATION AND axillary dissection;  Surgeon: Landen Forman MD;  Location: [facility];  Service: General;  Laterality: Left;   • BREAST SURGERY Left 05/28/2020    Procedure: LEFT LATERAL INTERCOASTAL ARTERY  flap ;  Surgeon: Yusuf Garcia MD;  Location: [facility] OR;  Service: Plastics;  Laterality: Left;   • CHOLECYSTECTOMY     • HYSTERECTOMY     • KNEE ARTHROSCOPY Right 03/24/2017    Procedure:  RIGHT  KNEE ARTHROSCOPY WITH DEBRIDEMENT CHONDROPLASTY PARTIAL MENISCECTOMY;  Surgeon: Karl Galeas MD;  Location: [facility];  Service:    • KNEE CARTILAGE SURGERY Right 2007   • TONSILLECTOMY     • TONSILLECTOMY  1974   • US GUIDED LYMPH NODE BIOPSY  10/29/2019    LEFT BREAST   • VENOUS ACCESS DEVICE (PORT) INSERTION Right 12/02/2019    Procedure: INSERTION VENOUS ACCESS DEVICE RIGHT;  Surgeon: Landen Forman MD;  Location: [facility];  Service: General   • VENOUS ACCESS DEVICE (PORT) REMOVAL N/A 05/25/2022    Procedure: Mediport Removal;  Surgeon: Cesar Vasquez Jr., MD;  Location: [facility];  Service: General;  Laterality: N/A;       ONCOLOGIC HISTORY:  Patient is a 59-year-old female who has had a history of HIV since age 34 followed by Dr. Natali Ng at [facility] and was on multiple HIV drugs initially but more recently has been on a single medication TRIUMEQ, with well-controlled HIV.  She follows up with Dr. Dawn , infectious disease who saw her recently and he saw her on 4/8/2019 and has excellent control and suppression of her viral load.  She is very compliant with her medications.    She also has had history of stroke in 2017 for which she was placed on aspirin.  She presented with a headache.  She is very active and works at [employer] full-time.  She also has had history of vertigo in the  past  Patient's PCP is Zach Lora.    Patient first noticed the mass in her left breast 5 months ago.  Her last mammogram was 5 years ago.  She had soreness in the left breast and she went to her primary care physician who then ordered a mammogram diagnostic and an ultrasound.  The diagnostic mammogram showed a 3.7 cm mass at 4 o'clock position in the lower outer quadrant of the left breast.  The ultrasound showed 3.8 cm x 3.1 x 2.5 cm mass at 4 o'clock position in the left breast with 2 abnormal appearing left axillary lymph nodes the largest being 1.4 cm.    She then underwent biopsy of both the breast mass as well as the left axillary lymph node and both of them are positive for invasive mammary carcinoma it is invasive ductal carcinoma with apocrine features, moderately differentiated, grade 2 of 3 with a [place] score of 5.  The left axillary lymph node is also consistent with metastatic mammary carcinoma.  It is ER positive [place] positive HER-2/merna negative.  Details as follows    10/21/19 - Bilateral Diagnostic Mammogram and US Breast Left  FINDINGS: Bilateral digital CC and MLO mammographic images were  obtained. No prior examination is available for comparison. Scattered  fibroglandular densities are seen throughout both breasts. A triangular  skin marker represents the area of palpable concern in the lower outer  quadrant of the left breast in the posterior 1/3. At this location there  is an irregular mass that measures on the order of 3.7 cm in greatest  dimension. Internal calcifications are noted. No suspicious findings in  the right breast are appreciated.     ULTRASOUND: Targeted sonographic evaluation of the left breast was  performed through the area of concern in the left axilla. At the 4  o'clock position on the order of 6 cm from the nipple there is an  irregular hypoechoic mass that measures 3.8 x 3.1 x 2.5 cm. Internal  vascularity is noted.     There are 2 abnormal-appearing left axillary  lymph nodes, the largest  which measures on the order of 1.4 cm in greatest dimension.     IMPRESSION:  1. There is a 3.8 cm irregular mass in the left breast at the 4 o'clock  position. This is seen in conjunction with an irregular 1.4 cm lymph  node in the left axilla. This is suspicious for malignancy with left  axillary involvement. Correlation with ultrasound-guided biopsy of both  the left breast mass and the lymph node is recommended.  2. There are no findings suspicious for malignancy in the right breast.     BI-RADS CATEGORY 5:     Patient's labs from 11/12/19 are normal.    10/29/19 - Tissue Pathology  1. Left Breast, 4:00, 6 cm FN, U/S-Guided Core Needle Biopsy for a Mass:  A. INVASIVE DUCTAL CARCINOMA WITH APOCRINE FEATURES, Moderately differentiated;  Jen Histologic Grade II/III (tubule score = 3, nuclear score = 2, mitoses score = 1), measuring at least  9 mm.  B. No ductal or lobular carcinoma in situ is identified in this sample.  C. Focus suspicious for lymphovascular space invasion.  D. See Biomarker Template for hormone receptor studies.  2. Lymph Node, Left Axilla, U/S-Guided Core Needle Biopsy:  A. METASTATIC MAMMARY CARCINOMA (see Comment).  B. Metastatic focus measures 5 mm in greatest extent.  ER+, 85%  NH+, 10%  HER2- Score 2+    11/13/19 - CT Neck Chest Abdomen Pelvis  IMPRESSION:  1. In addition to the irregular approximately 3.8 cm mass within the  lateral aspect of the left breast, there are a few subcentimeter  asymmetric nodules along the lateral margin of the glandular tissue. The  several asymmetric left subpectoral nodes and 1.2 x 1.0 cm left axillary  node are suspicious for tamar metastases. The asymmetric subcentimeter  left supraclavicular and left posterior cervical triangle nodes are  worrisome as well.  2. There is no convincing evidence for metastatic disease within the  abdomen or pelvis.    11/14/19 - Echocardiogram:  Normal.  Calculated EF = 67%.  There is trace  mitral valve and tricuspid valve regurgitation.    11/15/19 - Bone Scan  Negative.    19 - MRI Breast Bilateral  IMPRESSION:  1. Biopsy-proven malignancy in the left breast centered at the 4 o'clock  position with associated surrounding satellite nodules as described. The  entire region of involvement including the satellite nodules and the  dominant mass measure up to 7.5 cm in greatest dimension. Also, left  axillary adenopathy with multiple irregular lymph nodes and loss of  differentiation of the borders of multiple lymph nodes is noted and this  is suspicious for extranodal involvement.  2. There are no findings suspicious for malignancy in the right breast.  BI-RADS CATEGORY 6      19: Cycle #1 of Adriamycin/Cytoxan    20: Last cycle of Adriamycin/Cytoxan given without Neulasta.  We will switch from Neulasta to 7 days of Neupogen injections after cycle 4.    We reviewed with patient the US Breast from 20 which shows mild interval partial response to neoadjuvant chemotherapy.      20: Initiated cycle 1 Taxol  2020: Last cycle of Taxol, cycle 12    S/p left mastectomy with axillary dissection   Pathology showed invasive breast cancer which is 55 x 40 mm in size, grade 2 with focal lymphovascular space invasion with DCIS spanning over 36 x 6 mm.  All margins were negative for cancer.  8 of the additional lymph nodes out of 11 were positive with the largest micrometastasis measuring 5 mm.  Extranodal extension was seen.    Biomarkers on post neoadjuvant tumor is ER 81-90% PA 5% HER-2/merna 2+ by immunohistochemistry and HER-2/merna negative by FISH.    2020: Started letrozole but because of severe joint pains was discontinued 2020    End of 2020 started tamoxifen    OB-GYN:  Menarche 15 years  Menopause-46  Pregnancies- 1 para 1 no miscarriages her first pregnancy was in  at 29 years of age.  She did not breastfeed.  Post menopausal HRT- None.  Hx  of birth control pills- Yes.    Current Outpatient Medications on File Prior to Visit   Medication Sig Dispense Refill   • AIMOVIG 140 MG/ML solution auto-injector Inject 140 mg under the skin into the appropriate area as directed Every 30 (Thirty) Days. HAS NOT TAKEN IN SEVERAL MONTHS  11   • amitriptyline (ELAVIL) 25 MG tablet Take 25 mg by mouth Every Night.     • Aspirin 81 MG capsule Take 1 tablet by mouth Daily.     • atorvastatin (LIPITOR) 20 MG tablet Take 1 tablet by mouth Every Night. 90 tablet 1   • baclofen (LIORESAL) 10 MG tablet Take 10 mg by mouth 2 (Two) Times a Day.  2   • colesevelam (WELCHOL) 625 MG tablet TAKE 2 TABLETS BY MOUTH EVERY DAY (Patient taking differently: Take 1,250 mg by mouth Daily.) 180 tablet 1   • escitalopram (LEXAPRO) 10 MG tablet TAKE 1 TABLET BY MOUTH EVERY DAY (Patient taking differently: Take 10 mg by mouth Daily.) 90 tablet 3   • exemestane (AROMASIN) 25 MG chemo tablet Take 25 mg by mouth Daily.     • fluticasone (FLONASE) 50 MCG/ACT nasal spray 2 SPRAYS INTO THE NOSTRIL(S) AS DIRECTED BY PROVIDER DAILY. 48 mL 3   • Loratadine (CLARITIN PO) Take 10 mg by mouth Daily.     • Nurtec 75 MG dispersible tablet Take 1 tablet by mouth Daily As Needed.     • oxyCODONE-acetaminophen (PERCOCET)  MG per tablet Take 1 tablet by mouth 4 (Four) Times a Day.     • Triumeq 600- MG per tablet TAKE 1 TABLET BY MOUTH EVERY DAY 90 tablet 3   • vitamin B-12 (CYANOCOBALAMIN) 1000 MCG tablet Take 1,000 mcg by mouth Daily.     • vitamin B-6 (pyridoxine) 50 MG tablet Take 1 tablet by mouth Daily. 90 tablet 2   • hydrOXYzine (ATARAX) 25 MG tablet TAKE 1 TABLET BY MOUTH AT NIGHT AS NEEDED FOR ANXIETY. 30 tablet 0   • zolpidem (AMBIEN) 5 MG tablet Take 1 tablet by mouth At Night As Needed for Sleep. 25 tablet 0   • [DISCONTINUED] venlafaxine XR (Effexor XR) 37.5 MG 24 hr capsule Take 1 capsule by mouth every night at bedtime. 90 capsule 1     No current facility-administered medications  on file prior to visit.        ALLERGIES:    Allergies   Allergen Reactions   • Augmentin [Amoxicillin-Pot Clavulanate] Hives        Social History     Socioeconomic History   • Marital status:      Spouse name: Owen   • Number of children: 1   • Years of education: College   Tobacco Use   • Smoking status: Never   • Smokeless tobacco: Never   • Tobacco comments:     never   Vaping Use   • Vaping Use: Never used   Substance and Sexual Activity   • Alcohol use: Yes     Comment: OCCASSIONALLY   • Drug use: No   • Sexual activity: Yes     Partners: Male     Birth control/protection: Condom     Comment:      Patient does not smoke or do drugs.  She does drink occasionally.    Family History   Problem Relation Age of Onset   • Kidney disease Mother    • Hypertension Mother    • Heart disease Mother    • Arthritis Mother    • Breast cancer Mother    • Leukemia Mother         CLL?   • Diabetes Mother    • Hyperthyroidism Mother    • Hearing loss Mother    • Cancer Mother         breast   • Arthritis Father    • Dementia Father    • Stroke Father    • Leukemia Maternal Aunt         CLL?   • Throat cancer Paternal Uncle    • Heart disease Maternal Grandmother    • Diabetes Maternal Grandfather    • Heart disease Maternal Grandfather    • Stroke Maternal Grandfather    • Heart attack Maternal Grandfather    • Osteoporosis Paternal Grandmother    • Heart disease Paternal Grandfather    • Malig Hyperthermia Neg Hx       FAMILY HISTORY:  Her mother had breast cancer at age 60, still alive at 85 and in good health..  Her father had dementia.  Her paternal uncle had prostate cancer.  Her brother had esophageal cancer.    I have reviewed the patient's medical history in detail and updated the computerized patient record.     ROS: as per HPI    Objective    Vitals:    11/16/22 1434   BP: 132/89   Pulse: 86   Resp: 18   Temp: 97.8 °F (36.6 °C)   TempSrc: Temporal   SpO2: 100%   Weight: 65 kg (143 lb 6.4 oz)   Height:  "170.2 cm (67.01\")   PainSc:   6   PainLoc: Neck  Comment: PT STATED ALSO UPPER BACK         Review of Systems   Constitutional: Negative for appetite change, chills, diaphoresis, fatigue, fever and unexpected weight change.   HENT: Negative for hearing loss, sore throat and trouble swallowing.    Respiratory: Negative for cough, chest tightness, shortness of breath and wheezing.    Cardiovascular: Negative for chest pain, palpitations and leg swelling.   Gastrointestinal: Negative for abdominal distention, abdominal pain, constipation, diarrhea, nausea and vomiting.   Genitourinary: Negative for dysuria, frequency, hematuria and urgency.   Musculoskeletal: Negative for joint swelling.        No muscle weakness.   Skin: Negative for rash and wound.   Neurological: Positive for numbness (Bilat foot). Negative for seizures, syncope, speech difficulty, weakness and headaches.   Hematological: Negative for adenopathy. Does not bruise/bleed easily.   Psychiatric/Behavioral: Positive for sleep disturbance. Negative for behavioral problems, confusion and suicidal ideas.   All other systems reviewed and are negative.      Physical exam      CONSTITUTIONAL:  Vital signs reviewed.  No distress, looks comfortable.  RESPIRATORY:  Normal respiratory effort.  Lungs clear to auscultation bilaterally.  BREAST: Right breast: No skin changes, no evidence of breast mass, no nipple discharge, no evidence of any right axillary adenopathy or right supraclavicular adenopathy  Left breast: No evidence of any skin changes, no evidence of any left breast mass and no evidence of left nipple discharge as well as no left axillary adenopathy or left supraclavicular adenopathy.  CARDIOVASCULAR:  Normal S1, S2.  No murmurs rubs or gallops.  No significant lower extremity edema.  GASTROINTESTINAL: Abdomen appears unremarkable.  Nontender.  No hepatomegaly.  No splenomegaly.  LYMPHATIC:  No cervical, supraclavicular, axillary lymphadenopathy.  SKIN:  " Warm.  No rashes.  PSYCHIATRIC:  Normal judgment and insight.  Normal mood and affect..    RECENT LABS:   Results from last 7 days   Lab Units 11/16/22  1352   WBC 10*3/mm3 5.45   NEUTROS ABS 10*3/mm3 2.71   HEMOGLOBIN g/dL 13.9   HEMATOCRIT % 42.9   PLATELETS 10*3/mm3 285     Results from last 7 days   Lab Units 11/16/22  1352   SODIUM mmol/L 142   POTASSIUM mmol/L 4.1   CHLORIDE mmol/L 104   CO2 mmol/L 29.4*   BUN mg/dL 10   CREATININE mg/dL 0.72   CALCIUM mg/dL 10.1   ALBUMIN g/dL 4.50   BILIRUBIN mg/dL 0.3   ALK PHOS U/L 95   ALT (SGPT) U/L 14   AST (SGOT) U/L 19   GLUCOSE mg/dL 91   MAGNESIUM mg/dL 2.1         Assessment & Plan    1. T2N1 invasive ductal carcinoma of the left breast, recently diagnosed.    -Noticed mass in the left breast x5 months  -Diagnostic mammogram showed 3.7 mm mass in the left breast at 4 o'clock position  -Ultrasound-showed 3.8 x 3.1 x 2.5 cm mass at 4 o'clock position with 2 abnormal axillary lymph nodes, larger lymph node being 1.4 cm  -Left breast biopsy and axillary lymph node biopsy October 29, 2019, invasive ductal carcinoma, moderately differentiated, grade 2, ER 85%, NE 10%, HER-2/merna 2+, HER-2 FISH negative  · CT scans from 11/13/19 show some asymmetric nodules along the lateral margin of the glandular tissue.  The 1.2x1.0 cm left axillary nodes, left supraclavicular, and left posterior cervical triangle nodes are suspicious for tamar metastases.  ·   bone scan from 11/15/19 which was negative.   ·  MRI breast from 11/18/19 which shows the biopsy-proven malignancy in the left breast centered at the 4:00 position.  The region of involvement measures up to 7.5 cm.  There is left axillary adenopathy with multiple irregular lymph nodes and loss of differentiation of the border of multiple lymph nodes which are suspicious for extranodal involvement.  ·    echocardiogram from 11/14/19 which was normal with an ejection fraction of 67%.    · INVITAE from 11/14/19 which was  negative.  · Given the size of her tumor and her medical history, patient will be given 4 cycles of Adriamycin/Cytoxan with Neulasta.  After completion of this treatment, patient will be started on 12 cycles of Taxol.  After the tumor shrinks in size, Dr. Forman will perform a lumpectomy.    · Following lumpectomy, patient will begin endocrine therapy.  · Dr. Dawn agrees chemotherapy will not aggravate her HIV condition.  Dr. Moreno spoke with Dr. Mohan at [place] who also agrees chemotherapy is the first step.   · 12/06/19: Cycle #1 of Adriamycin/Cytoxan  · US Breast from 01/29/20 after 4 cycles of Adriamycin Cytoxan chemotherapy which shows mild interval partial response to neoadjuvant chemotherapy.  The mass has decreased from 3.8 x 2.5 to 3.1 cm to 3.5 x 2.3 x 3.2 cm previously the left axillary lymph node has decreased from 1.4 x 0.7 x 1 cm to 1.1 x 0.6 x 0.9 cm.  · 01/31/20: Initiated cycle 1 Taxol  · April 24, 2020: Completed cycle 12 Taxol  · MRI breast 5/6/2020 shows significant reduction in the left axillary adenopathy as well as reduction in the index breast lesion.  · S/p left modified radical mastectomy with left axillary dissection.  Patient has 55 x 40 x 30 mm invasive carcinoma, grade 2 with DCIS 36 x 6 mm, high-grade with good margins and 8 out of 11 lymph nodes positive with largest metastasis being 5 mm with extracapsular extension.  And there is lymphatic space invasion  · Will follow-up in 2 weeks at which time we will plan to start endocrine therapy.  She has radiation oncology appointment with Dr. Spivey  · Patient is eligible for Zometa once every 6 months for 2 years.  But given that she is undergoing some work on her teeth we will await starting Zometa until after radiation is complete.  · XRT completed August 17, 2020  · Started letrozole August 2020 but has arthralgias  · October 6, 2020 had severe arthralgias secondary to letrozole.  We will switch to tamoxifen starting  November 2020  · 1/8/2021 screening mammogram in December 2020 as well as diagnostic mammogram which showed 0.4 cm suspicious calcification in the lower left which on biopsy is consistent with fat necrosis.  · 8/12/2021 tamoxifen placed on hold due to significant arthralgias and worsening fatigue.   · September 21, 2021: Patient's depression, mental fogginess has resolved since tamoxifen discontinued.  She has mild worsening fatigue her TSH and B12 levels are normal.  We will need to check iron studies  · Discussed about switching to Arimidex to see if she could tolerate that better  · Patient initiated Arimidex 9/21/2021.  · Returns 10/5/2021 complaining of significant generalized arthralgias.  Reviewed with Dr. Moreno.  We were initially going to try her on Cymbalta, but the patient is already taking Lexapro, and doing well on this.  We will therefore discontinue Arimidex.  We will give her a 6-week break and reevaluate things in 6 weeks.  Could try at that time switching to Aromasin.  · November 15, 2021: Patient continued with Arimidex even though she was asked to hold it last time.  Her arthralgias worsened.  Also the stiffness in the joints worsened significantly.  Discussed with patient that we need to hold Arimidex for 6 weeks and try Aromasin at her next appointment.  · December 28, 2021: ArthralgiaS improved after holding Arimidex.  · December 28, 2021: Started Aromasin  · August 9, 2022: Patient continues to have some mild arthralgias and stiffness and left hip pain but manageable with use of Aromasin and prefers to continue Aromasin.    · November 11, 2022: Currently continue Aromasin    2. HIV, followed by Dr. Dawn in infectious disease.  Well-controlled and is on a single medication with very low viral load. Has HIV since age 34.  · Reviewed her HIV medications and she is compliant  · Patient followed by infectious disease for her HIV and currently on treatment.  Stay  · Currently is followed  by Dr. Dawn on anti-HIV medications     3.  Family history of breast cancer with mother having had breast cancer at age 60.  There is family history of uncle with prostate cancer.  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    4.  Arthritis with severe back pain followed by Dr. Bam Crandall  · Continue Percocet  · Patient has arthralgias which have worsened on Arimidex as well  · Patient seen by rheumatologist  · Patient has continued arthralgias and joint stiffness, currently on Aromasin and wants to continue it    5. Episodes of nosebleeds.  She has had these once a week for 3-4 years. She did not report this today.    6.  Vaginal dryness related to tamoxifen, ongoing.  Patient provided handouts for recommendations of a vaginal lubricant and vaginal moisturizer to use for hopeful improvement.  · Tamoxifen discontinued secondary to mood swings    7.  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  Patient continues on B6.  Chronic and stable    8. Fatigue  · 8/12/2021 patient seen as triage visit for worsening fatigue and diffuse arthralgias.  Reporting increased sleeping at night and still taking naps in the day.  Labs checked including thyroid studies which were unremarkable and B12 level actually elevated at greater than 2000.  Patient taking vitamin B supplements at that time.  Patient instructed to hold tamoxifen.  · 8/26/2021 reviewed today recent lab work for above with no evidence of thyroid dysfunction or vitamin B12 abnormality.  Patient notes no improvement being off tamoxifen for 2 weeks.  We will check a CA 15-3 to help further guide decision making.  If this is elevated we may then pursue imaging.  Discussed that she may just need to be off the tamoxifen longer to see improvement.  Patient does have underlying HIV as well and unclear if maybe some of  her chronic medications for this could be contributing to her current symptoms.  · Patient continues with fatigue but slightly improved now    *Insomnia, patient is doing well except has insomnia and neurology has placed on amitriptyline which does not help the sleep but helps her neuropathy    PLAN:   · Continue Aromasin  · Patient to discuss with her primary care about MPN for insomnia  · Continue calcium and vitamin D supplements  · We will give Prolia  · Referral made to psychiatry Richa Sal to see if she can see her sooner for insomnia and anxiety  · Follow-up in 6 months with labs and Prolia    Monitoring high risk medication  Ruby Moreno MD      Cc: ОЛЬГА Godoy  · MD Denilson Bishop MD

## 2022-11-16 NOTE — NURSING NOTE
To the infusion area after seeing Dr Moreno with the ok for prolia injection. Indication and side effects reviewed. Denies recent dental work. Labs and medications verified. Prolia administered in  arm without incidence. Instructed to call prescribing MD for any concerns or questions and instructed on how to schedule future appts.  Pt vu and discharged in stable condition.

## 2022-11-16 NOTE — TELEPHONE ENCOUNTER
"Plan as per Miri Montesinos:     \"She recently started amitriptyline, as prescribed by neurology, to help with sleep.  Her sleep study is scheduled in January.  Will continue 5 mg at this time, until we can assess how amitriptyline is helping.\"    Will approve zolpidem 5 mg to be used PRN (NOT every night routinely). Thus, qty will be reduced to #25 / 30 days and no refills. Will need to reassess monthly. She needs to work on actively weaning off zolpidem. She is on multiple psychotropic medications and opioids. Continue zolpidem long term is not a workable option.     "

## 2022-11-17 ENCOUNTER — TELEPHONE (OUTPATIENT)
Dept: INTERNAL MEDICINE | Age: 62
End: 2022-11-17

## 2022-11-17 NOTE — TELEPHONE ENCOUNTER
Please call pt.    Dr. Ortiz approved zolpidem 5 mg to be used PRN (NOT every night routinely).  He reduced the quantity for 25 pills for the next 30 days.  Will need to reassess monthly how you doing with discontinuing use of zolpidem.  Recommendation is to actively work towards weaning off of this medication, as it is not safe to be on multiple psychotropic medications and opioids.      Would recommend that you schedule an appointment with Dr. Ortiz in the near future to investigate other options to help with sleep long term.  Telehealth would be fine.

## 2022-11-21 ENCOUNTER — TELEPHONE (OUTPATIENT)
Dept: OTHER | Facility: HOSPITAL | Age: 62
End: 2022-11-21

## 2022-11-21 NOTE — TELEPHONE ENCOUNTER
Oncology Social Work  [facility]    Referral received from Dr. Moreno - due to increased anxiety and insomnia.  Chart reviewed.  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 - 2019.  Patient underwent chemotherapy, mastectomy and radiation therapy (completed in Aug. 2020). Patient started Aromasin in Dec. 2021.  Patient attempted Tamoxifen and Arimidex - could not tolerate and discontinued.   On Nov. 11, 2022 - currently continue Aromasin.     OSW called and spoke to patient.  Explained nature of call and explained that ОЛЬГА Rey is still on maternity leave and would not be able to see until later part of December.  Patient is ok with waiting till Richa gets back.  Patient currently experiencing anxiety and difficulty falling and staying asleep.  She is on Lexapro 10mg - prescribed by her PCP, Miri ROLON.  Patient is also on Elavil 25mg 2 tabs at night - prescribed by her Neurologist Fariba ROLON  Patient is on Ambien 5mg - only has about 2 weeks left of pills     Patient reports no psych hx - no inpatient psych stays, no outpatient mental health services.     Will message ОЛЬГА Rey - Behavioral Oncology regarding getting an appt.     OSW did talk with patient about nonpharmacologic ways to decrease anxiety and help to restore sleep.      Yola Barillas, MARQUEZW, CSW

## 2022-11-23 DIAGNOSIS — F41.9 ANXIETY: ICD-10-CM

## 2022-11-23 RX ORDER — HYDROXYZINE HYDROCHLORIDE 25 MG/1
25 TABLET, FILM COATED ORAL NIGHTLY PRN
Qty: 30 TABLET | Refills: 0 | OUTPATIENT
Start: 2022-11-23

## 2022-12-07 ENCOUNTER — OFFICE VISIT (OUTPATIENT)
Dept: INTERNAL MEDICINE | Age: 62
End: 2022-12-07

## 2022-12-07 VITALS
BODY MASS INDEX: 23.76 KG/M2 | DIASTOLIC BLOOD PRESSURE: 68 MMHG | OXYGEN SATURATION: 99 % | HEIGHT: 67 IN | SYSTOLIC BLOOD PRESSURE: 116 MMHG | WEIGHT: 151.4 LBS | TEMPERATURE: 98.9 F | HEART RATE: 89 BPM

## 2022-12-07 DIAGNOSIS — Z23 ENCOUNTER FOR IMMUNIZATION: ICD-10-CM

## 2022-12-07 DIAGNOSIS — K21.9 GASTROESOPHAGEAL REFLUX DISEASE, UNSPECIFIED WHETHER ESOPHAGITIS PRESENT: ICD-10-CM

## 2022-12-07 DIAGNOSIS — G47.00 INSOMNIA, UNSPECIFIED TYPE: Primary | ICD-10-CM

## 2022-12-07 PROCEDURE — 90686 IIV4 VACC NO PRSV 0.5 ML IM: CPT

## 2022-12-07 PROCEDURE — 90471 IMMUNIZATION ADMIN: CPT

## 2022-12-07 PROCEDURE — 99214 OFFICE O/P EST MOD 30 MIN: CPT

## 2022-12-07 RX ORDER — DOXYCYCLINE 100 MG/1
CAPSULE ORAL
COMMUNITY
Start: 2022-12-06 | End: 2023-04-06 | Stop reason: ALTCHOICE

## 2022-12-07 RX ORDER — OMEPRAZOLE 20 MG/1
20 CAPSULE, DELAYED RELEASE ORAL DAILY
Qty: 30 CAPSULE | Refills: 2 | Status: SHIPPED | OUTPATIENT
Start: 2022-12-07 | End: 2023-03-03

## 2022-12-07 NOTE — PROGRESS NOTES
"    I N T E R N A L  M E D I C I N E  Miri Montesinos, ОЛЬГА    ENCOUNTER DATE:  12/07/2022    Richa Dolan / 62 y.o. / female      CHIEF COMPLAINT / REASON FOR OFFICE VISIT     Follow-up (Medication discussion, wants flu shot)      ASSESSMENT & PLAN     Diagnoses and all orders for this visit:    1. Insomnia, unspecified type (Primary)    2. Gastroesophageal reflux disease, unspecified whether esophagitis present  -     omeprazole (priLOSEC) 20 MG capsule; Take 1 capsule by mouth Daily for 30 days.  Dispense: 30 capsule; Refill: 2    3. Encounter for immunization  -     FluLaval/Fluzone >6 mos ([id number])         SUMMARY/DISCUSSION  • Discussed with pt possibility of Quviviq, risks, benefits, side effects.  She will review information and seek input from infectious disease and oncology.    • Continue to work towards decreasing use of zolpidem.  Not safe to continue this medication long term.  • Attend sleep medicine appointment as scheduled.  • Pt to start PPI and provide update on symptoms in 1-2 weeks.      Next Appointment with me: 2/17/2023    Return for Next scheduled follow up.      VITAL SIGNS     Visit Vitals  /68 (BP Location: Right arm, Patient Position: Sitting, Cuff Size: Adult)   Pulse 89   Temp 98.9 °F (37.2 °C) (Temporal)   Ht 170.2 cm (67.01\")   Wt 68.7 kg (151 lb 6.4 oz)   SpO2 99%   BMI 23.71 kg/m²             Wt Readings from Last 3 Encounters:   12/07/22 68.7 kg (151 lb 6.4 oz)   11/16/22 65 kg (143 lb 6.4 oz)   10/17/22 64.4 kg (142 lb)     Body mass index is 23.71 kg/m².        MEDICATIONS AT THE TIME OF OFFICE VISIT     Current Outpatient Medications on File Prior to Visit   Medication Sig Dispense Refill   • AIMOVIG 140 MG/ML solution auto-injector Inject 140 mg under the skin into the appropriate area as directed Every 30 (Thirty) Days. HAS NOT TAKEN IN SEVERAL MONTHS  11   • amitriptyline (ELAVIL) 25 MG tablet Take 25 mg by mouth Every Night.     • Aspirin 81 MG capsule Take 1 tablet " by mouth Daily.     • atorvastatin (LIPITOR) 20 MG tablet Take 1 tablet by mouth Every Night. 90 tablet 1   • baclofen (LIORESAL) 10 MG tablet Take 10 mg by mouth 2 (Two) Times a Day.  2   • colesevelam (WELCHOL) 625 MG tablet TAKE 2 TABLETS BY MOUTH EVERY DAY (Patient taking differently: Take 1,250 mg by mouth Daily.) 180 tablet 1   • doxycycline (MONODOX) 100 MG capsule      • escitalopram (LEXAPRO) 10 MG tablet TAKE 1 TABLET BY MOUTH EVERY DAY (Patient taking differently: Take 10 mg by mouth Daily.) 90 tablet 3   • exemestane (AROMASIN) 25 MG chemo tablet Take 25 mg by mouth Daily.     • fluticasone (FLONASE) 50 MCG/ACT nasal spray 2 SPRAYS INTO THE NOSTRIL(S) AS DIRECTED BY PROVIDER DAILY. 48 mL 3   • Loratadine (CLARITIN PO) Take 10 mg by mouth Daily.     • Nurtec 75 MG dispersible tablet Take 1 tablet by mouth Daily As Needed.     • oxyCODONE-acetaminophen (PERCOCET)  MG per tablet Take 1 tablet by mouth 4 (Four) Times a Day.     • Triumeq 600- MG per tablet TAKE 1 TABLET BY MOUTH EVERY DAY 90 tablet 3   • vitamin B-12 (CYANOCOBALAMIN) 1000 MCG tablet Take 1,000 mcg by mouth Daily.     • vitamin B-6 (pyridoxine) 50 MG tablet Take 1 tablet by mouth Daily. 90 tablet 2   • zolpidem (AMBIEN) 5 MG tablet Take 1 tablet by mouth At Night As Needed for Sleep. 25 tablet 0   • [DISCONTINUED] hydrOXYzine (ATARAX) 25 MG tablet TAKE 1 TABLET BY MOUTH AT NIGHT AS NEEDED FOR ANXIETY. 30 tablet 0     No current facility-administered medications on file prior to visit.        HISTORY OF PRESENT ILLNESS     She has been taking zolpidem 5 mg nightly with the goal of attempting to decrease usage and eventually discontinue.  She was formerly on zolpidem 10 mg nightly x 15 years with side effects of morning drowsiness and slowed mentation.  She remains on Percocet 10 mg QID, amitriptyline and escitalopram.      She usually attempts to start sleep between  PM nightly.  When she does not take nightly zolpidem, she  reports continued racing thoughts while attempting to get to sleep.  She then remains awake for multiple hours.  With taking zolpidem, she is able to fall asleep in 5-10 minutes.  On average, she will remain asleep for approximately 4 hours, at which point, she will wake up, lay in bed for about 30 minutes, and then fall asleep for another 5 hours of sleep.   She has tried melatonin and Unisom without benefit.      She has sleep medicine appointment scheduled on January 5, 2023.    Over the last month, she reports intermittent episodes of dyspepsia after she eats dinner.  She has tried OTC acid reducer with some symptom reduction.  She reports prior EGD several years ago, which was normal.  Overall diet is healthy.  She avoids spicy and acidic foods.  No dysphagia, early satiety.        Patient Care Team:  Miri Montesinos APRN as PCP - General (Family Medicine)  Bam Crandall MD as Consulting Physician (Pain Medicine)  Denilson Dawn MD as Consulting Physician (Infectious Diseases)  Ruby Moreno MD as Consulting Physician (Hematology and Oncology)  Mag Spivey MD (Inactive) as Consulting Physician (Radiation Oncology)  Yusuf Garcia MD as Attending Provider (Plastic Surgery)  Luiz Mora MD as Surgeon (Orthopedic Surgery)  Yola Barillas CSW as  ()    REVIEW OF SYSTEMS     Review of Systems   Constitutional: Negative for chills, fever and unexpected weight change.   Respiratory: Negative for cough, chest tightness and shortness of breath.    Cardiovascular: Negative for chest pain, palpitations and leg swelling.   Gastrointestinal: Negative for abdominal pain and blood in stool.   Neurological: Negative for dizziness, weakness, light-headedness and headaches.   Psychiatric/Behavioral: Positive for sleep disturbance. The patient is not nervous/anxious.           PHYSICAL EXAMINATION     Physical Exam  Vitals reviewed.   Constitutional:        General: She is not in acute distress.     Appearance: Normal appearance. She is not ill-appearing, toxic-appearing or diaphoretic.   HENT:      Head: Normocephalic and atraumatic.   Cardiovascular:      Rate and Rhythm: Normal rate and regular rhythm.      Heart sounds: Normal heart sounds.   Pulmonary:      Effort: Pulmonary effort is normal.      Breath sounds: Normal breath sounds.   Abdominal:      General: There is no distension.      Palpations: Abdomen is soft.      Tenderness: There is no abdominal tenderness.   Neurological:      Mental Status: She is alert and oriented to person, place, and time. Mental status is at baseline.   Psychiatric:         Mood and Affect: Mood normal.         Behavior: Behavior normal.         Thought Content: Thought content normal.         Judgment: Judgment normal.           REVIEWED DATA     Labs:           Imaging:            Medical Tests:           Summary of old records / correspondence / consultant report:           Request outside records:

## 2022-12-08 ENCOUNTER — SPECIALTY PHARMACY (OUTPATIENT)
Dept: PHARMACY | Facility: HOSPITAL | Age: 62
End: 2022-12-08

## 2022-12-15 ENCOUNTER — OFFICE VISIT (OUTPATIENT)
Dept: INTERNAL MEDICINE | Age: 62
End: 2022-12-15

## 2022-12-15 VITALS
SYSTOLIC BLOOD PRESSURE: 140 MMHG | WEIGHT: 153 LBS | OXYGEN SATURATION: 99 % | BODY MASS INDEX: 24.01 KG/M2 | HEIGHT: 67 IN | HEART RATE: 98 BPM | TEMPERATURE: 98.1 F | DIASTOLIC BLOOD PRESSURE: 80 MMHG

## 2022-12-15 DIAGNOSIS — G89.4 CHRONIC PAIN SYNDROME: ICD-10-CM

## 2022-12-15 DIAGNOSIS — F39 MOOD DISORDER: ICD-10-CM

## 2022-12-15 DIAGNOSIS — G47.00 INSOMNIA, UNSPECIFIED TYPE: Primary | ICD-10-CM

## 2022-12-15 PROCEDURE — 99215 OFFICE O/P EST HI 40 MIN: CPT | Performed by: INTERNAL MEDICINE

## 2022-12-15 RX ORDER — AMITRIPTYLINE HYDROCHLORIDE 75 MG/1
75 TABLET, FILM COATED ORAL NIGHTLY
COMMUNITY
Start: 2022-12-07 | End: 2023-02-08 | Stop reason: SDUPTHER

## 2022-12-15 RX ORDER — ZOLPIDEM TARTRATE 5 MG/1
5 TABLET ORAL NIGHTLY PRN
Qty: 25 TABLET | Refills: 0 | Status: SHIPPED | OUTPATIENT
Start: 2022-12-15 | End: 2022-12-22 | Stop reason: SDUPTHER

## 2022-12-15 NOTE — PROGRESS NOTES
I N T E R N A L  M E D I C I N E    J U N O H  K I M,  M D      ENCOUNTER DATE:  12/15/2022    Richa Dolan / 62 y.o. / female    CHIEF COMPLAINT / REASON FOR OFFICE VISIT     Insomnia      ASSESSMENT & PLAN     1. Insomnia, unspecified type    2. Mood disorder (HCC)    3. Chronic pain syndrome      No orders of the defined types were placed in this encounter.    New Medications Ordered This Visit   Medications   • zolpidem (AMBIEN) 5 MG tablet     Sig: Take 1 tablet by mouth At Night As Needed for Sleep.     Dispense:  25 tablet     Refill:  0     Not to exceed 5 additional fills before 05/31/2021 DX Code Needed  .       SUMMARY/DISCUSSION  • Agree with plans for evaluation by sleep specialist and behavioral health   • Recommended discussing simplifying psychotropic / pain medications with appropriate specialists. She is currently on escitalopram and high dose amitriptyline which may cause issues. Consider mirtazapine with psychiatrist.  • Strongly recommended CBT for sleep disorder; discuss with sleep specialist for recommendation   • Strongly advised to discuss with pain mgmt about decreasing qty of oxycodone per month to 60/month. She reports to disposing of 60 pills monthly because she is using only twice daily. Discussed this issue with Miri Montesinos. Needs this readdressed on follow-up.   • For now will refill zolpidem 5 mg #25/30 days. Strongly recommended to continue to try to come off zolpidem if possible gradually.       I spent time: 40 minutes in direct care of this patient on this date of service. This time includes time spent by me in the following activities:preparing for the visit, obtaining and/or reviewing a separately obtained history, performing a medically appropriate examination and/or evaluation , reviewing medical records, reviewing tests, ordering medications, tests, or procedures, counseling and educating the patient/family/caregiver and documenting information in the medical  "record.     Next Appointment with me: Visit date not found    No follow-ups on file.        VITAL SIGNS     Vitals:    12/15/22 1121   BP: 140/80   Pulse: 98   Temp: 98.1 °F (36.7 °C)   SpO2: 99%   Weight: 69.4 kg (153 lb)   Height: 170.2 cm (67.01\")       BP Readings from Last 3 Encounters:   12/15/22 140/80   12/07/22 116/68   11/16/22 132/89     Wt Readings from Last 3 Encounters:   12/15/22 69.4 kg (153 lb)   12/07/22 68.7 kg (151 lb 6.4 oz)   11/16/22 65 kg (143 lb 6.4 oz)     Body mass index is 23.96 kg/m².    Blood pressure readings recorded on patient flowsheet:  No flowsheet data found.     MEDICATIONS AT THE TIME OF OFFICE VISIT     Current Outpatient Medications on File Prior to Visit   Medication Sig   • AIMOVIG 140 MG/ML solution auto-injector Inject 140 mg under the skin into the appropriate area as directed Every 30 (Thirty) Days. HAS NOT TAKEN IN SEVERAL MONTHS   • amitriptyline (ELAVIL) 75 MG tablet Take 75 mg by mouth Every Night.   • Aspirin 81 MG capsule Take 1 tablet by mouth Daily.   • atorvastatin (LIPITOR) 20 MG tablet Take 1 tablet by mouth Every Night.   • baclofen (LIORESAL) 10 MG tablet Take 10 mg by mouth 2 (Two) Times a Day.   • colesevelam (WELCHOL) 625 MG tablet TAKE 2 TABLETS BY MOUTH EVERY DAY (Patient taking differently: Take 1,250 mg by mouth Daily.)   • doxycycline (MONODOX) 100 MG capsule    • escitalopram (LEXAPRO) 10 MG tablet TAKE 1 TABLET BY MOUTH EVERY DAY (Patient taking differently: Take 10 mg by mouth Daily.)   • exemestane (AROMASIN) 25 MG chemo tablet Take 25 mg by mouth Daily.   • fluticasone (FLONASE) 50 MCG/ACT nasal spray 2 SPRAYS INTO THE NOSTRIL(S) AS DIRECTED BY PROVIDER DAILY.   • Loratadine (CLARITIN PO) Take 10 mg by mouth Daily.   • Nurtec 75 MG dispersible tablet Take 1 tablet by mouth Daily As Needed.   • omeprazole (priLOSEC) 20 MG capsule Take 1 capsule by mouth Daily for 30 days.   • oxyCODONE-acetaminophen (PERCOCET)  MG per tablet Take 1 tablet " "by mouth 4 (Four) Times a Day.   • Triumeq 600- MG per tablet TAKE 1 TABLET BY MOUTH EVERY DAY   • vitamin B-12 (CYANOCOBALAMIN) 1000 MCG tablet Take 1,000 mcg by mouth Daily.   • vitamin B-6 (pyridoxine) 50 MG tablet Take 1 tablet by mouth Daily.   • [DISCONTINUED] amitriptyline (ELAVIL) 25 MG tablet Take 25 mg by mouth Every Night. Take 75 mg nightly   • [DISCONTINUED] zolpidem (AMBIEN) 5 MG tablet Take 1 tablet by mouth At Night As Needed for Sleep.     No current facility-administered medications on file prior to visit.          HISTORY OF PRESENT ILLNESS     Seeing patient in consultation from her primary care provider re: complex insomnia history. Has taken zolpidem continuously > 10 years per patient. Recently decrease zolpidem to 5 mg dose and decreased qty to #25 with goal to try to wean her off dependence on zolpidem. She was started amitriptyline 75 mg by pain mgmt for pain which helped with pain but reports no improvement for sleep. She denies awakening at nighttime with pain. Reports to taking oxycodone/APAP \"just twice daily\" although she is being prescribed #120 monthly for many months. She states she has been disposing of half the pills monthly when asked why she has it filled every month. She is escitalopram 10 mg for \"anxiety\" but denies depression. She reports strong family history of alcoholism but there's no family history of bipolar disorder or depression.         REVIEW OF SYSTEMS             PHYSICAL EXAMINATION     Physical Exam  Constitutional:       General: She is not in acute distress.     Appearance: Normal appearance.   Neurological:      Mental Status: She is alert.   Psychiatric:         Attention and Perception: Attention normal.         Mood and Affect: Mood normal. Mood is not anxious or depressed. Affect is not flat or inappropriate.         Speech: Speech normal.         Behavior: Behavior normal. Behavior is cooperative.         Thought Content: Thought content normal.    "      Cognition and Memory: Cognition normal.         Judgment: Judgment normal.           REVIEWED DATA     Labs:     Lab Results   Component Value Date     11/16/2022    K 4.1 11/16/2022    CALCIUM 10.1 11/16/2022    AST 19 11/16/2022    ALT 14 11/16/2022    BUN 10 11/16/2022    CREATININE 0.72 11/16/2022    CREATININE 0.82 10/17/2022    CREATININE 0.74 10/04/2022    EGFRIFNONA 81 02/08/2022    EGFRIFAFRI >60 05/28/2015       Lab Results   Component Value Date    HGBA1C 5.30 10/17/2022    HGBA1C 5.2 09/09/2021    HGBA1C 5.13 10/13/2016       Lab Results   Component Value Date    LDL 56 10/17/2022    LDL 54 08/12/2022    LDL 52 03/08/2021    HDL 65 (H) 10/17/2022    HDL 62 08/12/2022    TRIG 103 10/17/2022    TRIG 46 08/12/2022       Lab Results   Component Value Date    TSH 1.500 10/17/2022    TSH 2.510 08/12/2022    TSH 1.670 08/12/2021    FREET4 1.13 10/17/2022    FREET4 1.05 08/12/2021    FREET4 0.93 01/14/2015       Lab Results   Component Value Date    WBC 5.45 11/16/2022    HGB 13.9 11/16/2022     11/16/2022       No results found for: MALBCRERATIO       Imaging:           Medical Tests:           Summary of old records / correspondence / consultant report:           Request outside records:             *Examiner was wearing KN95 mask and eye protection during the entire duration of the visit. Patient was masked the entire time. Minimum social distance of 6 ft maintained entire visit except if physical contact was necessary as documented.       Template created by Rubens Ortiz MD

## 2022-12-22 ENCOUNTER — OFFICE VISIT (OUTPATIENT)
Dept: PSYCHIATRY | Facility: HOSPITAL | Age: 62
End: 2022-12-22

## 2022-12-22 DIAGNOSIS — G47.00 INSOMNIA, UNSPECIFIED TYPE: ICD-10-CM

## 2022-12-22 PROCEDURE — 90792 PSYCH DIAG EVAL W/MED SRVCS: CPT | Performed by: NURSE PRACTITIONER

## 2022-12-22 RX ORDER — DULOXETIN HYDROCHLORIDE 30 MG/1
30 CAPSULE, DELAYED RELEASE ORAL 2 TIMES DAILY
Qty: 60 CAPSULE | Refills: 2 | Status: SHIPPED | OUTPATIENT
Start: 2022-12-22 | End: 2023-01-20 | Stop reason: SDUPTHER

## 2022-12-22 RX ORDER — ZOLPIDEM TARTRATE 5 MG/1
5 TABLET ORAL NIGHTLY PRN
Qty: 30 TABLET | Refills: 0 | Status: SHIPPED | OUTPATIENT
Start: 2022-12-22 | End: 2023-01-10 | Stop reason: SDUPTHER

## 2022-12-22 NOTE — PROGRESS NOTES
In Person  Provider Location: [facility]    Chief Complaint: Insomnia, recent medication disruption    Subjective  Patient ID: Richa Dolan is a 62 y.o. female who presents for initial consultation through the [facility] at the request of Ruby Moreno MD     PHQ9  7  ACACIA 7 Total Score: 11    HPI:  Patient with history of T2N1 invasive ductal carcinoma of the left breast, diagnosed and treated in 2019, currently continuing on aromatase inhibitor.  Historical challenges tolerating hormonal blockades, currently with minimal side effects on Aromasin.  Patient presents to clinic today for symptoms of insomnia, noting historical benefit to Ambien although difficulty connecting to prescriber willing to fill.  PHQ-9 total 7, ACACIA-7 of 11.  Primary endorsement of difficulty falling and staying asleep, although patient states when she takes Ambien 5 mg nightly sleep is easy to initiate, occasionally fragmented, although with ability to fall back to sleep.  Finds reduced dose (previously on 10 mg) has assisted in greater feelings of restoration in the morning.  When she does not sleep, patient confirms feeling tired with difficulty concentrating.  Anxiety is somewhat elevated, felt to be patient baseline.  Endorses feeling nervous and anxious on edge, not able to stop and control worry, and worrying too much about different things.  Patient describes most of this as being related to caregiving responsibility for mother who lives in long-term care facility, challenging relationship.  She is on Lexapro 10 mg daily, stating she has been on this for approximately 15 years.  Patient does note some benefit to this.  Additional medications include Elavil 75 mg nightly, dually beneficial to neuropathy and sleep.  Patient does see pain management for historical work-related back injury.  Reports inability to tolerate gabapentin with corresponding feelings of  increased helplessness, thoughts of being better off dead.  Denies past or current plan or intent to harm self.  Patient also has history of migraines as well as mini stroke in July 2017; follows regularly with neurology. Pt denies regular physical activity.    Patient is a college graduate, retired .  Denies past or current substance use.  Side effects of existing medications reviewed, specifically given anticholinergic potential with Elavil; patient does report some constipation, difficulty initiating stream of urination.  Finds Benefiber is helpful for promotion of regularity.  Pt denies cardiac history. Family mental health history reviewed to include dementia in both parents.  Father had OCD traits, longstanding challenging relationship with mother for whom patient is POA. Pt does acknowledges anticipatory grief surrounding mother's health decline, desire for mutual conflict resolution that will likely not be achieved.     Pt identifies primary goals of solidifying sleep, feeling confident medication will be filled. Also considers residual sx of anxiety, chronic pain history. Pt has never been on Cymbalta.    Social History  Marital Status:   Children: one son, lives in [place] - psychologist  Support Community: , aunt and uncle  Highest Level of Education: college graduate  Career: worked at [employer]; retired alongside cancer diagnosis  Tobacco Use: The patient denies current or previous tobacco use.  Alcohol Use: does not drink  Marijuana/ Other drug Use: denied.    Medical History  Psychiatric History: generalized anxiety disorder, on lexapro 10 mg   Insomnia on ambien 5 mg     Family History  Family Psychiatric History: Father with OCD type tendencies; mother also in psychiatric care    The following portions of the patient's history were reviewed and updated as appropriate: She  has a past medical history of Anxiety, Cerebrovascular accident (CVA) ([facility]) (08/22/2017), DDD  (degenerative disc disease), cervical, Depression, Drug therapy, Elevated cholesterol, Fat necrosis of left breast, GERD (gastroesophageal reflux disease), H/O ICH (intracerebral hemorrhage) (CMS/[facility]), History of chemotherapy, History of heart murmur in childhood, History of stroke, History of thrombocytopenia, HIV (human immunodeficiency virus infection) ([facility]) (1996), radiation therapy, Hyperlipidemia, Insomnia, Lupus anticoagulant positive, Malignant neoplasm of overlapping sites of left breast in female, estrogen receptor positive ([facility]) (11/12/2019), Meniscus tear (2017), Migraine, Neck pain, Neuropathy, Osteoarthritis, Osteoporosis, Pain management, Pneumonia (1989), Seasonal allergies, Spinal headache, Tick bite (06/2014), Upper back pain, and Vertigo.  She  has a past surgical history that includes Adenoidectomy; Hysterectomy; Tonsillectomy; Knee cartilage surgery (Right, 2007); Cholecystectomy; Knee Arthroscopy (Right, 03/24/2017); US Guided Lymph Node Biopsy (10/29/2019); Venous Access Device (Port) (Right, 12/02/2019); breast lumpectomy with sentinel node biopsy (Left, 05/28/2020); Breast surgery (Left, 05/28/2020); Breast lumpectomy; Venous Access Device (Port) Removal (N/A, 05/25/2022); and Tonsillectomy (1974).  Her family history includes Alcohol abuse in an other family member; Arthritis in her father and mother; Breast cancer in her mother; Cancer in her mother; Dementia in her father; Diabetes in her maternal grandfather and mother; Hearing loss in her mother; Heart attack in her maternal grandfather; Heart disease in her maternal grandfather, maternal grandmother, mother, and paternal grandfather; Hypertension in her mother; Hyperthyroidism in her mother; Kidney disease in her mother; Leukemia in her maternal aunt and mother; Osteoporosis in her paternal grandmother; Stroke in her father and maternal grandfather; Throat cancer in her paternal uncle.    Current Outpatient Medications   Medication Sig  Dispense Refill   • zolpidem (AMBIEN) 5 MG tablet Take 1 tablet by mouth At Night As Needed for Sleep. 30 tablet 0   • AIMOVIG 140 MG/ML solution auto-injector Inject 140 mg under the skin into the appropriate area as directed Every 30 (Thirty) Days. HAS NOT TAKEN IN SEVERAL MONTHS  11   • amitriptyline (ELAVIL) 75 MG tablet Take 75 mg by mouth Every Night.     • Aspirin 81 MG capsule Take 1 tablet by mouth Daily.     • atorvastatin (LIPITOR) 20 MG tablet Take 1 tablet by mouth Every Night. 90 tablet 1   • baclofen (LIORESAL) 10 MG tablet Take 10 mg by mouth 2 (Two) Times a Day.  2   • colesevelam (WELCHOL) 625 MG tablet TAKE 2 TABLETS BY MOUTH EVERY DAY (Patient taking differently: Take 1,250 mg by mouth Daily.) 180 tablet 1   • doxycycline (MONODOX) 100 MG capsule      • DULoxetine (Cymbalta) 30 MG capsule Take 1 capsule by mouth 2 (Two) Times a Day. 1 capsule PO q evening days 1-14 followed by increase to bid dose on day 15 onward. 60 capsule 2   • exemestane (AROMASIN) 25 MG chemo tablet Take 25 mg by mouth Daily.     • fluticasone (FLONASE) 50 MCG/ACT nasal spray 2 SPRAYS INTO THE NOSTRIL(S) AS DIRECTED BY PROVIDER DAILY. 48 mL 3   • Loratadine (CLARITIN PO) Take 10 mg by mouth Daily.     • Nurtec 75 MG dispersible tablet Take 1 tablet by mouth Daily As Needed.     • omeprazole (priLOSEC) 20 MG capsule Take 1 capsule by mouth Daily for 30 days. 30 capsule 2   • oxyCODONE-acetaminophen (PERCOCET)  MG per tablet Take 1 tablet by mouth 4 (Four) Times a Day.     • Triumeq 600- MG per tablet TAKE 1 TABLET BY MOUTH EVERY DAY 90 tablet 3   • vitamin B-12 (CYANOCOBALAMIN) 1000 MCG tablet Take 1,000 mcg by mouth Daily.     • vitamin B-6 (pyridoxine) 50 MG tablet Take 1 tablet by mouth Daily. 90 tablet 2     No current facility-administered medications for this visit.     Current Outpatient Medications on File Prior to Visit   Medication Sig   • AIMOVIG 140 MG/ML solution auto-injector Inject 140 mg under the  skin into the appropriate area as directed Every 30 (Thirty) Days. HAS NOT TAKEN IN SEVERAL MONTHS   • amitriptyline (ELAVIL) 75 MG tablet Take 75 mg by mouth Every Night.   • Aspirin 81 MG capsule Take 1 tablet by mouth Daily.   • atorvastatin (LIPITOR) 20 MG tablet Take 1 tablet by mouth Every Night.   • baclofen (LIORESAL) 10 MG tablet Take 10 mg by mouth 2 (Two) Times a Day.   • colesevelam (WELCHOL) 625 MG tablet TAKE 2 TABLETS BY MOUTH EVERY DAY (Patient taking differently: Take 1,250 mg by mouth Daily.)   • doxycycline (MONODOX) 100 MG capsule    • exemestane (AROMASIN) 25 MG chemo tablet Take 25 mg by mouth Daily.   • fluticasone (FLONASE) 50 MCG/ACT nasal spray 2 SPRAYS INTO THE NOSTRIL(S) AS DIRECTED BY PROVIDER DAILY.   • Loratadine (CLARITIN PO) Take 10 mg by mouth Daily.   • Nurtec 75 MG dispersible tablet Take 1 tablet by mouth Daily As Needed.   • omeprazole (priLOSEC) 20 MG capsule Take 1 capsule by mouth Daily for 30 days.   • oxyCODONE-acetaminophen (PERCOCET)  MG per tablet Take 1 tablet by mouth 4 (Four) Times a Day.   • Triumeq 600- MG per tablet TAKE 1 TABLET BY MOUTH EVERY DAY   • vitamin B-12 (CYANOCOBALAMIN) 1000 MCG tablet Take 1,000 mcg by mouth Daily.   • vitamin B-6 (pyridoxine) 50 MG tablet Take 1 tablet by mouth Daily.   • [DISCONTINUED] escitalopram (LEXAPRO) 10 MG tablet TAKE 1 TABLET BY MOUTH EVERY DAY (Patient taking differently: Take 10 mg by mouth Daily.)   • [DISCONTINUED] zolpidem (AMBIEN) 5 MG tablet Take 1 tablet by mouth At Night As Needed for Sleep.     No current facility-administered medications on file prior to visit.     She is allergic to augmentin [amoxicillin-pot clavulanate]..    Review of Systems   Constitutional: Positive for fatigue. Negative for activity change and appetite change.   Psychiatric/Behavioral: Positive for decreased concentration and sleep disturbance. Negative for dysphoric mood, self-injury and suicidal ideas. The patient is  nervous/anxious.      Objective   Mental Status Exam  Appearance:  clean and casually dressed, appropriate  Attitude toward clinician:  cooperative and agreeable   Speech:    Rate:  regular rate and rhythm   Volume:  normal  Motor:  no abnormal movements present  Mood:  Euthymic, stable  Affect:  euthymic and mood congruent  Thought Processes:  linear, logical, and goal directed  Thought Content:  normal  Suicidal Thoughts:  absent  Homicidal Thoughts:  absent  Perceptual Disturbance: no perceptual disturbance  Attention and Concentration:  good  Insight and Judgement:  good  Memory:  memory appears to be intact    Physical Exam  Constitutional:       Appearance: Normal appearance.   Neurological:      Mental Status: She is alert.   Psychiatric:         Attention and Perception: Attention normal.         Mood and Affect: Mood normal.         Speech: Speech normal.         Behavior: Behavior is cooperative.         Thought Content: Thought content normal.         Cognition and Memory: Cognition normal.       Station and gait observed to be WNL.    Lab Review:   Lab on 11/16/2022   Component Date Value   • Glucose 11/16/2022 91    • BUN 11/16/2022 10    • Creatinine 11/16/2022 0.72    • Sodium 11/16/2022 142    • Potassium 11/16/2022 4.1    • Chloride 11/16/2022 104    • CO2 11/16/2022 29.4 (H)    • Calcium 11/16/2022 10.1    • Total Protein 11/16/2022 7.6    • Albumin 11/16/2022 4.50    • ALT (SGPT) 11/16/2022 14    • AST (SGOT) 11/16/2022 19    • Alkaline Phosphatase 11/16/2022 95    • Total Bilirubin 11/16/2022 0.3    • Globulin 11/16/2022 3.1    • A/G Ratio 11/16/2022 1.5    • BUN/Creatinine Ratio 11/16/2022 13.9    • Anion Gap 11/16/2022 8.6    • eGFR 11/16/2022 94.7    • Magnesium 11/16/2022 2.1    • Phosphorus 11/16/2022 4.0    • WBC 11/16/2022 5.45    • RBC 11/16/2022 4.21    • Hemoglobin 11/16/2022 13.9    • Hematocrit 11/16/2022 42.9    • MCV 11/16/2022 101.9 (H)    • MCH 11/16/2022 33.0    • MCHC 11/16/2022  32.4    • RDW 11/16/2022 12.3    • RDW-SD 11/16/2022 46.5    • MPV 11/16/2022 9.3    • Platelets 11/16/2022 285    • Neutrophil % 11/16/2022 49.7    • Lymphocyte % 11/16/2022 35.6    • Monocyte % 11/16/2022 13.2 (H)    • Eosinophil % 11/16/2022 1.1    • Basophil % 11/16/2022 0.4    • Immature Grans % 11/16/2022 0.0    • Neutrophils, Absolute 11/16/2022 2.71    • Lymphocytes, Absolute 11/16/2022 1.94    • Monocytes, Absolute 11/16/2022 0.72    • Eosinophils, Absolute 11/16/2022 0.06    • Basophils, Absolute 11/16/2022 0.02    • Immature Grans, Absolute 11/16/2022 0.00    • nRBC 11/16/2022 0.0        Diagnoses and all orders for this visit:    1. Insomnia, unspecified type  -     zolpidem (AMBIEN) 5 MG tablet; Take 1 tablet by mouth At Night As Needed for Sleep.  Dispense: 30 tablet; Refill: 0    Other orders  -     DULoxetine (Cymbalta) 30 MG capsule; Take 1 capsule by mouth 2 (Two) Times a Day. 1 capsule PO q evening days 1-14 followed by increase to bid dose on day 15 onward.  Dispense: 60 capsule; Refill: 2    Plan of Care  Antidepressant regimen reviewed to include Lexapro and amitriptyline; given pain history, eventual goals of reducing anticholinergic burden of amitriptyline, will begin cross taper from Lexapro to Cymbalta.  Patient aware to reduce Lexapro to 5 mg daily for 2 weeks alongside initiation of Cymbalta 30 mg; in 2 weeks, patient to discontinue Lexapro and increase Cymbalta to twice daily dosing.  Goal of monotherapy with ability to eliminate amitriptyline. Counseling provided surrounding blackbox warning, specifically with patient feelings of hopelessness on gabapentin.  Considered historical benefit to Ambien, risk reduction with current 5 mg dose as opposed to previous 10 mg dose.  Considered cognitive impact of medication, goals of reduction. Reviewed goals of as needed versus nightly use.  Considered nonpharmacological strategies including apt recommended by renetta, exploration of sleep hygiene  techniques, caffeine curfew, eliminating TV in bed, and considering impact of nightly news on anxiety.  Counseling provided surrounding relationship with mom, allowance of boundaries, and self-care strategies.  Patient does have a sleep study scheduled on January 5.  Reviewed opportunities available through [employer] with [facility], and patient is interested in participating.  Follow-up scheduled in clinic in 4 to 6 weeks.    Patient is aware survivorship groups are common modality for meeting patient needs in survivorship; will consider appropriateness at next visit.

## 2022-12-23 RX ORDER — EXEMESTANE 25 MG/1
TABLET ORAL
Qty: 90 TABLET | Refills: 2 | Status: SHIPPED | OUTPATIENT
Start: 2022-12-23

## 2022-12-23 NOTE — TELEPHONE ENCOUNTER
Refill requested from pharmacy. Per last chart note, patient to continue Aromasin. Will route to MD for cosignature.    Carmine Ron, PharmD, BCOP  12/23/2022 11:32 EST

## 2022-12-29 ENCOUNTER — HOSPITAL ENCOUNTER (OUTPATIENT)
Dept: MAMMOGRAPHY | Facility: HOSPITAL | Age: 62
Discharge: HOME | End: 2022-12-29
Admitting: INTERNAL MEDICINE

## 2022-12-29 DIAGNOSIS — Z17.0 MALIGNANT NEOPLASM OF OVERLAPPING SITES OF LEFT BREAST IN FEMALE, ESTROGEN RECEPTOR POSITIVE: ICD-10-CM

## 2022-12-29 DIAGNOSIS — C50.812 MALIGNANT NEOPLASM OF OVERLAPPING SITES OF LEFT BREAST IN FEMALE, ESTROGEN RECEPTOR POSITIVE: ICD-10-CM

## 2022-12-29 PROCEDURE — 77067 SCR MAMMO BI INCL CAD: CPT

## 2022-12-29 PROCEDURE — 77063 BREAST TOMOSYNTHESIS BI: CPT

## 2023-01-04 NOTE — PROGRESS NOTES
"Subjective   Richa Dolan is a 56 y.o. female.     History of Present Illness   Richa Dolan 56 y.o. female who presents today for routine follow up check and medication refills.  she has a history of   Patient Active Problem List   Diagnosis   • Abnormal liver function tests   • Hypertension   • Insomnia   • Leukopenia   • Low back pain   • Osteoarthritis of knee   • Osteoporosis   • Arthralgia of multiple joints   • Seasonal allergic rhinitis   • Thrombocytopenia   • Bone cyst   .  Since the last visit, she has overall felt well.  She has insomnia well controlled with med, allergies controlled with med and using Boniva as directed for osteoporosis.  she has been compliant with current medications have reviewed them.  The patient denies medication side effects.        The following portions of the patient's history were reviewed and updated as appropriate: allergies, current medications, past social history and problem list.    Review of Systems   All other systems reviewed and are negative.      Objective   Visit Vitals   • /72 (BP Location: Left arm, Patient Position: Sitting)   • Pulse 72   • Temp 98.5 °F (36.9 °C)   • Ht 67\" (170.2 cm)   • Wt 139 lb 6.4 oz (63.2 kg)   • SpO2 99%   • BMI 21.83 kg/m2     Physical Exam   Constitutional: She is oriented to person, place, and time. Vital signs are normal. She appears well-developed and well-nourished. No distress.   HENT:   Head: Normocephalic.   Cardiovascular: Normal rate, regular rhythm and normal heart sounds.    Pulmonary/Chest: Effort normal and breath sounds normal.   Neurological: She is alert and oriented to person, place, and time. Gait normal.   Psychiatric: She has a normal mood and affect. Her behavior is normal. Judgment and thought content normal.   Vitals reviewed.    The patient has read and signed the [facility] Controlled Substance Contract.  I will continue to see patient for regular follow up appointments.  They are well " Left message for patient to return call. controlled on their medication.  MAGY has been reviewed by me and is updated every 3 months. The patient is aware of the potential for addiction and dependence.    Assessment/Plan   Problem List Items Addressed This Visit        Respiratory    Seasonal allergic rhinitis - Primary    Relevant Medications    fluticasone (FLONASE) 50 MCG/ACT nasal spray       Musculoskeletal and Integument    Osteoporosis    Relevant Medications    ibandronate (BONIVA) 150 MG tablet       Other    Insomnia    Relevant Medications    zolpidem (AMBIEN) 10 MG tablet        rto in 3 mons

## 2023-01-05 ENCOUNTER — OFFICE VISIT (OUTPATIENT)
Dept: SLEEP MEDICINE | Facility: HOSPITAL | Age: 63
End: 2023-01-05
Payer: COMMERCIAL

## 2023-01-05 VITALS
SYSTOLIC BLOOD PRESSURE: 134 MMHG | WEIGHT: 156 LBS | HEART RATE: 92 BPM | BODY MASS INDEX: 24.48 KG/M2 | HEIGHT: 67 IN | OXYGEN SATURATION: 99 % | DIASTOLIC BLOOD PRESSURE: 77 MMHG

## 2023-01-05 DIAGNOSIS — R06.83 SNORING: ICD-10-CM

## 2023-01-05 DIAGNOSIS — G47.30 OBSERVED SLEEP APNEA: Primary | ICD-10-CM

## 2023-01-05 DIAGNOSIS — F51.01 PRIMARY INSOMNIA: ICD-10-CM

## 2023-01-05 DIAGNOSIS — G47.10 HYPERSOMNIA: ICD-10-CM

## 2023-01-05 DIAGNOSIS — G47.8 NON-RESTORATIVE SLEEP: ICD-10-CM

## 2023-01-05 PROCEDURE — 99204 OFFICE O/P NEW MOD 45 MIN: CPT | Performed by: INTERNAL MEDICINE

## 2023-01-05 PROCEDURE — G0463 HOSPITAL OUTPT CLINIC VISIT: HCPCS

## 2023-01-05 NOTE — PROGRESS NOTES
[facility]  [address]  [address]  [place] [address]  Phone   Fax       Richa Dolan  [id number]   1960  62 y.o.  female      Referring physician/provider and PCP Miri Montesinos APRN    Type of service: Initial Sleep Medicine Consult.  Date of service: 1/5/2023      Chief Complaint   Patient presents with   • Witnessed Apnea   • Snoring   • Fatigue   • Non-restorative Sleep   • Daytime Sleepiness   • Insomnia   • Heartburn       History of present illness;  Thank you for asking to see Richa Dolan, 62 y.o.. The patient was seen today on 1/5/2023 at [facility].  The patient presents today with symptoms of snoring, non-restorative sleep and witnessed apneas. The symptoms are present for few years and they are persistent in nature.  The snoring is present in all positions and it is loud.  Patient has  prior surgery namely tonsillectomy, 1984    He also has a longstanding history of insomnia for which she is taking zolpidem for the past 15 years and recently her dose has been reduced from 10 mg to 5 mg.  She also has a history of breast cancer and had lumpectomy and she is on hormonal therapy.    Patient gives the following sleep history.  Sleep schedule:  Bedtime: 11 PM  Wake time: 8:30 AM  Normally takes about 15 minutes to fall asleep  Average hours of sleep 8-9  Number of naps per day 0  Symptoms   In addition to snoring, nonrestorative sleep and witnessed apneas patient gives the following associated symptoms.  Have you ever awakened gasping for breath, coughing, choking: Yes   Change in weight,  No   Morning headaches  Yes   Awaken with a sore throat or dry mouth  Yes   Leg jerking at night:  No   Crawly feeling/urge sensation to move in the legs: No   Teeth grinding:No   Have you ever awakened at night with a sour taste or burning sensation in your chest:  No   Do you have muscle weakness with laughing or anger or sleep paralysis:   No   Have you ever felt paralyzed while going to sleep or waking up:  No   Sleepwalking, nightmares, No   Nocturia (urination at night): 1 times per night  Memory Problem:No     MEDICAL CONDITIONS (PMH)  1. Anxiety  2. History of breast  3. Chronic back pain  4. Insomnia  5. Migraine  6. 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 Breast cancer    Medications: reviewed    Review of systems:  Sleep: Positve for snoring,witnessed apnea and daytime excessive sleepiness with [place] Sleepiness Scale of Total score: 7   Kidney/ Bladder  Difficulty In Urination: negative  Bed Wetting: negative  Frequent Urination: negative  HEENT  Recurrent Nose Bleeds: negative  Ear pain: negative  Sores In Mouth: negative  Persistent Hoarseness: negative  Nasal Congestion: negative  Post Nasal Drip: negative  Musculoskeletal:  Neck Pain: negative  Temporomandibular Joint Pain: negative  General:  Fever: negative  Fatigue: positive  Cardiovascular:  Irregular Heartbeat: negative  Swollen Ankles Or Legs: negative  Respiratory:  Shortness Of Breath: negative  Wheezing: negative  Neuro/Paych:  Fainting Spells: negative  Dizziness: negative  Anxiety: positive  Depression: negative  Gastrointestinal:  Problem Swallowing: negative  Frequent Heartburn: positive  Abdominal Bloating: negative  Skin:  Rash: negative  Change In Nails: negative  Endocrine:  Excessive Thirst: negative  Always Too Cold: negative  Always Too Warm: negative  Hem/Lymphatic:  Swollen Glands: negative  Burses/ Bleeds Easily: negative    Physical exam:  CONSTITUTINONAL:  Vitals:    01/05/23 1400   BP: 134/77   Pulse: 92   SpO2: 99%   Weight: 70.8 kg (156 lb)   Height: 170.2 cm (67.01\")    Body mass index is 24.43 kg/m².   HEAD: atraumatic, normocephalic    EYES:pupils are round, equal and reacting to light and accommodation, conjunctiva normal  NOSE:no nasal septal defects, nasal passages are clear, no nasal polyps,   THROAT: tonsils are not enlarged, tongue normal size, oral airway Mallampati class 3  NECK:Neck Circumference: 13 inches, trachea is in the midline, thyroid not enlarged  RESPIRATORY SYSTEM: Breath sounds are normal, there are no wheezes  CARDIOVASULAR SYSTEM: Heart sounds are regular rhythm and normal rate, there are no murmurs or thrills, no edema  GASTROINTESTINAL: Soft and non-tender,liver not enlarged, no evidence of ascites  MUSCULOSKELETAL SYSTEM: Full range of motion's in all the 4 extremities, neck movement not restricted, temporomandibular joint movement normal and no tenderness  SKIN: Warm and moist, no cyanosis, no clubbing,  NEUROLOGICAL SYSTEM: Oriented x 3, no gross neurological defects, No speech defect, gait is normal  PSYCHIATRIC SYSTEM: Mood is normal, thought content is normal    Office notes from care team reviewed. Office note dated December 7, 2022,reviewed  Labs reviewed.  TSH Results:  TSH    TSH 8/12/22 10/17/22   TSH 2.510 1.500            Most Recent A1C    HGBA1C Most Recent 10/17/22   Hemoglobin A1C 5.30      Comments are available for some flowsheets but are not being displayed.              Assessment and plan:  · Witnessed apneas,(R06.81) patient's symptoms and examination is consistent with sleep apnea (G47.30). I have talked to the patient about the signs and symptoms of sleep apnea. In addition, I have also discussed pathophysiology of sleep apnea.  I also discussed the complications of untreated sleep apnea including effects on hypertension, diabetes mellitus and nonrestorative sleep with hypersomnia which can increase risk for motor vehicle accidents.  Untreated sleep apnea is also a risk factor for development of atrial fibrillation, pulmonary hypertension and stroke.  Discussed in detail of various testing  methods including home-based and lab based sleep studies.  Based on history and physical examination and other comorbidities the most appropriate study is home sleep test.  The order for the sleep study is placed in [facility].  The test will be scheduled after approval from insurance. Treatment and management will be discussed after the test is completed.  Patient was given opportunity to ask questions and all the questions were answered.   · Snoring (R06.83), snoring is the sound created by turbulent airflow vibrating upper airway soft tissue due to limitation of inspiratory airflow. I have also discussed factors affecting snoring including sleep deprivation, sleeping on the back and alcohol ingestion. To minimize snoring, patient is advised to have adequate sleep, sleep on the side and avoid alcohol and sedative medications before bedtime  · Daytime excessive sleepiness .  It was assessed with [place] Sleepiness Scale of Total score: 7.  There are many causes for daytime excessive sleepiness including sleep depression, shiftwork syndrome, depression and other medical disorders including heart, kidney and liver failure.  The most serious cause of excessive sleepiness is due to neurological conditions like narcolepsy/cataplexy.  But the most common cause of excessive sleepiness is due to sleep apnea with frequent awakenings during sleep time.  I have discussed safety of driving and to remain vigilant while driving.  · Chronic insomnia, she is on zolpidem for many years but the dose has been reduced to 5 mg which is appropriate for female patients    I have also discussed with the patient the following  • Sleep hygiene: Maintaining a regular bedtime and wake time, not to watch television or work in bed, limit caffeine-containing beverages before bed time and avoid naps during the day  • Adequate amount of sleep.  Generally most people needs about 7 to 8 hours of sleep.    Return for 31 to 90 days after PAP setup with down  load..  Patient's questions were answered      I once again thank you for asking me to see this patient in consultation and I have forwarded my opinion and treatment plan.  Please do not hesitate to call me if you have any questions.     Ginger Helm MD  Sleep Medicine  Medical Director, [facility]  1/5/2023 ,

## 2023-01-10 DIAGNOSIS — G47.00 INSOMNIA, UNSPECIFIED TYPE: ICD-10-CM

## 2023-01-10 RX ORDER — ZOLPIDEM TARTRATE 5 MG/1
5 TABLET ORAL NIGHTLY PRN
Qty: 30 TABLET | Refills: 0 | Status: SHIPPED | OUTPATIENT
Start: 2023-01-10 | End: 2023-02-08 | Stop reason: SDUPTHER

## 2023-01-10 NOTE — PROGRESS NOTES
[facility]    Call received from patient regarding need for Ambien refill.  Reviewed last prescription on December 22; patient states she never picked this up.  Juan J reviewed to be as reported.  Refill has been resubmitted.

## 2023-01-20 RX ORDER — DULOXETIN HYDROCHLORIDE 30 MG/1
30 CAPSULE, DELAYED RELEASE ORAL 2 TIMES DAILY
Qty: 180 CAPSULE | Refills: 0 | Status: SHIPPED | OUTPATIENT
Start: 2023-01-20 | End: 2023-03-10 | Stop reason: SDUPTHER

## 2023-02-08 ENCOUNTER — OFFICE VISIT (OUTPATIENT)
Dept: PSYCHIATRY | Facility: HOSPITAL | Age: 63
End: 2023-02-08
Payer: COMMERCIAL

## 2023-02-08 DIAGNOSIS — G47.00 INSOMNIA, UNSPECIFIED TYPE: ICD-10-CM

## 2023-02-08 DIAGNOSIS — F43.23 ADJUSTMENT DISORDER WITH MIXED ANXIETY AND DEPRESSED MOOD: Primary | ICD-10-CM

## 2023-02-08 PROCEDURE — 99214 OFFICE O/P EST MOD 30 MIN: CPT | Performed by: NURSE PRACTITIONER

## 2023-02-08 RX ORDER — ZOLPIDEM TARTRATE 5 MG/1
5 TABLET ORAL NIGHTLY PRN
Qty: 30 TABLET | Refills: 0 | Status: SHIPPED | OUTPATIENT
Start: 2023-02-08 | End: 2023-03-10 | Stop reason: SDUPTHER

## 2023-02-08 RX ORDER — AMITRIPTYLINE HYDROCHLORIDE 50 MG/1
50 TABLET, FILM COATED ORAL NIGHTLY
Qty: 30 TABLET | Refills: 1 | Status: SHIPPED | OUTPATIENT
Start: 2023-02-08 | End: 2023-03-08

## 2023-02-08 NOTE — PROGRESS NOTES
[facility]  In Person Session    Subjective  Patient ID: Richa Dolan is a 62 y.o. female is seen face to face in the [facility].     CC: Insomnia    HPI/ Interval History: Interval history reviewed. Continues to take Ambien nightly, has reduced to 5mg. Sleep is comfortable, schedule 11:00pm-8am. Transitioned from Lexapro to Cymbalta, now completely off Lexapro. Mood is better, still has some paiin. Enjoyed recent travel. Complain about recent weight gain. Interested in reducing Elavil. Discussed increasing activity to help with sleep. Appetite is good. Pt denies alcohol use.     Exam: As above    Recent Labs Reviewed:  No visits with results within 2 Month(s) from this visit.   Latest known visit with results is:   Lab on 11/16/2022   Component Date Value   • Glucose 11/16/2022 91    • BUN 11/16/2022 10    • Creatinine 11/16/2022 0.72    • Sodium 11/16/2022 142    • Potassium 11/16/2022 4.1    • Chloride 11/16/2022 104    • CO2 11/16/2022 29.4 (H)    • Calcium 11/16/2022 10.1    • Total Protein 11/16/2022 7.6    • Albumin 11/16/2022 4.50    • ALT (SGPT) 11/16/2022 14    • AST (SGOT) 11/16/2022 19    • Alkaline Phosphatase 11/16/2022 95    • Total Bilirubin 11/16/2022 0.3    • Globulin 11/16/2022 3.1    • A/G Ratio 11/16/2022 1.5    • BUN/Creatinine Ratio 11/16/2022 13.9    • Anion Gap 11/16/2022 8.6    • eGFR 11/16/2022 94.7    • Magnesium 11/16/2022 2.1    • Phosphorus 11/16/2022 4.0    • WBC 11/16/2022 5.45    • RBC 11/16/2022 4.21    • Hemoglobin 11/16/2022 13.9    • Hematocrit 11/16/2022 42.9    • MCV 11/16/2022 101.9 (H)    • MCH 11/16/2022 33.0    • MCHC 11/16/2022 32.4    • RDW 11/16/2022 12.3    • RDW-SD 11/16/2022 46.5    • MPV 11/16/2022 9.3    • Platelets 11/16/2022 285    • Neutrophil % 11/16/2022 49.7    • Lymphocyte % 11/16/2022 35.6    • Monocyte % 11/16/2022 13.2 (H)    • Eosinophil % 11/16/2022 1.1    • Basophil % 11/16/2022 0.4    • Immature  Grans % 11/16/2022 0.0    • Neutrophils, Absolute 11/16/2022 2.71    • Lymphocytes, Absolute 11/16/2022 1.94    • Monocytes, Absolute 11/16/2022 0.72    • Eosinophils, Absolute 11/16/2022 0.06    • Basophils, Absolute 11/16/2022 0.02    • Immature Grans, Absolute 11/16/2022 0.00    • nRBC 11/16/2022 0.0    labs reviewed; MCV elevated.     Current Psychotropic Medications:  Lexapro - no longer taking  Cymbalta - 30 mg bid  Elavil - 75 mg q hs  Ambien - 5 mg q hs    Plan of Care/ Medical Decision Making  Continue Cymbalta 30 mg bid; reduce dose of Elavil to 50 mg q hs given hopeful benefit of cymbalta to neuropathic pain/ SE of weight gain perceived on elavil. Can reduce elavil to 1/2 tab in 2 weeks if well tolerated.  Discussed benefits of behavioral activation, daily exercise on energy and sleep. Provided patient with information for [facility] at [facility].  Counseling continued regarding personal boundaries and limitations.     Diagnoses and all orders for this visit:    1. Adjustment disorder with mixed anxiety and depressed mood (Primary)    2. Insomnia, unspecified type

## 2023-02-17 ENCOUNTER — OFFICE VISIT (OUTPATIENT)
Dept: INTERNAL MEDICINE | Age: 63
End: 2023-02-17
Payer: COMMERCIAL

## 2023-02-17 ENCOUNTER — HOSPITAL ENCOUNTER (OUTPATIENT)
Dept: GENERAL RADIOLOGY | Facility: HOSPITAL | Age: 63
Discharge: HOME | End: 2023-02-17
Payer: COMMERCIAL

## 2023-02-17 VITALS
HEIGHT: 67 IN | WEIGHT: 160 LBS | OXYGEN SATURATION: 98 % | SYSTOLIC BLOOD PRESSURE: 124 MMHG | BODY MASS INDEX: 25.11 KG/M2 | HEART RATE: 86 BPM | TEMPERATURE: 97.3 F | DIASTOLIC BLOOD PRESSURE: 72 MMHG

## 2023-02-17 DIAGNOSIS — G89.29 CHRONIC PAIN OF RIGHT KNEE: ICD-10-CM

## 2023-02-17 DIAGNOSIS — E78.5 HYPERLIPIDEMIA, UNSPECIFIED HYPERLIPIDEMIA TYPE: ICD-10-CM

## 2023-02-17 DIAGNOSIS — M25.561 ACUTE PAIN OF RIGHT KNEE: Primary | ICD-10-CM

## 2023-02-17 DIAGNOSIS — M25.561 CHRONIC PAIN OF RIGHT KNEE: ICD-10-CM

## 2023-02-17 DIAGNOSIS — F51.01 PRIMARY INSOMNIA: ICD-10-CM

## 2023-02-17 PROCEDURE — 73562 X-RAY EXAM OF KNEE 3: CPT

## 2023-02-17 PROCEDURE — 99214 OFFICE O/P EST MOD 30 MIN: CPT

## 2023-02-17 NOTE — PROGRESS NOTES
"    I N T E R N A L  M E D I C I N E  Miri Montesinos, APRN    ENCOUNTER DATE:  02/17/2023    Richa Dolan / 62 y.o. / female      CHIEF COMPLAINT / REASON FOR OFFICE VISIT     Knee Pain (Right ) and Hyperlipidemia      ASSESSMENT & PLAN     Diagnoses and all orders for this visit:    1. Acute pain of right knee (Primary)  -     XR Knee 3 View Right  -     Ambulatory Referral to Orthopedic Surgery  -     Ambulatory Referral to Physical Therapy Evaluate and treat    2. Chronic pain of right knee    3. Primary insomnia    4. Hyperlipidemia, unspecified hyperlipidemia type         SUMMARY/DISCUSSION  • She is agreeable to re establish with ortho for acute exacerbation of her chronic right knee pain.  Proceed with updating XR and referral to PT.  Continue ice/ heat, brace, elevation, rest.  • Discussed importance of continuing to work towards limiting zolpidem 5 mg to PRN as much as possible.    Insomnia .      Next Appointment with me: Visit date not found    Return in about 3 months (around 5/17/2023) for Chronic care.      VITAL SIGNS     Visit Vitals  /72   Pulse 86   Temp 97.3 °F (36.3 °C)   Ht 170.2 cm (67.01\")   Wt 72.6 kg (160 lb)   SpO2 98%   BMI 25.05 kg/m²             Wt Readings from Last 3 Encounters:   02/17/23 72.6 kg (160 lb)   01/05/23 70.8 kg (156 lb)   12/15/22 69.4 kg (153 lb)     Body mass index is 25.05 kg/m².        MEDICATIONS AT THE TIME OF OFFICE VISIT     Current Outpatient Medications on File Prior to Visit   Medication Sig Dispense Refill   • AIMOVIG 140 MG/ML solution auto-injector Inject 140 mg under the skin into the appropriate area as directed Every 30 (Thirty) Days. HAS NOT TAKEN IN SEVERAL MONTHS  11   • amitriptyline (ELAVIL) 50 MG tablet Take 1 tablet by mouth Every Night. 30 tablet 1   • Aspirin 81 MG capsule Take 1 tablet by mouth Daily.     • atorvastatin (LIPITOR) 20 MG tablet Take 1 tablet by mouth Every Night. 90 tablet 1   • baclofen (LIORESAL) 10 MG tablet Take " 10 mg by mouth 2 (Two) Times a Day.  2   • colesevelam (WELCHOL) 625 MG tablet TAKE 2 TABLETS BY MOUTH EVERY DAY (Patient taking differently: Take 1,250 mg by mouth Daily.) 180 tablet 1   • doxycycline (MONODOX) 100 MG capsule      • DULoxetine (Cymbalta) 30 MG capsule Take 1 capsule by mouth 2 (Two) Times a Day. 180 capsule 0   • exemestane (AROMASIN) 25 MG chemo tablet TAKE 1 TABLET BY MOUTH EVERY DAY 90 tablet 2   • fluticasone (FLONASE) 50 MCG/ACT nasal spray 2 SPRAYS INTO THE NOSTRIL(S) AS DIRECTED BY PROVIDER DAILY. 48 mL 3   • Loratadine (CLARITIN PO) Take 10 mg by mouth Daily.     • Nurtec 75 MG dispersible tablet Take 1 tablet by mouth Daily As Needed.     • oxyCODONE-acetaminophen (PERCOCET)  MG per tablet Take 1 tablet by mouth 4 (Four) Times a Day.     • Triumeq 600- MG per tablet TAKE 1 TABLET BY MOUTH EVERY DAY 90 tablet 3   • vitamin B-12 (CYANOCOBALAMIN) 1000 MCG tablet Take 1,000 mcg by mouth Daily.     • vitamin B-6 (pyridoxine) 50 MG tablet Take 1 tablet by mouth Daily. 90 tablet 2   • zolpidem (AMBIEN) 5 MG tablet Take 1 tablet by mouth At Night As Needed for Sleep. 30 tablet 0     No current facility-administered medications on file prior to visit.        HISTORY OF PRESENT ILLNESS     She recently established with psychiatry, who continued duloxetine 30 mg BID, and decreased dose of amitriptyline to 50 mg nightly.  Pt is tolerating dosage adjustment well.      She has plans to proceed with home sleep study.  She is now taking zolpidem 5 mg nightly with benefit.  She is using insomnia coaching with some benefit.  She is aware of importance of attempting to further decrease zolpidem use from nightly to truly PRN.    She does remain on Percocet 10 mg QID.  In prior appointment with Dr. Ortiz, pt reported that she is using this medication twice daily, and is disposing of excess pills.  Today, she reports this statement was incorrect and on rare occasion she may take 2 pills daily, but it  "is more common for her to take 4 pills daily as prescribed.  She reports that she did discuss this with pain management, and prescription was left unchanged.       Dyspepsia symptoms have improved after starting OTC omeprazole.      She reports she slipped on some ice around [place] 2022, resulting in an acute exacerbation of her chronic right knee pain.  Denies any fall.  She did not feel or hear any pop.  She did feel a \"pulling\" sensation.  No erythema or swelling.  Using ice, knee brace, and OTC pennsaid with some benefit.  She is able to ambulate.  Prior MRI right knee in 2018 showed prior partial medical meniscectomy.   Nonacute appearing chondromalacia, along with joint effusion with a small volume of fluid in a Baker's cyst.      HLD: Remains on atorvastatin 20 mg daily.  October 2022 Lipid panel with LDL 56; triglycerides 103.      Patient Care Team:  Miri Montesinos APRN as PCP - General (Family Medicine)  Karley, Bam Medrano MD as Consulting Physician (Pain Medicine)  Denilson Dawn MD as Consulting Physician (Infectious Diseases)  Ruby Moreno MD as Consulting Physician (Hematology and Oncology)  Mag Spivey MD (Inactive) as Consulting Physician (Radiation Oncology)  Yusuf Garcia MD as Attending Provider (Plastic Surgery)  Luiz Mora MD as Surgeon (Orthopedic Surgery)  Yola Barillas CSW as  ()    REVIEW OF SYSTEMS     Review of Systems   Constitutional: Negative for chills, fever and unexpected weight change.   Respiratory: Negative for cough, chest tightness and shortness of breath.    Cardiovascular: Negative for chest pain, palpitations and leg swelling.   Musculoskeletal: Positive for arthralgias (Right knee). Negative for joint swelling.   Neurological: Negative for dizziness, weakness, light-headedness and headaches.   Psychiatric/Behavioral: Positive for sleep disturbance. Negative for dysphoric mood. The patient is not " nervous/anxious.           PHYSICAL EXAMINATION     Physical Exam  Vitals reviewed.   Constitutional:       General: She is not in acute distress.     Appearance: Normal appearance. She is not ill-appearing, toxic-appearing or diaphoretic.   HENT:      Head: Normocephalic and atraumatic.   Cardiovascular:      Rate and Rhythm: Normal rate and regular rhythm.      Heart sounds: Normal heart sounds.   Pulmonary:      Effort: Pulmonary effort is normal.      Breath sounds: Normal breath sounds.   Musculoskeletal:      Right knee: No swelling or erythema. Tenderness present over the medial joint line.      Right lower leg: No edema.      Left lower leg: No edema.   Skin:     General: Skin is warm and dry.      Findings: No erythema.   Neurological:      Mental Status: She is alert and oriented to person, place, and time. Mental status is at baseline.   Psychiatric:         Mood and Affect: Mood normal.         Behavior: Behavior normal.         Thought Content: Thought content normal.         Judgment: Judgment normal.           REVIEWED DATA     Labs:           Imaging:            Medical Tests:           Summary of old records / correspondence / consultant report:           Request outside records:

## 2023-02-27 ENCOUNTER — OFFICE VISIT (OUTPATIENT)
Dept: ORTHOPEDIC SURGERY | Facility: CLINIC | Age: 63
End: 2023-02-27
Payer: COMMERCIAL

## 2023-02-27 VITALS — WEIGHT: 160.3 LBS | HEIGHT: 67 IN | TEMPERATURE: 97.1 F | BODY MASS INDEX: 25.16 KG/M2

## 2023-02-27 DIAGNOSIS — M17.11 ARTHRITIS OF RIGHT KNEE: Primary | ICD-10-CM

## 2023-02-27 PROCEDURE — 20610 DRAIN/INJ JOINT/BURSA W/O US: CPT | Performed by: ORTHOPAEDIC SURGERY

## 2023-02-27 PROCEDURE — 99213 OFFICE O/P EST LOW 20 MIN: CPT | Performed by: ORTHOPAEDIC SURGERY

## 2023-02-27 RX ORDER — METHYLPREDNISOLONE ACETATE 80 MG/ML
80 INJECTION, SUSPENSION INTRA-ARTICULAR; INTRALESIONAL; INTRAMUSCULAR; SOFT TISSUE
Status: COMPLETED | OUTPATIENT
Start: 2023-02-27 | End: 2023-02-27

## 2023-02-27 RX ORDER — LIDOCAINE HYDROCHLORIDE 10 MG/ML
4 INJECTION, SOLUTION EPIDURAL; INFILTRATION; INTRACAUDAL; PERINEURAL
Status: COMPLETED | OUTPATIENT
Start: 2023-02-27 | End: 2023-02-27

## 2023-02-27 RX ADMIN — LIDOCAINE HYDROCHLORIDE 4 ML: 10 INJECTION, SOLUTION EPIDURAL; INFILTRATION; INTRACAUDAL; PERINEURAL at 11:00

## 2023-02-27 RX ADMIN — METHYLPREDNISOLONE ACETATE 80 MG: 80 INJECTION, SUSPENSION INTRA-ARTICULAR; INTRALESIONAL; INTRAMUSCULAR; SOFT TISSUE at 11:00

## 2023-02-27 NOTE — PROGRESS NOTES
"Patient Name: Richa Dolan   YOB: 1960  Referring Primary Care Physician: Miri Montesinos APRN  BMI: Body mass index is 25.11 kg/m².    Chief Complaint:    Chief Complaint   Patient presents with   • Right Knee - Pain, Initial Evaluation        HPI:     Richa Dolan is a 62 y.o. female who presents today for evaluation of   Chief Complaint   Patient presents with   • Right Knee - Pain, Initial Evaluation   .  Patient is seen today complaint of right knee pain has been relatively chronic.  She says she has a history of osteoporosis and gets scanned and she is taking shots for that.  She has had 2 previous \"cleanouts\" in her right knee the last which was March 2017.  One was by Dr. Vinny Sinha and the other 1 by Dr. Turk.  He said in December she can is shuffled on the deck and some ice in the right knee started bothering her more than.  She apparently has physical therapy scheduled to begin on March 15.  Feels swollen and tight.  She says she has a history of HIV positivity but she has been medicine controlled for over 20 years      Subjective   Medications:   Home Medications:  Current Outpatient Medications on File Prior to Visit   Medication Sig   • AIMOVIG 140 MG/ML solution auto-injector Inject 140 mg under the skin into the appropriate area as directed Every 30 (Thirty) Days. HAS NOT TAKEN IN SEVERAL MONTHS   • Aspirin 81 MG capsule Take 1 tablet by mouth Daily.   • atorvastatin (LIPITOR) 20 MG tablet Take 1 tablet by mouth Every Night.   • baclofen (LIORESAL) 10 MG tablet Take 10 mg by mouth 2 (Two) Times a Day.   • colesevelam (WELCHOL) 625 MG tablet TAKE 2 TABLETS BY MOUTH EVERY DAY (Patient taking differently: Take 1,250 mg by mouth Daily.)   • doxycycline (MONODOX) 100 MG capsule    • DULoxetine (Cymbalta) 30 MG capsule Take 1 capsule by mouth 2 (Two) Times a Day.   • exemestane (AROMASIN) 25 MG chemo tablet TAKE 1 TABLET BY MOUTH EVERY DAY   • fluticasone (FLONASE) 50 MCG/ACT nasal " spray 2 SPRAYS INTO THE NOSTRIL(S) AS DIRECTED BY PROVIDER DAILY.   • Loratadine (CLARITIN PO) Take 10 mg by mouth Daily.   • Nurtec 75 MG dispersible tablet Take 1 tablet by mouth Daily As Needed.   • oxyCODONE-acetaminophen (PERCOCET)  MG per tablet Take 1 tablet by mouth 4 (Four) Times a Day.   • Triumeq 600- MG per tablet TAKE 1 TABLET BY MOUTH EVERY DAY   • vitamin B-12 (CYANOCOBALAMIN) 1000 MCG tablet Take 1,000 mcg by mouth Daily.   • vitamin B-6 (pyridoxine) 50 MG tablet Take 1 tablet by mouth Daily.   • zolpidem (AMBIEN) 5 MG tablet Take 1 tablet by mouth At Night As Needed for Sleep.   • amitriptyline (ELAVIL) 50 MG tablet Take 1 tablet by mouth Every Night.     No current facility-administered medications on file prior to visit.     Current Medications:  Scheduled Meds:  Continuous Infusions:No current facility-administered medications for this visit.    PRN Meds:.    I have reviewed the patient's medical history in detail and updated the computerized patient record.  Review and summarization of old records includes:    Past Medical History:   Diagnosis Date   • Anxiety    • Arthritis of back 2010   • Cerebrovascular accident (CVA) ([facility]) 08/22/2017    NO RESIDUAL AFFECT   • DDD (degenerative disc disease), cervical    • Depression    • Drug therapy    • Elevated cholesterol    • Fat necrosis of left breast    • GERD (gastroesophageal reflux disease)    • H/O ICH (intracerebral hemorrhage) ([facility])    • Hip arthrosis 2015   • History of chemotherapy    • History of heart murmur in childhood    • History of stroke     PATIENT STATES HAD A MINI STROKE, NO RESIDUAL EFFECTS   • History of thrombocytopenia    • HIV (human immunodeficiency virus infection) ([facility]) 1996   • Hx of radiation therapy    • Hyperlipidemia    • Insomnia    • Knee swelling 2017    meniscus tear   • Lupus anticoagulant positive    • Malignant neoplasm of overlapping sites of left breast in female, estrogen receptor positive  ([facility]) 11/12/2019   • Meniscus tear 2017    RIGHT   • Migraine    • Neck pain     WITH SHOOTING PAIN LEFT RIGHT ARM   • Neck strain 2001    work injury   • Neuropathy    • Osteoarthritis    • Osteoporosis    • Pain management     sees 1 every 3 months for scripts/injection/pain meds   • Periarthritis of shoulder 2010   • Pneumonia 1989   • Seasonal allergies    • Spinal headache     THINKS HAD BLOOD PATCH AFTER EPIDUAL    • Stress fracture 2010    three times   • Tear of meniscus of knee 2012 & 2017   • Tendinitis of knee 2000    physical therapy many times   • Tick bite 06/2014   • Upper back pain    • Vertigo         Past Surgical History:   Procedure Laterality Date   • ADENOIDECTOMY     • BREAST LUMPECTOMY     • BREAST LUMPECTOMY WITH SENTINEL NODE BIOPSY Left 05/28/2020    Procedure: BREAST LUMPECTOMY WITH SENTINEL NODE BIOPSY AND NEEDLE LOCALIZATION AND axillary dissection;  Surgeon: Landen Forman MD;  Location: [facility];  Service: General;  Laterality: Left;   • BREAST SURGERY Left 05/28/2020    Procedure: LEFT LATERAL INTERCOASTAL ARTERY  flap ;  Surgeon: Yusuf Garcia MD;  Location: [facility];  Service: Plastics;  Laterality: Left;   • CHOLECYSTECTOMY     • HYSTERECTOMY     • KNEE ARTHROSCOPY Right 03/24/2017    Procedure:  RIGHT  KNEE ARTHROSCOPY WITH DEBRIDEMENT CHONDROPLASTY PARTIAL MENISCECTOMY;  Surgeon: Karl Galeas MD;  Location: [facility];  Service:    • KNEE CARTILAGE SURGERY Right 2007   • TONSILLECTOMY     • TONSILLECTOMY  1974   • TRIGGER POINT INJECTION  2002 - 2023   • US GUIDED LYMPH NODE BIOPSY  10/29/2019    LEFT BREAST   • VENOUS ACCESS DEVICE (PORT) INSERTION Right 12/02/2019    Procedure: INSERTION VENOUS ACCESS DEVICE RIGHT;  Surgeon: Landen Forman MD;  Location: [facility];  Service: General   • VENOUS ACCESS DEVICE (PORT) REMOVAL N/A 05/25/2022    Procedure: Mediport Removal;  Surgeon: Cesar Vasquez Jr., MD;  [facility]  [facility];  Service: General;  Laterality: N/A;        Social History     Occupational History   • Occupation:      Employer:  [employer]   Tobacco Use   • Smoking status: Never   • Smokeless tobacco: Never   • Tobacco comments:     never   Vaping Use   • Vaping Use: Never used   Substance and Sexual Activity   • Alcohol use: Yes     Alcohol/week: 1.0 standard drink     Types: 1 Drinks containing 0.5 oz of alcohol per week     Comment: OCCASSIONALLY   • Drug use: No   • Sexual activity: Yes     Partners: Male     Birth control/protection: Condom     Comment:       Social History     Social History Narrative   • Not on file        Family History   Problem Relation Age of Onset   • Kidney disease Mother    • Hypertension Mother    • Heart disease Mother    • Arthritis Mother    • Breast cancer Mother    • Leukemia Mother         CLL?   • Diabetes Mother    • Hyperthyroidism Mother    • Hearing loss Mother    • Cancer Mother         breast   • Rheumatologic disease Mother    • Arthritis Father    • Dementia Father    • Stroke Father    • Heart disease Maternal Grandmother    • Diabetes Maternal Grandfather    • Heart disease Maternal Grandfather    • Stroke Maternal Grandfather    • Heart attack Maternal Grandfather    • Osteoporosis Paternal Grandmother    • Heart disease Paternal Grandfather    • Leukemia Maternal Aunt         CLL?   • Throat cancer Paternal Uncle    • Alcohol abuse Other         multiple uncles/aunts   • Malig Hyperthermia Neg Hx        ROS: 14 point review of systems was performed and all other systems were reviewed and are negative except for documented findings in HPI and today's encounter.     Allergies:   Allergies   Allergen Reactions   • Augmentin [Amoxicillin-Pot Clavulanate] Hives     Constitutional:  Denies fever, shaking or chills   Eyes:  Denies change in visual acuity   HENT:  Denies nasal congestion or sore throat   Respiratory:  Denies cough or shortness  "of breath   Cardiovascular:  Denies chest pain or severe LE edema   GI:  Denies abdominal pain, nausea, vomiting, bloody stools or diarrhea   Musculoskeletal:  Numbness, tingling, pain, or loss of motor function only as noted above in history of present illness.  : Denies painful urination or hematuria  Integument:  Denies rash, lesion or ulceration   Neurologic:  Denies headache or focal weakness  Endocrine:  Denies lymphadenopathy  Psych:  Denies confusion or change in mental status   Hem:  Denies active bleeding    OBJECTIVE:  Physical Exam: 62 y.o. female  Wt Readings from Last 3 Encounters:   02/27/23 72.7 kg (160 lb 4.8 oz)   02/17/23 72.6 kg (160 lb)   01/05/23 70.8 kg (156 lb)     Ht Readings from Last 1 Encounters:   02/27/23 170.2 cm (67\")     Body mass index is 25.11 kg/m².  Vitals:    02/27/23 1021   Temp: 97.1 °F (36.2 °C)     Vital signs reviewed.     General Appearance:    Alert, cooperative, in no acute distress                  Eyes: conjunctiva clear  ENT: external ears and nose atraumatic  CV: no peripheral edema  Resp: normal respiratory effort  Skin: no rashes or wounds; normal turgor  Psych: mood and affect appropriate  Lymph: no nodes appreciated  Neuro: gross sensation intact  Vascular:  Palpable peripheral pulse in noted extremity  Musculoskeletal Extremities: Exam today pleasant woman right knee has swelling crepitation synovitis medial osteophytes there is little pseudolaxity little bit of stiffness through the range of motion of small Baker's cyst her calf is soft hips noncontributory    Radiology:   AP lateral x-rays of the right knee in epic from the 17th viewed by me for the first time today without comparison views does show evidence of arthritis in medial patellofemoral joints with with narrowing.        Assessment:     ICD-10-CM ICD-9-CM   1. Arthritis of right knee  M17.11 716.96        MDM/Plan:   The diagnosis(es), natural history, pathophysiology and treatment for " diagnosis(es) were discussed. Opportunity given and questions answered.  Biomechanics of pertinent body areas discussed.  When appropriate, the use of ambulatory aids discussed.    Biomechanics of pertinent body areas discussed.  When appropriate, the use of ambulatory aids discussed.  BMI:  The concept of BMI body mass index and its importance and implications discussed.    EXERCISES:  Advice on benefits of, and types of regular/moderate exercise pertaining to orthopedic diagnosis(es).  MEDICATIONS:  The risks, benefits, warnings,side effects and alternatives of medications discussed.  Inflammation/pain control; with cold, heat, elevation and/or liniments discussed as appropriate  Cortisone Injection. See procedure note.  MEDICAL RECORDS reviewed from other provider(s) for past and current medical history pertinent to this complaint.      2/27/2023    Dictated utilizing Dragon dictation        Large Joint Arthrocentesis: R knee  Date/Time: 2/27/2023 11:00 AM  Consent given by: patient  Site marked: site marked  Timeout: Immediately prior to procedure a time out was called to verify the correct patient, procedure, equipment, support staff and site/side marked as required   Supporting Documentation  Indications: pain and joint swelling   Procedure Details  Location: knee - R knee  Preparation: Patient was prepped and draped in the usual sterile fashion  Needle size: 22 G  Approach: anterolateral  Medications administered: 80 mg methylPREDNISolone acetate 80 MG/ML; 4 mL lidocaine PF 1% 1 %  Patient tolerance: patient tolerated the procedure well with no immediate complications

## 2023-03-02 ENCOUNTER — HOSPITAL ENCOUNTER (OUTPATIENT)
Dept: SLEEP MEDICINE | Facility: HOSPITAL | Age: 63
Discharge: HOME | End: 2023-03-02
Admitting: INTERNAL MEDICINE
Payer: COMMERCIAL

## 2023-03-02 DIAGNOSIS — R06.83 SNORING: ICD-10-CM

## 2023-03-02 DIAGNOSIS — G47.30 OBSERVED SLEEP APNEA: ICD-10-CM

## 2023-03-02 DIAGNOSIS — F51.01 PRIMARY INSOMNIA: ICD-10-CM

## 2023-03-02 DIAGNOSIS — G47.8 NON-RESTORATIVE SLEEP: ICD-10-CM

## 2023-03-02 DIAGNOSIS — G47.10 HYPERSOMNIA: ICD-10-CM

## 2023-03-02 PROCEDURE — 95806 SLEEP STUDY UNATT&RESP EFFT: CPT | Performed by: INTERNAL MEDICINE

## 2023-03-02 PROCEDURE — 95806 SLEEP STUDY UNATT&RESP EFFT: CPT

## 2023-03-03 DIAGNOSIS — K21.9 GASTROESOPHAGEAL REFLUX DISEASE, UNSPECIFIED WHETHER ESOPHAGITIS PRESENT: ICD-10-CM

## 2023-03-03 RX ORDER — OMEPRAZOLE 20 MG/1
CAPSULE, DELAYED RELEASE ORAL
Qty: 90 CAPSULE | Refills: 0 | Status: SHIPPED | OUTPATIENT
Start: 2023-03-03

## 2023-03-08 ENCOUNTER — OFFICE VISIT (OUTPATIENT)
Dept: ONCOLOGY | Facility: CLINIC | Age: 63
End: 2023-03-08
Payer: COMMERCIAL

## 2023-03-08 ENCOUNTER — LAB (OUTPATIENT)
Dept: OTHER | Facility: HOSPITAL | Age: 63
End: 2023-03-08
Payer: COMMERCIAL

## 2023-03-08 VITALS
HEART RATE: 78 BPM | DIASTOLIC BLOOD PRESSURE: 84 MMHG | BODY MASS INDEX: 25.03 KG/M2 | RESPIRATION RATE: 18 BRPM | SYSTOLIC BLOOD PRESSURE: 135 MMHG | OXYGEN SATURATION: 97 % | HEIGHT: 67 IN | TEMPERATURE: 98.7 F | WEIGHT: 159.5 LBS

## 2023-03-08 DIAGNOSIS — Z87.898 HISTORY OF CHEST PAIN: ICD-10-CM

## 2023-03-08 DIAGNOSIS — Z79.811 USE OF EXEMESTANE (AROMASIN): ICD-10-CM

## 2023-03-08 DIAGNOSIS — C50.812 MALIGNANT NEOPLASM OF OVERLAPPING SITES OF LEFT BREAST IN FEMALE, ESTROGEN RECEPTOR POSITIVE: ICD-10-CM

## 2023-03-08 DIAGNOSIS — Z17.0 MALIGNANT NEOPLASM OF OVERLAPPING SITES OF LEFT BREAST IN FEMALE, ESTROGEN RECEPTOR POSITIVE: ICD-10-CM

## 2023-03-08 DIAGNOSIS — C50.812 MALIGNANT NEOPLASM OF OVERLAPPING SITES OF LEFT BREAST IN FEMALE, ESTROGEN RECEPTOR POSITIVE: Primary | ICD-10-CM

## 2023-03-08 DIAGNOSIS — Z17.0 MALIGNANT NEOPLASM OF OVERLAPPING SITES OF LEFT BREAST IN FEMALE, ESTROGEN RECEPTOR POSITIVE: Primary | ICD-10-CM

## 2023-03-08 LAB
ALBUMIN SERPL-MCNC: 4.6 G/DL (ref 3.5–5.2)
ALBUMIN/GLOB SERPL: 1.6 G/DL
ALP SERPL-CCNC: 67 U/L (ref 39–117)
ALT SERPL W P-5'-P-CCNC: 18 U/L (ref 1–33)
ANION GAP SERPL CALCULATED.3IONS-SCNC: 8.7 MMOL/L (ref 5–15)
AST SERPL-CCNC: 20 U/L (ref 1–32)
BASOPHILS # BLD AUTO: 0.03 10*3/MM3 (ref 0–0.2)
BASOPHILS NFR BLD AUTO: 0.4 % (ref 0–1.5)
BILIRUB SERPL-MCNC: 0.5 MG/DL (ref 0–1.2)
BUN SERPL-MCNC: 11 MG/DL (ref 8–23)
BUN/CREAT SERPL: 14.5 (ref 7–25)
CALCIUM SPEC-SCNC: 9.3 MG/DL (ref 8.6–10.5)
CHLORIDE SERPL-SCNC: 102 MMOL/L (ref 98–107)
CO2 SERPL-SCNC: 30.3 MMOL/L (ref 22–29)
CREAT SERPL-MCNC: 0.76 MG/DL (ref 0.57–1)
DEPRECATED RDW RBC AUTO: 48.9 FL (ref 37–54)
EGFRCR SERPLBLD CKD-EPI 2021: 88.7 ML/MIN/1.73
EOSINOPHIL # BLD AUTO: 0.21 10*3/MM3 (ref 0–0.4)
EOSINOPHIL NFR BLD AUTO: 2.9 % (ref 0.3–6.2)
ERYTHROCYTE [DISTWIDTH] IN BLOOD BY AUTOMATED COUNT: 12.8 % (ref 12.3–15.4)
GLOBULIN UR ELPH-MCNC: 2.8 GM/DL
GLUCOSE SERPL-MCNC: 96 MG/DL (ref 65–99)
HCT VFR BLD AUTO: 43.7 % (ref 34–46.6)
HGB BLD-MCNC: 14.2 G/DL (ref 12–15.9)
IMM GRANULOCYTES # BLD AUTO: 0.01 10*3/MM3 (ref 0–0.05)
IMM GRANULOCYTES NFR BLD AUTO: 0.1 % (ref 0–0.5)
LYMPHOCYTES # BLD AUTO: 2.91 10*3/MM3 (ref 0.7–3.1)
LYMPHOCYTES NFR BLD AUTO: 40.5 % (ref 19.6–45.3)
MCH RBC QN AUTO: 33.6 PG (ref 26.6–33)
MCHC RBC AUTO-ENTMCNC: 32.5 G/DL (ref 31.5–35.7)
MCV RBC AUTO: 103.6 FL (ref 79–97)
MONOCYTES # BLD AUTO: 0.82 10*3/MM3 (ref 0.1–0.9)
MONOCYTES NFR BLD AUTO: 11.4 % (ref 5–12)
NEUTROPHILS NFR BLD AUTO: 3.21 10*3/MM3 (ref 1.7–7)
NEUTROPHILS NFR BLD AUTO: 44.7 % (ref 42.7–76)
NRBC BLD AUTO-RTO: 0 /100 WBC (ref 0–0.2)
PLATELET # BLD AUTO: 279 10*3/MM3 (ref 140–450)
PMV BLD AUTO: 9.3 FL (ref 6–12)
POTASSIUM SERPL-SCNC: 3.9 MMOL/L (ref 3.5–5.2)
PROT SERPL-MCNC: 7.4 G/DL (ref 6–8.5)
RBC # BLD AUTO: 4.22 10*6/MM3 (ref 3.77–5.28)
SODIUM SERPL-SCNC: 141 MMOL/L (ref 136–145)
WBC NRBC COR # BLD: 7.19 10*3/MM3 (ref 3.4–10.8)

## 2023-03-08 PROCEDURE — 99215 OFFICE O/P EST HI 40 MIN: CPT | Performed by: INTERNAL MEDICINE

## 2023-03-08 PROCEDURE — 80053 COMPREHEN METABOLIC PANEL: CPT | Performed by: INTERNAL MEDICINE

## 2023-03-08 PROCEDURE — 85025 COMPLETE CBC W/AUTO DIFF WBC: CPT | Performed by: INTERNAL MEDICINE

## 2023-03-08 PROCEDURE — 36415 COLL VENOUS BLD VENIPUNCTURE: CPT

## 2023-03-08 NOTE — PROGRESS NOTES
Subjective       REASON FOR FOLLOW UP:    1.  T2N1 invasive ductal carcinoma of the left breast.  Ultrasound-showed 3.8 x 3.1 x 2.5 cm mass at 4 o'clock position with 2 abnormal axillary lymph nodes, larger lymph node being 1.4 cm.  Left breast biopsy and axillary lymph node biopsy October 29, 2019, invasive ductal carcinoma, moderately differentiated, grade 2, ER 85%, OH 10%, HER-2/merna 2+, HER-2 FISH negative.  · 12/06/19: Neoadjuvant chemo therapy, cycle #1 of dose-dense Adriamycin/Cytoxan with Neulasta for marrow support    · 01/17/20: Last cycle of Adriamycin/Cytoxan given  · January 31, 2020: Cycle 1 Taxol  · April 24, 2020: Last cycle, cycle 12 of Taxol  · Patient is s/p left mastectomy with axillary dissection with reconstruction.  She is healing up nicely.  She has a drain in place.  Pathology showed invasive breast cancer which is 55 x 40 mm in size, grade 2 with focal lymphovascular space invasion with DCIS spanning over 36 x 6 mm.  All margins were negative for cancer.  · 8 of the additional lymph nodes out of 11 were positive with the largest micrometastasis measuring 5 mm.  Extranodal extension was seen.  · Biomarkers on post neoadjuvant tumor is ER 81-90% OH 5% HER-2/merna 2+ by immunohistochemistry and HER-2/merna negative by FISH.  · Radiation July 6, 2020-August 17, 2020.  · Letrozole initiated July 2020.  · Letrozole discontinued October 2020 secondary to severe joint pain.  · October 30, 2020 patient starting tamoxifen and tolerating well  · August 2021: Tamoxifen discontinued secondary to depression and mental fogginess and fatigue  · September 21, 2021: Arimidex started  · Severe arthralgias secondary to Arimidex, will start Aromasin  · Patient is tolerating Aromasin well    2.  HIV, followed by Dr. Dawn from infectious disease.  Well controlled on meds    3.  Screening mammogram December 7, 2020 showed indeterminate calcifications lower outer middle left breast. Diagnostic mammogram showed  0.4 cm calcifications in the left breast.  Stereotactic biopsy January 8, 2021 consistent with organizing fat necrosis.              HISTORY OF PRESENT ILLNESS:  The patient is a 62 y.o. year old female with history of T2N1 invasive ductal carcinoma of the left breast s/p neoadjuvant chemotherapy with Adriamycin Cytoxan followed by Taxol, s/p left mastectomy with axillary dissection and had 55 x 40 mm grade 2 invasive breast cancer with DCIS spanning over 36 x 6 mm with 8 out of 11 lymph nodes positive with extranodal extension s/p radiation.  Subsequently patient had tried letrozole which caused her severe arthralgias and could not tolerate.  She tried tamoxifen but that caused her significant depression and fogginess and that was discontinued.  Currently she is on Arimidex.  Even that is causing her severe arthralgias and stiffness..    Patient could not tolerate Femara, tamoxifen, Arimidex.  We switched her to Aromasin and she is tolerating it very well.  She also went to rheumatologist who is evaluating her.  She is followed by pain medicine clinic and is on hydrocodone which she takes as needed for pain.    Interval history:    Patient is tolerating Aromasin better.  In the past she has had severe arthralgias with both Arimidex and Femara but not significant with Aromasin.  She does have insomnia for which she went to [facility].  She also has neuropathic pain in the lower extremity in the feet and she was placed on amitriptyline.  She was on 75 mg of amitriptyline but she could not handle it because of side effects like difficulty urinating.  So they cut the dose down and currently she is on 50 mg.  Her symptoms are improved and currently she is on 25 mg every other day.  She says she is getting tapered.  In future if neuropathic pain is worse we can always consider gabapentin but at the present time we will hold off.  She is on Cymbalta which also seems to be helping.    She had a chest pain in the middle  of the night.  Twice it did not go to her neck or arm.  It is spasmodic pain and she benefited with Gaviscon.  We discussed that this could probably be reflux but she would need a cardiologist to evaluate her.  Just to rule out at her age that there is no cardiac issues.  However we will refer her to cardio oncology.    Past Medical History:   Diagnosis Date   • Anxiety    • Arthritis of back 2010   • Cerebrovascular accident (CVA) ([facility]) 08/22/2017    NO RESIDUAL AFFECT   • DDD (degenerative disc disease), cervical    • Depression    • Drug therapy    • Elevated cholesterol    • Fat necrosis of left breast    • GERD (gastroesophageal reflux disease)    • H/O ICH (intracerebral hemorrhage) (CMS/[facility])    • Hip arthrosis 2015   • History of chemotherapy    • History of heart murmur in childhood    • History of stroke     PATIENT STATES HAD A MINI STROKE, NO RESIDUAL EFFECTS   • History of thrombocytopenia    • HIV (human immunodeficiency virus infection) ([facility]) 1996   • Hx of radiation therapy    • Hyperlipidemia    • Insomnia    • Knee swelling 2017    meniscus tear   • Lupus anticoagulant positive    • Malignant neoplasm of overlapping sites of left breast in female, estrogen receptor positive ([facility]) 11/12/2019   • Meniscus tear 2017    RIGHT   • Migraine    • Neck pain     WITH SHOOTING PAIN LEFT RIGHT ARM   • Neck strain 2001    work injury   • Neuropathy    • Osteoarthritis    • Osteoporosis    • Pain management     sees 1 every 3 months for scripts/injection/pain meds   • Periarthritis of shoulder 2010   • Pneumonia 1989   • Seasonal allergies    • Spinal headache     THINKS HAD BLOOD PATCH AFTER EPIDUAL    • Stress fracture 2010    three times   • Tear of meniscus of knee 2012 & 2017   • Tendinitis of knee 2000    physical therapy many times   • Tick bite 06/2014   • Upper back pain    • Vertigo      PAST MEDICAL HISTORY:  She had a stroke in July 2017 with headache and dizziness.  She went to the ER and was placed  on aspirin.  However, she stopped taking it two weeks ago.  She has been taking Aimovig (injection) monthly with Fariba Lóepz at [facility].    In 1994, patient was diagnosed with HIV with an unknown cause which is managed by Dr. Natali Subramanian.  As of now, her viral load is good.    Patient has arthritis.  She gets trigger-point injections in her back with her last one being 2 weeks ago.  She has had multiple ablations.  She also has pain in her SI joint and Dr. Bermudez performed a surgery on this.  She takes Narco for the pain.      She is osteoporotic.  She has had multiple hairline fractures in both her legs.  She had her knees cleaned out by Dr. Sinha.     Patient has had a hysterectomy and still has both of her ovaries.    Patient has vertigo and the muscles in her left ear are weak.  She just has an imbalance.    Past Surgical History:   Procedure Laterality Date   • ADENOIDECTOMY     • BREAST LUMPECTOMY     • BREAST LUMPECTOMY WITH SENTINEL NODE BIOPSY Left 05/28/2020    Procedure: BREAST LUMPECTOMY WITH SENTINEL NODE BIOPSY AND NEEDLE LOCALIZATION AND axillary dissection;  Surgeon: Landen Forman MD;  Location: [facility];  Service: General;  Laterality: Left;   • BREAST SURGERY Left 05/28/2020    Procedure: LEFT LATERAL INTERCOASTAL ARTERY  flap ;  Surgeon: Yusuf Garcia MD;  Location: [facility];  Service: Plastics;  Laterality: Left;   • CHOLECYSTECTOMY     • HYSTERECTOMY     • KNEE ARTHROSCOPY Right 03/24/2017    Procedure:  RIGHT  KNEE ARTHROSCOPY WITH DEBRIDEMENT CHONDROPLASTY PARTIAL MENISCECTOMY;  Surgeon: Karl Galeas MD;  Location: [facility];  Service:    • KNEE CARTILAGE SURGERY Right 2007   • TONSILLECTOMY     • TONSILLECTOMY  1974   • TRIGGER POINT INJECTION  2002 - 2023   • US GUIDED LYMPH NODE BIOPSY  10/29/2019    LEFT BREAST   • VENOUS ACCESS DEVICE (PORT) INSERTION Right 12/02/2019    Procedure: INSERTION VENOUS ACCESS DEVICE RIGHT;  Surgeon:  Landen Forman MD;  Location: [facility];  Service: General   • VENOUS ACCESS DEVICE (PORT) REMOVAL N/A 05/25/2022    Procedure: Mediport Removal;  Surgeon: Cesar Vasquez Jr., MD;  Location: [facility];  Service: General;  Laterality: N/A;       ONCOLOGIC HISTORY:  Patient is a 59-year-old female who has had a history of HIV since age 34 followed by Dr. Natali Ng at [facility] and was on multiple HIV drugs initially but more recently has been on a single medication TRIUMEQ, with well-controlled HIV.  She follows up with Dr. Dawn , infectious disease who saw her recently and he saw her on 4/8/2019 and has excellent control and suppression of her viral load.  She is very compliant with her medications.    She also has had history of stroke in 2017 for which she was placed on aspirin.  She presented with a headache.  She is very active and works at [employer] full-time.  She also has had history of vertigo in the past  Patient's PCP is Zach Lora.    Patient first noticed the mass in her left breast 5 months ago.  Her last mammogram was 5 years ago.  She had soreness in the left breast and she went to her primary care physician who then ordered a mammogram diagnostic and an ultrasound.  The diagnostic mammogram showed a 3.7 cm mass at 4 o'clock position in the lower outer quadrant of the left breast.  The ultrasound showed 3.8 cm x 3.1 x 2.5 cm mass at 4 o'clock position in the left breast with 2 abnormal appearing left axillary lymph nodes the largest being 1.4 cm.    She then underwent biopsy of both the breast mass as well as the left axillary lymph node and both of them are positive for invasive mammary carcinoma it is invasive ductal carcinoma with apocrine features, moderately differentiated, grade 2 of 3 with a [place] score of 5.  The left axillary lymph node is also consistent with metastatic mammary carcinoma.  It is ER positive MT positive HER-2/merna  negative.  Details as follows    10/21/19 - Bilateral Diagnostic Mammogram and US Breast Left  FINDINGS: Bilateral digital CC and MLO mammographic images were  obtained. No prior examination is available for comparison. Scattered  fibroglandular densities are seen throughout both breasts. A triangular  skin marker represents the area of palpable concern in the lower outer  quadrant of the left breast in the posterior 1/3. At this location there  is an irregular mass that measures on the order of 3.7 cm in greatest  dimension. Internal calcifications are noted. No suspicious findings in  the right breast are appreciated.     ULTRASOUND: Targeted sonographic evaluation of the left breast was  performed through the area of concern in the left axilla. At the 4  o'clock position on the order of 6 cm from the nipple there is an  irregular hypoechoic mass that measures 3.8 x 3.1 x 2.5 cm. Internal  vascularity is noted.     There are 2 abnormal-appearing left axillary lymph nodes, the largest  which measures on the order of 1.4 cm in greatest dimension.     IMPRESSION:  1. There is a 3.8 cm irregular mass in the left breast at the 4 o'clock  position. This is seen in conjunction with an irregular 1.4 cm lymph  node in the left axilla. This is suspicious for malignancy with left  axillary involvement. Correlation with ultrasound-guided biopsy of both  the left breast mass and the lymph node is recommended.  2. There are no findings suspicious for malignancy in the right breast.     BI-RADS CATEGORY 5:     Patient's labs from 11/12/19 are normal.    10/29/19 - Tissue Pathology  1. Left Breast, 4:00, 6 cm FN, U/S-Guided Core Needle Biopsy for a Mass:  A. INVASIVE DUCTAL CARCINOMA WITH APOCRINE FEATURES, Moderately differentiated;  [place] Histologic Grade II/III (tubule score = 3, nuclear score = 2, mitoses score = 1), measuring at least  9 mm.  B. No ductal or lobular carcinoma in situ is identified in this sample.  C.  Focus suspicious for lymphovascular space invasion.  D. See Biomarker Template for hormone receptor studies.  2. Lymph Node, Left Axilla, U/S-Guided Core Needle Biopsy:  A. METASTATIC MAMMARY CARCINOMA (see Comment).  B. Metastatic focus measures 5 mm in greatest extent.  ER+, 85%  OH+, 10%  HER2- Score 2+    11/13/19 - CT Neck Chest Abdomen Pelvis  IMPRESSION:  1. In addition to the irregular approximately 3.8 cm mass within the  lateral aspect of the left breast, there are a few subcentimeter  asymmetric nodules along the lateral margin of the glandular tissue. The  several asymmetric left subpectoral nodes and 1.2 x 1.0 cm left axillary  node are suspicious for tamar metastases. The asymmetric subcentimeter  left supraclavicular and left posterior cervical triangle nodes are  worrisome as well.  2. There is no convincing evidence for metastatic disease within the  abdomen or pelvis.    11/14/19 - Echocardiogram:  Normal.  Calculated EF = 67%.  There is trace mitral valve and tricuspid valve regurgitation.    11/15/19 - Bone Scan  Negative.    11/18/19 - MRI Breast Bilateral  IMPRESSION:  1. Biopsy-proven malignancy in the left breast centered at the 4 o'clock  position with associated surrounding satellite nodules as described. The  entire region of involvement including the satellite nodules and the  dominant mass measure up to 7.5 cm in greatest dimension. Also, left  axillary adenopathy with multiple irregular lymph nodes and loss of  differentiation of the borders of multiple lymph nodes is noted and this  is suspicious for extranodal involvement.  2. There are no findings suspicious for malignancy in the right breast.  BI-RADS CATEGORY 6      12/06/19: Cycle #1 of Adriamycin/Cytoxan    01/17/20: Last cycle of Adriamycin/Cytoxan given without Neulasta.  We will switch from Neulasta to 7 days of Neupogen injections after cycle 4.    We reviewed with patient the US Breast from 01/29/20 which shows mild interval  partial response to neoadjuvant chemotherapy.      20: Initiated cycle 1 Taxol  2020: Last cycle of Taxol, cycle 12    S/p left mastectomy with axillary dissection   Pathology showed invasive breast cancer which is 55 x 40 mm in size, grade 2 with focal lymphovascular space invasion with DCIS spanning over 36 x 6 mm.  All margins were negative for cancer.  8 of the additional lymph nodes out of 11 were positive with the largest micrometastasis measuring 5 mm.  Extranodal extension was seen.    Biomarkers on post neoadjuvant tumor is ER 81-90% UT 5% HER-2/merna 2+ by immunohistochemistry and HER-2/merna negative by FISH.    2020: Started letrozole but because of severe joint pains was discontinued 2020    End of 2020 started tamoxifen    OB-GYN:  Menarche 15 years  Menopause-46  Pregnancies- 1 para 1 no miscarriages her first pregnancy was in  at 29 years of age.  She did not breastfeed.  Post menopausal HRT- None.  Hx of birth control pills- Yes.    Current Outpatient Medications on File Prior to Visit   Medication Sig Dispense Refill   • AIMOVIG 140 MG/ML solution auto-injector Inject 1 mL under the skin into the appropriate area as directed Every 30 (Thirty) Days. HAS NOT TAKEN IN SEVERAL MONTHS  11   • Aspirin 81 MG capsule Take 1 tablet by mouth Daily.     • atorvastatin (LIPITOR) 20 MG tablet Take 1 tablet by mouth Every Night. 90 tablet 1   • baclofen (LIORESAL) 10 MG tablet Take 1 tablet by mouth 2 (Two) Times a Day.  2   • colesevelam (WELCHOL) 625 MG tablet TAKE 2 TABLETS BY MOUTH EVERY DAY (Patient taking differently: Take 2 tablets by mouth Daily.) 180 tablet 1   • doxycycline (MONODOX) 100 MG capsule      • DULoxetine (Cymbalta) 30 MG capsule Take 1 capsule by mouth 2 (Two) Times a Day. 180 capsule 0   • exemestane (AROMASIN) 25 MG chemo tablet TAKE 1 TABLET BY MOUTH EVERY DAY 90 tablet 2   • fluticasone (FLONASE) 50 MCG/ACT nasal spray 2 SPRAYS INTO THE  NOSTRIL(S) AS DIRECTED BY PROVIDER DAILY. 48 mL 3   • Loratadine (CLARITIN PO) Take 10 mg by mouth Daily.     • Nurtec 75 MG dispersible tablet Take 1 tablet by mouth Daily As Needed.     • omeprazole (priLOSEC) 20 MG capsule TAKE 1 CAPSULE BY MOUTH EVERY DAY 90 capsule 0   • oxyCODONE-acetaminophen (PERCOCET)  MG per tablet Take 1 tablet by mouth 4 (Four) Times a Day.     • Triumeq 600- MG per tablet TAKE 1 TABLET BY MOUTH EVERY DAY 90 tablet 3   • vitamin B-12 (CYANOCOBALAMIN) 1000 MCG tablet Take 1 tablet by mouth Daily.     • vitamin B-6 (pyridoxine) 50 MG tablet Take 1 tablet by mouth Daily. 90 tablet 2   • zolpidem (AMBIEN) 5 MG tablet Take 1 tablet by mouth At Night As Needed for Sleep. 30 tablet 0   • amitriptyline (ELAVIL) 50 MG tablet Take 1 tablet by mouth Every Night. 30 tablet 1     No current facility-administered medications on file prior to visit.        ALLERGIES:    Allergies   Allergen Reactions   • Augmentin [Amoxicillin-Pot Clavulanate] Hives        Social History     Socioeconomic History   • Marital status:      Spouse name: Owen   • Number of children: 1   • Years of education: College   Tobacco Use   • Smoking status: Never   • Smokeless tobacco: Never   • Tobacco comments:     never   Vaping Use   • Vaping Use: Never used   Substance and Sexual Activity   • Alcohol use: Yes     Alcohol/week: 1.0 standard drink     Types: 1 Drinks containing 0.5 oz of alcohol per week     Comment: OCCASSIONALLY   • Drug use: No   • Sexual activity: Yes     Partners: Male     Birth control/protection: Condom     Comment:      Patient does not smoke or do drugs.  She does drink occasionally.    Family History   Problem Relation Age of Onset   • Kidney disease Mother    • Hypertension Mother    • Heart disease Mother    • Arthritis Mother    • Breast cancer Mother    • Leukemia Mother         CLL?   • Diabetes Mother    • Hyperthyroidism Mother    • Hearing loss Mother    • Cancer  "Mother         breast   • Rheumatologic disease Mother    • Arthritis Father    • Dementia Father    • Stroke Father    • Heart disease Maternal Grandmother    • Diabetes Maternal Grandfather    • Heart disease Maternal Grandfather    • Stroke Maternal Grandfather    • Heart attack Maternal Grandfather    • Osteoporosis Paternal Grandmother    • Heart disease Paternal Grandfather    • Leukemia Maternal Aunt         CLL?   • Throat cancer Paternal Uncle    • Alcohol abuse Other         multiple uncles/aunts   • Malig Hyperthermia Neg Hx       FAMILY HISTORY:  Her mother had breast cancer at age 60, still alive at 85 and in good health..  Her father had dementia.  Her paternal uncle had prostate cancer.  Her brother had esophageal cancer.    I have reviewed the patient's medical history in detail and updated the computerized patient record.     ROS: as per HPI    Objective    Vitals:    03/08/23 0913   BP: 135/84   Pulse: 78   Resp: 18   Temp: 98.7 °F (37.1 °C)   TempSrc: Temporal   SpO2: 97%   Weight: 72.3 kg (159 lb 8 oz)   Height: 170.2 cm (67.01\")   PainSc:   3   PainLoc: Back         Review of Systems   Constitutional: Negative for appetite change, chills, diaphoresis, fatigue, fever and unexpected weight change.   HENT: Negative for hearing loss, sore throat and trouble swallowing.    Respiratory: Negative for cough, chest tightness, shortness of breath and wheezing.    Cardiovascular: Negative for chest pain, palpitations and leg swelling.   Gastrointestinal: Negative for abdominal distention, abdominal pain, constipation, diarrhea, nausea and vomiting.   Genitourinary: Negative for dysuria, frequency, hematuria and urgency.   Musculoskeletal: Negative for joint swelling.        No muscle weakness.   Skin: Negative for rash and wound.   Neurological: Positive for numbness (Bilat foot). Negative for seizures, syncope, speech difficulty, weakness and headaches.   Hematological: Negative for adenopathy. Does not " bruise/bleed easily.   Psychiatric/Behavioral: Positive for sleep disturbance. Negative for behavioral problems, confusion and suicidal ideas.   All other systems reviewed and are negative.    Patient had chest pain which was spasmodic likely reflux related.  Her insomnia is improved.  Her arthralgias are mild.  She still has some neuropathy    I have reviewed and confirmed the accuracy of the ROS as documented by the MA/LPN/RN Ruby Moreno MD      Physical exam      CONSTITUTIONAL:  Vital signs reviewed.  No distress, looks comfortable.  RESPIRATORY:  Normal respiratory effort.  Lungs clear to auscultation bilaterally.  BREAST: Right breast: No skin changes, no evidence of breast mass, no nipple discharge, no evidence of any right axillary adenopathy or right supraclavicular adenopathy  Left breast: No evidence of any skin changes, no evidence of any left breast mass and no evidence of left nipple discharge as well as no left axillary adenopathy or left supraclavicular adenopathy.  CARDIOVASCULAR:  Normal S1, S2.  No murmurs rubs or gallops.  No significant lower extremity edema.  GASTROINTESTINAL: Abdomen appears unremarkable.  Nontender.  No hepatomegaly.  No splenomegaly.  LYMPHATIC:  No cervical, supraclavicular, axillary lymphadenopathy.  SKIN:  Warm.  No rashes.  PSYCHIATRIC:  Normal judgment and insight.  Normal mood and affect..      I have reexamined the patient and the results are consistent with the previously documented exam. Ruby Moreno MD     RECENT LABS:   Results from last 7 days   Lab Units 03/08/23  0853   WBC 10*3/mm3 7.19   NEUTROS ABS 10*3/mm3 3.21   HEMOGLOBIN g/dL 14.2   HEMATOCRIT % 43.7   PLATELETS 10*3/mm3 279     Results from last 7 days   Lab Units 03/08/23  0853   SODIUM mmol/L 141   POTASSIUM mmol/L 3.9   CHLORIDE mmol/L 102   CO2 mmol/L 30.3*   BUN mg/dL 11   CREATININE mg/dL 0.76   CALCIUM mg/dL 9.3   ALBUMIN g/dL 4.6   BILIRUBIN mg/dL 0.5   ALK PHOS U/L 67   ALT (SGPT) U/L 18    AST (SGOT) U/L 20   GLUCOSE mg/dL 96         Assessment & Plan    1. T2N1 invasive ductal carcinoma of the left breast, recently diagnosed.    -Noticed mass in the left breast x5 months  -Diagnostic mammogram showed 3.7 mm mass in the left breast at 4 o'clock position  -Ultrasound-showed 3.8 x 3.1 x 2.5 cm mass at 4 o'clock position with 2 abnormal axillary lymph nodes, larger lymph node being 1.4 cm  -Left breast biopsy and axillary lymph node biopsy October 29, 2019, invasive ductal carcinoma, moderately differentiated, grade 2, ER 85%, RI 10%, HER-2/merna 2+, HER-2 FISH negative  · CT scans from 11/13/19 show some asymmetric nodules along the lateral margin of the glandular tissue.  The 1.2x1.0 cm left axillary nodes, left supraclavicular, and left posterior cervical triangle nodes are suspicious for tamar metastases.  ·   bone scan from 11/15/19 which was negative.   ·  MRI breast from 11/18/19 which shows the biopsy-proven malignancy in the left breast centered at the 4:00 position.  The region of involvement measures up to 7.5 cm.  There is left axillary adenopathy with multiple irregular lymph nodes and loss of differentiation of the border of multiple lymph nodes which are suspicious for extranodal involvement.  ·    echocardiogram from 11/14/19 which was normal with an ejection fraction of 67%.    · INVITAE from 11/14/19 which was negative.  · Given the size of her tumor and her medical history, patient will be given 4 cycles of Adriamycin/Cytoxan with Neulasta.  After completion of this treatment, patient will be started on 12 cycles of Taxol.  After the tumor shrinks in size, Dr. Forman will perform a lumpectomy.    · Following lumpectomy, patient will begin endocrine therapy.  · Dr. Dawn agrees chemotherapy will not aggravate her HIV condition.  Dr. Moreno spoke with Dr. Mohan at [facility] who also agrees chemotherapy is the first step.   · 12/06/19: Cycle #1 of Adriamycin/Cytoxan  · US  Breast from 01/29/20 after 4 cycles of Adriamycin Cytoxan chemotherapy which shows mild interval partial response to neoadjuvant chemotherapy.  The mass has decreased from 3.8 x 2.5 to 3.1 cm to 3.5 x 2.3 x 3.2 cm previously the left axillary lymph node has decreased from 1.4 x 0.7 x 1 cm to 1.1 x 0.6 x 0.9 cm.  · 01/31/20: Initiated cycle 1 Taxol  · April 24, 2020: Completed cycle 12 Taxol  · MRI breast 5/6/2020 shows significant reduction in the left axillary adenopathy as well as reduction in the index breast lesion.  · S/p left modified radical mastectomy with left axillary dissection.  Patient has 55 x 40 x 30 mm invasive carcinoma, grade 2 with DCIS 36 x 6 mm, high-grade with good margins and 8 out of 11 lymph nodes positive with largest metastasis being 5 mm with extracapsular extension.  And there is lymphatic space invasion  · Will follow-up in 2 weeks at which time we will plan to start endocrine therapy.  She has radiation oncology appointment with Dr. Spivey  · Patient is eligible for Zometa once every 6 months for 2 years.  But given that she is undergoing some work on her teeth we will await starting Zometa until after radiation is complete.  · XRT completed August 17, 2020  · Started letrozole August 2020 but has arthralgias  · October 6, 2020 had severe arthralgias secondary to letrozole.  We will switch to tamoxifen starting November 2020  · 1/8/2021 screening mammogram in December 2020 as well as diagnostic mammogram which showed 0.4 cm suspicious calcification in the lower left which on biopsy is consistent with fat necrosis.  · 8/12/2021 tamoxifen placed on hold due to significant arthralgias and worsening fatigue.   · September 21, 2021: Patient's depression, mental fogginess has resolved since tamoxifen discontinued.  She has mild worsening fatigue her TSH and B12 levels are normal.  We will need to check iron studies  · Discussed about switching to Arimidex to see if she could tolerate that  better  · Patient initiated Arimidex 9/21/2021.  · Returns 10/5/2021 complaining of significant generalized arthralgias.  Reviewed with Dr. Moreno.  We were initially going to try her on Cymbalta, but the patient is already taking Lexapro, and doing well on this.  We will therefore discontinue Arimidex.  We will give her a 6-week break and reevaluate things in 6 weeks.  Could try at that time switching to Aromasin.  · November 15, 2021: Patient continued with Arimidex even though she was asked to hold it last time.  Her arthralgias worsened.  Also the stiffness in the joints worsened significantly.  Discussed with patient that we need to hold Arimidex for 6 weeks and try Aromasin at her next appointment.  · December 28, 2021: ArthralgiaS improved after holding Arimidex.  · December 28, 2021: Started Aromasin  · March 8, 2023: Patient doing well with Aromasin.  Has mild arthralgias mammogram December 29, 2022 negative      2. HIV, followed by Dr. Dawn in infectious disease.  Well-controlled and is on a single medication with very low viral load. Has HIV since age 34.  · Reviewed her HIV medications and she is compliant  · Patient followed by infectious disease for her HIV and currently on treatment.  Stay  · Currently is followed by Dr. Dawn on anti-HIV medications  · Stable no new medications     3.  Family history of breast cancer with mother having had breast cancer at age 60.  There is family history of uncle with prostate cancer.  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    4.  Arthritis with severe back pain followed by Dr. Bam Crandall  · Continue Percocet  · Patient has arthralgias which have worsened on Arimidex as well  · Patient seen by rheumatologist  · Patient has continued arthralgias and joint stiffness, currently on Aromasin and wants to continue  it  · Improved    5. Episodes of nosebleeds.  She has had these once a week for 3-4 years. She did not report this today.    6.  Vaginal dryness related to tamoxifen, ongoing.  Patient provided handouts for recommendations of a vaginal lubricant and vaginal moisturizer to use for hopeful improvement.  · Tamoxifen discontinued secondary to mood swings    7.  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  Patient continues on B6.  Chronic and stable    8. Fatigue  · 8/12/2021 patient seen as triage visit for worsening fatigue and diffuse arthralgias.  Reporting increased sleeping at night and still taking naps in the day.  Labs checked including thyroid studies which were unremarkable and B12 level actually elevated at greater than 2000.  Patient taking vitamin B supplements at that time.  Patient instructed to hold tamoxifen.  · 8/26/2021 reviewed today recent lab work for above with no evidence of thyroid dysfunction or vitamin B12 abnormality.  Patient notes no improvement being off tamoxifen for 2 weeks.  We will check a CA 15-3 to help further guide decision making.  If this is elevated we may then pursue imaging.  Discussed that she may just need to be off the tamoxifen longer to see improvement.  Patient does have underlying HIV as well and unclear if maybe some of her chronic medications for this could be contributing to her current symptoms.  · Patient continues with fatigue but slightly improved now    *Insomnia, patient is doing well except has insomnia and neurology has placed on amitriptyline which does not help the sleep but helps her neuropathy  · Still present but improved    *.  Neuropathy: Patient has shooting pains in the feet likely from Taxol in the past which had improved with amitriptyline 75 mg but could not tolerate and hence they are tapering it.  She had difficulty with urination due to amitriptyline    *Recent spasmodic chest pain present x2 without any radiation to the neck or  axilla improved with Gaviscon  · We will start Prilosec over-the-counter  · Follow-up with cardio oncology to have a complete work-up given her age  · In future if she has more problems on Prilosec then we can consider referring to GI    PLAN:   · Continue Aromasin  · Continue calcium and vitamin D supplements  · Patient is due for Prolia May 2023 when she will see the nurse practitioner  · Patient continues to follow-up with infectious disease Dr. Dawn  · Amitriptyline is being weaned off  · We will schedule follow-up with cardio oncology given recent chest pain  · Follow-up with me in 6 months with labs    Monitoring high risk medication    I spent 40 total minutes, face-to-face, caring for Richa today.  Greater than 50% of this time involved counseling and/or coordination of care as documented within this note.      Ruby Moreno MD      Cc: ОЛЬГА Godoy  · MD Denilson Bishop MD

## 2023-03-10 ENCOUNTER — OFFICE VISIT (OUTPATIENT)
Dept: PSYCHIATRY | Facility: HOSPITAL | Age: 63
End: 2023-03-10
Payer: COMMERCIAL

## 2023-03-10 DIAGNOSIS — G47.00 INSOMNIA, UNSPECIFIED TYPE: ICD-10-CM

## 2023-03-10 DIAGNOSIS — F43.23 ADJUSTMENT DISORDER WITH MIXED ANXIETY AND DEPRESSED MOOD: Primary | ICD-10-CM

## 2023-03-10 DIAGNOSIS — G62.9 NEUROPATHY: ICD-10-CM

## 2023-03-10 PROCEDURE — 99214 OFFICE O/P EST MOD 30 MIN: CPT | Performed by: NURSE PRACTITIONER

## 2023-03-10 RX ORDER — DULOXETIN HYDROCHLORIDE 30 MG/1
30 CAPSULE, DELAYED RELEASE ORAL 2 TIMES DAILY
Qty: 180 CAPSULE | Refills: 0 | Status: SHIPPED | OUTPATIENT
Start: 2023-03-10

## 2023-03-10 RX ORDER — ZOLPIDEM TARTRATE 5 MG/1
5 TABLET ORAL NIGHTLY PRN
Qty: 30 TABLET | Refills: 0 | Status: SHIPPED | OUTPATIENT
Start: 2023-03-10 | End: 2023-03-23 | Stop reason: SDUPTHER

## 2023-03-10 RX ORDER — GABAPENTIN 300 MG/1
300 CAPSULE ORAL TAKE AS DIRECTED
Qty: 60 CAPSULE | Refills: 2 | Status: SHIPPED | OUTPATIENT
Start: 2023-03-10 | End: 2023-03-23 | Stop reason: SDUPTHER

## 2023-03-10 NOTE — PROGRESS NOTES
[facility]  In Person Session    Subjective  Patient ID: Richa Dolan is a 62 y.o. female is seen face to face in the [facility].    CC: insomnia, neuropathy    HPI/ Interval History:   Pt is seen in follow up alongside breast cancer survivorship for exacerbation of Insomnia. Sleep reviewed; pt reports improved sleep, sleeping well 4-5 nights weekly. Continues ambien 5 mg q hs. Reports difficulty initiating sleep appx 2 nights weekly, although able to fall asleep after about 1.5 hours and stay asleep. Continues to wake appx 8 AM with appropriate energy. Has begun to reduce elavil, taking 25 mg every other night. Appreciates simplification of this med, reporting reduction in constant hunger. Unfortunately, neuropathy in feet, some in hands, has begun to resurge. Has increased Cymbalta to 30 mg bid. Reports OK mood and anxiety, although with lessened sense of calm as achieved on lexapro. Reports startling more since being off Lexapro.    PHQ 9 and ACACIA 7 reviewed with patient, including significant improvement since new evaluation (on Lexapro) in December as compared to today. Pt agrees this could be possible.    Exam: As above    Recent Labs Reviewed:  Lab on 03/08/2023   Component Date Value   • Glucose 03/08/2023 96    • BUN 03/08/2023 11    • Creatinine 03/08/2023 0.76    • Sodium 03/08/2023 141    • Potassium 03/08/2023 3.9    • Chloride 03/08/2023 102    • CO2 03/08/2023 30.3 (H)    • Calcium 03/08/2023 9.3    • Total Protein 03/08/2023 7.4    • Albumin 03/08/2023 4.6    • ALT (SGPT) 03/08/2023 18    • AST (SGOT) 03/08/2023 20    • Alkaline Phosphatase 03/08/2023 67    • Total Bilirubin 03/08/2023 0.5    • Globulin 03/08/2023 2.8    • A/G Ratio 03/08/2023 1.6    • BUN/Creatinine Ratio 03/08/2023 14.5    • Anion Gap 03/08/2023 8.7    • eGFR 03/08/2023 88.7    • WBC 03/08/2023 7.19    • RBC 03/08/2023 4.22    • Hemoglobin 03/08/2023 14.2    • Hematocrit  03/08/2023 43.7    • MCV 03/08/2023 103.6 (H)    • MCH 03/08/2023 33.6 (H)    • MCHC 03/08/2023 32.5    • RDW 03/08/2023 12.8    • RDW-SD 03/08/2023 48.9    • MPV 03/08/2023 9.3    • Platelets 03/08/2023 279    • Neutrophil % 03/08/2023 44.7    • Lymphocyte % 03/08/2023 40.5    • Monocyte % 03/08/2023 11.4    • Eosinophil % 03/08/2023 2.9    • Basophil % 03/08/2023 0.4    • Immature Grans % 03/08/2023 0.1    • Neutrophils, Absolute 03/08/2023 3.21    • Lymphocytes, Absolute 03/08/2023 2.91    • Monocytes, Absolute 03/08/2023 0.82    • Eosinophils, Absolute 03/08/2023 0.21    • Basophils, Absolute 03/08/2023 0.03    • Immature Grans, Absolute 03/08/2023 0.01    • nRBC 03/08/2023 0.0        Current Psychotropic Medications:  amitryptiline 25 mg QOD  Cymbalta 30 mg bid    Plan of Care/ Medical Decision Making  Stop amitriptyline.  Add gabapentin 300 mg q hs; allow increase to 600 mg q hs as tolerated. Risks and benefits reviewed considering pt history. Pt agrees to discuss initiation of med with  to assist in assessing for challenges tolerating. Pt agrees to DC med with any concern regaridng resurgent hopelessness and certainly for any thoughts of SI. Safety plan reviewed and confirmed per pt. Pt is comfortable with this plan and voices interest in trial.  Continue cymbalta 30 mg bid  Follow up in 2 weeks.    Diagnoses and all orders for this visit:    1. Adjustment disorder with mixed anxiety and depressed mood (Primary)  -     gabapentin (Neurontin) 300 MG capsule; Take 1 capsule by mouth Take As Directed. 1 capsule PO q hs; may increase to 2 capsules po at bedtime after 4 days if well tolerated  Dispense: 60 capsule; Refill: 2    2. Insomnia, unspecified type  -     gabapentin (Neurontin) 300 MG capsule; Take 1 capsule by mouth Take As Directed. 1 capsule PO q hs; may increase to 2 capsules po at bedtime after 4 days if well tolerated  Dispense: 60 capsule; Refill: 2  -     zolpidem (AMBIEN) 5 MG tablet; Take  1 tablet by mouth At Night As Needed for Sleep.  Dispense: 30 tablet; Refill: 0    3. Neuropathy  -     gabapentin (Neurontin) 300 MG capsule; Take 1 capsule by mouth Take As Directed. 1 capsule PO q hs; may increase to 2 capsules po at bedtime after 4 days if well tolerated  Dispense: 60 capsule; Refill: 2    Other orders  -     DULoxetine (Cymbalta) 30 MG capsule; Take 1 capsule by mouth 2 (Two) Times a Day.  Dispense: 180 capsule; Refill: 0    I spent 36 minutes caring for Richa on this date of service.

## 2023-03-13 ENCOUNTER — TELEPHONE (OUTPATIENT)
Dept: SLEEP MEDICINE | Facility: HOSPITAL | Age: 63
End: 2023-03-13
Payer: COMMERCIAL

## 2023-03-15 ENCOUNTER — TREATMENT (OUTPATIENT)
Dept: PHYSICAL THERAPY | Facility: CLINIC | Age: 63
End: 2023-03-15
Payer: COMMERCIAL

## 2023-03-15 DIAGNOSIS — M25.561 ACUTE PAIN OF RIGHT KNEE: Primary | ICD-10-CM

## 2023-03-15 PROCEDURE — 97161 PT EVAL LOW COMPLEX 20 MIN: CPT | Performed by: PHYSICAL THERAPIST

## 2023-03-15 PROCEDURE — 97112 NEUROMUSCULAR REEDUCATION: CPT | Performed by: PHYSICAL THERAPIST

## 2023-03-15 PROCEDURE — 97110 THERAPEUTIC EXERCISES: CPT | Performed by: PHYSICAL THERAPIST

## 2023-03-15 NOTE — PROGRESS NOTES
Physical Therapy Initial Evaluation and Plan of Care  [address], [address]  [place] [address]    Patient: Richa Dolan   : 1960  Diagnosis/ICD-10 Code:  Acute pain of right knee [M25.561]  Referring practitioner: ОЛЬГА Rincon  Date of Initial Visit: 3/15/2023  Today's Date: 3/15/2023  Patient seen for 1 session         Visit Diagnoses:    ICD-10-CM ICD-9-CM   1. Acute pain of right knee  M25.561 719.46         Subjective Questionnaire: LEFS: 42      Subjective Evaluation    History of Present Illness  Mechanism of injury: Patient reports that her right knee has bothered her for years, but the pain comes and goes.  Reports having 2 surgeries in the past and having multiple surgeries.  Patient had a cortisone injection about 2 weeks ago, which helped a lot.  No recent injury to the knee.    History of 2 meniscus repairs, last one in 2017.      Patient Occupation: Retired Pain  Current pain rating: 3  At worst pain ratin  Location: right inferior, medial and lateral patella  Quality: dull ache  Relieving factors: ice  Aggravating factors: stairs  Progression: improved    Diagnostic Tests  X-ray: abnormal (bone on bone)    Treatments  Previous treatment: injection treatment           Objective          Observations     Additional Knee Observation Details  Patient presents with a knee brace on the right knee..  Patient ambulates with a minimal antalgic gait pattern.    Palpation     Additional Palpation Details  TTP to the right medial joint line, lateral joint line and inferior patella.    Active Range of Motion     Right Knee   Flexion: Right knee active flexion: WNL.   Extension: Right knee active extension: -8. with pain    Strength/Myotome Testing     Right Knee   Flexion: 4-  Extension: 4          Assessment & Plan     Assessment  Impairments: abnormal gait, abnormal or restricted ROM, activity intolerance, impaired physical strength, lacks appropriate home exercise program  and pain with function    Assessment details: Patient presents with c/o pain, TTP, limited knee extension AROM, decreased strength and an antalgic gait pattern which is limiting her ability to perform ADL'S.    Barriers to therapy: none  Prognosis: good  Prognosis details: STG's to be met by 2 weeks  1)  Independent with HEP  2)  Decrease pain by 50% or more  3)  AROM WNL for right knee extension  4)  Patient to ambulate with a min to no antalgic gait pattern    LTG's to be met by 4 weeks  1)  Independent with HEP progression  2)  Decrease pain by 75% or more  3)  Increase strength for the right knee to 4+/5  4)  No TTP present  5)  Patient to perform ADL'S without limitations       Plan  Therapy options: will be seen for skilled therapy services  Planned therapy interventions: strengthening, stretching, therapeutic activities, home exercise program, neuromuscular re-education and gait training  Frequency: 2x week  Duration in weeks: 4  Treatment plan discussed with: patient            Timed:         Manual Therapy:    0     mins  38364;     Therapeutic Exercise:    16     mins  74198;    Neuromuscular Louann:    8    mins  04837;    Therapeutic Activity:     0     mins  39953;     Gait Trainin     mins  02992;     Ultrasound:     0     mins  48198;          Un-Timed:  Electrical Stimulation:    0     mins  60599 ( );    Low Eval     15     Mins  12719  Mod Eval     0     Mins  84342  High Eval                       0     Mins  51924        Timed Treatment:   24   mins   Total Treatment:     39   mins          PT: Keshawn Blanca, PT     [place] License [id number]  Electronically signed by Keshawn Blanca PT, 03/15/23, 1:13 PM EDT    Certification Period: 3/15/2023 thru 2023  I certify that the therapy services are furnished while this patient is under my care.  The services outlined above are required by this patient, and will be reviewed every 90 days.    Miri Montesinos, Aprn  [address] [facility]  [address]  [place] [address]   NPI: [id number]      Keshawn Blanca, PT   License number: [id number]        Physician Signature:__________________________________________________    PHYSICIAN: Miri Montesinos APRN      DATE:     Please sign and return via fax to [email or web address] . Thank you, [facility].

## 2023-03-21 ENCOUNTER — TREATMENT (OUTPATIENT)
Dept: PHYSICAL THERAPY | Facility: CLINIC | Age: 63
End: 2023-03-21
Payer: COMMERCIAL

## 2023-03-21 DIAGNOSIS — M25.561 ACUTE PAIN OF RIGHT KNEE: Primary | ICD-10-CM

## 2023-03-21 PROCEDURE — 97110 THERAPEUTIC EXERCISES: CPT | Performed by: PHYSICAL THERAPIST

## 2023-03-21 PROCEDURE — 97112 NEUROMUSCULAR REEDUCATION: CPT | Performed by: PHYSICAL THERAPIST

## 2023-03-28 ENCOUNTER — TREATMENT (OUTPATIENT)
Dept: PHYSICAL THERAPY | Facility: CLINIC | Age: 63
End: 2023-03-28
Payer: COMMERCIAL

## 2023-03-28 DIAGNOSIS — M25.561 ACUTE PAIN OF RIGHT KNEE: Primary | ICD-10-CM

## 2023-03-28 PROCEDURE — 97110 THERAPEUTIC EXERCISES: CPT | Performed by: PHYSICAL THERAPIST

## 2023-03-28 NOTE — PROGRESS NOTES
Physical Therapy Daily Treatment Note  [address], [address]  [place] [address]  Visit # 3      Subjective Evaluation    History of Present Illness    Subjective comment: Pt reports currnet pain rating of 6/10 in (R) knee.       Objective   See Exercise, Manual, and Modality Logs for complete treatment.   Added clam shell, and step matrix    Assessment & Plan     Assessment    Assessment details: Pt completed treatment with mild c/o pain in (R) knee.  She tolerated increased volume in her strengthening exercises.  Continue to progress per POC.                     Manual Therapy:    0     mins  04619;  Therapeutic Exercise:    25     mins  69235;     Neuromuscular Louann:    0    mins  97353;    Therapeutic Activity:     0     mins  35171;     Gait Trainin     mins  17976;     Ultrasound:     0     mins  44781;    Work Hardening           0      mins 39920  Iontophoresis               0   mins 02587  E-Stim                          _0_ mins 86105 ( )    Timed Treatment:   25   mins   Total Treatment:     25   mins    Tito Bermudez PTA  Physical Therapist Assistant

## 2023-03-30 ENCOUNTER — TELEPHONE (OUTPATIENT)
Dept: INFECTIOUS DISEASES | Facility: CLINIC | Age: 63
End: 2023-03-30
Payer: COMMERCIAL

## 2023-03-30 NOTE — PROGRESS NOTES
Subjective:     Encounter Date:03/31/2023      Patient ID: Richa Dolan is a 62 y.o. female.    Chief Complaint:  Chest pain    HPI:   62 y.o. female with HIV, hyperlipidemia, Breast cancer status post left mastectomy with axillary dissection with reconstruction presents for further evaluation of chest pain.  Patient notes that about 6 weeks ago she had an episode of burning sensation in the middle of her chest that that lasted 10 minutes.  It was not associate with any other symptoms including shortness of breath, diaphoresis, nausea.  The discomfort resolves on its own but returned a couple of days later with another similar episode.  She denies any change in diet or food at the time.  She does have a history of heartburn but this discomfort was more severe.  She has not felt any discomfort with exertion but does note mild dyspnea with exertion.  She was able to walk up to 6 flights of stairs for this visit today and did feel very winded but was able to complete it.    She had an echocardiogram November 2019 with normal EF, trace MR, trace TR.    FH:  Mother- 4 stents    The following portions of the patient's history were reviewed and updated as appropriate: allergies, current medications, past family history, past medical history, past social history, past surgical history and problem list.     REVIEW OF SYSTEMS:   All systems reviewed.  Pertinent positives identified in HPI.  All other systems are negative.    Past Medical History:   Diagnosis Date   • Anxiety    • Arthritis of back 2010   • Cerebrovascular accident (CVA) (Formerly [facility]) 08/22/2017    NO RESIDUAL AFFECT   • DDD (degenerative disc disease), cervical    • Depression    • Drug therapy    • Elevated cholesterol    • Fat necrosis of left breast    • GERD (gastroesophageal reflux disease)    • H/O ICH (intracerebral hemorrhage) (CMS/Formerly [facility])    • Hip arthrosis 2015   • History of chemotherapy    • History of heart murmur in childhood    • History of stroke      PATIENT STATES HAD A MINI STROKE, NO RESIDUAL EFFECTS   • History of thrombocytopenia    • HIV (human immunodeficiency virus infection) (HCC) 1996   • Hx of radiation therapy    • Hyperlipidemia    • Insomnia    • Knee swelling 2017    meniscus tear   • Lupus anticoagulant positive    • Malignant neoplasm of overlapping sites of left breast in female, estrogen receptor positive (HCC) 11/12/2019   • Meniscus tear 2017    RIGHT   • Migraine    • Neck pain     WITH SHOOTING PAIN LEFT RIGHT ARM   • Neck strain 2001    work injury   • Neuropathy    • Osteoarthritis    • Osteoporosis    • Pain management     sees 1 every 3 months for scripts/injection/pain meds   • Periarthritis of shoulder 2010   • Pneumonia 1989   • Seasonal allergies    • Spinal headache     THINKS HAD BLOOD PATCH AFTER EPIDUAL    • Stress fracture 2010    three times   • Tear of meniscus of knee 2012 & 2017   • Tendinitis of knee 2000    physical therapy many times   • Tick bite 06/2014   • Upper back pain    • Vertigo        Family History   Problem Relation Age of Onset   • Kidney disease Mother    • Hypertension Mother    • Heart disease Mother    • Arthritis Mother    • Breast cancer Mother    • Leukemia Mother         CLL?   • Diabetes Mother    • Hyperthyroidism Mother    • Hearing loss Mother    • Cancer Mother         breast   • Rheumatologic disease Mother    • Arthritis Father    • Dementia Father    • Stroke Father    • Heart disease Maternal Grandmother    • Diabetes Maternal Grandfather    • Heart disease Maternal Grandfather    • Stroke Maternal Grandfather    • Heart attack Maternal Grandfather    • Osteoporosis Paternal Grandmother    • Heart disease Paternal Grandfather    • Leukemia Maternal Aunt         CLL?   • Throat cancer Paternal Uncle    • Alcohol abuse Other         multiple uncles/aunts   • Malig Hyperthermia Neg Hx        Social History     Socioeconomic History   • Marital status:      Spouse name: Owen   •  Number of children: 1   • Years of education: College   Tobacco Use   • Smoking status: Never   • Smokeless tobacco: Never   • Tobacco comments:     never   Vaping Use   • Vaping Use: Never used   Substance and Sexual Activity   • Alcohol use: Yes     Alcohol/week: 1.0 standard drink     Types: 1 Drinks containing 0.5 oz of alcohol per week     Comment: OCCASSIONALLY   • Drug use: No   • Sexual activity: Yes     Partners: Male     Birth control/protection: Condom     Comment:        Allergies   Allergen Reactions   • Augmentin [Amoxicillin-Pot Clavulanate] Hives       Past Surgical History:   Procedure Laterality Date   • ADENOIDECTOMY     • BREAST LUMPECTOMY     • BREAST LUMPECTOMY WITH SENTINEL NODE BIOPSY Left 05/28/2020    Procedure: BREAST LUMPECTOMY WITH SENTINEL NODE BIOPSY AND NEEDLE LOCALIZATION AND axillary dissection;  Surgeon: Landen Forman MD;  Location: [facility];  Service: General;  Laterality: Left;   • BREAST SURGERY Left 05/28/2020    Procedure: LEFT LATERAL INTERCOASTAL ARTERY  flap ;  Surgeon: Yusuf Garcia MD;  Location: [facility];  Service: Plastics;  Laterality: Left;   • CHOLECYSTECTOMY     • HYSTERECTOMY     • KNEE ARTHROSCOPY Right 03/24/2017    Procedure:  RIGHT  KNEE ARTHROSCOPY WITH DEBRIDEMENT CHONDROPLASTY PARTIAL MENISCECTOMY;  Surgeon: Karl Galeas MD;  Location: [facility];  Service:    • KNEE CARTILAGE SURGERY Right 2007   • TONSILLECTOMY     • TONSILLECTOMY  1974   • TRIGGER POINT INJECTION  2002 - 2023   • US GUIDED LYMPH NODE BIOPSY  10/29/2019    LEFT BREAST   • VENOUS ACCESS DEVICE (PORT) INSERTION Right 12/02/2019    Procedure: INSERTION VENOUS ACCESS DEVICE RIGHT;  Surgeon: Landen Forman MD;  Location: [facility];  Service: General   • VENOUS ACCESS DEVICE (PORT) REMOVAL N/A 05/25/2022    Procedure: Mediport Removal;  Surgeon: Cesar Vasquez Jr., MD;  Location: [facility];  Service: General;   Laterality: N/A;         ECG 12 Lead    Date/Time: 3/31/2023 3:28 PM  Performed by: Zoltan Velasquez MD  Authorized by: Zoltan Velasquez MD   Comparison: compared with previous ECG from 5/23/2022  Similar to previous ECG  Rhythm: sinus rhythm  Rate: normal  Conduction: conduction normal  ST Segments: ST segments normal  T Waves: T waves normal  QRS axis: normal    Clinical impression: normal ECG               Objective:         Vitals:    03/31/23 1358   BP: 108/80   Pulse: 83   SpO2: 98%       PHYSICAL EXAM:  GEN: well appearing, in NAD   HEENT: NCAT, EOMI, moist mucus membranes   Respiratory: CTAB, no wheezes, rales or rhonchi  CV: normal rate, regular rhythm, normal S1, S2, no murmurs, rubs, gallops, +2 radial pulses b/l  GI: soft, nontender, nondistended  MSK: no edema  Skin: no rash, warm, dry  Heme/Lymph: no bruising or bleeding  Psych: organized thought, normal behavior and affect  Neuro: Alert and Oriented x 3, grossly normal motor function        Assessment:         (R06.09) MCPHERSON (dyspnea on exertion) - Plan: Stress Test With Myocardial Perfusion One Day    62 y.o. female with HIV, hyperlipidemia, breast cancer status post left mastectomy with axillary dissection with reconstruction presents for further evaluation of chest pain.         Plan:       #Dyspnea on exertion/Chest pain  Patient with hyperlipidemia, family history of CAD and with mild dyspnea on exertion. Her chest pain is atypical and is likely secondary to GERD however given her MCPHERSON will evaluate further with stress testing.    Dr Moreno, thank you very much for referring this kind patient to me. Please call me with any questions or concerns. I will see the patient again in the office pending her stress results.         Zoltan Velasquez MD  03/31/23  [facility]    Outpatient Encounter Medications as of 3/31/2023   Medication Sig Dispense Refill   • AIMOVIG 140 MG/ML solution auto-injector Inject 1 mL under the skin into the appropriate area as  directed Every 30 (Thirty) Days. HAS NOT TAKEN IN SEVERAL MONTHS  11   • amitriptyline (ELAVIL) 50 MG tablet Take 25 mg by mouth every night at bedtime.     • Aspirin 81 MG capsule Take 1 tablet by mouth Daily.     • atorvastatin (LIPITOR) 20 MG tablet Take 1 tablet by mouth Every Night. 90 tablet 1   • baclofen (LIORESAL) 10 MG tablet Take 1 tablet by mouth 2 (Two) Times a Day.  2   • colesevelam (WELCHOL) 625 MG tablet TAKE 2 TABLETS BY MOUTH EVERY DAY (Patient taking differently: Take 2 tablets by mouth Daily.) 180 tablet 1   • doxycycline (MONODOX) 100 MG capsule      • DULoxetine (Cymbalta) 30 MG capsule Take 1 capsule by mouth 2 (Two) Times a Day. 180 capsule 0   • exemestane (AROMASIN) 25 MG chemo tablet TAKE 1 TABLET BY MOUTH EVERY DAY 90 tablet 2   • fluticasone (FLONASE) 50 MCG/ACT nasal spray 2 SPRAYS INTO THE NOSTRIL(S) AS DIRECTED BY PROVIDER DAILY. 48 mL 3   • gabapentin (Neurontin) 300 MG capsule Take 1 capsule by mouth Take As Directed. 1 capsule PO q evening, 2 capsules po at bedtime 90 capsule 2   • Loratadine (CLARITIN PO) Take 10 mg by mouth Daily.     • omeprazole (priLOSEC) 20 MG capsule TAKE 1 CAPSULE BY MOUTH EVERY DAY 90 capsule 0   • oxyCODONE-acetaminophen (PERCOCET)  MG per tablet Take 1 tablet by mouth 4 (Four) Times a Day.     • Triumeq 600- MG per tablet TAKE 1 TABLET BY MOUTH EVERY DAY 90 tablet 3   • vitamin B-12 (CYANOCOBALAMIN) 1000 MCG tablet Take 1 tablet by mouth Daily.     • vitamin B-6 (pyridoxine) 50 MG tablet Take 1 tablet by mouth Daily. 90 tablet 2   • zolpidem (AMBIEN) 5 MG tablet Take 1 tablet by mouth At Night As Needed for Sleep. 30 tablet 0   • Nurtec 75 MG dispersible tablet Take 1 tablet by mouth Daily As Needed. (Patient not taking: Reported on 3/31/2023)       No facility-administered encounter medications on file as of 3/31/2023.

## 2023-03-30 NOTE — TELEPHONE ENCOUNTER
Patient has upcoming appt on 4/6/23 with Dr. Dawn. Wonders if he would place lab orders for her to come by for labs prior to her appt? Please advise. (PATIENT WOULD LIKE US TO LEAVE A MESSAGE FOR HER IF SHE DOESN'T ANSWER STATING YES OR NO TO LABS). CHENG Calixto RN

## 2023-03-31 ENCOUNTER — OFFICE VISIT (OUTPATIENT)
Dept: CARDIOLOGY | Facility: CLINIC | Age: 63
End: 2023-03-31
Payer: COMMERCIAL

## 2023-03-31 ENCOUNTER — LAB (OUTPATIENT)
Dept: LAB | Facility: HOSPITAL | Age: 63
End: 2023-03-31
Payer: COMMERCIAL

## 2023-03-31 VITALS
DIASTOLIC BLOOD PRESSURE: 80 MMHG | WEIGHT: 163 LBS | BODY MASS INDEX: 25.58 KG/M2 | SYSTOLIC BLOOD PRESSURE: 108 MMHG | HEART RATE: 83 BPM | OXYGEN SATURATION: 98 % | HEIGHT: 67 IN

## 2023-03-31 DIAGNOSIS — R06.09 DOE (DYSPNEA ON EXERTION): Primary | ICD-10-CM

## 2023-03-31 DIAGNOSIS — Z21 ASYMPTOMATIC HIV INFECTION: ICD-10-CM

## 2023-03-31 DIAGNOSIS — Z21 ASYMPTOMATIC HIV INFECTION: Primary | ICD-10-CM

## 2023-03-31 LAB
ANION GAP SERPL CALCULATED.3IONS-SCNC: 7.1 MMOL/L (ref 5–15)
BASOPHILS # BLD AUTO: 0.03 10*3/MM3 (ref 0–0.2)
BASOPHILS NFR BLD AUTO: 0.5 % (ref 0–1.5)
BUN SERPL-MCNC: 9 MG/DL (ref 8–23)
BUN/CREAT SERPL: 11.4 (ref 7–25)
CALCIUM SPEC-SCNC: 9.8 MG/DL (ref 8.6–10.5)
CHLORIDE SERPL-SCNC: 105 MMOL/L (ref 98–107)
CO2 SERPL-SCNC: 30.9 MMOL/L (ref 22–29)
CREAT SERPL-MCNC: 0.79 MG/DL (ref 0.57–1)
DEPRECATED RDW RBC AUTO: 45 FL (ref 37–54)
EGFRCR SERPLBLD CKD-EPI 2021: 84.7 ML/MIN/1.73
EOSINOPHIL # BLD AUTO: 0.17 10*3/MM3 (ref 0–0.4)
EOSINOPHIL NFR BLD AUTO: 3 % (ref 0.3–6.2)
ERYTHROCYTE [DISTWIDTH] IN BLOOD BY AUTOMATED COUNT: 12.2 % (ref 12.3–15.4)
GLUCOSE SERPL-MCNC: 97 MG/DL (ref 65–99)
HCT VFR BLD AUTO: 40.2 % (ref 34–46.6)
HGB BLD-MCNC: 13.5 G/DL (ref 12–15.9)
IMM GRANULOCYTES # BLD AUTO: 0.01 10*3/MM3 (ref 0–0.05)
IMM GRANULOCYTES NFR BLD AUTO: 0.2 % (ref 0–0.5)
LYMPHOCYTES # BLD AUTO: 1.61 10*3/MM3 (ref 0.7–3.1)
LYMPHOCYTES NFR BLD AUTO: 28.6 % (ref 19.6–45.3)
MCH RBC QN AUTO: 33.6 PG (ref 26.6–33)
MCHC RBC AUTO-ENTMCNC: 33.6 G/DL (ref 31.5–35.7)
MCV RBC AUTO: 100 FL (ref 79–97)
MONOCYTES # BLD AUTO: 0.6 10*3/MM3 (ref 0.1–0.9)
MONOCYTES NFR BLD AUTO: 10.7 % (ref 5–12)
NEUTROPHILS NFR BLD AUTO: 3.2 10*3/MM3 (ref 1.7–7)
NEUTROPHILS NFR BLD AUTO: 57 % (ref 42.7–76)
NRBC BLD AUTO-RTO: 0 /100 WBC (ref 0–0.2)
PLATELET # BLD AUTO: 278 10*3/MM3 (ref 140–450)
PMV BLD AUTO: 9.3 FL (ref 6–12)
POTASSIUM SERPL-SCNC: 4.6 MMOL/L (ref 3.5–5.2)
RBC # BLD AUTO: 4.02 10*6/MM3 (ref 3.77–5.28)
SODIUM SERPL-SCNC: 143 MMOL/L (ref 136–145)
WBC NRBC COR # BLD: 5.62 10*3/MM3 (ref 3.4–10.8)

## 2023-03-31 PROCEDURE — 85025 COMPLETE CBC W/AUTO DIFF WBC: CPT

## 2023-03-31 PROCEDURE — 86361 T CELL ABSOLUTE COUNT: CPT

## 2023-03-31 PROCEDURE — 80048 BASIC METABOLIC PNL TOTAL CA: CPT

## 2023-03-31 PROCEDURE — 87536 HIV-1 QUANT&REVRSE TRNSCRPJ: CPT

## 2023-03-31 PROCEDURE — 36415 COLL VENOUS BLD VENIPUNCTURE: CPT

## 2023-03-31 RX ORDER — ATORVASTATIN CALCIUM 20 MG/1
TABLET, FILM COATED ORAL
Qty: 90 TABLET | Refills: 1 | Status: SHIPPED | OUTPATIENT
Start: 2023-03-31

## 2023-03-31 RX ORDER — AMITRIPTYLINE HYDROCHLORIDE 50 MG/1
25 TABLET, FILM COATED ORAL
COMMUNITY
Start: 2023-03-09

## 2023-04-01 LAB
HIV1 RNA # SERPL NAA+PROBE: 30 COPIES/ML
HIV1 RNA SERPL NAA+PROBE-LOG#: 1.48 LOG10COPY/ML

## 2023-04-03 LAB
BASOPHILS # BLD AUTO: 0 X10E3/UL (ref 0–0.2)
BASOPHILS NFR BLD AUTO: 1 %
CD3+CD4+ CELLS # BLD: 632 /UL (ref 359–1519)
CD3+CD4+ CELLS NFR BLD: 37.2 % (ref 30.8–58.5)
EOSINOPHIL # BLD AUTO: 0.2 X10E3/UL (ref 0–0.4)
EOSINOPHIL NFR BLD AUTO: 3 %
ERYTHROCYTE [DISTWIDTH] IN BLOOD BY AUTOMATED COUNT: 12.6 % (ref 11.7–15.4)
HCT VFR BLD AUTO: 41.5 % (ref 34–46.6)
HGB BLD-MCNC: 14.3 G/DL (ref 11.1–15.9)
IMM GRANULOCYTES # BLD AUTO: 0 X10E3/UL (ref 0–0.1)
IMM GRANULOCYTES NFR BLD AUTO: 0 %
LYMPHOCYTES # BLD AUTO: 1.7 X10E3/UL (ref 0.7–3.1)
LYMPHOCYTES NFR BLD AUTO: 30 %
MCH RBC QN AUTO: 34 PG (ref 26.6–33)
MCHC RBC AUTO-ENTMCNC: 34.5 G/DL (ref 31.5–35.7)
MCV RBC AUTO: 99 FL (ref 79–97)
MONOCYTES # BLD AUTO: 0.6 X10E3/UL (ref 0.1–0.9)
MONOCYTES NFR BLD AUTO: 11 %
NEUTROPHILS # BLD AUTO: 3.1 X10E3/UL (ref 1.4–7)
NEUTROPHILS NFR BLD AUTO: 55 %
PLATELET # BLD AUTO: 299 X10E3/UL (ref 150–450)
RBC # BLD AUTO: 4.2 X10E6/UL (ref 3.77–5.28)
WBC # BLD AUTO: 5.6 X10E3/UL (ref 3.4–10.8)

## 2023-04-05 ENCOUNTER — HOSPITAL ENCOUNTER (OUTPATIENT)
Dept: CARDIOLOGY | Facility: HOSPITAL | Age: 63
Discharge: HOME | End: 2023-04-05
Admitting: STUDENT IN AN ORGANIZED HEALTH CARE EDUCATION/TRAINING PROGRAM
Payer: COMMERCIAL

## 2023-04-05 VITALS — BODY MASS INDEX: 25.61 KG/M2 | HEIGHT: 67 IN | WEIGHT: 163.14 LBS

## 2023-04-05 DIAGNOSIS — R06.09 DOE (DYSPNEA ON EXERTION): ICD-10-CM

## 2023-04-05 LAB
BH CV NUCLEAR PRIOR STUDY: 2
BH CV REST NUCLEAR ISOTOPE DOSE: 10.2 MCI
BH CV STRESS BP STAGE 1: NORMAL
BH CV STRESS COMMENTS STAGE 1: NORMAL
BH CV STRESS DOSE REGADENOSON STAGE 1: 0.4
BH CV STRESS DURATION MIN STAGE 1: 0
BH CV STRESS DURATION SEC STAGE 1: 10
BH CV STRESS GRADE STAGE 1: 10
BH CV STRESS HR STAGE 1: 109
BH CV STRESS METS STAGE 1: 5
BH CV STRESS NUCLEAR ISOTOPE DOSE: 35.5 MCI
BH CV STRESS PROTOCOL 1: NORMAL
BH CV STRESS RECOVERY BP: NORMAL MMHG
BH CV STRESS RECOVERY HR: 82 BPM
BH CV STRESS SPEED STAGE 1: 1.7
BH CV STRESS STAGE 1: 1
LV EF NUC BP: 67 %
MAXIMAL PREDICTED HEART RATE: 158 BPM
PERCENT MAX PREDICTED HR: 68.99 %
STRESS BASELINE BP: NORMAL MMHG
STRESS BASELINE HR: 74 BPM
STRESS PERCENT HR: 81 %
STRESS POST EXERCISE DUR SEC: 10 SEC
STRESS POST PEAK BP: NORMAL MMHG
STRESS POST PEAK HR: 109 BPM
STRESS TARGET HR: 134 BPM

## 2023-04-05 PROCEDURE — 93017 CV STRESS TEST TRACING ONLY: CPT

## 2023-04-05 PROCEDURE — 78452 HT MUSCLE IMAGE SPECT MULT: CPT | Performed by: INTERNAL MEDICINE

## 2023-04-05 PROCEDURE — 25010000002 REGADENOSON 0.4 MG/5ML SOLUTION: Performed by: STUDENT IN AN ORGANIZED HEALTH CARE EDUCATION/TRAINING PROGRAM

## 2023-04-05 PROCEDURE — 0 TECHNETIUM TETROFOSMIN KIT: Performed by: STUDENT IN AN ORGANIZED HEALTH CARE EDUCATION/TRAINING PROGRAM

## 2023-04-05 PROCEDURE — 93016 CV STRESS TEST SUPVJ ONLY: CPT | Performed by: INTERNAL MEDICINE

## 2023-04-05 PROCEDURE — A9502 TC99M TETROFOSMIN: HCPCS | Performed by: STUDENT IN AN ORGANIZED HEALTH CARE EDUCATION/TRAINING PROGRAM

## 2023-04-05 PROCEDURE — 78452 HT MUSCLE IMAGE SPECT MULT: CPT

## 2023-04-05 PROCEDURE — 93018 CV STRESS TEST I&R ONLY: CPT | Performed by: INTERNAL MEDICINE

## 2023-04-05 RX ADMIN — REGADENOSON 0.4 MG: 0.08 INJECTION, SOLUTION INTRAVENOUS at 10:12

## 2023-04-05 RX ADMIN — TETROFOSMIN 1 DOSE: 1.38 INJECTION, POWDER, LYOPHILIZED, FOR SOLUTION INTRAVENOUS at 10:12

## 2023-04-05 RX ADMIN — TETROFOSMIN 1 DOSE: 1.38 INJECTION, POWDER, LYOPHILIZED, FOR SOLUTION INTRAVENOUS at 09:25

## 2023-04-06 ENCOUNTER — OFFICE VISIT (OUTPATIENT)
Dept: INFECTIOUS DISEASES | Facility: CLINIC | Age: 63
End: 2023-04-06
Payer: COMMERCIAL

## 2023-04-06 VITALS
WEIGHT: 163.2 LBS | BODY MASS INDEX: 25.55 KG/M2 | SYSTOLIC BLOOD PRESSURE: 149 MMHG | RESPIRATION RATE: 16 BRPM | HEART RATE: 79 BPM | DIASTOLIC BLOOD PRESSURE: 91 MMHG | TEMPERATURE: 97.1 F

## 2023-04-06 DIAGNOSIS — D05.12 DUCTAL CARCINOMA IN SITU (DCIS) OF LEFT BREAST: ICD-10-CM

## 2023-04-06 DIAGNOSIS — Z21 ASYMPTOMATIC HIV INFECTION: Primary | ICD-10-CM

## 2023-04-06 DIAGNOSIS — Z79.2 LONG TERM (CURRENT) USE OF ANTIBIOTICS: ICD-10-CM

## 2023-04-06 PROCEDURE — 99214 OFFICE O/P EST MOD 30 MIN: CPT | Performed by: INTERNAL MEDICINE

## 2023-04-06 RX ORDER — ABACAVIR SULFATE, DOLUTEGRAVIR SODIUM, LAMIVUDINE 600; 50; 300 MG/1; MG/1; MG/1
1 TABLET, FILM COATED ORAL DAILY
Qty: 90 TABLET | Refills: 3 | Status: SHIPPED | OUTPATIENT
Start: 2023-04-06 | End: 2023-07-05

## 2023-04-06 NOTE — PROGRESS NOTES
ID CLINIC NOTE:    CC: f/u HIV    HPI: Richa Dolan is a 62 y.o. female here for f/u of her HIV care. No missed doses of Triumeq. No obvious side effects. Refills through [facility].     For her breast cancer, she is doing well. She had her port removed. She is now on a maintenance pill.    She had a chest muscle spasm the other day. It is resolved. She was  Referred to cardiology and had a stress test. The result is pending.     Still having trouble w/ sleep. She is currently taking Ambien 5 mg. She is also on gabapentin.    PMH:   HIV suppressed on ART w/ Triumeq  Breast cancer  CVA  Migraines  HLD  GERD    Past Surgical History:   Procedure Laterality Date   • ADENOIDECTOMY     • BREAST LUMPECTOMY     • BREAST LUMPECTOMY WITH SENTINEL NODE BIOPSY Left 05/28/2020    Procedure: BREAST LUMPECTOMY WITH SENTINEL NODE BIOPSY AND NEEDLE LOCALIZATION AND axillary dissection;  Surgeon: Landen Forman MD;  Location: [facility];  Service: General;  Laterality: Left;   • BREAST SURGERY Left 05/28/2020    Procedure: LEFT LATERAL INTERCOASTAL ARTERY  flap ;  Surgeon: Yusuf Garcia MD;  Location: [facility];  Service: Plastics;  Laterality: Left;   • CHOLECYSTECTOMY     • HYSTERECTOMY     • KNEE ARTHROSCOPY Right 03/24/2017    Procedure:  RIGHT  KNEE ARTHROSCOPY WITH DEBRIDEMENT CHONDROPLASTY PARTIAL MENISCECTOMY;  Surgeon: Karl Galeas MD;  Location: [facility];  Service:    • KNEE CARTILAGE SURGERY Right 2007   • TONSILLECTOMY     • TONSILLECTOMY  1974   • TRIGGER POINT INJECTION  2002 - 2023   • US GUIDED LYMPH NODE BIOPSY  10/29/2019    LEFT BREAST   • VENOUS ACCESS DEVICE (PORT) INSERTION Right 12/02/2019    Procedure: INSERTION VENOUS ACCESS DEVICE RIGHT;  Surgeon: Landen Forman MD;  Location: [facility] OR;  Service: General   • VENOUS ACCESS DEVICE (PORT) REMOVAL N/A 05/25/2022    Procedure: Mediport Removal;  Surgeon: Cesar Vasquez Jr., MD;  Location: [facility]  OR;  Service: General;  Laterality: N/A;     SH:     1 son  No tob/EtOH/illicits   Retired from [employer]    Allergies: Augmentin (hives)    Medications:   Current Outpatient Medications:   •  AIMOVIG 140 MG/ML solution auto-injector, Inject 1 mL under the skin into the appropriate area as directed Every 30 (Thirty) Days. HAS NOT TAKEN IN SEVERAL MONTHS, Disp: , Rfl: 11  •  amitriptyline (ELAVIL) 50 MG tablet, Take 25 mg by mouth every night at bedtime., Disp: , Rfl:   •  Aspirin 81 MG capsule, Take 1 tablet by mouth Daily., Disp: , Rfl:   •  atorvastatin (LIPITOR) 20 MG tablet, TAKE 1 TABLET BY MOUTH EVERY DAY AT NIGHT, Disp: 90 tablet, Rfl: 1  •  baclofen (LIORESAL) 10 MG tablet, Take 1 tablet by mouth 2 (Two) Times a Day., Disp: , Rfl: 2  •  colesevelam (WELCHOL) 625 MG tablet, TAKE 2 TABLETS BY MOUTH EVERY DAY (Patient taking differently: Take 2 tablets by mouth Daily.), Disp: 180 tablet, Rfl: 1  •  doxycycline (MONODOX) 100 MG capsule, , Disp: , Rfl:   •  DULoxetine (Cymbalta) 30 MG capsule, Take 1 capsule by mouth 2 (Two) Times a Day., Disp: 180 capsule, Rfl: 0  •  exemestane (AROMASIN) 25 MG chemo tablet, TAKE 1 TABLET BY MOUTH EVERY DAY, Disp: 90 tablet, Rfl: 2  •  fluticasone (FLONASE) 50 MCG/ACT nasal spray, 2 SPRAYS INTO THE NOSTRIL(S) AS DIRECTED BY PROVIDER DAILY., Disp: 48 mL, Rfl: 3  •  gabapentin (Neurontin) 300 MG capsule, Take 1 capsule by mouth Take As Directed. 1 capsule PO q evening, 2 capsules po at bedtime, Disp: 90 capsule, Rfl: 2  •  Loratadine (CLARITIN PO), Take 10 mg by mouth Daily., Disp: , Rfl:   •  Nurtec 75 MG dispersible tablet, Take 1 tablet by mouth Daily As Needed. (Patient not taking: Reported on 3/31/2023), Disp: , Rfl:   •  omeprazole (priLOSEC) 20 MG capsule, TAKE 1 CAPSULE BY MOUTH EVERY DAY, Disp: 90 capsule, Rfl: 0  •  oxyCODONE-acetaminophen (PERCOCET)  MG per tablet, Take 1 tablet by mouth 4 (Four) Times a Day., Disp: , Rfl:   •  Triumeq 600- MG per  tablet, TAKE 1 TABLET BY MOUTH EVERY DAY, Disp: 90 tablet, Rfl: 3  •  vitamin B-12 (CYANOCOBALAMIN) 1000 MCG tablet, Take 1 tablet by mouth Daily., Disp: , Rfl:   •  vitamin B-6 (pyridoxine) 50 MG tablet, Take 1 tablet by mouth Daily., Disp: 90 tablet, Rfl: 2  •  zolpidem (AMBIEN) 5 MG tablet, Take 1 tablet by mouth At Night As Needed for Sleep., Disp: 30 tablet, Rfl: 0    OBJECTIVE:  /91   Pulse 79   Temp 97.1 °F (36.2 °C)   Resp 16   Wt 74 kg (163 lb 3.2 oz)   BMI 25.55 kg/m²     GENERAL: Awake, alert, NAD  Head; no trauma  ENT:  No thrush  EYES: No scleral icterus  HEART: NR  LUNGS: . Normal work of breathing on RA  ABDOMEN: soft, not distended  SKIN: no rashes  Vasc: R chest port has been removed    DIAGNOSTICS:  CBC, BMP, HIV labs reviewed today  Lab Results   Component Value Date    WBC 5.6 03/31/2023    WBC 5.62 03/31/2023    HGB 14.3 03/31/2023    HGB 13.5 03/31/2023    HCT 41.5 03/31/2023    HCT 40.2 03/31/2023     03/31/2023     03/31/2023     Lab Results   Component Value Date    GLUCOSE 97 03/31/2023    CALCIUM 9.8 03/31/2023     03/31/2023    K 4.6 03/31/2023    CO2 30.9 (H) 03/31/2023     03/31/2023    BUN 9 03/31/2023    CREATININE 0.79 03/31/2023    EGFRIFAFRI >60 05/28/2015    EGFRIFNONA 81 02/08/2022    BCR 11.4 03/31/2023    ANIONGAP 7.1 03/31/2023     HIV Related Labs:  CD4: 632 (3/2023)  VL 30 copies (3/2023)  HIV Genotype: unknown bc undetectable  [id number] - negative  Tspot - negative (10/2020)  RPR - non-reactive (10/2020)  Hep A - immune (7/2016)  Hep B - immune (9/2015)  Hep C - negative (10/2020)  Urine GCCT - negative (9/2015)    ASSESSMENT/PLAN:  1. Asymptomatic HIV  -excellent compliance w/ Triumeq; RX sent in  -most recent labs look good; will repeat in 6 months    2. T2N1 invasive ductal carcinoma of the left breast  -on maintenance therapy  -port removed  -says she's doing well    3. Long term use of antibiotics  -reviewed monitoring labs    RTC 6  months or sooner if needed

## 2023-04-07 DIAGNOSIS — R10.9 STOMACH DISCOMFORT: ICD-10-CM

## 2023-04-07 RX ORDER — COLESEVELAM 180 1/1
1250 TABLET ORAL DAILY
Qty: 180 TABLET | Refills: 11 | Status: SHIPPED | OUTPATIENT
Start: 2023-04-07

## 2023-04-10 DIAGNOSIS — G47.00 INSOMNIA, UNSPECIFIED TYPE: ICD-10-CM

## 2023-04-10 RX ORDER — AMITRIPTYLINE HYDROCHLORIDE 50 MG/1
TABLET, FILM COATED ORAL
Qty: 30 TABLET | Refills: 1 | OUTPATIENT
Start: 2023-04-10

## 2023-04-10 RX ORDER — ZOLPIDEM TARTRATE 5 MG/1
5 TABLET ORAL NIGHTLY PRN
Qty: 30 TABLET | Refills: 0 | Status: SHIPPED | OUTPATIENT
Start: 2023-04-10

## 2023-04-19 ENCOUNTER — OFFICE VISIT (OUTPATIENT)
Dept: PSYCHIATRY | Facility: HOSPITAL | Age: 63
End: 2023-04-19
Payer: COMMERCIAL

## 2023-04-19 DIAGNOSIS — G62.9 NEUROPATHY: ICD-10-CM

## 2023-04-19 DIAGNOSIS — F43.23 ADJUSTMENT DISORDER WITH MIXED ANXIETY AND DEPRESSED MOOD: ICD-10-CM

## 2023-04-19 DIAGNOSIS — G47.00 INSOMNIA, UNSPECIFIED TYPE: Primary | ICD-10-CM

## 2023-04-19 NOTE — PROGRESS NOTES
[facility]  In Person Session    Subjective  Patient ID: Richa Dolan is a 62 y.o. female is seen face to face in the [facility].    CC: insomnia, depression    HPI/ Interval History:   Pt is seen in follow up regarding hx of breast cancer.  Today reports further disruption of sleep. Feels this is related to pain, reported to be in hip, knee, and joints. Has seen Dr. Mcpherson with receipt of cortisone shot (helpful) and PT (incredibly painful). Medications reviewed, considering discontinuation of elavil, having been off this for appx 8 weeks at this time. Pt reports taking gabapentin 900 mg daily. Appetite is reduced with a few pounds of weight loss.     Pt does report feeling jumpy, although reports generalized anxiety is better controlled. Depressive sx are stable, scoring 8/27 on PHQ 9. Stable. Pt does score 2 on reduced interests and pleasure; states she very much enjoys activities although is working to balance ability for engagement considering pain, etc. Sleep is reportedly improved from December, although remains disrupted. Pt reports staying up later. Is working to adjust environment so she cannot hear extraneous sounds out of bedroom. Unfortunately feels higher level activity contributes to more discomfort in the evening which complicates ability to initiate sleep. Otherwise reports ability to fall asleep if fragmented.     Exam: As above    Recent Labs Reviewed:  Hospital Outpatient Visit on 04/05/2023   Component Date Value   • Target HR (85%) 04/05/2023 134    • Max. Pred. HR (100%) 04/05/2023 158    • Nuclear Prior Study 04/05/2023 2.0    • BH CV REST NUCLEAR ISOTO* 04/05/2023 10.2    • BH CV STRESS NUCLEAR ISO* 04/05/2023 35.5    • BH CV STRESS PROTOCOL 1 04/05/2023 Pharmacologic    • Stage 1 04/05/2023 1    • HR Stage 1 04/05/2023 109    • BP Stage 1 04/05/2023 128/72    • Duration Min Stage 1 04/05/2023 0    • Duration Sec Stage 1 04/05/2023 10    •  Grade Stage 1 04/05/2023 10    • Speed Stage 1 04/05/2023 1.7    • BH CV STRESS METS STAGE 1 04/05/2023 5    • Stress Dose Regadenoson * 04/05/2023 0.4    • Stress Comments Stage 1 04/05/2023 10 sec bolus injection    • Baseline HR 04/05/2023 74    • Baseline BP 04/05/2023 120/84    • Peak HR 04/05/2023 109    • Percent Max Pred HR 04/05/2023 68.99    • Percent Target HR 04/05/2023 81    • Peak BP 04/05/2023 128/72    • Recovery HR 04/05/2023 82    • Recovery BP 04/05/2023 134/72    • Exercise duration (sec) 04/05/2023 10    • Nuc Stress EF 04/05/2023 67    Lab on 03/31/2023   Component Date Value   • Glucose 03/31/2023 97    • BUN 03/31/2023 9    • Creatinine 03/31/2023 0.79    • Sodium 03/31/2023 143    • Potassium 03/31/2023 4.6    • Chloride 03/31/2023 105    • CO2 03/31/2023 30.9 (H)    • Calcium 03/31/2023 9.8    • BUN/Creatinine Ratio 03/31/2023 11.4    • Anion Gap 03/31/2023 7.1    • eGFR 03/31/2023 84.7    • Absolute CD 4 [place] 03/31/2023 632    • CD4 Positive Lymph % 03/31/2023 37.2    • WBC 03/31/2023 5.6    • RBC 03/31/2023 4.20    • Hemoglobin 03/31/2023 14.3    • Hematocrit 03/31/2023 41.5    • MCV 03/31/2023 99 (H)    • MCH 03/31/2023 34.0 (H)    • MCHC 03/31/2023 34.5    • RDW 03/31/2023 12.6    • Platelets 03/31/2023 299    • Neutrophil Rel % 03/31/2023 55    • Lymphocyte Rel % 03/31/2023 30    • Monocyte Rel % 03/31/2023 11    • Eosinophil Rel % 03/31/2023 3    • Basophil Rel % 03/31/2023 1    • Neutrophils Absolute 03/31/2023 3.1    • Lymphocytes Absolute 03/31/2023 1.7    • Monocytes Absolute 03/31/2023 0.6    • Eosinophils Absolute 03/31/2023 0.2    • Basophils Absolute 03/31/2023 0.0    • Immature Granulocyte Rel* 03/31/2023 0    • Immature Grans Absolute 03/31/2023 0.0    • HIV-1 RNA by PCR, Qn 03/31/2023 30    • log10 HIV-1 RNA 03/31/2023 1.477    • WBC 03/31/2023 5.62    • RBC 03/31/2023 4.02    • Hemoglobin 03/31/2023 13.5    • Hematocrit 03/31/2023 40.2    • MCV 03/31/2023 100.0 (H)    •  MCH 03/31/2023 33.6 (H)    • MCHC 03/31/2023 33.6    • RDW 03/31/2023 12.2 (L)    • RDW-SD 03/31/2023 45.0    • MPV 03/31/2023 9.3    • Platelets 03/31/2023 278    • Neutrophil % 03/31/2023 57.0    • Lymphocyte % 03/31/2023 28.6    • Monocyte % 03/31/2023 10.7    • Eosinophil % 03/31/2023 3.0    • Basophil % 03/31/2023 0.5    • Immature Grans % 03/31/2023 0.2    • Neutrophils, Absolute 03/31/2023 3.20    • Lymphocytes, Absolute 03/31/2023 1.61    • Monocytes, Absolute 03/31/2023 0.60    • Eosinophils, Absolute 03/31/2023 0.17    • Basophils, Absolute 03/31/2023 0.03    • Immature Grans, Absolute 03/31/2023 0.01    • nRBC 03/31/2023 0.0    Lab on 03/08/2023   Component Date Value   • Glucose 03/08/2023 96    • BUN 03/08/2023 11    • Creatinine 03/08/2023 0.76    • Sodium 03/08/2023 141    • Potassium 03/08/2023 3.9    • Chloride 03/08/2023 102    • CO2 03/08/2023 30.3 (H)    • Calcium 03/08/2023 9.3    • Total Protein 03/08/2023 7.4    • Albumin 03/08/2023 4.6    • ALT (SGPT) 03/08/2023 18    • AST (SGOT) 03/08/2023 20    • Alkaline Phosphatase 03/08/2023 67    • Total Bilirubin 03/08/2023 0.5    • Globulin 03/08/2023 2.8    • A/G Ratio 03/08/2023 1.6    • BUN/Creatinine Ratio 03/08/2023 14.5    • Anion Gap 03/08/2023 8.7    • eGFR 03/08/2023 88.7    • WBC 03/08/2023 7.19    • RBC 03/08/2023 4.22    • Hemoglobin 03/08/2023 14.2    • Hematocrit 03/08/2023 43.7    • MCV 03/08/2023 103.6 (H)    • MCH 03/08/2023 33.6 (H)    • MCHC 03/08/2023 32.5    • RDW 03/08/2023 12.8    • RDW-SD 03/08/2023 48.9    • MPV 03/08/2023 9.3    • Platelets 03/08/2023 279    • Neutrophil % 03/08/2023 44.7    • Lymphocyte % 03/08/2023 40.5    • Monocyte % 03/08/2023 11.4    • Eosinophil % 03/08/2023 2.9    • Basophil % 03/08/2023 0.4    • Immature Grans % 03/08/2023 0.1    • Neutrophils, Absolute 03/08/2023 3.21    • Lymphocytes, Absolute 03/08/2023 2.91    • Monocytes, Absolute 03/08/2023 0.82    • Eosinophils, Absolute 03/08/2023 0.21    •  Basophils, Absolute 03/08/2023 0.03    • Immature Grans, Absolute 03/08/2023 0.01    • nRBC 03/08/2023 0.0    labs reviewed    Current Psychotropic Medications:  Duloxetine 30 mg bid  Gabapentin 300 mg q evening, 600 mg q hs  Ambien 5 mg q hs    Plan of Care/ Medical Decision Making  Medications reviewed with functional improvement reported regarding depression and anxiety.  Sleep considered; CBT I utilized specifically considering pt report of appropriate AM energy, functional remission. Considered report of pain, potential benefit of increasing gabapentin alongside reduction of ambien. Pt does not prefer this approach. Polypharmacy and risks associated with reviewed. Will continue 5 mg ambien q hs as pt has tolerated well.  Supported patient with regular attendance to pain management visits. Considered total impact of pain.   No medications needed today.  Follow up arranged in 6-8 weeks.    Diagnoses and all orders for this visit:    1. Insomnia, unspecified type (Primary)    2. Neuropathy    3. Adjustment disorder with mixed anxiety and depressed mood    I spent 35 minutes caring for Richa on this date of service. This time includes time spent by me in the following activities: preparing for the visit, reviewing tests, obtaining and/or reviewing a separately obtained history, performing a medically appropriate examination and/or evaluation, counseling and educating the patient/family/caregiver and documenting information in the medical record.

## 2023-04-20 ENCOUNTER — OFFICE VISIT (OUTPATIENT)
Dept: INTERNAL MEDICINE | Age: 63
End: 2023-04-20
Payer: COMMERCIAL

## 2023-04-20 ENCOUNTER — HOSPITAL ENCOUNTER (OUTPATIENT)
Dept: GENERAL RADIOLOGY | Facility: HOSPITAL | Age: 63
Discharge: HOME | End: 2023-04-20
Payer: COMMERCIAL

## 2023-04-20 VITALS
HEART RATE: 84 BPM | HEIGHT: 67 IN | OXYGEN SATURATION: 98 % | DIASTOLIC BLOOD PRESSURE: 86 MMHG | BODY MASS INDEX: 25.27 KG/M2 | SYSTOLIC BLOOD PRESSURE: 130 MMHG | WEIGHT: 161 LBS | TEMPERATURE: 98.2 F

## 2023-04-20 DIAGNOSIS — M25.532 LEFT WRIST PAIN: Primary | ICD-10-CM

## 2023-04-20 PROCEDURE — 73110 X-RAY EXAM OF WRIST: CPT

## 2023-04-20 NOTE — PROGRESS NOTES
"    I N T E R N A L  M E D I C I N E  Miri Montesinos, APRN    ENCOUNTER DATE:  04/20/2023    Richa Dolan / 62 y.o. / female      CHIEF COMPLAINT / REASON FOR OFFICE VISIT     Hand Injury (Fall on left hand )      ASSESSMENT & PLAN     Diagnoses and all orders for this visit:    1. Left wrist pain (Primary)  -     XR Wrist 3+ View Left         SUMMARY/DISCUSSION  • XR ordered.  Recommend rest, ice, elevation and wrist splint.  She is prescribed percocet 10 mg 4 times daily by other provider.  Visit ER for acutely worsening symptoms, numbness, tingling, pale skin, swelling, loss of pulse.        Next Appointment with me: 5/17/2023    Return for Next scheduled follow up.      VITAL SIGNS     Visit Vitals  /86   Pulse 84   Temp 98.2 °F (36.8 °C)   Ht 170.2 cm (67.01\")   Wt 73 kg (161 lb)   SpO2 98%   BMI 25.21 kg/m²             Wt Readings from Last 3 Encounters:   04/20/23 73 kg (161 lb)   04/06/23 74 kg (163 lb 3.2 oz)   04/05/23 74 kg (163 lb 2.3 oz)     Body mass index is 25.21 kg/m².        MEDICATIONS AT THE TIME OF OFFICE VISIT     Current Outpatient Medications on File Prior to Visit   Medication Sig Dispense Refill   • Abacavir-Dolutegravir-Lamivud (Triumeq) 600- MG per tablet Take 1 tablet by mouth Daily for 90 days. 90 tablet 3   • AIMOVIG 140 MG/ML solution auto-injector Inject 1 mL under the skin into the appropriate area as directed Every 30 (Thirty) Days. HAS NOT TAKEN IN SEVERAL MONTHS  11   • amitriptyline (ELAVIL) 50 MG tablet Take 25 mg by mouth every night at bedtime.     • Aspirin 81 MG capsule Take 1 tablet by mouth Daily.     • atorvastatin (LIPITOR) 20 MG tablet TAKE 1 TABLET BY MOUTH EVERY DAY AT NIGHT 90 tablet 1   • baclofen (LIORESAL) 10 MG tablet Take 1 tablet by mouth 2 (Two) Times a Day.  2   • colesevelam (WELCHOL) 625 MG tablet Take 2 tablets by mouth Daily. 180 tablet 11   • DULoxetine (Cymbalta) 30 MG capsule Take 1 capsule by mouth 2 (Two) Times a Day. 180 capsule 0   • " exemestane (AROMASIN) 25 MG chemo tablet TAKE 1 TABLET BY MOUTH EVERY DAY 90 tablet 2   • fluticasone (FLONASE) 50 MCG/ACT nasal spray 2 SPRAYS INTO THE NOSTRIL(S) AS DIRECTED BY PROVIDER DAILY. 48 mL 3   • gabapentin (Neurontin) 300 MG capsule Take 1 capsule by mouth Take As Directed. 1 capsule PO q evening, 2 capsules po at bedtime 90 capsule 2   • Loratadine (CLARITIN PO) Take 10 mg by mouth Daily.     • Nurtec 75 MG dispersible tablet Take 1 tablet by mouth Daily As Needed.     • omeprazole (priLOSEC) 20 MG capsule TAKE 1 CAPSULE BY MOUTH EVERY DAY 90 capsule 0   • oxyCODONE-acetaminophen (PERCOCET)  MG per tablet Take 1 tablet by mouth 4 (Four) Times a Day.     • vitamin B-12 (CYANOCOBALAMIN) 1000 MCG tablet Take 1 tablet by mouth Daily.     • vitamin B-6 (pyridoxine) 50 MG tablet Take 1 tablet by mouth Daily. 90 tablet 2   • zolpidem (AMBIEN) 5 MG tablet Take 1 tablet by mouth At Night As Needed for Sleep. 30 tablet 0     No current facility-administered medications on file prior to visit.        HISTORY OF PRESENT ILLNESS     Pt reports she was gardening yesterday afternoon, when she accidentally tripped over a dog leash and fell forward on outstretched left hand.  She reports pain with extension of left wrist at today's visit.  She reports a small area of numbness to left palm.  No swelling, bruising.  Denies any prior trauma or surgery to left wrist.        Patient Care Team:  Miri Montesinos APRN as PCP - General (Family Medicine)  Bam Crandall MD as Consulting Physician (Pain Medicine)  Denilson Dawn MD as Consulting Physician (Infectious Diseases)  Ruby Moreno MD as Consulting Physician (Hematology and Oncology)  Mag Spivey MD as Consulting Physician (Radiation Oncology)  Yusuf Garcia MD as Attending Provider (Plastic Surgery)  Luiz Mora MD as Surgeon (Orthopedic Surgery)  Yola Barillas CSW as  ()  Lena Mariano  MD ROSI as Referring Physician (Radiation Oncology)    REVIEW OF SYSTEMS     Review of Systems   Constitutional: Negative for chills, fever and unexpected weight change.   Respiratory: Negative for cough, chest tightness and shortness of breath.    Cardiovascular: Negative for chest pain, palpitations and leg swelling.   Musculoskeletal: Positive for arthralgias (Left wrist).   Neurological: Negative for dizziness, weakness, light-headedness and headaches.   Psychiatric/Behavioral: The patient is not nervous/anxious.           PHYSICAL EXAMINATION     Physical Exam  Vitals reviewed.   Constitutional:       General: She is not in acute distress.     Appearance: Normal appearance. She is not ill-appearing, toxic-appearing or diaphoretic.   HENT:      Head: Normocephalic and atraumatic.   Cardiovascular:      Rate and Rhythm: Normal rate and regular rhythm.      Heart sounds: Normal heart sounds.   Pulmonary:      Effort: Pulmonary effort is normal.      Breath sounds: Normal breath sounds.   Musculoskeletal:      Left wrist: Tenderness (left lateral) present. No swelling or deformity. Decreased range of motion. Normal pulse.      Comments: Negative snuffbox tenderness   Skin:     General: Skin is warm and dry.      Coloration: Skin is not pale.      Findings: No bruising or erythema.   Neurological:      Mental Status: She is alert and oriented to person, place, and time. Mental status is at baseline.   Psychiatric:         Mood and Affect: Mood normal.         Behavior: Behavior normal.         Thought Content: Thought content normal.         Judgment: Judgment normal.           REVIEWED DATA     Labs:           Imaging:            Medical Tests:           Summary of old records / correspondence / consultant report:           Request outside records:

## 2023-05-17 ENCOUNTER — LAB (OUTPATIENT)
Dept: LAB | Facility: HOSPITAL | Age: 63
End: 2023-05-17
Payer: COMMERCIAL

## 2023-05-17 ENCOUNTER — OFFICE VISIT (OUTPATIENT)
Dept: ONCOLOGY | Facility: CLINIC | Age: 63
End: 2023-05-17
Payer: COMMERCIAL

## 2023-05-17 ENCOUNTER — DOCUMENTATION (OUTPATIENT)
Dept: PHYSICAL THERAPY | Facility: CLINIC | Age: 63
End: 2023-05-17
Payer: COMMERCIAL

## 2023-05-17 ENCOUNTER — INFUSION (OUTPATIENT)
Dept: ONCOLOGY | Facility: HOSPITAL | Age: 63
End: 2023-05-17
Payer: COMMERCIAL

## 2023-05-17 VITALS
OXYGEN SATURATION: 98 % | DIASTOLIC BLOOD PRESSURE: 90 MMHG | HEART RATE: 75 BPM | WEIGHT: 158 LBS | SYSTOLIC BLOOD PRESSURE: 145 MMHG | HEIGHT: 67 IN | BODY MASS INDEX: 24.8 KG/M2 | TEMPERATURE: 98 F | RESPIRATION RATE: 16 BRPM

## 2023-05-17 DIAGNOSIS — C50.812 MALIGNANT NEOPLASM OF OVERLAPPING SITES OF LEFT BREAST IN FEMALE, ESTROGEN RECEPTOR POSITIVE: Primary | ICD-10-CM

## 2023-05-17 DIAGNOSIS — C50.812 MALIGNANT NEOPLASM OF OVERLAPPING SITES OF LEFT BREAST IN FEMALE, ESTROGEN RECEPTOR POSITIVE: ICD-10-CM

## 2023-05-17 DIAGNOSIS — Z79.811 USE OF EXEMESTANE (AROMASIN): Primary | ICD-10-CM

## 2023-05-17 DIAGNOSIS — Z17.0 MALIGNANT NEOPLASM OF OVERLAPPING SITES OF LEFT BREAST IN FEMALE, ESTROGEN RECEPTOR POSITIVE: ICD-10-CM

## 2023-05-17 DIAGNOSIS — Z79.811 USE OF EXEMESTANE (AROMASIN): ICD-10-CM

## 2023-05-17 DIAGNOSIS — Z85.3 HISTORY OF LEFT BREAST CANCER: ICD-10-CM

## 2023-05-17 DIAGNOSIS — Z79.899 HIGH RISK MEDICATION USE: ICD-10-CM

## 2023-05-17 DIAGNOSIS — Z17.0 MALIGNANT NEOPLASM OF OVERLAPPING SITES OF LEFT BREAST IN FEMALE, ESTROGEN RECEPTOR POSITIVE: Primary | ICD-10-CM

## 2023-05-17 DIAGNOSIS — M81.8 OTHER OSTEOPOROSIS WITHOUT CURRENT PATHOLOGICAL FRACTURE: ICD-10-CM

## 2023-05-17 DIAGNOSIS — M81.0 AGE-RELATED OSTEOPOROSIS WITHOUT CURRENT PATHOLOGICAL FRACTURE: ICD-10-CM

## 2023-05-17 LAB
ALBUMIN SERPL-MCNC: 4.6 G/DL (ref 3.5–5.2)
ALBUMIN/GLOB SERPL: 1.7 G/DL (ref 1.1–2.4)
ALP SERPL-CCNC: 65 U/L (ref 38–116)
ALT SERPL W P-5'-P-CCNC: 15 U/L (ref 0–33)
ANION GAP SERPL CALCULATED.3IONS-SCNC: 10.5 MMOL/L (ref 5–15)
AST SERPL-CCNC: 25 U/L (ref 0–32)
BASOPHILS # BLD AUTO: 0.02 10*3/MM3 (ref 0–0.2)
BASOPHILS NFR BLD AUTO: 0.4 % (ref 0–1.5)
BILIRUB SERPL-MCNC: 0.4 MG/DL (ref 0.2–1.2)
BUN SERPL-MCNC: 8 MG/DL (ref 6–20)
BUN/CREAT SERPL: 11.6 (ref 7.3–30)
CALCIUM SPEC-SCNC: 9.4 MG/DL (ref 8.5–10.2)
CHLORIDE SERPL-SCNC: 105 MMOL/L (ref 98–107)
CO2 SERPL-SCNC: 27.5 MMOL/L (ref 22–29)
CREAT SERPL-MCNC: 0.69 MG/DL (ref 0.6–1.1)
DEPRECATED RDW RBC AUTO: 47.7 FL (ref 37–54)
EGFRCR SERPLBLD CKD-EPI 2021: 98.3 ML/MIN/1.73
EOSINOPHIL # BLD AUTO: 0.08 10*3/MM3 (ref 0–0.4)
EOSINOPHIL NFR BLD AUTO: 1.7 % (ref 0.3–6.2)
ERYTHROCYTE [DISTWIDTH] IN BLOOD BY AUTOMATED COUNT: 12.5 % (ref 12.3–15.4)
GLOBULIN UR ELPH-MCNC: 2.7 GM/DL (ref 1.8–3.5)
GLUCOSE SERPL-MCNC: 91 MG/DL (ref 74–124)
HCT VFR BLD AUTO: 41.3 % (ref 34–46.6)
HGB BLD-MCNC: 13.7 G/DL (ref 12–15.9)
IMM GRANULOCYTES # BLD AUTO: 0.01 10*3/MM3 (ref 0–0.05)
IMM GRANULOCYTES NFR BLD AUTO: 0.2 % (ref 0–0.5)
LYMPHOCYTES # BLD AUTO: 1.63 10*3/MM3 (ref 0.7–3.1)
LYMPHOCYTES NFR BLD AUTO: 34.1 % (ref 19.6–45.3)
MAGNESIUM SERPL-MCNC: 2.1 MG/DL (ref 1.8–2.5)
MCH RBC QN AUTO: 34.3 PG (ref 26.6–33)
MCHC RBC AUTO-ENTMCNC: 33.2 G/DL (ref 31.5–35.7)
MCV RBC AUTO: 103.3 FL (ref 79–97)
MONOCYTES # BLD AUTO: 0.48 10*3/MM3 (ref 0.1–0.9)
MONOCYTES NFR BLD AUTO: 10 % (ref 5–12)
NEUTROPHILS NFR BLD AUTO: 2.56 10*3/MM3 (ref 1.7–7)
NEUTROPHILS NFR BLD AUTO: 53.6 % (ref 42.7–76)
NRBC BLD AUTO-RTO: 0 /100 WBC (ref 0–0.2)
PHOSPHATE SERPL-MCNC: 3.4 MG/DL (ref 2.5–4.5)
PLATELET # BLD AUTO: 249 10*3/MM3 (ref 140–450)
PMV BLD AUTO: 9.4 FL (ref 6–12)
POTASSIUM SERPL-SCNC: 4.4 MMOL/L (ref 3.5–4.7)
PROT SERPL-MCNC: 7.3 G/DL (ref 6.3–8)
RBC # BLD AUTO: 4 10*6/MM3 (ref 3.77–5.28)
SODIUM SERPL-SCNC: 143 MMOL/L (ref 134–145)
WBC NRBC COR # BLD: 4.78 10*3/MM3 (ref 3.4–10.8)

## 2023-05-17 PROCEDURE — 85025 COMPLETE CBC W/AUTO DIFF WBC: CPT

## 2023-05-17 PROCEDURE — 96372 THER/PROPH/DIAG INJ SC/IM: CPT

## 2023-05-17 PROCEDURE — 83735 ASSAY OF MAGNESIUM: CPT

## 2023-05-17 PROCEDURE — 80053 COMPREHEN METABOLIC PANEL: CPT

## 2023-05-17 PROCEDURE — 25010000002 DENOSUMAB 60 MG/ML SOLUTION PREFILLED SYRINGE: Performed by: NURSE PRACTITIONER

## 2023-05-17 PROCEDURE — 36415 COLL VENOUS BLD VENIPUNCTURE: CPT

## 2023-05-17 PROCEDURE — 84100 ASSAY OF PHOSPHORUS: CPT

## 2023-05-17 RX ADMIN — DENOSUMAB 60 MG: 60 INJECTION SUBCUTANEOUS at 15:11

## 2023-05-17 NOTE — PROGRESS NOTES
Subjective       REASON FOR FOLLOW UP:    1.  T2N1 invasive ductal carcinoma of the left breast.  Ultrasound-showed 3.8 x 3.1 x 2.5 cm mass at 4 o'clock position with 2 abnormal axillary lymph nodes, larger lymph node being 1.4 cm.  Left breast biopsy and axillary lymph node biopsy October 29, 2019, invasive ductal carcinoma, moderately differentiated, grade 2, ER 85%, SC 10%, HER-2/merna 2+, HER-2 FISH negative.  · 12/06/19: Neoadjuvant chemotherapy, cycle #1 of dose-dense Adriamycin/Cytoxan with Neulasta for marrow support    · 01/17/20: Last cycle of Adriamycin/Cytoxan given  · January 31, 2020: Cycle 1 Taxol  · April 24, 2020: Last cycle, cycle 12 of Taxol  · Patient is s/p left mastectomy with axillary dissection with reconstruction.  She is healing up nicely.  She has a drain in place.  Pathology showed invasive breast cancer which is 55 x 40 mm in size, grade 2 with focal lymphovascular space invasion with DCIS spanning over 36 x 6 mm.  All margins were negative for cancer.  · 8 of the additional lymph nodes out of 11 were positive with the largest micrometastasis measuring 5 mm.  Extranodal extension was seen.  · Biomarkers on post neoadjuvant tumor is ER 81-90% SC 5% HER-2/merna 2+ by immunohistochemistry and HER-2/merna negative by FISH.  · Radiation July 6, 2020-August 17, 2020.  · Letrozole initiated July 2020.  · Letrozole discontinued October 2020 secondary to severe joint pain.  · October 30, 2020 patient starting tamoxifen and tolerating well  · August 2021: Tamoxifen discontinued secondary to depression and mental fogginess and fatigue  · September 21, 2021: Arimidex started  · Severe arthralgias secondary to Arimidex, will start Aromasin  · Patient is tolerating Aromasin well    2.  HIV, followed by Dr. Dawn from infectious disease.  Well controlled on meds    3.  Screening mammogram December 7, 2020 showed indeterminate calcifications lower outer middle left breast. Diagnostic mammogram showed  0.4 cm calcifications in the left breast.  Stereotactic biopsy January 8, 2021 consistent with organizing fat necrosis.              HISTORY OF PRESENT ILLNESS:  The patient is a 62 y.o. year old female with the above history who continues at this time on Aromasin which she is thankfully tolerating well.  Her biggest issue continues to be insomnia.  Patient previously taking Ambien 10 mg for reportedly 15+ years doing well but her newer primary care has refused to give her this due to potential interactions with her other medications.  I have encouraged her to continue to pursue this dosing as she clearly can tolerate it along with her other medications.    Otherwise she denies any concerns today    Past Medical History:   Diagnosis Date   • Anxiety    • Arthritis of back 2010   • Cerebrovascular accident (CVA) 08/22/2017    NO RESIDUAL AFFECT   • DDD (degenerative disc disease), cervical    • Depression    • Drug therapy    • Elevated cholesterol    • Fat necrosis of left breast    • GERD (gastroesophageal reflux disease)    • H/O ICH (intracerebral hemorrhage) (CMS/HCC)    • Hip arthrosis 2015   • History of chemotherapy    • History of heart murmur in childhood    • History of stroke     PATIENT STATES HAD A MINI STROKE, NO RESIDUAL EFFECTS   • History of thrombocytopenia    • HIV (human immunodeficiency virus infection) 1996   • Hx of radiation therapy    • Hyperlipidemia    • Insomnia    • Knee swelling 2017    meniscus tear   • Lupus anticoagulant positive    • Malignant neoplasm of overlapping sites of left breast in female, estrogen receptor positive 11/12/2019   • Meniscus tear 2017    RIGHT   • Migraine    • Neck pain     WITH SHOOTING PAIN LEFT RIGHT ARM   • Neck strain 2001    work injury   • Neuropathy    • Osteoarthritis    • Osteoporosis    • Pain management     sees 1 every 3 months for scripts/injection/pain meds   • Periarthritis of shoulder 2010   • Pneumonia 1989   • Seasonal allergies    •  Spinal headache     THINKS HAD BLOOD PATCH AFTER EPIDUAL    • Stress fracture 2010    three times   • Tear of meniscus of knee 2012 & 2017   • Tendinitis of knee 2000    physical therapy many times   • Tick bite 06/2014   • Upper back pain    • Vertigo      PAST MEDICAL HISTORY:  She had a stroke in July 2017 with headache and dizziness.  She went to the ER and was placed on aspirin.  However, she stopped taking it two weeks ago.  She has been taking Aimovig (injection) monthly with Fariba López at [facility].    In 1994, patient was diagnosed with HIV with an unknown cause which is managed by Dr. Natali Subramanian.  As of now, her viral load is good.    Patient has arthritis.  She gets trigger-point injections in her back with her last one being 2 weeks ago.  She has had multiple ablations.  She also has pain in her SI joint and Dr. Bermudez performed a surgery on this.  She takes Narco for the pain.      She is osteoporotic.  She has had multiple hairline fractures in both her legs.  She had her knees cleaned out by Dr. Sinha.     Patient has had a hysterectomy and still has both of her ovaries.    Patient has vertigo and the muscles in her left ear are weak.  She just has an imbalance.    Past Surgical History:   Procedure Laterality Date   • ADENOIDECTOMY     • BREAST LUMPECTOMY     • BREAST LUMPECTOMY WITH SENTINEL NODE BIOPSY Left 05/28/2020    Procedure: BREAST LUMPECTOMY WITH SENTINEL NODE BIOPSY AND NEEDLE LOCALIZATION AND axillary dissection;  Surgeon: Landen Forman MD;  Location: [facility];  Service: General;  Laterality: Left;   • BREAST SURGERY Left 05/28/2020    Procedure: LEFT LATERAL INTERCOASTAL ARTERY  flap ;  Surgeon: Yusuf Garcia MD;  Location: [facility];  Service: Plastics;  Laterality: Left;   • CHOLECYSTECTOMY     • HYSTERECTOMY     • KNEE ARTHROSCOPY Right 03/24/2017    Procedure:  RIGHT  KNEE ARTHROSCOPY WITH DEBRIDEMENT CHONDROPLASTY PARTIAL MENISCECTOMY;   Surgeon: Karl Galeas MD;  Location:  [facility];  Service:    • KNEE CARTILAGE SURGERY Right 2007   • TONSILLECTOMY     • TONSILLECTOMY  1974   • TRIGGER POINT INJECTION  2002 - 2023   • US GUIDED LYMPH NODE BIOPSY  10/29/2019    LEFT BREAST   • VENOUS ACCESS DEVICE (PORT) INSERTION Right 12/02/2019    Procedure: INSERTION VENOUS ACCESS DEVICE RIGHT;  Surgeon: Landen Forman MD;  Location: [facility] MAIN OR;  Service: General   • VENOUS ACCESS DEVICE (PORT) REMOVAL N/A 05/25/2022    Procedure: Mediport Removal;  Surgeon: Cesar Vasquez Jr., MD;  Location: [facility] MAIN OR;  Service: General;  Laterality: N/A;       ONCOLOGIC HISTORY:  Patient is a 59-year-old female who has had a history of HIV since age 34 followed by Dr. Natali Ng at [facility] and was on multiple HIV drugs initially but more recently has been on a single medication TRIUMEQ, with well-controlled HIV.  She follows up with Dr. Dawn , infectious disease who saw her recently and he saw her on 4/8/2019 and has excellent control and suppression of her viral load.  She is very compliant with her medications.    She also has had history of stroke in 2017 for which she was placed on aspirin.  She presented with a headache.  She is very active and works at [employer] full-time.  She also has had history of vertigo in the past  Patient's PCP is Zach Lora.    Patient first noticed the mass in her left breast 5 months ago.  Her last mammogram was 5 years ago.  She had soreness in the left breast and she went to her primary care physician who then ordered a mammogram diagnostic and an ultrasound.  The diagnostic mammogram showed a 3.7 cm mass at 4 o'clock position in the lower outer quadrant of the left breast.  The ultrasound showed 3.8 cm x 3.1 x 2.5 cm mass at 4 o'clock position in the left breast with 2 abnormal appearing left axillary lymph nodes the largest being 1.4 cm.    She then underwent biopsy of  both the breast mass as well as the left axillary lymph node and both of them are positive for invasive mammary carcinoma it is invasive ductal carcinoma with apocrine features, moderately differentiated, grade 2 of 3 with a Jen score of 5.  The left axillary lymph node is also consistent with metastatic mammary carcinoma.  It is ER positive MN positive HER-2/merna negative.  Details as follows    10/21/19 - Bilateral Diagnostic Mammogram and US Breast Left  FINDINGS: Bilateral digital CC and MLO mammographic images were  obtained. No prior examination is available for comparison. Scattered  fibroglandular densities are seen throughout both breasts. A triangular  skin marker represents the area of palpable concern in the lower outer  quadrant of the left breast in the posterior 1/3. At this location there  is an irregular mass that measures on the order of 3.7 cm in greatest  dimension. Internal calcifications are noted. No suspicious findings in  the right breast are appreciated.     ULTRASOUND: Targeted sonographic evaluation of the left breast was  performed through the area of concern in the left axilla. At the 4  o'clock position on the order of 6 cm from the nipple there is an  irregular hypoechoic mass that measures 3.8 x 3.1 x 2.5 cm. Internal  vascularity is noted.     There are 2 abnormal-appearing left axillary lymph nodes, the largest  which measures on the order of 1.4 cm in greatest dimension.     IMPRESSION:  1. There is a 3.8 cm irregular mass in the left breast at the 4 o'clock  position. This is seen in conjunction with an irregular 1.4 cm lymph  node in the left axilla. This is suspicious for malignancy with left  axillary involvement. Correlation with ultrasound-guided biopsy of both  the left breast mass and the lymph node is recommended.  2. There are no findings suspicious for malignancy in the right breast.     BI-RADS CATEGORY 5:     Patient's labs from 11/12/19 are normal.    10/29/19  - Tissue Pathology  1. Left Breast, 4:00, 6 cm FN, U/S-Guided Core Needle Biopsy for a Mass:  A. INVASIVE DUCTAL CARCINOMA WITH APOCRINE FEATURES, Moderately differentiated;  [place] Histologic Grade II/III (tubule score = 3, nuclear score = 2, mitoses score = 1), measuring at least  9 mm.  B. No ductal or lobular carcinoma in situ is identified in this sample.  C. Focus suspicious for lymphovascular space invasion.  D. See Biomarker Template for hormone receptor studies.  2. Lymph Node, Left Axilla, U/S-Guided Core Needle Biopsy:  A. METASTATIC MAMMARY CARCINOMA (see Comment).  B. Metastatic focus measures 5 mm in greatest extent.  ER+, 85%  IN+, 10%  HER2- Score 2+    11/13/19 - CT Neck Chest Abdomen Pelvis  IMPRESSION:  1. In addition to the irregular approximately 3.8 cm mass within the  lateral aspect of the left breast, there are a few subcentimeter  asymmetric nodules along the lateral margin of the glandular tissue. The  several asymmetric left subpectoral nodes and 1.2 x 1.0 cm left axillary  node are suspicious for tamar metastases. The asymmetric subcentimeter  left supraclavicular and left posterior cervical triangle nodes are  worrisome as well.  2. There is no convincing evidence for metastatic disease within the  abdomen or pelvis.    11/14/19 - Echocardiogram:  Normal.  Calculated EF = 67%.  There is trace mitral valve and tricuspid valve regurgitation.    11/15/19 - Bone Scan  Negative.    11/18/19 - MRI Breast Bilateral  IMPRESSION:  1. Biopsy-proven malignancy in the left breast centered at the 4 o'clock  position with associated surrounding satellite nodules as described. The  entire region of involvement including the satellite nodules and the  dominant mass measure up to 7.5 cm in greatest dimension. Also, left  axillary adenopathy with multiple irregular lymph nodes and loss of  differentiation of the borders of multiple lymph nodes is noted and this  is suspicious for extranodal  involvement.  2. There are no findings suspicious for malignancy in the right breast.  BI-RADS CATEGORY 6      19: Cycle #1 of Adriamycin/Cytoxan    20: Last cycle of Adriamycin/Cytoxan given without Neulasta.  We will switch from Neulasta to 7 days of Neupogen injections after cycle 4.    We reviewed with patient the US Breast from 20 which shows mild interval partial response to neoadjuvant chemotherapy.      20: Initiated cycle 1 Taxol  2020: Last cycle of Taxol, cycle 12    S/p left mastectomy with axillary dissection   Pathology showed invasive breast cancer which is 55 x 40 mm in size, grade 2 with focal lymphovascular space invasion with DCIS spanning over 36 x 6 mm.  All margins were negative for cancer.  8 of the additional lymph nodes out of 11 were positive with the largest micrometastasis measuring 5 mm.  Extranodal extension was seen.    Biomarkers on post neoadjuvant tumor is ER 81-90% IL 5% HER-2/merna 2+ by immunohistochemistry and HER-2/merna negative by FISH.    2020: Started letrozole but because of severe joint pains was discontinued 2020    End of 2020 started tamoxifen    OB-GYN:  Menarche 15 years  Menopause-46  Pregnancies- 1 para 1 no miscarriages her first pregnancy was in  at 29 years of age.  She did not breastfeed.  Post menopausal HRT- None.  Hx of birth control pills- Yes.    Current Outpatient Medications on File Prior to Visit   Medication Sig Dispense Refill   • Abacavir-Dolutegravir-Lamivud (Triumeq) 600- MG per tablet Take 1 tablet by mouth Daily for 90 days. 90 tablet 3   • AIMOVIG 140 MG/ML solution auto-injector Inject 1 mL under the skin into the appropriate area as directed Every 30 (Thirty) Days. HAS NOT TAKEN IN SEVERAL MONTHS  11   • Aspirin 81 MG capsule Take 1 tablet by mouth Daily.     • atorvastatin (LIPITOR) 20 MG tablet TAKE 1 TABLET BY MOUTH EVERY DAY AT NIGHT 90 tablet 1   • baclofen (LIORESAL) 10  MG tablet Take 1 tablet by mouth 2 (Two) Times a Day.  2   • colesevelam (WELCHOL) 625 MG tablet Take 2 tablets by mouth Daily. 180 tablet 11   • DULoxetine (Cymbalta) 30 MG capsule Take 1 capsule by mouth 2 (Two) Times a Day. 180 capsule 0   • exemestane (AROMASIN) 25 MG chemo tablet TAKE 1 TABLET BY MOUTH EVERY DAY 90 tablet 2   • fluticasone (FLONASE) 50 MCG/ACT nasal spray 2 SPRAYS INTO THE NOSTRIL(S) AS DIRECTED BY PROVIDER DAILY. 48 mL 3   • gabapentin (Neurontin) 300 MG capsule Take 1 capsule by mouth Take As Directed. 1 capsule PO q evening, 2 capsules po at bedtime 90 capsule 2   • Loratadine (CLARITIN PO) Take 10 mg by mouth Daily.     • Nurtec 75 MG dispersible tablet Take 1 tablet by mouth Daily As Needed.     • omeprazole (priLOSEC) 20 MG capsule TAKE 1 CAPSULE BY MOUTH EVERY DAY 90 capsule 0   • oxyCODONE-acetaminophen (PERCOCET)  MG per tablet Take 1 tablet by mouth 4 (Four) Times a Day.     • vitamin B-12 (CYANOCOBALAMIN) 1000 MCG tablet Take 1 tablet by mouth Daily.     • vitamin B-6 (pyridoxine) 50 MG tablet Take 1 tablet by mouth Daily. 90 tablet 2   • zolpidem (AMBIEN) 5 MG tablet Take 1 tablet by mouth At Night As Needed for Sleep. 30 tablet 0   • [DISCONTINUED] amitriptyline (ELAVIL) 50 MG tablet Take 25 mg by mouth every night at bedtime. (Patient not taking: Reported on 5/17/2023)       No current facility-administered medications on file prior to visit.        ALLERGIES:    Allergies   Allergen Reactions   • Augmentin [Amoxicillin-Pot Clavulanate] Hives        Social History     Socioeconomic History   • Marital status:      Spouse name: Owen   • Number of children: 1   • Years of education: College   Tobacco Use   • Smoking status: Never   • Smokeless tobacco: Never   • Tobacco comments:     never   Vaping Use   • Vaping Use: Never used   Substance and Sexual Activity   • Alcohol use: Yes     Alcohol/week: 1.0 standard drink     Types: 1 Drinks containing 0.5 oz of alcohol  "per week     Comment: OCCASSIONALLY   • Drug use: No   • Sexual activity: Yes     Partners: Male     Birth control/protection: Condom     Comment:      Patient does not smoke or do drugs.  She does drink occasionally.    Family History   Problem Relation Age of Onset   • Kidney disease Mother    • Hypertension Mother    • Heart disease Mother    • Arthritis Mother    • Breast cancer Mother    • Leukemia Mother         CLL?   • Diabetes Mother    • Hyperthyroidism Mother    • Hearing loss Mother    • Cancer Mother         breast   • Rheumatologic disease Mother    • Arthritis Father    • Dementia Father    • Stroke Father    • Heart disease Maternal Grandmother    • Diabetes Maternal Grandfather    • Heart disease Maternal Grandfather    • Stroke Maternal Grandfather    • Heart attack Maternal Grandfather    • Osteoporosis Paternal Grandmother    • Heart disease Paternal Grandfather    • Leukemia Maternal Aunt         CLL?   • Throat cancer Paternal Uncle    • Alcohol abuse Other         multiple uncles/aunts   • Malig Hyperthermia Neg Hx       FAMILY HISTORY:  Her mother had breast cancer at age 60, still alive at 85 and in good health..  Her father had dementia.  Her paternal uncle had prostate cancer.  Her brother had esophageal cancer.    I have reviewed the patient's medical history in detail and updated the computerized patient record.     ROS: as per HPI    Objective    Vitals:    05/17/23 1428   BP: 145/90   Pulse: 75   Resp: 16   Temp: 98 °F (36.7 °C)   TempSrc: Temporal   SpO2: 98%   Weight: 71.7 kg (158 lb)   Height: 170 cm (66.93\")   PainSc: 0-No pain         Review of Systems   Constitutional: Negative for appetite change, chills, diaphoresis, fatigue, fever and unexpected weight change.   HENT: Negative for hearing loss, sore throat and trouble swallowing.    Respiratory: Negative for cough, chest tightness, shortness of breath and wheezing.    Cardiovascular: Negative for chest pain, " palpitations and leg swelling.   Gastrointestinal: Negative for abdominal distention, abdominal pain, constipation, diarrhea, nausea and vomiting.   Genitourinary: Negative for dysuria, frequency, hematuria and urgency.   Musculoskeletal: Negative for joint swelling.        No muscle weakness.   Skin: Negative for rash and wound.   Neurological: Positive for numbness (Bilat foot). Negative for seizures, syncope, speech difficulty, weakness and headaches.   Hematological: Negative for adenopathy. Does not bruise/bleed easily.   Psychiatric/Behavioral: Positive for sleep disturbance. Negative for behavioral problems, confusion and suicidal ideas.   All other systems reviewed and are negative.    ROS reviewed and updated 05/17/23      Physical Exam:  CONSTITUTIONAL:  Vital signs reviewed.  No distress, looks comfortable.  RESPIRATORY:  Normal respiratory effort.  Lungs clear to auscultation bilaterally.  BREAST: Right breast: No skin changes, no evidence of breast mass, no nipple discharge, no evidence of any right axillary adenopathy or right supraclavicular adenopathy  Left breast: No evidence of any skin changes, no evidence of any left breast mass and no evidence of left nipple discharge as well as no left axillary adenopathy or left supraclavicular adenopathy.  CARDIOVASCULAR:  Normal S1, S2.  No murmurs rubs or gallops.  No significant lower extremity edema.  GASTROINTESTINAL: Abdomen appears unremarkable.  Nontender.  No hepatomegaly.  No splenomegaly.  LYMPHATIC:  No cervical, supraclavicular, axillary lymphadenopathy.  SKIN:  Warm.  No rashes.  PSYCHIATRIC:  Normal judgment and insight.  Normal mood and affect.    I have reexamined the patient and the results are consistent with the previously documented exam. ОЛЬГА Sher     RECENT LABS:   Results from last 7 days   Lab Units 05/17/23  1419   WBC 10*3/mm3 4.78   NEUTROS ABS 10*3/mm3 2.56   HEMOGLOBIN g/dL 13.7   HEMATOCRIT % 41.3   PLATELETS  10*3/mm3 249             Assessment & Plan    * T2N1 invasive ductal carcinoma of the left breast, recently diagnosed.    -Noticed mass in the left breast x5 months  -Diagnostic mammogram showed 3.7 mm mass in the left breast at 4 o'clock position  -Ultrasound-showed 3.8 x 3.1 x 2.5 cm mass at 4 o'clock position with 2 abnormal axillary lymph nodes, larger lymph node being 1.4 cm  -Left breast biopsy and axillary lymph node biopsy October 29, 2019, invasive ductal carcinoma, moderately differentiated, grade 2, ER 85%, MT 10%, HER-2/merna 2+, HER-2 FISH negative  · CT scans from 11/13/19 show some asymmetric nodules along the lateral margin of the glandular tissue.  The 1.2x1.0 cm left axillary nodes, left supraclavicular, and left posterior cervical triangle nodes are suspicious for tamar metastases.  ·   bone scan from 11/15/19 which was negative.   ·  MRI breast from 11/18/19 which shows the biopsy-proven malignancy in the left breast centered at the 4:00 position.  The region of involvement measures up to 7.5 cm.  There is left axillary adenopathy with multiple irregular lymph nodes and loss of differentiation of the border of multiple lymph nodes which are suspicious for extranodal involvement.  ·    echocardiogram from 11/14/19 which was normal with an ejection fraction of 67%.    · INVITAE from 11/14/19 which was negative.  · Given the size of her tumor and her medical history, patient will be given 4 cycles of Adriamycin/Cytoxan with Neulasta.  After completion of this treatment, patient will be started on 12 cycles of Taxol.  After the tumor shrinks in size, Dr. Forman will perform a lumpectomy.    · Following lumpectomy, patient will begin endocrine therapy.  · Dr. Dawn agrees chemotherapy will not aggravate her HIV condition.  Dr. Moreno spoke with Dr. Mohan at [facility] who also agrees chemotherapy is the first step.   · 12/06/19: Cycle #1 of Adriamycin/Cytoxan  · US Breast from 01/29/20 after 4  cycles of Adriamycin Cytoxan chemotherapy which shows mild interval partial response to neoadjuvant chemotherapy.  The mass has decreased from 3.8 x 2.5 to 3.1 cm to 3.5 x 2.3 x 3.2 cm previously the left axillary lymph node has decreased from 1.4 x 0.7 x 1 cm to 1.1 x 0.6 x 0.9 cm.  · 01/31/20: Initiated cycle 1 Taxol  · April 24, 2020: Completed cycle 12 Taxol  · MRI breast 5/6/2020 shows significant reduction in the left axillary adenopathy as well as reduction in the index breast lesion.  · S/p left modified radical mastectomy with left axillary dissection.  Patient has 55 x 40 x 30 mm invasive carcinoma, grade 2 with DCIS 36 x 6 mm, high-grade with good margins and 8 out of 11 lymph nodes positive with largest metastasis being 5 mm with extracapsular extension.  And there is lymphatic space invasion  · Will follow-up in 2 weeks at which time we will plan to start endocrine therapy.  She has radiation oncology appointment with Dr. Spivey  · XRT completed August 17, 2020  · Started letrozole August 2020 but has arthralgias  · October 6, 2020 had severe arthralgias secondary to letrozole.  We will switch to tamoxifen starting November 2020  · 1/8/2021 screening mammogram in December 2020 as well as diagnostic mammogram which showed 0.4 cm suspicious calcification in the lower left which on biopsy is consistent with fat necrosis.  · 8/12/2021 tamoxifen placed on hold due to significant arthralgias and worsening fatigue.   · September 21, 2021: Patient's depression, mental fogginess has resolved since tamoxifen discontinued.  She has mild worsening fatigue her TSH and B12 levels are normal.  We will need to check iron studies  · Discussed about switching to Arimidex to see if she could tolerate that better  · Patient initiated Arimidex 9/21/2021.  · Returns 10/5/2021 complaining of significant generalized arthralgias.  Reviewed with Dr. Moreno.  We were initially going to try her on Cymbalta, but the patient is  already taking Lexapro, and doing well on this.  We will therefore discontinue Arimidex.  We will give her a 6-week break and reevaluate things in 6 weeks.  Could try at that time switching to Aromasin.  · November 15, 2021: Patient continued with Arimidex even though she was asked to hold it last time.  Her arthralgias worsened.  Also the stiffness in the joints worsened significantly.  Discussed with patient that we need to hold Arimidex for 6 weeks and try Aromasin at her next appointment.  · December 28, 2021: ArthralgiaS improved after holding Arimidex.  · December 28, 2021: Started Aromasin  · 5/17/2023: Patient doing well with Aromasin.  Has mild arthralgias mammogram December 29, 2022 negative    *Osteoporosis  · DEXA scan 7/28/2022 with osteoporosis of both hips.  · Initiating Prolia 11/2022, continue every 6 months.    * HIV, followed by Dr. Dawn in infectious disease.  Well-controlled and is on a single medication with very low viral load. Has HIV since age 34.  · Reviewed her HIV medications and she is compliant  · Patient followed by infectious disease for her HIV and currently on treatment.  Stay  · Currently is followed by Dr. Dawn on anti-HIV medications  · Stable no new medications     * Family history of breast cancer with mother having had breast cancer at age 60.  There is family history of uncle with prostate cancer.  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    * Arthritis with severe back pain followed by Dr. 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  Patient continues on  B6.  Chronic and stable    * Fatigue  · 8/12/2021 patient seen as triage visit for worsening fatigue and diffuse arthralgias.  Reporting increased sleeping at night and still taking naps in the day.  Labs checked including thyroid studies which were unremarkable and B12 level actually elevated at greater than 2000.  Patient taking vitamin B supplements at that time.  Patient instructed to hold tamoxifen.  · 8/26/2021 reviewed today recent lab work for above with no evidence of thyroid dysfunction or vitamin B12 abnormality.  Patient notes no improvement being off tamoxifen for 2 weeks.  We will check a CA 15-3 to help further guide decision making.  If this is elevated we may then pursue imaging.  Discussed that she may just need to be off the tamoxifen longer to see improvement.  Patient does have underlying HIV as well and unclear if maybe some of her chronic medications for this could be contributing to her current symptoms.  · Patient continues with fatigue but slightly improved now    * Insomnia  · Has completely weaned off of amitriptyline   · Notes she is currently taking Ambien 5 mg because PCP would not fill 10 mg which has been historically effective.  Encouraged her to discuss this with primary care again as she has done well on higher dosing without issue in relation to other medications     *  Neuropathy: Patient has shooting pains in the feet likely from Taxol in the past which had improved with amitriptyline 75 mg but could not tolerate and hence they are tapering it.  She had difficulty with urination due to amitriptyline    *Recent spasmodic chest pain present x2 without any radiation to the neck or axilla improved with Gaviscon  · We will start Prilosec over-the-counter  · Follow-up with cardio oncology to have a complete work-up given her age  · Patient seen 3/31/2023 by Dr. Velasquez.  Symptoms felt to be most likely related to GERD however for further evaluation stress test ordered which was  normal.  · In future if she has more problems on Prilosec then we can consider referring to GI    PLAN:   · Proceed with Prolia   · Continue Aromasin  · Continue calcium and vitamin D supplements  · Patient continues to follow-up with infectious disease Dr. Dawn  · Follow-up with Dr. Moreno in 6 months with next dose of Prolia.    · Annual mammograms due in December.  · Next DEXA due July 2024    This patient is on high risk drug therapy requiring intensive monitoring for toxicity.        ОЛЬГА Sher    Cc: MD Denilson Feliciano MD

## 2023-06-02 DIAGNOSIS — K21.9 GASTROESOPHAGEAL REFLUX DISEASE, UNSPECIFIED WHETHER ESOPHAGITIS PRESENT: ICD-10-CM

## 2023-06-02 RX ORDER — OMEPRAZOLE 20 MG/1
CAPSULE, DELAYED RELEASE ORAL
Qty: 90 CAPSULE | Refills: 1 | Status: SHIPPED | OUTPATIENT
Start: 2023-06-02

## 2023-06-04 DIAGNOSIS — G47.00 INSOMNIA, UNSPECIFIED TYPE: ICD-10-CM

## 2023-06-06 RX ORDER — ZOLPIDEM TARTRATE 5 MG/1
5 TABLET ORAL NIGHTLY PRN
Qty: 30 TABLET | Refills: 0 | Status: SHIPPED | OUTPATIENT
Start: 2023-06-06

## 2023-06-14 ENCOUNTER — OFFICE VISIT (OUTPATIENT)
Dept: PSYCHIATRY | Facility: HOSPITAL | Age: 63
End: 2023-06-14
Payer: COMMERCIAL

## 2023-06-14 DIAGNOSIS — C50.812 MALIGNANT NEOPLASM OF OVERLAPPING SITES OF LEFT BREAST IN FEMALE, ESTROGEN RECEPTOR POSITIVE: Primary | ICD-10-CM

## 2023-06-14 DIAGNOSIS — F43.23 ADJUSTMENT DISORDER WITH MIXED ANXIETY AND DEPRESSED MOOD: ICD-10-CM

## 2023-06-14 DIAGNOSIS — G47.00 INSOMNIA, UNSPECIFIED TYPE: ICD-10-CM

## 2023-06-14 DIAGNOSIS — G62.9 NEUROPATHY: ICD-10-CM

## 2023-06-14 DIAGNOSIS — Z17.0 MALIGNANT NEOPLASM OF OVERLAPPING SITES OF LEFT BREAST IN FEMALE, ESTROGEN RECEPTOR POSITIVE: Primary | ICD-10-CM

## 2023-06-14 RX ORDER — GABAPENTIN 300 MG/1
300 CAPSULE ORAL TAKE AS DIRECTED
Qty: 90 CAPSULE | Refills: 2 | Status: SHIPPED | OUTPATIENT
Start: 2023-06-14 | End: 2024-06-13

## 2023-06-14 RX ORDER — ZOLPIDEM TARTRATE 10 MG/1
5 TABLET ORAL NIGHTLY PRN
Qty: 15 TABLET | Refills: 0 | Status: SHIPPED | OUTPATIENT
Start: 2023-06-14

## 2023-06-14 RX ORDER — DULOXETIN HYDROCHLORIDE 30 MG/1
30 CAPSULE, DELAYED RELEASE ORAL 2 TIMES DAILY
Qty: 180 CAPSULE | Refills: 0 | Status: SHIPPED | OUTPATIENT
Start: 2023-06-14

## 2023-06-14 NOTE — PROGRESS NOTES
[facility]  In Person Session    Subjective  Patient ID: Richa Dolan is a 62 y.o. female is seen face to face in the [facility].    CC: Insomnia    HPI/ Interval History:   Pt is seen in follow up alongside hx of breast cancer, primary concerns regarding insomnia, chronic pain. Pt reports challenges filling medications at pharmacy, perceived stigma regarding being on pain medication, ambien, and gabapentin. Fortunately able to fill meds as needed. Currently has reduced Ambien to 5 mg q hs alongside use of gabapentin. Sleep reportedly less effective, fatigue during day increasing and becoming intrusive. Gabapentin preferred over previous amitriptyline due to impact on weight, although not quite as effective regarding pain. Continues to try to wait as long as possible between injections due to general dislike of experience.    Sleep schedule reviewed; Sleep remains difficult to initiate, tossing and turning, impacted by chronic pain. Has purchased a new mattress, reports being relaxed, controlling stimuli as able. Limited caffeine, 1-2 drinks in AM. No night time alcohol, no cigarettes. Has completed sleep study without dx of MAHENDRA. Reports waking at 8:30 AM, out of bed by 9:15 AM. [place] to manage daily responsibilities. Pt reports some challenges getting going in the morning, also reporting early afternoon as being a low energy time. Becomes especially tired when reading, working on computer, while driving.    Medications reviewed; confirms taking duloxetine 30 mg bid. gabapentin 300 mg q dinner and 600 mg q hs.    Has not previously participated in aqua thererapy. Identifies she used to get out more often.    Exam: As above    Recent Labs Reviewed:  Lab on 05/17/2023   Component Date Value   • Glucose 05/17/2023 91    • BUN 05/17/2023 8    • Creatinine 05/17/2023 0.69    • Sodium 05/17/2023 143    • Potassium 05/17/2023 4.4    • Chloride 05/17/2023 105    • CO2  05/17/2023 27.5    • Calcium 05/17/2023 9.4    • Total Protein 05/17/2023 7.3    • Albumin 05/17/2023 4.6    • ALT (SGPT) 05/17/2023 15    • AST (SGOT) 05/17/2023 25    • Alkaline Phosphatase 05/17/2023 65    • Total Bilirubin 05/17/2023 0.4    • Globulin 05/17/2023 2.7    • A/G Ratio 05/17/2023 1.7    • BUN/Creatinine Ratio 05/17/2023 11.6    • Anion Gap 05/17/2023 10.5    • eGFR 05/17/2023 98.3    • Magnesium 05/17/2023 2.1    • Phosphorus 05/17/2023 3.4    • WBC 05/17/2023 4.78    • RBC 05/17/2023 4.00    • Hemoglobin 05/17/2023 13.7    • Hematocrit 05/17/2023 41.3    • MCV 05/17/2023 103.3 (H)    • MCH 05/17/2023 34.3 (H)    • MCHC 05/17/2023 33.2    • RDW 05/17/2023 12.5    • RDW-SD 05/17/2023 47.7    • MPV 05/17/2023 9.4    • Platelets 05/17/2023 249    • Neutrophil % 05/17/2023 53.6    • Lymphocyte % 05/17/2023 34.1    • Monocyte % 05/17/2023 10.0    • Eosinophil % 05/17/2023 1.7    • Basophil % 05/17/2023 0.4    • Immature Grans % 05/17/2023 0.2    • Neutrophils, Absolute 05/17/2023 2.56    • Lymphocytes, Absolute 05/17/2023 1.63    • Monocytes, Absolute 05/17/2023 0.48    • Eosinophils, Absolute 05/17/2023 0.08    • Basophils, Absolute 05/17/2023 0.02    • Immature Grans, Absolute 05/17/2023 0.01    • nRBC 05/17/2023 0.0    Labs reviewed    Current Psychotropic Medications:  Duloxetine 30 mg bid  Gabapentin 300 mg q evening, 600 mg q hs  Ambien 5 mg q hs    Plan of Care/ Medical Decision Making  Continue gabapentin, moving up earlier in the evening.  Move cymbalta to evening.  Continue ambien 5-10 mg q hs, #15 10 mg q hs  Recommended participation in [employer] with [employer], encouraging benefits of aqua therapy, yoga.  Considered benefits of peer support, to which patient reports interest. Follow up in four weeks in in person group    Diagnoses and all orders for this visit:    1. Malignant neoplasm of overlapping sites of left breast in female, estrogen receptor positive (Primary)  -     Ambulatory Referral  to [facility]    2. Insomnia, unspecified type  -     zolpidem (AMBIEN) 10 MG tablet; Take 0.5 tablets by mouth At Night As Needed for Sleep.  Dispense: 15 tablet; Refill: 0  -     gabapentin (Neurontin) 300 MG capsule; Take 1 capsule by mouth Take As Directed. 1 capsule PO q evening, 2 capsules po at bedtime  Dispense: 90 capsule; Refill: 2    3. Adjustment disorder with mixed anxiety and depressed mood  -     gabapentin (Neurontin) 300 MG capsule; Take 1 capsule by mouth Take As Directed. 1 capsule PO q evening, 2 capsules po at bedtime  Dispense: 90 capsule; Refill: 2    4. Neuropathy  -     gabapentin (Neurontin) 300 MG capsule; Take 1 capsule by mouth Take As Directed. 1 capsule PO q evening, 2 capsules po at bedtime  Dispense: 90 capsule; Refill: 2    Other orders  -     DULoxetine (Cymbalta) 30 MG capsule; Take 1 capsule by mouth 2 (Two) Times a Day.  Dispense: 180 capsule; Refill: 0    I spent 37 minutes caring for Richa on this date of service. This time includes time spent by me in the following activities: preparing for the visit, reviewing tests, obtaining and/or reviewing a separately obtained history, performing a medically appropriate examination and/or evaluation, counseling and educating the patient/family/caregiver, ordering medications, tests, or procedures and documenting information in the medical record.

## 2023-07-12 ENCOUNTER — OFFICE VISIT (OUTPATIENT)
Dept: PSYCHIATRY | Facility: HOSPITAL | Age: 63
End: 2023-07-12
Payer: COMMERCIAL

## 2023-07-12 DIAGNOSIS — F43.23 ADJUSTMENT DISORDER WITH MIXED ANXIETY AND DEPRESSED MOOD: Primary | ICD-10-CM

## 2023-07-12 DIAGNOSIS — G47.00 INSOMNIA, UNSPECIFIED TYPE: ICD-10-CM

## 2023-07-12 RX ORDER — ZOLPIDEM TARTRATE 10 MG/1
5 TABLET ORAL NIGHTLY PRN
Qty: 15 TABLET | Refills: 0 | Status: SHIPPED | OUTPATIENT
Start: 2023-07-12

## 2023-07-12 NOTE — PROGRESS NOTES
Subjective  Patient ID: Rciha Dolan is a 62 y.o.. female seen in regularly scheduled group session.  Group participants have consented to group conducted in person     Total Group Time, Face to Face: 70 minutes  Total Group Participants: 4, plus facilitation per ОЛЬГА Rey     Group Therapy  Group topics explored including development of new normal, interpersonal sensitivity, short and long term effects of diagnosis and treatment, significant other or family conflict, anxiety surrounding adjusted treatment plan or adjusted follow up, pain management, physical deconditioning, social determinants of health (finances, living arrangements, etc), and lifestyle choices. Shared relational distress amidst role strain, adjusted physical abilities.     Counseling provided regarding cognitive behavioral therapy (CBT) strategies, behavioral activation/ activity scheduling, reintroduction to activity through graded tasks, balancing avoidance with approach , recognition and allowance of need for rest, lifestyle counseling, benefits of exercise and strategies for managing barriers to engagement, allowance of self care, exploration of quality of life goals, survivorship issues, anxiety/ mood management , and medication education. Supported members in active engagement, support of other members.     Discussed current programming available in [facility] and community.    Patient response to group: Pt listens attentively and shares appropriately in group setting.    Medications Management  ОЛЬГА Rey present for medication discussion and management.  Pt continues in survivorship of breast cancer.    CC: Insomnia  HPI: Pt continues in survivorship of breast cancer. Continues to endorse disrupted sleep as primary concern in survivorship. Continues to report benefit to ambien, taking 5 mg q hs, although with worsening insomnia as compared to 10 mg q hs. Routine reviewed to include watching news 10:30 and 11  PM, followed by attempt to sleep at 11:45. Identifies activation of news prior to sleep, seeing this as being unpredictable and activating. Continues to get out of bed by 8. Denies regular activity during the day, generally piddling. Continues to activity restriction, pain in foot as limiting engagement in physical activity.   Exam: As Above  Current medication regimen: ambien 5 mg q hs  Lab Review:   Lab on 05/17/2023   Component Date Value    Glucose 05/17/2023 91     BUN 05/17/2023 8     Creatinine 05/17/2023 0.69     Sodium 05/17/2023 143     Potassium 05/17/2023 4.4     Chloride 05/17/2023 105     CO2 05/17/2023 27.5     Calcium 05/17/2023 9.4     Total Protein 05/17/2023 7.3     Albumin 05/17/2023 4.6     ALT (SGPT) 05/17/2023 15     AST (SGOT) 05/17/2023 25     Alkaline Phosphatase 05/17/2023 65     Total Bilirubin 05/17/2023 0.4     Globulin 05/17/2023 2.7     A/G Ratio 05/17/2023 1.7     BUN/Creatinine Ratio 05/17/2023 11.6     Anion Gap 05/17/2023 10.5     eGFR 05/17/2023 98.3     Magnesium 05/17/2023 2.1     Phosphorus 05/17/2023 3.4     WBC 05/17/2023 4.78     RBC 05/17/2023 4.00     Hemoglobin 05/17/2023 13.7     Hematocrit 05/17/2023 41.3     MCV 05/17/2023 103.3 (H)     MCH 05/17/2023 34.3 (H)     MCHC 05/17/2023 33.2     RDW 05/17/2023 12.5     RDW-SD 05/17/2023 47.7     MPV 05/17/2023 9.4     Platelets 05/17/2023 249     Neutrophil % 05/17/2023 53.6     Lymphocyte % 05/17/2023 34.1     Monocyte % 05/17/2023 10.0     Eosinophil % 05/17/2023 1.7     Basophil % 05/17/2023 0.4     Immature Grans % 05/17/2023 0.2     Neutrophils, Absolute 05/17/2023 2.56     Lymphocytes, Absolute 05/17/2023 1.63     Monocytes, Absolute 05/17/2023 0.48     Eosinophils, Absolute 05/17/2023 0.08     Basophils, Absolute 05/17/2023 0.02     Immature Grans, Absolute 05/17/2023 0.01     nRBC 05/17/2023 0.0    Labs reviewed    MDM:  Meds reviewed and renewed as appropriate.  Continue cymbalta 30 mg bid, gabapentin 900 mg daily  with benefit to neuropathy, ambien 5 mg q hs.  CBT- I provided with consideration of stimulus control, less activating bedtime routine.   Follow up in group setting.    Diagnoses and all orders for this visit:    1. Adjustment disorder with mixed anxiety and depressed mood (Primary)    2. Insomnia, unspecified type  -     zolpidem (AMBIEN) 10 MG tablet; Take 0.5 tablets by mouth At Night As Needed for Sleep.  Dispense: 15 tablet; Refill: 0

## 2023-07-26 RX ORDER — AMITRIPTYLINE HYDROCHLORIDE 50 MG/1
TABLET, FILM COATED ORAL
Qty: 30 TABLET | Refills: 1 | OUTPATIENT
Start: 2023-07-26

## 2023-07-27 NOTE — ADDENDUM NOTE
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  Patient reports in setting of chronic right leg atrophy she typically has increased pain in left hip and knee.  Patient reports increased pain in left hip recently, which has slowly been improving.  However, patient reports over the past few days she has had increased pain in her left knee, which she reports happens somewhat frequently in setting of overuse.  Patient reports she was walking earlier today when she slipped and twisted her left knee causing severe immediate pain.  Patient reports feeling like something broke when she twisted her knee.  Patient with severe pain, sharp, 10/10 without radiation.  Pain worse with movement, better at rest.  Denies any numbness or tingling in left leg.  No other current complaints, denies chest pain, shortness of breath, palpitations, fever or chills.    PAST MEDICAL & SURGICAL HISTORY    Past Medical History:   Diagnosis Date   • Polio     18 months of age     Past Surgical History:   Procedure Laterality Date   • Anesth,shoulder replacement Left 11/2019   • Radiofrequency ablation  08/2019    neck       CURRENT MEDICATIONS- medication list and allergies personally reviewed in epic  Medications Prior to Admission   Medication Sig Dispense Refill   • guaiFENesin (MUCINEX) 600 MG 12 hr tablet Take 600 mg by mouth 2 times daily as needed.     • polymyxin b-trimethoprim (POLYTRIM) 93420-6.1 UNIT/ML-% ophthalmic solution Place 1 drop into both eyes daily.     • DULoxetine (CYMBALTA) 30 MG capsule Take 1  capsule by mouth daily for 7 days, then 2 capsules by mouth daily thereafter.  CALL FOR REFILLS. 49 capsule 0   • naproxen sodium (ALEVE) 220 MG tablet Take 220 mg by mouth in the morning and 220 mg in the evening. Take with meals.     • ibuprofen (MOTRIN) 200 MG tablet Take 600 mg by mouth 2 times daily as needed.     • acetaminophen (TYLENOL) 650 MG CR tablet Take 2 tablets by mouth 2 times daily.     • ALPRAZolam (XANAX) 0.5 MG tablet Take 1/2-1 tablet by mouth daily at bedtime as needed.     • naproxen (NAPROSYN) 125 MG/5ML suspension 2 times daily.       ALLERGIES  ALLERGIES:   Allergen Reactions   • Ketorolac PRURITUS     SOCIAL HISTORY    Social History     Tobacco Use   • Smoking status: Never   • Smokeless tobacco: Never   Vaping Use   • Vaping Use: never used   Substance Use Topics   • Alcohol use: Yes     Alcohol/week: 7.0 standard drinks of alcohol     Types: 7 Glasses of wine per week   • Drug use: Never     FAMILY HISTORY    History reviewed. No pertinent family history.  REVIEW OF SYSTEMS    All 14 Systems were reviewed and found to be negative except what's mentioned in HPI    PHYSICAL EXAMINATION    Vital 24 Hour Range Most Recent Value   Temperature Temp  Min: 97.5 °F (36.4 °C)  Max: 98.6 °F (37 °C) 97.5 °F (36.4 °C)   Pulse Pulse  Min: 60  Max: 85 60   Respiratory Resp  Min: 16  Max: 18 16   Blood Pressure BP  Min: 161/92  Max: 188/84 (!) 162/93   Pulse Oximetry SpO2  Min: 95 %  Max: 100 % 96 %   General:  Alert & Oriented X 3 in no acute distress; mood and affect appropriate for situation; good insight; well nourished/well developed  Head/ENT:                Normocephalic/atraumatic; pupils are equal, round and reactive to light & accommodation; oral/nasal mucosa moist and intact without lesions; dentition intact; hearing grossly intact  Neck:   Supple; trachea is midline with no cervical or supraclavicular lymphadenopathy; no thyromegaly  Respiratory:  Clear to auscultation bilaterally; no use  of accessory muscles; no tactile fremitus  Cardiovascular: Regular rate and rhythm and S1, S2 present; no jugular venous distention; no Carotid Bruit  Abdomen:  Soft; nontender/nondistended;  + bowel sounds; No guarding or rebound; No peritoneal signs; no hepatosplenomegaly  Musculoskeletal: Chronic right leg atrophy; left knee with compressive wrap in place, decreased active range of motion left knee due to pain  Extremities/Skin: cyanosis absent; warm;  no obvious lesions or wounds noted; no edema   Neurologic:   Cranial Nerves 2-12 Grossly intact as best as patient can cooperate with exam     Her strength is bilaterally intact; sensation is grossly intact throughout        LABS    No results found  No results found  No results found for this or any previous visit.   No results found for this or any previous visit.  XR KNEE 4 OR MORE VIEWS LEFT    Result Date: 7/26/2023  EXAM:XR KNEE 4 OR MORE VIEWS LEFT INDICATION: Injury. TECHNIQUE: 4 views left knee     FINDINGS/IMPRESSION: Mild to moderate tricompartment osteoarthritis. Well-corticated bony densities about the proximal fibula, likely chronic. No fracture. No large joint effusion or hemarthrosis. Electronically Signed by: Pranav Crowe MD Signed on: 7/26/2023 6:53 PM Workstation ID: [id number]    CODE STATUS- Full Resuscitation    ASSESSMENT & PLAN:    · Left knee sprain, suspected- patient admitted 07/26 with left knee sprain.  Left knee x-ray negative for acute fracture.  Plan for conservative management with compressive wrap, ice as needed and as needed analgesics.  IV Dilaudid for severe pain, minimize use of opioids as able.  PT/OT consulted, limited mobility due to chronic right leg atrophy.  Consider orthopedic evaluation for recommendations on restrictions and potential need for imaging.    · Post-polio syndrome- supportive care, PT/OT consulted.    · Insomnia- home alprazolam continued.    · DVT Prophylaxis  Current Active Medications for DVT  Prophylaxis (From admission, onward)         Stop     enoxaparin (LOVENOX) injection 40 mg  40 mg,   Subcutaneous,   DAILY         --                · Disposition- admit to observation        Please see H&P and MD Progress Notes for additional information about the patient's course of treatment.    Tyler Guadalupe DO  7/26/2023  9:33 PM

## 2023-08-11 DIAGNOSIS — G47.00 INSOMNIA, UNSPECIFIED TYPE: ICD-10-CM

## 2023-08-11 RX ORDER — ZOLPIDEM TARTRATE 10 MG/1
5 TABLET ORAL NIGHTLY PRN
Qty: 15 TABLET | Refills: 0 | Status: SHIPPED | OUTPATIENT
Start: 2023-08-11

## 2023-08-18 ENCOUNTER — OFFICE VISIT (OUTPATIENT)
Dept: PSYCHIATRY | Facility: HOSPITAL | Age: 63
End: 2023-08-18
Payer: COMMERCIAL

## 2023-08-18 DIAGNOSIS — G47.00 INSOMNIA, UNSPECIFIED TYPE: ICD-10-CM

## 2023-08-18 RX ORDER — AMITRIPTYLINE HYDROCHLORIDE 25 MG/1
25 TABLET, FILM COATED ORAL NIGHTLY
Qty: 30 TABLET | Refills: 1 | Status: SHIPPED | OUTPATIENT
Start: 2023-08-18

## 2023-08-18 RX ORDER — ZOLPIDEM TARTRATE 10 MG/1
5 TABLET ORAL NIGHTLY PRN
Qty: 15 TABLET | Refills: 0 | Status: SHIPPED | OUTPATIENT
Start: 2023-08-18

## 2023-08-18 RX ORDER — DULOXETIN HYDROCHLORIDE 30 MG/1
30 CAPSULE, DELAYED RELEASE ORAL TAKE AS DIRECTED
Qty: 270 CAPSULE | Refills: 0 | Status: SHIPPED | OUTPATIENT
Start: 2023-08-18

## 2023-08-18 NOTE — PATIENT INSTRUCTIONS
Stop gabapentin.  Restart amitriptyline 25 mg at bedtime.  Increase duloxetine 30 mg in morning, 60 mg at bedtime.  Continue activity restriction at bedtime, walking, support consideration of water therapy.

## 2023-08-18 NOTE — PROGRESS NOTES
[facility]  In Person Session    Subjective  Patient ID: Richa Dolan is a 62 y.o. female is seen face to face in the [facility].    CC: Insomnia    HPI/ Interval History:   Pt is seen in follow up alongside survivorship of breast cancer. Continues to be seen in behav onc clinic for sx of sleep disruption, chronic neuropathy. Today reports challenges obtaining ambien as well as perception of reduced efficacy of gabapentin as compared to previous regimen of amitriptyline. Desires to consider both in 1:1 visit today.    Pt has transitioned from amitriptyline to gabapentin, appreciating trial although reported reduced benefit as compared to previous medication.   Denies having any weight changes, which was primary motivation to new medication strategy. Feels gabapentin has contributing to adjusted mood, not at all liking the way she feels on this. Reports heightened irritability. Is very interested in returning to previous. Has reduced down to 300 mg q hs.    Sleep remains disrupted, best managed on 10 mg ambien although radically accepting of benefit to reducing dosing of 5 mg q hs. Does share some concern, as insurance only covers this 3 months a year, and she is having difficulty using Good Rx. Non pharmacological mgmt considered; pt is no longer watching news prior to bed, reading instead. Continues to maintain sleep routine, which assists with wind down. Unfortunately feels pain is complicating sleep experience. Working with pain mgmt for non opioid tx including SI joint epidural, accupuncture.Has begun walking, doing this daily.    Exam: As above    Recent Labs Reviewed:  No visits with results within 2 Month(s) from this visit.   Latest known visit with results is:   Lab on 05/17/2023   Component Date Value    Glucose 05/17/2023 91     BUN 05/17/2023 8     Creatinine 05/17/2023 0.69     Sodium 05/17/2023 143     Potassium 05/17/2023 4.4     Chloride 05/17/2023  105     CO2 05/17/2023 27.5     Calcium 05/17/2023 9.4     Total Protein 05/17/2023 7.3     Albumin 05/17/2023 4.6     ALT (SGPT) 05/17/2023 15     AST (SGOT) 05/17/2023 25     Alkaline Phosphatase 05/17/2023 65     Total Bilirubin 05/17/2023 0.4     Globulin 05/17/2023 2.7     A/G Ratio 05/17/2023 1.7     BUN/Creatinine Ratio 05/17/2023 11.6     Anion Gap 05/17/2023 10.5     eGFR 05/17/2023 98.3     Magnesium 05/17/2023 2.1     Phosphorus 05/17/2023 3.4     WBC 05/17/2023 4.78     RBC 05/17/2023 4.00     Hemoglobin 05/17/2023 13.7     Hematocrit 05/17/2023 41.3     MCV 05/17/2023 103.3 (H)     MCH 05/17/2023 34.3 (H)     MCHC 05/17/2023 33.2     RDW 05/17/2023 12.5     RDW-SD 05/17/2023 47.7     MPV 05/17/2023 9.4     Platelets 05/17/2023 249     Neutrophil % 05/17/2023 53.6     Lymphocyte % 05/17/2023 34.1     Monocyte % 05/17/2023 10.0     Eosinophil % 05/17/2023 1.7     Basophil % 05/17/2023 0.4     Immature Grans % 05/17/2023 0.2     Neutrophils, Absolute 05/17/2023 2.56     Lymphocytes, Absolute 05/17/2023 1.63     Monocytes, Absolute 05/17/2023 0.48     Eosinophils, Absolute 05/17/2023 0.08     Basophils, Absolute 05/17/2023 0.02     Immature Grans, Absolute 05/17/2023 0.01     nRBC 05/17/2023 0.0      Current Psychotropic Medications:  Gabapentin 300 mg q hs (reduced from 900 mg daily)  Cymbalta 30 mg bid    Plan of Care/ Medical Decision Making  Restart amitriptyline 25 mg daily.  Stop gabapentin.  Increase duloxetine to 30 mg q AM and 60 mg q hs  Follow up in group setting for general needs in survivorship.    Diagnoses and all orders for this visit:    1. Insomnia, unspecified type  -     zolpidem (AMBIEN) 10 MG tablet; Take 0.5 tablets by mouth At Night As Needed for Sleep.  Dispense: 15 tablet; Refill: 0    Other orders  -     amitriptyline (ELAVIL) 25 MG tablet; Take 1 tablet by mouth Every Night.  Dispense: 30 tablet; Refill: 1  -     DULoxetine (Cymbalta) 30 MG capsule; Take 1 capsule by mouth Take  As Directed. 1 capsule po q AM, 2 capsules PO q hs  Dispense: 270 capsule; Refill: 0        I spent 36 minutes caring for Richa on this date of service. This time includes time spent by me in the following activities: preparing for the visit, reviewing tests, obtaining and/or reviewing a separately obtained history, performing a medically appropriate examination and/or evaluation, counseling and educating the patient/family/caregiver, ordering medications, tests, or procedures, and documenting information in the medical record.

## 2023-08-23 DIAGNOSIS — J30.2 SEASONAL ALLERGIC RHINITIS, UNSPECIFIED TRIGGER: ICD-10-CM

## 2023-08-23 RX ORDER — FLUTICASONE PROPIONATE 50 MCG
SPRAY, SUSPENSION (ML) NASAL
Qty: 48 ML | Refills: 3 | Status: SHIPPED | OUTPATIENT
Start: 2023-08-23

## 2023-08-31 ENCOUNTER — OFFICE VISIT (OUTPATIENT)
Dept: FAMILY MEDICINE CLINIC | Facility: CLINIC | Age: 63
End: 2023-08-31
Payer: COMMERCIAL

## 2023-08-31 VITALS
RESPIRATION RATE: 16 BRPM | HEART RATE: 79 BPM | HEIGHT: 67 IN | SYSTOLIC BLOOD PRESSURE: 144 MMHG | BODY MASS INDEX: 24.74 KG/M2 | TEMPERATURE: 97.6 F | WEIGHT: 157.6 LBS | DIASTOLIC BLOOD PRESSURE: 88 MMHG | OXYGEN SATURATION: 99 %

## 2023-08-31 DIAGNOSIS — Z86.73 H/O: STROKE: ICD-10-CM

## 2023-08-31 DIAGNOSIS — F33.42 MAJOR DEPRESSIVE DISORDER, RECURRENT, IN FULL REMISSION: ICD-10-CM

## 2023-08-31 DIAGNOSIS — F41.9 ANXIETY: ICD-10-CM

## 2023-08-31 DIAGNOSIS — G43.009 MIGRAINE WITHOUT AURA AND WITHOUT STATUS MIGRAINOSUS, NOT INTRACTABLE: ICD-10-CM

## 2023-08-31 DIAGNOSIS — G62.0 DRUG-INDUCED POLYNEUROPATHY: ICD-10-CM

## 2023-08-31 DIAGNOSIS — K21.9 CHRONIC GERD: ICD-10-CM

## 2023-08-31 DIAGNOSIS — F51.01 PRIMARY INSOMNIA: Primary | ICD-10-CM

## 2023-08-31 DIAGNOSIS — Z79.899 LONG-TERM USE OF HIGH-RISK MEDICATION: ICD-10-CM

## 2023-08-31 PROCEDURE — 99214 OFFICE O/P EST MOD 30 MIN: CPT | Performed by: FAMILY MEDICINE

## 2023-08-31 RX ORDER — ABACAVIR SULFATE, DOLUTEGRAVIR SODIUM, LAMIVUDINE 600; 50; 300 MG/1; MG/1; MG/1
TABLET, FILM COATED ORAL DAILY
COMMUNITY

## 2023-08-31 NOTE — PROGRESS NOTES
Subjective     Richa Dolan is a 62 y.o. female.     Chief Complaint   Patient presents with    Establish Care    Hyperlipidemia    Insomnia       History of Present Illness     To establish care, discuss the followings ;    Chronic Insomnia; saw sleep medicine , advised for CBT-I techniques and sleep hygiene and to continue on Zolpidem 5-10 mg PRN QHS   She is on Ambien for 10 years.  Last dose was last night     She is on B6 after cancer after chemo, has neuropathy from chemotherapy     Chronic back pain ; on percocet from pain clinic     Anxiety and depression ; doing well on cymbalta , following with [facility]     GERD;  well controlled with PPI 20 mg /day , no S/E reported.     H/o stroke in 2017 no residual weakness , on ASA and Statin    HLD; eats HD , on statin , no S/E reported     Lab Results   Component Value Date    CHLPL 140 10/17/2022    TRIG 103 10/17/2022    HDL 65 (H) 10/17/2022    LDL 56 10/17/2022         PN ; doing well on Amitriptyline , was on judi , could not tolerate it    Migraine ; on monthly injection from Neurology     HIV; doing well on Rx    Labs and documentation from previous PCP / consulting physician/sleep medicine  has been reviewed          The following portions of the patient's history were reviewed and updated as appropriate: allergies, current medications, past family history, past medical history, past social history, past surgical history, and problem list.        Review of Systems   Cardiovascular:  Negative for chest pain.   Musculoskeletal:  Positive for back pain.   Neurological:  Positive for numbness.     Vitals:    08/31/23 1407   BP: 144/88   Pulse: 79   Resp: 16   Temp: 97.6 øF (36.4 øC)   SpO2: 99%           08/31/23  1407   Weight: 71.5 kg (157 lb 9.6 oz)         Body mass index is 24.68 kg/mý.      Current Outpatient Medications   Medication Sig Dispense Refill    Abacavir-Dolutegravir-Lamivud (Triumeq) 600- MG per tablet Take  by mouth Daily.      AIMOVIG  140 MG/ML solution auto-injector Inject 1 mL under the skin into the appropriate area as directed Every 30 (Thirty) Days. HAS NOT TAKEN IN SEVERAL MONTHS  11    amitriptyline (ELAVIL) 25 MG tablet Take 1 tablet by mouth Every Night. 30 tablet 1    Aspirin 81 MG capsule Take 1 tablet by mouth Daily.      atorvastatin (LIPITOR) 20 MG tablet TAKE 1 TABLET BY MOUTH EVERY DAY AT NIGHT 90 tablet 1    baclofen (LIORESAL) 10 MG tablet Take 1 tablet by mouth 2 (Two) Times a Day.  2    CALCIUM-MAGNESIUM-VITAMIN D PO Take  by mouth.      colesevelam (WELCHOL) 625 MG tablet Take 2 tablets by mouth Daily. 180 tablet 11    Diclofenac Sodium (VOLTAREN) 1 % gel gel Apply 2 g topically to the appropriate area as directed As Needed.      DULoxetine (Cymbalta) 30 MG capsule Take 1 capsule by mouth Take As Directed. 1 capsule po q AM, 2 capsules PO q hs 270 capsule 0    exemestane (AROMASIN) 25 MG chemo tablet TAKE 1 TABLET BY MOUTH EVERY DAY 90 tablet 2    fluticasone (FLONASE) 50 MCG/ACT nasal spray SPRAY 2 SPRAYS INTO THE NOSTRIL AS DIRECTED BY PROVIDER DAILY. 48 mL 3    Loratadine (CLARITIN PO) Take 10 mg by mouth Daily.      omeprazole (priLOSEC) 20 MG capsule TAKE 1 CAPSULE BY MOUTH EVERY DAY 90 capsule 1    oxyCODONE-acetaminophen (PERCOCET)  MG per tablet Take 1 tablet by mouth 4 (Four) Times a Day.      vitamin B-12 (CYANOCOBALAMIN) 1000 MCG tablet Take 1 tablet by mouth Daily.      vitamin B-6 (pyridoxine) 50 MG tablet Take 1 tablet by mouth Daily. 90 tablet 2    zolpidem (AMBIEN) 10 MG tablet Take 0.5 tablets by mouth At Night As Needed for Sleep. 15 tablet 0     No current facility-administered medications for this visit.                Objective   Physical Exam  Vitals and nursing note reviewed.   Constitutional:       General: She is not in acute distress.     Appearance: She is not ill-appearing, toxic-appearing or diaphoretic.   Neck:      Comments: No enlarged thyroid      Cardiovascular:      Rate and Rhythm:  Normal rate and regular rhythm.      Heart sounds: Normal heart sounds. No murmur heard.  Pulmonary:      Effort: Pulmonary effort is normal. No respiratory distress.      Breath sounds: Normal breath sounds. No stridor. No wheezing or rhonchi.   Musculoskeletal:      Cervical back: Neck supple.   Neurological:      Mental Status: She is alert and oriented to person, place, and time.   Psychiatric:         Mood and Affect: Mood normal.         Behavior: Behavior normal.         Assessment & Plan   Diagnoses and all orders for this visit:    1. Primary insomnia (Primary)  Comments:  chart and PDMP been reviwed , discussed risk, will do refill, advised to cut down on dose gradullay    2. Long-term use of high-risk medication  -     ToxASSURE Select 13 (MW) - Urine, Clean Catch    3. Anxiety  Comments:  Stable, continue current Rx    4. Major depressive disorder, recurrent, in full remission  Comments:  Stable, continue current Rx    5. Chronic GERD  Comments:  Stable, continue current Rx    6. Drug-induced polyneuropathy  Comments:  due to chemo, on B6 , Amitriptyline and cymbalta , will continue    7. Migraine without aura and without status migrainosus, not intractable  Comments:  Stable, continue current Rx    8. H/O: stroke  Comments:  no residual weakness , continue ASA and statin      UDS ordered   PDMP reviwed , looks approperiate   Controlled substance medication agreement forms copy in chart.   Medication will send when due    Adverse effects and risks discussed.       Patient was given instructions and counseling regarding her condition or for health maintenance advice.   Please see specific information pulled into the AVS if appropriate.       I have fully discussed the nature of the medical condition(s) risks, complications, management, safe and proper use of medications.   Pt stated no allergy to the above prescribed medication.  I have discussed the SIDE EFFECT OF MEDICATION and importance TO report any  side effect , the patient expressed good understanding.  Encouraged medication compliance and the importance of keeping scheduled follow up appointments with me and any other providers.    Patient instructed to follow up with our office for results on any labs/imaging ordered during this visit.    Home care discussed  All questions answered  Patient verbalizes understanding and agrees to treatment plan.     Follow up: Return in about 3 months (around 11/28/2023) for physical, come fasting, follow up on current illness.

## 2023-09-11 LAB — DRUGS UR: NORMAL

## 2023-09-13 ENCOUNTER — OFFICE VISIT (OUTPATIENT)
Dept: PSYCHIATRY | Facility: HOSPITAL | Age: 63
End: 2023-09-13
Payer: COMMERCIAL

## 2023-09-13 DIAGNOSIS — F43.23 ADJUSTMENT DISORDER WITH MIXED ANXIETY AND DEPRESSED MOOD: Primary | ICD-10-CM

## 2023-09-13 DIAGNOSIS — G47.00 INSOMNIA, UNSPECIFIED TYPE: ICD-10-CM

## 2023-09-13 RX ORDER — ZOLPIDEM TARTRATE 10 MG/1
5 TABLET ORAL NIGHTLY PRN
Qty: 15 TABLET | Refills: 0 | Status: SHIPPED | OUTPATIENT
Start: 2023-09-13 | End: 2023-09-18 | Stop reason: SDUPTHER

## 2023-09-13 NOTE — PROGRESS NOTES
Subjective  Patient ID: Richa Dolan is a 63 y.o.. female seen in regularly scheduled group session.  Group participants have consented to group conducted in person    Total Group Time, Face to Face: 65  Total Group Participants: 2, plus facilitation per ОЛЬГА Rey and ОЛЬГА Maharaj student    Group Therapy  Group topics explored including development of new normal, interpersonal sensitivity, short and long term effects of diagnosis and treatment, significant other or family conflict, anxiety surrounding adjusted treatment plan or adjusted follow up, pain management, physical deconditioning, weight management, social determinants of health (finances, living arrangements, etc), and lifestyle choices. Considered caregiver distress, importance of vacation/ time away, reflection upon current needs and victories.    Counseling provided regarding behavioral activation/ activity scheduling, reintroduction to activity through graded tasks, balancing avoidance with approach , sleep hygiene, recognition and allowance of need for rest, pharmacological and non pharmacological management of sleep disturbance, lifestyle counseling, risks associated with substance use, benefits of exercise and strategies for managing barriers to engagement, allowance of self care, exploration of quality of life goals, survivorship issues, anxiety/ mood management , and medication education. Considered benefits of group dynamics, peer support. Encouraged sharing of information. Reconsidered pharmacological and non pharmacological strategies for sx management.     Discussed current programming available in [facility] and community.    Next shared medical visit: October 11 at 2, in person    Patient response to group: Pt shares openly in group setting, communicating needs and sharing information with other participant.    Medications Management  ОЛЬГА Rey present for medication discussion and management.  Pt  continues in survivorship of breast cancer.    CC: Insomnia, anxiety, neuropathy  HPI: Pt continues in survivorship of breast cancer, continuing to endorse residual sx of neuropathy, insomnia, and anxiety. Feels all are improved with recent transition from gabapentin to amitryptyline, increase in cymbalta from 60-90 mg daily, as well as acupuncture. Continues to appreciate benefit to low dose ambien. Does acknowledge reduced social stressors, recent vacation which have had a positive impact.   Exam: As Above  Current medication regimen: Gabapentin 300 mg q hs - discontinued, Amitriptyline 25 mg q hs, Cymbalta 30 mg q AM and 60 mg q hs  Lab Review:   Office Visit on 08/31/2023   Component Date Value    Report Summary 09/01/2023 FINAL      MDM:  Continue medications as written, remaining off gabapentin.  Supported patient in continued use of acupuncture which has assisted with pain, anxiety, and sleep.  Follow up arranged in group setting.    Diagnoses and all orders for this visit:    1. Adjustment disorder with mixed anxiety and depressed mood (Primary)    2. Insomnia, unspecified type  -     zolpidem (AMBIEN) 10 MG tablet; Take 0.5 tablets by mouth At Night As Needed for Sleep.  Dispense: 15 tablet; Refill: 0

## 2023-09-14 RX ORDER — EXEMESTANE 25 MG/1
TABLET ORAL
Qty: 90 TABLET | Refills: 2 | Status: SHIPPED | OUTPATIENT
Start: 2023-09-14

## 2023-09-18 ENCOUNTER — TELEPHONE (OUTPATIENT)
Dept: FAMILY MEDICINE CLINIC | Facility: CLINIC | Age: 63
End: 2023-09-18

## 2023-09-18 DIAGNOSIS — G47.00 INSOMNIA, UNSPECIFIED TYPE: ICD-10-CM

## 2023-09-18 RX ORDER — ZOLPIDEM TARTRATE 10 MG/1
5 TABLET ORAL NIGHTLY PRN
Qty: 15 TABLET | Refills: 0 | Status: SHIPPED | OUTPATIENT
Start: 2023-09-18 | End: 2023-10-19 | Stop reason: SDUPTHER

## 2023-09-18 NOTE — TELEPHONE ENCOUNTER
PATIENT CALLED FOR PRESCRIPTION OF     zolpidem (AMBIEN) 10 MG tablet     NEW PHARMACY    [id number] - [place], [place] - [address] [place] [facility] [place] [address] [facility] - [phone]  - [phone]     CALL BACK NUMBER [phone]    SHE IS OUT OF MEDICATION

## 2023-09-19 ENCOUNTER — OFFICE VISIT (OUTPATIENT)
Dept: FAMILY MEDICINE CLINIC | Facility: CLINIC | Age: 63
End: 2023-09-19
Payer: COMMERCIAL

## 2023-09-19 VITALS
HEART RATE: 76 BPM | HEIGHT: 67 IN | WEIGHT: 161 LBS | BODY MASS INDEX: 25.27 KG/M2 | OXYGEN SATURATION: 98 % | RESPIRATION RATE: 16 BRPM | SYSTOLIC BLOOD PRESSURE: 118 MMHG | DIASTOLIC BLOOD PRESSURE: 84 MMHG | TEMPERATURE: 97.7 F

## 2023-09-19 DIAGNOSIS — G89.29 CHRONIC FOOT PAIN, RIGHT: Primary | ICD-10-CM

## 2023-09-19 DIAGNOSIS — M79.671 CHRONIC FOOT PAIN, RIGHT: Primary | ICD-10-CM

## 2023-09-19 RX ORDER — MELOXICAM 15 MG/1
15 TABLET ORAL DAILY
Qty: 30 TABLET | Refills: 1 | Status: SHIPPED | OUTPATIENT
Start: 2023-09-19

## 2023-09-19 NOTE — PROGRESS NOTES
Subjective     Richa Dolan is a 63 y.o. female.     Chief Complaint   Patient presents with    Pain     Right foot x 2 weeks has got worst. Twisted 7/5 sharp pain.        History of Present Illness     H/o twisted Rt foot in July , went to , dx with sprain ,did well on ace bandage , rest and ice.   3 weeks ago , sx back with worsening pain of the rt foot mainly at the last side , 8-9/10, pain worse with standing and walking , walks with limping.  With Mild swelling        The following portions of the patient's history were reviewed and updated as appropriate: allergies, current medications, past family history, past medical history, past social history, past surgical history, and problem list.        Review of Systems   Musculoskeletal:  Positive for arthralgias and myalgias.     Vitals:    09/19/23 1559   BP: 118/84   Pulse: 76   Resp: 16   Temp: 97.7 °F (36.5 °C)   SpO2: 98%           09/19/23  1559   Weight: 73 kg (161 lb)         Body mass index is 25.21 kg/m².      Current Outpatient Medications   Medication Sig Dispense Refill    Abacavir-Dolutegravir-Lamivud (Triumeq) 600- MG per tablet Take  by mouth Daily.      AIMOVIG 140 MG/ML solution auto-injector Inject 1 mL under the skin into the appropriate area as directed Every 30 (Thirty) Days. HAS NOT TAKEN IN SEVERAL MONTHS  11    amitriptyline (ELAVIL) 25 MG tablet Take 1 tablet by mouth Every Night. 30 tablet 1    Aspirin 81 MG capsule Take 1 tablet by mouth Daily.      atorvastatin (LIPITOR) 20 MG tablet TAKE 1 TABLET BY MOUTH EVERY DAY AT NIGHT 90 tablet 1    baclofen (LIORESAL) 10 MG tablet Take 1 tablet by mouth 2 (Two) Times a Day.  2    CALCIUM-MAGNESIUM-VITAMIN D PO Take  by mouth.      colesevelam (WELCHOL) 625 MG tablet Take 2 tablets by mouth Daily. 180 tablet 11    Diclofenac Sodium (VOLTAREN) 1 % gel gel Apply 2 g topically to the appropriate area as directed As Needed.      DULoxetine (Cymbalta) 30 MG capsule Take 1 capsule by mouth  Take As Directed. 1 capsule po q AM, 2 capsules PO q hs 270 capsule 0    exemestane (AROMASIN) 25 MG tablet TAKE 1 TABLET BY MOUTH EVERY DAY 90 tablet 2    fluticasone (FLONASE) 50 MCG/ACT nasal spray SPRAY 2 SPRAYS INTO THE NOSTRIL AS DIRECTED BY PROVIDER DAILY. 48 mL 3    Loratadine (CLARITIN PO) Take 10 mg by mouth Daily.      omeprazole (priLOSEC) 20 MG capsule TAKE 1 CAPSULE BY MOUTH EVERY DAY 90 capsule 1    oxyCODONE-acetaminophen (PERCOCET)  MG per tablet Take 1 tablet by mouth 4 (Four) Times a Day.      vitamin B-12 (CYANOCOBALAMIN) 1000 MCG tablet Take 1 tablet by mouth Daily.      vitamin B-6 (pyridoxine) 50 MG tablet Take 1 tablet by mouth Daily. 90 tablet 2    zolpidem (AMBIEN) 10 MG tablet Take 0.5 tablets by mouth At Night As Needed for Sleep. 15 tablet 0    meloxicam (Mobic) 15 MG tablet Take 1 tablet by mouth Daily. 30 tablet 1     No current facility-administered medications for this visit.                Objective   Physical Exam  Vitals and nursing note reviewed.   Constitutional:       General: She is not in acute distress.     Appearance: She is not ill-appearing, toxic-appearing or diaphoretic.   Cardiovascular:      Pulses: Normal pulses.   Musculoskeletal:         General: Swelling and tenderness present. No deformity.      Comments: Mild ttp over the rt foot with mild swelling    Neurological:      Mental Status: She is alert and oriented to person, place, and time.   Psychiatric:         Mood and Affect: Mood normal.         Behavior: Behavior normal.         Thought Content: Thought content normal.     XR did not show # or dislocation per my reading, discussed with pt . Waiting for final radiology report       Assessment & Plan   Diagnoses and all orders for this visit:    1. Chronic foot pain, right (Primary)  Comments:  consider MRI if no better and neg XR  Discussed supportive care , foot elevation , icing and ACE wrap  Orders:  -     XR Foot 3+ View Right (In Office)  -      meloxicam (Mobic) 15 MG tablet; Take 1 tablet by mouth Daily.  Dispense: 30 tablet; Refill: 1        Patient was given instructions and counseling regarding her condition or for health maintenance advice.   Please see specific information pulled into the AVS if appropriate.       I have fully discussed the nature of the medical condition(s) risks, complications, management, safe and proper use of medications.   Pt stated no allergy to the above prescribed medication.  I have discussed the SIDE EFFECT OF MEDICATION and importance TO report any side effect , the patient expressed good understanding.  Encouraged medication compliance and the importance of keeping scheduled follow up appointments with me and any other providers.    Patient instructed to follow up with our office for results on any labs/imaging ordered during this visit.    Home care discussed  All questions answered  Patient verbalizes understanding and agrees to treatment plan.     Follow up: Return for WILL CALL YOU FOR LBAS/XR RESULT.

## 2023-09-25 RX ORDER — ATORVASTATIN CALCIUM 20 MG/1
TABLET, FILM COATED ORAL
Qty: 90 TABLET | Refills: 1 | Status: SHIPPED | OUTPATIENT
Start: 2023-09-25

## 2023-10-04 ENCOUNTER — TELEPHONE (OUTPATIENT)
Dept: ORTHOPEDIC SURGERY | Facility: CLINIC | Age: 63
End: 2023-10-04

## 2023-10-04 NOTE — TELEPHONE ENCOUNTER
Caller: Richa Dolan    Relationship to patient: Self    Best call back number:     Chief complaint: RIGHT KNEE    Type of visit: GEL INJECTION    Requested date: ASAP

## 2023-10-05 ENCOUNTER — HOSPITAL ENCOUNTER (OUTPATIENT)
Dept: MRI IMAGING | Facility: HOSPITAL | Age: 63
Discharge: HOME | End: 2023-10-05
Admitting: FAMILY MEDICINE
Payer: COMMERCIAL

## 2023-10-05 DIAGNOSIS — M79.671 CHRONIC FOOT PAIN, RIGHT: ICD-10-CM

## 2023-10-05 DIAGNOSIS — G89.29 CHRONIC FOOT PAIN, RIGHT: ICD-10-CM

## 2023-10-05 PROCEDURE — 73718 MRI LOWER EXTREMITY W/O DYE: CPT

## 2023-10-06 ENCOUNTER — LAB (OUTPATIENT)
Dept: LAB | Facility: HOSPITAL | Age: 63
End: 2023-10-06
Payer: COMMERCIAL

## 2023-10-06 DIAGNOSIS — G89.29 CHRONIC FOOT PAIN, RIGHT: Primary | ICD-10-CM

## 2023-10-06 DIAGNOSIS — M79.671 CHRONIC FOOT PAIN, RIGHT: Primary | ICD-10-CM

## 2023-10-06 DIAGNOSIS — Z21 ASYMPTOMATIC HIV INFECTION: ICD-10-CM

## 2023-10-06 LAB
ANION GAP SERPL CALCULATED.3IONS-SCNC: 11 MMOL/L (ref 5–15)
BASOPHILS # BLD AUTO: 0.02 10*3/MM3 (ref 0–0.2)
BASOPHILS NFR BLD AUTO: 0.3 % (ref 0–1.5)
BUN SERPL-MCNC: 9 MG/DL (ref 8–23)
BUN/CREAT SERPL: 13.2 (ref 7–25)
CALCIUM SPEC-SCNC: 9.4 MG/DL (ref 8.6–10.5)
CHLORIDE SERPL-SCNC: 101 MMOL/L (ref 98–107)
CO2 SERPL-SCNC: 27 MMOL/L (ref 22–29)
CREAT SERPL-MCNC: 0.68 MG/DL (ref 0.57–1)
DEPRECATED RDW RBC AUTO: 44.1 FL (ref 37–54)
EGFRCR SERPLBLD CKD-EPI 2021: 98 ML/MIN/1.73
EOSINOPHIL # BLD AUTO: 0.09 10*3/MM3 (ref 0–0.4)
EOSINOPHIL NFR BLD AUTO: 1.5 % (ref 0.3–6.2)
ERYTHROCYTE [DISTWIDTH] IN BLOOD BY AUTOMATED COUNT: 12.1 % (ref 12.3–15.4)
GLUCOSE SERPL-MCNC: 99 MG/DL (ref 65–99)
HCT VFR BLD AUTO: 42.9 % (ref 34–46.6)
HGB BLD-MCNC: 14.8 G/DL (ref 12–15.9)
IMM GRANULOCYTES # BLD AUTO: 0.01 10*3/MM3 (ref 0–0.05)
IMM GRANULOCYTES NFR BLD AUTO: 0.2 % (ref 0–0.5)
LYMPHOCYTES # BLD AUTO: 2.2 10*3/MM3 (ref 0.7–3.1)
LYMPHOCYTES NFR BLD AUTO: 37.6 % (ref 19.6–45.3)
MCH RBC QN AUTO: 34.2 PG (ref 26.6–33)
MCHC RBC AUTO-ENTMCNC: 34.5 G/DL (ref 31.5–35.7)
MCV RBC AUTO: 99.1 FL (ref 79–97)
MONOCYTES # BLD AUTO: 0.62 10*3/MM3 (ref 0.1–0.9)
MONOCYTES NFR BLD AUTO: 10.6 % (ref 5–12)
NEUTROPHILS NFR BLD AUTO: 2.91 10*3/MM3 (ref 1.7–7)
NEUTROPHILS NFR BLD AUTO: 49.8 % (ref 42.7–76)
NRBC BLD AUTO-RTO: 0 /100 WBC (ref 0–0.2)
PLATELET # BLD AUTO: 270 10*3/MM3 (ref 140–450)
PMV BLD AUTO: 9.8 FL (ref 6–12)
POTASSIUM SERPL-SCNC: 3.9 MMOL/L (ref 3.5–5.2)
RBC # BLD AUTO: 4.33 10*6/MM3 (ref 3.77–5.28)
SODIUM SERPL-SCNC: 139 MMOL/L (ref 136–145)
WBC NRBC COR # BLD: 5.85 10*3/MM3 (ref 3.4–10.8)

## 2023-10-06 PROCEDURE — 87536 HIV-1 QUANT&REVRSE TRNSCRPJ: CPT

## 2023-10-06 PROCEDURE — 85025 COMPLETE CBC W/AUTO DIFF WBC: CPT

## 2023-10-06 PROCEDURE — 86361 T CELL ABSOLUTE COUNT: CPT

## 2023-10-06 PROCEDURE — 80048 BASIC METABOLIC PNL TOTAL CA: CPT

## 2023-10-06 PROCEDURE — 36415 COLL VENOUS BLD VENIPUNCTURE: CPT

## 2023-10-07 LAB
HIV1 RNA # SERPL NAA+PROBE: 60 COPIES/ML
HIV1 RNA SERPL NAA+PROBE-LOG#: 1.78 LOG10COPY/ML

## 2023-10-09 LAB
BASOPHILS # BLD AUTO: 0 X10E3/UL (ref 0–0.2)
BASOPHILS NFR BLD AUTO: 1 %
CD3+CD4+ CELLS # BLD: 783 /UL (ref 359–1519)
CD3+CD4+ CELLS NFR BLD: 37.3 % (ref 30.8–58.5)
EOSINOPHIL # BLD AUTO: 0.1 X10E3/UL (ref 0–0.4)
EOSINOPHIL NFR BLD AUTO: 1 %
ERYTHROCYTE [DISTWIDTH] IN BLOOD BY AUTOMATED COUNT: 12.6 % (ref 11.7–15.4)
HCT VFR BLD AUTO: 42.7 % (ref 34–46.6)
HGB BLD-MCNC: 14.9 G/DL (ref 11.1–15.9)
IMM GRANULOCYTES # BLD AUTO: 0 X10E3/UL (ref 0–0.1)
IMM GRANULOCYTES NFR BLD AUTO: 0 %
LYMPHOCYTES # BLD AUTO: 2.1 X10E3/UL (ref 0.7–3.1)
LYMPHOCYTES NFR BLD AUTO: 38 %
MCH RBC QN AUTO: 34 PG (ref 26.6–33)
MCHC RBC AUTO-ENTMCNC: 34.9 G/DL (ref 31.5–35.7)
MCV RBC AUTO: 98 FL (ref 79–97)
MONOCYTES # BLD AUTO: 0.6 X10E3/UL (ref 0.1–0.9)
MONOCYTES NFR BLD AUTO: 10 %
NEUTROPHILS # BLD AUTO: 2.8 X10E3/UL (ref 1.4–7)
NEUTROPHILS NFR BLD AUTO: 50 %
PLATELET # BLD AUTO: 295 X10E3/UL (ref 150–450)
RBC # BLD AUTO: 4.38 X10E6/UL (ref 3.77–5.28)
WBC # BLD AUTO: 5.6 X10E3/UL (ref 3.4–10.8)

## 2023-10-10 ENCOUNTER — OFFICE VISIT (OUTPATIENT)
Dept: INFECTIOUS DISEASES | Facility: CLINIC | Age: 63
End: 2023-10-10
Payer: COMMERCIAL

## 2023-10-10 VITALS
TEMPERATURE: 98.6 F | RESPIRATION RATE: 18 BRPM | DIASTOLIC BLOOD PRESSURE: 95 MMHG | WEIGHT: 157.6 LBS | BODY MASS INDEX: 24.74 KG/M2 | HEIGHT: 67 IN | SYSTOLIC BLOOD PRESSURE: 157 MMHG | HEART RATE: 82 BPM

## 2023-10-10 DIAGNOSIS — Z21 ASYMPTOMATIC HIV INFECTION: Primary | ICD-10-CM

## 2023-10-10 DIAGNOSIS — Z79.2 LONG TERM (CURRENT) USE OF ANTIBIOTICS: ICD-10-CM

## 2023-10-10 DIAGNOSIS — D05.12 DUCTAL CARCINOMA IN SITU (DCIS) OF LEFT BREAST: ICD-10-CM

## 2023-10-10 PROCEDURE — 99214 OFFICE O/P EST MOD 30 MIN: CPT | Performed by: INTERNAL MEDICINE

## 2023-10-10 RX ORDER — ABACAVIR SULFATE, DOLUTEGRAVIR SODIUM, LAMIVUDINE 600; 50; 300 MG/1; MG/1; MG/1
1 TABLET, FILM COATED ORAL DAILY
Qty: 30 TABLET | Refills: 11 | Status: SHIPPED | OUTPATIENT
Start: 2023-10-10 | End: 2023-11-09

## 2023-10-10 NOTE — PROGRESS NOTES
ID CLINIC NOTE:    CC: f/u HIV    HPI: Richa Dolan is a 63 y.o. female here for f/u of her HIV care. No missed doses of Triumeq. No obvious side effects. Refills through [facility] but would like to make a change.     For her breast cancer, she is doing well. She had her port removed. She is on Aromasin. I reviewed her last oncology clinic note. She continues to follow with Dr Moreno.     She is dealing w/ ongoing neuropathy of the hands and the feet. It is felt most likely due to be due to Taxol. She is on amitriptyline.     She had a right foot injury and had a tendon tear based on her MRI. She sees orthopedist in 8 days.    PMH:   HIV well-controlled on ART w/ Triumeq  Breast cancer  CVA  Migraines  HLD  GERD    Past Surgical History:   Procedure Laterality Date    ADENOIDECTOMY      BREAST LUMPECTOMY      BREAST LUMPECTOMY WITH SENTINEL NODE BIOPSY Left 05/28/2020    Procedure: BREAST LUMPECTOMY WITH SENTINEL NODE BIOPSY AND NEEDLE LOCALIZATION AND axillary dissection;  Surgeon: Landen Forman MD;  Location: [facility];  Service: General;  Laterality: Left;    BREAST SURGERY Left 05/28/2020    Procedure: LEFT LATERAL INTERCOASTAL ARTERY  flap ;  Surgeon: Yusuf Garcia MD;  Location: [facility];  Service: Plastics;  Laterality: Left;    CHOLECYSTECTOMY      HYSTERECTOMY      KNEE ARTHROSCOPY Right 03/24/2017    Procedure:  RIGHT  KNEE ARTHROSCOPY WITH DEBRIDEMENT CHONDROPLASTY PARTIAL MENISCECTOMY;  Surgeon: Karl Galeas MD;  Location: [facility];  Service:     KNEE CARTILAGE SURGERY Right 2007    TONSILLECTOMY      TONSILLECTOMY  1974    TRIGGER POINT INJECTION  2002 - 2023    US GUIDED LYMPH NODE BIOPSY  10/29/2019    LEFT BREAST    VENOUS ACCESS DEVICE (PORT) INSERTION Right 12/02/2019    Procedure: INSERTION VENOUS ACCESS DEVICE RIGHT;  Surgeon: Landen Forman MD;  Location: [facility];  Service: General    VENOUS ACCESS DEVICE (PORT) REMOVAL N/A  05/25/2022    Procedure: Mediport Removal;  Surgeon: Cesar Vasquez Jr., MD;  Location: [facility] OR;  Service: General;  Laterality: N/A;     SH:     1 son  No tob/EtOH/illicits   Retired from [employer]    Allergies: Augmentin (hives)    Medications:   Current Outpatient Medications:     Abacavir-Dolutegravir-Lamivud (Triumeq) 600- MG per tablet, Take  by mouth Daily., Disp: , Rfl:     AIMOVIG 140 MG/ML solution auto-injector, Inject 1 mL under the skin into the appropriate area as directed Every 30 (Thirty) Days. HAS NOT TAKEN IN SEVERAL MONTHS, Disp: , Rfl: 11    amitriptyline (ELAVIL) 25 MG tablet, Take 1 tablet by mouth Every Night., Disp: 30 tablet, Rfl: 1    Aspirin 81 MG capsule, Take 1 tablet by mouth Daily., Disp: , Rfl:     atorvastatin (LIPITOR) 20 MG tablet, TAKE 1 TABLET BY MOUTH EVERY DAY AT NIGHT, Disp: 90 tablet, Rfl: 1    baclofen (LIORESAL) 10 MG tablet, Take 1 tablet by mouth 2 (Two) Times a Day., Disp: , Rfl: 2    CALCIUM-MAGNESIUM-VITAMIN D PO, Take  by mouth., Disp: , Rfl:     colesevelam (WELCHOL) 625 MG tablet, Take 2 tablets by mouth Daily., Disp: 180 tablet, Rfl: 11    Diclofenac Sodium (VOLTAREN) 1 % gel gel, Apply 2 g topically to the appropriate area as directed As Needed., Disp: , Rfl:     DULoxetine (Cymbalta) 30 MG capsule, Take 1 capsule by mouth Take As Directed. 1 capsule po q AM, 2 capsules PO q hs, Disp: 270 capsule, Rfl: 0    exemestane (AROMASIN) 25 MG tablet, TAKE 1 TABLET BY MOUTH EVERY DAY, Disp: 90 tablet, Rfl: 2    fluticasone (FLONASE) 50 MCG/ACT nasal spray, SPRAY 2 SPRAYS INTO THE NOSTRIL AS DIRECTED BY PROVIDER DAILY., Disp: 48 mL, Rfl: 3    Loratadine (CLARITIN PO), Take 10 mg by mouth Daily., Disp: , Rfl:     meloxicam (Mobic) 15 MG tablet, Take 1 tablet by mouth Daily., Disp: 30 tablet, Rfl: 1    omeprazole (priLOSEC) 20 MG capsule, TAKE 1 CAPSULE BY MOUTH EVERY DAY, Disp: 90 capsule, Rfl: 1    oxyCODONE-acetaminophen (PERCOCET)  MG per  "tablet, Take 1 tablet by mouth 4 (Four) Times a Day., Disp: , Rfl:     vitamin B-12 (CYANOCOBALAMIN) 1000 MCG tablet, Take 1 tablet by mouth Daily., Disp: , Rfl:     vitamin B-6 (pyridoxine) 50 MG tablet, Take 1 tablet by mouth Daily., Disp: 90 tablet, Rfl: 2    zolpidem (AMBIEN) 10 MG tablet, Take 0.5 tablets by mouth At Night As Needed for Sleep., Disp: 15 tablet, Rfl: 0    OBJECTIVE:  /95 (BP Location: Right arm, Patient Position: Sitting, Cuff Size: Adult)   Pulse 82   Temp 98.6 øF (37 øC) (Oral)   Resp 18   Ht 170.2 cm (67.01\")   Wt 71.5 kg (157 lb 9.6 oz)   BMI 24.68 kg/mý     GENERAL: Awake, alert, NAD  Head; no trauma  EYES: No scleral icterus  CV: NR  LUNGS: . Normal work of breathing on room air  ABDOMEN: soft, not distended  SKIN: no rashes  Vasc: R chest port has been removed    DIAGNOSTICS:  CBC, BMP, CD4 count, and HIV VL reviewed today  Lab Results   Component Value Date    WBC 5.6 10/06/2023    WBC 5.85 10/06/2023    HGB 14.9 10/06/2023    HGB 14.8 10/06/2023    HCT 42.7 10/06/2023    HCT 42.9 10/06/2023     10/06/2023     10/06/2023     Lab Results   Component Value Date    GLUCOSE 99 10/06/2023    CALCIUM 9.4 10/06/2023     10/06/2023    K 3.9 10/06/2023    CO2 27.0 10/06/2023     10/06/2023    BUN 9 10/06/2023    CREATININE 0.68 10/06/2023    EGFRIFAFRI >60 05/28/2015    EGFRIFNONA 81 02/08/2022    BCR 13.2 10/06/2023    ANIONGAP 11.0 10/06/2023     HIV Related Labs:  CD4: 783 (10/2023)  VL 60 copies (10/2023)  HIV Genotype: unknown bc undetectable  [id number] - negative  Tspot - negative (10/2020)  RPR - non-reactive (10/2020)  Hep A - immune (7/2016)  Hep B - immune (9/2015)  Hep C - negative (10/2020)  Urine GCCT - negative (9/2015)    ASSESSMENT/PLAN:  1. Asymptomatic HIV  -excellent compliance w/ Triumeq; RX sent to [facility]  -most recent maintenance/monitoring labs look good; will repeat in 6 months prior to next visit    2. T2N1 " invasive ductal carcinoma of the left breast  -on maintenance therapy  -port removed  -says she's doing well    3. Long term use of antibiotics  -reviewed monitoring labs    RTC 6 months or sooner if needed

## 2023-10-11 ENCOUNTER — OFFICE VISIT (OUTPATIENT)
Dept: PSYCHIATRY | Facility: HOSPITAL | Age: 63
End: 2023-10-11
Payer: COMMERCIAL

## 2023-10-11 DIAGNOSIS — G47.00 INSOMNIA, UNSPECIFIED TYPE: ICD-10-CM

## 2023-10-11 DIAGNOSIS — G62.9 NEUROPATHY: ICD-10-CM

## 2023-10-11 DIAGNOSIS — F43.23 ADJUSTMENT DISORDER WITH MIXED ANXIETY AND DEPRESSED MOOD: Primary | ICD-10-CM

## 2023-10-11 DIAGNOSIS — C50.812 MALIGNANT NEOPLASM OF OVERLAPPING SITES OF LEFT BREAST IN FEMALE, ESTROGEN RECEPTOR POSITIVE: ICD-10-CM

## 2023-10-11 DIAGNOSIS — Z17.0 MALIGNANT NEOPLASM OF OVERLAPPING SITES OF LEFT BREAST IN FEMALE, ESTROGEN RECEPTOR POSITIVE: ICD-10-CM

## 2023-10-11 RX ORDER — DULOXETIN HYDROCHLORIDE 30 MG/1
30 CAPSULE, DELAYED RELEASE ORAL TAKE AS DIRECTED
Qty: 270 CAPSULE | Refills: 0 | Status: SHIPPED | OUTPATIENT
Start: 2023-10-11

## 2023-10-11 NOTE — PROGRESS NOTES
Subjective  Patient ID: Richa Dolan is a 63 y.o.. female seen in regularly scheduled group session.  Group participants have consented to group conducted in person    Total Group Time, Face to Face: 70 minutes  Total Group Participants: 3, plus facilitation per Dr. Delma Moffett, ОЛЬГА Rey, and ОЛЬГА Espinoza Student    Group Therapy  Group topics explored including development of new normal, interpersonal sensitivity, short and long term effects of diagnosis and treatment, significant other or family conflict, anticipitory anxiety, pain management, physical deconditioning, weight management, and lifestyle choices. Considered burden of caregiver distress, fears of recurrence, sleep disruption, and unmanaged anxiety.    Counseling provided regarding cognitive behavioral therapy (CBT) strategies, behavioral activation/ activity scheduling, balancing avoidance with approach , sleep hygiene, recognition and allowance of need for rest, pharmacological and non pharmacological management of sleep disturbance, lifestyle counseling, benefits of obtaining appropraite BMI including programs available in Cancer Center and community, benefits of exercise and strategies for managing barriers to engagement, allowance of self care, exploration of quality of life goals, anxiety/ mood management , medication education, and existential distress. Counseling provided surrounding grounding techniques, allowance of self care, boundaries and limitations, and benefits of continued peer support.    Discussed current programming available in Cancer Center and community.    Next shared medical visit: November 8 at 3:30    Patient response to group: Pt listens attentively to other members, shares appropriately.    Medications Management  Dr. Delma Moffett, ОЛЬГА Rey, and ОЛЬГА Espinoza Student present for medication discussion and management.  Pt continues in survivorship of breast  cancer.    CC: Insomnia, chronic pain  HPI: Pt continues to experience pain and insomnia, chronic, exacerbated in survivorship of breast cancer. Pain remains intrusive, mostly in feet, less burdensome in hands. Continues to report benefit to amitriptyline, beneficial to both sleep and neuropathy. Sleep is effective most nights, reporting poor sleep appx once weekly. Continues to feel pain was better managed on higher dose of amitriptyline, although recognizes previous concern regarding weight gain. Continues on cymbalta 90 mg daily. Has effectively lost 4 pounds. Feels sx are acceptable at this time.  Exam: As Above  Current medication regimen: Cymbalta 30 mg q AM, 60 mg q hs, ambien 5 mg q hs, amitriptyline 25 mg q hs  Lab Review:   Lab on 10/06/2023   Component Date Value    Absolute CD 4 [place] 10/06/2023 783     CD4 Positive Lymph % 10/06/2023 37.3     WBC 10/06/2023 5.6     RBC 10/06/2023 4.38     Hemoglobin 10/06/2023 14.9     Hematocrit 10/06/2023 42.7     MCV 10/06/2023 98 (H)     MCH 10/06/2023 34.0 (H)     MCHC 10/06/2023 34.9     RDW 10/06/2023 12.6     Platelets 10/06/2023 295     Neutrophil Rel % 10/06/2023 50     Lymphocyte Rel % 10/06/2023 38     Monocyte Rel % 10/06/2023 10     Eosinophil Rel % 10/06/2023 1     Basophil Rel % 10/06/2023 1     Neutrophils Absolute 10/06/2023 2.8     Lymphocytes Absolute 10/06/2023 2.1     Monocytes Absolute 10/06/2023 0.6     Eosinophils Absolute 10/06/2023 0.1     Basophils Absolute 10/06/2023 0.0     Immature Granulocyte Rel* 10/06/2023 0     Immature Grans Absolute 10/06/2023 0.0     Glucose 10/06/2023 99     BUN 10/06/2023 9     Creatinine 10/06/2023 0.68     Sodium 10/06/2023 139     Potassium 10/06/2023 3.9     Chloride 10/06/2023 101     CO2 10/06/2023 27.0     Calcium 10/06/2023 9.4     BUN/Creatinine Ratio 10/06/2023 13.2     Anion Gap 10/06/2023 11.0     eGFR 10/06/2023 98.0     HIV-1 RNA by PCR, Qn 10/06/2023 60     log10 HIV-1 RNA 10/06/2023 1.778     WBC  10/06/2023 5.85     RBC 10/06/2023 4.33     Hemoglobin 10/06/2023 14.8     Hematocrit 10/06/2023 42.9     MCV 10/06/2023 99.1 (H)     MCH 10/06/2023 34.2 (H)     MCHC 10/06/2023 34.5     RDW 10/06/2023 12.1 (L)     RDW-SD 10/06/2023 44.1     MPV 10/06/2023 9.8     Platelets 10/06/2023 270     Neutrophil % 10/06/2023 49.8     Lymphocyte % 10/06/2023 37.6     Monocyte % 10/06/2023 10.6     Eosinophil % 10/06/2023 1.5     Basophil % 10/06/2023 0.3     Immature Grans % 10/06/2023 0.2     Neutrophils, Absolute 10/06/2023 2.91     Lymphocytes, Absolute 10/06/2023 2.20     Monocytes, Absolute 10/06/2023 0.62     Eosinophils, Absolute 10/06/2023 0.09     Basophils, Absolute 10/06/2023 0.02     Immature Grans, Absolute 10/06/2023 0.01     nRBC 10/06/2023 0.0    Office Visit on 08/31/2023   Component Date Value    Report Summary 09/01/2023 FINAL      MDM:  Meds reviewed and renewed as appropriate.  Continue medications as written.  Risks and benefits of increasing tricyclic considered; will continue as written at this time.  FU scheduled in group setting.    Diagnoses and all orders for this visit:    1. Adjustment disorder with mixed anxiety and depressed mood (Primary)    2. Insomnia, unspecified type    3. Malignant neoplasm of overlapping sites of left breast in female, estrogen receptor positive    4. Neuropathy    Other orders  -     DULoxetine (Cymbalta) 30 MG capsule; Take 1 capsule by mouth Take As Directed. 1 capsule po q AM, 2 capsules PO q hs  Dispense: 270 capsule; Refill: 0

## 2023-10-13 NOTE — TELEPHONE ENCOUNTER
Requested Prescriptions     Pending Prescriptions Disp Refills    amitriptyline (ELAVIL) 25 MG tablet [Pharmacy Med Name: AMITRIPTYLINE 25MG TABLETS] 30 tablet 1     Sig: TAKE 1 TABLET BY MOUTH EVERY NIGHT     Last apt 10/11/2023  Upcoming apt 11/8/2023

## 2023-10-17 RX ORDER — AMITRIPTYLINE HYDROCHLORIDE 25 MG/1
25 TABLET, FILM COATED ORAL NIGHTLY
Qty: 90 TABLET | Refills: 0 | Status: SHIPPED | OUTPATIENT
Start: 2023-10-17

## 2023-10-17 RX ORDER — AMITRIPTYLINE HYDROCHLORIDE 25 MG/1
25 TABLET, FILM COATED ORAL NIGHTLY
Qty: 30 TABLET | Refills: 1 | Status: SHIPPED | OUTPATIENT
Start: 2023-10-17 | End: 2023-10-17 | Stop reason: SDUPTHER

## 2023-10-18 ENCOUNTER — CLINICAL SUPPORT (OUTPATIENT)
Dept: ORTHOPEDIC SURGERY | Facility: CLINIC | Age: 63
End: 2023-10-18
Payer: COMMERCIAL

## 2023-10-18 VITALS — WEIGHT: 157.9 LBS | TEMPERATURE: 97.8 F | BODY MASS INDEX: 24.78 KG/M2 | HEIGHT: 67 IN

## 2023-10-18 DIAGNOSIS — M17.11 ARTHRITIS OF RIGHT KNEE: Primary | ICD-10-CM

## 2023-10-18 RX ORDER — METHYLPREDNISOLONE ACETATE 80 MG/ML
80 INJECTION, SUSPENSION INTRA-ARTICULAR; INTRALESIONAL; INTRAMUSCULAR; SOFT TISSUE
Status: COMPLETED | OUTPATIENT
Start: 2023-10-18 | End: 2023-10-18

## 2023-10-18 RX ORDER — LIDOCAINE HYDROCHLORIDE 10 MG/ML
2 INJECTION, SOLUTION EPIDURAL; INFILTRATION; INTRACAUDAL; PERINEURAL
Status: COMPLETED | OUTPATIENT
Start: 2023-10-18 | End: 2023-10-18

## 2023-10-18 RX ADMIN — LIDOCAINE HYDROCHLORIDE 2 ML: 10 INJECTION, SOLUTION EPIDURAL; INFILTRATION; INTRACAUDAL; PERINEURAL at 15:52

## 2023-10-18 RX ADMIN — METHYLPREDNISOLONE ACETATE 80 MG: 80 INJECTION, SUSPENSION INTRA-ARTICULAR; INTRALESIONAL; INTRAMUSCULAR; SOFT TISSUE at 15:52

## 2023-10-18 NOTE — PROGRESS NOTES
"Patient Name: Richa Dolan   YOB: 1960  Referring Primary Care Physician: Ar Beasley MD  BMI: Body mass index is 24.73 kg/m².    Chief Complaint:    Chief Complaint   Patient presents with    Right Knee - Follow-up   From before: Patient is seen today complaint of right knee pain has been relatively chronic.  She says she has a history of osteoporosis and gets scanned and she is taking shots for that.  She has had 2 previous \"cleanouts\" in her right knee the last which was March 2017.  One was by Dr. Vinny Sinha and the other 1 by Dr. Turk.  He said in December she can is shuffled on the deck and some ice in the right knee started bothering her more than.  She apparently has physical therapy scheduled to begin on March 15.  Feels swollen and tight.  She says she has a history of HIV positivity but she has been medicine controlled for over 20 years     HPI:     Richa Dolan is a 63 y.o. female who presents today for evaluation of   Chief Complaint   Patient presents with    Right Knee - Follow-up   .  Richa follows up today she had an injection back in February that is very helpful right knee started to bother her more it swelling etc.      Subjective   Medications:   Home Medications:  Current Outpatient Medications on File Prior to Visit   Medication Sig    Abacavir-Dolutegravir-Lamivud (Triumeq) 600- MG per tablet Take 1 tablet by mouth Daily for 30 days.    AIMOVIG 140 MG/ML solution auto-injector Inject 1 mL under the skin into the appropriate area as directed Every 30 (Thirty) Days. HAS NOT TAKEN IN SEVERAL MONTHS    amitriptyline (ELAVIL) 25 MG tablet Take 1 tablet by mouth Every Night.    Aspirin 81 MG capsule Take 1 tablet by mouth Daily.    atorvastatin (LIPITOR) 20 MG tablet TAKE 1 TABLET BY MOUTH EVERY DAY AT NIGHT    baclofen (LIORESAL) 10 MG tablet Take 1 tablet by mouth 2 (Two) Times a Day.    CALCIUM-MAGNESIUM-VITAMIN D PO Take  by mouth.    colesevelam (WELCHOL) " 625 MG tablet Take 2 tablets by mouth Daily.    Diclofenac Sodium (VOLTAREN) 1 % gel gel Apply 2 g topically to the appropriate area as directed As Needed.    DULoxetine (Cymbalta) 30 MG capsule Take 1 capsule by mouth Take As Directed. 1 capsule po q AM, 2 capsules PO q hs    exemestane (AROMASIN) 25 MG tablet TAKE 1 TABLET BY MOUTH EVERY DAY    fluticasone (FLONASE) 50 MCG/ACT nasal spray SPRAY 2 SPRAYS INTO THE NOSTRIL AS DIRECTED BY PROVIDER DAILY.    Loratadine (CLARITIN PO) Take 10 mg by mouth Daily.    omeprazole (priLOSEC) 20 MG capsule TAKE 1 CAPSULE BY MOUTH EVERY DAY    oxyCODONE-acetaminophen (PERCOCET)  MG per tablet Take 1 tablet by mouth 4 (Four) Times a Day.    vitamin B-12 (CYANOCOBALAMIN) 1000 MCG tablet Take 1 tablet by mouth Daily.    vitamin B-6 (pyridoxine) 50 MG tablet Take 1 tablet by mouth Daily.    zolpidem (AMBIEN) 10 MG tablet Take 0.5 tablets by mouth At Night As Needed for Sleep.     No current facility-administered medications on file prior to visit.     Current Medications:  Scheduled Meds:  Continuous Infusions:No current facility-administered medications for this visit.    PRN Meds:.    I have reviewed the patient's medical history in detail and updated the computerized patient record.  Review and summarization of old records includes:    Past Medical History:   Diagnosis Date    Anxiety     Arthritis of back 2010    Cerebrovascular accident (CVA) 08/22/2017    NO RESIDUAL AFFECT    DDD (degenerative disc disease), cervical     Depression     Drug therapy     Elevated cholesterol     Fat necrosis of left breast     GERD (gastroesophageal reflux disease)     H/O ICH (intracerebral hemorrhage) (CMS/[facility])     Heart murmur 1975    Hip arthrosis 2015    History of chemotherapy     History of heart murmur in childhood     History of stroke     PATIENT STATES HAD A MINI STROKE, NO RESIDUAL EFFECTS    History of thrombocytopenia     HIV (human immunodeficiency virus infection) 1996     Hx of radiation therapy     Hyperlipidemia     Insomnia     Knee swelling 2017    meniscus tear    Lupus anticoagulant positive     Malignant neoplasm of overlapping sites of left breast in female, estrogen receptor positive 11/12/2019    Meniscus tear 2017    RIGHT    Migraine     Neck pain     WITH SHOOTING PAIN LEFT RIGHT ARM    Neck strain 2001    work injury    Neuropathy     Osteoarthritis     Osteoporosis     Pain management     sees 1 every 3 months for scripts/injection/pain meds    Periarthritis of shoulder 2010    Pneumonia 1989    Seasonal allergies     Spinal headache     THINKS HAD BLOOD PATCH AFTER EPIDUAL     Stress fracture 2010    three times    Tear of meniscus of knee 2012 & 2017    Tendinitis of knee 2000    physical therapy many times    Tick bite 06/2014    Upper back pain     Vertigo         Past Surgical History:   Procedure Laterality Date    ADENOIDECTOMY      BREAST LUMPECTOMY      BREAST LUMPECTOMY WITH SENTINEL NODE BIOPSY Left 05/28/2020    Procedure: BREAST LUMPECTOMY WITH SENTINEL NODE BIOPSY AND NEEDLE LOCALIZATION AND axillary dissection;  Surgeon: Landen Forman MD;  Location: [facility];  Service: General;  Laterality: Left;    BREAST SURGERY Left 05/28/2020    Procedure: LEFT LATERAL INTERCOASTAL ARTERY  flap ;  Surgeon: Yusuf Garcia MD;  Location: [facility];  Service: Plastics;  Laterality: Left;    CHOLECYSTECTOMY      HYSTERECTOMY      KNEE ARTHROSCOPY Right 03/24/2017    Procedure:  RIGHT  KNEE ARTHROSCOPY WITH DEBRIDEMENT CHONDROPLASTY PARTIAL MENISCECTOMY;  Surgeon: Karl Galeas MD;  Location: [facility];  Service:     KNEE CARTILAGE SURGERY Right 2007    TONSILLECTOMY      TONSILLECTOMY  1974    TRIGGER POINT INJECTION  2002 - 2023    US GUIDED LYMPH NODE BIOPSY  10/29/2019    LEFT BREAST    VENOUS ACCESS DEVICE (PORT) INSERTION Right 12/02/2019    Procedure: INSERTION VENOUS ACCESS DEVICE RIGHT;  Surgeon: Dickson  Landen ANAND MD;  Location: [facility] MAIN OR;  Service: General    VENOUS ACCESS DEVICE (PORT) REMOVAL N/A 05/25/2022    Procedure: Mediport Removal;  Surgeon: Cesar Vasquez Jr., MD;  Location: [facility] MAIN OR;  Service: General;  Laterality: N/A;        Social History     Occupational History    Occupation:      Employer:  [employer]   Tobacco Use    Smoking status: Never    Smokeless tobacco: Never    Tobacco comments:     never   Vaping Use    Vaping Use: Never used   Substance and Sexual Activity    Alcohol use: Yes     Alcohol/week: 1.0 standard drink of alcohol     Types: 1 Drinks containing 0.5 oz of alcohol per week     Comment: once a week    Drug use: No    Sexual activity: Yes     Partners: Male     Birth control/protection: None     Comment:       Social History     Social History Narrative    Not on file        Family History   Problem Relation Age of Onset    Kidney disease Mother     Hypertension Mother     Heart disease Mother     Arthritis Mother     Breast cancer Mother     Leukemia Mother         CLL?    Diabetes Mother     Hyperthyroidism Mother     Hearing loss Mother     Cancer Mother         breast    Rheumatologic disease Mother     Arthritis Father     Dementia Father     Stroke Father     Heart disease Maternal Grandmother     Diabetes Maternal Grandfather     Heart disease Maternal Grandfather     Stroke Maternal Grandfather     Heart attack Maternal Grandfather     Osteoporosis Paternal Grandmother     Heart disease Paternal Grandfather     Leukemia Maternal Aunt         CLL?    Throat cancer Paternal Uncle     Alcohol abuse Other         multiple uncles/aunts    Malig Hyperthermia Neg Hx        ROS: 14 point review of systems was performed and all other systems were reviewed and are negative except for documented findings in HPI and today's encounter.     Allergies:   Allergies   Allergen Reactions    Augmentin [Amoxicillin-Pot Clavulanate] Hives  "    Constitutional:  Denies fever, shaking or chills   Eyes:  Denies change in visual acuity   HENT:  Denies nasal congestion or sore throat   Respiratory:  Denies cough or shortness of breath   Cardiovascular:  Denies chest pain or severe LE edema   GI:  Denies abdominal pain, nausea, vomiting, bloody stools or diarrhea   Musculoskeletal:  Numbness, tingling, pain, or loss of motor function only as noted above in history of present illness.  : Denies painful urination or hematuria  Integument:  Denies rash, lesion or ulceration   Neurologic:  Denies headache or focal weakness  Endocrine:  Denies lymphadenopathy  Psych:  Denies confusion or change in mental status   Hem:  Denies active bleeding    OBJECTIVE:  Physical Exam: 63 y.o. female  Wt Readings from Last 3 Encounters:   10/18/23 71.6 kg (157 lb 14.4 oz)   10/10/23 71.5 kg (157 lb 9.6 oz)   09/19/23 73 kg (161 lb)     Ht Readings from Last 1 Encounters:   10/18/23 170.2 cm (67\")     Body mass index is 24.73 kg/m².  Vitals:    10/18/23 1520   Temp: 97.8 °F (36.6 °C)     Vital signs reviewed.     General Appearance:    Alert, cooperative, in no acute distress                  Eyes: conjunctiva clear  ENT: external ears and nose atraumatic  CV: no peripheral edema  Resp: normal respiratory effort  Skin: no rashes or wounds; normal turgor  Psych: mood and affect appropriate  Lymph: no nodes appreciated  Neuro: gross sensation intact  Vascular:  Palpable peripheral pulse in noted extremity  Musculoskeletal Extremities: Exam today shows his some swelling and synovitis she has little crepitation she has pretty good range of motion Deysi's negative on today's exam    Radiology:   P lateral 40 degree PA x-ray taken of her right knee in the office today for complaints of pain with comparison views shows arthritis        Assessment:     ICD-10-CM ICD-9-CM   1. Arthritis of right knee  M17.11 716.96        MDM/Plan:   The diagnosis(es), natural history, " pathophysiology and treatment for diagnosis(es) were discussed. Opportunity given and questions answered.  Biomechanics of pertinent body areas discussed.  When appropriate, the use of ambulatory aids discussed.    Biomechanics of pertinent body areas discussed.  When appropriate, the use of ambulatory aids discussed.  Inflammation/pain control; with cold, heat, elevation and/or liniments discussed as appropriate  Cortisone Injection. See procedure note.  MEDICAL RECORDS reviewed from other provider(s) for past and current medical history pertinent to this complaint.      10/18/2023    Dictated utilizing Dragon dictation        Large Joint Arthrocentesis: R knee  Date/Time: 10/18/2023 3:52 PM  Consent given by: patient  Site marked: site marked  Timeout: Immediately prior to procedure a time out was called to verify the correct patient, procedure, equipment, support staff and site/side marked as required   Supporting Documentation  Indications: pain   Procedure Details  Location: knee - R knee  Preparation: Patient was prepped and draped in the usual sterile fashion  Needle gauge: 21g.  Approach: anterolateral  Medications administered: 80 mg methylPREDNISolone acetate 80 MG/ML; 2 mL lidocaine PF 1% 1 %  Patient tolerance: patient tolerated the procedure well with no immediate complications

## 2023-10-19 DIAGNOSIS — G47.00 INSOMNIA, UNSPECIFIED TYPE: ICD-10-CM

## 2023-10-19 RX ORDER — ZOLPIDEM TARTRATE 10 MG/1
5 TABLET ORAL NIGHTLY PRN
Qty: 15 TABLET | Refills: 0 | Status: SHIPPED | OUTPATIENT
Start: 2023-10-19

## 2023-10-19 NOTE — TELEPHONE ENCOUNTER
Caller: Richa Dolan    Relationship: Self    Best call back number: [phone]*    Requested Prescriptions:   Requested Prescriptions     Pending Prescriptions Disp Refills    zolpidem (AMBIEN) 10 MG tablet 15 tablet 0     Sig: Take 0.5 tablets by mouth At Night As Needed for Sleep.        Pharmacy where request should be sent: [id number] [place] [address] [address] AT [facility] [phone] [facility] [address] [phone]      Last office visit with prescribing clinician: 9/19/2023   Last telemedicine visit with prescribing clinician: Visit date not found   Next office visit with prescribing clinician: 12/5/2023     Additional details provided by patient: PATIENT WILL BE OUT OF MEDICATION AFTER TONIGHT    Does the patient have less than a 3 day supply:  [x] Yes  [] No    Would you like a call back once the refill request has been completed: [x] Yes [] No    If the office needs to give you a call back, can they leave a voicemail: [x] Yes [] No    Enzo Angel   10/19/23 09:22 EDT

## 2023-10-19 NOTE — TELEPHONE ENCOUNTER
Rx Refill Note  Requested Prescriptions     Pending Prescriptions Disp Refills    zolpidem (AMBIEN) 10 MG tablet 15 tablet 0     Sig: Take 0.5 tablets by mouth At Night As Needed for Sleep.      Last office visit with prescribing clinician: 9/19/2023   Last telemedicine visit with prescribing clinician: Visit date not found   Next office visit with prescribing clinician: 12/5/2023                         Would you like a call back once the refill request has been completed: [] Yes [] No    If the office needs to give you a call back, can they leave a voicemail: [] Yes [] No    Horacio Fernández CMA/MEMO  10/19/23, 09:24 EDT

## 2023-11-13 ENCOUNTER — TELEPHONE (OUTPATIENT)
Dept: FAMILY MEDICINE CLINIC | Facility: CLINIC | Age: 63
End: 2023-11-13

## 2023-11-13 NOTE — TELEPHONE ENCOUNTER
Caller: [facility]    Relationship: Other    Best call back number: [phone]     What is the best time to reach you: ANYTIME BETWEEN 10:00 AM- 6:30 PM    Who are you requesting to speak with (clinical staff, provider,  specific staff member): CLINICAL    Do you know the name of the person who called: VANESA    What was the call regarding: VANESA STATED SHE WOULD LIKE TO INFORM CLINICAL TEAM SHE IS CLOSING THE CURRENT CASE FOR THE [PATIENT.    PLEASE CALL IF NEEDED TO DISCUSS.

## 2023-11-14 ENCOUNTER — OFFICE VISIT (OUTPATIENT)
Dept: FAMILY MEDICINE CLINIC | Facility: CLINIC | Age: 63
End: 2023-11-14
Payer: COMMERCIAL

## 2023-11-14 VITALS
DIASTOLIC BLOOD PRESSURE: 86 MMHG | HEIGHT: 66 IN | TEMPERATURE: 97.7 F | SYSTOLIC BLOOD PRESSURE: 138 MMHG | OXYGEN SATURATION: 99 % | BODY MASS INDEX: 25.23 KG/M2 | RESPIRATION RATE: 16 BRPM | HEART RATE: 73 BPM | WEIGHT: 157 LBS

## 2023-11-14 DIAGNOSIS — S63.502A SPRAIN OF LEFT WRIST, INITIAL ENCOUNTER: Primary | ICD-10-CM

## 2023-11-14 DIAGNOSIS — M25.532 WRIST PAIN, ACUTE, LEFT: ICD-10-CM

## 2023-11-14 RX ORDER — PREDNISONE 20 MG/1
40 TABLET ORAL DAILY
Qty: 10 TABLET | Refills: 0 | Status: SHIPPED | OUTPATIENT
Start: 2023-11-14 | End: 2023-11-19

## 2023-11-14 RX ORDER — MELOXICAM 15 MG/1
15 TABLET ORAL DAILY
Qty: 30 TABLET | Refills: 0 | Status: SHIPPED | OUTPATIENT
Start: 2023-11-14

## 2023-11-14 NOTE — PROGRESS NOTES
Subjective     Richa Dolan is a 63 y.o. female.     Chief Complaint   Patient presents with    Pain     Left wrist pain since nov 5th also discuss Covid vaccine       History of Present Illness       C/o Lt wrist pain after she fell on 11/5, hurts her both knees and lt wrist.   went to  , XR for both knees and lt wrist did not show # but showed arthritis.  Pain 3-6/10, worse when she use her lt hand.  No weakness , no numbness     She is wearing wrist splint    The following portions of the patient's history were reviewed and updated as appropriate: allergies, current medications, past family history, past medical history, past social history, past surgical history, and problem list.        Review of Systems   Musculoskeletal:  Positive for arthralgias.       Vitals:    11/14/23 1602   BP: 138/86   Pulse: 73   Resp: 16   Temp: 97.7 °F (36.5 °C)   SpO2: 99%           11/14/23  1602   Weight: 71.2 kg (157 lb)         Body mass index is 25.35 kg/m².      Current Outpatient Medications   Medication Sig Dispense Refill    AIMOVIG 140 MG/ML solution auto-injector Inject 1 mL under the skin into the appropriate area as directed Every 30 (Thirty) Days. HAS NOT TAKEN IN SEVERAL MONTHS  11    amitriptyline (ELAVIL) 25 MG tablet Take 1 tablet by mouth Every Night. 90 tablet 0    Aspirin 81 MG capsule Take 1 tablet by mouth Daily.      atorvastatin (LIPITOR) 20 MG tablet TAKE 1 TABLET BY MOUTH EVERY DAY AT NIGHT 90 tablet 1    baclofen (LIORESAL) 10 MG tablet Take 1 tablet by mouth 2 (Two) Times a Day.  2    CALCIUM-MAGNESIUM-VITAMIN D PO Take  by mouth.      colesevelam (WELCHOL) 625 MG tablet Take 2 tablets by mouth Daily. 180 tablet 11    Diclofenac Sodium (VOLTAREN) 1 % gel gel Apply 2 g topically to the appropriate area as directed As Needed.      DULoxetine (Cymbalta) 30 MG capsule Take 1 capsule by mouth Take As Directed. 1 capsule po q AM, 2 capsules PO q hs 270 capsule 0    exemestane (AROMASIN) 25 MG tablet  TAKE 1 TABLET BY MOUTH EVERY DAY 90 tablet 2    fluticasone (FLONASE) 50 MCG/ACT nasal spray SPRAY 2 SPRAYS INTO THE NOSTRIL AS DIRECTED BY PROVIDER DAILY. 48 mL 3    Loratadine (CLARITIN PO) Take 10 mg by mouth Daily.      omeprazole (priLOSEC) 20 MG capsule TAKE 1 CAPSULE BY MOUTH EVERY DAY 90 capsule 1    oxyCODONE-acetaminophen (PERCOCET)  MG per tablet Take 1 tablet by mouth 4 (Four) Times a Day.      ubrogepant (UBRELVY) 100 MG tablet Take 1 tablet by mouth.      vitamin B-12 (CYANOCOBALAMIN) 1000 MCG tablet Take 1 tablet by mouth Daily.      vitamin B-6 (pyridoxine) 50 MG tablet Take 1 tablet by mouth Daily. 90 tablet 2    zolpidem (AMBIEN) 10 MG tablet Take 0.5 tablets by mouth At Night As Needed for Sleep. 15 tablet 0    meloxicam (Mobic) 15 MG tablet Take 1 tablet by mouth Daily. 30 tablet 0    predniSONE (DELTASONE) 20 MG tablet Take 2 tablets by mouth Daily for 5 days. 10 tablet 0     No current facility-administered medications for this visit.                Objective   Physical Exam  Vitals and nursing note reviewed.   Constitutional:       General: She is not in acute distress.     Appearance: She is not toxic-appearing.   Cardiovascular:      Pulses: Normal pulses.   Musculoskeletal:         General: Swelling and tenderness present.      Comments: Mild swelling with TTP of the lt wrist with mild limitation in ROM 2/2 pain   Neurological:      Mental Status: She is alert and oriented to person, place, and time.   Psychiatric:         Mood and Affect: Mood normal.         Behavior: Behavior normal.         Thought Content: Thought content normal.           Assessment & Plan   Diagnoses and all orders for this visit:    1. Sprain of left wrist, initial encounter (Primary)  Comments:  continue wearing wrist splint  Orders:  -     meloxicam (Mobic) 15 MG tablet; Take 1 tablet by mouth Daily.  Dispense: 30 tablet; Refill: 0  -     predniSONE (DELTASONE) 20 MG tablet; Take 2 tablets by mouth Daily for  5 days.  Dispense: 10 tablet; Refill: 0    2. Wrist pain, acute, left  -     predniSONE (DELTASONE) 20 MG tablet; Take 2 tablets by mouth Daily for 5 days.  Dispense: 10 tablet; Refill: 0          I have fully discussed the nature of the medical condition(s) risks, complications, management, safe and proper use of medications.   Pt stated no allergy to the above prescribed medication.  I have discussed the SIDE EFFECT OF MEDICATION and importance TO report any side effect , the patient expressed good understanding.  Encouraged medication compliance and the importance of keeping scheduled follow up appointments with me and any other providers.    Patient instructed to follow up with our office for results on any labs/imaging ordered during this visit.    Home care discussed  All questions answered  Patient verbalizes understanding and agrees to treatment plan.     Follow up: Return in about 2 weeks (around 11/28/2023) for follow up on current illness.

## 2023-11-16 ENCOUNTER — INFUSION (OUTPATIENT)
Dept: ONCOLOGY | Facility: HOSPITAL | Age: 63
End: 2023-11-16
Payer: COMMERCIAL

## 2023-11-16 ENCOUNTER — LAB (OUTPATIENT)
Dept: LAB | Facility: HOSPITAL | Age: 63
End: 2023-11-16
Payer: COMMERCIAL

## 2023-11-16 ENCOUNTER — OFFICE VISIT (OUTPATIENT)
Dept: ONCOLOGY | Facility: CLINIC | Age: 63
End: 2023-11-16
Payer: COMMERCIAL

## 2023-11-16 VITALS
WEIGHT: 159.5 LBS | SYSTOLIC BLOOD PRESSURE: 157 MMHG | BODY MASS INDEX: 25.63 KG/M2 | RESPIRATION RATE: 16 BRPM | OXYGEN SATURATION: 98 % | TEMPERATURE: 98.6 F | HEART RATE: 78 BPM | HEIGHT: 66 IN | DIASTOLIC BLOOD PRESSURE: 89 MMHG

## 2023-11-16 DIAGNOSIS — Z79.811 USE OF EXEMESTANE (AROMASIN): ICD-10-CM

## 2023-11-16 DIAGNOSIS — M81.8 OTHER OSTEOPOROSIS WITHOUT CURRENT PATHOLOGICAL FRACTURE: ICD-10-CM

## 2023-11-16 DIAGNOSIS — Z17.0 MALIGNANT NEOPLASM OF OVERLAPPING SITES OF LEFT BREAST IN FEMALE, ESTROGEN RECEPTOR POSITIVE: Primary | ICD-10-CM

## 2023-11-16 DIAGNOSIS — Z85.3 HISTORY OF LEFT BREAST CANCER: ICD-10-CM

## 2023-11-16 DIAGNOSIS — Z17.0 MALIGNANT NEOPLASM OF OVERLAPPING SITES OF LEFT BREAST IN FEMALE, ESTROGEN RECEPTOR POSITIVE: ICD-10-CM

## 2023-11-16 DIAGNOSIS — Z12.31 SCREENING MAMMOGRAM FOR BREAST CANCER: ICD-10-CM

## 2023-11-16 DIAGNOSIS — Z79.811 USE OF EXEMESTANE (AROMASIN): Primary | ICD-10-CM

## 2023-11-16 DIAGNOSIS — C50.812 MALIGNANT NEOPLASM OF OVERLAPPING SITES OF LEFT BREAST IN FEMALE, ESTROGEN RECEPTOR POSITIVE: ICD-10-CM

## 2023-11-16 DIAGNOSIS — C50.812 MALIGNANT NEOPLASM OF OVERLAPPING SITES OF LEFT BREAST IN FEMALE, ESTROGEN RECEPTOR POSITIVE: Primary | ICD-10-CM

## 2023-11-16 LAB
ALBUMIN SERPL-MCNC: 4.5 G/DL (ref 3.5–5.2)
ALBUMIN/GLOB SERPL: 1.6 G/DL
ALP SERPL-CCNC: 78 U/L (ref 39–117)
ALT SERPL W P-5'-P-CCNC: 17 U/L (ref 1–33)
ANION GAP SERPL CALCULATED.3IONS-SCNC: 12.3 MMOL/L (ref 5–15)
AST SERPL-CCNC: 18 U/L (ref 1–32)
BASOPHILS # BLD AUTO: 0.02 10*3/MM3 (ref 0–0.2)
BASOPHILS NFR BLD AUTO: 0.2 % (ref 0–1.5)
BILIRUB SERPL-MCNC: 0.3 MG/DL (ref 0–1.2)
BUN SERPL-MCNC: 11 MG/DL (ref 8–23)
BUN/CREAT SERPL: 15.3 (ref 7–25)
CALCIUM SPEC-SCNC: 9.3 MG/DL (ref 8.6–10.5)
CHLORIDE SERPL-SCNC: 104 MMOL/L (ref 98–107)
CO2 SERPL-SCNC: 27.7 MMOL/L (ref 22–29)
CREAT SERPL-MCNC: 0.72 MG/DL (ref 0.6–1.1)
DEPRECATED RDW RBC AUTO: 49.3 FL (ref 37–54)
EGFRCR SERPLBLD CKD-EPI 2021: 94.1 ML/MIN/1.73
EOSINOPHIL # BLD AUTO: 0.03 10*3/MM3 (ref 0–0.4)
EOSINOPHIL NFR BLD AUTO: 0.3 % (ref 0.3–6.2)
ERYTHROCYTE [DISTWIDTH] IN BLOOD BY AUTOMATED COUNT: 12.8 % (ref 12.3–15.4)
GLOBULIN UR ELPH-MCNC: 2.8 GM/DL
GLUCOSE SERPL-MCNC: 127 MG/DL (ref 65–99)
HCT VFR BLD AUTO: 41.2 % (ref 34–46.6)
HGB BLD-MCNC: 13.6 G/DL (ref 12–15.9)
IMM GRANULOCYTES # BLD AUTO: 0.05 10*3/MM3 (ref 0–0.05)
IMM GRANULOCYTES NFR BLD AUTO: 0.4 % (ref 0–0.5)
LYMPHOCYTES # BLD AUTO: 2.19 10*3/MM3 (ref 0.7–3.1)
LYMPHOCYTES NFR BLD AUTO: 19.6 % (ref 19.6–45.3)
MAGNESIUM SERPL-MCNC: 2.2 MG/DL (ref 1.6–2.4)
MCH RBC QN AUTO: 34.8 PG (ref 26.6–33)
MCHC RBC AUTO-ENTMCNC: 33 G/DL (ref 31.5–35.7)
MCV RBC AUTO: 105.4 FL (ref 79–97)
MONOCYTES # BLD AUTO: 0.76 10*3/MM3 (ref 0.1–0.9)
MONOCYTES NFR BLD AUTO: 6.8 % (ref 5–12)
NEUTROPHILS NFR BLD AUTO: 72.7 % (ref 42.7–76)
NEUTROPHILS NFR BLD AUTO: 8.1 10*3/MM3 (ref 1.7–7)
NRBC BLD AUTO-RTO: 0 /100 WBC (ref 0–0.2)
PHOSPHATE SERPL-MCNC: 3.3 MG/DL (ref 2.5–4.5)
PLATELET # BLD AUTO: 290 10*3/MM3 (ref 140–450)
PMV BLD AUTO: 9.1 FL (ref 6–12)
POTASSIUM SERPL-SCNC: 3.6 MMOL/L (ref 3.5–5.2)
PROT SERPL-MCNC: 7.3 G/DL (ref 6–8.5)
RBC # BLD AUTO: 3.91 10*6/MM3 (ref 3.77–5.28)
SODIUM SERPL-SCNC: 144 MMOL/L (ref 136–145)
WBC NRBC COR # BLD AUTO: 11.15 10*3/MM3 (ref 3.4–10.8)

## 2023-11-16 PROCEDURE — 80053 COMPREHEN METABOLIC PANEL: CPT

## 2023-11-16 PROCEDURE — 36415 COLL VENOUS BLD VENIPUNCTURE: CPT

## 2023-11-16 PROCEDURE — 83735 ASSAY OF MAGNESIUM: CPT

## 2023-11-16 PROCEDURE — 84100 ASSAY OF PHOSPHORUS: CPT

## 2023-11-16 PROCEDURE — 25010000002 DENOSUMAB 60 MG/ML SOLUTION PREFILLED SYRINGE: Performed by: INTERNAL MEDICINE

## 2023-11-16 PROCEDURE — 85025 COMPLETE CBC W/AUTO DIFF WBC: CPT

## 2023-11-16 PROCEDURE — 96372 THER/PROPH/DIAG INJ SC/IM: CPT

## 2023-11-16 RX ORDER — ABACAVIR SULFATE, DOLUTEGRAVIR SODIUM, LAMIVUDINE 600; 50; 300 MG/1; MG/1; MG/1
1 TABLET, FILM COATED ORAL DAILY
COMMUNITY
Start: 2023-11-13

## 2023-11-16 RX ADMIN — DENOSUMAB 60 MG: 60 INJECTION SUBCUTANEOUS at 15:23

## 2023-11-16 NOTE — PROGRESS NOTES
Subjective       REASON FOR FOLLOW UP:    1.  T2N1 invasive ductal carcinoma of the left breast.  Ultrasound-showed 3.8 x 3.1 x 2.5 cm mass at 4 o'clock position with 2 abnormal axillary lymph nodes, larger lymph node being 1.4 cm.  Left breast biopsy and axillary lymph node biopsy October 29, 2019, invasive ductal carcinoma, moderately differentiated, grade 2, ER 85%, NE 10%, HER-2/merna 2+, HER-2 FISH negative.  12/06/19: Neoadjuvant chemotherapy, cycle #1 of dose-dense Adriamycin/Cytoxan with Neulasta for marrow support    01/17/20: Last cycle of Adriamycin/Cytoxan given  January 31, 2020: Cycle 1 Taxol  April 24, 2020: Last cycle, cycle 12 of Taxol  Patient is s/p left mastectomy with axillary dissection with reconstruction.  She is healing up nicely.  She has a drain in place.  Pathology showed invasive breast cancer which is 55 x 40 mm in size, grade 2 with focal lymphovascular space invasion with DCIS spanning over 36 x 6 mm.  All margins were negative for cancer.  8 of the additional lymph nodes out of 11 were positive with the largest micrometastasis measuring 5 mm.  Extranodal extension was seen.  Biomarkers on post neoadjuvant tumor is ER 81-90% NE 5% HER-2/merna 2+ by immunohistochemistry and HER-2/merna negative by FISH.  Radiation July 6, 2020-August 17, 2020.  Letrozole initiated July 2020.  Letrozole discontinued October 2020 secondary to severe joint pain.  October 30, 2020 patient starting tamoxifen and tolerating well  August 2021: Tamoxifen discontinued secondary to depression and mental fogginess and fatigue  September 21, 2021: Arimidex started  Severe arthralgias secondary to Arimidex, will start Aromasin  Patient is tolerating Aromasin well    2.  HIV, followed by Dr. Dawn from infectious disease.  Well controlled on meds    3.  Screening mammogram December 7, 2020 showed indeterminate calcifications lower outer middle left breast. Diagnostic mammogram showed 0.4 cm calcifications in the  left breast.  Stereotactic biopsy January 8, 2021 consistent with organizing fat necrosis.              HISTORY OF PRESENT ILLNESS:      Patient is a 63-year-old female with pathologic T2N1 invasive ductal carcinoma ER/MI positive HER2 negative currently on Aromasin and tolerating well.  She did have neuropathy related to Taxol.  There is element of possible foot drop as she has a tendency to fall.  She fractured her left hand because she fell and also tore a tendon in the right foot.  We discussed about consideration of referral to physical therapy for ankle boot orthosis.  Also discussed with her to follow-up with orthopedics as she missed the appointment.    She does not have any arthralgias related to Aromasin and tolerates it well.  She has been following up with Dr. Franco for her HIV and it is very stable.    Past Medical History:   Diagnosis Date    Anxiety     Arthritis of back 2010    Cerebrovascular accident (CVA) 08/22/2017    NO RESIDUAL AFFECT    DDD (degenerative disc disease), cervical     Depression     Drug therapy     Elevated cholesterol     Fat necrosis of left breast     GERD (gastroesophageal reflux disease)     H/O ICH (intracerebral hemorrhage) (CMS/HCC)     Heart murmur 1975    Hip arthrosis 2015    History of chemotherapy     History of heart murmur in childhood     History of stroke     PATIENT STATES HAD A MINI STROKE, NO RESIDUAL EFFECTS    History of thrombocytopenia     HIV (human immunodeficiency virus infection) 1996    Hx of radiation therapy     Hyperlipidemia     Insomnia     Knee swelling 2017    meniscus tear    Lupus anticoagulant positive     Malignant neoplasm of overlapping sites of left breast in female, estrogen receptor positive 11/12/2019    Meniscus tear 2017    RIGHT    Migraine     Neck pain     WITH SHOOTING PAIN LEFT RIGHT ARM    Neck strain 2001    work injury    Neuropathy     Osteoarthritis     Osteoporosis     Pain management     sees 1 every 3 months for  scripts/injection/pain meds    Periarthritis of shoulder 2010    Pneumonia 1989    Seasonal allergies     Spinal headache     THINKS HAD BLOOD PATCH AFTER EPIDUAL     Stress fracture 2010    three times    Tear of meniscus of knee 2012 & 2017    Tendinitis of knee 2000    physical therapy many times    Tick bite 06/2014    Upper back pain     Vertigo      PAST MEDICAL HISTORY:  She had a stroke in July 2017 with headache and dizziness.  She went to the ER and was placed on aspirin.  However, she stopped taking it two weeks ago.  She has been taking Aimovig (injection) monthly with Fariba López at [place].    In 1994, patient was diagnosed with HIV with an unknown cause which is managed by Dr. Natali Subramanian.  As of now, her viral load is good.    Patient has arthritis.  She gets trigger-point injections in her back with her last one being 2 weeks ago.  She has had multiple ablations.  She also has pain in her SI joint and Dr. Bermudez performed a surgery on this.  She takes Narco for the pain.      She is osteoporotic.  She has had multiple hairline fractures in both her legs.  She had her knees cleaned out by Dr. Sinha.     Patient has had a hysterectomy and still has both of her ovaries.    Patient has vertigo and the muscles in her left ear are weak.  She just has an imbalance.    Past Surgical History:   Procedure Laterality Date    ADENOIDECTOMY      BREAST LUMPECTOMY      BREAST LUMPECTOMY WITH SENTINEL NODE BIOPSY Left 05/28/2020    Procedure: BREAST LUMPECTOMY WITH SENTINEL NODE BIOPSY AND NEEDLE LOCALIZATION AND axillary dissection;  Surgeon: Landen Forman MD;  Location: [facility] OR;  Service: General;  Laterality: Left;    BREAST SURGERY Left 05/28/2020    Procedure: LEFT LATERAL INTERCOASTAL ARTERY  flap ;  Surgeon: Yusuf Garcia MD;  Location: [facility] OR;  Service: Plastics;  Laterality: Left;    CHOLECYSTECTOMY      HYSTERECTOMY      KNEE ARTHROSCOPY Right 03/24/2017     Procedure:  RIGHT  KNEE ARTHROSCOPY WITH DEBRIDEMENT CHONDROPLASTY PARTIAL MENISCECTOMY;  Surgeon: Karl Galeas MD;  Location: [facility];  Service:     KNEE CARTILAGE SURGERY Right 2007    TONSILLECTOMY      TONSILLECTOMY  1974    TRIGGER POINT INJECTION  2002 - 2023    US GUIDED LYMPH NODE BIOPSY  10/29/2019    LEFT BREAST    VENOUS ACCESS DEVICE (PORT) INSERTION Right 12/02/2019    Procedure: INSERTION VENOUS ACCESS DEVICE RIGHT;  Surgeon: Landen Forman MD;  Location: [facility] MAIN OR;  Service: General    VENOUS ACCESS DEVICE (PORT) REMOVAL N/A 05/25/2022    Procedure: Mediport Removal;  Surgeon: Cesar Vasquez Jr., MD;  Location: [facility] OR;  Service: General;  Laterality: N/A;       ONCOLOGIC HISTORY:  Patient is a 59-year-old female who has had a history of HIV since age 34 followed by Dr. Natali Ng at [facility] and was on multiple HIV drugs initially but more recently has been on a single medication TRIUMEQ, with well-controlled HIV.  She follows up with Dr. Dawn , infectious disease who saw her recently and he saw her on 4/8/2019 and has excellent control and suppression of her viral load.  She is very compliant with her medications.    She also has had history of stroke in 2017 for which she was placed on aspirin.  She presented with a headache.  She is very active and works at [employer] full-time.  She also has had history of vertigo in the past  Patient's PCP is Zach Lora.    Patient first noticed the mass in her left breast 5 months ago.  Her last mammogram was 5 years ago.  She had soreness in the left breast and she went to her primary care physician who then ordered a mammogram diagnostic and an ultrasound.  The diagnostic mammogram showed a 3.7 cm mass at 4 o'clock position in the lower outer quadrant of the left breast.  The ultrasound showed 3.8 cm x 3.1 x 2.5 cm mass at 4 o'clock position in the left breast with 2 abnormal appearing  left axillary lymph nodes the largest being 1.4 cm.    She then underwent biopsy of both the breast mass as well as the left axillary lymph node and both of them are positive for invasive mammary carcinoma it is invasive ductal carcinoma with apocrine features, moderately differentiated, grade 2 of 3 with a Lovell score of 5.  The left axillary lymph node is also consistent with metastatic mammary carcinoma.  It is ER positive OR positive HER-2/merna negative.  Details as follows    10/21/19 - Bilateral Diagnostic Mammogram and US Breast Left  FINDINGS: Bilateral digital CC and MLO mammographic images were  obtained. No prior examination is available for comparison. Scattered  fibroglandular densities are seen throughout both breasts. A triangular  skin marker represents the area of palpable concern in the lower outer  quadrant of the left breast in the posterior 1/3. At this location there  is an irregular mass that measures on the order of 3.7 cm in greatest  dimension. Internal calcifications are noted. No suspicious findings in  the right breast are appreciated.     ULTRASOUND: Targeted sonographic evaluation of the left breast was  performed through the area of concern in the left axilla. At the 4  o'clock position on the order of 6 cm from the nipple there is an  irregular hypoechoic mass that measures 3.8 x 3.1 x 2.5 cm. Internal  vascularity is noted.     There are 2 abnormal-appearing left axillary lymph nodes, the largest  which measures on the order of 1.4 cm in greatest dimension.     IMPRESSION:  1. There is a 3.8 cm irregular mass in the left breast at the 4 o'clock  position. This is seen in conjunction with an irregular 1.4 cm lymph  node in the left axilla. This is suspicious for malignancy with left  axillary involvement. Correlation with ultrasound-guided biopsy of both  the left breast mass and the lymph node is recommended.  2. There are no findings suspicious for malignancy in the right  breast.     BI-RADS CATEGORY 5:     Patient's labs from 11/12/19 are normal.    10/29/19 - Tissue Pathology  1. Left Breast, 4:00, 6 cm FN, U/S-Guided Core Needle Biopsy for a Mass:  A. INVASIVE DUCTAL CARCINOMA WITH APOCRINE FEATURES, Moderately differentiated;  Jen Histologic Grade II/III (tubule score = 3, nuclear score = 2, mitoses score = 1), measuring at least  9 mm.  B. No ductal or lobular carcinoma in situ is identified in this sample.  C. Focus suspicious for lymphovascular space invasion.  D. See Biomarker Template for hormone receptor studies.  2. Lymph Node, Left Axilla, U/S-Guided Core Needle Biopsy:  A. METASTATIC MAMMARY CARCINOMA (see Comment).  B. Metastatic focus measures 5 mm in greatest extent.  ER+, 85%  NV+, 10%  HER2- Score 2+    11/13/19 - CT Neck Chest Abdomen Pelvis  IMPRESSION:  1. In addition to the irregular approximately 3.8 cm mass within the  lateral aspect of the left breast, there are a few subcentimeter  asymmetric nodules along the lateral margin of the glandular tissue. The  several asymmetric left subpectoral nodes and 1.2 x 1.0 cm left axillary  node are suspicious for tamar metastases. The asymmetric subcentimeter  left supraclavicular and left posterior cervical triangle nodes are  worrisome as well.  2. There is no convincing evidence for metastatic disease within the  abdomen or pelvis.    11/14/19 - Echocardiogram:  Normal.  Calculated EF = 67%.  There is trace mitral valve and tricuspid valve regurgitation.    11/15/19 - Bone Scan  Negative.    11/18/19 - MRI Breast Bilateral  IMPRESSION:  1. Biopsy-proven malignancy in the left breast centered at the 4 o'clock  position with associated surrounding satellite nodules as described. The  entire region of involvement including the satellite nodules and the  dominant mass measure up to 7.5 cm in greatest dimension. Also, left  axillary adenopathy with multiple irregular lymph nodes and loss of  differentiation of the  borders of multiple lymph nodes is noted and this  is suspicious for extranodal involvement.  2. There are no findings suspicious for malignancy in the right breast.  BI-RADS CATEGORY 6      19: Cycle #1 of Adriamycin/Cytoxan    20: Last cycle of Adriamycin/Cytoxan given without Neulasta.  We will switch from Neulasta to 7 days of Neupogen injections after cycle 4.    We reviewed with patient the US Breast from 20 which shows mild interval partial response to neoadjuvant chemotherapy.      20: Initiated cycle 1 Taxol  2020: Last cycle of Taxol, cycle 12    S/p left mastectomy with axillary dissection   Pathology showed invasive breast cancer which is 55 x 40 mm in size, grade 2 with focal lymphovascular space invasion with DCIS spanning over 36 x 6 mm.  All margins were negative for cancer.  8 of the additional lymph nodes out of 11 were positive with the largest micrometastasis measuring 5 mm.  Extranodal extension was seen.    Biomarkers on post neoadjuvant tumor is ER 81-90% KS 5% HER-2/merna 2+ by immunohistochemistry and HER-2/merna negative by FISH.    2020: Started letrozole but because of severe joint pains was discontinued 2020    End of 2020 started tamoxifen    OB-GYN:  Menarche 15 years  Menopause-46  Pregnancies- 1 para 1 no miscarriages her first pregnancy was in  at 29 years of age.  She did not breastfeed.  Post menopausal HRT- None.  Hx of birth control pills- Yes.    Current Outpatient Medications on File Prior to Visit   Medication Sig Dispense Refill    AIMOVIG 140 MG/ML solution auto-injector Inject 1 mL under the skin into the appropriate area as directed Every 30 (Thirty) Days. HAS NOT TAKEN IN SEVERAL MONTHS  11    amitriptyline (ELAVIL) 25 MG tablet Take 1 tablet by mouth Every Night. 90 tablet 0    Aspirin 81 MG capsule Take 1 tablet by mouth Daily.      atorvastatin (LIPITOR) 20 MG tablet TAKE 1 TABLET BY MOUTH EVERY DAY AT  NIGHT 90 tablet 1    baclofen (LIORESAL) 10 MG tablet Take 1 tablet by mouth 2 (Two) Times a Day.  2    CALCIUM-MAGNESIUM-VITAMIN D PO Take  by mouth.      colesevelam (WELCHOL) 625 MG tablet Take 2 tablets by mouth Daily. 180 tablet 11    Diclofenac Sodium (VOLTAREN) 1 % gel gel Apply 2 g topically to the appropriate area as directed As Needed.      DULoxetine (Cymbalta) 30 MG capsule Take 1 capsule by mouth Take As Directed. 1 capsule po q AM, 2 capsules PO q hs 270 capsule 0    exemestane (AROMASIN) 25 MG tablet TAKE 1 TABLET BY MOUTH EVERY DAY 90 tablet 2    fluticasone (FLONASE) 50 MCG/ACT nasal spray SPRAY 2 SPRAYS INTO THE NOSTRIL AS DIRECTED BY PROVIDER DAILY. 48 mL 3    Loratadine (CLARITIN PO) Take 10 mg by mouth Daily.      meloxicam (Mobic) 15 MG tablet Take 1 tablet by mouth Daily. 30 tablet 0    omeprazole (priLOSEC) 20 MG capsule TAKE 1 CAPSULE BY MOUTH EVERY DAY 90 capsule 1    oxyCODONE-acetaminophen (PERCOCET)  MG per tablet Take 1 tablet by mouth 4 (Four) Times a Day.      predniSONE (DELTASONE) 20 MG tablet Take 2 tablets by mouth Daily for 5 days. 10 tablet 0    Triumeq 600- MG per tablet Take 1 tablet by mouth Daily.      ubrogepant (UBRELVY) 100 MG tablet Take 1 tablet by mouth.      vitamin B-12 (CYANOCOBALAMIN) 1000 MCG tablet Take 1 tablet by mouth Daily.      vitamin B-6 (pyridoxine) 50 MG tablet Take 1 tablet by mouth Daily. 90 tablet 2    zolpidem (AMBIEN) 10 MG tablet Take 0.5 tablets by mouth At Night As Needed for Sleep. 15 tablet 0     No current facility-administered medications on file prior to visit.        ALLERGIES:    Allergies   Allergen Reactions    Augmentin [Amoxicillin-Pot Clavulanate] Hives        Social History     Socioeconomic History    Marital status:      Spouse name: Owen    Number of children: 1    Years of education: College   Tobacco Use    Smoking status: Never     Passive exposure: Never    Smokeless tobacco: Never    Tobacco comments:      "never   Vaping Use    Vaping Use: Never used   Substance and Sexual Activity    Alcohol use: Yes     Alcohol/week: 1.0 standard drink of alcohol     Types: 1 Drinks containing 0.5 oz of alcohol per week     Comment: once a week    Drug use: No    Sexual activity: Yes     Partners: Male     Birth control/protection: None, Hysterectomy     Comment:      Patient does not smoke or do drugs.  She does drink occasionally.    Family History   Problem Relation Age of Onset    Kidney disease Mother     Hypertension Mother     Heart disease Mother     Arthritis Mother     Breast cancer Mother     Leukemia Mother         CLL?    Diabetes Mother     Hyperthyroidism Mother     Hearing loss Mother     Cancer Mother         breast    Rheumatologic disease Mother     Arthritis Father     Dementia Father     Stroke Father     Heart disease Maternal Grandmother     Diabetes Maternal Grandfather     Heart disease Maternal Grandfather     Stroke Maternal Grandfather     Heart attack Maternal Grandfather     Osteoporosis Paternal Grandmother     Heart disease Paternal Grandfather     Leukemia Maternal Aunt         CLL?    Throat cancer Paternal Uncle     Alcohol abuse Other         multiple uncles/aunts    Malig Hyperthermia Neg Hx       FAMILY HISTORY:  Her mother had breast cancer at age 60, still alive at 85 and in good health..  Her father had dementia.  Her paternal uncle had prostate cancer.  Her brother had esophageal cancer.    I have reviewed the patient's medical history in detail and updated the computerized patient record.     ROS: as per HPI    Objective    Vitals:    11/16/23 1434   BP: 157/89   Pulse: 78   Resp: 16   Temp: 98.6 °F (37 °C)   TempSrc: Temporal   SpO2: 98%   Weight: 72.3 kg (159 lb 8 oz)   Height: 167.6 cm (65.98\")   PainSc: 0-No pain           Review of Systems   Constitutional:  Negative for appetite change, chills, diaphoresis, fatigue, fever and unexpected weight change.   HENT:  Negative for " hearing loss, sore throat and trouble swallowing.    Respiratory:  Negative for cough, chest tightness, shortness of breath and wheezing.    Cardiovascular:  Negative for chest pain, palpitations and leg swelling.   Gastrointestinal:  Negative for abdominal distention, abdominal pain, constipation, diarrhea, nausea and vomiting.   Genitourinary:  Negative for dysuria, frequency, hematuria and urgency.   Musculoskeletal:  Negative for joint swelling.        No muscle weakness.   Skin:  Negative for rash and wound.   Neurological:  Positive for numbness (Bilat foot). Negative for seizures, syncope, speech difficulty, weakness and headaches.   Hematological:  Negative for adenopathy. Does not bruise/bleed easily.   Psychiatric/Behavioral:  Positive for sleep disturbance. Negative for behavioral problems, confusion and suicidal ideas.    All other systems reviewed and are negative.    ROS reviewed and updated 11/16/23      Physical Exam:  CONSTITUTIONAL:  Vital signs reviewed.  No distress, looks comfortable.  RESPIRATORY:  Normal respiratory effort.  Lungs clear to auscultation bilaterally.  BREAST: Right breast: No skin changes, no evidence of breast mass, no nipple discharge, no evidence of any right axillary adenopathy or right supraclavicular adenopathy  Left breast: No evidence of any skin changes, no evidence of any left breast mass and no evidence of left nipple discharge as well as no left axillary adenopathy or left supraclavicular adenopathy.  CARDIOVASCULAR:  Normal S1, S2.  No murmurs rubs or gallops.  No significant lower extremity edema.  GASTROINTESTINAL: Abdomen appears unremarkable.  Nontender.  No hepatomegaly.  No splenomegaly.  LYMPHATIC:  No cervical, supraclavicular, axillary lymphadenopathy.  SKIN:  Warm.  No rashes.  PSYCHIATRIC:  Normal judgment and insight.  Normal mood and affect.    I have reexamined the patient and the results are consistent with the previously documented exam. Ruby  MD T[place]     RECENT LABS:   Results from last 7 days   Lab Units 11/16/23  1422   WBC 10*3/mm3 11.15*   NEUTROS ABS 10*3/mm3 8.10*   HEMOGLOBIN g/dL 13.6   HEMATOCRIT % 41.2   PLATELETS 10*3/mm3 290               Assessment & Plan    * T2N1 invasive ductal carcinoma of the left breast, recently diagnosed.    -Noticed mass in the left breast x5 months  -Diagnostic mammogram showed 3.7 mm mass in the left breast at 4 o'clock position  -Ultrasound-showed 3.8 x 3.1 x 2.5 cm mass at 4 o'clock position with 2 abnormal axillary lymph nodes, larger lymph node being 1.4 cm  -Left breast biopsy and axillary lymph node biopsy October 29, 2019, invasive ductal carcinoma, moderately differentiated, grade 2, ER 85%, MI 10%, HER-2/merna 2+, HER-2 FISH negative  CT scans from 11/13/19 show some asymmetric nodules along the lateral margin of the glandular tissue.  The 1.2x1.0 cm left axillary nodes, left supraclavicular, and left posterior cervical triangle nodes are suspicious for tamar metastases.    bone scan from 11/15/19 which was negative.    MRI breast from 11/18/19 which shows the biopsy-proven malignancy in the left breast centered at the 4:00 position.  The region of involvement measures up to 7.5 cm.  There is left axillary adenopathy with multiple irregular lymph nodes and loss of differentiation of the border of multiple lymph nodes which are suspicious for extranodal involvement.     echocardiogram from 11/14/19 which was normal with an ejection fraction of 67%.    INVITAE from 11/14/19 which was negative.  Given the size of her tumor and her medical history, patient will be given 4 cycles of Adriamycin/Cytoxan with Neulasta.  After completion of this treatment, patient will be started on 12 cycles of Taxol.  After the tumor shrinks in size, Dr. Forman will perform a lumpectomy.    Following lumpectomy, patient will begin endocrine therapy.  Dr. Dawn agrees chemotherapy will not aggravate her HIV condition.   Dr. Moreno spoke with Dr. Mohan at [place] who also agrees chemotherapy is the first step.   12/06/19: Cycle #1 of Adriamycin/Cytoxan  US Breast from 01/29/20 after 4 cycles of Adriamycin Cytoxan chemotherapy which shows mild interval partial response to neoadjuvant chemotherapy.  The mass has decreased from 3.8 x 2.5 to 3.1 cm to 3.5 x 2.3 x 3.2 cm previously the left axillary lymph node has decreased from 1.4 x 0.7 x 1 cm to 1.1 x 0.6 x 0.9 cm.  01/31/20: Initiated cycle 1 Taxol  April 24, 2020: Completed cycle 12 Taxol  MRI breast 5/6/2020 shows significant reduction in the left axillary adenopathy as well as reduction in the index breast lesion.  S/p left modified radical mastectomy with left axillary dissection.  Patient has 55 x 40 x 30 mm invasive carcinoma, grade 2 with DCIS 36 x 6 mm, high-grade with good margins and 8 out of 11 lymph nodes positive with largest metastasis being 5 mm with extracapsular extension.  And there is lymphatic space invasion  Will follow-up in 2 weeks at which time we will plan to start endocrine therapy.  She has radiation oncology appointment with Dr. Spivey  XRT completed August 17, 2020  Started letrozole August 2020 but has arthralgias  October 6, 2020 had severe arthralgias secondary to letrozole.  We will switch to tamoxifen starting November 2020 1/8/2021 screening mammogram in December 2020 as well as diagnostic mammogram which showed 0.4 cm suspicious calcification in the lower left which on biopsy is consistent with fat necrosis.  8/12/2021 tamoxifen placed on hold due to significant arthralgias and worsening fatigue.   September 21, 2021: Patient's depression, mental fogginess has resolved since tamoxifen discontinued.  She has mild worsening fatigue her TSH and B12 levels are normal.  We will need to check iron studies  Discussed about switching to Arimidex to see if she could tolerate that better  Patient initiated Arimidex 9/21/2021.  Returns 10/5/2021  complaining of significant generalized arthralgias.  Reviewed with Dr. Moreno.  We were initially going to try her on Cymbalta, but the patient is already taking Lexapro, and doing well on this.  We will therefore discontinue Arimidex.  We will give her a 6-week break and reevaluate things in 6 weeks.  Could try at that time switching to Aromasin.  November 15, 2021: Patient continued with Arimidex even though she was asked to hold it last time.  Her arthralgias worsened.  Also the stiffness in the joints worsened significantly.  Discussed with patient that we need to hold Arimidex for 6 weeks and try Aromasin at her next appointment.  December 28, 2021: ArthralgiaS improved after holding Arimidex.  December 28, 2021: Started Aromasin  November 2023: Tolerating Aromasin very well but has some neuropathy and questionable foot drop    *Osteoporosis  DEXA scan 7/28/2022 with osteoporosis of both hips.  Initiating Prolia 11/2022, continue every 6 months.    * HIV, followed by Dr. Dawn in infectious disease.  Well-controlled and is on a single medication with very low viral load. Has HIV since age 34.  Reviewed her HIV medications and she is compliant  Patient followed by infectious disease for her HIV and currently on treatment.  Stay  Currently is followed by Dr. Dawn on anti-HIV medications  Stable no new medications     * Family history of breast cancer with mother having had breast cancer at age 60.  There is family history of uncle with prostate cancer.  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    * Arthritis with severe back pain followed by Dr. 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  Patient continues on B6.  Chronic and stable  We will need to refer to physical therapy    * Fatigue  8/12/2021 patient seen as triage visit for worsening fatigue and diffuse arthralgias.  Reporting increased sleeping at night and still taking naps in the day.  Labs checked including thyroid studies which were unremarkable and B12 level actually elevated at greater than 2000.  Patient taking vitamin B supplements at that time.  Patient instructed to hold tamoxifen.  8/26/2021 reviewed today recent lab work for above with no evidence of thyroid dysfunction or vitamin B12 abnormality.  Patient notes no improvement being off tamoxifen for 2 weeks.  We will check a CA 15-3 to help further guide decision making.  If this is elevated we may then pursue imaging.  Discussed that she may just need to be off the tamoxifen longer to see improvement.  Patient does have underlying HIV as well and unclear if maybe some of her chronic medications for this could be contributing to her current symptoms.  Patient continues with fatigue but slightly improved now    * Insomnia  Has completely weaned off of amitriptyline   Notes she is currently taking Ambien 5 mg because PCP would not fill 10 mg which has been historically effective.  Encouraged her to discuss this with primary care again as she has done well on higher dosing without issue in relation to other medications     *  Neuropathy: Patient has shooting pains in the feet likely from Taxol in the past which had improved with amitriptyline 75 mg but could not tolerate and hence they are tapering it.  She had difficulty with urination due to amitriptyline    *Recent spasmodic chest pain present x2 without any radiation to the neck or axilla improved with Gaviscon  We will start Prilosec over-the-counter  Follow-up with cardio oncology to have a complete work-up given her age  Patient seen 3/31/2023  by Dr. Velasquez.  Symptoms felt to be most likely related to GERD however for further evaluation stress test ordered which was normal.  In future if she has more problems on Prilosec then we can consider referring to GI    PLAN:   Proceed with Prolia   Continue Aromasin  Continue calcium and vitamin D supplements  Patient continues to follow-up with infectious disease Dr. Dawn  Will refer to physical therapy to evaluate if patient would be a candidate for ankle-foot orthosis as there may be a possible foot drop as she seems to have frequent falls  Follow-up in 6 months with labs    This patient is on high risk drug therapy requiring intensive monitoring for toxicity.        Ruby Moreno MD    Cc: MD Denilson Feliciano MD

## 2023-11-17 DIAGNOSIS — G47.00 INSOMNIA, UNSPECIFIED TYPE: ICD-10-CM

## 2023-11-17 RX ORDER — ZOLPIDEM TARTRATE 10 MG/1
5 TABLET ORAL NIGHTLY PRN
Qty: 15 TABLET | Refills: 0 | Status: SHIPPED | OUTPATIENT
Start: 2023-11-17

## 2023-11-28 ENCOUNTER — OFFICE VISIT (OUTPATIENT)
Dept: FAMILY MEDICINE CLINIC | Facility: CLINIC | Age: 63
End: 2023-11-28
Payer: COMMERCIAL

## 2023-11-28 VITALS
RESPIRATION RATE: 16 BRPM | HEIGHT: 66 IN | DIASTOLIC BLOOD PRESSURE: 84 MMHG | HEART RATE: 79 BPM | WEIGHT: 162.6 LBS | TEMPERATURE: 97.7 F | BODY MASS INDEX: 26.13 KG/M2 | SYSTOLIC BLOOD PRESSURE: 110 MMHG | OXYGEN SATURATION: 98 %

## 2023-11-28 DIAGNOSIS — G47.00 INSOMNIA, UNSPECIFIED TYPE: ICD-10-CM

## 2023-11-28 DIAGNOSIS — S63.502A SPRAIN OF LEFT WRIST, INITIAL ENCOUNTER: Primary | ICD-10-CM

## 2023-11-28 DIAGNOSIS — Z23 NEED FOR PROPHYLACTIC VACCINATION AGAINST STREPTOCOCCUS PNEUMONIAE (PNEUMOCOCCUS): ICD-10-CM

## 2023-11-28 NOTE — PROGRESS NOTES
Subjective     Richa Dolan is a 63 y.o. female.     Chief Complaint   Patient presents with     Sprain of left wrist     2 week f/u    Insomnia     Have trouble falling asleep.       History of Present Illness       Last 2 weeks, she was c/o Lt wrist pain with swelling , hard to move.  Started on steroid and Mobic  Today, she feels much better , no more pain, able to move it.    Insomnia; she is doing well on Ambien 5 mg + Melatonin.  Last refill pills looks different from the actual one.        The following portions of the patient's history were reviewed and updated as appropriate: allergies, current medications, past family history, past medical history, past social history, past surgical history, and problem list.        Review of Systems   Musculoskeletal:  Negative for arthralgias.   Psychiatric/Behavioral:  Positive for sleep disturbance.        Vitals:    11/28/23 1258   BP: 110/84   Pulse: 79   Resp: 16   Temp: 97.7 °F (36.5 °C)   SpO2: 98%           11/28/23  1258   Weight: 73.8 kg (162 lb 9.6 oz)         Body mass index is 26.26 kg/m².      Current Outpatient Medications   Medication Sig Dispense Refill    AIMOVIG 140 MG/ML solution auto-injector Inject 1 mL under the skin into the appropriate area as directed Every 30 (Thirty) Days. HAS NOT TAKEN IN SEVERAL MONTHS  11    amitriptyline (ELAVIL) 25 MG tablet Take 1 tablet by mouth Every Night. 90 tablet 0    Aspirin 81 MG capsule Take 1 tablet by mouth Daily.      atorvastatin (LIPITOR) 20 MG tablet TAKE 1 TABLET BY MOUTH EVERY DAY AT NIGHT 90 tablet 1    baclofen (LIORESAL) 10 MG tablet Take 1 tablet by mouth 2 (Two) Times a Day.  2    CALCIUM-MAGNESIUM-VITAMIN D PO Take  by mouth.      colesevelam (WELCHOL) 625 MG tablet Take 2 tablets by mouth Daily. 180 tablet 11    Diclofenac Sodium (VOLTAREN) 1 % gel gel Apply 2 g topically to the appropriate area as directed As Needed.      DULoxetine (Cymbalta) 30 MG capsule Take 1 capsule by mouth Take As  Directed. 1 capsule po q AM, 2 capsules PO q hs 270 capsule 0    exemestane (AROMASIN) 25 MG tablet TAKE 1 TABLET BY MOUTH EVERY DAY 90 tablet 2    fluticasone (FLONASE) 50 MCG/ACT nasal spray SPRAY 2 SPRAYS INTO THE NOSTRIL AS DIRECTED BY PROVIDER DAILY. 48 mL 3    Loratadine (CLARITIN PO) Take 10 mg by mouth Daily.      meloxicam (Mobic) 15 MG tablet Take 1 tablet by mouth Daily. 30 tablet 0    omeprazole (priLOSEC) 20 MG capsule TAKE 1 CAPSULE BY MOUTH EVERY DAY 90 capsule 1    oxyCODONE-acetaminophen (PERCOCET)  MG per tablet Take 1 tablet by mouth 4 (Four) Times a Day.      Triumeq 600- MG per tablet Take 1 tablet by mouth Daily.      ubrogepant (UBRELVY) 100 MG tablet Take 1 tablet by mouth.      vitamin B-12 (CYANOCOBALAMIN) 1000 MCG tablet Take 1 tablet by mouth Daily.      vitamin B-6 (pyridoxine) 50 MG tablet Take 1 tablet by mouth Daily. 90 tablet 2    zolpidem (AMBIEN) 10 MG tablet Take 0.5 tablets by mouth At Night As Needed for Sleep. 15 tablet 0     No current facility-administered medications for this visit.                Objective   Physical Exam  Vitals and nursing note reviewed.   Constitutional:       General: She is not in acute distress.     Appearance: She is not toxic-appearing.   Cardiovascular:      Pulses: Normal pulses.   Musculoskeletal:         General: No swelling, tenderness or deformity. Normal range of motion.   Neurological:      Mental Status: She is alert and oriented to person, place, and time.   Psychiatric:         Mood and Affect: Mood normal.         Behavior: Behavior normal.         Thought Content: Thought content normal.           Assessment & Plan   Diagnoses and all orders for this visit:    1. Sprain of left wrist, initial encounter (Primary)  Comments:  sx resolved  advised to take Mobic PRN    2. Insomnia, unspecified type  Comments:  next refill will do 5 mg Ambien    3. Need for prophylactic vaccination against Streptococcus pneumoniae  (pneumococcus)  -     Pneumococcal Conjugate Vaccine 20-Valent (PCV20)                       I have fully discussed the nature of the medical condition(s) risks, complications, management, safe and proper use of medications.   Pt stated no allergy to the above prescribed medication.  I have discussed the SIDE EFFECT OF MEDICATION and importance TO report any side effect , the patient expressed good understanding.  Encouraged medication compliance and the importance of keeping scheduled follow up appointments with me and any other providers.    Patient instructed to follow up with our office for results on any labs/imaging ordered during this visit.    Home care discussed  All questions answered  Patient verbalizes understanding and agrees to treatment plan.     Follow up: Return in about 1 week (around 12/5/2023) for physical.

## 2023-11-28 NOTE — PROGRESS NOTES
Subjective     Richa Dolan is a 63 y.o. female.     Chief Complaint   Patient presents with     Sprain of left wrist     2 week f/u    Insomnia     Have trouble falling asleep.       History of Present Illness     Sleep disorder   Insomnia; doing well on 5 mg   Sleep medicine Melatonine with the ambien       The following portions of the patient's history were reviewed and updated as appropriate: allergies, current medications, past family history, past medical history, past social history, past surgical history, and problem list.        Review of Systems    Vitals:    11/28/23 1258   BP: 110/84   Pulse: 79   Resp: 16   Temp: 97.7 °F (36.5 °C)   SpO2: 98%           11/28/23  1258   Weight: 73.8 kg (162 lb 9.6 oz)         Body mass index is 26.26 kg/m².      Current Outpatient Medications   Medication Sig Dispense Refill    AIMOVIG 140 MG/ML solution auto-injector Inject 1 mL under the skin into the appropriate area as directed Every 30 (Thirty) Days. HAS NOT TAKEN IN SEVERAL MONTHS  11    amitriptyline (ELAVIL) 25 MG tablet Take 1 tablet by mouth Every Night. 90 tablet 0    Aspirin 81 MG capsule Take 1 tablet by mouth Daily.      atorvastatin (LIPITOR) 20 MG tablet TAKE 1 TABLET BY MOUTH EVERY DAY AT NIGHT 90 tablet 1    baclofen (LIORESAL) 10 MG tablet Take 1 tablet by mouth 2 (Two) Times a Day.  2    CALCIUM-MAGNESIUM-VITAMIN D PO Take  by mouth.      colesevelam (WELCHOL) 625 MG tablet Take 2 tablets by mouth Daily. 180 tablet 11    Diclofenac Sodium (VOLTAREN) 1 % gel gel Apply 2 g topically to the appropriate area as directed As Needed.      DULoxetine (Cymbalta) 30 MG capsule Take 1 capsule by mouth Take As Directed. 1 capsule po q AM, 2 capsules PO q hs 270 capsule 0    exemestane (AROMASIN) 25 MG tablet TAKE 1 TABLET BY MOUTH EVERY DAY 90 tablet 2    fluticasone (FLONASE) 50 MCG/ACT nasal spray SPRAY 2 SPRAYS INTO THE NOSTRIL AS DIRECTED BY PROVIDER DAILY. 48 mL 3    Loratadine (CLARITIN PO) Take 10 mg  by mouth Daily.      meloxicam (Mobic) 15 MG tablet Take 1 tablet by mouth Daily. 30 tablet 0    omeprazole (priLOSEC) 20 MG capsule TAKE 1 CAPSULE BY MOUTH EVERY DAY 90 capsule 1    oxyCODONE-acetaminophen (PERCOCET)  MG per tablet Take 1 tablet by mouth 4 (Four) Times a Day.      Triumeq 600- MG per tablet Take 1 tablet by mouth Daily.      ubrogepant (UBRELVY) 100 MG tablet Take 1 tablet by mouth.      vitamin B-12 (CYANOCOBALAMIN) 1000 MCG tablet Take 1 tablet by mouth Daily.      vitamin B-6 (pyridoxine) 50 MG tablet Take 1 tablet by mouth Daily. 90 tablet 2    zolpidem (AMBIEN) 10 MG tablet Take 0.5 tablets by mouth At Night As Needed for Sleep. 15 tablet 0     No current facility-administered medications for this visit.                Objective   Physical Exam      Assessment & Plan   There are no diagnoses linked to this encounter.             - New problem, need w/u   - Stable, continue current Rx  - Close BP BG monitoring and f/u , BP login form provided ,discussed correct way to position self for BP accuracy  - Will adjust dose accordingly   - Close monitoring and f/u   - Recommend yearly eye exams and daily foot exams.  - Discussed in length about lifestyle modification , wt loss, eating healthy , daily exercise   - Discussed supportive care , plenty of fluids     Encouraged patient to maintain a heart healthy diet/DASH diet, exercise as tolerated, limit sodium intake to no more than 1,500mg/day, limit alcohol intake, maintain medication compliance and practice relaxation techniques to reduce stress.     Encouraged patient to maintain a low cholesterol/DASH diet, increase aerobic exercise as tolerated, decrease alcohol, stop smoking if applicable, increase fiber intake, limit sodium intake to no more than 1,500mg/day, increase omega-3 fatty acids, and maintain medication compliance.     Discussed in length  diet and lifestyle modifications as prescribed. Encouraged patient to maintain a  diabetic diet, Increase lean protein and vegetable intake. Avoid sugary drinks and processed carbs including crackers, cookies, cakes. Recommend at least 30 minutes of exercise daily, at least 5 days per week. Increase exercise gradually. Blood glucose goal <150 fasting, <180 2 hr postprandial. Diabetic complications discussed. Annual eye examinations at Ophthalmology discussed, dental hygiene discussed  and foot care reviewed., home glucose monitoring emphasized, all medications, side effects and compliance discussed carefully and Hypoglycemia management and prevention reviewed. Reviewed ‘ABCs’ of diabetes management (respective goals in parentheses):  A1C (<7), blood pressure (<130/80), and cholesterol (LDL <100, if CVD <70).      Discussed with patient for decreasing JEN symptoms: eliminate foods that may trigger symptoms, avoid alcohol at bedtime, and avoid lying down after 3 hours of eating.  Common irritating foods include: chocolate, garlic, onions, citrus fruits, coffee, alcohol, highly seasoned foods and carbonated beverages.       She had a few results that were out of normal range however nothing to be concerned about.       UDS in chart  PDMP reviwed , looks approperiate   Controlled substance medication agreement forms copy in chart.   Medication sent in.    Adverse effects and risks discussed.       Patient was given instructions and counseling regarding her condition or for health maintenance advice.   Please see specific information pulled into the AVS if appropriate.       I have fully discussed the nature of the medical condition(s) risks, complications, management, safe and proper use of medications.   Pt stated no allergy to the above prescribed medication.  I have discussed the SIDE EFFECT OF MEDICATION and importance TO report any side effect , the patient expressed good understanding.  Encouraged medication compliance and the importance of keeping scheduled follow up appointments with me and  any other providers.    Patient instructed to follow up with our office for results on any labs/imaging ordered during this visit.    Home care discussed  All questions answered  Patient verbalizes understanding and agrees to treatment plan.     Follow up: No follow-ups on file.

## 2023-12-05 ENCOUNTER — OFFICE VISIT (OUTPATIENT)
Dept: FAMILY MEDICINE CLINIC | Facility: CLINIC | Age: 63
End: 2023-12-05
Payer: COMMERCIAL

## 2023-12-05 VITALS
HEART RATE: 81 BPM | SYSTOLIC BLOOD PRESSURE: 136 MMHG | HEIGHT: 66 IN | TEMPERATURE: 97.3 F | RESPIRATION RATE: 16 BRPM | WEIGHT: 160.2 LBS | BODY MASS INDEX: 25.75 KG/M2 | OXYGEN SATURATION: 96 % | DIASTOLIC BLOOD PRESSURE: 84 MMHG

## 2023-12-05 DIAGNOSIS — Z00.00 ENCOUNTER FOR ANNUAL PHYSICAL EXAM: Primary | ICD-10-CM

## 2023-12-05 DIAGNOSIS — Z85.3 HISTORY OF LEFT BREAST CANCER: ICD-10-CM

## 2023-12-05 DIAGNOSIS — K21.9 CHRONIC GERD: ICD-10-CM

## 2023-12-05 DIAGNOSIS — Z13.1 SCREENING FOR DIABETES MELLITUS: ICD-10-CM

## 2023-12-05 DIAGNOSIS — Z21 HIV POSITIVE LONG-TERM NON-PROGRESSOR: ICD-10-CM

## 2023-12-05 DIAGNOSIS — G62.0 DRUG-INDUCED POLYNEUROPATHY: ICD-10-CM

## 2023-12-05 DIAGNOSIS — G43.009 MIGRAINE WITHOUT AURA AND WITHOUT STATUS MIGRAINOSUS, NOT INTRACTABLE: ICD-10-CM

## 2023-12-05 DIAGNOSIS — F41.9 ANXIETY: ICD-10-CM

## 2023-12-05 DIAGNOSIS — E78.2 MIXED HYPERLIPIDEMIA: ICD-10-CM

## 2023-12-05 DIAGNOSIS — F33.42 MAJOR DEPRESSIVE DISORDER, RECURRENT, IN FULL REMISSION: ICD-10-CM

## 2023-12-05 DIAGNOSIS — F51.01 PRIMARY INSOMNIA: ICD-10-CM

## 2023-12-05 PROCEDURE — 96127 BRIEF EMOTIONAL/BEHAV ASSMT: CPT | Performed by: FAMILY MEDICINE

## 2023-12-05 PROCEDURE — 99396 PREV VISIT EST AGE 40-64: CPT | Performed by: FAMILY MEDICINE

## 2023-12-05 NOTE — PROGRESS NOTES
Patient Care Team:  Ar Beasley MD as PCP - General (Urgent Care)  Bam Crandall MD as Consulting Physician (Pain Medicine)  Denilson Dawn MD as Consulting Physician (Infectious Diseases)  Ruby Moreno MD as Consulting Physician (Hematology and Oncology)  Mag Spivey MD as Consulting Physician (Radiation Oncology)  Yusuf Garcia MD as Attending Provider (Plastic Surgery)  Luiz Mora MD as Surgeon (Orthopedic Surgery)  Yola Barillas LCSW as  ()  Lena Mariano MD as Referring Physician (Radiation Oncology)     Chief complaint: Patient is in today for a physical          Patient is a 63 y.o. female who presents for her yearly physical exam.     HPI   Doing well  Following with onco for h/o breast ca  PN from chemo, doing well on Amitriptyline   Following with ID for HIV  Insomnia; doing well on 5 mg of Ambien   Eating HD .   Immunizations: reviewed and discussed.       Health maintenance/lifestyle:  Immunization History   Administered Date(s) Administered    COVID-19 (PFIZER) BIVALENT 12+YRS 10/17/2022    COVID-19 (PFIZER) Purple Cap Monovalent 03/08/2021, 03/29/2021, 09/04/2021    Flu Vaccine Quad PF >36MO 11/09/2017    FluMist 2-49yrs 10/12/2015    Fluzone (or Fluarix & Flulaval for VFC) >6mos 11/09/2017, 10/26/2019, 10/24/2020, 12/10/2021, 12/07/2022    Fluzone Quad >6mos (Multi-dose) 11/14/2016    Hep B, Unspecified 06/23/2011    Influenza TIV (IM) 09/14/2012, 09/04/2014    Influenza, Unspecified 11/16/2011, 12/05/2018, 10/10/2023    Pneumococcal Conjugate 13-Valent (PCV13) 04/05/2018    Pneumococcal Conjugate 20-Valent (PCV20) 11/28/2023    flucelvax quad pfs =>4 YRS 12/05/2018         HM;  Colorectal Screening:  UTD  Mammogram:  scheduled in 1/2024         PHQ-2 Depression Screening  Little interest or pleasure in doing things? 0-->not at all   Feeling down, depressed, or hopeless? 0-->not at all   PHQ-2 Total Score 0          Social History     Tobacco Use   Smoking Status Never    Passive exposure: Never   Smokeless Tobacco Never   Tobacco Comments    never     Social History     Substance and Sexual Activity   Alcohol Use Yes    Alcohol/week: 1.0 standard drink of alcohol    Types: 1 Drinks containing 0.5 oz of alcohol per week    Comment: once a week         Review of Systems   Respiratory:  Negative for cough.    Cardiovascular:  Negative for chest pain.         History  Past Medical History:   Diagnosis Date    Anxiety     Arthritis of back 2010    Cerebrovascular accident (CVA) 08/22/2017    NO RESIDUAL AFFECT    DDD (degenerative disc disease), cervical     Depression     Drug therapy     Elevated cholesterol     Fat necrosis of left breast     GERD (gastroesophageal reflux disease)     H/O ICH (intracerebral hemorrhage) (CMS/[facility])     Heart murmur 1975    Hip arthrosis 2015    History of chemotherapy     History of heart murmur in childhood     History of stroke     PATIENT STATES HAD A MINI STROKE, NO RESIDUAL EFFECTS    History of thrombocytopenia     HIV (human immunodeficiency virus infection) 1996    Hx of radiation therapy     Hyperlipidemia     Insomnia     Knee swelling 2017    meniscus tear    Lupus anticoagulant positive     Malignant neoplasm of overlapping sites of left breast in female, estrogen receptor positive 11/12/2019    Meniscus tear 2017    RIGHT    Migraine     Neck pain     WITH SHOOTING PAIN LEFT RIGHT ARM    Neck strain 2001    work injury    Neuropathy     Osteoarthritis     Osteoporosis     Pain management     sees 1 every 3 months for scripts/injection/pain meds    Periarthritis of shoulder 2010    Pneumonia 1989    Seasonal allergies     Spinal headache     THINKS HAD BLOOD PATCH AFTER EPIDUAL     Stress fracture 2010    three times    Tear of meniscus of knee 2012 & 2017    Tendinitis of knee 2000    physical therapy many times    Tick bite 06/2014    Upper back pain     Vertigo       Past  Surgical History:   Procedure Laterality Date    ADENOIDECTOMY      BREAST LUMPECTOMY      BREAST LUMPECTOMY WITH SENTINEL NODE BIOPSY Left 05/28/2020    Procedure: BREAST LUMPECTOMY WITH SENTINEL NODE BIOPSY AND NEEDLE LOCALIZATION AND axillary dissection;  Surgeon: Landen Forman MD;  Location: [facility];  Service: General;  Laterality: Left;    BREAST SURGERY Left 05/28/2020    Procedure: LEFT LATERAL INTERCOASTAL ARTERY  flap ;  Surgeon: Yusuf Garcia MD;  Location: [facility];  Service: Plastics;  Laterality: Left;    CHOLECYSTECTOMY      HYSTERECTOMY      KNEE ARTHROSCOPY Right 03/24/2017    Procedure:  RIGHT  KNEE ARTHROSCOPY WITH DEBRIDEMENT CHONDROPLASTY PARTIAL MENISCECTOMY;  Surgeon: Karl Galeas MD;  Location: [facility];  Service:     KNEE CARTILAGE SURGERY Right 2007    TONSILLECTOMY      TONSILLECTOMY  1974    TRIGGER POINT INJECTION  2002 - 2023    US GUIDED LYMPH NODE BIOPSY  10/29/2019    LEFT BREAST    VENOUS ACCESS DEVICE (PORT) INSERTION Right 12/02/2019    Procedure: INSERTION VENOUS ACCESS DEVICE RIGHT;  Surgeon: Landen Forman MD;  Location: [facility];  Service: General    VENOUS ACCESS DEVICE (PORT) REMOVAL N/A 05/25/2022    Procedure: Mediport Removal;  Surgeon: Cesar Vasquez Jr., MD;  Location: [facility];  Service: General;  Laterality: N/A;      Allergies   Allergen Reactions    Augmentin [Amoxicillin-Pot Clavulanate] Hives      Family History   Problem Relation Age of Onset    Kidney disease Mother     Hypertension Mother     Heart disease Mother     Arthritis Mother     Breast cancer Mother     Leukemia Mother         CLL?    Diabetes Mother     Hyperthyroidism Mother     Hearing loss Mother     Cancer Mother         breast    Rheumatologic disease Mother     Arthritis Father     Dementia Father     Stroke Father     Heart disease Maternal Grandmother     Diabetes Maternal Grandfather     Heart disease Maternal  Grandfather     Stroke Maternal Grandfather     Heart attack Maternal Grandfather     Osteoporosis Paternal Grandmother     Heart disease Paternal Grandfather     Leukemia Maternal Aunt         CLL?    Throat cancer Paternal Uncle     Alcohol abuse Other         multiple uncles/aunts    Malig Hyperthermia Neg Hx      Social History     Socioeconomic History    Marital status:      Spouse name: Owen    Number of children: 1    Years of education: College   Tobacco Use    Smoking status: Never     Passive exposure: Never    Smokeless tobacco: Never    Tobacco comments:     never   Vaping Use    Vaping Use: Never used   Substance and Sexual Activity    Alcohol use: Yes     Alcohol/week: 1.0 standard drink of alcohol     Types: 1 Drinks containing 0.5 oz of alcohol per week     Comment: once a week    Drug use: No    Sexual activity: Yes     Partners: Male     Birth control/protection: None, Hysterectomy     Comment:         Current Outpatient Medications:     AIMOVIG 140 MG/ML solution auto-injector, Inject 1 mL under the skin into the appropriate area as directed Every 30 (Thirty) Days. HAS NOT TAKEN IN SEVERAL MONTHS, Disp: , Rfl: 11    amitriptyline (ELAVIL) 25 MG tablet, Take 1 tablet by mouth Every Night., Disp: 90 tablet, Rfl: 0    Aspirin 81 MG capsule, Take 1 tablet by mouth Daily., Disp: , Rfl:     atorvastatin (LIPITOR) 20 MG tablet, TAKE 1 TABLET BY MOUTH EVERY DAY AT NIGHT, Disp: 90 tablet, Rfl: 1    baclofen (LIORESAL) 10 MG tablet, Take 1 tablet by mouth 2 (Two) Times a Day., Disp: , Rfl: 2    CALCIUM-MAGNESIUM-VITAMIN D PO, Take  by mouth., Disp: , Rfl:     colesevelam (WELCHOL) 625 MG tablet, Take 2 tablets by mouth Daily., Disp: 180 tablet, Rfl: 11    Diclofenac Sodium (VOLTAREN) 1 % gel gel, Apply 2 g topically to the appropriate area as directed As Needed., Disp: , Rfl:     DULoxetine (Cymbalta) 30 MG capsule, Take 1 capsule by mouth Take As Directed. 1 capsule po q AM, 2 capsules PO  "q hs, Disp: 270 capsule, Rfl: 0    exemestane (AROMASIN) 25 MG tablet, TAKE 1 TABLET BY MOUTH EVERY DAY, Disp: 90 tablet, Rfl: 2    fluticasone (FLONASE) 50 MCG/ACT nasal spray, SPRAY 2 SPRAYS INTO THE NOSTRIL AS DIRECTED BY PROVIDER DAILY., Disp: 48 mL, Rfl: 3    Loratadine (CLARITIN PO), Take 10 mg by mouth Daily., Disp: , Rfl:     meloxicam (Mobic) 15 MG tablet, Take 1 tablet by mouth Daily., Disp: 30 tablet, Rfl: 0    omeprazole (priLOSEC) 20 MG capsule, TAKE 1 CAPSULE BY MOUTH EVERY DAY, Disp: 90 capsule, Rfl: 1    oxyCODONE-acetaminophen (PERCOCET)  MG per tablet, Take 1 tablet by mouth 4 (Four) Times a Day., Disp: , Rfl:     Triumeq 600- MG per tablet, Take 1 tablet by mouth Daily., Disp: , Rfl:     ubrogepant (UBRELVY) 100 MG tablet, Take 1 tablet by mouth., Disp: , Rfl:     vitamin B-12 (CYANOCOBALAMIN) 1000 MCG tablet, Take 1 tablet by mouth Daily., Disp: , Rfl:     vitamin B-6 (pyridoxine) 50 MG tablet, Take 1 tablet by mouth Daily., Disp: 90 tablet, Rfl: 2    zolpidem (AMBIEN) 10 MG tablet, Take 0.5 tablets by mouth At Night As Needed for Sleep., Disp: 15 tablet, Rfl: 0                  /84   Pulse 81   Temp 97.3 °F (36.3 °C)   Resp 16   Ht 167.6 cm (65.98\")   Wt 72.7 kg (160 lb 3.2 oz)   SpO2 96%   BMI 25.87 kg/m²       Physical Exam  Vitals and nursing note reviewed.   Constitutional:       General: She is not in acute distress.     Appearance: She is not ill-appearing, toxic-appearing or diaphoretic.   HENT:      Head: Normocephalic.      Right Ear: Tympanic membrane, ear canal and external ear normal.      Left Ear: Tympanic membrane, ear canal and external ear normal.      Nose: Nose normal.      Mouth/Throat:      Mouth: Mucous membranes are moist.      Pharynx: Oropharynx is clear. No oropharyngeal exudate or posterior oropharyngeal erythema.   Eyes:      General: No scleral icterus.        Right eye: No discharge.         Left eye: No discharge.      Extraocular " Movements: Extraocular movements intact.      Conjunctiva/sclera: Conjunctivae normal.      Pupils: Pupils are equal, round, and reactive to light.   Neck:      Comments: No enlarged thyroid      Cardiovascular:      Rate and Rhythm: Normal rate and regular rhythm.      Heart sounds: Normal heart sounds. No murmur heard.     No friction rub. No gallop.   Pulmonary:      Effort: Pulmonary effort is normal. No respiratory distress.      Breath sounds: Normal breath sounds. No stridor. No wheezing, rhonchi or rales.   Abdominal:      General: Bowel sounds are normal. There is no distension.      Palpations: Abdomen is soft. There is no mass.      Tenderness: There is no abdominal tenderness. There is no right CVA tenderness, left CVA tenderness, guarding or rebound.      Hernia: No hernia is present.   Musculoskeletal:         General: Normal range of motion.      Cervical back: Normal range of motion and neck supple.      Right lower leg: No edema.      Left lower leg: No edema.   Lymphadenopathy:      Cervical: No cervical adenopathy.   Skin:     General: Skin is warm.      Coloration: Skin is not jaundiced.   Neurological:      General: No focal deficit present.      Mental Status: She is alert and oriented to person, place, and time.      Coordination: Coordination normal.      Gait: Gait normal.   Psychiatric:         Mood and Affect: Mood normal.         Behavior: Behavior normal.         Thought Content: Thought content normal.         Judgment: Judgment normal.                   Diagnoses and all orders for this visit:    1. Encounter for annual physical exam (Primary)    2. Primary insomnia    3. Major depressive disorder, recurrent, in full remission    4. Anxiety    5. Migraine without aura and without status migrainosus, not intractable    6. Chronic GERD    7. Drug-induced polyneuropathy    8. Mixed hyperlipidemia  -     Lipid Panel    9. History of left breast cancer    10. HIV positive long-term  non-progressor    11. Screening for diabetes mellitus  -     Hemoglobin A1c      -Age and sex appropriate physical exam performed and documented.   -Updated past medical, family, social and surgical histories as well as allergies and care team list.   -Addressed care gaps listed in the medical record.  -advised to eat healthy diet, do daily exercise   - discussed and updates preventive screening measures            Follow up: Return in about 3 months (around 3/5/2024).  Plan of care discussed with pt. They verbalized understanding and agreement.     Ar Beasley MD   12/5/2023   10:46 EST

## 2023-12-06 ENCOUNTER — OFFICE VISIT (OUTPATIENT)
Dept: PSYCHIATRY | Facility: HOSPITAL | Age: 63
End: 2023-12-06
Payer: COMMERCIAL

## 2023-12-06 DIAGNOSIS — F43.23 ADJUSTMENT DISORDER WITH MIXED ANXIETY AND DEPRESSED MOOD: Primary | ICD-10-CM

## 2023-12-06 DIAGNOSIS — G62.9 NEUROPATHY: ICD-10-CM

## 2023-12-06 LAB
CHOLEST SERPL-MCNC: 184 MG/DL (ref 100–199)
HBA1C MFR BLD: 5.8 % (ref 4.8–5.6)
HDLC SERPL-MCNC: 90 MG/DL
LDLC SERPL CALC-MCNC: 82 MG/DL (ref 0–99)
TRIGL SERPL-MCNC: 63 MG/DL (ref 0–149)
VLDLC SERPL CALC-MCNC: 12 MG/DL (ref 5–40)

## 2023-12-06 NOTE — PROGRESS NOTES
Subjective  Patient ID: Richa Dolan is a 63 y.o.. female seen in regularly scheduled group session.  Group participants have consented to group conducted in person    Total Group Time, Face to Face: 60 minutes  Total Group Participants: 2, plus facilitation per ОЛЬГА Rey    Group Therapy  Group topics explored including development of new normal, short and long term effects of diagnosis and treatment, significant other or family conflict, anticipitory anxiety, pain management, physical deconditioning, weight management, social determinants of health (finances, living arrangements, etc), and lifestyle choices. Shares impact of neuropathy, stress surrounding holidays, impact of mood and anxiety on QOL, stress management strategies, sleep disruption and fatigue.     Counseling provided regarding behavioral activation/ activity scheduling, balancing avoidance with approach , sleep hygiene, discussion of need for restorative sleep with consideration of sleep apnea, recognition and allowance of need for rest, pharmacological and non pharmacological management of sleep disturbance, lifestyle counseling, risks associated with substance use, benefits of exercise and strategies for managing barriers to engagement, allowance of self care, exploration of quality of life goals, survivorship issues, anxiety/ mood management , and medication education. Supported patients in self care strategies, use of CBT-I for persistence of insomnia.    Discussed current programming available in [facility] and community.    Next shared medical visit: January 3 at 3:30 in person    Patient response to group: Pt listens to other member and shares in conversation    Medications Management  Dr. Delma Moffett and ОЛЬГА Rey present for medication discussion and management.  Pt continues in survivorship of breast cancer.    CC: Insomnia  HPI: Pt continues to be seen in follow up regarding sx of insomnia. Unfortunately  missed last apt due to fall over dog, recovering OK. Appreciates reduced pace, ability to enjoy the holidays with half-way from post office. Sleep remains acceptable with continued use of ambien 5 mg q hs. Also continues elavil and duloxetine . Reprots stable mood and anxiety. Has appreciated boundary of turning off phone ringer at night to allow more consistent sleep. Continues to remain very dedicated to specific sleep routine and environment.  Exam: As Above  Current medication regimen: Ambien, duloxetine, and elavil  Lab Review:   Office Visit on 12/05/2023   Component Date Value    Total Cholesterol 12/05/2023 184     Triglycerides 12/05/2023 63     HDL Cholesterol 12/05/2023 90     VLDL Cholesterol Teofilo 12/05/2023 12     LDL Chol Calc ([facility]) 12/05/2023 82     Hemoglobin A1C 12/05/2023 5.8 (H)    Lab on 11/16/2023   Component Date Value    Glucose 11/16/2023 127 (H)     BUN 11/16/2023 11     Creatinine 11/16/2023 0.72     Sodium 11/16/2023 144     Potassium 11/16/2023 3.6     Chloride 11/16/2023 104     CO2 11/16/2023 27.7     Calcium 11/16/2023 9.3     Total Protein 11/16/2023 7.3     Albumin 11/16/2023 4.5     ALT (SGPT) 11/16/2023 17     AST (SGOT) 11/16/2023 18     Alkaline Phosphatase 11/16/2023 78     Total Bilirubin 11/16/2023 0.3     Globulin 11/16/2023 2.8     A/G Ratio 11/16/2023 1.6     BUN/Creatinine Ratio 11/16/2023 15.3     Anion Gap 11/16/2023 12.3     eGFR 11/16/2023 94.1     Magnesium 11/16/2023 2.2     Phosphorus 11/16/2023 3.3     WBC 11/16/2023 11.15 (H)     RBC 11/16/2023 3.91     Hemoglobin 11/16/2023 13.6     Hematocrit 11/16/2023 41.2     MCV 11/16/2023 105.4 (H)     MCH 11/16/2023 34.8 (H)     MCHC 11/16/2023 33.0     RDW 11/16/2023 12.8     RDW-SD 11/16/2023 49.3     MPV 11/16/2023 9.1     Platelets 11/16/2023 290     Neutrophil % 11/16/2023 72.7     Lymphocyte % 11/16/2023 19.6     Monocyte % 11/16/2023 6.8     Eosinophil % 11/16/2023 0.3     Basophil % 11/16/2023 0.2     Immature  Grans % 11/16/2023 0.4     Neutrophils, Absolute 11/16/2023 8.10 (H)     Lymphocytes, Absolute 11/16/2023 2.19     Monocytes, Absolute 11/16/2023 0.76     Eosinophils, Absolute 11/16/2023 0.03     Basophils, Absolute 11/16/2023 0.02     Immature Grans, Absolute 11/16/2023 0.05     nRBC 11/16/2023 0.0      MDM:  Meds reviewed and renewed as appropriate.  Continue medications as written, ambien per PCP.   No refills needed at this time.    Diagnoses and all orders for this visit:    1. Adjustment disorder with mixed anxiety and depressed mood (Primary)    2. Neuropathy

## 2023-12-12 DIAGNOSIS — S63.502A SPRAIN OF LEFT WRIST, INITIAL ENCOUNTER: ICD-10-CM

## 2023-12-12 RX ORDER — MELOXICAM 15 MG/1
15 TABLET ORAL DAILY
Qty: 30 TABLET | Refills: 0 | Status: SHIPPED | OUTPATIENT
Start: 2023-12-12

## 2023-12-18 DIAGNOSIS — G47.00 INSOMNIA, UNSPECIFIED TYPE: ICD-10-CM

## 2023-12-18 RX ORDER — ZOLPIDEM TARTRATE 10 MG/1
5 TABLET ORAL NIGHTLY PRN
Qty: 15 TABLET | Refills: 0 | Status: SHIPPED | OUTPATIENT
Start: 2023-12-18

## 2023-12-18 NOTE — TELEPHONE ENCOUNTER
Caller: Richa Dolan    Relationship: Self    Best call back number:     Requested Prescriptions:   Requested Prescriptions     Pending Prescriptions Disp Refills    zolpidem (AMBIEN) 10 MG tablet 15 tablet 0     Sig: Take 0.5 tablets by mouth At Night As Needed for Sleep.        Pharmacy where request should be sent: [facility]  [address] [place] [address] ROAD  [phone]     Last office visit with prescribing clinician: 12/5/2023   Last telemedicine visit with prescribing clinician: Visit date not found   Next office visit with prescribing clinician: 3/5/2024     Additional details provided by patient: PATIENT SAYS THAT DR TRIPATHI WANTS THIS SWITCHED TO 5MG INSTEAD OF 10MG    Does the patient have less than a 3 day supply:  [x] Yes  [] No    Would you like a call back once the refill request has been completed: [x] Yes [] No    If the office needs to give you a call back, can they leave a voicemail: [x] Yes [] No    Ana Harris Rep   12/18/23 09:50 EST

## 2023-12-18 NOTE — TELEPHONE ENCOUNTER
UDS 9/1/23  [facility]  8/31/23  Rx Refill Note  Requested Prescriptions     Pending Prescriptions Disp Refills    zolpidem (AMBIEN) 10 MG tablet 15 tablet 0     Sig: Take 0.5 tablets by mouth At Night As Needed for Sleep.      Last office visit with prescribing clinician: 12/5/2023   Last telemedicine visit with prescribing clinician: Visit date not found   Next office visit with prescribing clinician: 3/5/2024                         Would you like a call back once the refill request has been completed: [] Yes [] No    If the office needs to give you a call back, can they leave a voicemail: [] Yes [] No    Tamiko Hernandes CMA/LMR  12/18/23, 09:56 EST

## 2023-12-20 ENCOUNTER — TELEPHONE (OUTPATIENT)
Dept: FAMILY MEDICINE CLINIC | Facility: CLINIC | Age: 63
End: 2023-12-20
Payer: COMMERCIAL

## 2023-12-20 DIAGNOSIS — G47.00 INSOMNIA, UNSPECIFIED TYPE: ICD-10-CM

## 2023-12-20 RX ORDER — ZOLPIDEM TARTRATE 10 MG/1
5 TABLET ORAL NIGHTLY PRN
Qty: 15 TABLET | Refills: 0 | Status: CANCELLED | OUTPATIENT
Start: 2023-12-20

## 2023-12-20 NOTE — TELEPHONE ENCOUNTER
Caller: Richa Dolan    Relationship: Self    Best call back number: [phone]     What was the call regarding: PATIENT STATES THAT THE ISSUE HAS BEEN RESOLVED AND PHARMACY HAS PRESCRIPTION AVAILABLE FOR PICK-UP

## 2023-12-20 NOTE — TELEPHONE ENCOUNTER
Rx Refill Note  Requested Prescriptions     Pending Prescriptions Disp Refills    zolpidem (AMBIEN) 10 MG tablet 15 tablet 0     Sig: Take 0.5 tablets by mouth At Night As Needed for Sleep.      Last office visit with prescribing clinician: 12/5/2023   Last telemedicine visit with prescribing clinician: Visit date not found   Next office visit with prescribing clinician: 12/20/2023                         Would you like a call back once the refill request has been completed: [] Yes [] No    If the office needs to give you a call back, can they leave a voicemail: [] Yes [] No    Horacio Fernández CMA/LMR  12/20/23, 11:03 EST

## 2023-12-20 NOTE — TELEPHONE ENCOUNTER
Caller: Richa Dolan    Relationship: Self    Best call back number: [phone]    Requested Prescriptions:   Requested Prescriptions     Pending Prescriptions Disp Refills    zolpidem (AMBIEN) 10 MG tablet 15 tablet 0     Sig: Take 0.5 tablets by mouth At Night As Needed for Sleep.        Additional details provided by patient: [facility] STATED THEY DID NOT GET THE REFILL. WILL NEED SENT SOMEWHERE ELSE . PATIENT IS OUT OF MEDS    Does the patient have less than a 3 day supply:  [x] Yes  [] No    Would you like a call back once the refill request has been completed: [] Yes [x] No    If the office needs to give you a call back, can they leave a voicemail: [] Yes [x] No    Aletha Becerra   12/20/23 10:30 [facility]       Pharmacy where request should be sent: [id number] - [place], [place] - [address] [facility] [facility] - [phone] [facility] [phone] FX

## 2023-12-20 NOTE — TELEPHONE ENCOUNTER
Caller: Richa Dolan    Relationship: Self    Best call back number: [phone]     What is the best time to reach you: ANY    Who are you requesting to speak with (clinical staff, provider,  specific staff member): CLINICAL STAFF    Do you know the name of the person who called:      What was the call regarding: PATIENT CALLED STATED THAT [facility] SAYING THAT THEY NEED TO VERIFY WITH THE PROVIDER BEFORE FILLING AND THEN TOLD HER SHE NEEDS TO CALL [facility].  PATIENT IS FRUSTRATED WITH [facility] AND JUST WANTS THE PRESCRIPTION TO BE CANCELLED AT [facility] AND SENT TO [facility].      PHARMACY:  [facility] #[id number] [place], [place] - [address] AT [facility] - [phone] [facility] [phone] FX     Is it okay if the provider responds through MyChart:

## 2023-12-21 ENCOUNTER — PRIOR AUTHORIZATION (OUTPATIENT)
Dept: FAMILY MEDICINE CLINIC | Facility: CLINIC | Age: 63
End: 2023-12-21
Payer: COMMERCIAL

## 2024-01-02 ENCOUNTER — HOSPITAL ENCOUNTER (OUTPATIENT)
Dept: MAMMOGRAPHY | Facility: HOSPITAL | Age: 64
Discharge: HOME | End: 2024-01-02
Admitting: INTERNAL MEDICINE
Payer: COMMERCIAL

## 2024-01-02 DIAGNOSIS — Z12.31 SCREENING MAMMOGRAM FOR BREAST CANCER: ICD-10-CM

## 2024-01-02 PROCEDURE — 77067 SCR MAMMO BI INCL CAD: CPT

## 2024-01-02 PROCEDURE — 77063 BREAST TOMOSYNTHESIS BI: CPT

## 2024-01-03 ENCOUNTER — OFFICE VISIT (OUTPATIENT)
Dept: PSYCHIATRY | Facility: HOSPITAL | Age: 64
End: 2024-01-03
Payer: COMMERCIAL

## 2024-01-03 DIAGNOSIS — F33.42 RECURRENT MAJOR DEPRESSIVE DISORDER, IN FULL REMISSION: Primary | ICD-10-CM

## 2024-01-03 NOTE — PROGRESS NOTES
Subjective  Patient ID: Richa Dolan is a 63 y.o.. female seen in regularly scheduled group session.  Group participants have consented to group conducted in person    Total Group Time, Face to Face: 60 minutes  Total Group Participants: 2, plus facilitation per ОЛЬГА Rey    Group Therapy  Group topics explored including development of new normal, interpersonal sensitivity, short and long term effects of diagnosis and treatment, significant other or family conflict, intimacy concerns, anticipitory anxiety, anxiety surrounding adjusted treatment plan or adjusted follow up, physical deconditioning, weight management, social determinants of health (finances, living arrangements, etc), and lifestyle choices. Shared experience of hair loss, physical changes associated with treatment, challenges with others moving on from diagnosis, communication barriers with spouse.    Counseling provided regarding behavioral activation/ activity scheduling, balancing avoidance with approach , sleep hygiene, pharmacological and non pharmacological management of sleep disturbance, lifestyle counseling, benefits of exercise and strategies for managing barriers to engagement, allowance of self care, exploration of quality of life goals, survivorship issues, anxiety/ mood management , medication education, and existential distress. Considered opportunities for self care, investment in fulfilling relationships outside of spouse. Supported group dynamics, recognition of similar experiences, ability to support one another. Reviewed sleep hygiene goals, impact of various daily functions on general health and wellness.    Discussed current programming available in [facility] and community.    Next shared medical visit: January 31 at 3:30 PM in person    Patient response to group: Pt shares openly in group setting, supporting other members.    Medications Management  Dr. Delma Moffett and ОЛЬГА Rey present for  medication discussion and management.  Pt continues in survivorship of breast cancer.    CC: Anxiety, depression, insomnia  HPI: Pt is seen in follow up regarding persistent sx of insomnia. Continues to endorse challenges falling asleep, despite continuation of low dose ambien. Remains active and engaged during day, continuing to endorse burden of caregiver responsibilities, mother with dementia. Appreciates limit setting, continuing to honor father in care for mother. Remains engaged with genealogy and is greatly enjoying this.  Medications reviewed; pt reports stable, manageable pain with adherence to duloxetine 30 mg q AM, 60 m gq hs, amitriptyline, and ambien 5 mg q hs.  Continues to endorse stable sx with appreciated benefits on current agents.   Exam: As Above  Current medication regimen:  Lab Review:   Office Visit on 12/05/2023   Component Date Value    Total Cholesterol 12/05/2023 184     Triglycerides 12/05/2023 63     HDL Cholesterol 12/05/2023 90     VLDL Cholesterol [place] 12/05/2023 12     LDL Chol Calc ([facility]) 12/05/2023 82     Hemoglobin A1C 12/05/2023 5.8 (H)    Lab on 11/16/2023   Component Date Value    Glucose 11/16/2023 127 (H)     BUN 11/16/2023 11     Creatinine 11/16/2023 0.72     Sodium 11/16/2023 144     Potassium 11/16/2023 3.6     Chloride 11/16/2023 104     CO2 11/16/2023 27.7     Calcium 11/16/2023 9.3     Total Protein 11/16/2023 7.3     Albumin 11/16/2023 4.5     ALT (SGPT) 11/16/2023 17     AST (SGOT) 11/16/2023 18     Alkaline Phosphatase 11/16/2023 78     Total Bilirubin 11/16/2023 0.3     Globulin 11/16/2023 2.8     A/G Ratio 11/16/2023 1.6     BUN/Creatinine Ratio 11/16/2023 15.3     Anion Gap 11/16/2023 12.3     eGFR 11/16/2023 94.1     Magnesium 11/16/2023 2.2     Phosphorus 11/16/2023 3.3     WBC 11/16/2023 11.15 (H)     RBC 11/16/2023 3.91     Hemoglobin 11/16/2023 13.6     Hematocrit 11/16/2023 41.2     MCV 11/16/2023 105.4 (H)     MCH 11/16/2023 34.8 (H)     MCHC 11/16/2023 33.0      RDW 11/16/2023 12.8     RDW-SD 11/16/2023 49.3     MPV 11/16/2023 9.1     Platelets 11/16/2023 290     Neutrophil % 11/16/2023 72.7     Lymphocyte % 11/16/2023 19.6     Monocyte % 11/16/2023 6.8     Eosinophil % 11/16/2023 0.3     Basophil % 11/16/2023 0.2     Immature Grans % 11/16/2023 0.4     Neutrophils, Absolute 11/16/2023 8.10 (H)     Lymphocytes, Absolute 11/16/2023 2.19     Monocytes, Absolute 11/16/2023 0.76     Eosinophils, Absolute 11/16/2023 0.03     Basophils, Absolute 11/16/2023 0.02     Immature Grans, Absolute 11/16/2023 0.05     nRBC 11/16/2023 0.0      MDM:  Meds reviewed and renewed as appropriate.  FU scheduled in group setting.    Diagnoses and all orders for this visit:    1. Recurrent major depressive disorder, in full remission (Primary)    Other orders  -     amitriptyline (ELAVIL) 25 MG tablet; Take 1 tablet by mouth Every Night.  Dispense: 90 tablet; Refill: 0  -     DULoxetine (Cymbalta) 30 MG capsule; Take 1 capsule by mouth Take As Directed. 1 capsule po q AM, 2 capsules PO q hs  Dispense: 270 capsule; Refill: 0

## 2024-01-04 RX ORDER — DULOXETIN HYDROCHLORIDE 30 MG/1
30 CAPSULE, DELAYED RELEASE ORAL TAKE AS DIRECTED
Qty: 270 CAPSULE | Refills: 0 | Status: SHIPPED | OUTPATIENT
Start: 2024-01-04

## 2024-01-04 RX ORDER — AMITRIPTYLINE HYDROCHLORIDE 25 MG/1
25 TABLET, FILM COATED ORAL NIGHTLY
Qty: 90 TABLET | Refills: 0 | Status: SHIPPED | OUTPATIENT
Start: 2024-01-04

## 2024-01-10 DIAGNOSIS — S63.502A SPRAIN OF LEFT WRIST, INITIAL ENCOUNTER: ICD-10-CM

## 2024-01-10 RX ORDER — MELOXICAM 15 MG/1
15 TABLET ORAL DAILY
Qty: 30 TABLET | Refills: 0 | Status: SHIPPED | OUTPATIENT
Start: 2024-01-10

## 2024-01-18 DIAGNOSIS — G47.00 INSOMNIA, UNSPECIFIED TYPE: ICD-10-CM

## 2024-01-18 RX ORDER — ZOLPIDEM TARTRATE 10 MG/1
5 TABLET ORAL NIGHTLY PRN
Qty: 15 TABLET | Refills: 0 | Status: SHIPPED | OUTPATIENT
Start: 2024-01-18

## 2024-01-18 NOTE — TELEPHONE ENCOUNTER
Caller: Magdaleno Richa S    Relationship: Self    Best call back number: [phone]     Requested Prescriptions:   Requested Prescriptions     Pending Prescriptions Disp Refills    zolpidem (AMBIEN) 10 MG tablet 15 tablet 0     Sig: Take 0.5 tablets by mouth At Night As Needed for Sleep.        Pharmacy where request should be sent: [facility]ACY #[id number] - [place], [place] - [address] AT [facility] - [phone] [facility] [phone] FX     Last office visit with prescribing clinician: 12/5/2023   Last telemedicine visit with prescribing clinician: Visit date not found   Next office visit with prescribing clinician: 3/5/2024     Additional details provided by patient: PATIENT STATES THAT SHE HAS ENOUGH MEDICATION TO LAST UNTIL SATURDAY. REQUESTS TO HAVE IT SENT TO [facility]    Does the patient have less than a 3 day supply:  [x] Yes  [] No    Would you like a call back once the refill request has been completed: [] Yes [x] No    If the office needs to give you a call back, can they leave a voicemail: [] Yes [x] No    Ana Moreno Rep   01/18/24 09:55 EST           
Rx Refill Note  Requested Prescriptions     Pending Prescriptions Disp Refills    zolpidem (AMBIEN) 10 MG tablet 15 tablet 0     Sig: Take 0.5 tablets by mouth At Night As Needed for Sleep.      Last office visit with prescribing clinician: 12/5/2023   Last telemedicine visit with prescribing clinician: Visit date not found   Next office visit with prescribing clinician: 3/5/2024                         Would you like a call back once the refill request has been completed: [] Yes [] No    If the office needs to give you a call back, can they leave a voicemail: [] Yes [] No    Horacio Fernández CMA/LMR  01/18/24, 09:58 EST    
S/P Bypass, Thrombectomy.

## 2024-02-07 DIAGNOSIS — S63.502A SPRAIN OF LEFT WRIST, INITIAL ENCOUNTER: ICD-10-CM

## 2024-02-07 RX ORDER — MELOXICAM 15 MG/1
15 TABLET ORAL DAILY
Qty: 30 TABLET | Refills: 0 | Status: SHIPPED | OUTPATIENT
Start: 2024-02-07

## 2024-02-15 DIAGNOSIS — G47.00 INSOMNIA, UNSPECIFIED TYPE: ICD-10-CM

## 2024-02-15 RX ORDER — ZOLPIDEM TARTRATE 10 MG/1
5 TABLET ORAL NIGHTLY PRN
Qty: 15 TABLET | Refills: 0 | Status: SHIPPED | OUTPATIENT
Start: 2024-02-15

## 2024-02-19 ENCOUNTER — OFFICE VISIT (OUTPATIENT)
Dept: FAMILY MEDICINE CLINIC | Facility: CLINIC | Age: 64
End: 2024-02-19
Payer: COMMERCIAL

## 2024-02-19 ENCOUNTER — TELEPHONE (OUTPATIENT)
Dept: FAMILY MEDICINE CLINIC | Facility: CLINIC | Age: 64
End: 2024-02-19

## 2024-02-19 VITALS
BODY MASS INDEX: 26.2 KG/M2 | SYSTOLIC BLOOD PRESSURE: 140 MMHG | HEART RATE: 80 BPM | HEIGHT: 66 IN | RESPIRATION RATE: 16 BRPM | WEIGHT: 163 LBS | DIASTOLIC BLOOD PRESSURE: 86 MMHG | OXYGEN SATURATION: 98 % | TEMPERATURE: 99.8 F

## 2024-02-19 DIAGNOSIS — M25.532 WRIST PAIN, CHRONIC, LEFT: Primary | ICD-10-CM

## 2024-02-19 DIAGNOSIS — K21.9 CHRONIC GERD: ICD-10-CM

## 2024-02-19 DIAGNOSIS — G62.0 DRUG-INDUCED POLYNEUROPATHY: ICD-10-CM

## 2024-02-19 DIAGNOSIS — R73.01 IFG (IMPAIRED FASTING GLUCOSE): ICD-10-CM

## 2024-02-19 DIAGNOSIS — F51.01 PRIMARY INSOMNIA: ICD-10-CM

## 2024-02-19 DIAGNOSIS — G89.29 WRIST PAIN, CHRONIC, LEFT: Primary | ICD-10-CM

## 2024-02-19 PROCEDURE — 99214 OFFICE O/P EST MOD 30 MIN: CPT | Performed by: FAMILY MEDICINE

## 2024-02-19 RX ORDER — OMEPRAZOLE 40 MG/1
40 CAPSULE, DELAYED RELEASE ORAL
Qty: 90 CAPSULE | Refills: 1 | Status: SHIPPED | OUTPATIENT
Start: 2024-02-19

## 2024-02-19 RX ORDER — DOXYCYCLINE 100 MG/1
1 CAPSULE ORAL EVERY 12 HOURS SCHEDULED
COMMUNITY
Start: 2024-01-23

## 2024-02-19 NOTE — TELEPHONE ENCOUNTER
Patient is scheduled to return in March for lab. Patient would like to schedule a follow up appointment for her wrist. When would you like to see patient for this?

## 2024-02-19 NOTE — PROGRESS NOTES
Subjective     Richa Dolan is a 63 y.o. female.     Chief Complaint   Patient presents with    Pain     Left wrist x 6 weeks     Insomnia       History of Present Illness     Chronic wrist pain since the fall in 11/2023 , pain on/off, she keeps drops things when she hold , pain mainly at the base of the thumb.   No weakness , no numbness       Insomnia; doing well on Ambien QHS, no S/E reported    GERD; HER SX WORSE EAT MID of the night with very bad taste     H/o breast cancer ; Following with onco every 6 months     PN from chemo, doing well on Amitriptyline and B6 and cymbalta     IFG; last A1c was above goal , eats HD     Lab Results   Component Value Date    HGBA1C 5.8 (H) 12/05/2023       The following portions of the patient's history were reviewed and updated as appropriate: allergies, current medications, past family history, past medical history, past social history, past surgical history, and problem list.        Review of Systems   Musculoskeletal:  Positive for arthralgias.       Vitals:    02/19/24 1529   BP: 140/86   Pulse: 80   Resp: 16   Temp: 99.8 °F (37.7 °C)   SpO2: 98%           02/19/24  1529   Weight: 73.9 kg (163 lb)         Body mass index is 26.32 kg/m².      Current Outpatient Medications   Medication Sig Dispense Refill    AIMOVIG 140 MG/ML solution auto-injector Inject 1 mL under the skin into the appropriate area as directed Every 30 (Thirty) Days. HAS NOT TAKEN IN SEVERAL MONTHS  11    amitriptyline (ELAVIL) 25 MG tablet Take 1 tablet by mouth Every Night. 90 tablet 0    Aspirin 81 MG capsule Take 1 tablet by mouth Daily.      atorvastatin (LIPITOR) 20 MG tablet TAKE 1 TABLET BY MOUTH EVERY DAY AT NIGHT 90 tablet 1    baclofen (LIORESAL) 10 MG tablet Take 1 tablet by mouth 2 (Two) Times a Day.  2    CALCIUM-MAGNESIUM-VITAMIN D PO Take  by mouth.      colesevelam (WELCHOL) 625 MG tablet Take 2 tablets by mouth Daily. 180 tablet 11    Diclofenac Sodium (VOLTAREN) 1 % gel gel Apply 2  g topically to the appropriate area as directed As Needed.      doxycycline (MONODOX) 100 MG capsule Take 1 capsule by mouth Every 12 (Twelve) Hours.      DULoxetine (Cymbalta) 30 MG capsule Take 1 capsule by mouth Take As Directed. 1 capsule po q AM, 2 capsules PO q hs 270 capsule 0    exemestane (AROMASIN) 25 MG tablet TAKE 1 TABLET BY MOUTH EVERY DAY 90 tablet 2    fluticasone (FLONASE) 50 MCG/ACT nasal spray SPRAY 2 SPRAYS INTO THE NOSTRIL AS DIRECTED BY PROVIDER DAILY. 48 mL 3    Loratadine (CLARITIN PO) Take 10 mg by mouth Daily.      meloxicam (MOBIC) 15 MG tablet TAKE 1 TABLET BY MOUTH DAILY 30 tablet 0    oxyCODONE-acetaminophen (PERCOCET)  MG per tablet Take 1 tablet by mouth 4 (Four) Times a Day.      Triumeq 600- MG per tablet Take 1 tablet by mouth Daily.      ubrogepant (UBRELVY) 100 MG tablet Take 1 tablet by mouth.      vitamin B-12 (CYANOCOBALAMIN) 1000 MCG tablet Take 1 tablet by mouth Daily.      vitamin B-6 (pyridoxine) 50 MG tablet Take 1 tablet by mouth Daily. 90 tablet 2    zolpidem (AMBIEN) 10 MG tablet Take 0.5 tablets by mouth At Night As Needed for Sleep. 15 tablet 0    omeprazole (priLOSEC) 40 MG capsule Take 1 capsule by mouth Daily Before Supper. 90 capsule 1     No current facility-administered medications for this visit.                Objective   Physical Exam  Vitals and nursing note reviewed.   Constitutional:       General: She is not in acute distress.     Appearance: She is not toxic-appearing.   Cardiovascular:      Rate and Rhythm: Normal rate and regular rhythm.      Heart sounds: Normal heart sounds. No murmur heard.  Pulmonary:      Effort: Pulmonary effort is normal. No respiratory distress.      Breath sounds: Normal breath sounds. No stridor. No wheezing or rhonchi.   Musculoskeletal:         General: Tenderness present. No swelling.      Comments: Ttp over the base of the thumbs and a long the tendon    Skin:     Findings: No bruising.   Neurological:       Mental Status: She is alert and oriented to person, place, and time.   Psychiatric:         Mood and Affect: Mood normal.         Behavior: Behavior normal.         Thought Content: Thought content normal.           Assessment & Plan   Diagnoses and all orders for this visit:    1. Wrist pain, chronic, left (Primary)  Comments:  jdaen tendinitis, may benifit from steroid injection  Orders:  -     Ambulatory Referral to Hand Surgery    2. Chronic GERD  Comments:  will start on PPI 40  mg, discussed diet and other supportive care  Orders:  -     omeprazole (priLOSEC) 40 MG capsule; Take 1 capsule by mouth Daily Before Supper.  Dispense: 90 capsule; Refill: 1  -     CBC (No Diff); Future  -     Comprehensive metabolic panel; Future    3. Primary insomnia  Comments:  Stable, continue current Rx    4. Drug-induced polyneuropathy  Comments:  Stable, continue current Rx    5. IFG (impaired fasting glucose)  -     Hemoglobin A1c; Future        Patient was given instructions and counseling regarding her condition or for health maintenance advice.   Please see specific information pulled into the AVS if appropriate.       I have fully discussed the nature of the medical condition(s) risks, complications, management, safe and proper use of medications.   Encouraged medication compliance and the importance of keeping scheduled follow up appointments with me and any other providers.    Patient instructed to follow up with our office for results on any labs/imaging ordered during this visit.    Home care discussed  All questions answered  Patient verbalizes understanding and agrees to treatment plan.     Follow up: Return for keep your appoin in march.           Answers submitted by the patient for this visit:  Other (Submitted on 2/16/2024)  Please describe your symptoms.: My left wrist is hurting. I can not lift anything using my left hand. It is not a constant pain more of a sharp pain when I use that hand. My hand hasn't  swelled and I have been wrapping in an ace bandage for the most part of the last 3 weeks.  Have you had these symptoms before?: Yes  How long have you been having these symptoms?: Greater than 2 weeks  Please describe any probable cause for these symptoms. : Daily use of the left hand  Primary Reason for Visit (Submitted on 2/16/2024)  What is the primary reason for your visit?: Other

## 2024-02-20 NOTE — TELEPHONE ENCOUNTER
Left vm - patient needs to push out her 3/5/24 to the week of 3/19/24 and have a lab draw one week prior. If no availability can offer standing [facility] facilities and [facility].

## 2024-03-05 ENCOUNTER — OFFICE VISIT (OUTPATIENT)
Dept: FAMILY MEDICINE CLINIC | Facility: CLINIC | Age: 64
End: 2024-03-05
Payer: COMMERCIAL

## 2024-03-05 VITALS
WEIGHT: 160.8 LBS | SYSTOLIC BLOOD PRESSURE: 122 MMHG | OXYGEN SATURATION: 98 % | BODY MASS INDEX: 25.84 KG/M2 | DIASTOLIC BLOOD PRESSURE: 82 MMHG | HEART RATE: 74 BPM | TEMPERATURE: 97.7 F | RESPIRATION RATE: 16 BRPM | HEIGHT: 66 IN

## 2024-03-05 DIAGNOSIS — G43.009 MIGRAINE WITHOUT AURA AND WITHOUT STATUS MIGRAINOSUS, NOT INTRACTABLE: ICD-10-CM

## 2024-03-05 DIAGNOSIS — G62.0 DRUG-INDUCED POLYNEUROPATHY: ICD-10-CM

## 2024-03-05 DIAGNOSIS — R03.0 ELEVATED BLOOD PRESSURE READING WITHOUT DIAGNOSIS OF HYPERTENSION: Primary | ICD-10-CM

## 2024-03-05 DIAGNOSIS — R73.01 IFG (IMPAIRED FASTING GLUCOSE): ICD-10-CM

## 2024-03-05 DIAGNOSIS — F51.01 PRIMARY INSOMNIA: ICD-10-CM

## 2024-03-05 DIAGNOSIS — K21.9 CHRONIC GERD: ICD-10-CM

## 2024-03-05 DIAGNOSIS — E78.2 MIXED HYPERLIPIDEMIA: ICD-10-CM

## 2024-03-05 PROCEDURE — 99214 OFFICE O/P EST MOD 30 MIN: CPT | Performed by: FAMILY MEDICINE

## 2024-03-05 NOTE — PROGRESS NOTES
Subjective     Richa Dolan is a 63 y.o. female.     Chief Complaint   Patient presents with     Primary insomnia     3 month f/u     Anxiety    Hyperlipidemia     Patient is fasting.     Hypertension     Has been elevated noticed in the last 6 months.        History of Present Illness     She noticed her BP elevated in certain places like visit with pain clinic , no h/o HTN. EATS hd LOW IN SALT. Denies CP    Chronic Lt wrist pain s/p fall in 11/2023 ;saw hand surgery , will do PT soon    Migraine ; on monthly injection from Neurology     [facility] , she do it at home      Insomnia; doing well on Ambien QHS, no S/E reported, her sleep much improved.      GERD; HER SX much improved with PPI since started last OV     H/o breast cancer ; Following with onco every 6 months      PN from chemo, doing well on Amitriptyline and B6 and cymbalta     HLD; eats HD + statin, no s/e reported     HIV; NO NEW CONCERN, doing well on Rx , following with ID yearly     IFG; last A1c was above goal , eats RD     H/o stroke in 2017 no residual weakness , on ASA and Statin     Lab Results   Component Value Date    HGBA1C 5.8 (H) 12/05/2023         The following portions of the patient's history were reviewed and updated as appropriate: allergies, current medications, past family history, past medical history, past social history, past surgical history, and problem list.        Review of Systems   Cardiovascular:  Negative for chest pain.       Vitals:    03/05/24 1102   BP: 122/82   Pulse: 74   Resp: 16   Temp: 97.7 °F (36.5 °C)   SpO2: 98%           03/05/24  1102   Weight: 72.9 kg (160 lb 12.8 oz)         Body mass index is 25.97 kg/m².      Current Outpatient Medications   Medication Sig Dispense Refill    AIMOVIG 140 MG/ML solution auto-injector Inject 1 mL under the skin into the appropriate area as directed Every 30 (Thirty) Days. HAS NOT TAKEN IN SEVERAL MONTHS  11    amitriptyline (ELAVIL) 25 MG tablet Take 1 tablet by mouth  Every Night. 90 tablet 0    Aspirin 81 MG capsule Take 1 tablet by mouth Daily.      atorvastatin (LIPITOR) 20 MG tablet TAKE 1 TABLET BY MOUTH EVERY DAY AT NIGHT 90 tablet 1    baclofen (LIORESAL) 10 MG tablet Take 1 tablet by mouth 2 (Two) Times a Day.  2    CALCIUM-MAGNESIUM-VITAMIN D PO Take  by mouth.      colesevelam (WELCHOL) 625 MG tablet Take 2 tablets by mouth Daily. 180 tablet 11    Diclofenac Sodium (VOLTAREN) 1 % gel gel Apply 2 g topically to the appropriate area as directed As Needed.      doxycycline (MONODOX) 100 MG capsule Take 1 capsule by mouth Every 12 (Twelve) Hours.      DULoxetine (Cymbalta) 30 MG capsule Take 1 capsule by mouth Take As Directed. 1 capsule po q AM, 2 capsules PO q hs 270 capsule 0    exemestane (AROMASIN) 25 MG tablet TAKE 1 TABLET BY MOUTH EVERY DAY 90 tablet 2    fluticasone (FLONASE) 50 MCG/ACT nasal spray SPRAY 2 SPRAYS INTO THE NOSTRIL AS DIRECTED BY PROVIDER DAILY. 48 mL 3    Loratadine (CLARITIN PO) Take 10 mg by mouth Daily.      meloxicam (MOBIC) 15 MG tablet TAKE 1 TABLET BY MOUTH DAILY 30 tablet 0    omeprazole (priLOSEC) 40 MG capsule Take 1 capsule by mouth Daily Before Supper. 90 capsule 1    oxyCODONE-acetaminophen (PERCOCET)  MG per tablet Take 1 tablet by mouth 4 (Four) Times a Day.      Triumeq 600- MG per tablet Take 1 tablet by mouth Daily.      ubrogepant (UBRELVY) 100 MG tablet Take 1 tablet by mouth.      vitamin B-12 (CYANOCOBALAMIN) 1000 MCG tablet Take 1 tablet by mouth Daily.      vitamin B-6 (pyridoxine) 50 MG tablet Take 1 tablet by mouth Daily. 90 tablet 2    zolpidem (AMBIEN) 10 MG tablet Take 0.5 tablets by mouth At Night As Needed for Sleep. 15 tablet 0     No current facility-administered medications for this visit.                Objective   Physical Exam  Vitals and nursing note reviewed.   Constitutional:       General: She is not in acute distress.     Appearance: She is not toxic-appearing.   Cardiovascular:      Rate and  Rhythm: Normal rate and regular rhythm.      Heart sounds: Normal heart sounds. No murmur heard.  Pulmonary:      Effort: Pulmonary effort is normal. No respiratory distress.      Breath sounds: Normal breath sounds. No stridor. No wheezing or rhonchi.   Musculoskeletal:         General: No swelling or tenderness.   Skin:     General: Skin is warm.      Findings: No bruising.   Neurological:      Mental Status: She is alert and oriented to person, place, and time.   Psychiatric:         Mood and Affect: Mood normal.         Behavior: Behavior normal.         Thought Content: Thought content normal.           Assessment & Plan   Diagnoses and all orders for this visit:    1. Elevated blood pressure reading without diagnosis of hypertension (Primary)  Comments:  Close BP monitoring and f/u , BP login form provided ,discussed correct way to position self for BP accuracy    2. Chronic GERD  Comments:  sx much improved , continue PPI    3. IFG (impaired fasting glucose)  Comments:  discussed diet  Orders:  -     Hemoglobin A1c    4. Primary insomnia  Comments:  Stable, continue current Rx    5. Drug-induced polyneuropathy  Comments:  Stable, continue current Rx    6. Migraine without aura and without status migrainosus, not intractable  Comments:  Stable, continue current Rx    7. Mixed hyperlipidemia  Comments:  Stable, continue current Rx  Orders:  -     Lipid Panel  -     Basic Metabolic Panel           Patient was given instructions and counseling regarding her condition or for health maintenance advice.   Please see specific information pulled into the AVS if appropriate.       I have fully discussed the nature of the medical condition(s) risks, complications, management, safe and proper use of medications.   Encouraged medication compliance and the importance of keeping scheduled follow up appointments with me and any other providers.    Patient instructed to follow up with our office for results on any  labs/imaging ordered during this visit.    Home care discussed  All questions answered  Patient verbalizes understanding and agrees to treatment plan.     Follow up: Return in about 4 weeks (around 4/2/2024) for f/u on HTN.

## 2024-03-06 LAB
BUN SERPL-MCNC: 15 MG/DL (ref 8–27)
BUN/CREAT SERPL: 20 (ref 12–28)
CALCIUM SERPL-MCNC: 9.3 MG/DL (ref 8.7–10.3)
CHLORIDE SERPL-SCNC: 102 MMOL/L (ref 96–106)
CHOLEST SERPL-MCNC: 166 MG/DL (ref 100–199)
CO2 SERPL-SCNC: 24 MMOL/L (ref 20–29)
CREAT SERPL-MCNC: 0.74 MG/DL (ref 0.57–1)
EGFRCR SERPLBLD CKD-EPI 2021: 91 ML/MIN/1.73
GLUCOSE SERPL-MCNC: 79 MG/DL (ref 70–99)
HBA1C MFR BLD: 5.8 % (ref 4.8–5.6)
HDLC SERPL-MCNC: 76 MG/DL
LDLC SERPL CALC-MCNC: 77 MG/DL (ref 0–99)
POTASSIUM SERPL-SCNC: 4.2 MMOL/L (ref 3.5–5.2)
SODIUM SERPL-SCNC: 142 MMOL/L (ref 134–144)
TRIGL SERPL-MCNC: 70 MG/DL (ref 0–149)
VLDLC SERPL CALC-MCNC: 13 MG/DL (ref 5–40)

## 2024-03-12 DIAGNOSIS — S63.502A SPRAIN OF LEFT WRIST, INITIAL ENCOUNTER: ICD-10-CM

## 2024-03-12 RX ORDER — MELOXICAM 15 MG/1
15 TABLET ORAL DAILY
Qty: 30 TABLET | Refills: 0 | Status: SHIPPED | OUTPATIENT
Start: 2024-03-12

## 2024-03-13 ENCOUNTER — PATIENT MESSAGE (OUTPATIENT)
Dept: FAMILY MEDICINE CLINIC | Facility: CLINIC | Age: 64
End: 2024-03-13

## 2024-03-13 DIAGNOSIS — G62.0 DRUG-INDUCED POLYNEUROPATHY: Primary | ICD-10-CM

## 2024-03-13 RX ORDER — AMITRIPTYLINE HYDROCHLORIDE 25 MG/1
25 TABLET, FILM COATED ORAL NIGHTLY
Qty: 90 TABLET | Refills: 3 | Status: SHIPPED | OUTPATIENT
Start: 2024-03-13

## 2024-03-18 ENCOUNTER — PATIENT MESSAGE (OUTPATIENT)
Dept: FAMILY MEDICINE CLINIC | Facility: CLINIC | Age: 64
End: 2024-03-18
Payer: COMMERCIAL

## 2024-03-18 DIAGNOSIS — G47.00 INSOMNIA, UNSPECIFIED TYPE: ICD-10-CM

## 2024-03-18 RX ORDER — ZOLPIDEM TARTRATE 10 MG/1
5 TABLET ORAL NIGHTLY PRN
Qty: 45 TABLET | Refills: 0 | Status: SHIPPED | OUTPATIENT
Start: 2024-03-18 | End: 2024-03-18 | Stop reason: SDUPTHER

## 2024-03-18 RX ORDER — ZOLPIDEM TARTRATE 10 MG/1
5 TABLET ORAL NIGHTLY PRN
Qty: 45 TABLET | Refills: 0 | Status: SHIPPED | OUTPATIENT
Start: 2024-03-18

## 2024-04-03 RX ORDER — DULOXETIN HYDROCHLORIDE 30 MG/1
CAPSULE, DELAYED RELEASE ORAL
Qty: 90 CAPSULE | Refills: 0 | Status: SHIPPED | OUTPATIENT
Start: 2024-04-03

## 2024-04-04 ENCOUNTER — TELEPHONE (OUTPATIENT)
Dept: INFECTIOUS DISEASES | Facility: CLINIC | Age: 64
End: 2024-04-04
Payer: COMMERCIAL

## 2024-04-04 ENCOUNTER — LAB (OUTPATIENT)
Dept: LAB | Facility: HOSPITAL | Age: 64
End: 2024-04-04
Payer: COMMERCIAL

## 2024-04-04 DIAGNOSIS — Z21 ASYMPTOMATIC HIV INFECTION: Primary | ICD-10-CM

## 2024-04-04 NOTE — TELEPHONE ENCOUNTER
Patient would like to come to lab tomorrow for blood work prior to her visit with you next week. Please place orders in EPIC . MACEY RN

## 2024-04-11 ENCOUNTER — OFFICE VISIT (OUTPATIENT)
Dept: INFECTIOUS DISEASES | Facility: CLINIC | Age: 64
End: 2024-04-11
Payer: COMMERCIAL

## 2024-04-11 ENCOUNTER — LAB (OUTPATIENT)
Dept: LAB | Facility: HOSPITAL | Age: 64
End: 2024-04-11
Payer: COMMERCIAL

## 2024-04-11 VITALS
HEIGHT: 66 IN | WEIGHT: 160.6 LBS | RESPIRATION RATE: 18 BRPM | DIASTOLIC BLOOD PRESSURE: 87 MMHG | HEART RATE: 79 BPM | TEMPERATURE: 97.6 F | BODY MASS INDEX: 25.81 KG/M2 | SYSTOLIC BLOOD PRESSURE: 144 MMHG

## 2024-04-11 DIAGNOSIS — Z79.2 LONG TERM (CURRENT) USE OF ANTIBIOTICS: ICD-10-CM

## 2024-04-11 DIAGNOSIS — Z21 ASYMPTOMATIC HIV INFECTION: ICD-10-CM

## 2024-04-11 DIAGNOSIS — Z21 ASYMPTOMATIC HIV INFECTION: Primary | ICD-10-CM

## 2024-04-11 DIAGNOSIS — S63.502A SPRAIN OF LEFT WRIST, INITIAL ENCOUNTER: ICD-10-CM

## 2024-04-11 LAB
BASOPHILS # BLD AUTO: 0.02 10*3/MM3 (ref 0–0.2)
BASOPHILS NFR BLD AUTO: 0.4 % (ref 0–1.5)
DEPRECATED RDW RBC AUTO: 43.7 FL (ref 37–54)
EOSINOPHIL # BLD AUTO: 0.04 10*3/MM3 (ref 0–0.4)
EOSINOPHIL NFR BLD AUTO: 0.7 % (ref 0.3–6.2)
ERYTHROCYTE [DISTWIDTH] IN BLOOD BY AUTOMATED COUNT: 12.1 % (ref 12.3–15.4)
HCT VFR BLD AUTO: 40.3 % (ref 34–46.6)
HGB BLD-MCNC: 13.5 G/DL (ref 12–15.9)
IMM GRANULOCYTES # BLD AUTO: 0.01 10*3/MM3 (ref 0–0.05)
IMM GRANULOCYTES NFR BLD AUTO: 0.2 % (ref 0–0.5)
LYMPHOCYTES # BLD AUTO: 2.01 10*3/MM3 (ref 0.7–3.1)
LYMPHOCYTES NFR BLD AUTO: 37.5 % (ref 19.6–45.3)
MCH RBC QN AUTO: 33 PG (ref 26.6–33)
MCHC RBC AUTO-ENTMCNC: 33.5 G/DL (ref 31.5–35.7)
MCV RBC AUTO: 98.5 FL (ref 79–97)
MONOCYTES # BLD AUTO: 0.54 10*3/MM3 (ref 0.1–0.9)
MONOCYTES NFR BLD AUTO: 10.1 % (ref 5–12)
NEUTROPHILS NFR BLD AUTO: 2.74 10*3/MM3 (ref 1.7–7)
NEUTROPHILS NFR BLD AUTO: 51.1 % (ref 42.7–76)
NRBC BLD AUTO-RTO: 0 /100 WBC (ref 0–0.2)
PLATELET # BLD AUTO: 274 10*3/MM3 (ref 140–450)
PMV BLD AUTO: 9.6 FL (ref 6–12)
RBC # BLD AUTO: 4.09 10*6/MM3 (ref 3.77–5.28)
WBC NRBC COR # BLD AUTO: 5.36 10*3/MM3 (ref 3.4–10.8)

## 2024-04-11 PROCEDURE — 86361 T CELL ABSOLUTE COUNT: CPT

## 2024-04-11 PROCEDURE — 87536 HIV-1 QUANT&REVRSE TRNSCRPJ: CPT

## 2024-04-11 PROCEDURE — 85025 COMPLETE CBC W/AUTO DIFF WBC: CPT

## 2024-04-11 PROCEDURE — 99214 OFFICE O/P EST MOD 30 MIN: CPT | Performed by: INTERNAL MEDICINE

## 2024-04-11 RX ORDER — MELOXICAM 15 MG/1
15 TABLET ORAL DAILY
Qty: 30 TABLET | Refills: 0 | Status: SHIPPED | OUTPATIENT
Start: 2024-04-11

## 2024-04-11 NOTE — PROGRESS NOTES
ID CLINIC NOTE:    CC: f/u HIV    HPI: Richa Dolan is a 63 y.o. female here for f/u re: HIV. No missed doses of Triumeq. No known side effects. Refills through [facility]. She gets timely refills. $0 cost to her.    She had labs done earlier today just prior to her visit.     She has been dealing w/ some anxiety since coming off of her Lexapro.     She remains on Aromasin maintenance therapy for breast cancer.     She has a torn ligament in the right foot and pain in the left hand after tripping over her dog.       PMH:   HIV well-controlled on ART w/ Triumeq  Breast cancer  Peripheral neuropathy  CVA  Migraines  HLD  GERD    Past Surgical History:   Procedure Laterality Date    ADENOIDECTOMY      BREAST LUMPECTOMY      BREAST LUMPECTOMY WITH SENTINEL NODE BIOPSY Left 05/28/2020    Procedure: BREAST LUMPECTOMY WITH SENTINEL NODE BIOPSY AND NEEDLE LOCALIZATION AND axillary dissection;  Surgeon: Landen Forman MD;  Location: [facility];  Service: General;  Laterality: Left;    BREAST SURGERY Left 05/28/2020    Procedure: LEFT LATERAL INTERCOASTAL ARTERY  flap ;  Surgeon: Yusuf aGrcia MD;  Location: [facility];  Service: Plastics;  Laterality: Left;    CHOLECYSTECTOMY      HYSTERECTOMY      KNEE ARTHROSCOPY Right 03/24/2017    Procedure:  RIGHT  KNEE ARTHROSCOPY WITH DEBRIDEMENT CHONDROPLASTY PARTIAL MENISCECTOMY;  Surgeon: Karl Galeas MD;  Location: [facility];  Service:     KNEE CARTILAGE SURGERY Right 2007    TONSILLECTOMY      TONSILLECTOMY  1974    TRIGGER POINT INJECTION  2002 - 2023    US GUIDED LYMPH NODE BIOPSY  10/29/2019    LEFT BREAST    VENOUS ACCESS DEVICE (PORT) INSERTION Right 12/02/2019    Procedure: INSERTION VENOUS ACCESS DEVICE RIGHT;  Surgeon: Landen Forman MD;  Location: [facility] OR;  Service: General    VENOUS ACCESS DEVICE (PORT) REMOVAL N/A 05/25/2022    Procedure: Mediport Removal;  Surgeon: Cesar Vasquez Jr., MD;   Location: [facility] MAIN OR;  Service: General;  Laterality: N/A;     SH:     1 son  No tob/EtOH/illicits   Retired from [employer]    Allergies: Augmentin (hives)    Medications:   Current Outpatient Medications:     AIMOVIG 140 MG/ML solution auto-injector, Inject 1 mL under the skin into the appropriate area as directed Every 30 (Thirty) Days. HAS NOT TAKEN IN SEVERAL MONTHS, Disp: , Rfl: 11    amitriptyline (ELAVIL) 25 MG tablet, Take 1 tablet by mouth Every Night., Disp: 90 tablet, Rfl: 3    Aspirin 81 MG capsule, Take 1 tablet by mouth Daily., Disp: , Rfl:     atorvastatin (LIPITOR) 20 MG tablet, TAKE 1 TABLET BY MOUTH EVERY DAY AT NIGHT, Disp: 90 tablet, Rfl: 1    baclofen (LIORESAL) 10 MG tablet, Take 1 tablet by mouth 2 (Two) Times a Day., Disp: , Rfl: 2    CALCIUM-MAGNESIUM-VITAMIN D PO, Take  by mouth., Disp: , Rfl:     colesevelam (WELCHOL) 625 MG tablet, Take 2 tablets by mouth Daily., Disp: 180 tablet, Rfl: 11    Diclofenac Sodium (VOLTAREN) 1 % gel gel, Apply 2 g topically to the appropriate area as directed As Needed., Disp: , Rfl:     doxycycline (MONODOX) 100 MG capsule, Take 1 capsule by mouth Every 12 (Twelve) Hours., Disp: , Rfl:     DULoxetine (CYMBALTA) 30 MG capsule, TAKE 1 CAPSULE BY MOUTH IN AM AND 2 CAPSULE BY MOUTH AT BEDTIME, Disp: 90 capsule, Rfl: 0    exemestane (AROMASIN) 25 MG tablet, TAKE 1 TABLET BY MOUTH EVERY DAY, Disp: 90 tablet, Rfl: 2    fluticasone (FLONASE) 50 MCG/ACT nasal spray, SPRAY 2 SPRAYS INTO THE NOSTRIL AS DIRECTED BY PROVIDER DAILY., Disp: 48 mL, Rfl: 3    Loratadine (CLARITIN PO), Take 10 mg by mouth Daily., Disp: , Rfl:     meloxicam (MOBIC) 15 MG tablet, TAKE 1 TABLET BY MOUTH DAILY, Disp: 30 tablet, Rfl: 0    omeprazole (priLOSEC) 40 MG capsule, Take 1 capsule by mouth Daily Before Supper., Disp: 90 capsule, Rfl: 1    oxyCODONE-acetaminophen (PERCOCET)  MG per tablet, Take 1 tablet by mouth 4 (Four) Times a Day., Disp: , Rfl:     Triumeq 600-  "MG per tablet, Take 1 tablet by mouth Daily., Disp: , Rfl:     ubrogepant (UBRELVY) 100 MG tablet, Take 1 tablet by mouth., Disp: , Rfl:     vitamin B-12 (CYANOCOBALAMIN) 1000 MCG tablet, Take 1 tablet by mouth Daily., Disp: , Rfl:     vitamin B-6 (pyridoxine) 50 MG tablet, Take 1 tablet by mouth Daily., Disp: 90 tablet, Rfl: 2    zolpidem (AMBIEN) 10 MG tablet, Take 0.5 tablets by mouth At Night As Needed for Sleep., Disp: 45 tablet, Rfl: 0    OBJECTIVE:  /87 (BP Location: Right arm, Patient Position: Sitting, Cuff Size: Adult)   Pulse 79   Temp 97.6 °F (36.4 °C) (Temporal)   Resp 18   Ht 167.6 cm (65.98\")   Wt 72.8 kg (160 lb 9.6 oz)   BMI 25.94 kg/m²       GENERAL: Awake, alert, NAD, very nice  EYES: No scleral icterus  CV: Normal rate  LUNGS: . Normal work of breathing onRA  ABDOMEN: soft, not distended  SKIN: no rashes  MSK: L wrist in brace      DIAGNOSTICS:  CBC, BMP, CD4 count, and HIV VL reviewed today  Lab Results   Component Value Date    WBC 11.15 (H) 11/16/2023    HGB 13.6 11/16/2023    HCT 41.2 11/16/2023     11/16/2023     Lab Results   Component Value Date    GLUCOSE 79 03/05/2024    CALCIUM 9.3 03/05/2024     03/05/2024    K 4.2 03/05/2024    CO2 24 03/05/2024     03/05/2024    BUN 15 03/05/2024    CREATININE 0.74 03/05/2024    EGFRIFAFRI >60 05/28/2015    EGFRIFNONA 81 02/08/2022    BCR 20 03/05/2024    ANIONGAP 12.3 11/16/2023     HIV Related Labs:  CD4: 783 (10/2023)  VL 60 copies (10/2023)  HIV Genotype: unknown bc undetectable  [id number] - negative  Tspot - negative (10/2020)  RPR - non-reactive (10/2020)  Hep A - immune (7/2016)  Hep B - immune (9/2015)  Hep C - negative (10/2020)  Urine GCCT - negative (9/2015)    ASSESSMENT/PLAN:  1. Asymptomatic HIV  -excellent compliance w/ Triumeq; RX sent to [facility]  -labs performed today; will call her w/ results  -encouraged Shingrix vaccine    2. T2N1 invasive ductal carcinoma of the left breast  -on " maintenance therapy  -port removed  -says she's doing well    3. Long term use of antibiotics  -reviewed monitoring labs    RTC 6 months or sooner if needed

## 2024-04-12 LAB
BASOPHILS # BLD AUTO: 0 X10E3/UL (ref 0–0.2)
BASOPHILS NFR BLD AUTO: 1 %
CD3+CD4+ CELLS # BLD: 665 /UL (ref 359–1519)
CD3+CD4+ CELLS NFR BLD: 35 % (ref 30.8–58.5)
EOSINOPHIL # BLD AUTO: 0 X10E3/UL (ref 0–0.4)
EOSINOPHIL NFR BLD AUTO: 1 %
ERYTHROCYTE [DISTWIDTH] IN BLOOD BY AUTOMATED COUNT: 12 % (ref 11.7–15.4)
HCT VFR BLD AUTO: 39.7 % (ref 34–46.6)
HGB BLD-MCNC: 13.6 G/DL (ref 11.1–15.9)
IMM GRANULOCYTES # BLD AUTO: 0 X10E3/UL (ref 0–0.1)
IMM GRANULOCYTES NFR BLD AUTO: 0 %
LYMPHOCYTES # BLD AUTO: 1.9 X10E3/UL (ref 0.7–3.1)
LYMPHOCYTES NFR BLD AUTO: 37 %
MCH RBC QN AUTO: 33.9 PG (ref 26.6–33)
MCHC RBC AUTO-ENTMCNC: 34.3 G/DL (ref 31.5–35.7)
MCV RBC AUTO: 99 FL (ref 79–97)
MONOCYTES # BLD AUTO: 0.5 X10E3/UL (ref 0.1–0.9)
MONOCYTES NFR BLD AUTO: 10 %
NEUTROPHILS # BLD AUTO: 2.7 X10E3/UL (ref 1.4–7)
NEUTROPHILS NFR BLD AUTO: 51 %
PLATELET # BLD AUTO: 266 X10E3/UL (ref 150–450)
RBC # BLD AUTO: 4.01 X10E6/UL (ref 3.77–5.28)
WBC # BLD AUTO: 5.2 X10E3/UL (ref 3.4–10.8)

## 2024-04-13 LAB
HIV1 RNA # SERPL NAA+PROBE: <20 COPIES/ML
HIV1 RNA SERPL NAA+PROBE-LOG#: NORMAL LOG10COPY/ML

## 2024-04-16 ENCOUNTER — TELEPHONE (OUTPATIENT)
Dept: INFECTIOUS DISEASES | Facility: CLINIC | Age: 64
End: 2024-04-16
Payer: COMMERCIAL

## 2024-04-16 NOTE — TELEPHONE ENCOUNTER
----- Message from Denilson Dawn MD sent at 4/15/2024  1:08 PM EDT -----  Please let patient know labs look great from my standpoint.  Her HIV viral load remains undetectable.

## 2024-04-16 NOTE — TELEPHONE ENCOUNTER
Patient notified labs were all good per Dr. Dawn's attached note. Advised continue current meds and follow up as scheduled. MACEY RN

## 2024-05-12 DIAGNOSIS — S63.502A SPRAIN OF LEFT WRIST, INITIAL ENCOUNTER: ICD-10-CM

## 2024-05-13 RX ORDER — MELOXICAM 15 MG/1
15 TABLET ORAL DAILY
Qty: 30 TABLET | Refills: 0 | Status: SHIPPED | OUTPATIENT
Start: 2024-05-13

## 2024-05-23 ENCOUNTER — LAB (OUTPATIENT)
Dept: LAB | Facility: HOSPITAL | Age: 64
End: 2024-05-23
Payer: COMMERCIAL

## 2024-05-23 ENCOUNTER — OFFICE VISIT (OUTPATIENT)
Dept: ONCOLOGY | Facility: CLINIC | Age: 64
End: 2024-05-23
Payer: COMMERCIAL

## 2024-05-23 ENCOUNTER — INFUSION (OUTPATIENT)
Dept: ONCOLOGY | Facility: HOSPITAL | Age: 64
End: 2024-05-23
Payer: COMMERCIAL

## 2024-05-23 VITALS
RESPIRATION RATE: 18 BRPM | OXYGEN SATURATION: 98 % | TEMPERATURE: 98.5 F | SYSTOLIC BLOOD PRESSURE: 150 MMHG | BODY MASS INDEX: 25.81 KG/M2 | WEIGHT: 160.6 LBS | HEIGHT: 66 IN | DIASTOLIC BLOOD PRESSURE: 90 MMHG | HEART RATE: 83 BPM

## 2024-05-23 DIAGNOSIS — Z85.3 HISTORY OF LEFT BREAST CANCER: ICD-10-CM

## 2024-05-23 DIAGNOSIS — Z79.811 USE OF EXEMESTANE (AROMASIN): Primary | ICD-10-CM

## 2024-05-23 DIAGNOSIS — M81.8 OTHER OSTEOPOROSIS WITHOUT CURRENT PATHOLOGICAL FRACTURE: ICD-10-CM

## 2024-05-23 DIAGNOSIS — Z17.0 MALIGNANT NEOPLASM OF OVERLAPPING SITES OF LEFT BREAST IN FEMALE, ESTROGEN RECEPTOR POSITIVE: Primary | ICD-10-CM

## 2024-05-23 DIAGNOSIS — Z79.811 USE OF EXEMESTANE (AROMASIN): ICD-10-CM

## 2024-05-23 DIAGNOSIS — Z17.0 MALIGNANT NEOPLASM OF OVERLAPPING SITES OF LEFT BREAST IN FEMALE, ESTROGEN RECEPTOR POSITIVE: ICD-10-CM

## 2024-05-23 DIAGNOSIS — C50.812 MALIGNANT NEOPLASM OF OVERLAPPING SITES OF LEFT BREAST IN FEMALE, ESTROGEN RECEPTOR POSITIVE: Primary | ICD-10-CM

## 2024-05-23 DIAGNOSIS — C50.812 MALIGNANT NEOPLASM OF OVERLAPPING SITES OF LEFT BREAST IN FEMALE, ESTROGEN RECEPTOR POSITIVE: ICD-10-CM

## 2024-05-23 LAB
ALBUMIN SERPL-MCNC: 4.6 G/DL (ref 3.5–5.2)
ALBUMIN/GLOB SERPL: 1.7 G/DL
ALP SERPL-CCNC: 72 U/L (ref 39–117)
ALT SERPL W P-5'-P-CCNC: 12 U/L (ref 1–33)
ANION GAP SERPL CALCULATED.3IONS-SCNC: 13.6 MMOL/L (ref 5–15)
AST SERPL-CCNC: 25 U/L (ref 1–32)
BASOPHILS # BLD AUTO: 0.02 10*3/MM3 (ref 0–0.2)
BASOPHILS NFR BLD AUTO: 0.4 % (ref 0–1.5)
BILIRUB SERPL-MCNC: 0.3 MG/DL (ref 0–1.2)
BUN SERPL-MCNC: 13 MG/DL (ref 8–23)
BUN/CREAT SERPL: 17.1 (ref 7–25)
CALCIUM SPEC-SCNC: 9.4 MG/DL (ref 8.6–10.5)
CHLORIDE SERPL-SCNC: 101 MMOL/L (ref 98–107)
CO2 SERPL-SCNC: 25.4 MMOL/L (ref 22–29)
CREAT SERPL-MCNC: 0.76 MG/DL (ref 0.57–1)
DEPRECATED RDW RBC AUTO: 46.8 FL (ref 37–54)
EGFRCR SERPLBLD CKD-EPI 2021: 88.2 ML/MIN/1.73
EOSINOPHIL # BLD AUTO: 0.05 10*3/MM3 (ref 0–0.4)
EOSINOPHIL NFR BLD AUTO: 0.9 % (ref 0.3–6.2)
ERYTHROCYTE [DISTWIDTH] IN BLOOD BY AUTOMATED COUNT: 12.4 % (ref 12.3–15.4)
GLOBULIN UR ELPH-MCNC: 2.7 GM/DL
GLUCOSE SERPL-MCNC: 106 MG/DL (ref 65–99)
HCT VFR BLD AUTO: 41.3 % (ref 34–46.6)
HGB BLD-MCNC: 14 G/DL (ref 12–15.9)
IMM GRANULOCYTES # BLD AUTO: 0.01 10*3/MM3 (ref 0–0.05)
IMM GRANULOCYTES NFR BLD AUTO: 0.2 % (ref 0–0.5)
LYMPHOCYTES # BLD AUTO: 1.76 10*3/MM3 (ref 0.7–3.1)
LYMPHOCYTES NFR BLD AUTO: 31.4 % (ref 19.6–45.3)
MAGNESIUM SERPL-MCNC: 2 MG/DL (ref 1.6–2.4)
MCH RBC QN AUTO: 34.3 PG (ref 26.6–33)
MCHC RBC AUTO-ENTMCNC: 33.9 G/DL (ref 31.5–35.7)
MCV RBC AUTO: 101.2 FL (ref 79–97)
MONOCYTES # BLD AUTO: 0.6 10*3/MM3 (ref 0.1–0.9)
MONOCYTES NFR BLD AUTO: 10.7 % (ref 5–12)
NEUTROPHILS NFR BLD AUTO: 3.17 10*3/MM3 (ref 1.7–7)
NEUTROPHILS NFR BLD AUTO: 56.4 % (ref 42.7–76)
NRBC BLD AUTO-RTO: 0 /100 WBC (ref 0–0.2)
PHOSPHATE SERPL-MCNC: 4 MG/DL (ref 2.5–4.5)
PLATELET # BLD AUTO: 257 10*3/MM3 (ref 140–450)
PMV BLD AUTO: 9.5 FL (ref 6–12)
POTASSIUM SERPL-SCNC: 4.1 MMOL/L (ref 3.5–5.2)
PROT SERPL-MCNC: 7.3 G/DL (ref 6–8.5)
RBC # BLD AUTO: 4.08 10*6/MM3 (ref 3.77–5.28)
SODIUM SERPL-SCNC: 140 MMOL/L (ref 136–145)
WBC NRBC COR # BLD AUTO: 5.61 10*3/MM3 (ref 3.4–10.8)

## 2024-05-23 PROCEDURE — 96372 THER/PROPH/DIAG INJ SC/IM: CPT

## 2024-05-23 PROCEDURE — 36415 COLL VENOUS BLD VENIPUNCTURE: CPT

## 2024-05-23 PROCEDURE — 84100 ASSAY OF PHOSPHORUS: CPT

## 2024-05-23 PROCEDURE — 25010000002 DENOSUMAB 60 MG/ML SOLUTION PREFILLED SYRINGE: Performed by: INTERNAL MEDICINE

## 2024-05-23 PROCEDURE — 80053 COMPREHEN METABOLIC PANEL: CPT

## 2024-05-23 PROCEDURE — 83735 ASSAY OF MAGNESIUM: CPT

## 2024-05-23 PROCEDURE — 85025 COMPLETE CBC W/AUTO DIFF WBC: CPT

## 2024-05-23 RX ADMIN — DENOSUMAB 60 MG: 60 INJECTION SUBCUTANEOUS at 14:13

## 2024-05-23 NOTE — PROGRESS NOTES
Subjective       REASON FOR FOLLOW UP:    1.  T2N1 invasive ductal carcinoma of the left breast.  Ultrasound-showed 3.8 x 3.1 x 2.5 cm mass at 4 o'clock position with 2 abnormal axillary lymph nodes, larger lymph node being 1.4 cm.  Left breast biopsy and axillary lymph node biopsy October 29, 2019, invasive ductal carcinoma, moderately differentiated, grade 2, ER 85%, IN 10%, HER-2/merna 2+, HER-2 FISH negative.  12/06/19: Neoadjuvant chemotherapy, cycle #1 of dose-dense Adriamycin/Cytoxan with Neulasta for marrow support    01/17/20: Last cycle of Adriamycin/Cytoxan given  January 31, 2020: Cycle 1 Taxol  April 24, 2020: Last cycle, cycle 12 of Taxol  Patient is s/p left mastectomy with axillary dissection with reconstruction.  She is healing up nicely.  She has a drain in place.  Pathology showed invasive breast cancer which is 55 x 40 mm in size, grade 2 with focal lymphovascular space invasion with DCIS spanning over 36 x 6 mm.  All margins were negative for cancer.  8 of the additional lymph nodes out of 11 were positive with the largest micrometastasis measuring 5 mm.  Extranodal extension was seen.  Biomarkers on post neoadjuvant tumor is ER 81-90% IN 5% HER-2/merna 2+ by immunohistochemistry and HER-2/merna negative by FISH.  Radiation July 6, 2020-August 17, 2020.  Letrozole initiated July 2020.  Letrozole discontinued October 2020 secondary to severe joint pain.  October 30, 2020 patient starting tamoxifen and tolerating well  August 2021: Tamoxifen discontinued secondary to depression and mental fogginess and fatigue  September 21, 2021: Arimidex started  Severe arthralgias secondary to Arimidex, will start Aromasin  Patient is tolerating Aromasin well    2.  HIV, followed by Dr. Dawn from infectious disease.  Well controlled on meds    3.  Screening mammogram December 7, 2020 showed indeterminate calcifications lower outer middle left breast. Diagnostic mammogram showed 0.4 cm calcifications in the  left breast.  Stereotactic biopsy January 8, 2021 consistent with organizing fat necrosis.              HISTORY OF PRESENT ILLNESS:      Patient is a 63-year-old female with pathologic T2N1 invasive ductal carcinoma ER/IA positive HER2 negative currently on Aromasin and tolerating well.  She did have neuropathy related to Taxol.  There is element of possible foot drop as she has a tendency to fall.  She fractured her left hand because she fell and also tore a tendon in the right foot.  We discussed about consideration of referral to physical therapy for ankle boot orthosis.  Also discussed with her to follow-up with orthopedics as she missed the appointment.    She does not have any arthralgias related to Aromasin and tolerates it well.  She has been following up with Dr. Franco for her HIV and it is very stable.    May 23, 2024: Patient has significant anxiety.  She thinks the Aromasin is causing the anxiety.  She used to be on Lexapro but we had discontinued it when she was on tamoxifen.  And since then she has been more anxious.  We discussed about referring her to [facility].  She was placed on Cymbalta by her by her primary care physician.  We discussed that she can certainly be tapered off Cymbalta and started on Lexapro if that was helping her anxiety and this could be done through her primary care physician.  Her primary care physician is through [facility].    Had a large tumor with 2 lymph nodes with extra capsular extension and hence we wanted to give for prolonged total of 10 years endocrine therapy    Past Medical History:   Diagnosis Date    Anxiety     Arthritis of back 2010    Cerebrovascular accident (CVA) 08/22/2017    NO RESIDUAL AFFECT    DDD (degenerative disc disease), cervical     Depression     Drug therapy     Elevated cholesterol     Fat necrosis of left breast     GERD (gastroesophageal reflux disease)     H/O ICH (intracerebral hemorrhage) (CMS/HCC)     Heart murmur 1975    Hip  arthrosis 2015    History of chemotherapy     History of heart murmur in childhood     History of stroke     PATIENT STATES HAD A MINI STROKE, NO RESIDUAL EFFECTS    History of thrombocytopenia     HIV (human immunodeficiency virus infection) 1996    Hx of radiation therapy     Hyperlipidemia     Insomnia     Knee swelling 2017    meniscus tear    Lupus anticoagulant positive     Malignant neoplasm of overlapping sites of left breast in female, estrogen receptor positive 11/12/2019    Meniscus tear 2017    RIGHT    Migraine     Neck pain     WITH SHOOTING PAIN LEFT RIGHT ARM    Neck strain 2001    work injury    Neuropathy     Osteoarthritis     Osteoporosis     Pain management     sees 1 every 3 months for scripts/injection/pain meds    Periarthritis of shoulder 2010    Pneumonia 1989    Seasonal allergies     Spinal headache     THINKS HAD BLOOD PATCH AFTER EPIDUAL     Stress fracture 2010    three times    Tear of meniscus of knee 2012 & 2017    Tendinitis of knee 2000    physical therapy many times    Tick bite 06/2014    Upper back pain     Vertigo      PAST MEDICAL HISTORY:  She had a stroke in July 2017 with headache and dizziness.  She went to the ER and was placed on aspirin.  However, she stopped taking it two weeks ago.  She has been taking Aimovig (injection) monthly with Fariba López at [facility].    In 1994, patient was diagnosed with HIV with an unknown cause which is managed by Dr. Natali Subramanian.  As of now, her viral load is good.    Patient has arthritis.  She gets trigger-point injections in her back with her last one being 2 weeks ago.  She has had multiple ablations.  She also has pain in her SI joint and Dr. Bermudez performed a surgery on this.  She takes Narco for the pain.      She is osteoporotic.  She has had multiple hairline fractures in both her legs.  She had her knees cleaned out by Dr. Sinha.     Patient has had a hysterectomy and still has both of her ovaries.    Patient has vertigo and  the muscles in her left ear are weak.  She just has an imbalance.    Past Surgical History:   Procedure Laterality Date    ADENOIDECTOMY      BREAST LUMPECTOMY      BREAST LUMPECTOMY WITH SENTINEL NODE BIOPSY Left 05/28/2020    Procedure: BREAST LUMPECTOMY WITH SENTINEL NODE BIOPSY AND NEEDLE LOCALIZATION AND axillary dissection;  Surgeon: Landen Forman MD;  Location: [facility];  Service: General;  Laterality: Left;    BREAST SURGERY Left 05/28/2020    Procedure: LEFT LATERAL INTERCOASTAL ARTERY  flap ;  Surgeon: Yusuf Garcia MD;  Location: [facility];  Service: Plastics;  Laterality: Left;    CHOLECYSTECTOMY      HYSTERECTOMY      KNEE ARTHROSCOPY Right 03/24/2017    Procedure:  RIGHT  KNEE ARTHROSCOPY WITH DEBRIDEMENT CHONDROPLASTY PARTIAL MENISCECTOMY;  Surgeon: Karl Galeas MD;  Location: [facility];  Service:     KNEE CARTILAGE SURGERY Right 2007    TONSILLECTOMY      TONSILLECTOMY  1974    TRIGGER POINT INJECTION  2002 - 2023    US GUIDED LYMPH NODE BIOPSY  10/29/2019    LEFT BREAST    VENOUS ACCESS DEVICE (PORT) INSERTION Right 12/02/2019    Procedure: INSERTION VENOUS ACCESS DEVICE RIGHT;  Surgeon: Landen Forman MD;  Location: [facility];  Service: General    VENOUS ACCESS DEVICE (PORT) REMOVAL N/A 05/25/2022    Procedure: Mediport Removal;  Surgeon: Cesar Vasquez Jr., MD;  Location: [facility];  Service: General;  Laterality: N/A;       ONCOLOGIC HISTORY:  Patient is a 59-year-old female who has had a history of HIV since age 34 followed by Dr. Natali Ng at [facility] and was on multiple HIV drugs initially but more recently has been on a single medication TRIUMEQ, with well-controlled HIV.  She follows up with Dr. Dawn , infectious disease who saw her recently and he saw her on 4/8/2019 and has excellent control and suppression of her viral load.  She is very compliant with her medications.    She also has had  history of stroke in 2017 for which she was placed on aspirin.  She presented with a headache.  She is very active and works at [employer] full-time.  She also has had history of vertigo in the past  Patient's PCP is Zach Lora.    Patient first noticed the mass in her left breast 5 months ago.  Her last mammogram was 5 years ago.  She had soreness in the left breast and she went to her primary care physician who then ordered a mammogram diagnostic and an ultrasound.  The diagnostic mammogram showed a 3.7 cm mass at 4 o'clock position in the lower outer quadrant of the left breast.  The ultrasound showed 3.8 cm x 3.1 x 2.5 cm mass at 4 o'clock position in the left breast with 2 abnormal appearing left axillary lymph nodes the largest being 1.4 cm.    She then underwent biopsy of both the breast mass as well as the left axillary lymph node and both of them are positive for invasive mammary carcinoma it is invasive ductal carcinoma with apocrine features, moderately differentiated, grade 2 of 3 with a [place] score of 5.  The left axillary lymph node is also consistent with metastatic mammary carcinoma.  It is ER positive TX positive HER-2/merna negative.  Details as follows    10/21/19 - Bilateral Diagnostic Mammogram and US Breast Left  FINDINGS: Bilateral digital CC and MLO mammographic images were  obtained. No prior examination is available for comparison. Scattered  fibroglandular densities are seen throughout both breasts. A triangular  skin marker represents the area of palpable concern in the lower outer  quadrant of the left breast in the posterior 1/3. At this location there  is an irregular mass that measures on the order of 3.7 cm in greatest  dimension. Internal calcifications are noted. No suspicious findings in  the right breast are appreciated.     ULTRASOUND: Targeted sonographic evaluation of the left breast was  performed through the area of concern in the left axilla. At the  4  o'clock position on the order of 6 cm from the nipple there is an  irregular hypoechoic mass that measures 3.8 x 3.1 x 2.5 cm. Internal  vascularity is noted.     There are 2 abnormal-appearing left axillary lymph nodes, the largest  which measures on the order of 1.4 cm in greatest dimension.     IMPRESSION:  1. There is a 3.8 cm irregular mass in the left breast at the 4 o'clock  position. This is seen in conjunction with an irregular 1.4 cm lymph  node in the left axilla. This is suspicious for malignancy with left  axillary involvement. Correlation with ultrasound-guided biopsy of both  the left breast mass and the lymph node is recommended.  2. There are no findings suspicious for malignancy in the right breast.     BI-RADS CATEGORY 5:     Patient's labs from 11/12/19 are normal.    10/29/19 - Tissue Pathology  1. Left Breast, 4:00, 6 cm FN, U/S-Guided Core Needle Biopsy for a Mass:  A. INVASIVE DUCTAL CARCINOMA WITH APOCRINE FEATURES, Moderately differentiated;  Jen Histologic Grade II/III (tubule score = 3, nuclear score = 2, mitoses score = 1), measuring at least  9 mm.  B. No ductal or lobular carcinoma in situ is identified in this sample.  C. Focus suspicious for lymphovascular space invasion.  D. See Biomarker Template for hormone receptor studies.  2. Lymph Node, Left Axilla, U/S-Guided Core Needle Biopsy:  A. METASTATIC MAMMARY CARCINOMA (see Comment).  B. Metastatic focus measures 5 mm in greatest extent.  ER+, 85%  UT+, 10%  HER2- Score 2+    11/13/19 - CT Neck Chest Abdomen Pelvis  IMPRESSION:  1. In addition to the irregular approximately 3.8 cm mass within the  lateral aspect of the left breast, there are a few subcentimeter  asymmetric nodules along the lateral margin of the glandular tissue. The  several asymmetric left subpectoral nodes and 1.2 x 1.0 cm left axillary  node are suspicious for tamar metastases. The asymmetric subcentimeter  left supraclavicular and left posterior  cervical triangle nodes are  worrisome as well.  2. There is no convincing evidence for metastatic disease within the  abdomen or pelvis.    11/14/19 - Echocardiogram:  Normal.  Calculated EF = 67%.  There is trace mitral valve and tricuspid valve regurgitation.    11/15/19 - Bone Scan  Negative.    11/18/19 - MRI Breast Bilateral  IMPRESSION:  1. Biopsy-proven malignancy in the left breast centered at the 4 o'clock  position with associated surrounding satellite nodules as described. The  entire region of involvement including the satellite nodules and the  dominant mass measure up to 7.5 cm in greatest dimension. Also, left  axillary adenopathy with multiple irregular lymph nodes and loss of  differentiation of the borders of multiple lymph nodes is noted and this  is suspicious for extranodal involvement.  2. There are no findings suspicious for malignancy in the right breast.  BI-RADS CATEGORY 6      12/06/19: Cycle #1 of Adriamycin/Cytoxan    01/17/20: Last cycle of Adriamycin/Cytoxan given without Neulasta.  We will switch from Neulasta to 7 days of Neupogen injections after cycle 4.    We reviewed with patient the US Breast from 01/29/20 which shows mild interval partial response to neoadjuvant chemotherapy.      01/31/20: Initiated cycle 1 Taxol  April 24, 2020: Last cycle of Taxol, cycle 12    S/p left mastectomy with axillary dissection   Pathology showed invasive breast cancer which is 55 x 40 mm in size, grade 2 with focal lymphovascular space invasion with DCIS spanning over 36 x 6 mm.  All margins were negative for cancer.  8 of the additional lymph nodes out of 11 were positive with the largest micrometastasis measuring 5 mm.  Extranodal extension was seen.    Biomarkers on post neoadjuvant tumor is ER 81-90% MD 5% HER-2/merna 2+ by immunohistochemistry and HER-2/merna negative by FISH.    June 2020: Started letrozole but because of severe joint pains was discontinued October 2020    End of October 2020  started tamoxifen    OB-GYN:  Menarche 15 years  Menopause-46  Pregnancies- 1 para 1 no miscarriages her first pregnancy was in  at 29 years of age.  She did not breastfeed.  Post menopausal HRT- None.  Hx of birth control pills- Yes.    Current Outpatient Medications on File Prior to Visit   Medication Sig Dispense Refill    AIMOVIG 140 MG/ML solution auto-injector Inject 1 mL under the skin into the appropriate area as directed Every 30 (Thirty) Days. HAS NOT TAKEN IN SEVERAL MONTHS  11    amitriptyline (ELAVIL) 25 MG tablet Take 1 tablet by mouth Every Night. 90 tablet 3    Aspirin 81 MG capsule Take 1 tablet by mouth Daily.      atorvastatin (LIPITOR) 20 MG tablet TAKE 1 TABLET BY MOUTH EVERY DAY AT NIGHT 90 tablet 1    baclofen (LIORESAL) 10 MG tablet Take 1 tablet by mouth 2 (Two) Times a Day.  2    CALCIUM-MAGNESIUM-VITAMIN D PO Take  by mouth.      colesevelam (WELCHOL) 625 MG tablet Take 2 tablets by mouth Daily. 180 tablet 11    doxycycline (MONODOX) 100 MG capsule Take 1 capsule by mouth Every 12 (Twelve) Hours.      DULoxetine (CYMBALTA) 30 MG capsule TAKE 1 CAPSULE BY MOUTH IN AM AND 2 CAPSULE BY MOUTH AT BEDTIME 90 capsule 0    exemestane (AROMASIN) 25 MG tablet TAKE 1 TABLET BY MOUTH EVERY DAY 90 tablet 2    fluticasone (FLONASE) 50 MCG/ACT nasal spray SPRAY 2 SPRAYS INTO THE NOSTRIL AS DIRECTED BY PROVIDER DAILY. 48 mL 3    Loratadine (CLARITIN PO) Take 10 mg by mouth Daily.      omeprazole (priLOSEC) 40 MG capsule Take 1 capsule by mouth Daily Before Supper. 90 capsule 1    oxyCODONE-acetaminophen (PERCOCET)  MG per tablet Take 1 tablet by mouth 4 (Four) Times a Day.      Triumeq 600- MG per tablet Take 1 tablet by mouth Daily.      ubrogepant (UBRELVY) 100 MG tablet Take 1 tablet by mouth.      vitamin B-12 (CYANOCOBALAMIN) 1000 MCG tablet Take 1 tablet by mouth Daily.      vitamin B-6 (pyridoxine) 50 MG tablet Take 1 tablet by mouth Daily. 90 tablet 2    zolpidem (AMBIEN) 10  MG tablet Take 0.5 tablets by mouth At Night As Needed for Sleep. 45 tablet 0     No current facility-administered medications on file prior to visit.        ALLERGIES:    Allergies   Allergen Reactions    Augmentin [Amoxicillin-Pot Clavulanate] Hives        Social History     Socioeconomic History    Marital status:      Spouse name: Owen    Number of children: 1    Years of education: College   Tobacco Use    Smoking status: Never     Passive exposure: Never    Smokeless tobacco: Never    Tobacco comments:     never   Vaping Use    Vaping status: Never Used   Substance and Sexual Activity    Alcohol use: Yes     Alcohol/week: 1.0 standard drink of alcohol     Types: 1 Drinks containing 0.5 oz of alcohol per week     Comment: once a week    Drug use: No    Sexual activity: Yes     Partners: Male     Birth control/protection: None, Hysterectomy     Comment:      Patient does not smoke or do drugs.  She does drink occasionally.    Family History   Problem Relation Age of Onset    Kidney disease Mother     Hypertension Mother     Heart disease Mother     Arthritis Mother     Breast cancer Mother     Leukemia Mother         CLL?    Diabetes Mother     Hyperthyroidism Mother     Hearing loss Mother     Cancer Mother         breast    Rheumatologic disease Mother     Arthritis Father     Dementia Father     Stroke Father     Heart disease Maternal Grandmother     Diabetes Maternal Grandfather     Heart disease Maternal Grandfather     Stroke Maternal Grandfather     Heart attack Maternal Grandfather     Osteoporosis Paternal Grandmother     Heart disease Paternal Grandfather     Leukemia Maternal Aunt         CLL?    Throat cancer Paternal Uncle     Alcohol abuse Other         multiple uncles/aunts    Malig Hyperthermia Neg Hx       FAMILY HISTORY:  Her mother had breast cancer at age 60, still alive at 85 and in good health..  Her father had dementia.  Her paternal uncle had prostate cancer.  Her brother  "had esophageal cancer.    I have reviewed the patient's medical history in detail and updated the computerized patient record.     ROS: as per HPI    Objective    Vitals:    05/23/24 1335 05/23/24 1340   BP: 145/96 150/90   Pulse: 83    Resp: 18    Temp: 98.5 °F (36.9 °C)    TempSrc: Oral    SpO2: 98%    Weight: 72.8 kg (160 lb 9.6 oz)    Height: 167.6 cm (65.98\")    PainSc:   5    PainLoc: Back            Review of Systems   Constitutional:  Negative for appetite change, chills, diaphoresis, fatigue, fever and unexpected weight change.   HENT:  Negative for hearing loss, sore throat and trouble swallowing.    Respiratory:  Negative for cough, chest tightness, shortness of breath and wheezing.    Cardiovascular:  Negative for chest pain, palpitations and leg swelling.   Gastrointestinal:  Negative for abdominal distention, abdominal pain, constipation, diarrhea, nausea and vomiting.   Genitourinary:  Negative for dysuria, frequency, hematuria and urgency.   Musculoskeletal:  Negative for joint swelling.        No muscle weakness.   Skin:  Negative for rash and wound.   Neurological:  Positive for numbness (Bilat foot). Negative for seizures, syncope, speech difficulty, weakness and headaches.   Hematological:  Negative for adenopathy. Does not bruise/bleed easily.   Psychiatric/Behavioral:  Positive for sleep disturbance. Negative for behavioral problems, confusion and suicidal ideas.    All other systems reviewed and are negative.    ROS reviewed and updated 05/24/24      Physical Exam:  CONSTITUTIONAL:  Vital signs reviewed.  No distress, looks comfortable.  RESPIRATORY:  Normal respiratory effort.  Lungs clear to auscultation bilaterally.  BREAST: Right breast: No skin changes, no evidence of breast mass, no nipple discharge, no evidence of any right axillary adenopathy or right supraclavicular adenopathy  Left breast: No evidence of any skin changes, no evidence of any left breast mass and no evidence of left " nipple discharge as well as no left axillary adenopathy or left supraclavicular adenopathy.  CARDIOVASCULAR:  Normal S1, S2.  No murmurs rubs or gallops.  No significant lower extremity edema.  GASTROINTESTINAL: Abdomen appears unremarkable.  Nontender.  No hepatomegaly.  No splenomegaly.  LYMPHATIC:  No cervical, supraclavicular, axillary lymphadenopathy.  SKIN:  Warm.  No rashes.  PSYCHIATRIC:  Normal judgment and insight.  Normal mood and affect.    I have reexamined the patient and the results are consistent with the previously documented exam. Ruby Moreno MD     RECENT LABS:   Results from last 7 days   Lab Units 05/23/24  1310   WBC 10*3/mm3 5.61   NEUTROS ABS 10*3/mm3 3.17   HEMOGLOBIN g/dL 14.0   HEMATOCRIT % 41.3   PLATELETS 10*3/mm3 257       Results from last 7 days   Lab Units 05/23/24  1310   SODIUM mmol/L 140   POTASSIUM mmol/L 4.1   CHLORIDE mmol/L 101   CO2 mmol/L 25.4   BUN mg/dL 13   CREATININE mg/dL 0.76   CALCIUM mg/dL 9.4   ALBUMIN g/dL 4.6   BILIRUBIN mg/dL 0.3   ALK PHOS U/L 72   ALT (SGPT) U/L 12   AST (SGOT) U/L 25   GLUCOSE mg/dL 106*   MAGNESIUM mg/dL 2.0         Assessment & Plan    * T2N1 invasive ductal carcinoma of the left breast, recently diagnosed.    -Noticed mass in the left breast x5 months  -Diagnostic mammogram showed 3.7 mm mass in the left breast at 4 o'clock position  -Ultrasound-showed 3.8 x 3.1 x 2.5 cm mass at 4 o'clock position with 2 abnormal axillary lymph nodes, larger lymph node being 1.4 cm  -Left breast biopsy and axillary lymph node biopsy October 29, 2019, invasive ductal carcinoma, moderately differentiated, grade 2, ER 85%, [place] 10%, HER-2/merna 2+, HER-2 FISH negative  CT scans from 11/13/19 show some asymmetric nodules along the lateral margin of the glandular tissue.  The 1.2x1.0 cm left axillary nodes, left supraclavicular, and left posterior cervical triangle nodes are suspicious for tamar metastases.    bone scan from 11/15/19 which was negative.    MRI  breast from 11/18/19 which shows the biopsy-proven malignancy in the left breast centered at the 4:00 position.  The region of involvement measures up to 7.5 cm.  There is left axillary adenopathy with multiple irregular lymph nodes and loss of differentiation of the border of multiple lymph nodes which are suspicious for extranodal involvement.     echocardiogram from 11/14/19 which was normal with an ejection fraction of 67%.    INVITAE from 11/14/19 which was negative.  Given the size of her tumor and her medical history, patient will be given 4 cycles of Adriamycin/Cytoxan with Neulasta.  After completion of this treatment, patient will be started on 12 cycles of Taxol.  After the tumor shrinks in size, Dr. Forman will perform a lumpectomy.    Following lumpectomy, patient will begin endocrine therapy.  Dr. Dawn agrees chemotherapy will not aggravate her HIV condition.  Dr. Moreno spoke with Dr. Mohan at [place] who also agrees chemotherapy is the first step.   12/06/19: Cycle #1 of Adriamycin/Cytoxan  US Breast from 01/29/20 after 4 cycles of Adriamycin Cytoxan chemotherapy which shows mild interval partial response to neoadjuvant chemotherapy.  The mass has decreased from 3.8 x 2.5 to 3.1 cm to 3.5 x 2.3 x 3.2 cm previously the left axillary lymph node has decreased from 1.4 x 0.7 x 1 cm to 1.1 x 0.6 x 0.9 cm.  01/31/20: Initiated cycle 1 Taxol  April 24, 2020: Completed cycle 12 Taxol  MRI breast 5/6/2020 shows significant reduction in the left axillary adenopathy as well as reduction in the index breast lesion.  S/p left modified radical mastectomy with left axillary dissection.  Patient has 55 x 40 x 30 mm invasive carcinoma, grade 2 with DCIS 36 x 6 mm, high-grade with good margins and 8 out of 11 lymph nodes positive with largest metastasis being 5 mm with extracapsular extension.  And there is lymphatic space invasion  Will follow-up in 2 weeks at which time we will plan to start endocrine  therapy.  She has radiation oncology appointment with Dr. Spivey  XRT completed August 17, 2020  Started letrozole August 2020 but has arthralgias  October 6, 2020 had severe arthralgias secondary to letrozole.  We will switch to tamoxifen starting November 2020 1/8/2021 screening mammogram in December 2020 as well as diagnostic mammogram which showed 0.4 cm suspicious calcification in the lower left which on biopsy is consistent with fat necrosis.  8/12/2021 tamoxifen placed on hold due to significant arthralgias and worsening fatigue.   September 21, 2021: Patient's depression, mental fogginess has resolved since tamoxifen discontinued.  She has mild worsening fatigue her TSH and B12 levels are normal.  We will need to check iron studies  Discussed about switching to Arimidex to see if she could tolerate that better  Patient initiated Arimidex 9/21/2021.  Returns 10/5/2021 complaining of significant generalized arthralgias.  Reviewed with Dr. Moreno.  We were initially going to try her on Cymbalta, but the patient is already taking Lexapro, and doing well on this.  We will therefore discontinue Arimidex.  We will give her a 6-week break and reevaluate things in 6 weeks.  Could try at that time switching to Aromasin.  November 15, 2021: Patient continued with Arimidex even though she was asked to hold it last time.  Her arthralgias worsened.  Also the stiffness in the joints worsened significantly.  Discussed with patient that we need to hold Arimidex for 6 weeks and try Aromasin at her next appointment.  December 28, 2021: ArthralgiaS improved after holding Arimidex.  December 28, 2021: Started Aromasin  November 2023: Tolerating Aromasin very well but has some neuropathy and questionable foot drop  May 23rd 2024: Patient is very anxious.  She states that Lexapro was keeping it under control but when she went on tamoxifen they discontinued Lexapro.  Subsequently she was started on Cymbalta by her primary care  physician when she was switched to aromatase inhibitor but Cymbalta does not control her anxiety and she wants to go back to Lexapro.  She will discuss with her primary care physician to see if they can she can be tapered off Cymbalta and started on Lexapro.  From our point we wanted to continue Aromasin for total of 10 years.  Node involvement    *Osteoporosis  DEXA scan 7/28/2022 with osteoporosis of both hips.  Initiating Prolia 11/2022, continue every 6 months.    * HIV, followed by Dr. Dawn in infectious disease.  Well-controlled and is on a single medication with very low viral load. Has HIV since age 34.  Reviewed her HIV medications and she is compliant  Patient followed by infectious disease for her HIV and currently on treatment.  Stay  Currently is followed by Dr. Dawn on anti-HIV medications  Stable no new medications     * Family history of breast cancer with mother having had breast cancer at age 60.  There is family history of uncle with prostate cancer.  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    * Arthritis with severe back pain followed by Dr. 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  Patient continues on B6.  Chronic and stable  We will need to refer to physical therapy    * Fatigue  8/12/2021 patient seen as triage visit for worsening fatigue and diffuse arthralgias.  Reporting increased sleeping at night and still taking naps in the day.  Labs checked including thyroid studies which were unremarkable and B12 level actually elevated at greater than 2000.  Patient taking vitamin B supplements  at that time.  Patient instructed to hold tamoxifen.  8/26/2021 reviewed today recent lab work for above with no evidence of thyroid dysfunction or vitamin B12 abnormality.  Patient notes no improvement being off tamoxifen for 2 weeks.  We will check a CA 15-3 to help further guide decision making.  If this is elevated we may then pursue imaging.  Discussed that she may just need to be off the tamoxifen longer to see improvement.  Patient does have underlying HIV as well and unclear if maybe some of her chronic medications for this could be contributing to her current symptoms.  Patient continues with fatigue but slightly improved now    * Insomnia  Has completely weaned off of amitriptyline   Notes she is currently taking Ambien 5 mg because PCP would not fill 10 mg which has been historically effective.  Encouraged her to discuss this with primary care again as she has done well on higher dosing without issue in relation to other medications     *  Neuropathy: Patient has shooting pains in the feet likely from Taxol in the past which had improved with amitriptyline 75 mg but could not tolerate and hence they are tapering it.  She had difficulty with urination due to amitriptyline    *Recent spasmodic chest pain present x2 without any radiation to the neck or axilla improved with Gaviscon  We will start Prilosec over-the-counter  Follow-up with cardio oncology to have a complete work-up given her age  Patient seen 3/31/2023 by Dr. Velasquez.  Symptoms felt to be most likely related to GERD however for further evaluation stress test ordered which was normal.  In future if she has more problems on Prilosec then we can consider referring to GI    */.  Anxiety likely related to Aromasin.  Patient will discuss with her primary care physician and come off of Cymbalta and start Lexapro which was helping her anxiety in the past.    PLAN:   Proceed with Prolia   Continue Aromasin  Continue calcium and vitamin D  supplements  Patient continues to follow-up with infectious disease Dr. Dawn  Screening mammogram January 2024 negative  Patient will discuss with her primary care physician to see if she can taper off Cymbalta and start back Lexapro which was helping her anxiety better than Cymbalta.  Follow-up with nurse practitioner in 3 months  Follow-up with me in 6 months with labs    I spent 30 total minutes, face-to-face, caring for Richa zarco. Greater than 50% of this time involved counseling and/or coordination of care as documented within this note.      This patient is on high risk drug therapy requiring intensive monitoring for toxicity.        Ruby Moreno MD    Cc: MD Denilson Feliciano MD   TM >3

## 2024-05-28 ENCOUNTER — PATIENT MESSAGE (OUTPATIENT)
Dept: FAMILY MEDICINE CLINIC | Facility: CLINIC | Age: 64
End: 2024-05-28
Payer: COMMERCIAL

## 2024-05-29 DIAGNOSIS — K21.9 CHRONIC GERD: ICD-10-CM

## 2024-05-29 RX ORDER — OMEPRAZOLE 40 MG/1
40 CAPSULE, DELAYED RELEASE ORAL
Qty: 90 CAPSULE | Refills: 1 | Status: SHIPPED | OUTPATIENT
Start: 2024-05-29

## 2024-06-04 RX ORDER — EXEMESTANE 25 MG/1
TABLET ORAL
Qty: 90 TABLET | Refills: 2 | Status: SHIPPED | OUTPATIENT
Start: 2024-06-04

## 2024-06-05 ENCOUNTER — OFFICE VISIT (OUTPATIENT)
Dept: FAMILY MEDICINE CLINIC | Facility: CLINIC | Age: 64
End: 2024-06-05
Payer: COMMERCIAL

## 2024-06-05 VITALS
RESPIRATION RATE: 16 BRPM | SYSTOLIC BLOOD PRESSURE: 140 MMHG | TEMPERATURE: 98 F | HEIGHT: 66 IN | OXYGEN SATURATION: 97 % | DIASTOLIC BLOOD PRESSURE: 82 MMHG | HEART RATE: 85 BPM | BODY MASS INDEX: 26.03 KG/M2 | WEIGHT: 162 LBS

## 2024-06-05 DIAGNOSIS — M81.0 AGE-RELATED OSTEOPOROSIS WITHOUT CURRENT PATHOLOGICAL FRACTURE: ICD-10-CM

## 2024-06-05 DIAGNOSIS — M25.532 WRIST PAIN, CHRONIC, LEFT: ICD-10-CM

## 2024-06-05 DIAGNOSIS — F51.01 PRIMARY INSOMNIA: ICD-10-CM

## 2024-06-05 DIAGNOSIS — G89.29 WRIST PAIN, CHRONIC, LEFT: ICD-10-CM

## 2024-06-05 DIAGNOSIS — R03.0 ELEVATED BLOOD PRESSURE READING WITHOUT DIAGNOSIS OF HYPERTENSION: ICD-10-CM

## 2024-06-05 DIAGNOSIS — G62.0 DRUG-INDUCED POLYNEUROPATHY: ICD-10-CM

## 2024-06-05 DIAGNOSIS — Z85.3 HISTORY OF LEFT BREAST CANCER: ICD-10-CM

## 2024-06-05 DIAGNOSIS — F41.9 ANXIETY: Primary | ICD-10-CM

## 2024-06-05 DIAGNOSIS — F33.42 MAJOR DEPRESSIVE DISORDER, RECURRENT, IN FULL REMISSION: ICD-10-CM

## 2024-06-05 DIAGNOSIS — Z21 HIV POSITIVE LONG-TERM NON-PROGRESSOR: ICD-10-CM

## 2024-06-05 PROCEDURE — 99214 OFFICE O/P EST MOD 30 MIN: CPT | Performed by: FAMILY MEDICINE

## 2024-06-05 RX ORDER — ESCITALOPRAM OXALATE 10 MG/1
10 TABLET ORAL DAILY
Qty: 60 TABLET | Refills: 1 | Status: SHIPPED | OUTPATIENT
Start: 2024-06-05

## 2024-06-05 RX ORDER — DULOXETIN HYDROCHLORIDE 20 MG/1
20 CAPSULE, DELAYED RELEASE ORAL DAILY
Qty: 30 CAPSULE | Refills: 0 | Status: SHIPPED | OUTPATIENT
Start: 2024-06-05

## 2024-06-05 NOTE — PROGRESS NOTES
Subjective     Richa Dolan is a 63 y.o. female.     Chief Complaint   Patient presents with    Hypertension     F/u    Anxiety     Discuss anxiety medication's       History of Present Illness     Pt with worsening Anxiety, noticed her BP elevated as well when she is anxious.   She was on Lexapro 20 mg in the past , was d/c by her oncol as it interfere with tamoxifen. Then started on Cymbalta to help with her anxiety/depression and PN.   She is no longer on tamoxifen, she is willing to be back on Lexapro.   Her depression sx stable, no new concern     Elevated BP with no h/o HTN, likely due to anxiety    Lt wrist pain after the fall , had the Splint for 6 weeks , she is still in pain. Following with hand surgery.    Osteoporosis; last DEXA was in 2022 , she is on chan and vit d, also on shots q 6 months      HIV; well controlled on meds , followed by Dr. Dawn from infectious disease.       Insomnia; doing well on Ambien 5 mg QHS, no S/E reported, her sleep much improved.      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        Review of Systems   Cardiovascular:  Negative for chest pain and leg swelling.   Psychiatric/Behavioral:  The patient is nervous/anxious.        Vitals:    06/05/24 1055   BP: 140/82   Pulse: 85   Resp: 16   Temp: 98 °F (36.7 °C)   SpO2: 97%           06/05/24  1055   Weight: 73.5 kg (162 lb)         Body mass index is 26.16 kg/m².      Current Outpatient Medications   Medication Sig Dispense Refill    AIMOVIG 140 MG/ML solution auto-injector Inject 1 mL under the skin into the appropriate area as directed Every 30 (Thirty) Days. HAS NOT TAKEN IN SEVERAL MONTHS  11    amitriptyline  (ELAVIL) 25 MG tablet Take 1 tablet by mouth Every Night. 90 tablet 3    Aspirin 81 MG capsule Take 1 tablet by mouth Daily.      atorvastatin (LIPITOR) 20 MG tablet TAKE 1 TABLET BY MOUTH EVERY DAY AT NIGHT 90 tablet 1    baclofen (LIORESAL) 10 MG tablet Take 1 tablet by mouth 2 (Two) Times a Day.  2    CALCIUM-MAGNESIUM-VITAMIN D PO Take  by mouth.      colesevelam (WELCHOL) 625 MG tablet Take 2 tablets by mouth Daily. 180 tablet 11    doxycycline (MONODOX) 100 MG capsule Take 1 capsule by mouth Every 12 (Twelve) Hours.      exemestane (AROMASIN) 25 MG tablet TAKE 1 TABLET BY MOUTH EVERY DAY 90 tablet 2    fluticasone (FLONASE) 50 MCG/ACT nasal spray SPRAY 2 SPRAYS INTO THE NOSTRIL AS DIRECTED BY PROVIDER DAILY. 48 mL 3    Loratadine (CLARITIN PO) Take 10 mg by mouth Daily.      omeprazole (priLOSEC) 40 MG capsule TAKE 1 CAPSULE BY MOUTH DAILY BEFORE SUPPER 90 capsule 1    oxyCODONE-acetaminophen (PERCOCET)  MG per tablet Take 1 tablet by mouth 4 (Four) Times a Day.      Triumeq 600- MG per tablet Take 1 tablet by mouth Daily.      ubrogepant (UBRELVY) 100 MG tablet Take 1 tablet by mouth.      vitamin B-12 (CYANOCOBALAMIN) 1000 MCG tablet Take 1 tablet by mouth Daily.      vitamin B-6 (pyridoxine) 50 MG tablet Take 1 tablet by mouth Daily. 90 tablet 2    zolpidem (AMBIEN) 10 MG tablet Take 0.5 tablets by mouth At Night As Needed for Sleep. 45 tablet 0    DULoxetine (CYMBALTA) 20 MG capsule Take 1 capsule by mouth Daily. Take one cap daily for 1 week Then every other day ora another week, then every 2-3 days then stop 30 capsule 0    escitalopram (Lexapro) 10 MG tablet Take 1 tablet by mouth Daily. 60 tablet 1     No current facility-administered medications for this visit.                Objective   Physical Exam  Vitals and nursing note reviewed.   Constitutional:       General: She is not in acute distress.     Appearance: She is not ill-appearing, toxic-appearing or diaphoretic.   Neck:       Comments: No enlarged thyroid      Cardiovascular:      Rate and Rhythm: Normal rate and regular rhythm.      Heart sounds: Normal heart sounds. No murmur heard.  Pulmonary:      Effort: Pulmonary effort is normal. No respiratory distress.      Breath sounds: Normal breath sounds. No stridor. No wheezing or rhonchi.   Musculoskeletal:      Cervical back: Neck supple.   Neurological:      Mental Status: She is alert and oriented to person, place, and time.   Psychiatric:         Mood and Affect: Mood normal.         Behavior: Behavior normal.         Thought Content: Thought content normal.           Assessment & Plan   Diagnoses and all orders for this visit:    1. Anxiety (Primary)  Comments:  due to worsening sx, will d/c cymbalta gradually and start Lexap. discussed with pt  Orders:  -     DULoxetine (CYMBALTA) 20 MG capsule; Take 1 capsule by mouth Daily. Take one cap daily for 1 week Then every other day ora another week, then every 2-3 days then stop  Dispense: 30 capsule; Refill: 0  -     escitalopram (Lexapro) 10 MG tablet; Take 1 tablet by mouth Daily.  Dispense: 60 tablet; Refill: 1    2. Major depressive disorder, recurrent, in full remission  Comments:  stable, no new concern  Orders:  -     DULoxetine (CYMBALTA) 20 MG capsule; Take 1 capsule by mouth Daily. Take one cap daily for 1 week Then every other day ora another week, then every 2-3 days then stop  Dispense: 30 capsule; Refill: 0  -     escitalopram (Lexapro) 10 MG tablet; Take 1 tablet by mouth Daily.  Dispense: 60 tablet; Refill: 1    3. Elevated blood pressure reading without diagnosis of hypertension  Comments:  mikalaley 2/2 anxiety, will start on lexapro to Rx anxiety with close BP monitoring    4. Primary insomnia  Comments:  Stable, continue current Rx    5. Drug-induced polyneuropathy  Comments:  Stable, continue current Rx    6. Wrist pain, chronic, left  Comments:  follow with hand surgery    7. HIV positive long-term  non-progressor  Comments:  Stable, continue current Rx and f/u with ID    8. History of left breast cancer  Comments:  no new concern  continue f/u with oncol    9. Age-related osteoporosis without current pathological fracture  Comments:  close monitoring and continue current Rx           I spent 35 minutes caring for this patient on this date of service. This time includes time spent by me in the following activities:preparing for the visit,reviewing previous medical records,  performing a medically appropriate examination and/or evaluation, counseling and educating the patient/family/caregiver, and documenting information in the medical record.       Patient was given instructions and counseling regarding her condition or for health maintenance advice. Please see specific information pulled into the AVS if appropriate.       I have fully discussed the nature of the medical condition(s) risks, complications, management, safe and proper use of medications.   Pt stated no allergy to the above prescribed medication.  I have discussed the SIDE EFFECT OF MEDICATION and importance TO report any side effect , the patient expressed good understanding.  Encouraged medication compliance and the importance of keeping scheduled follow up appointments with me and any other providers.    Patient instructed to follow up with our office for results on any labs/imaging ordered during this visit.    Home care discussed  All questions answered  Patient verbalizes understanding and agrees to treatment plan.     Follow up: Return in about 4 weeks (around 7/3/2024) for follow up on current illness.

## 2024-06-14 DIAGNOSIS — G47.00 INSOMNIA, UNSPECIFIED TYPE: ICD-10-CM

## 2024-06-14 NOTE — TELEPHONE ENCOUNTER
Rx Refill Note  Requested Prescriptions     Pending Prescriptions Disp Refills    zolpidem (AMBIEN) 10 MG tablet 45 tablet 0     Sig: Take 0.5 tablets by mouth At Night As Needed for Sleep.      Last office visit with prescribing clinician: 6/5/2024   Last telemedicine visit with prescribing clinician: Visit date not found   Next office visit with prescribing clinician: 7/2/2024                         Would you like a call back once the refill request has been completed: [] Yes [] No    If the office needs to give you a call back, can they leave a voicemail: [] Yes [] No    Horacio Fernández CMA/LMR  06/14/24, 07:36 EDT

## 2024-06-17 RX ORDER — ZOLPIDEM TARTRATE 10 MG/1
5 TABLET ORAL NIGHTLY PRN
Qty: 45 TABLET | Refills: 0 | Status: SHIPPED | OUTPATIENT
Start: 2024-06-17

## 2024-06-17 NOTE — TELEPHONE ENCOUNTER
Pt informed of urine test result, per Dr. Dawn.   What Is The Reason For Today's Visit?: Annual Full Body Skin Examination with No Concerns What Is The Reason For Today's Visit? (Being Monitored For X): concerning skin lesions on a periodic basis

## 2024-06-22 DIAGNOSIS — R10.9 STOMACH DISCOMFORT: ICD-10-CM

## 2024-06-24 RX ORDER — COLESEVELAM 180 1/1
1250 TABLET ORAL DAILY
Qty: 180 TABLET | Refills: 11 | Status: SHIPPED | OUTPATIENT
Start: 2024-06-24

## 2024-07-01 ENCOUNTER — OFFICE VISIT (OUTPATIENT)
Dept: FAMILY MEDICINE CLINIC | Facility: CLINIC | Age: 64
End: 2024-07-01
Payer: COMMERCIAL

## 2024-07-01 VITALS
TEMPERATURE: 97.3 F | HEIGHT: 66 IN | HEART RATE: 75 BPM | OXYGEN SATURATION: 98 % | RESPIRATION RATE: 16 BRPM | DIASTOLIC BLOOD PRESSURE: 82 MMHG | BODY MASS INDEX: 25.71 KG/M2 | WEIGHT: 160 LBS | SYSTOLIC BLOOD PRESSURE: 142 MMHG

## 2024-07-01 DIAGNOSIS — G62.0 DRUG-INDUCED POLYNEUROPATHY: ICD-10-CM

## 2024-07-01 DIAGNOSIS — M25.572 ACUTE LEFT ANKLE PAIN: ICD-10-CM

## 2024-07-01 DIAGNOSIS — F41.9 ANXIETY: Primary | ICD-10-CM

## 2024-07-01 DIAGNOSIS — R03.0 ELEVATED BLOOD PRESSURE READING WITHOUT DIAGNOSIS OF HYPERTENSION: ICD-10-CM

## 2024-07-01 PROCEDURE — 99214 OFFICE O/P EST MOD 30 MIN: CPT | Performed by: FAMILY MEDICINE

## 2024-07-01 RX ORDER — ESCITALOPRAM OXALATE 20 MG/1
20 TABLET ORAL DAILY
Qty: 60 TABLET | Refills: 2 | Status: SHIPPED | OUTPATIENT
Start: 2024-07-01

## 2024-07-01 NOTE — PROGRESS NOTES
Subjective     Richa Dolan is a 63 y.o. female.     Chief Complaint   Patient presents with    Anxiety     4 week f/u     ankle swelling     Left ankle.notice last Tuesday.        History of Present Illness     Anxiety;  Pt with worsening anxiety. Last OV 4 weeks ago, started on lexapro 10 mg, dose of cymbalta decreased to 20 mg.  Today she noticed little help with her anxiety but not to the goal.    Her BP still above goal likely due to anxiety.  No home BP check.     Lt ankle swelling ; 1  week ago, she noticed her Lt ankle little swollen and hurts to touch.   No h/o fall or trauma. She was at the pain clinic on the next day, was advised to use the topical pain  relife cream for her feet NP . She used the cream with great help.  No more swelling or pain.     PN; using the topical compound cream with great help , help pt to sleep.        The following portions of the patient's history were reviewed and updated as appropriate: allergies, current medications, past family history, past medical history, past social history, past surgical history, and problem list.        Review of Systems   Psychiatric/Behavioral:  The patient is nervous/anxious.        Vitals:    07/01/24 0846   BP: 142/82   Pulse: 75   Resp: 16   Temp: 97.3 °F (36.3 °C)   SpO2: 98%           07/01/24  0846   Weight: 72.6 kg (160 lb)         Body mass index is 25.84 kg/m².      Current Outpatient Medications   Medication Sig Dispense Refill    AIMOVIG 140 MG/ML solution auto-injector Inject 1 mL under the skin into the appropriate area as directed Every 30 (Thirty) Days. HAS NOT TAKEN IN SEVERAL MONTHS  11    amitriptyline (ELAVIL) 25 MG tablet Take 1 tablet by mouth Every Night. 90 tablet 3    Aspirin 81 MG capsule Take 1 tablet by mouth Daily.      atorvastatin (LIPITOR) 20 MG tablet TAKE 1 TABLET BY MOUTH EVERY DAY AT NIGHT 90 tablet 1    baclofen (LIORESAL) 10 MG tablet Take 1 tablet by mouth 2 (Two) Times a Day.  2     CALCIUM-MAGNESIUM-VITAMIN D PO Take  by mouth.      colesevelam (WELCHOL) 625 MG tablet TAKE 2 TABLETS BY MOUTH DAILY 180 tablet 11    doxycycline (MONODOX) 100 MG capsule Take 1 capsule by mouth Every 12 (Twelve) Hours.      DULoxetine (CYMBALTA) 20 MG capsule Take 1 capsule by mouth Daily. Take one cap daily for 1 week Then every other day ora another week, then every 2-3 days then stop 30 capsule 0    exemestane (AROMASIN) 25 MG tablet TAKE 1 TABLET BY MOUTH EVERY DAY 90 tablet 2    fluticasone (FLONASE) 50 MCG/ACT nasal spray SPRAY 2 SPRAYS INTO THE NOSTRIL AS DIRECTED BY PROVIDER DAILY. 48 mL 3    Loratadine (CLARITIN PO) Take 10 mg by mouth Daily.      NON FORMULARY Compound cream (ketamine, gabapentin, clonidine, and lidocaine)      omeprazole (priLOSEC) 40 MG capsule TAKE 1 CAPSULE BY MOUTH DAILY BEFORE SUPPER 90 capsule 1    oxyCODONE-acetaminophen (PERCOCET)  MG per tablet Take 1 tablet by mouth 4 (Four) Times a Day.      Triumeq 600- MG per tablet Take 1 tablet by mouth Daily.      ubrogepant (UBRELVY) 100 MG tablet Take 1 tablet by mouth.      vitamin B-12 (CYANOCOBALAMIN) 1000 MCG tablet Take 1 tablet by mouth Daily.      vitamin B-6 (pyridoxine) 50 MG tablet Take 1 tablet by mouth Daily. 90 tablet 2    zolpidem (AMBIEN) 10 MG tablet Take 0.5 tablets by mouth At Night As Needed for Sleep. 45 tablet 0    escitalopram (Lexapro) 20 MG tablet Take 1 tablet by mouth Daily. 60 tablet 2     No current facility-administered medications for this visit.                Objective   Physical Exam  Vitals reviewed.   Constitutional:       General: She is not in acute distress.     Appearance: She is not toxic-appearing.   Cardiovascular:      Pulses: Normal pulses.   Musculoskeletal:         General: No swelling, tenderness or deformity. Normal range of motion.   Skin:     General: Skin is warm.      Findings: No bruising.   Neurological:      Mental Status: She is alert and oriented to person, place, and  time.   Psychiatric:         Mood and Affect: Mood normal.         Behavior: Behavior normal.         Thought Content: Thought content normal.         Judgment: Judgment normal.           Assessment & Plan   Diagnoses and all orders for this visit:    1. Anxiety (Primary)  Comments:  little improved , still not goal  will increase dose of lexapro to 20 mg  advised to take cymbalat  every other day for 1 week then stop  close monitoring  Orders:  -     escitalopram (Lexapro) 20 MG tablet; Take 1 tablet by mouth Daily.  Dispense: 60 tablet; Refill: 2    2. Elevated blood pressure reading without diagnosis of hypertension  Comments:  close BP monitoring    3. Drug-induced polyneuropathy  Comments:  stable with topical compound cream + Amitrip    4. Acute left ankle pain  Comments:  resolved            I spent 34  minutes caring for this patient on this date of service. This time includes time spent by me in the following activities:preparing for the visit,reviewing previous medical records,  performing a medically appropriate examination and/or evaluation, counseling and educating the patient/family/caregiver, and documenting information in the medical record.         Patient was given instructions and counseling regarding her condition or for health maintenance advice. Please see specific information pulled into the AVS if appropriate.     I have fully discussed the nature of the medical condition(s) risks, complications, management, safe and proper use of medications.   Pt stated no allergy to the above prescribed medication.  I have discussed the SIDE EFFECT OF MEDICATION and importance TO report any side effect , the patient expressed good understanding.  Encouraged medication compliance and the importance of keeping scheduled follow up appointments with me and any other providers.    Patient instructed to follow up with our office for results on any labs/imaging ordered during this visit.    Home care  discussed  All questions answered  Patient verbalizes understanding and agrees to treatment plan.     Follow up: Return in about 2 months (around 9/1/2024) for follow up on current illness.

## 2024-07-11 DIAGNOSIS — F41.9 ANXIETY: ICD-10-CM

## 2024-07-11 DIAGNOSIS — F33.42 MAJOR DEPRESSIVE DISORDER, RECURRENT, IN FULL REMISSION: ICD-10-CM

## 2024-07-11 RX ORDER — DULOXETIN HYDROCHLORIDE 20 MG/1
CAPSULE, DELAYED RELEASE ORAL
Qty: 30 CAPSULE | Refills: 0 | Status: SHIPPED | OUTPATIENT
Start: 2024-07-11

## 2024-08-15 ENCOUNTER — LAB (OUTPATIENT)
Dept: LAB | Facility: HOSPITAL | Age: 64
End: 2024-08-15
Payer: COMMERCIAL

## 2024-08-15 ENCOUNTER — OFFICE VISIT (OUTPATIENT)
Dept: ONCOLOGY | Facility: CLINIC | Age: 64
End: 2024-08-15
Payer: COMMERCIAL

## 2024-08-15 VITALS
TEMPERATURE: 98.4 F | HEART RATE: 82 BPM | OXYGEN SATURATION: 97 % | HEIGHT: 66 IN | WEIGHT: 161.4 LBS | BODY MASS INDEX: 25.94 KG/M2 | DIASTOLIC BLOOD PRESSURE: 83 MMHG | SYSTOLIC BLOOD PRESSURE: 136 MMHG | RESPIRATION RATE: 18 BRPM

## 2024-08-15 DIAGNOSIS — Z85.3 HISTORY OF LEFT BREAST CANCER: ICD-10-CM

## 2024-08-15 DIAGNOSIS — Z79.811 USE OF EXEMESTANE (AROMASIN): ICD-10-CM

## 2024-08-15 DIAGNOSIS — C50.812 MALIGNANT NEOPLASM OF OVERLAPPING SITES OF LEFT BREAST IN FEMALE, ESTROGEN RECEPTOR POSITIVE: Primary | ICD-10-CM

## 2024-08-15 DIAGNOSIS — M81.8 OTHER OSTEOPOROSIS WITHOUT CURRENT PATHOLOGICAL FRACTURE: ICD-10-CM

## 2024-08-15 DIAGNOSIS — Z17.0 MALIGNANT NEOPLASM OF OVERLAPPING SITES OF LEFT BREAST IN FEMALE, ESTROGEN RECEPTOR POSITIVE: Primary | ICD-10-CM

## 2024-08-15 LAB
ALBUMIN SERPL-MCNC: 4.5 G/DL (ref 3.5–5.2)
ALBUMIN/GLOB SERPL: 2 G/DL
ALP SERPL-CCNC: 74 U/L (ref 39–117)
ALT SERPL W P-5'-P-CCNC: 14 U/L (ref 1–33)
ANION GAP SERPL CALCULATED.3IONS-SCNC: 10.3 MMOL/L (ref 5–15)
AST SERPL-CCNC: 22 U/L (ref 1–32)
BASOPHILS # BLD AUTO: 0.03 10*3/MM3 (ref 0–0.2)
BASOPHILS NFR BLD AUTO: 0.4 % (ref 0–1.5)
BILIRUB SERPL-MCNC: 0.4 MG/DL (ref 0–1.2)
BUN SERPL-MCNC: 12 MG/DL (ref 8–23)
BUN/CREAT SERPL: 16 (ref 7–25)
CALCIUM SPEC-SCNC: 9.6 MG/DL (ref 8.6–10.5)
CHLORIDE SERPL-SCNC: 101 MMOL/L (ref 98–107)
CO2 SERPL-SCNC: 29.7 MMOL/L (ref 22–29)
CREAT SERPL-MCNC: 0.75 MG/DL (ref 0.57–1)
DEPRECATED RDW RBC AUTO: 49.1 FL (ref 37–54)
EGFRCR SERPLBLD CKD-EPI 2021: 89.6 ML/MIN/1.73
EOSINOPHIL # BLD AUTO: 0.15 10*3/MM3 (ref 0–0.4)
EOSINOPHIL NFR BLD AUTO: 1.9 % (ref 0.3–6.2)
ERYTHROCYTE [DISTWIDTH] IN BLOOD BY AUTOMATED COUNT: 12.9 % (ref 12.3–15.4)
GLOBULIN UR ELPH-MCNC: 2.2 GM/DL
GLUCOSE SERPL-MCNC: 117 MG/DL (ref 65–99)
HCT VFR BLD AUTO: 42.2 % (ref 34–46.6)
HGB BLD-MCNC: 14.2 G/DL (ref 12–15.9)
IMM GRANULOCYTES # BLD AUTO: 0.03 10*3/MM3 (ref 0–0.05)
IMM GRANULOCYTES NFR BLD AUTO: 0.4 % (ref 0–0.5)
LYMPHOCYTES # BLD AUTO: 2.3 10*3/MM3 (ref 0.7–3.1)
LYMPHOCYTES NFR BLD AUTO: 29.2 % (ref 19.6–45.3)
MCH RBC QN AUTO: 34.6 PG (ref 26.6–33)
MCHC RBC AUTO-ENTMCNC: 33.6 G/DL (ref 31.5–35.7)
MCV RBC AUTO: 102.9 FL (ref 79–97)
MONOCYTES # BLD AUTO: 0.77 10*3/MM3 (ref 0.1–0.9)
MONOCYTES NFR BLD AUTO: 9.8 % (ref 5–12)
NEUTROPHILS NFR BLD AUTO: 4.6 10*3/MM3 (ref 1.7–7)
NEUTROPHILS NFR BLD AUTO: 58.3 % (ref 42.7–76)
NRBC BLD AUTO-RTO: 0 /100 WBC (ref 0–0.2)
PLATELET # BLD AUTO: 240 10*3/MM3 (ref 140–450)
PMV BLD AUTO: 9.2 FL (ref 6–12)
POTASSIUM SERPL-SCNC: 4.1 MMOL/L (ref 3.5–5.2)
PROT SERPL-MCNC: 6.7 G/DL (ref 6–8.5)
RBC # BLD AUTO: 4.1 10*6/MM3 (ref 3.77–5.28)
SODIUM SERPL-SCNC: 141 MMOL/L (ref 136–145)
WBC NRBC COR # BLD AUTO: 7.88 10*3/MM3 (ref 3.4–10.8)

## 2024-08-15 PROCEDURE — 99214 OFFICE O/P EST MOD 30 MIN: CPT | Performed by: NURSE PRACTITIONER

## 2024-08-15 PROCEDURE — 80053 COMPREHEN METABOLIC PANEL: CPT

## 2024-08-15 PROCEDURE — 85025 COMPLETE CBC W/AUTO DIFF WBC: CPT

## 2024-08-15 PROCEDURE — 36415 COLL VENOUS BLD VENIPUNCTURE: CPT

## 2024-08-15 NOTE — PROGRESS NOTES
Subjective       REASON FOR FOLLOW UP:    1.  T2N1 invasive ductal carcinoma of the left breast.  Ultrasound-showed 3.8 x 3.1 x 2.5 cm mass at 4 o'clock position with 2 abnormal axillary lymph nodes, larger lymph node being 1.4 cm.  Left breast biopsy and axillary lymph node biopsy October 29, 2019, invasive ductal carcinoma, moderately differentiated, grade 2, ER 85%, IL 10%, HER-2/merna 2+, HER-2 FISH negative.  12/06/19: Neoadjuvant chemotherapy, cycle #1 of dose-dense Adriamycin/Cytoxan with Neulasta for marrow support    01/17/20: Last cycle of Adriamycin/Cytoxan given  January 31, 2020: Cycle 1 Taxol  April 24, 2020: Last cycle, cycle 12 of Taxol  Patient is s/p left mastectomy with axillary dissection with reconstruction.  She is healing up nicely.  She has a drain in place.  Pathology showed invasive breast cancer which is 55 x 40 mm in size, grade 2 with focal lymphovascular space invasion with DCIS spanning over 36 x 6 mm.  All margins were negative for cancer.  8 of the additional lymph nodes out of 11 were positive with the largest micrometastasis measuring 5 mm.  Extranodal extension was seen.  Biomarkers on post neoadjuvant tumor is ER 81-90% IL 5% HER-2/merna 2+ by immunohistochemistry and HER-2/merna negative by FISH.  Radiation July 6, 2020-August 17, 2020.  Letrozole initiated July 2020.  Letrozole discontinued October 2020 secondary to severe joint pain.  October 30, 2020 patient starting tamoxifen and tolerating well  August 2021: Tamoxifen discontinued secondary to depression and mental fogginess and fatigue  September 21, 2021: Arimidex started  Severe arthralgias secondary to Arimidex, switched to Aromasin  Patient is tolerating Aromasin well    2.  HIV, followed by Dr. Dawn from infectious disease.  Well controlled on meds    3.  Screening mammogram December 7, 2020 showed indeterminate calcifications lower outer middle left breast. Diagnostic mammogram showed 0.4 cm calcifications in the  left breast.  Stereotactic biopsy January 8, 2021 consistent with organizing fat necrosis.              HISTORY OF PRESENT ILLNESS:    Patient is a 63 y.o. female with pathologic T2N1 invasive ductal carcinoma ER/AZ positive HER2 negative currently on Aromasin and tolerating well.  She had previously taken tamoxifen and at that time we had to take her off her Lexapro and switch to Cymbalta.  When she was seen 3 months ago she was noting more difficulty with anxiety feeling that the Cymbalta was not effective.  She did follow-up with her PCP who transitioned her off Cymbalta and back to Lexapro.  She is reviewed back today she notes that her anxiety is improved now back on the Lexapro.    Patient has chronic neuropathy in her feet secondary to previous Taxol.  Unfortunately she could not tolerate gabapentin.  She does have prescription compounded cream that has ketamine and it through her pain management that is helpful.  She does try to stay barefoot as wearing shoes feels worse.  This does lead to her feet being very callused.    He does have underlying osteoporosis receiving Prolia every 6 months.  She is due for repeat DEXA scan at this time we discussed ordering this.      She continues to follow with Dr. Franco for her HIV and it is very stable.    Had a large tumor with 2 lymph nodes with extra capsular extension and hence we wanted to give for prolonged total of 10 years endocrine therapy.    Past Medical History:   Diagnosis Date    Anxiety     Arthritis of back 2010    Cerebrovascular accident (CVA) 08/22/2017    NO RESIDUAL AFFECT    DDD (degenerative disc disease), cervical     Depression     Drug therapy     Elevated cholesterol     Fat necrosis of left breast     GERD (gastroesophageal reflux disease)     H/O ICH (intracerebral hemorrhage) (CMS/HCC)     Heart murmur 1975    Hip arthrosis 2015    History of chemotherapy     History of heart murmur in childhood     History of stroke     PATIENT STATES  HAD A MINI STROKE, NO RESIDUAL EFFECTS    History of thrombocytopenia     HIV (human immunodeficiency virus infection) 1996    Hx of radiation therapy     Hyperlipidemia     Insomnia     Knee swelling 2017    meniscus tear    Lupus anticoagulant positive     Malignant neoplasm of overlapping sites of left breast in female, estrogen receptor positive 11/12/2019    Meniscus tear 2017    RIGHT    Migraine     Neck pain     WITH SHOOTING PAIN LEFT RIGHT ARM    Neck strain 2001    work injury    Neuropathy     Osteoarthritis     Osteoporosis     Pain management     sees 1 every 3 months for scripts/injection/pain meds    Periarthritis of shoulder 2010    Pneumonia 1989    Seasonal allergies     Spinal headache     THINKS HAD BLOOD PATCH AFTER EPIDUAL     Stress fracture 2010    three times    Tear of meniscus of knee 2012 & 2017    Tendinitis of knee 2000    physical therapy many times    Tick bite 06/2014    Upper back pain     Vertigo      PAST MEDICAL HISTORY:  She had a stroke in July 2017 with headache and dizziness.  She went to the ER and was placed on aspirin.  However, she stopped taking it two weeks ago.  She has been taking Aimovig (injection) monthly with Fariba López at [facility].    In 1994, patient was diagnosed with HIV with an unknown cause which is managed by Dr. Natali Subramanian.  As of now, her viral load is good.    Patient has arthritis.  She gets trigger-point injections in her back with her last one being 2 weeks ago.  She has had multiple ablations.  She also has pain in her SI joint and Dr. Bermudez performed a surgery on this.  She takes Narco for the pain.      She is osteoporotic.  She has had multiple hairline fractures in both her legs.  She had her knees cleaned out by Dr. Sinha.     Patient has had a hysterectomy and still has both of her ovaries.    Patient has vertigo and the muscles in her left ear are weak.  She just has an imbalance.    Past Surgical History:   Procedure Laterality Date     ADENOIDECTOMY      BREAST LUMPECTOMY      BREAST LUMPECTOMY WITH SENTINEL NODE BIOPSY Left 05/28/2020    Procedure: BREAST LUMPECTOMY WITH SENTINEL NODE BIOPSY AND NEEDLE LOCALIZATION AND axillary dissection;  Surgeon: Landen Forman MD;  Location: [facility] OR;  Service: General;  Laterality: Left;    BREAST SURGERY Left 05/28/2020    Procedure: LEFT LATERAL INTERCOASTAL ARTERY  flap ;  Surgeon: Yusuf Garcia MD;  Location: [facility] OR;  Service: Plastics;  Laterality: Left;    CHOLECYSTECTOMY      HYSTERECTOMY      KNEE ARTHROSCOPY Right 03/24/2017    Procedure:  RIGHT  KNEE ARTHROSCOPY WITH DEBRIDEMENT CHONDROPLASTY PARTIAL MENISCECTOMY;  Surgeon: Karl Galeas MD;  Location: [facility];  Service:     KNEE CARTILAGE SURGERY Right 2007    TONSILLECTOMY      TONSILLECTOMY  1974    TRIGGER POINT INJECTION  2002 - 2023    US GUIDED LYMPH NODE BIOPSY  10/29/2019    LEFT BREAST    VENOUS ACCESS DEVICE (PORT) INSERTION Right 12/02/2019    Procedure: INSERTION VENOUS ACCESS DEVICE RIGHT;  Surgeon: Landen Forman MD;  Location: [facility] OR;  Service: General    VENOUS ACCESS DEVICE (PORT) REMOVAL N/A 05/25/2022    Procedure: Mediport Removal;  Surgeon: Cesar Vasquez Jr., MD;  Location: [facility] OR;  Service: General;  Laterality: N/A;       ONCOLOGIC HISTORY:  Patient is a 59-year-old female who has had a history of HIV since age 34 followed by Dr. Natali Ng at [facility] and was on multiple HIV drugs initially but more recently has been on a single medication TRIUMEQ, with well-controlled HIV.  She follows up with Dr. Dawn , infectious disease who saw her recently and he saw her on 4/8/2019 and has excellent control and suppression of her viral load.  She is very compliant with her medications.    She also has had history of stroke in 2017 for which she was placed on aspirin.  She presented with a headache.  She is very active and works at  [employer] full-time.  She also has had history of vertigo in the past  Patient's PCP is Zach Lora.    Patient first noticed the mass in her left breast 5 months ago.  Her last mammogram was 5 years ago.  She had soreness in the left breast and she went to her primary care physician who then ordered a mammogram diagnostic and an ultrasound.  The diagnostic mammogram showed a 3.7 cm mass at 4 o'clock position in the lower outer quadrant of the left breast.  The ultrasound showed 3.8 cm x 3.1 x 2.5 cm mass at 4 o'clock position in the left breast with 2 abnormal appearing left axillary lymph nodes the largest being 1.4 cm.    She then underwent biopsy of both the breast mass as well as the left axillary lymph node and both of them are positive for invasive mammary carcinoma it is invasive ductal carcinoma with apocrine features, moderately differentiated, grade 2 of 3 with a [place] score of 5.  The left axillary lymph node is also consistent with metastatic mammary carcinoma.  It is ER positive MT positive HER-2/merna negative.  Details as follows    10/21/19 - Bilateral Diagnostic Mammogram and US Breast Left  FINDINGS: Bilateral digital CC and MLO mammographic images were  obtained. No prior examination is available for comparison. Scattered  fibroglandular densities are seen throughout both breasts. A triangular  skin marker represents the area of palpable concern in the lower outer  quadrant of the left breast in the posterior 1/3. At this location there  is an irregular mass that measures on the order of 3.7 cm in greatest  dimension. Internal calcifications are noted. No suspicious findings in  the right breast are appreciated.     ULTRASOUND: Targeted sonographic evaluation of the left breast was  performed through the area of concern in the left axilla. At the 4  o'clock position on the order of 6 cm from the nipple there is an  irregular hypoechoic mass that measures 3.8 x 3.1 x 2.5 cm.  Internal  vascularity is noted.     There are 2 abnormal-appearing left axillary lymph nodes, the largest  which measures on the order of 1.4 cm in greatest dimension.     IMPRESSION:  1. There is a 3.8 cm irregular mass in the left breast at the 4 o'clock  position. This is seen in conjunction with an irregular 1.4 cm lymph  node in the left axilla. This is suspicious for malignancy with left  axillary involvement. Correlation with ultrasound-guided biopsy of both  the left breast mass and the lymph node is recommended.  2. There are no findings suspicious for malignancy in the right breast.     BI-RADS CATEGORY 5:     Patient's labs from 11/12/19 are normal.    10/29/19 - Tissue Pathology  1. Left Breast, 4:00, 6 cm FN, U/S-Guided Core Needle Biopsy for a Mass:  A. INVASIVE DUCTAL CARCINOMA WITH APOCRINE FEATURES, Moderately differentiated;  Jen Histologic Grade II/III (tubule score = 3, nuclear score = 2, mitoses score = 1), measuring at least  9 mm.  B. No ductal or lobular carcinoma in situ is identified in this sample.  C. Focus suspicious for lymphovascular space invasion.  D. See Biomarker Template for hormone receptor studies.  2. Lymph Node, Left Axilla, U/S-Guided Core Needle Biopsy:  A. METASTATIC MAMMARY CARCINOMA (see Comment).  B. Metastatic focus measures 5 mm in greatest extent.  ER+, 85%  WA+, 10%  HER2- Score 2+    11/13/19 - CT Neck Chest Abdomen Pelvis  IMPRESSION:  1. In addition to the irregular approximately 3.8 cm mass within the  lateral aspect of the left breast, there are a few subcentimeter  asymmetric nodules along the lateral margin of the glandular tissue. The  several asymmetric left subpectoral nodes and 1.2 x 1.0 cm left axillary  node are suspicious for tmaar metastases. The asymmetric subcentimeter  left supraclavicular and left posterior cervical triangle nodes are  worrisome as well.  2. There is no convincing evidence for metastatic disease within the  abdomen or  pelvis.    19 - Echocardiogram:  Normal.  Calculated EF = 67%.  There is trace mitral valve and tricuspid valve regurgitation.    11/15/19 - Bone Scan  Negative.    19 - MRI Breast Bilateral  IMPRESSION:  1. Biopsy-proven malignancy in the left breast centered at the 4 o'clock  position with associated surrounding satellite nodules as described. The  entire region of involvement including the satellite nodules and the  dominant mass measure up to 7.5 cm in greatest dimension. Also, left  axillary adenopathy with multiple irregular lymph nodes and loss of  differentiation of the borders of multiple lymph nodes is noted and this  is suspicious for extranodal involvement.  2. There are no findings suspicious for malignancy in the right breast.  BI-RADS CATEGORY 6      19: Cycle #1 of Adriamycin/Cytoxan    20: Last cycle of Adriamycin/Cytoxan given without Neulasta.  We will switch from Neulasta to 7 days of Neupogen injections after cycle 4.    We reviewed with patient the US Breast from 20 which shows mild interval partial response to neoadjuvant chemotherapy.      20: Initiated cycle 1 Taxol  2020: Last cycle of Taxol, cycle 12    S/p left mastectomy with axillary dissection   Pathology showed invasive breast cancer which is 55 x 40 mm in size, grade 2 with focal lymphovascular space invasion with DCIS spanning over 36 x 6 mm.  All margins were negative for cancer.  8 of the additional lymph nodes out of 11 were positive with the largest micrometastasis measuring 5 mm.  Extranodal extension was seen.    Biomarkers on post neoadjuvant tumor is ER 81-90% MT 5% HER-2/merna 2+ by immunohistochemistry and HER-2/merna negative by FISH.    2020: Started letrozole but because of severe joint pains was discontinued 2020    End of 2020 started tamoxifen    OB-GYN:  Menarche 15 years  Menopause-46  Pregnancies- 1 para 1 no miscarriages her first pregnancy was  in 1990 at 29 years of age.  She did not breastfeed.  Post menopausal HRT- None.  Hx of birth control pills- Yes.    Current Outpatient Medications on File Prior to Visit   Medication Sig Dispense Refill    AIMOVIG 140 MG/ML solution auto-injector Inject 1 mL under the skin into the appropriate area as directed Every 30 (Thirty) Days. HAS NOT TAKEN IN SEVERAL MONTHS  11    amitriptyline (ELAVIL) 25 MG tablet Take 1 tablet by mouth Every Night. 90 tablet 3    Aspirin 81 MG capsule Take 1 tablet by mouth Daily.      atorvastatin (LIPITOR) 20 MG tablet TAKE 1 TABLET BY MOUTH EVERY DAY AT NIGHT 90 tablet 1    baclofen (LIORESAL) 10 MG tablet Take 1 tablet by mouth 2 (Two) Times a Day.  2    CALCIUM-MAGNESIUM-VITAMIN D PO Take  by mouth.      colesevelam (WELCHOL) 625 MG tablet TAKE 2 TABLETS BY MOUTH DAILY 180 tablet 11    doxycycline (MONODOX) 100 MG capsule Take 1 capsule by mouth Every 12 (Twelve) Hours.      escitalopram (Lexapro) 20 MG tablet Take 1 tablet by mouth Daily. 60 tablet 2    exemestane (AROMASIN) 25 MG tablet TAKE 1 TABLET BY MOUTH EVERY DAY 90 tablet 2    fluticasone (FLONASE) 50 MCG/ACT nasal spray SPRAY 2 SPRAYS INTO THE NOSTRIL AS DIRECTED BY PROVIDER DAILY. 48 mL 3    Loratadine (CLARITIN PO) Take 10 mg by mouth Daily.      NON FORMULARY Compound cream (ketamine, gabapentin, clonidine, and lidocaine)      omeprazole (priLOSEC) 40 MG capsule TAKE 1 CAPSULE BY MOUTH DAILY BEFORE SUPPER 90 capsule 1    oxyCODONE-acetaminophen (PERCOCET)  MG per tablet Take 1 tablet by mouth 4 (Four) Times a Day.      Triumeq 600- MG per tablet Take 1 tablet by mouth Daily.      ubrogepant (UBRELVY) 100 MG tablet Take 1 tablet by mouth.      vitamin B-12 (CYANOCOBALAMIN) 1000 MCG tablet Take 1 tablet by mouth Daily.      vitamin B-6 (pyridoxine) 50 MG tablet Take 1 tablet by mouth Daily. 90 tablet 2    zolpidem (AMBIEN) 10 MG tablet Take 0.5 tablets by mouth At Night As Needed for Sleep. 45 tablet 0     [DISCONTINUED] DULoxetine (CYMBALTA) 20 MG capsule TAKE ONE CAPSULE BY MOUTH DAILY FOR ONE WEEK, THEN EVERY OTHER DAY OR ANOTHER WEEK, THEN EVERY 2-3 DAYS THEN STOP (Patient not taking: Reported on 8/15/2024) 30 capsule 0     No current facility-administered medications on file prior to visit.        ALLERGIES:    Allergies   Allergen Reactions    Augmentin [Amoxicillin-Pot Clavulanate] Hives        Social History     Socioeconomic History    Marital status:      Spouse name: Owen    Number of children: 1    Years of education: College   Tobacco Use    Smoking status: Never     Passive exposure: Never    Smokeless tobacco: Never    Tobacco comments:     never   Vaping Use    Vaping status: Never Used   Substance and Sexual Activity    Alcohol use: Yes     Alcohol/week: 1.0 standard drink of alcohol     Types: 1 Drinks containing 0.5 oz of alcohol per week     Comment: once a week    Drug use: No    Sexual activity: Yes     Partners: Male     Birth control/protection: None, Hysterectomy     Comment:      Patient does not smoke or do drugs.  She does drink occasionally.    Family History   Problem Relation Age of Onset    Kidney disease Mother     Hypertension Mother     Heart disease Mother     Arthritis Mother     Breast cancer Mother     Leukemia Mother         CLL?    Diabetes Mother     Hyperthyroidism Mother     Hearing loss Mother     Cancer Mother         breast    Rheumatologic disease Mother     Arthritis Father     Dementia Father     Stroke Father     Heart disease Maternal Grandmother     Diabetes Maternal Grandfather     Heart disease Maternal Grandfather     Stroke Maternal Grandfather     Heart attack Maternal Grandfather     Osteoporosis Paternal Grandmother     Heart disease Paternal Grandfather     Leukemia Maternal Aunt         CLL?    Throat cancer Paternal Uncle     Alcohol abuse Other         multiple uncles/aunts    Malig Hyperthermia Neg Hx       FAMILY HISTORY:  Her mother had  "breast cancer at age 60, still alive at 85 and in good health..  Her father had dementia.  Her paternal uncle had prostate cancer.  Her brother had esophageal cancer.    I have reviewed the patient's medical history in detail and updated the computerized patient record.     ROS: as per HPI    Objective    Vitals:    08/15/24 1358   BP: 136/83   Pulse: 82   Resp: 18   Temp: 98.4 °F (36.9 °C)   TempSrc: Skin   SpO2: 97%   Weight: 73.2 kg (161 lb 6.4 oz)   Height: 167.6 cm (65.98\")   PainSc:   6   PainLoc: Foot           Review of Systems   Constitutional:  Negative for appetite change, chills, diaphoresis, fatigue, fever and unexpected weight change.   HENT:  Negative for hearing loss, sore throat and trouble swallowing.    Respiratory:  Negative for cough, chest tightness, shortness of breath and wheezing.    Cardiovascular:  Negative for chest pain, palpitations and leg swelling.   Gastrointestinal:  Negative for abdominal distention, abdominal pain, constipation, diarrhea, nausea and vomiting.   Genitourinary:  Negative for dysuria, frequency, hematuria and urgency.   Musculoskeletal:  Negative for joint swelling.        No muscle weakness.   Skin:  Negative for rash and wound.   Neurological:  Positive for numbness (Bilat foot). Negative for seizures, syncope, speech difficulty, weakness and headaches.   Hematological:  Negative for adenopathy. Does not bruise/bleed easily.   Psychiatric/Behavioral:  Negative for behavioral problems, confusion and suicidal ideas.    All other systems reviewed and are negative.    ROS reviewed and updated 08/15/24      Physical Exam:  CONSTITUTIONAL:  Vital signs reviewed.  No distress, looks comfortable.  RESPIRATORY:  Normal respiratory effort.  Lungs clear to auscultation bilaterally.  BREASTS: Dense breast bilaterally.  Nodular scar tissue at the site of previous lumpectomy scar with slight deformity of the left nipple around the 2 o'clock position.  Right breast dense but " otherwise benign.  CARDIOVASCULAR:  Normal S1, S2.  No murmurs rubs or gallops.  No significant lower extremity edema.  GASTROINTESTINAL: Abdomen appears unremarkable.  Nontender.  No hepatomegaly.  No splenomegaly.  LYMPHATIC:  No cervical, supraclavicular, axillary lymphadenopathy.  SKIN:  Warm.  No rashes.  PSYCHIATRIC:  Normal judgment and insight.  Normal mood and affect.    I have reexamined the patient and the results are consistent with the previously documented exam except as updated. Nikki Walters, ОЛЬГА     RECENT LABS:   Results from last 7 days   Lab Units 08/15/24  1332   WBC 10*3/mm3 7.88   NEUTROS ABS 10*3/mm3 4.60   HEMOGLOBIN g/dL 14.2   HEMATOCRIT % 42.2   PLATELETS 10*3/mm3 240       Results from last 7 days   Lab Units 08/15/24  1332   SODIUM mmol/L 141   POTASSIUM mmol/L 4.1   CHLORIDE mmol/L 101   CO2 mmol/L 29.7*   BUN mg/dL 12   CREATININE mg/dL 0.75   CALCIUM mg/dL 9.6   ALBUMIN g/dL 4.5   BILIRUBIN mg/dL 0.4   ALK PHOS U/L 74   ALT (SGPT) U/L 14   AST (SGOT) U/L 22   GLUCOSE mg/dL 117*           Assessment & Plan    * T2N1 invasive ductal carcinoma of the left breast, recently diagnosed.    -Noticed mass in the left breast x5 months  -Diagnostic mammogram showed 3.7 mm mass in the left breast at 4 o'clock position  -Ultrasound-showed 3.8 x 3.1 x 2.5 cm mass at 4 o'clock position with 2 abnormal axillary lymph nodes, larger lymph node being 1.4 cm  -Left breast biopsy and axillary lymph node biopsy October 29, 2019, invasive ductal carcinoma, moderately differentiated, grade 2, ER 85%, OH 10%, HER-2/merna 2+, HER-2 FISH negative  CT scans from 11/13/19 show some asymmetric nodules along the lateral margin of the glandular tissue.  The 1.2x1.0 cm left axillary nodes, left supraclavicular, and left posterior cervical triangle nodes are suspicious for tamar metastases.    bone scan from 11/15/19 which was negative.    MRI breast from 11/18/19 which shows the biopsy-proven malignancy in  the left breast centered at the 4:00 position.  The region of involvement measures up to 7.5 cm.  There is left axillary adenopathy with multiple irregular lymph nodes and loss of differentiation of the border of multiple lymph nodes which are suspicious for extranodal involvement.     echocardiogram from 11/14/19 which was normal with an ejection fraction of 67%.    INVITAE from 11/14/19 which was negative.  Given the size of her tumor and her medical history, patient will be given 4 cycles of Adriamycin/Cytoxan with Neulasta.  After completion of this treatment, patient will be started on 12 cycles of Taxol.  After the tumor shrinks in size, Dr. Forman will perform a lumpectomy.    Following lumpectomy, patient will begin endocrine therapy.  Dr. Dawn agrees chemotherapy will not aggravate her HIV condition.  Dr. Moreno spoke with Dr. Mohan at [facility] who also agrees chemotherapy is the first step.   12/06/19: Cycle #1 of Adriamycin/Cytoxan  US Breast from 01/29/20 after 4 cycles of Adriamycin Cytoxan chemotherapy which shows mild interval partial response to neoadjuvant chemotherapy.  The mass has decreased from 3.8 x 2.5 to 3.1 cm to 3.5 x 2.3 x 3.2 cm previously the left axillary lymph node has decreased from 1.4 x 0.7 x 1 cm to 1.1 x 0.6 x 0.9 cm.  01/31/20: Initiated cycle 1 Taxol  April 24, 2020: Completed cycle 12 Taxol  MRI breast 5/6/2020 shows significant reduction in the left axillary adenopathy as well as reduction in the index breast lesion.  S/p left modified radical mastectomy with left axillary dissection.  Patient has 55 x 40 x 30 mm invasive carcinoma, grade 2 with DCIS 36 x 6 mm, high-grade with good margins and 8 out of 11 lymph nodes positive with largest metastasis being 5 mm with extracapsular extension.  And there is lymphatic space invasion  Will follow-up in 2 weeks at which time we will plan to start endocrine therapy.  She has radiation oncology appointment with   [facility]  XRT completed August 17, 2020  Started letrozole August 2020 but has arthralgias  October 6, 2020 had severe arthralgias secondary to letrozole.  We will switch to tamoxifen starting November 2020 1/8/2021 screening mammogram in December 2020 as well as diagnostic mammogram which showed 0.4 cm suspicious calcification in the lower left which on biopsy is consistent with fat necrosis.  8/12/2021 tamoxifen placed on hold due to significant arthralgias and worsening fatigue.   September 21, 2021: Patient's depression, mental fogginess has resolved since tamoxifen discontinued.  She has mild worsening fatigue her TSH and B12 levels are normal.  We will need to check iron studies  Discussed about switching to Arimidex to see if she could tolerate that better  Patient initiated Arimidex 9/21/2021.  Returns 10/5/2021 complaining of significant generalized arthralgias.  Reviewed with Dr. Moreno.  We were initially going to try her on Cymbalta, but the patient is already taking Lexapro, and doing well on this.  We will therefore discontinue Arimidex.  We will give her a 6-week break and reevaluate things in 6 weeks.  Could try at that time switching to Aromasin.  November 15, 2021: Patient continued with Arimidex even though she was asked to hold it last time.  Her arthralgias worsened.  Also the stiffness in the joints worsened significantly.  Discussed with patient that we need to hold Arimidex for 6 weeks and try Aromasin at her next appointment.  December 28, 2021: Arthralgias improved after holding Arimidex.  December 28, 2021: Started Aromasin  5/23/2024: Patient struggling with increased anxiety (previous Lexapro was stopped when she was on tamoxifen and at the time switched to Cymbalta).  Discussed following up with PCP to taper Cymbalta and get back on Lexapro.  Otherwise tolerating Aromasin well.  Continue for 10 years due to tamar involvement.  8/15/2024 continues to tolerate Aromasin well.  Anxiety  better controlled now back on Lexapro.  Plan to continue AI therapy for total of 10 years considering tamar involvement.    *Osteoporosis  DEXA scan 7/28/2022 with osteoporosis of both hips.  Initiating Prolia 11/2022, continue every 6 months.  For repeat DEXA scan.    * HIV, followed by Dr. Dawn in infectious disease.  Well-controlled and is on a single medication with very low viral load. Has HIV since age 34.  Reviewed her HIV medications and she is compliant  Patient followed by infectious disease for her HIV and currently on treatment.  Stay  Currently is followed by Dr. Dawn on anti-HIV medications  Stable no new medications     * Family history of breast cancer with mother having had breast cancer at age 60.  There is family history of uncle with prostate cancer.  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    * Arthritis with severe back pain followed by Dr. 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  Patient continues on B6.  Chronic and stable  We will need to refer to physical therapy    * Fatigue  8/12/2021 patient seen as triage visit for worsening fatigue and diffuse arthralgias.  Reporting increased sleeping at night and still taking naps in the day.  Labs checked including thyroid studies which were unremarkable and B12 level actually elevated at greater than 2000.  Patient taking vitamin B supplements at that time.  Patient instructed to hold tamoxifen.  8/26/2021 reviewed today recent lab work for above with no evidence of thyroid dysfunction or vitamin B12 abnormality.  Patient notes no improvement  being off tamoxifen for 2 weeks.  We will check a CA 15-3 to help further guide decision making.  If this is elevated we may then pursue imaging.  Discussed that she may just need to be off the tamoxifen longer to see improvement.  Patient does have underlying HIV as well and unclear if maybe some of her chronic medications for this could be contributing to her current symptoms.  Patient continues with fatigue but slightly improved now    * Insomnia  Has completely weaned off of amitriptyline   Notes she is currently taking Ambien 5 mg because PCP would not fill 10 mg which has been historically effective.  Encouraged her to discuss this with primary care again as she has done well on higher dosing without issue in relation to other medications     *Neuropathy:   Chronic secondary to previous Taxol   Cannot tolerate gabapentin.    Continues topical compounded cream that includes ketamine through pain management.      * Anxiety   8/15/2024 much better controlled now back on Lexapro, managed through PCP.    PLAN:   Continue Aromasin.  Due for repeat DEXA scan, ordered today.  Continue calcium and vitamin D supplements  Patient continues to follow-up with infectious disease, Dr. Dawn  Continue Lexapro prescribed through PCP.  Return in 3 months for follow-up with Dr. Perez with next dose of Prolia.  Annual mammograms due in January.  Order at next visit.      This patient is on high risk drug therapy requiring intensive monitoring for toxicity.        ОЛЬГА Sher    Cc: MD Denilson Feliciano MD

## 2024-08-29 ENCOUNTER — HOSPITAL ENCOUNTER (OUTPATIENT)
Dept: BONE DENSITY | Facility: HOSPITAL | Age: 64
Discharge: HOME | End: 2024-08-29
Admitting: NURSE PRACTITIONER
Payer: COMMERCIAL

## 2024-08-29 DIAGNOSIS — Z17.0 MALIGNANT NEOPLASM OF OVERLAPPING SITES OF LEFT BREAST IN FEMALE, ESTROGEN RECEPTOR POSITIVE: ICD-10-CM

## 2024-08-29 DIAGNOSIS — Z79.811 USE OF EXEMESTANE (AROMASIN): ICD-10-CM

## 2024-08-29 DIAGNOSIS — C50.812 MALIGNANT NEOPLASM OF OVERLAPPING SITES OF LEFT BREAST IN FEMALE, ESTROGEN RECEPTOR POSITIVE: ICD-10-CM

## 2024-08-29 PROCEDURE — 77080 DXA BONE DENSITY AXIAL: CPT

## 2024-09-03 ENCOUNTER — OFFICE VISIT (OUTPATIENT)
Dept: FAMILY MEDICINE CLINIC | Facility: CLINIC | Age: 64
End: 2024-09-03
Payer: COMMERCIAL

## 2024-09-03 VITALS
TEMPERATURE: 98 F | RESPIRATION RATE: 16 BRPM | DIASTOLIC BLOOD PRESSURE: 82 MMHG | HEIGHT: 66 IN | BODY MASS INDEX: 25.97 KG/M2 | HEART RATE: 73 BPM | SYSTOLIC BLOOD PRESSURE: 140 MMHG | WEIGHT: 161.6 LBS | OXYGEN SATURATION: 98 %

## 2024-09-03 DIAGNOSIS — K21.9 CHRONIC GERD: ICD-10-CM

## 2024-09-03 DIAGNOSIS — F41.9 ANXIETY: Primary | ICD-10-CM

## 2024-09-03 DIAGNOSIS — Z85.3 HISTORY OF LEFT BREAST CANCER: ICD-10-CM

## 2024-09-03 DIAGNOSIS — E78.2 MIXED HYPERLIPIDEMIA: ICD-10-CM

## 2024-09-03 DIAGNOSIS — G62.0 DRUG-INDUCED POLYNEUROPATHY: ICD-10-CM

## 2024-09-03 DIAGNOSIS — G89.29 CHRONIC RIGHT-SIDED LOW BACK PAIN WITH RIGHT-SIDED SCIATICA: ICD-10-CM

## 2024-09-03 DIAGNOSIS — M54.41 CHRONIC RIGHT-SIDED LOW BACK PAIN WITH RIGHT-SIDED SCIATICA: ICD-10-CM

## 2024-09-03 DIAGNOSIS — R03.0 ELEVATED BLOOD PRESSURE READING WITHOUT DIAGNOSIS OF HYPERTENSION: ICD-10-CM

## 2024-09-03 PROCEDURE — 99214 OFFICE O/P EST MOD 30 MIN: CPT | Performed by: FAMILY MEDICINE

## 2024-09-03 RX ORDER — ESCITALOPRAM OXALATE 20 MG/1
20 TABLET ORAL DAILY
Qty: 90 TABLET | Refills: 2 | Status: SHIPPED | OUTPATIENT
Start: 2024-09-03

## 2024-09-03 NOTE — PROGRESS NOTES
Subjective     Richa Dolan is a 63 y.o. female.     Chief Complaint   Patient presents with    Anxiety     2 month f/u, discuss medication dose.     Hyperlipidemia    Heartburn    Back Pain       History of Present Illness     Anxiety ; last OV dose of lexapro increased to 20 mg, doing much better. No S/E reported.     Noticed Elevated BP, not on BP medication , usually check her BP at home, wnl    HLD;  well controlled with current Rx, no S/E reported.   Eats HD   Do some walking     H/o breast cancer, doing well, no new concern, following with onco    PN ; likely from medication vs HIV , doing well on Elavil 6 months. Also on topical pain relive cream which helps a lot.     C/o all joints pain mainly on the rt side including Rt KNEE/ ANKLE/ HIP AND SHOULDER , ALL ON THE Rt side. She is on oxy from pain clinic .   Chronic back pain with Rt side sciatica ; following with pain loni..     GERD;  well controlled with current Rx, no S/E reported.     HLD;  well controlled with current Rx, no S/E reported.       Lab Results   Component Value Date    GLUCOSE 117 (H) 08/15/2024    BUN 12 08/15/2024    CREATININE 0.75 08/15/2024     08/15/2024    K 4.1 08/15/2024     08/15/2024    CALCIUM 9.6 08/15/2024    PROTEINTOT 6.7 08/15/2024    ALBUMIN 4.5 08/15/2024    ALT 14 08/15/2024    AST 22 08/15/2024    ALKPHOS 74 08/15/2024    BILITOT 0.4 08/15/2024    GLOB 2.2 08/15/2024    AGRATIO 2.0 08/15/2024    BCR 16.0 08/15/2024    ANIONGAP 10.3 08/15/2024    EGFR 89.6 08/15/2024     Lab Results   Component Value Date    CHLPL 166 03/05/2024    TRIG 70 03/05/2024    HDL 76 03/05/2024    LDL 77 03/05/2024     Lab Results   Component Value Date    HGBA1C 5.8 (H) 03/05/2024            The following portions of the patient's history were reviewed and updated as appropriate: allergies, current medications, past family history, past medical history, past social history, past surgical history, and problem  list.        Review of Systems   Musculoskeletal:  Positive for arthralgias.       Vitals:    09/03/24 1304   BP: 140/82   Pulse: 73   Resp: 16   Temp: 98 °F (36.7 °C)   SpO2: 98%           09/03/24  1304   Weight: 73.3 kg (161 lb 9.6 oz)         Body mass index is 26.1 kg/m².      Current Outpatient Medications   Medication Sig Dispense Refill    AIMOVIG 140 MG/ML solution auto-injector Inject 1 mL under the skin into the appropriate area as directed Every 30 (Thirty) Days. HAS NOT TAKEN IN SEVERAL MONTHS  11    amitriptyline (ELAVIL) 25 MG tablet Take 1 tablet by mouth Every Night. 90 tablet 3    Aspirin 81 MG capsule Take 1 tablet by mouth Daily.      atorvastatin (LIPITOR) 20 MG tablet TAKE 1 TABLET BY MOUTH EVERY DAY AT NIGHT 90 tablet 1    baclofen (LIORESAL) 10 MG tablet Take 1 tablet by mouth 2 (Two) Times a Day.  2    CALCIUM-MAGNESIUM-VITAMIN D PO Take  by mouth.      colesevelam (WELCHOL) 625 MG tablet TAKE 2 TABLETS BY MOUTH DAILY 180 tablet 11    doxycycline (MONODOX) 100 MG capsule Take 1 capsule by mouth Every 12 (Twelve) Hours.      escitalopram (Lexapro) 20 MG tablet Take 1 tablet by mouth Daily. 90 tablet 2    exemestane (AROMASIN) 25 MG tablet TAKE 1 TABLET BY MOUTH EVERY DAY 90 tablet 2    fluticasone (FLONASE) 50 MCG/ACT nasal spray SPRAY 2 SPRAYS INTO THE NOSTRIL AS DIRECTED BY PROVIDER DAILY. 48 mL 3    Loratadine (CLARITIN PO) Take 10 mg by mouth Daily.      NON FORMULARY Compound cream (ketamine, gabapentin, clonidine, and lidocaine)      omeprazole (priLOSEC) 40 MG capsule TAKE 1 CAPSULE BY MOUTH DAILY BEFORE SUPPER 90 capsule 1    oxyCODONE-acetaminophen (PERCOCET)  MG per tablet Take 1 tablet by mouth 4 (Four) Times a Day.      Triumeq 600- MG per tablet Take 1 tablet by mouth Daily.      ubrogepant (UBRELVY) 100 MG tablet Take 1 tablet by mouth.      vitamin B-12 (CYANOCOBALAMIN) 1000 MCG tablet Take 1 tablet by mouth Daily.      vitamin B-6 (pyridoxine) 50 MG tablet Take 1  tablet by mouth Daily. 90 tablet 2    zolpidem (AMBIEN) 10 MG tablet Take 0.5 tablets by mouth At Night As Needed for Sleep. 45 tablet 0     No current facility-administered medications for this visit.                Objective   Physical Exam  Vitals and nursing note reviewed.   Constitutional:       General: She is not in acute distress.     Appearance: She is not toxic-appearing.   Cardiovascular:      Rate and Rhythm: Normal rate and regular rhythm.      Heart sounds: Normal heart sounds. No murmur heard.  Pulmonary:      Effort: Pulmonary effort is normal. No respiratory distress.      Breath sounds: Normal breath sounds. No stridor. No wheezing or rhonchi.   Musculoskeletal:         General: No swelling or tenderness. Normal range of motion.   Neurological:      Mental Status: She is alert and oriented to person, place, and time.   Psychiatric:         Behavior: Behavior normal.         Thought Content: Thought content normal.           Assessment & Plan   Diagnoses and all orders for this visit:    1. Anxiety (Primary)  Comments:  she is off from cymbalat   doing well on lexapro 20 mg, continue  Orders:  -     escitalopram (Lexapro) 20 MG tablet; Take 1 tablet by mouth Daily.  Dispense: 90 tablet; Refill: 2    2. Elevated blood pressure reading without diagnosis of hypertension  Comments:  close monitoirng    3. Drug-induced polyneuropathy  Comments:  Stable, continue Elavil    4. History of left breast cancer  Comments:  doing well, following with On[facility]    5. Chronic GERD  Comments:  Stable, continue current Rx    6. Mixed hyperlipidemia  Comments:  Stable, continue current Rx + HD    7. Chronic right-sided low back pain with right-sided sciatica  Comments:  follwoing with pain clinic          Patient was given instructions and counseling regarding her condition or for health maintenance advice. Please see specific information pulled into the AVS if appropriate.       I have fully discussed the nature of the  medical condition(s) risks, complications, management, safe and proper use of medications.   Pt stated no allergy to the above prescribed medication.  I have discussed the SIDE EFFECT OF MEDICATION and importance TO report any side effect , the patient expressed good understanding.  Encouraged medication compliance and the importance of keeping scheduled follow up appointments with me and any other providers.    Patient instructed to follow up with our office for results on any labs/imaging ordered during this visit.    Home care discussed  All questions answered  Patient verbalizes understanding and agrees to treatment plan.     Follow up: Return in about 3 months (around 12/3/2024) for physical, come fasting.           Answers submitted by the patient for this visit:  Other (Submitted on 8/29/2024)  Please describe your symptoms.: Follow up for health and refills of sleeping  medicine  Have you had these symptoms before?: Yes  How long have you been having these symptoms?: Greater than 2 weeks  Please list any medications you are currently taking for this condition.: Ambient 10 mg  half tablet at bedtime  Please describe any probable cause for these symptoms. : anxiety, back pain, neuropathy in feet  Primary Reason for Visit (Submitted on 8/29/2024)  What is the primary reason for your visit?: Other

## 2024-09-09 DIAGNOSIS — G47.00 INSOMNIA, UNSPECIFIED TYPE: ICD-10-CM

## 2024-09-09 RX ORDER — ZOLPIDEM TARTRATE 10 MG/1
5 TABLET ORAL NIGHTLY PRN
Qty: 45 TABLET | Refills: 0 | Status: SHIPPED | OUTPATIENT
Start: 2024-09-09

## 2024-09-09 NOTE — TELEPHONE ENCOUNTER
Caller: Richa Dolan    Relationship: Self    Best call back number: [phone]    Requested Prescriptions:   Requested Prescriptions     Pending Prescriptions Disp Refills    zolpidem (AMBIEN) 10 MG tablet 45 tablet 0     Sig: Take 0.5 tablets by mouth At Night As Needed for Sleep.        Pharmacy where request should be sent: [facility] #[id number] - [place], [place] - [address] AT [facility] - [phone] [facility] [phone] FX     Last office visit with prescribing clinician: 9/3/2024   Last telemedicine visit with prescribing clinician: Visit date not found   Next office visit with prescribing clinician: 12/4/2024     Additional details provided by patient: STATED THAT THEY HAVE 1 OF THE MEDICATION REMAINING    Does the patient have less than a 3 day supply:  [x] Yes  [] No    Would you like a call back once the refill request has been completed: [] Yes [x] No    If the office needs to give you a call back, can they leave a voicemail: [] Yes [x] No    Ana Grey   09/09/24 09:58 EDT

## 2024-09-09 NOTE — TELEPHONE ENCOUNTER
UDS 9/1/23  CSA 8/31/23  Rx Refill Note  Requested Prescriptions     Pending Prescriptions Disp Refills    zolpidem (AMBIEN) 10 MG tablet 45 tablet 0     Sig: Take 0.5 tablets by mouth At Night As Needed for Sleep.      Last office visit with prescribing clinician: 9/3/2024   Last telemedicine visit with prescribing clinician: Visit date not found   Next office visit with prescribing clinician: 12/4/2024                         Would you like a call back once the refill request has been completed: [] Yes [] No    If the office needs to give you a call back, can they leave a voicemail: [] Yes [] No    Tamiko Hernandes CMA/MEMO  09/09/24, 10:02 EDT

## 2024-10-07 ENCOUNTER — TELEPHONE (OUTPATIENT)
Dept: INFECTIOUS DISEASES | Facility: CLINIC | Age: 64
End: 2024-10-07
Payer: COMMERCIAL

## 2024-10-07 NOTE — TELEPHONE ENCOUNTER
Patient has an office appt 10/11/24 and asked if you would place the orders for her labs so she could have them done tomorrow?  Please advise.

## 2024-10-08 DIAGNOSIS — Z21 ASYMPTOMATIC HIV INFECTION: Primary | ICD-10-CM

## 2024-10-08 NOTE — TELEPHONE ENCOUNTER
After receiving Dr. Dawn's reply, I called patient and since patient unable to come to phone, I informed her spouse that Dr. Dawn had placed the orders for her lab work and so she can have the labs done at her convenience. He stated understanding and said he would let patient know.

## 2024-10-10 ENCOUNTER — LAB (OUTPATIENT)
Dept: LAB | Facility: HOSPITAL | Age: 64
End: 2024-10-10
Payer: COMMERCIAL

## 2024-10-10 DIAGNOSIS — C50.812 MALIGNANT NEOPLASM OF OVERLAPPING SITES OF LEFT BREAST IN FEMALE, ESTROGEN RECEPTOR POSITIVE: ICD-10-CM

## 2024-10-10 DIAGNOSIS — F41.9 ANXIETY: ICD-10-CM

## 2024-10-10 DIAGNOSIS — Z17.0 MALIGNANT NEOPLASM OF OVERLAPPING SITES OF LEFT BREAST IN FEMALE, ESTROGEN RECEPTOR POSITIVE: ICD-10-CM

## 2024-10-10 DIAGNOSIS — F33.42 MAJOR DEPRESSIVE DISORDER, RECURRENT, IN FULL REMISSION: ICD-10-CM

## 2024-10-10 DIAGNOSIS — Z21 ASYMPTOMATIC HIV INFECTION: ICD-10-CM

## 2024-10-10 LAB
ALBUMIN SERPL-MCNC: 4.3 G/DL (ref 3.5–5.2)
ALBUMIN/GLOB SERPL: 1.7 G/DL
ALP SERPL-CCNC: 72 U/L (ref 39–117)
ALT SERPL W P-5'-P-CCNC: 16 U/L (ref 1–33)
ANION GAP SERPL CALCULATED.3IONS-SCNC: 7.6 MMOL/L (ref 5–15)
AST SERPL-CCNC: 23 U/L (ref 1–32)
BASOPHILS # BLD AUTO: 0.02 10*3/MM3 (ref 0–0.2)
BASOPHILS NFR BLD AUTO: 0.4 % (ref 0–1.5)
BILIRUB SERPL-MCNC: 0.3 MG/DL (ref 0–1.2)
BUN SERPL-MCNC: 11 MG/DL (ref 8–23)
BUN/CREAT SERPL: 15.3 (ref 7–25)
CALCIUM SPEC-SCNC: 9.1 MG/DL (ref 8.6–10.5)
CHLORIDE SERPL-SCNC: 106 MMOL/L (ref 98–107)
CO2 SERPL-SCNC: 26.4 MMOL/L (ref 22–29)
CREAT SERPL-MCNC: 0.72 MG/DL (ref 0.57–1)
DEPRECATED RDW RBC AUTO: 44.5 FL (ref 37–54)
EGFRCR SERPLBLD CKD-EPI 2021: 93.5 ML/MIN/1.73
EOSINOPHIL # BLD AUTO: 0.05 10*3/MM3 (ref 0–0.4)
EOSINOPHIL NFR BLD AUTO: 1 % (ref 0.3–6.2)
ERYTHROCYTE [DISTWIDTH] IN BLOOD BY AUTOMATED COUNT: 12.2 % (ref 12.3–15.4)
GLOBULIN UR ELPH-MCNC: 2.5 GM/DL
GLUCOSE SERPL-MCNC: 118 MG/DL (ref 65–99)
HCT VFR BLD AUTO: 37.9 % (ref 34–46.6)
HGB BLD-MCNC: 13 G/DL (ref 12–15.9)
IMM GRANULOCYTES # BLD AUTO: 0.01 10*3/MM3 (ref 0–0.05)
IMM GRANULOCYTES NFR BLD AUTO: 0.2 % (ref 0–0.5)
LYMPHOCYTES # BLD AUTO: 1.65 10*3/MM3 (ref 0.7–3.1)
LYMPHOCYTES NFR BLD AUTO: 34.4 % (ref 19.6–45.3)
MCH RBC QN AUTO: 34.3 PG (ref 26.6–33)
MCHC RBC AUTO-ENTMCNC: 34.3 G/DL (ref 31.5–35.7)
MCV RBC AUTO: 100 FL (ref 79–97)
MONOCYTES # BLD AUTO: 0.47 10*3/MM3 (ref 0.1–0.9)
MONOCYTES NFR BLD AUTO: 9.8 % (ref 5–12)
NEUTROPHILS NFR BLD AUTO: 2.6 10*3/MM3 (ref 1.7–7)
NEUTROPHILS NFR BLD AUTO: 54.2 % (ref 42.7–76)
NRBC BLD AUTO-RTO: 0 /100 WBC (ref 0–0.2)
PLATELET # BLD AUTO: 266 10*3/MM3 (ref 140–450)
PMV BLD AUTO: 9.5 FL (ref 6–12)
POTASSIUM SERPL-SCNC: 4.2 MMOL/L (ref 3.5–5.2)
PROT SERPL-MCNC: 6.8 G/DL (ref 6–8.5)
RBC # BLD AUTO: 3.79 10*6/MM3 (ref 3.77–5.28)
SODIUM SERPL-SCNC: 140 MMOL/L (ref 136–145)
WBC NRBC COR # BLD AUTO: 4.8 10*3/MM3 (ref 3.4–10.8)

## 2024-10-10 PROCEDURE — 87536 HIV-1 QUANT&REVRSE TRNSCRPJ: CPT

## 2024-10-10 PROCEDURE — 85025 COMPLETE CBC W/AUTO DIFF WBC: CPT

## 2024-10-10 PROCEDURE — 36415 COLL VENOUS BLD VENIPUNCTURE: CPT

## 2024-10-10 PROCEDURE — 80053 COMPREHEN METABOLIC PANEL: CPT

## 2024-10-10 PROCEDURE — 86361 T CELL ABSOLUTE COUNT: CPT

## 2024-10-10 RX ORDER — ESCITALOPRAM OXALATE 10 MG/1
10 TABLET ORAL DAILY
Qty: 60 TABLET | Refills: 1 | OUTPATIENT
Start: 2024-10-10

## 2024-10-10 NOTE — TELEPHONE ENCOUNTER
The original prescription was discontinued on 7/1/2024 by Ar Beasley MD for the following reason: Dose adjustment. Renewing this prescription may not be appropriate.

## 2024-10-11 ENCOUNTER — OFFICE VISIT (OUTPATIENT)
Dept: INFECTIOUS DISEASES | Facility: CLINIC | Age: 64
End: 2024-10-11
Payer: COMMERCIAL

## 2024-10-11 VITALS
WEIGHT: 162.2 LBS | TEMPERATURE: 97.1 F | RESPIRATION RATE: 14 BRPM | HEART RATE: 80 BPM | SYSTOLIC BLOOD PRESSURE: 146 MMHG | BODY MASS INDEX: 26.19 KG/M2 | DIASTOLIC BLOOD PRESSURE: 88 MMHG

## 2024-10-11 DIAGNOSIS — Z21 ASYMPTOMATIC HIV INFECTION: Primary | ICD-10-CM

## 2024-10-11 DIAGNOSIS — D05.12 DUCTAL CARCINOMA IN SITU (DCIS) OF LEFT BREAST: ICD-10-CM

## 2024-10-11 DIAGNOSIS — Z79.2 LONG TERM (CURRENT) USE OF ANTIBIOTICS: ICD-10-CM

## 2024-10-11 LAB
BASOPHILS # BLD AUTO: 0 X10E3/UL (ref 0–0.2)
BASOPHILS NFR BLD AUTO: 0 %
CD3+CD4+ CELLS # BLD: 675 /UL (ref 359–1519)
CD3+CD4+ CELLS NFR BLD: 37.5 % (ref 30.8–58.5)
EOSINOPHIL # BLD AUTO: 0.1 X10E3/UL (ref 0–0.4)
EOSINOPHIL NFR BLD AUTO: 1 %
ERYTHROCYTE [DISTWIDTH] IN BLOOD BY AUTOMATED COUNT: 12.1 % (ref 11.7–15.4)
HCT VFR BLD AUTO: 39.4 % (ref 34–46.6)
HGB BLD-MCNC: 13.2 G/DL (ref 11.1–15.9)
HIV1 RNA # SERPL NAA+PROBE: 20 COPIES/ML
HIV1 RNA SERPL NAA+PROBE-LOG#: 1.3 LOG10COPY/ML
IMM GRANULOCYTES # BLD AUTO: 0 X10E3/UL (ref 0–0.1)
IMM GRANULOCYTES NFR BLD AUTO: 0 %
LYMPHOCYTES # BLD AUTO: 1.8 X10E3/UL (ref 0.7–3.1)
LYMPHOCYTES NFR BLD AUTO: 36 %
MCH RBC QN AUTO: 33.7 PG (ref 26.6–33)
MCHC RBC AUTO-ENTMCNC: 33.5 G/DL (ref 31.5–35.7)
MCV RBC AUTO: 101 FL (ref 79–97)
MONOCYTES # BLD AUTO: 0.5 X10E3/UL (ref 0.1–0.9)
MONOCYTES NFR BLD AUTO: 10 %
NEUTROPHILS # BLD AUTO: 2.7 X10E3/UL (ref 1.4–7)
NEUTROPHILS NFR BLD AUTO: 53 %
PLATELET # BLD AUTO: 289 X10E3/UL (ref 150–450)
RBC # BLD AUTO: 3.92 X10E6/UL (ref 3.77–5.28)
WBC # BLD AUTO: 5.1 X10E3/UL (ref 3.4–10.8)

## 2024-10-11 PROCEDURE — 99214 OFFICE O/P EST MOD 30 MIN: CPT | Performed by: INTERNAL MEDICINE

## 2024-10-11 NOTE — PROGRESS NOTES
ID CLINIC NOTE:    CC: f/u HIV    HPI: Richa Dolan is a 64 y.o. female here for f/u re: HIV. No missed doses of Triumeq. No known side effects. Refills through [facility]. She gets timely refills. $0 cost to her.    She had labs done yesterday. CD4 count and HIV VL still pending.     She remains on Aromasin maintenance therapy for breast cancer. She says it has led to some weight gain.     She has stress.     She is contemplating getting the COVID-19 vaccine. She hasn't had a vaccine in about 3+ years.       PMH:   HIV well-controlled on ART w/ Triumeq  Breast cancer - on maintenance therapy  Peripheral neuropathy  CVA  Migraines  HLD  GERD    Past Surgical History:   Procedure Laterality Date    ADENOIDECTOMY      BREAST LUMPECTOMY      BREAST LUMPECTOMY WITH SENTINEL NODE BIOPSY Left 05/28/2020    Procedure: BREAST LUMPECTOMY WITH SENTINEL NODE BIOPSY AND NEEDLE LOCALIZATION AND axillary dissection;  Surgeon: Landen Forman MD;  Location: [facility];  Service: General;  Laterality: Left;    BREAST SURGERY Left 05/28/2020    Procedure: LEFT LATERAL INTERCOASTAL ARTERY  flap ;  Surgeon: Yusuf Garcia MD;  Location: [facility] OR;  Service: Plastics;  Laterality: Left;    CHOLECYSTECTOMY      HYSTERECTOMY      KNEE ARTHROSCOPY Right 03/24/2017    Procedure:  RIGHT  KNEE ARTHROSCOPY WITH DEBRIDEMENT CHONDROPLASTY PARTIAL MENISCECTOMY;  Surgeon: Karl Galeas MD;  Location: [facility];  Service:     KNEE CARTILAGE SURGERY Right 2007    TONSILLECTOMY      TONSILLECTOMY  1974    TRIGGER POINT INJECTION  2002 - 2023    US GUIDED LYMPH NODE BIOPSY  10/29/2019    LEFT BREAST    VENOUS ACCESS DEVICE (PORT) INSERTION Right 12/02/2019    Procedure: INSERTION VENOUS ACCESS DEVICE RIGHT;  Surgeon: Landen Forman MD;  Location: [facility] OR;  Service: General    VENOUS ACCESS DEVICE (PORT) REMOVAL N/A 05/25/2022    Procedure: Mediport Removal;  Surgeon: Pedro  Cesar Quezada MD;  Location: [facility] OR;  Service: General;  Laterality: N/A;     SH:     1 son  No tob/EtOH/illicits   Retired from [employer]    Allergies: Augmentin (hives)    Medications:   Current Outpatient Medications:     AIMOVIG 140 MG/ML solution auto-injector, Inject 1 mL under the skin into the appropriate area as directed Every 30 (Thirty) Days. HAS NOT TAKEN IN SEVERAL MONTHS, Disp: , Rfl: 11    amitriptyline (ELAVIL) 25 MG tablet, Take 1 tablet by mouth Every Night., Disp: 90 tablet, Rfl: 3    Aspirin 81 MG capsule, Take 1 tablet by mouth Daily., Disp: , Rfl:     atorvastatin (LIPITOR) 20 MG tablet, TAKE 1 TABLET BY MOUTH EVERY DAY AT NIGHT, Disp: 90 tablet, Rfl: 1    baclofen (LIORESAL) 10 MG tablet, Take 1 tablet by mouth 2 (Two) Times a Day., Disp: , Rfl: 2    CALCIUM-MAGNESIUM-VITAMIN D PO, Take  by mouth., Disp: , Rfl:     colesevelam (WELCHOL) 625 MG tablet, TAKE 2 TABLETS BY MOUTH DAILY, Disp: 180 tablet, Rfl: 11    doxycycline (MONODOX) 100 MG capsule, Take 1 capsule by mouth Every 12 (Twelve) Hours., Disp: , Rfl:     escitalopram (Lexapro) 20 MG tablet, Take 1 tablet by mouth Daily., Disp: 90 tablet, Rfl: 2    exemestane (AROMASIN) 25 MG tablet, TAKE 1 TABLET BY MOUTH EVERY DAY, Disp: 90 tablet, Rfl: 2    fluticasone (FLONASE) 50 MCG/ACT nasal spray, SPRAY 2 SPRAYS INTO THE NOSTRIL AS DIRECTED BY PROVIDER DAILY., Disp: 48 mL, Rfl: 3    Loratadine (CLARITIN PO), Take 10 mg by mouth Daily., Disp: , Rfl:     NON FORMULARY, Compound cream (ketamine, gabapentin, clonidine, and lidocaine), Disp: , Rfl:     omeprazole (priLOSEC) 40 MG capsule, TAKE 1 CAPSULE BY MOUTH DAILY BEFORE SUPPER, Disp: 90 capsule, Rfl: 1    oxyCODONE-acetaminophen (PERCOCET)  MG per tablet, Take 1 tablet by mouth 4 (Four) Times a Day., Disp: , Rfl:     Triumeq 600- MG per tablet, Take 1 tablet by mouth Daily., Disp: , Rfl:     ubrogepant (UBRELVY) 100 MG tablet, Take 1 tablet by mouth., Disp: , Rfl:      vitamin B-12 (CYANOCOBALAMIN) 1000 MCG tablet, Take 1 tablet by mouth Daily., Disp: , Rfl:     vitamin B-6 (pyridoxine) 50 MG tablet, Take 1 tablet by mouth Daily., Disp: 90 tablet, Rfl: 2    zolpidem (AMBIEN) 10 MG tablet, Take 0.5 tablets by mouth At Night As Needed for Sleep., Disp: 45 tablet, Rfl: 0    OBJECTIVE:  /88   Pulse 80   Temp 97.1 °F (36.2 °C)   Resp 14   Wt 73.6 kg (162 lb 3.2 oz)   BMI 26.19 kg/m²     GENERAL: Awake, alert, NAD, very nice  EYES: No scleral icterus  CV: Normal rate  LUNGS: . Normal work of breathing on RA  ABDOMEN: soft, not distended  SKIN: no rashes      DIAGNOSTICS:  A1c, lipid panel, CBC, BMP, CD4 count, and HIV VL reviewed today  Lab Results   Component Value Date    WBC 4.80 10/10/2024    HGB 13.0 10/10/2024    HCT 37.9 10/10/2024     10/10/2024     Lab Results   Component Value Date    GLUCOSE 118 (H) 10/10/2024    CALCIUM 9.1 10/10/2024     10/10/2024    K 4.2 10/10/2024    CO2 26.4 10/10/2024     10/10/2024    BUN 11 10/10/2024    CREATININE 0.72 10/10/2024    EGFRIFAFRI >60 05/28/2015    EGFRIFNONA 81 02/08/2022    BCR 15.3 10/10/2024    ANIONGAP 7.6 10/10/2024     Lab Results   Component Value Date    HGBA1C 5.8 (H) 03/05/2024     Lab Results   Component Value Date    CHLPL 166 03/05/2024    TRIG 70 03/05/2024    HDL 76 03/05/2024    LDL 77 03/05/2024     Lab Results   Component Value Date    RPR Non-Reactive 10/05/2020     HIV Related Labs:  CD4: 665 (4/2024)  HIV viral load: <20 copies (4/2024)  HIV Genotype: unknown bc undetectable  [id number] - negative  Tspot - negative (10/2020)  RPR - non-reactive (10/2020)  Hep A - immune (7/2016)  Hep B - immune (9/2015)  Hep C - negative (10/2020)  Urine GCCT - negative (9/2015)    ASSESSMENT/PLAN:  1. Asymptomatic HIV  -excellent compliance w/ Triumeq; RX sent to [facility]  -labs performed yesterday; CD4 count and HIV viral load still pending; I will call her with the results early  next week    2. T2N1 invasive ductal carcinoma of the left breast  -on maintenance therapy  -port removed  -now following with Dr. Perez after Dr. Moreon's USP    3. Long term use of antibiotics  -reviewed monitoring labs    RTC 6 months or sooner if needed

## 2024-10-14 RX ORDER — ABACAVIR SULFATE, DOLUTEGRAVIR SODIUM, LAMIVUDINE 600; 50; 300 MG/1; MG/1; MG/1
1 TABLET, FILM COATED ORAL DAILY
Qty: 30 TABLET | Refills: 11 | Status: SHIPPED | OUTPATIENT
Start: 2024-10-14 | End: 2024-11-13

## 2024-10-14 NOTE — TELEPHONE ENCOUNTER
----- Message from Denilson Dawn sent at 10/14/2024  9:57 AM EDT -----  Please let patient know her labs look great from my standpoint.  Her HIV viral load is undetectable.  Her CD4 count is 675.

## 2024-11-26 DIAGNOSIS — M81.0 AGE-RELATED OSTEOPOROSIS WITHOUT CURRENT PATHOLOGICAL FRACTURE: ICD-10-CM

## 2024-11-26 DIAGNOSIS — Z79.811 USE OF EXEMESTANE (AROMASIN): Primary | ICD-10-CM

## 2024-11-26 NOTE — PROGRESS NOTES
Supportive plan for Prolia was discontinued inadvertently by RN.  Supportive plan for Prolia re-entered with 4 cycles completed as history, and next dose due on 12/3/24.

## 2024-12-02 NOTE — PROGRESS NOTES
Chief Complaint   Patient presents with    Follow-up     12/3/2024, interval history:  Patient new to me.  Previous Dr. Moreno patient.  Currently on exemestane.  States compliance.  Reports arthralgias and neuropathy pain in her feet wake her up from sleep.  Also has hot flashes.  However does not want to try any pharmaceutical therapy for hot flashes.  Also complaining of vaginal dryness which has been ongoing since initiation of endocrine treatment.  Denies any other concerns or complaints               ONCOLOGIC HISTORY:  Patient is a 59-year-old female who has had a history of HIV since age 34 followed by Dr. Natali Ng at [facility] and was on multiple HIV drugs initially but more recently has been on a single medication TRIUMEQ, with well-controlled HIV.  She follows up with Dr. Dawn , infectious disease who saw her recently and he saw her on 4/8/2019 and has excellent control and suppression of her viral load.  She is very compliant with her medications.    She also has had history of stroke in 2017 for which she was placed on aspirin.  She presented with a headache.  She is very active and works at [employer] full-time.  She also has had history of vertigo in the past  Patient's PCP is Zach Lora.    Patient first noticed the mass in her left breast 5 months ago.  Her last mammogram was 5 years ago.  She had soreness in the left breast and she went to her primary care physician who then ordered a mammogram diagnostic and an ultrasound.  The diagnostic mammogram showed a 3.7 cm mass at 4 o'clock position in the lower outer quadrant of the left breast.  The ultrasound showed 3.8 cm x 3.1 x 2.5 cm mass at 4 o'clock position in the left breast with 2 abnormal appearing left axillary lymph nodes the largest being 1.4 cm.    She then underwent biopsy of both the breast mass as well as the left axillary lymph node and both of them are positive for invasive mammary carcinoma it is  invasive ductal carcinoma with apocrine features, moderately differentiated, grade 2 of 3 with a Anamosa score of 5.  The left axillary lymph node is also consistent with metastatic mammary carcinoma.  It is ER positive ND positive HER-2/merna negative.  Details as follows    10/21/19 - Bilateral Diagnostic Mammogram and US Breast Left  FINDINGS: Bilateral digital CC and MLO mammographic images were  obtained. No prior examination is available for comparison. Scattered  fibroglandular densities are seen throughout both breasts. A triangular  skin marker represents the area of palpable concern in the lower outer  quadrant of the left breast in the posterior 1/3. At this location there  is an irregular mass that measures on the order of 3.7 cm in greatest  dimension. Internal calcifications are noted. No suspicious findings in  the right breast are appreciated.     ULTRASOUND: Targeted sonographic evaluation of the left breast was  performed through the area of concern in the left axilla. At the 4  o'clock position on the order of 6 cm from the nipple there is an  irregular hypoechoic mass that measures 3.8 x 3.1 x 2.5 cm. Internal  vascularity is noted.     There are 2 abnormal-appearing left axillary lymph nodes, the largest  which measures on the order of 1.4 cm in greatest dimension.     IMPRESSION:  1. There is a 3.8 cm irregular mass in the left breast at the 4 o'clock  position. This is seen in conjunction with an irregular 1.4 cm lymph  node in the left axilla. This is suspicious for malignancy with left  axillary involvement. Correlation with ultrasound-guided biopsy of both  the left breast mass and the lymph node is recommended.  2. There are no findings suspicious for malignancy in the right breast.     BI-RADS CATEGORY 5:     Patient's labs from 11/12/19 are normal.    10/29/19 - Tissue Pathology  1. Left Breast, 4:00, 6 cm FN, U/S-Guided Core Needle Biopsy for a Mass:  A. INVASIVE DUCTAL CARCINOMA WITH  APOCRINE FEATURES, Moderately differentiated;  Jen Histologic Grade II/III (tubule score = 3, nuclear score = 2, mitoses score = 1), measuring at least  9 mm.  B. No ductal or lobular carcinoma in situ is identified in this sample.  C. Focus suspicious for lymphovascular space invasion.  D. See Biomarker Template for hormone receptor studies.  2. Lymph Node, Left Axilla, U/S-Guided Core Needle Biopsy:  A. METASTATIC MAMMARY CARCINOMA (see Comment).  B. Metastatic focus measures 5 mm in greatest extent.  ER+, 85%  PA+, 10%  HER2- Score 2+    11/13/19 - CT Neck Chest Abdomen Pelvis  IMPRESSION:  1. In addition to the irregular approximately 3.8 cm mass within the  lateral aspect of the left breast, there are a few subcentimeter  asymmetric nodules along the lateral margin of the glandular tissue. The  several asymmetric left subpectoral nodes and 1.2 x 1.0 cm left axillary  node are suspicious for tamar metastases. The asymmetric subcentimeter  left supraclavicular and left posterior cervical triangle nodes are  worrisome as well.  2. There is no convincing evidence for metastatic disease within the  abdomen or pelvis.    11/14/19 - Echocardiogram:  Normal.  Calculated EF = 67%.  There is trace mitral valve and tricuspid valve regurgitation.    11/15/19 - Bone Scan  Negative.    11/18/19 - MRI Breast Bilateral  IMPRESSION:  1. Biopsy-proven malignancy in the left breast centered at the 4 o'clock  position with associated surrounding satellite nodules as described. The  entire region of involvement including the satellite nodules and the  dominant mass measure up to 7.5 cm in greatest dimension. Also, left  axillary adenopathy with multiple irregular lymph nodes and loss of  differentiation of the borders of multiple lymph nodes is noted and this  is suspicious for extranodal involvement.  2. There are no findings suspicious for malignancy in the right breast.  BI-RADS CATEGORY 6      12/06/19: Cycle #1 of  Adriamycin/Cytoxan    20: Last cycle of Adriamycin/Cytoxan given without Neulasta.  We will switch from Neulasta to 7 days of Neupogen injections after cycle 4.    We reviewed with patient the US Breast from 20 which shows mild interval partial response to neoadjuvant chemotherapy.      20: Initiated cycle 1 Taxol  2020: Last cycle of Taxol, cycle 12    S/p left mastectomy with axillary dissection   Pathology showed invasive breast cancer which is 55 x 40 mm in size, grade 2 with focal lymphovascular space invasion with DCIS spanning over 36 x 6 mm.  All margins were negative for cancer.  8 of the additional lymph nodes out of 11 were positive with the largest micrometastasis measuring 5 mm.  Extranodal extension was seen.    Biomarkers on post neoadjuvant tumor is ER 81-90% AZ 5% HER-2/merna 2+ by immunohistochemistry and HER-2/merna negative by FISH.    2020: Started letrozole but because of severe joint pains was discontinued 2020    End of 2020 started tamoxifen    OB-GYN:  Menarche 15 years  Menopause-46  Pregnancies- 1 para 1 no miscarriages her first pregnancy was in  at 29 years of age.  She did not breastfeed.  Post menopausal HRT- None.  Hx of birth control pills- Yes.    Current Outpatient Medications on File Prior to Visit   Medication Sig Dispense Refill    AIMOVIG 140 MG/ML solution auto-injector Inject 1 mL under the skin into the appropriate area as directed Every 30 (Thirty) Days. HAS NOT TAKEN IN SEVERAL MONTHS  11    amitriptyline (ELAVIL) 25 MG tablet Take 1 tablet by mouth Every Night. 90 tablet 3    Aspirin 81 MG capsule Take 1 tablet by mouth Daily.      atorvastatin (LIPITOR) 20 MG tablet TAKE 1 TABLET BY MOUTH EVERY DAY AT NIGHT 90 tablet 1    baclofen (LIORESAL) 10 MG tablet Take 1 tablet by mouth 2 (Two) Times a Day.  2    CALCIUM-MAGNESIUM-VITAMIN D PO Take  by mouth.      colesevelam (WELCHOL) 625 MG tablet TAKE 2 TABLETS BY MOUTH  "DAILY 180 tablet 11    escitalopram (Lexapro) 20 MG tablet Take 1 tablet by mouth Daily. 90 tablet 2    exemestane (AROMASIN) 25 MG tablet TAKE 1 TABLET BY MOUTH EVERY DAY 90 tablet 2    fluticasone (FLONASE) 50 MCG/ACT nasal spray SPRAY 2 SPRAYS INTO THE NOSTRIL AS DIRECTED BY PROVIDER DAILY. 48 mL 3    Loratadine (CLARITIN PO) Take 10 mg by mouth Daily.      NON FORMULARY Compound cream (ketamine, gabapentin, clonidine, and lidocaine)      omeprazole (priLOSEC) 40 MG capsule TAKE 1 CAPSULE BY MOUTH DAILY BEFORE SUPPER 90 capsule 1    oxyCODONE-acetaminophen (PERCOCET)  MG per tablet Take 1 tablet by mouth 4 (Four) Times a Day.      ubrogepant (UBRELVY) 100 MG tablet Take 1 tablet by mouth.      vitamin B-12 (CYANOCOBALAMIN) 1000 MCG tablet Take 1 tablet by mouth Daily.      vitamin B-6 (pyridoxine) 50 MG tablet Take 1 tablet by mouth Daily. 90 tablet 2    zolpidem (AMBIEN) 10 MG tablet Take 0.5 tablets by mouth At Night As Needed for Sleep. 45 tablet 0     Current Facility-Administered Medications on File Prior to Visit   Medication Dose Route Frequency Provider Last Rate Last Admin    denosumab (PROLIA) syringe 60 mg  60 mg Subcutaneous Once Park Perez MD            ALLERGIES:    Allergies   Allergen Reactions    Augmentin [Amoxicillin-Pot Clavulanate] Hives        I have reviewed Past Medical, Surgical History, Social History, Meds and Allergies.     REVIEW OF SYSTEMS:  See interval History      Objective    Vitals:    12/03/24 1330   BP: 130/81   Pulse: 73   Resp: 16   Temp: 98.1 °F (36.7 °C)   TempSrc: Oral   SpO2: 96%   Weight: 74.2 kg (163 lb 9.6 oz)   Height: 170.2 cm (67\")   PainSc: 0-No pain       Physical Exam  Constitutional:       Appearance: Normal appearance.   HENT:      Head: Normocephalic and atraumatic.      Mouth/Throat:      Mouth: Mucous membranes are moist.      Pharynx: Oropharynx is clear.   Eyes:      Extraocular Movements: Extraocular movements intact.      Pupils: Pupils are " equal, round, and reactive to light.   Cardiovascular:      Rate and Rhythm: Normal rate and regular rhythm.   Pulmonary:      Effort: Pulmonary effort is normal.      Breath sounds: Normal breath sounds.   Chest:       Abdominal:      General: Bowel sounds are normal.      Palpations: Abdomen is soft.   Musculoskeletal:      Cervical back: Normal range of motion and neck supple.   Neurological:      General: No focal deficit present.      Mental Status: She is alert and oriented to person, place, and time.   Psychiatric:         Mood and Affect: Mood normal.         Behavior: Behavior normal.           RECENT LABS:   CBC:      Lab 12/03/24  1243   WBC 4.54   HEMOGLOBIN 14.1   HEMATOCRIT 42.2   PLATELETS 269   NEUTROS ABS 2.15   IMMATURE GRANS (ABS) 0.01   LYMPHS ABS 1.89   MONOS ABS 0.39   EOS ABS 0.07   .9*         Assessment & Plan    * pT3 pN3 invasive ductal carcinoma, Left breast, ER/UT positive  10/21/2019 diagnostic mammogram showed 3.7 mm mass in the left breast at 4 o'clock position. Ultrasound-showed 3.8 x 3.1 x 2.5 cm mass at 4 o'clock position with 2 abnormal axillary lymph nodes, larger lymph node being 1.4 cm  October 29, 2019 Left breast biopsy and axillary lymph node biopsy- invasive ductal carcinoma, moderately differentiated, grade 2, ER 85%, UT 10%, HER-2/merna 2+, HER-2 FISH negative  11/13/19 CT scans- asymmetric nodules along the lateral margin of the glandular tissue.  The 1.2x1.0 cm left axillary nodes, left supraclavicular, and left posterior cervical triangle nodes are suspicious for tamar metastases.  11/15/2019 bone scan- negative.   11/18/19 MRI breast - biopsy-proven malignancy in the left breast centered at the 4:00 position.  The region of involvement measures up to 7.5 cm.  There is left axillary adenopathy with multiple irregular lymph nodes and loss of differentiation of the border of multiple lymph nodes which are suspicious for extranodal involvement.  11/14/19 INVITAE -  negative.  12/06/19 - 01/17/2020: s/p 4 cycles of neoadjuvant Adriamycin/Cytoxan  US Breast from 01/29/20 after 4 cycles of Adriamycin Cytoxan chemotherapy which shows mild interval partial response to neoadjuvant chemotherapy.  The mass has decreased from 3.8 x 2.5 to 3.1 cm to 3.5 x 2.3 x 3.2 cm previously the left axillary lymph node has decreased from 1.4 x 0.7 x 1 cm to 1.1 x 0.6 x 0.9 cm.  01/31/2020 - 04/24/2020: s/p neoadjuvant 12 cycles of Taxol  5/6/2020 MRI breast shows significant reduction in the left axillary adenopathy as well as reduction in the index breast lesion.  05/28/2020 S/p left breast lumpectomy with left axillary dissection.  Patient has 55 x 40 x 30 mm invasive carcinoma, grade 2 with DCIS 36 x 6 mm, high-grade with good margins and 8 out of 11 lymph nodes positive with largest metastasis being 5 mm with extracapsular extension.  And there is lymphatic space invasion  August 17, 2020 s/p adj XRT   August 2020 Started Endocrine therapy x 10 years.  She was initially started on letrozole, switched to tamoxifen in November 2020 due to severe arthralgia.  August 2021, tamoxifen discontinued due to significant arthralgia and worsening fatigue.  November 2021 started on Arimidex, discontinued in October 2021 due to significant generalized arthralgia.  December 2021 switched to exemestane  8/15/2024 continues to tolerate Aromasin well.  Anxiety better controlled now back on Lexapro.  Plan to continue AI therapy for total of 10 years considering tamar involvement.  December 2024: Patient establish care with me.  Tolerating exemestane quite well.  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  Information given to patient.  Patient willing to try.    *Vaginal dryness  Recommended vaginal moisturizers    *Osteoporosis  August 2024 DEXA scan-osteoporosis.  Started on Prolia in 11/2022, continue  every 6 months.  Will need DEXA scan every 2 years while on AI.  Due in August 2026    * HIV, followed by Dr. Dawn in infectious disease.       * Family history of breast cancer with mother having had breast cancer at age 60.  There is family history of uncle with prostate cancer.  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    On amitriptyline, managed by her Neurologist  Has appt with them in Jan 2025    PLAN:   Continue Aromasin. End date Aug 2030  Start tart cherry extract.  Information provided to patient.  Patient willing to try  Recommended vaginal moisturizers for the vaginal dryness  Reviewed results of DEXA scan obtained in August 2024.    Proceed with Prolia today.  Due for next on 6/3/2025  Continue calcium and vitamin D supplements  Return in 4 months with labs  Due for annual mammogram in January 2025      This patient is on high risk drug therapy requiring intensive monitoring for toxicity.      Patient new to me.  All issues new to me  I spent 50 total minutes, face-to-face, caring for Richa zarco. Greater than 50% of this time involved counseling and/or coordination of care as documented within this note.

## 2024-12-03 ENCOUNTER — LAB (OUTPATIENT)
Dept: LAB | Facility: HOSPITAL | Age: 64
End: 2024-12-03
Payer: COMMERCIAL

## 2024-12-03 ENCOUNTER — OFFICE VISIT (OUTPATIENT)
Dept: ONCOLOGY | Facility: CLINIC | Age: 64
End: 2024-12-03
Payer: COMMERCIAL

## 2024-12-03 ENCOUNTER — INFUSION (OUTPATIENT)
Dept: ONCOLOGY | Facility: HOSPITAL | Age: 64
End: 2024-12-03
Payer: COMMERCIAL

## 2024-12-03 VITALS
HEART RATE: 73 BPM | WEIGHT: 163.6 LBS | OXYGEN SATURATION: 96 % | RESPIRATION RATE: 16 BRPM | TEMPERATURE: 98.1 F | BODY MASS INDEX: 25.68 KG/M2 | SYSTOLIC BLOOD PRESSURE: 130 MMHG | HEIGHT: 67 IN | DIASTOLIC BLOOD PRESSURE: 81 MMHG

## 2024-12-03 DIAGNOSIS — Z79.811 USE OF EXEMESTANE (AROMASIN): ICD-10-CM

## 2024-12-03 DIAGNOSIS — Z17.0 MALIGNANT NEOPLASM OF OVERLAPPING SITES OF LEFT BREAST IN FEMALE, ESTROGEN RECEPTOR POSITIVE: ICD-10-CM

## 2024-12-03 DIAGNOSIS — Z17.0 MALIGNANT NEOPLASM OF OVERLAPPING SITES OF LEFT BREAST IN FEMALE, ESTROGEN RECEPTOR POSITIVE: Primary | ICD-10-CM

## 2024-12-03 DIAGNOSIS — Z79.899 HIGH RISK MEDICATION USE: ICD-10-CM

## 2024-12-03 DIAGNOSIS — M81.0 AGE-RELATED OSTEOPOROSIS WITHOUT CURRENT PATHOLOGICAL FRACTURE: ICD-10-CM

## 2024-12-03 DIAGNOSIS — C50.812 MALIGNANT NEOPLASM OF OVERLAPPING SITES OF LEFT BREAST IN FEMALE, ESTROGEN RECEPTOR POSITIVE: Primary | ICD-10-CM

## 2024-12-03 DIAGNOSIS — M81.0 AGE-RELATED OSTEOPOROSIS WITHOUT CURRENT PATHOLOGICAL FRACTURE: Primary | ICD-10-CM

## 2024-12-03 DIAGNOSIS — C50.812 MALIGNANT NEOPLASM OF OVERLAPPING SITES OF LEFT BREAST IN FEMALE, ESTROGEN RECEPTOR POSITIVE: ICD-10-CM

## 2024-12-03 DIAGNOSIS — Z12.31 SCREENING MAMMOGRAM FOR BREAST CANCER: ICD-10-CM

## 2024-12-03 LAB
ALBUMIN SERPL-MCNC: 4.7 G/DL (ref 3.5–5.2)
ALBUMIN/GLOB SERPL: 1.6 G/DL
ALP SERPL-CCNC: 77 U/L (ref 39–117)
ALT SERPL W P-5'-P-CCNC: 20 U/L (ref 1–33)
ANION GAP SERPL CALCULATED.3IONS-SCNC: 12.4 MMOL/L (ref 5–15)
AST SERPL-CCNC: 31 U/L (ref 1–32)
BASOPHILS # BLD AUTO: 0.03 10*3/MM3 (ref 0–0.2)
BASOPHILS NFR BLD AUTO: 0.7 % (ref 0–1.5)
BILIRUB SERPL-MCNC: 0.4 MG/DL (ref 0–1.2)
BUN SERPL-MCNC: 14 MG/DL (ref 8–23)
BUN/CREAT SERPL: 18.4 (ref 7–25)
CALCIUM SPEC-SCNC: 9.3 MG/DL (ref 8.6–10.5)
CHLORIDE SERPL-SCNC: 100 MMOL/L (ref 98–107)
CO2 SERPL-SCNC: 27.6 MMOL/L (ref 22–29)
CREAT SERPL-MCNC: 0.76 MG/DL (ref 0.57–1)
DEPRECATED RDW RBC AUTO: 46.7 FL (ref 37–54)
EGFRCR SERPLBLD CKD-EPI 2021: 87.6 ML/MIN/1.73
EOSINOPHIL # BLD AUTO: 0.07 10*3/MM3 (ref 0–0.4)
EOSINOPHIL NFR BLD AUTO: 1.5 % (ref 0.3–6.2)
ERYTHROCYTE [DISTWIDTH] IN BLOOD BY AUTOMATED COUNT: 12.4 % (ref 12.3–15.4)
GLOBULIN UR ELPH-MCNC: 3 GM/DL
GLUCOSE SERPL-MCNC: 132 MG/DL (ref 65–99)
HCT VFR BLD AUTO: 42.2 % (ref 34–46.6)
HGB BLD-MCNC: 14.1 G/DL (ref 12–15.9)
IMM GRANULOCYTES # BLD AUTO: 0.01 10*3/MM3 (ref 0–0.05)
IMM GRANULOCYTES NFR BLD AUTO: 0.2 % (ref 0–0.5)
LYMPHOCYTES # BLD AUTO: 1.89 10*3/MM3 (ref 0.7–3.1)
LYMPHOCYTES NFR BLD AUTO: 41.6 % (ref 19.6–45.3)
MAGNESIUM SERPL-MCNC: 2.1 MG/DL (ref 1.6–2.4)
MCH RBC QN AUTO: 34.1 PG (ref 26.6–33)
MCHC RBC AUTO-ENTMCNC: 33.4 G/DL (ref 31.5–35.7)
MCV RBC AUTO: 101.9 FL (ref 79–97)
MONOCYTES # BLD AUTO: 0.39 10*3/MM3 (ref 0.1–0.9)
MONOCYTES NFR BLD AUTO: 8.6 % (ref 5–12)
NEUTROPHILS NFR BLD AUTO: 2.15 10*3/MM3 (ref 1.7–7)
NEUTROPHILS NFR BLD AUTO: 47.4 % (ref 42.7–76)
NRBC BLD AUTO-RTO: 0 /100 WBC (ref 0–0.2)
PHOSPHATE SERPL-MCNC: 3.9 MG/DL (ref 2.5–4.5)
PLATELET # BLD AUTO: 269 10*3/MM3 (ref 140–450)
PMV BLD AUTO: 9.7 FL (ref 6–12)
POTASSIUM SERPL-SCNC: 4 MMOL/L (ref 3.5–5.2)
PROT SERPL-MCNC: 7.7 G/DL (ref 6–8.5)
RBC # BLD AUTO: 4.14 10*6/MM3 (ref 3.77–5.28)
SODIUM SERPL-SCNC: 140 MMOL/L (ref 136–145)
WBC NRBC COR # BLD AUTO: 4.54 10*3/MM3 (ref 3.4–10.8)

## 2024-12-03 PROCEDURE — 25010000002 DENOSUMAB 60 MG/ML SOLUTION PREFILLED SYRINGE: Performed by: STUDENT IN AN ORGANIZED HEALTH CARE EDUCATION/TRAINING PROGRAM

## 2024-12-03 PROCEDURE — 80053 COMPREHEN METABOLIC PANEL: CPT

## 2024-12-03 PROCEDURE — 84100 ASSAY OF PHOSPHORUS: CPT

## 2024-12-03 PROCEDURE — 96372 THER/PROPH/DIAG INJ SC/IM: CPT

## 2024-12-03 PROCEDURE — 83735 ASSAY OF MAGNESIUM: CPT

## 2024-12-03 PROCEDURE — 85025 COMPLETE CBC W/AUTO DIFF WBC: CPT

## 2024-12-03 PROCEDURE — 36415 COLL VENOUS BLD VENIPUNCTURE: CPT

## 2024-12-03 RX ADMIN — DENOSUMAB 60 MG: 60 INJECTION SUBCUTANEOUS at 14:15

## 2024-12-04 ENCOUNTER — OFFICE VISIT (OUTPATIENT)
Dept: FAMILY MEDICINE CLINIC | Facility: CLINIC | Age: 64
End: 2024-12-04
Payer: COMMERCIAL

## 2024-12-04 VITALS
BODY MASS INDEX: 25.3 KG/M2 | SYSTOLIC BLOOD PRESSURE: 124 MMHG | DIASTOLIC BLOOD PRESSURE: 80 MMHG | TEMPERATURE: 97.1 F | OXYGEN SATURATION: 98 % | HEIGHT: 67 IN | RESPIRATION RATE: 16 BRPM | WEIGHT: 161.2 LBS | HEART RATE: 70 BPM

## 2024-12-04 DIAGNOSIS — R73.01 IFG (IMPAIRED FASTING GLUCOSE): ICD-10-CM

## 2024-12-04 DIAGNOSIS — Z86.73 H/O: STROKE: ICD-10-CM

## 2024-12-04 DIAGNOSIS — G62.0 DRUG-INDUCED POLYNEUROPATHY: ICD-10-CM

## 2024-12-04 DIAGNOSIS — F51.01 PRIMARY INSOMNIA: ICD-10-CM

## 2024-12-04 DIAGNOSIS — E78.2 MIXED HYPERLIPIDEMIA: ICD-10-CM

## 2024-12-04 DIAGNOSIS — G47.00 INSOMNIA, UNSPECIFIED TYPE: ICD-10-CM

## 2024-12-04 DIAGNOSIS — K21.9 CHRONIC GERD: ICD-10-CM

## 2024-12-04 DIAGNOSIS — G43.009 MIGRAINE WITHOUT AURA AND WITHOUT STATUS MIGRAINOSUS, NOT INTRACTABLE: ICD-10-CM

## 2024-12-04 DIAGNOSIS — Z79.899 LONG-TERM USE OF HIGH-RISK MEDICATION: ICD-10-CM

## 2024-12-04 DIAGNOSIS — E66.3 OVERWEIGHT (BMI 25.0-29.9): ICD-10-CM

## 2024-12-04 DIAGNOSIS — Z23 ENCOUNTER FOR VACCINATION: ICD-10-CM

## 2024-12-04 DIAGNOSIS — Z00.00 ENCOUNTER FOR ANNUAL PHYSICAL EXAM: Primary | ICD-10-CM

## 2024-12-04 DIAGNOSIS — F41.9 ANXIETY: ICD-10-CM

## 2024-12-04 RX ORDER — RIMEGEPANT SULFATE 75 MG/75MG
75 TABLET, ORALLY DISINTEGRATING ORAL AS NEEDED
COMMUNITY
Start: 2024-10-24

## 2024-12-04 RX ORDER — ZOLPIDEM TARTRATE 10 MG/1
5 TABLET ORAL NIGHTLY PRN
Qty: 45 TABLET | Refills: 0 | Status: SHIPPED | OUTPATIENT
Start: 2024-12-11 | End: 2024-12-05 | Stop reason: SDUPTHER

## 2024-12-04 NOTE — PROGRESS NOTES
Patient Care Team:  Ar Beasley MD as PCP - General (Urgent Care)  Bam Crandall MD as Consulting Physician (Pain Medicine)  Denilson Dawn MD as Consulting Physician (Infectious Diseases)  Mag Spivey MD as Consulting Physician (Radiation Oncology)  Yusuf Garcia MD as Attending Provider (Plastic Surgery)  Luiz Mora MD as Surgeon (Orthopedic Surgery)  Yola Barillas LCSW (Inactive) as  ()  Lena Mariano MD as Referring Physician (Radiation Oncology)  Park Perez MD as Consulting Physician (Hematology and Oncology)     Chief complaint: Patient is in today for a physical          Patient is a 64 y.o. female who presents for her yearly physical exam.     HPI     Doing well  Yesterday had labs done at the hospital, reviewed and discussed   Eating HD  .   Exercising routinely.   Immunizations: reviewed and discussed.     Lab Results   Component Value Date    CHLPL 166 03/05/2024    TRIG 70 03/05/2024    HDL 76 03/05/2024    LDL 77 03/05/2024       BMI is >= 25 and <30. (Overweight) The following options were offered after discussion;: exercise counseling/recommendations and nutrition counseling/recommendations        Health maintenance/lifestyle:  Immunization History   Administered Date(s) Administered    COVID-19 (PFIZER) BIVALENT 12+YRS 10/17/2022    COVID-19 (PFIZER) Purple Cap Monovalent 03/08/2021, 03/29/2021, 09/04/2021    Flu Vaccine Quad PF >36MO 11/09/2017    FluMist 2-49yrs 10/12/2015    Fluzone  >6mos 12/04/2024    Fluzone (or Fluarix & Flulaval for VFC) >6mos 11/09/2017, 10/26/2019, 10/24/2020, 12/10/2021, 12/07/2022    Fluzone Quad >6mos (Multi-dose) 11/14/2016    Hep B, Unspecified 06/23/2011    Influenza TIV (IM) 09/14/2012, 09/04/2014    Influenza, Unspecified 11/16/2011, 12/05/2018, 10/10/2023    Pneumococcal Conjugate 13-Valent (PCV13) 04/05/2018    Pneumococcal Conjugate 20-Valent (PCV20) 11/28/2023     flucelvax quad pfs =>4 YRS 12/05/2018         HM;  Colorectal Screening:  UTD  Pap:  UTD  Mammogram:  UTD           Social History     Tobacco Use   Smoking Status Never    Passive exposure: Never   Smokeless Tobacco Never   Tobacco Comments    never     Social History     Substance and Sexual Activity   Alcohol Use Yes    Alcohol/week: 1.0 standard drink of alcohol    Types: 1 Drinks containing 0.5 oz of alcohol per week    Comment: once a week         Review of Systems   Respiratory:  Negative for shortness of breath.    Cardiovascular:  Negative for chest pain.         History  Past Medical History:   Diagnosis Date    Anxiety     Arthritis of back 2010    Cerebrovascular accident (CVA) 08/22/2017    NO RESIDUAL AFFECT    DDD (degenerative disc disease), cervical     Depression     Drug therapy     Elevated cholesterol     Fat necrosis of left breast     GERD (gastroesophageal reflux disease)     H/O ICH (intracerebral hemorrhage) (CMS/HCC)     Heart murmur 1975    Hip arthrosis 2015    History of chemotherapy     History of heart murmur in childhood     History of stroke     PATIENT STATES HAD A MINI STROKE, NO RESIDUAL EFFECTS    History of thrombocytopenia     HIV (human immunodeficiency virus infection) 1996    Hx of radiation therapy     Hyperlipidemia     Insomnia     Knee swelling 2017    meniscus tear    Lupus anticoagulant positive     Malignant neoplasm of overlapping sites of left breast in female, estrogen receptor positive 11/12/2019    Meniscus tear 2017    RIGHT    Migraine     Neck pain     WITH SHOOTING PAIN LEFT RIGHT ARM    Neck strain 2001    work injury    Neuropathy     Osteoarthritis     Osteoporosis     Pain management     sees 1 every 3 months for scripts/injection/pain meds    Periarthritis of shoulder 2010    Pneumonia 1989    Seasonal allergies     Spinal headache     THINKS HAD BLOOD PATCH AFTER EPIDUAL     Stress fracture 2010    three times    Tear of meniscus of knee 2012 & 2017     Tendinitis of knee 2000    physical therapy many times    Tick bite 06/2014    Upper back pain     Vertigo       Past Surgical History:   Procedure Laterality Date    ADENOIDECTOMY      BREAST LUMPECTOMY      BREAST LUMPECTOMY WITH SENTINEL NODE BIOPSY Left 05/28/2020    Procedure: BREAST LUMPECTOMY WITH SENTINEL NODE BIOPSY AND NEEDLE LOCALIZATION AND axillary dissection;  Surgeon: Landen Forman MD;  Location: [facility];  Service: General;  Laterality: Left;    BREAST SURGERY Left 05/28/2020    Procedure: LEFT LATERAL INTERCOASTAL ARTERY  flap ;  Surgeon: Yusuf Garcia MD;  Location: [facility];  Service: Plastics;  Laterality: Left;    CHOLECYSTECTOMY      HYSTERECTOMY      KNEE ARTHROSCOPY Right 03/24/2017    Procedure:  RIGHT  KNEE ARTHROSCOPY WITH DEBRIDEMENT CHONDROPLASTY PARTIAL MENISCECTOMY;  Surgeon: Karl Galeas MD;  Location: [facility];  Service:     KNEE CARTILAGE SURGERY Right 2007    TONSILLECTOMY      TONSILLECTOMY  1974    TRIGGER POINT INJECTION  2002 - 2023    US GUIDED LYMPH NODE BIOPSY  10/29/2019    LEFT BREAST    VENOUS ACCESS DEVICE (PORT) INSERTION Right 12/02/2019    Procedure: INSERTION VENOUS ACCESS DEVICE RIGHT;  Surgeon: Landen Forman MD;  Location: [facility];  Service: General    VENOUS ACCESS DEVICE (PORT) REMOVAL N/A 05/25/2022    Procedure: Mediport Removal;  Surgeon: Cesar Vasquez Jr., MD;  Location: [facility];  Service: General;  Laterality: N/A;      Allergies   Allergen Reactions    Augmentin [Amoxicillin-Pot Clavulanate] Hives      Family History   Problem Relation Age of Onset    Kidney disease Mother     Hypertension Mother     Heart disease Mother     Arthritis Mother     Breast cancer Mother     Leukemia Mother         CLL?    Diabetes Mother     Hyperthyroidism Mother     Hearing loss Mother     Cancer Mother         breast    Rheumatologic disease Mother     Arthritis Father     Dementia Father      Stroke Father     Heart disease Maternal Grandmother     Diabetes Maternal Grandfather     Heart disease Maternal Grandfather     Stroke Maternal Grandfather     Heart attack Maternal Grandfather     Osteoporosis Paternal Grandmother     Heart disease Paternal Grandfather     Leukemia Maternal Aunt         CLL?    Throat cancer Paternal Uncle     Alcohol abuse Other         multiple uncles/aunts    Malig Hyperthermia Neg Hx      Social History     Socioeconomic History    Marital status:      Spouse name: Owen    Number of children: 1    Years of education: College   Tobacco Use    Smoking status: Never     Passive exposure: Never    Smokeless tobacco: Never    Tobacco comments:     never   Vaping Use    Vaping status: Never Used   Substance and Sexual Activity    Alcohol use: Yes     Alcohol/week: 1.0 standard drink of alcohol     Types: 1 Drinks containing 0.5 oz of alcohol per week     Comment: once a week    Drug use: No    Sexual activity: Yes     Partners: Male     Birth control/protection: None, Hysterectomy     Comment:         Current Outpatient Medications:     AIMOVIG 140 MG/ML solution auto-injector, Inject 1 mL under the skin into the appropriate area as directed Every 30 (Thirty) Days. HAS NOT TAKEN IN SEVERAL MONTHS, Disp: , Rfl: 11    amitriptyline (ELAVIL) 25 MG tablet, Take 1 tablet by mouth Every Night., Disp: 90 tablet, Rfl: 3    Aspirin 81 MG capsule, Take 1 tablet by mouth Daily., Disp: , Rfl:     atorvastatin (LIPITOR) 20 MG tablet, TAKE 1 TABLET BY MOUTH EVERY DAY AT NIGHT, Disp: 90 tablet, Rfl: 1    baclofen (LIORESAL) 10 MG tablet, Take 1 tablet by mouth 2 (Two) Times a Day., Disp: , Rfl: 2    CALCIUM-MAGNESIUM-VITAMIN D PO, Take  by mouth., Disp: , Rfl:     colesevelam (WELCHOL) 625 MG tablet, TAKE 2 TABLETS BY MOUTH DAILY, Disp: 180 tablet, Rfl: 11    escitalopram (Lexapro) 20 MG tablet, Take 1 tablet by mouth Daily., Disp: 90 tablet, Rfl: 2    exemestane (AROMASIN) 25 MG  "tablet, TAKE 1 TABLET BY MOUTH EVERY DAY, Disp: 90 tablet, Rfl: 2    fluticasone (FLONASE) 50 MCG/ACT nasal spray, SPRAY 2 SPRAYS INTO THE NOSTRIL AS DIRECTED BY PROVIDER DAILY., Disp: 48 mL, Rfl: 3    Loratadine (CLARITIN PO), Take 10 mg by mouth Daily., Disp: , Rfl:     NON FORMULARY, Compound cream (ketamine, gabapentin, clonidine, and lidocaine), Disp: , Rfl:     Nurtec 75 MG dispersible tablet, Take 1 tablet by mouth As Needed., Disp: , Rfl:     omeprazole (priLOSEC) 40 MG capsule, TAKE 1 CAPSULE BY MOUTH DAILY BEFORE SUPPER, Disp: 90 capsule, Rfl: 1    oxyCODONE-acetaminophen (PERCOCET)  MG per tablet, Take 1 tablet by mouth 4 (Four) Times a Day., Disp: , Rfl:     vitamin B-12 (CYANOCOBALAMIN) 1000 MCG tablet, Take 1 tablet by mouth Daily., Disp: , Rfl:     vitamin B-6 (pyridoxine) 50 MG tablet, Take 1 tablet by mouth Daily., Disp: 90 tablet, Rfl: 2    [START ON 12/11/2024] zolpidem (AMBIEN) 10 MG tablet, Take 0.5 tablets by mouth At Night As Needed for Sleep for up to 90 days., Disp: 45 tablet, Rfl: 0  No current facility-administered medications for this visit.                  /80   Pulse 70   Temp 97.1 °F (36.2 °C)   Resp 16   Ht 170.2 cm (67.01\")   Wt 73.1 kg (161 lb 3.2 oz)   SpO2 98%   BMI 25.24 kg/m²       Physical Exam  Vitals and nursing note reviewed.   Constitutional:       General: She is not in acute distress.     Appearance: She is not ill-appearing, toxic-appearing or diaphoretic.   HENT:      Nose: No congestion or rhinorrhea.      Mouth/Throat:      Mouth: Mucous membranes are moist.   Eyes:      Conjunctiva/sclera: Conjunctivae normal.   Cardiovascular:      Rate and Rhythm: Normal rate and regular rhythm.      Heart sounds: Normal heart sounds. No murmur heard.  Pulmonary:      Effort: Pulmonary effort is normal. No respiratory distress.      Breath sounds: Normal breath sounds. No stridor. No wheezing or rhonchi.   Abdominal:      General: Bowel sounds are normal. There " is no distension.      Palpations: Abdomen is soft. There is no mass.      Tenderness: There is no abdominal tenderness. There is no guarding or rebound.      Hernia: No hernia is present.   Skin:     General: Skin is warm.      Coloration: Skin is not jaundiced or pale.      Findings: No erythema or rash.   Neurological:      General: No focal deficit present.      Mental Status: She is alert and oriented to person, place, and time.   Psychiatric:         Mood and Affect: Mood normal.         Behavior: Behavior normal.         Thought Content: Thought content normal.                   Diagnoses and all orders for this visit:    1. Encounter for annual physical exam (Primary)    2. Encounter for vaccination  -     Fluzone >6mos    3. Mixed hyperlipidemia  -     Lipid Panel    4. Chronic GERD    5. Anxiety    6. Primary insomnia    7. IFG (impaired fasting glucose)  -     Hemoglobin A1c    8. Drug-induced polyneuropathy    9. Migraine without aura and without status migrainosus, not intractable    10. Long-term use of high-risk medication    11. H/O: stroke    12. Overweight (BMI 25.0-29.9)    13. Insomnia, unspecified type  -     zolpidem (AMBIEN) 10 MG tablet; Take 0.5 tablets by mouth At Night As Needed for Sleep for up to 90 days.  Dispense: 45 tablet; Refill: 0      -Age and sex appropriate physical exam performed and documented.   -Updated past medical, family, social and surgical histories as well as allergies and care team list.   -Addressed care gaps listed in the medical record.  -advised to eat healthy diet, do daily exercise   - discussed and updates preventive screening measures       Follow up: Return in about 3 months (around 3/4/2025) for follow up on current illness.  Plan of care discussed with pt. They verbalized understanding and agreement.     Ar Beasley MD   12/4/2024   11:30 EST               UDS in chart  PDMP reviwed , looks approperiate   Controlled substance medication agreement forms  copy in chart.   Medication sent in.    Adverse effects and risks discussed.

## 2024-12-05 DIAGNOSIS — G47.00 INSOMNIA, UNSPECIFIED TYPE: ICD-10-CM

## 2024-12-05 LAB
CHOLEST SERPL-MCNC: 162 MG/DL (ref 100–199)
HBA1C MFR BLD: 5.7 % (ref 4.8–5.6)
HDLC SERPL-MCNC: 64 MG/DL
LDLC SERPL CALC-MCNC: 86 MG/DL (ref 0–99)
TRIGL SERPL-MCNC: 62 MG/DL (ref 0–149)
VLDLC SERPL CALC-MCNC: 12 MG/DL (ref 5–40)

## 2024-12-05 RX ORDER — ZOLPIDEM TARTRATE 10 MG/1
5 TABLET ORAL NIGHTLY PRN
Qty: 45 TABLET | Refills: 0 | Status: SHIPPED | OUTPATIENT
Start: 2024-12-11 | End: 2025-03-11

## 2024-12-05 NOTE — TELEPHONE ENCOUNTER
Medication was sent to [facility] pharmacy, please send to local has been updated.

## 2024-12-09 RX ORDER — EXEMESTANE 25 MG/1
25 TABLET ORAL DAILY
Qty: 90 TABLET | Refills: 1 | Status: SHIPPED | OUTPATIENT
Start: 2024-12-09

## 2024-12-11 ENCOUNTER — PRIOR AUTHORIZATION (OUTPATIENT)
Dept: FAMILY MEDICINE CLINIC | Facility: CLINIC | Age: 64
End: 2024-12-11
Payer: COMMERCIAL

## 2025-01-28 ENCOUNTER — HOSPITAL ENCOUNTER (OUTPATIENT)
Dept: MAMMOGRAPHY | Facility: HOSPITAL | Age: 65
Discharge: HOME | End: 2025-01-28
Admitting: STUDENT IN AN ORGANIZED HEALTH CARE EDUCATION/TRAINING PROGRAM
Payer: COMMERCIAL

## 2025-01-28 DIAGNOSIS — Z12.31 SCREENING MAMMOGRAM FOR BREAST CANCER: ICD-10-CM

## 2025-01-28 DIAGNOSIS — Z17.0 MALIGNANT NEOPLASM OF OVERLAPPING SITES OF LEFT BREAST IN FEMALE, ESTROGEN RECEPTOR POSITIVE: ICD-10-CM

## 2025-01-28 DIAGNOSIS — C50.812 MALIGNANT NEOPLASM OF OVERLAPPING SITES OF LEFT BREAST IN FEMALE, ESTROGEN RECEPTOR POSITIVE: ICD-10-CM

## 2025-01-28 PROCEDURE — 77067 SCR MAMMO BI INCL CAD: CPT

## 2025-01-28 PROCEDURE — 77063 BREAST TOMOSYNTHESIS BI: CPT

## 2025-01-29 ENCOUNTER — TELEPHONE (OUTPATIENT)
Dept: ONCOLOGY | Facility: CLINIC | Age: 65
End: 2025-01-29
Payer: COMMERCIAL

## 2025-01-29 NOTE — TELEPHONE ENCOUNTER
Left message letting patient know her mammogram was fine.  Left direct number in case she has questions.

## 2025-01-29 NOTE — TELEPHONE ENCOUNTER
----- Message from Park Perez sent at 1/29/2025  8:13 AM EST -----  Screening mammogram looks good

## 2025-02-18 DIAGNOSIS — F41.9 ANXIETY: ICD-10-CM

## 2025-02-18 RX ORDER — ESCITALOPRAM OXALATE 20 MG/1
20 TABLET ORAL DAILY
Qty: 60 TABLET | Refills: 0 | Status: SHIPPED | OUTPATIENT
Start: 2025-02-18

## 2025-03-10 DIAGNOSIS — G47.00 INSOMNIA, UNSPECIFIED TYPE: ICD-10-CM

## 2025-03-10 RX ORDER — ZOLPIDEM TARTRATE 10 MG/1
5 TABLET ORAL NIGHTLY PRN
Qty: 45 TABLET | Refills: 0 | Status: SHIPPED | OUTPATIENT
Start: 2025-03-10 | End: 2025-06-08

## 2025-03-10 NOTE — TELEPHONE ENCOUNTER
Rx Refill Note  Requested Prescriptions     Pending Prescriptions Disp Refills    zolpidem (AMBIEN) 10 MG tablet 45 tablet 0     Sig: Take 0.5 tablets by mouth At Night As Needed for Sleep for up to 90 days.      Last office visit with prescribing clinician: 12/4/2024   Last telemedicine visit with prescribing clinician: Visit date not found   Next office visit with prescribing clinician: 3/11/2025                         Would you like a call back once the refill request has been completed: [] Yes [] No    If the office needs to give you a call back, can they leave a voicemail: [] Yes [] No    Timothy Ramirez MA  03/10/25, 09:38 EDT

## 2025-03-11 ENCOUNTER — OFFICE VISIT (OUTPATIENT)
Dept: FAMILY MEDICINE CLINIC | Facility: CLINIC | Age: 65
End: 2025-03-11
Payer: COMMERCIAL

## 2025-03-11 VITALS
HEART RATE: 73 BPM | WEIGHT: 161.2 LBS | DIASTOLIC BLOOD PRESSURE: 84 MMHG | BODY MASS INDEX: 25.3 KG/M2 | SYSTOLIC BLOOD PRESSURE: 138 MMHG | TEMPERATURE: 97.1 F | RESPIRATION RATE: 16 BRPM | OXYGEN SATURATION: 98 % | HEIGHT: 67 IN

## 2025-03-11 DIAGNOSIS — M79.642 BILATERAL HAND PAIN: ICD-10-CM

## 2025-03-11 DIAGNOSIS — M81.0 AGE-RELATED OSTEOPOROSIS WITHOUT CURRENT PATHOLOGICAL FRACTURE: ICD-10-CM

## 2025-03-11 DIAGNOSIS — G43.009 MIGRAINE WITHOUT AURA AND WITHOUT STATUS MIGRAINOSUS, NOT INTRACTABLE: ICD-10-CM

## 2025-03-11 DIAGNOSIS — F41.9 ANXIETY: ICD-10-CM

## 2025-03-11 DIAGNOSIS — F33.42 MAJOR DEPRESSIVE DISORDER, RECURRENT, IN FULL REMISSION: ICD-10-CM

## 2025-03-11 DIAGNOSIS — K21.9 CHRONIC GERD: ICD-10-CM

## 2025-03-11 DIAGNOSIS — R73.02 IGT (IMPAIRED GLUCOSE TOLERANCE): ICD-10-CM

## 2025-03-11 DIAGNOSIS — M79.89 BILATERAL HAND SWELLING: ICD-10-CM

## 2025-03-11 DIAGNOSIS — E78.2 MIXED HYPERLIPIDEMIA: ICD-10-CM

## 2025-03-11 DIAGNOSIS — F51.01 PRIMARY INSOMNIA: Primary | ICD-10-CM

## 2025-03-11 DIAGNOSIS — Z79.899 LONG-TERM USE OF HIGH-RISK MEDICATION: ICD-10-CM

## 2025-03-11 DIAGNOSIS — H93.8X2 MASS OF EAR CANAL, LEFT: ICD-10-CM

## 2025-03-11 DIAGNOSIS — M79.641 BILATERAL HAND PAIN: ICD-10-CM

## 2025-03-11 LAB
EXPIRATION DATE: ABNORMAL
HBA1C MFR BLD: 5.7 % (ref 4.5–5.7)
Lab: ABNORMAL

## 2025-03-11 NOTE — PROGRESS NOTES
Subjective     Richa Dolan is a 64 y.o. female.     Chief Complaint   Patient presents with    Hyperlipidemia     3 month f/u     Anxiety    Insomnia    Migraine    Mass     Left ear. Has a purple color. Painful.        History of Present Illness     Presents for FU On the followings;     Insomnia; she is on Ambien 10 mg QHS, lately no longer help, still has some sleep problem  No S/E reported     Anxiety with depression;  doing well with current Rx, no S/E reported.      Elevated BP, not on BP medication , usually check her BP at home--> wnl    IGT; Last A1c 5.7, today same 5.7. She likes sweat tea      HLD;  last lipid wnl. well controlled with current Rx, no S/E reported.  Eats RD      H/o breast cancer, doing well, no new concern, following with onco q 6 months    Osteoporosis ; on injection every 6 months by oncology.     PN ;still feels burning and tingling in both feet. Likely from medication vs HIV , doing well on Elavil 6 months.     Hands joints feels stiff, swollen and painfull every am, better during the day .   XR many years ago was neg.   Sx worse lately     Migrain HA ;has HA 1 x /3 months .  well controlled with current Rx, no S/E reported.     Chronic back pain with Rt side sciatica ; following with pain clinic .  she is on triggers point injection q 5 weeks      GERD;  well controlled with current Rx, no S/E reported.         Labs and documentation from consulting physician has been reviewed      Labs has been ordered and personally/independently interpreted , discussed with pt   Lab Results   Component Value Date    CHLPL 162 12/04/2024    TRIG 62 12/04/2024    HDL 64 12/04/2024    LDL 86 12/04/2024     Lab Results   Component Value Date    GLUCOSE 132 (H) 12/03/2024    BUN 14 12/03/2024    CREATININE 0.76 12/03/2024     12/03/2024    K 4.0 12/03/2024     12/03/2024    CALCIUM 9.3 12/03/2024    PROTEINTOT 7.7 12/03/2024    ALBUMIN 4.7 12/03/2024    ALT 20 12/03/2024    AST 31  12/03/2024    ALKPHOS 77 12/03/2024    BILITOT 0.4 12/03/2024    GLOB 3.0 12/03/2024    AGRATIO 1.6 12/03/2024    BCR 18.4 12/03/2024    ANIONGAP 12.4 12/03/2024    EGFR 87.6 12/03/2024     Lab Results   Component Value Date    HGBA1C 5.7 (H) 12/04/2024     Lab Results   Component Value Date    TSH 1.500 10/17/2022         The following portions of the patient's history were reviewed and updated as appropriate: allergies, current medications, past family history, past medical history, past social history, past surgical history, and problem list.        Review of Systems   Constitutional:  Positive for fatigue. Negative for chills, diaphoresis and fever.   HENT:  Negative for congestion, sore throat and swollen glands.    Respiratory:  Negative for cough.    Cardiovascular:  Negative for chest pain.   Gastrointestinal:  Negative for abdominal pain, nausea and vomiting.   Genitourinary:  Negative for dysuria.   Musculoskeletal:  Positive for arthralgias and myalgias. Negative for neck pain.   Skin:  Negative for rash.   Neurological:  Negative for weakness and numbness.       Vitals:    03/11/25 1245   BP: 138/84   Pulse: 73   Resp: 16   Temp: 97.1 °F (36.2 °C)   SpO2: 98%           03/11/25  1245   Weight: 73.1 kg (161 lb 3.2 oz)         Body mass index is 25.24 kg/m².      Current Outpatient Medications   Medication Sig Dispense Refill    AIMOVIG 140 MG/ML solution auto-injector Inject 1 mL under the skin into the appropriate area as directed Every 30 (Thirty) Days. HAS NOT TAKEN IN SEVERAL MONTHS  11    amitriptyline (ELAVIL) 25 MG tablet Take 1 tablet by mouth Every Night. 90 tablet 3    Aspirin 81 MG capsule Take 1 tablet by mouth Daily.      atorvastatin (LIPITOR) 20 MG tablet TAKE 1 TABLET BY MOUTH EVERY DAY AT NIGHT 90 tablet 1    baclofen (LIORESAL) 10 MG tablet Take 1 tablet by mouth 2 (Two) Times a Day.  2    CALCIUM-MAGNESIUM-VITAMIN D PO Take  by mouth.      colesevelam (WELCHOL) 625 MG tablet TAKE 2  TABLETS BY MOUTH DAILY 180 tablet 11    escitalopram (LEXAPRO) 20 MG tablet TAKE 1 TABLET BY MOUTH DAILY 60 tablet 0    exemestane (AROMASIN) 25 MG tablet Take 1 tablet by mouth Daily. 90 tablet 1    fluticasone (FLONASE) 50 MCG/ACT nasal spray SPRAY 2 SPRAYS INTO THE NOSTRIL AS DIRECTED BY PROVIDER DAILY. 48 mL 3    Loratadine (CLARITIN PO) Take 10 mg by mouth Daily.      NON FORMULARY Compound cream (ketamine, gabapentin, clonidine, and lidocaine)      Nurtec 75 MG dispersible tablet Take 1 tablet by mouth As Needed.      omeprazole (priLOSEC) 40 MG capsule TAKE 1 CAPSULE BY MOUTH DAILY BEFORE SUPPER 90 capsule 1    oxyCODONE-acetaminophen (PERCOCET)  MG per tablet Take 1 tablet by mouth 4 (Four) Times a Day.      vitamin B-12 (CYANOCOBALAMIN) 1000 MCG tablet Take 1 tablet by mouth Daily.      vitamin B-6 (pyridoxine) 50 MG tablet Take 1 tablet by mouth Daily. 90 tablet 2    zolpidem (AMBIEN) 10 MG tablet Take 0.5 tablets by mouth At Night As Needed for Sleep for up to 90 days. 45 tablet 0     No current facility-administered medications for this visit.                Objective   Physical Exam  Vitals and nursing note reviewed.   Constitutional:       General: She is not in acute distress.     Appearance: She is not toxic-appearing.   HENT:      Ears:      Comments: Purple small cyst on the Lt Ext ear  Cardiovascular:      Rate and Rhythm: Normal rate and regular rhythm.      Heart sounds: Normal heart sounds. No murmur heard.  Pulmonary:      Effort: Pulmonary effort is normal. No respiratory distress.      Breath sounds: Normal breath sounds. No stridor. No wheezing or rhonchi.   Neurological:      Mental Status: She is alert and oriented to person, place, and time.   Psychiatric:         Mood and Affect: Mood normal.         Behavior: Behavior normal.         Thought Content: Thought content normal.         XR ;I personally interpreted the X ray, it did show arthritis changes per my reading, discussed  with pt . Waiting for final radiology report       Assessment & Plan   Diagnoses and all orders for this visit:    1. Primary insomnia (Primary)  Comments:  advised to take mag 400 mg QHS from OTC + Ambein    2. Anxiety  Comments:  Stable, continue current Rx    3. Mixed hyperlipidemia  Comments:  discussed diet    4. Migraine without aura and without status migrainosus, not intractable  Comments:  Stable, continue current Rx    5. Major depressive disorder, recurrent, in full remission  Comments:  Stable, continue current Rx    6. IGT (impaired glucose tolerance)  Comments:  a1c same, discussed diet  Orders:  -     POC Glycosylated Hemoglobin (Hb A1C)    7. Bilateral hand pain  Comments:  XR showed arthritis  discussed supportive care  Orders:  -     XR Hand 3+ View Bilateral (In Office)    8. Bilateral hand swelling  Comments:  as above  Orders:  -     XR Hand 3+ View Bilateral (In Office)    9. Mass of ear canal, left  -     Ambulatory Referral to ENT (Otolaryngology)    10. Chronic GERD  Comments:  Stable, continue current Rx    11. Age-related osteoporosis without current pathological fracture  Comments:  continue injcetion q 6 months    12. Long-term use of high-risk medication  -     ToxASSURE Select 13 (MW) - Urine, Clean Catch          UDS in chart  PDMP reviwed , looks approperiate   Controlled substance medication agreement forms copy in chart.   Medication sent in.    Adverse effects and risks discussed.         Patient was given instructions and counseling regarding her condition or for health maintenance advice. Please see specific information pulled into the AVS if appropriate.       I have fully discussed the nature of the medical condition(s) risks, complications, management, safe and proper use of medications.   Pt stated no allergy to the above prescribed medication.  I have discussed the SIDE EFFECT OF MEDICATION and importance TO report any side effect , the patient expressed good  understanding.  Encouraged medication compliance and the importance of keeping scheduled follow up appointments with me and any other providers.    Patient instructed to follow up with our office for results on any labs/imaging ordered during this visit.    Home care discussed  All questions answered  Patient verbalizes understanding and agrees to treatment plan.     Follow up: Return in about 3 months (around 6/11/2025) for follow up on current illness.

## 2025-03-17 ENCOUNTER — TELEPHONE (OUTPATIENT)
Dept: INFECTIOUS DISEASES | Facility: CLINIC | Age: 65
End: 2025-03-17
Payer: COMMERCIAL

## 2025-03-17 NOTE — TELEPHONE ENCOUNTER
I called patient to remind her about labs that Dr. Dawn had placed in the [facility] so she can have them done at a [facility] outpatient lab if she would like prior to her appt w/ him 4/17/25. I also let her know that if she didn't have time to havew them done prior to the appt that she could wait til she see Dr. Dawn on 4/17/25. Patient stated understanding and denied having any questions.

## 2025-03-19 LAB — DRUGS UR: NORMAL

## 2025-03-20 DIAGNOSIS — K21.9 CHRONIC GERD: ICD-10-CM

## 2025-03-20 DIAGNOSIS — G62.0 DRUG-INDUCED POLYNEUROPATHY: ICD-10-CM

## 2025-03-20 RX ORDER — OMEPRAZOLE 40 MG/1
40 CAPSULE, DELAYED RELEASE ORAL
Qty: 90 CAPSULE | Refills: 1 | Status: SHIPPED | OUTPATIENT
Start: 2025-03-20

## 2025-03-20 NOTE — TELEPHONE ENCOUNTER
Rx Refill Note  Requested Prescriptions     Pending Prescriptions Disp Refills    omeprazole (priLOSEC) 40 MG capsule [Pharmacy Med Name: OMEPRAZOLE 40MG CAPSULES] 90 capsule 1     Sig: TAKE 1 CAPSULE BY MOUTH DAILY BEFORE SUPPER      Last office visit with prescribing clinician: 3/11/2025   Last telemedicine visit with prescribing clinician: Visit date not found   Next office visit with prescribing clinician: Visit date not found                         Would you like a call back once the refill request has been completed: [] Yes [] No    If the office needs to give you a call back, can they leave a voicemail: [] Yes [] No    Timothy Ramirez MA  03/20/25, 07:10 EDT

## 2025-03-26 NOTE — PROGRESS NOTES
Chief Complaint   Patient presents with    Follow-up     03/27/2025, interval history:  Patient is here accompanied by her  for follow-up.  Continues to experience vaginal dryness.  She reports she has been experiencing vaginal dryness for the past several years.  Does endorse some worsening over the last few.  Also reporting some hair thinning.  She is reporting some worsening in her acid reflux.  She has recently been starting on omeprazole by her PCP.  She is requesting some medication for her anxiety which she can take on an as-needed basis.  Her mother is in memory care unit, and she gets several calls through the night regarding her mother's falls which make her quite anxious.  Otherwise no other issues               ONCOLOGIC HISTORY:  Patient is a 59-year-old female who has had a history of HIV since age 34 followed by Dr. Natali Ng at [facility] and was on multiple HIV drugs initially but more recently has been on a single medication TRIUMEQ, with well-controlled HIV.  She follows up with Dr. Dawn , infectious disease who saw her recently and he saw her on 4/8/2019 and has excellent control and suppression of her viral load.  She is very compliant with her medications.    She also has had history of stroke in 2017 for which she was placed on aspirin.  She presented with a headache.  She is very active and works at [employer] full-time.  She also has had history of vertigo in the past  Patient's PCP is Zach Lora.    Patient first noticed the mass in her left breast 5 months ago.  Her last mammogram was 5 years ago.  She had soreness in the left breast and she went to her primary care physician who then ordered a mammogram diagnostic and an ultrasound.  The diagnostic mammogram showed a 3.7 cm mass at 4 o'clock position in the lower outer quadrant of the left breast.  The ultrasound showed 3.8 cm x 3.1 x 2.5 cm mass at 4 o'clock position in the left breast with 2  abnormal appearing left axillary lymph nodes the largest being 1.4 cm.    She then underwent biopsy of both the breast mass as well as the left axillary lymph node and both of them are positive for invasive mammary carcinoma it is invasive ductal carcinoma with apocrine features, moderately differentiated, grade 2 of 3 with a Jen score of 5.  The left axillary lymph node is also consistent with metastatic mammary carcinoma.  It is ER positive NV positive HER-2/merna negative.  Details as follows    10/21/19 - Bilateral Diagnostic Mammogram and US Breast Left  FINDINGS: Bilateral digital CC and MLO mammographic images were  obtained. No prior examination is available for comparison. Scattered  fibroglandular densities are seen throughout both breasts. A triangular  skin marker represents the area of palpable concern in the lower outer  quadrant of the left breast in the posterior 1/3. At this location there  is an irregular mass that measures on the order of 3.7 cm in greatest  dimension. Internal calcifications are noted. No suspicious findings in  the right breast are appreciated.     ULTRASOUND: Targeted sonographic evaluation of the left breast was  performed through the area of concern in the left axilla. At the 4  o'clock position on the order of 6 cm from the nipple there is an  irregular hypoechoic mass that measures 3.8 x 3.1 x 2.5 cm. Internal  vascularity is noted.     There are 2 abnormal-appearing left axillary lymph nodes, the largest  which measures on the order of 1.4 cm in greatest dimension.     IMPRESSION:  1. There is a 3.8 cm irregular mass in the left breast at the 4 o'clock  position. This is seen in conjunction with an irregular 1.4 cm lymph  node in the left axilla. This is suspicious for malignancy with left  axillary involvement. Correlation with ultrasound-guided biopsy of both  the left breast mass and the lymph node is recommended.  2. There are no findings suspicious for malignancy  in the right breast.     BI-RADS CATEGORY 5:     Patient's labs from 11/12/19 are normal.    10/29/19 - Tissue Pathology  1. Left Breast, 4:00, 6 cm FN, U/S-Guided Core Needle Biopsy for a Mass:  A. INVASIVE DUCTAL CARCINOMA WITH APOCRINE FEATURES, Moderately differentiated;  [place] Histologic Grade II/III (tubule score = 3, nuclear score = 2, mitoses score = 1), measuring at least  9 mm.  B. No ductal or lobular carcinoma in situ is identified in this sample.  C. Focus suspicious for lymphovascular space invasion.  D. See Biomarker Template for hormone receptor studies.  2. Lymph Node, Left Axilla, U/S-Guided Core Needle Biopsy:  A. METASTATIC MAMMARY CARCINOMA (see Comment).  B. Metastatic focus measures 5 mm in greatest extent.  ER+, 85%  LA+, 10%  HER2- Score 2+    11/13/19 - CT Neck Chest Abdomen Pelvis  IMPRESSION:  1. In addition to the irregular approximately 3.8 cm mass within the  lateral aspect of the left breast, there are a few subcentimeter  asymmetric nodules along the lateral margin of the glandular tissue. The  several asymmetric left subpectoral nodes and 1.2 x 1.0 cm left axillary  node are suspicious for tamar metastases. The asymmetric subcentimeter  left supraclavicular and left posterior cervical triangle nodes are  worrisome as well.  2. There is no convincing evidence for metastatic disease within the  abdomen or pelvis.    11/14/19 - Echocardiogram:  Normal.  Calculated EF = 67%.  There is trace mitral valve and tricuspid valve regurgitation.    11/15/19 - Bone Scan  Negative.    11/18/19 - MRI Breast Bilateral  IMPRESSION:  1. Biopsy-proven malignancy in the left breast centered at the 4 o'clock  position with associated surrounding satellite nodules as described. The  entire region of involvement including the satellite nodules and the  dominant mass measure up to 7.5 cm in greatest dimension. Also, left  axillary adenopathy with multiple irregular lymph nodes and loss  of  differentiation of the borders of multiple lymph nodes is noted and this  is suspicious for extranodal involvement.  2. There are no findings suspicious for malignancy in the right breast.  BI-RADS CATEGORY 6      19: Cycle #1 of Adriamycin/Cytoxan    20: Last cycle of Adriamycin/Cytoxan given without Neulasta.  We will switch from Neulasta to 7 days of Neupogen injections after cycle 4.    We reviewed with patient the US Breast from 20 which shows mild interval partial response to neoadjuvant chemotherapy.      20: Initiated cycle 1 Taxol  2020: Last cycle of Taxol, cycle 12    S/p left mastectomy with axillary dissection   Pathology showed invasive breast cancer which is 55 x 40 mm in size, grade 2 with focal lymphovascular space invasion with DCIS spanning over 36 x 6 mm.  All margins were negative for cancer.  8 of the additional lymph nodes out of 11 were positive with the largest micrometastasis measuring 5 mm.  Extranodal extension was seen.    Biomarkers on post neoadjuvant tumor is ER 81-90% WV 5% HER-2/merna 2+ by immunohistochemistry and HER-2/merna negative by FISH.    2020: Started letrozole but because of severe joint pains was discontinued 2020    End of 2020 started tamoxifen    OB-GYN:  Menarche 15 years  Menopause-46  Pregnancies- 1 para 1 no miscarriages her first pregnancy was in  at 29 years of age.  She did not breastfeed.  Post menopausal HRT- None.  Hx of birth control pills- Yes.    Current Outpatient Medications on File Prior to Visit   Medication Sig Dispense Refill    AIMOVIG 140 MG/ML solution auto-injector Inject 1 mL under the skin into the appropriate area as directed Every 30 (Thirty) Days. HAS NOT TAKEN IN SEVERAL MONTHS  11    amitriptyline (ELAVIL) 25 MG tablet TAKE 1 TABLET BY MOUTH EVERY NIGHT 90 tablet 3    Aspirin 81 MG capsule Take 1 tablet by mouth Daily.      atorvastatin (LIPITOR) 20 MG tablet TAKE 1 TABLET  "BY MOUTH EVERY DAY AT NIGHT 90 tablet 1    baclofen (LIORESAL) 10 MG tablet Take 1 tablet by mouth 2 (Two) Times a Day.  2    CALCIUM-MAGNESIUM-VITAMIN D PO Take  by mouth.      colesevelam (WELCHOL) 625 MG tablet TAKE 2 TABLETS BY MOUTH DAILY 180 tablet 11    escitalopram (LEXAPRO) 20 MG tablet TAKE 1 TABLET BY MOUTH DAILY 60 tablet 0    exemestane (AROMASIN) 25 MG tablet Take 1 tablet by mouth Daily. 90 tablet 1    fluticasone (FLONASE) 50 MCG/ACT nasal spray SPRAY 2 SPRAYS INTO THE NOSTRIL AS DIRECTED BY PROVIDER DAILY. 48 mL 3    Loratadine (CLARITIN PO) Take 10 mg by mouth Daily.      NON FORMULARY Compound cream (ketamine, gabapentin, clonidine, and lidocaine)      Nurtec 75 MG dispersible tablet Take 1 tablet by mouth As Needed.      omeprazole (priLOSEC) 40 MG capsule TAKE 1 CAPSULE BY MOUTH DAILY BEFORE SUPPER 90 capsule 1    oxyCODONE-acetaminophen (PERCOCET)  MG per tablet Take 1 tablet by mouth 4 (Four) Times a Day.      vitamin B-12 (CYANOCOBALAMIN) 1000 MCG tablet Take 1 tablet by mouth Daily.      vitamin B-6 (pyridoxine) 50 MG tablet Take 1 tablet by mouth Daily. 90 tablet 2    zolpidem (AMBIEN) 10 MG tablet Take 0.5 tablets by mouth At Night As Needed for Sleep for up to 90 days. 45 tablet 0     No current facility-administered medications on file prior to visit.        ALLERGIES:    Allergies   Allergen Reactions    Augmentin [Amoxicillin-Pot Clavulanate] Hives        I have reviewed Past Medical, Surgical History, Social History, Meds and Allergies.     REVIEW OF SYSTEMS:  See interval History      Objective    Vitals:    03/27/25 1212   BP: 149/87   Pulse: 69   Temp: 97.8 °F (36.6 °C)   TempSrc: Oral   SpO2: 97%   Weight: 73.6 kg (162 lb 4.8 oz)   Height: 170.2 cm (67.01\")   PainSc: 0-No pain         Physical Exam  Constitutional:       Appearance: Normal appearance.   HENT:      Head: Normocephalic and atraumatic.      Mouth/Throat:      Mouth: Mucous membranes are moist.      Pharynx: " Oropharynx is clear.   Eyes:      Extraocular Movements: Extraocular movements intact.      Pupils: Pupils are equal, round, and reactive to light.   Cardiovascular:      Rate and Rhythm: Normal rate and regular rhythm.   Pulmonary:      Effort: Pulmonary effort is normal.      Breath sounds: Normal breath sounds.   Chest:       Abdominal:      General: Bowel sounds are normal.      Palpations: Abdomen is soft.   Musculoskeletal:      Cervical back: Normal range of motion and neck supple.   Neurological:      General: No focal deficit present.      Mental Status: She is alert and oriented to person, place, and time.   Psychiatric:         Mood and Affect: Mood normal.         Behavior: Behavior normal.     I have reexamined the patient and the results are consistent with the previously documented exam. Park Perez MD       RECENT LABS:   CBC:      Lab 03/27/25  1201   WBC 4.96   HEMOGLOBIN 14.5   HEMATOCRIT 43.1   PLATELETS 276   NEUTROS ABS 2.48   IMMATURE GRANS (ABS) 0.01   LYMPHS ABS 1.86   MONOS ABS 0.53   EOS ABS 0.05   .5*           Assessment & Plan    * pT3 pN3 invasive ductal carcinoma, Left breast, ER/MS positive  10/21/2019 diagnostic mammogram showed 3.7 mm mass in the left breast at 4 o'clock position. Ultrasound-showed 3.8 x 3.1 x 2.5 cm mass at 4 o'clock position with 2 abnormal axillary lymph nodes, larger lymph node being 1.4 cm  October 29, 2019 Left breast biopsy and axillary lymph node biopsy- invasive ductal carcinoma, moderately differentiated, grade 2, ER 85%, MS 10%, HER-2/merna 2+, HER-2 FISH negative  11/13/19 CT scans- asymmetric nodules along the lateral margin of the glandular tissue.  The 1.2x1.0 cm left axillary nodes, left supraclavicular, and left posterior cervical triangle nodes are suspicious for tamar metastases.  11/15/2019 bone scan- negative.   11/18/19 MRI breast - biopsy-proven malignancy in the left breast centered at the 4:00 position.  The region of involvement  measures up to 7.5 cm.  There is left axillary adenopathy with multiple irregular lymph nodes and loss of differentiation of the border of multiple lymph nodes which are suspicious for extranodal involvement.  11/14/19 INVITAE - negative.  12/06/19 - 01/17/2020: s/p 4 cycles of neoadjuvant Adriamycin/Cytoxan  US Breast from 01/29/20 after 4 cycles of Adriamycin Cytoxan chemotherapy which shows mild interval partial response to neoadjuvant chemotherapy.  The mass has decreased from 3.8 x 2.5 to 3.1 cm to 3.5 x 2.3 x 3.2 cm previously the left axillary lymph node has decreased from 1.4 x 0.7 x 1 cm to 1.1 x 0.6 x 0.9 cm.  01/31/2020 - 04/24/2020: s/p neoadjuvant 12 cycles of Taxol  5/6/2020 MRI breast shows significant reduction in the left axillary adenopathy as well as reduction in the index breast lesion.  05/28/2020 S/p left breast lumpectomy with left axillary dissection.  Patient has 55 x 40 x 30 mm invasive carcinoma, grade 2 with DCIS 36 x 6 mm, high-grade with good margins and 8 out of 11 lymph nodes positive with largest metastasis being 5 mm with extracapsular extension.  And there is lymphatic space invasion  August 17, 2020 s/p adj XRT   August 2020 Started Endocrine therapy x 10 years.  She was initially started on letrozole, switched to tamoxifen in November 2020 due to severe arthralgia.  August 2021, tamoxifen discontinued due to significant arthralgia and worsening fatigue.  November 2021 started on Arimidex, discontinued in October 2021 due to significant generalized arthralgia.  December 2021 switched to exemestane  8/15/2024 continues to tolerate Aromasin well.  Anxiety better controlled now back on Lexapro.  Plan to continue AI therapy for total of 10 years considering tamar involvement.  December 2024: Patient establish care with me.  Tolerating exemestane quite well.  Plan to continue for a total of 10 years, end date August 2030 March 2025: Tolerating Aromasin well overall.  Reviewed results  of mammogram from January, BI-RADS 2.  Tolerating exemestane well.    *Anxiety  Patient on Lexapro, managed by her PCP  Will send in Xanax 0.5 mg 3 times daily as needed-60 tablets to her preferred pharmacy.    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  Started on Prolia in 11/2022, continue every 6 months.  Will need DEXA scan every 2 years while on AI.  Due in August 2026    * HIV, followed by Dr. Dawn in infectious disease.       * Family history of breast cancer with mother having had breast cancer at age 60.  There is family history of uncle with prostate cancer.  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    On amitriptyline, managed by her Neurologist      PLAN:   Continue Aromasin. End date Aug 2030  Continue tart cherry extract   Discussed trial of  HyaloGyn or Revaree for vaginal dryness  Mammogram from January 2025-BI-RADS 2.  Prescription for Xanax 0.5 mg 3 times a day as needed, 60 tablets sent to her preferred pharmacy for as needed anxiety management.  She is already on Lexapro managed by her primary care  Due for annual mammogram in January 2026.    DEXA due in August 2026  Continue calcium and vitamin D supplements  NP visit with Prolia on 6/3/2025      This patient is on high risk drug therapy requiring intensive monitoring for toxicity.

## 2025-03-27 ENCOUNTER — LAB (OUTPATIENT)
Dept: LAB | Facility: HOSPITAL | Age: 65
End: 2025-03-27
Payer: COMMERCIAL

## 2025-03-27 ENCOUNTER — OFFICE VISIT (OUTPATIENT)
Dept: ONCOLOGY | Facility: CLINIC | Age: 65
End: 2025-03-27
Payer: COMMERCIAL

## 2025-03-27 VITALS
HEART RATE: 69 BPM | OXYGEN SATURATION: 97 % | BODY MASS INDEX: 25.47 KG/M2 | DIASTOLIC BLOOD PRESSURE: 87 MMHG | HEIGHT: 67 IN | TEMPERATURE: 97.8 F | SYSTOLIC BLOOD PRESSURE: 149 MMHG | WEIGHT: 162.3 LBS

## 2025-03-27 DIAGNOSIS — C50.812 MALIGNANT NEOPLASM OF OVERLAPPING SITES OF LEFT BREAST IN FEMALE, ESTROGEN RECEPTOR POSITIVE: ICD-10-CM

## 2025-03-27 DIAGNOSIS — K21.9 GASTROESOPHAGEAL REFLUX DISEASE WITHOUT ESOPHAGITIS: ICD-10-CM

## 2025-03-27 DIAGNOSIS — Z79.899 HIGH RISK MEDICATION USE: ICD-10-CM

## 2025-03-27 DIAGNOSIS — C50.812 MALIGNANT NEOPLASM OF OVERLAPPING SITES OF LEFT BREAST IN FEMALE, ESTROGEN RECEPTOR POSITIVE: Primary | ICD-10-CM

## 2025-03-27 DIAGNOSIS — N89.8 VAGINAL DRYNESS: ICD-10-CM

## 2025-03-27 DIAGNOSIS — Z17.0 MALIGNANT NEOPLASM OF OVERLAPPING SITES OF LEFT BREAST IN FEMALE, ESTROGEN RECEPTOR POSITIVE: Primary | ICD-10-CM

## 2025-03-27 DIAGNOSIS — Z17.0 MALIGNANT NEOPLASM OF OVERLAPPING SITES OF LEFT BREAST IN FEMALE, ESTROGEN RECEPTOR POSITIVE: ICD-10-CM

## 2025-03-27 DIAGNOSIS — F41.9 ANXIETY: ICD-10-CM

## 2025-03-27 LAB
ALBUMIN SERPL-MCNC: 4.5 G/DL (ref 3.5–5.2)
ALBUMIN/GLOB SERPL: 1.7 G/DL
ALP SERPL-CCNC: 62 U/L (ref 39–117)
ALT SERPL W P-5'-P-CCNC: 16 U/L (ref 1–33)
ANION GAP SERPL CALCULATED.3IONS-SCNC: 11.7 MMOL/L (ref 5–15)
AST SERPL-CCNC: 22 U/L (ref 1–32)
BASOPHILS # BLD AUTO: 0.03 10*3/MM3 (ref 0–0.2)
BASOPHILS NFR BLD AUTO: 0.6 % (ref 0–1.5)
BILIRUB SERPL-MCNC: 0.3 MG/DL (ref 0–1.2)
BUN SERPL-MCNC: 9 MG/DL (ref 8–23)
BUN/CREAT SERPL: 14.3 (ref 7–25)
CALCIUM SPEC-SCNC: 9.3 MG/DL (ref 8.6–10.5)
CHLORIDE SERPL-SCNC: 104 MMOL/L (ref 98–107)
CO2 SERPL-SCNC: 26.3 MMOL/L (ref 22–29)
CREAT SERPL-MCNC: 0.63 MG/DL (ref 0.57–1)
DEPRECATED RDW RBC AUTO: 47.2 FL (ref 37–54)
EGFRCR SERPLBLD CKD-EPI 2021: 99.2 ML/MIN/1.73
EOSINOPHIL # BLD AUTO: 0.05 10*3/MM3 (ref 0–0.4)
EOSINOPHIL NFR BLD AUTO: 1 % (ref 0.3–6.2)
ERYTHROCYTE [DISTWIDTH] IN BLOOD BY AUTOMATED COUNT: 12.7 % (ref 12.3–15.4)
GLOBULIN UR ELPH-MCNC: 2.7 GM/DL
GLUCOSE SERPL-MCNC: 85 MG/DL (ref 65–99)
HCT VFR BLD AUTO: 43.1 % (ref 34–46.6)
HGB BLD-MCNC: 14.5 G/DL (ref 12–15.9)
IMM GRANULOCYTES # BLD AUTO: 0.01 10*3/MM3 (ref 0–0.05)
IMM GRANULOCYTES NFR BLD AUTO: 0.2 % (ref 0–0.5)
LYMPHOCYTES # BLD AUTO: 1.86 10*3/MM3 (ref 0.7–3.1)
LYMPHOCYTES NFR BLD AUTO: 37.5 % (ref 19.6–45.3)
MCH RBC QN AUTO: 33.8 PG (ref 26.6–33)
MCHC RBC AUTO-ENTMCNC: 33.6 G/DL (ref 31.5–35.7)
MCV RBC AUTO: 100.5 FL (ref 79–97)
MONOCYTES # BLD AUTO: 0.53 10*3/MM3 (ref 0.1–0.9)
MONOCYTES NFR BLD AUTO: 10.7 % (ref 5–12)
NEUTROPHILS NFR BLD AUTO: 2.48 10*3/MM3 (ref 1.7–7)
NEUTROPHILS NFR BLD AUTO: 50 % (ref 42.7–76)
NRBC BLD AUTO-RTO: 0 /100 WBC (ref 0–0.2)
PLATELET # BLD AUTO: 276 10*3/MM3 (ref 140–450)
PMV BLD AUTO: 9.6 FL (ref 6–12)
POTASSIUM SERPL-SCNC: 4.3 MMOL/L (ref 3.5–5.2)
PROT SERPL-MCNC: 7.2 G/DL (ref 6–8.5)
RBC # BLD AUTO: 4.29 10*6/MM3 (ref 3.77–5.28)
SODIUM SERPL-SCNC: 142 MMOL/L (ref 136–145)
WBC NRBC COR # BLD AUTO: 4.96 10*3/MM3 (ref 3.4–10.8)

## 2025-03-27 PROCEDURE — 99214 OFFICE O/P EST MOD 30 MIN: CPT | Performed by: STUDENT IN AN ORGANIZED HEALTH CARE EDUCATION/TRAINING PROGRAM

## 2025-03-27 PROCEDURE — 36415 COLL VENOUS BLD VENIPUNCTURE: CPT

## 2025-03-27 PROCEDURE — 80053 COMPREHEN METABOLIC PANEL: CPT

## 2025-03-27 PROCEDURE — 85025 COMPLETE CBC W/AUTO DIFF WBC: CPT

## 2025-03-27 RX ORDER — ALPRAZOLAM 0.5 MG
0.5 TABLET ORAL 3 TIMES DAILY PRN
Qty: 60 TABLET | Refills: 0 | Status: SHIPPED | OUTPATIENT
Start: 2025-03-27 | End: 2025-04-26

## 2025-03-27 RX ORDER — ALPRAZOLAM 0.5 MG
0.5 TABLET ORAL 3 TIMES DAILY PRN
Qty: 60 TABLET | Refills: 0 | Status: SHIPPED | OUTPATIENT
Start: 2025-03-27 | End: 2025-03-27

## 2025-03-27 NOTE — PATIENT INSTRUCTIONS
Vaginal Dryness    For vaginal dryness, try HyaloGyn or Revaree -both are available over-the-counter    Vaginal lubricants provide several hours worth of lubrication for use prior to intercourse. Apply directly to vagina or to partner’s penis or vaginal dilator. These can be purchased over-the-counter and are easy to find at any drugstore.. Some examples are listed below:    · [place]roglide

## 2025-04-09 ENCOUNTER — LAB (OUTPATIENT)
Dept: LAB | Facility: HOSPITAL | Age: 65
End: 2025-04-09
Payer: COMMERCIAL

## 2025-04-09 DIAGNOSIS — Z21 ASYMPTOMATIC HIV INFECTION: ICD-10-CM

## 2025-04-09 LAB
ALBUMIN SERPL-MCNC: 4.6 G/DL (ref 3.5–5.2)
ALBUMIN/GLOB SERPL: 1.6 G/DL
ALP SERPL-CCNC: 71 U/L (ref 39–117)
ALT SERPL W P-5'-P-CCNC: 13 U/L (ref 1–33)
ANION GAP SERPL CALCULATED.3IONS-SCNC: 9.7 MMOL/L (ref 5–15)
AST SERPL-CCNC: 29 U/L (ref 1–32)
BASOPHILS # BLD AUTO: 0.03 10*3/MM3 (ref 0–0.2)
BASOPHILS NFR BLD AUTO: 0.5 % (ref 0–1.5)
BILIRUB SERPL-MCNC: 0.4 MG/DL (ref 0–1.2)
BUN SERPL-MCNC: 12 MG/DL (ref 8–23)
BUN/CREAT SERPL: 15 (ref 7–25)
CALCIUM SPEC-SCNC: 9.4 MG/DL (ref 8.6–10.5)
CHLORIDE SERPL-SCNC: 104 MMOL/L (ref 98–107)
CO2 SERPL-SCNC: 27.3 MMOL/L (ref 22–29)
CREAT SERPL-MCNC: 0.8 MG/DL (ref 0.57–1)
DEPRECATED RDW RBC AUTO: 44.1 FL (ref 37–54)
EGFRCR SERPLBLD CKD-EPI 2021: 82.4 ML/MIN/1.73
EOSINOPHIL # BLD AUTO: 0.04 10*3/MM3 (ref 0–0.4)
EOSINOPHIL NFR BLD AUTO: 0.7 % (ref 0.3–6.2)
ERYTHROCYTE [DISTWIDTH] IN BLOOD BY AUTOMATED COUNT: 12.3 % (ref 12.3–15.4)
GLOBULIN UR ELPH-MCNC: 2.8 GM/DL
GLUCOSE SERPL-MCNC: 80 MG/DL (ref 65–99)
HCT VFR BLD AUTO: 42.1 % (ref 34–46.6)
HGB BLD-MCNC: 13.9 G/DL (ref 12–15.9)
IMM GRANULOCYTES # BLD AUTO: 0.01 10*3/MM3 (ref 0–0.05)
IMM GRANULOCYTES NFR BLD AUTO: 0.2 % (ref 0–0.5)
LYMPHOCYTES # BLD AUTO: 2.16 10*3/MM3 (ref 0.7–3.1)
LYMPHOCYTES NFR BLD AUTO: 35.1 % (ref 19.6–45.3)
MCH RBC QN AUTO: 32.6 PG (ref 26.6–33)
MCHC RBC AUTO-ENTMCNC: 33 G/DL (ref 31.5–35.7)
MCV RBC AUTO: 98.8 FL (ref 79–97)
MONOCYTES # BLD AUTO: 0.62 10*3/MM3 (ref 0.1–0.9)
MONOCYTES NFR BLD AUTO: 10.1 % (ref 5–12)
NEUTROPHILS NFR BLD AUTO: 3.29 10*3/MM3 (ref 1.7–7)
NEUTROPHILS NFR BLD AUTO: 53.4 % (ref 42.7–76)
NRBC BLD AUTO-RTO: 0 /100 WBC (ref 0–0.2)
PLATELET # BLD AUTO: 304 10*3/MM3 (ref 140–450)
PMV BLD AUTO: 10 FL (ref 6–12)
POTASSIUM SERPL-SCNC: 4.2 MMOL/L (ref 3.5–5.2)
PROT SERPL-MCNC: 7.4 G/DL (ref 6–8.5)
RBC # BLD AUTO: 4.26 10*6/MM3 (ref 3.77–5.28)
SODIUM SERPL-SCNC: 141 MMOL/L (ref 136–145)
WBC NRBC COR # BLD AUTO: 6.15 10*3/MM3 (ref 3.4–10.8)

## 2025-04-09 PROCEDURE — 80053 COMPREHEN METABOLIC PANEL: CPT

## 2025-04-09 PROCEDURE — 85025 COMPLETE CBC W/AUTO DIFF WBC: CPT

## 2025-04-09 PROCEDURE — 36415 COLL VENOUS BLD VENIPUNCTURE: CPT

## 2025-04-09 PROCEDURE — 87536 HIV-1 QUANT&REVRSE TRNSCRPJ: CPT

## 2025-04-09 PROCEDURE — 86361 T CELL ABSOLUTE COUNT: CPT

## 2025-04-10 LAB
BASOPHILS # BLD AUTO: 0 X10E3/UL (ref 0–0.2)
BASOPHILS NFR BLD AUTO: 1 %
CD3+CD4+ CELLS # BLD: 758 /UL (ref 359–1519)
CD3+CD4+ CELLS NFR BLD: 36.1 % (ref 30.8–58.5)
EOSINOPHIL # BLD AUTO: 0 X10E3/UL (ref 0–0.4)
EOSINOPHIL NFR BLD AUTO: 1 %
ERYTHROCYTE [DISTWIDTH] IN BLOOD BY AUTOMATED COUNT: 12.2 % (ref 11.7–15.4)
HCT VFR BLD AUTO: 42.3 % (ref 34–46.6)
HGB BLD-MCNC: 14.3 G/DL (ref 11.1–15.9)
HIV1 RNA # SERPL NAA+PROBE: <20 COPIES/ML
HIV1 RNA SERPL NAA+PROBE-LOG#: NORMAL LOG10COPY/ML
IMM GRANULOCYTES # BLD AUTO: 0 X10E3/UL (ref 0–0.1)
IMM GRANULOCYTES NFR BLD AUTO: 0 %
LYMPHOCYTES # BLD AUTO: 2.1 X10E3/UL (ref 0.7–3.1)
LYMPHOCYTES NFR BLD AUTO: 34 %
MCH RBC QN AUTO: 33.6 PG (ref 26.6–33)
MCHC RBC AUTO-ENTMCNC: 33.8 G/DL (ref 31.5–35.7)
MCV RBC AUTO: 99 FL (ref 79–97)
MONOCYTES # BLD AUTO: 0.6 X10E3/UL (ref 0.1–0.9)
MONOCYTES NFR BLD AUTO: 10 %
NEUTROPHILS # BLD AUTO: 3.4 X10E3/UL (ref 1.4–7)
NEUTROPHILS NFR BLD AUTO: 54 %
PLATELET # BLD AUTO: 305 X10E3/UL (ref 150–450)
RBC # BLD AUTO: 4.26 X10E6/UL (ref 3.77–5.28)
WBC # BLD AUTO: 6.2 X10E3/UL (ref 3.4–10.8)

## 2025-04-14 NOTE — TELEPHONE ENCOUNTER
Addended by: JOZEF HOOPER on: 4/14/2025 09:53 AM     Modules accepted: Orders     Per Dr. Dawn's request, I contacted patient and informed her that per Dr. Dawn her recent labs look great from his standpoint and that her HIV viral load is undetectable and her CD4 count is 675.   Patient stated understanding and denied having any questions.

## 2025-04-17 ENCOUNTER — OFFICE VISIT (OUTPATIENT)
Dept: INFECTIOUS DISEASES | Facility: CLINIC | Age: 65
End: 2025-04-17
Payer: COMMERCIAL

## 2025-04-17 VITALS
RESPIRATION RATE: 14 BRPM | SYSTOLIC BLOOD PRESSURE: 158 MMHG | BODY MASS INDEX: 25.05 KG/M2 | DIASTOLIC BLOOD PRESSURE: 82 MMHG | TEMPERATURE: 97.1 F | HEART RATE: 67 BPM | WEIGHT: 160 LBS

## 2025-04-17 DIAGNOSIS — Z20.828 EXPOSURE TO VIRUS: ICD-10-CM

## 2025-04-17 DIAGNOSIS — Z79.2 LONG TERM (CURRENT) USE OF ANTIBIOTICS: ICD-10-CM

## 2025-04-17 DIAGNOSIS — Z21 ASYMPTOMATIC HIV INFECTION: Primary | ICD-10-CM

## 2025-04-17 PROCEDURE — 99214 OFFICE O/P EST MOD 30 MIN: CPT | Performed by: INTERNAL MEDICINE

## 2025-04-17 RX ORDER — ABACAVIR SULFATE, DOLUTEGRAVIR SODIUM, LAMIVUDINE 600; 50; 300 MG/1; MG/1; MG/1
TABLET, FILM COATED ORAL DAILY
COMMUNITY

## 2025-04-17 NOTE — PROGRESS NOTES
ID CLINIC NOTE:    CC: f/u HIV    HPI: Richa Dolan is a 64 y.o. female here for f/u re: HIV.  I last saw her in October 2024.  She continues to take Triumeq.  No side effects.  No missed doses.  She gets her refills through [facility] in a timely fashion.  There is no cost to her.    She had labs done prior to appointment.  Her CD4 count is 758 and her HIV viral load is undetectable.    She is curious to know if she immune to measles. She was born after 1957. She is unsure about vaccination. Her mother told her that she had measles but she is not sure.     She says she has lost some weight after stopping amitriptyline.     She remains on Aromasin for her breast cancer which is in remission. She continue to follow w/ Dr JANE Perez twice yearly.    PMH:   HIV well-controlled on ART w/ Triumeq  Breast cancer - on maintenance therapy  Peripheral neuropathy  CVA  Migraines  HLD  GERD    Past Surgical History:   Procedure Laterality Date    ADENOIDECTOMY      BREAST LUMPECTOMY      BREAST LUMPECTOMY WITH SENTINEL NODE BIOPSY Left 05/28/2020    Procedure: BREAST LUMPECTOMY WITH SENTINEL NODE BIOPSY AND NEEDLE LOCALIZATION AND axillary dissection;  Surgeon: Landen Forman MD;  Location: [facility];  Service: General;  Laterality: Left;    BREAST SURGERY Left 05/28/2020    Procedure: LEFT LATERAL INTERCOASTAL ARTERY  flap ;  Surgeon: Yusuf Garcia MD;  Location: [facility];  Service: Plastics;  Laterality: Left;    CHOLECYSTECTOMY      HYSTERECTOMY      KNEE ARTHROSCOPY Right 03/24/2017    Procedure:  RIGHT  KNEE ARTHROSCOPY WITH DEBRIDEMENT CHONDROPLASTY PARTIAL MENISCECTOMY;  Surgeon: Karl Galeas MD;  Location: [facility];  Service:     KNEE CARTILAGE SURGERY Right 2007    TONSILLECTOMY      TONSILLECTOMY  1974    TRIGGER POINT INJECTION  2002 - 2023    US GUIDED LYMPH NODE BIOPSY  10/29/2019    LEFT BREAST    VENOUS ACCESS DEVICE (PORT) INSERTION Right  12/02/2019    Procedure: INSERTION VENOUS ACCESS DEVICE RIGHT;  Surgeon: Landen Forman MD;  Location: [facility] MAIN OR;  Service: General    VENOUS ACCESS DEVICE (PORT) REMOVAL N/A 05/25/2022    Procedure: Mediport Removal;  Surgeon: Cesar Vasquez Jr., MD;  Location: [facility] MAIN OR;  Service: General;  Laterality: N/A;     SH:     1 son  No tob/EtOH/illicits   Retired from [employer]    Allergies: Augmentin (hives)    Medications:   Current Outpatient Medications:     AIMOVIG 140 MG/ML solution auto-injector, Inject 1 mL under the skin into the appropriate area as directed Every 30 (Thirty) Days. HAS NOT TAKEN IN SEVERAL MONTHS, Disp: , Rfl: 11    ALPRAZolam (Xanax) 0.5 MG tablet, Take 1 tablet by mouth 3 (Three) Times a Day As Needed for Anxiety for up to 30 days., Disp: 60 tablet, Rfl: 0    amitriptyline (ELAVIL) 25 MG tablet, TAKE 1 TABLET BY MOUTH EVERY NIGHT, Disp: 90 tablet, Rfl: 3    Aspirin 81 MG capsule, Take 1 tablet by mouth Daily., Disp: , Rfl:     atorvastatin (LIPITOR) 20 MG tablet, TAKE 1 TABLET BY MOUTH EVERY DAY AT NIGHT, Disp: 90 tablet, Rfl: 1    baclofen (LIORESAL) 10 MG tablet, Take 1 tablet by mouth 2 (Two) Times a Day., Disp: , Rfl: 2    CALCIUM-MAGNESIUM-VITAMIN D PO, Take  by mouth., Disp: , Rfl:     colesevelam (WELCHOL) 625 MG tablet, TAKE 2 TABLETS BY MOUTH DAILY, Disp: 180 tablet, Rfl: 11    escitalopram (LEXAPRO) 20 MG tablet, TAKE 1 TABLET BY MOUTH DAILY, Disp: 60 tablet, Rfl: 0    exemestane (AROMASIN) 25 MG tablet, Take 1 tablet by mouth Daily., Disp: 90 tablet, Rfl: 1    fluticasone (FLONASE) 50 MCG/ACT nasal spray, SPRAY 2 SPRAYS INTO THE NOSTRIL AS DIRECTED BY PROVIDER DAILY., Disp: 48 mL, Rfl: 3    Loratadine (CLARITIN PO), Take 10 mg by mouth Daily., Disp: , Rfl:     NON FORMULARY, Compound cream (ketamine, gabapentin, clonidine, and lidocaine), Disp: , Rfl:     Nurtec 75 MG dispersible tablet, Take 1 tablet by mouth As Needed., Disp: , Rfl:     omeprazole  (priLOSEC) 40 MG capsule, TAKE 1 CAPSULE BY MOUTH DAILY BEFORE SUPPER, Disp: 90 capsule, Rfl: 1    oxyCODONE-acetaminophen (PERCOCET)  MG per tablet, Take 1 tablet by mouth 4 (Four) Times a Day., Disp: , Rfl:     vitamin B-12 (CYANOCOBALAMIN) 1000 MCG tablet, Take 1 tablet by mouth Daily., Disp: , Rfl:     vitamin B-6 (pyridoxine) 50 MG tablet, Take 1 tablet by mouth Daily., Disp: 90 tablet, Rfl: 2    zolpidem (AMBIEN) 10 MG tablet, Take 0.5 tablets by mouth At Night As Needed for Sleep for up to 90 days., Disp: 45 tablet, Rfl: 0    OBJECTIVE:  /82   Pulse 67   Temp 97.1 °F (36.2 °C)   Resp 14   Wt 72.6 kg (160 lb)   BMI 25.05 kg/m²       GENERAL: Awake, alert, NAD, very nice  EYES: No scleral icterus  CV: NR  LUNGS: . Normal work of breathing on RA  ABDOMEN: soft, not distended, not tender  SKIN: no rashes      DIAGNOSTICS:  A1c, lipid panel, CBC, BMP, CD4 count, and HIV VL reviewed today  Lab Results   Component Value Date    WBC 6.2 04/09/2025    WBC 6.15 04/09/2025    HGB 14.3 04/09/2025    HGB 13.9 04/09/2025    HCT 42.3 04/09/2025    HCT 42.1 04/09/2025     04/09/2025     04/09/2025     Lab Results   Component Value Date    GLUCOSE 80 04/09/2025    CALCIUM 9.4 04/09/2025     04/09/2025    K 4.2 04/09/2025    CO2 27.3 04/09/2025     04/09/2025    BUN 12 04/09/2025    CREATININE 0.80 04/09/2025    EGFRIFAFRI >60 05/28/2015    EGFRIFNONA 81 02/08/2022    BCR 15.0 04/09/2025    ANIONGAP 9.7 04/09/2025     Lab Results   Component Value Date    HGBA1C 5.7 03/11/2025     Lab Results   Component Value Date    CHLPL 162 12/04/2024    TRIG 62 12/04/2024    HDL 64 12/04/2024    LDL 86 12/04/2024     Lab Results   Component Value Date    RPR Non-Reactive 10/05/2020     HIV Related Labs:  CD4: 758 (10/2024)  HIV viral load: <20 copies (10/2024)  HIV Genotype: unknown bc undetectable  [id number] - negative  Tspot - negative (10/2020)  RPR - non-reactive (10/2020)  Hep A - immune  (7/2016)  Hep B - immune (9/2015)  Hep C - negative (10/2020)  Urine GCCT - negative (9/2015)    ASSESSMENT/PLAN:  1. Asymptomatic HIV  -excellent compliance w/ Triumeq; RX sent to [facility]  -labs performed last week; HIV viral load remains undetectable and CD4 count 758.      2. T2N1 invasive ductal carcinoma of the left breast  -on maintenance therapy  -port removed  -now following with Dr. Perez after Dr. Moreno's [facility]    3. Long term use of antibiotics  -reviewed monitoring labs    RTC 6 months or sooner if needed

## 2025-05-02 ENCOUNTER — TELEPHONE (OUTPATIENT)
Dept: INFECTIOUS DISEASES | Facility: CLINIC | Age: 65
End: 2025-05-02
Payer: COMMERCIAL

## 2025-05-02 NOTE — TELEPHONE ENCOUNTER
I called patient to remind her about the lab test for Measles/Mumps/Rubella Immunity that Dr. aDwn had ordered the last time she was in the office. She said that she was aware but not had a chance to have it done yet. Said she plans to have it done at a [facility] facility.

## 2025-05-16 ENCOUNTER — LAB (OUTPATIENT)
Dept: LAB | Facility: HOSPITAL | Age: 65
End: 2025-05-16
Payer: COMMERCIAL

## 2025-05-16 PROCEDURE — 86765 RUBEOLA ANTIBODY: CPT | Performed by: INTERNAL MEDICINE

## 2025-05-16 PROCEDURE — 86735 MUMPS ANTIBODY: CPT | Performed by: INTERNAL MEDICINE

## 2025-05-16 PROCEDURE — 86762 RUBELLA ANTIBODY: CPT | Performed by: INTERNAL MEDICINE

## 2025-05-17 ENCOUNTER — RESULTS FOLLOW-UP (OUTPATIENT)
Dept: INFECTIOUS DISEASES | Facility: CLINIC | Age: 65
End: 2025-05-17
Payer: COMMERCIAL

## 2025-05-17 LAB
MEV IGG SER IA-ACNC: >300 AU/ML
MUV IGG SER IA-ACNC: 60.5 AU/ML
RUBV IGG SERPL IA-ACNC: <0.9 INDEX

## 2025-05-19 NOTE — TELEPHONE ENCOUNTER
Per Dr. Dawn's request, I called patient and informed her that per Dr. Dawn, her recent labs showed that she has immunity to both measles and mumps. I also told her that Dr. Dawn said that she does not have immunity to rubella which is [place] measles but not to worry as it was eliminated fro the [place] more than 20 years ago. Patient stated understanding and denied having any questions.

## 2025-05-29 ENCOUNTER — OFFICE VISIT (OUTPATIENT)
Dept: FAMILY MEDICINE CLINIC | Facility: CLINIC | Age: 65
End: 2025-05-29
Payer: COMMERCIAL

## 2025-05-29 VITALS
WEIGHT: 160 LBS | HEIGHT: 67 IN | DIASTOLIC BLOOD PRESSURE: 80 MMHG | BODY MASS INDEX: 25.11 KG/M2 | RESPIRATION RATE: 16 BRPM | HEART RATE: 75 BPM | TEMPERATURE: 98.7 F | SYSTOLIC BLOOD PRESSURE: 136 MMHG | OXYGEN SATURATION: 98 %

## 2025-05-29 DIAGNOSIS — F33.42 MAJOR DEPRESSIVE DISORDER, RECURRENT, IN FULL REMISSION: ICD-10-CM

## 2025-05-29 DIAGNOSIS — Z12.11 SCREENING FOR COLON CANCER: ICD-10-CM

## 2025-05-29 DIAGNOSIS — K21.9 CHRONIC GERD: ICD-10-CM

## 2025-05-29 DIAGNOSIS — F41.9 ANXIETY: ICD-10-CM

## 2025-05-29 DIAGNOSIS — E78.2 MIXED HYPERLIPIDEMIA: ICD-10-CM

## 2025-05-29 DIAGNOSIS — R73.02 IGT (IMPAIRED GLUCOSE TOLERANCE): ICD-10-CM

## 2025-05-29 DIAGNOSIS — F51.01 PRIMARY INSOMNIA: Primary | ICD-10-CM

## 2025-05-29 DIAGNOSIS — G62.0 DRUG-INDUCED POLYNEUROPATHY: ICD-10-CM

## 2025-05-29 DIAGNOSIS — G43.009 MIGRAINE WITHOUT AURA AND WITHOUT STATUS MIGRAINOSUS, NOT INTRACTABLE: ICD-10-CM

## 2025-05-29 NOTE — PROGRESS NOTES
Subjective     Richa Dolan is a 64 y.o. female.     Chief Complaint   Patient presents with    Primary insomnia     F/u     Anxiety    Hyperlipidemia    Migraine       History of Present Illness     Presents for FU On the followings;      Insomnia; she is on Ambien 10 mg QHS, take 5 mg + zquil, helps with sleeping. No S/E reported      Anxiety with depression;  doing well with current Rx, no S/E reported.       HLD;  last lipid wnl. well controlled with current Rx, no S/E reported.  Eats HD      IGT; Last A1c 5.7, eats HD    Osteoporosis ; on injection every 6 months by oncology.     PN ;sx on/off, doing well on Elavil     Migraine HA ;has HA less frequent  . On monthly injection form Neurology     GERD;  well controlled with current Rx, no S/E reported.       Lab Results   Component Value Date    HGBA1C 5.7 03/11/2025    HGBA1C 5.7 (H) 12/04/2024    HGBA1C 5.8 (H) 03/05/2024      Lab Results   Component Value Date    CHLPL 162 12/04/2024    TRIG 62 12/04/2024    HDL 64 12/04/2024    LDL 86 12/04/2024           The following portions of the patient's history were reviewed and updated as appropriate: allergies, current medications, past family history, past medical history, past social history, past surgical history, and problem list.        Review of Systems   Musculoskeletal:  Positive for back pain.   Neurological:  Positive for numbness.       Vitals:    05/29/25 1405   BP: 136/80   Pulse: 75   Resp: 16   Temp: 98.7 °F (37.1 °C)   SpO2: 98%           05/29/25  1405   Weight: 72.6 kg (160 lb)         Body mass index is 25.05 kg/m².      Current Outpatient Medications   Medication Sig Dispense Refill    Abacavir-Dolutegravir-Lamivud (Triumeq) 600- MG per tablet Take  by mouth Daily.      AIMOVIG 140 MG/ML solution auto-injector Inject 1 mL under the skin into the appropriate area as directed Every 30 (Thirty) Days. HAS NOT TAKEN IN SEVERAL MONTHS  11    amitriptyline (ELAVIL) 25 MG tablet TAKE 1 TABLET  BY MOUTH EVERY NIGHT 90 tablet 3    Aspirin 81 MG capsule Take 1 tablet by mouth Daily.      atorvastatin (LIPITOR) 20 MG tablet TAKE 1 TABLET BY MOUTH EVERY DAY AT NIGHT 90 tablet 1    baclofen (LIORESAL) 10 MG tablet Take 1 tablet by mouth 2 (Two) Times a Day.  2    CALCIUM-MAGNESIUM-VITAMIN D PO Take  by mouth.      colesevelam (WELCHOL) 625 MG tablet TAKE 2 TABLETS BY MOUTH DAILY 180 tablet 11    escitalopram (LEXAPRO) 20 MG tablet TAKE 1 TABLET BY MOUTH DAILY 60 tablet 0    exemestane (AROMASIN) 25 MG tablet Take 1 tablet by mouth Daily. 90 tablet 1    fluticasone (FLONASE) 50 MCG/ACT nasal spray SPRAY 2 SPRAYS INTO THE NOSTRIL AS DIRECTED BY PROVIDER DAILY. 48 mL 3    Loratadine (CLARITIN PO) Take 10 mg by mouth Daily.      NON FORMULARY Compound cream (ketamine, gabapentin, clonidine, and lidocaine)      omeprazole (priLOSEC) 40 MG capsule TAKE 1 CAPSULE BY MOUTH DAILY BEFORE SUPPER 90 capsule 1    oxyCODONE-acetaminophen (PERCOCET)  MG per tablet Take 1 tablet by mouth 4 (Four) Times a Day.      oxyCODONE-acetaminophen (PERCOCET)  MG per tablet Take 1 tablet by mouth 4 (Four) Times a Day. 120 tablet 0    vitamin B-12 (CYANOCOBALAMIN) 1000 MCG tablet Take 1 tablet by mouth Daily.      vitamin B-6 (pyridoxine) 50 MG tablet Take 1 tablet by mouth Daily. 90 tablet 2    zolpidem (AMBIEN) 10 MG tablet Take 0.5 tablets by mouth At Night As Needed for Sleep for up to 90 days. 45 tablet 0     No current facility-administered medications for this visit.                Objective   Physical Exam  Vitals and nursing note reviewed.   Constitutional:       General: She is not in acute distress.     Appearance: She is not toxic-appearing.   Cardiovascular:      Rate and Rhythm: Normal rate and regular rhythm.      Heart sounds: Normal heart sounds. No murmur heard.  Pulmonary:      Effort: Pulmonary effort is normal. No respiratory distress.      Breath sounds: Normal breath sounds. No stridor. No wheezing or  rhonchi.   Skin:     General: Skin is dry.   Neurological:      Mental Status: She is alert and oriented to person, place, and time.   Psychiatric:         Mood and Affect: Mood normal.         Behavior: Behavior normal.         Thought Content: Thought content normal.           Assessment & Plan   Diagnoses and all orders for this visit:    1. Primary insomnia (Primary)  Comments:  Stable, continue current Rx  next refill will be ambein 5 mg    2. Anxiety  Comments:  Stable, continue current Rx    3. Mixed hyperlipidemia  Comments:  Stable, continue current Rx    4. Drug-induced polyneuropathy  Comments:  Stable, continue current Rx    5. Major depressive disorder, recurrent, in full remission  Comments:  Stable, continue current Rx    6. IGT (impaired glucose tolerance)  Comments:  continue HD    7. Migraine without aura and without status migrainosus, not intractable  Comments:  Stable, continue current Rx    8. Chronic GERD  Comments:  Stable, continue current Rx prn    9. Screening for colon cancer  -     Cologuard - Stool, Per Rectum; Future      UDS in chart  PDMP reviwed , looks approperiate   Controlled substance medication agreement forms copy in chart.   Medication sent in.    Adverse effects and risks discussed.       Patient was given instructions and counseling regarding her condition or for health maintenance advice. Please see specific information pulled into the AVS if appropriate.       I have fully discussed the nature of the medical condition(s) risks, complications, management, safe and proper use of medications.   Pt stated no allergy to the above prescribed medication.  I have discussed the SIDE EFFECT OF MEDICATION and importance TO report any side effect , the patient expressed good understanding.  Encouraged medication compliance and the importance of keeping scheduled follow up appointments with me and any other providers.    Patient instructed to follow up with our office for results on  any labs/imaging ordered during this visit.    Home care discussed  All questions answered  Patient verbalizes understanding and agrees to treatment plan.     Follow up: Return in about 3 months (around 8/29/2025) for follow up on current illness.

## 2025-06-02 NOTE — PROGRESS NOTES
Chief Complaint   Patient presents with    Follow-up     Lab review     6/3/2025, interval history:  Patient is here for 3 month follow-up accompanied by her  for follow-up.  She is due today for Prolia.  She also continues Aromasin with good tolerance.  She has continued to experience vaginal dryness and admittedly did not try what we suggested at her last visit.  I did give her a handout today on both vaginal moisturizers and vaginal lubricants, discussing the difference and how best to use them.  She denies other concerns at this time.    ONCOLOGIC HISTORY:  Patient is a 59-year-old female who has had a history of HIV since age 34 followed by Dr. Natali Ng at [facility] and was on multiple HIV drugs initially but more recently has been on a single medication TRIUMEQ, with well-controlled HIV.  She follows up with Dr. Dawn , infectious disease who saw her recently and he saw her on 4/8/2019 and has excellent control and suppression of her viral load.  She is very compliant with her medications.    She also has had history of stroke in 2017 for which she was placed on aspirin.  She presented with a headache.  She is very active and works at [employer] full-time.  She also has had history of vertigo in the past  Patient's PCP is Zach Lora.    Patient first noticed the mass in her left breast 5 months ago.  Her last mammogram was 5 years ago.  She had soreness in the left breast and she went to her primary care physician who then ordered a mammogram diagnostic and an ultrasound.  The diagnostic mammogram showed a 3.7 cm mass at 4 o'clock position in the lower outer quadrant of the left breast.  The ultrasound showed 3.8 cm x 3.1 x 2.5 cm mass at 4 o'clock position in the left breast with 2 abnormal appearing left axillary lymph nodes the largest being 1.4 cm.    She then underwent biopsy of both the breast mass as well as the left axillary lymph node and both of them are  positive for invasive mammary carcinoma it is invasive ductal carcinoma with apocrine features, moderately differentiated, grade 2 of 3 with a Jen score of 5.  The left axillary lymph node is also consistent with metastatic mammary carcinoma.  It is ER positive KS positive HER-2/merna negative.  Details as follows    10/21/19 - Bilateral Diagnostic Mammogram and US Breast Left  FINDINGS: Bilateral digital CC and MLO mammographic images were  obtained. No prior examination is available for comparison. Scattered  fibroglandular densities are seen throughout both breasts. A triangular  skin marker represents the area of palpable concern in the lower outer  quadrant of the left breast in the posterior 1/3. At this location there  is an irregular mass that measures on the order of 3.7 cm in greatest  dimension. Internal calcifications are noted. No suspicious findings in  the right breast are appreciated.     ULTRASOUND: Targeted sonographic evaluation of the left breast was  performed through the area of concern in the left axilla. At the 4  o'clock position on the order of 6 cm from the nipple there is an  irregular hypoechoic mass that measures 3.8 x 3.1 x 2.5 cm. Internal  vascularity is noted.     There are 2 abnormal-appearing left axillary lymph nodes, the largest  which measures on the order of 1.4 cm in greatest dimension.     IMPRESSION:  1. There is a 3.8 cm irregular mass in the left breast at the 4 o'clock  position. This is seen in conjunction with an irregular 1.4 cm lymph  node in the left axilla. This is suspicious for malignancy with left  axillary involvement. Correlation with ultrasound-guided biopsy of both  the left breast mass and the lymph node is recommended.  2. There are no findings suspicious for malignancy in the right breast.     BI-RADS CATEGORY 5:     Patient's labs from 11/12/19 are normal.    10/29/19 - Tissue Pathology  1. Left Breast, 4:00, 6 cm FN, U/S-Guided Core Needle Biopsy  for a Mass:  A. INVASIVE DUCTAL CARCINOMA WITH APOCRINE FEATURES, Moderately differentiated;  Jen Histologic Grade II/III (tubule score = 3, nuclear score = 2, mitoses score = 1), measuring at least  9 mm.  B. No ductal or lobular carcinoma in situ is identified in this sample.  C. Focus suspicious for lymphovascular space invasion.  D. See Biomarker Template for hormone receptor studies.  2. Lymph Node, Left Axilla, U/S-Guided Core Needle Biopsy:  A. METASTATIC MAMMARY CARCINOMA (see Comment).  B. Metastatic focus measures 5 mm in greatest extent.  ER+, 85%  SC+, 10%  HER2- Score 2+    11/13/19 - CT Neck Chest Abdomen Pelvis  IMPRESSION:  1. In addition to the irregular approximately 3.8 cm mass within the  lateral aspect of the left breast, there are a few subcentimeter  asymmetric nodules along the lateral margin of the glandular tissue. The  several asymmetric left subpectoral nodes and 1.2 x 1.0 cm left axillary  node are suspicious for tamar metastases. The asymmetric subcentimeter  left supraclavicular and left posterior cervical triangle nodes are  worrisome as well.  2. There is no convincing evidence for metastatic disease within the  abdomen or pelvis.    11/14/19 - Echocardiogram:  Normal.  Calculated EF = 67%.  There is trace mitral valve and tricuspid valve regurgitation.    11/15/19 - Bone Scan  Negative.    11/18/19 - MRI Breast Bilateral  IMPRESSION:  1. Biopsy-proven malignancy in the left breast centered at the 4 o'clock  position with associated surrounding satellite nodules as described. The  entire region of involvement including the satellite nodules and the  dominant mass measure up to 7.5 cm in greatest dimension. Also, left  axillary adenopathy with multiple irregular lymph nodes and loss of  differentiation of the borders of multiple lymph nodes is noted and this  is suspicious for extranodal involvement.  2. There are no findings suspicious for malignancy in the right  breast.  BI-RADS CATEGORY 6      19: Cycle #1 of Adriamycin/Cytoxan    20: Last cycle of Adriamycin/Cytoxan given without Neulasta.  We will switch from Neulasta to 7 days of Neupogen injections after cycle 4.    We reviewed with patient the US Breast from 20 which shows mild interval partial response to neoadjuvant chemotherapy.      20: Initiated cycle 1 Taxol  2020: Last cycle of Taxol, cycle 12    S/p left mastectomy with axillary dissection   Pathology showed invasive breast cancer which is 55 x 40 mm in size, grade 2 with focal lymphovascular space invasion with DCIS spanning over 36 x 6 mm.  All margins were negative for cancer.  8 of the additional lymph nodes out of 11 were positive with the largest micrometastasis measuring 5 mm.  Extranodal extension was seen.    Biomarkers on post neoadjuvant tumor is ER 81-90% KS 5% HER-2/merna 2+ by immunohistochemistry and HER-2/merna negative by FISH.    2020: Started letrozole but because of severe joint pains was discontinued 2020    End of 2020 started tamoxifen    OB-GYN:  Menarche 15 years  Menopause-46  Pregnancies- 1 para 1 no miscarriages her first pregnancy was in  at 29 years of age.  She did not breastfeed.  Post menopausal HRT- None.  Hx of birth control pills- Yes.    Current Outpatient Medications on File Prior to Visit   Medication Sig Dispense Refill    Abacavir-Dolutegravir-Lamivud (Triumeq) 600- MG per tablet Take  by mouth Daily.      AIMOVIG 140 MG/ML solution auto-injector Inject 1 mL under the skin into the appropriate area as directed Every 30 (Thirty) Days. HAS NOT TAKEN IN SEVERAL MONTHS  11    amitriptyline (ELAVIL) 25 MG tablet TAKE 1 TABLET BY MOUTH EVERY NIGHT 90 tablet 3    Aspirin 81 MG capsule Take 1 tablet by mouth Daily.      atorvastatin (LIPITOR) 20 MG tablet TAKE 1 TABLET BY MOUTH EVERY DAY AT NIGHT 90 tablet 1    baclofen (LIORESAL) 10 MG tablet Take 1 tablet by  "mouth 2 (Two) Times a Day.  2    CALCIUM-MAGNESIUM-VITAMIN D PO Take  by mouth.      colesevelam (WELCHOL) 625 MG tablet TAKE 2 TABLETS BY MOUTH DAILY 180 tablet 11    escitalopram (LEXAPRO) 20 MG tablet TAKE 1 TABLET BY MOUTH DAILY 60 tablet 0    fluticasone (FLONASE) 50 MCG/ACT nasal spray SPRAY 2 SPRAYS INTO THE NOSTRIL AS DIRECTED BY PROVIDER DAILY. 48 mL 3    Loratadine (CLARITIN PO) Take 10 mg by mouth Daily.      NON FORMULARY Compound cream (ketamine, gabapentin, clonidine, and lidocaine)      omeprazole (priLOSEC) 40 MG capsule TAKE 1 CAPSULE BY MOUTH DAILY BEFORE SUPPER 90 capsule 1    oxyCODONE-acetaminophen (PERCOCET)  MG per tablet Take 1 tablet by mouth 4 (Four) Times a Day.      oxyCODONE-acetaminophen (PERCOCET)  MG per tablet Take 1 tablet by mouth 4 (Four) Times a Day. 120 tablet 0    vitamin B-12 (CYANOCOBALAMIN) 1000 MCG tablet Take 1 tablet by mouth Daily.      vitamin B-6 (pyridoxine) 50 MG tablet Take 1 tablet by mouth Daily. 90 tablet 2    zolpidem (AMBIEN) 10 MG tablet Take 0.5 tablets by mouth At Night As Needed for Sleep for up to 90 days. 45 tablet 0    [DISCONTINUED] exemestane (AROMASIN) 25 MG tablet Take 1 tablet by mouth Daily. 90 tablet 1     Current Facility-Administered Medications on File Prior to Visit   Medication Dose Route Frequency Provider Last Rate Last Admin    [COMPLETED] denosumab (PROLIA) syringe 60 mg  60 mg Subcutaneous Once Nikki Walters APRN   60 mg at 06/03/25 1441        ALLERGIES:    Allergies   Allergen Reactions    Augmentin [Amoxicillin-Pot Clavulanate] Hives        I have reviewed Past Medical, Surgical History, Social History, Meds and Allergies.     REVIEW OF SYSTEMS:  See interval History      Objective    Vitals:    06/03/25 1423   BP: 122/73   Pulse: 72   Temp: 98.4 °F (36.9 °C)   TempSrc: Oral   SpO2: 98%   Weight: 72.7 kg (160 lb 3.2 oz)   Height: 170.2 cm (67.01\")   PainSc: 6    PainLoc: Back           Physical " Exam  Constitutional:       Appearance: Normal appearance.   HENT:      Head: Normocephalic and atraumatic.      Mouth/Throat:      Mouth: Mucous membranes are moist.      Pharynx: Oropharynx is clear.   Eyes:      Extraocular Movements: Extraocular movements intact.      Pupils: Pupils are equal, round, and reactive to light.   Cardiovascular:      Rate and Rhythm: Normal rate and regular rhythm.   Pulmonary:      Effort: Pulmonary effort is normal.      Breath sounds: Normal breath sounds.   Chest:       Abdominal:      General: Bowel sounds are normal.      Palpations: Abdomen is soft.   Musculoskeletal:      Cervical back: Normal range of motion and neck supple.   Neurological:      General: No focal deficit present.      Mental Status: She is alert and oriented to person, place, and time.   Psychiatric:         Mood and Affect: Mood normal.         Behavior: Behavior normal.       I have reexamined the patient and the results are consistent with the previously documented exam. ОЛЬГА Sher       RECENT LABS:   Results from last 7 days   Lab Units 06/03/25  1359   WBC 10*3/mm3 6.24   NEUTROS ABS 10*3/mm3 3.54   HEMOGLOBIN g/dL 14.6   HEMATOCRIT % 44.6   PLATELETS 10*3/mm3 260     Results from last 7 days   Lab Units 06/03/25  1359   SODIUM mmol/L 141   POTASSIUM mmol/L 3.7   CHLORIDE mmol/L 104   CO2 mmol/L 23.9   BUN mg/dL 9.6   CREATININE mg/dL 0.74   CALCIUM mg/dL 10.0   ALBUMIN g/dL 5.2   BILIRUBIN mg/dL 0.4   ALK PHOS U/L 77   ALT (SGPT) U/L 16   AST (SGOT) U/L 26   GLUCOSE mg/dL 110*   MAGNESIUM mg/dL 2.2             Assessment & Plan    * pT3 pN3 invasive ductal carcinoma, Left breast, ER/OK positive  10/21/2019 diagnostic mammogram showed 3.7 mm mass in the left breast at 4 o'clock position. Ultrasound-showed 3.8 x 3.1 x 2.5 cm mass at 4 o'clock position with 2 abnormal axillary lymph nodes, larger lymph node being 1.4 cm  October 29, 2019 Left breast biopsy and axillary lymph node biopsy-  invasive ductal carcinoma, moderately differentiated, grade 2, ER 85%, SD 10%, HER-2/merna 2+, HER-2 FISH negative  11/13/19 CT scans- asymmetric nodules along the lateral margin of the glandular tissue.  The 1.2x1.0 cm left axillary nodes, left supraclavicular, and left posterior cervical triangle nodes are suspicious for tamar metastases.  11/15/2019 bone scan- negative.   11/18/19 MRI breast - biopsy-proven malignancy in the left breast centered at the 4:00 position.  The region of involvement measures up to 7.5 cm.  There is left axillary adenopathy with multiple irregular lymph nodes and loss of differentiation of the border of multiple lymph nodes which are suspicious for extranodal involvement.  11/14/19 INVITAE - negative.  12/06/19 - 01/17/2020: s/p 4 cycles of neoadjuvant Adriamycin/Cytoxan  US Breast from 01/29/20 after 4 cycles of Adriamycin Cytoxan chemotherapy which shows mild interval partial response to neoadjuvant chemotherapy.  The mass has decreased from 3.8 x 2.5 to 3.1 cm to 3.5 x 2.3 x 3.2 cm previously the left axillary lymph node has decreased from 1.4 x 0.7 x 1 cm to 1.1 x 0.6 x 0.9 cm.  01/31/2020 - 04/24/2020: s/p neoadjuvant 12 cycles of Taxol  5/6/2020 MRI breast shows significant reduction in the left axillary adenopathy as well as reduction in the index breast lesion.  05/28/2020 S/p left breast lumpectomy with left axillary dissection.  Patient has 55 x 40 x 30 mm invasive carcinoma, grade 2 with DCIS 36 x 6 mm, high-grade with good margins and 8 out of 11 lymph nodes positive with largest metastasis being 5 mm with extracapsular extension.  And there is lymphatic space invasion  August 17, 2020 s/p adj XRT   August 2020 Started Endocrine therapy x 10 years.  She was initially started on letrozole, switched to tamoxifen in November 2020 due to severe arthralgia.  August 2021, tamoxifen discontinued due to significant arthralgia and worsening fatigue.  November 2021 started on Arimidex,  discontinued in October 2021 due to significant generalized arthralgia.  December 2021 switched to exemestane  8/15/2024 continues to tolerate Aromasin well.  Anxiety better controlled now back on Lexapro.  Plan to continue AI therapy for total of 10 years considering tamar involvement.  December 2024: Patient establish care with me.  Tolerating exemestane quite well.  Plan to continue for a total of 10 years, end date August 2030 March 2025: Tolerating Aromasin well overall.  Reviewed results of mammogram from January, BI-RADS 2.  Tolerating exemestane well.  6/3/2025 patient continues to tolerate exemestane well.  No new concerns.  Continue current plan of care.    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  Patient has not yet tried interventions and she was given a printout discussing vaginal moisturizers versus lubricants.    *Osteoporosis  August 2024 DEXA scan-osteoporosis.  Started on Prolia in 11/2022, continue every 6 months.  Will need DEXA scan every 2 years while on AI.  Due in August 2026    * HIV, followed by Dr. Dawn in infectious disease.       * Family history of breast cancer with mother having had breast cancer at age 60.  There is family history of uncle with prostate cancer.  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    On amitriptyline, managed by her Neurologist      PLAN:   Proceed with Prolia today.  Continue Aromasin. End date Aug 2030. Refilled today.  Continue tart cherry extract   Discussed trial of HyaloGyn for vaginal dryness. Handout  provided.  Mammogram from January 2025-BI-RADS 2.  Continue Xanax 0.5 mg 3 times a day as needed.  She also continues on Lexapro managed by her primary care  Due for annual mammogram in January 2026.    DEXA due in August 2026  Continue calcium and vitamin D supplements  Return in 6 months for follow-up with Dr. Perez and pamela Tavares.    This patient is on high risk drug therapy requiring intensive monitoring for toxicity.          This patient is on high risk drug therapy requiring intensive monitoring for toxicity.

## 2025-06-03 ENCOUNTER — INFUSION (OUTPATIENT)
Dept: ONCOLOGY | Facility: HOSPITAL | Age: 65
End: 2025-06-03
Payer: COMMERCIAL

## 2025-06-03 ENCOUNTER — LAB (OUTPATIENT)
Dept: LAB | Facility: HOSPITAL | Age: 65
End: 2025-06-03
Payer: COMMERCIAL

## 2025-06-03 ENCOUNTER — PATIENT ROUNDING (BHMG ONLY) (OUTPATIENT)
Dept: FAMILY MEDICINE CLINIC | Facility: CLINIC | Age: 65
End: 2025-06-03
Payer: COMMERCIAL

## 2025-06-03 ENCOUNTER — OFFICE VISIT (OUTPATIENT)
Dept: ONCOLOGY | Facility: CLINIC | Age: 65
End: 2025-06-03
Payer: COMMERCIAL

## 2025-06-03 VITALS
WEIGHT: 160.2 LBS | HEIGHT: 67 IN | BODY MASS INDEX: 25.15 KG/M2 | OXYGEN SATURATION: 98 % | TEMPERATURE: 98.4 F | SYSTOLIC BLOOD PRESSURE: 122 MMHG | DIASTOLIC BLOOD PRESSURE: 73 MMHG | HEART RATE: 72 BPM

## 2025-06-03 DIAGNOSIS — Z17.0 MALIGNANT NEOPLASM OF OVERLAPPING SITES OF LEFT BREAST IN FEMALE, ESTROGEN RECEPTOR POSITIVE: Primary | ICD-10-CM

## 2025-06-03 DIAGNOSIS — M81.0 AGE-RELATED OSTEOPOROSIS WITHOUT CURRENT PATHOLOGICAL FRACTURE: ICD-10-CM

## 2025-06-03 DIAGNOSIS — C50.812 MALIGNANT NEOPLASM OF OVERLAPPING SITES OF LEFT BREAST IN FEMALE, ESTROGEN RECEPTOR POSITIVE: Primary | ICD-10-CM

## 2025-06-03 DIAGNOSIS — Z79.899 HIGH RISK MEDICATION USE: ICD-10-CM

## 2025-06-03 DIAGNOSIS — N89.8 VAGINAL DRYNESS: ICD-10-CM

## 2025-06-03 DIAGNOSIS — Z79.811 USE OF EXEMESTANE (AROMASIN): Primary | ICD-10-CM

## 2025-06-03 DIAGNOSIS — Z79.811 USE OF EXEMESTANE (AROMASIN): ICD-10-CM

## 2025-06-03 DIAGNOSIS — M81.8 OTHER OSTEOPOROSIS WITHOUT CURRENT PATHOLOGICAL FRACTURE: ICD-10-CM

## 2025-06-03 LAB
ALBUMIN SERPL-MCNC: 5.2 G/DL (ref 3.5–5.2)
ALBUMIN/GLOB SERPL: 1.9 G/DL
ALP SERPL-CCNC: 77 U/L (ref 39–117)
ALT SERPL W P-5'-P-CCNC: 16 U/L (ref 1–33)
ANION GAP SERPL CALCULATED.3IONS-SCNC: 13.1 MMOL/L (ref 5–15)
AST SERPL-CCNC: 26 U/L (ref 1–32)
BASOPHILS # BLD AUTO: 0.03 10*3/MM3 (ref 0–0.2)
BASOPHILS NFR BLD AUTO: 0.5 % (ref 0–1.5)
BILIRUB SERPL-MCNC: 0.4 MG/DL (ref 0–1.2)
BUN SERPL-MCNC: 9.6 MG/DL (ref 8–23)
BUN/CREAT SERPL: 13 (ref 7–25)
CALCIUM SPEC-SCNC: 10 MG/DL (ref 8.6–10.5)
CHLORIDE SERPL-SCNC: 104 MMOL/L (ref 98–107)
CO2 SERPL-SCNC: 23.9 MMOL/L (ref 22–29)
CREAT SERPL-MCNC: 0.74 MG/DL (ref 0.57–1)
DEPRECATED RDW RBC AUTO: 48.2 FL (ref 37–54)
EGFRCR SERPLBLD CKD-EPI 2021: 90.5 ML/MIN/1.73
EOSINOPHIL # BLD AUTO: 0.07 10*3/MM3 (ref 0–0.4)
EOSINOPHIL NFR BLD AUTO: 1.1 % (ref 0.3–6.2)
ERYTHROCYTE [DISTWIDTH] IN BLOOD BY AUTOMATED COUNT: 12.7 % (ref 12.3–15.4)
GLOBULIN UR ELPH-MCNC: 2.8 GM/DL
GLUCOSE SERPL-MCNC: 110 MG/DL (ref 65–99)
HCT VFR BLD AUTO: 44.6 % (ref 34–46.6)
HGB BLD-MCNC: 14.6 G/DL (ref 12–15.9)
IMM GRANULOCYTES # BLD AUTO: 0.01 10*3/MM3 (ref 0–0.05)
IMM GRANULOCYTES NFR BLD AUTO: 0.2 % (ref 0–0.5)
LYMPHOCYTES # BLD AUTO: 1.99 10*3/MM3 (ref 0.7–3.1)
LYMPHOCYTES NFR BLD AUTO: 31.9 % (ref 19.6–45.3)
MAGNESIUM SERPL-MCNC: 2.2 MG/DL (ref 1.6–2.4)
MCH RBC QN AUTO: 33.2 PG (ref 26.6–33)
MCHC RBC AUTO-ENTMCNC: 32.7 G/DL (ref 31.5–35.7)
MCV RBC AUTO: 101.4 FL (ref 79–97)
MONOCYTES # BLD AUTO: 0.6 10*3/MM3 (ref 0.1–0.9)
MONOCYTES NFR BLD AUTO: 9.6 % (ref 5–12)
NEUTROPHILS NFR BLD AUTO: 3.54 10*3/MM3 (ref 1.7–7)
NEUTROPHILS NFR BLD AUTO: 56.7 % (ref 42.7–76)
NRBC BLD AUTO-RTO: 0 /100 WBC (ref 0–0.2)
PHOSPHATE SERPL-MCNC: 4 MG/DL (ref 2.5–4.5)
PLATELET # BLD AUTO: 260 10*3/MM3 (ref 140–450)
PMV BLD AUTO: 9.7 FL (ref 6–12)
POTASSIUM SERPL-SCNC: 3.7 MMOL/L (ref 3.5–5.2)
PROT SERPL-MCNC: 8 G/DL (ref 6–8.5)
RBC # BLD AUTO: 4.4 10*6/MM3 (ref 3.77–5.28)
SODIUM SERPL-SCNC: 141 MMOL/L (ref 136–145)
WBC NRBC COR # BLD AUTO: 6.24 10*3/MM3 (ref 3.4–10.8)

## 2025-06-03 PROCEDURE — 85025 COMPLETE CBC W/AUTO DIFF WBC: CPT

## 2025-06-03 PROCEDURE — 25010000002 DENOSUMAB 60 MG/ML SOLUTION PREFILLED SYRINGE: Performed by: NURSE PRACTITIONER

## 2025-06-03 PROCEDURE — 96372 THER/PROPH/DIAG INJ SC/IM: CPT

## 2025-06-03 PROCEDURE — 80053 COMPREHEN METABOLIC PANEL: CPT

## 2025-06-03 PROCEDURE — 83735 ASSAY OF MAGNESIUM: CPT

## 2025-06-03 PROCEDURE — 84100 ASSAY OF PHOSPHORUS: CPT

## 2025-06-03 PROCEDURE — 36415 COLL VENOUS BLD VENIPUNCTURE: CPT

## 2025-06-03 RX ORDER — EXEMESTANE 25 MG/1
25 TABLET ORAL DAILY
Qty: 90 TABLET | Refills: 1 | Status: SHIPPED | OUTPATIENT
Start: 2025-06-03

## 2025-06-03 RX ADMIN — DENOSUMAB 60 MG: 60 INJECTION SUBCUTANEOUS at 14:41

## 2025-06-03 NOTE — PROGRESS NOTES
Bella 3, 2025    Hello, may I speak with Richa Dolan?    My name is Aishwarya Ross      I am  with [facility]  [address] [place] [id number] [phone].    Before we get started may I verify your date of birth? 1960    I am calling to officially welcome you to our practice and ask about your recent visit. Is this a good time to talk? No, patient message sent

## 2025-06-06 DIAGNOSIS — G47.00 INSOMNIA, UNSPECIFIED TYPE: ICD-10-CM

## 2025-06-06 NOTE — TELEPHONE ENCOUNTER
Caller: Richa Dolan    Relationship: Self    Best call back number: [phone]     Requested Prescriptions:   Requested Prescriptions     Pending Prescriptions Disp Refills    zolpidem (AMBIEN) 10 MG tablet 45 tablet 0     Sig: Take 0.5 tablets by mouth At Night As Needed for Sleep for up to 90 days.        Pharmacy where request should be sent: [facility] #[id number] - [place], [place] - [address] AT [address] - [phone] [facility] [phone] FX     Last office visit with prescribing clinician: 5/29/2025   Last telemedicine visit with prescribing clinician: Visit date not found   Next office visit with prescribing clinician: 8/27/2025     Additional details provided by patient:      Does the patient have less than a 3 day supply:  [] Yes  [] No    Would you like a call back once the refill request has been completed: [] Yes [] No    If the office needs to give you a call back, can they leave a voicemail: [] Yes [] No    Ana Craven   06/06/25 09:00 EDT

## 2025-06-06 NOTE — TELEPHONE ENCOUNTER
Rx Refill Note  Requested Prescriptions     Pending Prescriptions Disp Refills    zolpidem (AMBIEN) 10 MG tablet 45 tablet 0     Sig: Take 0.5 tablets by mouth At Night As Needed for Sleep for up to 90 days.      Last office visit with prescribing clinician: 5/29/2025   Last telemedicine visit with prescribing clinician: Visit date not found   Next office visit with prescribing clinician: 8/27/2025                         Would you like a call back once the refill request has been completed: [] Yes [] No    If the office needs to give you a call back, can they leave a voicemail: [] Yes [] No    Timothy Ramirez MA  06/06/25, 09:05 EDT

## 2025-06-08 RX ORDER — ZOLPIDEM TARTRATE 10 MG/1
5 TABLET ORAL NIGHTLY PRN
Qty: 45 TABLET | Refills: 0 | Status: SHIPPED | OUTPATIENT
Start: 2025-06-10 | End: 2025-09-08

## 2025-06-10 ENCOUNTER — PATIENT MESSAGE (OUTPATIENT)
Dept: FAMILY MEDICINE CLINIC | Facility: CLINIC | Age: 65
End: 2025-06-10
Payer: COMMERCIAL

## 2025-06-10 DIAGNOSIS — F41.9 ANXIETY: ICD-10-CM

## 2025-06-11 RX ORDER — ESCITALOPRAM OXALATE 20 MG/1
20 TABLET ORAL DAILY
Qty: 60 TABLET | Refills: 0 | Status: SHIPPED | OUTPATIENT
Start: 2025-06-11

## 2025-07-09 LAB — NONINV COLON CA DNA+OCC BLD SCRN STL QL: NORMAL

## 2025-07-18 LAB — NONINV COLON CA DNA+OCC BLD SCRN STL QL: NORMAL

## 2025-08-03 LAB — NONINV COLON CA DNA+OCC BLD SCRN STL QL: NEGATIVE

## 2025-08-11 DIAGNOSIS — F41.9 ANXIETY: ICD-10-CM

## 2025-08-11 RX ORDER — ESCITALOPRAM OXALATE 20 MG/1
20 TABLET ORAL DAILY
Qty: 60 TABLET | Refills: 0 | Status: SHIPPED | OUTPATIENT
Start: 2025-08-11

## (undated) DEVICE — STPLR SKIN VISISTAT WD 35CT

## (undated) DEVICE — ANTIBACTERIAL UNDYED BRAIDED (POLYGLACTIN 910), SYNTHETIC ABSORBABLE SUTURE: Brand: COATED VICRYL

## (undated) DEVICE — JACKSON-PRATT 100CC BULB RESERVOIR: Brand: CARDINAL HEALTH

## (undated) DEVICE — SUT MNCRYL 4/0 PS2 18 IN

## (undated) DEVICE — PK ARTHSCP 40

## (undated) DEVICE — GLV SURG PREMIERPRO ORTHO LTX PF SZ8 BRN

## (undated) DEVICE — NDL HYPO PRECISIONGLIDE REG 25G 1 1/2

## (undated) DEVICE — CVR TRANSD CIV FLX TPR 11.9 TO 3.8X61CM

## (undated) DEVICE — DRSNG SURESITE WNDW 4X4.5

## (undated) DEVICE — SYR LUERLOK 5CC

## (undated) DEVICE — BNDG ELAS ELITE V/CLOSE 4IN 5YD LF STRL

## (undated) DEVICE — Device

## (undated) DEVICE — GLV SURG BIOGEL LTX PF 8

## (undated) DEVICE — PENCL E/S ULTRAVAC TELESCP NOSE HOLSTR 10FT

## (undated) DEVICE — LOU MINOR PROCEDURE: Brand: MEDLINE INDUSTRIES, INC.

## (undated) DEVICE — SUT MNCRYL 3/0 PS2 18IN MCP497G

## (undated) DEVICE — DRAPE,REIN 53X77,STERILE: Brand: MEDLINE

## (undated) DEVICE — 3M™ STERI-STRIP™ REINFORCED ADHESIVE SKIN CLOSURES, R1547, 1/2 IN X 4 IN (12 MM X 100 MM), 6 STRIPS/ENVELOPE: Brand: 3M™ STERI-STRIP™

## (undated) DEVICE — DISPOSABLE TOURNIQUET CUFF SINGLE BLADDER, SINGLE PORT AND QUICK CONNECT CONNECTOR: Brand: COLOR CUFF

## (undated) DEVICE — PAD GRND REM POLYHESIVE A/ DISP

## (undated) DEVICE — APPL CHLORAPREP W/TINT 26ML ORNG

## (undated) DEVICE — 3M™ STERI-STRIP™ COMPOUND BENZOIN TINCTURE 40 BAGS/CARTON 4 CARTONS/CASE C1544: Brand: 3M™ STERI-STRIP™

## (undated) DEVICE — PK UNIV COMPL 40

## (undated) DEVICE — EXTRCT STPL SKIN STRL BX/12

## (undated) DEVICE — ADHS SKIN DERMABOND TOP ADVANCED

## (undated) DEVICE — NEEDLE, QUINCKE 22GX3.5": Brand: MEDLINE INDUSTRIES, INC.

## (undated) DEVICE — UNDERCAST PADDING: Brand: DEROYAL

## (undated) DEVICE — IRRIGATOR BULB ASEPTO 60CC STRL

## (undated) DEVICE — SCANLAN® SUTURE BOOT™ INSTRUMENT JAW COVERS - ORIGINAL YELLOW, STANDARD PKG (5 PAIR/CARTRIDGE, 1 CARTRIDGE/PKG): Brand: SCANLAN® SUTURE BOOT™ INSTRUMENT JAW COVERS

## (undated) DEVICE — TRAP FLD MINIVAC MEGADYNE 100ML

## (undated) DEVICE — INTENDED FOR TISSUE SEPARATION, AND OTHER PROCEDURES THAT REQUIRE A SHARP SURGICAL BLADE TO PUNCTURE OR CUT.: Brand: BARD-PARKER ® CARBON RIB-BACK BLADES

## (undated) DEVICE — BNDG ELAS ELITE V/CLOSE 6IN 5YD LF STRL

## (undated) DEVICE — SUT PROLN 2/0 CT2 30IN 8411H

## (undated) DEVICE — NDL HYPO ECLPS SFTY 22G 1 1/2IN

## (undated) DEVICE — 3M™ IOBAN™ 2 ANTIMICROBIAL INCISE DRAPE 6640EZ: Brand: IOBAN™ 2

## (undated) DEVICE — SPNG GZ WOVN 4X4IN 12PLY 10/BX STRL

## (undated) DEVICE — OCCLUSIVE GAUZE STRIP,3% BISMUTH TRIBROMOPHENATE IN PETROLATUM BLEND: Brand: XEROFORM

## (undated) DEVICE — GAUZE,SPONGE,FLUFF,6"X6.75",STRL,10/TRAY: Brand: MEDLINE

## (undated) DEVICE — MARKR SKIN W/RULR AND LBL

## (undated) DEVICE — SUT SILK 2/0 FS BLK 18IN 685G

## (undated) DEVICE — PAD,ABDOMINAL,8"X10",ST,LF: Brand: MEDLINE

## (undated) DEVICE — APPL CHLORAPREP W/TINT 10.5ML PERC STRL

## (undated) DEVICE — TBG PENCL TELESCP MEGADYNE SMOKE EVAC 10FT

## (undated) DEVICE — ELECTRD BLD EDGE/INSUL1P 2.4X5.1MM STRL

## (undated) DEVICE — GLV SURG BIOGEL LTX PF 8 1/2

## (undated) DEVICE — SKIN PREP TRAY W/CHG: Brand: MEDLINE INDUSTRIES, INC.

## (undated) DEVICE — BANDAGE,GAUZE,BULKEE II,4.5"X4.1YD,STRL: Brand: MEDLINE

## (undated) DEVICE — PATIENT RETURN ELECTRODE, SINGLE-USE, CONTACT QUALITY MONITORING, ADULT, WITH 9FT CORD, FOR PATIENTS WEIGING OVER 33LBS. (15KG): Brand: MEGADYNE

## (undated) DEVICE — GLV SURG PREMIERPRO ORTHO LTX PF SZ7.5 BRN

## (undated) DEVICE — PK CHST BRST 40

## (undated) DEVICE — SUT ETHLN 4/0 PS2 PLSTC 1667G

## (undated) DEVICE — GOWN,NON-REINFORCED,SIRUS,SET IN SLV,XXL: Brand: MEDLINE

## (undated) DEVICE — GLV SURG TRIUMPH CLASSIC PF LTX 8 STRL

## (undated) DEVICE — SUT PDS 2/0 CT1 27IN DYED Z339H

## (undated) DEVICE — TOTAL TRAY, 16FR 10ML SIL FOLEY, URN: Brand: MEDLINE

## (undated) DEVICE — SOL NACL 0.9PCT 100ML SGL

## (undated) DEVICE — SUT MNCRYL PLS ANTIB UD 4/0 PS2 18IN

## (undated) DEVICE — DRP C/ARM 41X74IN

## (undated) DEVICE — SUT VIC 3/0 TIES 18IN J110T

## (undated) DEVICE — BNDG ESMARK STRL 6INX12FT LF

## (undated) DEVICE — SUT ETHLN 3/0 PS1 18IN 1663H

## (undated) DEVICE — 1010 S-DRAPE TOWEL DRAPE 10/BX: Brand: STERI-DRAPE™